# Patient Record
Sex: FEMALE | Race: WHITE | NOT HISPANIC OR LATINO | Employment: OTHER | URBAN - METROPOLITAN AREA
[De-identification: names, ages, dates, MRNs, and addresses within clinical notes are randomized per-mention and may not be internally consistent; named-entity substitution may affect disease eponyms.]

---

## 2017-01-20 ENCOUNTER — OFFICE VISIT (OUTPATIENT)
Dept: FAMILY MEDICINE | Facility: CLINIC | Age: 77
End: 2017-01-20
Payer: MEDICARE

## 2017-01-20 VITALS
SYSTOLIC BLOOD PRESSURE: 136 MMHG | WEIGHT: 141 LBS | DIASTOLIC BLOOD PRESSURE: 70 MMHG | TEMPERATURE: 97.5 F | OXYGEN SATURATION: 98 % | HEART RATE: 79 BPM | BODY MASS INDEX: 24.98 KG/M2

## 2017-01-20 DIAGNOSIS — R35.0 URINARY FREQUENCY: Primary | ICD-10-CM

## 2017-01-20 DIAGNOSIS — L65.9 HAIR THINNING: ICD-10-CM

## 2017-01-20 DIAGNOSIS — F43.9 STRESS: ICD-10-CM

## 2017-01-20 DIAGNOSIS — L98.9 SORE ON SCALP: ICD-10-CM

## 2017-01-20 DIAGNOSIS — R39.89 URINARY PROBLEM: ICD-10-CM

## 2017-01-20 DIAGNOSIS — I10 ESSENTIAL HYPERTENSION WITH GOAL BLOOD PRESSURE LESS THAN 140/90: ICD-10-CM

## 2017-01-20 DIAGNOSIS — R53.83 DECREASED ENERGY: ICD-10-CM

## 2017-01-20 DIAGNOSIS — R82.90 ABNORMAL URINE ODOR: ICD-10-CM

## 2017-01-20 DIAGNOSIS — Z86.39 HISTORY OF HYPOKALEMIA: ICD-10-CM

## 2017-01-20 DIAGNOSIS — E04.1 THYROID NODULE: ICD-10-CM

## 2017-01-20 LAB
ALBUMIN UR-MCNC: NEGATIVE MG/DL
AMORPH CRY #/AREA URNS HPF: ABNORMAL /HPF
APPEARANCE UR: ABNORMAL
BACTERIA #/AREA URNS HPF: ABNORMAL /HPF
BILIRUB UR QL STRIP: NEGATIVE
COLOR UR AUTO: YELLOW
GLUCOSE UR STRIP-MCNC: NEGATIVE MG/DL
HGB UR QL STRIP: NEGATIVE
KETONES UR STRIP-MCNC: NEGATIVE MG/DL
LEUKOCYTE ESTERASE UR QL STRIP: ABNORMAL
MICRO REPORT STATUS: NORMAL
NITRATE UR QL: POSITIVE
NON-SQ EPI CELLS #/AREA URNS LPF: ABNORMAL /LPF
PH UR STRIP: 8 PH (ref 5–7)
RBC #/AREA URNS AUTO: ABNORMAL /HPF (ref 0–2)
SP GR UR STRIP: 1.02 (ref 1–1.03)
SPECIMEN SOURCE: NORMAL
URN SPEC COLLECT METH UR: ABNORMAL
UROBILINOGEN UR STRIP-ACNC: 0.2 EU/DL (ref 0.2–1)
WBC #/AREA URNS AUTO: ABNORMAL /HPF (ref 0–2)
WET PREP SPEC: NORMAL

## 2017-01-20 PROCEDURE — 99214 OFFICE O/P EST MOD 30 MIN: CPT | Performed by: FAMILY MEDICINE

## 2017-01-20 PROCEDURE — 87088 URINE BACTERIA CULTURE: CPT | Performed by: FAMILY MEDICINE

## 2017-01-20 PROCEDURE — 81001 URINALYSIS AUTO W/SCOPE: CPT | Performed by: FAMILY MEDICINE

## 2017-01-20 PROCEDURE — 87186 SC STD MICRODIL/AGAR DIL: CPT | Performed by: FAMILY MEDICINE

## 2017-01-20 PROCEDURE — 87086 URINE CULTURE/COLONY COUNT: CPT | Performed by: FAMILY MEDICINE

## 2017-01-20 PROCEDURE — 87210 SMEAR WET MOUNT SALINE/INK: CPT | Performed by: FAMILY MEDICINE

## 2017-01-20 RX ORDER — FLUOXETINE 10 MG/1
10 CAPSULE ORAL DAILY
Qty: 90 CAPSULE | Refills: 1 | Status: SHIPPED | OUTPATIENT
Start: 2017-01-20 | End: 2017-07-27

## 2017-01-20 RX ORDER — LOSARTAN POTASSIUM 100 MG/1
100 TABLET ORAL
Qty: 90 TABLET | Refills: 1 | Status: SHIPPED | OUTPATIENT
Start: 2017-01-20 | End: 2017-04-10

## 2017-01-20 RX ORDER — POTASSIUM CHLORIDE 750 MG/1
10 TABLET, EXTENDED RELEASE ORAL 2 TIMES DAILY
Qty: 180 TABLET | Refills: 1 | Status: SHIPPED | OUTPATIENT
Start: 2017-01-20 | End: 2017-07-27

## 2017-01-20 RX ORDER — CARVEDILOL 25 MG/1
25 TABLET ORAL 2 TIMES DAILY WITH MEALS
Qty: 180 TABLET | Refills: 1 | Status: SHIPPED | OUTPATIENT
Start: 2017-01-20 | End: 2017-04-10

## 2017-01-20 ASSESSMENT — ANXIETY QUESTIONNAIRES
2. NOT BEING ABLE TO STOP OR CONTROL WORRYING: NOT AT ALL
GAD7 TOTAL SCORE: 1
3. WORRYING TOO MUCH ABOUT DIFFERENT THINGS: SEVERAL DAYS
7. FEELING AFRAID AS IF SOMETHING AWFUL MIGHT HAPPEN: NOT AT ALL
6. BECOMING EASILY ANNOYED OR IRRITABLE: NOT AT ALL
1. FEELING NERVOUS, ANXIOUS, OR ON EDGE: NOT AT ALL
5. BEING SO RESTLESS THAT IT IS HARD TO SIT STILL: NOT AT ALL

## 2017-01-20 ASSESSMENT — PATIENT HEALTH QUESTIONNAIRE - PHQ9: 5. POOR APPETITE OR OVEREATING: NOT AT ALL

## 2017-01-20 NOTE — PROGRESS NOTES
"  SUBJECTIVE:                                                    Tomasa Fam is a 76 year old female who presents to clinic today for the following health issues:      URINARY TRACT SYMPTOMS      Duration: intermittent 2 weeks    Description  odor and hesitancy, and \"shakes\" while sitting down to go, cloudy urine    Intensity:  intermittent    Accompanying signs and symptoms:  Fever/chills: no   Flank pain no   Nausea and vomiting: no   Vaginal symptoms: none  Abdominal/Pelvic Pain: YES- occassional    History  History of frequent UTI's: YES, but has not had one in awhile  History of kidney stones: no   Sexually Active: no   Possibility of pregnancy: No    Precipitating or alleviating factors: None    Therapies tried and outcome: none   Outcome: n/a     Follow up on hair loss. Needs medication refills.        Patient Active Problem List   Diagnosis     Chronic airway obstruction (H)     ASCUS on Pap smear     Hyperlipidemia LDL goal <160     Breast cancer (H)     Hypertension goal BP (blood pressure) < 140/90     Cystocele, midline     Uterovaginal prolapse     S/P hysterectomy     Advanced directives, counseling/discussion     History of hypokalemia     Concussion     Malignant neoplasm of upper lobe of right lung (H)     Thyroid nodule     Chronic pain of both knees       Current Outpatient Prescriptions   Medication Sig Dispense Refill     order for DME Equipment being ordered: The following items were dispensed: Knee Brace - Reaction medium RIGHT 1 Device 0     cholecalciferol (VITAMIN D) 400 UNIT TABS Take 400 Units by mouth daily       carvedilol (COREG) 25 MG tablet Take 1 tablet (25 mg) by mouth 2 times daily (with meals) 180 tablet 0     FLUoxetine (PROZAC) 10 MG capsule Take 1 capsule (10 mg) by mouth daily 90 capsule 0     losartan (COZAAR) 100 MG tablet Take 1 tablet (100 mg) by mouth daily (with breakfast) 90 tablet 0     potassium chloride SA (K-DUR,KLOR-CON M) 10 MEQ tablet Take 1 tablet (10 " mEq) by mouth 2 times daily 180 tablet 1     fluocinonide (LIDEX) 0.05 % external solution Apply topically 2 times daily       calcium-vitamin D (CALTRATE) 600-400 MG-UNIT per tablet Take 1 tablet by mouth 2 times daily       olopatadine HCl (PATADAY) 0.2 % SOLN Place 1 drop into the right eye daily as needed (runny eye)       loteprednol (LOTEMAX/ALREX) 0.2 % SUSP Place 1 drop into the right eye daily as needed (runny eye)       polyethylene glycol (MIRALAX/GLYCOLAX) powder Take 1 capful by mouth daily       aspirin 81 MG tablet Take 1 tablet (81 mg) by mouth daily 90 tablet 3     omega 3 1000 MG CAPS Take 1 g by mouth daily 90 capsule      chlorthalidone (HYGROTON) 25 MG tablet Take 1 tablet (25 mg) by mouth daily [DO NOT FILL. Patient will call] 90 tablet 1     B Complex Vitamins (VITAMIN  B COMPLEX) tablet Take 1 tablet by mouth daily.             ROS:  CONSTITUTIONAL:NEGATIVE for fever, chills, change in weight  INTEGUMENTARY/SKIN: notes she continues with hair loss. Wonders if there might be dandruff or other present.  RESP:NEGATIVE for significant cough or SOB  CV: NEGATIVE for chest pain, palpitations or peripheral edema  GI: NEGATIVE for nausea, abdominal pain, heartburn, or change in bowel habits  : see below.  PSYCHIATRIC: NEGATIVE for changes in mood or affect    Urine odor. This has been more constant.  Will sometimes feels like has to go and gets the shivers. Very little urine. Not frequent. Not at all today.  No dysuria.    No vaginal itch or discharge.     Hair loss; ? Stress.         OBJECTIVE:                                                    /70 mmHg  Pulse 79  Temp(Src) 97.5  F (36.4  C) (Oral)  Wt 141 lb (63.957 kg)  SpO2 98%  Body mass index is 24.98 kg/(m^2).  GENERAL APPEARANCE: alert and no distress  CV: regular rates and rhythm  MS: extremities normal- no gross deformities noted  SKIN: hair is diffusely thin. I am not seeing any lesions or dandruff.   PSYCH: mentation appears  normal and affect normal/bright    TSH   Date Value Ref Range Status   05/18/2016 2.58 0.40 - 4.00 mU/L Final   ]  PHQ-9 SCORE 8/3/2016 1/20/2017   Total Score 3 3       RYANNE-7 SCORE 8/3/2016 1/20/2017   Total Score 2 1             Component      Latest Ref Rng 9/1/2016   Sodium      133 - 144 mmol/L 132 (L)   Potassium      3.4 - 5.3 mmol/L 4.1   Chloride      94 - 109 mmol/L 97   Carbon Dioxide      20 - 32 mmol/L 28   Anion Gap      3 - 14 mmol/L 7   Glucose      70 - 99 mg/dL 89   Urea Nitrogen      7 - 30 mg/dL 16   Creatinine      0.52 - 1.04 mg/dL 0.78   GFR Estimate      >60 mL/min/1.7m2 71   GFR Estimate If Black      >60 mL/min/1.7m2 86   Calcium      8.5 - 10.1 mg/dL 9.6   WBC      4.0 - 11.0 10e9/L 5.5   RBC Count      3.8 - 5.2 10e12/L 4.22   Hemoglobin      11.7 - 15.7 g/dL 12.4   Hematocrit      35.0 - 47.0 % 36.7   MCV      78 - 100 fl 87   MCH      26.5 - 33.0 pg 29.4   MCHC      31.5 - 36.5 g/dL 33.8   RDW      10.0 - 15.0 % 12.4   Platelet Count      150 - 450 10e9/L 228   Bilirubin Direct      0.0 - 0.2 mg/dL 0.1   Bilirubin Total      0.2 - 1.3 mg/dL 0.4   Albumin      3.4 - 5.0 g/dL 3.8   Protein Total      6.8 - 8.8 g/dL 6.9   Alkaline Phosphatase      40 - 150 U/L 59   ALT      0 - 50 U/L 24   AST      0 - 45 U/L 19   Cholesterol      <200 mg/dL 210 (H)   Triglycerides      <150 mg/dL 158 (H)   HDL Cholesterol      >49 mg/dL 63   LDL Cholesterol Calculated      <100 mg/dL 115 (H)   Non HDL Cholesterol      <130 mg/dL 147 (H)     Reviewed Endocrinology notes.     ASSESSMENT/PLAN:                                                      Urinary frequency  Mild symptoms. Discussed and will await culture. She will contact us for worsening symptoms.   - Urine Culture Aerobic Bacterial    Abnormal urine odor  As above.   - Wet prep    Essential hypertension with goal blood pressure less than 140/90  Meeting goal. Refilling.   - carvedilol (COREG) 25 MG tablet; Take 1 tablet (25 mg) by mouth 2 times  daily (with meals)  - losartan (COZAAR) 100 MG tablet; Take 1 tablet (100 mg) by mouth daily (with breakfast)  - Urine Microscopic    Decreased energy  Improved with fluoxetine. Continue.   - FLUoxetine (PROZAC) 10 MG capsule; Take 1 capsule (10 mg) by mouth daily    Sore on scalp  Resolved.  - FLUoxetine (PROZAC) 10 MG capsule; Take 1 capsule (10 mg) by mouth daily    Hair thinning:  Could be stress. Discussed genetics. Thyroid OK. She has already discussed with Dermatology.     Stress  Feeling better.   - FLUoxetine (PROZAC) 10 MG capsule; Take 1 capsule (10 mg) by mouth daily    History of hypokalemia  Refilling.  - potassium chloride SA (K-DUR/KLOR-CON M) 10 MEQ CR tablet; Take 1 tablet (10 mEq) by mouth 2 times daily    Thyroid nodule  Will follow with Yina regarding this.  - ENDOCRINOLOGY ADULT REFERRAL    Urinary problem  As above.  - *UA reflex to Microscopic and Culture ([facility], [place] and [facility] (except [place] and [place])  - Urine Culture Aerobic Bacterial        Patient Instructions   I will be in touch through MyChart as the culture returns.            Violet Fenton MD, MD  [facility]

## 2017-01-20 NOTE — MR AVS SNAPSHOT
After Visit Summary   1/20/2017    Tomasa Fam    MRN: [id number]           Patient Information     Date Of Birth          1940        Visit Information        Provider Department      1/20/2017 3:10 PM Violet Fenton MD [facility]        Today's Diagnoses     Essential hypertension with goal blood pressure less than 140/90    -  1     Urinary frequency         Decreased energy         Sore on scalp         Stress         History of hypokalemia         Urinary problem         Abnormal urine odor           Care Instructions    I will be in touch through [email or web address] as the culture returns.            Follow-ups after your visit        Who to contact     If you have questions or need follow up information about today's clinic visit or your schedule please contact [facility] directly at [phone].  Normal or non-critical lab and imaging results will be communicated to you by [email or web address], letter or phone within 4 business days after the clinic has received the results. If you do not hear from us within 7 days, please contact the clinic through Within3t or phone. If you have a critical or abnormal lab result, we will notify you by phone as soon as possible.  Submit refill requests through [email or web address] or call your pharmacy and they will forward the refill request to us. Please allow 3 business days for your refill to be completed.          Additional Information About Your Visit        [email or web address] Information     [email or web address] gives you secure access to your electronic health record. If you see a primary care provider, you can also send messages to your care team and make appointments. If you have questions, please call your primary care clinic.  If you do not have a primary care provider, please call [phone] and they will assist you.        Care EveryWhere ID     This is your Care EveryWhere ID. This could be used by other organizations to access your [facility]  records  [id number]        Your Vitals Were     Pulse Temperature Pulse Oximetry             79 97.5  F (36.4  C) (Oral) 98%          Blood Pressure from Last 3 Encounters:   01/20/17 136/70   11/09/16 122/64   11/02/16 125/42    Weight from Last 3 Encounters:   01/20/17 141 lb (63.957 kg)   11/02/16 136 lb (61.689 kg)   10/27/16 139 lb 1.6 oz (63.095 kg)              We Performed the Following     *UA reflex to Microscopic and Culture ([facility], [place] and [facility] (except [place] and [place])     Urine Culture Aerobic Bacterial     Urine Microscopic     Wet prep          Where to get your medicines      These medications were sent to [id number] - [place], [place] - [address]  [address], [facility] [address]     Phone:  [phone]    - carvedilol 25 MG tablet  - FLUoxetine 10 MG capsule  - losartan 100 MG tablet  - potassium chloride SA 10 MEQ CR tablet       Primary Care Provider Office Phone # Fax #    Violet Fenton [phone]       [facility] [address]  [facility] [address]        Thank you!     Thank you for choosing [facility]  for your care. Our goal is always to provide you with excellent care. Hearing back from our patients is one way we can continue to improve our services. Please take a few minutes to complete the written survey that you may receive in the mail after your visit with us. Thank you!             Your Updated Medication List - Protect others around you: Learn how to safely use, store and throw away your medicines at www.disposemymeds.org.          This list is accurate as of: 1/20/17  3:56 PM.  Always use your most recent med list.                   Brand Name Dispense Instructions for use    aspirin 81 MG tablet     90 tablet    Take 1 tablet (81 mg) by mouth daily       calcium-vitamin D 600-400 MG-UNIT per tablet    CALTRATE     Take 1 tablet by  mouth 2 times daily       carvedilol 25 MG tablet    COREG    180 tablet    Take 1 tablet (25 mg) by mouth 2 times daily (with meals)       chlorthalidone 25 MG tablet    HYGROTON    90 tablet    Take 1 tablet (25 mg) by mouth daily [DO NOT FILL. Patient will call]       cholecalciferol 400 UNIT Tabs tablet    vitamin D     Take 400 Units by mouth daily       fluocinonide 0.05 % solution    LIDEX     Apply topically 2 times daily       FLUoxetine 10 MG capsule    PROzac    90 capsule    Take 1 capsule (10 mg) by mouth daily       losartan 100 MG tablet    COZAAR    90 tablet    Take 1 tablet (100 mg) by mouth daily (with breakfast)       loteprednol 0.2 % Susp ophthalmic susp    LOTEMAX/ALREX     Place 1 drop into the right eye daily as needed (runny eye)       olopatadine HCl 0.2 % Soln    PATADAY     Place 1 drop into the right eye daily as needed (runny eye)       omega 3 1000 MG Caps     90 capsule    Take 1 g by mouth daily       order for DME     1 Device    Equipment being ordered: The following items were dispensed: Knee Brace - Reaction medium RIGHT       polyethylene glycol powder    MIRALAX/GLYCOLAX     Take 1 capful by mouth daily       potassium chloride SA 10 MEQ CR tablet    K-DUR/KLOR-CON M    180 tablet    Take 1 tablet (10 mEq) by mouth 2 times daily       vitamin B complex with vitamin C Tabs tablet    STRESS TAB     Take 1 tablet by mouth daily.

## 2017-01-21 ASSESSMENT — PATIENT HEALTH QUESTIONNAIRE - PHQ9: SUM OF ALL RESPONSES TO PHQ QUESTIONS 1-9: 3

## 2017-01-21 ASSESSMENT — ANXIETY QUESTIONNAIRES: GAD7 TOTAL SCORE: 1

## 2017-01-24 ENCOUNTER — TELEPHONE (OUTPATIENT)
Dept: FAMILY MEDICINE | Facility: CLINIC | Age: 77
End: 2017-01-24

## 2017-01-24 DIAGNOSIS — N39.0 URINARY TRACT INFECTION WITHOUT HEMATURIA, SITE UNSPECIFIED: Primary | ICD-10-CM

## 2017-01-24 LAB
BACTERIA SPEC CULT: ABNORMAL
MICRO REPORT STATUS: ABNORMAL
MICROORGANISM SPEC CULT: ABNORMAL
SPECIMEN SOURCE: ABNORMAL

## 2017-01-24 RX ORDER — SULFAMETHOXAZOLE/TRIMETHOPRIM 800-160 MG
1 TABLET ORAL 2 TIMES DAILY
Qty: 10 TABLET | Refills: 0 | Status: SHIPPED | OUTPATIENT
Start: 2017-01-24 | End: 2017-04-10

## 2017-01-24 NOTE — TELEPHONE ENCOUNTER
Please call and see if she has any symptoms yet...    It does look like something is growing out though still not final as far as the identification.     There are sensitivities there. If she is still having any symptoms, would treat.  Otherwise, I do not think we need to.    Thanks!!!

## 2017-01-24 NOTE — TELEPHONE ENCOUNTER
Patient is having continued odor to urine, as well as on and off cloudiness, no fever. No problem and no pain with voiding. Does not drink a lot of water. Is up a lot at night if she does.  Please make this an inexpensive antibiotic.   Peyton Boyd, RN  Triage Nurse

## 2017-01-25 NOTE — TELEPHONE ENCOUNTER
Let's go with Septra. Take twice daily; best tolerated with food.   It should be inexpensive.    Thanks!

## 2017-02-09 ENCOUNTER — OFFICE VISIT (OUTPATIENT)
Dept: ENDOCRINOLOGY | Facility: CLINIC | Age: 77
End: 2017-02-09
Payer: MEDICARE

## 2017-02-09 VITALS
WEIGHT: 139.9 LBS | BODY MASS INDEX: 24.79 KG/M2 | SYSTOLIC BLOOD PRESSURE: 116 MMHG | HEART RATE: 78 BPM | HEIGHT: 63 IN | DIASTOLIC BLOOD PRESSURE: 60 MMHG | OXYGEN SATURATION: 98 %

## 2017-02-09 DIAGNOSIS — E04.1 THYROID NODULE: Primary | ICD-10-CM

## 2017-02-09 DIAGNOSIS — R79.89 ELEVATED TSH: ICD-10-CM

## 2017-02-09 LAB
T4 FREE SERPL-MCNC: 0.95 NG/DL (ref 0.76–1.46)
TSH SERPL DL<=0.05 MIU/L-ACNC: 2.81 MU/L (ref 0.4–4)

## 2017-02-09 PROCEDURE — 99213 OFFICE O/P EST LOW 20 MIN: CPT | Performed by: CLINICAL NURSE SPECIALIST

## 2017-02-09 PROCEDURE — 84443 ASSAY THYROID STIM HORMONE: CPT | Performed by: CLINICAL NURSE SPECIALIST

## 2017-02-09 PROCEDURE — 36415 COLL VENOUS BLD VENIPUNCTURE: CPT | Performed by: CLINICAL NURSE SPECIALIST

## 2017-02-09 PROCEDURE — 84439 ASSAY OF FREE THYROXINE: CPT | Performed by: CLINICAL NURSE SPECIALIST

## 2017-02-09 NOTE — PROGRESS NOTES
"Chief Complaint   Patient presents with     Follow Up For     THYROID       Initial There were no vitals taken for this visit. Estimated body mass index is 24.98 kg/(m^2) as calculated from the following:    Height as of 11/2/16: 1.6 m (5' 3\").    Weight as of 1/20/17: 63.957 kg (141 lb).  Medication Reconciliation: complete  "

## 2017-02-09 NOTE — PROGRESS NOTES
Name: Tomasa Fam  Seen at the request of Violet Fenton for thyroid nodule (Last seen 6/8/2016).   HPI:  Tomasa Fam is a 76 year old female who presents for the f/u of thyroid nodule.  Right thyroid nodule incidentally noted on chest CT done for further evaluation of lung nodules October 2015.  Repeat chest CT done February 2016 reports right thyroid nodule stable compared to previous CT.  Recent TSH also noted to be slightly elevated with normal free T4.  She has a prior history of breast cancer diagnosed 4 years ago, treated surgically followed by chemo and currently in remission.  Lung cancer recent diagnosed October 2015 felt secondary to second hand smoke (not mets from breast cancer), recent surgery 3/2016, felt to be cured surgically - no chemo planned.  No known family history of thyroid disease or thyroid cancer.  No personal history of radiation exposure to the head or neck.      The constant need to clear her throat and throat irritation/pain was due to nodule on her larynx.  This has been treated since she was last seen.      Her other concern is continued hair loss the past 2 years.  She has treated this with Rogaine but finds it inconvenient to apply twice daily.    History of radiation exposure: NO  History of thyroid dysfunction: NO  Palpitations:  No  Changes to hair or skin: Yes: increased hair loss the past 2 years, amount of hair loss varies  Diarrhea/Constipation:Yes: constipation felt related to medications she takes  Changes in menses: N/A  Changes in vision:No  Diplopia/Blurriness:No  Dysphagia or Shortness of breath:Yes: no SOB, but occasional difficulty swallowing  Muscle aches or pain:Yes: right knee pain related to arthritis  Tremors:No  Difficulty sleeping:Yes: off an don  Changes in weight: No  PMH/PSH:  Past Medical History   Diagnosis Date     Unspecified essential hypertension late 1980's     Diffuse cystic mastopathy      Chronic airway obstruction, not elsewhere  classified 2006     very mild COPD - small cough     Arthritis      hands, knees     Breast cancer (H) jan. 2012      left mastectomy followed by right;  Dr. Luong     Lung cancer (H) 10/21/2015     Past Surgical History   Procedure Laterality Date     C nonspecific procedure  1970     s/p Tubal ligation 1970     Colonoscopy  3/2003     adenomatous polyp      Colonoscopy  7/2006     diverticulosis - repeat in 5 years     Colonoscopy  10/13/2011     Procedure:COLONOSCOPY; COLONOSCOPY ; Surgeon:CHITO GORDILLO; Location:[facility]     Surgical history of -   in 40's     face lift     Surgical history of -        lipoma removed right thigh     Mastectomy simple, sentinel node, combined  1/6/2012     Procedure:COMBINED MASTECTOMY SIMPLE, SENTINEL NODE; Left Mastectomy Left [place] Node Biopsy,  Right Breast Reduction ; Surgeon:TRESSA TAPIA; Location:[facility]     Mammoplasty reduction  1/6/2012     Procedure:MAMMOPLASTY REDUCTION; Surgeon:MICKI CAICEDO; Location:[facility]     Liposuction, rhytidectomy, combined       Insert port vascular access  2/3/2012     Procedure:INSERT PORT VASCULAR ACCESS; Power Port-A- Catheter Placement ; Surgeon:TRESSA TAPIA; Location:[facility]     Surgical history of -        D and C     Davinci hysterectomy supracervical, sacrocolpopexy, combined  7/11/2012     Procedure: COMBINED DAVINCI HYSTERECTOMY SUPRACERVICAL, SACROCOLPOPEXY;   DAVINCI ASSISTED LAPAROSCOPIC SUPRACERVICAL HYSTERECTOMY AND BILATERAL SALPINGO-OOPHORECTOMY, SACROCOLPOPEXY AND CYSTOSCOPY;  Surgeon: Aline Cooper DO;  Location:  OR     Laparoscopic salpingo-oophorectomy  7/11/2012     Procedure: LAPAROSCOPIC SALPINGO-OOPHORECTOMY;  Davinci;  Surgeon: Aline Cooper DO;  Location:  OR     Cystoscopy  7/11/2012     Procedure: CYSTOSCOPY;;  Surgeon: Aline Cooper DO;  Location:  OR     Mastectomy simple  11/12/2012     Procedure: MASTECTOMY SIMPLE;   Right Prophylactic Mastectomy with attempted  Right Sentinal Node Biopsy, Revision Bilateral Mastectomy Insicions, liposuction in breast area;  Surgeon: Irish Douglas MD;  Location: [facility]     Revise reconstructed breast bilateral  11/12/2012     Procedure: REVISE RECONSTRUCTED BREAST BILATERAL;;  Surgeon: Katia Kyle MD;  Location: [facility]     Mastectomy, bilateral       Remove port vascular access  4/29/2013     Procedure: REMOVE PORT VASCULAR ACCESS;  Port A catheter removal ;  Surgeon: Irish Douglas MD;  Location: [facility]     Repair ptosis bilateral Bilateral 6/29/2015     Procedure: REPAIR PTOSIS BILATERAL;  Surgeon: Hank Olvera MD;  [facility]:  [facility]     Excise lesion eyelid Right 6/29/2015     Procedure: EXCISE LESION EYELID;  Surgeon: Hank Olvera MD;  Location:  [facility]     Thoracotomy Right 3/1/2016     Procedure: THORACOTOMY;  Surgeon: Jonas Woodward MD;  Location: [facility]     Lobectomy lung Right 3/1/2016     Procedure: LOBECTOMY LUNG;  Surgeon: Jonas Woodward MD;  Location: [facility]     Family Hx:  Family History   Problem Relation Age of Onset     Cardiovascular Father      ruptured aorta, hardening of the arteries     CEREBROVASCULAR DISEASE Mother      Respiratory Mother      chronic bronchitis - was a smoker early on     Cardiovascular Paternal Grandfather      MI     CEREBROVASCULAR DISEASE Paternal Aunt      Hypertension Son      Neurologic Disorder Daughter      migraines     Brain Tumor Sister      Thyroid disease: No         DM2: No         Autoimmune: DM1, SLE, RA, Vitiligo No    Social Hx:  Social History     Social History     Marital Status:      Spouse Name: Aren     Number of Children: 2     Years of Education: N/A     Occupational History     curves None      Social History Main Topics     Smoking status: Never Smoker      Smokeless tobacco: Never Used     Alcohol Use: 0.0 oz/week     0 Standard drinks or equivalent per week      Comment: occasional; perhaps one per day     Drug  "Use: No     Sexual Activity:     Partners: Male     Other Topics Concern     Exercise Yes     curves     Parent/Sibling W/ Cabg, Mi Or Angioplasty Before 65f 55m? Yes     Social History Narrative          MEDICATIONS:  has a current medication list which includes the following prescription(s): carvedilol, losartan, fluoxetine, potassium chloride sa, order for dme, cholecalciferol, fluocinonide, calcium-vitamin d, olopatadine hcl, loteprednol, polyethylene glycol, aspirin, omega 3, chlorthalidone, vitamin  b complex, and sulfamethoxazole-trimethoprim, and the following Facility-Administered Medications: cefazolin.    Review of Systems  10 point ROS neg other than the symptoms noted above in the HPI.    Physical Exam   VS: /60 mmHg  Pulse 78  Ht 1.6 m (5' 3\")  Wt 63.458 kg (139 lb 14.4 oz)  BMI 24.79 kg/m2  SpO2 98%  GENERAL: AXOX3, NAD, well dressed, answering questions appropriately, appears stated age.  HEENT: OP clear, no LAD, no TM, non-tender, no exophthalmos, no proptosis, EOMI, no lig lag, no retraction  NECK:  Supple, palpable right thyroid nodule, approximately 2 cm, smooth, firm, freely mobile, nontender, no adenopathy, no other distinct nodules noted.  CV: RRR, no rubs, gallops, no murmurs  LUNGS: CTAB, no wheezes, rales, or rhonchi  EXTREMITIES: no edema, +pulses, no rashes, no lesions  NEUROLOGY: CN grossly intact, no tremors  MSK: grossly intact  SKIN: no rashes, no lesions    LABS:  TFTs:  !THYROID Latest Ref Rng 5/18/2016 2/23/2016 6/10/2015 12/30/2011   TSH 0.40 - 4.00 mU/L 2.58 4.34 (H) 2.39 2.45   T4 FREE 0.76 - 1.46 ng/dL 0.93 1.00       Component    Latest Ref Rng 5/18/2016   Thyroglobulin Antibody    <40 IU/mL <20   Thyroid Peroxidase Antibody    <35 IU/mL <10     CT CHEST WITH CONTRAST 10/21/2015:  Right thyroid lobe nodule or cyst demonstrates  peripheral enhancement measuring 1.8 cm.    CT CHEST WITH CONTRAST 2/24/2016       HISTORY: Malignant neoplasm of upper-inner quadrant of " left female  breast. Solitary pulmonary nodule.     COMPARISON: CT chest 10/21/2015.     TECHNIQUE: Axial images from the thoracic inlet to the lung bases are  performed with additional coronal reformatted images. 80 mL of Isovue  370 are given intravenously.          FINDINGS:       Chest: Area of groundglass opacity with some associated solid  component on series 3, image 26 now measures 3.4 x 1.7 cm, previously  2.8 x 1.8 cm. Overall this appears slightly larger in comparison to  the prior study. Tiny 0.4 cm nodule medial right middle lobe on image  35 is stable. Tiny 0.3 cm nodule left lower lobe on image 46 and a  similar-sized nodule in the left lower lobe on image 38 that are also  stable. No new lung lesions are appreciated.     No pleural or pericardial fluid. Heart is normal in size. Esophagus is  unremarkable. Prominent right thyroid lobe nodule and/or cyst is  unchanged. No enlarged lymph nodes in the chest or axillas. Thoracic  aorta is normal in caliber with scattered calcified plaque. Bone  window examination is unremarkable. Mild degenerative spine changes  are present.         IMPRESSION:  1. Groundglass opacity right upper lobe along the minor fissure  appears slightly larger when compared to prior exam. 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 Followup as clinically warranted.  2. No enlarged lymph nodes in the chest or axillas.     3. Stable right thyroid lobe nodule and/or cyst.    ULTRASOUND HEAD NECK SOFT TISSUE  5/19/2016       HISTORY: Nontoxic single thyroid nodule. Abnormal results of thyroid  function studies.     COMPARISON: None.     FINDINGS:  Right thyroid measures 4.0 x 1.9 x 2.0 cm. Left thyroid  measures 3.5 x 0.9 x 1.1 cm. Thyroid isthmus measures 0.2 cm.     Thyroid nodules as follows:  1. 3.2 x 1.7 x 1.2 cm complex solid and cystic hypoechoic right mid to  low thyroid nodule.  2. 1.4 x 1.0 x 0.7 cm complex hypoechoic solid medial right lower  thyroid  nodule.                                                                       IMPRESSION: Thyroid nodules as above.       CYTOLOGIC INTERPRETATION: 6/2/2016    A.  FNA-thyroid, right thyroid nodule, correlates to #1 on  ultrasound:    Benign   Consistent with a benign nodule (includes adenomatoid nodule, colloid   nodule, etc.)     The [place]ot Implied Risk of Malignancy and Recommended Clinical   Management:   Benign has a 0-3% risk of malignancy, recommended management is clinical   follow-up     Specimen Adequacy: Satisfactory for evaluation.     B.  FNA-thyroid, right thyroid nodule, correlates to #2 on ultrasound:   Benign   Consistent with a benign nodule (includes adenomatoid nodule, colloid   nodule, etc.)   All pertinent notes, labs, and images personally reviewed by me.     A/P  Ms.Barbara Speedy Fam is a 75 year old here for the evaluation/managment of:    #1.  Thyroid nodule.  Two right thyroid nodules initially noted incidentally on CT scan - verified by ultrasound to be 3.2 cm and 1.4 cm.  FNA biopsy done 6/2/2016 consistent with benign nodules.  Plan to repeat TFT's today and obtain repeat thyroid ultrasound.    I don't believe the hair loss is related to her thyroid function.      Thyroid nodules are common and are frequently benign. Data suggest that the prevalence of palpable thyroid nodules is 3% to 7% in [place]; the prevalence is as high as 50% based on ultrasonography (US) or autopsy data. All patients with a palpable thyroid nodule, however, should undergo US examination. US-guided FNA (US-FNA) is recommended for nodules ?10 mm; US-FNA is suggested for nodules <10 mm only if clinical information or US features are suspicious.    Causes of thyroid Nodules: Benign (Multinodular goiter, Hashimoto s thyroiditis, Simple or hemorrhagic cysts, Follicular adenomas, Subacute thyroiditis) or Malignant(Papillary carcinoma, Follicular carcinoma, Hürthle cell carcinoma, Medullary carcinoma,  Anaplastic carcinoma, Primary thyroid lymphoma, or Metastatic malignant lesion).    MultiNodule:  The risk of cancer is not significantly higher in palpable solitary thyroid nodules than in multinodular lesions or in nodules in diffuse goiters. In multinodular thyroid glands, the cytologic sampling should be focused on lesions characterized by suspicious US features rather than on larger or clinically dominant nodules.    Cyst:  Most complex thyroid nodules with a dominant fluid component are benign. USFNA, however, should always be performed because the rare papillary thyroid carcinoma (PTC) can be cystic. An unsatisfactory (nondiagnostic) specimen usually results from a cystic nodule that yields few or no follicular cells. Reaspiration yields satisfactory results in 50% of cases.    Fine-Needle Aspiration Cytologic Diagnosis: 70% of FNA specimens are classified as benign; in addition, 5% are malignant, 10% are suspicious, and 10% to 20% are nondiagnostic or unsatisfactory. At surgical intervention, about 20% of such indeterminate/suspicious specimens are found to be malignant lesions.    Will first obtain thyroid ultrasound to confirm nodule size and characteristics and screen for any other occult nodules.  Once ultrasound completed, will schedule FNA biopsy of any thyroid nodules meeting criteria for biopsy.  Despite good initial technique, repeated biopsy, and US-FNA, approximately 5% of nodules still remain nondiagnostic. Such thyroid nodules should be surgically excised.    #2.  Elevated TSH/Subclinical hypothyroidism.  Repeat TFT's were WNL's and thyroid antibodies were not elevated. Continue to monitor.    Labs ordered today:   Orders Placed This Encounter   Procedures     US Thyroid     TSH     T4 FREE     Radiology/Consults ordered today: US THYROID    More than 50% of the time spent with Ms. Fam on counseling / coordinating her care. Total face to face time was greater than or equal to 15  minutes.      Follow-up:  6 months    Yina Tran NP  Endocrinology  [facility]  CC: Violet Fenton

## 2017-02-15 NOTE — PROGRESS NOTES
Chandrika,  Your thyroid levels are normal.  I recommend rechecking thyroid labs again in another 6 months.  I'll let you know the thyroid ultrasound results when available.  Yina Tran NP  Endocrinology

## 2017-02-23 ENCOUNTER — HOSPITAL ENCOUNTER (OUTPATIENT)
Dept: ULTRASOUND IMAGING | Facility: CLINIC | Age: 77
Discharge: HOME | End: 2017-02-23
Attending: CLINICAL NURSE SPECIALIST | Admitting: CLINICAL NURSE SPECIALIST
Payer: MEDICARE

## 2017-02-23 DIAGNOSIS — R79.89 ELEVATED TSH: ICD-10-CM

## 2017-02-23 DIAGNOSIS — E04.1 THYROID NODULE: ICD-10-CM

## 2017-02-23 PROCEDURE — 76536 US EXAM OF HEAD AND NECK: CPT

## 2017-02-24 NOTE — PROGRESS NOTES
Chandrika,  Your thyroid ultrasound is unchanged compare to the previous thyroid ultrasound done 5/2016.  Here's a copy of the results for your records.  Let me know if you have any questions.  Yina Tran NP  Endocrinology

## 2017-03-09 DIAGNOSIS — I10 HYPERTENSION GOAL BP (BLOOD PRESSURE) < 140/90: ICD-10-CM

## 2017-03-09 NOTE — TELEPHONE ENCOUNTER
chlorthalidone (HYGROTON) 25 MG      Last Written Prescription Date: 1/4/16  Last Fill Quantity: 90, # refills: 1  Last Office Visit with G, P or [facility] prescribing provider: 1/20/17       Potassium   Date Value Ref Range Status   09/01/2016 4.1 3.4 - 5.3 mmol/L Final     Creatinine   Date Value Ref Range Status   09/01/2016 0.78 0.52 - 1.04 mg/dL Final     BP Readings from Last 3 Encounters:   02/09/17 116/60   01/20/17 136/70   11/09/16 122/64

## 2017-03-10 RX ORDER — CHLORTHALIDONE 25 MG/1
25 TABLET ORAL DAILY
Qty: 90 TABLET | Refills: 1 | Status: SHIPPED | OUTPATIENT
Start: 2017-03-10 | End: 2017-06-01

## 2017-03-10 NOTE — TELEPHONE ENCOUNTER
Prescription approved per [facility] Refill Protocol.    Not sure if the pt has been taking this regularly as she would have been out of it a while ago.       Called the pt to see if she has been taking the medication.  She says she does take it daily or her ankles get swollen.

## 2017-03-17 ENCOUNTER — TELEPHONE (OUTPATIENT)
Dept: PEDIATRICS | Facility: CLINIC | Age: 77
End: 2017-03-17

## 2017-03-17 NOTE — TELEPHONE ENCOUNTER
Pt notifies the following:     - morning BP reading has been high, then it drops too low after med intake - this has been going on for about 1-2 weeks now  - has been checking BP 2-3 times a day lately  - BP at 9 am today 154/68 - 84, took her regular BP meds, rechecked BP around 10:30 am, it was 86/42 - 85  - evening BP(~ 4 pm) has been 140/70 - 79  - when her BP goes down, she has been feeling weakness all over the body(especially legs)  - after resting for a while it gets better  - denies chest pain/tightness, PADRON, HA, SOB, vision changes, weakness in one side of face/body, diaphoresis, lethargy, confusion, palpitations, vomiting, nausea, pain radiating to jaw/shoulder/hand or dehydration sx's  - pt has been taking Coreg 25 mg bid, chlorthalidone 25 mg qam & losartan 100 mg qhs    Please advise on any BP med dose change. Pt is stable. Pt can be reached at [phone](OK to LM).    Gia, RN  Triage Nurse

## 2017-03-17 NOTE — TELEPHONE ENCOUNTER
See if she is willing to try switching the losartan and chlorthalidone. Take the losartan in am and chlorthalidone in pm.    The chlorthalidone is a diuretic, but a little long acting. It may not interrupt sleep so much regarding getting up to go to the bathroom.

## 2017-03-28 ENCOUNTER — TRANSFERRED RECORDS (OUTPATIENT)
Dept: SURGERY | Facility: CLINIC | Age: 77
End: 2017-03-28

## 2017-03-28 ENCOUNTER — TRANSFERRED RECORDS (OUTPATIENT)
Dept: HEALTH INFORMATION MANAGEMENT | Facility: CLINIC | Age: 77
End: 2017-03-28

## 2017-04-10 ENCOUNTER — OFFICE VISIT (OUTPATIENT)
Dept: FAMILY MEDICINE | Facility: CLINIC | Age: 77
End: 2017-04-10
Payer: MEDICARE

## 2017-04-10 VITALS
BODY MASS INDEX: 24.38 KG/M2 | TEMPERATURE: 97.4 F | WEIGHT: 137.6 LBS | HEART RATE: 83 BPM | SYSTOLIC BLOOD PRESSURE: 101 MMHG | DIASTOLIC BLOOD PRESSURE: 64 MMHG | RESPIRATION RATE: 16 BRPM | HEIGHT: 63 IN

## 2017-04-10 DIAGNOSIS — R07.0 THROAT DISCOMFORT: ICD-10-CM

## 2017-04-10 DIAGNOSIS — E78.5 HYPERLIPIDEMIA LDL GOAL <160: ICD-10-CM

## 2017-04-10 DIAGNOSIS — R42 LIGHTHEADEDNESS: Primary | ICD-10-CM

## 2017-04-10 DIAGNOSIS — I10 ESSENTIAL HYPERTENSION WITH GOAL BLOOD PRESSURE LESS THAN 140/90: ICD-10-CM

## 2017-04-10 PROCEDURE — 80048 BASIC METABOLIC PNL TOTAL CA: CPT | Performed by: FAMILY MEDICINE

## 2017-04-10 PROCEDURE — 36415 COLL VENOUS BLD VENIPUNCTURE: CPT | Performed by: FAMILY MEDICINE

## 2017-04-10 PROCEDURE — 99214 OFFICE O/P EST MOD 30 MIN: CPT | Performed by: FAMILY MEDICINE

## 2017-04-10 RX ORDER — CARVEDILOL 25 MG/1
12.5 TABLET ORAL 2 TIMES DAILY WITH MEALS
Qty: 1 TABLET | Refills: 0 | Status: SHIPPED | OUTPATIENT
Start: 2017-04-10 | End: 2017-06-01

## 2017-04-10 RX ORDER — LOSARTAN POTASSIUM 100 MG/1
50 TABLET ORAL
COMMUNITY
Start: 2017-04-10 | End: 2017-05-05

## 2017-04-10 NOTE — PATIENT INSTRUCTIONS
At this time, let's have you do the following.    Take 1/2 of the carvedilol twice daily.  Take 1/2 of the losartan in the evening.    Continue chlortalidone.    -------------------------    In the long run, we may be able to wean you off carvedilol. We would want to do this gradually; if you do it suddenly, you may develop a fast heart rate.    Let's see you back in ~ 3 weeks with your blood pressure readings.

## 2017-04-10 NOTE — PROGRESS NOTES
SUBJECTIVE:                                                    Tomasa Fam is a 76 year old female who presents to clinic today for the following health issues:      Here to discuss low blood pressure reading in the am.  Been running about /42-88.  Discuss taking Niacin.  Discuss throat - worse while laying down.  Cyst on the lower left leg.  Compared pt's home monitor to clinic monitor.  Pt's blood pressure reading 119 66.        Problem list and histories reviewed & adjusted, as indicated.  Additional history:     See under ROS    Patient Active Problem List   Diagnosis     Chronic airway obstruction (H)     ASCUS on Pap smear     Hyperlipidemia LDL goal <160     Breast cancer (H)     Hypertension goal BP (blood pressure) < 140/90     Cystocele, midline     Uterovaginal prolapse     S/P hysterectomy     Advanced directives, counseling/discussion     History of hypokalemia     Concussion     Malignant neoplasm of upper lobe of right lung (H)     Thyroid nodule     Chronic pain of both knees       Current Outpatient Prescriptions   Medication Sig Dispense Refill     chlorthalidone (HYGROTON) 25 MG tablet Take 1 tablet (25 mg) by mouth daily 90 tablet 1     carvedilol (COREG) 25 MG tablet Take 1 tablet (25 mg) by mouth 2 times daily (with meals) 180 tablet 1     FLUoxetine (PROZAC) 10 MG capsule Take 1 capsule (10 mg) by mouth daily 90 capsule 1     potassium chloride SA (K-DUR/KLOR-CON M) 10 MEQ CR tablet Take 1 tablet (10 mEq) by mouth 2 times daily 180 tablet 1     order for DME Equipment being ordered: The following items were dispensed: Knee Brace - Reaction medium RIGHT 1 Device 0     cholecalciferol (VITAMIN D) 400 UNIT TABS Take 400 Units by mouth daily       fluocinonide (LIDEX) 0.05 % external solution Apply topically 2 times daily       calcium-vitamin D (CALTRATE) 600-400 MG-UNIT per tablet Take 1 tablet by mouth 2 times daily       olopatadine HCl (PATADAY) 0.2 % SOLN Place 1 drop into  "the right eye daily as needed (runny eye)       loteprednol (LOTEMAX/ALREX) 0.2 % SUSP Place 1 drop into the right eye daily as needed (runny eye)       polyethylene glycol (MIRALAX/GLYCOLAX) powder Take 1 capful by mouth daily       aspirin 81 MG tablet Take 1 tablet (81 mg) by mouth daily 90 tablet 3     omega 3 1000 MG CAPS Take 1 g by mouth daily 90 capsule      B Complex Vitamins (VITAMIN  B COMPLEX) tablet Take 1 tablet by mouth daily.       losartan (COZAAR) 100 MG tablet Take 1 tablet (100 mg) by mouth daily (with breakfast) (Patient not taking: Reported on 4/10/2017) 90 tablet 1     SHE IS NOT TAKING THE LOSARTAN    Reviewed and updated as needed this visit by clinical staff  Tobacco  Allergies  Med Hx  Surg Hx  Fam Hx  Soc Hx      Reviewed and updated as needed this visit by Provider         ROS:  CONSTITUTIONAL:NEGATIVE for fever, chills, change in weight  CV: NEGATIVE for chest pain, palpitations or change in peripheral edema. Does get swelling by the end of the day.  Will get hard heart beats; not fast ones.  PSYCHIATRIC: NEGATIVE for changes in mood or affect    Notes bp low in the morning.   Did bump into something (wall or other)  this am; notes vision was black. Did not pass out.  Notes in the am.    Took self off losartan. Stopped mid March.  The dizzines will usually go away.    Stopped something due to cost for lipids. (zetia).   Wonders about niacin.     Throat is doing similar thing as prior to surgery.  Anticipates having it looked at again.   Will cough when lay down. Gets phlegmy stuff.     Has been to thyroid doctor; nothing changing there.     OBJECTIVE:                                                    /64 (BP Location: Left arm, Patient Position: Chair, Cuff Size: Adult Regular)  Pulse 83  Temp 97.4  F (36.3  C) (Oral)  Resp 16  Ht 5' 3\" (1.6 m)  Wt 137 lb 9.6 oz (62.4 kg)  BMI 24.37 kg/m2  Body mass index is 24.37 kg/(m^2).  GENERAL APPEARANCE: alert and no " distress  RESP: lungs clear to auscultation - no rales, rhonchi or wheezes  CV: regular rates and rhythm  MS: extremities normal- no gross deformities noted  PSYCH: mentation appears normal and affect normal/bright    Recent Labs   Lab Test  09/01/16   0830  06/10/15   0925  07/22/14   0827   CHOL  210*  194  183   HDL  63  70  55   LDL  115*  106  105   TRIG  158*  88  114   CHOLHDLRATIO   --   2.8  3.3              ASSESSMENT/PLAN:                                                      Lightheadedness  Primarily in the am. Suspect may be related to lowish bp.  She is improved after stopping the losartan (on her own). Denies CV disease or palpitations; at this time, will attempt to wean/stop the beta blocker and get ARB back on board.     Essential hypertension with goal blood pressure less than 140/90  See above. Discussed potential for palpitations with stopping beta blocker. Will attempt to wean. Getting back on ARB; may need to adjust.   - Basic metabolic panel  - carvedilol (COREG) 25 MG tablet; Take 0.5 tablets (12.5 mg) by mouth 2 times daily (with meals)  - losartan (COZAAR) 100 MG tablet; Take 0.5 tablets (50 mg) by mouth daily (with breakfast)    Hyperlipidemia LDL goal <160  In the future.   - **Lipid panel reflex to direct LDL FUTURE anytime; Future    Throat discomfort  Uncertain etiology. She plans to check with ENT. Discussed possible PND. She could try something like Flonase        Patient Instructions   At this time, let's have you do the following.    Take 1/2 of the carvedilol twice daily.  Take 1/2 of the losartan in the evening.    Continue chlortalidone.    -------------------------    In the long run, we may be able to wean you off carvedilol. We would want to do this gradually; if you do it suddenly, you may develop a fast heart rate.    Let's see you back in ~ 3 weeks with your blood pressure readings.          Violet Fenton MD, MD  [facility]

## 2017-04-10 NOTE — NURSING NOTE
"Chief Complaint   Patient presents with     Hypertension       Initial /64 (BP Location: Left arm, Patient Position: Chair, Cuff Size: Adult Regular)  Pulse 83  Temp 97.4  F (36.3  C) (Oral)  Resp 16  Ht 5' 3\" (1.6 m)  Wt 137 lb 9.6 oz (62.4 kg)  BMI 24.37 kg/m2 Estimated body mass index is 24.37 kg/(m^2) as calculated from the following:    Height as of this encounter: 5' 3\" (1.6 m).    Weight as of this encounter: 137 lb 9.6 oz (62.4 kg).  Medication Reconciliation: complete   Kamini Bob, AMADO      "

## 2017-04-10 NOTE — MR AVS SNAPSHOT
After Visit Summary   4/10/2017    Tomasa aFm    MRN: [id number]           Patient Information     Date Of Birth          1940        Visit Information        Provider Department      4/10/2017 8:50 AM Violet Fenton MD [facility]        Today's Diagnoses     Hypertension goal BP (blood pressure) < 140/90    -  1    Hyperlipidemia LDL goal <160          Care Instructions    At this time, let's have you do the following.    Take 1/2 of the carvedilol twice daily.  Take 1/2 of the losartan in the evening.    Continue chlortalidone.    -------------------------    In the long run, we may be able to wean you off carvedilol. We would want to do this gradually; if you do it suddenly, you may develop a fast heart rate.    Let's see you back in ~ 3 weeks with your blood pressure readings.            Follow-ups after your visit        Future tests that were ordered for you today     Open Future Orders        Priority Expected Expires Ordered    **Lipid panel reflex to direct LDL FUTURE anytime Routine 4/10/2017 4/10/2018 4/10/2017            Who to contact     If you have questions or need follow up information about today's clinic visit or your schedule please contact [facility] directly at [phone].  Normal or non-critical lab and imaging results will be communicated to you by MyChart, letter or phone within 4 business days after the clinic has received the results. If you do not hear from us within 7 days, please contact the clinic through MyChart or phone. If you have a critical or abnormal lab result, we will notify you by phone as soon as possible.  Submit refill requests through [employer]ated[employer] or call your pharmacy and they will forward the refill request to us. Please allow 3 business days for your refill to be completed.          Additional Information About Your Visit        [employer] Information     [employer] gives you secure access to your electronic health  "record. If you see a primary care provider, you can also send messages to your care team and make appointments. If you have questions, please call your primary care clinic.  If you do not have a primary care provider, please call [phone] and they will assist you.        Care EveryWhere ID     This is your Care EveryWhere ID. This could be used by other organizations to access your [place] medical records  [id number]        Your Vitals Were     Pulse Temperature Respirations Height BMI (Body Mass Index)       83 97.4  F (36.3  C) (Oral) 16 5' 3\" (1.6 m) 24.37 kg/m2        Blood Pressure from Last 3 Encounters:   04/10/17 101/64   02/09/17 116/60   01/20/17 136/70    Weight from Last 3 Encounters:   04/10/17 137 lb 9.6 oz (62.4 kg)   02/09/17 139 lb 14.4 oz (63.5 kg)   01/20/17 141 lb (64 kg)              We Performed the Following     Basic metabolic panel        Primary Care Provider Office Phone # Fax #    Violet Fenton [phone]       [facility] [address] [facility] [address]        Thank you!     Thank you for choosing [facility]  for your care. Our goal is always to provide you with excellent care. Hearing back from our patients is one way we can continue to improve our services. Please take a few minutes to complete the written survey that you may receive in the mail after your visit with us. Thank you!             Your Updated Medication List - Protect others around you: Learn how to safely use, store and throw away your medicines at www.disposemymeds.org.          This list is accurate as of: 4/10/17  9:50 AM.  Always use your most recent med list.                   Brand Name Dispense Instructions for use    aspirin 81 MG tablet     90 tablet    Take 1 tablet (81 mg) by mouth daily       calcium-vitamin D 600-400 MG-UNIT per tablet    CALTRATE     Take 1 tablet by mouth 2 times daily       carvedilol 25 MG tablet    COREG    180 tablet    Take " 1 tablet (25 mg) by mouth 2 times daily (with meals)       chlorthalidone 25 MG tablet    HYGROTON    90 tablet    Take 1 tablet (25 mg) by mouth daily       cholecalciferol 400 UNIT Tabs tablet    vitamin D     Take 400 Units by mouth daily       fluocinonide 0.05 % solution    LIDEX     Apply topically 2 times daily       FLUoxetine 10 MG capsule    PROzac    90 capsule    Take 1 capsule (10 mg) by mouth daily       losartan 100 MG tablet    COZAAR    90 tablet    Take 1 tablet (100 mg) by mouth daily (with breakfast)       loteprednol 0.2 % Susp ophthalmic susp    LOTEMAX/ALREX     Place 1 drop into the right eye daily as needed (runny eye)       olopatadine HCl 0.2 % Soln    PATADAY     Place 1 drop into the right eye daily as needed (runny eye)       omega 3 1000 MG Caps     90 capsule    Take 1 g by mouth daily       order for DME     1 Device    Equipment being ordered: The following items were dispensed: Knee Brace - Reaction medium RIGHT       polyethylene glycol powder    MIRALAX/GLYCOLAX     Take 1 capful by mouth daily       potassium chloride SA 10 MEQ CR tablet    K-DUR/KLOR-CON M    180 tablet    Take 1 tablet (10 mEq) by mouth 2 times daily       vitamin B complex with vitamin C Tabs tablet    STRESS TAB     Take 1 tablet by mouth daily.

## 2017-04-11 LAB
ANION GAP SERPL CALCULATED.3IONS-SCNC: 7 MMOL/L (ref 3–14)
BUN SERPL-MCNC: 21 MG/DL (ref 7–30)
CALCIUM SERPL-MCNC: 9.9 MG/DL (ref 8.5–10.1)
CHLORIDE SERPL-SCNC: 97 MMOL/L (ref 94–109)
CO2 SERPL-SCNC: 31 MMOL/L (ref 20–32)
CREAT SERPL-MCNC: 0.84 MG/DL (ref 0.52–1.04)
GFR SERPL CREATININE-BSD FRML MDRD: 66 ML/MIN/1.7M2
GLUCOSE SERPL-MCNC: 110 MG/DL (ref 70–99)
POTASSIUM SERPL-SCNC: 3.4 MMOL/L (ref 3.4–5.3)
SODIUM SERPL-SCNC: 135 MMOL/L (ref 133–144)

## 2017-04-26 ENCOUNTER — TRANSFERRED RECORDS (OUTPATIENT)
Dept: SURGERY | Facility: CLINIC | Age: 77
End: 2017-04-26

## 2017-04-26 ENCOUNTER — TRANSFERRED RECORDS (OUTPATIENT)
Dept: HEALTH INFORMATION MANAGEMENT | Facility: CLINIC | Age: 77
End: 2017-04-26

## 2017-05-05 ENCOUNTER — OFFICE VISIT (OUTPATIENT)
Dept: FAMILY MEDICINE | Facility: CLINIC | Age: 77
End: 2017-05-05
Payer: MEDICARE

## 2017-05-05 VITALS
OXYGEN SATURATION: 99 % | SYSTOLIC BLOOD PRESSURE: 144 MMHG | HEART RATE: 79 BPM | DIASTOLIC BLOOD PRESSURE: 72 MMHG | HEIGHT: 63 IN | BODY MASS INDEX: 24.82 KG/M2 | RESPIRATION RATE: 16 BRPM | TEMPERATURE: 97.4 F | WEIGHT: 140.1 LBS

## 2017-05-05 DIAGNOSIS — I10 ESSENTIAL HYPERTENSION WITH GOAL BLOOD PRESSURE LESS THAN 140/90: ICD-10-CM

## 2017-05-05 DIAGNOSIS — E78.5 HYPERLIPIDEMIA LDL GOAL <160: ICD-10-CM

## 2017-05-05 LAB
ANION GAP SERPL CALCULATED.3IONS-SCNC: 8 MMOL/L (ref 3–14)
BUN SERPL-MCNC: 19 MG/DL (ref 7–30)
CALCIUM SERPL-MCNC: 10.1 MG/DL (ref 8.5–10.1)
CHLORIDE SERPL-SCNC: 96 MMOL/L (ref 94–109)
CHOLEST SERPL-MCNC: 243 MG/DL
CO2 SERPL-SCNC: 29 MMOL/L (ref 20–32)
CREAT SERPL-MCNC: 0.74 MG/DL (ref 0.52–1.04)
GFR SERPL CREATININE-BSD FRML MDRD: 76 ML/MIN/1.7M2
GLUCOSE SERPL-MCNC: 93 MG/DL (ref 70–99)
HDLC SERPL-MCNC: 72 MG/DL
LDLC SERPL CALC-MCNC: 152 MG/DL
NONHDLC SERPL-MCNC: 171 MG/DL
POTASSIUM SERPL-SCNC: 3.9 MMOL/L (ref 3.4–5.3)
SODIUM SERPL-SCNC: 133 MMOL/L (ref 133–144)
TRIGL SERPL-MCNC: 94 MG/DL

## 2017-05-05 PROCEDURE — 99213 OFFICE O/P EST LOW 20 MIN: CPT | Performed by: FAMILY MEDICINE

## 2017-05-05 PROCEDURE — 36415 COLL VENOUS BLD VENIPUNCTURE: CPT | Performed by: FAMILY MEDICINE

## 2017-05-05 PROCEDURE — 80048 BASIC METABOLIC PNL TOTAL CA: CPT | Performed by: FAMILY MEDICINE

## 2017-05-05 PROCEDURE — 80061 LIPID PANEL: CPT | Performed by: FAMILY MEDICINE

## 2017-05-05 RX ORDER — CARVEDILOL 6.25 MG/1
6.25 TABLET ORAL 2 TIMES DAILY WITH MEALS
Qty: 60 TABLET | Refills: 0 | Status: SHIPPED | OUTPATIENT
Start: 2017-05-05 | End: 2017-06-01

## 2017-05-05 RX ORDER — LOSARTAN POTASSIUM 100 MG/1
100 TABLET ORAL
Qty: 30 TABLET | Refills: 0 | Status: SHIPPED | OUTPATIENT
Start: 2017-05-05 | End: 2017-06-01

## 2017-05-05 NOTE — PROGRESS NOTES
SUBJECTIVE:                                                    Tomasa Fam is a 76 year old female who presents to clinic today for the following health issues:      Hypertension Follow-up      Outpatient blood pressures are being checked at home.  Results are 132-180/71-91.    Low Salt Diet: not monitoring salt       Amount of exercise or physical activity: low to moderate    Problems taking medications regularly: No    Medication side effects: none    Diet: regular (no restrictions)  Did have 2 episodes of feeling really weak. Has to sit down.         Problem list and histories reviewed & adjusted, as indicated.  Additional history:   See under ROS    Patient Active Problem List   Diagnosis     Chronic airway obstruction (H)     ASCUS on Pap smear     Hyperlipidemia LDL goal <160     Breast cancer (H)     Hypertension goal BP (blood pressure) < 140/90     Cystocele, midline     Uterovaginal prolapse     S/P hysterectomy     Advanced directives, counseling/discussion     History of hypokalemia     Concussion     Malignant neoplasm of upper lobe of right lung (H)     Thyroid nodule     Chronic pain of both knees       Current Outpatient Prescriptions   Medication Sig Dispense Refill     carvedilol (COREG) 25 MG tablet Take 0.5 tablets (12.5 mg) by mouth 2 times daily (with meals) 1 tablet 0     losartan (COZAAR) 100 MG tablet Take 0.5 tablets (50 mg) by mouth daily (with breakfast)       chlorthalidone (HYGROTON) 25 MG tablet Take 1 tablet (25 mg) by mouth daily 90 tablet 1     FLUoxetine (PROZAC) 10 MG capsule Take 1 capsule (10 mg) by mouth daily 90 capsule 1     cholecalciferol (VITAMIN D) 400 UNIT TABS Take 400 Units by mouth daily       fluocinonide (LIDEX) 0.05 % external solution Apply topically 2 times daily       calcium-vitamin D (CALTRATE) 600-400 MG-UNIT per tablet Take 1 tablet by mouth 2 times daily       aspirin 81 MG tablet Take 1 tablet (81 mg) by mouth daily 90 tablet 3     B Complex  "Vitamins (VITAMIN  B COMPLEX) tablet Take 1 tablet by mouth daily.       potassium chloride SA (K-DUR/KLOR-CON M) 10 MEQ CR tablet Take 1 tablet (10 mEq) by mouth 2 times daily 180 tablet 1     order for DME Equipment being ordered: The following items were dispensed: Knee Brace - Reaction medium RIGHT 1 Device 0     olopatadine HCl (PATADAY) 0.2 % SOLN Place 1 drop into the right eye daily as needed (runny eye)       loteprednol (LOTEMAX/ALREX) 0.2 % SUSP Place 1 drop into the right eye daily as needed (runny eye)       polyethylene glycol (MIRALAX/GLYCOLAX) powder Take 1 capful by mouth daily       omega 3 1000 MG CAPS Take 1 g by mouth daily 90 capsule            Reviewed and updated as needed this visit by clinical staff  Tobacco  Allergies  Med Hx  Surg Hx  Fam Hx  Soc Hx      Reviewed and updated as needed this visit by Provider         ROS:  CONSTITUTIONAL:NEGATIVE for fever, chills, change in weight  RESP:NEGATIVE for significant cough or SOB  CV: NEGATIVE for chest pain, palpitations or peripheral edema  PSYCHIATRIC: NEGATIVE for changes in mood or affect    Things are going better.  Only had the problem once. Weakness; not in her head. Difficult to stand up.    Half of everything now (carvedilol and losartan).  No palpitations.    Fasting; has been off cholesterol medication. (zetial).  Due to cost.   Not on statin due to leg pain.      OBJECTIVE:                                                    /72 (BP Location: Right arm, Patient Position: Chair, Cuff Size: Adult Regular)  Pulse 79  Temp 97.4  F (36.3  C) (Oral)  Resp 16  Ht 5' 3\" (1.6 m)  Wt 140 lb 1.6 oz (63.5 kg)  SpO2 99%  BMI 24.82 kg/m2  Body mass index is 24.82 kg/(m^2).     GENERAL APPEARANCE: alert and no distress  CV: regular rates and rhythm  MS: no edema  PSYCH: mentation appears normal and affect normal/bright      Last Basic Metabolic Panel:  Lab Results   Component Value Date     04/10/2017      Lab Results "   Component Value Date    POTASSIUM 3.4 04/10/2017     Lab Results   Component Value Date    CHLORIDE 97 04/10/2017     Lab Results   Component Value Date    CHARLIE 9.9 04/10/2017     Lab Results   Component Value Date    CO2 31 04/10/2017     Lab Results   Component Value Date    BUN 21 04/10/2017     Lab Results   Component Value Date    CR 0.84 04/10/2017     Lab Results   Component Value Date     04/10/2017       Recent Labs   Lab Test  09/01/16   0830  06/10/15   0925  07/22/14   0827   CHOL  210*  194  183   HDL  63  70  55   LDL  115*  106  105   TRIG  158*  88  114   CHOLHDLRATIO   --   2.8  3.3     Reviewed her bp readings; some have been normal; several high.   See HPI       ASSESSMENT/PLAN:                                                      Essential hypertension with goal blood pressure less than 140/90  Continuing to go down on the coreg. If doing well, may consider stopping.   Will have her increase losartan to 100 mg.   Follow up in a month.   - carvedilol (COREG) 6.25 MG tablet; Take 1 tablet (6.25 mg) by mouth 2 times daily (with meals)  - losartan (COZAAR) 100 MG tablet; Take 1 tablet (100 mg) by mouth daily (with breakfast)  - Basic metabolic panel    Hyperlipidemia LDL goal <160  She notes inability to tolerate statin. Zetia too expensive at this time.  Would like to check lipids now that she has been off for a few months.   - **Lipid panel reflex to direct LDL FUTURE anytime      Patient Instructions   Take the 6.25 mg carvedilol twice daily (this will be half of what you are currently doing).    Take the full pill of the losartan.     Let's see you in a month (before you run out).          Violet Fenton MD, MD  [facility]

## 2017-05-05 NOTE — NURSING NOTE
"Chief Complaint   Patient presents with     Hypertension     medication recheck       Initial /72 (BP Location: Right arm, Patient Position: Chair, Cuff Size: Adult Regular)  Pulse 79  Temp 97.4  F (36.3  C) (Oral)  Resp 16  Ht 5' 3\" (1.6 m)  Wt 140 lb 1.6 oz (63.5 kg)  SpO2 99%  BMI 24.82 kg/m2 Estimated body mass index is 24.82 kg/(m^2) as calculated from the following:    Height as of this encounter: 5' 3\" (1.6 m).    Weight as of this encounter: 140 lb 1.6 oz (63.5 kg).  Medication Reconciliation: complete   Kamini Bob, AMADO      "

## 2017-05-05 NOTE — PATIENT INSTRUCTIONS
Take the 6.25 mg carvedilol twice daily (this will be half of what you are currently doing).    Take the full pill of the losartan.     Let's see you in a month (before you run out).

## 2017-05-05 NOTE — MR AVS SNAPSHOT
After Visit Summary   5/5/2017    Tomasa Fam    MRN: [id number]           Patient Information     Date Of Birth          1940        Visit Information        Provider Department      5/5/2017 8:30 AM Violet Fenton MD [facility]        Today's Diagnoses     Essential hypertension with goal blood pressure less than 140/90        Hyperlipidemia LDL goal <160          Care Instructions    Take the 6.25 mg carvedilol twice daily (this will be half of what you are currently doing).    Take the full pill of the losartan.     Let's see you in a month (before you run out).            Follow-ups after your visit        Who to contact     If you have questions or need follow up information about today's clinic visit or your schedule please contact [facility] directly at [phone].  Normal or non-critical lab and imaging results will be communicated to you by MyChart, letter or phone within 4 business days after the clinic has received the results. If you do not hear from us within 7 days, please contact the clinic through [employer] or phone. If you have a critical or abnormal lab result, we will notify you by phone as soon as possible.  Submit refill requests through [employer]ia or call your pharmacy and they will forward the refill request to us. Please allow 3 business days for your refill to be completed.          Additional Information About Your Visit        MyChart Information     [employer] gives you secure access to your electronic health record. If you see a primary care provider, you can also send messages to your care team and make appointments. If you have questions, please call your primary care clinic.  If you do not have a primary care provider, please call [phone] and they will assist you.        Care EveryWhere ID     This is your Care EveryWhere ID. This could be used by other organizations to access your [place] medical records  [id number]       "  Your Vitals Were     Pulse Temperature Respirations Height Pulse Oximetry BMI (Body Mass Index)    79 97.4  F (36.3  C) (Oral) 16 5' 3\" (1.6 m) 99% 24.82 kg/m2       Blood Pressure from Last 3 Encounters:   05/05/17 144/72   04/10/17 101/64   02/09/17 116/60    Weight from Last 3 Encounters:   05/05/17 140 lb 1.6 oz (63.5 kg)   04/10/17 137 lb 9.6 oz (62.4 kg)   02/09/17 139 lb 14.4 oz (63.5 kg)              We Performed the Following     **Lipid panel reflex to direct LDL FUTURE anytime     Basic metabolic panel          Today's Medication Changes          These changes are accurate as of: 5/5/17  9:06 AM.  If you have any questions, ask your nurse or doctor.               These medicines have changed or have updated prescriptions.        Dose/Directions    * carvedilol 25 MG tablet   Commonly known as:  COREG   This may have changed:  Another medication with the same name was added. Make sure you understand how and when to take each.   Used for:  Essential hypertension with goal blood pressure less than 140/90   Changed by:  Violet Fenton MD        Dose:  12.5 mg   Take 0.5 tablets (12.5 mg) by mouth 2 times daily (with meals)   Quantity:  1 tablet   Refills:  0       * carvedilol 6.25 MG tablet   Commonly known as:  COREG   This may have changed:  You were already taking a medication with the same name, and this prescription was added. Make sure you understand how and when to take each.   Used for:  Essential hypertension with goal blood pressure less than 140/90   Changed by:  Violet Fenton MD        Dose:  6.25 mg   Take 1 tablet (6.25 mg) by mouth 2 times daily (with meals)   Quantity:  60 tablet   Refills:  0       losartan 100 MG tablet   Commonly known as:  COZAAR   This may have changed:  how much to take   Used for:  Essential hypertension with goal blood pressure less than 140/90   Changed by:  Violet Fenton MD        Dose:  100 mg   Take 1 tablet (100 mg) by mouth daily (with breakfast)   Quantity: "  30 tablet   Refills:  0       * Notice:  This list has 2 medication(s) that are the same as other medications prescribed for you. Read the directions carefully, and ask your doctor or other care provider to review them with you.         Where to get your medicines      These medications were sent to [id number] - [place], [place] - [address]  [address], [facility] [address]     Phone:  [phone]     carvedilol 6.25 MG tablet    losartan 100 MG tablet                Primary Care Provider Office Phone # Fax #    Violet Fenton [phone]       [facility] [address]  [facility] [address]        Thank you!     Thank you for choosing [facility]  for your care. Our goal is always to provide you with excellent care. Hearing back from our patients is one way we can continue to improve our services. Please take a few minutes to complete the written survey that you may receive in the mail after your visit with us. Thank you!             Your Updated Medication List - Protect others around you: Learn how to safely use, store and throw away your medicines at www.disposemymeds.org.          This list is accurate as of: 5/5/17  9:06 AM.  Always use your most recent med list.                   Brand Name Dispense Instructions for use    aspirin 81 MG tablet     90 tablet    Take 1 tablet (81 mg) by mouth daily       calcium-vitamin D 600-400 MG-UNIT per tablet    CALTRATE     Take 1 tablet by mouth 2 times daily       * carvedilol 25 MG tablet    COREG    1 tablet    Take 0.5 tablets (12.5 mg) by mouth 2 times daily (with meals)       * carvedilol 6.25 MG tablet    COREG    60 tablet    Take 1 tablet (6.25 mg) by mouth 2 times daily (with meals)       chlorthalidone 25 MG tablet    HYGROTON    90 tablet    Take 1 tablet (25 mg) by mouth daily       cholecalciferol 400 UNIT Tabs tablet    vitamin D      Take 400 Units by mouth daily       fluocinonide 0.05 % solution    LIDEX     Apply topically 2 times daily       FLUoxetine 10 MG capsule    PROzac    90 capsule    Take 1 capsule (10 mg) by mouth daily       losartan 100 MG tablet    COZAAR    30 tablet    Take 1 tablet (100 mg) by mouth daily (with breakfast)       loteprednol 0.2 % Susp ophthalmic susp    LOTEMAX/ALREX     Place 1 drop into the right eye daily as needed (runny eye)       olopatadine HCl 0.2 % Soln    PATADAY     Place 1 drop into the right eye daily as needed (runny eye)       omega 3 1000 MG Caps     90 capsule    Take 1 g by mouth daily       order for DME     1 Device    Equipment being ordered: The following items were dispensed: Knee Brace - Reaction medium RIGHT       polyethylene glycol powder    MIRALAX/GLYCOLAX     Take 1 capful by mouth daily       potassium chloride SA 10 MEQ CR tablet    K-DUR/KLOR-CON M    180 tablet    Take 1 tablet (10 mEq) by mouth 2 times daily       vitamin B complex with vitamin C Tabs tablet    STRESS TAB     Take 1 tablet by mouth daily.       * Notice:  This list has 2 medication(s) that are the same as other medications prescribed for you. Read the directions carefully, and ask your doctor or other care provider to review them with you.

## 2017-06-01 ENCOUNTER — OFFICE VISIT (OUTPATIENT)
Dept: FAMILY MEDICINE | Facility: CLINIC | Age: 77
End: 2017-06-01
Payer: MEDICARE

## 2017-06-01 VITALS
HEART RATE: 85 BPM | HEIGHT: 63 IN | TEMPERATURE: 97.6 F | DIASTOLIC BLOOD PRESSURE: 60 MMHG | WEIGHT: 137.2 LBS | RESPIRATION RATE: 16 BRPM | OXYGEN SATURATION: 99 % | BODY MASS INDEX: 24.31 KG/M2 | SYSTOLIC BLOOD PRESSURE: 140 MMHG

## 2017-06-01 DIAGNOSIS — I10 ESSENTIAL HYPERTENSION WITH GOAL BLOOD PRESSURE LESS THAN 140/90: Primary | ICD-10-CM

## 2017-06-01 DIAGNOSIS — R05.9 COUGH: ICD-10-CM

## 2017-06-01 DIAGNOSIS — J30.2 SEASONAL ALLERGIC RHINITIS, UNSPECIFIED ALLERGIC RHINITIS TRIGGER: ICD-10-CM

## 2017-06-01 PROCEDURE — 80048 BASIC METABOLIC PNL TOTAL CA: CPT | Performed by: FAMILY MEDICINE

## 2017-06-01 PROCEDURE — 99214 OFFICE O/P EST MOD 30 MIN: CPT | Performed by: FAMILY MEDICINE

## 2017-06-01 PROCEDURE — 82043 UR ALBUMIN QUANTITATIVE: CPT | Performed by: FAMILY MEDICINE

## 2017-06-01 PROCEDURE — 36415 COLL VENOUS BLD VENIPUNCTURE: CPT | Performed by: FAMILY MEDICINE

## 2017-06-01 RX ORDER — CHLORTHALIDONE 25 MG/1
25 TABLET ORAL DAILY
Qty: 90 TABLET | Refills: 1 | Status: SHIPPED | OUTPATIENT
Start: 2017-06-01 | End: 2017-11-30

## 2017-06-01 RX ORDER — CARVEDILOL 6.25 MG/1
6.25 TABLET ORAL 2 TIMES DAILY WITH MEALS
Qty: 180 TABLET | Refills: 1 | Status: SHIPPED | OUTPATIENT
Start: 2017-06-01 | End: 2017-11-30

## 2017-06-01 RX ORDER — LOSARTAN POTASSIUM 100 MG/1
100 TABLET ORAL
Qty: 90 TABLET | Refills: 1 | Status: SHIPPED | OUTPATIENT
Start: 2017-06-01 | End: 2018-02-22

## 2017-06-01 NOTE — PATIENT INSTRUCTIONS
If all is well, let's see you in 6 months.    You may want to try the allergy pill consistently to see if this helps the cough. Otherwise, I agree with seeing the ENT person.

## 2017-06-01 NOTE — NURSING NOTE
"Chief Complaint   Patient presents with     Hypertension       Initial /60 (BP Location: Right arm, Patient Position: Chair, Cuff Size: Adult Regular)  Pulse 85  Temp 97.6  F (36.4  C) (Oral)  Resp 16  Ht 5' 3\" (1.6 m)  Wt 137 lb 3.2 oz (62.2 kg)  SpO2 99%  BMI 24.3 kg/m2 Estimated body mass index is 24.3 kg/(m^2) as calculated from the following:    Height as of this encounter: 5' 3\" (1.6 m).    Weight as of this encounter: 137 lb 3.2 oz (62.2 kg).  Medication Reconciliation: complete   Kamini Bob, AMADO      "

## 2017-06-01 NOTE — MR AVS SNAPSHOT
After Visit Summary   6/1/2017    Tomasa Fam    MRN: [id number]           Patient Information     Date Of Birth          1940        Visit Information        Provider Department      6/1/2017 4:10 PM Violet Fenton MD [facility]        Today's Diagnoses     Hypertension goal BP (blood pressure) < 140/90    -  1    Essential hypertension with goal blood pressure less than 140/90          Care Instructions    If all is well, let's see you in 6 months.    You may want to try the allergy pill consistently to see if this helps the cough. Otherwise, I agree with seeing the ENT person.                Follow-ups after your visit        Who to contact     If you have questions or need follow up information about today's clinic visit or your schedule please contact [facility] directly at [phone].  Normal or non-critical lab and imaging results will be communicated to you by MyChart, letter or phone within 4 business days after the clinic has received the results. If you do not hear from us within 7 days, please contact the clinic through MyChart or phone. If you have a critical or abnormal lab result, we will notify you by phone as soon as possible.  Submit refill requests through [employer] or call your pharmacy and they will forward the refill request to us. Please allow 3 business days for your refill to be completed.          Additional Information About Your Visit        [employer] Information     [employer] gives you secure access to your electronic health record. If you see a primary care provider, you can also send messages to your care team and make appointments. If you have questions, please call your primary care clinic.  If you do not have a primary care provider, please call [phone] and they will assist you.        Care EveryWhere ID     This is your Care EveryWhere ID. This could be used by other organizations to access your [facility]  "records  [id number]        Your Vitals Were     Pulse Temperature Respirations Height Pulse Oximetry BMI (Body Mass Index)    85 97.6  F (36.4  C) (Oral) 16 5' 3\" (1.6 m) 99% 24.3 kg/m2       Blood Pressure from Last 3 Encounters:   06/01/17 140/60   05/05/17 144/72   04/10/17 101/64    Weight from Last 3 Encounters:   06/01/17 137 lb 3.2 oz (62.2 kg)   05/05/17 140 lb 1.6 oz (63.5 kg)   04/10/17 137 lb 9.6 oz (62.4 kg)              We Performed the Following     Albumin Random Urine Quantitative     Basic metabolic panel          Today's Medication Changes          These changes are accurate as of: 6/1/17  4:59 PM.  If you have any questions, ask your nurse or doctor.               These medicines have changed or have updated prescriptions.        Dose/Directions    carvedilol 6.25 MG tablet   Commonly known as:  COREG   This may have changed:  Another medication with the same name was removed. Continue taking this medication, and follow the directions you see here.   Used for:  Essential hypertension with goal blood pressure less than 140/90, Hypertension goal BP (blood pressure) < 140/90   Changed by:  Violet Fenton MD        Dose:  6.25 mg   Take 1 tablet (6.25 mg) by mouth 2 times daily (with meals)   Quantity:  180 tablet   Refills:  1            Where to get your medicines      These medications were sent to [facility] - [facility] [address]  [address], [facility] [address]     Phone:  [phone]     carvedilol 6.25 MG tablet    chlorthalidone 25 MG tablet    losartan 100 MG tablet                Primary Care Provider Office Phone # Fax #    Violet Fenton [phone]       [facility] [address]  [facility] [address]        Thank you!     Thank you for choosing [facility]  for your care. Our goal is always to provide you with excellent care. Hearing back from our patients is " one way we can continue to improve our services. Please take a few minutes to complete the written survey that you may receive in the mail after your visit with us. Thank you!             Your Updated Medication List - Protect others around you: Learn how to safely use, store and throw away your medicines at www.disposemymeds.org.          This list is accurate as of: 6/1/17  4:59 PM.  Always use your most recent med list.                   Brand Name Dispense Instructions for use    aspirin 81 MG tablet     90 tablet    Take 1 tablet (81 mg) by mouth daily       calcium-vitamin D 600-400 MG-UNIT per tablet    CALTRATE     Take 1 tablet by mouth 2 times daily       carvedilol 6.25 MG tablet    COREG    180 tablet    Take 1 tablet (6.25 mg) by mouth 2 times daily (with meals)       chlorthalidone 25 MG tablet    HYGROTON    90 tablet    Take 1 tablet (25 mg) by mouth daily       cholecalciferol 400 UNIT Tabs tablet    vitamin D     Take 400 Units by mouth daily       fluocinonide 0.05 % solution    LIDEX     Apply topically 2 times daily       FLUoxetine 10 MG capsule    PROzac    90 capsule    Take 1 capsule (10 mg) by mouth daily       losartan 100 MG tablet    COZAAR    90 tablet    Take 1 tablet (100 mg) by mouth daily (with breakfast)       loteprednol 0.2 % Susp ophthalmic susp    LOTEMAX/ALREX     Place 1 drop into the right eye daily as needed (runny eye)       olopatadine HCl 0.2 % Soln    PATADAY     Place 1 drop into the right eye daily as needed (runny eye)       omega 3 1000 MG Caps     90 capsule    Take 1 g by mouth daily       order for DME     1 Device    Equipment being ordered: The following items were dispensed: Knee Brace - Reaction medium RIGHT       polyethylene glycol powder    MIRALAX/GLYCOLAX     Take 1 capful by mouth daily       potassium chloride SA 10 MEQ CR tablet    K-DUR/KLOR-CON M    180 tablet    Take 1 tablet (10 mEq) by mouth 2 times daily       vitamin B complex with vitamin C  Tabs tablet    STRESS TAB     Take 1 tablet by mouth daily.

## 2017-06-01 NOTE — PROGRESS NOTES
SUBJECTIVE:                                                    Tomasa Fam is a 76 year old female who presents to clinic today for the following health issues:      Medication Followup of coreg - decrease    Taking Medication as prescribed: yes    Side Effects:  None    Medication Helping Symptoms:  Blood pressure readings- [phone] before blood pressure pills   Would like to discuss if blood pressure is normal should be taking the blood pressure medication?        Problem list and histories reviewed & adjusted, as indicated.  Additional history:   See under ROS     Patient Active Problem List   Diagnosis     Chronic airway obstruction (H)     ASCUS on Pap smear     Hyperlipidemia LDL goal <160     Breast cancer (H)     Hypertension goal BP (blood pressure) < 140/90     Cystocele, midline     Uterovaginal prolapse     S/P hysterectomy     Advanced directives, counseling/discussion     History of hypokalemia     Concussion     Malignant neoplasm of upper lobe of right lung (H)     Thyroid nodule     Chronic pain of both knees       Current Outpatient Prescriptions   Medication Sig Dispense Refill     carvedilol (COREG) 6.25 MG tablet Take 1 tablet (6.25 mg) by mouth 2 times daily (with meals) 60 tablet 0     losartan (COZAAR) 100 MG tablet Take 1 tablet (100 mg) by mouth daily (with breakfast) 30 tablet 0     carvedilol (COREG) 25 MG tablet Take 0.5 tablets (12.5 mg) by mouth 2 times daily (with meals) 1 tablet 0     chlorthalidone (HYGROTON) 25 MG tablet Take 1 tablet (25 mg) by mouth daily 90 tablet 1     FLUoxetine (PROZAC) 10 MG capsule Take 1 capsule (10 mg) by mouth daily 90 capsule 1     potassium chloride SA (K-DUR/KLOR-CON M) 10 MEQ CR tablet Take 1 tablet (10 mEq) by mouth 2 times daily 180 tablet 1     order for DME Equipment being ordered: The following items were dispensed: Knee Brace - Reaction medium RIGHT 1 Device 0     cholecalciferol (VITAMIN D) 400 UNIT TABS Take 400 Units by mouth  daily       fluocinonide (LIDEX) 0.05 % external solution Apply topically 2 times daily       calcium-vitamin D (CALTRATE) 600-400 MG-UNIT per tablet Take 1 tablet by mouth 2 times daily       olopatadine HCl (PATADAY) 0.2 % SOLN Place 1 drop into the right eye daily as needed (runny eye)       loteprednol (LOTEMAX/ALREX) 0.2 % SUSP Place 1 drop into the right eye daily as needed (runny eye)       polyethylene glycol (MIRALAX/GLYCOLAX) powder Take 1 capful by mouth daily       aspirin 81 MG tablet Take 1 tablet (81 mg) by mouth daily 90 tablet 3     omega 3 1000 MG CAPS Take 1 g by mouth daily 90 capsule      B Complex Vitamins (VITAMIN  B COMPLEX) tablet Take 1 tablet by mouth daily.               Reviewed and updated as needed this visit by clinical staff  Tobacco  Allergies  Meds  Med Hx  Surg Hx  Fam Hx  Soc Hx      Reviewed and updated as needed this visit by Provider         ROS:  CONSTITUTIONAL:NEGATIVE for fever, chills, change in weight  RESP:NEGATIVE for significant cough or SOB  CV: NEGATIVE for chest pain, palpitations or peripheral edema  MUSCULOSKELETAL: NEGATIVE for significant arthralgias or myalgia  PSYCHIATRIC: NEGATIVE for changes in mood or affect    Thinking things are better. Notes no attack; (the almost passing out feeling.)   In the am, wants to get up and go right away. Nervous in the am about her medication. Wants to know if still should take med if her bp is normal.    Coughing for no reason. Cough drop has helped.   There had been a growth previously that was removed. This seems a little different this time. This feels like a constant tickle.   Last time the growth would get in the way and then when she coughed, would move and clear. Also her voice was more affected.    Taking an allergy pill.      OBJECTIVE:                                                    /60 (BP Location: Right arm, Patient Position: Chair, Cuff Size: Adult Regular)  Pulse 85  Temp 97.6  F (36.4  C)  "(Oral)  Resp 16  Ht 5' 3\" (1.6 m)  Wt 137 lb 3.2 oz (62.2 kg)  SpO2 99%  BMI 24.3 kg/m2  Body mass index is 24.3 kg/(m^2).  GENERAL APPEARANCE: alert and no distress  HENT: ear canals and TM's normal and nose and mouth without ulcers or lesions  RESP: lungs clear to auscultation - no rales, rhonchi or wheezes  CV: regular rates and rhythm  MS: extremities normal- no gross deformities noted  PSYCH: mentation appears normal and affect normal/bright         ASSESSMENT/PLAN:                                                      Essential hypertension with goal blood pressure less than 140/90  Controlled.  I did recommend to be consistent with the bp medication. It is meant to last 24 hours; so should take it when bp normal; not just when elevated. Gets difficult to adjust then.   If there is some consistency with an abnormally low or symptomatically low bp, we can adjust.   - Basic metabolic panel  - Albumin Random Urine Quantitative  - carvedilol (COREG) 6.25 MG tablet; Take 1 tablet (6.25 mg) by mouth 2 times daily (with meals)  - losartan (COZAAR) 100 MG tablet; Take 1 tablet (100 mg) by mouth daily (with breakfast)  - chlorthalidone (HYGROTON) 25 MG tablet; Take 1 tablet (25 mg) by mouth daily    Cough  Uncertain etiology. Possible allergies; see AVS    Seasonal allergic rhinitis, unspecified allergic rhinitis trigger  Also see patient instructions.          Patient Instructions   If all is well, let's see you in 6 months.    You may want to try the allergy pill consistently to see if this helps the cough. Otherwise, I agree with seeing the ENT person.            Violet Fenton MD, MD  [facility]  "

## 2017-06-02 LAB
ANION GAP SERPL CALCULATED.3IONS-SCNC: 12 MMOL/L (ref 3–14)
BUN SERPL-MCNC: 15 MG/DL (ref 7–30)
CALCIUM SERPL-MCNC: 9.7 MG/DL (ref 8.5–10.1)
CHLORIDE SERPL-SCNC: 91 MMOL/L (ref 94–109)
CO2 SERPL-SCNC: 27 MMOL/L (ref 20–32)
CREAT SERPL-MCNC: 0.67 MG/DL (ref 0.52–1.04)
GFR SERPL CREATININE-BSD FRML MDRD: 85 ML/MIN/1.7M2
GLUCOSE SERPL-MCNC: 86 MG/DL (ref 70–99)
POTASSIUM SERPL-SCNC: 3.2 MMOL/L (ref 3.4–5.3)
SODIUM SERPL-SCNC: 130 MMOL/L (ref 133–144)

## 2017-06-03 ENCOUNTER — TELEPHONE (OUTPATIENT)
Dept: FAMILY MEDICINE | Facility: CLINIC | Age: 77
End: 2017-06-03

## 2017-06-03 DIAGNOSIS — E87.1 HYPONATREMIA: ICD-10-CM

## 2017-06-03 DIAGNOSIS — E87.6 HYPOKALEMIA: Primary | ICD-10-CM

## 2017-06-03 LAB
CREAT UR-MCNC: 78 MG/DL
MICROALBUMIN UR-MCNC: 18 MG/L
MICROALBUMIN/CREAT UR: 22.88 MG/G CR (ref 0–25)

## 2017-06-03 NOTE — TELEPHONE ENCOUNTER
She called me back; noting that she took her bp medication (carvedilol) this morning and then felt odd and took her bp.   It was ~ 10_/__.  She notes being afraid to take it if her bp is normal in the am because of this.  She does take the rest of her meds at night.    At this time, encourage continued consistency. If this is something that continues to happen, we can adjust medication around this.   Difficult to tell if she sometimes takes medication and sometimes doesn't.   To keep in touch.   (also mentioned coreg important for heart).

## 2017-06-07 DIAGNOSIS — E87.1 HYPONATREMIA: ICD-10-CM

## 2017-06-07 DIAGNOSIS — E87.6 HYPOKALEMIA: ICD-10-CM

## 2017-06-07 PROCEDURE — 36415 COLL VENOUS BLD VENIPUNCTURE: CPT | Performed by: FAMILY MEDICINE

## 2017-06-07 PROCEDURE — 80048 BASIC METABOLIC PNL TOTAL CA: CPT | Performed by: FAMILY MEDICINE

## 2017-06-08 LAB
ANION GAP SERPL CALCULATED.3IONS-SCNC: 10 MMOL/L (ref 3–14)
BUN SERPL-MCNC: 19 MG/DL (ref 7–30)
CALCIUM SERPL-MCNC: 9.6 MG/DL (ref 8.5–10.1)
CHLORIDE SERPL-SCNC: 96 MMOL/L (ref 94–109)
CO2 SERPL-SCNC: 27 MMOL/L (ref 20–32)
CREAT SERPL-MCNC: 0.79 MG/DL (ref 0.52–1.04)
GFR SERPL CREATININE-BSD FRML MDRD: 70 ML/MIN/1.7M2
GLUCOSE SERPL-MCNC: 102 MG/DL (ref 70–99)
POTASSIUM SERPL-SCNC: 3.5 MMOL/L (ref 3.4–5.3)
SODIUM SERPL-SCNC: 133 MMOL/L (ref 133–144)

## 2017-06-16 ENCOUNTER — OFFICE VISIT (OUTPATIENT)
Dept: FAMILY MEDICINE | Facility: CLINIC | Age: 77
End: 2017-06-16
Payer: MEDICARE

## 2017-06-16 VITALS
SYSTOLIC BLOOD PRESSURE: 138 MMHG | BODY MASS INDEX: 23.76 KG/M2 | TEMPERATURE: 97.8 F | HEART RATE: 79 BPM | DIASTOLIC BLOOD PRESSURE: 72 MMHG | WEIGHT: 134.1 LBS | OXYGEN SATURATION: 99 % | HEIGHT: 63 IN

## 2017-06-16 DIAGNOSIS — N90.89 VULVAR LUMP: Primary | ICD-10-CM

## 2017-06-16 PROCEDURE — 99213 OFFICE O/P EST LOW 20 MIN: CPT | Performed by: FAMILY MEDICINE

## 2017-06-16 NOTE — PROGRESS NOTES
SUBJECTIVE:                                                    Tomasa Fam is a 76 year old female who presents to clinic today for the following health issues:      Lump near urethra      Duration: 2-3 months    Description (location/character/radiation): marble sized lumps near urethra    Intensity:  none    Accompanying signs and symptoms: chills    History (similar episodes/previous evaluation): None    Precipitating or alleviating factors: None    Therapies tried and outcome: None           Problem list and histories reviewed & adjusted, as indicated.  Additional history:     See under ROS     Patient Active Problem List   Diagnosis     Chronic airway obstruction (H)     ASCUS on Pap smear     Hyperlipidemia LDL goal <160     Breast cancer (H)     Hypertension goal BP (blood pressure) < 140/90     Cystocele, midline     Uterovaginal prolapse     S/P hysterectomy     Advanced directives, counseling/discussion     History of hypokalemia     Concussion     Malignant neoplasm of upper lobe of right lung (H)     Thyroid nodule     Chronic pain of both knees       Current Outpatient Prescriptions   Medication Sig Dispense Refill     carvedilol (COREG) 6.25 MG tablet Take 1 tablet (6.25 mg) by mouth 2 times daily (with meals) 180 tablet 1     losartan (COZAAR) 100 MG tablet Take 1 tablet (100 mg) by mouth daily (with breakfast) 90 tablet 1     chlorthalidone (HYGROTON) 25 MG tablet Take 1 tablet (25 mg) by mouth daily 90 tablet 1     FLUoxetine (PROZAC) 10 MG capsule Take 1 capsule (10 mg) by mouth daily 90 capsule 1     potassium chloride SA (K-DUR/KLOR-CON M) 10 MEQ CR tablet Take 1 tablet (10 mEq) by mouth 2 times daily 180 tablet 1     order for DME Equipment being ordered: The following items were dispensed: Knee Brace - Reaction medium RIGHT 1 Device 0     cholecalciferol (VITAMIN D) 400 UNIT TABS Take 400 Units by mouth daily       fluocinonide (LIDEX) 0.05 % external solution Apply topically 2 times  "daily       calcium-vitamin D (CALTRATE) 600-400 MG-UNIT per tablet Take 1 tablet by mouth 2 times daily       olopatadine HCl (PATADAY) 0.2 % SOLN Place 1 drop into the right eye daily as needed (runny eye)       loteprednol (LOTEMAX/ALREX) 0.2 % SUSP Place 1 drop into the right eye daily as needed (runny eye)       polyethylene glycol (MIRALAX/GLYCOLAX) powder Take 1 capful by mouth daily       aspirin 81 MG tablet Take 1 tablet (81 mg) by mouth daily 90 tablet 3     omega 3 1000 MG CAPS Take 1 g by mouth daily 90 capsule      B Complex Vitamins (VITAMIN  B COMPLEX) tablet Take 1 tablet by mouth daily.         Reviewed and updated as needed this visit by clinical staff  Allergies  Meds       Reviewed and updated as needed this visit by Provider         ROS:  CONSTITUTIONAL:NEGATIVE for fever, chills, change in weight  : see below    Has recently noted a lump.    Lump in the genital area. Feels 2 of them. Not painful.  Notes would get shivers intermittently in her arms with urinating. This is infrequent.  Thinks may be related to the lumps now.   No itching or pain associated with that. Occasional itch, more with dryness (not at same spot).  Notes they are hard and mobile.         OBJECTIVE:                                                    /72 (BP Location: Right arm, Patient Position: Chair, Cuff Size: Adult Regular)  Pulse 79  Temp 97.8  F (36.6  C) (Oral)  Ht 5' 3\" (1.6 m)  Wt 134 lb 1.6 oz (60.8 kg)  SpO2 99%  BMI 23.75 kg/m2  Body mass index is 23.75 kg/(m^2).  GENERAL APPEARANCE: alert and no distress   (female): there is a lump in the labia anteriorly, that is palpable , well circumscribed, mobile subcutaeously. No lump was visible.   PSYCH: mentation appears normal and affect normal/bright         ASSESSMENT/PLAN:                                                      Vulvar lump  Uncertain etiology. Suspect sebaceous cyst or other. Doubt worrisome. Will have her evaluated by GYN.  - " OB/GYN REFERRAL    Follow up prn or as previously directed.        Violet Fenton MD, MD  [facility]

## 2017-06-16 NOTE — MR AVS SNAPSHOT
After Visit Summary   6/16/2017    Tomasa Fam    MRN: [id number]           Patient Information     Date Of Birth          1940        Visit Information        Provider Department      6/16/2017 9:10 AM Violet Fenton MD [facility]        Today's Diagnoses     Vulvar lump    -  1       Follow-ups after your visit        Additional Services     OB/GYN REFERRAL       Your provider has referred you to:  [facility]: [facility] [phone]   [email or web address]      Please be aware that coverage of these services is subject to the terms and limitations of your health insurance plan.  Call member services at your health plan with any benefit or coverage questions.      Please bring the following to your appointment:  >>   Any x-rays, CTs or MRIs which have been performed.  Contact the facility where they were done to arrange for  prior to your scheduled appointment.  Any new CT, MRI or other procedures ordered by your specialist must be performed at a [place] facility or coordinated by your clinic's referral office.    >>   List of current medications   >>   This referral request   >>   Any documents/labs given to you for this referral                  Who to contact     If you have questions or need follow up information about today's clinic visit or your schedule please contact [facility] directly at [phone].  Normal or non-critical lab and imaging results will be communicated to you by MyChart, letter or phone within 4 business days after the clinic has received the results. If you do not hear from us within 7 days, please contact the clinic through MyChart or phone. If you have a critical or abnormal lab result, we will notify you by phone as soon as possible.  Submit refill requests through [employer] or call your pharmacy and they will forward the refill request to us. Please allow 3 business days  "for your refill to be completed.          Additional Information About Your Visit        MyChart Information     [employer] gives you secure access to your electronic health record. If you see a primary care provider, you can also send messages to your care team and make appointments. If you have questions, please call your primary care clinic.  If you do not have a primary care provider, please call [phone] and they will assist you.        Care EveryWhere ID     This is your Care EveryWhere ID. This could be used by other organizations to access your [place] medical records  [id number]        Your Vitals Were     Pulse Temperature Height Pulse Oximetry BMI (Body Mass Index)       79 97.8  F (36.6  C) (Oral) 5' 3\" (1.6 m) 99% 23.75 kg/m2        Blood Pressure from Last 3 Encounters:   06/16/17 138/72   06/01/17 140/60   05/05/17 144/72    Weight from Last 3 Encounters:   06/16/17 134 lb 1.6 oz (60.8 kg)   06/01/17 137 lb 3.2 oz (62.2 kg)   05/05/17 140 lb 1.6 oz (63.5 kg)              We Performed the Following     OB/GYN REFERRAL        Primary Care Provider Office Phone # Fax #    Violet Fenton [phone]       [facility] [address] [facility] [address]        Thank you!     Thank you for choosing [facility]  for your care. Our goal is always to provide you with excellent care. Hearing back from our patients is one way we can continue to improve our services. Please take a few minutes to complete the written survey that you may receive in the mail after your visit with us. Thank you!             Your Updated Medication List - Protect others around you: Learn how to safely use, store and throw away your medicines at www.disposemymeds.org.          This list is accurate as of: 6/16/17 10:04 AM.  Always use your most recent med list.                   Brand Name Dispense Instructions for use    aspirin 81 MG tablet     90 tablet    Take 1 tablet (81 mg) by " mouth daily       calcium-vitamin D 600-400 MG-UNIT per tablet    CALTRATE     Take 1 tablet by mouth 2 times daily       carvedilol 6.25 MG tablet    COREG    180 tablet    Take 1 tablet (6.25 mg) by mouth 2 times daily (with meals)       chlorthalidone 25 MG tablet    HYGROTON    90 tablet    Take 1 tablet (25 mg) by mouth daily       cholecalciferol 400 UNIT Tabs tablet    vitamin D     Take 400 Units by mouth daily       fluocinonide 0.05 % solution    LIDEX     Apply topically 2 times daily       FLUoxetine 10 MG capsule    PROzac    90 capsule    Take 1 capsule (10 mg) by mouth daily       losartan 100 MG tablet    COZAAR    90 tablet    Take 1 tablet (100 mg) by mouth daily (with breakfast)       loteprednol 0.2 % Susp ophthalmic susp    LOTEMAX/ALREX     Place 1 drop into the right eye daily as needed (runny eye)       olopatadine HCl 0.2 % Soln    PATADAY     Place 1 drop into the right eye daily as needed (runny eye)       omega 3 1000 MG Caps     90 capsule    Take 1 g by mouth daily       order for DME     1 Device    Equipment being ordered: The following items were dispensed: Knee Brace - Reaction medium RIGHT       polyethylene glycol powder    MIRALAX/GLYCOLAX     Take 1 capful by mouth daily       potassium chloride SA 10 MEQ CR tablet    K-DUR/KLOR-CON M    180 tablet    Take 1 tablet (10 mEq) by mouth 2 times daily       vitamin B complex with vitamin C Tabs tablet    STRESS TAB     Take 1 tablet by mouth daily.

## 2017-06-16 NOTE — NURSING NOTE
"Chief Complaint   Patient presents with     lump near urethra       Initial /72 (BP Location: Right arm, Patient Position: Chair, Cuff Size: Adult Regular)  Pulse 79  Temp 97.8  F (36.6  C) (Oral)  Ht 5' 3\" (1.6 m)  Wt 134 lb 1.6 oz (60.8 kg)  SpO2 99%  BMI 23.75 kg/m2 Estimated body mass index is 23.75 kg/(m^2) as calculated from the following:    Height as of this encounter: 5' 3\" (1.6 m).    Weight as of this encounter: 134 lb 1.6 oz (60.8 kg).  Medication Reconciliation: complete  "

## 2017-06-22 ENCOUNTER — TELEPHONE (OUTPATIENT)
Dept: OBGYN | Facility: CLINIC | Age: 77
End: 2017-06-22

## 2017-06-22 ENCOUNTER — OFFICE VISIT (OUTPATIENT)
Dept: OBGYN | Facility: CLINIC | Age: 77
End: 2017-06-22
Payer: MEDICARE

## 2017-06-22 VITALS
WEIGHT: 136.1 LBS | BODY MASS INDEX: 24.11 KG/M2 | HEIGHT: 63 IN | SYSTOLIC BLOOD PRESSURE: 130 MMHG | DIASTOLIC BLOOD PRESSURE: 82 MMHG

## 2017-06-22 DIAGNOSIS — R82.90 CLOUDY URINE: Primary | ICD-10-CM

## 2017-06-22 DIAGNOSIS — N90.89 VULVAR IRRITATION: Primary | ICD-10-CM

## 2017-06-22 DIAGNOSIS — N90.89 VULVAR MASS: ICD-10-CM

## 2017-06-22 PROCEDURE — 99213 OFFICE O/P EST LOW 20 MIN: CPT | Performed by: FAMILY MEDICINE

## 2017-06-22 NOTE — NURSING NOTE
"Chief Complaint   Patient presents with     Consult     vaginal lumps--not painful--noticed them awhile ago       Initial /82  Ht 5' 3\" (1.6 m)  Wt 136 lb 1.6 oz (61.7 kg)  BMI 24.11 kg/m2 Estimated body mass index is 24.11 kg/(m^2) as calculated from the following:    Height as of this encounter: 5' 3\" (1.6 m).    Weight as of this encounter: 136 lb 1.6 oz (61.7 kg).  Medication Reconciliation: complete   Clara Wick CMA      "

## 2017-06-22 NOTE — PATIENT INSTRUCTIONS
chris will call you   Dr. Aline Cooper, DO    Obstetrics and Gynecology  [facility] [place] [facility] [place]

## 2017-06-22 NOTE — MR AVS SNAPSHOT
"              After Visit Summary   6/22/2017    Tomasa Fam    MRN: [id number]           Patient Information     Date Of Birth          1940        Visit Information        Provider Department      6/22/2017 1:45 PM Aline Cooper, DO [facility]        Care Instructions    chris will call you   Dr. Aline Cooper, DO    Obstetrics and Gynecology  [facility] and [place]                 Follow-ups after your visit        Who to contact     If you have questions or need follow up information about today's clinic visit or your schedule please contact [facility] directly at [phone].  Normal or non-critical lab and imaging results will be communicated to you by Guguchuhart, letter or phone within 4 business days after the clinic has received the results. If you do not hear from us within 7 days, please contact the clinic through Guguchuhart or phone. If you have a critical or abnormal lab result, we will notify you by phone as soon as possible.  Submit refill requests through ERCOM or call your pharmacy and they will forward the refill request to us. Please allow 3 business days for your refill to be completed.          Additional Information About Your Visit        MyChart Information     ERCOM gives you secure access to your electronic health record. If you see a primary care provider, you can also send messages to your care team and make appointments. If you have questions, please call your primary care clinic.  If you do not have a primary care provider, please call [phone] and they will assist you.        Care EveryWhere ID     This is your Care EveryWhere ID. This could be used by other organizations to access your [place] medical records  [id number]        Your Vitals Were     Height BMI (Body Mass Index)                5' 3\" (1.6 m) 24.11 kg/m2           Blood Pressure from Last 3 Encounters:   06/22/17 130/82   06/16/17 138/72 "   06/01/17 140/60    Weight from Last 3 Encounters:   06/22/17 136 lb 1.6 oz (61.7 kg)   06/16/17 134 lb 1.6 oz (60.8 kg)   06/01/17 137 lb 3.2 oz (62.2 kg)              Today, you had the following     No orders found for display       Primary Care Provider Office Phone # Fax #    Violet Fenton [phone]       [facility] [address] [address] [facility] [address]        Equal Access to Services     [facility]: Hadii aad ku hadasho Soomaali, waaxda luqadaha, qaybta kaalmada adeegyada, waxay luciano haychandana puri . So [facility] [phone].    ATENCIÓN: Si habla español, tiene a srivastava disposición servicios gratuitos de asistencia lingüística. [facility] [phone].    We comply with applicable federal civil rights laws and [place] laws. We do not discriminate on the basis of race, color, national origin, age, disability sex, sexual orientation or gender identity.            Thank you!     Thank you for choosing [facility]  for your care. Our goal is always to provide you with excellent care. Hearing back from our patients is one way we can continue to improve our services. Please take a few minutes to complete the written survey that you may receive in the mail after your visit with us. Thank you!             Your Updated Medication List - Protect others around you: Learn how to safely use, store and throw away your medicines at www.disposemymeds.org.          This list is accurate as of: 6/22/17  2:04 PM.  Always use your most recent med list.                   Brand Name Dispense Instructions for use Diagnosis    aspirin 81 MG tablet     90 tablet    Take 1 tablet (81 mg) by mouth daily        calcium-vitamin D 600-400 MG-UNIT per tablet    CALTRATE     Take 1 tablet by mouth 2 times daily        carvedilol 6.25 MG tablet    COREG    180 tablet    Take 1 tablet (6.25 mg) by mouth 2 times daily (with meals)    Essential hypertension with goal blood pressure  less than 140/90       chlorthalidone 25 MG tablet    HYGROTON    90 tablet    Take 1 tablet (25 mg) by mouth daily    Essential hypertension with goal blood pressure less than 140/90       cholecalciferol 400 UNIT Tabs tablet    vitamin D     Take 400 Units by mouth daily        fluocinonide 0.05 % solution    LIDEX     Apply topically 2 times daily        FLUoxetine 10 MG capsule    PROzac    90 capsule    Take 1 capsule (10 mg) by mouth daily    Decreased energy, Stress       losartan 100 MG tablet    COZAAR    90 tablet    Take 1 tablet (100 mg) by mouth daily (with breakfast)    Essential hypertension with goal blood pressure less than 140/90       loteprednol 0.2 % Susp ophthalmic susp    LOTEMAX/ALREX     Place 1 drop into the right eye daily as needed (runny eye)        olopatadine HCl 0.2 % Soln    PATADAY     Place 1 drop into the right eye daily as needed (runny eye)        omega 3 1000 MG Caps     90 capsule    Take 1 g by mouth daily        order for DME     1 Device    Equipment being ordered: The following items were dispensed: Knee Brace - Reaction medium RIGHT    Right knee pain, unspecified chronicity, Primary osteoarthritis of both knees, Knee effusion, right       polyethylene glycol powder    MIRALAX/GLYCOLAX     Take 1 capful by mouth daily        potassium chloride SA 10 MEQ CR tablet    K-DUR/KLOR-CON M    180 tablet    Take 1 tablet (10 mEq) by mouth 2 times daily    History of hypokalemia       vitamin B complex with vitamin C Tabs tablet    STRESS TAB     Take 1 tablet by mouth daily.

## 2017-06-22 NOTE — TELEPHONE ENCOUNTER
Surgery:  VULVAR BIOPSY  Date:  6/28/17  Time:  12:30 PM  Hospital:  [facility]    Patient advised of the following:  The surgery center will contact you 24-48 hours prior to surgery to discuss any pre op instructions.  Contact your insurance company to see if a prior authorization or second opinion is needed.  Please schedule a pre op appointment with your primary physician within 7 days of surgery.  No aspirin or Ibuprofen 10 days prior to surgery.  Make arrangements to have someone drive you home from the hospital.    Surgery specific and surgery center information mailed to patient.    Information was placed on the surgery calendar in [place].

## 2017-06-22 NOTE — PROGRESS NOTES
SUBJECTIVE:  Tomasa Fam is an 76 year old  woman who presents for   gynecology consult for evaluation of vulvar lump.  No LMP recorded. Patient has had a hysterectomy.  No bleeding, spotting, or discharge. Patient endorses cloudy urine and notes sometimes she gets pain in her lower side area.       History of abnormal Pap smear: No  Family history of uterine or ovarian cancer: No  History of abnormal mammogram: No  Family history of breast cancer: Yes: self, mastectomy    Concerns today:   Patient reports she has noticed a hard lump in her vulvar area.       Past Medical History:   Diagnosis Date     Arthritis     hands, knees     Breast cancer (H) 2012     left mastectomy followed by right;  Dr. Luong     Chronic airway obstruction, not elsewhere classified     very mild COPD - small cough     Diffuse cystic mastopathy      Lung cancer (H) 10/21/2015     Unspecified essential hypertension late           Family History   Problem Relation Age of Onset     Cardiovascular Father      ruptured aorta, hardening of the arteries     CEREBROVASCULAR DISEASE Mother      Respiratory Mother      chronic bronchitis - was a smoker early on     Cardiovascular Paternal Grandfather      MI     CEREBROVASCULAR DISEASE Paternal Aunt      Hypertension Son      Neurologic Disorder Daughter      migraines     Brain Tumor Sister        Past Surgical History:   Procedure Laterality Date     C NONSPECIFIC PROCEDURE      s/p Tubal ligation      COLONOSCOPY  3/2003    adenomatous polyp      COLONOSCOPY  2006    diverticulosis - repeat in 5 years     COLONOSCOPY  10/13/2011    Procedure:COLONOSCOPY; COLONOSCOPY ; Surgeon:CHITO GORDILLO; Location: GI     CYSTOSCOPY  2012    Procedure: CYSTOSCOPY;;  Surgeon: Aline Cooper DO;  Location: [facility] OR     DAVINCI HYSTERECTOMY SUPRACERVICAL, SACROCOLPOPEXY, COMBINED  2012    Procedure: COMBINED DAVINCI HYSTERECTOMY SUPRACERVICAL,  SACROCOLPOPEXY;   DAVINCI ASSISTED LAPAROSCOPIC SUPRACERVICAL HYSTERECTOMY AND BILATERAL SALPINGO-OOPHORECTOMY, SACROCOLPOPEXY AND CYSTOSCOPY;  Surgeon: Aline Cooper DO;  Location:  OR     EXCISE LESION EYELID Right 6/29/2015    Procedure: EXCISE LESION EYELID;  Surgeon: Hank Olvera MD;  Location:  [facility]     INSERT PORT VASCULAR ACCESS  2/3/2012    Procedure:INSERT PORT VASCULAR ACCESS; Power Port-A- Catheter Placement ; Surgeon:IRISH TAPIA; Location:[facility] OR     LAPAROSCOPIC SALPINGO-OOPHORECTOMY  7/11/2012    Procedure: LAPAROSCOPIC SALPINGO-OOPHORECTOMY;  Davinci;  Surgeon: Aline Cooper DO;  Location:  OR     LIPOSUCTION, RHYTIDECTOMY, COMBINED       LOBECTOMY LUNG Right 3/1/2016    Procedure: LOBECTOMY LUNG;  Surgeon: Jonas Woodward MD;  Location:  OR     MAMMOPLASTY REDUCTION  1/6/2012    Procedure:MAMMOPLASTY REDUCTION; Surgeon:MICKI KYLE; Location:[facility] OR     MASTECTOMY SIMPLE  11/12/2012    Procedure: MASTECTOMY SIMPLE;   Right Prophylactic Mastectomy with attempted Right Sentinal Node Biopsy, Revision Bilateral Mastectomy Insicions, liposuction in breast area;  Surgeon: Irish Tapia MD;  Location: [facility] OR     MASTECTOMY SIMPLE, SENTINEL NODE, COMBINED  1/6/2012    Procedure:COMBINED MASTECTOMY SIMPLE, SENTINEL NODE; Left Mastectomy Left [place] Node Biopsy,  Right Breast Reduction ; Surgeon:IRISH TAPIA; Location:[facility] OR     MASTECTOMY, BILATERAL       REMOVE PORT VASCULAR ACCESS  4/29/2013    Procedure: REMOVE PORT VASCULAR ACCESS;  Port A catheter removal ;  Surgeon: Irish Tapia MD;  Location: [facility] OR     REPAIR PTOSIS BILATERAL Bilateral 6/29/2015    Procedure: REPAIR PTOSIS BILATERAL;  Surgeon: Hank Olvera MD;  Location:  [facility]     REVISE RECONSTRUCTED BREAST BILATERAL  11/12/2012    Procedure: REVISE RECONSTRUCTED BREAST BILATERAL;;  Surgeon: Micki Kyle MD;  Location: [facility] OR     SURGICAL HISTORY OF -   in 40's     face lift     SURGICAL HISTORY OF -       lipoma removed right thigh     SURGICAL HISTORY OF -       D and C     THORACOTOMY Right 3/1/2016    Procedure: THORACOTOMY;  Surgeon: Jonas Woodward MD;  Location:  OR       Current Outpatient Prescriptions   Medication     carvedilol (COREG) 6.25 MG tablet     losartan (COZAAR) 100 MG tablet     chlorthalidone (HYGROTON) 25 MG tablet     FLUoxetine (PROZAC) 10 MG capsule     potassium chloride SA (K-DUR/KLOR-CON M) 10 MEQ CR tablet     order for DME     cholecalciferol (VITAMIN D) 400 UNIT TABS     fluocinonide (LIDEX) 0.05 % external solution     calcium-vitamin D (CALTRATE) 600-400 MG-UNIT per tablet     olopatadine HCl (PATADAY) 0.2 % SOLN     loteprednol (LOTEMAX/ALREX) 0.2 % SUSP     polyethylene glycol (MIRALAX/GLYCOLAX) powder     aspirin 81 MG tablet     omega 3 1000 MG CAPS     B Complex Vitamins (VITAMIN  B COMPLEX) tablet     No current facility-administered medications for this visit.      Facility-Administered Medications Ordered in Other Visits   Medication     ceFAZolin (ANCEF) 1 g vial to attach to  ml bag for ADULT or 50 ml bag for PEDS     Allergies   Allergen Reactions     No Known Drug Allergies      Tape [Adhesive Tape]      Sensitive to plastic tape on upper part of body--takes skin off       Social History   Substance Use Topics     Smoking status: Never Smoker     Smokeless tobacco: Never Used     Alcohol use 0.0 oz/week     0 Standard drinks or equivalent per week      Comment: occasional; perhaps one per day       Review Of Systems  Ears/Nose/Throat: negative  Respiratory: No shortness of breath, dyspnea on exertion, cough, or hemoptysis  Cardiovascular: negative  Gastrointestinal: negative  Genitourinary: negative    This document serves as a record of the services and decisions personally performed and made by Aline Cooper DO. It was created on his/her behalf by Supriya Palma, a trained medical scribe. The creation of this  "document is based the provider's statements to the medical scribe.  Jagjitjohn Epps Minerva 1:40 PM, 2017      OBJECTIVE:  /82  Ht 1.6 m (5' 3\")  Wt 61.7 kg (136 lb 1.6 oz)  BMI 24.11 kg/m2  General appearance: healthy, alert and no distress  Lungs: negative, Percussion normal. Good diaphragmatic excursion. Lungs clear  Heart: negative, PMI normal. No lifts, heaves, or thrills. RRR. No murmurs, clicks gallops or rub  Pelvic: Pelvic examination  including  External genitalia normal   and vagina normal rugatted not atrophic  White skin changes noted over labia minor and majora with erythema  Examination of urethra normal no masses, tenderness, scarring  Mobile inclusion cyst noted in left labia minora/clitoral hodd  bladder, no masses or tenderness  Cervix no lesions or discharge      ASSESSMENT:  Tomasa Fam is an 76 year old  woman who presents for   gynecology consult for evaluation of vulvar lump.  Concerns today     PLAN:  Dx:  1) Vulvar inclusion cyst: mobile, discussed surgical removal vs. monitoring and patient desires surgical removal in next 1-2 weeks.   2) White skin changes in vulvar area: suspect Lichen Sclerosis , plan for vulvar biopsy during above surgery  3) UA reflex pending    The information in this document, created by the medical scribe for me, accurately reflects the services I personally performed and the decisions made by me. I have reviewed and approved this document for accuracy prior to leaving the patient care area.  Aline Cooper DO  1:41 PM, 17      Dr. Aline Cooper, DO    Obstetrics and Gynecology  [facility]       "

## 2017-06-22 NOTE — TELEPHONE ENCOUNTER
Surgeon:MIHIR LUCERO  Assist:  No  Location: [facility] or [facility] operating room  Date/time preference:  Next 1-2 week if possible on Wednesday,   After the previous 2 cases scheduled     Surgery:  Vulvar biopsy,   Length of Surgery:  30 min  Diagnosis:  Vulvar mass, vulvar irritation  Anesthesia type:  GENERAL     Special instructions / equipment:    Am admit or same day: SAME DAY  Bowel prep: No  Pre op: PCP  Office visit with surgeon prior to surgery: No

## 2017-06-22 NOTE — PROGRESS NOTES
Surgeon:MIHIR LUCERO  Assist:  No  Location: [facility] or [facility] operating room  Date/time preference:  Next 1-2 week if possible on Wednesday,   After the previous 2 cases scheduled    Surgery:  Vulvar biopsy,   Length of Surgery:  30 min  Diagnosis:  Vulvar mass, vulvar irritation  Anesthesia type:  GENERAL    Special instructions / equipment:    Am admit or same day: SAME DAY  Bowel prep: No  Pre op: PCP  Office visit with surgeon prior to surgery: No

## 2017-06-23 ENCOUNTER — OFFICE VISIT (OUTPATIENT)
Dept: FAMILY MEDICINE | Facility: CLINIC | Age: 77
End: 2017-06-23
Payer: MEDICARE

## 2017-06-23 VITALS
HEART RATE: 80 BPM | RESPIRATION RATE: 16 BRPM | OXYGEN SATURATION: 99 % | SYSTOLIC BLOOD PRESSURE: 128 MMHG | HEIGHT: 63 IN | BODY MASS INDEX: 24.19 KG/M2 | WEIGHT: 136.5 LBS | DIASTOLIC BLOOD PRESSURE: 70 MMHG | TEMPERATURE: 98.1 F

## 2017-06-23 DIAGNOSIS — Z86.39 HISTORY OF HYPOKALEMIA: ICD-10-CM

## 2017-06-23 DIAGNOSIS — I10 HYPERTENSION GOAL BP (BLOOD PRESSURE) < 140/90: ICD-10-CM

## 2017-06-23 DIAGNOSIS — J44.9 CHRONIC OBSTRUCTIVE PULMONARY DISEASE, UNSPECIFIED COPD TYPE (H): ICD-10-CM

## 2017-06-23 DIAGNOSIS — Z01.818 PREOP GENERAL PHYSICAL EXAM: Primary | ICD-10-CM

## 2017-06-23 DIAGNOSIS — I44.0 FIRST DEGREE AV BLOCK: ICD-10-CM

## 2017-06-23 LAB — HGB BLD-MCNC: 12.3 G/DL (ref 11.7–15.7)

## 2017-06-23 PROCEDURE — 93000 ELECTROCARDIOGRAM COMPLETE: CPT | Performed by: NURSE PRACTITIONER

## 2017-06-23 PROCEDURE — 84132 ASSAY OF SERUM POTASSIUM: CPT | Performed by: NURSE PRACTITIONER

## 2017-06-23 PROCEDURE — 85018 HEMOGLOBIN: CPT | Performed by: NURSE PRACTITIONER

## 2017-06-23 PROCEDURE — 36415 COLL VENOUS BLD VENIPUNCTURE: CPT | Performed by: NURSE PRACTITIONER

## 2017-06-23 PROCEDURE — 99214 OFFICE O/P EST MOD 30 MIN: CPT | Performed by: NURSE PRACTITIONER

## 2017-06-23 NOTE — MR AVS SNAPSHOT
After Visit Summary   6/23/2017    Tomasa Fam    MRN: [id number]           Patient Information     Date Of Birth          1940        Visit Information        Provider Department      6/23/2017 4:20 PM Radha Magana APRN [facility]        Today's Diagnoses     Preop general physical exam    -  1      Care Instructions      Before Your Surgery      Call your surgeon if there is any change in your health. This includes signs of a cold or flu (such as a sore throat, runny nose, cough, rash or fever).    Do not smoke, drink alcohol or take over the counter medicine (unless your surgeon or primary care doctor tells you to) for the 24 hours before and after surgery.    If you take prescribed drugs: Follow your doctor s orders about which medicines to take and which to stop until after surgery.    Eating and drinking prior to surgery: follow the instructions from your surgeon    Take a shower or bath the night before surgery. Use the soap your surgeon gave you to gently clean your skin. If you do not have soap from your surgeon, use your regular soap. Do not shave or scrub the surgery site.  Wear clean pajamas and have clean sheets on your bed.     Approval given to proceed with proposed procedure, without further diagnostic evaluation    --Pain management prior to surgery:   -Patient advised to avoid NSAIDS (Motrin, Ibuprofen, Aleve or Naprosyn)   -If needed, Tylenol or Acetaminophen are fine to use.    --Medications reviewed:   -One week prior to surgery:    HOLD Aspirin, Vitamin E, and Fish Oil one week prior to surgery.  STOP today   -Night prior to surgery:    OK to take statins.   -Morning of surgery: HOLD Losaartan on the morning of surgery    OK to take other medications with a tiny sip of water.    --Pain medications, time off from work and FMLA following surgery deferred to surgeon.            Follow-ups after your visit        Future tests that were ordered  "for you today     Open Future Orders        Priority Expected Expires Ordered    UA with Microscopic reflex to Culture Routine  6/22/2018 6/22/2017            Who to contact     If you have questions or need follow up information about today's clinic visit or your schedule please contact [facility] directly at [phone].  Normal or non-critical lab and imaging results will be communicated to you by MyChart, letter or phone within 4 business days after the clinic has received the results. If you do not hear from us within 7 days, please contact the clinic through IngagePatienthart or phone. If you have a critical or abnormal lab result, we will notify you by phone as soon as possible.  Submit refill requests through [email or web address] or call your pharmacy and they will forward the refill request to us. Please allow 3 business days for your refill to be completed.          Additional Information About Your Visit        MyChart Information     [email or web address] gives you secure access to your electronic health record. If you see a primary care provider, you can also send messages to your care team and make appointments. If you have questions, please call your primary care clinic.  If you do not have a primary care provider, please call [phone] and they will assist you.        Care EveryWhere ID     This is your Care EveryWhere ID. This could be used by other organizations to access your [place] medical records  [id number]        Your Vitals Were     Pulse Temperature Respirations Height Pulse Oximetry BMI (Body Mass Index)    80 98.1  F (36.7  C) (Oral) 16 5' 3\" (1.6 m) 99% 24.18 kg/m2       Blood Pressure from Last 3 Encounters:   06/23/17 128/70   06/22/17 130/82   06/16/17 138/72    Weight from Last 3 Encounters:   06/23/17 136 lb 8 oz (61.9 kg)   06/22/17 136 lb 1.6 oz (61.7 kg)   06/16/17 134 lb 1.6 oz (60.8 kg)              We Performed the Following     EKG 12-lead complete w/read - Clinics     Hemoglobin     " [facility]        Primary Care Provider Office Phone # Fax #    Violet Fenton [phone]       [facility] [address] [address]  [facility] [address]        Equal Access to Services     DELFINA SIGALA : Hadii vitaly ku hadsondrao Sotiali, waaxda luqadaha, qaybta kaalmada adeegyada, waxdelgado watersn falguni brennan laefrain asif. So [facility] [phone].    ATENCIÓN: Si habla español, tiene a srivastava disposición servicios gratuitos de asistencia lingüística. Llame al [phone].    We comply with applicable federal civil rights laws and [place] laws. We do not discriminate on the basis of race, color, national origin, age, disability sex, sexual orientation or gender identity.            Thank you!     Thank you for choosing [facility]  for your care. Our goal is always to provide you with excellent care. Hearing back from our patients is one way we can continue to improve our services. Please take a few minutes to complete the written survey that you may receive in the mail after your visit with us. Thank you!             Your Updated Medication List - Protect others around you: Learn how to safely use, store and throw away your medicines at www.disposemymeds.org.          This list is accurate as of: 6/23/17  5:29 PM.  Always use your most recent med list.                   Brand Name Dispense Instructions for use Diagnosis    aspirin 81 MG tablet     90 tablet    Take 1 tablet (81 mg) by mouth daily        calcium-vitamin D 600-400 MG-UNIT per tablet    CALTRATE     Take 1 tablet by mouth 2 times daily        carvedilol 6.25 MG tablet    COREG    180 tablet    Take 1 tablet (6.25 mg) by mouth 2 times daily (with meals)    Essential hypertension with goal blood pressure less than 140/90       chlorthalidone 25 MG tablet    HYGROTON    90 tablet    Take 1 tablet (25 mg) by mouth daily    Essential hypertension with goal blood pressure less than 140/90       cholecalciferol 400 UNIT Tabs  tablet    vitamin D     Take 400 Units by mouth daily        fluocinonide 0.05 % solution    LIDEX     Apply topically 2 times daily        FLUoxetine 10 MG capsule    PROzac    90 capsule    Take 1 capsule (10 mg) by mouth daily    Decreased energy, Stress       losartan 100 MG tablet    COZAAR    90 tablet    Take 1 tablet (100 mg) by mouth daily (with breakfast)    Essential hypertension with goal blood pressure less than 140/90       loteprednol 0.2 % Susp ophthalmic susp    LOTEMAX/ALREX     Place 1 drop into the right eye daily as needed (runny eye)        olopatadine HCl 0.2 % Soln    PATADAY     Place 1 drop into the right eye daily as needed (runny eye)        omega 3 1000 MG Caps     90 capsule    Take 1 g by mouth daily        order for DME     1 Device    Equipment being ordered: The following items were dispensed: Knee Brace - Reaction medium RIGHT    Right knee pain, unspecified chronicity, Primary osteoarthritis of both knees, Knee effusion, right       polyethylene glycol powder    MIRALAX/GLYCOLAX     Take 1 capful by mouth daily        potassium chloride SA 10 MEQ CR tablet    K-DUR/KLOR-CON M    180 tablet    Take 1 tablet (10 mEq) by mouth 2 times daily    History of hypokalemia       vitamin B complex with vitamin C Tabs tablet    STRESS TAB     Take 1 tablet by mouth daily.

## 2017-06-23 NOTE — NURSING NOTE
"Chief Complaint   Patient presents with     Pre-Op Exam       Initial /70 (BP Location: Right arm, Patient Position: Chair, Cuff Size: Adult Large)  Pulse 80  Temp 98.1  F (36.7  C) (Oral)  Resp 16  Ht 5' 3\" (1.6 m)  Wt 136 lb 8 oz (61.9 kg)  SpO2 99%  BMI 24.18 kg/m2 Estimated body mass index is 24.18 kg/(m^2) as calculated from the following:    Height as of this encounter: 5' 3\" (1.6 m).    Weight as of this encounter: 136 lb 8 oz (61.9 kg).  Medication Reconciliation: complete   Joanie Guadalupe MA       "

## 2017-06-23 NOTE — PROGRESS NOTES
[facility]  [address] [facility] [address] [phone]  Dept: [phone]    PRE-OP EVALUATION:  Today's date: 2017    Tomasa Fam (: 1940) presents for pre-operative evaluation assessment as requested by Dr. Aline Cooper.  She requires evaluation and anesthesia risk assessment prior to undergoing surgery/procedure for treatment .  Proposed procedure: Cyst removal     Date of Surgery/ Procedure: 17  Time of Surgery/ Procedure: [facility]  Hospital/Surgical [facility]  Primary Physician: Violet Fenton  Type of Anesthesia Anticipated: General    Patient has a Health Care Directive or Living Will:  YES     Preop Questions 2017   1.  Do you have a history of heart attack, stroke, stent, bypass or surgery on an artery in the head, neck, heart or legs? No   2.  Do you ever have any pain or discomfort in your chest? No   3.  Do you have a history of  Heart Failure? No   4.   Are you troubled by shortness of breath when:  walking on a level surface, or up a slight hill, or at night? YES - Pt had breast removal and lung surgery, no acute changes, has chronic shortness of breath   5.  Do you currently have a cold, bronchitis or other respiratory infection? No   6.  Do you have a cough, shortness of breath, or wheezing? No   7.  Do you sometimes get pains in the calves of your legs when you walk? No   8. Do you or anyone in your family have previous history of blood clots? No   9.  Do you or does anyone in your family have a serious bleeding problem such as prolonged bleeding following surgeries or cuts? No   10. Have you ever had problems with anemia or been told to take iron pills? YES - 30+ years ago - resolved.   11. Have you had any abnormal blood loss such as black, tarry or bloody stools, or abnormal vaginal bleeding? No   12. Have you ever had a blood transfusion? No   13. Have you or any of your relatives ever had problems with anesthesia?  No   14. Do you have sleep apnea, excessive snoring or daytime drowsiness? No   15. Do you have any prosthetic heart valves? No   16. Do you have prosthetic joints? No   17. Is there any chance that you may be pregnant? No     HPI:                                                      Brief HPI related to upcoming procedure:   Pt is presenting for a preoperative visit to have a cyst removed from her vulva.  The cyst is located  near her urethra. She was washing herself in the shower and noticed the cyst.  She claims that the lump had been there for quite some time.  She noticed several lumps, went in for evaluation, and decided to have them removed. The procedure will take place on 06/26/17.   She is additionally having a biopsy for a potential yeast disease in her vagina.  She is not having any new acute problems at this time.        HYPERTENSION - Patient has longstanding history of mod-severe HTN , currently denies any symptoms referable to elevated blood pressure. Specifically denies chest pain, palpitations, dyspnea, orthopnea, PND or peripheral edema. Blood pressure readings have been in normal range. Current medication regimen is as listed below. Patient denies any side effects of medication.                                      BP Readings from Last 3 Encounters:   06/23/17 128/70   06/22/17 130/82   06/16/17 138/72                                                                                                                                                           .                        HYPERLIPIDEMIA - Patient not currently taking any medication for hyperlipidemia. No reason to delay procedure.   Recent Labs   Lab Test  05/05/17   0907  09/01/16   0830  06/10/15   0925  07/22/14   0827   CHOL  243*  210*  194  183   HDL  72  63  70  55   LDL  152*  115*  106  105   TRIG  94  158*  88  114   CHOLHDLRATIO   --    --   2.8  3.3                                                                                   .  DEPRESSION - Patient has a long history of Depression of moderate severity requiring medication for control with recent symptoms being stable..Current symptoms of depression include: none.                                                                                                                                                                                    .  MEDICAL HISTORY:                                                      Patient Active Problem List    Diagnosis Date Noted     Chronic pain of both knees 11/09/2016     Priority: Medium     Thyroid nodule 04/23/2016     Priority: Medium     Noted on CT Fall 2015.       Malignant neoplasm of upper lobe of right lung (H) 03/01/2016     Priority: Medium     Concussion 03/13/2013     Priority: Medium     Problem list name updated by automated process. Provider to review and confirm  [employer] Update utility       History of hypokalemia 01/23/2013     Priority: Medium     Advanced directives, counseling/discussion 08/21/2012     Priority: Medium     Received outside advance directive.  Previously signed by patient and witnessed with two signatures (cannot be the HCA).  scanned into EMR as Advance Directive/Living Will document. View document and details in Code Status History Report. Please see advance directive for specifics.          S/P hysterectomy 07/12/2012     Priority: Medium     Uterovaginal prolapse 06/12/2012     Priority: Medium     Cystocele, midline 04/04/2012     Priority: Medium     Hypertension goal BP (blood pressure) < 140/90 01/31/2012     Priority: Medium     Breast cancer (H) 12/30/2011     Priority: Medium     Hyperlipidemia LDL goal <160 06/29/2011     Priority: Medium     [place] 10-year CHD Risk Score: 2% (14 Total Points)   Values used to calculate score:     Age: 70 years -- Points: 14     Total Cholesterol: 192 mg/dL -- Points: 1     HDL Cholesterol: 61 mg/dL -- Points: -1     Systolic BP (treated): 118 mmHg -- Points:  0    The patient is not a smoker. -- Points: 0    The patient has not been diagnosed with diabetes. -- Points: 0    The patient does not have a family history of CHD. -- Points:0          ASCUS on Pap smear 03/11/2011     Priority: Medium     3/11/11 pap ASCUS, neg HPV - r/p pap in 1 year.   Due 3/12/12.       Chronic airway obstruction (H)      Priority: Medium     very mild COPD  Problem list name updated by automated process. Provider to review        Past Medical History:   Diagnosis Date     Arthritis     hands, knees     Breast cancer (H) jan. 2012     left mastectomy followed by right;  Dr. Luong     Chronic airway obstruction, not elsewhere classified 2006    very mild COPD - small cough     Diffuse cystic mastopathy      Lung cancer (H) 10/21/2015     Unspecified essential hypertension late 1980's     Past Surgical History:   Procedure Laterality Date     C NONSPECIFIC PROCEDURE  1970    s/p Tubal ligation 1970     COLONOSCOPY  3/2003    adenomatous polyp      COLONOSCOPY  7/2006    diverticulosis - repeat in 5 years     COLONOSCOPY  10/13/2011    Procedure:COLONOSCOPY; COLONOSCOPY ; Surgeon:CHITO GORDILLO; Location: GI     CYSTOSCOPY  7/11/2012    Procedure: CYSTOSCOPY;;  Surgeon: Aline Cooper DO;  Location:  OR     DAVINCI HYSTERECTOMY SUPRACERVICAL, SACROCOLPOPEXY, COMBINED  7/11/2012    Procedure: COMBINED DAVINCI HYSTERECTOMY SUPRACERVICAL, SACROCOLPOPEXY;   DAVINCI ASSISTED LAPAROSCOPIC SUPRACERVICAL HYSTERECTOMY AND BILATERAL SALPINGO-OOPHORECTOMY, SACROCOLPOPEXY AND CYSTOSCOPY;  Surgeon: Aline Cooper DO;  Location:  OR     EXCISE LESION EYELID Right 6/29/2015    Procedure: EXCISE LESION EYELID;  Surgeon: Hank Olvera MD;  Location:  SD     INSERT PORT VASCULAR ACCESS  2/3/2012    Procedure:INSERT PORT VASCULAR ACCESS; Power Port-A- Catheter Placement ; Surgeon:TRESSA TAPIA; Location: OR     LAPAROSCOPIC SALPINGO-OOPHORECTOMY  7/11/2012    Procedure:  LAPAROSCOPIC SALPINGO-OOPHORECTOMY;  Eric;  Surgeon: Aline Cooper DO;  Location: [facility] OR     LIPOSUCTION, RHYTIDECTOMY, COMBINED       LOBECTOMY LUNG Right 3/1/2016    Procedure: LOBECTOMY LUNG;  Surgeon: Jonas Woodward MD;  Location:  OR     MAMMOPLASTY REDUCTION  1/6/2012    Procedure:MAMMOPLASTY REDUCTION; Surgeon:MICKI KYLE; Location:[facility] OR     MASTECTOMY SIMPLE  11/12/2012    Procedure: MASTECTOMY SIMPLE;   Right Prophylactic Mastectomy with attempted Right Sentinal Node Biopsy, Revision Bilateral Mastectomy Insicions, liposuction in breast area;  Surgeon: Irish Tapia MD;  Location: [facility] OR     MASTECTOMY SIMPLE, SENTINEL NODE, COMBINED  1/6/2012    Procedure:COMBINED MASTECTOMY SIMPLE, SENTINEL NODE; Left Mastectomy Left [place] Node Biopsy,  Right Breast Reduction ; Surgeon:IRISH TAPIA; Location:[facility] OR     MASTECTOMY, BILATERAL       REMOVE PORT VASCULAR ACCESS  4/29/2013    Procedure: REMOVE PORT VASCULAR ACCESS;  Port A catheter removal ;  Surgeon: Irish Tapia MD;  Location: [facility] OR     REPAIR PTOSIS BILATERAL Bilateral 6/29/2015    Procedure: REPAIR PTOSIS BILATERAL;  Surgeon: Hank Olvera MD;  Location:  [facility]     REVISE RECONSTRUCTED BREAST BILATERAL  11/12/2012    Procedure: REVISE RECONSTRUCTED BREAST BILATERAL;;  Surgeon: Micki Kyle MD;  Location: [facility] OR     SURGICAL HISTORY OF -   in 40's    face lift     SURGICAL HISTORY OF -       lipoma removed right thigh     SURGICAL HISTORY OF -       D and C     THORACOTOMY Right 3/1/2016    Procedure: THORACOTOMY;  Surgeon: Jonas Woodward MD;  Location:  OR     Current Outpatient Prescriptions   Medication Sig Dispense Refill     carvedilol (COREG) 6.25 MG tablet Take 1 tablet (6.25 mg) by mouth 2 times daily (with meals) 180 tablet 1     losartan (COZAAR) 100 MG tablet Take 1 tablet (100 mg) by mouth daily (with breakfast) 90 tablet 1     chlorthalidone (HYGROTON) 25 MG tablet  Take 1 tablet (25 mg) by mouth daily 90 tablet 1     FLUoxetine (PROZAC) 10 MG capsule Take 1 capsule (10 mg) by mouth daily 90 capsule 1     potassium chloride SA (K-DUR/KLOR-CON M) 10 MEQ CR tablet Take 1 tablet (10 mEq) by mouth 2 times daily 180 tablet 1     order for DME Equipment being ordered: The following items were dispensed: Knee Brace - Reaction medium RIGHT 1 Device 0     cholecalciferol (VITAMIN D) 400 UNIT TABS Take 400 Units by mouth daily       fluocinonide (LIDEX) 0.05 % external solution Apply topically 2 times daily       calcium-vitamin D (CALTRATE) 600-400 MG-UNIT per tablet Take 1 tablet by mouth 2 times daily       olopatadine HCl (PATADAY) 0.2 % SOLN Place 1 drop into the right eye daily as needed (runny eye)       loteprednol (LOTEMAX/ALREX) 0.2 % SUSP Place 1 drop into the right eye daily as needed (runny eye)       polyethylene glycol (MIRALAX/GLYCOLAX) powder Take 1 capful by mouth daily       aspirin 81 MG tablet Take 1 tablet (81 mg) by mouth daily 90 tablet 3     omega 3 1000 MG CAPS Take 1 g by mouth daily 90 capsule      B Complex Vitamins (VITAMIN  B COMPLEX) tablet Take 1 tablet by mouth daily.       OTC products: None, except as noted above    Allergies   Allergen Reactions     No Known Drug Allergies      Tape [Adhesive Tape]      Sensitive to plastic tape on upper part of body--takes skin off      Latex Allergy: NO    Social History   Substance Use Topics     Smoking status: Never Smoker     Smokeless tobacco: Never Used     Alcohol use 0.0 oz/week     0 Standard drinks or equivalent per week      Comment: occasional; perhaps one per day     History   Drug Use No       REVIEW OF SYSTEMS:                                                    REVIEW OF SYSTEMS:  C: NEGATIVE for fever, chills, or change in weight  I: NEGATIVE for worrisome rashes, moles or lesions  R: NEGATIVE for significant cough or SOB  CV: Hx of hypertension - use of Losartan and Coreg; NEGATIVE for chest pain,  "palpitations or peripheral edema  GI: NEGATIVE for nausea, abdominal pain, heartburn, or change in bowel habits  B: Hx of bilateral mastectomy; NEGATIVE for masses, tenderness, or nipple discharge  : Cyst on vagina near urethra, see HPI;  M: Positive for leg pain/soreness on right leg; NEGATIVE for significant arthralgias or myalgia  N: NEGATIVE for weakness, dizziness or paresthesias  H: NEGATIVE for bleeding problems  P: Hx of anxiety - use of Prozac; NEGATIVE for changes in mood or affect    This document serves as a record of the services and decisions personally performed and made by Radha Magana NP. It was created on her behalf by Huey Davenport, a trained medical scribe. The creation of this document is based on the provider's statements to the medical scribe.  Huey Davenport, June 23, 2017, 4:55 PM    EXAM:                                                    /70 (BP Location: Right arm, Patient Position: Chair, Cuff Size: Adult Large)  Pulse 80  Temp 98.1  F (36.7  C) (Oral)  Resp 16  Ht 5' 3\" (1.6 m)  Wt 136 lb 8 oz (61.9 kg)  SpO2 99%  BMI 24.18 kg/m2    EXAM:  GENERAL: Patient appears healthy, alert and not distressed.  HENT: Ear canals and TM's appear normal, nose and mouth are without ulcers or lesions, oropharynx is clear and oral mucous membranes are moist  NECK: No adenopathy present, no asymmetry, masses, or scars noted, thyroid is normal to palpation  RESP: Lungs are clear to auscultation - no rales, rhonchi or wheezes present  CV: Regular rate and rhythm, normal S1 S2 heart sounds, no ectopy, no peripheral edema present, peripheral pulses normal, no carotid bruit.  ABDOMEN: Soft, nontender, no hepatosplenomegaly, no masses, normal bowel sounds  MS: No gross musculoskeletal defects noted, no edema, gait is age appropriate without ataxia  SKIN: No suspicious rashes, lesions, or moles.  PSYCH: Mentation appears normal, affect is normal/bright    Diagnostic Test " Results:  Hemoglobin   Date Value Ref Range Status   06/23/2017 12.3 11.7 - 15.7 g/dL Final   ]  Potassium   Date Value Ref Range Status   06/23/2017 3.6 3.4 - 5.3 mmol/L Final   ]        DIAGNOSTICS:                                                    EKG: No acute changes.  1st degree AV Block Nicci 236.  Sinus rhythm., no LVH by voltage criteria    Recent Labs   Lab Test  06/07/17   1027  06/01/17   1701   09/01/16   0830  04/18/16   0912   03/01/16   0650   HGB   --    --    --   12.4  11.9   < >  13.0   PLT   --    --    --   228  273   < >  217   INR   --    --    --    --    --    --   0.94   NA  133  130*   < >  132*  138   < >  135   POTASSIUM  3.5  3.2*   < >  4.1  3.5   < >  3.1*   CR  0.79  0.67   < >  0.78  0.80   < >  0.71    < > = values in this interval not displayed.     IMPRESSION:                                                    Reason for surgery/procedure: Pt has a cyst on vulva which is causing itching and discomfort/cyst removal  Diagnosis/reason for consult: Preoperative visit    The proposed surgical procedure is considered LOW risk.    REVISED CARDIAC RISK INDEX  The patient has the following serious cardiovascular risks for perioperative complications such as (MI, PE, VFib and 3  AV Block):  No serious cardiac risks  INTERPRETATION: 0 risks: Class I (very low risk - 0.4% complication rate)    The patient has the following additional risks for perioperative complications:  No identified additional risks      ICD-10-CM    1. Preop general physical exam Z01.818 EKG 12-lead complete w/read - Clinics     Hemoglobin     Potassium   2. Hypertension goal BP (blood pressure) < 140/90 I10    3. History of hypokalemia Z86.39    4. Chronic obstructive pulmonary disease, unspecified COPD type (H) J44.9    5. First degree AV block I44.0      Reviewed recommendations for holding ARB on day of surgery with Dr Fenton.  She supports this and is also aware of 1st degree AV block.  No other changes to  medication regimen recommended at this time.    Chronic conditions are stable and well managed.        Patient Instructions:    Before Your Surgery      Call your surgeon if there is any change in your health. This includes signs of a cold or flu (such as a sore throat, runny nose, cough, rash or fever).    Do not smoke, drink alcohol or take over the counter medicine (unless your surgeon or primary care doctor tells you to) for the 24 hours before and after surgery.    If you take prescribed drugs: Follow your doctor s orders about which medicines to take and which to stop until after surgery.    Eating and drinking prior to surgery: follow the instructions from your surgeon    Take a shower or bath the night before surgery. Use the soap your surgeon gave you to gently clean your skin. If you do not have soap from your surgeon, use your regular soap. Do not shave or scrub the surgery site.  Wear clean pajamas and have clean sheets on your bed.     Approval given to proceed with proposed procedure, without further diagnostic evaluation    --Pain management prior to surgery:   -Patient advised to avoid NSAIDS (Motrin, Ibuprofen, Aleve or Naprosyn)   -If needed, Tylenol or Acetaminophen are fine to use.    --Medications reviewed:   -One week prior to surgery:    HOLD Aspirin, Vitamin E, and Fish Oil one week prior to surgery.  Patient to start this now.   -Night prior to surgery:    OK to take statins.   -Morning of surgery: HOLD Cozaar    OK to take other blood pressure medications with a tiny sip of water.    --Pain medications, time off from work and FMLA following surgery deferred to surgeon.      RECOMMENDATIONS:                                                      --Patient is to take all scheduled medications on the day of surgery EXCEPT for modifications listed above.    APPROVAL GIVEN to proceed with proposed procedure, without further diagnostic evaluation     The information in this document, created by deepa  medical scribe for me, accurately reflects the services I personally performed and the decisions made by me. I have reviewed and approved this document for accuracy.  Radha Magana, June 23, 2017, 5:31 PM    Signed Electronically by: SYLVIA Henderson CNP    Copy of this evaluation report is provided to requesting physician.    [facility] Preop Guidelines

## 2017-06-23 NOTE — PATIENT INSTRUCTIONS
Before Your Surgery      Call your surgeon if there is any change in your health. This includes signs of a cold or flu (such as a sore throat, runny nose, cough, rash or fever).    Do not smoke, drink alcohol or take over the counter medicine (unless your surgeon or primary care doctor tells you to) for the 24 hours before and after surgery.    If you take prescribed drugs: Follow your doctor s orders about which medicines to take and which to stop until after surgery.    Eating and drinking prior to surgery: follow the instructions from your surgeon    Take a shower or bath the night before surgery. Use the soap your surgeon gave you to gently clean your skin. If you do not have soap from your surgeon, use your regular soap. Do not shave or scrub the surgery site.  Wear clean pajamas and have clean sheets on your bed.     Approval given to proceed with proposed procedure, without further diagnostic evaluation    --Pain management prior to surgery:   -Patient advised to avoid NSAIDS (Motrin, Ibuprofen, Aleve or Naprosyn)   -If needed, Tylenol or Acetaminophen are fine to use.    --Medications reviewed:   -One week prior to surgery:    HOLD Aspirin, Vitamin E, and Fish Oil one week prior to surgery.  STOP today   -Night prior to surgery:    OK to take statins.   -Morning of surgery: HOLD Losaartan on the morning of surgery    OK to take other medications with a tiny sip of water.    --Pain medications, time off from work and FMLA following surgery deferred to surgeon.

## 2017-06-24 ENCOUNTER — HEALTH MAINTENANCE LETTER (OUTPATIENT)
Age: 77
End: 2017-06-24

## 2017-06-24 LAB — POTASSIUM SERPL-SCNC: 3.6 MMOL/L (ref 3.4–5.3)

## 2017-06-26 ENCOUNTER — TELEPHONE (OUTPATIENT)
Dept: OBGYN | Facility: CLINIC | Age: 77
End: 2017-06-26

## 2017-06-26 DIAGNOSIS — R30.0 DYSURIA: Primary | ICD-10-CM

## 2017-06-26 NOTE — TELEPHONE ENCOUNTER
Pt states that she would like to do the ativan 30 min prior to procedure.      She asks where to go first and at what time, has not received anything in the mail yet.    Routed to both Dr Cooper and Ana.    Tiesha Noland R.N.  [facility]

## 2017-06-26 NOTE — TELEPHONE ENCOUNTER
Pt calls and has a question about anesthetic.    She has vulvar bx scheduled for 6/28/17.  She is interested in going the route of not doing general anesthesia.  But, she asks about getting an anti anxiety or tranquilizer to take prior to the procedure to calm her with the local anesthetic.  Is this possible?    Tiesha Noland R.N.  [facility]

## 2017-06-26 NOTE — TELEPHONE ENCOUNTER
Please let her know, we can possibly do mild sedation   If we do it at the surgery center, but it would be through the IV and   We would need to discuss it at the time of the procedure.     Other option is at the clinic and I could give her ativan   To take 30 minutes prior to procedure, do topical numbing and   Then local anesthetic.      Dr. Aline Cooper, DO    Obstetrics and Gynecology  [facility] [place] and [place]

## 2017-06-26 NOTE — TELEPHONE ENCOUNTER
Spoke with pt, she talked with Dr Cooper.  Will cb if she didn't info in the mail.    Tiesha Noland R.N.  [facility]

## 2017-06-26 NOTE — TELEPHONE ENCOUNTER
Patient  Has already been scheduled wed at 1230   Advised to come 1-2 hours prior  Info was mailed out   Recommend to do at surgery center.       Dr. Aline Cooper, DO    Obstetrics and Gynecology  [facility] - [place] and [place]

## 2017-06-28 ENCOUNTER — HOSPITAL (OUTPATIENT)
Dept: OBGYN | Facility: CLINIC | Age: 77
End: 2017-06-28

## 2017-06-28 ENCOUNTER — HOSPITAL PATHOLOGY (OUTPATIENT)
Dept: OTHER | Facility: CLINIC | Age: 77
End: 2017-06-28

## 2017-06-28 NOTE — PROGRESS NOTES
Pre-procedure diagnosis: 77 y/o  with vulvar itching and skin changes and vulvar mass   Post-procedure diagnosis:  77 y/o  with vulvar itching and skin changes and vulvar mass     Procedure:   1.  Vulvar Biopsy   2. Vulvar mass excision  Surgeon: Dr. Cooper   Assistant:  none  Complications: none  EBL: 1 cc   Indications: 77 y/o  with vulvar itching and skin changes and vulvar mass   Procedure: After informed consent was obtained, the patient was taken to the operating room and placed under MAC anesthesia. She was placed in dorsal lithotomy position and prepped and draped in the normal fashion. A time out was performed.  Lidocaine was injection at the 11 and 2 o'clock positions of the left and right fold between the labial majora and minora.   At the 11 o'clock position 2 4 mm camilo punch biopsies were performed.  The incision was closed with 4-0 monocryl in a figure-of-8 suture.  The vulvar mass was located and skin incision over it was made.  A round capsular mass was located and dissected out of the underlying tissue and removed.  The incision is closed with a deep stitch of figure-of-8 suture of 4-0 monocryl and then the skin edges were closed with 2 interrupted sutures of 4-0 monocryl.  Hemostasis is seen.   The patient tolerated the procedure well and was awakened from anesthesia and taken to the recovery room in stable condition. Sponge, lap and needle counts are correct x 2.   Dr. Aline Cooper, DO   Obstetrics and Gynecology   [facility]

## 2017-06-29 LAB — COPATH REPORT: NORMAL

## 2017-06-30 ENCOUNTER — TELEPHONE (OUTPATIENT)
Dept: OBGYN | Facility: CLINIC | Age: 77
End: 2017-06-30

## 2017-06-30 DIAGNOSIS — L90.0 LICHEN SCLEROSUS: Primary | ICD-10-CM

## 2017-06-30 NOTE — TELEPHONE ENCOUNTER
Pt calls and asks for results of recent pathology.    Tiesha Noland R.N.  [facility]

## 2017-07-05 ENCOUNTER — ALLIED HEALTH/NURSE VISIT (OUTPATIENT)
Dept: NURSING | Facility: CLINIC | Age: 77
End: 2017-07-05
Payer: MEDICARE

## 2017-07-05 DIAGNOSIS — R30.0 DYSURIA: ICD-10-CM

## 2017-07-05 DIAGNOSIS — N81.11 CYSTOCELE, MIDLINE: Primary | ICD-10-CM

## 2017-07-05 LAB
ALBUMIN UR-MCNC: NEGATIVE MG/DL
APPEARANCE UR: CLEAR
BILIRUB UR QL STRIP: NEGATIVE
COLOR UR AUTO: YELLOW
GLUCOSE UR STRIP-MCNC: NEGATIVE MG/DL
HGB UR QL STRIP: NEGATIVE
KETONES UR STRIP-MCNC: NEGATIVE MG/DL
LEUKOCYTE ESTERASE UR QL STRIP: ABNORMAL
NITRATE UR QL: NEGATIVE
PH UR STRIP: 7 PH (ref 5–7)
RBC #/AREA URNS AUTO: ABNORMAL /HPF (ref 0–2)
SP GR UR STRIP: 1.02 (ref 1–1.03)
URN SPEC COLLECT METH UR: ABNORMAL
UROBILINOGEN UR STRIP-ACNC: 0.2 EU/DL (ref 0.2–1)
WBC #/AREA URNS AUTO: ABNORMAL /HPF (ref 0–2)

## 2017-07-05 PROCEDURE — 99207 ZZC NO CHARGE NURSE ONLY: CPT

## 2017-07-05 PROCEDURE — 81001 URINALYSIS AUTO W/SCOPE: CPT | Performed by: FAMILY MEDICINE

## 2017-07-05 PROCEDURE — 87086 URINE CULTURE/COLONY COUNT: CPT | Performed by: FAMILY MEDICINE

## 2017-07-05 RX ORDER — CLOBETASOL PROPIONATE 0.5 MG/G
CREAM TOPICAL DAILY
Qty: 70 G | Refills: 0 | Status: SHIPPED | OUTPATIENT
Start: 2017-07-05 | End: 2019-09-13

## 2017-07-05 RX ORDER — HYDROCORTISONE VALERATE 2 MG/G
OINTMENT TOPICAL
Qty: 45 G | Refills: 0 | Status: SHIPPED | OUTPATIENT
Start: 2017-07-05 | End: 2018-03-02

## 2017-07-05 NOTE — TELEPHONE ENCOUNTER
Pt calling again to check the status of the message.     She is very anxious to get the results from her recent biopsy.     I did advise the pt that the md has been out of the office since the end of last week.       Pt states that the symptoms have gotten worse.     Pt would like a phone call from md today.     JOHN Rodriguez RN

## 2017-07-05 NOTE — TELEPHONE ENCOUNTER
Discussed results   Called in clobetasol       Dr. Aline Cooper, DO    Obstetrics and Gynecology  [facility] [place] and [place]

## 2017-07-05 NOTE — MR AVS SNAPSHOT
After Visit Summary   7/5/2017    Tomasa Fam    MRN: [id number]           Patient Information     Date Of Birth          1940        Visit Information        Provider Department      7/5/2017 4:15 PM RI OB NURSE [facility]        Today's Diagnoses     Cystocele, midline    -  1    Dysuria            Follow-ups after your visit        Your next 10 appointments already scheduled     Jul 21, 2017 10:00 AM CDT   SHORT with Aline Cooper, DO   [facility]    [address]  [facility] [id number] [phone]              Who to contact     If you have questions or need follow up information about today's clinic visit or your schedule please contact [facility] directly at [phone].  Normal or non-critical lab and imaging results will be communicated to you by MyChart, letter or phone within 4 business days after the clinic has received the results. If you do not hear from us within 7 days, please contact the clinic through MyChart or phone. If you have a critical or abnormal lab result, we will notify you by phone as soon as possible.  Submit refill requests through [email or web address] or call your pharmacy and they will forward the refill request to us. Please allow 3 business days for your refill to be completed.          Additional Information About Your Visit        MyChart Information     [email or web address] gives you secure access to your electronic health record. If you see a primary care provider, you can also send messages to your care team and make appointments. If you have questions, please call your primary care clinic.  If you do not have a primary care provider, please call [phone] and they will assist you.        Care EveryWhere ID     This is your Care EveryWhere ID. This could be used by other organizations to access your [place] medical records  [id number]         Blood Pressure from Last 3  Encounters:   06/23/17 128/70   06/22/17 130/82   06/16/17 138/72    Weight from Last 3 Encounters:   06/23/17 136 lb 8 oz (61.9 kg)   06/22/17 136 lb 1.6 oz (61.7 kg)   06/16/17 134 lb 1.6 oz (60.8 kg)              We Performed the Following     UA with Microscopic     Urine Culture Aerobic Bacterial        Primary Care Provider Office Phone # Fax #    Violet Fenton [phone]       [facility] [address] [facility] [address]        Equal Access to Services     [facility]: Hadii aad ku hadasho Soomaali, waaxda luqadaha, qaybta kaalmada adeegyada, ward puri . So [facility] [phone].    ATENCIÓN: Si habla español, tiene a srivastava disposición servicios gratuitos de asistencia lingüística. Llame al [phone].    We comply with applicable federal civil rights laws and [place] laws. We do not discriminate on the basis of race, color, national origin, age, disability sex, sexual orientation or gender identity.            Thank you!     Thank you for choosing [facility]  for your care. Our goal is always to provide you with excellent care. Hearing back from our patients is one way we can continue to improve our services. Please take a few minutes to complete the written survey that you may receive in the mail after your visit with us. Thank you!             Your Updated Medication List - Protect others around you: Learn how to safely use, store and throw away your medicines at www.disposemymeds.org.          This list is accurate as of: 7/5/17  5:07 PM.  Always use your most recent med list.                   Brand Name Dispense Instructions for use Diagnosis    aspirin 81 MG tablet     90 tablet    Take 1 tablet (81 mg) by mouth daily        calcium-vitamin D 600-400 MG-UNIT per tablet    CALTRATE     Take 1 tablet by mouth 2 times daily        carvedilol 6.25 MG tablet    COREG    180 tablet    Take 1 tablet (6.25 mg) by mouth 2 times  daily (with meals)    Essential hypertension with goal blood pressure less than 140/90       chlorthalidone 25 MG tablet    HYGROTON    90 tablet    Take 1 tablet (25 mg) by mouth daily    Essential hypertension with goal blood pressure less than 140/90       cholecalciferol 400 UNIT Tabs tablet    vitamin D     Take 400 Units by mouth daily        clobetasol 0.05 % Crea cream    CLOBETASOL PROPIONATE EMULSION    70 g    Apply topically daily    Lichen sclerosus       fluocinonide 0.05 % solution    LIDEX     Apply topically 2 times daily        FLUoxetine 10 MG capsule    PROzac    90 capsule    Take 1 capsule (10 mg) by mouth daily    Decreased energy, Stress       hydrocortisone valerate 0.2 % ointment    WEST-MARIEL    45 g    Apply sparingly to affected area 2 times daily    Lichen sclerosus       losartan 100 MG tablet    COZAAR    90 tablet    Take 1 tablet (100 mg) by mouth daily (with breakfast)    Essential hypertension with goal blood pressure less than 140/90       loteprednol 0.2 % Susp ophthalmic susp    LOTEMAX/ALREX     Place 1 drop into the right eye daily as needed (runny eye)        olopatadine HCl 0.2 % Soln    PATADAY     Place 1 drop into the right eye daily as needed (runny eye)        omega 3 1000 MG Caps     90 capsule    Take 1 g by mouth daily        order for DME     1 Device    Equipment being ordered: The following items were dispensed: Knee Brace - Reaction medium RIGHT    Right knee pain, unspecified chronicity, Primary osteoarthritis of both knees, Knee effusion, right       polyethylene glycol powder    MIRALAX/GLYCOLAX     Take 1 capful by mouth daily        potassium chloride SA 10 MEQ CR tablet    K-DUR/KLOR-CON M    180 tablet    Take 1 tablet (10 mEq) by mouth 2 times daily    History of hypokalemia       vitamin B complex with vitamin C Tabs tablet    STRESS TAB     Take 1 tablet by mouth daily.

## 2017-07-05 NOTE — NURSING NOTE
"Chief Complaint   Patient presents with     Urinary Problem     C/O a cloudy urine and has spasms while urinating        Initial There were no vitals taken for this visit. Estimated body mass index is 24.18 kg/(m^2) as calculated from the following:    Height as of 6/23/17: 5' 3\" (1.6 m).    Weight as of 6/23/17: 136 lb 8 oz (61.9 kg).  Medication Reconciliation: incomplete    "

## 2017-07-06 LAB
BACTERIA SPEC CULT: NORMAL
MICRO REPORT STATUS: NORMAL
SPECIMEN SOURCE: NORMAL

## 2017-07-06 RX ORDER — LEVOFLOXACIN 250 MG/1
250 TABLET, FILM COATED ORAL DAILY
Qty: 3 TABLET | Refills: 0 | Status: SHIPPED | OUTPATIENT
Start: 2017-07-06 | End: 2018-03-02

## 2017-07-07 ENCOUNTER — TELEPHONE (OUTPATIENT)
Dept: OBGYN | Facility: CLINIC | Age: 77
End: 2017-07-07

## 2017-07-10 NOTE — TELEPHONE ENCOUNTER
Can you guys help me out?  Can you call insurance and find out what cortisone is covered and I will call that in.   Just google medicare and it should be listed. Let me know and I will call it in    Dr. Aline Cooper, DO    Obstetrics and Gynecology  [facility] [place]

## 2017-07-10 NOTE — TELEPHONE ENCOUNTER
I tried a few phone numbers and still didn't get anyone that could answer this question.  I did link the pharmacy benefits in epic so you can type in hydrocortisone into orders and the ones that are covered should have a green check by them.  You get more options when you click database lookup tab.      Tiesha Noland R.N.  [facility]

## 2017-07-21 ENCOUNTER — OFFICE VISIT (OUTPATIENT)
Dept: OBGYN | Facility: CLINIC | Age: 77
End: 2017-07-21
Payer: MEDICARE

## 2017-07-21 VITALS
HEART RATE: 78 BPM | BODY MASS INDEX: 23.87 KG/M2 | SYSTOLIC BLOOD PRESSURE: 138 MMHG | HEIGHT: 63 IN | WEIGHT: 134.7 LBS | DIASTOLIC BLOOD PRESSURE: 76 MMHG

## 2017-07-21 DIAGNOSIS — Z87.440 PERSONAL HISTORY OF URINARY TRACT INFECTION: ICD-10-CM

## 2017-07-21 DIAGNOSIS — L90.0 LICHEN SCLEROSUS ET ATROPHICUS: Primary | ICD-10-CM

## 2017-07-21 LAB
MICRO REPORT STATUS: NORMAL
SPECIMEN SOURCE: NORMAL
WET PREP SPEC: NORMAL

## 2017-07-21 PROCEDURE — 87210 SMEAR WET MOUNT SALINE/INK: CPT | Performed by: FAMILY MEDICINE

## 2017-07-21 PROCEDURE — 99024 POSTOP FOLLOW-UP VISIT: CPT | Performed by: FAMILY MEDICINE

## 2017-07-21 RX ORDER — BETAMETHASONE DIPROPIONATE 0.5 MG/G
CREAM TOPICAL 2 TIMES DAILY
Qty: 70 G | Refills: 11 | Status: SHIPPED | OUTPATIENT
Start: 2017-07-21 | End: 2018-08-06

## 2017-07-21 NOTE — NURSING NOTE
"Chief Complaint   Patient presents with     Surgical Followup     Patient stated her medication is not working       Initial /76  Pulse 78  Ht 5' 3\" (1.6 m)  Wt 134 lb 11.2 oz (61.1 kg)  BMI 23.86 kg/m2 Estimated body mass index is 23.86 kg/(m^2) as calculated from the following:    Height as of this encounter: 5' 3\" (1.6 m).    Weight as of this encounter: 134 lb 11.2 oz (61.1 kg).  BP completed using cuff size: regular        The following HM Due: NONE      The following patient reported/Care Every where data was sent to:  P ABSTRACT QUALITY INITIATIVES [15504]  NA     patient has appointment for today    Debbie GREWAL               "

## 2017-07-21 NOTE — PROGRESS NOTES
"Subjective: 76 year old female   status post vulvar biopsy and vulvar cyst removal , here for incision check, 6/28/17.  Doing well, denies fever, significant pain.    Is not taking pain medications.   States some light vaginal bleeding.      Objective:  EXAM:  /76  Pulse 78  Ht 5' 3\" (1.6 m)  Wt 134 lb 11.2 oz (61.1 kg)  BMI 23.86 kg/m2  Constitutional: healthy, alert and no distress  Vulva:  Erythema, stitches  removed      Assessment/Plan: 76 year old female   status  post vulvar biopsy and vulvar cyst removal , here for incision check, 6/28/17.V67.00C Surgery Follow-Up Examination  (primary encounter diagnosis)  Comment:    Plan: twice daily betamethasone diprionate covered by medicare !!!!    Stitches removed   Return in 3 months    "

## 2017-07-21 NOTE — MR AVS SNAPSHOT
"              After Visit Summary   7/21/2017    Tomasa Fam    MRN: [id number]           Patient Information     Date Of Birth          1940        Visit Information        Provider Department      7/21/2017 10:00 AM Aline Cooper,  [facility]        Today's Diagnoses     Lichen sclerosus et atrophicus    -  1      Care Instructions    twice daily betamethasone diprionate   Stitches removed   Return in 3 months            Follow-ups after your visit        Who to contact     If you have questions or need follow up information about today's clinic visit or your schedule please contact [facility] directly at [phone].  Normal or non-critical lab and imaging results will be communicated to you by [employer], letter or phone within 4 business days after the clinic has received the results. If you do not hear from us within 7 days, please contact the clinic through [employer] or phone. If you have a critical or abnormal lab result, we will notify you by phone as soon as possible.  Submit refill requests through [employer] or call your pharmacy and they will forward the refill request to us. Please allow 3 business days for your refill to be completed.          Additional Information About Your Visit        MyChart Information     [employer] gives you secure access to your electronic health record. If you see a primary care provider, you can also send messages to your care team and make appointments. If you have questions, please call your primary care clinic.  If you do not have a primary care provider, please call [phone] and they will assist you.        Care EveryWhere ID     This is your Care EveryWhere ID. This could be used by other organizations to access your [place] medical records  [id number]        Your Vitals Were     Pulse Height BMI (Body Mass Index)             78 5' 3\" (1.6 m) 23.86 kg/m2          Blood Pressure from Last 3 Encounters:   07/21/17 " 138/76   06/23/17 128/70   06/22/17 130/82    Weight from Last 3 Encounters:   07/21/17 134 lb 11.2 oz (61.1 kg)   06/23/17 136 lb 8 oz (61.9 kg)   06/22/17 136 lb 1.6 oz (61.7 kg)              Today, you had the following     No orders found for display         Today's Medication Changes          These changes are accurate as of: 7/21/17 10:36 AM.  If you have any questions, ask your nurse or doctor.               Start taking these medicines.        Dose/Directions    augmented betamethasone dipropionate 0.05 % cream   Commonly known as:  DIPROLENE-AF   Used for:  Lichen sclerosus et atrophicus   Started by:  Aline Cooper, DO        Apply topically 2 times daily   Quantity:  70 g   Refills:  11            Where to get your medicines      These medications were sent to [facility] [id number] [place] [address] [address]  [address], [facility] [address]     Phone:  [phone]     augmented betamethasone dipropionate 0.05 % cream                Primary Care Provider Office Phone # Fax #    Violet Fenton [phone]       [facility] [address] [facility] [address]        Equal Access to Services     [facility]: Hadii aad ku hadasho Soomaali, waaxda luqadaha, qaybta kaalmada adeegyada, waxay idiin hayaan falguni khalexis puri ah. So [facility] [phone].    ATENCIÓN: Si habla español, tiene a srivastava disposición servicios gratuitos de asistencia lingüística. Llame al [phone].    We comply with applicable federal civil rights laws and [place] laws. We do not discriminate on the basis of race, color, national origin, age, disability sex, sexual orientation or gender identity.            Thank you!     Thank you for choosing [facility]  for your care. Our goal is always to provide you with excellent care. Hearing back from our patients is one way we can continue to improve our services. Please  take a few minutes to complete the written survey that you may receive in the mail after your visit with us. Thank you!             Your Updated Medication List - Protect others around you: Learn how to safely use, store and throw away your medicines at www.disposemymeds.org.          This list is accurate as of: 7/21/17 10:36 AM.  Always use your most recent med list.                   Brand Name Dispense Instructions for use Diagnosis    aspirin 81 MG tablet     90 tablet    Take 1 tablet (81 mg) by mouth daily        augmented betamethasone dipropionate 0.05 % cream    DIPROLENE-AF    70 g    Apply topically 2 times daily    Lichen sclerosus et atrophicus       calcium-vitamin D 600-400 MG-UNIT per tablet    CALTRATE     Take 1 tablet by mouth 2 times daily        carvedilol 6.25 MG tablet    COREG    180 tablet    Take 1 tablet (6.25 mg) by mouth 2 times daily (with meals)    Essential hypertension with goal blood pressure less than 140/90       chlorthalidone 25 MG tablet    HYGROTON    90 tablet    Take 1 tablet (25 mg) by mouth daily    Essential hypertension with goal blood pressure less than 140/90       cholecalciferol 400 UNIT Tabs tablet    vitamin D     Take 400 Units by mouth daily        clobetasol 0.05 % Crea cream    CLOBETASOL PROPIONATE EMULSION    70 g    Apply topically daily    Lichen sclerosus       fluocinonide 0.05 % solution    LIDEX     Apply topically 2 times daily        FLUoxetine 10 MG capsule    PROzac    90 capsule    Take 1 capsule (10 mg) by mouth daily    Decreased energy, Stress       hydrocortisone valerate 0.2 % ointment    WEST-MARIEL    45 g    Apply sparingly to affected area 2 times daily    Lichen sclerosus       levofloxacin 250 MG tablet    LEVAQUIN    3 tablet    Take 1 tablet (250 mg) by mouth daily    Dysuria       losartan 100 MG tablet    COZAAR    90 tablet    Take 1 tablet (100 mg) by mouth daily (with breakfast)    Essential hypertension with goal blood pressure  less than 140/90       loteprednol 0.2 % Susp ophthalmic susp    LOTEMAX/ALREX     Place 1 drop into the right eye daily as needed (runny eye)        olopatadine HCl 0.2 % Soln    PATADAY     Place 1 drop into the right eye daily as needed (runny eye)        omega 3 1000 MG Caps     90 capsule    Take 1 g by mouth daily        order for DME     1 Device    Equipment being ordered: The following items were dispensed: Knee Brace - Reaction medium RIGHT    Right knee pain, unspecified chronicity, Primary osteoarthritis of both knees, Knee effusion, right       polyethylene glycol powder    MIRALAX/GLYCOLAX     Take 1 capful by mouth daily        potassium chloride SA 10 MEQ CR tablet    K-DUR/KLOR-CON M    180 tablet    Take 1 tablet (10 mEq) by mouth 2 times daily    History of hypokalemia       vitamin B complex with vitamin C Tabs tablet    STRESS TAB     Take 1 tablet by mouth daily.

## 2017-07-27 DIAGNOSIS — F43.9 STRESS: ICD-10-CM

## 2017-07-27 DIAGNOSIS — R53.83 DECREASED ENERGY: ICD-10-CM

## 2017-07-27 DIAGNOSIS — Z86.39 HISTORY OF HYPOKALEMIA: ICD-10-CM

## 2017-07-27 RX ORDER — FLUOXETINE 10 MG/1
CAPSULE ORAL
Qty: 90 CAPSULE | Refills: 1 | Status: SHIPPED | OUTPATIENT
Start: 2017-07-27 | End: 2018-01-19

## 2017-07-27 RX ORDER — POTASSIUM CHLORIDE 750 MG/1
TABLET, EXTENDED RELEASE ORAL
Qty: 180 TABLET | Refills: 1 | Status: SHIPPED | OUTPATIENT
Start: 2017-07-27 | End: 2018-01-25

## 2017-07-27 NOTE — TELEPHONE ENCOUNTER
FLUoxetine (PROZAC) 10 MG capsule     Last Written Prescription Date: 1/20/17  Last Fill Quantity: 90, # refills: 1  Last Office Visit with [facility] primary care provider:  6/23/17        Last PHQ-9 score on record=   PHQ-9 SCORE 1/20/2017   Total Score 3   Some recent data might be hidden         potassium chloride SA (K-DUR/KLOR-CON M) 10 MEQ       Last Written Prescription Date: 1/20/17  Last Fill Quantity: 180, # refills: 1  Last Office Visit with [facility], [facility] or [facility] prescribing provider: 6/23/17       Potassium   Date Value Ref Range Status   06/23/2017 3.6 3.4 - 5.3 mmol/L Final     Creatinine   Date Value Ref Range Status   06/07/2017 0.79 0.52 - 1.04 mg/dL Final     BP Readings from Last 3 Encounters:   07/21/17 138/76   06/23/17 128/70   06/22/17 130/82

## 2017-07-27 NOTE — TELEPHONE ENCOUNTER
Prescription approved per [facility] Refill Protocol.  Peyton Boyd, RN  Triage Nurse

## 2017-10-13 ENCOUNTER — ALLIED HEALTH/NURSE VISIT (OUTPATIENT)
Dept: NURSING | Facility: CLINIC | Age: 77
End: 2017-10-13
Payer: MEDICARE

## 2017-10-13 DIAGNOSIS — R82.90 NONSPECIFIC FINDING ON EXAMINATION OF URINE: Primary | ICD-10-CM

## 2017-10-13 DIAGNOSIS — R30.0 DYSURIA: ICD-10-CM

## 2017-10-13 DIAGNOSIS — Z23 NEED FOR PROPHYLACTIC VACCINATION AND INOCULATION AGAINST INFLUENZA: Primary | ICD-10-CM

## 2017-10-13 DIAGNOSIS — Z87.440 PERSONAL HISTORY OF URINARY TRACT INFECTION: ICD-10-CM

## 2017-10-13 LAB
ALBUMIN UR-MCNC: NEGATIVE MG/DL
APPEARANCE UR: CLEAR
BACTERIA #/AREA URNS HPF: ABNORMAL /HPF
BILIRUB UR QL STRIP: NEGATIVE
COLOR UR AUTO: YELLOW
GLUCOSE UR STRIP-MCNC: NEGATIVE MG/DL
HGB UR QL STRIP: NEGATIVE
KETONES UR STRIP-MCNC: NEGATIVE MG/DL
LEUKOCYTE ESTERASE UR QL STRIP: ABNORMAL
NITRATE UR QL: NEGATIVE
PH UR STRIP: 7 PH (ref 5–7)
RBC #/AREA URNS AUTO: ABNORMAL /HPF
SOURCE: ABNORMAL
SP GR UR STRIP: 1.01 (ref 1–1.03)
UROBILINOGEN UR STRIP-ACNC: 0.2 EU/DL (ref 0.2–1)
WBC #/AREA URNS AUTO: ABNORMAL /HPF

## 2017-10-13 PROCEDURE — 87086 URINE CULTURE/COLONY COUNT: CPT | Performed by: FAMILY MEDICINE

## 2017-10-13 PROCEDURE — G0008 ADMIN INFLUENZA VIRUS VAC: HCPCS

## 2017-10-13 PROCEDURE — 99207 ZZC NO CHARGE NURSE ONLY: CPT

## 2017-10-13 PROCEDURE — 81001 URINALYSIS AUTO W/SCOPE: CPT | Performed by: FAMILY MEDICINE

## 2017-10-13 PROCEDURE — 87186 SC STD MICRODIL/AGAR DIL: CPT | Performed by: FAMILY MEDICINE

## 2017-10-13 PROCEDURE — 87088 URINE BACTERIA CULTURE: CPT | Performed by: FAMILY MEDICINE

## 2017-10-13 PROCEDURE — 90662 IIV NO PRSV INCREASED AG IM: CPT

## 2017-10-13 NOTE — PROGRESS NOTES
Injectable Influenza Immunization Documentation    1.  Is the person to be vaccinated sick today?   No    2. Does the person to be vaccinated have an allergy to a component   of the vaccine?   No    3. Has the person to be vaccinated ever had a serious reaction   to influenza vaccine in the past?   No    4. Has the person to be vaccinated ever had Guillain-Barré syndrome?   No    Form completed by Kamini Salguero MA

## 2017-10-13 NOTE — MR AVS SNAPSHOT
After Visit Summary   10/13/2017    Tomasa Fam    MRN: [id number]           Patient Information     Date Of Birth          1940        Visit Information        Provider Department      10/13/2017 9:15 AM  FLU CLINIC NURSE [facility]        Today's Diagnoses     Need for prophylactic vaccination and inoculation against influenza    -  1       Follow-ups after your visit        Your next 10 appointments already scheduled     Oct 13, 2017  9:45 AM CDT   LAB with  LAB   [facility]    [address] [facility] [address] [phone]           Patient must bring picture ID. Patient should be prepared to give a urine specimen  Please do not eat 10-12 hours before your appointment if you are coming in fasting for labs on lipids, cholesterol, or glucose (sugar). Pregnant women should follow their Care Team instructions. Water with medications is okay. Do not drink coffee or other fluids. If you have concerns about taking  your medications, please ask at office or if scheduling via [employer], send a message by clicking on Secure Messaging, Message Your Care Team.              Who to contact     If you have questions or need follow up information about today's clinic visit or your schedule please contact [facility] directly at [phone].  Normal or non-critical lab and imaging results will be communicated to you by MyChart, letter or phone within 4 business days after the clinic has received the results. If you do not hear from us within 7 days, please contact the clinic through [employer] or phone. If you have a critical or abnormal lab result, we will notify you by phone as soon as possible.  Submit refill requests through [employer] or call your pharmacy and they will forward the refill request to us. Please allow 3 business days for your refill to be completed.          Additional Information About Your  Visit        MyChart Information     [employer] gives you secure access to your electronic health record. If you see a primary care provider, you can also send messages to your care team and make appointments. If you have questions, please call your primary care clinic.  If you do not have a primary care provider, please call [phone] and they will assist you.        Care EveryWhere ID     This is your Care EveryWhere ID. This could be used by other organizations to access your [place] medical records  [id number]         Blood Pressure from Last 3 Encounters:   07/21/17 138/76   06/23/17 128/70   06/22/17 130/82    Weight from Last 3 Encounters:   07/21/17 134 lb 11.2 oz (61.1 kg)   06/23/17 136 lb 8 oz (61.9 kg)   06/22/17 136 lb 1.6 oz (61.7 kg)              We Performed the Following     ADMIN INFLUENZA (For MEDICARE Patients ONLY) []     FLU VACCINE, INCREASED ANTIGEN, PRESV FREE, AGE 65+ [93734]        Primary Care Provider Office Phone # Fax #    Violet Fenton [phone] [address] [facility] [address]        Equal Access to Services     [facility] ZAKIYA AH: Hadii aad ku hadasho Sotiali, waaxda luqadaha, qaybta kaalmada adeegyada, ward asif. So [facility] [phone].    ATENCIÓN: Si habla español, tiene a srivastava disposición servicios gratuitos de asistencia lingüística. [facility] [phone].    We comply with applicable federal civil rights laws and [place] laws. We do not discriminate on the basis of race, color, national origin, age, disability, sex, sexual orientation, or gender identity.            Thank you!     Thank you for choosing [facility]  for your care. Our goal is always to provide you with excellent care. Hearing back from our patients is one way we can continue to improve our services. Please take a few minutes to complete the written survey that you may receive in the mail after your visit with us. Thank you!              Your Updated Medication List - Protect others around you: Learn how to safely use, store and throw away your medicines at www.disposemymeds.org.          This list is accurate as of: 10/13/17  9:32 AM.  Always use your most recent med list.                   Brand Name Dispense Instructions for use Diagnosis    aspirin 81 MG tablet     90 tablet    Take 1 tablet (81 mg) by mouth daily        augmented betamethasone dipropionate 0.05 % cream    DIPROLENE-AF    70 g    Apply topically 2 times daily    Lichen sclerosus et atrophicus       calcium-vitamin D 600-400 MG-UNIT per tablet    CALTRATE     Take 1 tablet by mouth 2 times daily        carvedilol 6.25 MG tablet    COREG    180 tablet    Take 1 tablet (6.25 mg) by mouth 2 times daily (with meals)    Essential hypertension with goal blood pressure less than 140/90       chlorthalidone 25 MG tablet    HYGROTON    90 tablet    Take 1 tablet (25 mg) by mouth daily    Essential hypertension with goal blood pressure less than 140/90       cholecalciferol 400 UNIT Tabs tablet    vitamin D     Take 400 Units by mouth daily        clobetasol 0.05 % Crea cream    CLOBETASOL PROPIONATE EMULSION    70 g    Apply topically daily    Lichen sclerosus       fluocinonide 0.05 % solution    LIDEX     Apply topically 2 times daily        FLUoxetine 10 MG capsule    PROzac    90 capsule    TAKE 1 CAPSULE(10 MG) BY MOUTH DAILY    Decreased energy, Stress       hydrocortisone valerate 0.2 % ointment    WEST-MARIEL    45 g    Apply sparingly to affected area 2 times daily    Lichen sclerosus       levofloxacin 250 MG tablet    LEVAQUIN    3 tablet    Take 1 tablet (250 mg) by mouth daily    Dysuria       losartan 100 MG tablet    COZAAR    90 tablet    Take 1 tablet (100 mg) by mouth daily (with breakfast)    Essential hypertension with goal blood pressure less than 140/90       loteprednol 0.2 % Susp ophthalmic susp    LOTEMAX/ALREX     Place 1 drop into the right eye daily as  needed (runny eye)        olopatadine HCl 0.2 % Soln    [facility]     Place 1 drop into the right eye daily as needed (runny eye)        omega 3 1000 MG Caps     90 capsule    Take 1 g by mouth daily        order for DME     1 Device    Equipment being ordered: The following items were dispensed: Knee Brace - Reaction medium RIGHT    Right knee pain, unspecified chronicity, Primary osteoarthritis of both knees, Knee effusion, right       polyethylene glycol powder    MIRALAX/GLYCOLAX     Take 1 capful by mouth daily        potassium chloride SA 10 MEQ CR tablet    K-DUR/KLOR-CON M    180 tablet    TAKE 1 TABLET(10 MEQ) BY MOUTH TWICE DAILY    History of hypokalemia       vitamin B complex with vitamin C Tabs tablet    STRESS TAB     Take 1 tablet by mouth daily.

## 2017-10-15 RX ORDER — NITROFURANTOIN 25; 75 MG/1; MG/1
100 CAPSULE ORAL 2 TIMES DAILY
Qty: 14 CAPSULE | Refills: 0 | Status: SHIPPED | OUTPATIENT
Start: 2017-10-15 | End: 2018-03-02

## 2017-10-16 ENCOUNTER — TRANSFERRED RECORDS (OUTPATIENT)
Dept: HEALTH INFORMATION MANAGEMENT | Facility: CLINIC | Age: 77
End: 2017-10-16

## 2017-10-16 ENCOUNTER — TRANSFERRED RECORDS (OUTPATIENT)
Dept: SURGERY | Facility: CLINIC | Age: 77
End: 2017-10-16

## 2017-10-16 LAB
BACTERIA SPEC CULT: ABNORMAL
BACTERIA SPEC CULT: ABNORMAL
SPECIMEN SOURCE: ABNORMAL

## 2017-10-30 DIAGNOSIS — R82.90 CLOUDY URINE: ICD-10-CM

## 2017-10-30 LAB
ALBUMIN UR-MCNC: NEGATIVE MG/DL
AMORPH CRY #/AREA URNS HPF: ABNORMAL /HPF
APPEARANCE UR: CLEAR
BILIRUB UR QL STRIP: NEGATIVE
COLOR UR AUTO: YELLOW
GLUCOSE UR STRIP-MCNC: NEGATIVE MG/DL
HGB UR QL STRIP: NEGATIVE
KETONES UR STRIP-MCNC: NEGATIVE MG/DL
LEUKOCYTE ESTERASE UR QL STRIP: ABNORMAL
NITRATE UR QL: NEGATIVE
NON-SQ EPI CELLS #/AREA URNS LPF: ABNORMAL /LPF
PH UR STRIP: 7 PH (ref 5–7)
RBC #/AREA URNS AUTO: ABNORMAL /HPF
SOURCE: ABNORMAL
SP GR UR STRIP: 1.02 (ref 1–1.03)
UROBILINOGEN UR STRIP-ACNC: 0.2 EU/DL (ref 0.2–1)
WBC #/AREA URNS AUTO: ABNORMAL /HPF

## 2017-10-30 PROCEDURE — 81001 URINALYSIS AUTO W/SCOPE: CPT | Performed by: FAMILY MEDICINE

## 2017-11-30 DIAGNOSIS — I10 ESSENTIAL HYPERTENSION WITH GOAL BLOOD PRESSURE LESS THAN 140/90: ICD-10-CM

## 2017-12-01 RX ORDER — CARVEDILOL 6.25 MG/1
TABLET ORAL
Qty: 180 TABLET | Refills: 1 | Status: SHIPPED | OUTPATIENT
Start: 2017-12-01 | End: 2018-05-23

## 2017-12-01 RX ORDER — CHLORTHALIDONE 25 MG/1
TABLET ORAL
Qty: 90 TABLET | Refills: 1 | Status: SHIPPED | OUTPATIENT
Start: 2017-12-01 | End: 2018-05-31

## 2017-12-01 NOTE — TELEPHONE ENCOUNTER
Requested Prescriptions   Pending Prescriptions Disp Refills     carvedilol (COREG) 6.25 MG tablet [Pharmacy Med Name: CARVEDILOL 6.25MG TABLETS] 180 tablet 0     Sig: TAKE 1 TABLET(6.25 MG) BY MOUTH TWICE DAILY WITH MEALS    Beta-Blockers Protocol Passed    11/30/2017  1:58 PM       Passed - Blood pressure under 140/90    BP Readings from Last 3 Encounters:   07/21/17 138/76   06/23/17 128/70   06/22/17 130/82                Passed - Patient is age 6 or older       Passed - Recent or future visit with authorizing provider's specialty    Patient had office visit in the last year or has a visit in the next 30 days with authorizing provider.  See chart review.               chlorthalidone (HYGROTON) 25 MG tablet [Pharmacy Med Name: CHLORTHALIDONE 25MG TABLETS] 90 tablet 0     Sig: TAKE 1 TABLET(25 MG) BY MOUTH DAILY    Diuretics (Including Combos) Protocol Passed    11/30/2017  1:58 PM       Passed - Blood pressure under 140/90    BP Readings from Last 3 Encounters:   07/21/17 138/76   06/23/17 128/70   06/22/17 130/82                Passed - Recent or future visit with authorizing provider's specialty    Patient had office visit in the last year or has a visit in the next 30 days with authorizing provider.  See chart review.              Passed - Patient is age 18 or older       Passed - No active pregancy on record       Passed - Normal serum creatinine on file in past 12 months    Recent Labs   Lab Test  06/07/17   1027   CR  0.79             Passed - Normal serum potassium on file in past 12 months    Recent Labs   Lab Test  06/23/17   1659   POTASSIUM  3.6                   Passed - Normal serum sodium on file in past 12 months    Recent Labs   Lab Test  06/07/17   1027   NA  133             Passed - No positive pregnancy test in past 12 months        Prescription approved per G Refill Protocol.    Ama Veliz RN -- [facility]

## 2018-01-19 DIAGNOSIS — R53.83 DECREASED ENERGY: ICD-10-CM

## 2018-01-19 DIAGNOSIS — F43.9 STRESS: ICD-10-CM

## 2018-01-23 RX ORDER — FLUOXETINE 10 MG/1
CAPSULE ORAL
Qty: 90 CAPSULE | Refills: 0 | Status: SHIPPED | OUTPATIENT
Start: 2018-01-23 | End: 2018-04-19

## 2018-01-23 NOTE — TELEPHONE ENCOUNTER
Depression not on problem list.  Medication is being filled for 1 time refill only due to:  will need visit 6/18   Imelda Davis RN

## 2018-01-25 DIAGNOSIS — Z86.39 HISTORY OF HYPOKALEMIA: ICD-10-CM

## 2018-01-26 NOTE — TELEPHONE ENCOUNTER
"Requested Prescriptions   Pending Prescriptions Disp Refills     potassium chloride SA (K-DUR/KLOR-CON M) 10 MEQ CR tablet [Pharmacy Med Name: POTASSIUM CL MICRO 10MEQ ER TABS]  Last Written Prescription Date:  07/27/2017  Last Fill Quantity: 180 tablet,  # refills: 1   Last Office Visit: 6/23/2017   Future Office Visit:    180 tablet 0     Sig: TAKE 1 TABLET(10 MEQ) BY MOUTH TWICE DAILY    Potassium Supplements Protocol Passed    1/25/2018  1:50 PM       Passed - Recent or future visit with authorizing provider's specialty    Patient had office visit in the last year or has a visit in the next 30 days with authorizing provider.  See \"Patient Info\" tab in inbasket, or \"Choose Columns\" in Meds & Orders section of the refill encounter.            Passed - Patient is age 18 or older       Passed - Normal serum potassium in past 12 months    Recent Labs   Lab Test  06/23/17   1659   POTASSIUM  3.6                      "

## 2018-01-30 RX ORDER — POTASSIUM CHLORIDE 750 MG/1
TABLET, EXTENDED RELEASE ORAL
Qty: 180 TABLET | Refills: 1 | Status: SHIPPED | OUTPATIENT
Start: 2018-01-30 | End: 2018-07-26

## 2018-01-30 NOTE — TELEPHONE ENCOUNTER
Patient calls and needs this today, it is sent.  Labs are up to date.  Peyton Boyd, RN  Triage Nurse

## 2018-02-22 DIAGNOSIS — I10 ESSENTIAL HYPERTENSION WITH GOAL BLOOD PRESSURE LESS THAN 140/90: ICD-10-CM

## 2018-02-22 NOTE — TELEPHONE ENCOUNTER
"Requested Prescriptions   Pending Prescriptions Disp Refills     losartan (COZAAR) 100 MG tablet [Pharmacy Med Name: LOSARTAN 100MG TABLETS]  Last Written Prescription Date:  6/1/17  Last Fill Quantity: 90,  # refills: 1   Last office visit: 6/23/2017 with prescribing provider:  6/23/2017     Future Office Visit:     90 tablet 0     Sig: TAKE 1 TABLET(100 MG) BY MOUTH DAILY WITH BREAKFAST    Angiotensin-II Receptors Passed    2/22/2018 11:22 AM       Passed - Blood pressure under 140/90 in past 12 months    BP Readings from Last 3 Encounters:   07/21/17 138/76   06/23/17 128/70   06/22/17 130/82                Passed - Recent or future visit with authorizing provider's specialty    Patient had office visit in the last year or has a visit in the next 30 days with authorizing provider.  See \"Patient Info\" tab in inbasket, or \"Choose Columns\" in Meds & Orders section of the refill encounter.            Passed - Patient is age 18 or older       Passed - No active pregnancy on record       Passed - Normal serum creatinine on file in past 12 months    Recent Labs   Lab Test  06/07/17   1027   CR  0.79            Passed - Normal serum potassium on file in past 12 months    Recent Labs   Lab Test  06/23/17   1659   POTASSIUM  3.6                   Passed - No positive pregnancy test in past 12 months          "

## 2018-02-26 RX ORDER — LOSARTAN POTASSIUM 100 MG/1
TABLET ORAL
Qty: 90 TABLET | Refills: 0 | Status: SHIPPED | OUTPATIENT
Start: 2018-02-26 | End: 2018-03-02

## 2018-02-26 NOTE — TELEPHONE ENCOUNTER
Pt calls.  She says she only 1 or 2 left.      Medication is being filled for 1 time refill only due to:  At [facility] in June with Dr. Fenton, she wanted to see the pt back in about 6 months.    Called the pt.  Appt scheduled.

## 2018-03-02 ENCOUNTER — OFFICE VISIT (OUTPATIENT)
Dept: FAMILY MEDICINE | Facility: CLINIC | Age: 78
End: 2018-03-02
Payer: MEDICARE

## 2018-03-02 DIAGNOSIS — Z85.118 HISTORY OF LUNG CANCER: ICD-10-CM

## 2018-03-02 DIAGNOSIS — I10 ESSENTIAL HYPERTENSION WITH GOAL BLOOD PRESSURE LESS THAN 140/90: Primary | ICD-10-CM

## 2018-03-02 DIAGNOSIS — J44.9 CHRONIC OBSTRUCTIVE PULMONARY DISEASE, UNSPECIFIED COPD TYPE (H): ICD-10-CM

## 2018-03-02 DIAGNOSIS — E78.5 HYPERLIPIDEMIA LDL GOAL <160: ICD-10-CM

## 2018-03-02 DIAGNOSIS — M54.50 ACUTE RIGHT-SIDED LOW BACK PAIN WITHOUT SCIATICA: ICD-10-CM

## 2018-03-02 PROCEDURE — 99214 OFFICE O/P EST MOD 30 MIN: CPT | Performed by: FAMILY MEDICINE

## 2018-03-02 RX ORDER — EZETIMIBE 10 MG/1
10 TABLET ORAL DAILY
Qty: 90 TABLET | Refills: 0 | Status: SHIPPED | OUTPATIENT
Start: 2018-03-02 | End: 2018-05-23

## 2018-03-02 RX ORDER — LOSARTAN POTASSIUM 100 MG/1
TABLET ORAL
Qty: 90 TABLET | Refills: 0 | Status: SHIPPED | OUTPATIENT
Start: 2018-03-02 | End: 2018-10-01

## 2018-03-02 ASSESSMENT — ANXIETY QUESTIONNAIRES
5. BEING SO RESTLESS THAT IT IS HARD TO SIT STILL: NOT AT ALL
1. FEELING NERVOUS, ANXIOUS, OR ON EDGE: NOT AT ALL
6. BECOMING EASILY ANNOYED OR IRRITABLE: NOT AT ALL
GAD7 TOTAL SCORE: 0
3. WORRYING TOO MUCH ABOUT DIFFERENT THINGS: NOT AT ALL
7. FEELING AFRAID AS IF SOMETHING AWFUL MIGHT HAPPEN: NOT AT ALL
2. NOT BEING ABLE TO STOP OR CONTROL WORRYING: NOT AT ALL

## 2018-03-02 ASSESSMENT — PATIENT HEALTH QUESTIONNAIRE - PHQ9: 5. POOR APPETITE OR OVEREATING: NOT AT ALL

## 2018-03-02 NOTE — MR AVS SNAPSHOT
After Visit Summary   3/2/2018    Tomasa Fam    MRN: [id number]           Patient Information     Date Of Birth          1940        Visit Information        Provider Department      3/2/2018 10:30 AM Violet Fenton MD [place] [facility]        Today's Diagnoses     Hyperlipidemia LDL goal <160    -  1    Hypertension goal BP (blood pressure) < 140/90        Essential hypertension with goal blood pressure less than 140/90        Chronic obstructive pulmonary disease, unspecified COPD type (H)          Care Instructions    Do fasting lab work in about three months.    If all is well, I would like to see you in a year; earlier if needed.            Follow-ups after your visit        Follow-up notes from your care team     Return in about 3 months (around 6/2/2018) for Lab Work.      Future tests that were ordered for you today     Open Future Orders        Priority Expected Expires Ordered    Comprehensive metabolic panel Routine 6/2/2018 9/2/2018 3/2/2018    Lipid panel reflex to direct LDL Fasting Routine 6/2/2018 9/2/2018 3/2/2018    Albumin Random Urine Quantitative with Creat Ratio Routine 6/2/2018 9/2/2018 3/2/2018    COPD ACTION PLAN Routine 6/2/2018 9/2/2018 3/2/2018            Who to contact     If you have questions or need follow up information about today's clinic visit or your schedule please contact [facility] directly at [phone].  Normal or non-critical lab and imaging results will be communicated to you by MyChart, letter or phone within 4 business days after the clinic has received the results. If you do not hear from us within 7 days, please contact the clinic through MyChart or phone. If you have a critical or abnormal lab result, we will notify you by phone as soon as possible.  Submit refill requests through [email or web address] or call your pharmacy and they will forward the refill request to us. Please allow 3 business days for your refill to be  completed.          Additional Information About Your Visit        [facility] Information     [facility] gives you secure access to your electronic health record. If you see a primary care provider, you can also send messages to your care team and make appointments. If you have questions, please call your primary care clinic.  If you do not have a primary care provider, please call [phone] and they will assist you.        Care EveryWhere ID     This is your Care EveryWhere ID. This could be used by other organizations to access your [facility] medical records  [id number]         Blood Pressure from Last 3 Encounters:   03/02/18 (P) 132/68   07/21/17 138/76   06/23/17 128/70    Weight from Last 3 Encounters:   03/02/18 (P) 135 lb 4.8 oz (61.4 kg)   07/21/17 134 lb 11.2 oz (61.1 kg)   06/23/17 136 lb 8 oz (61.9 kg)                 Today's Medication Changes          These changes are accurate as of 3/2/18 11:21 AM.  If you have any questions, ask your nurse or doctor.               Start taking these medicines.        Dose/Directions    ezetimibe 10 MG tablet   Commonly known as:  ZETIA   Used for:  Hyperlipidemia LDL goal <160   Started by:  Violet Fenton MD        Dose:  10 mg   Take 1 tablet (10 mg) by mouth daily   Quantity:  90 tablet   Refills:  0         These medicines have changed or have updated prescriptions.        Dose/Directions    losartan 100 MG tablet   Commonly known as:  COZAAR   This may have changed:  See the new instructions.   Used for:  Essential hypertension with goal blood pressure less than 140/90   Changed by:  Violet Fenton MD        TAKE 1 TABLET(100 MG) BY MOUTH DAILY WITH BREAKFAST   Quantity:  90 tablet   Refills:  0         Stop taking these medicines if you haven't already. Please contact your care team if you have questions.     hydrocortisone valerate 0.2 % ointment   Commonly known as:  WEST-MARIEL   Stopped by:  Violet Fenton MD           loteprednol 0.2 % Susp ophthalmic susp    Commonly known as:  LOTEMAX/ALREX   Stopped by:  Violet Fenton MD           order for DME   Stopped by:  Violet Fenton MD                Where to get your medicines      These medications were sent to [facility] - [place], [place] - [address]  [address], [facility] [address]     Phone:  [phone]     ezetimibe 10 MG tablet    losartan 100 MG tablet                Primary Care Provider Office Phone # Fax #    Violet Fenton [phone] [address] [address] [facility] [address]        Equal Access to Services     : Hadii aad ku hadasho Soomaali, waaxda luqadaha, qaybta kaalmada adeegyada, waxay luzin haychandana adeamy puri . So [facility] [phone].    ATENCIÓN: Si habla español, tiene a srivastava disposición servicios gratuitos de asistencia lingüística. Llame al [phone].    We comply with applicable federal civil rights laws and [place] laws. We do not discriminate on the basis of race, color, national origin, age, disability, sex, sexual orientation, or gender identity.            Thank you!     Thank you for choosing [facility]  for your care. Our goal is always to provide you with excellent care. Hearing back from our patients is one way we can continue to improve our services. Please take a few minutes to complete the written survey that you may receive in the mail after your visit with us. Thank you!             Your Updated Medication List - Protect others around you: Learn how to safely use, store and throw away your medicines at www.disposemymeds.org.          This list is accurate as of 3/2/18 11:21 AM.  Always use your most recent med list.                   Brand Name Dispense Instructions for use Diagnosis    aspirin 81 MG tablet     90 tablet    Take 1 tablet (81 mg) by mouth daily        augmented betamethasone dipropionate 0.05 % cream    DIPROLENE-AF    70 g    Apply  topically 2 times daily    Lichen sclerosus et atrophicus       calcium-vitamin D 600-400 MG-UNIT per tablet    CALTRATE     Take 1 tablet by mouth 2 times daily        carvedilol 6.25 MG tablet    COREG    180 tablet    TAKE 1 TABLET(6.25 MG) BY MOUTH TWICE DAILY WITH MEALS    Essential hypertension with goal blood pressure less than 140/90       chlorthalidone 25 MG tablet    HYGROTON    90 tablet    TAKE 1 TABLET(25 MG) BY MOUTH DAILY    Essential hypertension with goal blood pressure less than 140/90       cholecalciferol 400 UNIT Tabs tablet    vitamin D3     Take 400 Units by mouth daily        clobetasol 0.05 % Crea cream    CLOBETASOL PROPIONATE EMULSION    70 g    Apply topically daily    Lichen sclerosus       ezetimibe 10 MG tablet    ZETIA    90 tablet    Take 1 tablet (10 mg) by mouth daily    Hyperlipidemia LDL goal <160       fluocinonide 0.05 % solution    LIDEX     Apply topically 2 times daily        FLUoxetine 10 MG capsule    PROzac    90 capsule    TAKE 1 CAPSULE(10 MG) BY MOUTH DAILY    Decreased energy, Stress       losartan 100 MG tablet    COZAAR    90 tablet    TAKE 1 TABLET(100 MG) BY MOUTH DAILY WITH BREAKFAST    Essential hypertension with goal blood pressure less than 140/90       olopatadine HCl 0.2 % [facility]     Place 1 drop into the right eye daily as needed (runny eye)        omega 3 1000 MG Caps     90 capsule    Take 1 g by mouth daily        polyethylene glycol powder    MIRALAX/GLYCOLAX     Take 1 capful by mouth daily        potassium chloride SA 10 MEQ CR tablet    K-DUR/KLOR-CON M    180 tablet    TAKE 1 TABLET(10 MEQ) BY MOUTH TWICE DAILY    History of hypokalemia       vitamin B complex with vitamin C Tabs tablet    STRESS TAB     Take 1 tablet by mouth daily.

## 2018-03-02 NOTE — PATIENT INSTRUCTIONS
Do fasting lab work in about three months.    If all is well, I would like to see you in a year; earlier if needed.

## 2018-03-02 NOTE — PROGRESS NOTES
"  SUBJECTIVE:   Tomasa Fam is a 77 year old female who presents to clinic today for the following health issues:      Hypertension Follow-up      Outpatient blood pressures are not being checked.    Low Salt Diet: not monitoring salt      Amount of exercise or physical activity: \"not much due to breathing issues\"    Problems taking medications regularly: No    Medication side effects: none    Diet: regular (no restrictions)        Back Pain       Duration: Tuesday        Specific cause: none    Description:   Location of pain: low back right  Character of pain: sharp and intermittent  Pain radiation:radiates into the right buttocks  New numbness or weakness in legs, not attributed to pain:  no     Intensity: At its worst 9/10 4/10    History:   Pain interferes with job: Not applicable,   History of back problems: no prior back problems  Any previous MRI or X-rays: None  Sees a specialist for back pain:  No  Therapies tried without relief: none    Alleviating factors:   Improved by: none      Precipitating factors:  Worsened by: Sitting and walking -the first 6 steps and then will be fine    Functional and Psychosocial Screen (Nemesio STarT Back):      Most recent score:    NEMESIO START BACK TOTAL SCORE 3/2/2018   Total Score (all 9) 2             Accompanying Signs & Symptoms:  Risk of Fracture:  None  Risk of Cauda Equina:  None  Risk of Infection:  None  Risk of Cancer:  History of cancer  Risk of Ankylosing Spondylitis:  Onset at age <35, male, AND morning back stiffness. no       Problem list and histories reviewed & adjusted, as indicated.  Additional history:     See under ROS     Patient Active Problem List   Diagnosis     Chronic airway obstruction (H)     ASCUS on Pap smear     Hyperlipidemia LDL goal <160     Breast cancer (H)     Hypertension goal BP (blood pressure) < 140/90     Cystocele, midline     Uterovaginal prolapse     S/P hysterectomy     Advanced directives, counseling/discussion     " History of hypokalemia     Concussion     Malignant neoplasm of upper lobe of right lung (H)     Thyroid nodule     Chronic pain of both knees       Current Outpatient Prescriptions   Medication Sig Dispense Refill     losartan (COZAAR) 100 MG tablet TAKE 1 TABLET(100 MG) BY MOUTH DAILY WITH BREAKFAST 90 tablet 0     potassium chloride SA (K-DUR/KLOR-CON M) 10 MEQ CR tablet TAKE 1 TABLET(10 MEQ) BY MOUTH TWICE DAILY 180 tablet 1     FLUoxetine (PROZAC) 10 MG capsule TAKE 1 CAPSULE(10 MG) BY MOUTH DAILY 90 capsule 0     carvedilol (COREG) 6.25 MG tablet TAKE 1 TABLET(6.25 MG) BY MOUTH TWICE DAILY WITH MEALS 180 tablet 1     chlorthalidone (HYGROTON) 25 MG tablet TAKE 1 TABLET(25 MG) BY MOUTH DAILY 90 tablet 1     augmented betamethasone dipropionate (DIPROLENE-AF) 0.05 % cream Apply topically 2 times daily 70 g 11     order for DME Equipment being ordered: The following items were dispensed: Knee Brace - Reaction medium RIGHT 1 Device 0     cholecalciferol (VITAMIN D) 400 UNIT TABS Take 400 Units by mouth daily       fluocinonide (LIDEX) 0.05 % external solution Apply topically 2 times daily       calcium-vitamin D (CALTRATE) 600-400 MG-UNIT per tablet Take 1 tablet by mouth 2 times daily       olopatadine HCl (PATADAY) 0.2 % SOLN Place 1 drop into the right eye daily as needed (runny eye)       polyethylene glycol (MIRALAX/GLYCOLAX) powder Take 1 capful by mouth daily       aspirin 81 MG tablet Take 1 tablet (81 mg) by mouth daily 90 tablet 3     omega 3 1000 MG CAPS Take 1 g by mouth daily 90 capsule      B Complex Vitamins (VITAMIN  B COMPLEX) tablet Take 1 tablet by mouth daily.       clobetasol (CLOBETASOL PROPIONATE EMULSION) 0.05 % CREA cream Apply topically daily (Patient not taking: Reported on 3/2/2018) 70 g 0         Reviewed and updated as needed this visit by clinical staff       Reviewed and updated as needed this visit by Provider         ROS:  CONSTITUTIONAL:NEGATIVE for fever, chills, change in  "weight  RESP:NEGATIVE for significant cough or SOB  CV: NEGATIVE for chest pain, palpitations or peripheral edema  MUSCULOSKELETAL: see below  PSYCHIATRIC: NEGATIVE for changes in mood or affect    Low right back pain.  Started as she was getting out of a chair, 3 days ago.  No other injury.  Would probably not have called for an appointment for this; but is here for the blood pressure.     Back is bad first thing in the morning.   When first getting up from chair and first few steps will hurt.  Gets better.  Not going down leg.    She notes she is not fasting today.  Is not currently on lipid lowering medication. Stopped zetia when it got too costly.  Tried Lipitor and another statin and had myalgia. Could not walk.      Breathing is without change.  Will go for another CT scan. This is through Dr. Woodward.   No need for inhalers.    Was told no COPD when she had her prior spirometry; though notes someone else told her she did. She only gets dyspnea on exertion with significant exercises; she does not feel this is unusual.     OBJECTIVE:     BP (P) 132/68 (BP Location: Right arm, Cuff Size: Adult Regular)  Pulse (P) 73  Temp (P) 97.7  F (36.5  C) (Oral)  Resp (P) 16  Ht (P) 5' 3\" (1.6 m)  Wt (P) 135 lb 4.8 oz (61.4 kg)  SpO2 (P) 98%  BMI (P) 23.97 kg/m2  Body mass index is 23.97 kg/(m^2) (pended).  GENERAL APPEARANCE: alert and no distress  RESP: lungs clear to auscultation - no rales, rhonchi or wheezes  CV: regular rates and rhythm  MS: Back is minimally tender to palpation right low back, about the SI area. .  Forward bending limited at extreme.  Extension is normal. Right lateral flexion is painful at the extreme.  Left lateral flexion is painful at the extreme.  Rotation is painful, more to the right.  Negative straight leg raising test bilaterally.  Patellar and achilles reflexes symmetric.  Dorsiflexion intact.  PSYCH: mentation appears normal and affect normal/bright    GFR Estimate   Date Value Ref " Range Status   06/07/2017 70 >60 mL/min/1.7m2 Final     Comment:     Non  GFR Calc   06/01/2017 85 >60 mL/min/1.7m2 Final     Comment:     Non  GFR Calc   05/05/2017 76 >60 mL/min/1.7m2 Final     Comment:     Non  GFR Calc     Recent Labs   Lab Test  05/05/17   0907  09/01/16   0830  06/10/15   0925  07/22/14   0827   CHOL  243*  210*  194  183   HDL  72  63  70  55   LDL  152*  115*  106  105   TRIG  94  158*  88  114   CHOLHDLRATIO   --    --   2.8  3.3       The 10-year ASCVD risk score (Gray Summitevangelista AMADOR Jr, et al., 2013) is: 30.6%    Values used to calculate the score:      Age: 77 years      Sex: Female      Is Non- : No      Diabetic: No      Tobacco smoker: No      Systolic Blood Pressure: 132 mmHg      Is BP treated: Yes      HDL Cholesterol: 72 mg/dL      Total Cholesterol: 243 mg/dL      Reviewed her spirometry from 2016  INTERPRETATION:   1. Moderate obstruction   2. Air trapping   3. Hyperinflation, borderline   4. Impaired gas transfer       ASSESSMENT/PLAN:     Essential hypertension with goal blood pressure less than 140/90  Meeting goal. Anticipate continuing current medication.   - Comprehensive metabolic panel; Future  - Albumin Random Urine Quantitative with Creat Ratio; Future  - losartan (COZAAR) 100 MG tablet; TAKE 1 TABLET(100 MG) BY MOUTH DAILY WITH BREAKFAST    Acute right-sided low back pain without sciatica  Mild. Discussed ice/heat.   Consider physical therapy; she will call is if this is worsening or not improving.     Hyperlipidemia LDL goal <160  She has not tolerated statins. Stopped zetia due to cost.   At this time, will see if cost is more reasonable. If not, she anticipates not being on medication.   - ezetimibe (ZETIA) 10 MG tablet; Take 1 tablet (10 mg) by mouth daily  - Comprehensive metabolic panel; Future  - Lipid panel reflex to direct LDL Fasting; Future    Chronic obstructive pulmonary disease, unspecified COPD  type (H)  She notes she was told by someone that she did not have COPD when she had the spirometry.  It looks like it was abnormal. She is overall assymptomatic; not needing inhalers. Discussed some of the symptoms to be aware of.  - COPD ACTION PLAN; Future    History of lung cancer  She is following with Dr. Woodward. Anticipating a CT in the near future.       Patient Instructions   Do fasting lab work in about three months.    If all is well, I would like to see you in a year; earlier if needed.        Violet Fenton MD, MD  [facility]

## 2018-03-02 NOTE — LETTER
My COPD Action Plan     Name: Tomasa Fam    YOB: 1940   Date: 3/2/2018    My doctor: Violet Fenton MD, MD   My clinic: [address] [address] [phone]  My Controller Medicine: none   Dose:      My Rescue Medicine: none   Dose:      My Flare Up Medicine: none   Dose:  FEV-1 (no units)   Date Value   07/31/2013 0.01     FEV1/FVC (no units)   Date Value   07/31/2013 47      My COPD Severity: Moderate = FeV1 < 79% -50%      Use of Oxygen: Oxygen Not Prescribed      Make sure you've had your pneumonia   vaccines.          GREEN ZONE       Doing well today      Usual level of activity and exercise    Usual amount of cough and mucus    No shortness of breath    Usual level of health (thinking clearly, sleeping well, feel like eating) Actions:      Take daily medicines    Use oxygen as prescribed    Follow regular exercise and diet plan    Avoid cigarette smoke and other irritants that harm the lungs           YELLOW ZONE          Having a bad day or flare up      Short of breath more than usual    A lot more sputum (mucus) than usual    Sputum looks yellow, green, tan, brown or bloody    More coughing or wheezing    Fever or chills    Less energy; trouble completing activities    Trouble thinking or focusing    Using quick relief inhaler or nebulizer more often    Poor sleep; symptoms wake me up    Do not feel like eating Actions:      Get plenty of rest    Take daily medicines    Use quick relief inhaler every  hours    If you use oxygen, call you doctor to see if you should adjust your oxygen    Do breathing exercises or other things to help you relax    Let a loved one, friend or neighbor know you are feeling worse    Call your care team if you have 2 or more symptoms.  Start taking steroids or antibiotics if directed by your care team           RED ZONE       Need medical care now      Severe shortness of breath (feel you can't  breathe)    Fever, chills    Not enough breath to do any activity    Trouble coughing up mucus, walking or talking    Blood in mucus    Frequent coughing   Rescue medicines are not working    Not able to sleep because of breathing    Feel confused or drowsy    Chest pain    Actions:      Call your health care team.  If you cannot reach your care team, call 911 or go to the emergency room.        Electronically signed by: Violet Fenton MD, March 2, 2018  Annual Reminders:  Meet with Care Team, Flu Shot every Fall  Pharmacy:    [facility] [address] [place] [address] [id number]  [place], [place] - [address]

## 2018-03-03 ASSESSMENT — ANXIETY QUESTIONNAIRES: GAD7 TOTAL SCORE: 0

## 2018-03-03 ASSESSMENT — PATIENT HEALTH QUESTIONNAIRE - PHQ9: SUM OF ALL RESPONSES TO PHQ QUESTIONS 1-9: 0

## 2018-04-09 ENCOUNTER — TRANSFERRED RECORDS (OUTPATIENT)
Dept: SURGERY | Facility: CLINIC | Age: 78
End: 2018-04-09

## 2018-04-09 ENCOUNTER — TRANSFERRED RECORDS (OUTPATIENT)
Dept: HEALTH INFORMATION MANAGEMENT | Facility: CLINIC | Age: 78
End: 2018-04-09

## 2018-04-19 DIAGNOSIS — R53.83 DECREASED ENERGY: ICD-10-CM

## 2018-04-19 DIAGNOSIS — F43.9 STRESS: ICD-10-CM

## 2018-04-19 NOTE — TELEPHONE ENCOUNTER
"Requested Prescriptions   Pending Prescriptions Disp Refills     FLUoxetine (PROZAC) 10 MG capsule [Pharmacy Med Name: FLUOXETINE 10MG CAPSULES]  Last Written Prescription Date:  1/23/2018  Last Fill Quantity: 90 capsule,  # refills: 0   Last office visit: 3/2/2018 with prescribing provider:  Violet Fenton   Future Office Visit:     90 capsule 0     Sig: TAKE 1 CAPSULE(10 MG) BY MOUTH DAILY    SSRIs Protocol Passed    4/19/2018 12:51 PM  PHQ-9 SCORE 8/3/2016 1/20/2017 3/2/2018   Total Score 3 3 0     RYANNE-7 SCORE 8/3/2016 1/20/2017 3/2/2018   Total Score 2 1 0          Passed - Recent (12 mo) or future (30 days) visit within the authorizing provider's specialty    Patient had office visit in the last 12 months or has a visit in the next 30 days with authorizing provider or within the authorizing provider's specialty.  See \"Patient Info\" tab in inbasket, or \"Choose Columns\" in Meds & Orders section of the refill encounter.           Passed - Patient is age 18 or older       Passed - No active pregnancy on record       Passed - No positive pregnancy test in last 12 months          "

## 2018-04-23 RX ORDER — FLUOXETINE 10 MG/1
CAPSULE ORAL
Qty: 90 CAPSULE | Refills: 0 | Status: SHIPPED | OUTPATIENT
Start: 2018-04-23 | End: 2018-07-18

## 2018-04-23 NOTE — TELEPHONE ENCOUNTER
Medication is being filled for 1 time refill only due to:  Patient needs to be seen because due for follow up per LOV note around 6/2/2018.   Patient informed.     Prescription approved per [facility] Refill Protocol.    Tomasa CUNNINGHAM RN, BSN, PHN  [place] [facility]

## 2018-05-22 DIAGNOSIS — I10 ESSENTIAL HYPERTENSION WITH GOAL BLOOD PRESSURE LESS THAN 140/90: ICD-10-CM

## 2018-05-22 DIAGNOSIS — E78.5 HYPERLIPIDEMIA LDL GOAL <160: ICD-10-CM

## 2018-05-22 DIAGNOSIS — R30.0 DYSURIA: ICD-10-CM

## 2018-05-22 DIAGNOSIS — R82.90 NONSPECIFIC FINDING ON EXAMINATION OF URINE: Primary | ICD-10-CM

## 2018-05-22 LAB
ALBUMIN SERPL-MCNC: 3.8 G/DL (ref 3.4–5)
ALBUMIN UR-MCNC: NEGATIVE MG/DL
ALP SERPL-CCNC: 56 U/L (ref 40–150)
ALT SERPL W P-5'-P-CCNC: 27 U/L (ref 0–50)
ANION GAP SERPL CALCULATED.3IONS-SCNC: 7 MMOL/L (ref 3–14)
APPEARANCE UR: ABNORMAL
AST SERPL W P-5'-P-CCNC: 16 U/L (ref 0–45)
BACTERIA #/AREA URNS HPF: ABNORMAL /HPF
BILIRUB SERPL-MCNC: 0.6 MG/DL (ref 0.2–1.3)
BILIRUB UR QL STRIP: NEGATIVE
BUN SERPL-MCNC: 21 MG/DL (ref 7–30)
CALCIUM SERPL-MCNC: 10 MG/DL (ref 8.5–10.1)
CHLORIDE SERPL-SCNC: 98 MMOL/L (ref 94–109)
CHOLEST SERPL-MCNC: 197 MG/DL
CO2 SERPL-SCNC: 29 MMOL/L (ref 20–32)
COLOR UR AUTO: YELLOW
CREAT SERPL-MCNC: 0.81 MG/DL (ref 0.52–1.04)
CREAT UR-MCNC: 56 MG/DL
GFR SERPL CREATININE-BSD FRML MDRD: 69 ML/MIN/1.7M2
GLUCOSE SERPL-MCNC: 95 MG/DL (ref 70–99)
GLUCOSE UR STRIP-MCNC: NEGATIVE MG/DL
HDLC SERPL-MCNC: 66 MG/DL
HGB UR QL STRIP: NEGATIVE
KETONES UR STRIP-MCNC: NEGATIVE MG/DL
LDLC SERPL CALC-MCNC: 108 MG/DL
LEUKOCYTE ESTERASE UR QL STRIP: ABNORMAL
MICROALBUMIN UR-MCNC: 13 MG/L
MICROALBUMIN/CREAT UR: 22.66 MG/G CR (ref 0–25)
NITRATE UR QL: NEGATIVE
NON-SQ EPI CELLS #/AREA URNS LPF: ABNORMAL /LPF
NONHDLC SERPL-MCNC: 131 MG/DL
PH UR STRIP: 7 PH (ref 5–7)
POTASSIUM SERPL-SCNC: 3.7 MMOL/L (ref 3.4–5.3)
PROT SERPL-MCNC: 7 G/DL (ref 6.8–8.8)
RBC #/AREA URNS AUTO: ABNORMAL /HPF
SODIUM SERPL-SCNC: 134 MMOL/L (ref 133–144)
SOURCE: ABNORMAL
SP GR UR STRIP: 1.01 (ref 1–1.03)
TRIGL SERPL-MCNC: 113 MG/DL
UROBILINOGEN UR STRIP-ACNC: 0.2 EU/DL (ref 0.2–1)
WBC #/AREA URNS AUTO: ABNORMAL /HPF

## 2018-05-22 PROCEDURE — 82043 UR ALBUMIN QUANTITATIVE: CPT | Performed by: FAMILY MEDICINE

## 2018-05-22 PROCEDURE — 36415 COLL VENOUS BLD VENIPUNCTURE: CPT | Performed by: FAMILY MEDICINE

## 2018-05-22 PROCEDURE — 80061 LIPID PANEL: CPT | Performed by: FAMILY MEDICINE

## 2018-05-22 PROCEDURE — 80053 COMPREHEN METABOLIC PANEL: CPT | Performed by: FAMILY MEDICINE

## 2018-05-22 PROCEDURE — 87086 URINE CULTURE/COLONY COUNT: CPT | Performed by: FAMILY MEDICINE

## 2018-05-22 PROCEDURE — 81001 URINALYSIS AUTO W/SCOPE: CPT | Performed by: FAMILY MEDICINE

## 2018-05-23 DIAGNOSIS — I10 ESSENTIAL HYPERTENSION WITH GOAL BLOOD PRESSURE LESS THAN 140/90: ICD-10-CM

## 2018-05-23 DIAGNOSIS — E78.5 HYPERLIPIDEMIA LDL GOAL <160: ICD-10-CM

## 2018-05-23 LAB
BACTERIA SPEC CULT: NORMAL
SPECIMEN SOURCE: NORMAL

## 2018-05-24 RX ORDER — EZETIMIBE 10 MG/1
TABLET ORAL
Qty: 90 TABLET | Refills: 3 | Status: SHIPPED | OUTPATIENT
Start: 2018-05-24 | End: 2019-05-23

## 2018-05-24 RX ORDER — CARVEDILOL 6.25 MG/1
TABLET ORAL
Qty: 180 TABLET | Refills: 2 | Status: SHIPPED | OUTPATIENT
Start: 2018-05-24 | End: 2018-10-01

## 2018-05-24 RX ORDER — LOSARTAN POTASSIUM 100 MG/1
TABLET ORAL
Qty: 90 TABLET | Refills: 3 | Status: SHIPPED | OUTPATIENT
Start: 2018-05-24 | End: 2018-12-06

## 2018-05-24 NOTE — TELEPHONE ENCOUNTER
"Requested Prescriptions   Pending Prescriptions Disp Refills     losartan (COZAAR) 100 MG tablet [Pharmacy Med Name: LOSARTAN 100MG TABLETS]  Last Written Prescription Date:  3/2/18  Last Fill Quantity: 90,  # refills: 0   Last office visit: 3/2/2018 with prescribing provider:  3/2/2018     Future Office Visit:     90 tablet 0     Sig: TAKE 1 TABLET(100 MG) BY MOUTH DAILY WITH BREAKFAST    Angiotensin-II Receptors Passed    5/23/2018  6:23 PM       Passed - Blood pressure under 140/90 in past 12 months    BP Readings from Last 3 Encounters:   03/02/18 (P) 132/68   07/21/17 138/76   06/23/17 128/70                Passed - Recent (12 mo) or future (30 days) visit within the authorizing provider's specialty    Patient had office visit in the last 12 months or has a visit in the next 30 days with authorizing provider or within the authorizing provider's specialty.  See \"Patient Info\" tab in inbasket, or \"Choose Columns\" in Meds & Orders section of the refill encounter.           Passed - Patient is age 18 or older       Passed - No active pregnancy on record       Passed - Normal serum creatinine on file in past 12 months    Recent Labs   Lab Test  05/22/18   0759   CR  0.81            Passed - Normal serum potassium on file in past 12 months    Recent Labs   Lab Test  05/22/18   0759   POTASSIUM  3.7                   Passed - No positive pregnancy test in past 12 months        carvedilol (COREG) 6.25 MG tablet [Pharmacy Med Name: CARVEDILOL 6.25MG TABLETS]  Last Written Prescription Date:  12/1/17  Last Fill Quantity: 180,  # refills: 1   Last office visit: 3/2/2018 with prescribing provider:  3/2/2018     Future Office Visit:     180 tablet 0     Sig: TAKE 1 TABLET(6.25 MG) BY MOUTH TWICE DAILY WITH MEALS    Beta-Blockers Protocol Passed    5/23/2018  6:23 PM       Passed - Blood pressure under 140/90 in past 12 months    BP Readings from Last 3 Encounters:   03/02/18 (P) 132/68   07/21/17 138/76   06/23/17 128/70 " "               Passed - Patient is age 6 or older       Passed - Recent (12 mo) or future (30 days) visit within the authorizing provider's specialty    Patient had office visit in the last 12 months or has a visit in the next 30 days with authorizing provider or within the authorizing provider's specialty.  See \"Patient Info\" tab in inBebosket, or \"Choose Columns\" in Meds & Orders section of the refill encounter.            ezetimibe (ZETIA) 10 MG tablet [Pharmacy Med Name: EZETIMIBE 10MG TABLETS]  Last Written Prescription Date:  3/2/18  Last Fill Quantity: 90,  # refills: 0   Last office visit: 3/2/2018 with prescribing provider:  3/2/2018     Future Office Visit:     90 tablet 0     Sig: TAKE 1 TABLET(10 MG) BY MOUTH DAILY    Antihyperlipidemic agents Passed    5/23/2018  6:23 PM       Passed - Lipid panel on file in past 12 mos    Recent Labs   Lab Test  05/22/18   0759   06/10/15   0925   CHOL  197   < >  194   TRIG  113   < >  88   HDL  66   < >  70   LDL  108*   < >  106   NHDL  131*   < >   --    VLDL   --    --   18   CHOLHDLRATIO   --    --   2.8    < > = values in this interval not displayed.              Passed - Normal serum ALT on record in past 12 mos    Recent Labs   Lab Test  05/22/18   0759   ALT  27            Passed - Recent (12 mo) or future (30 days) visit within the authorizing provider's specialty    Patient had office visit in the last 12 months or has a visit in the next 30 days with authorizing provider or within the authorizing provider's specialty.  See \"Patient Info\" tab in inbasket, or \"Choose Columns\" in Meds & Orders section of the refill encounter.           Passed - Patient is age 18 years or older       Passed - No active pregnancy on record       Passed - No positive pregnancy test in past 12 mos          "

## 2018-05-31 DIAGNOSIS — I10 ESSENTIAL HYPERTENSION WITH GOAL BLOOD PRESSURE LESS THAN 140/90: ICD-10-CM

## 2018-06-05 RX ORDER — CHLORTHALIDONE 25 MG/1
TABLET ORAL
Qty: 90 TABLET | Refills: 2 | Status: SHIPPED | OUTPATIENT
Start: 2018-06-05 | End: 2018-12-06

## 2018-06-05 NOTE — TELEPHONE ENCOUNTER
Prescription approved per [facility] Refill Protocol.  Zina Leung RN  Message handled by Nurse Triage.

## 2018-07-18 DIAGNOSIS — R53.83 DECREASED ENERGY: ICD-10-CM

## 2018-07-18 DIAGNOSIS — F43.9 STRESS: ICD-10-CM

## 2018-07-19 NOTE — TELEPHONE ENCOUNTER
"Requested Prescriptions   Pending Prescriptions Disp Refills     FLUoxetine (PROZAC) 10 MG capsule [Pharmacy Med Name: FLUOXETINE 10MG CAPSULES]  Last Written Prescription Date:  4/23/18  Last Fill Quantity: 90,  # refills: 0   Last office visit: 3/2/2018 with prescribing provider:  Violet Fenton MD    Future Office Visit:     90 capsule 0     Sig: TAKE 1 CAPSULE(10 MG) BY MOUTH DAILY    SSRIs Protocol Passed    7/18/2018  6:27 PM  PHQ-9 SCORE 8/3/2016 1/20/2017 3/2/2018   Total Score 3 3 0     RYANNE-7 SCORE 8/3/2016 1/20/2017 3/2/2018   Total Score 2 1 0              Passed - Recent (12 mo) or future (30 days) visit within the authorizing provider's specialty    Patient had office visit in the last 12 months or has a visit in the next 30 days with authorizing provider or within the authorizing provider's specialty.  See \"Patient Info\" tab in inbasket, or \"Choose Columns\" in Meds & Orders section of the refill encounter.           Passed - Patient is age 18 or older       Passed - No active pregnancy on record       Passed - No positive pregnancy test in last 12 months          "

## 2018-07-22 RX ORDER — FLUOXETINE 10 MG/1
CAPSULE ORAL
Qty: 90 CAPSULE | Refills: 0 | Status: SHIPPED | OUTPATIENT
Start: 2018-07-22 | End: 2018-10-01

## 2018-07-22 NOTE — TELEPHONE ENCOUNTER
Routing refill request to provider for review/approval because:  Lizbeth given x1 and patient did not follow up, please advise  Zina Leung, RN  Message handled by Nurse Triage.

## 2018-07-23 NOTE — TELEPHONE ENCOUNTER
PHQ-9 SCORE 8/3/2016 1/20/2017 3/2/2018   Total Score 3 3 0       RYANNE-7 SCORE 8/3/2016 1/20/2017 3/2/2018   Total Score 2 1 0

## 2018-07-26 DIAGNOSIS — Z86.39 HISTORY OF HYPOKALEMIA: ICD-10-CM

## 2018-07-27 RX ORDER — POTASSIUM CHLORIDE 750 MG/1
TABLET, EXTENDED RELEASE ORAL
Qty: 180 TABLET | Refills: 1 | Status: SHIPPED | OUTPATIENT
Start: 2018-07-27 | End: 2019-01-31

## 2018-07-27 NOTE — TELEPHONE ENCOUNTER
"Requested Prescriptions   Pending Prescriptions Disp Refills     potassium chloride SA (K-DUR/KLOR-CON M) 10 MEQ CR tablet [Pharmacy Med Name: POTASSIUM CL MICRO 10MEQ ER TABS] 180 tablet 0    Last Written Prescription Date:  1/30/18  Last Fill Quantity: 180,  # refills: 1   Last Office Visit: 3/2/2018 [place], Hypertension   Future Office Visit:      Sig: TAKE 1 TABLET(10 MEQ) BY MOUTH TWICE DAILY    Potassium Supplements Protocol Passed    7/26/2018 12:01 PM       Passed - Recent (12 mo) or future (30 days) visit within the authorizing provider's specialty    Patient had office visit in the last 12 months or has a visit in the next 30 days with authorizing provider or within the authorizing provider's specialty.  See \"Patient Info\" tab in inbasket, or \"Choose Columns\" in Meds & Orders section of the refill encounter.           Passed - Patient is age 18 or older       Passed - Normal serum potassium in past 12 months    Recent Labs   Lab Test  05/22/18   0759   POTASSIUM  3.7                      "

## 2018-08-06 DIAGNOSIS — L90.0 LICHEN SCLEROSUS ET ATROPHICUS: ICD-10-CM

## 2018-08-06 RX ORDER — BETAMETHASONE DIPROPIONATE 0.5 MG/G
CREAM TOPICAL
Qty: 50 G | Refills: 0 | Status: SHIPPED | OUTPATIENT
Start: 2018-08-06 | End: 2019-09-13

## 2018-08-06 NOTE — TELEPHONE ENCOUNTER
Courtesy refill given, added note to sig to make appt.  Tiesha FREITAS R.N.  [facility]

## 2018-09-24 ENCOUNTER — TRANSFERRED RECORDS (OUTPATIENT)
Dept: HEALTH INFORMATION MANAGEMENT | Facility: CLINIC | Age: 78
End: 2018-09-24

## 2018-09-28 ENCOUNTER — NURSE TRIAGE (OUTPATIENT)
Dept: NURSING | Facility: CLINIC | Age: 78
End: 2018-09-28

## 2018-09-28 ENCOUNTER — HOSPITAL ENCOUNTER (EMERGENCY)
Facility: CLINIC | Age: 78
Discharge: HOME | End: 2018-09-28
Attending: EMERGENCY MEDICINE | Admitting: EMERGENCY MEDICINE
Payer: MEDICARE

## 2018-09-28 ENCOUNTER — APPOINTMENT (OUTPATIENT)
Dept: GENERAL RADIOLOGY | Facility: CLINIC | Age: 78
End: 2018-09-28
Attending: EMERGENCY MEDICINE
Payer: MEDICARE

## 2018-09-28 VITALS
TEMPERATURE: 97.7 F | RESPIRATION RATE: 21 BRPM | OXYGEN SATURATION: 96 % | SYSTOLIC BLOOD PRESSURE: 160 MMHG | DIASTOLIC BLOOD PRESSURE: 69 MMHG

## 2018-09-28 DIAGNOSIS — R30.0 DYSURIA: ICD-10-CM

## 2018-09-28 DIAGNOSIS — I10 ESSENTIAL HYPERTENSION: ICD-10-CM

## 2018-09-28 LAB
ALBUMIN UR-MCNC: NEGATIVE MG/DL
ANION GAP SERPL CALCULATED.3IONS-SCNC: 7 MMOL/L (ref 3–14)
APPEARANCE UR: CLEAR
BACTERIA #/AREA URNS HPF: ABNORMAL /HPF
BASOPHILS # BLD AUTO: 0.1 10E9/L (ref 0–0.2)
BASOPHILS NFR BLD AUTO: 0.6 %
BILIRUB UR QL STRIP: NEGATIVE
BUN SERPL-MCNC: 17 MG/DL (ref 7–30)
CALCIUM SERPL-MCNC: 9.8 MG/DL (ref 8.5–10.1)
CHLORIDE SERPL-SCNC: 92 MMOL/L (ref 94–109)
CO2 BLDCOV-SCNC: 29 MMOL/L (ref 21–28)
CO2 SERPL-SCNC: 31 MMOL/L (ref 20–32)
COLOR UR AUTO: YELLOW
CREAT SERPL-MCNC: 0.71 MG/DL (ref 0.52–1.04)
DIFFERENTIAL METHOD BLD: NORMAL
EOSINOPHIL # BLD AUTO: 0.3 10E9/L (ref 0–0.7)
EOSINOPHIL NFR BLD AUTO: 3.5 %
ERYTHROCYTE [DISTWIDTH] IN BLOOD BY AUTOMATED COUNT: 12.6 % (ref 10–15)
GFR SERPL CREATININE-BSD FRML MDRD: 79 ML/MIN/1.7M2
GLUCOSE SERPL-MCNC: 91 MG/DL (ref 70–99)
GLUCOSE UR STRIP-MCNC: NEGATIVE MG/DL
HCT VFR BLD AUTO: 38.9 % (ref 35–47)
HGB BLD-MCNC: 13.2 G/DL (ref 11.7–15.7)
HGB UR QL STRIP: NEGATIVE
IMM GRANULOCYTES # BLD: 0 10E9/L (ref 0–0.4)
IMM GRANULOCYTES NFR BLD: 0.3 %
INTERPRETATION ECG - MUSE: NORMAL
KETONES UR STRIP-MCNC: NEGATIVE MG/DL
LACTATE BLD-SCNC: 0.4 MMOL/L (ref 0.7–2.1)
LEUKOCYTE ESTERASE UR QL STRIP: ABNORMAL
LYMPHOCYTES # BLD AUTO: 1.7 10E9/L (ref 0.8–5.3)
LYMPHOCYTES NFR BLD AUTO: 21.3 %
MCH RBC QN AUTO: 30.3 PG (ref 26.5–33)
MCHC RBC AUTO-ENTMCNC: 33.9 G/DL (ref 31.5–36.5)
MCV RBC AUTO: 89 FL (ref 78–100)
MONOCYTES # BLD AUTO: 1 10E9/L (ref 0–1.3)
MONOCYTES NFR BLD AUTO: 12.8 %
NEUTROPHILS # BLD AUTO: 4.8 10E9/L (ref 1.6–8.3)
NEUTROPHILS NFR BLD AUTO: 61.5 %
NITRATE UR QL: NEGATIVE
NRBC # BLD AUTO: 0 10*3/UL
NRBC BLD AUTO-RTO: 0 /100
PCO2 BLDV: 43 MM HG (ref 40–50)
PH BLDV: 7.43 PH (ref 7.32–7.43)
PH UR STRIP: 8 PH (ref 5–7)
PLATELET # BLD AUTO: 237 10E9/L (ref 150–450)
PO2 BLDV: 28 MM HG (ref 25–47)
POTASSIUM SERPL-SCNC: 3 MMOL/L (ref 3.4–5.3)
RBC # BLD AUTO: 4.36 10E12/L (ref 3.8–5.2)
RBC #/AREA URNS AUTO: <1 /HPF (ref 0–2)
SAO2 % BLDV FROM PO2: 53 %
SODIUM SERPL-SCNC: 130 MMOL/L (ref 133–144)
SOURCE: ABNORMAL
SP GR UR STRIP: 1.01 (ref 1–1.03)
SQUAMOUS #/AREA URNS AUTO: <1 /HPF (ref 0–1)
T4 FREE SERPL-MCNC: 0.96 NG/DL (ref 0.76–1.46)
TROPONIN I SERPL-MCNC: <0.015 UG/L (ref 0–0.04)
TSH SERPL DL<=0.005 MIU/L-ACNC: 4.32 MU/L (ref 0.4–4)
UROBILINOGEN UR STRIP-MCNC: 0 MG/DL (ref 0–2)
WBC # BLD AUTO: 7.8 10E9/L (ref 4–11)
WBC #/AREA URNS AUTO: 8 /HPF (ref 0–5)

## 2018-09-28 PROCEDURE — 87088 URINE BACTERIA CULTURE: CPT | Performed by: EMERGENCY MEDICINE

## 2018-09-28 PROCEDURE — 84484 ASSAY OF TROPONIN QUANT: CPT | Performed by: EMERGENCY MEDICINE

## 2018-09-28 PROCEDURE — 25000132 ZZH RX MED GY IP 250 OP 250 PS 637: Mod: GY | Performed by: EMERGENCY MEDICINE

## 2018-09-28 PROCEDURE — 71046 X-RAY EXAM CHEST 2 VIEWS: CPT

## 2018-09-28 PROCEDURE — 96374 THER/PROPH/DIAG INJ IV PUSH: CPT

## 2018-09-28 PROCEDURE — 81001 URINALYSIS AUTO W/SCOPE: CPT | Performed by: EMERGENCY MEDICINE

## 2018-09-28 PROCEDURE — 84439 ASSAY OF FREE THYROXINE: CPT | Performed by: EMERGENCY MEDICINE

## 2018-09-28 PROCEDURE — 87086 URINE CULTURE/COLONY COUNT: CPT | Performed by: EMERGENCY MEDICINE

## 2018-09-28 PROCEDURE — 84443 ASSAY THYROID STIM HORMONE: CPT | Performed by: EMERGENCY MEDICINE

## 2018-09-28 PROCEDURE — 80048 BASIC METABOLIC PNL TOTAL CA: CPT | Performed by: EMERGENCY MEDICINE

## 2018-09-28 PROCEDURE — 93005 ELECTROCARDIOGRAM TRACING: CPT

## 2018-09-28 PROCEDURE — 83605 ASSAY OF LACTIC ACID: CPT

## 2018-09-28 PROCEDURE — 82803 BLOOD GASES ANY COMBINATION: CPT

## 2018-09-28 PROCEDURE — 25000128 H RX IP 250 OP 636: Performed by: EMERGENCY MEDICINE

## 2018-09-28 PROCEDURE — A9270 NON-COVERED ITEM OR SERVICE: HCPCS | Mod: GY | Performed by: EMERGENCY MEDICINE

## 2018-09-28 PROCEDURE — 99284 EMERGENCY DEPT VISIT MOD MDM: CPT | Mod: 25

## 2018-09-28 PROCEDURE — 85025 COMPLETE CBC W/AUTO DIFF WBC: CPT | Performed by: EMERGENCY MEDICINE

## 2018-09-28 RX ORDER — LABETALOL HYDROCHLORIDE 5 MG/ML
10 INJECTION, SOLUTION INTRAVENOUS ONCE
Status: COMPLETED | OUTPATIENT
Start: 2018-09-28 | End: 2018-09-28

## 2018-09-28 RX ORDER — NITROFURANTOIN 25; 75 MG/1; MG/1
100 CAPSULE ORAL ONCE
Status: COMPLETED | OUTPATIENT
Start: 2018-09-28 | End: 2018-09-28

## 2018-09-28 RX ORDER — NITROFURANTOIN 25; 75 MG/1; MG/1
100 CAPSULE ORAL 2 TIMES DAILY
Qty: 13 CAPSULE | Refills: 0 | Status: SHIPPED | OUTPATIENT
Start: 2018-09-28 | End: 2018-09-30 | Stop reason: ALTCHOICE

## 2018-09-28 RX ORDER — POTASSIUM CHLORIDE 1.5 G/1.58G
40 POWDER, FOR SOLUTION ORAL ONCE
Status: COMPLETED | OUTPATIENT
Start: 2018-09-28 | End: 2018-09-28

## 2018-09-28 RX ADMIN — NITROFURANTOIN (MONOHYDRATE/MACROCRYSTALS) 100 MG: 75; 25 CAPSULE ORAL at 21:02

## 2018-09-28 RX ADMIN — SODIUM CHLORIDE 1000 ML: 9 INJECTION, SOLUTION INTRAVENOUS at 21:02

## 2018-09-28 RX ADMIN — POTASSIUM CHLORIDE 40 MEQ: 1.5 POWDER, FOR SOLUTION ORAL at 19:53

## 2018-09-28 RX ADMIN — LABETALOL HYDROCHLORIDE 10 MG: 5 INJECTION, SOLUTION INTRAVENOUS at 18:54

## 2018-09-28 ASSESSMENT — ENCOUNTER SYMPTOMS
CONSTIPATION: 0
DIARRHEA: 0
DIAPHORESIS: 0
APPETITE CHANGE: 0
BLOOD IN STOOL: 0
FREQUENCY: 1
WEAKNESS: 0
ABDOMINAL PAIN: 0
COUGH: 1
UNEXPECTED WEIGHT CHANGE: 0
SHORTNESS OF BREATH: 0

## 2018-09-28 NOTE — ED TRIAGE NOTES
"Pt presents to ED for evaluation of elevated blood pressure.  States today she was \"feeling quite right\" which prompted her to check her BP at home.  Pt states her BP was 191/91; at about 1815 pt did take a dose of Coreg and a dose of Losartan.  Pt denies any pain but does note over the past week or two she's had occasional nausea and just hasn't been feeling well.   "

## 2018-09-28 NOTE — ED PROVIDER NOTES
"  History     Chief Complaint:  Hypertension    The history is provided by a friend.      Tomasa Fam is a 78 year old female, with history of breast cancer, lung cancer, hypertension, hyperlipidemia, amongst others as noted below, currently taking baby aspirin daily amongst other medications below, who presents with her spouse to the emergency department for evaluation of hypertension, with a home reading of 191/ 91 prior to today. Patient noted that she has not been feeling baseline for the last two weeks and \"something felt wrong\", where she was experiencing intermittent sore throat, headaches, nausea episodes, intermittent right calf cramps and hot flashes. Patient noted that she felt baseline most of the day yesterday, but last evening she began to feel like something was \"not quite right\" and measured her blood pressure, noting it was high and took a dose of her medication. However, patient noted that she was unable to sleep as she was concerned there was \"something wrong\". Patient checked her blood pressure again today and it was noted to be 191/90, raising concern and prompting patient to present. Of note, patient took a dose of Coreg and Losartan prior to arrival at 1815. She notes that she normally takes her medication with a meal, but she did not feel hungry and took it anyway. Spouse noted that they have not eaten since this morning when they had a late breakfast, but did snack throughout the day. Patient endorses worsening cough, some nausea one arrival and some urinary urgency/frequency, but decreased urine output. She also noted intermittent right calf pain which began two weeks ago. She notes this is somewhat similar to when she experienced previous UTIs. Patient denies any chest pain, new shortness of breath, new abdominal pain, one sided weakness, new bowel issues, visual changes (including blurry or doubled vision), weight loss, appetite change or diaphoresis.    Allergies:  No Known Drug " "Allergies      Medications:    Coreg  Losartan  Aspirin 81 mg  Diprolene cream  Vitamin B Complex  Hygroton  Vitamin D  Clobetasol  Zetia  Lidex  Prozac  Omega 3   Polyethylene glycol  Potassium chloride     Past Medical History:    Arthritis  Breast cancer  Chromic airway obstruction  Diffuse cystic mastopathy  Lung cancer  Unspecified essential hypertension  Hyperlipidemia    Past Surgical History:    Tubal ligation  Colonoscopy x3  Cystoscopy  Davinci hysterectomy supracervical sacrocolpopexy  Excise lesion eyelid  Insert port vascular access  Laparascopic salpingo-oophorectomy   Liposuction, rhytidectomy combined  Lobectomy lung  Mammoplasty reduction  Mastectomy simple x2  Mastectomy bilateral  Remove port vascular access  Revise reconstructed breast bilateral  Repair ptosis bilateral  Face lift  Lipoma removed right thigh  D & C  Thoracotomy    Family History:    Cardiovascular - ruptured aorta, hardening of the arteries  Cerebrovascular disease  Respiratory - chronic bronchitis  Brain tumor    Social History:  The patient was accompanied to the ED by spouse.  Smoking Status: No  Smokeless Tobacco: No  Alcohol Use: Yes - 4-5x/week   Marital Status:   [2]     Review of Systems   Constitutional: Negative for appetite change, diaphoresis and unexpected weight change.        \"Feeling hot\"   Eyes: Negative for visual disturbance.   Respiratory: Positive for cough. Negative for shortness of breath.    Cardiovascular: Negative for chest pain.   Gastrointestinal: Negative for abdominal pain, blood in stool, constipation and diarrhea.   Genitourinary: Positive for decreased urine volume, frequency and urgency.   Musculoskeletal:        Intermittent right calf cramping   Neurological: Negative for weakness.   All other systems reviewed and are negative.    Physical Exam   Vitals:  Patient Vitals for the past 24 hrs:   BP Temp Temp src Heart Rate Resp SpO2   09/28/18 2115 160/69 - - - - -   09/28/18 2100 153/71 - " - - - 96 %   09/28/18 2045 156/77 - - - - 96 %   09/28/18 2030 155/66 - - - - 95 %   09/28/18 2015 - - - - - 97 %   09/28/18 2000 172/78 - - - - 96 %   09/28/18 1955 - - - - - 98 %   09/28/18 1954 - - - - - 97 %   09/28/18 1945 166/72 - - - - 98 %   09/1939 - - - - - 98 %   09/28/18 1937 165/84 - - - - -   09/28/18 1910 175/83 - - 71 21 97 %   09/28/18 1900 - - - 71 18 97 %   09/28/18 1854 181/77 - - 74 15 96 %   09/28/18 1845 - - - 74 25 98 %   09/28/18 1830 199/88 - - 81 14 98 %   09/28/18 1819 - - - 80 25 99 %   09/28/18 1818 200/84 - - - - 98 %   09/28/18 1815 (!) 203/93 97.7  F (36.5  C) Oral 86 18 98 %   09/28/18 1814 (!) 203/93 - - - - -      Physical Exam  Constitutional: Well developed, nontox appearance  Head: Atraumatic.   Mouth/Throat: Oropharynx is clear and moist.   Neck:  no stridor  Eyes: no scleral icterus, PERRL, EOMI  Cardiovascular: RRR, 2+ bilat radial pulses  Pulmonary/Chest: nml resp effort, Clear BS bilat  Abdominal: ND, +BS, soft, NT, no rebound or guarding   : no CVA tenderness bilat  Ext: Warm, well perfused, no edema  Neurological: A&O,  CNII-XII intact, 5/5 strength throughout upper and lower ext, symmetric; sensation grossly intactt  Skin: Skin is warm and dry.   Psychiatric: Behavior is normal. Thought content normal.   Nursing note and vitals reviewed.  Emergency Department Course     ECG:  ECG taken at 1815, ECG read at 1820 by Dr. Rollins  Sinus rhythm with premature atrial complexes  Possible left atrial enlargement  Borderline ECG  New T wave inversion in VI  Rate 83 bpm. WY interval 208. QRS duration 84. QT/QTc 372/437. P-R-T axes 62 34 58.     Imaging:  Radiology findings were communicated with the patient and family who voiced understanding of the findings.  XR Chest:  IMPRESSION: Postoperative change of right upper lung resection are  noted with suture lines in place. Lungs are otherwise clear. No  consolidation, edema, effusion, or pneumothorax. Heart size is at  upper  limits of normal.  Reading per radiology.     Laboratory:  Laboratory findings were communicated with the patient and family who voiced understanding of the findings.  CBC: AWNL (WBC 7.8, HGB 13.2, )  BMP:  (L), Potassium 3.0 (L), Chloride  92 (L) o/w WNL (Creatinine 0.71)  ISTAT Gases Lactate Charles POCT (Collected 1849): Bicarbonate Venous 29 (H), Lactic acid 0.4 (L) o/w WNL  Troponin (Collected 1843): <0.015   TSH with free T4 reflex: 0.96  T4 Free: 0.96    UA with Microscopic: pH Urine 8.0 (H), Leukocyte Esterase Urine Small (A), WBC/HPF 8 (H), Bacteria Few (A) o/w WNL   Urine Culture: Pending    Interventions:  1854 Labetalol 10 mg IV  1953 Potassium chloride 40 mEq PO  2102 0.9% NaCl Bolus 1000 mL IV  2102 Macrobid 100 mg PO     Emergency Department Course:  Nursing notes and vitals reviewed.  The patient was sent for a XR Chest while in the emergency department, results above.   IV was inserted and blood was drawn for laboratory testing, results above.  The patient provided a urine sample here in the emergency department. This was sent for laboratory testing, findings above.  EKG obtained in the ED, see results above.      6:27 PM: I performed an exam of the patient as documented above. History obtained from patient.  8:44 PM: Updated patient and spouse regarding results. Plan of care will be to discharge patient home after a liter of fluids and a dose of Macrobid here.     I discussed the treatment plan with the patient. They expressed understanding of this plan and consented to discharge. They will be discharged home with instructions for care and follow up. In addition, the patient will return to the emergency department if their symptoms persist, worsen, if new symptoms arise or if there is any concern.  All questions were answered.     I personally reviewed the laboratory results with the Patient and spouse and answered all related questions prior to discharge.    Impression & Plan      Medical  Decision Makin year old female presenting w/ hypertension, urinary urgency     DDx includes essential hypertension, subtherapeutic HTN medication, UTI, pyelonephritis, thyroid dysfunction, electrolyte abnml, HTN urgency vs emergency.  Doubt aortic dissection, PE, CVA given symptomatology.  EKG as noted above.  Labs and imaging ordered as noted above.  Labs significant for mild hypokalemia and hyponatremia, UA with WBCs and leuk esterase.  Imaging sig for no acute cardiopul dx.  Given EKG and trop in context of symptoms and phys exam, doubt ACS.  Given pt's urinary symptoms, will treat for UTI although weakly positive UA.  BP improved in ED after IV meds to bridge gap until home meds took affect.   Recommendations given regarding follow up with primary care doctor and return to the emergency department as needed for new or worsening symptoms.  Counseled on all results, disposition and diagnosis.  Pt understanding and agreeable to plan. Patient discharged in stable condition.      Diagnosis:    ICD-10-CM    1. Dysuria R30.0    2. Essential hypertension I10         Disposition:   Discharged.    Discharge Medications:  New Prescriptions    NITROFURANTOIN, MACROCRYSTAL-MONOHYDRATE, (MACROBID) 100 MG CAPSULE    Take 1 capsule (100 mg) by mouth 2 times daily for 13 doses       Scribe Disclosure:  Nova TOSCANO, am serving as a scribe at 6:27 PM on 2018 to document services personally performed by Fercho Rollins MD, based on my observations and the provider's statements to me.  2018   [facility] EMERGENCY DEPARTMENT       Fercho Rollins MD  18 [id number]

## 2018-09-28 NOTE — ED AVS SNAPSHOT
[facility] Emergency Department    [address]    [facility] [address]    Phone:  [phone]    Fax:  [phone]                                       Tomasa Fam   MRN: [id number]    Department:  [facility] Emergency Department   Date of Visit:  9/28/2018           Patient Information     Date Of Birth          1940        Your diagnoses for this visit were:     Dysuria     Essential hypertension        You were seen by Fercho Rollins MD.      Follow-up Information     Follow up with [facility] Emergency Department.    Specialty:  EMERGENCY MEDICINE    Why:  As needed    Contact information:    [address] [place] [address] [phone]        Follow up with Violet Fenton MD. Call in 3 days.    Specialty:  Family Practice    Why:  to ask about urine culture results    Contact information:    [address] [place] [id number] [phone]          Discharge Instructions       Take the below medications as prescribed.  Please do not miss any doses.    New Prescriptions    NITROFURANTOIN, MACROCRYSTAL-MONOHYDRATE, (MACROBID) 100 MG CAPSULE    Take 1 capsule (100 mg) by mouth 2 times daily for 13 doses       Please keep a morning and evening blood pressure journal until you follow-up with your primary care doctor.    Please return to the emergency department as needed for new or worsening symptoms including fever greater than 100.4  F, vomiting and unable to keep anything down, chest pain, shortness of breath, severe confusion, focal weakness, any other concerning symptoms.    Discharge Instructions  Urinary Tract Infection  You or your child have been diagnosed with a urinary tract infection, or UTI. The urinary tract includes the kidneys (which make urine/pee), ureters (the tubes that carry urine/pee from the kidneys to the bladder), the bladder (which stores urine/pee), and urethra (the tube that carries  urine/pee out of the bladder). Urinary tract infections occur when bacteria travel up the urethra into the bladder (bladder infection) and, in some cases, from there into the kidneys (kidney infection).  Generally, every Emergency Department visit should have a follow-up clinic visit with either a primary or a specialty clinic/provider. Please follow-up as instructed by your emergency provider today.  Return to the Emergency Department if:    You or your child have severe back pain.    You or your child are vomiting (throwing up) so that you cannot take your medicine.    You or your child have a new fever (had not previously had a fever) over 101 F.    You or your child have confusion or are very weak, or feel very ill.    Your child seems much more ill, will not wake up, will not respond right, or is crying for a long time and will not calm down.    You or your child are showing signs of dehydration. These signs may include decreased urination (pee), dry mouth/gums/tongue, or decreased activity.    Follow-up with your provider:     Children under 24 months need to be seen by their regular provider within one week after a diagnosis of a UTI. It may be necessary to do some more tests to look at the child s kidney or bladder.    You should begin to feel better within 24 - 48 hours of starting your antibiotic; follow-up with your regular clinic/doctor/provider if this is not the case.    Treatment:     You will be treated with an antibiotic to kill the bacteria. We have to make an educated guess, based on what we know about common bacteria and antibiotics, as to which antibiotic will work for your infection. We will be correct most times but there will be some cases where the antibiotic chosen is not correct (see urine cultures below).    Take a pain medication such as acetaminophen (Tylenol ) or ibuprofen (Advil , Motrin , Nuprin ).    Phenazopyridine (Pyridium , Uristat ) is a prescription medication that numbs the  "bladder to reduce the burning pain of some UTIs.  The same medication is available in a non-prescription version (Azo-Standard , Urodol ). This medication will change the color of the urine and tears (usually blue or orange). If you wear contacts, do not wear them while taking this medication as they may be stained by the medication.    Urine Cultures:    If indicated, a urine culture may have been performed today. This test generally takes 24-48 hours to complete so the results are not known at this time. The results can confirm that an infection is present but also determine which antibiotic is effective for the specific bacteria that is causing the infection. If your urine culture shows that the antibiotic you were given today will not work to treat your infection, we will attempt to contact you to make arrangements to change the antibiotic. If the culture confirms that the antibiotic is effective for your infection, you will not be contacted. We often recommend follow-up with your regular physician/provider on the culture results regardless of this process.    Antibiotic Warning:     If you have been placed on antibiotics - watch for signs of allergic reaction.  These include rash, lip swelling, difficulty breathing, wheezing, and dizziness.  If you develop any of these symptoms, stop the antibiotic immediately and go to an emergency room or urgent care for evaluation.    Probiotics: If you have been given an antibiotic, you may want to also take a probiotic pill or eat yogurt with live cultures. Probiotics have \"good bacteria\" to help your intestines stay healthy. Studies have shown that probiotics help prevent diarrhea and other intestine problems (including C. diff infection) when you take antibiotics. You can buy these without a prescription in the pharmacy section of the store.   If you were given a prescription for medicine here today, be sure to read all of the information (including the package insert) " that comes with your prescription.  This will include important information about the medicine, its side effects, and any warnings that you need to know about.  The pharmacist who fills the prescription can provide more information and answer questions you may have about the medicine.  If you have questions or concerns that the pharmacist cannot address, please call or return to the Emergency Department.   Remember that you can always come back to the Emergency Department if you are not able to see your regular provider in the amount of time listed above, if you get any new symptoms, or if there is anything that worries you.    Discharge Instructions  Hypertension - High Blood Pressure    During you visit to the Emergency Department, your blood pressure was higher than the recommended blood pressure.  This may be related to stress, pain, medication or other temporary conditions. In these cases, your blood pressure may return to normal on its own. If you have a history of high blood pressure, you may need to have your provider adjust your medications. Sometimes, your high measurement here may indicate that you have developed high blood pressure that will stay high unless it is treated. As a general rule, high blood pressure causes problems over years rather than days, weeks, or months. So, while it is important to treat blood pressure, it is rarely important to treat blood pressure immediately. Occasionally we will begin a medication in the Emergency Department; more often we will recommend close follow-up for medications with a primary doctor/clinic.    Generally, every Emergency Department visit should have a follow-up clinic visit with either a primary or a specialty clinic/provider. Please follow-up as instructed by your emergency provider today.    Return to the Emergency Department if you start to have:    A severe headache.    Chest pain.    Shortness of breath.    Weakness or numbness that affects one part of  the body.    Confusion.    Vision changes.    Significant swelling of legs and/or eyes.    A reaction to any medication started in the Emergency Department.    What can I do to help myself?    Avoid alcohol.    Take any blood pressure medicine that you are prescribed.    Get a good night s sleep.    Lower your salt intake.    Exercise.    Lose weight.    Manage stress.    See your doctor regularly    If blood pressure medication was started in the Emergency Department:    The medicine may not have an immediate effect. The body and brain determine what blood pressure you have. The medicine s job is to retrain the body s  thermostat  to a lower blood pressure.    You will need to follow up with your provider to see how this medicine is working for you.  If you were given a prescription for medicine here today, be sure to read all of the information (including the package insert) that comes with your prescription.  This will include important information about the medicine, its side effects, and any warnings that you need to know about.  The pharmacist who fills the prescription can provide more information and answer questions you may have about the medicine.  If you have questions or concerns that the pharmacist cannot address, please call or return to the Emergency Department.   Remember that you can always come back to the Emergency Department if you are not able to see your regular provider in the amount of time listed above, if you get any new symptoms, or if there is anything that worries you.      Your next 10 appointments already scheduled     Oct 09, 2018  4:10 PM CDT   Office Visit with Violet Fenton MD   [facility]    [address] [address] [phone]           Bring a current list of meds and any records pertaining to this visit. For Physicals, please bring immunization records and any forms needing to be filled out. Please arrive 10  minutes early to complete paperwork.              24 Hour Appointment Hotline       To make an appointment at any [facility], call [phone] ([phone]). If you don't have a family doctor or clinic, we will help you find one. [facility] clinics are conveniently located to serve the needs of you and your family.             Review of your medicines      START taking        Dose / Directions Last dose taken    nitroFURantoin (macrocrystal-monohydrate) 100 MG capsule   Commonly known as:  MACROBID   Dose:  100 mg   Quantity:  13 capsule        Take 1 capsule (100 mg) by mouth 2 times daily for 13 doses   Refills:  0          Our records show that you are taking the medicines listed below. If these are incorrect, please call your family doctor or clinic.        Dose / Directions Last dose taken    aspirin 81 MG tablet   Dose:  81 mg   Quantity:  90 tablet        Take 1 tablet (81 mg) by mouth daily   Refills:  3        augmented betamethasone dipropionate 0.05 % cream   Commonly known as:  DIPROLENE-AF   Quantity:  50 g        APPLY EXTERNALLY TO THE AFFECTED AREA TWICE DAILY   Refills:  0        calcium carbonate 600 mg-vitamin D 400 units 600-400 MG-UNIT per tablet   Commonly known as:  CALTRATE   Dose:  1 tablet        Take 1 tablet by mouth 2 times daily   Refills:  0        carvedilol 6.25 MG tablet   Commonly known as:  COREG   Quantity:  180 tablet        TAKE 1 TABLET(6.25 MG) BY MOUTH TWICE DAILY WITH MEALS   Refills:  2        chlorthalidone 25 MG tablet   Commonly known as:  HYGROTON   Quantity:  90 tablet        TAKE 1 TABLET(25 MG) BY MOUTH DAILY   Refills:  2        cholecalciferol 400 UNIT Tabs tablet   Commonly known as:  vitamin D3   Dose:  400 Units        Take 400 Units by mouth daily   Refills:  0        clobetasol 0.05 % Crea cream   Commonly known as:  CLOBETASOL PROPIONATE EMULSION   Quantity:  70 g        Apply topically daily   Refills:  0        ezetimibe 10 MG tablet   Commonly  known as:  ZETIA   Quantity:  90 tablet        TAKE 1 TABLET(10 MG) BY MOUTH DAILY   Refills:  3        fluocinonide 0.05 % solution   Commonly known as:  LIDEX        Apply topically 2 times daily   Refills:  0        FLUoxetine 10 MG capsule   Commonly known as:  PROzac   Quantity:  90 capsule        TAKE 1 CAPSULE(10 MG) BY MOUTH DAILY   Refills:  0        * losartan 100 MG tablet   Commonly known as:  COZAAR   Quantity:  90 tablet        TAKE 1 TABLET(100 MG) BY MOUTH DAILY WITH BREAKFAST   Refills:  0        * losartan 100 MG tablet   Commonly known as:  COZAAR   Quantity:  90 tablet        TAKE 1 TABLET(100 MG) BY MOUTH DAILY WITH BREAKFAST   Refills:  3        olopatadine HCl 0.2 % Soln   Commonly known as:  PATADAY   Dose:  1 drop        Place 1 drop into the right eye daily as needed (runny eye)   Refills:  0        omega 3 1000 MG Caps   Dose:  1 g   Quantity:  90 capsule        Take 1 g by mouth daily   Refills:  0        polyethylene glycol powder   Commonly known as:  MIRALAX/GLYCOLAX   Dose:  1 capful        Take 1 capful by mouth daily   Refills:  0        potassium chloride SA 10 MEQ CR tablet   Commonly known as:  K-DUR/KLOR-CON M   Quantity:  180 tablet        TAKE 1 TABLET(10 MEQ) BY MOUTH TWICE DAILY   Refills:  1        vitamin B complex with vitamin C Tabs tablet   Commonly known as:  STRESS TAB   Dose:  1 tablet        Take 1 tablet by mouth daily.   Refills:  0        * Notice:  This list has 2 medication(s) that are the same as other medications prescribed for you. Read the directions carefully, and ask your doctor or other care provider to review them with you.            Prescriptions were sent or printed at these locations (1 Prescription)                   Other Prescriptions                Printed at Department/Unit printer (1 of 1)         nitroFURantoin, macrocrystal-monohydrate, (MACROBID) 100 MG capsule                Procedures and tests performed during your visit      Procedure/Test Number of Times Performed    Basic metabolic panel 1    CBC with platelets differential 1    EKG 12 lead 1    ISTAT CG4 gases lactate balwinder nursing POCT 1    ISTAT gases lactate balwinder POCT 1    T4 free 2    TSH with free T4 reflex 1    Troponin I 1    UA with Microscopic 1    Urine Culture 1    XR Chest 2 Views 1      Orders Needing Specimen Collection     None      Pending Results     Date and Time Order Name Status Description    9/28/2018 2041 Urine Culture In process     9/28/2018 1843 T4 free In process             Pending Culture Results     Date and Time Order Name Status Description    9/28/2018 2041 Urine Culture In process             Pending Results Instructions     If you had any lab results that were not finalized at the time of your Discharge, you can call the ED Lab Result RN at [phone]. You will be contacted by this team for any positive Lab results or changes in treatment. The nurses are available 7 days a week from 10A to 6:30P.  You can leave a message 24 hours per day and they will return your call.        Test Results From Your Hospital Stay        9/28/2018  7:01 PM      Component Results     Component Value Ref Range & Units Status    WBC 7.8 4.0 - 11.0 10e9/L Final    RBC Count 4.36 3.8 - 5.2 10e12/L Final    Hemoglobin 13.2 11.7 - 15.7 g/dL Final    Hematocrit 38.9 35.0 - 47.0 % Final    MCV 89 78 - 100 fl Final    MCH 30.3 26.5 - 33.0 pg Final    MCHC 33.9 31.5 - 36.5 g/dL Final    RDW 12.6 10.0 - 15.0 % Final    Platelet Count 237 150 - 450 10e9/L Final    Diff Method Automated Method  Final    % Neutrophils 61.5 % Final    % Lymphocytes 21.3 % Final    % Monocytes 12.8 % Final    % Eosinophils 3.5 % Final    % Basophils 0.6 % Final    % Immature Granulocytes 0.3 % Final    Nucleated RBCs 0 0 /100 Final    Absolute Neutrophil 4.8 1.6 - 8.3 10e9/L Final    Absolute Lymphocytes 1.7 0.8 - 5.3 10e9/L Final    Absolute Monocytes 1.0 0.0 - 1.3 10e9/L Final    Absolute Eosinophils  0.3 0.0 - 0.7 10e9/L Final    Absolute Basophils 0.1 0.0 - 0.2 10e9/L Final    Abs Immature Granulocytes 0.0 0 - 0.4 10e9/L Final    Absolute Nucleated RBC 0.0  Final         9/28/2018  7:17 PM      Component Results     Component Value Ref Range & Units Status    Sodium 130 (L) 133 - 144 mmol/L Final    Potassium 3.0 (L) 3.4 - 5.3 mmol/L Final    Chloride 92 (L) 94 - 109 mmol/L Final    Carbon Dioxide 31 20 - 32 mmol/L Final    Anion Gap 7 3 - 14 mmol/L Final    Glucose 91 70 - 99 mg/dL Final    Urea Nitrogen 17 7 - 30 mg/dL Final    Creatinine 0.71 0.52 - 1.04 mg/dL Final    GFR Estimate 79 >60 mL/min/1.7m2 Final    Non  GFR Calc    GFR Estimate If Black >90 >60 mL/min/1.7m2 Final    African American GFR Calc    Calcium 9.8 8.5 - 10.1 mg/dL Final         9/28/2018  8:00 PM      Component Results     Component Value Ref Range & Units Status    Color Urine Yellow  Final    Appearance Urine Clear  Final    Glucose Urine Negative NEG^Negative mg/dL Final    Bilirubin Urine Negative NEG^Negative Final    Ketones Urine Negative NEG^Negative mg/dL Final    Specific Gravity Urine 1.009 1.003 - 1.035 Final    Blood Urine Negative NEG^Negative Final    pH Urine 8.0 (H) 5.0 - 7.0 pH Final    Protein Albumin Urine Negative NEG^Negative mg/dL Final    Urobilinogen mg/dL 0.0 0.0 - 2.0 mg/dL Final    Nitrite Urine Negative NEG^Negative Final    Leukocyte Esterase Urine Small (A) NEG^Negative Final    Source Midstream Urine  Final    WBC Urine 8 (H) 0 - 5 /HPF Final    RBC Urine <1 0 - 2 /HPF Final    Bacteria Urine Few (A) NEG^Negative /HPF Final    Squamous Epithelial /HPF Urine <1 0 - 1 /HPF Final         9/28/2018  7:40 PM      Narrative     CHEST TWO VIEWS    9/28/2018 7:28 PM     HISTORY: New fatigue, previous lung resection.     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 Lungs are otherwise  clear. No  consolidation, edema, effusion, or pneumothorax. Heart size is at  upper limits of normal.    EDE COOPER MD         9/28/2018  7:22 PM      Component Results     Component Value Ref Range & Units Status    Troponin I ES <0.015 0.000 - 0.045 ug/L Final    The 99th percentile for upper reference range is 0.045 ug/L.  Troponin values   in the range of 0.045 - 0.120 ug/L may be associated with risks of adverse   clinical events.           9/28/2018  7:42 PM      Component Results     Component Value Ref Range & Units Status    TSH 4.32 (H) 0.40 - 4.00 mU/L Final               9/28/2018  6:56 PM      Component Results     Component Value Ref Range & Units Status    Ph Venous 7.43 7.32 - 7.43 pH Final    PCO2 Venous 43 40 - 50 mm Hg Final    PO2 Venous 28 25 - 47 mm Hg Final    Bicarbonate Venous 29 (H) 21 - 28 mmol/L Final    O2 Sat Venous 53 % Final    Lactic Acid 0.4 (L) 0.7 - 2.1 mmol/L Final         9/28/2018  7:28 PM         9/28/2018  7:42 PM      Component Results     Component Value Ref Range & Units Status    T4 Free 0.96 0.76 - 1.46 ng/dL Final         9/28/2018  8:47 PM                Clinical Quality Measure: Blood Pressure Screening     Your blood pressure was checked while you were in the emergency department today. The last reading we obtained was  BP: 160/69 . Please read the guidelines below about what these numbers mean and what you should do about them.  If your systolic blood pressure (the top number) is less than 120 and your diastolic blood pressure (the bottom number) is less than 80, then your blood pressure is normal. There is nothing more that you need to do about it.  If your systolic blood pressure (the top number) is 120-139 or your diastolic blood pressure (the bottom number) is 80-89, your blood pressure may be higher than it should be. You should have your blood pressure rechecked within a year by a primary care provider.  If your systolic blood pressure (the top number) is  140 or greater or your diastolic blood pressure (the bottom number) is 90 or greater, you may have high blood pressure. High blood pressure is treatable, but if left untreated over time it can put you at risk for heart attack, stroke, or kidney failure. You should have your blood pressure rechecked by a primary care provider within the next 4 weeks.  If your provider in the emergency department today gave you specific instructions to follow-up with your doctor or provider even sooner than that, you should follow that instruction and not wait for up to 4 weeks for your follow-up visit.        Thank you for choosing [facility]       Thank you for choosing [facility] for your care. Our goal is always to provide you with excellent care. Hearing back from our patients is one way we can continue to improve our services. Please take a few minutes to complete the written survey that you may receive in the mail after you visit with us. Thank you!        [employer] Information     [employer] gives you secure access to your electronic health record. If you see a primary care provider, you can also send messages to your care team and make appointments. If you have questions, please call your primary care clinic.  If you do not have a primary care provider, please call [phone] and they will assist you.        Care EveryWhere ID     This is your Care EveryWhere ID. This could be used by other organizations to access your [facility] medical records  [id number]        Equal Access to Services     DELFINA SIGALA : Hadii vitaly Vides, waaxda luqadaha, qaybta kaalmada zana, ward asif. So [facility] [phone].    ATENCIÓN: Si habla español, tiene a srivastava disposición servicios gratuitos de asistencia lingüística. Llame al [phone].    We comply with applicable federal civil rights laws and [place] laws. We do not discriminate on the basis of race, color, national origin, age, disability, sex,  sexual orientation, or gender identity.            After Visit Summary       This is your record. Keep this with you and show to your community pharmacist(s) and doctor(s) at your next visit.

## 2018-09-28 NOTE — TELEPHONE ENCOUNTER
Patient calls to report that her blood pressure has been erratic this past week. She uses a home blood pressure check on her wrist and it is now 191/91. Patient denies chest pain or trouble breathing, but does state she has some pain under her left arm. Patient is triaged per adult high blood pressure and is advised to go now to her emergency department. Patient will go to [facility] and her  will drive her. Patient will call 911 if any symptoms worsen.    Reason for Disposition    [1] BP  >= 160 / 100 AND [2] cardiac or neurologic symptoms    (e.g., chest pain, difficulty breathing, unsteady gait, blurred vision)    Additional Information    Negative: Difficult to awaken or acting confused  (e.g., disoriented, slurred speech)    Negative: Severe difficulty breathing (e.g., struggling for each breath, speaks in single words)    Negative: [1] Weakness of the face, arm or leg on one side of the body AND [2] new onset    Negative: [1] Numbness (i.e., loss of sensation) of the face, arm or leg on one side of the body AND [2] new onset    Negative: [1] Chest pain lasts > 5 minutes AND [2] history of heart disease  (i.e., heart attack, bypass surgery, angina, angioplasty, CHF)    Negative: [1] Chest pain AND [2] took nitrogylcerin AND [3] pain was not relieved    Negative: Sounds like a life-threatening emergency to the triager    Negative: Symptom is main concern  (e.g., headache, chest pain)    Negative: Low blood pressure is main concern    Protocols used: HIGH BLOOD PRESSURE-ADULT-

## 2018-09-28 NOTE — ED AVS SNAPSHOT
[facility] Emergency Department    [address]    [facility] [address]    Phone:  [phone]    Fax:  [phone]                                       Tomasa Fam   MRN: [id number]    Department:  [facility] Emergency Department   Date of Visit:  9/28/2018           After Visit Summary Signature Page     I have received my discharge instructions, and my questions have been answered. I have discussed any challenges I see with this plan with the nurse or doctor.    ..........................................................................................................................................  Patient/Patient Representative Signature      ..........................................................................................................................................  Patient Representative Print Name and Relationship to Patient    ..................................................               ................................................  Date                                   Time    ..........................................................................................................................................  Reviewed by Signature/Title    ...................................................              ..............................................  Date                                               Time          22EPIC Rev 08/18

## 2018-09-29 NOTE — DISCHARGE INSTRUCTIONS
Take the below medications as prescribed.  Please do not miss any doses.    New Prescriptions    NITROFURANTOIN, MACROCRYSTAL-MONOHYDRATE, (MACROBID) 100 MG CAPSULE    Take 1 capsule (100 mg) by mouth 2 times daily for 13 doses       Please keep a morning and evening blood pressure journal until you follow-up with your primary care doctor.    Please return to the emergency department as needed for new or worsening symptoms including fever greater than 100.4  F, vomiting and unable to keep anything down, chest pain, shortness of breath, severe confusion, focal weakness, any other concerning symptoms.    Discharge Instructions  Urinary Tract Infection  You or your child have been diagnosed with a urinary tract infection, or UTI. The urinary tract includes the kidneys (which make urine/pee), ureters (the tubes that carry urine/pee from the kidneys to the bladder), the bladder (which stores urine/pee), and urethra (the tube that carries urine/pee out of the bladder). Urinary tract infections occur when bacteria travel up the urethra into the bladder (bladder infection) and, in some cases, from there into the kidneys (kidney infection).  Generally, every Emergency Department visit should have a follow-up clinic visit with either a primary or a specialty clinic/provider. Please follow-up as instructed by your emergency provider today.  Return to the Emergency Department if:    You or your child have severe back pain.    You or your child are vomiting (throwing up) so that you cannot take your medicine.    You or your child have a new fever (had not previously had a fever) over 101 F.    You or your child have confusion or are very weak, or feel very ill.    Your child seems much more ill, will not wake up, will not respond right, or is crying for a long time and will not calm down.    You or your child are showing signs of dehydration. These signs may include decreased urination (pee), dry mouth/gums/tongue, or decreased  activity.    Follow-up with your provider:     Children under 24 months need to be seen by their regular provider within one week after a diagnosis of a UTI. It may be necessary to do some more tests to look at the child s kidney or bladder.    You should begin to feel better within 24 - 48 hours of starting your antibiotic; follow-up with your regular clinic/doctor/provider if this is not the case.    Treatment:     You will be treated with an antibiotic to kill the bacteria. We have to make an educated guess, based on what we know about common bacteria and antibiotics, as to which antibiotic will work for your infection. We will be correct most times but there will be some cases where the antibiotic chosen is not correct (see urine cultures below).    Take a pain medication such as acetaminophen (Tylenol ) or ibuprofen (Advil , Motrin , Nuprin ).    Phenazopyridine (Pyridium , Uristat ) is a prescription medication that numbs the bladder to reduce the burning pain of some UTIs.  The same medication is available in a non-prescription version (Azo-Standard , Urodol ). This medication will change the color of the urine and tears (usually blue or orange). If you wear contacts, do not wear them while taking this medication as they may be stained by the medication.    Urine Cultures:    If indicated, a urine culture may have been performed today. This test generally takes 24-48 hours to complete so the results are not known at this time. The results can confirm that an infection is present but also determine which antibiotic is effective for the specific bacteria that is causing the infection. If your urine culture shows that the antibiotic you were given today will not work to treat your infection, we will attempt to contact you to make arrangements to change the antibiotic. If the culture confirms that the antibiotic is effective for your infection, you will not be contacted. We often recommend follow-up with your  "regular physician/provider on the culture results regardless of this process.    Antibiotic Warning:     If you have been placed on antibiotics - watch for signs of allergic reaction.  These include rash, lip swelling, difficulty breathing, wheezing, and dizziness.  If you develop any of these symptoms, stop the antibiotic immediately and go to an emergency room or urgent care for evaluation.    Probiotics: If you have been given an antibiotic, you may want to also take a probiotic pill or eat yogurt with live cultures. Probiotics have \"good bacteria\" to help your intestines stay healthy. Studies have shown that probiotics help prevent diarrhea and other intestine problems (including C. diff infection) when you take antibiotics. You can buy these without a prescription in the pharmacy section of the store.   If you were given a prescription for medicine here today, be sure to read all of the information (including the package insert) that comes with your prescription.  This will include important information about the medicine, its side effects, and any warnings that you need to know about.  The pharmacist who fills the prescription can provide more information and answer questions you may have about the medicine.  If you have questions or concerns that the pharmacist cannot address, please call or return to the Emergency Department.   Remember that you can always come back to the Emergency Department if you are not able to see your regular provider in the amount of time listed above, if you get any new symptoms, or if there is anything that worries you.    Discharge Instructions  Hypertension - High Blood Pressure    During you visit to the Emergency Department, your blood pressure was higher than the recommended blood pressure.  This may be related to stress, pain, medication or other temporary conditions. In these cases, your blood pressure may return to normal on its own. If you have a history of high blood " pressure, you may need to have your provider adjust your medications. Sometimes, your high measurement here may indicate that you have developed high blood pressure that will stay high unless it is treated. As a general rule, high blood pressure causes problems over years rather than days, weeks, or months. So, while it is important to treat blood pressure, it is rarely important to treat blood pressure immediately. Occasionally we will begin a medication in the Emergency Department; more often we will recommend close follow-up for medications with a primary doctor/clinic.    Generally, every Emergency Department visit should have a follow-up clinic visit with either a primary or a specialty clinic/provider. Please follow-up as instructed by your emergency provider today.    Return to the Emergency Department if you start to have:    A severe headache.    Chest pain.    Shortness of breath.    Weakness or numbness that affects one part of the body.    Confusion.    Vision changes.    Significant swelling of legs and/or eyes.    A reaction to any medication started in the Emergency Department.    What can I do to help myself?    Avoid alcohol.    Take any blood pressure medicine that you are prescribed.    Get a good night s sleep.    Lower your salt intake.    Exercise.    Lose weight.    Manage stress.    See your doctor regularly    If blood pressure medication was started in the Emergency Department:    The medicine may not have an immediate effect. The body and brain determine what blood pressure you have. The medicine s job is to retrain the body s  thermostat  to a lower blood pressure.    You will need to follow up with your provider to see how this medicine is working for you.  If you were given a prescription for medicine here today, be sure to read all of the information (including the package insert) that comes with your prescription.  This will include important information about the medicine, its side  effects, and any warnings that you need to know about.  The pharmacist who fills the prescription can provide more information and answer questions you may have about the medicine.  If you have questions or concerns that the pharmacist cannot address, please call or return to the Emergency Department.   Remember that you can always come back to the Emergency Department if you are not able to see your regular provider in the amount of time listed above, if you get any new symptoms, or if there is anything that worries you.

## 2018-09-30 ENCOUNTER — TELEPHONE (OUTPATIENT)
Dept: EMERGENCY MEDICINE | Facility: CLINIC | Age: 78
End: 2018-09-30

## 2018-09-30 ENCOUNTER — NURSE TRIAGE (OUTPATIENT)
Dept: NURSING | Facility: CLINIC | Age: 78
End: 2018-09-30

## 2018-09-30 DIAGNOSIS — R30.0 DYSURIA: ICD-10-CM

## 2018-09-30 LAB
BACTERIA SPEC CULT: ABNORMAL
Lab: ABNORMAL
SPECIMEN SOURCE: ABNORMAL

## 2018-09-30 NOTE — TELEPHONE ENCOUNTER
"[facility] Emergency Department Lab result notification [Adult-Female]    [facility] ED lab result protocol used  Urine Culture    Reason for call  Notify of lab results, assess symptoms,  review ED providers recommendations/discharge instructions (if necessary) and advise per ED lab result f/u protocol    Lab Result (including Rx patient on, if applicable)  Final Urine Culture Report on 9/30/18  Emergency Dept discharge antibiotic prescribed: Nitrofurantoin Macrocrystal-Monohydrate (Macrobid) 100 mg PO capsule, 1 capsule (100 mg) by mouth 2 times daily for 7 days.  #1. Bacteria, >100,000 colonies/mL Beta hemolytic Streptococcus group B,  is [NOT TESTED] to antibiotic.   Change in treatment as per [facility] ED Lab result protocol.    Information table from ED Provider visit on 9/28/18  Symptoms reported at ED visit (Chief complaint, HPI) Tomasa Fam is a 78 year old female, with history of breast cancer, lung cancer, hypertension, hyperlipidemia, amongst others as noted below, currently taking baby aspirin daily amongst other medications below, who presents with her spouse to the emergency department for evaluation of hypertension, with a home reading of 191/ 91 prior to today. Patient noted that she has not been feeling baseline for the last two weeks and \"something felt wrong\", where she was experiencing intermittent sore throat, headaches, nausea episodes, intermittent right calf cramps and hot flashes. Patient noted that she felt baseline most of the day yesterday, but last evening she began to feel like something was \"not quite right\" and measured her blood pressure, noting it was high and took a dose of her medication. However, patient noted that she was unable to sleep as she was concerned there was \"something wrong\". Patient checked her blood pressure again today and it was noted to be 191/90, raising concern and prompting patient to present. Of note, patient took a dose of Coreg and " Losartan prior to arrival at 1815. She notes that she normally takes her medication with a meal, but she did not feel hungry and took it anyway. Spouse noted that they have not eaten since this morning when they had a late breakfast, but did snack throughout the day. Patient endorses worsening cough, some nausea one arrival and some urinary urgency/frequency, but decreased urine output. She also noted intermittent right calf pain which began two weeks ago. She notes this is somewhat similar to when she experienced previous UTIs. Patient denies any chest pain, new shortness of breath, new abdominal pain, one sided weakness, new bowel issues, visual changes (including blurry or doubled vision), weight loss, appetite change or diaphoresis.   Significant Medical hx, if applicable (i.e. CKD, diabetes) Lung cancer   Allergies Allergies   Allergen Reactions     No Known Drug Allergies      Tape [Adhesive Tape]      Sensitive to plastic tape on upper part of body--takes skin off      Weight, if applicable Wt Readings from Last 2 Encounters:   03/02/18 (P) 61.4 kg (135 lb 4.8 oz)   07/21/17 61.1 kg (134 lb 11.2 oz)      Coumadin/Warfarin [Yes /No] No   Creatinine Level (mg/dl) Creatinine   Date Value Ref Range Status   09/28/2018 0.71 0.52 - 1.04 mg/dL Final      Creatinine clearance (ml/min), if applicable Creatinine clearance cannot be calculated (Unknown ideal weight.)   Pregnant (Yes/No/NA) No   Breastfeeding (Yes/No/NA) No   ED providers Impression and Plan (applicable information) 78 year old female presenting w/ hypertension, urinary urgency      DDx includes essential hypertension, subtherapeutic HTN medication, UTI, pyelonephritis, thyroid dysfunction, electrolyte abnml, HTN urgency vs emergency.  Doubt aortic dissection, PE, CVA given symptomatology.  EKG as noted above.  Labs and imaging ordered as noted above.  Labs significant for mild hypokalemia and hyponatremia, UA with WBCs and leuk esterase.  Imaging sig  "for no acute cardiopul dx.  Given EKG and trop in context of symptoms and phys exam, doubt ACS.  Given pt's urinary symptoms, will treat for UTI although weakly positive UA.  BP improved in ED after IV meds to bridge gap until home meds took affect.   Recommendations given regarding follow up with primary care doctor and return to the emergency department as needed for new or worsening symptoms.  Counseled on all results, disposition and diagnosis.  Pt understanding and agreeable to plan. Patient discharged in stable condition.     ED diagnosis  Dysuria      ED provider Fercho Rollins MD      RN Assessment (Patient s current Symptoms), include time called.  [Insert Left message here if message left]  \"My b/p has not gone down\".  Today is \"172/98\".  \"A little bit of headache but I'm trying to cut out caffeine\".  Yesterday had slight headache and it did resolve with 1/2 cup of coffee, believes it is related to caffeine cessation.  1 year ago, had Carvedilol dosage cut in half for hypotension.  Chandrika reports urinary symptoms \"seems a little bit better\" (pain) and is improving with urinary urgency, though it is still present.  Did review final UC results, questions answered.  Did advise to call triage nurse as needed if b/p continues to elevate, or she becomes symptomatic.       RN Recommendations/Instructions per [facility] ED lab result protocol  Patient notified of lab result and treatment recommendations.  Rx for Augmentin sent to [Pharmacy - [facility] in [place]].  RN reviewed information about stopping Macrobid.    Patient Education on preventing future UTI's.  1. Practice good personal hygiene. Wipe yourself from front to back after using the toilet. This helps keep bacteria from getting into the urethra. Keep the genital area clean and dry.  2. Empty your bladder. Always empty your bladder when you feel the urge to urinate. And always urinate before going to sleep. Urine that stays in your bladder can " lead to infection. Try to urinate before and after sex as well.  3. Use condoms during sex. These help prevent UTIs caused by sexually transmitted bacteria. Also, avoid using spermicides during sex. These can increase the risk of UTIs. Choose other forms of birth control instead. For women who tend to get UTIs after sex, a low-dose of a preventive antibiotic may be used. Be sure to discuss this option with your health care provider.       Please Contact your PCP clinic or return to the Emergency department if your:    Symptoms return.    Symptoms do not resolve after completing antibiotic.    Symptoms worsen or other concerning symptom's.    PCP follow-up Questions asked: YES       Aline Olmstead RN    [facility] RN  Lung Nodule and ED Lab Results F/U RN  Epic pool (ED late result f/u RN) : P [id number]   # [phone]    Copy of Lab result   Order   Urine Culture [[id number]] (Order [id number])   Exam Information   Exam Date Exam Time Accession # Results    9/28/18  7:34 PM [id number]    Component Results   Component Collected Lab   Specimen Description 09/28/2018  7:34    Midstream Urine   Special Requests 09/28/2018  7:34 PM 75   Specimen received in preservative   Culture Micro (Abnormal) 09/28/2018  7:34    >100,000 colonies/mL   Streptococcus agalactiae sero group B   Susceptibility testing not routinely done on this organism from the genitourinary tract.   Our antibiogram indicates that Group B streptococci are susceptible to ampicillin,   penicillin, vancomycin and the cephalosporins. Susceptibility testing must be requested   within 5 days.

## 2018-09-30 NOTE — TELEPHONE ENCOUNTER
Tomasa was seen in an ER on 9/28/2018 for hypertension. Many tests were done and provider did not find anything significant that could be the cause except for an urinary tract infection.  This provider did not add any bp medications or increase dose of her Coreg. It was recommended to her to see her pcp about this. Tomasa's pcp is out on vacation at this time. Tomasa would like to be seen as soon as tomorrow. I connected her to scheduling.  Meri CABEZAS RN [facility]

## 2018-10-01 ENCOUNTER — OFFICE VISIT (OUTPATIENT)
Dept: FAMILY MEDICINE | Facility: CLINIC | Age: 78
End: 2018-10-01
Payer: MEDICARE

## 2018-10-01 VITALS
RESPIRATION RATE: 16 BRPM | TEMPERATURE: 98 F | DIASTOLIC BLOOD PRESSURE: 78 MMHG | WEIGHT: 132 LBS | SYSTOLIC BLOOD PRESSURE: 156 MMHG | BODY MASS INDEX: 23.39 KG/M2 | HEART RATE: 78 BPM | HEIGHT: 63 IN | OXYGEN SATURATION: 97 %

## 2018-10-01 DIAGNOSIS — I10 ESSENTIAL HYPERTENSION WITH GOAL BLOOD PRESSURE LESS THAN 140/90: Primary | ICD-10-CM

## 2018-10-01 DIAGNOSIS — R53.83 DECREASED ENERGY: ICD-10-CM

## 2018-10-01 DIAGNOSIS — F43.9 STRESS: ICD-10-CM

## 2018-10-01 LAB
ANION GAP SERPL CALCULATED.3IONS-SCNC: 6 MMOL/L (ref 3–14)
BUN SERPL-MCNC: 17 MG/DL (ref 7–30)
CALCIUM SERPL-MCNC: 9.7 MG/DL (ref 8.5–10.1)
CHLORIDE SERPL-SCNC: 94 MMOL/L (ref 94–109)
CO2 SERPL-SCNC: 31 MMOL/L (ref 20–32)
CREAT SERPL-MCNC: 0.69 MG/DL (ref 0.52–1.04)
GFR SERPL CREATININE-BSD FRML MDRD: 82 ML/MIN/1.7M2
GLUCOSE SERPL-MCNC: 81 MG/DL (ref 70–99)
POTASSIUM SERPL-SCNC: 3.7 MMOL/L (ref 3.4–5.3)
SODIUM SERPL-SCNC: 131 MMOL/L (ref 133–144)

## 2018-10-01 PROCEDURE — 80048 BASIC METABOLIC PNL TOTAL CA: CPT | Performed by: PHYSICIAN ASSISTANT

## 2018-10-01 PROCEDURE — 36415 COLL VENOUS BLD VENIPUNCTURE: CPT | Performed by: PHYSICIAN ASSISTANT

## 2018-10-01 PROCEDURE — 99213 OFFICE O/P EST LOW 20 MIN: CPT | Performed by: PHYSICIAN ASSISTANT

## 2018-10-01 RX ORDER — FLUOXETINE 10 MG/1
CAPSULE ORAL
Qty: 90 CAPSULE | Refills: 0 | Status: SHIPPED | OUTPATIENT
Start: 2018-10-01 | End: 2019-01-17

## 2018-10-01 RX ORDER — CARVEDILOL 6.25 MG/1
12.5 TABLET ORAL 2 TIMES DAILY WITH MEALS
Qty: 180 TABLET | Refills: 2 | COMMUNITY
Start: 2018-10-01 | End: 2018-10-09

## 2018-10-01 RX ORDER — AMLODIPINE BESYLATE 5 MG/1
5 TABLET ORAL DAILY
Qty: 30 TABLET | Refills: 1 | Status: CANCELLED | OUTPATIENT
Start: 2018-10-01

## 2018-10-01 RX ORDER — LOSARTAN POTASSIUM 100 MG/1
TABLET ORAL
Qty: 90 TABLET | Refills: 0 | Status: CANCELLED | OUTPATIENT
Start: 2018-10-01

## 2018-10-01 NOTE — PROGRESS NOTES
SUBJECTIVE:   Tomasa Fam is a 78 year old female who presents to clinic today for the following health issues:      ED/UC Followup:    Facility:  [facility]  Date of visit: 9/28/18  Reason for visit: hypertension, dyruria  Current Status:   BP 9/29: 156/84 AM; 165/82 PM   BP 9/30: 172/93 AM; 153/79 PM  BP 10/1: 162/87 AM      Patient is here today for ER follow up  Over the weekend, went to the ER with high BP readings  She notes while there, they did numerous tests, had trouble getting BP lower  Denies chest pain, headache, vision changes, shortness of breath  Taking BP medications regularly  Denies recent stressors  Does note about 1 year ago, Carvedilol was decreased in dose.  Was diagnosed with UTI  Was switched from Macrobid to Augmentin, thinks symptoms are getting better but just started Augmentin yesterday. She denies painful urination, fever or chills.           Problem list and histories reviewed & adjusted, as indicated.  Additional history: as documented    Patient Active Problem List   Diagnosis     Chronic airway obstruction (H)     ASCUS on Pap smear     Hyperlipidemia LDL goal <160     Breast cancer (H)     Essential hypertension with goal blood pressure less than 140/90     Cystocele, midline     Uterovaginal prolapse     S/P hysterectomy     Advanced directives, counseling/discussion     History of hypokalemia     Concussion     Malignant neoplasm of upper lobe of right lung (H)     Thyroid nodule     Chronic pain of both knees     Past Surgical History:   Procedure Laterality Date     C NONSPECIFIC PROCEDURE  1970    s/p Tubal ligation 1970     COLONOSCOPY  3/2003    adenomatous polyp      COLONOSCOPY  7/2006    diverticulosis - repeat in 5 years     COLONOSCOPY  10/13/2011    Procedure:COLONOSCOPY; COLONOSCOPY ; Surgeon:CHITO GORDILLO; Location: GI     CYSTOSCOPY  7/11/2012    Procedure: CYSTOSCOPY;;  Surgeon: Aline Cooper DO;  Location: [facility] OR     DAVINCI HYSTERECTOMY  SUPRACERVICAL, SACROCOLPOPEXY, COMBINED  7/11/2012    Procedure: COMBINED DAVINCI HYSTERECTOMY SUPRACERVICAL, SACROCOLPOPEXY;   DAVINCI ASSISTED LAPAROSCOPIC SUPRACERVICAL HYSTERECTOMY AND BILATERAL SALPINGO-OOPHORECTOMY, SACROCOLPOPEXY AND CYSTOSCOPY;  Surgeon: Aline Cooper DO;  Location:  OR     EXCISE LESION EYELID Right 6/29/2015    Procedure: EXCISE LESION EYELID;  Surgeon: Hank Olvera MD;  Location:  [facility]     INSERT PORT VASCULAR ACCESS  2/3/2012    Procedure:INSERT PORT VASCULAR ACCESS; Power Port-A- Catheter Placement ; Surgeon:IRISH TAPIA; Location:[facility] OR     LAPAROSCOPIC SALPINGO-OOPHORECTOMY  7/11/2012    Procedure: LAPAROSCOPIC SALPINGO-OOPHORECTOMY;  Davinci;  Surgeon: Aline Cooper DO;  Location:  OR     LIPOSUCTION, RHYTIDECTOMY, COMBINED       LOBECTOMY LUNG Right 3/1/2016    Procedure: LOBECTOMY LUNG;  Surgeon: Jonas Woodward MD;  Location:  OR     MAMMOPLASTY REDUCTION  1/6/2012    Procedure:MAMMOPLASTY REDUCTION; Surgeon:MICKI CAICEDO; Location:[facility] OR     MASTECTOMY SIMPLE  11/12/2012    Procedure: MASTECTOMY SIMPLE;   Right Prophylactic Mastectomy with attempted Right Sentinal Node Biopsy, Revision Bilateral Mastectomy Insicions, liposuction in breast area;  Surgeon: Irish Tapia MD;  Location: [facility] OR     MASTECTOMY SIMPLE, SENTINEL NODE, COMBINED  1/6/2012    Procedure:COMBINED MASTECTOMY SIMPLE, SENTINEL NODE; Left Mastectomy Left Fletcher Node Biopsy,  Right Breast Reduction ; Surgeon:IRISH TAPIA; Location:[facility] OR     MASTECTOMY, BILATERAL       REMOVE PORT VASCULAR ACCESS  4/29/2013    Procedure: REMOVE PORT VASCULAR ACCESS;  Port A catheter removal ;  Surgeon: Irish Tapia MD;  Location: [facility] OR     REPAIR PTOSIS BILATERAL Bilateral 6/29/2015    Procedure: REPAIR PTOSIS BILATERAL;  Surgeon: Hank Olvera MD;  Location:  [facility]     REVISE RECONSTRUCTED BREAST BILATERAL  11/12/2012    Procedure: REVISE RECONSTRUCTED  BREAST BILATERAL;;  Surgeon: Katia Kyle MD;  Location: [facility] OR     SURGICAL HISTORY OF -   in 40's    face lift     SURGICAL HISTORY OF -       lipoma removed right thigh     SURGICAL HISTORY OF -       D and C     THORACOTOMY Right 3/1/2016    Procedure: THORACOTOMY;  Surgeon: Jonas Woodward MD;  Location:  OR       Social History   Substance Use Topics     Smoking status: Never Smoker     Smokeless tobacco: Never Used     Alcohol use 0.0 oz/week     0 Standard drinks or equivalent per week      Comment: occasional; perhaps one per day     Family History   Problem Relation Age of Onset     Cardiovascular Father      ruptured aorta, hardening of the arteries     Cerebrovascular Disease Mother      Respiratory Mother      chronic bronchitis - was a smoker early on     Cardiovascular Paternal Grandfather      MI     Cerebrovascular Disease Paternal Aunt      Hypertension Son      Neurologic Disorder Daughter      migraines     Brain Tumor Sister          Current Outpatient Prescriptions   Medication Sig Dispense Refill     amoxicillin-clavulanate (AUGMENTIN) 875-125 MG per tablet Take 1 tablet by mouth 2 times daily for 3 days 6 tablet 0     aspirin 81 MG tablet Take 1 tablet (81 mg) by mouth daily 90 tablet 3     augmented betamethasone dipropionate (DIPROLENE-AF) 0.05 % cream APPLY EXTERNALLY TO THE AFFECTED AREA TWICE DAILY 50 g 0     B Complex Vitamins (VITAMIN  B COMPLEX) tablet Take 1 tablet by mouth daily.       calcium-vitamin D (CALTRATE) 600-400 MG-UNIT per tablet Take 1 tablet by mouth 2 times daily       carvedilol (COREG) 6.25 MG tablet Take 2 tablets (12.5 mg) by mouth 2 times daily (with meals) 180 tablet 2     chlorthalidone (HYGROTON) 25 MG tablet TAKE 1 TABLET(25 MG) BY MOUTH DAILY 90 tablet 2     cholecalciferol (VITAMIN D) 400 UNIT TABS Take 400 Units by mouth daily       clobetasol (CLOBETASOL PROPIONATE EMULSION) 0.05 % CREA cream Apply topically daily (Patient taking  "differently: Apply topically as needed ) 70 g 0     ezetimibe (ZETIA) 10 MG tablet TAKE 1 TABLET(10 MG) BY MOUTH DAILY 90 tablet 3     fluocinonide (LIDEX) 0.05 % external solution Apply topically as needed        FLUoxetine (PROZAC) 10 MG capsule TAKE 1 CAPSULE(10 MG) BY MOUTH DAILY 90 capsule 0     losartan (COZAAR) 100 MG tablet TAKE 1 TABLET(100 MG) BY MOUTH DAILY WITH BREAKFAST 90 tablet 3     olopatadine HCl (PATADAY) 0.2 % SOLN Place 1 drop into the right eye daily as needed (runny eye)       omega 3 1000 MG CAPS Take 1 g by mouth daily 90 capsule      polyethylene glycol (MIRALAX/GLYCOLAX) powder Take 1 capful by mouth daily       potassium chloride SA (K-DUR/KLOR-CON M) 10 MEQ CR tablet TAKE 1 TABLET(10 MEQ) BY MOUTH TWICE DAILY 180 tablet 1     [DISCONTINUED] carvedilol (COREG) 6.25 MG tablet TAKE 1 TABLET(6.25 MG) BY MOUTH TWICE DAILY WITH MEALS 180 tablet 2     [DISCONTINUED] FLUoxetine (PROZAC) 10 MG capsule TAKE 1 CAPSULE(10 MG) BY MOUTH DAILY 90 capsule 0     [DISCONTINUED] losartan (COZAAR) 100 MG tablet TAKE 1 TABLET(100 MG) BY MOUTH DAILY WITH BREAKFAST 90 tablet 0     Allergies   Allergen Reactions     No Known Drug Allergies      Tape [Adhesive Tape]      Sensitive to plastic tape on upper part of body--takes skin off       Reviewed and updated as needed this visit by clinical staff  Tobacco  Allergies  Meds  Med Hx  Surg Hx  Fam Hx  Soc Hx      Reviewed and updated as needed this visit by Provider         ROS:  Constitutional, HEENT, cardiovascular, pulmonary, gi and gu systems are negative, except as otherwise noted.    OBJECTIVE:     /78 (BP Location: Right arm, Patient Position: Chair, Cuff Size: Adult Regular)  Pulse 78  Temp 98  F (36.7  C) (Oral)  Resp 16  Ht 5' 3\" (1.6 m)  Wt 132 lb (59.9 kg)  LMP  (LMP Unknown)  SpO2 97%  Breastfeeding? No  BMI 23.38 kg/m2  Body mass index is 23.38 kg/(m^2).  GENERAL: healthy, alert and no distress  NECK: no adenopathy, no asymmetry, " masses, or scars and thyroid normal to palpation  RESP: lungs clear to auscultation - no rales, rhonchi or wheezes  CV: regular rate and rhythm, normal S1 S2, no S3 or S4, no murmur, click or rub, no peripheral edema and peripheral pulses strong  ABDOMEN: soft, nontender, no hepatosplenomegaly, no masses and bowel sounds normal  MS: no gross musculoskeletal defects noted, no edema    Diagnostic Test Results:  Results for orders placed or performed during the hospital encounter of 09/28/18   XR Chest 2 Views    Narrative    CHEST TWO VIEWS    9/28/2018 7:28 PM     HISTORY: New fatigue, previous lung resection.     COMPARISON: Chest CT 10/21/2015      Impression    IMPRESSION: Postoperative change of right upper lung resection are  noted with suture lines in place. Lungs are otherwise clear. No  consolidation, edema, effusion, or pneumothorax. Heart size is at  upper limits of normal.    EDE COOPER MD   CBC with platelets differential   Result Value Ref Range    WBC 7.8 4.0 - 11.0 10e9/L    RBC Count 4.36 3.8 - 5.2 10e12/L    Hemoglobin 13.2 11.7 - 15.7 g/dL    Hematocrit 38.9 35.0 - 47.0 %    MCV 89 78 - 100 fl    MCH 30.3 26.5 - 33.0 pg    MCHC 33.9 31.5 - 36.5 g/dL    RDW 12.6 10.0 - 15.0 %    Platelet Count 237 150 - 450 10e9/L    Diff Method Automated Method     % Neutrophils 61.5 %    % Lymphocytes 21.3 %    % Monocytes 12.8 %    % Eosinophils 3.5 %    % Basophils 0.6 %    % Immature Granulocytes 0.3 %    Nucleated RBCs 0 0 /100    Absolute Neutrophil 4.8 1.6 - 8.3 10e9/L    Absolute Lymphocytes 1.7 0.8 - 5.3 10e9/L    Absolute Monocytes 1.0 0.0 - 1.3 10e9/L    Absolute Eosinophils 0.3 0.0 - 0.7 10e9/L    Absolute Basophils 0.1 0.0 - 0.2 10e9/L    Abs Immature Granulocytes 0.0 0 - 0.4 10e9/L    Absolute Nucleated RBC 0.0    Basic metabolic panel   Result Value Ref Range    Sodium 130 (L) 133 - 144 mmol/L    Potassium 3.0 (L) 3.4 - 5.3 mmol/L    Chloride 92 (L) 94 - 109 mmol/L    Carbon Dioxide 31 20 - 32  mmol/L    Anion Gap 7 3 - 14 mmol/L    Glucose 91 70 - 99 mg/dL    Urea Nitrogen 17 7 - 30 mg/dL    Creatinine 0.71 0.52 - 1.04 mg/dL    GFR Estimate 79 >60 mL/min/1.7m2    GFR Estimate If Black >90 >60 mL/min/1.7m2    Calcium 9.8 8.5 - 10.1 mg/dL   UA with Microscopic   Result Value Ref Range    Color Urine Yellow     Appearance Urine Clear     Glucose Urine Negative NEG^Negative mg/dL    Bilirubin Urine Negative NEG^Negative    Ketones Urine Negative NEG^Negative mg/dL    Specific Gravity Urine 1.009 1.003 - 1.035    Blood Urine Negative NEG^Negative    pH Urine 8.0 (H) 5.0 - 7.0 pH    Protein Albumin Urine Negative NEG^Negative mg/dL    Urobilinogen mg/dL 0.0 0.0 - 2.0 mg/dL    Nitrite Urine Negative NEG^Negative    Leukocyte Esterase Urine Small (A) NEG^Negative    Source Midstream Urine     WBC Urine 8 (H) 0 - 5 /HPF    RBC Urine <1 0 - 2 /HPF    Bacteria Urine Few (A) NEG^Negative /HPF    Squamous Epithelial /HPF Urine <1 0 - 1 /HPF   Troponin I   Result Value Ref Range    Troponin I ES <0.015 0.000 - 0.045 ug/L   TSH with free T4 reflex   Result Value Ref Range    TSH 4.32 (H) 0.40 - 4.00 mU/L   T4 free   Result Value Ref Range    T4 Free 0.96 0.76 - 1.46 ng/dL   EKG 12 lead   Result Value Ref Range    Interpretation ECG Click View Image link to view waveform and result    ISTAT gases lactate balwinder POCT   Result Value Ref Range    Ph Venous 7.43 7.32 - 7.43 pH    PCO2 Venous 43 40 - 50 mm Hg    PO2 Venous 28 25 - 47 mm Hg    Bicarbonate Venous 29 (H) 21 - 28 mmol/L    O2 Sat Venous 53 %    Lactic Acid 0.4 (L) 0.7 - 2.1 mmol/L   Urine Culture   Result Value Ref Range    Specimen Description Midstream Urine     Special Requests Specimen received in preservative     Culture Micro (A)      >100,000 colonies/mL  Streptococcus agalactiae sero group B  Susceptibility testing not routinely done on this organism from the genitourinary tract.   Our antibiogram indicates that Group B streptococci are susceptible to  ampicillin,   penicillin, vancomycin and the cephalosporins. Susceptibility testing must be requested   within 5 days.         ASSESSMENT/PLAN:             1. Essential hypertension with goal blood pressure less than 140/90  Chronic issue, BP high today.  Asymptomatic. Will double dose of Coreg, recheck with PCP next week.  Also due to her lab abnormalities in ER, will recheck BMP, may need to increase K+ supplement.  - carvedilol (COREG) 6.25 MG tablet; Take 2 tablets (12.5 mg) by mouth 2 times daily (with meals)  Dispense: 180 tablet; Refill: 2  - Basic metabolic panel    2. Decreased energy  3. Stress  Chronic issue, stable, meds refilled.  - FLUoxetine (PROZAC) 10 MG capsule; TAKE 1 CAPSULE(10 MG) BY MOUTH DAILY  Dispense: 90 capsule; Refill: 0    Risks, benefits and alternatives were discussed with patient. Agreeable to the plan of care.      Sherri Martin PA-C  [facility]    Injectable Influenza Immunization Documentation    1.  Is the person to be vaccinated sick today?   No    2. Does the person to be vaccinated have an allergy to a component   of the vaccine?   No  Egg Allergy Algorithm Link    3. Has the person to be vaccinated ever had a serious reaction   to influenza vaccine in the past?   No    4. Has the person to be vaccinated ever had Guillain-Barré syndrome?   No    Form completed by Evelyn Forrester CMA (AAMA)

## 2018-10-09 ENCOUNTER — OFFICE VISIT (OUTPATIENT)
Dept: FAMILY MEDICINE | Facility: CLINIC | Age: 78
End: 2018-10-09
Payer: MEDICARE

## 2018-10-09 VITALS
HEIGHT: 63 IN | SYSTOLIC BLOOD PRESSURE: 165 MMHG | HEART RATE: 81 BPM | BODY MASS INDEX: 23.81 KG/M2 | WEIGHT: 134.4 LBS | DIASTOLIC BLOOD PRESSURE: 82 MMHG

## 2018-10-09 DIAGNOSIS — J02.9 SORE THROAT: ICD-10-CM

## 2018-10-09 DIAGNOSIS — Z23 NEED FOR PROPHYLACTIC VACCINATION AND INOCULATION AGAINST INFLUENZA: ICD-10-CM

## 2018-10-09 DIAGNOSIS — I10 ESSENTIAL HYPERTENSION WITH GOAL BLOOD PRESSURE LESS THAN 140/90: Primary | ICD-10-CM

## 2018-10-09 PROCEDURE — 90662 IIV NO PRSV INCREASED AG IM: CPT | Performed by: FAMILY MEDICINE

## 2018-10-09 PROCEDURE — 99214 OFFICE O/P EST MOD 30 MIN: CPT | Mod: 25 | Performed by: FAMILY MEDICINE

## 2018-10-09 PROCEDURE — G0008 ADMIN INFLUENZA VIRUS VAC: HCPCS | Performed by: FAMILY MEDICINE

## 2018-10-09 RX ORDER — CARVEDILOL 6.25 MG/1
6.25 TABLET ORAL 2 TIMES DAILY WITH MEALS
Qty: 180 TABLET | Refills: 2 | COMMUNITY
Start: 2018-10-09 | End: 2019-06-20

## 2018-10-09 NOTE — PROGRESS NOTES
SUBJECTIVE:   Tomasa Fam is a 78 year old female who presents to clinic today for the following health issues:      Acute Illness   Acute illness concerns: sore throat   Onset: x 3 weeks     Fever: no     Chills/Sweats: YES    Headache (location?): YES    Sinus Pressure:no     Conjunctivitis:  no    Ear Pain: no    Rhinorrhea: YES    Congestion: no     Sore Throat: YES     Cough: no    Wheeze: no     Decreased Appetite: no     Nausea: YES- not currently    Vomiting: no     Diarrhea:  no     Dysuria/Freq.: no     Fatigue/Achiness: no     Sick/Strep Exposure: no      Therapies Tried and outcome: none    Discuss incontinence. Discuss fluctuating blood pressures.  Discuss leg cramps at night.        Problem list and histories reviewed & adjusted, as indicated.  Additional history:     See under ROS     Patient Active Problem List   Diagnosis     Chronic airway obstruction (H)     ASCUS on Pap smear     Hyperlipidemia LDL goal <160     Breast cancer (H)     Essential hypertension with goal blood pressure less than 140/90     Cystocele, midline     Uterovaginal prolapse     S/P hysterectomy     Advanced directives, counseling/discussion     History of hypokalemia     Concussion     Malignant neoplasm of upper lobe of right lung (H)     Thyroid nodule     Chronic pain of both knees       Current Outpatient Prescriptions   Medication Sig Dispense Refill     aspirin 81 MG tablet Take 1 tablet (81 mg) by mouth daily 90 tablet 3     augmented betamethasone dipropionate (DIPROLENE-AF) 0.05 % cream APPLY EXTERNALLY TO THE AFFECTED AREA TWICE DAILY 50 g 0     B Complex Vitamins (VITAMIN  B COMPLEX) tablet Take 1 tablet by mouth daily.       calcium-vitamin D (CALTRATE) 600-400 MG-UNIT per tablet Take 1 tablet by mouth 2 times daily       carvedilol (COREG) 6.25 MG tablet Take 2 tablets (12.5 mg) by mouth 2 times daily (with meals) 180 tablet 2     chlorthalidone (HYGROTON) 25 MG tablet TAKE 1 TABLET(25 MG) BY MOUTH  DAILY 90 tablet 2     cholecalciferol (VITAMIN D) 400 UNIT TABS Take 400 Units by mouth daily       clobetasol (CLOBETASOL PROPIONATE EMULSION) 0.05 % CREA cream Apply topically daily (Patient taking differently: Apply topically as needed ) 70 g 0     ezetimibe (ZETIA) 10 MG tablet TAKE 1 TABLET(10 MG) BY MOUTH DAILY 90 tablet 3     fluocinonide (LIDEX) 0.05 % external solution Apply topically as needed        FLUoxetine (PROZAC) 10 MG capsule TAKE 1 CAPSULE(10 MG) BY MOUTH DAILY 90 capsule 0     losartan (COZAAR) 100 MG tablet TAKE 1 TABLET(100 MG) BY MOUTH DAILY WITH BREAKFAST 90 tablet 3     olopatadine HCl (PATADAY) 0.2 % SOLN Place 1 drop into the right eye daily as needed (runny eye)       omega 3 1000 MG CAPS Take 1 g by mouth daily 90 capsule      polyethylene glycol (MIRALAX/GLYCOLAX) powder Take 1 capful by mouth daily       potassium chloride SA (K-DUR/KLOR-CON M) 10 MEQ CR tablet TAKE 1 TABLET(10 MEQ) BY MOUTH TWICE DAILY 180 tablet 1         Reviewed and updated as needed this visit by clinical staff       Reviewed and updated as needed this visit by Provider         ROS:  CONSTITUTIONAL:NEGATIVE for fever, chills, change in weight  EENT: see below  RESP:NEGATIVE for significant cough or SOB  CV: NEGATIVE for chest pain, palpitations or peripheral edema  PSYCHIATRIC: NEGATIVE for changes in mood or affect    Notes will get real weak when bp low.  Was low at that time; a couple weeks ago. Then checked a little while later and bp was 205/109.   Was up for several days.   Did go to ER.    The hospital did not change medication.  Then saw Sherri.   She suggested taking 2 coregs; but has not done this x for the one time noted below.    Did take 2 on 10/7 evening when bp 163/71.   122/71 when she got up yesterday; so did not take coreg.  Went down to 83/49; had taken her water pill and potassium,  At noon was 143/69.   Evening 129/63 so took one.   So did not take one until dinner yesterday.   She notes her  "bp has rarely low since cutting the coreg into half.     Has recently been treated for UTI.  Initially macrobid and then changed to augmentin for UTI.    Notes mild sore throat. Congestion.    OBJECTIVE:     BP (P) 172/70 (BP Location: Right arm, Cuff Size: Adult Regular)  Pulse (P) 77  Temp (P) 97.4  F (36.3  C)  Resp (P) 16  Ht 5' 3\" (1.6 m)  Wt 134 lb 6.4 oz (61 kg)  LMP  (LMP Unknown)  SpO2 (P) 99%  BMI 23.81 kg/m2  Body mass index is 23.81 kg/(m^2).  GENERAL APPEARANCE: alert and no distress  HENT: ear canals and TM's normal and nose and mouth without ulcers or lesions  RESP: lungs clear to auscultation - no rales, rhonchi or wheezes  CV: regular rates and rhythm  MS: no ankle edema.  PSYCH: mentation appears normal and affect normal/bright    GFR Estimate   Date Value Ref Range Status   10/01/2018 82 >60 mL/min/1.7m2 Final     Comment:     Non  GFR Calc   09/28/2018 79 >60 mL/min/1.7m2 Final     Comment:     Non  GFR Calc   05/22/2018 69 >60 mL/min/1.7m2 Final     Comment:     Non  GFR Calc           Reviewed ER note and later office note.    ASSESSMENT/PLAN:     Essential hypertension with goal blood pressure less than 140/90  This is varying.   She also has been taking the medication inconsistently in reaction to the bp. One low one noted above was when she did not take coreg.  I wonder if she may have been dehydrated; she had taken diuretic.  At this time, recommend using the coreg consistently at the lower dose.   If her bp is low, to hydrate self.   Monitor bp; see patient instructions. Consider amlodipine if elevated.  - carvedilol (COREG) 6.25 MG tablet; Take 1 tablet (6.25 mg) by mouth 2 times daily (with meals)    Sore throat  Uncertain etiology.  I wonder if this is PND; perhaps with some allergy or other. Could try Flonase.    Need for prophylactic vaccination and inoculation against influenza    - FLU VACCINE, INCREASED ANTIGEN, PRESV FREE, " AGE 65+ [52602]  - ADMIN INFLUENZA (For MEDICARE Patients ONLY) []      Patient Instructions       At this time, go back to 1 of the coreg twice daily.    Take your medications consistently.    If your bp is low, first take in some fluids.  Hold coreg only if bp < 100/50 and the fluids do not bring it up.      I want to see you again in 10-14 days with your blood pressures.  If high, my thought at this time, would be to add a low dose amlodipine at night.  We will check your kidney tests and electrolytes again.          Violet Fenton MD, MD  [facility]

## 2018-10-09 NOTE — MR AVS SNAPSHOT
After Visit Summary   10/9/2018    Tomasa Fam    MRN: [id number]           Patient Information     Date Of Birth          1940        Visit Information        Provider Department      10/9/2018 4:10 PM Violet Fenton MD [facility]        Today's Diagnoses     Essential hypertension with goal blood pressure less than 140/90    -  1    Sore throat        Need for prophylactic vaccination and inoculation against influenza          Care Instructions        At this time, go back to 1 of the coreg twice daily.    Take your medications consistently.    If your bp is low, first take in some fluids.  Hold coreg only if bp < 100/50 and the fluids do not bring it up.      I want to see you again in 10-14 days with your blood pressures.  If high, my thought at this time, would be to add a low dose amlodipine at night.  We will check your kidney tests and electrolytes again.              Follow-ups after your visit        Follow-up notes from your care team     Return in about 2 weeks (around 10/23/2018) for Medication recheck.      Your next 10 appointments already scheduled     Nov 06, 2018  4:30 PM CST   Office Visit with Violet Fenton MD   [facility] ([facility])    [address] [facility] [address] [phone]           Bring a current list of meds and any records pertaining to this visit. For Physicals, please bring immunization records and any forms needing to be filled out. Please arrive 10 minutes early to complete paperwork.              Who to contact     If you have questions or need follow up information about today's clinic visit or your schedule please contact [facility] directly at [phone].  Normal or non-critical lab and imaging results will be communicated to you by MyChart, letter or phone within 4 business days after the clinic has received the results. If you do not hear from us within 7 days,  "please contact the clinic through [employer] or phone. If you have a critical or abnormal lab result, we will notify you by phone as soon as possible.  Submit refill requests through [employer] or call your pharmacy and they will forward the refill request to us. Please allow 3 business days for your refill to be completed.          Additional Information About Your Visit        [employer] Information     [employer] gives you secure access to your electronic health record. If you see a primary care provider, you can also send messages to your care team and make appointments. If you have questions, please call your primary care clinic.  If you do not have a primary care provider, please call [phone] and they will assist you.        Care EveryWhere ID     This is your Care EveryWhere ID. This could be used by other organizations to access your [facility] medical records  [id number]        Your Vitals Were     Pulse Height Last Period BMI (Body Mass Index)          81 5' 3\" (1.6 m) (LMP Unknown) 23.81 kg/m2         Blood Pressure from Last 3 Encounters:   10/19/18 142/70   10/09/18 165/82   10/01/18 156/78    Weight from Last 3 Encounters:   10/19/18 133 lb 12.8 oz (60.7 kg)   10/09/18 134 lb 6.4 oz (61 kg)   10/01/18 132 lb (59.9 kg)              We Performed the Following     ADMIN INFLUENZA (For MEDICARE Patients ONLY) []     FLU VACCINE, INCREASED ANTIGEN, PRESV FREE, AGE 65+ [37554]          Today's Medication Changes          These changes are accurate as of 10/9/18 11:59 PM.  If you have any questions, ask your nurse or doctor.               These medicines have changed or have updated prescriptions.        Dose/Directions    carvedilol 6.25 MG tablet   Commonly known as:  COREG   This may have changed:  how much to take   Used for:  Essential hypertension with goal blood pressure less than 140/90   Changed by:  Violet Fenton MD        Dose:  6.25 mg   Take 1 tablet (6.25 mg) by mouth 2 times daily (with meals) "   Quantity:  180 tablet   Refills:  2       clobetasol 0.05 % Crea cream   Commonly known as:  CLOBETASOL PROPIONATE EMULSION   This may have changed:    - when to take this  - reasons to take this   Used for:  Lichen sclerosus        Apply topically daily   Quantity:  70 g   Refills:  0                Primary Care Provider Office Phone # Fax #    Violet Fenton [phone]       [address] [facility] [address]        Equal Access to Services     TAIWO SIGALA AH: Hadii aad ku hadasho Soomaali, waaxda luqadaha, qaybta kaalmada adeegyada, waxay idiin hayaan adeeg lainalizzieyo laefrain . So [facility] [phone].    ATENCIÓN: Si habla español, tiene a srivastava disposición servicios gratuitos de asistencia lingüística. [facility] [phone].    We comply with applicable federal civil rights laws and [place] laws. We do not discriminate on the basis of race, color, national origin, age, disability, sex, sexual orientation, or gender identity.            Thank you!     Thank you for choosing [facility]  for your care. Our goal is always to provide you with excellent care. Hearing back from our patients is one way we can continue to improve our services. Please take a few minutes to complete the written survey that you may receive in the mail after your visit with us. Thank you!             Your Updated Medication List - Protect others around you: Learn how to safely use, store and throw away your medicines at www.disposemymeds.org.          This list is accurate as of 10/9/18 11:59 PM.  Always use your most recent med list.                   Brand Name Dispense Instructions for use Diagnosis    aspirin 81 MG tablet     90 tablet    Take 1 tablet (81 mg) by mouth daily        augmented betamethasone dipropionate 0.05 % cream    DIPROLENE-AF    50 g    APPLY EXTERNALLY TO THE AFFECTED AREA TWICE DAILY    Lichen sclerosus et atrophicus       calcium carbonate 600 mg-vitamin D 400 units 600-400 MG-UNIT  per tablet    CALTRATE     Take 1 tablet by mouth 2 times daily        carvedilol 6.25 MG tablet    COREG    180 tablet    Take 1 tablet (6.25 mg) by mouth 2 times daily (with meals)    Essential hypertension with goal blood pressure less than 140/90       chlorthalidone 25 MG tablet    HYGROTON    90 tablet    TAKE 1 TABLET(25 MG) BY MOUTH DAILY    Essential hypertension with goal blood pressure less than 140/90       cholecalciferol 400 UNIT Tabs tablet    vitamin D3     Take 400 Units by mouth daily        clobetasol 0.05 % Crea cream    CLOBETASOL PROPIONATE EMULSION    70 g    Apply topically daily    Lichen sclerosus       ezetimibe 10 MG tablet    ZETIA    90 tablet    TAKE 1 TABLET(10 MG) BY MOUTH DAILY    Hyperlipidemia LDL goal <160       fluocinonide 0.05 % solution    LIDEX     Apply topically as needed        FLUoxetine 10 MG capsule    PROzac    90 capsule    TAKE 1 CAPSULE(10 MG) BY MOUTH DAILY    Decreased energy, Stress       losartan 100 MG tablet    COZAAR    90 tablet    TAKE 1 TABLET(100 MG) BY MOUTH DAILY WITH BREAKFAST    Essential hypertension with goal blood pressure less than 140/90       olopatadine HCl 0.2 % Soln    [facility]     Place 1 drop into the right eye daily as needed (runny eye)        omega 3 1000 MG Caps     90 capsule    Take 1 g by mouth daily        polyethylene glycol powder    MIRALAX/GLYCOLAX     Take 1 capful by mouth daily        potassium chloride SA 10 MEQ CR tablet    K-DUR/KLOR-CON M    180 tablet    TAKE 1 TABLET(10 MEQ) BY MOUTH TWICE DAILY    History of hypokalemia       vitamin B complex with vitamin C Tabs tablet    STRESS TAB     Take 1 tablet by mouth daily.

## 2018-10-09 NOTE — PROGRESS NOTES
"  SUBJECTIVE:   Tomasa Fam is a 78 year old female who presents to clinic today for the following health issues:      ED/UC Followup:    Facility:  [facility]  Date of visit: 09/28/2018  Reason for visit: high blood pressure   Current Status: blood press        {ACUTE Problem  - brief histories:784595}    {additional problems for provider to add:448017}    Problem list and histories reviewed & adjusted, as indicated.  Additional history: {NONE - AS DOCUMENTED:210503::\"as documented\"}    {HIST REVIEW/ LINKS 2:147264}    Reviewed and updated as needed this visit by clinical staff       Reviewed and updated as needed this visit by Provider         {PROVIDER CHARTING PREFERENCE:177089}  "

## 2018-10-09 NOTE — PATIENT INSTRUCTIONS
At this time, go back to 1 of the coreg twice daily.    Take your medications consistently.    If your bp is low, first take in some fluids.  Hold coreg only if bp < 100/50 and the fluids do not bring it up.      I want to see you again in 10-14 days with your blood pressures.  If high, my thought at this time, would be to add a low dose amlodipine at night.  We will check your kidney tests and electrolytes again.

## 2018-10-09 NOTE — PROGRESS NOTES

## 2018-10-19 ENCOUNTER — OFFICE VISIT (OUTPATIENT)
Dept: FAMILY MEDICINE | Facility: CLINIC | Age: 78
End: 2018-10-19
Payer: MEDICARE

## 2018-10-19 VITALS
BODY MASS INDEX: 23.71 KG/M2 | HEIGHT: 63 IN | SYSTOLIC BLOOD PRESSURE: 142 MMHG | DIASTOLIC BLOOD PRESSURE: 70 MMHG | TEMPERATURE: 97.6 F | OXYGEN SATURATION: 98 % | RESPIRATION RATE: 16 BRPM | WEIGHT: 133.8 LBS | HEART RATE: 78 BPM

## 2018-10-19 DIAGNOSIS — R07.89 ATYPICAL CHEST PAIN: ICD-10-CM

## 2018-10-19 DIAGNOSIS — Z85.118 HISTORY OF LUNG CANCER: ICD-10-CM

## 2018-10-19 DIAGNOSIS — I10 ESSENTIAL HYPERTENSION WITH GOAL BLOOD PRESSURE LESS THAN 140/90: Primary | ICD-10-CM

## 2018-10-19 DIAGNOSIS — K21.9 GASTROESOPHAGEAL REFLUX DISEASE, ESOPHAGITIS PRESENCE NOT SPECIFIED: ICD-10-CM

## 2018-10-19 PROCEDURE — 99214 OFFICE O/P EST MOD 30 MIN: CPT | Performed by: FAMILY MEDICINE

## 2018-10-19 RX ORDER — AMLODIPINE BESYLATE 2.5 MG/1
2.5 TABLET ORAL DAILY
Qty: 30 TABLET | Refills: 0 | Status: SHIPPED | OUTPATIENT
Start: 2018-10-19 | End: 2018-11-06

## 2018-10-19 NOTE — MR AVS SNAPSHOT
After Visit Summary   10/19/2018    Tomasa Fam    MRN: [id number]           Patient Information     Date Of Birth          1940        Visit Information        Provider Department      10/19/2018 9:10 AM Violet Fenton MD [facility]        Today's Diagnoses     Essential hypertension with goal blood pressure less than 140/90    -  1      Care Instructions        Try Zantac; (ranitidine) at night.   You can start at 75 mg and go as high as 300 mg.    If that does not work, we can consider Prilosec (omeprazole).          Follow-ups after your visit        Follow-up notes from your care team     Return in about 3 weeks (around 11/9/2018) for Medication recheck.      Your next 10 appointments already scheduled     Nov 06, 2018  4:30 PM CST   Office Visit with Violet Fenton MD   [facility] ([facility])    [address] [place] [address] [phone]           Bring a current list of meds and any records pertaining to this visit. For Physicals, please bring immunization records and any forms needing to be filled out. Please arrive 10 minutes early to complete paperwork.              Who to contact     If you have questions or need follow up information about today's clinic visit or your schedule please contact [facility] directly at [phone].  Normal or non-critical lab and imaging results will be communicated to you by MyChart, letter or phone within 4 business days after the clinic has received the results. If you do not hear from us within 7 days, please contact the clinic through MyChart or phone. If you have a critical or abnormal lab result, we will notify you by phone as soon as possible.  Submit refill requests through [employer] or call your pharmacy and they will forward the refill request to us. Please allow 3 business days for your refill to be completed.          Additional Information About Your  "Visit        [employer] Information     [employer] gives you secure access to your electronic health record. If you see a primary care provider, you can also send messages to your care team and make appointments. If you have questions, please call your primary care clinic.  If you do not have a primary care provider, please call [phone] and they will assist you.        Care EveryWhere ID     This is your Care EveryWhere ID. This could be used by other organizations to access your [facility] medical records  [id number]        Your Vitals Were     Pulse Temperature Respirations Height Last Period Pulse Oximetry    78 97.6  F (36.4  C) (Oral) 16 5' 3\" (1.6 m) (LMP Unknown) 98%    BMI (Body Mass Index)                   23.7 kg/m2            Blood Pressure from Last 3 Encounters:   10/19/18 142/70   10/09/18 165/82   10/01/18 156/78    Weight from Last 3 Encounters:   10/19/18 133 lb 12.8 oz (60.7 kg)   10/09/18 134 lb 6.4 oz (61 kg)   10/01/18 132 lb (59.9 kg)              Today, you had the following     No orders found for display         Today's Medication Changes          These changes are accurate as of 10/19/18 10:13 AM.  If you have any questions, ask your nurse or doctor.               Start taking these medicines.        Dose/Directions    amLODIPine 2.5 MG tablet   Commonly known as:  NORVASC   Used for:  Essential hypertension with goal blood pressure less than 140/90   Started by:  Violet Fenton MD        Dose:  2.5 mg   Take 1 tablet (2.5 mg) by mouth daily   Quantity:  30 tablet   Refills:  0         These medicines have changed or have updated prescriptions.        Dose/Directions    clobetasol 0.05 % Crea cream   Commonly known as:  CLOBETASOL PROPIONATE EMULSION   This may have changed:    - when to take this  - reasons to take this   Used for:  Lichen sclerosus        Apply topically daily   Quantity:  70 g   Refills:  0            Where to get your medicines      These medications [facility] to " [facility] - [place], [place] - [address]  [address], [facility] [address]     Phone:  [phone]     amLODIPine 2.5 MG tablet                Primary Care Provider Office Phone # Fax #    Violet Fenton [phone]       [address]  [facility] [address]        Equal Access to Services     [facility] HospitalDOROTEO : Hadii aad ku hadasho Soomaali, waaxda luqadaha, qaybta kaalmada adeegyada, waxay idiin hayaan adeeg kharash la'aan . So [facility] [phone].    ATENCIÓN: Si habla español, tiene a srivastava disposición servicios gratuitos de asistencia lingüística. [facility] [phone].    We comply with applicable federal civil rights laws and [place] laws. We do not discriminate on the basis of race, color, national origin, age, disability, sex, sexual orientation, or gender identity.            Thank you!     Thank you for choosing [facility]  for your care. Our goal is always to provide you with excellent care. Hearing back from our patients is one way we can continue to improve our services. Please take a few minutes to complete the written survey that you may receive in the mail after your visit with us. Thank you!             Your Updated Medication List - Protect others around you: Learn how to safely use, store and throw away your medicines at www.disposemymeds.org.          This list is accurate as of 10/19/18 10:13 AM.  Always use your most recent med list.                   Brand Name Dispense Instructions for use Diagnosis    amLODIPine 2.5 MG tablet    NORVASC    30 tablet    Take 1 tablet (2.5 mg) by mouth daily    Essential hypertension with goal blood pressure less than 140/90       aspirin 81 MG tablet     90 tablet    Take 1 tablet (81 mg) by mouth daily        augmented betamethasone dipropionate 0.05 % cream    DIPROLENE-AF    50 g    APPLY EXTERNALLY TO THE AFFECTED AREA TWICE DAILY    Lichen sclerosus et  atrophicus       calcium carbonate 600 mg-vitamin D 400 units 600-400 MG-UNIT per tablet    CALTRATE     Take 1 tablet by mouth 2 times daily        carvedilol 6.25 MG tablet    COREG    180 tablet    Take 1 tablet (6.25 mg) by mouth 2 times daily (with meals)    Essential hypertension with goal blood pressure less than 140/90       chlorthalidone 25 MG tablet    HYGROTON    90 tablet    TAKE 1 TABLET(25 MG) BY MOUTH DAILY    Essential hypertension with goal blood pressure less than 140/90       cholecalciferol 400 UNIT Tabs tablet    vitamin D3     Take 400 Units by mouth daily        clobetasol 0.05 % Crea cream    CLOBETASOL PROPIONATE EMULSION    70 g    Apply topically daily    Lichen sclerosus       ezetimibe 10 MG tablet    ZETIA    90 tablet    TAKE 1 TABLET(10 MG) BY MOUTH DAILY    Hyperlipidemia LDL goal <160       fluocinonide 0.05 % solution    LIDEX     Apply topically as needed        FLUoxetine 10 MG capsule    PROzac    90 capsule    TAKE 1 CAPSULE(10 MG) BY MOUTH DAILY    Decreased energy, Stress       losartan 100 MG tablet    COZAAR    90 tablet    TAKE 1 TABLET(100 MG) BY MOUTH DAILY WITH BREAKFAST    Essential hypertension with goal blood pressure less than 140/90       olopatadine HCl 0.2 % [facility]     Place 1 drop into the right eye daily as needed (runny eye)        omega 3 1000 MG Caps     90 capsule    Take 1 g by mouth daily        polyethylene glycol powder    MIRALAX/GLYCOLAX     Take 1 capful by mouth daily        potassium chloride SA 10 MEQ CR tablet    K-DUR/KLOR-CON M    180 tablet    TAKE 1 TABLET(10 MEQ) BY MOUTH TWICE DAILY    History of hypokalemia       vitamin B complex with vitamin C Tabs tablet    STRESS TAB     Take 1 tablet by mouth daily.

## 2018-10-19 NOTE — PROGRESS NOTES
"  SUBJECTIVE:   Tomasa Fam is a 78 year old female who presents to clinic today for the following health issues:      Medication Followup of decreased dose coreg    Taking Medication as prescribed: yes    Side Effects:  None    Medication Helping Symptoms:  Blood pressure reading are /       Is having pain on the left side right before the armpit intermittently.  The pain will radiate up to the left side of the neck. The pain can last most of the day. The pain is \"like a sore muscle pain\".     Problem list and histories reviewed & adjusted, as indicated.  Additional history:     See under ROS     Patient Active Problem List   Diagnosis     Chronic airway obstruction (H)     ASCUS on Pap smear     Hyperlipidemia LDL goal <160     Breast cancer (H)     Essential hypertension with goal blood pressure less than 140/90     Cystocele, midline     Uterovaginal prolapse     S/P hysterectomy     Advanced directives, counseling/discussion     History of hypokalemia     Concussion     Malignant neoplasm of upper lobe of right lung (H)     Thyroid nodule     Chronic pain of both knees       Current Outpatient Prescriptions   Medication Sig Dispense Refill     aspirin 81 MG tablet Take 1 tablet (81 mg) by mouth daily 90 tablet 3     augmented betamethasone dipropionate (DIPROLENE-AF) 0.05 % cream APPLY EXTERNALLY TO THE AFFECTED AREA TWICE DAILY 50 g 0     B Complex Vitamins (VITAMIN  B COMPLEX) tablet Take 1 tablet by mouth daily.       calcium-vitamin D (CALTRATE) 600-400 MG-UNIT per tablet Take 1 tablet by mouth 2 times daily       carvedilol (COREG) 6.25 MG tablet Take 1 tablet (6.25 mg) by mouth 2 times daily (with meals) 180 tablet 2     chlorthalidone (HYGROTON) 25 MG tablet TAKE 1 TABLET(25 MG) BY MOUTH DAILY 90 tablet 2     cholecalciferol (VITAMIN D) 400 UNIT TABS Take 400 Units by mouth daily       clobetasol (CLOBETASOL PROPIONATE EMULSION) 0.05 % CREA cream Apply topically daily (Patient taking " "differently: Apply topically as needed ) 70 g 0     ezetimibe (ZETIA) 10 MG tablet TAKE 1 TABLET(10 MG) BY MOUTH DAILY 90 tablet 3     fluocinonide (LIDEX) 0.05 % external solution Apply topically as needed        FLUoxetine (PROZAC) 10 MG capsule TAKE 1 CAPSULE(10 MG) BY MOUTH DAILY 90 capsule 0     losartan (COZAAR) 100 MG tablet TAKE 1 TABLET(100 MG) BY MOUTH DAILY WITH BREAKFAST 90 tablet 3     olopatadine HCl (PATADAY) 0.2 % SOLN Place 1 drop into the right eye daily as needed (runny eye)       omega 3 1000 MG CAPS Take 1 g by mouth daily 90 capsule      polyethylene glycol (MIRALAX/GLYCOLAX) powder Take 1 capful by mouth daily       potassium chloride SA (K-DUR/KLOR-CON M) 10 MEQ CR tablet TAKE 1 TABLET(10 MEQ) BY MOUTH TWICE DAILY 180 tablet 1       Reviewed and updated as needed this visit by clinical staff       Reviewed and updated as needed this visit by Provider         ROS:  CONSTITUTIONAL:NEGATIVE for fever, chills, change in weight  RESP:NEGATIVE for significant cough or SOB  CV: NEGATIVE for chest pain, palpitations or peripheral edema  PSYCHIATRIC: NEGATIVE for changes in mood or affect    Anticipating CT on Sunday. Notes she gets this q 6 months.   Will get dye.     Wondering if urinary tract problem is gone. Did have infection; she was told by ER doctor to follow up with me.  No dysuria (but never has). Had smell and difficulty holding urine and unable to go when she needed to. No problem since then.    Was on prilosec after surgery for lungs, due to gas problem. Was helpful.  Getting a lot of gas at night currently.  Pain on left chest; down arm and up neck.  Takes tums; this does help.  Happens at night once laying down.  Burps a lot in bed.   Does not eat prior to going to bed.     OBJECTIVE:     /70 (BP Location: Right arm, Cuff Size: Adult Regular)  Pulse 78  Temp 97.6  F (36.4  C) (Oral)  Resp 16  Ht 5' 3\" (1.6 m)  Wt 133 lb 12.8 oz (60.7 kg)  LMP  (LMP Unknown)  SpO2 98%  BMI " 23.7 kg/m2  Body mass index is 23.7 kg/(m^2).     GENERAL APPEARANCE: alert and no distress  RESP: lungs clear to auscultation - no rales, rhonchi or wheezes  CV: regular rates and rhythm  MS: no edema.   PSYCH: mentation appears normal and affect normal/bright    Her blood pressures /53-82.  Highest p salt.  But several others in the 150-17_ range.         ASSESSMENT/PLAN:     Essential hypertension with goal blood pressure less than 140/90  It does appear elevated. She is feeling better having decreased the coreg.  At this time, will add low dose amlodipine at night. Discussed potential for edema.   - amLODIPine (NORVASC) 2.5 MG tablet; Take 1 tablet (2.5 mg) by mouth daily    Gastroesophageal reflux disease, esophagitis presence not specified  Discussed options. At this time, will try ranitidine.   Discussed as less strong than PPI.     Atypical chest pain  Sounds more consistent with GERD with happening at night when laying down and relieved with AA.     History of lung cancer  Anticipating CT. She notes they do check her kidney function. Encourage to be hydrated.       Patient Instructions       Try Zantac; (ranitidine) at night.   You can start at 75 mg and go as high as 300 mg.    If that does not work, we can consider Prilosec (omeprazole).      Violet Fenton MD, MD  [facility]

## 2018-10-19 NOTE — PATIENT INSTRUCTIONS
Try Zantac; (ranitidine) at night.   You can start at 75 mg and go as high as 300 mg.    If that does not work, we can consider Prilosec (omeprazole).

## 2018-10-22 ENCOUNTER — TRANSFERRED RECORDS (OUTPATIENT)
Dept: HEALTH INFORMATION MANAGEMENT | Facility: CLINIC | Age: 78
End: 2018-10-22

## 2018-10-22 ENCOUNTER — TRANSFERRED RECORDS (OUTPATIENT)
Dept: SURGERY | Facility: CLINIC | Age: 78
End: 2018-10-22

## 2018-11-06 ENCOUNTER — OFFICE VISIT (OUTPATIENT)
Dept: FAMILY MEDICINE | Facility: CLINIC | Age: 78
End: 2018-11-06
Payer: MEDICARE

## 2018-11-06 VITALS
DIASTOLIC BLOOD PRESSURE: 76 MMHG | SYSTOLIC BLOOD PRESSURE: 164 MMHG | RESPIRATION RATE: 14 BRPM | HEART RATE: 79 BPM | WEIGHT: 136 LBS | TEMPERATURE: 98 F | BODY MASS INDEX: 24.09 KG/M2 | OXYGEN SATURATION: 99 %

## 2018-11-06 DIAGNOSIS — I10 ESSENTIAL HYPERTENSION WITH GOAL BLOOD PRESSURE LESS THAN 140/90: Primary | ICD-10-CM

## 2018-11-06 DIAGNOSIS — R20.9 ABNORMAL ARM SENSATION: ICD-10-CM

## 2018-11-06 DIAGNOSIS — Z12.11 SCREEN FOR COLON CANCER: ICD-10-CM

## 2018-11-06 PROCEDURE — 99214 OFFICE O/P EST MOD 30 MIN: CPT | Performed by: FAMILY MEDICINE

## 2018-11-06 RX ORDER — AMLODIPINE BESYLATE 2.5 MG/1
2.5 TABLET ORAL DAILY
Qty: 30 TABLET | Refills: 0 | Status: SHIPPED | OUTPATIENT
Start: 2018-11-06 | End: 2018-12-06

## 2018-11-06 NOTE — MR AVS SNAPSHOT
After Visit Summary   11/6/2018    Tomasa Fam    MRN: [id number]           Patient Information     Date Of Birth          1940        Visit Information        Provider Department      11/6/2018 4:30 PM Violet Fenton MD [facility]        Today's Diagnoses     Screen for colon cancer    -  1    Essential hypertension with goal blood pressure less than 140/90          Care Instructions        Let's have you try continuing to take the coreg twice daily, and the chlorthalidone in the morning.    Continue with the amlodipine at night.    We can try switching the losartan to mid day.              Follow-ups after your visit        Follow-up notes from your care team     Return in about 4 weeks (around 12/4/2018).      Your next 10 appointments already scheduled     Dec 06, 2018  1:50 PM CST   SHORT with Violet Fenton MD   [facility] ([facility])    [address] [address] [phone]              Who to contact     If you have questions or need follow up information about today's clinic visit or your schedule please contact [facility] directly at [phone].  Normal or non-critical lab and imaging results will be communicated to you by MyChart, letter or phone within 4 business days after the clinic has received the results. If you do not hear from us within 7 days, please contact the clinic through [facility]rh[facility] or phone. If you have a critical or abnormal lab result, we will notify you by phone as soon as possible.  Submit refill requests through Needish or call your pharmacy and they will forward the refill request to us. Please allow 3 business days for your refill to be completed.          Additional Information About Your Visit        MyChart Information     Needish gives you secure access to your electronic health record. If you see a primary care provider, you can also send messages to your care team  and make appointments. If you have questions, please call your primary care clinic.  If you do not have a primary care provider, please call [phone] and they will assist you.        Care EveryWhere ID     This is your Care EveryWhere ID. This could be used by other organizations to access your [facility] medical records  [id number]        Your Vitals Were     Pulse Temperature Respirations Last Period Pulse Oximetry BMI (Body Mass Index)    79 98  F (36.7  C) (Oral) 14 (LMP Unknown) 99% 24.09 kg/m2       Blood Pressure from Last 3 Encounters:   11/06/18 164/76   10/19/18 142/70   10/09/18 165/82    Weight from Last 3 Encounters:   11/06/18 136 lb (61.7 kg)   10/19/18 133 lb 12.8 oz (60.7 kg)   10/09/18 134 lb 6.4 oz (61 kg)              Today, you had the following     No orders found for display         Today's Medication Changes          These changes are accurate as of 11/6/18  5:15 PM.  If you have any questions, ask your nurse or doctor.               These medicines have changed or have updated prescriptions.        Dose/Directions    clobetasol 0.05 % Crea cream   Commonly known as:  CLOBETASOL PROPIONATE EMULSION   This may have changed:    - when to take this  - reasons to take this   Used for:  Lichen sclerosus        Apply topically daily   Quantity:  70 g   Refills:  0            Where to get your medicines      These medications were sent to [id number] - [facility] [address]  [address], [facility] [address]     Phone:  [phone]     amLODIPine 2.5 MG tablet                Primary Care Provider Office Phone # Fax #    Violet Fenton [phone] [address] [place] [address]        Equal Access to Services     DELFINA SIGALA : Jennifer cordova Soyakelin, waaxda luqadaha, qaybta kaalmada adeegyada, ward aisf. So [facility] [phone].    ATENCIÓN: Si pasquale  español, tiene a srivastava disposición servicios gratuitos de asistencia lingüística. Savi chatman [phone].    We comply with applicable federal civil rights laws and [place] laws. We do not discriminate on the basis of race, color, national origin, age, disability, sex, sexual orientation, or gender identity.            Thank you!     Thank you for choosing [facility]  for your care. Our goal is always to provide you with excellent care. Hearing back from our patients is one way we can continue to improve our services. Please take a few minutes to complete the written survey that you may receive in the mail after your visit with us. Thank you!             Your Updated Medication List - Protect others around you: Learn how to safely use, store and throw away your medicines at www.disposemymeds.org.          This list is accurate as of 11/6/18  5:15 PM.  Always use your most recent med list.                   Brand Name Dispense Instructions for use Diagnosis    amLODIPine 2.5 MG tablet    NORVASC    30 tablet    Take 1 tablet (2.5 mg) by mouth daily    Essential hypertension with goal blood pressure less than 140/90       aspirin 81 MG tablet     90 tablet    Take 1 tablet (81 mg) by mouth daily        augmented betamethasone dipropionate 0.05 % cream    DIPROLENE-AF    50 g    APPLY EXTERNALLY TO THE AFFECTED AREA TWICE DAILY    Lichen sclerosus et atrophicus       calcium carbonate 600 mg-vitamin D 400 units 600-400 MG-UNIT per tablet    CALTRATE     Take 1 tablet by mouth 2 times daily        carvedilol 6.25 MG tablet    COREG    180 tablet    Take 1 tablet (6.25 mg) by mouth 2 times daily (with meals)    Essential hypertension with goal blood pressure less than 140/90       chlorthalidone 25 MG tablet    HYGROTON    90 tablet    TAKE 1 TABLET(25 MG) BY MOUTH DAILY    Essential hypertension with goal blood pressure less than 140/90       cholecalciferol 400 UNIT Tabs tablet    vitamin D3     Take 400  Units by mouth daily        clobetasol 0.05 % Crea cream    CLOBETASOL PROPIONATE EMULSION    70 g    Apply topically daily    Lichen sclerosus       ezetimibe 10 MG tablet    ZETIA    90 tablet    TAKE 1 TABLET(10 MG) BY MOUTH DAILY    Hyperlipidemia LDL goal <160       fluocinonide 0.05 % solution    LIDEX     Apply topically as needed        FLUoxetine 10 MG capsule    PROzac    90 capsule    TAKE 1 CAPSULE(10 MG) BY MOUTH DAILY    Decreased energy, Stress       losartan 100 MG tablet    COZAAR    90 tablet    TAKE 1 TABLET(100 MG) BY MOUTH DAILY WITH BREAKFAST    Essential hypertension with goal blood pressure less than 140/90       olopatadine HCl 0.2 % [facility]     Place 1 drop into the right eye daily as needed (runny eye)        omega 3 1000 MG Caps     90 capsule    Take 1 g by mouth daily        polyethylene glycol powder    MIRALAX/GLYCOLAX     Take 1 capful by mouth daily        potassium chloride SA 10 MEQ CR tablet    K-DUR/KLOR-CON M    180 tablet    TAKE 1 TABLET(10 MEQ) BY MOUTH TWICE DAILY    History of hypokalemia       vitamin B complex with vitamin C Tabs tablet    STRESS TAB     Take 1 tablet by mouth daily.

## 2018-11-06 NOTE — PATIENT INSTRUCTIONS
Let's have you try continuing to take the coreg twice daily, and the chlorthalidone in the morning.    Continue with the amlodipine at night.    We can try switching the losartan to mid day.

## 2018-11-06 NOTE — PROGRESS NOTES
SUBJECTIVE:   Tomasa Fam is a 78 year old female who presents to clinic today for the following health issues:      History of Present Illness     Hypertension:     Outpatient blood pressures:  Are being checked    Blood pressures checked at:  Home    Dietary sodium intake::  Not monitoring salt intake    Diet:  Regular (no restrictions)  Frequency of exercise:  1 day/week  Taking medications regularly:  Yes  Medication side effects:  Not applicable  Additional concerns today:  Yes (intermittent shooting pain going down left arm.)      Problem list and histories reviewed & adjusted, as indicated.  Additional history:         See under ROS     Patient Active Problem List   Diagnosis     Chronic airway obstruction (H)     ASCUS on Pap smear     Hyperlipidemia LDL goal <160     Breast cancer (H)     Essential hypertension with goal blood pressure less than 140/90     Cystocele, midline     Uterovaginal prolapse     S/P hysterectomy     Advanced directives, counseling/discussion     History of hypokalemia     Concussion     Malignant neoplasm of upper lobe of right lung (H)     Thyroid nodule     Chronic pain of both knees       Current Outpatient Prescriptions   Medication Sig Dispense Refill     amLODIPine (NORVASC) 2.5 MG tablet Take 1 tablet (2.5 mg) by mouth daily 30 tablet 0     aspirin 81 MG tablet Take 1 tablet (81 mg) by mouth daily 90 tablet 3     augmented betamethasone dipropionate (DIPROLENE-AF) 0.05 % cream APPLY EXTERNALLY TO THE AFFECTED AREA TWICE DAILY 50 g 0     B Complex Vitamins (VITAMIN  B COMPLEX) tablet Take 1 tablet by mouth daily.       calcium-vitamin D (CALTRATE) 600-400 MG-UNIT per tablet Take 1 tablet by mouth 2 times daily       carvedilol (COREG) 6.25 MG tablet Take 1 tablet (6.25 mg) by mouth 2 times daily (with meals) 180 tablet 2     chlorthalidone (HYGROTON) 25 MG tablet TAKE 1 TABLET(25 MG) BY MOUTH DAILY 90 tablet 2     cholecalciferol (VITAMIN D) 400 UNIT TABS Take 400  Units by mouth daily       clobetasol (CLOBETASOL PROPIONATE EMULSION) 0.05 % CREA cream Apply topically daily (Patient taking differently: Apply topically as needed ) 70 g 0     ezetimibe (ZETIA) 10 MG tablet TAKE 1 TABLET(10 MG) BY MOUTH DAILY 90 tablet 3     fluocinonide (LIDEX) 0.05 % external solution Apply topically as needed        FLUoxetine (PROZAC) 10 MG capsule TAKE 1 CAPSULE(10 MG) BY MOUTH DAILY 90 capsule 0     losartan (COZAAR) 100 MG tablet TAKE 1 TABLET(100 MG) BY MOUTH DAILY WITH BREAKFAST 90 tablet 3     olopatadine HCl (PATADAY) 0.2 % SOLN Place 1 drop into the right eye daily as needed (runny eye)       omega 3 1000 MG CAPS Take 1 g by mouth daily 90 capsule      polyethylene glycol (MIRALAX/GLYCOLAX) powder Take 1 capful by mouth daily       potassium chloride SA (K-DUR/KLOR-CON M) 10 MEQ CR tablet TAKE 1 TABLET(10 MEQ) BY MOUTH TWICE DAILY 180 tablet 1         ROS:  CONSTITUTIONAL:NEGATIVE for fever, chills, change in weight  RESP:NEGATIVE for significant cough or SOB  CV: NEGATIVE for chest pain, palpitations or peripheral edema  PSYCHIATRIC: NEGATIVE for changes in mood or affect    bp at home has mostly been mostly ok, but with some elevations. Rare low ones.     Took her bp now and was 182/92 on her machine; better on ours; see below.   Not sure if she trusts the original one.    Notes the bp effect does not last 24 hours.    One day, bp low in the am and needed to sit down.  Will have more highs.    Left arm pain intermittently will get a mild ache.. Years.  One node as removed.    Currently, Will get little needles sensation under part of upper arm.   This is different pain.  This one has been there for this week. A couple prior to that.   Certain movement brings it out.        OBJECTIVE:     /80  Pulse 79  Temp 98  F (36.7  C) (Oral)  Resp 14  Wt 136 lb (61.7 kg)  LMP  (LMP Unknown)  SpO2 99%  BMI 24.09 kg/m2  Body mass index is 24.09 kg/(m^2).   /76       GENERAL  APPEARANCE: alert and no distress  CV: regular rates and rhythm  MS: no abnormality detected of her upper left arm   PSYCH: mentation appears normal and affect normal/bright        ASSESSMENT/PLAN:     Essential hypertension with goal blood pressure less than 140/90  She has been reluctant to increase medication. Reviewed timing of what/when she takes her meds. Also see patient instructions. Return in 1 month.   - amLODIPine (NORVASC) 2.5 MG tablet; Take 1 tablet (2.5 mg) by mouth daily    Abnormal arm sensation  Uncertain etiology. Sounds like a nerve type symptoms. Minimal. Consider Neurology if persists/worsens.     Screen for colon cancer  Discussed.       Patient Instructions       Let's have you try continuing to take the coreg twice daily, and the chlorthalidone in the morning.    Continue with the amlodipine at night.    We can try switching the losartan to mid day.          Violet Fenton MD, MD  [facility]  Answers for HPI/ROS submitted by the patient on 11/6/2018   PHQ-2 Score: 0

## 2018-12-06 ENCOUNTER — OFFICE VISIT (OUTPATIENT)
Dept: FAMILY MEDICINE | Facility: CLINIC | Age: 78
End: 2018-12-06
Payer: MEDICARE

## 2018-12-06 VITALS
SYSTOLIC BLOOD PRESSURE: 112 MMHG | RESPIRATION RATE: 16 BRPM | HEART RATE: 85 BPM | HEIGHT: 63 IN | WEIGHT: 137.8 LBS | TEMPERATURE: 97.7 F | DIASTOLIC BLOOD PRESSURE: 52 MMHG | OXYGEN SATURATION: 99 % | BODY MASS INDEX: 24.41 KG/M2

## 2018-12-06 DIAGNOSIS — R25.2 LEG CRAMPS: ICD-10-CM

## 2018-12-06 DIAGNOSIS — E87.1 HYPONATREMIA: ICD-10-CM

## 2018-12-06 DIAGNOSIS — I10 ESSENTIAL HYPERTENSION WITH GOAL BLOOD PRESSURE LESS THAN 140/90: Primary | ICD-10-CM

## 2018-12-06 PROCEDURE — 80048 BASIC METABOLIC PNL TOTAL CA: CPT | Performed by: FAMILY MEDICINE

## 2018-12-06 PROCEDURE — 83735 ASSAY OF MAGNESIUM: CPT | Performed by: FAMILY MEDICINE

## 2018-12-06 PROCEDURE — 36415 COLL VENOUS BLD VENIPUNCTURE: CPT | Performed by: FAMILY MEDICINE

## 2018-12-06 PROCEDURE — 99214 OFFICE O/P EST MOD 30 MIN: CPT | Performed by: FAMILY MEDICINE

## 2018-12-06 RX ORDER — LOSARTAN POTASSIUM 100 MG/1
TABLET ORAL
Qty: 90 TABLET | Refills: 1 | Status: ON HOLD | OUTPATIENT
Start: 2018-12-06 | End: 2019-09-14

## 2018-12-06 RX ORDER — CHLORTHALIDONE 25 MG/1
TABLET ORAL
Qty: 90 TABLET | Refills: 1 | Status: SHIPPED | OUTPATIENT
Start: 2018-12-06 | End: 2019-08-29

## 2018-12-06 RX ORDER — AMLODIPINE BESYLATE 2.5 MG/1
2.5 TABLET ORAL DAILY
Qty: 90 TABLET | Refills: 1 | Status: SHIPPED | OUTPATIENT
Start: 2018-12-06 | End: 2019-09-13

## 2018-12-06 NOTE — PROGRESS NOTES
"  SUBJECTIVE:   Tomasa Fam is a 78 year old female who presents to clinic today for the following health issues:      Hypertension Follow-up      Outpatient blood pressures are not being checked.    Low Salt Diet: not monitoring salt      Amount of exercise or physical activity: daily activities    Problems taking medications regularly: sometimes will forget to take them.    Medication side effects: none    Diet: regular (no restrictions)    Would like to discuss bilateral leg cramps that happen at night or early morning.  Did stop using OTC medication for leg cramps.  Is having a sensation under the left arm that feels like \"needle sticks poking the arm\".  Comes at random times.        Problem list and histories reviewed & adjusted, as indicated.  Additional history:     See under ROS   Patient Active Problem List   Diagnosis     Chronic airway obstruction (H)     ASCUS on Pap smear     Hyperlipidemia LDL goal <160     Breast cancer (H)     Essential hypertension with goal blood pressure less than 140/90     Cystocele, midline     Uterovaginal prolapse     S/P hysterectomy     Advanced directives, counseling/discussion     History of hypokalemia     Concussion     Malignant neoplasm of upper lobe of right lung (H)     Thyroid nodule     Chronic pain of both knees       Current Outpatient Prescriptions   Medication Sig Dispense Refill     amLODIPine (NORVASC) 2.5 MG tablet Take 1 tablet (2.5 mg) by mouth daily 30 tablet 0     aspirin 81 MG tablet Take 1 tablet (81 mg) by mouth daily 90 tablet 3     augmented betamethasone dipropionate (DIPROLENE-AF) 0.05 % cream APPLY EXTERNALLY TO THE AFFECTED AREA TWICE DAILY 50 g 0     B Complex Vitamins (VITAMIN  B COMPLEX) tablet Take 1 tablet by mouth daily.       calcium-vitamin D (CALTRATE) 600-400 MG-UNIT per tablet Take 1 tablet by mouth 2 times daily       carvedilol (COREG) 6.25 MG tablet Take 1 tablet (6.25 mg) by mouth 2 times daily (with meals) 180 tablet 2 "     chlorthalidone (HYGROTON) 25 MG tablet TAKE 1 TABLET(25 MG) BY MOUTH DAILY 90 tablet 2     cholecalciferol (VITAMIN D) 400 UNIT TABS Take 400 Units by mouth daily       clobetasol (CLOBETASOL PROPIONATE EMULSION) 0.05 % CREA cream Apply topically daily (Patient taking differently: Apply topically as needed ) 70 g 0     ezetimibe (ZETIA) 10 MG tablet TAKE 1 TABLET(10 MG) BY MOUTH DAILY 90 tablet 3     fluocinonide (LIDEX) 0.05 % external solution Apply topically as needed        FLUoxetine (PROZAC) 10 MG capsule TAKE 1 CAPSULE(10 MG) BY MOUTH DAILY 90 capsule 0     losartan (COZAAR) 100 MG tablet TAKE 1 TABLET(100 MG) BY MOUTH DAILY WITH BREAKFAST 90 tablet 3     olopatadine HCl (PATADAY) 0.2 % SOLN Place 1 drop into the right eye daily as needed (runny eye)       omega 3 1000 MG CAPS Take 1 g by mouth daily 90 capsule      polyethylene glycol (MIRALAX/GLYCOLAX) powder Take 1 capful by mouth daily       potassium chloride SA (K-DUR/KLOR-CON M) 10 MEQ CR tablet TAKE 1 TABLET(10 MEQ) BY MOUTH TWICE DAILY 180 tablet 1         Reviewed and updated as needed this visit by clinical staff       Reviewed and updated as needed this visit by Provider         ROS:  CONSTITUTIONAL:NEGATIVE for fever, chills, change in weight  RESP:NEGATIVE for significant cough or SOB  CV: NEGATIVE for chest pain, palpitations or peripheral edema  MUSCULOSKELETAL: not much swelling.   PSYCHIATRIC: NEGATIVE for changes in mood or affect    Notes bp is low today and she does not feel well. Just does not like to do much when her bp is in this range.   Does not happen very often.    Did have leg cramps last night; not frequently.   Did take otc pills for leg cramps.   Also had some nocturia last night. Note she drank a lot of fluids. No dysuria. No frequency today.    Was eating a half banana; but does not like to. (will stop her up)    OBJECTIVE:     /52 (BP Location: Right arm, Cuff Size: Adult Regular)  Pulse 85  Temp 97.7  F (36.5  " C) (Oral)  Resp 16  Ht 5' 3\" (1.6 m)  Wt 137 lb 12.8 oz (62.5 kg)  LMP  (LMP Unknown)  SpO2 99%  BMI 24.41 kg/m2  Body mass index is 24.41 kg/(m^2).  GENERAL APPEARANCE: alert and no distress  CV: regular rates and rhythm  MS: no edema.   PSYCH: mentation appears normal and affect normal/bright    Reviewed sodiums historically.    ASSESSMENT/PLAN:       Essential hypertension with goal blood pressure less than 140/90  Overall has been doing well.   No changes made. She notes today is too low. Does look OK, but she does not feel well.  Since this is rare, recommend no change in her usual medication regimen.   - Basic metabolic panel  - amLODIPine (NORVASC) 2.5 MG tablet; Take 1 tablet (2.5 mg) by mouth daily  - chlorthalidone (HYGROTON) 25 MG tablet; TAKE 1 TABLET(25 MG) BY MOUTH DAILY  - losartan (COZAAR) 100 MG tablet; TAKE 1 TABLET(100 MG) BY MOUTH DAILY WITH BREAKFAST    Hyponatremia  Discussed this and what can happen. Might be the chlorthalidone contributing. Fluid intake can contribute as well.  She notes if she goes off chlorthalidone that her legs really swell. Will continue to follow.   - Basic metabolic panel    Leg cramps  She notes not real common. Does not feel it is the otc med she took last night that is making her feel sluggish today.   - Basic metabolic panel  - Magnesium        Violet Fenton MD, MD  [facility] [place]  "

## 2018-12-06 NOTE — MR AVS SNAPSHOT
After Visit Summary   12/6/2018    Tomasa Fam    MRN: [id number]           Patient Information     Date Of Birth          1940        Visit Information        Provider Department      12/6/2018 1:50 PM Violet Fenton MD [facility]        Today's Diagnoses     Hyponatremia    -  1    Essential hypertension with goal blood pressure less than 140/90        Leg cramps           Follow-ups after your visit        Follow-up notes from your care team     Return in about 6 months (around 6/6/2019).      Who to contact     If you have questions or need follow up information about today's clinic visit or your schedule please contact [facility] directly at [phone].  Normal or non-critical lab and imaging results will be communicated to you by MyChart, letter or phone within 4 business days after the clinic has received the results. If you do not hear from us within 7 days, please contact the clinic through [employer] or phone. If you have a critical or abnormal lab result, we will notify you by phone as soon as possible.  Submit refill requests through [employer] or call your pharmacy and they will forward the refill request to us. Please allow 3 business days for your refill to be completed.          Additional Information About Your Visit        MyChart Information     [employer] gives you secure access to your electronic health record. If you see a primary care provider, you can also send messages to your care team and make appointments. If you have questions, please call your primary care clinic.  If you do not have a primary care provider, please call [phone] and they will assist you.        Care EveryWhere ID     This is your Care EveryWhere ID. This could be used by other organizations to access your [place] medical records  [id number]        Your Vitals Were     Pulse Temperature Respirations Height Last Period Pulse Oximetry    85 97.7  F (36.5  C)  "(Oral) 16 5' 3\" (1.6 m) (LMP Unknown) 99%    BMI (Body Mass Index)                   24.41 kg/m2            Blood Pressure from Last 3 Encounters:   12/06/18 112/52   11/06/18 164/76   10/19/18 142/70    Weight from Last 3 Encounters:   12/06/18 137 lb 12.8 oz (62.5 kg)   11/06/18 136 lb (61.7 kg)   10/19/18 133 lb 12.8 oz (60.7 kg)              We Performed the Following     Basic metabolic panel     Magnesium          Today's Medication Changes          These changes are accurate as of 12/6/18  2:25 PM.  If you have any questions, ask your nurse or doctor.               These medicines have changed or have updated prescriptions.        Dose/Directions    clobetasol 0.05 % Crea cream   Commonly known as:  CLOBETASOL PROPIONATE EMULSION   This may have changed:    - when to take this  - reasons to take this   Used for:  Lichen sclerosus        Apply topically daily   Quantity:  70 g   Refills:  0            Where to get your medicines      These medications were sent to [id number] - [facility] [address]  [address], [facility] [address]     Phone:  [phone]     amLODIPine 2.5 MG tablet    chlorthalidone 25 MG tablet    losartan 100 MG tablet                Primary Care Provider Office Phone # Fax #    Violet Fenton [phone]       [address] [facility] [address]        Equal Access to Services     [facility]: Hadii aad ku hadasho Soomaali, waaxda luqadaha, qaybta kaalmada adeegyada, waxay luciano haydilian falguni puri . So [facility] [phone].    ATENCIÓN: Si habla español, tiene a srivastava disposición servicios gratuitos de asistencia lingüística. Llame al [phone].    We comply with applicable federal civil rights laws and [place] laws. We do not discriminate on the basis of race, color, national origin, age, disability, sex, sexual orientation, or gender identity.            Thank you!     Thank you " [facility]  [facility]. Our goal is always to provide you with excellent care. Hearing back from our patients is one way we can continue to improve our services. Please take a few minutes to complete the written survey that you may receive in the mail after your visit with us. Thank you!             Your Updated Medication List - Protect others around you: Learn how to safely use, store and throw away your medicines at www.disposemymeds.org.          This list is accurate as of 12/6/18  2:25 PM.  Always use your most recent med list.                   Brand Name Dispense Instructions for use Diagnosis    amLODIPine 2.5 MG tablet    NORVASC    90 tablet    Take 1 tablet (2.5 mg) by mouth daily    Essential hypertension with goal blood pressure less than 140/90       aspirin 81 MG tablet    ASA    90 tablet    Take 1 tablet (81 mg) by mouth daily        augmented betamethasone dipropionate 0.05 % external cream    DIPROLENE-AF    50 g    APPLY EXTERNALLY TO THE AFFECTED AREA TWICE DAILY    Lichen sclerosus et atrophicus       calcium carbonate 600 mg-vitamin D 400 units 600-400 MG-UNIT per tablet    CALTRATE     Take 1 tablet by mouth 2 times daily        carvedilol 6.25 MG tablet    COREG    180 tablet    Take 1 tablet (6.25 mg) by mouth 2 times daily (with meals)    Essential hypertension with goal blood pressure less than 140/90       chlorthalidone 25 MG tablet    HYGROTON    90 tablet    TAKE 1 TABLET(25 MG) BY MOUTH DAILY    Essential hypertension with goal blood pressure less than 140/90       cholecalciferol 400 unit (10 mcg) Tabs tablet    VITAMIN D3     Take 400 Units by mouth daily        clobetasol 0.05 % Crea cream    CLOBETASOL PROPIONATE EMULSION    70 g    Apply topically daily    Lichen sclerosus       ezetimibe 10 MG tablet    ZETIA    90 tablet    TAKE 1 TABLET(10 MG) BY MOUTH DAILY    Hyperlipidemia LDL goal <160       fluocinonide 0.05 % external solution    LIDEX      Apply topically as needed        FLUoxetine 10 MG capsule    PROzac    90 capsule    TAKE 1 CAPSULE(10 MG) BY MOUTH DAILY    Decreased energy, Stress       losartan 100 MG tablet    COZAAR    90 tablet    TAKE 1 TABLET(100 MG) BY MOUTH DAILY WITH BREAKFAST    Essential hypertension with goal blood pressure less than 140/90       olopatadine 0.2 % ophthalmic solution    PATADAY     Place 1 drop into the right eye daily as needed (runny eye)        omega 3 1000 MG Caps     90 capsule    Take 1 g by mouth daily        polyethylene glycol powder    MIRALAX/GLYCOLAX     Take 1 capful by mouth daily        potassium chloride ER 10 MEQ CR tablet    K-DUR/KLOR-CON M    180 tablet    TAKE 1 TABLET(10 MEQ) BY MOUTH TWICE DAILY    History of hypokalemia       vitamin B complex with vitamin C tablet    STRESS TAB     Take 1 tablet by mouth daily.

## 2018-12-07 LAB
ANION GAP SERPL CALCULATED.3IONS-SCNC: 10 MMOL/L (ref 3–14)
BUN SERPL-MCNC: 26 MG/DL (ref 7–30)
CALCIUM SERPL-MCNC: 9.6 MG/DL (ref 8.5–10.1)
CHLORIDE SERPL-SCNC: 99 MMOL/L (ref 94–109)
CO2 SERPL-SCNC: 26 MMOL/L (ref 20–32)
CREAT SERPL-MCNC: 1.06 MG/DL (ref 0.52–1.04)
GFR SERPL CREATININE-BSD FRML MDRD: 50 ML/MIN/1.7M2
GLUCOSE SERPL-MCNC: 126 MG/DL (ref 70–99)
MAGNESIUM SERPL-MCNC: 1.4 MG/DL (ref 1.6–2.3)
POTASSIUM SERPL-SCNC: 3.4 MMOL/L (ref 3.4–5.3)
SODIUM SERPL-SCNC: 135 MMOL/L (ref 133–144)

## 2018-12-17 NOTE — TELEPHONE ENCOUNTER
"Requested Prescriptions   Pending Prescriptions Disp Refills     FLUoxetine (PROZAC) 10 MG capsule [Pharmacy Med Name: FLUOXETINE 10MG CAPSULES]  Last Written Prescription Date:  7/27/17  Last Fill Quantity: 90,  # refills: 1   Last Office Visit with [facility], P or [facility] prescribing provider:  6/23/2017     Future Office Visit:      90 capsule 0     Sig: TAKE 1 CAPSULE(10 MG) BY MOUTH DAILY    SSRIs Protocol Passed    1/19/2018 11:42 AM  PHQ-9 SCORE 8/3/2016 1/20/2017   Total Score 3 3   Some recent data might be hidden     RYANNE-7 SCORE 8/3/2016 1/20/2017   Total Score 2 1   Some recent data might be hidden              Passed - Recent or future visit with authorizing provider    Patient had office visit in the last year or has a visit in the next 30 days with authorizing provider.  See \"Patient Info\" tab in inbasket, or \"Choose Columns\" in Meds & Orders section of the refill encounter.            Passed - Patient is age 18 or older       Passed - No active pregnancy on record       Passed - No positive pregnancy test in last 12 months          " Pt wanted to reschedule to Wednesday or Thursday morning. Informed pt that Dr. Dupont is not in clinic on Wednesday and start at 1pm on Thursday. Pt will keep original scheduled appointment

## 2019-01-17 DIAGNOSIS — F43.9 STRESS: ICD-10-CM

## 2019-01-17 DIAGNOSIS — R53.83 DECREASED ENERGY: ICD-10-CM

## 2019-01-18 RX ORDER — FLUOXETINE 10 MG/1
CAPSULE ORAL
Qty: 90 CAPSULE | Refills: 0 | Status: SHIPPED | OUTPATIENT
Start: 2019-01-18 | End: 2019-04-18

## 2019-01-31 DIAGNOSIS — Z86.39 HISTORY OF HYPOKALEMIA: ICD-10-CM

## 2019-01-31 NOTE — TELEPHONE ENCOUNTER
"Requested Prescriptions   Pending Prescriptions Disp Refills     potassium chloride ER (K-DUR/KLOR-CON M) 10 MEQ CR tablet [Pharmacy Med Name: POTASSIUM CL MICRO 10MEQ ER TABS]  Last Written Prescription Date:  7/27/18  Last Fill Quantity: 180,  # refills: 1   Last office visit: 12/6/2018 with prescribing provider:  Violet Fenton MD   Future Office Visit:     180 tablet 0     Sig: TAKE 1 TABLET(10 MEQ) BY MOUTH TWICE DAILY    Potassium Supplements Protocol Passed - 1/31/2019 11:47 AM       Passed - Recent (12 mo) or future (30 days) visit within the authorizing provider's specialty    Patient had office visit in the last 12 months or has a visit in the next 30 days with authorizing provider or within the authorizing provider's specialty.  See \"Patient Info\" tab in inbasket, or \"Choose Columns\" in Meds & Orders section of the refill encounter.             Passed - Medication is active on med list       Passed - Patient is age 18 or older       Passed - Normal serum potassium in past 12 months    Recent Labs   Lab Test 12/06/18  1429   POTASSIUM 3.4                      "

## 2019-02-01 RX ORDER — POTASSIUM CHLORIDE 750 MG/1
TABLET, EXTENDED RELEASE ORAL
Qty: 180 TABLET | Refills: 0 | Status: SHIPPED | OUTPATIENT
Start: 2019-02-01 | End: 2019-04-25

## 2019-02-01 NOTE — TELEPHONE ENCOUNTER
Prescription approved per [facility] Refill Protocol.    Ute Morales, RN  Triage Nurse

## 2019-02-07 ENCOUNTER — TRANSFERRED RECORDS (OUTPATIENT)
Dept: HEALTH INFORMATION MANAGEMENT | Facility: CLINIC | Age: 79
End: 2019-02-07

## 2019-02-11 ENCOUNTER — TRANSFERRED RECORDS (OUTPATIENT)
Dept: HEALTH INFORMATION MANAGEMENT | Facility: CLINIC | Age: 79
End: 2019-02-11

## 2019-02-28 DIAGNOSIS — I10 ESSENTIAL HYPERTENSION WITH GOAL BLOOD PRESSURE LESS THAN 140/90: ICD-10-CM

## 2019-02-28 NOTE — TELEPHONE ENCOUNTER
"Requested Prescriptions   Pending Prescriptions Disp Refills     carvedilol (COREG) 6.25 MG tablet [Pharmacy Med Name: CARVEDILOL 6.25MG TABLETS]  Last Written Prescription Date:  10/9/18  Last Fill Quantity: 180,  # refills: 2   Last office visit: 12/6/2018 with prescribing provider:  Violet Fenton MD    Future Office Visit:     180 tablet 0     Sig: TAKE 1 TABLET(6.25 MG) BY MOUTH TWICE DAILY WITH MEALS    Beta-Blockers Protocol Passed - 2/28/2019 10:54 AM       Passed - Blood pressure under 140/90 in past 12 months    BP Readings from Last 3 Encounters:   12/06/18 112/52   11/06/18 164/76   10/19/18 142/70                Passed - Patient is age 6 or older       Passed - Recent (12 mo) or future (30 days) visit within the authorizing provider's specialty    Patient had office visit in the last 12 months or has a visit in the next 30 days with authorizing provider or within the authorizing provider's specialty.  See \"Patient Info\" tab in inbasket, or \"Choose Columns\" in Meds & Orders section of the refill encounter.             Passed - Medication is active on med list          "

## 2019-03-04 RX ORDER — CARVEDILOL 6.25 MG/1
TABLET ORAL
Qty: 180 TABLET | Refills: 0 | Status: SHIPPED | OUTPATIENT
Start: 2019-03-04 | End: 2019-05-23

## 2019-03-04 NOTE — TELEPHONE ENCOUNTER
Routing refill request to provider for review/approval because:  Medication is reported/historical  Alyse MCKENZIE RN

## 2019-03-15 ENCOUNTER — OFFICE VISIT (OUTPATIENT)
Dept: FAMILY MEDICINE | Facility: CLINIC | Age: 79
End: 2019-03-15
Payer: MEDICARE

## 2019-03-15 DIAGNOSIS — K08.89 PAIN IN A TOOTH OR TEETH: ICD-10-CM

## 2019-03-15 DIAGNOSIS — N39.0 URINARY TRACT INFECTION WITHOUT HEMATURIA, SITE UNSPECIFIED: ICD-10-CM

## 2019-03-15 DIAGNOSIS — R35.0 URINE FREQUENCY: ICD-10-CM

## 2019-03-15 DIAGNOSIS — R82.90 NONSPECIFIC FINDING ON EXAMINATION OF URINE: ICD-10-CM

## 2019-03-15 DIAGNOSIS — R39.15 URINARY URGENCY: Primary | ICD-10-CM

## 2019-03-15 LAB
ALBUMIN UR-MCNC: NEGATIVE MG/DL
APPEARANCE UR: ABNORMAL
BACTERIA #/AREA URNS HPF: ABNORMAL /HPF
BILIRUB UR QL STRIP: NEGATIVE
COLOR UR AUTO: YELLOW
GLUCOSE UR STRIP-MCNC: NEGATIVE MG/DL
HGB UR QL STRIP: NEGATIVE
KETONES UR STRIP-MCNC: NEGATIVE MG/DL
LEUKOCYTE ESTERASE UR QL STRIP: ABNORMAL
NITRATE UR QL: NEGATIVE
PH UR STRIP: 6.5 PH (ref 5–7)
RBC #/AREA URNS AUTO: ABNORMAL /HPF
SOURCE: ABNORMAL
SP GR UR STRIP: 1.02 (ref 1–1.03)
UROBILINOGEN UR STRIP-ACNC: 0.2 EU/DL (ref 0.2–1)
WBC #/AREA URNS AUTO: >100 /HPF

## 2019-03-15 PROCEDURE — 99214 OFFICE O/P EST MOD 30 MIN: CPT | Performed by: FAMILY MEDICINE

## 2019-03-15 PROCEDURE — 81001 URINALYSIS AUTO W/SCOPE: CPT | Performed by: FAMILY MEDICINE

## 2019-03-15 PROCEDURE — 87086 URINE CULTURE/COLONY COUNT: CPT | Performed by: FAMILY MEDICINE

## 2019-03-15 RX ORDER — SULFAMETHOXAZOLE/TRIMETHOPRIM 800-160 MG
1 TABLET ORAL 2 TIMES DAILY
Qty: 10 TABLET | Refills: 0 | Status: SHIPPED | OUTPATIENT
Start: 2019-03-15 | End: 2019-06-20

## 2019-03-15 ASSESSMENT — ANXIETY QUESTIONNAIRES
3. WORRYING TOO MUCH ABOUT DIFFERENT THINGS: NOT AT ALL
6. BECOMING EASILY ANNOYED OR IRRITABLE: NOT AT ALL
7. FEELING AFRAID AS IF SOMETHING AWFUL MIGHT HAPPEN: NOT AT ALL
1. FEELING NERVOUS, ANXIOUS, OR ON EDGE: NOT AT ALL
5. BEING SO RESTLESS THAT IT IS HARD TO SIT STILL: NOT AT ALL
IF YOU CHECKED OFF ANY PROBLEMS ON THIS QUESTIONNAIRE, HOW DIFFICULT HAVE THESE PROBLEMS MADE IT FOR YOU TO DO YOUR WORK, TAKE CARE OF THINGS AT HOME, OR GET ALONG WITH OTHER PEOPLE: NOT DIFFICULT AT ALL
2. NOT BEING ABLE TO STOP OR CONTROL WORRYING: NOT AT ALL
GAD7 TOTAL SCORE: 0

## 2019-03-15 ASSESSMENT — PATIENT HEALTH QUESTIONNAIRE - PHQ9
SUM OF ALL RESPONSES TO PHQ QUESTIONS 1-9: 1
5. POOR APPETITE OR OVEREATING: NOT AT ALL

## 2019-03-15 NOTE — PROGRESS NOTES
"  SUBJECTIVE:   Tomasa Fam is a 78 year old female who presents to clinic today for the following health issues:      URINARY TRACT SYMPTOMS      Duration: on for last few years    Description  frequency and urgency, strong odor     Intensity:  Mild \"weird\"     Accompanying signs and symptoms:  Fever/chills: no   Flank pain YES- left   Nausea and vomiting: no   Vaginal symptoms: none  Abdominal/Pelvic Pain: no     History  History of frequent UTI's: YES  History of kidney stones: no   Sexually Active: no   Possibility of pregnancy: No    Precipitating or alleviating factors: None    Therapies tried and outcome: none         Here to discuss tingling and prickling sensations on the arms x 2 days.  Dentist suggested to see PCP regarding possible clenching of the teeth. Wants to rule out heart issues before getting a mouth guard for the jaw pain.    Problem list and histories reviewed & adjusted, as indicated.  Additional history:     See under ROS     Patient Active Problem List   Diagnosis     Chronic airway obstruction (H)     ASCUS on Pap smear     Hyperlipidemia LDL goal <160     Breast cancer (H)     Essential hypertension with goal blood pressure less than 140/90     Cystocele, midline     Uterovaginal prolapse     S/P hysterectomy     Advanced directives, counseling/discussion     History of hypokalemia     Concussion     Malignant neoplasm of upper lobe of right lung (H)     Thyroid nodule     Chronic pain of both knees       Current Outpatient Medications   Medication Sig Dispense Refill     amLODIPine (NORVASC) 2.5 MG tablet Take 1 tablet (2.5 mg) by mouth daily 90 tablet 1     aspirin 81 MG tablet Take 1 tablet (81 mg) by mouth daily 90 tablet 3     augmented betamethasone dipropionate (DIPROLENE-AF) 0.05 % cream APPLY EXTERNALLY TO THE AFFECTED AREA TWICE DAILY 50 g 0     B Complex Vitamins (VITAMIN  B COMPLEX) tablet Take 1 tablet by mouth daily.       calcium-vitamin D (CALTRATE) 600-400 MG-UNIT " per tablet Take 1 tablet by mouth 2 times daily       carvedilol (COREG) 6.25 MG tablet TAKE 1 TABLET(6.25 MG) BY MOUTH TWICE DAILY WITH MEALS 180 tablet 0     carvedilol (COREG) 6.25 MG tablet Take 1 tablet (6.25 mg) by mouth 2 times daily (with meals) 180 tablet 2     chlorthalidone (HYGROTON) 25 MG tablet TAKE 1 TABLET(25 MG) BY MOUTH DAILY 90 tablet 1     cholecalciferol (VITAMIN D) 400 UNIT TABS Take 400 Units by mouth daily       clobetasol (CLOBETASOL PROPIONATE EMULSION) 0.05 % CREA cream Apply topically daily (Patient taking differently: Apply topically as needed ) 70 g 0     ezetimibe (ZETIA) 10 MG tablet TAKE 1 TABLET(10 MG) BY MOUTH DAILY 90 tablet 3     fluocinonide (LIDEX) 0.05 % external solution Apply topically as needed        FLUoxetine (PROZAC) 10 MG capsule TAKE 1 CAPSULE(10 MG) BY MOUTH DAILY 90 capsule 0     losartan (COZAAR) 100 MG tablet TAKE 1 TABLET(100 MG) BY MOUTH DAILY WITH BREAKFAST 90 tablet 1     olopatadine HCl (PATADAY) 0.2 % SOLN Place 1 drop into the right eye daily as needed (runny eye)       omega 3 1000 MG CAPS Take 1 g by mouth daily 90 capsule      polyethylene glycol (MIRALAX/GLYCOLAX) powder Take 1 capful by mouth daily       potassium chloride ER (K-DUR/KLOR-CON M) 10 MEQ CR tablet TAKE 1 TABLET(10 MEQ) BY MOUTH TWICE DAILY 180 tablet 0       Reviewed and updated as needed this visit by clinical staff       Reviewed and updated as needed this visit by Provider         ROS:  CONSTITUTIONAL:NEGATIVE for fever, chills, change in weight  HEENT: see below  RESP:NEGATIVE for significant cough or SOB  CV: NEGATIVE for chest pain, palpitations or peripheral edema  GI: NEGATIVE for nausea, abdominal pain, heartburn, or change in bowel habits  : see below  PSYCHIATRIC: NEGATIVE for changes in mood or affect    If pees a little bit, gets pin pricks in arms; then gone.  Occasional frequency and urgency; intermittent.    Dentist wants us to check.  All her teeth hurt.  Random.  Frequent. Can be watching TV and will start.   Once did last long enough that she checked for asprin. Usually will go away on its own.  15-30 minutes.   None of this pain with exertion.  Always short of breath; not new.  Can vary; some days can do a lot of stairs.  Hesitates to chew gum; sometimes wonders if feels it with a piece of candy.     ? Heart. Before treating for clenching.    OBJECTIVE:     BP (P) 118/52 (BP Location: Right arm, Cuff Size: Adult Regular)   Pulse (P) 82   Temp (P) 97.9  F (36.6  C) (Oral)   Resp (P) 16   Wt (P) 61.2 kg (134 lb 14.4 oz)   LMP  (LMP Unknown)   SpO2 (P) 97%   BMI (P) 23.90 kg/m    Body mass index is 23.9 kg/m  (pended).  GENERAL APPEARANCE: alert and no distress  HEENT: Unremarkable.  NECK: no adenopathy  RESP: lungs clear to auscultation - no rales, rhonchi or wheezes  CV: regular rates and rhythm  ABDOMEN: soft, nontender, without hepatosplenomegaly or masses and bowel sounds normal  PSYCH: mentation appears normal and affect normal/bright    Diagnostic Test Results:  Results for orders placed or performed in visit on 03/15/19   *UA reflex to Microscopic and Culture ([place] [facility] (except [place] and [place])   Result Value Ref Range    Color Urine Yellow     Appearance Urine Slightly Cloudy     Glucose Urine Negative NEG^Negative mg/dL    Bilirubin Urine Negative NEG^Negative    Ketones Urine Negative NEG^Negative mg/dL    Specific Gravity Urine 1.020 1.003 - 1.035    Blood Urine Negative NEG^Negative    pH Urine 6.5 5.0 - 7.0 pH    Protein Albumin Urine Negative NEG^Negative mg/dL    Urobilinogen Urine 0.2 0.2 - 1.0 EU/dL    Nitrite Urine Negative NEG^Negative    Leukocyte Esterase Urine Moderate (A) NEG^Negative    Source Midstream Urine    Urine Microscopic   Result Value Ref Range    WBC Urine >100 (A) OTO5^0 - 5 /HPF    RBC Urine O - 2 OTO2^O - 2 /HPF    Bacteria Urine Few (A) NEG^Negative /HPF   Urine Culture Aerobic Bacterial                              ASSESSMENT/PLAN:       1. Urinary urgency    - *UA reflex to Microscopic and Culture ([place] and [facility] (except [place] and [place])  - Urine Microscopic  - Urine Culture Aerobic Bacterial    2. Urine frequency    - *UA reflex to Microscopic and Culture ([place] [facility] (except [place] and [place])  - Urine Culture Aerobic Bacterial    3. Nonspecific finding on examination of urine    - Urine Culture Aerobic Bacterial    4. Urinary tract infection without hematuria, site unspecified  There are a lot of wbc's. Will go ahead and treat. Running culture. Discussed potential side effects.  - sulfamethoxazole-trimethoprim (BACTRIM DS/SEPTRA DS) 800-160 MG tablet; Take 1 tablet by mouth 2 times daily  Dispense: 10 tablet; Refill: 0    5. Pain in a tooth or teeth  Very atypical for cardiac pain. Does sound more consistent with her teeth/moth.  Discussed to be seen for any discomfort with exertion, increased shortness of breath.    Violet Fenton MD, MD  [facility]

## 2019-03-16 LAB
BACTERIA SPEC CULT: NORMAL
SPECIMEN SOURCE: NORMAL

## 2019-03-16 ASSESSMENT — ANXIETY QUESTIONNAIRES: GAD7 TOTAL SCORE: 0

## 2019-04-18 DIAGNOSIS — F43.9 STRESS: ICD-10-CM

## 2019-04-18 DIAGNOSIS — R53.83 DECREASED ENERGY: ICD-10-CM

## 2019-04-18 RX ORDER — FLUOXETINE 10 MG/1
CAPSULE ORAL
Qty: 90 CAPSULE | Refills: 1 | Status: SHIPPED | OUTPATIENT
Start: 2019-04-18 | End: 2019-10-17

## 2019-04-18 NOTE — TELEPHONE ENCOUNTER
Prescription approved per [facility], [facility] or [facility] refill protocol.  Yue BABCOCK RN - Triage  [facility]

## 2019-04-18 NOTE — TELEPHONE ENCOUNTER
"Requested Prescriptions   Pending Prescriptions Disp Refills     FLUoxetine (PROZAC) 10 MG capsule [Pharmacy Med Name: FLUOXETINE 10MG CAPSULES]  Last Written Prescription Date:  1/18/19  Last Fill Quantity: 90,  # refills: 0    Last office visit: 3/15/2019 with prescribing provider:  Violet Fenton MD        Future Office Visit:     90 capsule 0     Sig: TAKE 1 CAPSULE(10 MG) BY MOUTH DAILY       SSRIs Protocol Passed - 4/18/2019  2:32 PM  PHQ-9 SCORE 1/20/2017 3/2/2018 3/15/2019   PHQ-9 Total Score 3 0 1     RYANNE-7 SCORE 1/20/2017 3/2/2018 3/15/2019   Total Score 1 0 0               Passed - Recent (12 mo) or future (30 days) visit within the authorizing provider's specialty     Patient had office visit in the last 12 months or has a visit in the next 30 days with authorizing provider or within the authorizing provider's specialty.  See \"Patient Info\" tab in inbasket, or \"Choose Columns\" in Meds & Orders section of the refill encounter.              Passed - Medication is active on med list        Passed - Patient is age 18 or older        Passed - No active pregnancy on record        Passed - No positive pregnancy test in last 12 months          "

## 2019-04-24 ENCOUNTER — TRANSFERRED RECORDS (OUTPATIENT)
Dept: HEALTH INFORMATION MANAGEMENT | Facility: CLINIC | Age: 79
End: 2019-04-24

## 2019-04-24 ENCOUNTER — TRANSFERRED RECORDS (OUTPATIENT)
Dept: SURGERY | Facility: CLINIC | Age: 79
End: 2019-04-24

## 2019-04-25 DIAGNOSIS — Z86.39 HISTORY OF HYPOKALEMIA: ICD-10-CM

## 2019-04-25 NOTE — TELEPHONE ENCOUNTER
"Requested Prescriptions   Pending Prescriptions Disp Refills     potassium chloride ER (K-DUR/KLOR-CON M) 10 MEQ CR tablet [Pharmacy Med Name: POTASSIUM CL MICRO 10MEQ ER TABS]  Last Written Prescription Date:  2/1/19  Last Fill Quantity: 180,  # refills: 0    Last office visit: 3/15/2019 with prescribing provider:  Violet Fenton MD        Future Office Visit:     180 tablet 0     Sig: TAKE 1 TABLET(10 MEQ) BY MOUTH TWICE DAILY       Potassium Supplements Protocol Passed - 4/25/2019 10:45 AM        Passed - Recent (12 mo) or future (30 days) visit within the authorizing provider's specialty     Patient had office visit in the last 12 months or has a visit in the next 30 days with authorizing provider or within the authorizing provider's specialty.  See \"Patient Info\" tab in inbasket, or \"Choose Columns\" in Meds & Orders section of the refill encounter.              Passed - Medication is active on med list        Passed - Patient is age 18 or older        Passed - Normal serum potassium in past 12 months     Recent Labs   Lab Test 12/06/18  1429   POTASSIUM 3.4                      "

## 2019-04-29 RX ORDER — POTASSIUM CHLORIDE 750 MG/1
TABLET, EXTENDED RELEASE ORAL
Qty: 180 TABLET | Refills: 1 | Status: SHIPPED | OUTPATIENT
Start: 2019-04-29 | End: 2019-09-30

## 2019-04-29 NOTE — TELEPHONE ENCOUNTER
Prescription approved per [facility], [facility] or [facility] refill protocol.  Yue BABCOCK RN - Triage  [facility]

## 2019-05-23 DIAGNOSIS — I10 ESSENTIAL HYPERTENSION WITH GOAL BLOOD PRESSURE LESS THAN 140/90: ICD-10-CM

## 2019-05-23 DIAGNOSIS — E78.5 HYPERLIPIDEMIA LDL GOAL <160: ICD-10-CM

## 2019-05-23 DIAGNOSIS — R94.4 DECREASED GFR: Primary | ICD-10-CM

## 2019-05-23 RX ORDER — EZETIMIBE 10 MG/1
TABLET ORAL
Qty: 90 TABLET | Refills: 0 | Status: SHIPPED | OUTPATIENT
Start: 2019-05-23 | End: 2019-08-22

## 2019-05-23 RX ORDER — CARVEDILOL 6.25 MG/1
TABLET ORAL
Qty: 180 TABLET | Refills: 0 | Status: SHIPPED | OUTPATIENT
Start: 2019-05-23 | End: 2019-08-22 | Stop reason: DRUGHIGH

## 2019-05-23 NOTE — TELEPHONE ENCOUNTER
"Requested Prescriptions   Pending Prescriptions Disp Refills     ezetimibe (ZETIA) 10 MG tablet [Pharmacy Med Name: EZETIMIBE 10MG TABLETS]  Last Written Prescription Date:  5/24/18  Last Fill Quantity: 90,  # refills: 3    Last office visit: 3/15/2019 with prescribing provider:  Violet Fenton MD        Future Office Visit:   Next 5 appointments (look out 90 days)    Jun 20, 2019  1:10 PM CDT  Office Visit with Violet Fenton MD  [address] [address] [phone]         90 tablet 0     Sig: TAKE 1 TABLET(10 MG) BY MOUTH DAILY       Antihyperlipidemic agents Failed - 5/23/2019 11:04 AM        Failed - Lipid panel on file in past 12 mos     Recent Labs   Lab Test 05/22/18  0759  06/10/15  0925   CHOL 197   < > 194   TRIG 113   < > 88   HDL 66   < > 70   *   < > 106   NHDL 131*   < >  --    VLDL  --   --  18   CHOLHDLRATIO  --   --  2.8    < > = values in this interval not displayed.               Failed - Normal serum ALT on record in past 12 mos     Recent Labs   Lab Test 05/22/18  0759   ALT 27             Passed - Recent (12 mo) or future (30 days) visit within the authorizing provider's specialty     Patient had office visit in the last 12 months or has a visit in the next 30 days with authorizing provider or within the authorizing provider's specialty.  See \"Patient Info\" tab in inbasket, or \"Choose Columns\" in Meds & Orders section of the refill encounter.              Passed - Medication is active on med list        Passed - Patient is age 18 years or older        Passed - No active pregnancy on record        Passed - No positive pregnancy test in past 12 mos   Routing refill request to provider for review/approval because:  Labs not current:  LDL           carvedilol (COREG) 6.25 MG tablet [Pharmacy Med Name: CARVEDILOL 6.25MG TABLETS]  Last Written Prescription Date:  3/4/19  Last Fill Quantity: 180,  # refills: 0 " "   Last office visit: 3/15/2019 with prescribing provider:  Violet Fenton MD        Future Office Visit:   Next 5 appointments (look out 90 days)    Jun 20, 2019  1:10 PM CDT  [facility] [address] [facility] [address] [phone]          180 tablet 0     Sig: TAKE 1 TABLET(6.25 MG) BY MOUTH TWICE DAILY WITH MEALS       Beta-Blockers Protocol Passed - 5/23/2019 11:04 AM        Passed - Blood pressure under 140/90 in past 12 months     BP Readings from Last 3 Encounters:   03/15/19 (P) 118/52   12/06/18 112/52   11/06/18 164/76           Passed - Patient is age 6 or older        Passed - Recent (12 mo) or future (30 days) visit within the authorizing provider's specialty     Patient had office visit in the last 12 months or has a visit in the next 30 days with authorizing provider or within the authorizing provider's specialty.  See \"Patient Info\" tab in inbasket, or \"Choose Columns\" in Meds & Orders section of the refill encounter.              Passed - Medication is active on med list      Prescription approved per [facility] Refill Protocol.  Marianne Chakraborty RN    "

## 2019-05-23 NOTE — TELEPHONE ENCOUNTER
Has appointment 6/20    GFR Estimate   Date Value Ref Range Status   12/06/2018 50 (L) >60 mL/min/1.7m2 Final     Comment:     Non  GFR Calc   10/01/2018 82 >60 mL/min/1.7m2 Final     Comment:     Non  GFR Calc   09/28/2018 79 >60 mL/min/1.7m2 Final     Comment:     Non  GFR Calc

## 2019-06-20 ENCOUNTER — OFFICE VISIT (OUTPATIENT)
Dept: FAMILY MEDICINE | Facility: CLINIC | Age: 79
End: 2019-06-20
Payer: MEDICARE

## 2019-06-20 VITALS
WEIGHT: 134.6 LBS | TEMPERATURE: 98 F | OXYGEN SATURATION: 98 % | HEIGHT: 63 IN | BODY MASS INDEX: 23.85 KG/M2 | RESPIRATION RATE: 16 BRPM | SYSTOLIC BLOOD PRESSURE: 124 MMHG | DIASTOLIC BLOOD PRESSURE: 62 MMHG | HEART RATE: 86 BPM

## 2019-06-20 DIAGNOSIS — J44.9 CHRONIC OBSTRUCTIVE PULMONARY DISEASE, UNSPECIFIED COPD TYPE (H): ICD-10-CM

## 2019-06-20 DIAGNOSIS — Z85.118 HISTORY OF LUNG CANCER: ICD-10-CM

## 2019-06-20 DIAGNOSIS — R06.09 DOE (DYSPNEA ON EXERTION): Primary | ICD-10-CM

## 2019-06-20 LAB
ERYTHROCYTE [DISTWIDTH] IN BLOOD BY AUTOMATED COUNT: 11.6 % (ref 10–15)
FEF 25/75: 0.56
FEV-1: 1.02
FEV1/FVC: NORMAL
FVC: 1.71
HCT VFR BLD AUTO: 34.4 % (ref 35–47)
HGB BLD-MCNC: 11.7 G/DL (ref 11.7–15.7)
MCH RBC QN AUTO: 28.9 PG (ref 26.5–33)
MCHC RBC AUTO-ENTMCNC: 34 G/DL (ref 31.5–36.5)
MCV RBC AUTO: 85 FL (ref 78–100)
PLATELET # BLD AUTO: 254 10E9/L (ref 150–450)
RBC # BLD AUTO: 4.05 10E12/L (ref 3.8–5.2)
WBC # BLD AUTO: 7.3 10E9/L (ref 4–11)

## 2019-06-20 PROCEDURE — 94010 BREATHING CAPACITY TEST: CPT | Performed by: FAMILY MEDICINE

## 2019-06-20 PROCEDURE — 85027 COMPLETE CBC AUTOMATED: CPT | Performed by: FAMILY MEDICINE

## 2019-06-20 PROCEDURE — 36415 COLL VENOUS BLD VENIPUNCTURE: CPT | Performed by: FAMILY MEDICINE

## 2019-06-20 PROCEDURE — 99214 OFFICE O/P EST MOD 30 MIN: CPT | Mod: 25 | Performed by: FAMILY MEDICINE

## 2019-06-20 PROCEDURE — 93000 ELECTROCARDIOGRAM COMPLETE: CPT | Performed by: FAMILY MEDICINE

## 2019-06-20 RX ORDER — LEVALBUTEROL TARTRATE 45 UG/1
2 AEROSOL, METERED ORAL EVERY 4 HOURS PRN
Qty: 1 INHALER | Refills: 0 | Status: SHIPPED | OUTPATIENT
Start: 2019-06-20 | End: 2019-09-13

## 2019-06-20 ASSESSMENT — MIFFLIN-ST. JEOR: SCORE: 1054.67

## 2019-06-20 NOTE — PROGRESS NOTES
Subjective     Tomasa Fam is a 78 year old female who presents to clinic today for the following health issues:    HPI   Here to discuss SOB.  Has been getting worse within the last year.  Notices going up and down stairs and while walking up hills.  The cough has improved.           See under ROS     Patient Active Problem List   Diagnosis     Chronic airway obstruction (H)     ASCUS on Pap smear     Hyperlipidemia LDL goal <160     Breast cancer (H)     Essential hypertension with goal blood pressure less than 140/90     Cystocele, midline     Uterovaginal prolapse     S/P hysterectomy     Advanced directives, counseling/discussion     History of hypokalemia     Concussion     Malignant neoplasm of upper lobe of right lung (H)     Thyroid nodule     Chronic pain of both knees       Current Outpatient Medications   Medication Sig Dispense Refill     amLODIPine (NORVASC) 2.5 MG tablet Take 1 tablet (2.5 mg) by mouth daily 90 tablet 1     aspirin 81 MG tablet Take 1 tablet (81 mg) by mouth daily 90 tablet 3     augmented betamethasone dipropionate (DIPROLENE-AF) 0.05 % cream APPLY EXTERNALLY TO THE AFFECTED AREA TWICE DAILY 50 g 0     B Complex Vitamins (VITAMIN  B COMPLEX) tablet Take 1 tablet by mouth daily.       calcium-vitamin D (CALTRATE) 600-400 MG-UNIT per tablet Take 1 tablet by mouth 2 times daily       carvedilol (COREG) 6.25 MG tablet TAKE 1 TABLET(6.25 MG) BY MOUTH TWICE DAILY WITH MEALS 180 tablet 0     chlorthalidone (HYGROTON) 25 MG tablet TAKE 1 TABLET(25 MG) BY MOUTH DAILY 90 tablet 1     cholecalciferol (VITAMIN D) 400 UNIT TABS Take 400 Units by mouth daily       clobetasol (CLOBETASOL PROPIONATE EMULSION) 0.05 % CREA cream Apply topically daily (Patient taking differently: Apply topically as needed ) 70 g 0     ezetimibe (ZETIA) 10 MG tablet TAKE 1 TABLET(10 MG) BY MOUTH DAILY 90 tablet 0     fluocinonide (LIDEX) 0.05 % external solution Apply topically as needed        FLUoxetine  "(PROZAC) 10 MG capsule TAKE 1 CAPSULE(10 MG) BY MOUTH DAILY 90 capsule 1     losartan (COZAAR) 100 MG tablet TAKE 1 TABLET(100 MG) BY MOUTH DAILY WITH BREAKFAST 90 tablet 1     olopatadine HCl (PATADAY) 0.2 % SOLN Place 1 drop into the right eye daily as needed (runny eye)       omega 3 1000 MG CAPS Take 1 g by mouth daily 90 capsule      polyethylene glycol (MIRALAX/GLYCOLAX) powder Take 1 capful by mouth daily       potassium chloride ER (K-DUR/KLOR-CON M) 10 MEQ CR tablet TAKE 1 TABLET(10 MEQ) BY MOUTH TWICE DAILY 180 tablet 1       Social History     Tobacco Use     Smoking status: Never Smoker     Smokeless tobacco: Never Used   Substance Use Topics     Alcohol use: Yes     Alcohol/week: 0.0 oz     Comment: occasional; perhaps one per day       Reviewed and updated as needed this visit by Provider         Review of Systems   ROS COMP: CONSTITUTIONAL:NEGATIVE for fever, chills, change in weight  RESP:see below  CV: NEGATIVE for chest pain, palpitations or peripheral edema  PSYCHIATRIC: NEGATIVE for changes in mood or affect    Dyspnea on Exertion. Improves with rest.   Will cough with exposure to smoke.   No chest pain or pressure.     Gradually increased over the last year plus.       No blood loss.    Was reading in the newspaper about mesh.  Notes no pain.    Had a clear CT in April/May through Dr. Woodward.    She notes when she tried albuterol in the past, it caused her to cough a lot.      Objective    /62 (BP Location: Right arm, Cuff Size: Adult Regular)   Pulse 86   Temp 98  F (36.7  C) (Oral)   Resp 16   Ht 1.6 m (5' 3\")   Wt 61.1 kg (134 lb 9.6 oz)   LMP  (LMP Unknown)   SpO2 98%   BMI 23.84 kg/m    Body mass index is 23.84 kg/m .  Physical Exam   GENERAL APPEARANCE: alert and no distress  RESP: lungs clear to auscultation - no rales, rhonchi or wheezes; but breath sounds diminished  CV: regular rates and rhythm  MS: no edema.   PSYCH: mentation appears normal and affect " normal/bright    Hemoglobin   Date Value Ref Range Status   09/28/2018 13.2 11.7 - 15.7 g/dL Final   06/23/2017 12.3 11.7 - 15.7 g/dL Final           EKG probably normal; negative precordial T waves.    Component      Latest Ref Rng & Units 6/20/2019   WBC      4.0 - 11.0 10e9/L 7.3   RBC Count      3.8 - 5.2 10e12/L 4.05   Hemoglobin      11.7 - 15.7 g/dL 11.7   Hematocrit      35.0 - 47.0 % 34.4 (L)   MCV      78 - 100 fl 85   MCH      26.5 - 33.0 pg 28.9   MCHC      31.5 - 36.5 g/dL 34.0   RDW      10.0 - 15.0 % 11.6   Platelet Count      150 - 450 10e9/L 254           Assessment & Plan     1. PADRON (dyspnea on exertion)  Discussed etiologies can include cardiac, lung, systemic like anemia. At this time, I suspect most likely COPD  - CBC with platelets  - Spirometry, Breathing Capacity: Normal Order, Clinic Performed  - EKG 12-lead complete w/read - Clinics    2. Chronic obstructive pulmonary disease, unspecified COPD type (H)  She notes she had a cough with albuterol in the past. Will try xopenex. Discussed controller vs rescue.   Reassess in a few weeks, may wish to increase controller inhaler if getting some improvement  - umeclidinium (INCRUSE ELLIPTA) 62.5 MCG/INH inhaler; Inhale 1 puff into the lungs daily  Dispense: 1 Inhaler; Refill: 0  - levalbuterol (XOPENEX HFA) 45 MCG/ACT inhaler; Inhale 2 puffs into the lungs every 4 hours as needed for shortness of breath / dyspnea or wheezing  Dispense: 1 Inhaler; Refill: 0    3. History of lung cancer  Has had thoracotomy.  Of note, she has not been a smoker.  Reports a lot of passive exposure.         Return in about 6 weeks (around 8/1/2019), or if symptoms worsen or fail to improve, for Medication recheck.    Violet Fenton MD, MD  [facility]

## 2019-08-21 NOTE — PROGRESS NOTES
Subjective     Tomasa aFm is a 78 year old female who presents to clinic today for the following health issues:    HPI   COPD Follow-Up    Overall, how are your COPD symptoms since your last clinic visit?  Medium worse    How much fatigue or shortness of breath do you have when you are walking?  More than usual    How much shortness of breath do you have when you are resting?  None    How often do you cough? Often    Have you noticed any change in your sputum/phlegm?  No- not seeing the sputum/phlegm due to vomiting from the cough.     Have you experienced a recent fever? No    Please describe how far you can walk without stopping to rest:  Less than a mile    How many flights of stairs are you able to walk up without stopping?  1    Have you had any Emergency Room Visits, Urgent Care Visits, or Hospital Admissions because of your COPD since your last office visit?  No    History   Smoking Status     Never Smoker   Smokeless Tobacco     Never Used     Lab Results   Component Value Date    FEV1 1.02 06/20/2019    ILV7FIS 60% 06/20/2019           How many servings of fruits and vegetables do you eat daily?  1-3    On average, how many sweetened beverages do you drink each day (soda, juice, sweet tea, etc)?   0  How many days per week do you miss taking your medication? 1    What makes it hard for you to take your medications?  remembering to take            See under ROS    Patient Active Problem List   Diagnosis     Chronic airway obstruction (H)     ASCUS on Pap smear     Hyperlipidemia LDL goal <160     Breast cancer (H)     Essential hypertension with goal blood pressure less than 140/90     Cystocele, midline     Uterovaginal prolapse     S/P hysterectomy     Advanced directives, counseling/discussion     History of hypokalemia     Concussion     Malignant neoplasm of upper lobe of right lung (H)     Thyroid nodule     Chronic pain of both knees     Past Surgical History:   Procedure Laterality Date      C NONSPECIFIC PROCEDURE  1970    s/p Tubal ligation 1970     COLONOSCOPY  3/2003    adenomatous polyp      COLONOSCOPY  7/2006    diverticulosis - repeat in 5 years     COLONOSCOPY  10/13/2011    Procedure:COLONOSCOPY; COLONOSCOPY ; Surgeon:CHITO GORDILLO; Location:[facility]     CYSTOSCOPY  7/11/2012    Procedure: CYSTOSCOPY;;  Surgeon: Aline Cooper DO;  Location: [facility]     DAVINCI HYSTERECTOMY SUPRACERVICAL, SACROCOLPOPEXY, COMBINED  7/11/2012    Procedure: COMBINED DAVINCI HYSTERECTOMY SUPRACERVICAL, SACROCOLPOPEXY;   DAVINCI ASSISTED LAPAROSCOPIC SUPRACERVICAL HYSTERECTOMY AND BILATERAL SALPINGO-OOPHORECTOMY, SACROCOLPOPEXY AND CYSTOSCOPY;  Surgeon: Aline Cooper DO;  Location: [facility]     EXCISE LESION EYELID Right 6/29/2015    Procedure: EXCISE LESION EYELID;  Surgeon: Hank Olvera MD;  Location:  [facility]     INSERT PORT VASCULAR ACCESS  2/3/2012    Procedure:INSERT PORT VASCULAR ACCESS; Power Port-A- Catheter Placement ; Surgeon:IRISH TAPIA; Location:[facility]     LAPAROSCOPIC SALPINGO-OOPHORECTOMY  7/11/2012    Procedure: LAPAROSCOPIC SALPINGO-OOPHORECTOMY;  Davinci;  Surgeon: Aline Cooper DO;  Location: [facility]     LIPOSUCTION, RHYTIDECTOMY, COMBINED       LOBECTOMY LUNG Right 3/1/2016    Procedure: LOBECTOMY LUNG;  Surgeon: Jonas Woodward MD;  Location:  OR     MAMMOPLASTY REDUCTION  1/6/2012    Procedure:MAMMOPLASTY REDUCTION; Surgeon:MICKI CAICEDO; Location:[facility]     MASTECTOMY SIMPLE  11/12/2012    Procedure: MASTECTOMY SIMPLE;   Right Prophylactic Mastectomy with attempted Right Sentinal Node Biopsy, Revision Bilateral Mastectomy Insicions, liposuction in breast area;  Surgeon: Irish Tapia MD;  Location: [facility]     MASTECTOMY SIMPLE, SENTINEL NODE, COMBINED  1/6/2012    Procedure:COMBINED MASTECTOMY SIMPLE, SENTINEL NODE; Left Mastectomy Left [place] Node Biopsy,  Right Breast Reduction ; Surgeon:IRISH TAPIA; Location:[facility]     MASTECTOMY,  BILATERAL       REMOVE PORT VASCULAR ACCESS  4/29/2013    Procedure: REMOVE PORT VASCULAR ACCESS;  Port A catheter removal ;  Surgeon: Irish Douglas MD;  Location: [facility]     REPAIR PTOSIS BILATERAL Bilateral 6/29/2015    Procedure: REPAIR PTOSIS BILATERAL;  Surgeon: Hank Olvera MD;  [facility]:  [facility]     REVISE RECONSTRUCTED BREAST BILATERAL  11/12/2012    Procedure: REVISE RECONSTRUCTED BREAST BILATERAL;;  Surgeon: Katia Kyle MD;  Location: [facility]     SURGICAL HISTORY OF -   in 40's    face lift     SURGICAL HISTORY OF -       lipoma removed right thigh     SURGICAL HISTORY OF -       D and C     THORACOTOMY Right 3/1/2016    Procedure: THORACOTOMY;  Surgeon: Jonas Woodward MD;  Location:  OR         Current Outpatient Medications   Medication Sig Dispense Refill     amLODIPine (NORVASC) 2.5 MG tablet Take 1 tablet (2.5 mg) by mouth daily 90 tablet 1     aspirin 81 MG tablet Take 1 tablet (81 mg) by mouth daily 90 tablet 3     augmented betamethasone dipropionate (DIPROLENE-AF) 0.05 % cream APPLY EXTERNALLY TO THE AFFECTED AREA TWICE DAILY 50 g 0     B Complex Vitamins (VITAMIN  B COMPLEX) tablet Take 1 tablet by mouth daily.       calcium-vitamin D (CALTRATE) 600-400 MG-UNIT per tablet Take 1 tablet by mouth 2 times daily       carvedilol (COREG) 6.25 MG tablet TAKE 1 TABLET(6.25 MG) BY MOUTH TWICE DAILY WITH MEALS 180 tablet 0     chlorthalidone (HYGROTON) 25 MG tablet TAKE 1 TABLET(25 MG) BY MOUTH DAILY 90 tablet 1     cholecalciferol (VITAMIN D) 400 UNIT TABS Take 400 Units by mouth daily       clobetasol (CLOBETASOL PROPIONATE EMULSION) 0.05 % CREA cream Apply topically daily (Patient taking differently: Apply topically as needed ) 70 g 0     ezetimibe (ZETIA) 10 MG tablet TAKE 1 TABLET(10 MG) BY MOUTH DAILY 90 tablet 0     fluocinonide (LIDEX) 0.05 % external solution Apply topically as needed        FLUoxetine (PROZAC) 10 MG capsule TAKE 1 CAPSULE(10 MG) BY MOUTH DAILY 90  "capsule 1     levalbuterol (XOPENEX HFA) 45 MCG/ACT inhaler Inhale 2 puffs into the lungs every 4 hours as needed for shortness of breath / dyspnea or wheezing 1 Inhaler 0     losartan (COZAAR) 100 MG tablet TAKE 1 TABLET(100 MG) BY MOUTH DAILY WITH BREAKFAST 90 tablet 1     olopatadine HCl (PATADAY) 0.2 % SOLN Place 1 drop into the right eye daily as needed (runny eye)       omega 3 1000 MG CAPS Take 1 g by mouth daily 90 capsule      polyethylene glycol (MIRALAX/GLYCOLAX) powder Take 1 capful by mouth daily       potassium chloride ER (K-DUR/KLOR-CON M) 10 MEQ CR tablet TAKE 1 TABLET(10 MEQ) BY MOUTH TWICE DAILY 180 tablet 1     umeclidinium (INCRUSE ELLIPTA) 62.5 MCG/INH inhaler Inhale 1 puff into the lungs daily 1 Inhaler 0         Reviewed and updated as needed this visit by Provider         Review of Systems   ROS COMP: CONSTITUTIONAL:NEGATIVE for fever, chills, change in weight  RESP:see below  CV: NEGATIVE for chest pain, palpitations or peripheral edema  PSYCHIATRIC: NEGATIVE for changes in mood or affect      Notes the inhaler did not do anything. Never has it when she needs it, such as at the top of the stairs.  So not using any inhaler.  Just got the cheaper inhaler; did not get the Incruse.    Bp:   Notes dizzy with bp 128/70 was too low.  Takes medication intermittently. Will hold if bp is low to her; such as < 140/90  Had carvedilol this am.  Losartan does not give her as much of a jolt for bp lowering.  Carvedilol takes bp down faster   Will take the one at night if high.   Amlodipine at night.     Pain Left side abdomen.   Will get pain left triceps since breast surgery. Massage and will go away. Some on right side.    Daughter has breast cancer; being treated now.    Objective    /66 (BP Location: Right arm, Cuff Size: Adult Regular)   Pulse 88   Temp 98.2  F (36.8  C) (Oral)   Resp 16   Ht 1.6 m (5' 3\")   Wt 60.1 kg (132 lb 9.6 oz)   LMP  (LMP Unknown)   SpO2 98%   BMI 23.49 kg/m  " "  Body mass index is 23.49 kg/m .  Physical Exam   GENERAL APPEARANCE: alert and no distress  RESP: lungs clear to auscultation - no rales, rhonchi or wheezes  CV: regular rates and rhythm  MS: no edema  PSYCH: mentation appears normal and affect normal/bright                Recent Labs   Lab Test 05/22/18  0759 05/05/17  0907  06/10/15  0925 07/22/14  0827   CHOL 197 243*   < > 194 183   HDL 66 72   < > 70 55   * 152*   < > 106 105   TRIG 113 94   < > 88 114   CHOLHDLRATIO  --   --   --  2.8 3.3    < > = values in this interval not displayed.       Assessment & Plan     1. Chronic obstructive pulmonary disease, unspecified COPD type (H)  I believe there was some miscommunication.  Discussed rescue inhaler vs controller. She had not started on any controller. Will do now. Discussed there are additional, combination inhalers if this does not work.  She is interested in seeing Pulmonary as well.   - umeclidinium (INCRUSE ELLIPTA) 62.5 MCG/INH inhaler; Inhale 1 puff into the lungs daily  Dispense: 1 Inhaler; Refill: 0  - PULMONARY MEDICINE REFERRAL    2. Essential hypertension with goal blood pressure less than 140/90  Discussed.   Will try to keep near the upper end of this goal as she feels better there. Discussed being consistent with medication so we can adjust around this.   Since she notes a significant \"jolt\" with carvedilol (and this is also bid); will have her stop this, and work on consistency with other meds. Also see patient instructions.     3. Hyperlipidemia LDL goal <160  Refilling.   - ezetimibe (ZETIA) 10 MG tablet; Take 1 tablet (10 mg) by mouth daily  Dispense: 90 tablet; Refill: 0    4. History of lung cancer  Did have thoracotomy.            Patient Instructions   Stop the carvedilol.   Take the losartan in the am and the amlodipine at night.    I would like to see how your bp does in a couple months.         Return in about 2 months (around 10/22/2019).    Violet Fenton MD, MD [facility] last note, this note and spirometry.

## 2019-08-22 ENCOUNTER — OFFICE VISIT (OUTPATIENT)
Dept: FAMILY MEDICINE | Facility: CLINIC | Age: 79
End: 2019-08-22
Payer: MEDICARE

## 2019-08-22 VITALS
TEMPERATURE: 98.2 F | WEIGHT: 132.6 LBS | HEART RATE: 88 BPM | HEIGHT: 63 IN | RESPIRATION RATE: 16 BRPM | DIASTOLIC BLOOD PRESSURE: 66 MMHG | SYSTOLIC BLOOD PRESSURE: 138 MMHG | OXYGEN SATURATION: 98 % | BODY MASS INDEX: 23.5 KG/M2

## 2019-08-22 DIAGNOSIS — Z85.118 HISTORY OF LUNG CANCER: ICD-10-CM

## 2019-08-22 DIAGNOSIS — I10 ESSENTIAL HYPERTENSION WITH GOAL BLOOD PRESSURE LESS THAN 140/90: ICD-10-CM

## 2019-08-22 DIAGNOSIS — E78.5 HYPERLIPIDEMIA LDL GOAL <160: ICD-10-CM

## 2019-08-22 DIAGNOSIS — J44.9 CHRONIC OBSTRUCTIVE PULMONARY DISEASE, UNSPECIFIED COPD TYPE (H): Primary | ICD-10-CM

## 2019-08-22 PROCEDURE — 99214 OFFICE O/P EST MOD 30 MIN: CPT | Performed by: FAMILY MEDICINE

## 2019-08-22 RX ORDER — EZETIMIBE 10 MG/1
10 TABLET ORAL DAILY
Qty: 90 TABLET | Refills: 0 | Status: SHIPPED | OUTPATIENT
Start: 2019-08-22 | End: 2019-11-21

## 2019-08-22 ASSESSMENT — MIFFLIN-ST. JEOR: SCORE: 1045.6

## 2019-08-22 NOTE — PATIENT INSTRUCTIONS
Stop the carvedilol.   Take the losartan in the am and the amlodipine at night.    I would like to see how your bp does in a couple months.

## 2019-08-26 DIAGNOSIS — R94.4 DECREASED GFR: ICD-10-CM

## 2019-08-26 DIAGNOSIS — I10 ESSENTIAL HYPERTENSION WITH GOAL BLOOD PRESSURE LESS THAN 140/90: ICD-10-CM

## 2019-08-26 DIAGNOSIS — E78.5 HYPERLIPIDEMIA LDL GOAL <160: ICD-10-CM

## 2019-08-26 LAB
ALBUMIN SERPL-MCNC: 3.4 G/DL (ref 3.4–5)
ALP SERPL-CCNC: 68 U/L (ref 40–150)
ALT SERPL W P-5'-P-CCNC: 20 U/L (ref 0–50)
ANION GAP SERPL CALCULATED.3IONS-SCNC: 7 MMOL/L (ref 3–14)
AST SERPL W P-5'-P-CCNC: 13 U/L (ref 0–45)
BILIRUB SERPL-MCNC: 0.6 MG/DL (ref 0.2–1.3)
BUN SERPL-MCNC: 13 MG/DL (ref 7–30)
CALCIUM SERPL-MCNC: 9.8 MG/DL (ref 8.5–10.1)
CHLORIDE SERPL-SCNC: 98 MMOL/L (ref 94–109)
CHOLEST SERPL-MCNC: 187 MG/DL
CO2 SERPL-SCNC: 29 MMOL/L (ref 20–32)
CREAT SERPL-MCNC: 0.6 MG/DL (ref 0.52–1.04)
CREAT UR-MCNC: 52 MG/DL
GFR SERPL CREATININE-BSD FRML MDRD: 86 ML/MIN/{1.73_M2}
GLUCOSE SERPL-MCNC: 93 MG/DL (ref 70–99)
HDLC SERPL-MCNC: 65 MG/DL
LDLC SERPL CALC-MCNC: 97 MG/DL
MICROALBUMIN UR-MCNC: 7 MG/L
MICROALBUMIN/CREAT UR: 13.24 MG/G CR (ref 0–25)
NONHDLC SERPL-MCNC: 122 MG/DL
POTASSIUM SERPL-SCNC: 3.7 MMOL/L (ref 3.4–5.3)
PROT SERPL-MCNC: 7 G/DL (ref 6.8–8.8)
SODIUM SERPL-SCNC: 134 MMOL/L (ref 133–144)
TRIGL SERPL-MCNC: 124 MG/DL

## 2019-08-26 PROCEDURE — 80053 COMPREHEN METABOLIC PANEL: CPT | Performed by: FAMILY MEDICINE

## 2019-08-26 PROCEDURE — 80061 LIPID PANEL: CPT | Performed by: FAMILY MEDICINE

## 2019-08-26 PROCEDURE — 82043 UR ALBUMIN QUANTITATIVE: CPT | Performed by: FAMILY MEDICINE

## 2019-08-26 PROCEDURE — 36415 COLL VENOUS BLD VENIPUNCTURE: CPT | Performed by: FAMILY MEDICINE

## 2019-08-29 DIAGNOSIS — I10 ESSENTIAL HYPERTENSION WITH GOAL BLOOD PRESSURE LESS THAN 140/90: ICD-10-CM

## 2019-08-29 NOTE — TELEPHONE ENCOUNTER
"Requested Prescriptions   Pending Prescriptions Disp Refills     chlorthalidone (HYGROTON) 25 MG tablet [Pharmacy Med Name: CHLORTHALIDONE 25MG TABLETS] 90 tablet 0     Sig: TAKE 1 TABLET(25 MG) BY MOUTH DAILY   Last Written Prescription Date:  12/6/18  Last Fill Quantity: 90,  # refills: 1   Last office visit: 8/22/19 with prescribing provider:  Violet Fenton MD   Future Office Visit:   Next 5 appointments (look out 90 days)    Oct 24, 2019  9:10 AM CDT  Office Visit with Violet Fenton MD  [facility] ([facility] [address] [facility] [address] [phone]             Diuretics (Including Combos) Protocol Passed - 8/29/2019  9:45 AM        Passed - Blood pressure under 140/90 in past 12 months     BP Readings from Last 3 Encounters:   08/22/19 138/66   06/20/19 124/62   03/15/19 (P) 118/52                 Passed - Recent (12 mo) or future (30 days) visit within the authorizing provider's specialty     Patient had office visit in the last 12 months or has a visit in the next 30 days with authorizing provider or within the authorizing provider's specialty.  See \"Patient Info\" tab in inbasket, or \"Choose Columns\" in Meds & Orders section of the refill encounter.              Passed - Medication is active on med list        Passed - Patient is age 18 or older        Passed - No active pregancy on record        Passed - Normal serum creatinine on file in past 12 months     Recent Labs   Lab Test 08/26/19  0832   CR 0.60              Passed - Normal serum potassium on file in past 12 months     Recent Labs   Lab Test 08/26/19  0832   POTASSIUM 3.7                    Passed - Normal serum sodium on file in past 12 months     Recent Labs   Lab Test 08/26/19  0832                 Passed - No positive pregnancy test in past 12 months          "

## 2019-08-30 RX ORDER — CHLORTHALIDONE 25 MG/1
TABLET ORAL
Qty: 90 TABLET | Refills: 1 | Status: SHIPPED | OUTPATIENT
Start: 2019-08-30 | End: 2019-09-28

## 2019-08-30 NOTE — TELEPHONE ENCOUNTER
Prescription approved per [facility] Refill Protocol.    Kamini Verdugo RN, [facility]

## 2019-09-01 PROBLEM — Z85.118 HISTORY OF LUNG CANCER: Status: ACTIVE | Noted: 2019-09-01

## 2019-09-09 ENCOUNTER — TRANSFERRED RECORDS (OUTPATIENT)
Dept: HEALTH INFORMATION MANAGEMENT | Facility: CLINIC | Age: 79
End: 2019-09-09

## 2019-09-13 ENCOUNTER — TELEPHONE (OUTPATIENT)
Dept: FAMILY MEDICINE | Facility: CLINIC | Age: 79
End: 2019-09-13

## 2019-09-13 ENCOUNTER — APPOINTMENT (OUTPATIENT)
Dept: CARDIOLOGY | Facility: CLINIC | Age: 79
End: 2019-09-13
Attending: PHYSICIAN ASSISTANT
Payer: MEDICARE

## 2019-09-13 ENCOUNTER — APPOINTMENT (OUTPATIENT)
Dept: ULTRASOUND IMAGING | Facility: CLINIC | Age: 79
End: 2019-09-13
Attending: PHYSICIAN ASSISTANT
Payer: MEDICARE

## 2019-09-13 ENCOUNTER — HOSPITAL ENCOUNTER (OUTPATIENT)
Facility: CLINIC | Age: 79
Setting detail: OBSERVATION
Discharge: HOME | End: 2019-09-14
Attending: NURSE PRACTITIONER | Admitting: INTERNAL MEDICINE
Payer: MEDICARE

## 2019-09-13 DIAGNOSIS — I48.20 CHRONIC ATRIAL FIBRILLATION (H): ICD-10-CM

## 2019-09-13 DIAGNOSIS — K21.00 GASTROESOPHAGEAL REFLUX DISEASE WITH ESOPHAGITIS: Primary | ICD-10-CM

## 2019-09-13 DIAGNOSIS — I10 ESSENTIAL HYPERTENSION: ICD-10-CM

## 2019-09-13 DIAGNOSIS — I48.91 ATRIAL FIBRILLATION (H): ICD-10-CM

## 2019-09-13 LAB
ALBUMIN SERPL-MCNC: 3.6 G/DL (ref 3.4–5)
ALP SERPL-CCNC: 66 U/L (ref 40–150)
ALT SERPL W P-5'-P-CCNC: 24 U/L (ref 0–50)
ANION GAP SERPL CALCULATED.3IONS-SCNC: 7 MMOL/L (ref 3–14)
APTT PPP: 24 SEC (ref 22–37)
AST SERPL W P-5'-P-CCNC: 13 U/L (ref 0–45)
BASOPHILS # BLD AUTO: 0.1 10E9/L (ref 0–0.2)
BASOPHILS NFR BLD AUTO: 0.6 %
BILIRUB SERPL-MCNC: 0.4 MG/DL (ref 0.2–1.3)
BUN SERPL-MCNC: 24 MG/DL (ref 7–30)
CALCIUM SERPL-MCNC: 10.1 MG/DL (ref 8.5–10.1)
CHLORIDE SERPL-SCNC: 97 MMOL/L (ref 94–109)
CO2 SERPL-SCNC: 29 MMOL/L (ref 20–32)
CREAT SERPL-MCNC: 0.79 MG/DL (ref 0.52–1.04)
DIFFERENTIAL METHOD BLD: ABNORMAL
EOSINOPHIL # BLD AUTO: 0.4 10E9/L (ref 0–0.7)
EOSINOPHIL NFR BLD AUTO: 3 %
ERYTHROCYTE [DISTWIDTH] IN BLOOD BY AUTOMATED COUNT: 13 % (ref 10–15)
GFR SERPL CREATININE-BSD FRML MDRD: 71 ML/MIN/{1.73_M2}
GLUCOSE SERPL-MCNC: 124 MG/DL (ref 70–99)
HCT VFR BLD AUTO: 39.3 % (ref 35–47)
HGB BLD-MCNC: 12.7 G/DL (ref 11.7–15.7)
IMM GRANULOCYTES # BLD: 0.1 10E9/L (ref 0–0.4)
IMM GRANULOCYTES NFR BLD: 0.5 %
INR PPP: 0.99 (ref 0.86–1.14)
INTERPRETATION ECG - MUSE: NORMAL
LYMPHOCYTES # BLD AUTO: 1.1 10E9/L (ref 0.8–5.3)
LYMPHOCYTES NFR BLD AUTO: 9.3 %
MAGNESIUM SERPL-MCNC: 1.4 MG/DL (ref 1.6–2.3)
MAGNESIUM SERPL-MCNC: 2.7 MG/DL (ref 1.6–2.3)
MCH RBC QN AUTO: 28 PG (ref 26.5–33)
MCHC RBC AUTO-ENTMCNC: 32.3 G/DL (ref 31.5–36.5)
MCV RBC AUTO: 87 FL (ref 78–100)
MONOCYTES # BLD AUTO: 0.9 10E9/L (ref 0–1.3)
MONOCYTES NFR BLD AUTO: 7.5 %
NEUTROPHILS # BLD AUTO: 9.5 10E9/L (ref 1.6–8.3)
NEUTROPHILS NFR BLD AUTO: 79.1 %
NRBC # BLD AUTO: 0 10*3/UL
NRBC BLD AUTO-RTO: 0 /100
PLATELET # BLD AUTO: 355 10E9/L (ref 150–450)
POTASSIUM SERPL-SCNC: 3.5 MMOL/L (ref 3.4–5.3)
PROT SERPL-MCNC: 7.3 G/DL (ref 6.8–8.8)
RBC # BLD AUTO: 4.53 10E12/L (ref 3.8–5.2)
SODIUM SERPL-SCNC: 133 MMOL/L (ref 133–144)
TSH SERPL DL<=0.005 MIU/L-ACNC: 2.99 MU/L (ref 0.4–4)
WBC # BLD AUTO: 12.1 10E9/L (ref 4–11)

## 2019-09-13 PROCEDURE — 85025 COMPLETE CBC W/AUTO DIFF WBC: CPT | Performed by: NURSE PRACTITIONER

## 2019-09-13 PROCEDURE — 96375 TX/PRO/DX INJ NEW DRUG ADDON: CPT

## 2019-09-13 PROCEDURE — 84443 ASSAY THYROID STIM HORMONE: CPT | Performed by: NURSE PRACTITIONER

## 2019-09-13 PROCEDURE — 25000132 ZZH RX MED GY IP 250 OP 250 PS 637: Mod: GY | Performed by: PHYSICIAN ASSISTANT

## 2019-09-13 PROCEDURE — 96366 THER/PROPH/DIAG IV INF ADDON: CPT

## 2019-09-13 PROCEDURE — 96365 THER/PROPH/DIAG IV INF INIT: CPT

## 2019-09-13 PROCEDURE — 83735 ASSAY OF MAGNESIUM: CPT | Performed by: NURSE PRACTITIONER

## 2019-09-13 PROCEDURE — G0378 HOSPITAL OBSERVATION PER HR: HCPCS

## 2019-09-13 PROCEDURE — 93005 ELECTROCARDIOGRAM TRACING: CPT | Mod: XU

## 2019-09-13 PROCEDURE — 25000128 H RX IP 250 OP 636: Performed by: PHYSICIAN ASSISTANT

## 2019-09-13 PROCEDURE — 93880 EXTRACRANIAL BILAT STUDY: CPT

## 2019-09-13 PROCEDURE — 99291 CRITICAL CARE FIRST HOUR: CPT | Mod: 25

## 2019-09-13 PROCEDURE — 85610 PROTHROMBIN TIME: CPT | Performed by: NURSE PRACTITIONER

## 2019-09-13 PROCEDURE — 0298T ZIO PATCH HOLTER ADULT PEDIATRIC GREATER THAN 48 HRS: CPT | Performed by: INTERNAL MEDICINE

## 2019-09-13 PROCEDURE — 80053 COMPREHEN METABOLIC PANEL: CPT | Performed by: NURSE PRACTITIONER

## 2019-09-13 PROCEDURE — 25000125 ZZHC RX 250: Performed by: NURSE PRACTITIONER

## 2019-09-13 PROCEDURE — 25000128 H RX IP 250 OP 636: Performed by: NURSE PRACTITIONER

## 2019-09-13 PROCEDURE — 83735 ASSAY OF MAGNESIUM: CPT | Mod: 91 | Performed by: INTERNAL MEDICINE

## 2019-09-13 PROCEDURE — 25800030 ZZH RX IP 258 OP 636: Performed by: NURSE PRACTITIONER

## 2019-09-13 PROCEDURE — 36415 COLL VENOUS BLD VENIPUNCTURE: CPT | Performed by: INTERNAL MEDICINE

## 2019-09-13 PROCEDURE — 0296T ZIO PATCH HOLTER ADULT PEDIATRIC GREATER THAN 48 HRS: CPT

## 2019-09-13 PROCEDURE — 85730 THROMBOPLASTIN TIME PARTIAL: CPT | Performed by: NURSE PRACTITIONER

## 2019-09-13 PROCEDURE — 96361 HYDRATE IV INFUSION ADD-ON: CPT

## 2019-09-13 PROCEDURE — 99220 ZZC INITIAL OBSERVATION CARE,LEVL III: CPT | Performed by: PHYSICIAN ASSISTANT

## 2019-09-13 PROCEDURE — 25000132 ZZH RX MED GY IP 250 OP 250 PS 637: Mod: GY | Performed by: NURSE PRACTITIONER

## 2019-09-13 RX ORDER — NALOXONE HYDROCHLORIDE 0.4 MG/ML
.1-.4 INJECTION, SOLUTION INTRAMUSCULAR; INTRAVENOUS; SUBCUTANEOUS
Status: DISCONTINUED | OUTPATIENT
Start: 2019-09-13 | End: 2019-09-14 | Stop reason: HOSPADM

## 2019-09-13 RX ORDER — POTASSIUM CHLORIDE 7.45 MG/ML
10 INJECTION INTRAVENOUS
Status: DISCONTINUED | OUTPATIENT
Start: 2019-09-13 | End: 2019-09-14 | Stop reason: HOSPADM

## 2019-09-13 RX ORDER — PREDNISONE 20 MG/1
20 TABLET ORAL DAILY
Status: DISCONTINUED | OUTPATIENT
Start: 2019-09-13 | End: 2019-09-14 | Stop reason: HOSPADM

## 2019-09-13 RX ORDER — ONDANSETRON 4 MG/1
4 TABLET, ORALLY DISINTEGRATING ORAL EVERY 6 HOURS PRN
Status: DISCONTINUED | OUTPATIENT
Start: 2019-09-13 | End: 2019-09-14 | Stop reason: HOSPADM

## 2019-09-13 RX ORDER — LIDOCAINE 40 MG/G
CREAM TOPICAL
Status: DISCONTINUED | OUTPATIENT
Start: 2019-09-13 | End: 2019-09-14 | Stop reason: HOSPADM

## 2019-09-13 RX ORDER — IPRATROPIUM BROMIDE AND ALBUTEROL SULFATE 2.5; .5 MG/3ML; MG/3ML
3 SOLUTION RESPIRATORY (INHALATION) EVERY 4 HOURS PRN
Status: DISCONTINUED | OUTPATIENT
Start: 2019-09-13 | End: 2019-09-14 | Stop reason: HOSPADM

## 2019-09-13 RX ORDER — POTASSIUM CHLORIDE 750 MG/1
10 TABLET, EXTENDED RELEASE ORAL 2 TIMES DAILY
Status: DISCONTINUED | OUTPATIENT
Start: 2019-09-13 | End: 2019-09-14

## 2019-09-13 RX ORDER — ACETAMINOPHEN 325 MG/1
650 TABLET ORAL EVERY 4 HOURS PRN
Status: DISCONTINUED | OUTPATIENT
Start: 2019-09-13 | End: 2019-09-14 | Stop reason: HOSPADM

## 2019-09-13 RX ORDER — ALBUTEROL SULFATE 90 UG/1
1-2 AEROSOL, METERED RESPIRATORY (INHALATION) EVERY 6 HOURS PRN
COMMUNITY
End: 2020-03-19

## 2019-09-13 RX ORDER — POTASSIUM CHLORIDE 29.8 MG/ML
20 INJECTION INTRAVENOUS
Status: DISCONTINUED | OUTPATIENT
Start: 2019-09-13 | End: 2019-09-14 | Stop reason: HOSPADM

## 2019-09-13 RX ORDER — METOPROLOL TARTRATE 1 MG/ML
5 INJECTION, SOLUTION INTRAVENOUS ONCE
Status: COMPLETED | OUTPATIENT
Start: 2019-09-13 | End: 2019-09-13

## 2019-09-13 RX ORDER — METOPROLOL TARTRATE 25 MG/1
25 TABLET, FILM COATED ORAL ONCE
Status: COMPLETED | OUTPATIENT
Start: 2019-09-13 | End: 2019-09-13

## 2019-09-13 RX ORDER — POTASSIUM CL/LIDO/0.9 % NACL 10MEQ/0.1L
10 INTRAVENOUS SOLUTION, PIGGYBACK (ML) INTRAVENOUS
Status: DISCONTINUED | OUTPATIENT
Start: 2019-09-13 | End: 2019-09-14 | Stop reason: HOSPADM

## 2019-09-13 RX ORDER — FLUOXETINE 10 MG/1
10 CAPSULE ORAL DAILY
Status: DISCONTINUED | OUTPATIENT
Start: 2019-09-13 | End: 2019-09-14 | Stop reason: HOSPADM

## 2019-09-13 RX ORDER — POTASSIUM CHLORIDE 1.5 G/1.58G
20-40 POWDER, FOR SOLUTION ORAL
Status: DISCONTINUED | OUTPATIENT
Start: 2019-09-13 | End: 2019-09-14 | Stop reason: HOSPADM

## 2019-09-13 RX ORDER — ONDANSETRON 2 MG/ML
4 INJECTION INTRAMUSCULAR; INTRAVENOUS EVERY 6 HOURS PRN
Status: DISCONTINUED | OUTPATIENT
Start: 2019-09-13 | End: 2019-09-14 | Stop reason: HOSPADM

## 2019-09-13 RX ORDER — AMLODIPINE BESYLATE 2.5 MG/1
2.5 TABLET ORAL AT BEDTIME
Status: ON HOLD | COMMUNITY
End: 2019-09-14

## 2019-09-13 RX ORDER — NITROGLYCERIN 0.4 MG/1
0.4 TABLET SUBLINGUAL EVERY 5 MIN PRN
Status: DISCONTINUED | OUTPATIENT
Start: 2019-09-13 | End: 2019-09-14 | Stop reason: HOSPADM

## 2019-09-13 RX ORDER — POTASSIUM CHLORIDE 1500 MG/1
20-40 TABLET, EXTENDED RELEASE ORAL
Status: DISCONTINUED | OUTPATIENT
Start: 2019-09-13 | End: 2019-09-14 | Stop reason: HOSPADM

## 2019-09-13 RX ORDER — METOPROLOL TARTRATE 25 MG/1
25 TABLET, FILM COATED ORAL 2 TIMES DAILY
Status: DISCONTINUED | OUTPATIENT
Start: 2019-09-13 | End: 2019-09-14 | Stop reason: HOSPADM

## 2019-09-13 RX ORDER — MAGNESIUM SULFATE HEPTAHYDRATE 40 MG/ML
4 INJECTION, SOLUTION INTRAVENOUS EVERY 4 HOURS PRN
Status: DISCONTINUED | OUTPATIENT
Start: 2019-09-13 | End: 2019-09-14 | Stop reason: HOSPADM

## 2019-09-13 RX ORDER — CHLORTHALIDONE 25 MG/1
25 TABLET ORAL DAILY
Status: DISCONTINUED | OUTPATIENT
Start: 2019-09-13 | End: 2019-09-14 | Stop reason: HOSPADM

## 2019-09-13 RX ORDER — EZETIMIBE 10 MG/1
10 TABLET ORAL DAILY
Status: DISCONTINUED | OUTPATIENT
Start: 2019-09-13 | End: 2019-09-14 | Stop reason: HOSPADM

## 2019-09-13 RX ORDER — PREDNISONE 10 MG/1
10 TABLET ORAL SEE ADMIN INSTRUCTIONS
COMMUNITY
Start: 2019-09-09 | End: 2019-10-22

## 2019-09-13 RX ORDER — PREDNISONE 10 MG/1
10 TABLET ORAL SEE ADMIN INSTRUCTIONS
Status: DISCONTINUED | OUTPATIENT
Start: 2019-09-13 | End: 2019-09-13

## 2019-09-13 RX ORDER — ACETAMINOPHEN 650 MG/1
650 SUPPOSITORY RECTAL EVERY 4 HOURS PRN
Status: DISCONTINUED | OUTPATIENT
Start: 2019-09-13 | End: 2019-09-14 | Stop reason: HOSPADM

## 2019-09-13 RX ADMIN — SODIUM CHLORIDE 1000 ML: 9 INJECTION, SOLUTION INTRAVENOUS at 11:36

## 2019-09-13 RX ADMIN — METOPROLOL TARTRATE 25 MG: 25 TABLET ORAL at 13:51

## 2019-09-13 RX ADMIN — DILTIAZEM HYDROCHLORIDE 5 MG/HR: 5 INJECTION INTRAVENOUS at 13:47

## 2019-09-13 RX ADMIN — METOPROLOL TARTRATE 5 MG: 5 INJECTION INTRAVENOUS at 11:36

## 2019-09-13 RX ADMIN — METOPROLOL TARTRATE 25 MG: 25 TABLET ORAL at 20:31

## 2019-09-13 RX ADMIN — POTASSIUM CHLORIDE 10 MEQ: 750 TABLET, FILM COATED, EXTENDED RELEASE ORAL at 20:31

## 2019-09-13 RX ADMIN — APIXABAN 2.5 MG: 2.5 TABLET, FILM COATED ORAL at 14:13

## 2019-09-13 RX ADMIN — MAGNESIUM SULFATE HEPTAHYDRATE 4 G: 40 INJECTION, SOLUTION INTRAVENOUS at 18:29

## 2019-09-13 RX ADMIN — APIXABAN 2.5 MG: 2.5 TABLET, FILM COATED ORAL at 20:31

## 2019-09-13 ASSESSMENT — ENCOUNTER SYMPTOMS
DIZZINESS: 1
DIARRHEA: 0
DYSURIA: 0
VOMITING: 0
WEAKNESS: 0
LIGHT-HEADEDNESS: 1
FEVER: 0
SHORTNESS OF BREATH: 0
PALPITATIONS: 1
FREQUENCY: 0
CHILLS: 0

## 2019-09-13 ASSESSMENT — ACTIVITIES OF DAILY LIVING (ADL)
AMBULATION: 0-->INDEPENDENT
FALL_HISTORY_WITHIN_LAST_SIX_MONTHS: NO
COGNITION: 0 - NO COGNITION ISSUES REPORTED
BATHING: 0-->INDEPENDENT
TRANSFERRING: 0-->INDEPENDENT
TOILETING: 0-->INDEPENDENT
RETIRED_EATING: 0-->INDEPENDENT
DRESS: 0-->INDEPENDENT
RETIRED_COMMUNICATION: 0-->UNDERSTANDS/COMMUNICATES WITHOUT DIFFICULTY
SWALLOWING: 0-->SWALLOWS FOODS/LIQUIDS WITHOUT DIFFICULTY

## 2019-09-13 ASSESSMENT — MIFFLIN-ST. JEOR: SCORE: 1037.88

## 2019-09-13 NOTE — ED NOTES
[facility]  ED Nurse Handoff Report    Tomasa Fam is a 79 year old female   ED Chief complaint: Tachycardia and Dizziness  . ED Diagnosis:   Final diagnoses:   Atrial fibrillation (H)     Allergies:   Allergies   Allergen Reactions     No Known Drug Allergies      Tape [Adhesive Tape]      Sensitive to plastic tape on upper part of body--takes skin off       Code Status: Full Code  Activity level - Baseline/Home:  Independent. Activity Level - Current:   Stand by Assist. Lift room needed: No. Bariatric: No   Needed: No   Isolation: No. Infection: Not Applicable.     Vital Signs:   Vitals:    09/13/19 1130 09/13/19 1145 09/13/19 1200 09/13/19 1207   BP: 115/84 114/66 126/63    Pulse: 126 116 92    Resp:       Temp:       TempSrc:       SpO2: 100% 100% 100%    Weight:    59.9 kg (132 lb)   Height:           Cardiac Rhythm:  ,      Pain level: 0-10 Pain Scale: 0  Patient confused: No. Patient Falls Risk: Yes.   Elimination Status: has not voided yet in the ER   Patient Report - Initial Complaint: dizziness. Focused Assessment: intermittent dizziness and feelings of syncope for the past 3 weeks. Noted to be in afib.    Tests Performed: EKG, labs. Abnormal Results:   Labs Ordered and Resulted from Time of ED Arrival Up to the Time of Departure from the ED   CBC WITH PLATELETS DIFFERENTIAL - Abnormal; Notable for the following components:       Result Value    WBC 12.1 (*)     Absolute Neutrophil 9.5 (*)     All other components within normal limits   COMPREHENSIVE METABOLIC PANEL - Abnormal; Notable for the following components:    Glucose 124 (*)     All other components within normal limits   INR   PARTIAL THROMBOPLASTIN TIME   MAY SALINE LOCK IV   .   Treatments provided: see mar  Family Comments: spouse supportive at bedside  OBS brochure/video discussed/provided to patient:  N/A  ED Medications:   Medications   apixaban ANTICOAGULANT (ELIQUIS) tablet 2.5 mg (has no administration  in time range)   0.9% sodium chloride BOLUS (1,000 mLs Intravenous New Bag 9/13/19 1136)   metoprolol (LOPRESSOR) injection 5 mg (5 mg Intravenous Given 9/13/19 1136)     Drips infusing:  No  For the majority of the shift, the patient's behavior Green. Interventions performed were reinforce plan of care.     Severe Sepsis OR Septic Shock Diagnosis Present: No      ED Nurse Name/Phone Number: Irish Doran RN,   12:17 PM  RECEIVING UNIT ED HANDOFF REVIEW    Above ED Nurse Handoff Report was reviewed: Yes  Reviewed by: Mariposa Osborn RN on September 13, 2019 at 1:28 PM

## 2019-09-13 NOTE — H&P
Admitted:     09/13/2019      PRIMARY CARE PROVIDER:  Violet Fenton MD.        CHIEF COMPLAINT:  Lightheadedness and dizziness and weakness.      HISTORY OF PRESENT ILLNESS:  Ms. Chandrika Fam is a very pleasant 79-year-old female with a past medical history significant for hypertension, COPD, breast cancer, status post chemo and mastectomy in 2011, along with non-small cell lung cancer, status post thoracotomy in 2016 and a history of hypokalemia, hyperlipidemia who presented to the emergency room with 1-2 week history of worsening gradual shortness of breath with activity, dizziness, weakness and near-syncope.  History is obtained by speaking with the ER physician, the patient interview and chart review.  The patient states that she has recently been treated for COPD with acute bronchitis.  She was actually seen by Dr. Suero not too long ago and was recently placed on a course of prednisone taper along with a course of azithromycin that she finished.  She has not taken much over-the-counter except for occasional Mucinex and occasionally some [facility] medication that has over-the-counter decongestant.  Over the last 1-2 weeks she has been complaining of intermittent lightheadedness and dizziness and generalized weakness that she initially attributed to her bronchitis.  She did have some episodes where she got more short of breath with activity as well as near-syncope symptoms and due to the duration of her symptoms, she decided to come into the emergency room for further evaluation.      Upon arrival to the emergency room, she is afebrile, her heart rate was elevated in the 120s-150s, blood pressure was stable in the 110s-120s.      Laboratory result was fairly unremarkable with normal electrolyte panel, normal TSH, white count was minimally elevated at 12.1, but otherwise hemoglobin stable at 12.7.  EKG performed shows new onset atrial fibrillation with heart rate of 139.  No obvious ischemic changes were  "identified.  She was given initially 5 mg of labetalol which brought her depression, which brought her heart rate down to the 90s, but with any movement she jumped right back to the 130s.  Therefore, she has been started on diltiazem drip and I have been asked to admit her to the hospital for further evaluation and treatment.      PAST MEDICAL HISTORY:   1.  Hypertension.   2.  History of non-small cell lung cancer, status post right thoracotomy in 2016.   3.  History of breast cancer, status post left mastectomy with chemotherapy, followed by Dr. Luong.     4.  Osteoarthritis.   5.  Chronic obstructive pulmonary disease due to secondhand smoke.      PAST SURGICAL HISTORY:  Multiple, including thoracotomy, mastectomy and reconstruction, laparoscopic salpingo-oophorectomy, cystoscopy, and colonoscopy.  Otherwise, other surgeries are fully reviewed in Epic.      MEDICATIONS PRIOR TO ADMISSION:  Currently being reconciled.  They include:     1.  Albuterol 1-2 puffs as needed for shortness of breath.   2.  Norvasc 2.5 mg at bedtime.   3.  Aspirin 81 mg daily.   4.  Vitamin B complex.   5.  Calcium supplements.     6.  Chlorthalidone 25 mg daily.   6.  Vitamin D 400 units daily.   7.  Zetia 10 mg daily.   8.  Prozac 10 mg daily.   9.  Trelegy Ellipta 10/6.25/25 one puff daily.  This is a new medication.     10.  Cozaar 100 mg p.o. daily.   11.  Potassium supplements.     12.  Prednisone taper, which she is currently on 20 mg and will be weaned down to 10 mg for 3 days.      ALLERGIES:  No known drug allergies, except she states that previously she had a \"jolt sensation\" when she was on Coreg.      FAMILY HISTORY:  Reviewed.  Father  at age of 51 due to coronary artery disease.  Mother had TIAs and pneumonia.      SOCIAL HISTORY:  The patient is .  She is a nonsmoker.  She is a very rare alcohol user, maybe 1 glass of wine every few months.  Otherwise, she is ambulatory and lives independently with her " .  She is normally ambulatory without any assistive device.        CODE STATUS:  She is a DNR/DNI and this was discussed with the patient.      REVIEW OF SYSTEMS:  Negative for any recent fevers or chills.  No nausea or vomiting, no complaints of chest pain, no complaints of exertional angina, no orthopnea or PND.  Otherwise, 12-point system reviewed and all negative beyond those stated in HPI.      PHYSICAL EXAMINATION:   VITAL SIGNS:  T-max of 98.91 with a heart rate of 98 after diltiazem drip.  Blood pressure 124/75, respirations 20, saturating 100% on room air.   GENERAL:  The patient is alert and oriented.  She is in no acute distress.   HEENT:  Pupils equal, round, react to light.  EOMs intact.  Sclerae nonicteric.  Conjunctivae are pink.  Oral mucosa is pink and moist.   NECK:  Supple, no evidence of lymphadenopathy or thyromegaly.  Trachea is midline.   CARDIAC:  Irregularly irregular rhythm.  Normal S1, S2 with no loud murmurs appreciated.   PULMONARY:  Slightly decreased breath sounds at the bases, but no significant wheezing, rales or rhonchi appreciated.  No use of accessory muscles or costal retraction.   ABDOMEN:  Bowel sounds present, soft, nontender, nondistended, no hepatomegaly.   EXTREMITIES:  Reveals no clubbing, cyanosis or edema.   NEUROLOGIC:  Cranial nerves II-XII intact.  She has bilateral symmetric upper and lower extremity strength.  Sensation is intact distally.  She has no focal deficits.   PSYCHIATRIC:  Mood and affect are appropriate.      LABORATORY RESULTS:  Again, as described above.      ASSESSMENT AND PLAN:  Ms. Chandrika Fam is a very pleasant 79-year-old female with a past medical history significant for breast cancer, status post mastectomy and chemo, lung cancer status post thoracotomy, hypertension, COPD who presents to the emergency room with 2-week history of waxing and waning worsening lightheadedness, dizziness, shortness of breath and near-syncope.  She has been  recently treated for acute bronchitis with a steroid taper and now comes in for increasing dizziness and near-syncope.  Workup in the emergency room revealed new onset atrial fibrillation with heart rates as high as the 150s.  A diltiazem drip has been started, and she will be admitted to the hospital for further evaluation and treatment.   1.  New onset atrial fibrillation.  She has never had any history of atrial fibrillation in the past.  No history of arrhythmia in the family.  I am not sure what the specific trigger is, her electrolytes are looking pretty good.  TSH is normal.  Only new medication is the prednisone and possibly the over-the-counter decongestant, although not pseudoephedrine.  Otherwise, clinically she is hemodynamically stable with no need for emergent cardioversion.  She will be admitted to the hospital under telemetry.  She will be continued on diltiazem drip for now.  I will start her on metoprolol 25 mg b.i.d. with further titration as needed.  Otherwise, she will continue with echocardiogram to assess her valves and cardiac function.  She will need workup for possible coronary ischemia at some point.  In terms of anticoagulation, she has a CHADS-VASc2 score of 4 given age, hypertension, and female sex.  Anticoagulation has been discussed and she adamantly is declining warfarin due to its tedious nature.  They started her on apixaban in the emergency room.  I will continue that for now and have pharmacy liaison reconcile the cost for the apixaban.  Note that her last test for workup of coronary ischemia was in 2012 in which she underwent a nuclear Lexiscan that showed no significant perfusion deficit.  In addition, I will place her on a ZIO Patch to evaluate for her atrial fibrillation burden as this is the weekend.   2.  Acute bronchitis in the setting of history of chronic obstructive pulmonary disease.  Currently, her lungs actually sound clear.  She has no acute exacerbation at this  point.  I will continue her on her prednisone 20 mg today and then tapered off to 10 mg for the next 3 days as per Pulmonary.  There are no signs of pneumonia.  I will place p.rxiomara. DuoNeb here in the hospital while she is in observation.  I did hold her Trelegy inhaler due to cost.   3.  Hypertension.  The patient states that she is very sensitive to any blood pressure below 125 in which she starts to feel lightheaded and dizzy.  Given the initiation of beta blocker, I will hold off on her Norvasc and losartan for now and titrate them back if needed.  I will continue her chlorthalidone for now.   4.  History of hypokalemia.  Potassium appears to be stable at 3.5.  I will give her additional potassium to boost it up above 4 for now.  Her magnesium is also low at 1.4, so I will replace that as well.   5.  Mild leukocytosis.  I suspect likely related to steroid usage.  There are no signs of acute infection.  She has no complaints of fevers, chills to suggest pneumonia or urinary symptoms.   7.  Otherwise, all her other medical conditions are stable.  She will be admitted as observation for now.      CODE STATUS:  DNR/DNI, discussed with the patient.         ALISSA VERGARA MD       As dictated by FARIDEH ZAIDI            D: 2019   T: 2019   MT: JOANN      Name:     HAMIDA HODGE   MRN:      [id number]        Account:      [id number]   :      1940        Admitted:     2019                   Document: [id number]       cc: Violet Fenton MD

## 2019-09-13 NOTE — PROGRESS NOTES
Patient admitted into room 312 from ED.  Alert oriented times four.  Walked from cart to scale, then bed, then BR to void unmeasured amount.  Has PIV c diltiazem drip at 5 mg/hour infusing.  Lungs sound clear.  Has nonproductive cough.  Last bm this am  Skin intact.  No issues.    Dx: atrial fibrillation-->heart rate 107-->113    Plan:  Monitor HR, drip.   Safety.

## 2019-09-13 NOTE — PHARMACY-ADMISSION MEDICATION HISTORY
Admission medication history interview status for this patient is complete. See [facility] admission navigator for allergy information, prior to admission medications and immunization status.     Medication history interview source(s):Patient  Medication history resources (including written lists, pill bottles, clinic record):None  Primary pharmacy: [id number] - [place], [place] - [address]    Changes made to PTA medication list:  Added: prednisone, trelegy ellipta, albuterol  Deleted: diprolene cream, clobetasol cream, lidex solution, xoponex inhaler, incruse ellipta  Changed: amlodipine (daily --> every evening),     Actions taken by pharmacist (provider contacted, etc):None   Additional medication history information: completed Z-Josh (azithromycin) this morning; currently on day 5 of 17 of steroid taper (see PTA med list)  Medication reconciliation/reorder completed by provider prior to medication history? No      Prior to Admission medications    Medication Sig Last Dose Taking? Auth Provider   albuterol (PROAIR HFA/PROVENTIL HFA/VENTOLIN HFA) 108 (90 Base) MCG/ACT inhaler Inhale 1-2 puffs into the lungs every 6 hours as needed for shortness of breath / dyspnea or wheezing Past Week at Unknown time Yes Unknown, Entered By History   amLODIPine (NORVASC) 2.5 MG tablet Take 2.5 mg by mouth At Bedtime 9/12/2019 at hs Yes Unknown, Entered By History   aspirin 81 MG tablet Take 1 tablet (81 mg) by mouth daily 9/12/2019 at am Yes Violet Fenton MD   B Complex Vitamins (VITAMIN  B COMPLEX) tablet Take 1 tablet by mouth daily. 9/13/2019 at am Yes Unknown, Entered By History   calcium-vitamin D (CALTRATE) 600-400 MG-UNIT per tablet Take 1 tablet by mouth 2 times daily 9/13/2019 at x 1 Yes Reported, Patient   chlorthalidone (HYGROTON) 25 MG tablet TAKE 1 TABLET(25 MG) BY MOUTH DAILY 9/12/2019 at am Yes Violet Fenton MD   cholecalciferol (VITAMIN D) 400 UNIT TABS Take 400  Units by mouth daily 9/13/2019 at am Yes Reported, Patient   ezetimibe (ZETIA) 10 MG tablet Take 1 tablet (10 mg) by mouth daily 9/12/2019 at am Yes Violet Fenton MD   FLUoxetine (PROZAC) 10 MG capsule TAKE 1 CAPSULE(10 MG) BY MOUTH DAILY 9/12/2019 at am Yes Sherri Martin PA-C   Fluticasone-Umeclidin-Vilanterol (TRELEGY ELLIPTA) 100-62.5-25 MCG/INH oral inhaler Inhale 1 puff into the lungs daily 9/13/2019 at am Yes Unknown, Entered By History   losartan (COZAAR) 100 MG tablet TAKE 1 TABLET(100 MG) BY MOUTH DAILY WITH BREAKFAST 9/12/2019 at am Yes Violet Fenton MD   omega 3 1000 MG CAPS Take 1 g by mouth daily 9/13/2019 at am Yes Violet Fenton MD   polyethylene glycol (MIRALAX/GLYCOLAX) powder Take 1 capful by mouth daily 9/13/2019 at am Yes Reported, Patient   potassium chloride ER (K-DUR/KLOR-CON M) 10 MEQ CR tablet TAKE 1 TABLET(10 MEQ) BY MOUTH TWICE DAILY 9/13/2019 at x 1 Yes Violet Fenton MD   predniSONE (DELTASONE) 10 MG tablet Take 10 mg by mouth See Admin Instructions Take 4 tablets daily for 2 days, then decrease by 1 tablet every 3 days until all gone 9/12/2019 at took 2 tablets = 20 mg Yes Unknown, Entered By History

## 2019-09-13 NOTE — ED PROVIDER NOTES
History     Chief Complaint:  Dizziness and Tachycardia    HPI   Tomasa Fam is a 79 year old female who presents to the ED for evaluation of dizziness and tachycardia. The patient states that she has been having intermittent episodes of dizziness and light headedness to the point where she almost loses consciousness. The patient states that she has also been dealing with a cough/cold for the past 6 months that has become progressively worse; she saw a pulmonologist last week and was diagnosed with bronchitis and prescribed prednisone and an antibiotic. The patient denies any history of atrial fibrillation, but her ECG in the ED notes new atrial fibrillation. The patient denies any vomiting, diarrhea, dysuria, urgency, or frequency. The patient also denies any history of diabetes, COPD, or tobacco use.     Allergies:  The patient has no known drug allergies.     Medications:    Norvasc  81 mg Aspirin  Diprolene-AF  Vitamin B complex  Caltrate  Hygroton  Vitamin D  Clobetasol  Zetia  Lidex  Prozac  Xopenex HFA  Cozaar  Pataday  Miralax  K-Dur  Incruse Ellipta    Past Medical History:    Arthritis  Breast cancer  Chronic airway obstruction  Diffuse cystic mastopathy  Lung cancer  Hypertension   Hyperlipidemia  Hypokalemia  Thyroid nodule  Cystocele     Past Surgical History:    Tubal ligation  Davinci hysterectomy supracervical sacrocolpopexy, combined  Excise lesion, eyelid  Insert port vascular access  Laparoscopic salpingo-oophorectomy  Liposuction , rhytidectomy, combined  Lobectomy lung  Mammoplasty reduction  Mastectomy simple  Mastectomy bilateral  Repair ptosis bilateral  Reconstruct breast bilateral  Thoracotomy   Lipoma removed right thigh  D & C  Face lift    Family History:    Atherosclerosis, father  CVD, mother  Chronic bronchitis, mother    Social History:  Negative for tobacco use.  Positive for alcohol use.   Negative for drug use.  Marital Status:   [2]     Review of Systems  "  Constitutional: Negative for chills and fever.   Eyes: Negative for visual disturbance.   Respiratory: Negative for shortness of breath.    Cardiovascular: Positive for palpitations. Negative for chest pain.   Gastrointestinal: Negative for diarrhea and vomiting.   Genitourinary: Negative for dysuria, frequency and urgency.   Neurological: Positive for dizziness and light-headedness. Negative for weakness.   All other systems reviewed and are negative.      Physical Exam     Patient Vitals for the past 24 hrs:   BP Temp Temp src Pulse Heart Rate Resp SpO2 Height Weight   09/13/19 1207 -- -- -- -- -- -- -- -- 59.9 kg (132 lb)   09/13/19 1200 126/63 -- -- 92 98 -- 100 % -- --   09/13/19 1145 114/66 -- -- 116 105 -- 100 % -- --   09/13/19 1130 115/84 -- -- 126 112 -- 100 % -- --   09/13/19 1115 116/66 -- -- 154 -- -- 100 % -- --   09/13/19 1114 122/88 97.9  F (36.6  C) Oral 139 -- 20 100 % 1.6 m (5' 3\") --     Physical Exam  General: Alert, No obvious discomfort, well kept  Eyes: PERRL, conjunctivae pink no scleral icterus or conjunctival injection  ENT:   Moist mucus membranes, posterior oropharynx clear without erythema or exudates, No lymphadenopathy, Normal voice  Resp:  Lungs clear to auscultation bilaterally, no crackles/rubs/wheezes. Good air movement  CV:  Tachycardic irregularly irregular rhythm, no murmurs/rubs/gallops  GI:  Abdomen soft and non-distended.  Normoactive BS.  No tenderness, guarding or rebound, No masses  Skin:  Warm, dry.  No rashes or petechiae  Musculoskeletal: No peripheral edema or calf tenderness, Normal gross ROM   Neuro: Alert and oriented to person/place/time, normal sensation  Psychiatric: Normal affect, cooperative, good eye contact    Emergency Department Course   ECG:  Indication: dizziness and tachycardia  Time: 1113  Vent. Rate 139 bpm. OK interval *. QRS duration 72. QT/QTc 294/447. P-R-T axis * 59 -33.  Atrial fibrillation with rapid ventricular response with premature " ventricular or aberrantly conducted complexes. Abnormal QRS-T angle, consider primary T wave abnormality. Abnormal ECG. Read time: 1128    Laboratory:  CBC: WBC: 12.1 (H), HGB: 12.7, PLT: 355  CMP: Glucose 124 (H), o/w WNL (Creatinine: 0.79)  INR: 0.99  Partial thromboplastin: 24  TSH with free T4 reflex: 2.99  Magnesium: 1.4 (L)    Interventions:  1136 NS 1L IV Bolus   Lopressor injection 5 mg IV    Emergency Department Course:  Nursing notes and vitals reviewed. (1118) I performed an exam of the patient as documented above.     IV inserted. Medicine administered as documented above. Blood drawn. This was sent to the lab for further testing, results above.     1152 I rechecked the patient and discussed the results of her workup thus far.     1203 I updated the patient.     Findings and plan explained to the Patient who consents to admission.     1222 I discussed the patient with Dr. Tatum    1238 I updated the patient    Findings and plan explained to the Patient who consents to admission.     1249: Discussed the patient with Margaret Tatum PA-C, for Dr. Wild, who will admit the patient to an [facility] bed for further monitoring, evaluation, and treatment.     Impression & Plan    Medical Decision Making:  Tomasa Fam is a 79 year old female who presents today for evaluation of lightheadedness and dizziness as well as feeling of palpitations.  She has had this intermittently for the past several weeks however over the last couple days it has worsened and today she felt like she may have a syncopal episode a couple times therefore presented for evaluation.  Her examination shows new onset atrial fibrillation.  She was rate controlled with metoprolol which seemed to control her rate well until she was ambulatory and rate went back up into the 130s she was therefore started on a diltiazem drip.  She has otherwise been asymptomatic.  She does has a chadsvasc score of 4 and is started on Eliquis.  She is admitted to  the cardiac telemetry unit.  I spoke to hospitalist who did accept patient to their service.      Diagnosis:    ICD-10-CM    1. Atrial fibrillation (H) I48.91 TSH with free T4 reflex     TSH with free T4 reflex     Magnesium     Magnesium     CANCELED: TSH with free T4 reflex     CANCELED: Magnesium       Disposition:  The patient was admitted.     Scribe Disclosure:  I, Allison Lan, am serving as a scribe on 9/13/2019 at 11:18 AM to personally document services performed by Oc Rollins APRN based on my observations and the provider's statements to me.     Allison Lan  9/13/2019   [facility] EMERGENCY DEPARTMENT       Oc Rollins APRN CNP  09/13/19 [id number]

## 2019-09-13 NOTE — ED TRIAGE NOTES
Patient complaining of two to three weeks of dizziness, hypotension and tachycardia.      Denies history of atrial fib.      ABCs intact.  Alert and oriented x 3.

## 2019-09-13 NOTE — TELEPHONE ENCOUNTER
MAYDA    Patient c/o fluctuating BPs and Pulses with dizziness past few weeks. Pulses have been 106-134. BPs have been low so will hold am med. BPs higher at [facility] and will take evening med. BPs 115//122.    C/o rapid pulse and dizziness at this time.    Denies any chest pain and SOB.    Huddled with VO.    Advised patient to go to ER. Does have .    Carlie Burns RN

## 2019-09-13 NOTE — H&P
Physician Attestation   I, Lucy Wild, saw and evaluated Tomasa Fam as part of a shared visit.  I have reviewed and discussed with the advanced practice provider their history, physical and plan.    I personally reviewed the vital signs, medications, labs and imaging.    My key history or physical exam findings: patient comfortable on dilt gtt, HR in the 90's.  BPs ok and asx.  Apparently symptomatic with BPs < 135- gets dizzy    Key management decisions made by me: check carotid US, agree with dilt gtt and wean to oral metop bid, hold home BP meds to make room for BB.  May end up needing digoxin and/or antiarrythmic.  Hold off on cards consult for now.  Pharm liaison for NOAC coverage, already started on apixaban in ER.     Lucy Wild  Date of Service (when I saw the patient): 9/13/19

## 2019-09-14 ENCOUNTER — APPOINTMENT (OUTPATIENT)
Dept: CARDIOLOGY | Facility: CLINIC | Age: 79
End: 2019-09-14
Attending: PHYSICIAN ASSISTANT
Payer: MEDICARE

## 2019-09-14 VITALS
SYSTOLIC BLOOD PRESSURE: 121 MMHG | TEMPERATURE: 97.8 F | DIASTOLIC BLOOD PRESSURE: 78 MMHG | HEART RATE: 52 BPM | RESPIRATION RATE: 16 BRPM | OXYGEN SATURATION: 97 % | BODY MASS INDEX: 23.34 KG/M2 | HEIGHT: 63 IN | WEIGHT: 131.7 LBS

## 2019-09-14 LAB
ANION GAP SERPL CALCULATED.3IONS-SCNC: 4 MMOL/L (ref 3–14)
BUN SERPL-MCNC: 17 MG/DL (ref 7–30)
CALCIUM SERPL-MCNC: 9.6 MG/DL (ref 8.5–10.1)
CHLORIDE SERPL-SCNC: 99 MMOL/L (ref 94–109)
CO2 SERPL-SCNC: 30 MMOL/L (ref 20–32)
CREAT SERPL-MCNC: 0.67 MG/DL (ref 0.52–1.04)
ERYTHROCYTE [DISTWIDTH] IN BLOOD BY AUTOMATED COUNT: 12.9 % (ref 10–15)
GFR SERPL CREATININE-BSD FRML MDRD: 83 ML/MIN/{1.73_M2}
GLUCOSE BLDC GLUCOMTR-MCNC: 110 MG/DL (ref 70–99)
GLUCOSE BLDC GLUCOMTR-MCNC: 96 MG/DL (ref 70–99)
GLUCOSE SERPL-MCNC: 93 MG/DL (ref 70–99)
HCT VFR BLD AUTO: 34.4 % (ref 35–47)
HGB BLD-MCNC: 11.2 G/DL (ref 11.7–15.7)
MCH RBC QN AUTO: 28.3 PG (ref 26.5–33)
MCHC RBC AUTO-ENTMCNC: 32.6 G/DL (ref 31.5–36.5)
MCV RBC AUTO: 87 FL (ref 78–100)
PLATELET # BLD AUTO: 327 10E9/L (ref 150–450)
POTASSIUM SERPL-SCNC: 3.8 MMOL/L (ref 3.4–5.3)
RBC # BLD AUTO: 3.96 10E12/L (ref 3.8–5.2)
SODIUM SERPL-SCNC: 133 MMOL/L (ref 133–144)
WBC # BLD AUTO: 9.2 10E9/L (ref 4–11)

## 2019-09-14 PROCEDURE — 25000132 ZZH RX MED GY IP 250 OP 250 PS 637: Mod: GY | Performed by: PHYSICIAN ASSISTANT

## 2019-09-14 PROCEDURE — 36415 COLL VENOUS BLD VENIPUNCTURE: CPT | Performed by: PHYSICIAN ASSISTANT

## 2019-09-14 PROCEDURE — 40000264 ECHOCARDIOGRAM COMPLETE

## 2019-09-14 PROCEDURE — G0378 HOSPITAL OBSERVATION PER HR: HCPCS

## 2019-09-14 PROCEDURE — 25500064 ZZH RX 255 OP 636: Performed by: INTERNAL MEDICINE

## 2019-09-14 PROCEDURE — 99217 ZZC OBSERVATION CARE DISCHARGE: CPT | Performed by: INTERNAL MEDICINE

## 2019-09-14 PROCEDURE — 85027 COMPLETE CBC AUTOMATED: CPT | Performed by: PHYSICIAN ASSISTANT

## 2019-09-14 PROCEDURE — 80048 BASIC METABOLIC PNL TOTAL CA: CPT | Performed by: PHYSICIAN ASSISTANT

## 2019-09-14 PROCEDURE — 00000146 ZZHCL STATISTIC GLUCOSE BY METER IP

## 2019-09-14 PROCEDURE — 96366 THER/PROPH/DIAG IV INF ADDON: CPT

## 2019-09-14 PROCEDURE — 93306 TTE W/DOPPLER COMPLETE: CPT | Mod: 26 | Performed by: INTERNAL MEDICINE

## 2019-09-14 RX ORDER — METOPROLOL TARTRATE 25 MG/1
25 TABLET, FILM COATED ORAL 2 TIMES DAILY
Qty: 60 TABLET | Refills: 0 | Status: SHIPPED | OUTPATIENT
Start: 2019-09-14 | End: 2019-10-10

## 2019-09-14 RX ORDER — OMEPRAZOLE 20 MG/1
20 TABLET, DELAYED RELEASE ORAL DAILY
Qty: 30 TABLET | Refills: 0 | Status: SHIPPED | OUTPATIENT
Start: 2019-09-14 | End: 2019-10-22

## 2019-09-14 RX ADMIN — HUMAN ALBUMIN MICROSPHERES AND PERFLUTREN 3 ML: 10; .22 INJECTION, SOLUTION INTRAVENOUS at 10:45

## 2019-09-14 RX ADMIN — METOPROLOL TARTRATE 25 MG: 25 TABLET ORAL at 09:07

## 2019-09-14 RX ADMIN — CHLORTHALIDONE 25 MG: 25 TABLET ORAL at 09:06

## 2019-09-14 RX ADMIN — APIXABAN 2.5 MG: 2.5 TABLET, FILM COATED ORAL at 09:07

## 2019-09-14 RX ADMIN — FLUOXETINE 10 MG: 10 CAPSULE ORAL at 09:06

## 2019-09-14 RX ADMIN — POTASSIUM CHLORIDE 20 MEQ: 1500 TABLET, EXTENDED RELEASE ORAL at 09:06

## 2019-09-14 ASSESSMENT — MIFFLIN-ST. JEOR: SCORE: 1036.52

## 2019-09-14 NOTE — PLAN OF CARE
Patient's After Visit Summary was reviewed with patient and/or significant other.   Patient verbalized understanding of After Visit Summary, recommended follow up and was given an opportunity to ask questions.   Discharge medications sent home with patient/family: YES   Discharged with spouse    Patient alert/oriented. Resting comfortably. Reviewed discharge paperwork with patient and . Questions answered. Instructions given on new medications and medications to discontinue. Vital signs stable. Tele is A. Fib CVR.

## 2019-09-14 NOTE — DISCHARGE SUMMARY
Discharge Summary  Hospitalist Service    Tomasa Fam MRN# [id number]   YOB: 1940 Age: 79 year old     Date of Admission:  9/13/2019  Date of Discharge:  9/14/2019  Admitting Physician:  Lucy Wild MD  Discharge Physician: Lucy Wild MD  Discharging Service: Hospitalist Service     Primary Provider: Violet Fenton  Primary Care Physician Phone Number: [phone]         Discharge Diagnoses/Problem Oriented Hospital Course (Providers):    Tomasa Fam was admitted on 9/13/2019 by Lucy Wild MD and I would refer you to their history and physical.  The following problems were addressed during her hospitalization:      Atrial Fibrillation with rvr  Pauses while on combination BB and CCB  htn-significant sensitivity to normal   Remote hx of NSCLC  COPD without exacerbation              Code Status:      Full Code        Brief Hospital Stay Summary Sent Home With Patient in AVS:       Ms. Fam is an 79-year-old female with a past medical history notable for hypertension, HLP, non-oxygen dependent COPD, remote history of breast cancer in 2011, and history of non-small cell lung cancer in 2016 status post prior thoracotomy.    Recently seen in the outpatient setting and placed on azithromycin and is currently finishing a prednisone taper for diagnosis of acute bronchitis by Dr. Suero.    She has chronic hypertension but she is exquisitely sensitive when her blood pressure drops below 135 and she tends to get woozy and lightheaded.    She presents with a 1 to 2-week history of intermittent lightheadedness and dizziness that continued to worsen.  Her presenting evaluation is notable for atrial fibrillation with RVR into the 120-150 range.  She was placed on a diltiazem drip and transition to metoprolol.  Brief hospitalization is notable for pauses while on both BB and CCB.    She is currently quite sensitive to wooziness lightheadedness with too much blood pressure control,  reporting to me that if she has SBPs less than 135 she is often symptomatic.  For these reasons I checked carotid ultrasounds which did not show significant stenosis.    She has a chads VASC2 of 4 (age/hypertension/female sex) so therefore the strong candidate for anticoagulation.    1.  A. fib with RVR: Initially placed on adult drip which is been weaned and she is currently on metoprolol twice daily with good control.  TSH within normal limits.  She does not drink alcohol on a regular basis.  She has no symptoms to suggest obstructive sleep apnea.  Echocardiogram was completed and showed normal LV function at 55%, no evidence of atrial enlargement, indeterminant DD.  Given her chads VASC2 score of 4 she was initiated on anticoagulation.  Initially she was placed on apixaban, but she is actually on the cusp of dosage adjustment.  Typically dosing is 5 mg twice daily, but should be decreased to 2.5 mg twice daily for any 2 of the following: Weight less than or equal to 60 kg, age greater than or equal to 80, her creatinine greater than 1.5.  Her weight is 59.9 kg.  Multiple studies have shown that people as strokes well and does wax due to inappropriate low dosing of the DOAC.  Given that she is right there on the cusp, and after my discussion with pharmacy, it is impossible to know which dose to choose.  Therefore we will switch her over to rivaroxaban at 20 mg nightly-she is to start that tomorrow as she received a dose of apixaban this morning.    2.  Inability to tolerate BPs less than 135: No evidence of significant carotid stenosis.  Consider further evaluation if pervasive    3.  Mild pauses on telemetry while on diltiazem drip and oral metoprolol: Max pause was 2.3 seconds.  Has a Zio patch in place, will discharge on metoprolol 25 mg p.o. twice daily.    4.  Chronic non-oxygen dependent COPD, recent diagnosis of acute bronchitis.  She completed a course of azithromycin in the outpatient setting.  She is  currently on a prednisone wean.  She should complete the wean and I have recommended that she be on a PPI while on both prednisone and anticoagulation to prevent GI bleed.  Continue PTA inhalers    5.  Hypertension: PTA was on amlodipine 2.5 mg p.o. nightly, chlorthalidone 25 mg p.o. daily, and losartan 100 mg p.o. daily.  Held both the losartan and amlodipine.  I have continued the chlorthalidone and added and metoprolol 25 mg twice daily.  She will follow-up with her primary doctor in the next 5 to 7 days for reassessment    6.  Hyperlipidemia: Continue ezetimibe as an outpatient setting           Important Results:      As noted below         Pending Results:        Unresulted Labs Ordered in the Past 30 Days of this Admission     No orders found for last 31 day(s).            Discharge Instructions and Follow-Up:      Follow-up Appointments     Follow-up and recommended labs and tests       F/u with Dr Fenton in 5-7 days for recheck of BP and Atrial Fibrillation  Stop your aspirin, amlodipine and losartan    I switched from eliquis to xarelto based on dosing recommendations, you   should start the xarelto tomorrow (9/15/19) evening               Discharge Disposition:      Discharged to home         Discharge Medications:        Current Discharge Medication List      START taking these medications    Details   metoprolol tartrate (LOPRESSOR) 25 MG tablet Take 1 tablet (25 mg) by mouth 2 times daily  Qty: 60 tablet, Refills: 0    Associated Diagnoses: Essential hypertension      omeprazole (PRILOSEC OTC) 20 MG EC tablet Take 1 tablet (20 mg) by mouth daily Until til 7 days after you complete your prednisone taper  Qty: 30 tablet, Refills: 0    Associated Diagnoses: Gastroesophageal reflux disease with esophagitis      rivaroxaban ANTICOAGULANT (XARELTO ANTICOAGULANT) 20 MG TABS tablet Take 1 tablet (20 mg) by mouth daily (with dinner)  Qty: 30 tablet, Refills: 0    Associated Diagnoses: Chronic atrial fibrillation  (H)         CONTINUE these medications which have NOT CHANGED    Details   albuterol (PROAIR HFA/PROVENTIL HFA/VENTOLIN HFA) 108 (90 Base) MCG/ACT inhaler Inhale 1-2 puffs into the lungs every 6 hours as needed for shortness of breath / dyspnea or wheezing    Comments: Pharmacy may dispense brand covered by insurance (Proair, or proventil or ventolin or generic albuterol inhaler)      B Complex Vitamins (VITAMIN  B COMPLEX) tablet Take 1 tablet by mouth daily.      calcium-vitamin D (CALTRATE) 600-400 MG-UNIT per tablet Take 1 tablet by mouth 2 times daily      chlorthalidone (HYGROTON) 25 MG tablet TAKE 1 TABLET(25 MG) BY MOUTH DAILY  Qty: 90 tablet, Refills: 1    Associated Diagnoses: Essential hypertension with goal blood pressure less than 140/90      cholecalciferol (VITAMIN D) 400 UNIT TABS Take 400 Units by mouth daily      ezetimibe (ZETIA) 10 MG tablet Take 1 tablet (10 mg) by mouth daily  Qty: 90 tablet, Refills: 0    Associated Diagnoses: Hyperlipidemia LDL goal <160      FLUoxetine (PROZAC) 10 MG capsule TAKE 1 CAPSULE(10 MG) BY MOUTH DAILY  Qty: 90 capsule, Refills: 1    Associated Diagnoses: Decreased energy; Stress      Fluticasone-Umeclidin-Vilanterol (TRELEGY ELLIPTA) 100-62.5-25 MCG/INH oral inhaler Inhale 1 puff into the lungs daily      omega 3 1000 MG CAPS Take 1 g by mouth daily  Qty: 90 capsule      polyethylene glycol (MIRALAX/GLYCOLAX) powder Take 1 capful by mouth daily      potassium chloride ER (K-DUR/KLOR-CON M) 10 MEQ CR tablet TAKE 1 TABLET(10 MEQ) BY MOUTH TWICE DAILY  Qty: 180 tablet, Refills: 1    Associated Diagnoses: History of hypokalemia      predniSONE (DELTASONE) 10 MG tablet Take 10 mg by mouth See Admin Instructions Take 4 tablets daily for 2 days, then decrease by 1 tablet every 3 days until all gone         STOP taking these medications       amLODIPine (NORVASC) 2.5 MG tablet Comments:   Reason for Stopping:         aspirin 81 MG tablet Comments:   Reason for Stopping:   "       losartan (COZAAR) 100 MG tablet Comments:   Reason for Stopping:                 Allergies:         Allergies   Allergen Reactions     No Known Drug Allergies      Tape [Adhesive Tape]      Sensitive to plastic tape on upper part of body--takes skin off           Consultations This Hospital Stay:      No consultations were requested during this admission         Condition and Physical on Discharge:      Discharge condition: Stable   Vitals: Blood pressure 105/68, pulse 52, temperature 97.7  F (36.5  C), temperature source Oral, resp. rate 16, height 1.6 m (5' 3\"), weight 59.7 kg (131 lb 11.2 oz), SpO2 100 %, not currently breastfeeding.     Constitutional:  Pleasant no acute distress looks stated age head is normocephalic atraumatic and sclera are clear   Lungs:  Some rhonchorous findings but otherwise clear to auscultation she exhibits normal respiratory effort   Cardiovascular:  Irregularly irregular rhythm without murmurs rubs or gallops, telemetry strips are reviewed and she is A. fib with controlled ventricular rate 3 pauses were documented longest 1 2.3 seconds   Abdomen:  Soft nontender nondistended   Skin:  Warm dry no cyanosis or clubbing of the extremities   Other:  Affect is appropriate she is alert and oriented         Discharge Time:      Greater than 30 minutes.        Image Results From This Hospital Stay (For Non-EPIC Providers):        Results for orders placed or performed during the hospital encounter of 09/13/19   US Carotid Bilateral    Narrative    BILATERAL CAROTID ULTRASOUND   9/13/2019 5:21 PM     HISTORY: Dizziness.    COMPARISON: None.    RIGHT CAROTID FINDINGS:  Mild plaque.  Right ICA PSV:  74  cm/sec.  Right ICA EDV:  21 cm/sec.  Right ICA/CCA PSV Ratio:  0.8    These indicate less than 50% diameter stenosis of the right ICA.    Right Vertebral: Not visualized.  Right ECA: Antegrade flow.     LEFT CAROTID FINDINGS:  Mild plaque.  Left ICA PSV:  87  cm/sec.  Left ICA EDV:  19 " cm/sec.  Left ICA/CCA PSV Ratio:  0.9    These indicate less than 50% diameter stenosis of the left ICA.    Left Vertebral: Antegrade flow.   Left ECA: Antegrade flow.     Causes of Decreased Accuracy:   None.       Impression    IMPRESSION:    1. Less than 50% diameter stenosis of the right ICA relative to the  distal ICA diameter.  2. Less than 50% diameter stenosis of the left ICA relative to the  distal ICA diameter.   3. Right vertebral artery was not visualized on this study. Left  vertebral artery showed antegrade flow.    SHANDA HEIN MD           Most Recent Lab Results In EPIC (For Non-EPIC Providers):    Most Recent 3 CBC's:  Recent Labs   Lab Test 09/14/19  0621 09/13/19  1127 06/20/19  1359   WBC 9.2 12.1* 7.3   HGB 11.2* 12.7 11.7   MCV 87 87 85    355 254      Most Recent 3 BMP's:  Recent Labs   Lab Test 09/14/19  0621 09/13/19  1127 08/26/19  0832    133 134   POTASSIUM 3.8 3.5 3.7   CHLORIDE 99 97 98   CO2 30 29 29   BUN 17 24 13   CR 0.67 0.79 0.60   ANIONGAP 4 7 7   CHARLIE 9.6 10.1 9.8   GLC 93 124* 93     Most Recent 3 INR's:  Recent Labs   Lab Test 09/13/19  1127 03/01/16  0650   INR 0.99 0.94     Most Recent 2 LFT's:  Recent Labs   Lab Test 09/13/19  1127 08/26/19  0832   AST 13 13   ALT 24 20   ALKPHOS 66 68   BILITOTAL 0.4 0.6     Most Recent Cholesterol Panel:  Recent Labs   Lab Test 08/26/19  0832   CHOL 187   LDL 97   HDL 65   TRIG 124       Most Recent TSH, T4 and HgbA1c:   Recent Labs   Lab Test 09/13/19  1127 09/28/18  1843   TSH 2.99 4.32*   T4  --  0.96

## 2019-09-14 NOTE — PROGRESS NOTES
Called for pauses, in setting for diltiazem drip and BB therapy for atrial fib. hearet rate controlled, discontinue diltiazem and hold BB til seem by rounder in am

## 2019-09-14 NOTE — PLAN OF CARE
Pt came in 9/13, for dizziness and lightheadedness. Tachycardic, New A-fib. On dilt drip @5. HR in the 70;s. No complaints of pain, SOB, PADRON, lightheadedness, or dizziness. BP is stable, on Metoprolol. VS done every two hours. Tele Afib CVR. Pt has had 3 pauses this shift, the longest pause being 2.3 seconds. MD was paged and notified. Dilt drip shut off at 0404 per MD order. MD also wanted Metoprolol held until rounding hospitalist came in the AM (see MD note for clarification).  Reg diet, no caffeine. On eliquis. On high mag and K protocol. K replaced, recheck in the AM. Mag was replaced on eves, Mag 2.7. Up SBA.  Plan Is to monitor HR and treat bronchitis. zyo patch on. . Will continue to monitor.

## 2019-09-14 NOTE — PLAN OF CARE
Patient denied pain or discomfort. VS stable. MD updated r/t HR 63-98, continue Dilt gtt 5 mg/h. Had one pause 2.1, continue to monitor.

## 2019-09-14 NOTE — PROVIDER NOTIFICATION
MD updated r/t patient conditions: HR btw 63-98, , par MD continue Dilt gtt and IMC order entered.

## 2019-09-14 NOTE — PROVIDER NOTIFICATION
MD paged pt in 312 BP has had three pauses since 0130, longest being 2.3 seconds.     MD called back, MD wants to discontinue dilt drip, and hold metoprolol until rounding hospitalist comes in the AM.

## 2019-09-16 ENCOUNTER — TELEPHONE (OUTPATIENT)
Dept: FAMILY MEDICINE | Facility: CLINIC | Age: 79
End: 2019-09-16

## 2019-09-16 NOTE — TELEPHONE ENCOUNTER
Please contact patient for In-patient follow up.  [phone] (home)     Visit date: 9/14/2019  Diagnosis listed:Atrial Fibrillation (H), Gastroesophageal Reflux Disease with Esophagitis  Number of visits in past 12 months:0/0

## 2019-09-18 NOTE — PROGRESS NOTES
Subjective     Tomasa Fam is a 79 year old female who presents to clinic today for the following health issues:    HPI       Hospital Follow-up Visit:    Hospital/Nursing Home/IP Rehab Facility: [facility]  Date of Admission: 09/13/2019  Date of Discharge: 09/14/2019  Reason(s) for Admission: afib            Problems taking medications regularly:  None       Medication changes since discharge: yes-Metoprolol, prilosec,xarelto       Problems adhering to non-medication therapy:  None    Summary of hospitalization:  [facility] discharge summary reviewed  Diagnostic Tests/Treatments reviewed.  Follow up needed: Zio patch report once available.   Other Healthcare Providers Involved in Patient s Care:         None  Update since discharge: stable.     Post Discharge Medication Reconciliation: discharge medications reconciled, continue medications without change.  Plan of care communicated with patient     Coding guidelines for this visit:  Type of Medical   Decision Making Face-to-Face Visit       within 7 Days of discharge Face-to-Face Visit        within 14 days of discharge   Moderate Complexity 82024 [id number]   High Complexity [id number]                See under ROS     Patient Active Problem List   Diagnosis     Chronic airway obstruction (H)     ASCUS on Pap smear     Hyperlipidemia LDL goal <160     Breast cancer (H)     Essential hypertension with goal blood pressure less than 140/90     Cystocele, midline     Uterovaginal prolapse     S/P hysterectomy     Advanced directives, counseling/discussion     History of hypokalemia     Concussion     Thyroid nodule     Chronic pain of both knees     History of lung cancer     Atrial fibrillation (H)       Current Outpatient Medications   Medication Sig Dispense Refill     albuterol (PROAIR HFA/PROVENTIL HFA/VENTOLIN HFA) 108 (90 Base) MCG/ACT inhaler Inhale 1-2 puffs into the lungs every 6 hours as needed for shortness of breath / dyspnea or  wheezing       B Complex Vitamins (VITAMIN  B COMPLEX) tablet Take 1 tablet by mouth daily.       calcium-vitamin D (CALTRATE) 600-400 MG-UNIT per tablet Take 1 tablet by mouth 2 times daily       chlorthalidone (HYGROTON) 25 MG tablet TAKE 1 TABLET(25 MG) BY MOUTH DAILY 90 tablet 1     cholecalciferol (VITAMIN D) 400 UNIT TABS Take 400 Units by mouth daily       ezetimibe (ZETIA) 10 MG tablet Take 1 tablet (10 mg) by mouth daily 90 tablet 0     FLUoxetine (PROZAC) 10 MG capsule TAKE 1 CAPSULE(10 MG) BY MOUTH DAILY 90 capsule 1     Fluticasone-Umeclidin-Vilanterol (TRELEGY ELLIPTA) 100-62.5-25 MCG/INH oral inhaler Inhale 1 puff into the lungs daily       metoprolol tartrate (LOPRESSOR) 25 MG tablet Take 1 tablet (25 mg) by mouth 2 times daily 60 tablet 0     omega 3 1000 MG CAPS Take 1 g by mouth daily 90 capsule      omeprazole (PRILOSEC OTC) 20 MG EC tablet Take 1 tablet (20 mg) by mouth daily Until til 7 days after you complete your prednisone taper 30 tablet 0     polyethylene glycol (MIRALAX/GLYCOLAX) powder Take 1 capful by mouth daily       potassium chloride ER (K-DUR/KLOR-CON M) 10 MEQ CR tablet TAKE 1 TABLET(10 MEQ) BY MOUTH TWICE DAILY 180 tablet 1     predniSONE (DELTASONE) 10 MG tablet Take 10 mg by mouth See Admin Instructions Take 4 tablets daily for 2 days, then decrease by 1 tablet every 3 days until all gone       rivaroxaban ANTICOAGULANT (XARELTO ANTICOAGULANT) 20 MG TABS tablet Take 1 tablet (20 mg) by mouth daily (with dinner) 30 tablet 0           Reviewed and updated as needed this visit by Provider         Review of Systems   ROS COMP: CONSTITUTIONAL:NEGATIVE for fever, chills, change in weight  RESP:NEGATIVE for significant cough or SOB  CV: NEGATIVE for chest pain, palpitations or peripheral edema x occasional pounding in chest.  NEURO: feeling less dizziness; in the past, complained of this when her bp < 135  PSYCHIATRIC: NEGATIVE for changes in mood or affect      Started on Rivaroxaban  "for anticoagulation.  Has a Zio patch in place due to some pauses. Was discharged on metoprolol.    Asprin on hold. Amlodipine and losartan held. Put on metoprolol.      Notes numbers are good; bp, pulse and oxygen.  Can sometimes feel heart pounding.    Notes bp gets down to 111; but not feeling dizzy.      Objective    /62 (BP Location: Right arm, Cuff Size: Adult Regular)   Pulse 65   Temp 98  F (36.7  C) (Oral)   Resp 16   Ht 1.6 m (5' 3\")   Wt 60.9 kg (134 lb 3.2 oz)   LMP  (LMP Unknown)   SpO2 98%   BMI 23.77 kg/m    Body mass index is 23.77 kg/m .  Physical Exam   GENERAL APPEARANCE: alert and no distress  RESP: lungs clear to auscultation - no rales, rhonchi or wheezes  CV: irregularly irregular heart beat; controlled.   MS: no edema.  PSYCH: mentation appears normal and affect normal/bright          Assessment & Plan     1. Hospital discharge follow-up      2. Atrial fibrillation, unspecified type (H)  Recent diagnosis.  Is on anticoagulation.  Pauses were seen on Telemetry. She does have Zio patch monitor on.  I strongly encouraged Cardiology consult to follow and assist with any concerns on her monitor.   - rivaroxaban ANTICOAGULANT (XARELTO ANTICOAGULANT) 20 MG TABS tablet; Take 1 tablet (20 mg) by mouth daily (with dinner)  Dispense: 30 tablet; Refill: 3  - MED THERAPY MANAGE REFERRAL  - CARDIOLOGY EVAL ADULT REFERRAL    3. Essential hypertension with goal blood pressure less than 140/90  Currently satisfactory; now on metoprolol.   She is interested in MTM; have made referral.  - MED THERAPY MANAGE REFERRAL    4. Palpitations  Recently found to have a fib with RVR. This may have been some of her dizziness; perhaps not related to normal bp.   Continue to follow.   - CARDIOLOGY EVAL ADULT REFERRAL    5. Long term current use of anticoagulant therapy  On Xarelto.  She notes expensive.   Apixaban was considered but decided against due to being on the cusp for dosing.  She does not want to " deal with the monitoring of coumadin.   Will see if MTM and Cardiology can assist with this. Did spend time discussing the monitoring that goes with coumadin. Once stable,  Can often be 4-6 weeks.         Violet Fenton MD, MD  [facility]

## 2019-09-18 NOTE — TELEPHONE ENCOUNTER
"Pt has an appt scheduled for 9/19/19.     ED/Discharge Protocol    \"Hi, my name is Kylie Whyte RN, a registered nurse, and I am calling on behalf of Dr. Fenton's office at [place].  I am calling to follow up and see how things are going for you after your recent visit.\"    \"I see that you were in the (ER/UC/IP) on 9/13/19.    How are you doing now that you are home?\" doing pretty good    Is patient experiencing symptoms that may require a hospital visit?  no    Discharge Instructions    \"Let's review your discharge instructions.  What is/are the follow-up recommendations?  Pt. Response: She has an appt with Dr. Fenton tomorrow - 9/19/19    \"Were you instructed to make a follow-up appointment?\"  Pt. Response: Yes.  Has appointment been made?   Yes      \"When you see the provider, I would recommend that you bring your discharge instructions with you.    Medications    \"How many new medications are you on since your hospitalization/ED visit?\"    2 or more - Psychiatric MTM referral needed - she declines visit with MTM at this time.  She wants to speak to Dr. Fenton first.  \"How many of your current medicines changed (dose, timing, name, etc.) while you were in the hospital/ED visit?\"   2 or more - Psychiatric MTM referral needed - see above  \"Do you have questions about your medications?\"   She would like to discuss at appt tomorrow with Dr. Fenton  \"Were you newly diagnosed with heart failure, COPD, diabetes or did you have a heart attack?\"   No  For patients on insulin: \"Did you start on insulin in the hospital or did you have your insulin dose changed?\"   No    Medication reconciliation completed? Yes    Was MTM referral placed (*Make sure to put transitions as reason for referral)?   No    Call Summary    \"Do you have any questions or concerns about your condition or care plan at the moment?\"    No  Triage nurse advice given: none    Patient was in ER twice in the past year (assess appropriateness of ER visits.)      \"If " "you have questions or things don't continue to improve, we encourage you contact us through the main clinic number,  [phone].  Even if the clinic is not open, triage nurses are available 24/7 to help you.     We would like you to know that our clinic has extended hours (provide information).  We also have urgent care (provide details on closest location and hours/contact info)\"      \"Thank you for your time and take care!\"        "

## 2019-09-19 ENCOUNTER — OFFICE VISIT (OUTPATIENT)
Dept: FAMILY MEDICINE | Facility: CLINIC | Age: 79
End: 2019-09-19
Payer: MEDICARE

## 2019-09-19 VITALS
WEIGHT: 134.2 LBS | BODY MASS INDEX: 23.78 KG/M2 | TEMPERATURE: 98 F | HEIGHT: 63 IN | HEART RATE: 65 BPM | RESPIRATION RATE: 16 BRPM | OXYGEN SATURATION: 98 % | DIASTOLIC BLOOD PRESSURE: 62 MMHG | SYSTOLIC BLOOD PRESSURE: 122 MMHG

## 2019-09-19 DIAGNOSIS — Z09 HOSPITAL DISCHARGE FOLLOW-UP: Primary | ICD-10-CM

## 2019-09-19 DIAGNOSIS — I48.91 ATRIAL FIBRILLATION, UNSPECIFIED TYPE (H): ICD-10-CM

## 2019-09-19 DIAGNOSIS — R00.2 PALPITATIONS: ICD-10-CM

## 2019-09-19 DIAGNOSIS — Z79.01 LONG TERM CURRENT USE OF ANTICOAGULANT THERAPY: ICD-10-CM

## 2019-09-19 DIAGNOSIS — I10 ESSENTIAL HYPERTENSION WITH GOAL BLOOD PRESSURE LESS THAN 140/90: ICD-10-CM

## 2019-09-19 PROCEDURE — 99496 TRANSJ CARE MGMT HIGH F2F 7D: CPT | Performed by: FAMILY MEDICINE

## 2019-09-19 ASSESSMENT — MIFFLIN-ST. JEOR: SCORE: 1047.86

## 2019-09-24 ENCOUNTER — OFFICE VISIT (OUTPATIENT)
Dept: CARDIOLOGY | Facility: CLINIC | Age: 79
End: 2019-09-24
Attending: FAMILY MEDICINE
Payer: MEDICARE

## 2019-09-24 VITALS
BODY MASS INDEX: 23.83 KG/M2 | HEIGHT: 63 IN | SYSTOLIC BLOOD PRESSURE: 120 MMHG | WEIGHT: 134.5 LBS | DIASTOLIC BLOOD PRESSURE: 62 MMHG | HEART RATE: 68 BPM

## 2019-09-24 DIAGNOSIS — I10 ESSENTIAL HYPERTENSION: ICD-10-CM

## 2019-09-24 DIAGNOSIS — I48.91 ATRIAL FIBRILLATION, UNSPECIFIED TYPE (H): Primary | ICD-10-CM

## 2019-09-24 PROCEDURE — 99204 OFFICE O/P NEW MOD 45 MIN: CPT | Performed by: INTERNAL MEDICINE

## 2019-09-24 ASSESSMENT — MIFFLIN-ST. JEOR: SCORE: 1049.22

## 2019-09-24 NOTE — PROGRESS NOTES
SUBJECTIVE/OBJECTIVE:                Tomasa Fam is a 79 year old female coming in for a transitions of care visit.  She was discharged from [facility] on 9/14 for Atrial Fibrillation. Referred by Dr. Fenton.    Chief Complaint: She is mostly concerned about the cost of her medications, the Xarelto going forward and her Trelegy as well.    Allergies/ADRs: Tape (adhesive)  Tobacco: No tobacco use   Alcohol: 1 glass of wine with supper  Caffeine: 1 cups/day of coffee, 1 can/day of soda  Activity: Limited, knee pain hinders movement    Medication Adherence/Access:  Trelegy is expensive $150/month and has switched to taking Anora due to cost. If Xarelto is going to be expensive once her sample is done she will not take/ the medication.    Afib/HTN: Patient is currently taking Xarelto 20mg daily (from sample supply) for anticoagulation. Patient reports no current concerns of bruising or bleeding. Patient does not have a hx of GI bleed. She wonders about aspirin therapy vs warfarin/Xarelto. She also inquired about using warfarin over Xarelto as well and does not mind finger pricks and frequent INR monitoring. With warfarin, she is concerned about her variable diet, especially the timing of her meals each day. Patient has a calculated CHADS-VASc2 score of 4 and a HASBLED score of 2.  Patient is also taking metoprolol tartrate 25 mg twice daily and chlorthalidone 25 mg once daily. Also takes potassium chloride 10 mEq ER twice daily. Pt reports no current medication side effects.   Patient does self-monitor BP. Pt reports SBP~138 mmHg yesterday when checked at home prior to BP medications. When she goes below SBP ~120 mmHg she starts to feel fatigued and dizzy (especially when standing up).  Estimated Creatinine Clearance: 64.5 mL/min (based on SCr of 0.67 mg/dL).     BP Readings from Last 3 Encounters:   09/25/19 (P) 128/64   09/24/19 120/62   09/19/19 122/62       Hyperlipidemia: Current therapy  includes Ezetimibe (Zetia) 10mg once daily.  Pt reports no significant myalgias or other side effects. She has tried atorvastatin and simvastatin (per chart) in the past but had intolerable muscle pains, and not interesting in starting a different statin at this time. Willing to in the future.    Lab Results   Component Value Date    CHOL 187 08/26/2019     Lab Results   Component Value Date    HDL 65 08/26/2019     Lab Results   Component Value Date    LDL 97 08/26/2019     Lab Results   Component Value Date    TRIG 124 08/26/2019     Lab Results   Component Value Date    CHOLHDLRATIO 2.8 06/10/2015     Lab Results   Component Value Date    CHOL 187 08/26/2019     Lab Results   Component Value Date    HDL 65 08/26/2019     Lab Results   Component Value Date    LDL 97 08/26/2019     Lab Results   Component Value Date    TRIG 124 08/26/2019     Lab Results   Component Value Date    CHOLHDLRATIO 2.8 06/10/2015     COPD: Currently taking Anoro 1 puff once daily, albuterol PRN (has not needed for a couple of weeks), prednisone 10 mg once daily (part of taper, unknown how many tablets left). She had been on Trelegy for the past two weeks after meeting with pulmonologist, however it was a sample and it has run out as of yesterday. It is too expensive for her to fill now. She is now using Anoro which she had been on prior to Trelegy. The patient brought in her AVS from the pulmonologist visit 2 weeks ago and it put her in the severe COPD category, however she did have a respiratory infection at that time. Her breathing has been fine recently, only some difficulty breathing going up stairs or carrying things.    Knee Pain: Currently uses a cannabis/CBD cream that she feels works well. No additional concerns at this time.    Depression: Currently taking fluoxetine 10 mg once daily. She reports no issues at this time and is not sure if it is having an effect at this point. It was started on at a point in her life when things  where very stressful, but now it is hard to tell if it is adding any benefit for her.  PHQ-9 SCORE 1/20/2017 3/2/2018 3/15/2019   PHQ-9 Total Score 3 0 1     RYANNE-7 SCORE 1/20/2017 3/2/2018 3/15/2019   Total Score 1 0 0     Ulcer Prophylaxis: Currently taking omeprazole 20 mg once daily. She is to continue until 7 days after prednisone taper is complete. No side effects reported.     Supplements: Currently taking vitamin B complex 1 tablet once daily, calcium-vitamin D 600 mg-400 units once daily, and vitamin D 400 units once daily. No additional concerns at this time.    Constipation: Currently taking polyethylene glycol 1 capful once daily. No additional concerns at this time.    Today's Vitals: BP (P) 128/64   Wt 133 lb 9.6 oz (60.6 kg)   LMP  (LMP Unknown)   BMI 23.67 kg/m      ASSESSMENT:                 Current medications were reviewed today.      Medication Adherence/Access: needs improvement - see below    Afib/HTN: Needs improvement. The patient will not take Xarelto given how expensive it will be through her insurance, although covered still $150/month. She would benefit from a more affordable anticoagulant, such as warfarin, to prevent stroke with her Afib. With a CHADS-VASc2 score >2 and a HASBLED score of 2 the patient should be on an anticoagulant to prevent stroke. Educated pt on warfarin use with diet as she had questions. Patient is also meeting blood pressure goal of <140/90 mmHg.    Hyperlipidemia: Stable. Although patient is taking ezetimibe, patient could benefit more from a statin to reduce risk of stroke. At future visits, starting a trial of a statin she has not attempted, such as pravastatin, should be inquired about.    COPD: Improved. There is no alternative for Trelegy, however Anoro has a LAMA and LABA contained in Trelegy and should still provide the patient with benefits for her COPD. Anoro is less expensive than Trelegy, however still $80/month. She may benefit from speaking with   Prescription Assistance program to determine if there are possible ways for her to decrease the price of her COPD medications.    Knee Pain: Stable    Depression: Needs improvement. Pt preferred to discuss discontinuing fluoxetine at future visit as may be no longer needed.    Ulcer Prophylaxis: Stable.    Supplements: Stable.    Constipation: Stable    PLAN:                  Post Discharge Medication Reconciliation Status: discharge medications reconciled and changed, per note/orders (see AVS).  1. Patient will call [place] Prescription Assistance program to inquire about COPD inhaler pricing.  2. Referral for [facility] [facility] sent, they will reach out to patient to set up an appointment and begin warfarin.    Future: discontinue fluoxetine, switch ezetimibe to pravastatin    I spent 60 minutes with this patient today. All changes were made via collaborative practice agreement with Violet Fenton. A copy of the visit note was provided to the patient's primary care provider.    Will follow up in 2-3 months.    The patient was given a summary of these recommendations as an after visit summary.    Rm Lees, PharmD IV Student    Annabelle Petty, PharmD  Medication Therapy Management Provider, [facility]  Pager: [phone]

## 2019-09-24 NOTE — LETTER
9/24/2019      [place]  [address]  [facility] [address]      RE: Tomasa Speedy Fam       Dear Colleague,    I had the pleasure of seeing Tomasa Fam in the [facility].    Service Date: 09/24/2019      REFERRING PHYSICIAN:  Dr. Violet Fenton.      HISTORY OF PRESENT ILLNESS:  Ms. Fam is a very pleasant 79-year-old female who was recently hospitalized with weakness and dizziness, new onset atrial fibrillation with a rapid ventricular response.  She was treated with rate-controlling medication and placed on anticoagulation with Xarelto.  There was some concern about what type of anticoagulation.  She is on the cusp of requiring reduction in dose of Eliquis, so they opted to put her on Xarelto.  She also was sent home on metoprolol 25 mg twice daily.  She takes chlorthalidone for lower extremity swelling.  She has trouble with low blood pressures.  She is very sensitive if her blood pressure falls below 120s systolic.  She has not experienced significant weakness or dizziness since her discharge, although she does occasionally feel dizzy in the a.m.  She is unsure whether this may be a recurrence of atrial fibrillation or not.  She notes that she has only been on the metoprolol about a week and so she is not so sure whether or not she is going to be tolerating this medicine, again because of her sensitivity to low blood pressures and her occasional intermittent dizziness in the mornings.  She is concerned that if her blood pressure is low in the morning and she feels dizzy whether or not she should take the metoprolol.  She also has a problem with the cost of the anticoagulation and we spent some time today discussing the options for that as well.      PHYSICAL EXAMINATION:  On exam today, her blood pressure is 120/62, pulse was 68 and regular on auscultation indicating normal sinus today.  The rest of her physical exam was normal.      SUMMARY:  Ms.  Ayesha is a very pleasant 79-year-old female with an underlying history of hypertension, hyperlipidemia, new onset atrial fibrillation.  She is on low-dose metoprolol 25 mg short-acting b.i.d. and Xarelto for CVA prophylaxis with a CHADS-VASc score of 3.  We spent the majority of the appointment in discussion about different management options and the need for anticoagulation and options for this as well including adult sized aspirin which does not provide as much protection against CVA as the anticoagulant such as Coumadin and its novel alternatives.  She is very concerned about the cost and she is going to meet with the [place] pharmacist tomorrow to discuss further the options that best suit her for anticoagulation.  I have given her some more samples of Xarelto today to give her some more time to decide.  In regards to the metoprolol, I did talk about other options including digoxin, which may not be as effective in rate controlling as metoprolol, but would not lower blood pressure.  She is going to continue to take the metoprolol and give it a try for another couple of weeks before she decides whether or not she wants to try something different.  We also talked about rhythm controlling options as well which she did not seem too keen on.  I have instructed that if she feels dizzy and her blood pressure is low in the morning not to take her morning dose of the metoprolol.  She is on the lowest dose so she can choose not to take it if she is not feeling well or if her blood pressure is too low.        I will see her back in a few weeks.  Please feel free to contact me with any questions you have in regards to her care and thank you for allowing me to participate in the care of your nice patient.      cc:   Violet Fenton MD   [facility]    [address] [place]  [address]         MAGY MILLER DO             D: 09/24/2019   T: 09/24/2019   MT: ANGÉLICA      Name:     AYESHA  HAMIDA   MRN:      [id number]        Account:      [id number]   :      1940           Service Date: 2019      Document: [id number]         Outpatient Encounter Medications as of 2019   Medication Sig Dispense Refill     albuterol (PROAIR HFA/PROVENTIL HFA/VENTOLIN HFA) 108 (90 Base) MCG/ACT inhaler Inhale 1-2 puffs into the lungs every 6 hours as needed for shortness of breath / dyspnea or wheezing       B Complex Vitamins (VITAMIN  B COMPLEX) tablet Take 1 tablet by mouth daily.       calcium-vitamin D (CALTRATE) 600-400 MG-UNIT per tablet Take 1 tablet by mouth daily        chlorthalidone (HYGROTON) 25 MG tablet TAKE 1 TABLET(25 MG) BY MOUTH DAILY 90 tablet 1     cholecalciferol (VITAMIN D) 400 UNIT TABS Take 400 Units by mouth daily       ezetimibe (ZETIA) 10 MG tablet Take 1 tablet (10 mg) by mouth daily 90 tablet 0     FLUoxetine (PROZAC) 10 MG capsule TAKE 1 CAPSULE(10 MG) BY MOUTH DAILY 90 capsule 1     Fluticasone-Umeclidin-Vilanterol (TRELEGY ELLIPTA) 100-62.5-25 MCG/INH oral inhaler Inhale 1 puff into the lungs daily       metoprolol tartrate (LOPRESSOR) 25 MG tablet Take 1 tablet (25 mg) by mouth 2 times daily 60 tablet 0     omega 3 1000 MG CAPS Take 1 g by mouth daily 90 capsule      omeprazole (PRILOSEC OTC) 20 MG EC tablet Take 1 tablet (20 mg) by mouth daily Until til 7 days after you complete your prednisone taper 30 tablet 0     polyethylene glycol (MIRALAX/GLYCOLAX) powder Take 1 capful by mouth daily       potassium chloride ER (K-DUR/KLOR-CON M) 10 MEQ CR tablet TAKE 1 TABLET(10 MEQ) BY MOUTH TWICE DAILY 180 tablet 1     [] predniSONE (DELTASONE) 10 MG tablet Take 10 mg by mouth See Admin Instructions Take 4 tablets daily for 2 days, then decrease by 1 tablet every 3 days until all gone       rivaroxaban ANTICOAGULANT (XARELTO ANTICOAGULANT) 20 MG TABS tablet Take 1 tablet (20 mg) by mouth daily (with dinner) 30 tablet 3     No facility-administered encounter  medications on file as of 9/24/2019.        Again, thank you for allowing me to participate in the care of your patient.      Sincerely,    Kylie Sevilla,      [facility]

## 2019-09-24 NOTE — LETTER
9/24/2019    [place]  [address] [place] [address]    RE: Tomasa Fam       Dear Colleague,    I had the pleasure of seeing Tomasa Fam in the [facility].    HPI and Plan:   See dictation    Orders Placed This Encounter   Procedures     Follow-Up with Cardiac Advanced Practice Provider       No orders of the defined types were placed in this encounter.      There are no discontinued medications.      Encounter Diagnoses   Name Primary?     Atrial fibrillation, unspecified type (H) Yes     Essential hypertension        CURRENT MEDICATIONS:  Current Outpatient Medications   Medication Sig Dispense Refill     albuterol (PROAIR HFA/PROVENTIL HFA/VENTOLIN HFA) 108 (90 Base) MCG/ACT inhaler Inhale 1-2 puffs into the lungs every 6 hours as needed for shortness of breath / dyspnea or wheezing       B Complex Vitamins (VITAMIN  B COMPLEX) tablet Take 1 tablet by mouth daily.       calcium-vitamin D (CALTRATE) 600-400 MG-UNIT per tablet Take 1 tablet by mouth 2 times daily       chlorthalidone (HYGROTON) 25 MG tablet TAKE 1 TABLET(25 MG) BY MOUTH DAILY 90 tablet 1     cholecalciferol (VITAMIN D) 400 UNIT TABS Take 400 Units by mouth daily       ezetimibe (ZETIA) 10 MG tablet Take 1 tablet (10 mg) by mouth daily 90 tablet 0     FLUoxetine (PROZAC) 10 MG capsule TAKE 1 CAPSULE(10 MG) BY MOUTH DAILY 90 capsule 1     Fluticasone-Umeclidin-Vilanterol (TRELEGY ELLIPTA) 100-62.5-25 MCG/INH oral inhaler Inhale 1 puff into the lungs daily       metoprolol tartrate (LOPRESSOR) 25 MG tablet Take 1 tablet (25 mg) by mouth 2 times daily 60 tablet 0     omega 3 1000 MG CAPS Take 1 g by mouth daily 90 capsule      omeprazole (PRILOSEC OTC) 20 MG EC tablet Take 1 tablet (20 mg) by mouth daily Until til 7 days after you complete your prednisone taper 30 tablet 0     polyethylene glycol (MIRALAX/GLYCOLAX) powder Take 1 capful by mouth daily       potassium chloride ER  (K-DUR/KLOR-CON M) 10 MEQ CR tablet TAKE 1 TABLET(10 MEQ) BY MOUTH TWICE DAILY 180 tablet 1     predniSONE (DELTASONE) 10 MG tablet Take 10 mg by mouth See Admin Instructions Take 4 tablets daily for 2 days, then decrease by 1 tablet every 3 days until all gone       rivaroxaban ANTICOAGULANT (XARELTO ANTICOAGULANT) 20 MG TABS tablet Take 1 tablet (20 mg) by mouth daily (with dinner) 30 tablet 3       ALLERGIES     Allergies   Allergen Reactions     No Known Drug Allergies      Tape [Adhesive Tape]      Sensitive to plastic tape on upper part of body--takes skin off       PAST MEDICAL HISTORY:  Past Medical History:   Diagnosis Date     Arthritis     hands, knees     Breast cancer (H) jan. 2012     left mastectomy followed by right;  Dr. Luong     Chronic airway obstruction, not elsewhere classified 2006    very mild COPD - small cough     Diffuse cystic mastopathy      Lung cancer (H) 10/21/2015     Unspecified essential hypertension late 1980's       PAST SURGICAL HISTORY:  Past Surgical History:   Procedure Laterality Date     C NONSPECIFIC PROCEDURE  1970    s/p Tubal ligation 1970     COLONOSCOPY  3/2003    adenomatous polyp      COLONOSCOPY  7/2006    diverticulosis - repeat in 5 years     COLONOSCOPY  10/13/2011    Procedure:COLONOSCOPY; COLONOSCOPY ; Surgeon:CHITO GORDILLO; Location: GI     CYSTOSCOPY  7/11/2012    Procedure: CYSTOSCOPY;;  Surgeon: Aline Cooper DO;  Location:  OR     DAVINCI HYSTERECTOMY SUPRACERVICAL, SACROCOLPOPEXY, COMBINED  7/11/2012    Procedure: COMBINED DAVINCI HYSTERECTOMY SUPRACERVICAL, SACROCOLPOPEXY;   DAVINCI ASSISTED LAPAROSCOPIC SUPRACERVICAL HYSTERECTOMY AND BILATERAL SALPINGO-OOPHORECTOMY, SACROCOLPOPEXY AND CYSTOSCOPY;  Surgeon: Aline Cooper DO;  Location:  OR     EXCISE LESION EYELID Right 6/29/2015    Procedure: EXCISE LESION EYELID;  Surgeon: Hank Olvera MD;  Location:  SD     INSERT PORT VASCULAR ACCESS  2/3/2012    Procedure:INSERT  PORT VASCULAR ACCESS; Power Port-A- Catheter Placement ; Surgeon:IRISH TAPIA; Location:[facility] OR     LAPAROSCOPIC SALPINGO-OOPHORECTOMY  7/11/2012    Procedure: LAPAROSCOPIC SALPINGO-OOPHORECTOMY;  Davinci;  Surgeon: Aline Cooper DO;  Location: [facility] OR     LIPOSUCTION, RHYTIDECTOMY, COMBINED       LOBECTOMY LUNG Right 3/1/2016    Procedure: LOBECTOMY LUNG;  Surgeon: Jonas Woodward MD;  Location:  OR     MAMMOPLASTY REDUCTION  1/6/2012    Procedure:MAMMOPLASTY REDUCTION; Surgeon:MICKI KYLE; Location:[facility] OR     MASTECTOMY SIMPLE  11/12/2012    Procedure: MASTECTOMY SIMPLE;   Right Prophylactic Mastectomy with attempted Right Sentinal Node Biopsy, Revision Bilateral Mastectomy Insicions, liposuction in breast area;  Surgeon: Irish Tapia MD;  Location: [facility] OR     MASTECTOMY SIMPLE, SENTINEL NODE, COMBINED  1/6/2012    Procedure:COMBINED MASTECTOMY SIMPLE, SENTINEL NODE; Left Mastectomy Left Midway Node Biopsy,  Right Breast Reduction ; Surgeon:IRISH TAPIA; Location:[facility] OR     MASTECTOMY, BILATERAL       REMOVE PORT VASCULAR ACCESS  4/29/2013    Procedure: REMOVE PORT VASCULAR ACCESS;  Port A catheter removal ;  Surgeon: Irish Tapia MD;  Location: [facility] OR     REPAIR PTOSIS BILATERAL Bilateral 6/29/2015    Procedure: REPAIR PTOSIS BILATERAL;  Surgeon: Hank Olvera MD;  Location:  [facility]     REVISE RECONSTRUCTED BREAST BILATERAL  11/12/2012    Procedure: REVISE RECONSTRUCTED BREAST BILATERAL;;  Surgeon: Micki Kyle MD;  Location:  OR     SURGICAL HISTORY OF -   in 40's    face lift     SURGICAL HISTORY OF -       lipoma removed right thigh     SURGICAL HISTORY OF -       D and C     THORACOTOMY Right 3/1/2016    Procedure: THORACOTOMY;  Surgeon: Jonas Woodward MD;  Location:  OR       FAMILY HISTORY:  Family History   Problem Relation Age of Onset     Cardiovascular Father         ruptured aorta, hardening of the arteries     Cerebrovascular  Disease Mother      Respiratory Mother         chronic bronchitis - was a smoker early on     Cardiovascular Paternal Grandfather         MI     Cerebrovascular Disease Paternal Aunt      Hypertension Son      Neurologic Disorder Daughter         migraines     Breast Cancer Daughter      Brain Tumor Sister        SOCIAL HISTORY:  Social History     Socioeconomic History     Marital status:      Spouse name: Aren     Number of children: 2     Years of education: None     Highest education level: None   Occupational History     Occupation: [employer]     Employer: NONE    Social Needs     Financial resource strain: None     Food insecurity:     Worry: None     Inability: None     Transportation needs:     Medical: None     Non-medical: None   Tobacco Use     Smoking status: Never Smoker     Smokeless tobacco: Never Used   Substance and Sexual Activity     Alcohol use: Yes     Alcohol/week: 0.0 standard drinks     Comment: occasional; perhaps one per day     Drug use: No     Sexual activity: Yes     Partners: Male   Lifestyle     Physical activity:     Days per week: None     Minutes per session: None     Stress: None   Relationships     Social connections:     Talks on phone: None     Gets together: None     Attends Christian service: None     Active member of club or organization: None     Attends meetings of clubs or organizations: None     Relationship status: None     Intimate partner violence:     Fear of current or ex partner: None     Emotionally abused: None     Physically abused: None     Forced sexual activity: None   Other Topics Concern      Service Not Asked     Blood Transfusions Not Asked     Caffeine Concern Not Asked     Occupational Exposure Not Asked     Hobby Hazards Not Asked     Sleep Concern Not Asked     Stress Concern Not Asked     Weight Concern Not Asked     Special Diet Not Asked     Back Care Not Asked     Exercise Yes     Comment: curves     Bike Helmet Not Asked     Seat  "Belt Not Asked     Self-Exams Not Asked     Parent/sibling w/ CABG, MI or angioplasty before 65F 55M? Yes   Social History Narrative     None       Review of Systems:  Skin:  Positive for hair changes;itching hair loss off and on - itching on scalp   Eyes:  Positive for glasses    ENT:  Negative      Respiratory:  Positive for dyspnea on exertion stairs; COPD   Cardiovascular:    Positive for;palpitations;chest pain;lightheadedness chest pain due to gas per pt  Gastroenterology: Negative      Genitourinary:  Positive for nocturia    Musculoskeletal:  Positive for muscular weakness;joint pain;nocturnal cramping right knee - needs a replacement; weakness with low BP per pt  Neurologic:  Negative      Psychiatric:  Positive for   a little stress  Heme/Lymph/Imm:  Negative      Endocrine:  Negative        Physical Exam:  Vitals: /62 (BP Location: Right arm, Patient Position: Sitting, Cuff Size: Adult Regular)   Pulse 68   Ht 1.6 m (5' 3\")   Wt 61 kg (134 lb 8 oz)   LMP  (LMP Unknown)   BMI 23.83 kg/m       Constitutional:  cooperative;in no acute distress        Skin:  warm and dry to the touch          Head:  normocephalic        Eyes:  pupils equal and round        Lymph:      ENT:  no pallor or cyanosis        Neck:  carotid pulses are full and equal bilaterally        Respiratory:  clear to auscultation;normal symmetry         Cardiac: regular rhythm;no murmurs, gallops or rubs detected                pulses full and equal                                        GI:  abdomen soft;no bruits        Extremities and Muscular Skeletal:  no deformities, clubbing, cyanosis, erythema observed;no edema              Neurological:  no gross motor deficits;affect appropriate        Psych:  Alert and Oriented x 3          CC  Violet Fenton MD  [address] [address] [place] [address]                    Thank you for allowing me to participate in the care of your patient.      Sincerely,     Kylie Sevilla, DO "     [facility]    cc:   Violet Fenton MD  [address] [address]

## 2019-09-24 NOTE — PROGRESS NOTES
Service Date: 09/24/2019      REFERRING PHYSICIAN:  Dr. Violet Fenton.      HISTORY OF PRESENT ILLNESS:  Ms. Fam is a very pleasant 79-year-old female who was recently hospitalized with weakness and dizziness, new onset atrial fibrillation with a rapid ventricular response.  She was treated with rate-controlling medication and placed on anticoagulation with Xarelto.  There was some concern about what type of anticoagulation.  She is on the cusp of requiring reduction in dose of Eliquis, so they opted to put her on Xarelto.  She also was sent home on metoprolol 25 mg twice daily.  She takes chlorthalidone for lower extremity swelling.  She has trouble with low blood pressures.  She is very sensitive if her blood pressure falls below 120s systolic.  She has not experienced significant weakness or dizziness since her discharge, although she does occasionally feel dizzy in the a.m.  She is unsure whether this may be a recurrence of atrial fibrillation or not.  She notes that she has only been on the metoprolol about a week and so she is not so sure whether or not she is going to be tolerating this medicine, again because of her sensitivity to low blood pressures and her occasional intermittent dizziness in the mornings.  She is concerned that if her blood pressure is low in the morning and she feels dizzy whether or not she should take the metoprolol.  She also has a problem with the cost of the anticoagulation and we spent some time today discussing the options for that as well.      PHYSICAL EXAMINATION:  On exam today, her blood pressure is 120/62, pulse was 68 and regular on auscultation indicating normal sinus today.  The rest of her physical exam was normal.      SUMMARY:  Ms. Fam is a very pleasant 79-year-old female with an underlying history of hypertension, hyperlipidemia, new onset atrial fibrillation.  She is on low-dose metoprolol 25 mg short-acting b.i.d. and Xarelto for CVA prophylaxis with a  CHADS-VASc score of 3.  We spent the majority of the appointment in discussion about different management options and the need for anticoagulation and options for this as well including adult sized aspirin which does not provide as much protection against CVA as the anticoagulant such as Coumadin and its novel alternatives.  She is very concerned about the cost and she is going to meet with the [place] pharmacist tomorrow to discuss further the options that best suit her for anticoagulation.  I have given her some more samples of Xarelto today to give her some more time to decide.  In regards to the metoprolol, I did talk about other options including digoxin, which may not be as effective in rate controlling as metoprolol, but would not lower blood pressure.  She is going to continue to take the metoprolol and give it a try for another couple of weeks before she decides whether or not she wants to try something different.  We also talked about rhythm controlling options as well which she did not seem too keen on.  I have instructed that if she feels dizzy and her blood pressure is low in the morning not to take her morning dose of the metoprolol.  She is on the lowest dose so she can choose not to take it if she is not feeling well or if her blood pressure is too low.        I will see her back in a few weeks.  Please feel free to contact me with any questions you have in regards to her care and thank you for allowing me to participate in the care of your nice patient.      cc:   [place] [address]         MAGY MILLER DO             D: 2019   T: 2019   MT: ANGÉLICA      Name:     HAMIDA HODGE   MRN:      [id number]        Account:      [id number]   :      1940           Service Date: 2019      Document: [id number]

## 2019-09-24 NOTE — PROGRESS NOTES
HPI and Plan:   See dictation    Orders Placed This Encounter   Procedures     Follow-Up with Cardiac Advanced Practice Provider       No orders of the defined types were placed in this encounter.      There are no discontinued medications.      Encounter Diagnoses   Name Primary?     Atrial fibrillation, unspecified type (H) Yes     Essential hypertension        CURRENT MEDICATIONS:  Current Outpatient Medications   Medication Sig Dispense Refill     albuterol (PROAIR HFA/PROVENTIL HFA/VENTOLIN HFA) 108 (90 Base) MCG/ACT inhaler Inhale 1-2 puffs into the lungs every 6 hours as needed for shortness of breath / dyspnea or wheezing       B Complex Vitamins (VITAMIN  B COMPLEX) tablet Take 1 tablet by mouth daily.       calcium-vitamin D (CALTRATE) 600-400 MG-UNIT per tablet Take 1 tablet by mouth 2 times daily       chlorthalidone (HYGROTON) 25 MG tablet TAKE 1 TABLET(25 MG) BY MOUTH DAILY 90 tablet 1     cholecalciferol (VITAMIN D) 400 UNIT TABS Take 400 Units by mouth daily       ezetimibe (ZETIA) 10 MG tablet Take 1 tablet (10 mg) by mouth daily 90 tablet 0     FLUoxetine (PROZAC) 10 MG capsule TAKE 1 CAPSULE(10 MG) BY MOUTH DAILY 90 capsule 1     Fluticasone-Umeclidin-Vilanterol (TRELEGY ELLIPTA) 100-62.5-25 MCG/INH oral inhaler Inhale 1 puff into the lungs daily       metoprolol tartrate (LOPRESSOR) 25 MG tablet Take 1 tablet (25 mg) by mouth 2 times daily 60 tablet 0     omega 3 1000 MG CAPS Take 1 g by mouth daily 90 capsule      omeprazole (PRILOSEC OTC) 20 MG EC tablet Take 1 tablet (20 mg) by mouth daily Until til 7 days after you complete your prednisone taper 30 tablet 0     polyethylene glycol (MIRALAX/GLYCOLAX) powder Take 1 capful by mouth daily       potassium chloride ER (K-DUR/KLOR-CON M) 10 MEQ CR tablet TAKE 1 TABLET(10 MEQ) BY MOUTH TWICE DAILY 180 tablet 1     predniSONE (DELTASONE) 10 MG tablet Take 10 mg by mouth See Admin Instructions Take 4 tablets daily for 2 days, then decrease by 1 tablet  every 3 days until all gone       rivaroxaban ANTICOAGULANT (XARELTO ANTICOAGULANT) 20 MG TABS tablet Take 1 tablet (20 mg) by mouth daily (with dinner) 30 tablet 3       ALLERGIES     Allergies   Allergen Reactions     No Known Drug Allergies      Tape [Adhesive Tape]      Sensitive to plastic tape on upper part of body--takes skin off       PAST MEDICAL HISTORY:  Past Medical History:   Diagnosis Date     Arthritis     hands, knees     Breast cancer (H) jan. 2012     left mastectomy followed by right;  Dr. Luong     Chronic airway obstruction, not elsewhere classified 2006    very mild COPD - small cough     Diffuse cystic mastopathy      Lung cancer (H) 10/21/2015     Unspecified essential hypertension late 1980's       PAST SURGICAL HISTORY:  Past Surgical History:   Procedure Laterality Date     C NONSPECIFIC PROCEDURE  1970    s/p Tubal ligation 1970     COLONOSCOPY  3/2003    adenomatous polyp      COLONOSCOPY  7/2006    diverticulosis - repeat in 5 years     COLONOSCOPY  10/13/2011    Procedure:COLONOSCOPY; COLONOSCOPY ; Surgeon:CHITO GORDILLO; Location: GI     CYSTOSCOPY  7/11/2012    Procedure: CYSTOSCOPY;;  Surgeon: Aline Cooper DO;  Location:  OR     DAVINCI HYSTERECTOMY SUPRACERVICAL, SACROCOLPOPEXY, COMBINED  7/11/2012    Procedure: COMBINED DAVINCI HYSTERECTOMY SUPRACERVICAL, SACROCOLPOPEXY;   DAVINCI ASSISTED LAPAROSCOPIC SUPRACERVICAL HYSTERECTOMY AND BILATERAL SALPINGO-OOPHORECTOMY, SACROCOLPOPEXY AND CYSTOSCOPY;  Surgeon: Aline Cooper DO;  Location:  OR     EXCISE LESION EYELID Right 6/29/2015    Procedure: EXCISE LESION EYELID;  Surgeon: Hank Olvera MD;  Location: [facility]     INSERT PORT VASCULAR ACCESS  2/3/2012    Procedure:INSERT PORT VASCULAR ACCESS; Power Port-A- Catheter Placement ; Surgeon:TRESSA TAPIA; Location: OR     LAPAROSCOPIC SALPINGO-OOPHORECTOMY  7/11/2012    Procedure: LAPAROSCOPIC SALPINGO-OOPHORECTOMY;  Davinci;  Surgeon: Aline Cooper  DO Jordana;  Location:  OR     LIPOSUCTION, RHYTIDECTOMY, COMBINED       LOBECTOMY LUNG Right 3/1/2016    Procedure: LOBECTOMY LUNG;  Surgeon: Jonas Woodward MD;  Location:  OR     MAMMOPLASTY REDUCTION  1/6/2012    Procedure:MAMMOPLASTY REDUCTION; Surgeon:MICKI KYLE; Location:[facility] OR     MASTECTOMY SIMPLE  11/12/2012    Procedure: MASTECTOMY SIMPLE;   Right Prophylactic Mastectomy with attempted Right Sentinal Node Biopsy, Revision Bilateral Mastectomy Insicions, liposuction in breast area;  Surgeon: Irish Tapia MD;  Location: [facility] OR     MASTECTOMY SIMPLE, SENTINEL NODE, COMBINED  1/6/2012    Procedure:COMBINED MASTECTOMY SIMPLE, SENTINEL NODE; Left Mastectomy Left [place] Node Biopsy,  Right Breast Reduction ; Surgeon:IRISH TAPIA; Location:[facility] OR     MASTECTOMY, BILATERAL       REMOVE PORT VASCULAR ACCESS  4/29/2013    Procedure: REMOVE PORT VASCULAR ACCESS;  Port A catheter removal ;  Surgeon: Irish Tapia MD;  Location: [facility] OR     REPAIR PTOSIS BILATERAL Bilateral 6/29/2015    Procedure: REPAIR PTOSIS BILATERAL;  Surgeon: Hank Olvera MD;  Location:  [facility]     REVISE RECONSTRUCTED BREAST BILATERAL  11/12/2012    Procedure: REVISE RECONSTRUCTED BREAST BILATERAL;;  Surgeon: Micki Kyle MD;  Location: [facility] OR     SURGICAL HISTORY OF -   in 40's    face lift     SURGICAL HISTORY OF -       lipoma removed right thigh     SURGICAL HISTORY OF -       D and C     THORACOTOMY Right 3/1/2016    Procedure: THORACOTOMY;  Surgeon: Jonas Woodward MD;  Location:  OR       FAMILY HISTORY:  Family History   Problem Relation Age of Onset     Cardiovascular Father         ruptured aorta, hardening of the arteries     Cerebrovascular Disease Mother      Respiratory Mother         chronic bronchitis - was a smoker early on     Cardiovascular Paternal Grandfather         MI     Cerebrovascular Disease Paternal Aunt      Hypertension Son      Neurologic Disorder  Daughter         migraines     Breast Cancer Daughter      Brain Tumor Sister        SOCIAL HISTORY:  Social History     Socioeconomic History     Marital status:      Spouse name: Aren     Number of children: 2     Years of education: None     Highest education level: None   Occupational History     Occupation: [employer]     Employer: NONE    Social Needs     Financial resource strain: None     Food insecurity:     Worry: None     Inability: None     Transportation needs:     Medical: None     Non-medical: None   Tobacco Use     Smoking status: Never Smoker     Smokeless tobacco: Never Used   Substance and Sexual Activity     Alcohol use: Yes     Alcohol/week: 0.0 standard drinks     Comment: occasional; perhaps one per day     Drug use: No     Sexual activity: Yes     Partners: Male   Lifestyle     Physical activity:     Days per week: None     Minutes per session: None     Stress: None   Relationships     Social connections:     Talks on phone: None     Gets together: None     Attends [employer] service: None     Active member of club or organization: None     Attends meetings of clubs or organizations: None     Relationship status: None     Intimate partner violence:     Fear of current or ex partner: None     Emotionally abused: None     Physically abused: None     Forced sexual activity: None   Other Topics Concern      Service Not Asked     Blood Transfusions Not Asked     Caffeine Concern Not Asked     Occupational Exposure Not Asked     Hobby Hazards Not Asked     Sleep Concern Not Asked     Stress Concern Not Asked     Weight Concern Not Asked     Special Diet Not Asked     Back Care Not Asked     Exercise Yes     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 Yes   Social History Narrative     None       Review of Systems:  Skin:  Positive for hair changes;itching hair loss off and on -  "itching on scalp   Eyes:  Positive for glasses    ENT:  Negative      Respiratory:  Positive for dyspnea on exertion stairs; COPD   Cardiovascular:    Positive for;palpitations;chest pain;lightheadedness chest pain due to gas per pt  Gastroenterology: Negative      Genitourinary:  Positive for nocturia    Musculoskeletal:  Positive for muscular weakness;joint pain;nocturnal cramping right knee - needs a replacement; weakness with low BP per pt  Neurologic:  Negative      Psychiatric:  Positive for   a little stress  Heme/Lymph/Imm:  Negative      Endocrine:  Negative        Physical Exam:  Vitals: /62 (BP Location: Right arm, Patient Position: Sitting, Cuff Size: Adult Regular)   Pulse 68   Ht 1.6 m (5' 3\")   Wt 61 kg (134 lb 8 oz)   LMP  (LMP Unknown)   BMI 23.83 kg/m      Constitutional:  cooperative;in no acute distress        Skin:  warm and dry to the touch          Head:  normocephalic        Eyes:  pupils equal and round        Lymph:      ENT:  no pallor or cyanosis        Neck:  carotid pulses are full and equal bilaterally        Respiratory:  clear to auscultation;normal symmetry         Cardiac: regular rhythm;no murmurs, gallops or rubs detected                pulses full and equal                                        GI:  abdomen soft;no bruits        Extremities and Muscular Skeletal:  no deformities, clubbing, cyanosis, erythema observed;no edema              Neurological:  no gross motor deficits;affect appropriate        Psych:  Alert and Oriented x 3          CC  Violet Fenton MD  [id number] [id number]                  "

## 2019-09-25 ENCOUNTER — OFFICE VISIT (OUTPATIENT)
Dept: PHARMACY | Facility: CLINIC | Age: 79
End: 2019-09-25
Payer: COMMERCIAL

## 2019-09-25 VITALS — WEIGHT: 133.6 LBS | BODY MASS INDEX: 23.67 KG/M2

## 2019-09-25 DIAGNOSIS — I10 ESSENTIAL HYPERTENSION WITH GOAL BLOOD PRESSURE LESS THAN 140/90: ICD-10-CM

## 2019-09-25 DIAGNOSIS — F39 MOOD DISORDER (H): ICD-10-CM

## 2019-09-25 DIAGNOSIS — Z78.9 TAKES DIETARY SUPPLEMENTS: ICD-10-CM

## 2019-09-25 DIAGNOSIS — K59.00 CONSTIPATION, UNSPECIFIED CONSTIPATION TYPE: ICD-10-CM

## 2019-09-25 DIAGNOSIS — I48.91 ATRIAL FIBRILLATION, UNSPECIFIED TYPE (H): Primary | ICD-10-CM

## 2019-09-25 DIAGNOSIS — E78.5 HYPERLIPIDEMIA LDL GOAL <160: ICD-10-CM

## 2019-09-25 DIAGNOSIS — J44.9 CHRONIC OBSTRUCTIVE PULMONARY DISEASE, UNSPECIFIED COPD TYPE (H): ICD-10-CM

## 2019-09-25 DIAGNOSIS — R52 PAIN: ICD-10-CM

## 2019-09-25 DIAGNOSIS — Z91.89 AT RISK FOR STRESS ULCER: ICD-10-CM

## 2019-09-25 PROCEDURE — 99605 MTMS BY PHARM NP 15 MIN: CPT | Performed by: PHARMACIST

## 2019-09-25 PROCEDURE — 99607 MTMS BY PHARM ADDL 15 MIN: CPT | Performed by: PHARMACIST

## 2019-09-25 NOTE — PATIENT INSTRUCTIONS
Recommendations from today's MTM visit:                                                    MTM (medication therapy management) is a service provided by a clinical pharmacist designed to help you get the most of out of your medicines.   Today we reviewed what your medicines are for, how to know if they are working, that your medicines are safe and how to make your medicine regimen as easy as possible.     1. Call [facility] Assistance program: [phone]    2. The [facility] will follow-up with you to set up an appointment and start warfarin.    It was great to speak with you today.  I value your experience and would be very thankful for your time with providing feedback on our clinic survey. You may receive a survey via email or text message in the next few days.     To schedule another MTM appointment, please call the clinic directly or you may call the MTM scheduling line at [phone] or toll-free at [phone].     My Clinical Pharmacist's contact information:                                                      It was a pleasure talking with you today!  Please feel free to contact me with any questions or concerns you have.      Annabelle Petty, PharmD  Medication Therapy Management Provider, [facility]  Pager: [phone]

## 2019-09-28 ENCOUNTER — APPOINTMENT (OUTPATIENT)
Dept: CT IMAGING | Facility: CLINIC | Age: 79
End: 2019-09-28
Attending: EMERGENCY MEDICINE
Payer: MEDICARE

## 2019-09-28 ENCOUNTER — HOSPITAL ENCOUNTER (EMERGENCY)
Facility: CLINIC | Age: 79
Discharge: HOME | End: 2019-09-28
Attending: EMERGENCY MEDICINE | Admitting: EMERGENCY MEDICINE
Payer: MEDICARE

## 2019-09-28 VITALS
OXYGEN SATURATION: 99 % | DIASTOLIC BLOOD PRESSURE: 64 MMHG | SYSTOLIC BLOOD PRESSURE: 136 MMHG | TEMPERATURE: 97.7 F | RESPIRATION RATE: 21 BRPM | HEART RATE: 87 BPM

## 2019-09-28 DIAGNOSIS — R51.9 ACUTE NONINTRACTABLE HEADACHE, UNSPECIFIED HEADACHE TYPE: ICD-10-CM

## 2019-09-28 DIAGNOSIS — I10 ESSENTIAL HYPERTENSION: ICD-10-CM

## 2019-09-28 LAB
ANION GAP SERPL CALCULATED.3IONS-SCNC: 6 MMOL/L (ref 3–14)
BASOPHILS # BLD AUTO: 0 10E9/L (ref 0–0.2)
BASOPHILS NFR BLD AUTO: 0.2 %
BUN SERPL-MCNC: 19 MG/DL (ref 7–30)
CALCIUM SERPL-MCNC: 9.8 MG/DL (ref 8.5–10.1)
CHLORIDE SERPL-SCNC: 88 MMOL/L (ref 94–109)
CO2 SERPL-SCNC: 32 MMOL/L (ref 20–32)
CREAT SERPL-MCNC: 0.7 MG/DL (ref 0.52–1.04)
DIFFERENTIAL METHOD BLD: ABNORMAL
EOSINOPHIL # BLD AUTO: 0.1 10E9/L (ref 0–0.7)
EOSINOPHIL NFR BLD AUTO: 0.5 %
ERYTHROCYTE [DISTWIDTH] IN BLOOD BY AUTOMATED COUNT: 13.5 % (ref 10–15)
GFR SERPL CREATININE-BSD FRML MDRD: 81 ML/MIN/{1.73_M2}
GLUCOSE SERPL-MCNC: 101 MG/DL (ref 70–99)
HCT VFR BLD AUTO: 39.1 % (ref 35–47)
HGB BLD-MCNC: 13.2 G/DL (ref 11.7–15.7)
IMM GRANULOCYTES # BLD: 0.1 10E9/L (ref 0–0.4)
IMM GRANULOCYTES NFR BLD: 0.6 %
INR PPP: 1.01 (ref 0.86–1.14)
LYMPHOCYTES # BLD AUTO: 1.8 10E9/L (ref 0.8–5.3)
LYMPHOCYTES NFR BLD AUTO: 14.7 %
MCH RBC QN AUTO: 28.5 PG (ref 26.5–33)
MCHC RBC AUTO-ENTMCNC: 33.8 G/DL (ref 31.5–36.5)
MCV RBC AUTO: 84 FL (ref 78–100)
MONOCYTES # BLD AUTO: 1 10E9/L (ref 0–1.3)
MONOCYTES NFR BLD AUTO: 7.8 %
NEUTROPHILS # BLD AUTO: 9.4 10E9/L (ref 1.6–8.3)
NEUTROPHILS NFR BLD AUTO: 76.2 %
NRBC # BLD AUTO: 0 10*3/UL
NRBC BLD AUTO-RTO: 0 /100
PLATELET # BLD AUTO: 264 10E9/L (ref 150–450)
POTASSIUM SERPL-SCNC: 3.5 MMOL/L (ref 3.4–5.3)
RBC # BLD AUTO: 4.63 10E12/L (ref 3.8–5.2)
SODIUM SERPL-SCNC: 126 MMOL/L (ref 133–144)
WBC # BLD AUTO: 12.4 10E9/L (ref 4–11)

## 2019-09-28 PROCEDURE — 96375 TX/PRO/DX INJ NEW DRUG ADDON: CPT

## 2019-09-28 PROCEDURE — 25000128 H RX IP 250 OP 636: Performed by: EMERGENCY MEDICINE

## 2019-09-28 PROCEDURE — 93005 ELECTROCARDIOGRAM TRACING: CPT

## 2019-09-28 PROCEDURE — 99285 EMERGENCY DEPT VISIT HI MDM: CPT | Mod: 25

## 2019-09-28 PROCEDURE — 96374 THER/PROPH/DIAG INJ IV PUSH: CPT | Mod: 59

## 2019-09-28 PROCEDURE — 96361 HYDRATE IV INFUSION ADD-ON: CPT

## 2019-09-28 PROCEDURE — 70450 CT HEAD/BRAIN W/O DYE: CPT | Mod: XS

## 2019-09-28 PROCEDURE — 25000125 ZZHC RX 250: Performed by: EMERGENCY MEDICINE

## 2019-09-28 PROCEDURE — 80048 BASIC METABOLIC PNL TOTAL CA: CPT | Performed by: EMERGENCY MEDICINE

## 2019-09-28 PROCEDURE — 70498 CT ANGIOGRAPHY NECK: CPT

## 2019-09-28 PROCEDURE — 85025 COMPLETE CBC W/AUTO DIFF WBC: CPT | Performed by: EMERGENCY MEDICINE

## 2019-09-28 PROCEDURE — 85610 PROTHROMBIN TIME: CPT | Performed by: EMERGENCY MEDICINE

## 2019-09-28 RX ORDER — METOCLOPRAMIDE HYDROCHLORIDE 5 MG/ML
5 INJECTION INTRAMUSCULAR; INTRAVENOUS ONCE
Status: COMPLETED | OUTPATIENT
Start: 2019-09-28 | End: 2019-09-28

## 2019-09-28 RX ORDER — MORPHINE SULFATE 4 MG/ML
4 INJECTION, SOLUTION INTRAMUSCULAR; INTRAVENOUS ONCE
Status: COMPLETED | OUTPATIENT
Start: 2019-09-28 | End: 2019-09-28

## 2019-09-28 RX ORDER — DIPHENHYDRAMINE HYDROCHLORIDE 50 MG/ML
25 INJECTION INTRAMUSCULAR; INTRAVENOUS ONCE
Status: COMPLETED | OUTPATIENT
Start: 2019-09-28 | End: 2019-09-28

## 2019-09-28 RX ORDER — HYDRALAZINE HYDROCHLORIDE 20 MG/ML
10 INJECTION INTRAMUSCULAR; INTRAVENOUS ONCE
Status: COMPLETED | OUTPATIENT
Start: 2019-09-28 | End: 2019-09-28

## 2019-09-28 RX ORDER — IOPAMIDOL 755 MG/ML
500 INJECTION, SOLUTION INTRAVASCULAR ONCE
Status: COMPLETED | OUTPATIENT
Start: 2019-09-28 | End: 2019-09-28

## 2019-09-28 RX ORDER — SODIUM CHLORIDE 9 MG/ML
1000 INJECTION, SOLUTION INTRAVENOUS CONTINUOUS
Status: DISCONTINUED | OUTPATIENT
Start: 2019-09-28 | End: 2019-09-28 | Stop reason: HOSPADM

## 2019-09-28 RX ORDER — DEXAMETHASONE SODIUM PHOSPHATE 10 MG/ML
10 INJECTION, SOLUTION INTRAMUSCULAR; INTRAVENOUS ONCE
Status: COMPLETED | OUTPATIENT
Start: 2019-09-28 | End: 2019-09-28

## 2019-09-28 RX ADMIN — MORPHINE SULFATE 4 MG: 4 INJECTION INTRAVENOUS at 19:43

## 2019-09-28 RX ADMIN — METOCLOPRAMIDE 5 MG: 5 INJECTION, SOLUTION INTRAMUSCULAR; INTRAVENOUS at 19:43

## 2019-09-28 RX ADMIN — HYDRALAZINE HYDROCHLORIDE 10 MG: 20 INJECTION INTRAMUSCULAR; INTRAVENOUS at 20:20

## 2019-09-28 RX ADMIN — SODIUM CHLORIDE 1000 ML: 9 INJECTION, SOLUTION INTRAVENOUS at 19:43

## 2019-09-28 RX ADMIN — DEXAMETHASONE SODIUM PHOSPHATE 10 MG: 10 INJECTION, SOLUTION INTRAMUSCULAR; INTRAVENOUS at 19:43

## 2019-09-28 RX ADMIN — IOPAMIDOL 70 ML: 755 INJECTION, SOLUTION INTRAVENOUS at 19:55

## 2019-09-28 RX ADMIN — SODIUM CHLORIDE 80 ML: 9 INJECTION, SOLUTION INTRAVENOUS at 19:56

## 2019-09-28 RX ADMIN — DIPHENHYDRAMINE HYDROCHLORIDE 25 MG: 50 INJECTION, SOLUTION INTRAMUSCULAR; INTRAVENOUS at 19:42

## 2019-09-28 ASSESSMENT — ENCOUNTER SYMPTOMS
VOMITING: 1
NECK STIFFNESS: 1
HEADACHES: 1
NAUSEA: 1

## 2019-09-28 NOTE — ED AVS SNAPSHOT
[facility] Emergency Department  [address]  [facility] [address]  Phone:  [phone]  Fax:  [phone]                                    Tomasa Fam   MRN: [id number]    Department:  [facility] Emergency Department   Date of Visit:  9/28/2019           After Visit Summary Signature Page    I have received my discharge instructions, and my questions have been answered. I have discussed any challenges I see with this plan with the nurse or doctor.    ..........................................................................................................................................  Patient/Patient Representative Signature      ..........................................................................................................................................  Patient Representative Print Name and Relationship to Patient    ..................................................               ................................................  Date                                   Time    ..........................................................................................................................................  Reviewed by Signature/Title    ...................................................              ..............................................  Date                                               Time          22EPIC Rev 08/18

## 2019-09-28 NOTE — ED TRIAGE NOTES
"Right posterior headache since 1800 this evening. Vomited right after the onset of the headache and BP at home was high.  Lights \"bothering my eyes\"  Recently diagnosed with afib and \"started on a whole bunch of new medications, they told me to watch for a stroke\"  "

## 2019-09-28 NOTE — ED PROVIDER NOTES
"  History     Chief Complaint:  Headache    HPI   Tomasa Fam is a 79 year old female with a history of breast cancer, hypertension, hyperlipidemia, lung cancer, amongst others, anticoagulated on Xarelto who presents with headache. The patient reports that within the past hour she developed a headache followed by neck stiffness, nausea, and vomiting. She describes that since she vomited, however, the nausea has improved. She describes that the headache is a 4/10 on severity and started in her eyes, primarily the right, and has since transitioned to the posterior head. The patient recalls that she has had issues with the right eyes for several years though. She was diagnosed with atrial fibrillation 2 weeks ago and has since been started on Xarelto, which she takes nightly and has not taken yet today nor any of her other evening medications. The patient denies head trauma, injuries, or falls today. She notes that she has had headaches in the past, but never been diagnosed with migraines and the last one she had was \"a long time ago\".     Allergies:  No known drug allergies.       Medications:    Albuterol  Caltrate  Hygroton  Zetia  Prozac  Trelegy Ellipta  Lopressor  Prilosec  Miralax  K-Dur/Klor-Con  Xarelto  Anoro Ellipta     Past Medical History:    arthritis  Breast cancer  Hypokalemia  Thyroid nodule   Atrial fibrillation    Uterovaginal prolapse   Chronic airway obstruction  Diffuse cystic mastopathy  Lung cancer  Hypertension  ASCUS on pap smear   Hyperlipidemia  Breast cancer  Cystocele     Past Surgical History:    Tubal ligation  Colonoscopy x2  Cystoscopy   Hysterectomy supracervical sacrocolpopexy  Excise lesion eyelid   Insert port vascular access  Salpingo-oophorectomy  Liposuction, rhytidectomy  Lobectomy   Mammoplasty reduction  Mastectomy x3  Remove port vascular access  Repair ptosis bilateral   Revise reconstructed breast bilateral   Face lift  Lipoma removed right thigh  D&C  thoracotomy "     Family History:    Father: ruptured aorta  Mother: Cerebrovascular Disease, chronic bronchitis   Son: hypertension  Sister: brain tumor  Daughter: breast cancer, migraines   Paternal aunt: Cerebrovascular Disease  Paternal grandfather: MI     Social History:  The patient was accompanied to the ED by .  Smoking Status: Never Smoker  Smokeless Tobacco: Never Used  Alcohol Use: Positive  Drug Use: Negative  PCP: Violet Fenton   Marital Status:        Review of Systems   Gastrointestinal: Positive for nausea and vomiting.   Musculoskeletal: Positive for neck stiffness.   Neurological: Positive for headaches.   All other systems reviewed and are negative.    Physical Exam     Patient Vitals for the past 24 hrs:   BP Temp Temp src Pulse Heart Rate Resp SpO2   09/28/19 2115 (!) 144/65 -- -- 80 80 -- --   09/28/19 2100 137/75 -- -- 68 71 22 98 %   09/28/19 2045 126/56 -- -- 62 66 14 96 %   09/28/19 2030 (!) 142/60 -- -- 79 -- -- 98 %   09/28/19 2020 (!) 180/85 -- -- -- -- -- --   09/28/19 2015 (!) 180/85 -- -- 74 -- -- --   09/28/19 1945 (!) 191/86 -- -- 57 58 22 99 %   09/28/19 1930 (!) 191/79 -- -- 61 65 19 99 %   09/28/19 1915 (!) 193/81 -- -- 67 67 27 100 %   09/28/19 1900 (!) 194/92 -- -- 64 71 21 100 %   09/28/19 1850 (!) 208/90 97.7  F (36.5  C) Oral -- 65 18 100 %   09/28/19 1845 (!) 208/90 -- -- 71 -- -- 100 %      Physical Exam  Constitutional:       Appearance: She is well-developed.   HENT:      Right Ear: Tympanic membrane and external ear normal.      Left Ear: Tympanic membrane and external ear normal.      Mouth/Throat:      Mouth: Mucous membranes are moist.      Pharynx: No oropharyngeal exudate.   Eyes:      General: No scleral icterus.     Conjunctiva/sclera: Conjunctivae normal.      Pupils: Pupils are equal, round, and reactive to light.   Neck:      Musculoskeletal: Normal range of motion and neck supple. No neck rigidity.      Vascular: No JVD.   Cardiovascular:      Rate and  Rhythm: Normal rate and regular rhythm.      Heart sounds: Normal heart sounds. No murmur. No friction rub. No gallop.    Pulmonary:      Effort: Pulmonary effort is normal. No respiratory distress.      Breath sounds: Normal breath sounds. No wheezing or rales.   Abdominal:      General: Bowel sounds are normal. There is no distension.      Palpations: Abdomen is soft. There is no mass.      Tenderness: There is no tenderness.   Musculoskeletal: Normal range of motion.   Skin:     General: Skin is warm and dry.      Capillary Refill: Capillary refill takes less than 2 seconds.      Findings: No rash.   Neurological:      General: No focal deficit present.      Mental Status: She is alert.      Cranial Nerves: No cranial nerve deficit.      Sensory: No sensory deficit.      Coordination: Coordination normal.   Psychiatric:         Mood and Affect: Mood normal.           Emergency Department Course     ECG:  ECG taken at 1851, ECG read at 1853  Normal sinus rhythm  Possible left atrial enlargement   Borderline ECG  Rate 70 bpm. MN interval 192 ms. QRS duration 86 ms. QT/QTc 402/434 ms. P-R-T axes 62 49 53.    Imaging:  Radiology findings were communicated with the patient who voiced understanding of the findings.    CT Head w/o Contrast  1.  No acute process intracranially. Mild chronic white matter changes. No specific CT evidence for subarachnoid hemorrhage is identified. Nonspecific gas density cavernous sinus and extracranially at the suprazygomatic and  spaces may be   iatrogenic or related to trauma. Please correlate clinically.  Reading per radiology    CT Head Neck Angio w/o & w Contrast  HEAD CTA:   1.  Mild ectasia versus 2 mm saccular outpouching of the medially directed aspect of the right supraclinoid ICA with no additional evidence for aneurysm. There is no high-grade intracranial stenosis with mild narrowing cavernous ICA segments and left MCA   M1 segment. Gas density at the cavernous sinus  level and suprahyoid neck deep fascial spaces may be iatrogenic related to injection on the basis of trauma and is indeterminate.. Nothing for dissection. Nothing for large vessel occlusion. No evidence for   contrast extravasation/blood product leak including at the level of the supraclinoid right ICA noted.  NECK CTA:   1.  Narrowing of the CCA bifurcations and ICA level proximally bilaterally left more than right as above mild to moderate in degree.  Reading per radiology      Laboratory:  Laboratory findings were communicated with the patient who voiced understanding of the findings.    CBC: WBC 12.4 (H), HGB 13.2,   BMP:  (L), Chloride 88 (L), Glucose 101 (H), o/w WNL (Creatinine 0.70)    INR: 1.01    Interventions:  1942 Benadryl 25 mg IV  1943 NS 1000 mL  IV  1943 Decadron 10 mg IV  1943 Reglan 5 mg IV   2020 Hydralazine 10 mg IV     Emergency Department Course:    1850 Nursing notes and vitals reviewed. I performed an exam of the patient as documented above.     1851 EKG obtained as noted above.     1902 IV was inserted and blood was drawn for laboratory testing, results above.     1952 The patient was sent for a CT while in the emergency department, results above.      2031 Patient rechecked and updated.  The patient has no pain or nausea.     2108 Patient rechecked and updated.      2128 Prior to dishcarge, I personally reviewed the results with the patient and all related questions were answered. The patient  verbalized understanding and is amenable to plan.     Impression & Plan      Medical Decision Making:  Tomasa Fam is a 79 year old female who presents to the emergency department today for evaluation of hypertension headache, nausea, vomiting. Her headache was a sudden and she did present fairly right after that. No history of similar headaches. Her headache was more posterior although it started more on the right side. She was given hydralazine to treat blood pressure and was  send for CT and CTA. She was on a blood thinner therefore we did want to rule out bleed. Her blood pressure reduced down to the 120'2-140's range after Hydralazine. Her symptoms are now gone and its likely that she may have hypertension induced headache. I do not believe that this is a subarachnoid hemorrhage. There is no evidence of bleed and her CT was performed right after onset so the sensitivity is fairly good. We discussed stopping chorthalidone due to her low sodium which she was somewhat hesitant to do but she did eventually agree to restart her Amlodipine. She is going to follow up with her doctor in the next couple of days and they can discuss switching up her blood pressure medication. She is comfortable with that. I asked her to return if symptoms worsen and she is aware that she needs to stop chlorthalidone  tonight and starting Amlodipine in the morning.     Diagnosis:    ICD-10-CM    1. Essential hypertension I10    2. Acute nonintractable headache, unspecified headache type R51      Disposition:   The patient is discharged to home.     Discharge Medications:  No discharge medications.     Scribe Disclosure:  I, Orla Severson, am serving as a scribe at 6:53 PM on 9/28/2019 to document services personally performed by Jazzy Louis MD based on my observations and the provider's statements to me.    [facility] EMERGENCY DEPARTMENT       Jazzy Louis MD  09/28/19 2841

## 2019-09-29 NOTE — DISCHARGE INSTRUCTIONS
Stop chlorthalidone due to low sodium  Restart your amlodipine tomorrow  Continue amlodipine until you get in to see your doctor to discuss your BP medications  Continue all other medications

## 2019-09-29 NOTE — ED NOTES
Pt eating and drinking without nausea, headache has improved. Walked to bathroom without issue.  at bedside.

## 2019-09-30 ENCOUNTER — OFFICE VISIT (OUTPATIENT)
Dept: FAMILY MEDICINE | Facility: CLINIC | Age: 79
End: 2019-09-30
Payer: MEDICARE

## 2019-09-30 VITALS
TEMPERATURE: 97.8 F | SYSTOLIC BLOOD PRESSURE: 146 MMHG | HEIGHT: 63 IN | OXYGEN SATURATION: 99 % | RESPIRATION RATE: 16 BRPM | BODY MASS INDEX: 24.33 KG/M2 | HEART RATE: 66 BPM | DIASTOLIC BLOOD PRESSURE: 62 MMHG | WEIGHT: 137.3 LBS

## 2019-09-30 DIAGNOSIS — I10 ESSENTIAL HYPERTENSION WITH GOAL BLOOD PRESSURE LESS THAN 140/90: ICD-10-CM

## 2019-09-30 DIAGNOSIS — I48.91 ATRIAL FIBRILLATION, UNSPECIFIED TYPE (H): Primary | ICD-10-CM

## 2019-09-30 LAB — INTERPRETATION ECG - MUSE: NORMAL

## 2019-09-30 PROCEDURE — 99214 OFFICE O/P EST MOD 30 MIN: CPT | Performed by: PHYSICIAN ASSISTANT

## 2019-09-30 RX ORDER — AMLODIPINE BESYLATE 2.5 MG/1
5 TABLET ORAL DAILY
Qty: 30 TABLET | Refills: 1
Start: 2019-09-30 | End: 2019-10-24

## 2019-09-30 RX ORDER — AMLODIPINE BESYLATE 2.5 MG/1
TABLET ORAL
Refills: 1 | COMMUNITY
Start: 2019-05-23 | End: 2019-09-30

## 2019-09-30 ASSESSMENT — MIFFLIN-ST. JEOR: SCORE: 1061.92

## 2019-09-30 NOTE — PROGRESS NOTES
"Subjective     Tomasa Fam is a 79 year old female who presents to clinic today for the following health issues:    HPI   ED Followup:    Facility: [facility]  Date of visit: 9/28/19  Reason for visit: Headache, nausea    Current Status: Pt has still had vision problems since being seen, states she has \"swirly eyes\".      Patient is an 80yo female who presents for med check/fu after a new onset a fib and uncontrolled hypertension  Initial dx for a fib was 9/13/19   She has also suffered from uncontrolled blood pressure during the episodes   -her meds were changed multiple times   -did have some low sodium recently and previously; stopped chlorthalidone    -not potassium??  She DID restart amlodipine last night - 2.5mg  She is thinking of starting on coumadin; meeting with anticoagulation next wee      Today she still notes a bit of \"swirlyness\" in the eyes once in awhile  Noted just a bit in the morning, not for a while   -she has had these for 45 years, but seemed worse last Saturday  Does feel lower on energy   Headaches have largely improved       Patient Active Problem List   Diagnosis     Chronic airway obstruction (H)     ASCUS on Pap smear     Hyperlipidemia LDL goal <160     Breast cancer (H)     Essential hypertension with goal blood pressure less than 140/90     Cystocele, midline     Uterovaginal prolapse     S/P hysterectomy     Advanced directives, counseling/discussion     History of hypokalemia     Concussion     Thyroid nodule     Chronic pain of both knees     History of lung cancer     Atrial fibrillation (H)     Past Surgical History:   Procedure Laterality Date     C NONSPECIFIC PROCEDURE  1970    s/p Tubal ligation 1970     COLONOSCOPY  3/2003    adenomatous polyp      COLONOSCOPY  7/2006    diverticulosis - repeat in 5 years     COLONOSCOPY  10/13/2011    Procedure:COLONOSCOPY; COLONOSCOPY ; Surgeon:CHITO GORDILLO; Location: GI     CYSTOSCOPY  7/11/2012    Procedure: CYSTOSCOPY;;  " Surgeon: Aline Cooper DO;  Location:  OR     DAVINCI HYSTERECTOMY SUPRACERVICAL, SACROCOLPOPEXY, COMBINED  7/11/2012    Procedure: COMBINED DAVINCI HYSTERECTOMY SUPRACERVICAL, SACROCOLPOPEXY;   DAVINCI ASSISTED LAPAROSCOPIC SUPRACERVICAL HYSTERECTOMY AND BILATERAL SALPINGO-OOPHORECTOMY, SACROCOLPOPEXY AND CYSTOSCOPY;  Surgeon: Aline Cooper DO;  Location:  OR     EXCISE LESION EYELID Right 6/29/2015    Procedure: EXCISE LESION EYELID;  Surgeon: Hank Olvera MD;  Location:  SD     INSERT PORT VASCULAR ACCESS  2/3/2012    Procedure:INSERT PORT VASCULAR ACCESS; Power Port-A- Catheter Placement ; Surgeon:IRISH TAPIA; Location:[facility] OR     LAPAROSCOPIC SALPINGO-OOPHORECTOMY  7/11/2012    Procedure: LAPAROSCOPIC SALPINGO-OOPHORECTOMY;  Davinci;  Surgeon: Aline Cooper DO;  Location:  OR     LIPOSUCTION, RHYTIDECTOMY, COMBINED       LOBECTOMY LUNG Right 3/1/2016    Procedure: LOBECTOMY LUNG;  Surgeon: Jonas Woodward MD;  Location:  OR     MAMMOPLASTY REDUCTION  1/6/2012    Procedure:MAMMOPLASTY REDUCTION; Surgeon:MICKI CAICEDO; Location:[facility] OR     MASTECTOMY SIMPLE  11/12/2012    Procedure: MASTECTOMY SIMPLE;   Right Prophylactic Mastectomy with attempted Right Sentinal Node Biopsy, Revision Bilateral Mastectomy Insicions, liposuction in breast area;  Surgeon: Irish Tapia MD;  Location: [facility] OR     MASTECTOMY SIMPLE, SENTINEL NODE, COMBINED  1/6/2012    Procedure:COMBINED MASTECTOMY SIMPLE, SENTINEL NODE; Left Mastectomy Left Loyal Node Biopsy,  Right Breast Reduction ; Surgeon:IRISH TAPIA; Location:[facility] OR     MASTECTOMY, BILATERAL       REMOVE PORT VASCULAR ACCESS  4/29/2013    Procedure: REMOVE PORT VASCULAR ACCESS;  Port A catheter removal ;  Surgeon: Irish Tapia MD;  Location: [facility] OR     REPAIR PTOSIS BILATERAL Bilateral 6/29/2015    Procedure: REPAIR PTOSIS BILATERAL;  Surgeon: Hank Olvera MD;  Location:  [facility]     REVISE  "RECONSTRUCTED BREAST BILATERAL  11/12/2012    Procedure: REVISE RECONSTRUCTED BREAST BILATERAL;;  Surgeon: Katia Kyle MD;  Location: [facility] OR     SURGICAL HISTORY OF -   in 40's    face lift     SURGICAL HISTORY OF -       lipoma removed right thigh     SURGICAL HISTORY OF -       D and C     THORACOTOMY Right 3/1/2016    Procedure: THORACOTOMY;  Surgeon: Jonas Woodward MD;  Location: [facility] OR       Social History     Tobacco Use     Smoking status: Never Smoker     Smokeless tobacco: Never Used   Substance Use Topics     Alcohol use: Yes     Alcohol/week: 0.0 standard drinks     Comment: occasional; perhaps one per day     Family History   Problem Relation Age of Onset     Cardiovascular Father         ruptured aorta, hardening of the arteries     Cerebrovascular Disease Mother      Respiratory Mother         chronic bronchitis - was a smoker early on     Cardiovascular Paternal Grandfather         MI     Cerebrovascular Disease Paternal Aunt      Hypertension Son      Neurologic Disorder Daughter         migraines     Breast Cancer Daughter      Brain Tumor Sister            Reviewed and updated as needed this visit by Provider         Review of Systems   ROS COMP: Constitutional, HEENT, cardiovascular, pulmonary, gi and gu systems are negative, except as otherwise noted.      Objective    BP (!) 146/62   Pulse 66   Temp 97.8  F (36.6  C) (Tympanic)   Resp 16   Ht 1.6 m (5' 3\")   Wt 62.3 kg (137 lb 4.8 oz)   LMP  (LMP Unknown)   SpO2 99%   BMI 24.32 kg/m    Body mass index is 24.32 kg/m .  Physical Exam   GENERAL: healthy, alert and no distress  NECK: thyroid normal to palpation and no carotid bruits  RESP: lungs clear to auscultation - no rales, rhonchi or wheezes  CV: TODAY I did not note irregularity; RRR, soft systolic murmur noted   MS: No peripheral edema   PSYCH: mentation appears normal, affect normal/bright    Diagnostic Test Results:  Labs reviewed in Epic        Assessment & " Plan     1. Atrial fibrillation, unspecified type (H)  2. Essential hypertension with goal blood pressure less than 140/90  ON exam today I did not note irregularity of rhythm. The BP is just slightly elevated systolic, will add another 2.5mg dose amlodipine. She is now off thhiazide diuretic so I will stop her potassium. She will need this updated with primary care provider at next visit. We discussed the dose dependent swelling with increased dosing of the CCB but she will be additionally going off of diuretic so hard to know if swelling occurs the etiology. She will continue to check BP at home. She otherwise feels well today, just overwhelmed with the recent developments.   - amLODIPine (NORVASC) 2.5 MG tablet; Take 2 tablets (5 mg) by mouth daily  Dispense: 30 tablet; Refill: 1       Return in about 24 days (around 10/24/2019) for With Dr. Prabhakar .    Jourdan Chu PA-C  [facility]

## 2019-10-03 ENCOUNTER — HEALTH MAINTENANCE LETTER (OUTPATIENT)
Age: 79
End: 2019-10-03

## 2019-10-09 ENCOUNTER — TRANSFERRED RECORDS (OUTPATIENT)
Dept: HEALTH INFORMATION MANAGEMENT | Facility: CLINIC | Age: 79
End: 2019-10-09

## 2019-10-09 ENCOUNTER — TRANSFERRED RECORDS (OUTPATIENT)
Dept: SURGERY | Facility: CLINIC | Age: 79
End: 2019-10-09

## 2019-10-10 DIAGNOSIS — I10 ESSENTIAL HYPERTENSION: ICD-10-CM

## 2019-10-10 NOTE — TELEPHONE ENCOUNTER
"Requested Prescriptions   Pending Prescriptions Disp Refills     metoprolol tartrate (LOPRESSOR) 25 MG tablet 60 tablet 0     Sig: Take 1 tablet (25 mg) by mouth 2 times daily   Last Written Prescription Date:  9/14/19  Last Fill Quantity: 60,  # refills: 0   Last office visit: 9/30/2019 with prescribing provider:  Jourdan Chu PA-C   Future Office Visit:   Next 5 appointments (look out 90 days)    Oct 22, 2019  1:50 PM CDT  Pharmacist visit with Annabelle Petty RPH, CR EXAM ROOM 36  [facility] ([facility]) [address] [address] [address]  [phone]   Oct 23, 2019  2:30 PM CDT  Return Visit with Kyung Lobo PA-C  [facility] ([facility]) [address]  [facility] [id number] [phone]   Oct 24, 2019  9:10 AM CDT  Office Visit with Violet Fenton MD  [facility] [address] [address] [address]  [phone]             Beta-Blockers Protocol Failed - 10/10/2019  1:21 PM        Failed - Blood pressure under 140/90 in past 12 months     BP Readings from Last 3 Encounters:   09/30/19 (!) 146/62   09/28/19 136/64   09/25/19 (P) 128/64                 Passed - Patient is age 6 or older        Passed - Recent (12 mo) or future (30 days) visit within the authorizing provider's specialty     Patient has had an office visit with the authorizing provider or a provider within the authorizing providers department within the previous 12 mos or has a future within next 30 days. See \"Patient Info\" tab in inbasket, or \"Choose Columns\" in Meds & Orders section of the refill encounter.              Passed - Medication is active on med list         "

## 2019-10-11 ENCOUNTER — DOCUMENTATION ONLY (OUTPATIENT)
Dept: CARDIOLOGY | Facility: CLINIC | Age: 79
End: 2019-10-11

## 2019-10-11 DIAGNOSIS — I10 ESSENTIAL HYPERTENSION: ICD-10-CM

## 2019-10-11 RX ORDER — METOPROLOL TARTRATE 25 MG/1
25 TABLET, FILM COATED ORAL 2 TIMES DAILY
Qty: 60 TABLET | Refills: 0 | Status: SHIPPED | OUTPATIENT
Start: 2019-10-11 | End: 2019-11-07

## 2019-10-11 RX ORDER — METOPROLOL TARTRATE 25 MG/1
TABLET, FILM COATED ORAL
Qty: 180 TABLET | Refills: 0 | OUTPATIENT
Start: 2019-10-11

## 2019-10-11 NOTE — TELEPHONE ENCOUNTER
Routing refill request to provider for review/approval because:  Labs out of range:  bp    Med pended for 90 day supply with reminder

## 2019-10-11 NOTE — TELEPHONE ENCOUNTER
Pt calling to follow up on refill request. Pt stated she knows Dr. Fenton is not in clinic on  Fridays and would like to know what is going to happen with her request.     Pt would like to have a call back from RN with update.

## 2019-10-11 NOTE — TELEPHONE ENCOUNTER
"Requested Prescriptions   Pending Prescriptions Disp Refills     metoprolol tartrate (LOPRESSOR) 25 MG tablet [Pharmacy Med Name: METOPROLOL TARTRATE 25MG TABLETS] 180 tablet 0     Sig: TAKE 1 TABLET BY MOUTH TWICE DAILY   Last Written Prescription Date:  10/11/19  Last Fill Quantity: 60,  # refills: 0   Last office visit: 9/30/2019 with prescribing provider:  Jourdan Chu PA-C   Future Office Visit:   Next 5 appointments (look out 90 days)    Oct 22, 2019  1:50 PM CDT  Pharmacist visit with Annabelle Petty RPH, CR EXAM ROOM 36  [facility] ([facility]) [address] [address] [address] [phone]   Oct 23, 2019  2:30 PM CDT  Return Visit with Kyung Lobo PA-C  [facility] ([facility]) [address]  [facility] [address] [phone]   Oct 24, 2019  9:10 AM CDT  Office Visit with Violet Fenton MD  [facility] ([facility]) [address] [address] [address] [phone]             Beta-Blockers Protocol Failed - 10/11/2019 11:32 AM        Failed - Blood pressure under 140/90 in past 12 months     BP Readings from Last 3 Encounters:   09/30/19 (!) 146/62   09/28/19 136/64   09/25/19 (P) 128/64                 Passed - Patient is age 6 or older        Passed - Recent (12 mo) or future (30 days) visit within the authorizing provider's specialty     Patient has had an office visit with the authorizing provider or a provider within the authorizing providers department within the previous 12 mos or has a future within next 30 days. See \"Patient Info\" tab in inbasket, or \"Choose Columns\" in Meds & Orders section of the refill encounter.              Passed - Medication is active on med list         "

## 2019-10-11 NOTE — TELEPHONE ENCOUNTER
Patient seen by DM 9/30/2019. appt with Dr. Fenton 10/24.    Medication is being filled for 1 time refill only due to:  Patient needs to be seen because BP elevated..     Patient informed of refill sent to pharmacy.    Carlie Burns RN

## 2019-10-17 ENCOUNTER — TELEPHONE (OUTPATIENT)
Dept: FAMILY MEDICINE | Facility: CLINIC | Age: 79
End: 2019-10-17

## 2019-10-17 DIAGNOSIS — I10 ESSENTIAL HYPERTENSION WITH GOAL BLOOD PRESSURE LESS THAN 140/90: ICD-10-CM

## 2019-10-17 DIAGNOSIS — I10 ESSENTIAL HYPERTENSION WITH GOAL BLOOD PRESSURE LESS THAN 140/90: Primary | ICD-10-CM

## 2019-10-17 DIAGNOSIS — R52 GENERALIZED PAIN: ICD-10-CM

## 2019-10-17 DIAGNOSIS — F43.9 STRESS: ICD-10-CM

## 2019-10-17 DIAGNOSIS — R53.83 DECREASED ENERGY: ICD-10-CM

## 2019-10-17 LAB
ANION GAP SERPL CALCULATED.3IONS-SCNC: 5 MMOL/L (ref 3–14)
BUN SERPL-MCNC: 12 MG/DL (ref 7–30)
CALCIUM SERPL-MCNC: 9.7 MG/DL (ref 8.5–10.1)
CHLORIDE SERPL-SCNC: 100 MMOL/L (ref 94–109)
CO2 SERPL-SCNC: 29 MMOL/L (ref 20–32)
CREAT SERPL-MCNC: 0.6 MG/DL (ref 0.52–1.04)
GFR SERPL CREATININE-BSD FRML MDRD: 86 ML/MIN/{1.73_M2}
GLUCOSE SERPL-MCNC: 112 MG/DL (ref 70–99)
MAGNESIUM SERPL-MCNC: 1.5 MG/DL (ref 1.6–2.3)
POTASSIUM SERPL-SCNC: 4 MMOL/L (ref 3.4–5.3)
SODIUM SERPL-SCNC: 134 MMOL/L (ref 133–144)

## 2019-10-17 PROCEDURE — 36415 COLL VENOUS BLD VENIPUNCTURE: CPT | Performed by: FAMILY MEDICINE

## 2019-10-17 PROCEDURE — 83735 ASSAY OF MAGNESIUM: CPT | Performed by: FAMILY MEDICINE

## 2019-10-17 PROCEDURE — 80048 BASIC METABOLIC PNL TOTAL CA: CPT | Performed by: FAMILY MEDICINE

## 2019-10-17 RX ORDER — FLUOXETINE 10 MG/1
CAPSULE ORAL
Qty: 90 CAPSULE | Refills: 1 | Status: SHIPPED | OUTPATIENT
Start: 2019-10-17 | End: 2020-02-20

## 2019-10-17 RX ORDER — ACETAMINOPHEN 500 MG
500-1000 TABLET ORAL EVERY 6 HOURS PRN
COMMUNITY
End: 2020-02-20

## 2019-10-17 NOTE — TELEPHONE ENCOUNTER
"Prescription approved per [facility] Refill Protocol.    Fluoxetine, 10 mg cap  Last Written Prescription Date:  4/18/19  Last Fill Quantity: 90,  # refills: 1   Last office visit: 9/30/2019 with GERALDO Chu  Future Office Visit:   Next 5 appointments (look out 90 days)    Oct 22, 2019  1:50 PM CDT  Pharmacist visit with Annabelle Petty RPH, CR EXAM ROOM 36  [facility] ([facility]) [address] [address] [address] [phone]   Oct 23, 2019  2:30 PM CDT  Return Visit with Kyung Lobo PA-C  [facility] ([facility]) [address]  [facility] [address] [phone]   Oct 24, 2019  9:10 AM CDT  Office Visit with Violet Fenton MD  [facility] ([address] [address] [phone]         Requested Prescriptions   Pending Prescriptions Disp Refills     FLUoxetine (PROZAC) 10 MG capsule 90 capsule 1     Sig: TAKE 1 CAPSULE(10 MG) BY MOUTH DAILY       SSRIs Protocol Passed - 10/17/2019 11:47 AM        Passed - Recent (12 mo) or future (30 days) visit within the authorizing provider's specialty     Patient has had an office visit with the authorizing provider or a provider within the authorizing providers department within the previous 12 mos or has a future within next 30 days. See \"Patient Info\" tab in inbasket, or \"Choose Columns\" in Meds & Orders section of the refill encounter.              Passed - Medication is active on med list        Passed - Patient is age 18 or older        Passed - No active pregnancy on record        Passed - No positive pregnancy test in last 12 months        Handy Velázquez RN    "

## 2019-10-17 NOTE — TELEPHONE ENCOUNTER
Patient states for the last 3-4 days she hurts all over her body, especially in bed, standing up it's not as bad. Taking Tylenol and it did help. Blood pressures been running a bit high. This morning it was 158/80. Sometimes it's low it's under 130's/80s and she feels weak. Rating pain at night it gets closer to 8/10 in the morning. Shoulders, arms, hips and lower legs, and neck and left side of head     Next 5 appointments (look out 90 days)    Oct 22, 2019  1:50 PM CDT  Pharmacist visit with Annabelle Petty RPH, DIPIKA EXAM ROOM 36  [facility] ([facility]) [address]  [address] [phone]   Oct 23, 2019  2:30 PM CDT  Return Visit with Kyung Lobo PA-C  [facility] ([facility]) [address]  [facility] [address] [phone]   Oct 24, 2019  9:10 AM CDT  Office Visit with Violet Fenton MD  [facility] ([facility]) [address] [address] [address] [phone]        Huddled with Dr. Fenton: Continue with Tylenol PRN, order BMP and Magnesium and schedule lab-only appt.     Patient in agreement, scheduled lab-only appt for today.    Yue NICOLAS, Triage RN

## 2019-10-21 NOTE — PROGRESS NOTES
SUBJECTIVE/OBJECTIVE:                Tomasa Fam is a 79 year old female coming in for a follow-up visit for Medication Therapy Management.  She was referred to me from Dr. Fenton.     Chief Complaint: Follow up from [facility] visit on 9/25. She has been having muscle aches and headaches for the past week.    Tobacco:  reports that she has never smoked. She has never used smokeless tobacco.  Alcohol: Social History    Substance and Sexual Activity      Alcohol use: Yes        Alcohol/week: 0.0 standard drinks        Comment: occasional; perhaps one per day    Medication Adherence/Access:  Only taking 2.5 mg amlodipine at night if blood pressure is low.    Afib/HTN: Patient is currently taking Xarelto 20mg daily (from sample supply) for anticoagulation. Patient reports no current concerns of bruising or bleeding. Patient does not have a hx of GI bleed. She would like more information about warfarin. With warfarin, she is concerned about her variable diet, especially the timing of her meals each day. A referral was sent at the last visit but she was never contacted about setting up an appointment. She does have a cardiology appointment this week where she could discuss starting warfarin.  Patient is also taking metoprolol tartrate 25 mg twice daily and amlodipine 5mg daily (most days). Stopped chlorthalidone. Pt reports no current medication side effects. States when her BP is low in the 110-120's, she'll take only 2.5mg of amlodipine at night.  Patient does self-monitor BP. Pt reports variable BP readings. Some readings in 160's systolic and other readings 110-130. States she can't function with a SBP lower than 130. She also complains of headaches that worsen when she lays down.  Estimated Creatinine Clearance: 73.5 mL/min (based on SCr of 0.6 mg/dL).  BP Readings from Last 3 Encounters:   10/22/19 (!) 152/66   09/30/19 (!) 146/62   09/28/19 136/64     Last Comprehensive Metabolic Panel:  Sodium   Date Value Ref  Range Status   10/17/2019 134 133 - 144 mmol/L Final     Potassium   Date Value Ref Range Status   10/17/2019 4.0 3.4 - 5.3 mmol/L Final     Chloride   Date Value Ref Range Status   10/17/2019 100 94 - 109 mmol/L Final     Carbon Dioxide   Date Value Ref Range Status   10/17/2019 29 20 - 32 mmol/L Final     Anion Gap   Date Value Ref Range Status   10/17/2019 5 3 - 14 mmol/L Final     Glucose   Date Value Ref Range Status   10/17/2019 112 (H) 70 - 99 mg/dL Final     Urea Nitrogen   Date Value Ref Range Status   10/17/2019 12 7 - 30 mg/dL Final     Creatinine   Date Value Ref Range Status   10/17/2019 0.60 0.52 - 1.04 mg/dL Final     GFR Estimate   Date Value Ref Range Status   10/17/2019 86 >60 mL/min/[1.73_m2] Final     Comment:     Non  GFR Calc  Starting 12/18/2018, serum creatinine based estimated GFR (eGFR) will be   calculated using the Chronic Kidney Disease Epidemiology Collaboration   (CKD-EPI) equation.       Calcium   Date Value Ref Range Status   10/17/2019 9.7 8.5 - 10.1 mg/dL Final     Hyperlipidemia: Current therapy includes Ezetimibe (Zetia) 10mg once daily. She complains of muscle pains, however she has been on ezetimibe therapy before these symptoms started.  Lab Results   Component Value Date    CHOL 187 08/26/2019     Lab Results   Component Value Date    HDL 65 08/26/2019     Lab Results   Component Value Date    LDL 97 08/26/2019     Lab Results   Component Value Date    TRIG 124 08/26/2019     Lab Results   Component Value Date    CHOLHDLRATIO 2.8 06/10/2015     COPD: She is not currently taking any medications. She feels her breathing is fine and has no symptoms currently. Anoro made her throat hurt so she stopped using.    Depression: Currently taking fluoxetine 10 mg once daily. She reports no issues at this time. She was having low sodium levels, but this resolved when chlorthalidone was discontinued. She expressed interest in discontinuing at future visits (too much going  on right now).  PHQ-9 SCORE 1/20/2017 3/2/2018 3/15/2019   PHQ-9 Total Score 3 0 1     RYANNE-7 SCORE 1/20/2017 3/2/2018 3/15/2019   Total Score 1 0 0     Muscle aches/Headache: Currently taking acetaminophen 500-1000mg every 6 hours as needed. She feels this regimen is effective, but is wondering if the symptoms are medication related. Patient asked about starting magnesium, which was recommended by a provider. Aches started about 1 week ago.    Immunizations: Pt is due for flu shot. Plans to get this at PCP visit.   Most Recent Immunizations   Administered Date(s) Administered     Influenza (High Dose) 3 valent vaccine 10/09/2018     Influenza (IIV3) PF 11/01/2011     Pneumo Conj 13-V (2010&after) 08/03/2016     Pneumococcal 23 valent 07/31/2013     TD (ADULT, 7+) 10/26/2009     TDAP Vaccine (Adacel) 03/11/2011     Today's Vitals: BP (!) 152/66   LMP  (LMP Unknown)       ASSESSMENT:                  Medication Adherence:needs improvement - see below    Afib/HTN: Needs improvement. The patient is not meeting blood pressure goal of <140/90 mmHg. She would benefit from taking 2.5 mg of her amlodipine during the day if she only takes 2.5 mg the night before (due to low blood pressure per pt) to ensure that she is still receiving 5 mg amlodipine per day as she refuses to have her systolic BP lower than 120mmHg. Improving adherence will likely stabilize blood pressure, reducing the frequency of highs (which may be causing the headaches). Blood pressure would not be expected to decrease to SBP<100 mmHg with this regimen. The patient was also educated and provided information about warfarin.    Hyperlipidemia: Stable. Muscle pains are not likely related to the ezetimibe due to the timing of the symptoms.    COPD: Stable.    Depression: Stable. Reassess need for fluoxetine at future visits.    Muscle aches/Headache: Needs improvement. The headache are most likely related to the high blood pressure, see the Afib/HTN section  for more information. Magnesium deficiency could be contributing to muscle aches, educated patient on buying magnesium over the counter.    Immunizations: Plan in place for flu vaccine at PCP visit this week.    PLAN:                  1. Educated pt on adherence to amlodipine dose to keep BP under control.   2. Educated pt on buying magnesium supplement over the counter.    I spent 45 minutes with this patient today. All changes were made via collaborative practice agreement with Violet Fenton. A copy of the visit note was provided to the patient's primary care provider.     Will follow up in 2 months.    The patient was given a summary of these recommendations as an after visit summary.    Rm Lees, PharmD IV Student    Annabelle Petty, PharmD  Medication Therapy Management Provider, [facility][note 3]  Pager: [phone]

## 2019-10-22 ENCOUNTER — OFFICE VISIT (OUTPATIENT)
Dept: PHARMACY | Facility: CLINIC | Age: 79
End: 2019-10-22
Payer: COMMERCIAL

## 2019-10-22 VITALS — DIASTOLIC BLOOD PRESSURE: 66 MMHG | SYSTOLIC BLOOD PRESSURE: 152 MMHG

## 2019-10-22 DIAGNOSIS — J44.9 CHRONIC OBSTRUCTIVE PULMONARY DISEASE, UNSPECIFIED COPD TYPE (H): ICD-10-CM

## 2019-10-22 DIAGNOSIS — R52 PAIN: ICD-10-CM

## 2019-10-22 DIAGNOSIS — E78.5 HYPERLIPIDEMIA LDL GOAL <160: ICD-10-CM

## 2019-10-22 DIAGNOSIS — F39 MOOD DISORDER (H): ICD-10-CM

## 2019-10-22 DIAGNOSIS — Z23 ENCOUNTER FOR IMMUNIZATION: ICD-10-CM

## 2019-10-22 DIAGNOSIS — I48.91 ATRIAL FIBRILLATION, UNSPECIFIED TYPE (H): Primary | ICD-10-CM

## 2019-10-22 DIAGNOSIS — I10 ESSENTIAL HYPERTENSION WITH GOAL BLOOD PRESSURE LESS THAN 140/90: ICD-10-CM

## 2019-10-22 PROCEDURE — 99607 MTMS BY PHARM ADDL 15 MIN: CPT | Performed by: PHARMACIST

## 2019-10-22 PROCEDURE — 99606 MTMS BY PHARM EST 15 MIN: CPT | Performed by: PHARMACIST

## 2019-10-22 NOTE — PATIENT INSTRUCTIONS
Recommendations from today's MTM visit:                                                      1. Remember to take another amlodipine dose during the day if you need to again if you only took 1 dose of amlodipine at bedtime.     It was great to speak with you today.  I value your experience and would be very thankful for your time with providing feedback on our clinic survey. You may receive a survey via email or text message in the next few days.     To schedule another MTM appointment, please call the clinic directly or you may call the MTM scheduling line at [phone] or toll-free at [phone].     My Clinical Pharmacist's contact information:                                                      It was a pleasure talking with you today!  Please feel free to contact me with any questions or concerns you have.      Annabelle Petty, PharmD  Medication Therapy Management Provider, [facility]  Pager: [phone]

## 2019-10-23 ENCOUNTER — OFFICE VISIT (OUTPATIENT)
Dept: CARDIOLOGY | Facility: CLINIC | Age: 79
End: 2019-10-23
Attending: INTERNAL MEDICINE
Payer: MEDICARE

## 2019-10-23 VITALS
OXYGEN SATURATION: 97 % | SYSTOLIC BLOOD PRESSURE: 128 MMHG | HEART RATE: 78 BPM | WEIGHT: 136 LBS | BODY MASS INDEX: 24.1 KG/M2 | DIASTOLIC BLOOD PRESSURE: 68 MMHG | HEIGHT: 63 IN

## 2019-10-23 DIAGNOSIS — I48.91 ATRIAL FIBRILLATION, UNSPECIFIED TYPE (H): ICD-10-CM

## 2019-10-23 DIAGNOSIS — I10 ESSENTIAL HYPERTENSION: ICD-10-CM

## 2019-10-23 PROCEDURE — 99214 OFFICE O/P EST MOD 30 MIN: CPT | Performed by: PHYSICIAN ASSISTANT

## 2019-10-23 ASSESSMENT — MIFFLIN-ST. JEOR: SCORE: 1056.02

## 2019-10-23 NOTE — LETTER
10/23/2019    [place]  [address] [address] [facility] [address]    RE: Tomasa Fam       Dear Colleague,    I had the pleasure of seeing Tomasa Fam in the [facility].    CARDIOLOGY CLINIC PROGRESS NOTE    DOS: 10/23/2019      Tomasa Fam  : 1940, 79 year old  MRN: [id number]      History:  I am meeting Tomasa Fam today in the cardiology clinic.  She previously was seen by Dr. Cueva, but more recently was hospitalized and has since established follow up with Dr. Sevilla.     Chandrika is a very pleasant 79 year old woman with history of afib with RVR, idiopathic CM, HTN, hyperlipidemia, COPD, hx of lung cancer with partial resection of the right lung, and breast cancer, thyroid nodule.     Chandrika was hospitalized 2019 with weakness and dizziness, new onset atrial fibrillation with a rapid ventricular response.  She was treated with rate-controlling medication and placed on anticoagulation with Xarelto.  There was some concern about what type of anticoagulation.  She is on the cusp of requiring reduction in dose of Eliquis, so they opted to put her on Xarelto.  She also was sent home on metoprolol 25 mg twice daily.  She takes chlorthalidone for lower extremity swelling.      She has trouble with low blood pressures.  She is very sensitive if her blood pressure falls below 120s systolic.      When she saw Dr. Sevilla 19 she had not experienced significant weakness or dizziness since her discharge, although she did occasionally feel dizzy in the a.m.  She is unsure whether this may be a recurrence of atrial fibrillation or not.     Dr. Sevilla provided some samples of Xarelto with plans for Chandrika to discuss cost with her pharmacist. She decided to continue metoprolol for a few more weeks to see if the dizziness/BPs improved.       Interval History:   19 MTM visit. Planning to switch from Xarelto to warfarin. INRs through  PCP.     9/28/19 ED visit for HA, N/V.  BP was elevated >200 systolic.  CT and CTA head/neck were done and ruled out a bleed.  She was given IV hydralazine and SBP came down to 120-140s.  Na was low at 126. Advised to stop chlorthalidone and start amlodipine 2.5 mg.     9/30/19 PCP visit:  BP was 146/62.  Amlodipine increased to 5 mg.  Her potassium supplement was stopped.    10/17/19 repeat BMP showed Na was WNL at 134.     10/22/19 MTM visit.  When pt has lower BPs (110-120s) she will only take amlodipine 2.5 mg instead of 5 mg as she states she cannot function with SBP<130.  Is not interested in restarting a statin (previous intolerance to atorva and simva).     Interval History:  She plans to switch over to warfarin when the Xarelto runs out.   BP today is controlled.   No bleeding on the Xarelto.   The PADRON is chronic since her partial lung resection.        ROS:  Skin:  Negative     Eyes:  Positive for glasses  ENT:  Negative    Respiratory:  Positive for dyspnea on exertion;cough  Cardiovascular:    lightheadedness;Positive for  Gastroenterology: Negative    Genitourinary:  Negative    Musculoskeletal:    joint pain  Neurologic:  Positive for headaches  Psychiatric:  Positive for anxiety;excessive stress;depression  Heme/Lymph/Imm:  Negative    Endocrine:  Negative      PAST MEDICAL HISTORY:  Past Medical History:   Diagnosis Date     Arthritis     hands, knees     Breast cancer (H) jan. 2012     left mastectomy followed by right;  Dr. Luong     Chronic airway obstruction, not elsewhere classified 2006    very mild COPD - small cough     Diffuse cystic mastopathy      Lung cancer (H) 10/21/2015     Unspecified essential hypertension late 1980's       PAST SURGICAL HISTORY:  Past Surgical History:   Procedure Laterality Date     C NONSPECIFIC PROCEDURE  1970    s/p Tubal ligation 1970     COLONOSCOPY  3/2003    adenomatous polyp      COLONOSCOPY  7/2006    diverticulosis - repeat in 5 years     COLONOSCOPY   10/13/2011    Procedure:COLONOSCOPY; COLONOSCOPY ; Surgeon:CHITO GORDILLO; Location: GI     CYSTOSCOPY  7/11/2012    Procedure: CYSTOSCOPY;;  Surgeon: Aline Cooper DO;  Location: [facility] OR     DAVINCI HYSTERECTOMY SUPRACERVICAL, SACROCOLPOPEXY, COMBINED  7/11/2012    Procedure: COMBINED DAVINCI HYSTERECTOMY SUPRACERVICAL, SACROCOLPOPEXY;   DAVINCI ASSISTED LAPAROSCOPIC SUPRACERVICAL HYSTERECTOMY AND BILATERAL SALPINGO-OOPHORECTOMY, SACROCOLPOPEXY AND CYSTOSCOPY;  Surgeon: Aline Cooper DO;  Location:  OR     EXCISE LESION EYELID Right 6/29/2015    Procedure: EXCISE LESION EYELID;  Surgeon: Hank Olvera MD;  Location:  [facility]     INSERT PORT VASCULAR ACCESS  2/3/2012    Procedure:INSERT PORT VASCULAR ACCESS; Power Port-A- Catheter Placement ; Surgeon:IRISH TAPIA; Location: OR     LAPAROSCOPIC SALPINGO-OOPHORECTOMY  7/11/2012    Procedure: LAPAROSCOPIC SALPINGO-OOPHORECTOMY;  Davinci;  Surgeon: Aline Cooper DO;  Location:  OR     LIPOSUCTION, RHYTIDECTOMY, COMBINED       LOBECTOMY LUNG Right 3/1/2016    Procedure: LOBECTOMY LUNG;  Surgeon: Jonas Woodward MD;  Location:  OR     MAMMOPLASTY REDUCTION  1/6/2012    Procedure:MAMMOPLASTY REDUCTION; Surgeon:MICKI CAICEDO; Location:[facility] OR     MASTECTOMY SIMPLE  11/12/2012    Procedure: MASTECTOMY SIMPLE;   Right Prophylactic Mastectomy with attempted Right Sentinal Node Biopsy, Revision Bilateral Mastectomy Insicions, liposuction in breast area;  Surgeon: Irish Tapia MD;  Location: [facility] OR     MASTECTOMY SIMPLE, SENTINEL NODE, COMBINED  1/6/2012    Procedure:COMBINED MASTECTOMY SIMPLE, SENTINEL NODE; Left Mastectomy Left Jamul Node Biopsy,  Right Breast Reduction ; Surgeon:IRISH TAPIA; Location:[facility] OR     MASTECTOMY, BILATERAL       REMOVE PORT VASCULAR ACCESS  4/29/2013    Procedure: REMOVE PORT VASCULAR ACCESS;  Port A catheter removal ;  Surgeon: Irish Tapia MD;  Location: [facility] OR      REPAIR PTOSIS BILATERAL Bilateral 6/29/2015    Procedure: REPAIR PTOSIS BILATERAL;  Surgeon: Hank Olvera MD;  Location:  [facility]     REVISE RECONSTRUCTED BREAST BILATERAL  11/12/2012    Procedure: REVISE RECONSTRUCTED BREAST BILATERAL;;  Surgeon: Katia Kyle MD;  Location:  OR     SURGICAL HISTORY OF -   in 40's    face lift     SURGICAL HISTORY OF -       lipoma removed right thigh     SURGICAL HISTORY OF -       D and C     THORACOTOMY Right 3/1/2016    Procedure: THORACOTOMY;  Surgeon: Jonas Woodward MD;  Location:  OR       SOCIAL HISTORY:  Social History     Socioeconomic History     Marital status:      Spouse name: Aren     Number of children: 2     Years of education: Not on file     Highest education level: Not on file   Occupational History     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     Employer: NONE    Social Needs     Financial resource strain: Not on file     Food insecurity:     Worry: Not on file     Inability: Not on file     Transportation needs:     Medical: Not on file     Non-medical: Not on file   Tobacco Use     Smoking status: Never Smoker     Smokeless tobacco: Never Used   Substance and Sexual Activity     Alcohol use: Yes     Alcohol/week: 0.0 standard drinks     Comment: occasional; perhaps one per day     Drug use: No     Sexual activity: Yes     Partners: Male   Lifestyle     Physical activity:     Days per week: Not on file     Minutes per session: Not on file     Stress: Not on file   Relationships     Social connections:     Talks on phone: Not on file     Gets together: Not on file     Attends Yazdanism service: Not on file     Active member of club or organization: Not on file     Attends meetings of clubs or organizations: Not on file     Relationship status: Not on file     Intimate partner violence:     Fear of current or ex partner: Not on file     Emotionally abused: Not on file     Physically abused: Not on file     Forced sexual activity: Not on file    Other Topics Concern      Service Not Asked     Blood Transfusions Not Asked     Caffeine Concern Not Asked     Occupational Exposure Not Asked     Hobby Hazards Not Asked     Sleep Concern Not Asked     Stress Concern Not Asked     Weight Concern Not Asked     Special Diet Not Asked     Back Care Not Asked     Exercise Yes     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 Yes   Social History Narrative     Not on file       FAMILY HISTORY:  Family History   Problem Relation Age of Onset     Cardiovascular Father         ruptured aorta, hardening of the arteries     Cerebrovascular Disease Mother      Respiratory Mother         chronic bronchitis - was a smoker early on     Cardiovascular Paternal Grandfather         MI     Cerebrovascular Disease Paternal Aunt      Hypertension Son      Neurologic Disorder Daughter         migraines     Breast Cancer Daughter      Brain Tumor Sister        MEDS: acetaminophen (TYLENOL) 500 MG tablet, Take 500-1,000 mg by mouth every 6 hours as needed for pain  albuterol (PROAIR HFA/PROVENTIL HFA/VENTOLIN HFA) 108 (90 Base) MCG/ACT inhaler, Inhale 1-2 puffs into the lungs every 6 hours as needed for shortness of breath / dyspnea or wheezing  amLODIPine (NORVASC) 2.5 MG tablet, Take 2 tablets (5 mg) by mouth daily  B Complex Vitamins (VITAMIN  B COMPLEX) tablet, Take 1 tablet by mouth daily.  calcium-vitamin D (CALTRATE) 600-400 MG-UNIT per tablet, Take 1 tablet by mouth daily   cholecalciferol (VITAMIN D) 400 UNIT TABS, Take 400 Units by mouth daily  ezetimibe (ZETIA) 10 MG tablet, Take 1 tablet (10 mg) by mouth daily  FLUoxetine (PROZAC) 10 MG capsule, TAKE 1 CAPSULE(10 MG) BY MOUTH DAILY  HEMP OIL OR EXTRACT OR OTHER CBD CANNABINOID, NOT MEDICAL CANNABIS,, Apply topically daily as needed  metoprolol tartrate (LOPRESSOR) 25 MG tablet, Take 1 tablet (25 mg) by mouth 2  "times daily  omega 3 1000 MG CAPS, Take 1 g by mouth daily  polyethylene glycol (MIRALAX/GLYCOLAX) powder, Take 1 capful by mouth daily as needed for constipation  rivaroxaban ANTICOAGULANT (XARELTO ANTICOAGULANT) 20 MG TABS tablet, Take 1 tablet (20 mg) by mouth daily (with dinner)  umeclidinium-vilanterol (ANORO ELLIPTA) 62.5-25 MCG/INH oral inhaler, Inhale 1 puff into the lungs daily    No current facility-administered medications on file prior to visit.       ALLERGIES:   Allergies   Allergen Reactions     No Known Drug Allergies      Tape [Adhesive Tape]      Sensitive to plastic tape on upper part of body--takes skin off       PHYSICAL EXAM:  Vitals: /68 (BP Location: Right arm, Patient Position: Sitting, Cuff Size: Adult Regular)   Pulse 78   Ht 1.6 m (5' 3\")   Wt 61.7 kg (136 lb)   LMP  (LMP Unknown)   SpO2 97%   BMI 24.09 kg/m     Constitutional:  cooperative, alert and oriented, well developed, well nourished, in no acute distress        Skin:  warm and dry to the touch        Head:  normocephalic;no masses or lesions        Eyes:  pupils equal and round;conjunctivae and lids unremarkable;sclera white        ENT:  no pallor or cyanosis;dentition good        Neck:  JVP normal        Respiratory:  clear to auscultation;normal symmetry        Cardiac: regular rhythm       systolic murmur;grade 1     single ectopic beat     GI:  abdomen soft;BS normoactive        Vascular: pulses full and equal                                      Extremities and Musculoskeletal:  no deformities, clubbing, cyanosis, erythema observed;no edema        Neurological:  no gross motor deficits;affect appropriate          LABS/DATA:  I reviewed the followin day Lorraine 19:  PAF, about 20% of the time, longest was 1 day 22 hours        ASSESSMENT/PLAN:  HTN  - Controlled and not too low on metoprolol 25 mg BID and amlodipine 5 mg daily.  She may only take amlodipine 2.5 mg if her BPs is running low as she is quite " symptomatic with this      Hyperlipidemia  - On Zetia 10 mg with LDL 97      PAF  - Asymptomatic with the afib  - Rate controlled on metoprolol 25 mg BID, and she is tolerating this  - [id number] score is 3.  She is on Xarelto.  After this runs out, she will switch to warfarin due to cost.  PCP will manage INRs.         Follow up:  6 months        Thank you for allowing me to participate in the care of your patient.    Sincerely,     Kyung Lobo PA-C     [facility]

## 2019-10-23 NOTE — LETTER
10/23/2019    [place]  [address] [address] [facility] [address]    RE: Tomasa Fam       Dear Colleague,    I had the pleasure of seeing Tomasa Fam in the [facility].    CARDIOLOGY CLINIC PROGRESS NOTE    DOS: 10/23/2019      Tomasa Fam  : 1940, 79 year old  MRN: [id number]      History:  I am meeting Tomasa Fam today in the cardiology clinic.  She previously was seen by Dr. Cueva, but more recently was hospitalized and has since established follow up with Dr. Sevilla.     Chandrika is a very pleasant 79 year old woman with history of afib with RVR, idiopathic CM, HTN, hyperlipidemia, COPD, hx of lung cancer with partial resection of the right lung, and breast cancer, thyroid nodule.     Chandrika was hospitalized 2019 with weakness and dizziness, new onset atrial fibrillation with a rapid ventricular response.  She was treated with rate-controlling medication and placed on anticoagulation with Xarelto.  There was some concern about what type of anticoagulation.  She is on the cusp of requiring reduction in dose of Eliquis, so they opted to put her on Xarelto.  She also was sent home on metoprolol 25 mg twice daily.  She takes chlorthalidone for lower extremity swelling.      She has trouble with low blood pressures.  She is very sensitive if her blood pressure falls below 120s systolic.      When she saw Dr. Sevilla 19 she had not experienced significant weakness or dizziness since her discharge, although she did occasionally feel dizzy in the a.m.  She is unsure whether this may be a recurrence of atrial fibrillation or not.     Dr. Sevilla provided some samples of Xarelto with plans for Chandrika to discuss cost with her pharmacist. She decided to continue metoprolol for a few more weeks to see if the dizziness/BPs improved.       Interval History:   19 MTM visit. Planning to switch from Xarelto to warfarin. INRs through  PCP.     9/28/19 ED visit for HA, N/V.  BP was elevated >200 systolic.  CT and CTA head/neck were done and ruled out a bleed.  She was given IV hydralazine and SBP came down to 120-140s.  Na was low at 126. Advised to stop chlorthalidone and start amlodipine 2.5 mg.     9/30/19 PCP visit:  BP was 146/62.  Amlodipine increased to 5 mg.  Her potassium supplement was stopped.    10/17/19 repeat BMP showed Na was WNL at 134.     10/22/19 MTM visit.  When pt has lower BPs (110-120s) she will only take amlodipine 2.5 mg instead of 5 mg as she states she cannot function with SBP<130.  Is not interested in restarting a statin (previous intolerance to atorva and simva).     Interval History:  She plans to switch over to warfarin when the Xarelto runs out.   BP today is controlled.   No bleeding on the Xarelto.   The PADRON is chronic since her partial lung resection.        ROS:  Skin:  Negative     Eyes:  Positive for glasses  ENT:  Negative    Respiratory:  Positive for dyspnea on exertion;cough  Cardiovascular:    lightheadedness;Positive for  Gastroenterology: Negative    Genitourinary:  Negative    Musculoskeletal:    joint pain  Neurologic:  Positive for headaches  Psychiatric:  Positive for anxiety;excessive stress;depression  Heme/Lymph/Imm:  Negative    Endocrine:  Negative      PAST MEDICAL HISTORY:  Past Medical History:   Diagnosis Date     Arthritis     hands, knees     Breast cancer (H) jan. 2012     left mastectomy followed by right;  Dr. Luong     Chronic airway obstruction, not elsewhere classified 2006    very mild COPD - small cough     Diffuse cystic mastopathy      Lung cancer (H) 10/21/2015     Unspecified essential hypertension late 1980's       PAST SURGICAL HISTORY:  Past Surgical History:   Procedure Laterality Date     C NONSPECIFIC PROCEDURE  1970    s/p Tubal ligation 1970     COLONOSCOPY  3/2003    adenomatous polyp      COLONOSCOPY  7/2006    diverticulosis - repeat in 5 years     COLONOSCOPY   10/13/2011    Procedure:COLONOSCOPY; COLONOSCOPY ; Surgeon:CHITO GORDILLO; Location: GI     CYSTOSCOPY  7/11/2012    Procedure: CYSTOSCOPY;;  Surgeon: Aline Cooper DO;  Location: [facility] OR     DAVINCI HYSTERECTOMY SUPRACERVICAL, SACROCOLPOPEXY, COMBINED  7/11/2012    Procedure: COMBINED DAVINCI HYSTERECTOMY SUPRACERVICAL, SACROCOLPOPEXY;   DAVINCI ASSISTED LAPAROSCOPIC SUPRACERVICAL HYSTERECTOMY AND BILATERAL SALPINGO-OOPHORECTOMY, SACROCOLPOPEXY AND CYSTOSCOPY;  Surgeon: Aline Cooper DO;  Location:  OR     EXCISE LESION EYELID Right 6/29/2015    Procedure: EXCISE LESION EYELID;  Surgeon: Hank Olvera MD;  Location:  [facility]     INSERT PORT VASCULAR ACCESS  2/3/2012    Procedure:INSERT PORT VASCULAR ACCESS; Power Port-A- Catheter Placement ; Surgeon:IRISH TAPIA; Location: OR     LAPAROSCOPIC SALPINGO-OOPHORECTOMY  7/11/2012    Procedure: LAPAROSCOPIC SALPINGO-OOPHORECTOMY;  Davinci;  Surgeon: Aline Cooper DO;  Location:  OR     LIPOSUCTION, RHYTIDECTOMY, COMBINED       LOBECTOMY LUNG Right 3/1/2016    Procedure: LOBECTOMY LUNG;  Surgeon: Jonas Woodward MD;  Location:  OR     MAMMOPLASTY REDUCTION  1/6/2012    Procedure:MAMMOPLASTY REDUCTION; Surgeon:MICKI CAICEDO; Location:[facility] OR     MASTECTOMY SIMPLE  11/12/2012    Procedure: MASTECTOMY SIMPLE;   Right Prophylactic Mastectomy with attempted Right Sentinal Node Biopsy, Revision Bilateral Mastectomy Insicions, liposuction in breast area;  Surgeon: Irish Tapia MD;  Location: [facility] OR     MASTECTOMY SIMPLE, SENTINEL NODE, COMBINED  1/6/2012    Procedure:COMBINED MASTECTOMY SIMPLE, SENTINEL NODE; Left Mastectomy Left [place] Node Biopsy,  Right Breast Reduction ; Surgeon:IRISH TAPIA; Location:[facility] OR     MASTECTOMY, BILATERAL       REMOVE PORT VASCULAR ACCESS  4/29/2013    Procedure: REMOVE PORT VASCULAR ACCESS;  Port A catheter removal ;  Surgeon: Irish Tapia MD;  Location: [facility] OR      REPAIR PTOSIS BILATERAL Bilateral 6/29/2015    Procedure: REPAIR PTOSIS BILATERAL;  Surgeon: Hank Olvera MD;  Location:  [facility]     REVISE RECONSTRUCTED BREAST BILATERAL  11/12/2012    Procedure: REVISE RECONSTRUCTED BREAST BILATERAL;;  Surgeon: Katia Kyle MD;  Location:  OR     SURGICAL HISTORY OF -   in 40's    face lift     SURGICAL HISTORY OF -       lipoma removed right thigh     SURGICAL HISTORY OF -       D and C     THORACOTOMY Right 3/1/2016    Procedure: THORACOTOMY;  Surgeon: Jonas Woodward MD;  Location:  OR       SOCIAL HISTORY:  Social History     Socioeconomic History     Marital status:      Spouse name: Aren     Number of children: 2     Years of education: Not on file     Highest education level: Not on file   Occupational History     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     Employer: NONE    Social Needs     Financial resource strain: Not on file     Food insecurity:     Worry: Not on file     Inability: Not on file     Transportation needs:     Medical: Not on file     Non-medical: Not on file   Tobacco Use     Smoking status: Never Smoker     Smokeless tobacco: Never Used   Substance and Sexual Activity     Alcohol use: Yes     Alcohol/week: 0.0 standard drinks     Comment: occasional; perhaps one per day     Drug use: No     Sexual activity: Yes     Partners: Male   Lifestyle     Physical activity:     Days per week: Not on file     Minutes per session: Not on file     Stress: Not on file   Relationships     Social connections:     Talks on phone: Not on file     Gets together: Not on file     Attends U[employer] service: Not on file     Active member of club or organization: Not on file     Attends meetings of clubs or organizations: Not on file     Relationship status: Not on file     Intimate partner violence:     Fear of current or ex partner: Not on file     Emotionally abused: Not on file     Physically abused: Not on file     Forced sexual activity: Not on file    Other Topics Concern      Service Not Asked     Blood Transfusions Not Asked     Caffeine Concern Not Asked     Occupational Exposure Not Asked     Hobby Hazards Not Asked     Sleep Concern Not Asked     Stress Concern Not Asked     Weight Concern Not Asked     Special Diet Not Asked     Back Care Not Asked     Exercise Yes     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 Yes   Social History Narrative     Not on file       FAMILY HISTORY:  Family History   Problem Relation Age of Onset     Cardiovascular Father         ruptured aorta, hardening of the arteries     Cerebrovascular Disease Mother      Respiratory Mother         chronic bronchitis - was a smoker early on     Cardiovascular Paternal Grandfather         MI     Cerebrovascular Disease Paternal Aunt      Hypertension Son      Neurologic Disorder Daughter         migraines     Breast Cancer Daughter      Brain Tumor Sister        MEDS: acetaminophen (TYLENOL) 500 MG tablet, Take 500-1,000 mg by mouth every 6 hours as needed for pain  albuterol (PROAIR HFA/PROVENTIL HFA/VENTOLIN HFA) 108 (90 Base) MCG/ACT inhaler, Inhale 1-2 puffs into the lungs every 6 hours as needed for shortness of breath / dyspnea or wheezing  amLODIPine (NORVASC) 2.5 MG tablet, Take 2 tablets (5 mg) by mouth daily  B Complex Vitamins (VITAMIN  B COMPLEX) tablet, Take 1 tablet by mouth daily.  calcium-vitamin D (CALTRATE) 600-400 MG-UNIT per tablet, Take 1 tablet by mouth daily   cholecalciferol (VITAMIN D) 400 UNIT TABS, Take 400 Units by mouth daily  ezetimibe (ZETIA) 10 MG tablet, Take 1 tablet (10 mg) by mouth daily  FLUoxetine (PROZAC) 10 MG capsule, TAKE 1 CAPSULE(10 MG) BY MOUTH DAILY  HEMP OIL OR EXTRACT OR OTHER CBD CANNABINOID, NOT MEDICAL CANNABIS,, Apply topically daily as needed  metoprolol tartrate (LOPRESSOR) 25 MG tablet, Take 1 tablet (25 mg) by mouth 2  "times daily  omega 3 1000 MG CAPS, Take 1 g by mouth daily  polyethylene glycol (MIRALAX/GLYCOLAX) powder, Take 1 capful by mouth daily as needed for constipation  rivaroxaban ANTICOAGULANT (XARELTO ANTICOAGULANT) 20 MG TABS tablet, Take 1 tablet (20 mg) by mouth daily (with dinner)  umeclidinium-vilanterol (ANORO ELLIPTA) 62.5-25 MCG/INH oral inhaler, Inhale 1 puff into the lungs daily    No current facility-administered medications on file prior to visit.       ALLERGIES:   Allergies   Allergen Reactions     No Known Drug Allergies      Tape [Adhesive Tape]      Sensitive to plastic tape on upper part of body--takes skin off       PHYSICAL EXAM:  Vitals: /68 (BP Location: Right arm, Patient Position: Sitting, Cuff Size: Adult Regular)   Pulse 78   Ht 1.6 m (5' 3\")   Wt 61.7 kg (136 lb)   LMP  (LMP Unknown)   SpO2 97%   BMI 24.09 kg/m     Constitutional:  cooperative, alert and oriented, well developed, well nourished, in no acute distress        Skin:  warm and dry to the touch        Head:  normocephalic;no masses or lesions        Eyes:  pupils equal and round;conjunctivae and lids unremarkable;sclera white        ENT:  no pallor or cyanosis;dentition good        Neck:  JVP normal        Respiratory:  clear to auscultation;normal symmetry        Cardiac: regular rhythm       systolic murmur;grade 1     single ectopic beat     GI:  abdomen soft;BS normoactive        Vascular: pulses full and equal                                      Extremities and Musculoskeletal:  no deformities, clubbing, cyanosis, erythema observed;no edema        Neurological:  no gross motor deficits;affect appropriate          LABS/DATA:  I reviewed the followin day Lorraine 19:  PAF, about 20% of the time, longest was 1 day 22 hours        ASSESSMENT/PLAN:  HTN  - Controlled and not too low on metoprolol 25 mg BID and amlodipine 5 mg daily.  She may only take amlodipine 2.5 mg if her BPs is running low as she is quite " symptomatic with this      Hyperlipidemia  - On Zetia 10 mg with LDL 97      PAF  - Asymptomatic with the afib  - Rate controlled on metoprolol 25 mg BID, and she is tolerating this  - [id number] score is 3.  She is on Xarelto.  After this runs out, she will switch to warfarin due to cost.  PCP will manage INRs.         Follow up:  6 months      Kyung Lobo PA-C    Thank you for allowing me to participate in the care of your patient.      Sincerely,     Kyung Lobo PA-C     [facility]    cc:   Kylie Sevilla DO  [address]  [place] [address]

## 2019-10-23 NOTE — PROGRESS NOTES
CARDIOLOGY CLINIC PROGRESS NOTE    DOS: 10/23/2019      Tomasa Fam  : 1940, 79 year old  MRN: [id number]      History:  I am meeting Tomasa Fam today in the cardiology clinic.  She previously was seen by Dr. Cueva, but more recently was hospitalized and has since established follow up with Dr. Sevilla.     Chandrika is a very pleasant 79 year old woman with history of afib with RVR, idiopathic CM, HTN, hyperlipidemia, COPD, hx of lung cancer with partial resection of the right lung, and breast cancer, thyroid nodule.     Chandrika was hospitalized 2019 with weakness and dizziness, new onset atrial fibrillation with a rapid ventricular response.  She was treated with rate-controlling medication and placed on anticoagulation with Xarelto.  There was some concern about what type of anticoagulation.  She is on the cusp of requiring reduction in dose of Eliquis, so they opted to put her on Xarelto.  She also was sent home on metoprolol 25 mg twice daily.  She takes chlorthalidone for lower extremity swelling.      She has trouble with low blood pressures.  She is very sensitive if her blood pressure falls below 120s systolic.      When she saw Dr. Sevilla 19 she had not experienced significant weakness or dizziness since her discharge, although she did occasionally feel dizzy in the a.m.  She is unsure whether this may be a recurrence of atrial fibrillation or not.     Dr. Sevilla provided some samples of Xarelto with plans for Chandrika to discuss cost with her pharmacist. She decided to continue metoprolol for a few more weeks to see if the dizziness/BPs improved.       Interval History:   19 MTM visit. Planning to switch from Xarelto to warfarin. INRs through PCP.     19 ED visit for HA, N/V.  BP was elevated >200 systolic.  CT and CTA head/neck were done and ruled out a bleed.  She was given IV hydralazine and SBP came down to 120-140s.  Na was low at 126. Advised to stop  chlorthalidone and start amlodipine 2.5 mg.     9/30/19 PCP visit:  BP was 146/62.  Amlodipine increased to 5 mg.  Her potassium supplement was stopped.    10/17/19 repeat BMP showed Na was WNL at 134.     10/22/19 MTM visit.  When pt has lower BPs (110-120s) she will only take amlodipine 2.5 mg instead of 5 mg as she states she cannot function with SBP<130.  Is not interested in restarting a statin (previous intolerance to atorva and simva).     Interval History:  She plans to switch over to warfarin when the Xarelto runs out.   BP today is controlled.   No bleeding on the Xarelto.   The PADRON is chronic since her partial lung resection.        ROS:  Skin:  Negative     Eyes:  Positive for glasses  ENT:  Negative    Respiratory:  Positive for dyspnea on exertion;cough  Cardiovascular:    lightheadedness;Positive for  Gastroenterology: Negative    Genitourinary:  Negative    Musculoskeletal:    joint pain  Neurologic:  Positive for headaches  Psychiatric:  Positive for anxiety;excessive stress;depression  Heme/Lymph/Imm:  Negative    Endocrine:  Negative      PAST MEDICAL HISTORY:  Past Medical History:   Diagnosis Date     Arthritis     hands, knees     Breast cancer (H) jan. 2012     left mastectomy followed by right;  Dr. Luong     Chronic airway obstruction, not elsewhere classified 2006    very mild COPD - small cough     Diffuse cystic mastopathy      Lung cancer (H) 10/21/2015     Unspecified essential hypertension late 1980's       PAST SURGICAL HISTORY:  Past Surgical History:   Procedure Laterality Date     C NONSPECIFIC PROCEDURE  1970    s/p Tubal ligation 1970     COLONOSCOPY  3/2003    adenomatous polyp      COLONOSCOPY  7/2006    diverticulosis - repeat in 5 years     COLONOSCOPY  10/13/2011    Procedure:COLONOSCOPY; COLONOSCOPY ; Surgeon:CHITO GORDILLO; Location: GI     CYSTOSCOPY  7/11/2012    Procedure: CYSTOSCOPY;;  Surgeon: Aline Cooper DO;  Location:  OR     DAVINCI HYSTERECTOMY  SUPRACERVICAL, SACROCOLPOPEXY, COMBINED  7/11/2012    Procedure: COMBINED DAVINCI HYSTERECTOMY SUPRACERVICAL, SACROCOLPOPEXY;   DAVINCI ASSISTED LAPAROSCOPIC SUPRACERVICAL HYSTERECTOMY AND BILATERAL SALPINGO-OOPHORECTOMY, SACROCOLPOPEXY AND CYSTOSCOPY;  Surgeon: Aline Cooper DO;  Location:  OR     EXCISE LESION EYELID Right 6/29/2015    Procedure: EXCISE LESION EYELID;  Surgeon: Hank Olvera MD;  Location:  [facility]     INSERT PORT VASCULAR ACCESS  2/3/2012    Procedure:INSERT PORT VASCULAR ACCESS; Power Port-A- Catheter Placement ; Surgeon:IRISH TAPIA; Location:[facility] OR     LAPAROSCOPIC SALPINGO-OOPHORECTOMY  7/11/2012    Procedure: LAPAROSCOPIC SALPINGO-OOPHORECTOMY;  Davinci;  Surgeon: Aline Cooper DO;  Location:  OR     LIPOSUCTION, RHYTIDECTOMY, COMBINED       LOBECTOMY LUNG Right 3/1/2016    Procedure: LOBECTOMY LUNG;  Surgeon: Jonas Woodward MD;  Location:  OR     MAMMOPLASTY REDUCTION  1/6/2012    Procedure:MAMMOPLASTY REDUCTION; Surgeon:MICKI CAICEDO; Location:[facility] OR     MASTECTOMY SIMPLE  11/12/2012    Procedure: MASTECTOMY SIMPLE;   Right Prophylactic Mastectomy with attempted Right Sentinal Node Biopsy, Revision Bilateral Mastectomy Insicions, liposuction in breast area;  Surgeon: Irish Tapia MD;  Location: [facility] OR     MASTECTOMY SIMPLE, SENTINEL NODE, COMBINED  1/6/2012    Procedure:COMBINED MASTECTOMY SIMPLE, SENTINEL NODE; Left Mastectomy Left [place] Node Biopsy,  Right Breast Reduction ; Surgeon:IRISH TAPIA; Location:[facility] OR     MASTECTOMY, BILATERAL       REMOVE PORT VASCULAR ACCESS  4/29/2013    Procedure: REMOVE PORT VASCULAR ACCESS;  Port A catheter removal ;  Surgeon: Irish Tapia MD;  Location: [facility] OR     REPAIR PTOSIS BILATERAL Bilateral 6/29/2015    Procedure: REPAIR PTOSIS BILATERAL;  Surgeon: Hank Olvera MD;  Location:  [facility]     REVISE RECONSTRUCTED BREAST BILATERAL  11/12/2012    Procedure: REVISE RECONSTRUCTED  BREAST BILATERAL;;  Surgeon: Katia Kyle MD;  Location: [facility] OR     SURGICAL HISTORY OF -   in 40's    face lift     SURGICAL HISTORY OF -       lipoma removed right thigh     SURGICAL HISTORY OF -       D and C     THORACOTOMY Right 3/1/2016    Procedure: THORACOTOMY;  Surgeon: Jonas Woodward MD;  Location:  OR       SOCIAL HISTORY:  Social History     Socioeconomic History     Marital status:      Spouse name: Aren     Number of children: 2     Years of education: Not on file     Highest education level: Not on file   Occupational History     Occupation: [employer]     Employer: NONE    Social Needs     Financial resource strain: Not on file     Food insecurity:     Worry: Not on file     Inability: Not on file     Transportation needs:     Medical: Not on file     Non-medical: Not on file   Tobacco Use     Smoking status: Never Smoker     Smokeless tobacco: Never Used   Substance and Sexual Activity     Alcohol use: Yes     Alcohol/week: 0.0 standard drinks     Comment: occasional; perhaps one per day     Drug use: No     Sexual activity: Yes     Partners: Male   Lifestyle     Physical activity:     Days per week: Not on file     Minutes per session: Not on file     Stress: Not on file   Relationships     Social connections:     Talks on phone: Not on file     Gets together: Not on file     Attends Christianity service: Not on file     Active member of club or organization: Not on file     Attends meetings of clubs or organizations: Not on file     Relationship status: Not on file     Intimate partner violence:     Fear of current or ex partner: Not on file     Emotionally abused: Not on file     Physically abused: Not on file     Forced sexual activity: Not on file   Other Topics Concern      Service Not Asked     Blood Transfusions Not Asked     Caffeine Concern Not Asked     Occupational Exposure Not Asked     Hobby Hazards Not Asked     Sleep Concern Not Asked     Stress  Concern Not Asked     Weight Concern Not Asked     Special Diet Not Asked     Back Care Not Asked     Exercise Yes     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 Yes   Social History Narrative     Not on file       FAMILY HISTORY:  Family History   Problem Relation Age of Onset     Cardiovascular Father         ruptured aorta, hardening of the arteries     Cerebrovascular Disease Mother      Respiratory Mother         chronic bronchitis - was a smoker early on     Cardiovascular Paternal Grandfather         MI     Cerebrovascular Disease Paternal Aunt      Hypertension Son      Neurologic Disorder Daughter         migraines     Breast Cancer Daughter      Brain Tumor Sister        MEDS: acetaminophen (TYLENOL) 500 MG tablet, Take 500-1,000 mg by mouth every 6 hours as needed for pain  albuterol (PROAIR HFA/PROVENTIL HFA/VENTOLIN HFA) 108 (90 Base) MCG/ACT inhaler, Inhale 1-2 puffs into the lungs every 6 hours as needed for shortness of breath / dyspnea or wheezing  amLODIPine (NORVASC) 2.5 MG tablet, Take 2 tablets (5 mg) by mouth daily  B Complex Vitamins (VITAMIN  B COMPLEX) tablet, Take 1 tablet by mouth daily.  calcium-vitamin D (CALTRATE) 600-400 MG-UNIT per tablet, Take 1 tablet by mouth daily   cholecalciferol (VITAMIN D) 400 UNIT TABS, Take 400 Units by mouth daily  ezetimibe (ZETIA) 10 MG tablet, Take 1 tablet (10 mg) by mouth daily  FLUoxetine (PROZAC) 10 MG capsule, TAKE 1 CAPSULE(10 MG) BY MOUTH DAILY  HEMP OIL OR EXTRACT OR OTHER CBD CANNABINOID, NOT MEDICAL CANNABIS,, Apply topically daily as needed  metoprolol tartrate (LOPRESSOR) 25 MG tablet, Take 1 tablet (25 mg) by mouth 2 times daily  omega 3 1000 MG CAPS, Take 1 g by mouth daily  polyethylene glycol (MIRALAX/GLYCOLAX) powder, Take 1 capful by mouth daily as needed for constipation  rivaroxaban ANTICOAGULANT (XARELTO ANTICOAGULANT) 20 MG  "TABS tablet, Take 1 tablet (20 mg) by mouth daily (with dinner)  umeclidinium-vilanterol (ANORO ELLIPTA) 62.5-25 MCG/INH oral inhaler, Inhale 1 puff into the lungs daily    No current facility-administered medications on file prior to visit.       ALLERGIES:   Allergies   Allergen Reactions     No Known Drug Allergies      Tape [Adhesive Tape]      Sensitive to plastic tape on upper part of body--takes skin off       PHYSICAL EXAM:  Vitals: /68 (BP Location: Right arm, Patient Position: Sitting, Cuff Size: Adult Regular)   Pulse 78   Ht 1.6 m (5' 3\")   Wt 61.7 kg (136 lb)   LMP  (LMP Unknown)   SpO2 97%   BMI 24.09 kg/m    Constitutional:  cooperative, alert and oriented, well developed, well nourished, in no acute distress        Skin:  warm and dry to the touch        Head:  normocephalic;no masses or lesions        Eyes:  pupils equal and round;conjunctivae and lids unremarkable;sclera white        ENT:  no pallor or cyanosis;dentition good        Neck:  JVP normal        Respiratory:  clear to auscultation;normal symmetry        Cardiac: regular rhythm       systolic murmur;grade 1     single ectopic beat     GI:  abdomen soft;BS normoactive        Vascular: pulses full and equal                                      Extremities and Musculoskeletal:  no deformities, clubbing, cyanosis, erythema observed;no edema        Neurological:  no gross motor deficits;affect appropriate          LABS/DATA:  I reviewed the followin day Zio 19:  PAF, about 20% of the time, longest was 1 day 22 hours        ASSESSMENT/PLAN:  HTN  - Controlled and not too low on metoprolol 25 mg BID and amlodipine 5 mg daily.  She may only take amlodipine 2.5 mg if her BPs is running low as she is quite symptomatic with this      Hyperlipidemia  - On Zetia 10 mg with LDL 97      PAF  - Asymptomatic with the afib  - Rate controlled on metoprolol 25 mg BID, and she is tolerating this  - [id number] score is 3.  She is on " Xarelto.  After this runs out, she will switch to warfarin due to cost.  PCP will manage INRs.         Follow up:  6 months      Kyung Lobo PA-C

## 2019-10-24 ENCOUNTER — OFFICE VISIT (OUTPATIENT)
Dept: FAMILY MEDICINE | Facility: CLINIC | Age: 79
End: 2019-10-24
Payer: MEDICARE

## 2019-10-24 VITALS
SYSTOLIC BLOOD PRESSURE: 138 MMHG | WEIGHT: 134.7 LBS | TEMPERATURE: 97.7 F | HEART RATE: 81 BPM | DIASTOLIC BLOOD PRESSURE: 70 MMHG | OXYGEN SATURATION: 98 % | BODY MASS INDEX: 23.87 KG/M2 | HEIGHT: 63 IN | RESPIRATION RATE: 16 BRPM

## 2019-10-24 DIAGNOSIS — I10 ESSENTIAL HYPERTENSION WITH GOAL BLOOD PRESSURE LESS THAN 140/90: Primary | ICD-10-CM

## 2019-10-24 DIAGNOSIS — Z23 NEED FOR PROPHYLACTIC VACCINATION AND INOCULATION AGAINST INFLUENZA: ICD-10-CM

## 2019-10-24 DIAGNOSIS — I48.91 ATRIAL FIBRILLATION, UNSPECIFIED TYPE (H): ICD-10-CM

## 2019-10-24 DIAGNOSIS — E83.42 HYPOMAGNESEMIA: ICD-10-CM

## 2019-10-24 DIAGNOSIS — Z79.01 LONG TERM CURRENT USE OF ANTICOAGULANTS WITH INR GOAL OF 2.0-3.0: ICD-10-CM

## 2019-10-24 DIAGNOSIS — Z79.01 LONG TERM CURRENT USE OF ANTICOAGULANTS WITH INR GOAL OF 2.0-3.0: Primary | ICD-10-CM

## 2019-10-24 DIAGNOSIS — M79.10 MYALGIA: ICD-10-CM

## 2019-10-24 DIAGNOSIS — I10 ESSENTIAL HYPERTENSION WITH GOAL BLOOD PRESSURE LESS THAN 140/90: ICD-10-CM

## 2019-10-24 PROCEDURE — 99214 OFFICE O/P EST MOD 30 MIN: CPT | Mod: 25 | Performed by: FAMILY MEDICINE

## 2019-10-24 PROCEDURE — G0008 ADMIN INFLUENZA VIRUS VAC: HCPCS | Performed by: FAMILY MEDICINE

## 2019-10-24 PROCEDURE — 90662 IIV NO PRSV INCREASED AG IM: CPT | Performed by: FAMILY MEDICINE

## 2019-10-24 RX ORDER — WARFARIN SODIUM 2.5 MG/1
TABLET ORAL
Qty: 90 TABLET | Refills: 0 | Status: SHIPPED | OUTPATIENT
Start: 2019-11-06 | End: 2019-11-15

## 2019-10-24 RX ORDER — WARFARIN SODIUM 2.5 MG/1
TABLET ORAL
Qty: 30 TABLET | Refills: 0 | Status: SHIPPED | OUTPATIENT
Start: 2019-10-24 | End: 2019-11-08

## 2019-10-24 RX ORDER — AMLODIPINE BESYLATE 2.5 MG/1
5 TABLET ORAL 2 TIMES DAILY
Qty: 180 TABLET | Refills: 1 | Status: SHIPPED | OUTPATIENT
Start: 2019-10-24 | End: 2020-02-20

## 2019-10-24 RX ORDER — AMLODIPINE BESYLATE 2.5 MG/1
5 TABLET ORAL DAILY
Qty: 90 TABLET | Refills: 1 | Status: SHIPPED | OUTPATIENT
Start: 2019-10-24 | End: 2019-10-24

## 2019-10-24 RX ORDER — AMLODIPINE BESYLATE 2.5 MG/1
TABLET ORAL
Qty: 180 TABLET | Refills: 1 | OUTPATIENT
Start: 2019-10-24

## 2019-10-24 ASSESSMENT — MIFFLIN-ST. JEOR: SCORE: 1050.13

## 2019-10-24 NOTE — PROGRESS NOTES
Subjective     Tomasa Fam is a 79 year old female who presents to clinic today for the following health issues:    HPI   Medication Followup of Amlodipine     Taking Medication as prescribed: yes    Side Effects:  None    Medication Helping Symptoms:  Blood pressure have been fluctuating up and down     Would like to discuss muscle aches in both arms and joints in the hips. The muscle aches in the arms have been for 2 weeks.       See under ROS     Patient Active Problem List   Diagnosis     Chronic airway obstruction (H)     ASCUS on Pap smear     Hyperlipidemia LDL goal <160     Breast cancer (H)     Essential hypertension with goal blood pressure less than 140/90     Cystocele, midline     Uterovaginal prolapse     S/P hysterectomy     Advanced directives, counseling/discussion     History of hypokalemia     Concussion     Thyroid nodule     Chronic pain of both knees     History of lung cancer     Atrial fibrillation (H)       Current Outpatient Medications   Medication Sig Dispense Refill     acetaminophen (TYLENOL) 500 MG tablet Take 500-1,000 mg by mouth every 6 hours as needed for pain       albuterol (PROAIR HFA/PROVENTIL HFA/VENTOLIN HFA) 108 (90 Base) MCG/ACT inhaler Inhale 1-2 puffs into the lungs every 6 hours as needed for shortness of breath / dyspnea or wheezing       amLODIPine (NORVASC) 2.5 MG tablet Take 2 tablets (5 mg) by mouth daily 30 tablet 1     B Complex Vitamins (VITAMIN  B COMPLEX) tablet Take 1 tablet by mouth daily.       calcium-vitamin D (CALTRATE) 600-400 MG-UNIT per tablet Take 1 tablet by mouth daily        cholecalciferol (VITAMIN D) 400 UNIT TABS Take 400 Units by mouth daily       ezetimibe (ZETIA) 10 MG tablet Take 1 tablet (10 mg) by mouth daily 90 tablet 0     FLUoxetine (PROZAC) 10 MG capsule TAKE 1 CAPSULE(10 MG) BY MOUTH DAILY 90 capsule 1     HEMP OIL OR EXTRACT OR OTHER CBD CANNABINOID, NOT MEDICAL CANNABIS, Apply topically daily as needed       metoprolol  "tartrate (LOPRESSOR) 25 MG tablet Take 1 tablet (25 mg) by mouth 2 times daily 60 tablet 0     omega 3 1000 MG CAPS Take 1 g by mouth daily 90 capsule      polyethylene glycol (MIRALAX/GLYCOLAX) powder Take 1 capful by mouth daily as needed for constipation       rivaroxaban ANTICOAGULANT (XARELTO ANTICOAGULANT) 20 MG TABS tablet Take 1 tablet (20 mg) by mouth daily (with dinner) 30 tablet 3     umeclidinium-vilanterol (ANORO ELLIPTA) 62.5-25 MCG/INH oral inhaler Inhale 1 puff into the lungs daily             Reviewed and updated as needed this visit by Provider         Review of Systems   ROS COMP: CONSTITUTIONAL:NEGATIVE for fever, chills, change in weight  CV: NEGATIVE for chest pain, palpitations or peripheral edema  GI: see below  MUSCULOSKELETAL: no edema.     Some pain RLQ intermittent.  Some muscle pain upper arms and hips.   Arm pain improves through the day.   Tylenol works.   She has not started magnesium.    Intermittent problems with bm; miralax helps.    bp up and down.   Did see MTM on Tuesday; they convinced her to take her medication consistently twice per day. This is difficult for her to do. If she skips a dose; she is planning to make it up later.     Yesterday, did not miss dose. But did prior to that.           Objective    BP (!) 144/60 (BP Location: Right arm, Cuff Size: Adult Regular)   Pulse 81   Temp 97.7  F (36.5  C) (Oral)   Resp 16   Ht 1.6 m (5' 3\")   Wt 61.1 kg (134 lb 11.2 oz)   LMP  (LMP Unknown)   SpO2 98%   BMI 23.86 kg/m    There is no height or weight on file to calculate BMI.     /70  Upon repeat.    Physical Exam   GENERAL APPEARANCE: alert and no distress  CV: regular rates and rhythm  ABDOMEN: soft, nontender, without hepatosplenomegaly or masses  PSYCH: mentation appears normal and affect normal/bright    Reviewed MTM note.  Reviewed Cardiology note        Assessment & Plan     1. Essential hypertension with goal blood pressure less than 140/90  Borderline " control.   Discussed again how it can be difficult to adjust doses when not taking medication consistently. She is planning to do this now.   Will follow.  - amLODIPine (NORVASC) 2.5 MG tablet; Take 2 tablets (5 mg) by mouth 2 times daily  Dispense: 180 tablet; Refill: 1    2. Atrial fibrillation, unspecified type (H)  Stable.  She is on anticoagulation, but planning to change to coumadin. She is using up her Xarelto.   Did have INR nurse meet with her briefly. Yue is working on getting the plan to her.     3. Myalgia  Uncertain etiology. Magnesium was a little low; did discuss how this can go with cramps. However, it sounds like this may be different. Magnesium was minimally low.    4. Hypomagnesemia  As above.     5. Need for prophylactic vaccination and inoculation against influenza    - INFLUENZA (HIGH DOSE) 3 VALENT VACCINE [65185]  - ADMIN INFLUENZA (For MEDICARE Patients ONLY) []    Return in about 4 months (around 2/24/2020).    Violet Fenton MD, MD  [facility]

## 2019-10-24 NOTE — TELEPHONE ENCOUNTER
"Norvasc  Last Written Prescription Date:  10/24/2019  Last Fill Quantity: 180,  # refills: 1   Last office visit: 10/24/19  Future Office Visit:      Requested Prescriptions   Pending Prescriptions Disp Refills     amLODIPine (NORVASC) 2.5 MG tablet [Pharmacy Med Name: AMLODIPINE BESYLATE 2.5MG TABLETS] 180 tablet 1     Sig: TAKE 2 TABLETS(5 MG) BY MOUTH DAILY       Calcium Channel Blockers Protocol  Passed - 10/24/2019 10:19 AM        Passed - Blood pressure under 140/90 in past 12 months     BP Readings from Last 3 Encounters:   10/24/19 138/70   10/23/19 128/68   10/22/19 (!) 152/66                 Passed - Recent (12 mo) or future (30 days) visit within the authorizing provider's specialty     Patient has had an office visit with the authorizing provider or a provider within the authorizing providers department within the previous 12 mos or has a future within next 30 days. See \"Patient Info\" tab in inbasket, or \"Choose Columns\" in Meds & Orders section of the refill encounter.              Passed - Medication is active on med list        Passed - Patient is age 18 or older        Passed - No active pregnancy on record        Passed - Normal serum creatinine on file in past 12 months     Recent Labs   Lab Test 10/17/19  1018  10/21/15  1014   CR 0.60   < >  --    CREAT  --   --  0.7    < > = values in this interval not displayed.             Passed - No positive pregnancy test in past 12 months        RX filled today by provider  Atiya Estrada RN on 10/24/2019 at 12:02 PM    "

## 2019-10-24 NOTE — TELEPHONE ENCOUNTER
Was informed today by PCP that on 9/25/19, patient met with [facility] and was given a referral to the Anticoagulation Clinic to transition from Xarelto to warfarin d/t high cost of Xarelto. Patient understands the need for frequent lab tests and is in agreement. [facility] provided some education and told patient INR nurse would be in touch to schedule. However, INR clinic was never informed until today. Discussed transitioning with PCP and Anticoagulation Pharmacist. Patient has a therapeutic range of 2.0-3.0 for a diagnosis of paroxysmal a-fib. Cannot accept referral from [facility]. Dr. Fenton will need to sign new referral and prescribe the warfarin. Orders pended for signature.     Yue NICOLAS RN  Anticoagulation Clinic  [place]

## 2019-10-28 RX ORDER — WARFARIN SODIUM 2.5 MG/1
TABLET ORAL
Qty: 90 TABLET | Refills: 0 | OUTPATIENT
Start: 2019-11-06

## 2019-11-06 ENCOUNTER — TELEPHONE (OUTPATIENT)
Dept: FAMILY MEDICINE | Facility: CLINIC | Age: 79
End: 2019-11-06

## 2019-11-06 ENCOUNTER — ANTICOAGULATION THERAPY VISIT (OUTPATIENT)
Dept: NURSING | Facility: CLINIC | Age: 79
End: 2019-11-06
Payer: MEDICARE

## 2019-11-06 DIAGNOSIS — I48.91 ATRIAL FIBRILLATION (H): ICD-10-CM

## 2019-11-06 DIAGNOSIS — J44.9 CHRONIC OBSTRUCTIVE PULMONARY DISEASE, UNSPECIFIED COPD TYPE (H): ICD-10-CM

## 2019-11-06 LAB — INR POINT OF CARE: 1.3 (ref 0.86–1.14)

## 2019-11-06 PROCEDURE — 36416 COLLJ CAPILLARY BLOOD SPEC: CPT

## 2019-11-06 PROCEDURE — 99207 ZZC NO CHARGE NURSE ONLY: CPT

## 2019-11-06 PROCEDURE — 85610 PROTHROMBIN TIME: CPT | Mod: QW

## 2019-11-06 NOTE — TELEPHONE ENCOUNTER
Routing to Cardiology for further advice.    Patient was in to the INR Clinic today to start transition to warfarin. Advised to take 2.5mg both today and tomorrow and return on Friday for another INR.   Per protocol, with Xarelto [place] labeling we can continue with Xarelto when starting warfarin. And will discontinue the Xarelto when INR is greater than or equal to 2.0. Are you in agreement with this plan? Patient has 5 tabs of Xarelto left.  PCP defers to Cardiology.    Yue NICOLAS RN  Anticoagulation Clinic  [place]

## 2019-11-06 NOTE — PROGRESS NOTES
ANTICOAGULATION INITIAL CLINIC VISIT    Patient Name:  Tomasa Fam  Date:  11/6/2019  Referred by: Violet Fenton MD   Contact Type:  Face to Face    SUBJECTIVE:  Coumadin education was completed today.  Topics covered include:  -Introduction to coumadin  -Proper Administration  -INR Testing  -Sign/Symptoms of Bleeding  -Signs/Symptoms of Clot Formation or Stroke  -Dietary Intake of Vitamin K  -Drug Interactions  -Anticoagulation Identification (bracelet, necklace or wallet card)  -Future Surgery  -Effects of Alcohol, Tobacco, and Exercise on Coumadin  -Bridging with Xarelto    Coumadin Education Booklet and Coumadin Identification Wallet Card were given to the patient.    Patient Findings     Comments:   Patient has been taking one 20mg Xarelto daily with dinner, she has five left. This writer remembers discussing bridging with PCP and being advised no bridging needed, but now cannot find documentation of that. Per protocol, Xarelto can bridge to warfarin under [place] guidelines until patient's INR is in therapeutic range. PCP is not in clinic today so sent Cardiology a message to see if they want to bridge with Xarelto or not. Advised patient she should continue to take Xarelto for the next two days in addition to starting 2.5mg warfarin nightly. Will recheck INR in 2 days on Friday. Consulted with [facility], Annabelle Salcedo, and she is in agreement that until we hear back from either PCP or Cardiology, taking the Xarelto in conjunction with warfarin is appropriate as warfarin will take at least 3 days to start to see much effect. That being said, patient's [id number] score is 3, patient may not actually need bridging. Will wait for reply from Cardiology or huddle with PCP tomorrow.        Clinical Outcomes     Comments:   Patient has been taking one 20mg Xarelto daily with dinner, she has five left. This writer remembers discussing bridging with PCP and being advised no bridging needed, but now  cannot find documentation of that. Per protocol, Xarelto can bridge to warfarin under [place] guidelines until patient's INR is in therapeutic range. PCP is not in clinic today so sent Cardiology a message to see if they want to bridge with Xarelto or not. Advised patient she should continue to take Xarelto for the next two days in addition to starting 2.5mg warfarin nightly. Will recheck INR in 2 days on Friday. Consulted with Shanique CHOW, Annabelle Salcedo, and she is in agreement that until we hear back from either PCP or Cardiology, taking the Xarelto in conjunction with warfarin is appropriate as warfarin will take at least 3 days to start to see much effect. That being said, patient's [id number] score is 3, patient may not actually need bridging. Will wait for reply from Cardiology or huddle with PCP tomorrow.          OBJECTIVE    INR Protime   Date Value Ref Range Status   2019 1.3 (A) 0.86 - 1.14 Final       ASSESSMENT / PLAN  INR assessment SUB    Recheck INR In: 2 DAYS    INR Location Clinic      Anticoagulation Summary  As of 2019    INR goal:   2.0-3.0   TTR:   --   INR used for dosin.3! (2019)   Warfarin maintenance plan:   2.5 mg (2.5 mg x 1) every day   Full warfarin instructions:   2.5 mg every day   Weekly warfarin total:   17.5 mg   Plan last modified:   Yue Martínez RN (2019)   Next INR check:   2019   Target end date:   Indefinite    Indications    Atrial fibrillation (H) [I48.91]             Anticoagulation Episode Summary     INR check location:   Anticoagulation Clinic    Preferred lab:       Send INR reminders to:   SHANIQUE WEAVER    Comments:   patient started on Xarelto, transition to warfarin 19, 2.5mg tab      Anticoagulation Care Providers     Provider Role Specialty Phone number    Violet Fenton MD Referring [facility] [phone]            See the Encounter Report to view Anticoagulation Flowsheet and Dosing Calendar (Go to  Encounters tab in chart review, and find the Anticoagulation Therapy Visit)    Dosage adjustment made based on physician directed care plan. Starting warfarin tonight.    Yue Martínez RN

## 2019-11-07 DIAGNOSIS — I10 ESSENTIAL HYPERTENSION: ICD-10-CM

## 2019-11-07 RX ORDER — METOPROLOL TARTRATE 25 MG/1
TABLET, FILM COATED ORAL
Qty: 180 TABLET | Refills: 0 | Status: SHIPPED | OUTPATIENT
Start: 2019-11-07 | End: 2020-02-14

## 2019-11-07 NOTE — TELEPHONE ENCOUNTER
I believe she is on Anoro ellipta through Pulmonary instead of the Incruse.  I believe she is still seeing Pulmonary.     So this is probably the pharmacy asking and not realizing about adjustment.   Please verify with patient.  Thanks!

## 2019-11-07 NOTE — TELEPHONE ENCOUNTER
Not on RN protocol.  Please refill as appropriate. Patient was seen on 10/24/19 Dr. Fenton.  States that she was not taking due to sore throat.    Please advise.  Josephine Ribeiro RN

## 2019-11-08 ENCOUNTER — ANTICOAGULATION THERAPY VISIT (OUTPATIENT)
Dept: NURSING | Facility: CLINIC | Age: 79
End: 2019-11-08
Payer: MEDICARE

## 2019-11-08 DIAGNOSIS — I48.91 ATRIAL FIBRILLATION (H): ICD-10-CM

## 2019-11-08 LAB — INR POINT OF CARE: 1.3 (ref 0.86–1.14)

## 2019-11-08 PROCEDURE — 85610 PROTHROMBIN TIME: CPT | Mod: QW

## 2019-11-08 PROCEDURE — 36416 COLLJ CAPILLARY BLOOD SPEC: CPT

## 2019-11-08 PROCEDURE — 99207 ZZC NO CHARGE NURSE ONLY: CPT

## 2019-11-08 NOTE — TELEPHONE ENCOUNTER
Patient returned call and reported that Incruse Elipta is the inhaler she has at home.  Patient notes that she was told the inhalers are the same.    Has not used it for a while because it gives her sore throat even though she rinses and gargles.      She says that Pulmonary did prescribe an inhaler that was to expensive and so she went back to the Incruse from PCP.    Patient notes that she is not following with them at this time they advised to see her in 6 months at last appointment.  She notes that she was just there prior to being hospitalized.    Please advise,  Yue VENEGAS - Registered Nurse  [facility]  Acute and Diagnostic Services

## 2019-11-08 NOTE — TELEPHONE ENCOUNTER
I would recommend that she call and discuss with Pulmonary and see if they want to see her sooner or what they would want her to do regarding inhalers.  The Anoro is a step up from the Incruse; it does look like Dr. Suero had some thoughts about alternative treatment if the original one she prescribed was too expensive (though looks like the original was Trelogy, so Anoro may be OK (?); looks like a couple were recommended).    The note is on 9/9/19; but listed as Oncology instead of Pulmonary.  She did say to return in 3-6 months.    (I wonder if we can get the note changed to be listed as transferred records from Pulmonary instead of Oncology; would be easier to find in the future... not sure who to send it to, can you help?)

## 2019-11-08 NOTE — TELEPHONE ENCOUNTER
Patient contacted with information from PCP and agreed with plan to follow up with Pulmonology regarding inhalers  Yue VENEGAS - Registered Nurse  [facility]  Acute and Diagnostic Services

## 2019-11-08 NOTE — TELEPHONE ENCOUNTER
Non-detailed message left with patient  to return our call.    Yue VENEGAS - Registered Nurse  [facility]  Acute and Diagnostic Services

## 2019-11-08 NOTE — TELEPHONE ENCOUNTER
Non-detailed message left to return our call.  OK to speak to triage nurse at PCP station when they call back.    Beverly LOZA given information regarding scanned document error and will reach out to abstracting.  Yue VENEGAS - Registered Nurse  [facility]  Acute and Diagnostic Services

## 2019-11-08 NOTE — TELEPHONE ENCOUNTER
I am okay with this plan, however, I am not this patient's primary cardiologist. Dr. Mancia has been seeing her so you may want to okay with him as well.

## 2019-11-08 NOTE — TELEPHONE ENCOUNTER
Form signed by Dr. Gonzales and faxed to [phone]. Original form sent to scanning.  Yuliet Posada RN'     Routing to abstraction team to update the record.  -Beverly Higgins

## 2019-11-08 NOTE — TELEPHONE ENCOUNTER
Sly Wesley,  Thank you for your message below. The abstraction team is not a part of HIM and works remotely so we are unable to receive records or scan them in. Please let us know if you have any questions about this.  You need to reach out to the [employer] department with any scanning errors. We are a part of the Data Analytics team with IT.  Thank you,  Diana, Abstracting Team

## 2019-11-08 NOTE — PROGRESS NOTES
ANTICOAGULATION FOLLOW-UP CLINIC VISIT    Patient Name:  Tomasa Fam  Date:  2019  Contact Type:  Face to Face    SUBJECTIVE:  Patient Findings     Comments:   Per new start protocol, can increase warfarin 0-20%. Will increase next 2 days which is an increase of 14%. Patient will continue with Xarelto through the weekend as well and recheck on Monday in [place]. The patient was assessed for diet, medication, and activity level changes, missed or extra doses, bruising or bleeding, with no problem findings. Still no word from cardiology regarding bridging.  Yue NICOLAS RN  Anticoagulation Clinic  [place]          Clinical Outcomes     Comments:   Per new start protocol, can increase warfarin 0-20%. Will increase next 2 days which is an increase of 14%. Patient will continue with Xarelto through the weekend as well and recheck on Monday in [place]. The patient was assessed for diet, medication, and activity level changes, missed or extra doses, bruising or bleeding, with no problem findings. Still no word from cardiology regarding bridging.  Yue NICOLAS RN  Anticoagulation Clinic  [place]             OBJECTIVE    INR Protime   Date Value Ref Range Status   2019 1.3 (A) 0.86 - 1.14 Final       ASSESSMENT / PLAN  INR assessment SUB    Recheck INR In: 3 DAYS    INR Location Clinic      Anticoagulation Summary  As of 2019    INR goal:   2.0-3.0   TTR:   --   INR used for dosin.3! (2019)   Warfarin maintenance plan:   3.75 mg (2.5 mg x 1.5) every Fri, Sat; 2.5 mg (2.5 mg x 1) all other days   Full warfarin instructions:   3.75 mg every Fri, Sat; 2.5 mg all other days   Weekly warfarin total:   20 mg   Plan last modified:   Yue Martínez RN (2019)   Next INR check:   2019   Target end date:   Indefinite    Indications    Atrial fibrillation (H) [I48.91]             Anticoagulation Episode Summary     INR check location:   Anticoagulation Clinic    Preferred  lab:       Send INR reminders to:   PK WEAVER    Comments:   patient started on Xarelto, transition to warfarin 11/6/19, 2.5mg tab      Anticoagulation Care Providers     Provider Role Specialty Phone number    Violet Fenton MD Referring [facility] [phone]            See the Encounter Report to view Anticoagulation Flowsheet and Dosing Calendar (Go to Encounters tab in chart review, and find the Anticoagulation Therapy Visit)    Dosage adjustment made based on physician directed care plan.    Yue Martínez RN

## 2019-11-11 ENCOUNTER — ANTICOAGULATION THERAPY VISIT (OUTPATIENT)
Dept: NURSING | Facility: CLINIC | Age: 79
End: 2019-11-11
Payer: MEDICARE

## 2019-11-11 DIAGNOSIS — I48.91 ATRIAL FIBRILLATION (H): ICD-10-CM

## 2019-11-11 LAB — INR POINT OF CARE: 2.1 (ref 0.86–1.14)

## 2019-11-11 PROCEDURE — 36416 COLLJ CAPILLARY BLOOD SPEC: CPT

## 2019-11-11 PROCEDURE — 85610 PROTHROMBIN TIME: CPT | Mod: QW

## 2019-11-11 PROCEDURE — 99207 ZZC NO CHARGE NURSE ONLY: CPT

## 2019-11-11 NOTE — TELEPHONE ENCOUNTER
Patient's INR is 2.1 today. Bridging no longer required.    Yue NICOLAS RN  Anticoagulation Clinic  [place]

## 2019-11-11 NOTE — PROGRESS NOTES
ANTICOAGULATION FOLLOW-UP CLINIC VISIT    Patient Name:  Tomasa Fam  Date:  2019  Contact Type:  Face to Face    SUBJECTIVE:  Patient Findings     Positives:   Change in medications (Finished Xarelto last night--11/10/19)    Comments:   INR jasen quickly with weekend Warfarin dose increase so patient will have INR again in 2 days at her home clinic ().        Clinical Outcomes     Negatives:   Major bleeding event, Thromboembolic event, Anticoagulation-related hospital admission, Anticoagulation-related ED visit, Anticoagulation-related fatality    Comments:   INR jasen quickly with weekend Warfarin dose increase so patient will have INR again in 2 days at her home clinic ().           OBJECTIVE    INR Protime   Date Value Ref Range Status   2019 2.1 (A) 0.86 - 1.14 Final       ASSESSMENT / PLAN  INR assessment THER    Recheck INR In: 2 DAYS    INR Location Clinic      Anticoagulation Summary  As of 2019    INR goal:   2.0-3.0   TTR:   --   INR used for dosin.1 (2019)   Warfarin maintenance plan:   3.75 mg (2.5 mg x 1.5) every Fri, Sat; 2.5 mg (2.5 mg x 1) all other days   Full warfarin instructions:   3.75 mg every Fri, Sat; 2.5 mg all other days   Weekly warfarin total:   20 mg   Plan last modified:   hTeresa Lorenzo RN (2019)   Next INR check:   2019   Target end date:   Indefinite    Indications    Atrial fibrillation (H) [I48.91]             Anticoagulation Episode Summary     INR check location:   Anticoagulation Clinic    Preferred lab:       Send INR reminders to:   PK WEAVER    Comments:   patient started on Xarelto, transition to warfarin 19, 2.5mg tab      Anticoagulation Care Providers     Provider Role Specialty Phone number    Violet Fenton MD Referring Family Practice [phone]            See the Encounter Report to view Anticoagulation Flowsheet and Dosing Calendar (Go to Encounters tab in chart review, and find the Anticoagulation  Therapy Visit)        Theresa Lorenzo RN

## 2019-11-13 ENCOUNTER — ANTICOAGULATION THERAPY VISIT (OUTPATIENT)
Dept: NURSING | Facility: CLINIC | Age: 79
End: 2019-11-13
Payer: MEDICARE

## 2019-11-13 DIAGNOSIS — I48.91 ATRIAL FIBRILLATION (H): ICD-10-CM

## 2019-11-13 LAB — INR POINT OF CARE: 1.4 (ref 0.86–1.14)

## 2019-11-13 PROCEDURE — 85610 PROTHROMBIN TIME: CPT | Mod: QW

## 2019-11-13 PROCEDURE — 36416 COLLJ CAPILLARY BLOOD SPEC: CPT

## 2019-11-13 PROCEDURE — 99207 ZZC NO CHARGE NURSE ONLY: CPT

## 2019-11-13 NOTE — PROGRESS NOTES
ANTICOAGULATION FOLLOW-UP CLINIC VISIT    Patient Name:  Tomasa Fam  Date:  2019  Contact Type:  Face to Face    SUBJECTIVE:  Patient Findings     Positives:   Change in medications (stopped Xarelto on 19)    Comments:   Now that patient has not had Xarelto since , her INR is back down to 1.4. Per protocol, can increase 10-20%. Will increase maintenance dose by 18% and see her back in another 2 days. Otherwise, no changes or concerns.   Yue NICOLAS RN  Anticoagulation Clinic  [place]          Clinical Outcomes     Comments:   Now that patient has not had Xarelto since , her INR is back down to 1.4. Per protocol, can increase 10-20%. Will increase maintenance dose by 18% and see her back in another 2 days. Otherwise, no changes or concerns.   Yue NICOLAS RN  Anticoagulation Clinic  [place]             OBJECTIVE    INR Protime   Date Value Ref Range Status   2019 1.4 (A) 0.86 - 1.14 Final       ASSESSMENT / PLAN  INR assessment SUB    Recheck INR In: 2 DAYS    INR Location Clinic      Anticoagulation Summary  As of 2019    INR goal:   2.0-3.0   TTR:   --   INR used for dosin.4! (2019)   Warfarin maintenance plan:   3.75 mg (2.5 mg x 1.5) every Thu, Fri, Sat; 5 mg (2.5 mg x 2) every Wed; 2.5 mg (2.5 mg x 1) all other days   Full warfarin instructions:   3.75 mg every Thu, Fri, Sat; 5 mg every Wed; 2.5 mg all other days   Weekly warfarin total:   23.75 mg   Plan last modified:   Yue Martínez RN (2019)   Next INR check:   11/15/2019   Priority:   High   Target end date:   Indefinite    Indications    Atrial fibrillation (H) [I48.91]             Anticoagulation Episode Summary     INR check location:   Anticoagulation Clinic    Preferred lab:       Send INR reminders to:   PK WEAVER    Comments:   patient started on Xarelto, transition to warfarin 19, 2.5mg tab      Anticoagulation Care Providers     Provider Role Specialty Phone  number    Violet Fenton MD Referring [facility] [phone]            See the Encounter Report to view Anticoagulation Flowsheet and Dosing Calendar (Go to Encounters tab in chart review, and find the Anticoagulation Therapy Visit)    Dosage adjustment made based on physician directed care plan.    Yue Martínez RN

## 2019-11-15 ENCOUNTER — ANTICOAGULATION THERAPY VISIT (OUTPATIENT)
Dept: NURSING | Facility: CLINIC | Age: 79
End: 2019-11-15
Payer: MEDICARE

## 2019-11-15 DIAGNOSIS — Z79.01 LONG TERM CURRENT USE OF ANTICOAGULANTS WITH INR GOAL OF 2.0-3.0: ICD-10-CM

## 2019-11-15 DIAGNOSIS — I48.91 ATRIAL FIBRILLATION, UNSPECIFIED TYPE (H): ICD-10-CM

## 2019-11-15 DIAGNOSIS — I48.91 ATRIAL FIBRILLATION (H): ICD-10-CM

## 2019-11-15 LAB — INR POINT OF CARE: 1.9 (ref 0.86–1.14)

## 2019-11-15 PROCEDURE — 99207 ZZC NO CHARGE NURSE ONLY: CPT

## 2019-11-15 PROCEDURE — 36416 COLLJ CAPILLARY BLOOD SPEC: CPT

## 2019-11-15 PROCEDURE — 85610 PROTHROMBIN TIME: CPT | Mod: QW

## 2019-11-15 RX ORDER — WARFARIN SODIUM 2.5 MG/1
TABLET ORAL
Qty: 90 TABLET | Refills: 0
Start: 2019-11-15 | End: 2020-01-10

## 2019-11-15 NOTE — PROGRESS NOTES
ANTICOAGULATION FOLLOW-UP CLINIC VISIT    Patient Name:  Tomasa Fam  Date:  11/15/2019  Contact Type:  Face to Face    SUBJECTIVE:  Patient Findings     Comments:   Patient 1.9 today, per New Start Schedule, with increase maintenance dose by 5% and see patient back for next INR in 1 week. The patient was assessed for diet, medication, and activity level changes, missed or extra doses, bruising or bleeding, with no problem findings.  Yue NICOLAS RN  Anticoagulation Clinic  [place]          Clinical Outcomes     Negatives:   Major bleeding event, Thromboembolic event, Anticoagulation-related hospital admission, Anticoagulation-related ED visit, Anticoagulation-related fatality    Comments:   Patient 1.9 today, per New Start Schedule, with increase maintenance dose by 5% and see patient back for next INR in 1 week. The patient was assessed for diet, medication, and activity level changes, missed or extra doses, bruising or bleeding, with no problem findings.  Yue NICOLAS RN  Anticoagulation Clinic  [place]             OBJECTIVE    INR Protime   Date Value Ref Range Status   11/15/2019 1.9 (A) 0.86 - 1.14 Final       ASSESSMENT / PLAN  INR assessment SUB    Recheck INR In: 1 WEEK    INR Location Clinic      Anticoagulation Summary  As of 11/15/2019    INR goal:   2.0-3.0   TTR:   --   INR used for dosin.9! (11/15/2019)   Warfarin maintenance plan:   5 mg (2.5 mg x 2) every Mon, Wed, Fri; 2.5 mg (2.5 mg x 1) all other days   Full warfarin instructions:   5 mg every Mon, Wed, Fri; 2.5 mg all other days   Weekly warfarin total:   25 mg   Plan last modified:   Yue Martínez RN (11/15/2019)   Next INR check:   2019   Priority:   High   Target end date:   Indefinite    Indications    Atrial fibrillation (H) [I48.91]             Anticoagulation Episode Summary     INR check location:   Anticoagulation Clinic    Preferred lab:       Send INR reminders to:   PK WEAVER    Comments:    patient started on Xarelto, transition to warfarin 11/6/19, 2.5mg tab      Anticoagulation Care Providers     Provider Role Specialty Phone number    Violet Fenton MD Referring [facility] [phone]            See the Encounter Report to view Anticoagulation Flowsheet and Dosing Calendar (Go to Encounters tab in chart review, and find the Anticoagulation Therapy Visit)    Dosage adjustment made based on physician directed care plan.    Yue Martínez RN

## 2019-11-21 DIAGNOSIS — E78.5 HYPERLIPIDEMIA LDL GOAL <160: ICD-10-CM

## 2019-11-21 NOTE — TELEPHONE ENCOUNTER
"Requested Prescriptions   Pending Prescriptions Disp Refills     ezetimibe (ZETIA) 10 MG tablet [Pharmacy Med Name: EZETIMIBE 10MG TABLETS] 90 tablet 0     Sig: TAKE 1 TABLET(10 MG) BY MOUTH DAILY   Last Written Prescription Date:  8/22/19  Last Fill Quantity: 90,  # refills: 0   Last office visit: 10/24/2019 with prescribing provider:  Violet Fenton MD   Future Office Visit:   Next 5 appointments (look out 90 days)    Nov 22, 2019 11:30 AM CST  [facility] Visit with [facility] Fenton MD  [facility] [address] [facility] [address] [phone]               Antihyperlipidemic agents Passed - 11/21/2019 10:59 AM        Passed - Lipid panel on file in past 12 mos     Recent Labs   Lab Test 08/26/19  0832  06/10/15  0925   CHOL 187   < > 194   TRIG 124   < > 88   HDL 65   < > 70   LDL 97   < > 106   NHDL 122   < >  --    VLDL  --   --  18   CHOLHDLRATIO  --   --  2.8    < > = values in this interval not displayed.               Passed - Normal serum ALT on record in past 12 mos     Recent Labs   Lab Test 09/13/19  1127   ALT 24             Passed - Recent (12 mo) or future (30 days) visit within the authorizing provider's specialty     Patient has had an office visit with the authorizing provider or a provider within the authorizing providers department within the previous 12 mos or has a future within next 30 days. See \"Patient Info\" tab in inbasket, or \"Choose Columns\" in Meds & Orders section of the refill encounter.              Passed - Medication is active on med list        Passed - Patient is age 18 years or older        Passed - No active pregnancy on record        Passed - No positive pregnancy test in past 12 mos         "

## 2019-11-22 ENCOUNTER — OFFICE VISIT (OUTPATIENT)
Dept: FAMILY MEDICINE | Facility: CLINIC | Age: 79
End: 2019-11-22
Payer: MEDICARE

## 2019-11-22 ENCOUNTER — ANTICOAGULATION THERAPY VISIT (OUTPATIENT)
Dept: NURSING | Facility: CLINIC | Age: 79
End: 2019-11-22
Payer: MEDICARE

## 2019-11-22 VITALS
BODY MASS INDEX: 23.76 KG/M2 | HEART RATE: 79 BPM | DIASTOLIC BLOOD PRESSURE: 70 MMHG | SYSTOLIC BLOOD PRESSURE: 146 MMHG | HEIGHT: 63 IN | RESPIRATION RATE: 16 BRPM | TEMPERATURE: 97.7 F | WEIGHT: 134.1 LBS | OXYGEN SATURATION: 99 %

## 2019-11-22 DIAGNOSIS — M79.602 BILATERAL ARM PAIN: Primary | ICD-10-CM

## 2019-11-22 DIAGNOSIS — I48.91 ATRIAL FIBRILLATION (H): ICD-10-CM

## 2019-11-22 DIAGNOSIS — M79.605 BILATERAL LEG PAIN: ICD-10-CM

## 2019-11-22 DIAGNOSIS — I10 ESSENTIAL HYPERTENSION WITH GOAL BLOOD PRESSURE LESS THAN 140/90: ICD-10-CM

## 2019-11-22 DIAGNOSIS — M79.604 BILATERAL LEG PAIN: ICD-10-CM

## 2019-11-22 DIAGNOSIS — M79.601 BILATERAL ARM PAIN: Primary | ICD-10-CM

## 2019-11-22 DIAGNOSIS — K21.9 GASTROESOPHAGEAL REFLUX DISEASE, ESOPHAGITIS PRESENCE NOT SPECIFIED: ICD-10-CM

## 2019-11-22 LAB
ANION GAP SERPL CALCULATED.3IONS-SCNC: 8 MMOL/L (ref 3–14)
BUN SERPL-MCNC: 16 MG/DL (ref 7–30)
CALCIUM SERPL-MCNC: 9.8 MG/DL (ref 8.5–10.1)
CHLORIDE SERPL-SCNC: 100 MMOL/L (ref 94–109)
CO2 SERPL-SCNC: 26 MMOL/L (ref 20–32)
CREAT SERPL-MCNC: 0.62 MG/DL (ref 0.52–1.04)
CRP SERPL-MCNC: 94 MG/L (ref 0–8)
ERYTHROCYTE [DISTWIDTH] IN BLOOD BY AUTOMATED COUNT: 12.9 % (ref 10–15)
ERYTHROCYTE [SEDIMENTATION RATE] IN BLOOD BY WESTERGREN METHOD: 59 MM/H (ref 0–30)
GFR SERPL CREATININE-BSD FRML MDRD: 85 ML/MIN/{1.73_M2}
GLUCOSE SERPL-MCNC: 84 MG/DL (ref 70–99)
HCT VFR BLD AUTO: 35.8 % (ref 35–47)
HGB BLD-MCNC: 11.6 G/DL (ref 11.7–15.7)
INR POINT OF CARE: 2.7 (ref 0.86–1.14)
MAGNESIUM SERPL-MCNC: 1.7 MG/DL (ref 1.6–2.3)
MCH RBC QN AUTO: 27.4 PG (ref 26.5–33)
MCHC RBC AUTO-ENTMCNC: 32.4 G/DL (ref 31.5–36.5)
MCV RBC AUTO: 85 FL (ref 78–100)
PLATELET # BLD AUTO: 400 10E9/L (ref 150–450)
POTASSIUM SERPL-SCNC: 4.2 MMOL/L (ref 3.4–5.3)
RBC # BLD AUTO: 4.23 10E12/L (ref 3.8–5.2)
SODIUM SERPL-SCNC: 134 MMOL/L (ref 133–144)
WBC # BLD AUTO: 9.8 10E9/L (ref 4–11)

## 2019-11-22 PROCEDURE — 85027 COMPLETE CBC AUTOMATED: CPT | Performed by: FAMILY MEDICINE

## 2019-11-22 PROCEDURE — 83735 ASSAY OF MAGNESIUM: CPT | Performed by: FAMILY MEDICINE

## 2019-11-22 PROCEDURE — 99214 OFFICE O/P EST MOD 30 MIN: CPT | Performed by: FAMILY MEDICINE

## 2019-11-22 PROCEDURE — 86140 C-REACTIVE PROTEIN: CPT | Performed by: FAMILY MEDICINE

## 2019-11-22 PROCEDURE — 80048 BASIC METABOLIC PNL TOTAL CA: CPT | Performed by: FAMILY MEDICINE

## 2019-11-22 PROCEDURE — 36416 COLLJ CAPILLARY BLOOD SPEC: CPT

## 2019-11-22 PROCEDURE — 99207 ZZC NO CHARGE NURSE ONLY: CPT

## 2019-11-22 PROCEDURE — 85652 RBC SED RATE AUTOMATED: CPT | Performed by: FAMILY MEDICINE

## 2019-11-22 PROCEDURE — 85610 PROTHROMBIN TIME: CPT | Mod: QW

## 2019-11-22 RX ORDER — EZETIMIBE 10 MG/1
TABLET ORAL
Qty: 90 TABLET | Refills: 1 | Status: SHIPPED | OUTPATIENT
Start: 2019-11-22 | End: 2020-05-21

## 2019-11-22 ASSESSMENT — MIFFLIN-ST. JEOR: SCORE: 1047.4

## 2019-11-22 NOTE — PROGRESS NOTES
Subjective     Tomasa Fam is a 79 year old female who presents to clinic today for the following health issues:    HPI   Here to discuss muscle aches that are located on bilateral arm, upper neck that goes up the back of head and the right toe.  The bilateral arm pain is constant. The upper neck pain comes and goes.  The pain is a deep ache.  Would like to have potassium checked. The pain started about 3-4 days after stopping the Potassium. Is also having dry mouth mostly at night.       See under ROS    Patient Active Problem List   Diagnosis     Chronic airway obstruction (H)     ASCUS on Pap smear     Hyperlipidemia LDL goal <160     Breast cancer (H)     Essential hypertension with goal blood pressure less than 140/90     Cystocele, midline     Uterovaginal prolapse     S/P hysterectomy     Advanced directives, counseling/discussion     History of hypokalemia     Concussion     Thyroid nodule     Chronic pain of both knees     History of lung cancer     Atrial fibrillation (H)       Current Outpatient Medications   Medication Sig Dispense Refill     acetaminophen (TYLENOL) 500 MG tablet Take 500-1,000 mg by mouth every 6 hours as needed for pain       albuterol (PROAIR HFA/PROVENTIL HFA/VENTOLIN HFA) 108 (90 Base) MCG/ACT inhaler Inhale 1-2 puffs into the lungs every 6 hours as needed for shortness of breath / dyspnea or wheezing       amLODIPine (NORVASC) 2.5 MG tablet Take 2 tablets (5 mg) by mouth 2 times daily 180 tablet 1     B Complex Vitamins (VITAMIN  B COMPLEX) tablet Take 1 tablet by mouth daily.       calcium-vitamin D (CALTRATE) 600-400 MG-UNIT per tablet Take 1 tablet by mouth daily        cholecalciferol (VITAMIN D) 400 UNIT TABS Take 400 Units by mouth daily       ezetimibe (ZETIA) 10 MG tablet TAKE 1 TABLET(10 MG) BY MOUTH DAILY 90 tablet 1     FLUoxetine (PROZAC) 10 MG capsule TAKE 1 CAPSULE(10 MG) BY MOUTH DAILY 90 capsule 1     HEMP OIL OR EXTRACT OR OTHER CBD CANNABINOID, NOT  "MEDICAL CANNABIS, Apply topically daily as needed       metoprolol tartrate (LOPRESSOR) 25 MG tablet TAKE 1 TABLET BY MOUTH TWICE DAILY 180 tablet 0     omega 3 1000 MG CAPS Take 1 g by mouth daily 90 capsule      polyethylene glycol (MIRALAX/GLYCOLAX) powder Take 1 capful by mouth daily as needed for constipation       umeclidinium-vilanterol (ANORO ELLIPTA) 62.5-25 MCG/INH oral inhaler Inhale 1 puff into the lungs daily       warfarin ANTICOAGULANT (COUMADIN) 2.5 MG tablet Take 2 tablets (5mg) by mouth M,W,F; take 1 tablet (2.5mg) by mouth T,Th,Sat,Sun; or as instructed by INR Clinic. 90 tablet 0           Reviewed and updated as needed this visit by Provider         Review of Systems   ROS COMP: CONSTITUTIONAL:NEGATIVE for fever, chills, change in weight  CV: NEGATIVE for chest pain, palpitations or peripheral edema  MUSCULOSKELETAL: see below  PSYCHIATRIC: NEGATIVE for changes in mood or affect    Arm pain since potassium and water pills were taken away at her last hospitalization.   Within the last couple months.   She would like to have her potassium checked.    Hurts to bring arms up to shoulder level or beyond. Due to pain in the muscle bilaterally. Upper arm.   So reaching can be difficult.     Some pain in legs. Some pain in knees. Will have some in the thighs as well.     It is worse, with stiffness in the am or after periods of inactivity.    She denies headaches.        Objective    BP (!) (P) 144/74 (BP Location: Right arm, Cuff Size: Adult Regular)   Pulse 79   Temp 97.7  F (36.5  C) (Oral)   Resp 16   Ht 1.6 m (5' 3\")   Wt 60.8 kg (134 lb 1.6 oz)   LMP  (LMP Unknown)   SpO2 99%   BMI 23.75 kg/m    Body mass index is 23.75 kg/m .     Physical Exam   GENERAL APPEARANCE: alert and no distress  CV: regular rates and rhythm  MS: there is no tenderness of upper arms or thighs in the area of pain.  She does show me how difficult it is to raise her arms to 90 degrees; she does not go further.   Able " to raise legs without difficulty.  PSYCH: mentation appears normal and affect normal/bright    GFR Estimate   Date Value Ref Range Status   10/17/2019 86 >60 mL/min/[1.73_m2] Final     Comment:     Non  GFR Calc  Starting 12/18/2018, serum creatinine based estimated GFR (eGFR) will be   calculated using the Chronic Kidney Disease Epidemiology Collaboration   (CKD-EPI) equation.     09/28/2019 81 >60 mL/min/[1.73_m2] Final     Comment:     Non  GFR Calc  Starting 12/18/2018, serum creatinine based estimated GFR (eGFR) will be   calculated using the Chronic Kidney Disease Epidemiology Collaboration   (CKD-EPI) equation.     09/14/2019 83 >60 mL/min/[1.73_m2] Final     Comment:     Non  GFR Calc  Starting 12/18/2018, serum creatinine based estimated GFR (eGFR) will be   calculated using the Chronic Kidney Disease Epidemiology Collaboration   (CKD-EPI) equation.       Reviewed her zio patch with her; she was in a fib 20% of the time.              Assessment & Plan     1. Bilateral arm pain  Uncertain etiology. She believes it to be potassium. Will check magnesium and calcium as well.  I have some concerns about PMR. Discussed this. Treatment would be steroids. She is not real anxious to go on steroids; notes if she does she would need PPI as it worsens GERD  Given patient education materials from [employer] on Polymyalgia Rheumatica.  This also discussed temporal arteritis.   Discussed possible visit with Rheumatology.  - Basic metabolic panel  - Magnesium  - ESR: Erythrocyte sedimentation rate  - CRP, inflammation  - CBC with platelets      2. Bilateral leg pain  As above.   - Basic metabolic panel  - Magnesium  - ESR: Erythrocyte sedimentation rate  - CRP, inflammation  - CBC with platelets    3. Essential hypertension with goal blood pressure less than 140/90  Borderline elevated.     4. Gastroesophageal reflux disease, esophagitis presence not specified  As above; would  need PPI if she were to go on steroids.     Return in about 3 months (around 2/22/2020) for Medication recheck.    Violet Fenton MD, MD  [facility]

## 2019-11-22 NOTE — PROGRESS NOTES
ANTICOAGULATION FOLLOW-UP CLINIC VISIT    Patient Name:  Tomasa Fam  Date:  2019  Contact Type:  Face to Face    SUBJECTIVE:  Patient Findings     Comments:   The patient was assessed for diet, medication, and activity level changes, missed or extra doses, bruising or bleeding, with no problem findings.  Will check again in 10 days (cannot come in 1 week).   Yue NICOLAS RN  Anticoagulation Clinic  [place]          Clinical Outcomes     Negatives:   Major bleeding event, Thromboembolic event, Anticoagulation-related hospital admission, Anticoagulation-related ED visit, Anticoagulation-related fatality    Comments:   The patient was assessed for diet, medication, and activity level changes, missed or extra doses, bruising or bleeding, with no problem findings.  Will check again in 10 days (cannot come in 1 week).   Yue NICOLAS RN  Anticoagulation Clinic  [place]             OBJECTIVE    INR Protime   Date Value Ref Range Status   2019 2.7 (A) 0.86 - 1.14 Final       ASSESSMENT / PLAN  INR assessment THER    Recheck INR In: 10 DAYS    INR Location Clinic      Anticoagulation Summary  As of 2019    INR goal:   2.0-3.0   TTR:   100.0 % (6 d)   INR used for dosin.7 (2019)   Warfarin maintenance plan:   5 mg (2.5 mg x 2) every Mon, Wed, Fri; 2.5 mg (2.5 mg x 1) all other days   Full warfarin instructions:   5 mg every Mon, Wed, Fri; 2.5 mg all other days   Weekly warfarin total:   25 mg   No change documented:   Yue Martínez RN   Plan last modified:   Yue Martínez RN (11/15/2019)   Next INR check:   2019   Priority:   High   Target end date:   Indefinite    Indications    Atrial fibrillation (H) [I48.91]             Anticoagulation Episode Summary     INR check location:   Anticoagulation Clinic    Preferred lab:       Send INR reminders to:   PK WEAVER    Comments:   No Bandaid // 2.5 mg tab // patient started on Xarelto, transition to warfarin 19       Anticoagulation Care Providers     Provider Role Specialty Phone number    Violet Fenton MD Referring [facility] [phone]            See the Encounter Report to view Anticoagulation Flowsheet and Dosing Calendar (Go to Encounters tab in chart review, and find the Anticoagulation Therapy Visit)    Yue Martínez RN

## 2019-11-22 NOTE — TELEPHONE ENCOUNTER
Prescription approved per [facility] Refill Protocol.  Maddison Carmona RN

## 2019-11-26 ENCOUNTER — TELEPHONE (OUTPATIENT)
Dept: FAMILY MEDICINE | Facility: CLINIC | Age: 79
End: 2019-11-26

## 2019-11-26 DIAGNOSIS — K21.9 GASTROESOPHAGEAL REFLUX DISEASE, ESOPHAGITIS PRESENCE NOT SPECIFIED: ICD-10-CM

## 2019-11-26 DIAGNOSIS — M35.3 POLYMYALGIA RHEUMATICA (H): Primary | ICD-10-CM

## 2019-11-26 RX ORDER — PREDNISONE 5 MG/1
15 TABLET ORAL DAILY
Qty: 150 TABLET | Refills: 0 | Status: SHIPPED | OUTPATIENT
Start: 2019-11-26 | End: 2020-01-10

## 2019-11-26 NOTE — TELEPHONE ENCOUNTER
Called and talked to patient, next available apt is 1/17/20, apt scheduled with PCP.    Patient is also seeing INR on 12/4, made note in apt notes to check on patient and see how she is doing, will route to PCP when completed.  Patient aware needs to check in when she comes for INR.    Evelyn Forrester CMA ([facility])

## 2019-11-26 NOTE — TELEPHONE ENCOUNTER
Discussed her CRP.   Strongly suspect PMR. She does as well after reading the materials we gave her and seeing other labs as normal.    Will start prednisone.   Discussed potential side effects, including long term as anticipate she will be on this for awhile.   Offered Rheumatology; at this time, she notes she does not want to drive; will continue here at this time.    I would like to hear how she is doing in 1-2 weeks and would like to see her in a month.    She notes her next anticoagulation visit is 12/4.  Will send this to INR nurse in the event they would want to see her sooner.      MAYDA INR nurse: adding in prednisone.  Team Coordinators: see if you can help her schedule a visit for a month from now.    Thanks!

## 2019-11-26 NOTE — TELEPHONE ENCOUNTER
Reason for call:  Results   Name of test or procedure: lab   Date of test or procedure:  Last week   Location of test or procedure: [facility]     Additional comments:  Patient would like to start whatever medication you recommended after seeing her lab results and would meds as soon as possible   Please send to [facility] on [address]     Phone number to reach patient:  Home number on file [phone] (home)    Best Time:  Any     Can we leave a detailed message on this number?  YES

## 2019-12-04 ENCOUNTER — ANTICOAGULATION THERAPY VISIT (OUTPATIENT)
Dept: NURSING | Facility: CLINIC | Age: 79
End: 2019-12-04
Payer: MEDICARE

## 2019-12-04 DIAGNOSIS — I48.91 ATRIAL FIBRILLATION (H): ICD-10-CM

## 2019-12-04 LAB — INR POINT OF CARE: 3.7 (ref 0.86–1.14)

## 2019-12-04 PROCEDURE — 36416 COLLJ CAPILLARY BLOOD SPEC: CPT

## 2019-12-04 PROCEDURE — 99207 ZZC NO CHARGE NURSE ONLY: CPT

## 2019-12-04 PROCEDURE — 85610 PROTHROMBIN TIME: CPT | Mod: QW

## 2019-12-04 NOTE — PROGRESS NOTES
"ANTICOAGULATION FOLLOW-UP CLINIC VISIT    Patient Name:  Tomasa Fam  Date:  12/4/2019  Contact Type:  Face to Face    SUBJECTIVE:  Patient Findings     Positives:   Change in health, Change in medications    Comments:   Patient has been on prednisone for about 1 week. Notes the pain went away the day before the prescription. The pain in her arms has gone away. Around Thanksgiving she did have 3 optic migraines which is a lot in such a short time. She is having some symptoms with a-fib, she will get short of breath, it might last from waking to about noon and when she takes her bp it is \"wacko\". She feels the more she moves around the better she feels. Patient has not noted any unusual bruising or bleeding. INR is supratherapeutic today, probably d/t prednisone use x 1 week. Will hold warfarin today and reduce maintenance dosing by about 10%. Will check INR next in 10 days.  Yue NICOLAS RN  Anticoagulation Clinic  [place]          Clinical Outcomes     Comments:   Patient has been on prednisone for about 1 week. Notes the pain went away the day before the prescription. The pain in her arms has gone away. Around Thanksgiving she did have 3 optic migraines which is a lot in such a short time. She is having some symptoms with a-fib, she will get short of breath, it might last from waking to about noon and when she takes her bp it is \"wacko\". She feels the more she moves around the better she feels. Patient has not noted any unusual bruising or bleeding. INR is supratherapeutic today, probably d/t prednisone use x 1 week. Will hold warfarin today and reduce maintenance dosing by about 10%. Will check INR next in 10 days.  Yue NICOLAS RN  Anticoagulation Clinic  [place]             OBJECTIVE    INR Protime   Date Value Ref Range Status   12/04/2019 3.7 (A) 0.86 - 1.14 Final       ASSESSMENT / PLAN  INR assessment SUPRA    Recheck INR In: 10 DAYS    INR Location Clinic      Anticoagulation Summary  As of " 12/4/2019    INR goal:   2.0-3.0   TTR:   53.3 % (2.6 wk)   INR used for dosing:   3.7! (12/4/2019)   Warfarin maintenance plan:   5 mg (2.5 mg x 2) every Mon, Fri; 2.5 mg (2.5 mg x 1) all other days   Full warfarin instructions:   12/4: Hold; Otherwise 5 mg every Mon, Fri; 2.5 mg all other days   Weekly warfarin total:   22.5 mg   Plan last modified:   Yue Martínez RN (12/4/2019)   Next INR check:   12/13/2019   Priority:   High   Target end date:   Indefinite    Indications    Atrial fibrillation (H) [I48.91]             Anticoagulation Episode Summary     INR check location:   Anticoagulation Clinic    Preferred lab:       Send INR reminders to:   PK WEAVER    Comments:   No Bandaid // 2.5 mg tab // patient started on Xarelto, transition to warfarin 11/6/19      Anticoagulation Care Providers     Provider Role Specialty Phone number    Violet Fenton MD Referring [facility] [phone]            See the Encounter Report to view Anticoagulation Flowsheet and Dosing Calendar (Go to Encounters tab in chart review, and find the Anticoagulation Therapy Visit)    Dosage adjustment made based on physician directed care plan.    Yue Martínez RN

## 2019-12-13 ENCOUNTER — ANTICOAGULATION THERAPY VISIT (OUTPATIENT)
Dept: NURSING | Facility: CLINIC | Age: 79
End: 2019-12-13
Payer: MEDICARE

## 2019-12-13 DIAGNOSIS — I48.91 ATRIAL FIBRILLATION (H): ICD-10-CM

## 2019-12-13 LAB — INR POINT OF CARE: 2.7 (ref 0.86–1.14)

## 2019-12-13 PROCEDURE — 99207 ZZC NO CHARGE NURSE ONLY: CPT

## 2019-12-13 PROCEDURE — 36416 COLLJ CAPILLARY BLOOD SPEC: CPT

## 2019-12-13 PROCEDURE — 85610 PROTHROMBIN TIME: CPT | Mod: QW

## 2019-12-13 NOTE — PROGRESS NOTES
ANTICOAGULATION FOLLOW-UP CLINIC VISIT    Patient Name:  Tomasa Fam  Date:  2019  Contact Type:  Face to Face    SUBJECTIVE:  Patient Findings     Comments:   The patient was assessed for diet, medication, and activity level changes, missed or extra doses, bruising or bleeding, with no problem findings.  States she is going to try getting off the prednisone, not sure that she really needs it and doesn't like all the possible side effects. For now will leave on this maintenance dose of warfarin and see patient back 1 week, if still therapeutic, will start increasing time between visits. Patient stated understanding and is in agreement with plan.  Yue NICOLAS RN  Anticoagulation Clinic  [place]          Clinical Outcomes     Negatives:   Major bleeding event, Thromboembolic event, Anticoagulation-related hospital admission, Anticoagulation-related ED visit, Anticoagulation-related fatality    Comments:   The patient was assessed for diet, medication, and activity level changes, missed or extra doses, bruising or bleeding, with no problem findings.  States she is going to try getting off the prednisone, not sure that she really needs it and doesn't like all the possible side effects. For now will leave on this maintenance dose of warfarin and see patient back 1 week, if still therapeutic, will start increasing time between visits. Patient stated understanding and is in agreement with plan.  Yue NICOLAS RN  Anticoagulation Clinic  [place]             OBJECTIVE    INR Protime   Date Value Ref Range Status   2019 2.7 (A) 0.86 - 1.14 Final       ASSESSMENT / PLAN  INR assessment THER    Recheck INR In: 1 WEEK    INR Location Clinic      Anticoagulation Summary  As of 2019    INR goal:   2.0-3.0   TTR:   45.6 % (3.9 wk)   INR used for dosin.7 (2019)   Warfarin maintenance plan:   5 mg (2.5 mg x 2) every Mon, Fri; 2.5 mg (2.5 mg x 1) all other days   Full warfarin instructions:   5  mg every Mon, Fri; 2.5 mg all other days   Weekly warfarin total:   22.5 mg   No change documented:   Yue Martínez RN   Plan last modified:   Yue Martínez RN (12/4/2019)   Next INR check:   12/20/2019   Priority:   Maintenance   Target end date:   Indefinite    Indications    Atrial fibrillation (H) [I48.91]             Anticoagulation Episode Summary     INR check location:   Anticoagulation Clinic    Preferred lab:       Send INR reminders to:   PK WEAVER    Comments:   No Bandaid // 2.5 mg tab // patient started on Xarelto, transition to warfarin 11/6/19      Anticoagulation Care Providers     Provider Role Specialty Phone number    Violet Fenton MD Referring [facility] [phone]            See the Encounter Report to view Anticoagulation Flowsheet and Dosing Calendar (Go to Encounters tab in chart review, and find the Anticoagulation Therapy Visit)    Yue Martínez, VEDA

## 2019-12-16 ENCOUNTER — HEALTH MAINTENANCE LETTER (OUTPATIENT)
Age: 79
End: 2019-12-16

## 2019-12-20 ENCOUNTER — ANTICOAGULATION THERAPY VISIT (OUTPATIENT)
Dept: NURSING | Facility: CLINIC | Age: 79
End: 2019-12-20
Payer: MEDICARE

## 2019-12-20 DIAGNOSIS — I48.91 ATRIAL FIBRILLATION (H): ICD-10-CM

## 2019-12-20 LAB — INR POINT OF CARE: 2.8 (ref 0.86–1.14)

## 2019-12-20 PROCEDURE — 85610 PROTHROMBIN TIME: CPT | Mod: QW

## 2019-12-20 PROCEDURE — 36416 COLLJ CAPILLARY BLOOD SPEC: CPT

## 2019-12-20 PROCEDURE — 99207 ZZC NO CHARGE NURSE ONLY: CPT

## 2019-12-20 NOTE — PATIENT INSTRUCTIONS
Can add in an extra serving or two throughout the week of green veggies for a bit more vitamin K to keep you more mid range (2.0-3.0).

## 2019-12-20 NOTE — PROGRESS NOTES
ANTICOAGULATION FOLLOW-UP CLINIC VISIT    Patient Name:  Tomasa Fam  Date:  2019  Contact Type:  Face to Face    SUBJECTIVE:  Patient Findings     Comments:   Patient continues with prednisone and now noting she is having a hard time sleeping. Otherwise, The patient was assessed for diet, medication, and activity level changes, missed or extra doses, bruising or bleeding, with no problem findings. Will continue with this maintenance dose and recheck in another 2 weeks.   Yue NICOLAS RN  Anticoagulation Clinic  [place]            Clinical Outcomes     Comments:   Patient continues with prednisone and now noting she is having a hard time sleeping. Otherwise, The patient was assessed for diet, medication, and activity level changes, missed or extra doses, bruising or bleeding, with no problem findings. Will continue with this maintenance dose and recheck in another 2 weeks.   Yue NICOLAS RN  Anticoagulation Clinic  [place]               OBJECTIVE    INR Protime   Date Value Ref Range Status   2019 2.8 (A) 0.86 - 1.14 Final       ASSESSMENT / PLAN  INR assessment THER    Recheck INR In: 2 WEEKS    INR Location Clinic      Anticoagulation Summary  As of 2019    INR goal:   2.0-3.0   TTR:   56.8 % (1.1 mo)   INR used for dosin.8 (2019)   Warfarin maintenance plan:   5 mg (2.5 mg x 2) every Mon, Fri; 2.5 mg (2.5 mg x 1) all other days   Full warfarin instructions:   5 mg every Mon, Fri; 2.5 mg all other days   Weekly warfarin total:   22.5 mg   No change documented:   Yue Martínez RN   Plan last modified:   Yue Martínez RN (2019)   Next INR check:   1/3/2020   Priority:   Maintenance   Target end date:   Indefinite    Indications    Atrial fibrillation (H) [I48.91]             Anticoagulation Episode Summary     INR check location:   Anticoagulation Clinic    Preferred lab:       Send INR reminders to:   PK WEAVER    Comments:   No Bandaid // 2.5 mg tab  // patient started on Xarelto, transition to warfarin 11/6/19      Anticoagulation Care Providers     Provider Role Specialty Phone number    Violet Fenton MD Referring [facility] [phone]            See the Encounter Report to view Anticoagulation Flowsheet and Dosing Calendar (Go to Encounters tab in chart review, and find the Anticoagulation Therapy Visit)    Yue Martínez RN

## 2020-01-03 ENCOUNTER — ANTICOAGULATION THERAPY VISIT (OUTPATIENT)
Dept: NURSING | Facility: CLINIC | Age: 80
End: 2020-01-03
Payer: MEDICARE

## 2020-01-03 DIAGNOSIS — I48.91 ATRIAL FIBRILLATION (H): ICD-10-CM

## 2020-01-03 LAB — INR POINT OF CARE: 2.8 (ref 0.86–1.14)

## 2020-01-03 PROCEDURE — 85610 PROTHROMBIN TIME: CPT | Mod: QW

## 2020-01-03 PROCEDURE — 36416 COLLJ CAPILLARY BLOOD SPEC: CPT

## 2020-01-03 PROCEDURE — 99207 ZZC NO CHARGE NURSE ONLY: CPT

## 2020-01-03 NOTE — PROGRESS NOTES
ANTICOAGULATION FOLLOW-UP CLINIC VISIT    Patient Name:  Tomasa Fam  Date:  1/3/2020  Contact Type:  Face to Face    SUBJECTIVE:  Patient Findings     Comments:   Patient continues on prednisone, will have f/u appt on 20 with Dr. Fenton. Still not sleeping well but feels the prednisone will be long-term. Therapeutic today. The patient was assessed for diet, medication, and activity level changes, missed or extra doses, bruising or bleeding, with no problem findings.  Yue NICOLAS RN  Anticoagulation Clinic  [place]          Clinical Outcomes     Comments:   Patient continues on prednisone, will have f/u appt on 20 with Dr. Fenton. Still not sleeping well but feels the prednisone will be long-term. Therapeutic today. The patient was assessed for diet, medication, and activity level changes, missed or extra doses, bruising or bleeding, with no problem findings.  Yue NICOLAS RN  Anticoagulation Clinic  [place]             OBJECTIVE    INR Protime   Date Value Ref Range Status   2020 2.8 (A) 0.86 - 1.14 Final       ASSESSMENT / PLAN  INR assessment THER    Recheck INR In: 3 WEEKS    INR Location Clinic      Anticoagulation Summary  As of 1/3/2020    INR goal:   2.0-3.0   TTR:   69.4 % (1.6 mo)   INR used for dosin.8 (1/3/2020)   Warfarin maintenance plan:   5 mg (2.5 mg x 2) every Mon, Fri; 2.5 mg (2.5 mg x 1) all other days   Full warfarin instructions:   5 mg every Mon, Fri; 2.5 mg all other days   Weekly warfarin total:   22.5 mg   No change documented:   Yue Martínez RN   Plan last modified:   Yue Martínez RN (2019)   Next INR check:   2020   Priority:   Maintenance   Target end date:   Indefinite    Indications    Atrial fibrillation (H) [I48.91]             Anticoagulation Episode Summary     INR check location:   Anticoagulation Clinic    Preferred lab:       Send INR reminders to:   PK WAEVER    Comments:   No Bandaid // 2.5 mg tab // patient  started on Xarelto, transition to warfarin 11/6/19      Anticoagulation Care Providers     Provider Role Specialty Phone number    Violet Fenton MD Referring [facility] [phone]            See the Encounter Report to view Anticoagulation Flowsheet and Dosing Calendar (Go to Encounters tab in chart review, and find the Anticoagulation Therapy Visit)    Yue Martínez RN

## 2020-01-09 DIAGNOSIS — I48.91 ATRIAL FIBRILLATION, UNSPECIFIED TYPE (H): ICD-10-CM

## 2020-01-09 DIAGNOSIS — M35.3 POLYMYALGIA RHEUMATICA (H): ICD-10-CM

## 2020-01-09 DIAGNOSIS — Z79.01 LONG TERM CURRENT USE OF ANTICOAGULANTS WITH INR GOAL OF 2.0-3.0: ICD-10-CM

## 2020-01-09 NOTE — TELEPHONE ENCOUNTER
"Requested Prescriptions   Pending Prescriptions Disp Refills     predniSONE (DELTASONE) 5 MG tablet [Pharmacy Med Name: PREDNISONE 5MG TABLETS] 150 tablet 0     Sig: TAKE 3 TABLETS(15 MG) BY MOUTH DAILY   Last Written Prescription Date:  11/26/19  Last Fill Quantity: 150,  # refills: 0   Last office visit: 11/22/2019 with prescribing provider:  Violet Fenton MD   Future Office Visit:   Next 5 appointments (look out 90 days)    Jan 17, 2020 11:30 AM CST  [facility] [address] [id number] [phone]             There is no refill protocol information for this order        warfarin ANTICOAGULANT (COUMADIN) 2.5 MG tablet [Pharmacy Med Name: WARFARIN SOD 2.5MG TABLETS (GREEN)] 90 tablet 0     Sig: TAKE 1 TABLET BY MOUTH DAILY OR AS INSTRUCTED BY INR CLINIC, TO START AFTER FIRST VISIT 11/06/19   Last Written Prescription Date:  11/15/19  Last Fill Quantity: 90,  # refills: 0   Last office visit: 11/22/2019 with prescribing provider:  Violet Fenton MD   Future Office Visit:   Next 5 appointments (look out 90 days)    Jan 17, 2020 11:30 AM CST  SHORT with Violet Fenton MD  [facility] [address] [address] [phone]             Vitamin K Antagonists Failed - 1/9/2020  8:46 AM        Failed - INR is within goal in the past 6 weeks     Confirm INR is within goal in the past 6 weeks.     Recent Labs   Lab Test 01/03/20   INR 2.8*                       Passed - Recent (12 mo) or future (30 days) visit within the authorizing provider's specialty     Patient has had an office visit with the authorizing provider or a provider within the authorizing providers department within the previous 12 mos or has a future within next 30 days. See \"Patient Info\" tab in inbasket, or \"Choose Columns\" in Meds & Orders section of the refill encounter.              Passed - " Medication is active on med list        Passed - Patient is 18 years of age or older        Passed - Patient is not pregnant        Passed - No positive pregnancy on file in past 12 months

## 2020-01-10 RX ORDER — PREDNISONE 5 MG/1
TABLET ORAL
Qty: 150 TABLET | Refills: 0 | Status: SHIPPED | OUTPATIENT
Start: 2020-01-10 | End: 2020-01-17

## 2020-01-10 RX ORDER — WARFARIN SODIUM 2.5 MG/1
TABLET ORAL
Qty: 110 TABLET | Refills: 0 | Status: SHIPPED | OUTPATIENT
Start: 2020-01-10 | End: 2020-01-10

## 2020-01-10 NOTE — TELEPHONE ENCOUNTER
Routing refill request to provider for review/approval because:  Drug not on the [facility] refill protocol     Carlie Burns RN

## 2020-01-10 NOTE — TELEPHONE ENCOUNTER
Warfarin 2.5 mg tablets: Reviewed last refill and last [facility] visit. INR therapeutic at 2.8 on 1/3/2020.    Prescription approved per [facility] Refill Protocol.    uYe NICOLAS RN  Anticoagulation Clinic  [place]

## 2020-01-17 ENCOUNTER — OFFICE VISIT (OUTPATIENT)
Dept: FAMILY MEDICINE | Facility: CLINIC | Age: 80
End: 2020-01-17
Payer: MEDICARE

## 2020-01-17 VITALS — DIASTOLIC BLOOD PRESSURE: 80 MMHG | SYSTOLIC BLOOD PRESSURE: 132 MMHG

## 2020-01-17 DIAGNOSIS — M35.3 PMR (POLYMYALGIA RHEUMATICA) (H): Primary | ICD-10-CM

## 2020-01-17 DIAGNOSIS — G47.00 INSOMNIA, UNSPECIFIED TYPE: ICD-10-CM

## 2020-01-17 DIAGNOSIS — G89.29 CHRONIC PAIN OF LEFT KNEE: ICD-10-CM

## 2020-01-17 DIAGNOSIS — I10 ESSENTIAL HYPERTENSION WITH GOAL BLOOD PRESSURE LESS THAN 140/90: ICD-10-CM

## 2020-01-17 DIAGNOSIS — I48.0 PAROXYSMAL ATRIAL FIBRILLATION (H): ICD-10-CM

## 2020-01-17 DIAGNOSIS — R63.1 INCREASED THIRST: ICD-10-CM

## 2020-01-17 DIAGNOSIS — M25.562 CHRONIC PAIN OF LEFT KNEE: ICD-10-CM

## 2020-01-17 DIAGNOSIS — R23.2 HOT FLASHES: ICD-10-CM

## 2020-01-17 LAB
CRP SERPL-MCNC: <2.9 MG/L (ref 0–8)
ERYTHROCYTE [SEDIMENTATION RATE] IN BLOOD BY WESTERGREN METHOD: 8 MM/H (ref 0–30)

## 2020-01-17 PROCEDURE — 85652 RBC SED RATE AUTOMATED: CPT | Performed by: FAMILY MEDICINE

## 2020-01-17 PROCEDURE — 99214 OFFICE O/P EST MOD 30 MIN: CPT | Performed by: FAMILY MEDICINE

## 2020-01-17 PROCEDURE — 36415 COLL VENOUS BLD VENIPUNCTURE: CPT | Performed by: FAMILY MEDICINE

## 2020-01-17 PROCEDURE — 86140 C-REACTIVE PROTEIN: CPT | Performed by: FAMILY MEDICINE

## 2020-01-17 RX ORDER — PREDNISONE 1 MG/1
3 TABLET ORAL DAILY
Qty: 90 TABLET | Refills: 1 | Status: SHIPPED | OUTPATIENT
Start: 2020-01-17 | End: 2020-02-20 | Stop reason: DRUGHIGH

## 2020-01-17 RX ORDER — PREDNISONE 5 MG/1
10 TABLET ORAL DAILY
Qty: 150 TABLET | Refills: 0 | COMMUNITY
Start: 2020-01-17 | End: 2020-03-30

## 2020-01-17 ASSESSMENT — MIFFLIN-ST. JEOR: SCORE: 1040.6

## 2020-01-17 NOTE — PROGRESS NOTES
Subjective     Tomasa Fam is a 79 year old female who presents to clinic today for the following health issues:    HPI   Medication Followup of Prednisone     Taking Medication as prescribed: yes- had missed a couple doses due to flu symptoms- vomiting and diarrhea     Side Effects:  Insomnia, hot flashes, thirsty (not sure if related)     Medication Helping Symptoms:  Yes- effective but would like to get off this - would like to have other options due to the side effects.        See under ROS     Patient Active Problem List   Diagnosis     Chronic airway obstruction (H)     ASCUS on Pap smear     Hyperlipidemia LDL goal <160     Breast cancer (H)     Essential hypertension with goal blood pressure less than 140/90     Cystocele, midline     Uterovaginal prolapse     S/P hysterectomy     Advanced directives, counseling/discussion     History of hypokalemia     Concussion     Thyroid nodule     Chronic pain of both knees     History of lung cancer     Atrial fibrillation (H)     Gastroesophageal reflux disease, esophagitis presence not specified     PMR (polymyalgia rheumatica) (H)       Current Outpatient Medications   Medication Sig Dispense Refill     acetaminophen (TYLENOL) 500 MG tablet Take 500-1,000 mg by mouth every 6 hours as needed for pain       albuterol (PROAIR HFA/PROVENTIL HFA/VENTOLIN HFA) 108 (90 Base) MCG/ACT inhaler Inhale 1-2 puffs into the lungs every 6 hours as needed for shortness of breath / dyspnea or wheezing       amLODIPine (NORVASC) 2.5 MG tablet Take 2 tablets (5 mg) by mouth 2 times daily 180 tablet 1     B Complex Vitamins (VITAMIN  B COMPLEX) tablet Take 1 tablet by mouth daily.       calcium-vitamin D (CALTRATE) 600-400 MG-UNIT per tablet Take 1 tablet by mouth daily        cholecalciferol (VITAMIN D) 400 UNIT TABS Take 400 Units by mouth daily       ezetimibe (ZETIA) 10 MG tablet TAKE 1 TABLET(10 MG) BY MOUTH DAILY 90 tablet 1     FLUoxetine (PROZAC) 10 MG capsule TAKE 1  CAPSULE(10 MG) BY MOUTH DAILY 90 capsule 1     HEMP OIL OR EXTRACT OR OTHER CBD CANNABINOID, NOT MEDICAL CANNABIS, Apply topically daily as needed       metoprolol tartrate (LOPRESSOR) 25 MG tablet TAKE 1 TABLET BY MOUTH TWICE DAILY 180 tablet 0     omega 3 1000 MG CAPS Take 1 g by mouth daily 90 capsule      omeprazole (PRILOSEC) 20 MG DR capsule Take 1 capsule (20 mg) by mouth daily 90 capsule 1     polyethylene glycol (MIRALAX/GLYCOLAX) powder Take 1 capful by mouth daily as needed for constipation       predniSONE (DELTASONE) 5 MG tablet TAKE 3 TABLETS(15 MG) BY MOUTH DAILY 150 tablet 0     umeclidinium-vilanterol (ANORO ELLIPTA) 62.5-25 MCG/INH oral inhaler Inhale 1 puff into the lungs daily       warfarin ANTICOAGULANT (COUMADIN) 2.5 MG tablet TAKE 5 MG (2 TABLETS) BY MOUTH MONDAY AND FRIDAY AND TAKE 2.5 MG (ONE TABLET) BY MOUTH ALL OTHER DAYS OF THE WEEK 120 tablet 0     Reviewed and updated as needed this visit by Provider         Review of Systems   ROS COMP: CONSTITUTIONAL:NEGATIVE for fever, chills, change in weight  RESP:NEGATIVE for significant cough or SOB  CV: NEGATIVE for chest pain, palpitations or peripheral edema  MUSCULOSKELETAL: see below  PSYCHIATRIC: NEGATIVE for changes in mood or affect    Doing well overall.  Notes the arm pain went away very quickly after starting the prednisone.    She does note she had been ill for a couple days with a GI illness. Better now.    Notes bp went low for two days; not when sick. Difficult time navigating when this happens; she notes difficulty if < 130. She does note it went as low as 93/65 on her wrist cuff.     She notes increased thirst; asks if it could be metoprol.     Wonders if she can take a Sleeping pill.  Not sleeping well. Has taken sominex at home and notes it did work well.    Asking about when she can get her knee replaced; this is still hurting. She was told with the a fib that she should wait 6 months. 4 more months to go for this.     Hot  "flashes; went away a year ago or so. Came back; prior to prednisone. About when she started with the a fib discovery.      Objective    BP (!) (P) 144/76 (BP Location: Right arm, Cuff Size: Adult Regular)   Pulse (P) 70   Temp (P) 97.9  F (36.6  C) (Oral)   Resp (P) 16   Ht (P) 1.6 m (5' 3\")   Wt (P) 60.1 kg (132 lb 9.6 oz)   LMP  (LMP Unknown)   SpO2 (P) 99%   BMI (P) 23.49 kg/m    Body mass index is 23.49 kg/m  (pended).     /80  Upon recheck.     Physical Exam   GENERAL APPEARANCE: alert and no distress  CV: regular rates and rhythm  MS: no edema.   PSYCH: mentation appears normal and affect normal/bright    Component      Latest Ref Rng & Units 11/22/2019   Sed Rate      0 - 30 mm/h 59 (H)   CRP Inflammation      0.0 - 8.0 mg/L 94.0 (H)               Assessment & Plan     1. PMR (polymyalgia rheumatica) (H)  Noted significant improvement once starting prednisone. She has been on for a little over a month. Will try to start weaning. She is aware it will take some time.   Did see that when > 10 mg, one can decrease by 2.5 mg every 2-4 weeks when I reviewed. She notes it would be difficult for her to cut pills in half; so will decrease by 2 mg at this time.   Will have her follow up in a month. If doing well after a couple weeks, and she wants to try going down 2 more, she can. Did discuss if pain returns, will go back up to where she had been pain free.   Anticipate checking inflammatory markers every few months.   - predniSONE (DELTASONE) 1 MG tablet; Take 3 tablets (3 mg) by mouth daily (along with 2 of the 5 mg to + 13 mg daily  Dispense: 90 tablet; Refill: 1  - CRP, inflammation  - ESR: Erythrocyte sedimentation rate    2. Chronic pain of left knee  She notes Cardiology told her to wait for at least 6 months (and that she now has 4 more months). I do think it would be good to get her lower on the prednisone as well if we can.     3. Paroxysmal atrial fibrillation (H)  Seeing Cardiology. Is on " coumadin.     4. Essential hypertension with goal blood pressure less than 140/90  Borderline controlled.     5. Increased thirst  Uncertain etiology. Doubt metoprolol. Did discuss that prednisone can increase blood sugars.     6. Insomnia, unspecified type  sominex can cause dry mouth.   Discussed I am not real big on sleeping pills. She notes she would use rarely. Did caution this can sometimes cause drowsiness or impairment into the next morning; she does admit she had this one day. She will use rarely.     7. Hot flashes  Uncertain etiology. She does note it was prior to prednisone.        Patient Instructions       We will decrease prednisone to 13 mg (2 of the 5 mg tabs and 3 of the 1 mg tabs).    If you are really doing well with this after a couple weeks and want to try to decrease by 2 mg more (11 mg) you can do this.      Return in about 4 weeks (around 2/14/2020).    Violet Fenton MD, MD  [facility]

## 2020-01-17 NOTE — PATIENT INSTRUCTIONS
We will decrease prednisone to 13 mg (2 of the 5 mg tabs and 3 of the 1 mg tabs).    If you are really doing well with this after a couple weeks and want to try to decrease by 2 mg more (11 mg) you can do this.

## 2020-01-18 PROBLEM — I48.0 PAROXYSMAL ATRIAL FIBRILLATION (H): Status: ACTIVE | Noted: 2019-09-13

## 2020-01-24 ENCOUNTER — ANTICOAGULATION THERAPY VISIT (OUTPATIENT)
Dept: NURSING | Facility: CLINIC | Age: 80
End: 2020-01-24
Payer: MEDICARE

## 2020-01-24 DIAGNOSIS — I48.91 ATRIAL FIBRILLATION, UNSPECIFIED TYPE (H): ICD-10-CM

## 2020-01-24 DIAGNOSIS — Z79.01 LONG TERM CURRENT USE OF ANTICOAGULANTS WITH INR GOAL OF 2.0-3.0: ICD-10-CM

## 2020-01-24 DIAGNOSIS — I48.0 PAROXYSMAL ATRIAL FIBRILLATION (H): ICD-10-CM

## 2020-01-24 LAB — INR POINT OF CARE: 3.6 (ref 0.86–1.14)

## 2020-01-24 PROCEDURE — 99207 ZZC NO CHARGE NURSE ONLY: CPT

## 2020-01-24 PROCEDURE — 36416 COLLJ CAPILLARY BLOOD SPEC: CPT

## 2020-01-24 PROCEDURE — 85610 PROTHROMBIN TIME: CPT | Mod: QW

## 2020-01-24 RX ORDER — WARFARIN SODIUM 2.5 MG/1
TABLET ORAL
Qty: 120 TABLET | Refills: 0
Start: 2020-01-24 | End: 2020-04-16

## 2020-01-24 NOTE — PROGRESS NOTES
ANTICOAGULATION FOLLOW-UP CLINIC VISIT    Patient Name:  Tomasa Fam  Date:  1/24/2020  Contact Type:  Face to Face    SUBJECTIVE:  Patient Findings     Positives:   Signs/symptoms of bleeding (when she blows nose, notices it's pink tinged), Change in medications (reducing prednisone)    Comments:   Patient tapering her prednisone. The patient was assessed for diet, medication, and activity level changes, missed or extra doses, with no problem findings. Did have the flu a few weeks ago with vomiting, but well recovered now. No pain returning with lower dose of prednisone. Per protocol, will reduce warfarin dose today as well as maintenance dose by about 10%.   Yue NICOLAS RN  Anticoagulation Clinic  [place]          Clinical Outcomes     Comments:   Patient tapering her prednisone. The patient was assessed for diet, medication, and activity level changes, missed or extra doses, with no problem findings. Did have the flu a few weeks ago with vomiting, but well recovered now. No pain returning with lower dose of prednisone. Per protocol, will reduce warfarin dose today as well as maintenance dose by about 10%.   Yue NICOLAS RN  Anticoagulation Clinic  [place]             OBJECTIVE    INR Protime   Date Value Ref Range Status   01/24/2020 3.6 (A) 0.86 - 1.14 Final       ASSESSMENT / PLAN  INR assessment SUPRA    Recheck INR In: 2 WEEKS    INR Location Clinic      Anticoagulation Summary  As of 1/24/2020    INR goal:   2.0-3.0   TTR:   55.9 % (2.3 mo)   INR used for dosing:   3.6! (1/24/2020)   Warfarin maintenance plan:   5 mg (2.5 mg x 2) every Fri; 2.5 mg (2.5 mg x 1) all other days   Full warfarin instructions:   1/24: 2.5 mg; Otherwise 5 mg every Fri; 2.5 mg all other days   Weekly warfarin total:   20 mg   Plan last modified:   Yue Martínez RN (1/24/2020)   Next INR check:   2/7/2020   Priority:   High   Target end date:   Indefinite    Indications    Paroxysmal atrial fibrillation (H)  [I48.0]             Anticoagulation Episode Summary     INR check location:   Anticoagulation Clinic    Preferred lab:       Send INR reminders to:   PK WEAVER    Comments:   No Bandaid // 2.5 mg tab // patient started on Xarelto, transition to warfarin 11/6/19      Anticoagulation Care Providers     Provider Role Specialty Phone number    Violet Fenton MD Referring [facility] [phone]            See the Encounter Report to view Anticoagulation Flowsheet and Dosing Calendar (Go to Encounters tab in chart review, and find the Anticoagulation Therapy Visit)    Dosage adjustment made based on physician directed care plan.    Yue Martínez RN

## 2020-02-07 ENCOUNTER — ANTICOAGULATION THERAPY VISIT (OUTPATIENT)
Dept: NURSING | Facility: CLINIC | Age: 80
End: 2020-02-07
Payer: MEDICARE

## 2020-02-07 DIAGNOSIS — I48.0 PAROXYSMAL ATRIAL FIBRILLATION (H): ICD-10-CM

## 2020-02-07 LAB — INR POINT OF CARE: 1.9 (ref 0.86–1.14)

## 2020-02-07 PROCEDURE — 36416 COLLJ CAPILLARY BLOOD SPEC: CPT

## 2020-02-07 PROCEDURE — 85610 PROTHROMBIN TIME: CPT | Mod: QW

## 2020-02-07 PROCEDURE — 99207 ZZC NO CHARGE NURSE ONLY: CPT

## 2020-02-07 NOTE — PROGRESS NOTES
ANTICOAGULATION FOLLOW-UP CLINIC VISIT    Patient Name:  Tomasa Fam  Date:  2020  Contact Type:  Face to Face    SUBJECTIVE:  Patient Findings     Positives:   Signs/symptoms of bleeding (some bloody noses lately), Change in health (some issues with BP lately), Change in diet/appetite (eating more greens asparagus)    Comments:   Assessed for S/S bleeding, clotting, medication, diet, health, activity and alcohol changes.          Clinical Outcomes     Negatives:   Major bleeding event, Thromboembolic event, Anticoagulation-related hospital admission, Anticoagulation-related ED visit, Anticoagulation-related fatality    Comments:   Assessed for S/S bleeding, clotting, medication, diet, health, activity and alcohol changes.             OBJECTIVE    INR Protime   Date Value Ref Range Status   2020 1.9 (A) 0.86 - 1.14 Final       ASSESSMENT / PLAN  INR assessment SUB    Recheck INR In: 2 WEEKS    INR Location Clinic      Anticoagulation Summary  As of 2020    INR goal:   2.0-3.0   TTR:   56.4 % (2.8 mo)   INR used for dosin.9! (2020)   Warfarin maintenance plan:   5 mg (2.5 mg x 2) every Fri; 2.5 mg (2.5 mg x 1) all other days   Full warfarin instructions:   5 mg every Fri; 2.5 mg all other days   Weekly warfarin total:   20 mg   No change documented:   Annabelle Salcedo [facility]   Plan last modified:   Yue Martínez RN (2020)   Next INR check:   2020   Priority:   High   Target end date:   Indefinite    Indications    Paroxysmal atrial fibrillation (H) [I48.0]             Anticoagulation Episode Summary     INR check location:   Anticoagulation Clinic    Preferred lab:       Send INR reminders to:   PK WEAVER    Comments:   No Bandaid // 2.5 mg tab // patient started on Xarelto, transition to warfarin 19      Anticoagulation Care Providers     Provider Role Specialty Phone number    Violet Fenton MD Referring [facility] [phone]            See the  Encounter Report to view Anticoagulation Flowsheet and Dosing Calendar (Go to Encounters tab in chart review, and find the Anticoagulation Therapy Visit)    Will not adjust dose and recheck in 2 weeks since Chandrika is changing her diet a bit.  Reviewed that asparagus has a bit more vitamin K, but encouraged to continue current diet if that is comfortable to maintain.      Annabelle Salcedo, [facility]

## 2020-02-11 ENCOUNTER — TELEPHONE (OUTPATIENT)
Dept: FAMILY MEDICINE | Facility: CLINIC | Age: 80
End: 2020-02-11

## 2020-02-11 NOTE — TELEPHONE ENCOUNTER
Received Medical Authorization form [facility] to send laboratory results.    Attempted to contact pt- left voicemail to give a call back    When call back- ask pt what lab reports needed and dates. Form did not specify.  At what location to fax to- [facility] or [place].    Waiting for call back.  Kamini Bob [facility]

## 2020-02-13 DIAGNOSIS — I10 ESSENTIAL HYPERTENSION: ICD-10-CM

## 2020-02-13 NOTE — TELEPHONE ENCOUNTER
"Requested Prescriptions   Pending Prescriptions Disp Refills     metoprolol tartrate (LOPRESSOR) 25 MG tablet [Pharmacy Med Name: METOPROLOL TARTRATE 25MG TABLETS] 180 tablet 0     Sig: TAKE 1 TABLET BY MOUTH TWICE DAILY   Last Written Prescription Date:  11/7/19  Last Fill Quantity: 180,  # refills: 0   Last office visit: 1/17/2020 with prescribing provider:  Violet Fenton MD   Future Office Visit:   Next 5 appointments (look out 90 days)    Feb 20, 2020 11:30 AM CST  Office Visit with Violet Fenton MD  [facility] [address] [address]  [phone]   Feb 20, 2020 11:30 AM CST  SHORT with Annabelle Ribeiro [facility] ([facility]) [address] [address]  [phone]             Beta-Blockers Protocol Passed - 2/13/2020 10:44 AM        Passed - Blood pressure under 140/90 in past 12 months     BP Readings from Last 3 Encounters:   01/17/20 132/80   11/22/19 (!) (P) 144/74   10/24/19 138/70                 Passed - Patient is age 6 or older        Passed - Recent (12 mo) or future (30 days) visit within the authorizing provider's specialty     Patient has had an office visit with the authorizing provider or a provider within the authorizing providers department within the previous 12 mos or has a future within next 30 days. See \"Patient Info\" tab in inbasket, or \"Choose Columns\" in Meds & Orders section of the refill encounter.              Passed - Medication is active on med list         "

## 2020-02-14 RX ORDER — METOPROLOL TARTRATE 25 MG/1
TABLET, FILM COATED ORAL
Qty: 180 TABLET | Refills: 0 | Status: SHIPPED | OUTPATIENT
Start: 2020-02-14 | End: 2020-05-06

## 2020-02-20 ENCOUNTER — OFFICE VISIT (OUTPATIENT)
Dept: PHARMACY | Facility: CLINIC | Age: 80
End: 2020-02-20
Payer: COMMERCIAL

## 2020-02-20 ENCOUNTER — TELEPHONE (OUTPATIENT)
Dept: FAMILY MEDICINE | Facility: CLINIC | Age: 80
End: 2020-02-20

## 2020-02-20 ENCOUNTER — OFFICE VISIT (OUTPATIENT)
Dept: FAMILY MEDICINE | Facility: CLINIC | Age: 80
End: 2020-02-20
Payer: MEDICARE

## 2020-02-20 VITALS
HEART RATE: 68 BPM | BODY MASS INDEX: 25 KG/M2 | TEMPERATURE: 97.6 F | OXYGEN SATURATION: 97 % | HEIGHT: 63 IN | WEIGHT: 141.1 LBS | DIASTOLIC BLOOD PRESSURE: 84 MMHG | RESPIRATION RATE: 16 BRPM | SYSTOLIC BLOOD PRESSURE: 134 MMHG

## 2020-02-20 DIAGNOSIS — Z85.3 PERSONAL HISTORY OF MALIGNANT NEOPLASM OF BREAST: ICD-10-CM

## 2020-02-20 DIAGNOSIS — I48.0 PAROXYSMAL ATRIAL FIBRILLATION (H): ICD-10-CM

## 2020-02-20 DIAGNOSIS — J44.9 CHRONIC OBSTRUCTIVE PULMONARY DISEASE, UNSPECIFIED COPD TYPE (H): ICD-10-CM

## 2020-02-20 DIAGNOSIS — F39 MOOD DISORDER (H): ICD-10-CM

## 2020-02-20 DIAGNOSIS — I10 ESSENTIAL HYPERTENSION WITH GOAL BLOOD PRESSURE LESS THAN 140/90: ICD-10-CM

## 2020-02-20 DIAGNOSIS — K59.00 CONSTIPATION, UNSPECIFIED CONSTIPATION TYPE: ICD-10-CM

## 2020-02-20 DIAGNOSIS — M35.3 PMR (POLYMYALGIA RHEUMATICA) (H): Primary | ICD-10-CM

## 2020-02-20 DIAGNOSIS — Z78.9 TAKES DIETARY SUPPLEMENTS: ICD-10-CM

## 2020-02-20 DIAGNOSIS — K21.9 GASTROESOPHAGEAL REFLUX DISEASE, ESOPHAGITIS PRESENCE NOT SPECIFIED: ICD-10-CM

## 2020-02-20 DIAGNOSIS — I48.91 ATRIAL FIBRILLATION, UNSPECIFIED TYPE (H): Primary | ICD-10-CM

## 2020-02-20 DIAGNOSIS — R23.2 HOT FLASHES: ICD-10-CM

## 2020-02-20 DIAGNOSIS — E78.5 HYPERLIPIDEMIA LDL GOAL <160: ICD-10-CM

## 2020-02-20 DIAGNOSIS — M35.3 PMR (POLYMYALGIA RHEUMATICA) (H): ICD-10-CM

## 2020-02-20 LAB
CAPILLARY BLOOD COLLECTION: NORMAL
INR PPP: 2.1 (ref 0.86–1.14)

## 2020-02-20 PROCEDURE — 99605 MTMS BY PHARM NP 15 MIN: CPT | Performed by: PHARMACIST

## 2020-02-20 PROCEDURE — 99607 MTMS BY PHARM ADDL 15 MIN: CPT | Performed by: PHARMACIST

## 2020-02-20 PROCEDURE — 36416 COLLJ CAPILLARY BLOOD SPEC: CPT | Performed by: FAMILY MEDICINE

## 2020-02-20 PROCEDURE — 85610 PROTHROMBIN TIME: CPT | Performed by: FAMILY MEDICINE

## 2020-02-20 PROCEDURE — 99214 OFFICE O/P EST MOD 30 MIN: CPT | Performed by: FAMILY MEDICINE

## 2020-02-20 RX ORDER — AMLODIPINE BESYLATE 2.5 MG/1
5 TABLET ORAL DAILY
Qty: 180 TABLET | Refills: 1 | Status: SHIPPED | OUTPATIENT
Start: 2020-02-20 | End: 2020-05-20

## 2020-02-20 ASSESSMENT — MIFFLIN-ST. JEOR: SCORE: 1079.16

## 2020-02-20 NOTE — PROGRESS NOTES
MTM ENCOUNTER  SUBJECTIVE/OBJECTIVE:                Tomasa Fam is a 79 year old female coming in for a follow-up visit.  She was referred to me from Dr. Fenton. Today's visit is a co-visit with PCP.    Chief Complaint: Follow up from MTM visit on 10/22.     Tobacco:  reports that she has never smoked. She has never used smokeless tobacco.  Alcohol: occasionally, perhaps 1 drink/day    Medication Adherence/Access:  no issues reported - has been taking amlodipine 5mg every night now    Afib/HTN: Patient is currently taking warfarin 5mg Fridays and 2.5mg AOD (follows INR clinic). Patient reports no current concerns of bruising or bleeding. Patient does not have a hx of GI bleed. Patient is also taking metoprolol tartrate 25 mg twice daily and amlodipine 5mg HS. Pt reports no current medication side effects except does still complain of dizziness with BP's in the 120's. She likes her BP to be 135-140 systolic. Patient does self-monitor BP. Pt reports home systolic BP readings of 120-130's. States she can't function with a SBP lower than 130. Wonders if she should skip her metoprolol dose when her BP is low.   BP Readings from Last 3 Encounters:   02/20/20 134/84   01/17/20 132/80   11/22/19 (!) (P) 144/74     Lab Results   Component Value Date    INR 2.10 02/20/2020    INR 1.9 02/07/2020    INR 3.6 01/24/2020     Last Comprehensive Metabolic Panel:  Sodium   Date Value Ref Range Status   11/22/2019 134 133 - 144 mmol/L Final     Potassium   Date Value Ref Range Status   11/22/2019 4.2 3.4 - 5.3 mmol/L Final     Chloride   Date Value Ref Range Status   11/22/2019 100 94 - 109 mmol/L Final     Carbon Dioxide   Date Value Ref Range Status   11/22/2019 26 20 - 32 mmol/L Final     Anion Gap   Date Value Ref Range Status   11/22/2019 8 3 - 14 mmol/L Final     Glucose   Date Value Ref Range Status   11/22/2019 84 70 - 99 mg/dL Final     Comment:     Non Fasting     Urea Nitrogen   Date Value Ref Range Status    11/22/2019 16 7 - 30 mg/dL Final     Creatinine   Date Value Ref Range Status   11/22/2019 0.62 0.52 - 1.04 mg/dL Final     GFR Estimate   Date Value Ref Range Status   11/22/2019 85 >60 mL/min/[1.73_m2] Final     Comment:     Non  GFR Calc  Starting 12/18/2018, serum creatinine based estimated GFR (eGFR) will be   calculated using the Chronic Kidney Disease Epidemiology Collaboration   (CKD-EPI) equation.       Calcium   Date Value Ref Range Status   11/22/2019 9.8 8.5 - 10.1 mg/dL Final     Hyperlipidemia: Current therapy includes Ezetimibe (Zetia) 10mg once daily and fish oil daily. Reports no issues.   Lab Results   Component Value Date    CHOL 187 08/26/2019     Lab Results   Component Value Date    HDL 65 08/26/2019     Lab Results   Component Value Date    LDL 97 08/26/2019     Lab Results   Component Value Date    TRIG 124 08/26/2019     Lab Results   Component Value Date    CHOLHDLRATIO 2.8 06/10/2015     COPD: She is not currently taking any medications. She feels her breathing is fine and has no symptoms. Anoro made her throat hurt so she stopped using. Not using albuterol inhaler.     Depression: Currently taking fluoxetine 10 mg once daily. She reports no issues at this time. She would be interested in trying off fluoxetine.   PHQ 1/20/2017 3/2/2018 3/15/2019   PHQ-9 Total Score 3 0 1   Q9: Thoughts of better off dead/self-harm past 2 weeks Not at all Not at all Not at all     RYANNE-7 SCORE 1/20/2017 3/2/2018 3/15/2019   Total Score 1 0 0     GERD: Current medications include: Prilosec (omeprazole) 20mg once daily. Pt c/o no current symptoms.  Patient feels that current regimen is effective with prednisone. She needs omeprazole with prednisone otherwise terrible heartburn.    PMR: Taking prednisone 12 mg daily and would like to decrease dose. Doesn't feel like she needs such a high dose anymore and having side effects of insomnia, hot flashes (she's not sure if from prednisone).  "    Supplements: Currently taking vitamin D daily, calcium/vitamin d daily and B complex daily. Reports no issues.    Constipation: Takes Miralax daily and reports no issues. States this keeps her well regulated.     Today's Vitals:   BP Readings from Last 1 Encounters:   02/20/20 134/84     Pulse Readings from Last 1 Encounters:   02/20/20 68     Wt Readings from Last 1 Encounters:   02/20/20 141 lb 1.6 oz (64 kg)     Ht Readings from Last 1 Encounters:   02/20/20 5' 3\" (1.6 m)     Estimated body mass index is 24.99 kg/m  as calculated from the following:    Height as of an earlier encounter on 2/20/20: 5' 3\" (1.6 m).    Weight as of an earlier encounter on 2/20/20: 141 lb 1.6 oz (64 kg).    Temp Readings from Last 1 Encounters:   02/20/20 97.6  F (36.4  C) (Oral)       ASSESSMENT:                  Medication Adherence: no issues identified    Afib/Hypertension: Stable. Educated pt on possible orthostatic hypotension and staying hydrated plus compression stockings may improve dizziness. Also educated to remain on consistent dose of metoprolol for Afib.     Hyperlipidemia: Stable.    COPD: Stable    Depression: Needs improvement. She would benefit from discontinuing fluoxetine.     GERD: Stable    PMR: Improved and addressed by PCP for steroid dose decrease.     Supplements: Stable    Constipation: Stable  PLAN:                  1. Decrease fluoxetine to every other day for 1-2 weeks then stop.  2. Use compression stockings during the day.     I spent 20 minutes with this patient today. All changes were made via collaborative practice agreement with Violet Fenton. A copy of the visit note was provided to the patient's primary care provider.     Will follow up in 6 months or sooner if needed.    The patient was given a summary of these recommendations. See Provider note/AVS from today.     Annabelle Ribeiro, PharmD  Medication Therapy Management Provider, [facility]  Pager: " [phone]

## 2020-02-20 NOTE — TELEPHONE ENCOUNTER
Left message for Chandrika to return call to INR clinic and left dosing instruction to take 2.5 mg tonight.    INR today 2.10, clinic lab

## 2020-02-20 NOTE — PROGRESS NOTES
Subjective     Tomasa Fam is a 79 year old female who presents to clinic today for the following health issues:    HPI   Here for a follow up and seeing MTM      Patient Active Problem List   Diagnosis     Chronic airway obstruction (H)     ASCUS on Pap smear     Hyperlipidemia LDL goal <160     Breast cancer (H)     Essential hypertension with goal blood pressure less than 140/90     Cystocele, midline     Uterovaginal prolapse     S/P hysterectomy     Advanced directives, counseling/discussion     History of hypokalemia     Concussion     Thyroid nodule     Chronic pain of both knees     History of lung cancer     Paroxysmal atrial fibrillation (H)     Gastroesophageal reflux disease, esophagitis presence not specified     PMR (polymyalgia rheumatica) (H)       Past Medical History:   Diagnosis Date     Arthritis     hands, knees     Breast cancer (H) jan. 2012     left mastectomy followed by right;  Dr. Luong     Chronic airway obstruction, not elsewhere classified 2006    very mild COPD - small cough     Diffuse cystic mastopathy      Lung cancer (H) 10/21/2015     Unspecified essential hypertension late 1980's       Past Surgical History:   Procedure Laterality Date     C NONSPECIFIC PROCEDURE  1970    s/p Tubal ligation 1970     COLONOSCOPY  3/2003    adenomatous polyp      COLONOSCOPY  7/2006    diverticulosis - repeat in 5 years     COLONOSCOPY  10/13/2011    Procedure:COLONOSCOPY; COLONOSCOPY ; Surgeon:CHITO GORDILLO; Location: GI     CYSTOSCOPY  7/11/2012    Procedure: CYSTOSCOPY;;  Surgeon: Aline Cooper DO;  Location:  OR     DAVINCI HYSTERECTOMY SUPRACERVICAL, SACROCOLPOPEXY, COMBINED  7/11/2012    Procedure: COMBINED DAVINCI HYSTERECTOMY SUPRACERVICAL, SACROCOLPOPEXY;   DAVINCI ASSISTED LAPAROSCOPIC SUPRACERVICAL HYSTERECTOMY AND BILATERAL SALPINGO-OOPHORECTOMY, SACROCOLPOPEXY AND CYSTOSCOPY;  Surgeon: Aline Cooper DO;  Location:  OR     EXCISE LESION EYELID Right  6/29/2015    Procedure: EXCISE LESION EYELID;  Surgeon: Hank Olvera MD;  Location:  SD     INSERT PORT VASCULAR ACCESS  2/3/2012    Procedure:INSERT PORT VASCULAR ACCESS; Power Port-A- Catheter Placement ; Surgeon:IRISH TAPIA; Location:[facility] OR     LAPAROSCOPIC SALPINGO-OOPHORECTOMY  7/11/2012    Procedure: LAPAROSCOPIC SALPINGO-OOPHORECTOMY;  Davinci;  Surgeon: Aline Cooper DO;  Location:  OR     LIPOSUCTION, RHYTIDECTOMY, COMBINED       LOBECTOMY LUNG Right 3/1/2016    Procedure: LOBECTOMY LUNG;  Surgeon: Jonas Woodward MD;  Location:  OR     MAMMOPLASTY REDUCTION  1/6/2012    Procedure:MAMMOPLASTY REDUCTION; Surgeon:MICKI KYLE; Location:[facility] OR     MASTECTOMY SIMPLE  11/12/2012    Procedure: MASTECTOMY SIMPLE;   Right Prophylactic Mastectomy with attempted Right Sentinal Node Biopsy, Revision Bilateral Mastectomy Insicions, liposuction in breast area;  Surgeon: Irish Tapia MD;  Location: [facility] OR     MASTECTOMY SIMPLE, SENTINEL NODE, COMBINED  1/6/2012    Procedure:COMBINED MASTECTOMY SIMPLE, SENTINEL NODE; Left Mastectomy Left [place] Node Biopsy,  Right Breast Reduction ; Surgeon:IRISH TAPIA; Location:[facility] OR     MASTECTOMY, BILATERAL       REMOVE PORT VASCULAR ACCESS  4/29/2013    Procedure: REMOVE PORT VASCULAR ACCESS;  Port A catheter removal ;  Surgeon: Irish Tapia MD;  Location: [facility] OR     REPAIR PTOSIS BILATERAL Bilateral 6/29/2015    Procedure: REPAIR PTOSIS BILATERAL;  Surgeon: Hank Olvera MD;  Location: [facility]     REVISE RECONSTRUCTED BREAST BILATERAL  11/12/2012    Procedure: REVISE RECONSTRUCTED BREAST BILATERAL;;  Surgeon: Micki Kyle MD;  Location: [facility] OR     SURGICAL HISTORY OF -   in 40's    face lift     SURGICAL HISTORY OF -       lipoma removed right thigh     SURGICAL HISTORY OF -       D and C     THORACOTOMY Right 3/1/2016    Procedure: THORACOTOMY;  Surgeon: Jonas Woodward MD;  Location:  OR        OB History    Para Term  AB Living   3 2 2 0 1 2   SAB TAB Ectopic Multiple Live Births   1 0 0 0 0      # Outcome Date GA Lbr Hussein/2nd Weight Sex Delivery Anes PTL Lv   3 SAB            2 Term            1 Term                Current Outpatient Medications   Medication Sig Dispense Refill     amLODIPine 2.5 MG PO tablet Take 2 tablets (5 mg) by mouth daily 180 tablet 1     B Complex Vitamins (VITAMIN  B COMPLEX) tablet Take 1 tablet by mouth daily.       calcium-vitamin D (CALTRATE) 600-400 MG-UNIT per tablet Take 1 tablet by mouth daily        cholecalciferol (VITAMIN D) 400 UNIT TABS Take 400 Units by mouth daily       ezetimibe (ZETIA) 10 MG tablet TAKE 1 TABLET(10 MG) BY MOUTH DAILY 90 tablet 1     metoprolol tartrate (LOPRESSOR) 25 MG tablet TAKE 1 TABLET BY MOUTH TWICE DAILY 180 tablet 0     omega 3 1000 MG CAPS Take 1 g by mouth daily 90 capsule      omeprazole (PRILOSEC) 20 MG DR capsule Take 1 capsule (20 mg) by mouth daily 90 capsule 1     polyethylene glycol (MIRALAX/GLYCOLAX) powder Take 1 capful by mouth daily as needed for constipation       predniSONE (DELTASONE) 1 MG tablet Take 3 tablets (3 mg) by mouth daily (along with 2 of the 5 mg to + 13 mg daily (Patient taking differently: Take 2 mg by mouth daily (along with 2 of the 5 mg to + 13 mg daily) 90 tablet 1     predniSONE (DELTASONE) 5 MG tablet Take 2 tablets (10 mg) by mouth daily 150 tablet 0     warfarin ANTICOAGULANT (COUMADIN) 2.5 MG tablet TAKE 5 MG (2 TABLETS) BY MOUTH ON  AND TAKE 2.5 MG (ONE TABLET) BY MOUTH ALL OTHER DAYS OF THE WEEK 120 tablet 0     albuterol (PROAIR HFA/PROVENTIL HFA/VENTOLIN HFA) 108 (90 Base) MCG/ACT inhaler Inhale 1-2 puffs into the lungs every 6 hours as needed for shortness of breath / dyspnea or wheezing         Family History   Problem Relation Age of Onset     Cardiovascular Father         ruptured aorta, hardening of the arteries     Cerebrovascular Disease Mother      Respiratory  "Mother         chronic bronchitis - was a smoker early on     Cardiovascular Paternal Grandfather         MI     Cerebrovascular Disease Paternal Aunt      Hypertension Son      Neurologic Disorder Daughter         migraines     Breast Cancer Daughter      Brain Tumor Sister        Social History     Tobacco Use     Smoking status: Never Smoker     Smokeless tobacco: Never Used   Substance Use Topics     Alcohol use: Yes     Alcohol/week: 0.0 standard drinks     Comment: occasional; perhaps one per day       Immunization History   Administered Date(s) Administered     Influenza (High Dose) 3 valent vaccine 11/01/2012, 10/25/2013, 11/07/2014, 11/04/2015, 11/01/2016, 10/13/2017, 10/09/2018, 10/24/2019     Influenza (IIV3) PF 10/26/2006, 11/14/2007, 10/31/2008, 10/26/2009, 10/11/2010, 10/26/2011, 11/01/2011     Pneumo Conj 13-V (2010&after) 08/03/2016     Pneumococcal 23 valent 07/31/2013     TD (ADULT, 7+) 10/26/2009     TDAP Vaccine (Adacel) 03/11/2011           Reviewed and updated as needed this visit by Provider         Review of Systems   ROS COMP: CONSTITUTIONAL:NEGATIVE for fever, chills, change in weight  RESP:NEGATIVE for significant cough or SOB  CV: NEGATIVE for chest pain, palpitations or peripheral edema  PSYCHIATRIC: NEGATIVE for changes in mood or affect    Doing well.     Having some hot flashes.   Does not want to consider hormones.   Will try going off fluoxetine; will initially go every other day. See MTM note.    Notes her low blood pressure in am.     Would like to decrease prednisone. Doing well with regards to pain.        Objective    /84   Pulse 68   Temp 97.6  F (36.4  C) (Oral)   Resp 16   Ht 1.6 m (5' 3\")   Wt 64 kg (141 lb 1.6 oz)   LMP  (LMP Unknown)   SpO2 97%   BMI 24.99 kg/m    Body mass index is 24.99 kg/m .  Physical Exam   GENERAL APPEARANCE: alert and no distress  RESP: lungs clear to auscultation - no rales, rhonchi or wheezes  CV: regular rates and rhythm  MS: trace " edema bilaterally.   PSYCH: mentation appears normal and affect normal/bright    Component      Latest Ref Rng & Units 11/22/2019 1/17/2020   Sed Rate      0 - 30 mm/h 59 (H) 8   CRP Inflammation      0.0 - 8.0 mg/L 94.0 (H) <2.9             Assessment & Plan     1. PMR (polymyalgia rheumatica) (H)  Doing well. Will have her decrease prednisone to 10 mg. If doing well in another month; can consider further decrease. At that time, would go by 1 mg increments.     2. Essential hypertension with goal blood pressure less than 140/90  Stable. No change in dosing. She does get nervous if bp is getting near the 130/80 arielle (feels she has symptoms of low bp). See MTM note; agree with trying compression stockings.   - amLODIPine 2.5 MG PO tablet; Take 2 tablets (5 mg) by mouth daily  Dispense: 180 tablet; Refill: 1    3. Hot flashes  She will try decreasing fluoxetine.     4. Personal history of malignant neoplasm of breast  Agree with not wanting to use hormones.            Patient Instructions   1. Use your compression stockings during the day.     2. Decrease fluoxetine to every other day for 1-2 weeks before stopping.     Dr. Eliceo Ribeiro, PharmD  Medication Therapy Management Provider, [facility]  Pager: [phone]      Return in about 4 weeks (around 3/19/2020).    Violet Fenton MD, MD  [facility]

## 2020-02-20 NOTE — PATIENT INSTRUCTIONS
1. Use your compression stockings during the day.     2. Decrease fluoxetine to every other day for 1-2 weeks before stopping.     Dr. Eliceo Ribeiro, PharmD  Medication Therapy Management Provider, [facility]  Pager: [phone]

## 2020-02-21 PROBLEM — Z85.3 PERSONAL HISTORY OF MALIGNANT NEOPLASM OF BREAST: Status: ACTIVE | Noted: 2020-02-21

## 2020-02-21 NOTE — TELEPHONE ENCOUNTER
ANTICOAGULATION MANAGEMENT     Patient Name:  Tomasa Fam  Date:  2020    ASSESSMENT /SUBJECTIVE:      Today's INR result of 2.10 is therapeutic. Goal INR of 2.0-3.0      Warfarin dose taken: Warfarin taken as previously instructed    Diet: No new diet changes affecting INR    Medication changes/ interactions: No new medications/supplements affecting INR    Previous INR: subTherapeutic     S/S of bleeding or thromboembolism: No    New injury or illness:  No    Upcoming surgery, procedure or cardioversion:  No    Additional findings: None      PLAN:    Spoke with Tomasa regarding INR result and instructed:     Warfarin Dosing Instructions: Continue your current warfarin dose    Instructed patient to follow up no later than: 2 weeks  ACC RN visit scheduled    Education provided: No      Chandrika verbalizes understanding and agrees to warfarin dosing plan.    Instructed to call the Anticoagulation Clinic for any changes, questions or concerns. (#[phone])        OBJECTIVE:  INR   Date Value Ref Range Status   2020 2.10 (H) 0.86 - 1.14 Final     Comment:     This test is intended for monitoring Coumadin therapy.  Results are not   accurate in patients with prolonged INR due to factor deficiency.               Anticoagulation Summary  As of 2020    INR goal:   2.0-3.0   TTR:   55.5 % (3.2 mo)   INR used for dosin.10 (2020)   Warfarin maintenance plan:   5 mg (2.5 mg x 2) every Fri; 2.5 mg (2.5 mg x 1) all other days   Full warfarin instructions:   5 mg every Fri; 2.5 mg all other days   Weekly warfarin total:   20 mg   Plan last modified:   Yue Martínez RN (2020)   Next INR check:   3/5/2020   Priority:   High   Target end date:   Indefinite    Indications    Paroxysmal atrial fibrillation (H) [I48.0]             Anticoagulation Episode Summary     INR check location:   Anticoagulation Clinic    Preferred lab:       Send INR reminders to:   PK WEAVER    Comments:    No Bandaid // 2.5 mg tab // patient started on Xarelto, transition to warfarin 11/6/19      Anticoagulation Care Providers     Provider Role Specialty Phone number    Violet Fenton MD Referring [facility] [phone]

## 2020-03-04 ENCOUNTER — TELEPHONE (OUTPATIENT)
Dept: FAMILY MEDICINE | Facility: CLINIC | Age: 80
End: 2020-03-04

## 2020-03-04 NOTE — TELEPHONE ENCOUNTER
Pt has NOT been charting her home BP, but she will begin documenting. She will NOT skip her Metoprolol, but is wondering about her Amlodipine? She is willing to speak w/ MTM over the phone after charting her BP.     Irish RICO RN

## 2020-03-04 NOTE — TELEPHONE ENCOUNTER
Is she writing down her bp and when she is changing her meds?   It might be good to have someone sit down and review this.   MTM has seen her; I wonder if they could connect with her tomorrow and review.    She tends to like her bp on the high side (above 140; not just above 130...)    I do recommend that she NOT skip the metoprolol

## 2020-03-04 NOTE — TELEPHONE ENCOUNTER
Pt calling in- symptoms of SOB, dizziness, bilateral ankle swelling. Going on for sometime. She believes it is BP related. She takes her BP when feeling symptomatic and always below 130 when she has symptoms. Today 94/63 and 107/76. Symptoms are noticed if she is up and about NOT siting in a chair. She did use her compression stockings yesterday which she found slightly helpful especially with the swelling. She is going to put them on now. She takes (2) tablets of amlodipine at night to equal 5 mg. She wakes with these symptoms and they almost always are better by 2pm. She sometimes takes her Metoprolol (am) sometimes not because she thinks it will affect her BP more. Hx of lung CA w/ partial lung removed.     Adv her Metoprolol is also to help her rate control due to her Afib.     Adv her she should reach out to cardiology. Will route to . She would like her BP above 130- she does NOT feel the symptoms with if it is higher. Adv if symptoms worsen- SOB at rest, dizziness/faint feeling, increased swelling should go into ER.     Will notify pt when we hear back with plan.     Irish RICO RN

## 2020-03-05 NOTE — TELEPHONE ENCOUNTER
I prefer that she does not skip her amlodipine. But if she is going to do something, I would probably only take one of those instead of the 2 she takes in the evening. She should track it as well with the bp's.

## 2020-03-05 NOTE — TELEPHONE ENCOUNTER
Patient calling back- patient stated: I took two last night- I will stick with 2 and see what happens. If I feel like my BP is too low I will take one. For now I will just take 2 as prescribed. It was explained to me yesterday what my medications were for and now I understand. Adv patient to call clinic with any further questions/concerns. Patient verbalized understanding and is agreeable to plan.     Roxie Lobato RN on 3/5/2020 at 8:28 AM

## 2020-03-10 ENCOUNTER — ANTICOAGULATION THERAPY VISIT (OUTPATIENT)
Dept: NURSING | Facility: CLINIC | Age: 80
End: 2020-03-10
Payer: MEDICARE

## 2020-03-10 DIAGNOSIS — I48.0 PAROXYSMAL ATRIAL FIBRILLATION (H): ICD-10-CM

## 2020-03-10 LAB — INR POINT OF CARE: 2 (ref 0.86–1.14)

## 2020-03-10 PROCEDURE — 85610 PROTHROMBIN TIME: CPT | Mod: QW

## 2020-03-10 PROCEDURE — 36416 COLLJ CAPILLARY BLOOD SPEC: CPT

## 2020-03-10 PROCEDURE — 99207 ZZC NO CHARGE NURSE ONLY: CPT

## 2020-03-10 NOTE — PROGRESS NOTES
ANTICOAGULATION FOLLOW-UP CLINIC VISIT    Patient Name:  Tomasa Fam  Date:  3/10/2020  Contact Type:  Face to Face    SUBJECTIVE:  Patient Findings     Comments:   Patient's  had a heart attack and is not doing very well so she is stressed and not in her usual habits. But The patient was assessed for diet, medication, and activity level changes, missed or extra doses, bruising or bleeding, with no problem findings.  Yue NICOLAS RN  Anticoagulation Clinic  [place]            Clinical Outcomes     Negatives:   Major bleeding event, Thromboembolic event, Anticoagulation-related hospital admission, Anticoagulation-related ED visit, Anticoagulation-related fatality    Comments:   Patient's  had a heart attack and is not doing very well so she is stressed and not in her usual habits. But The patient was assessed for diet, medication, and activity level changes, missed or extra doses, bruising or bleeding, with no problem findings.  Yue NICOLAS RN  Anticoagulation Clinic  [place]               OBJECTIVE    INR Protime   Date Value Ref Range Status   03/10/2020 2.0 (A) 0.86 - 1.14 Final       ASSESSMENT / PLAN  INR assessment THER    Recheck INR In: 3 WEEKS    INR Location Clinic      Anticoagulation Summary  As of 3/10/2020    INR goal:   2.0-3.0   TTR:   62.7 % (3.8 mo)   INR used for dosin.0 (3/10/2020)   Warfarin maintenance plan:   5 mg (2.5 mg x 2) every Fri; 2.5 mg (2.5 mg x 1) all other days   Full warfarin instructions:   5 mg every Fri; 2.5 mg all other days   Weekly warfarin total:   20 mg   No change documented:   Yue Martínez RN   Plan last modified:   Yue Martínez RN (2020)   Next INR check:   2020   Priority:   Maintenance   Target end date:   Indefinite    Indications    Paroxysmal atrial fibrillation (H) [I48.0]             Anticoagulation Episode Summary     INR check location:   Anticoagulation Clinic    Preferred lab:       Send INR reminders to:    PK WEAVER    Comments:   No Bandaid // 2.5 mg tab // patient started on Xarelto, transition to warfarin 11/6/19      Anticoagulation Care Providers     Provider Role Specialty Phone number    Violet Fenton MD Referring [facility] [phone]            See the Encounter Report to view Anticoagulation Flowsheet and Dosing Calendar (Go to Encounters tab in chart review, and find the Anticoagulation Therapy Visit)    Yue Martínez RN

## 2020-03-19 ENCOUNTER — OFFICE VISIT (OUTPATIENT)
Dept: FAMILY MEDICINE | Facility: CLINIC | Age: 80
End: 2020-03-19
Payer: MEDICARE

## 2020-03-19 VITALS
RESPIRATION RATE: 16 BRPM | DIASTOLIC BLOOD PRESSURE: 74 MMHG | HEART RATE: 136 BPM | TEMPERATURE: 97.4 F | WEIGHT: 141.7 LBS | SYSTOLIC BLOOD PRESSURE: 128 MMHG | OXYGEN SATURATION: 99 % | BODY MASS INDEX: 25.1 KG/M2

## 2020-03-19 DIAGNOSIS — I48.0 PAROXYSMAL ATRIAL FIBRILLATION (H): Primary | ICD-10-CM

## 2020-03-19 DIAGNOSIS — R82.90 BAD ODOR OF URINE: ICD-10-CM

## 2020-03-19 DIAGNOSIS — R39.15 URINARY URGENCY: ICD-10-CM

## 2020-03-19 DIAGNOSIS — F43.9 STRESS: ICD-10-CM

## 2020-03-19 DIAGNOSIS — M35.3 PMR (POLYMYALGIA RHEUMATICA) (H): Primary | ICD-10-CM

## 2020-03-19 DIAGNOSIS — R82.90 NONSPECIFIC FINDING ON EXAMINATION OF URINE: ICD-10-CM

## 2020-03-19 LAB
ALBUMIN UR-MCNC: ABNORMAL MG/DL
APPEARANCE UR: ABNORMAL
BACTERIA #/AREA URNS HPF: ABNORMAL /HPF
BILIRUB UR QL STRIP: ABNORMAL
COLOR UR AUTO: YELLOW
CRP SERPL-MCNC: <2.9 MG/L (ref 0–8)
ERYTHROCYTE [SEDIMENTATION RATE] IN BLOOD BY WESTERGREN METHOD: 10 MM/H (ref 0–30)
GLUCOSE UR STRIP-MCNC: NEGATIVE MG/DL
HGB UR QL STRIP: ABNORMAL
KETONES UR STRIP-MCNC: NEGATIVE MG/DL
LEUKOCYTE ESTERASE UR QL STRIP: ABNORMAL
NITRATE UR QL: NEGATIVE
PH UR STRIP: 6 PH (ref 5–7)
RBC #/AREA URNS AUTO: ABNORMAL /HPF
SOURCE: ABNORMAL
SP GR UR STRIP: 1.02 (ref 1–1.03)
URNS CMNT MICRO: ABNORMAL
UROBILINOGEN UR STRIP-ACNC: 0.2 EU/DL (ref 0.2–1)
WBC #/AREA URNS AUTO: ABNORMAL /HPF
WBC CLUMPS #/AREA URNS HPF: PRESENT /HPF

## 2020-03-19 PROCEDURE — 87086 URINE CULTURE/COLONY COUNT: CPT | Performed by: FAMILY MEDICINE

## 2020-03-19 PROCEDURE — 87088 URINE BACTERIA CULTURE: CPT | Performed by: FAMILY MEDICINE

## 2020-03-19 PROCEDURE — 87186 SC STD MICRODIL/AGAR DIL: CPT | Performed by: FAMILY MEDICINE

## 2020-03-19 PROCEDURE — 85652 RBC SED RATE AUTOMATED: CPT | Performed by: FAMILY MEDICINE

## 2020-03-19 PROCEDURE — 81001 URINALYSIS AUTO W/SCOPE: CPT | Performed by: FAMILY MEDICINE

## 2020-03-19 PROCEDURE — 99214 OFFICE O/P EST MOD 30 MIN: CPT | Performed by: FAMILY MEDICINE

## 2020-03-19 PROCEDURE — 36415 COLL VENOUS BLD VENIPUNCTURE: CPT | Performed by: FAMILY MEDICINE

## 2020-03-19 PROCEDURE — 86140 C-REACTIVE PROTEIN: CPT | Performed by: FAMILY MEDICINE

## 2020-03-19 RX ORDER — PREDNISONE 5 MG/1
5 TABLET ORAL DAILY
Qty: 30 TABLET | Refills: 0 | Status: SHIPPED | OUTPATIENT
Start: 2020-03-19 | End: 2020-03-30

## 2020-03-19 RX ORDER — PREDNISONE 1 MG/1
4 TABLET ORAL DAILY
Qty: 120 TABLET | Refills: 0 | Status: SHIPPED | OUTPATIENT
Start: 2020-03-19 | End: 2020-03-30

## 2020-03-19 NOTE — PATIENT INSTRUCTIONS
Let us know if you develop burning with urination or excessively going to the bathroom.    We should follow up in a month.   Let me know earlier if you develop pain.  At this time, will decrease the prednisone to 9 mg.

## 2020-03-19 NOTE — PROGRESS NOTES
Subjective     Tomasa Fam is a 79 year old female who presents to clinic today for the following health issues:    HPI   Medication Followup of Prednisone    Taking Medication as prescribed: yes    Side Effects:  swelling    Medication Helping Symptoms:  yes     URINARY TRACT SYMPTOMS      Duration: about 2 weeks    Description  urgency and urine is very strong smelling in the morning     Intensity:  mild    Accompanying signs and symptoms:  Fever/chills: no   Flank pain no   Nausea and vomiting: no   Vaginal symptoms: none  Abdominal/Pelvic Pain: no     History  History of frequent UTI's: YES  History of kidney stones: no   Sexually Active: no   Possibility of pregnancy: No    Precipitating or alleviating factors: None    Therapies tried and outcome: none         See under ROS     Patient Active Problem List   Diagnosis     Chronic airway obstruction (H)     ASCUS on Pap smear     Hyperlipidemia LDL goal <160     Breast cancer (H)     Essential hypertension with goal blood pressure less than 140/90     Cystocele, midline     Uterovaginal prolapse     S/P hysterectomy     Advanced directives, counseling/discussion     History of hypokalemia     Concussion     Thyroid nodule     Chronic pain of both knees     History of lung cancer     Paroxysmal atrial fibrillation (H)     Gastroesophageal reflux disease, esophagitis presence not specified     PMR (polymyalgia rheumatica) (H)     Personal history of malignant neoplasm of breast       Current Outpatient Medications   Medication Sig Dispense Refill     amLODIPine 2.5 MG PO tablet Take 2 tablets (5 mg) by mouth daily 180 tablet 1     B Complex Vitamins (VITAMIN  B COMPLEX) tablet Take 1 tablet by mouth daily.       calcium-vitamin D (CALTRATE) 600-400 MG-UNIT per tablet Take 1 tablet by mouth daily        cholecalciferol (VITAMIN D) 400 UNIT TABS Take 400 Units by mouth daily       ezetimibe (ZETIA) 10 MG tablet TAKE 1 TABLET(10 MG) BY MOUTH DAILY 90 tablet 1      metoprolol tartrate (LOPRESSOR) 25 MG tablet TAKE 1 TABLET BY MOUTH TWICE DAILY 180 tablet 0     omega 3 1000 MG CAPS Take 1 g by mouth daily 90 capsule      omeprazole (PRILOSEC) 20 MG DR capsule Take 1 capsule (20 mg) by mouth daily 90 capsule 1     polyethylene glycol (MIRALAX/GLYCOLAX) powder Take 1 capful by mouth daily as needed for constipation       predniSONE (DELTASONE) 5 MG tablet Take 2 tablets (10 mg) by mouth daily 150 tablet 0     warfarin ANTICOAGULANT (COUMADIN) 2.5 MG tablet TAKE 5 MG (2 TABLETS) BY MOUTH ON FRIDAYS AND TAKE 2.5 MG (ONE TABLET) BY MOUTH ALL OTHER DAYS OF THE WEEK 120 tablet 0       Reviewed and updated as needed this visit by Provider         Review of Systems   ROS COMP: CONSTITUTIONAL:NEGATIVE for fever, chills, change in weight  CV: NEGATIVE for chest pain, palpitations or peripheral edema  MUSCULOSKELETAL: see below  PSYCHIATRIC: see below.    Did fall and had some arm pain related to that. That is now gone.  Doing well with regards to pain otherwise.   Would like to get off the prednisone; would like to go down further. Notes face is puffy.     Stress:  in [facility]. Will be on dialysis for rest of his life. Recent MI; has multiple stents. Looking at getting placed in Rehab. She finally is having her son deal with that. Her son is dealing with prostate cancer..     MTM took her off prozac. Overall feeling well. Melt down last night but related to the above stress.    Urine has an odor. More at night.   No dysuria.   Intermittent RLQ pain.  Occasional urgency at night. Not new.       Objective    /74 (BP Location: Right arm, Patient Position: Sitting, Cuff Size: Adult Regular)   Pulse 136   Temp 97.4  F (36.3  C) (Oral)   Resp 16   Wt 64.3 kg (141 lb 11.2 oz)   LMP  (LMP Unknown)   SpO2 99%   BMI 25.10 kg/m    Body mass index is 25.1 kg/m .  Physical Exam   GENERAL APPEARANCE: alert and no distress  CV: regular rates and rhythm  PSYCH: mentation appears  normal and affect normal/bright    Diagnostic Test Results:  Labs reviewed in Epic  Results for orders placed or performed in visit on 03/19/20 (from the past 24 hour(s))   UA reflex to Microscopic and Culture    Specimen: Midstream Urine   Result Value Ref Range    Color Urine Yellow     Appearance Urine Cloudy     Glucose Urine Negative NEG^Negative mg/dL    Bilirubin Urine Small (A) NEG^Negative    Ketones Urine Negative NEG^Negative mg/dL    Specific Gravity Urine 1.025 1.003 - 1.035    Blood Urine Small (A) NEG^Negative    pH Urine 6.0 5.0 - 7.0 pH    Protein Albumin Urine Trace (A) NEG^Negative mg/dL    Urobilinogen Urine 0.2 0.2 - 1.0 EU/dL    Nitrite Urine Negative NEG^Negative    Leukocyte Esterase Urine Large (A) NEG^Negative    Source Midstream Urine    Urine Microscopic   Result Value Ref Range    WBC Urine  (A) OTO5^0 - 5 /HPF    RBC Urine O - 2 OTO2^O - 2 /HPF    WBC Clumps Present (A) NEG^Negative /HPF    Bacteria Urine Many (A) NEG^Negative /HPF    Comment Urine Unconcentrated            Assessment & Plan     1. PMR (polymyalgia rheumatica) (H)  Doing well clinically. She is real interested in seeing labs. Wanted to keep office visit so she could do that.   Will decrease to 9 mg (she is requesting 8). Recommended that we do 1 mg decrease per month if doing well.  Did review with her that the steroid does increase risk for infection, including coronavirus. To take the precautions around this.   - ESR: Erythrocyte sedimentation rate  - CRP, inflammation  - predniSONE (DELTASONE) 5 MG tablet; Take 1 tablet (5 mg) by mouth daily  Dispense: 30 tablet; Refill: 0  - predniSONE (DELTASONE) 1 MG tablet; Take 4 tablets (4 mg) by mouth daily  Dispense: 120 tablet; Refill: 0    2. Bad odor of urine  Reviewed that we do not tend to treat for this symptom alone.   Did recommend to let us know if she develops burning or frequency.   - UA reflex to Microscopic and Culture  - Urine Microscopic    3. Stress  She  notes that overall she is doing well.   She does have some help at home. Can continue to monitor now that she has recently stopped fluoxetine; can consider restarting.     4. Urinary urgency  See above. She notes this is not new. Will have her monitor for symptoms. Doing a UC but not treat unless other symptoms develop  - UA reflex to Microscopic and Culture  - Urine Microscopic  - Urine Culture Aerobic Bacterial    5. Nonspecific finding on examination of urine  As above.  - Urine Culture Aerobic Bacterial       Patient Instructions       Let us know if you develop burning with urination or excessively going to the bathroom.    We should follow up in a month.   Let me know earlier if you develop pain.  At this time, will decrease the prednisone to 9 mg.          Return in about 4 weeks (around 4/16/2020).    Violet Fenton MD, MD  [facility]

## 2020-03-20 LAB
BACTERIA SPEC CULT: ABNORMAL
SPECIMEN SOURCE: ABNORMAL

## 2020-03-27 ENCOUNTER — MYC MEDICAL ADVICE (OUTPATIENT)
Dept: FAMILY MEDICINE | Facility: CLINIC | Age: 80
End: 2020-03-27

## 2020-03-30 ENCOUNTER — VIRTUAL VISIT (OUTPATIENT)
Dept: FAMILY MEDICINE | Facility: CLINIC | Age: 80
End: 2020-03-30
Payer: MEDICARE

## 2020-03-30 DIAGNOSIS — M35.3 PMR (POLYMYALGIA RHEUMATICA) (H): Primary | ICD-10-CM

## 2020-03-30 DIAGNOSIS — Z85.118 HISTORY OF LUNG CANCER: ICD-10-CM

## 2020-03-30 DIAGNOSIS — Z79.52 LONG TERM SYSTEMIC STEROID USER: ICD-10-CM

## 2020-03-30 DIAGNOSIS — Z71.89 ADVICE GIVEN ABOUT COVID-19 VIRUS INFECTION: ICD-10-CM

## 2020-03-30 PROCEDURE — G2012 BRIEF CHECK IN BY MD/QHP: HCPCS | Performed by: FAMILY MEDICINE

## 2020-03-30 RX ORDER — PREDNISONE 5 MG/1
5 TABLET ORAL DAILY
Qty: 30 TABLET | Refills: 0 | Status: SHIPPED | OUTPATIENT
Start: 2020-03-30 | End: 2020-04-20

## 2020-03-30 RX ORDER — PREDNISONE 1 MG/1
3 TABLET ORAL DAILY
Qty: 90 TABLET | Refills: 0 | Status: SHIPPED | OUTPATIENT
Start: 2020-03-30 | End: 2020-04-20

## 2020-03-30 NOTE — TELEPHONE ENCOUNTER
Pt reports feeling a little groggy from sleeping pill last night  SOB w/ exertion (going up and down stairs).   No cough   No fever  Swelling still present- night is worse  Compression stockings- she stopped HURT too bad    She feels like it may be from the prednisone.     Set pt up for a phone visit this afternoon to discuss concerns. She is really wanting to feel better- her  is coming home in 2 days from being in the hospital for 3 1/2 weeks.     Irish RICO RN

## 2020-03-30 NOTE — PROGRESS NOTES
"Subjective     Tomasa Fam is a 79 year old female who is being evaluated via a billable telephone visit.      The patient has been notified of following:     \"This telephone visit will be conducted via a call between you and your physician/provider. We have found that certain health care needs can be provided without the need for a physical exam.  This service lets us provide the care you need with a short phone conversation.  If a prescription is necessary we can send it directly to your pharmacy.  If lab work is needed we can place an order for that and you can then stop by our lab to have the test done at a later time.    If during the course of the call the physician/provider feels a telephone visit is not appropriate, you will not be charged for this service.\"     Physician has received verbal consent for a Telephone Visit from the patient? Yes    Tomasa Fam complains of   Chief Complaint   Patient presents with     Edema     has been on prednisone since November and has gained about 10 pounds.   ( her  had a heart attack last week and is coming home this week)      Is going down to 8 mg daily  taking for PMR      PT STATES THAT SHE IS GOING DOWN TO 8 MG TODAY AND REFUSES TO GO UP.  HER PREFERENCE IS TO GET OFF PREDNISONE AS SOON  POSSIBLE   ALLERGIES  No known drug allergies and Tape [adhesive tape]      Patient Active Problem List   Diagnosis     Chronic airway obstruction (H)     ASCUS on Pap smear     Hyperlipidemia LDL goal <160     Breast cancer (H)     Essential hypertension with goal blood pressure less than 140/90     Cystocele, midline     Uterovaginal prolapse     S/P hysterectomy     Advanced directives, counseling/discussion     History of hypokalemia     Concussion     Thyroid nodule     Chronic pain of both knees     History of lung cancer     Paroxysmal atrial fibrillation (H)     Gastroesophageal reflux disease, esophagitis presence not specified     PMR (polymyalgia " rheumatica) (H)     Personal history of malignant neoplasm of breast       Current Outpatient Medications   Medication Sig Dispense Refill     amLODIPine 2.5 MG PO tablet Take 2 tablets (5 mg) by mouth daily 180 tablet 1     B Complex Vitamins (VITAMIN  B COMPLEX) tablet Take 1 tablet by mouth daily.       calcium-vitamin D (CALTRATE) 600-400 MG-UNIT per tablet Take 1 tablet by mouth daily        cholecalciferol (VITAMIN D) 400 UNIT TABS Take 400 Units by mouth daily       ezetimibe (ZETIA) 10 MG tablet TAKE 1 TABLET(10 MG) BY MOUTH DAILY 90 tablet 1     metoprolol tartrate (LOPRESSOR) 25 MG tablet TAKE 1 TABLET BY MOUTH TWICE DAILY 180 tablet 0     omega 3 1000 MG CAPS Take 1 g by mouth daily 90 capsule      omeprazole (PRILOSEC) 20 MG DR capsule Take 1 capsule (20 mg) by mouth daily 90 capsule 1     polyethylene glycol (MIRALAX/GLYCOLAX) powder Take 1 capful by mouth daily as needed for constipation       predniSONE (DELTASONE) 1 MG tablet Take 4 tablets (4 mg) by mouth daily 120 tablet 0     predniSONE (DELTASONE) 5 MG tablet Take 1 tablet (5 mg) by mouth daily 30 tablet 0     warfarin ANTICOAGULANT (COUMADIN) 2.5 MG tablet TAKE 5 MG (2 TABLETS) BY MOUTH ON FRIDAYS AND TAKE 2.5 MG (ONE TABLET) BY MOUTH ALL OTHER DAYS OF THE WEEK 120 tablet 0   She just went to 8 mg today.  Notes she was at the 9 mg for about three weeks.    Recalls when she had a cough, she was on a quick taper (sounds like a medrol dose marianne).     She is supposed to get a CT next week; not sure if she will be getting it or not.  Reviewed and updated as needed this visit by Provider         Review of Systems   Notes puffiness.   Occasionally a little dyspnea on exertion that she feels is related to the puffiness.  Not having pain.       Objective   Reported vitals:  LMP  (LMP Unknown)    Voice sounds upbeat.        Assessment/Plan:  1. PMR (polymyalgia rheumatica) (H)  Discussed the typical recommendations are to decrease no faster than 1 mg per  month once under 10 mg.   Did discuss I think the puffiness is due to the steroid.   Reviewed that sometimes we have to go slower due to relapse/return of pain.   She does anticipate staying on this for a month currently.   Discussed this is a different indication than a cough.   - predniSONE (DELTASONE) 1 MG tablet; Take 3 tablets (3 mg) by mouth daily  Dispense: 90 tablet; Refill: 0  - predniSONE (DELTASONE) 5 MG tablet; Take 1 tablet (5 mg) by mouth daily  Dispense: 30 tablet; Refill: 0    2. Long term systemic steroid user  Discussed having been on higher dose for longer time, another consideration is adrenal suppression. This would be another reason to go down slowly.     3. History of lung cancer  She has CT scheduled for next Monday.   She will call Thursday or Friday if not hearing from them to make sure it is still happening vs being postponed.    4. Advice Given About Covid-19 Virus Infection  Brief discussion around this and grocery shopping.  Her  will be coming home soon from Rehab.      Return in about 4 weeks (around 4/27/2020) for Medication recheck.      Phone call duration:  10 minutes    Violet Fenton MD, MD

## 2020-03-31 ENCOUNTER — TELEPHONE (OUTPATIENT)
Dept: FAMILY MEDICINE | Facility: CLINIC | Age: 80
End: 2020-03-31

## 2020-03-31 NOTE — TELEPHONE ENCOUNTER
"Called patient to help reschedule INR appointment to a lab-only appointment.      In response to the COVID-19 pandemic, the Anticoagulation Clinic ([facility]) program is making changes to keep your safety our top priority.      Warfarin monitoring is important, and we are doing the following to make your monitoring safe:  1. Lab will be doing your fingerstick, to minimize your time in clinic  2. Your result will be routed to a trained [facility] nurse or pharmacist  3. An [facility] nurse or pharmacist will call you to review your results, review the questions we normally ask you in clinic, and give you dosing and na next recheck date.     Please:  1. Have your phone number up to date in your chart information  2. Update your information with the clinic so we know if it is OK to leave a voice message, or talk to another person about your result if we cnnot reach you directly  3. Answer your phone if you see [facility] on your caller ID  4. Keep in mind our calls may also display as \"caller ID unavailable\"     We recognize that this is a change and that can sometimes be difficult. Your safety is our priority. Thank you for partnering with us as we work through these challenging times.      Transferred to lab-only INR appt. RN to call patient at home with results and instructions.     Yue NICOLAS RN  Anticoagulation     "

## 2020-04-01 ENCOUNTER — ANTICOAGULATION THERAPY VISIT (OUTPATIENT)
Dept: NURSING | Facility: CLINIC | Age: 80
End: 2020-04-01

## 2020-04-01 ENCOUNTER — TELEPHONE (OUTPATIENT)
Dept: FAMILY MEDICINE | Facility: CLINIC | Age: 80
End: 2020-04-01

## 2020-04-01 DIAGNOSIS — I48.0 PAROXYSMAL ATRIAL FIBRILLATION (H): ICD-10-CM

## 2020-04-01 LAB
CAPILLARY BLOOD COLLECTION: NORMAL
INR PPP: 2.5 (ref 0.86–1.14)

## 2020-04-01 PROCEDURE — 36416 COLLJ CAPILLARY BLOOD SPEC: CPT | Performed by: FAMILY MEDICINE

## 2020-04-01 PROCEDURE — 85610 PROTHROMBIN TIME: CPT | Performed by: FAMILY MEDICINE

## 2020-04-01 PROCEDURE — 99207 ZZC NO CHARGE NURSE ONLY: CPT

## 2020-04-01 NOTE — PROGRESS NOTES
ANTICOAGULATION FOLLOW-UP CLINIC VISIT    Patient Name:  Tomasa Fam  Date:  2020  Contact Type:  Telephone    SUBJECTIVE:  Patient Findings     Comments:   Called patient to discuss lab-only INR result: The patient was assessed for diet, medication, and activity level changes, missed or extra doses, bruising or bleeding, with no problem findings.  Is tapering her long-term prednisone by about 1 mg per month - had been developing some puffiness she is uncomfortable with. Reviewed maintenance dosing of warfarin which she will continue until next lab-only INR in six weeks.  Yue NICOLAS RN  Anticoagulation Clinic  [place]          Clinical Outcomes     Negatives:   Major bleeding event, Thromboembolic event, Anticoagulation-related hospital admission, Anticoagulation-related ED visit, Anticoagulation-related fatality    Comments:   Called patient to discuss lab-only INR result: The patient was assessed for diet, medication, and activity level changes, missed or extra doses, bruising or bleeding, with no problem findings.  Is tapering her long-term prednisone by about 1 mg per month - had been developing some puffiness she is uncomfortable with. Reviewed maintenance dosing of warfarin which she will continue until next lab-only INR in six weeks.  Yue NICOLAS RN  Anticoagulation Clinic  [place]             OBJECTIVE    INR   Date Value Ref Range Status   2020 2.50 (H) 0.86 - 1.14 Final     Comment:     This test is intended for monitoring Coumadin therapy.  Results are not   accurate in patients with prolonged INR due to factor deficiency.         ASSESSMENT / PLAN  INR assessment THER    Recheck INR In: 6 WEEKS    INR Location Clinic lab     Anticoagulation Summary  As of 2020    INR goal:   2.0-3.0   TTR:   68.8 % (4.6 mo)   INR used for dosin.50 (2020)   Warfarin maintenance plan:   5 mg (2.5 mg x 2) every Fri; 2.5 mg (2.5 mg x 1) all other days   Full warfarin instructions:   5 mg  every Fri; 2.5 mg all other days   Weekly warfarin total:   20 mg   No change documented:   Yue Martínez RN   Plan last modified:   Yue Martínez RN (1/24/2020)   Next INR check:   5/13/2020   Priority:   Maintenance   Target end date:   Indefinite    Indications    Paroxysmal atrial fibrillation (H) [I48.0]             Anticoagulation Episode Summary     INR check location:   Anticoagulation Clinic    Preferred lab:       Send INR reminders to:   PK WEAVER    Comments:   No Bandaid // 2.5 mg tab // patient started on Xarelto, transition to warfarin 11/6/19      Anticoagulation Care Providers     Provider Role Specialty Phone number    Violet Fenton MD Referring [facility] [phone]            See the Encounter Report to view Anticoagulation Flowsheet and Dosing Calendar (Go to Encounters tab in chart review, and find the Anticoagulation Therapy Visit)    Yue Martínez RN

## 2020-04-01 NOTE — TELEPHONE ENCOUNTER
"    Due to see Dr. Woodward for CT scan and visit.  She is more short of breath. Also has more \"fluid on board.\"  May be related to prednisone.       Discussed this with her.  Suspect that it is more prednisone related unless things have changed in the last week or so.  She will leave final decision to Dr. Woodward regarding whether to do or postpone the CT.   "

## 2020-04-01 NOTE — TELEPHONE ENCOUNTER
Dr. Woodward's nurse, Elham, from [facility] called [phone] requesting to discuss pt. Please call Elham back at the above number.    Thanks,  Elham Zavala on 4/1/2020 at 12:27 PM

## 2020-04-09 ENCOUNTER — VIRTUAL VISIT (OUTPATIENT)
Dept: FAMILY MEDICINE | Facility: CLINIC | Age: 80
End: 2020-04-09
Payer: MEDICARE

## 2020-04-09 DIAGNOSIS — R53.83 DECREASED ENERGY: ICD-10-CM

## 2020-04-09 DIAGNOSIS — G89.29 CHRONIC KNEE PAIN, UNSPECIFIED LATERALITY: ICD-10-CM

## 2020-04-09 DIAGNOSIS — Z79.52 LONG TERM SYSTEMIC STEROID USER: ICD-10-CM

## 2020-04-09 DIAGNOSIS — R82.90 ABNORMAL URINE ODOR: ICD-10-CM

## 2020-04-09 DIAGNOSIS — M35.3 PMR (POLYMYALGIA RHEUMATICA) (H): ICD-10-CM

## 2020-04-09 DIAGNOSIS — R60.9 EDEMA, UNSPECIFIED TYPE: Primary | ICD-10-CM

## 2020-04-09 DIAGNOSIS — M25.559 ARTHRALGIA OF HIP, UNSPECIFIED LATERALITY: ICD-10-CM

## 2020-04-09 DIAGNOSIS — F43.9 STRESS: ICD-10-CM

## 2020-04-09 DIAGNOSIS — M25.569 CHRONIC KNEE PAIN, UNSPECIFIED LATERALITY: ICD-10-CM

## 2020-04-09 DIAGNOSIS — R60.9 EDEMA, UNSPECIFIED TYPE: ICD-10-CM

## 2020-04-09 PROCEDURE — 99442 ZZC PHYSICIAN TELEPHONE EVALUATION 11-20 MIN: CPT | Performed by: FAMILY MEDICINE

## 2020-04-09 RX ORDER — POTASSIUM CHLORIDE 750 MG/1
10 TABLET, EXTENDED RELEASE ORAL DAILY
Qty: 30 TABLET | Refills: 0 | Status: SHIPPED | OUTPATIENT
Start: 2020-04-09 | End: 2020-04-10

## 2020-04-09 RX ORDER — FUROSEMIDE 20 MG
10 TABLET ORAL DAILY
Qty: 15 TABLET | Refills: 0 | Status: SHIPPED | OUTPATIENT
Start: 2020-04-09 | End: 2020-04-10

## 2020-04-09 ASSESSMENT — ANXIETY QUESTIONNAIRES
6. BECOMING EASILY ANNOYED OR IRRITABLE: NOT AT ALL
5. BEING SO RESTLESS THAT IT IS HARD TO SIT STILL: NOT AT ALL
7. FEELING AFRAID AS IF SOMETHING AWFUL MIGHT HAPPEN: NOT AT ALL
3. WORRYING TOO MUCH ABOUT DIFFERENT THINGS: NOT AT ALL
GAD7 TOTAL SCORE: 0
1. FEELING NERVOUS, ANXIOUS, OR ON EDGE: NOT AT ALL
2. NOT BEING ABLE TO STOP OR CONTROL WORRYING: NOT AT ALL

## 2020-04-09 ASSESSMENT — PATIENT HEALTH QUESTIONNAIRE - PHQ9
5. POOR APPETITE OR OVEREATING: NOT AT ALL
SUM OF ALL RESPONSES TO PHQ QUESTIONS 1-9: 7

## 2020-04-09 NOTE — PROGRESS NOTES
"Subjective     Tomasa Fam is a 79 year old female who is being evaluated via a billable telephone visit.    [phone]    The patient has been notified of following:     \"This telephone visit will be conducted via a call between you and your physician/provider. We have found that certain health care needs can be provided without the need for a physical exam.  This service lets us provide the care you need with a short phone conversation.  If a prescription is necessary we can send it directly to your pharmacy.  If lab work is needed we can place an order for that and you can then stop by our lab to have the test done at a later time.    Telephone visits are billed at different rates depending on your insurance coverage. During this emergency period, for some insurers they may be billed the same as an in-person visit.  Please reach out to your insurance provider with any questions.    If during the course of the call the physician/provider feels a telephone visit is not appropriate, you will not be charged for this service.\"    Patient has given verbal consent for Telephone visit?  Yes    How would you like to obtain your AVS? Graceharluis armando    Tomasa Fam complains of No chief complaint on file.      ALLERGIES  No known drug allergies and Tape [adhesive tape]    Would like to discuss problems breathing still. Legs and feet are swollen x 1 week.  Not able comfort socks on and shoes are not fitting. The left hip is painful due to the knee pain.  Cannot walk very well. The urine has a terrible odor more in the morning.   While laying on the back throat will feel closed up.  After sitting up it will go away.      See under ROS     Patient Active Problem List   Diagnosis     Chronic airway obstruction (H)     ASCUS on Pap smear     Hyperlipidemia LDL goal <160     Breast cancer (H)     Essential hypertension with goal blood pressure less than 140/90     Cystocele, midline     Uterovaginal prolapse     S/P " hysterectomy     Advanced directives, counseling/discussion     History of hypokalemia     Concussion     Thyroid nodule     Chronic pain of both knees     History of lung cancer     Paroxysmal atrial fibrillation (H)     Gastroesophageal reflux disease, esophagitis presence not specified     PMR (polymyalgia rheumatica) (H)     Personal history of malignant neoplasm of breast     Long term systemic steroid user       Current Outpatient Medications   Medication Sig Dispense Refill     amLODIPine 2.5 MG PO tablet Take 2 tablets (5 mg) by mouth daily 180 tablet 1     B Complex Vitamins (VITAMIN  B COMPLEX) tablet Take 1 tablet by mouth daily.       calcium-vitamin D (CALTRATE) 600-400 MG-UNIT per tablet Take 1 tablet by mouth daily        cholecalciferol (VITAMIN D) 400 UNIT TABS Take 400 Units by mouth daily       ezetimibe (ZETIA) 10 MG tablet TAKE 1 TABLET(10 MG) BY MOUTH DAILY 90 tablet 1     metoprolol tartrate (LOPRESSOR) 25 MG tablet TAKE 1 TABLET BY MOUTH TWICE DAILY 180 tablet 0     omega 3 1000 MG CAPS Take 1 g by mouth daily 90 capsule      omeprazole (PRILOSEC) 20 MG DR capsule Take 1 capsule (20 mg) by mouth daily (Patient taking differently: Take 20 mg by mouth as needed ) 90 capsule 1     polyethylene glycol (MIRALAX/GLYCOLAX) powder Take 1 capful by mouth daily as needed for constipation       predniSONE (DELTASONE) 1 MG tablet Take 3 tablets (3 mg) by mouth daily 90 tablet 0     predniSONE (DELTASONE) 5 MG tablet Take 1 tablet (5 mg) by mouth daily 30 tablet 0     warfarin ANTICOAGULANT (COUMADIN) 2.5 MG tablet TAKE 5 MG (2 TABLETS) BY MOUTH ON FRIDAYS AND TAKE 2.5 MG (ONE TABLET) BY MOUTH ALL OTHER DAYS OF THE WEEK 120 tablet 0         Reviewed and updated as needed this visit by Provider         Review of Systems   ROS COMP: CONSTITUTIONAL:she notes a weight gain of 10 pounds she attributes to fluids.  RESP:she notes dyspnea on exertion going upstairs; not going downstairs.  MUSCULOSKELETAL: she  notes edema.  PSYCHIATRIC: see below.    Not especially down or depressed.   had MI. He is now on dialysis. A couple times in the last 6 weeks where she felt she has had it.  But then picks up again.  Micelle took her off fluoxetine. Wondering about going back on it.       Son and daughter with cancer. (breast and prostate)    Feeling she has 10 pounds of fluid.  Compression socks hurt too so cannot wear them.     Has not been able to get knee done.   Walking funny.  Hip hurts. Into butt muscle.  With every step.    Difficulty breathing going upstairs.     Strong urine smell.  This has continued.  No other urinary symptoms.       Objective   Reported vitals:  LMP  (LMP Unknown)      Psych: Alert and oriented times 3; coherent speech, normal   rate and volume, able to articulate logical thoughts, able   to abstract reason.  Her affect is upbeat on the phone.       PHQ-9 SCORE 3/2/2018 3/15/2019 4/9/2020   PHQ-9 Total Score 0 1 7       RYANNE-7 SCORE 3/2/2018 3/15/2019 4/9/2020   Total Score 0 0 0         ECHO 9/19:  Interpretation Summary     Left ventricular systolic function is normal.  The visual ejection fraction is estimated at 55-60%.  Diastolic function not assessed due to atrial fibrillation.  The right ventricle is normal in structure, function and size.  Normal atrial dimensions.  There is trace to mild mitral regurgitation.  There is mild (1+) tricuspid regurgitation.  The right ventricular systolic pressure is approximated at 21mmHg plus the  right atrial pressure.  There is mild (1+) aortic regurgitation.  There is mild to moderate (1-2+) pulmonic valvular regurgitation.  The rhythm was atrial fibrillation with controlled ventricular rate at rest.  Since the study of 5/15/2013, the only change is atrial fibrillation is now  present.        GFR Estimate   Date Value Ref Range Status   11/22/2019 85 >60 mL/min/[1.73_m2] Final     Comment:     Non  GFR Calc  Starting 12/18/2018, serum  creatinine based estimated GFR (eGFR) will be   calculated using the Chronic Kidney Disease Epidemiology Collaboration   (CKD-EPI) equation.     10/17/2019 86 >60 mL/min/[1.73_m2] Final     Comment:     Non  GFR Calc  Starting 12/18/2018, serum creatinine based estimated GFR (eGFR) will be   calculated using the Chronic Kidney Disease Epidemiology Collaboration   (CKD-EPI) equation.     09/28/2019 81 >60 mL/min/[1.73_m2] Final     Comment:     Non  GFR Calc  Starting 12/18/2018, serum creatinine based estimated GFR (eGFR) will be   calculated using the Chronic Kidney Disease Epidemiology Collaboration   (CKD-EPI) equation.           Assessment/Plan:  1. Edema, unspecified type  Uncertain etiology. She does feel this is fluid.  Discussed and prescribed lasix and potassium; will want to have close follow up to see if working or if having decreased potassium/kidney function.    2. Decreased energy  Some of this may go with weight gain, etc. Continue to follow.    3. Stress  Discussed going back on fluoxetine. She has a lot on her plate and it is now during the covid pandemic as well.   At this time, she has decided not to, but might in the future.     4. PMR (polymyalgia rheumatica) (H)  Continues on prednisone; slow wean.     5. Long term systemic steroid user  Suspect this is contributing to her edema and puffiness.    6. Chronic knee pain, unspecified laterality  Notes she has not been able to have this worked on surgically; walking differently.     7. Arthralgia of hip, unspecified laterality  In buttock.  Might not be joint; discussed possible muscle or back.  She is walking differently. Continue to follow.      Return in about 1 week (around 4/16/2020).      Phone call duration:  15 minutes    Violet Fenton MD, MD

## 2020-04-10 RX ORDER — FUROSEMIDE 20 MG
TABLET ORAL
Qty: 45 TABLET | Refills: 0 | Status: SHIPPED | OUTPATIENT
Start: 2020-04-10 | End: 2020-04-28 | Stop reason: DRUGHIGH

## 2020-04-10 RX ORDER — POTASSIUM CHLORIDE 750 MG/1
TABLET, EXTENDED RELEASE ORAL
Qty: 90 TABLET | Refills: 0 | Status: SHIPPED | OUTPATIENT
Start: 2020-04-10 | End: 2020-05-05 | Stop reason: DRUGHIGH

## 2020-04-10 ASSESSMENT — ANXIETY QUESTIONNAIRES: GAD7 TOTAL SCORE: 0

## 2020-04-10 NOTE — TELEPHONE ENCOUNTER
"Requested Prescriptions   Pending Prescriptions Disp Refills     furosemide (LASIX) 20 MG tablet [Pharmacy Med Name: FUROSEMIDE 20MG TABLETS]  Last Written Prescription Date:  4/9/2020  Last Fill Quantity: 15,  # refills: 0   Last office visit: No previous visit found with prescribing provider:  arnoldo   Future Office Visit:   Next 5 appointments (look out 90 days)    Jun 23, 2020 10:50 AM CDT  Return Visit with SYLVIA Pena CNP  [facility] ([facility]) [id number] [place] [address] [phone]          45 tablet      Sig: TAKE 1/2 TABLET(10 MG) BY MOUTH DAILY       Diuretics (Including Combos) Protocol Passed - 4/9/2020  5:08 PM        Passed - Blood pressure under 140/90 in past 12 months     BP Readings from Last 3 Encounters:   03/19/20 128/74   02/20/20 134/84   01/17/20 132/80                 Passed - Recent (12 mo) or future (30 days) visit within the authorizing provider's specialty     Patient has had an office visit with the authorizing provider or a provider within the authorizing providers department within the previous 12 mos or has a future within next 30 days. See \"Patient Info\" tab in inbasket, or \"Choose Columns\" in Meds & Orders section of the refill encounter.              Passed - Medication is active on med list        Passed - Patient is age 18 or older        Passed - No active pregancy on record        Passed - Normal serum creatinine on file in past 12 months     Recent Labs   Lab Test 11/22/19  1219   CR 0.62              Passed - Normal serum potassium on file in past 12 months     Recent Labs   Lab Test 11/22/19  1219   POTASSIUM 4.2                    Passed - Normal serum sodium on file in past 12 months     Recent Labs   Lab Test 11/22/19  1219                 Passed - No positive pregnancy test in past 12 months           potassium chloride ER (K-TAB/KLOR-CON) 10 MEQ CR tablet [Pharmacy Med Name: " "POTASSIUM CL 10MEQ ER TABLETS]  Last Written Prescription Date:  4/9/2020  Last Fill Quantity: 30,  # refills: 0   Last office visit: No previous visit found with prescribing provider:  arnoldo   Future Office Visit:   Next 5 appointments (look out 90 days)    Jun 23, 2020 10:50 AM CDT  Return Visit with SYLVIA Pena CNP  [facility] ([facility]) [address] [address] [address] [phone]          90 tablet      Sig: TAKE 1 TABLET(10 MEQ) BY MOUTH DAILY       Potassium Supplements Protocol Passed - 4/9/2020  5:08 PM        Passed - Recent (12 mo) or future (30 days) visit within the authorizing provider's department     Patient has had an office visit with the authorizing provider or a provider within the authorizing providers department within the previous 12 mos or has a future within next 30 days. See \"Patient Info\" tab in inbasket, or \"Choose Columns\" in Meds & Orders section of the refill encounter.              Passed - Medication is active on med list        Passed - Patient is age 18 or older        Passed - Normal serum potassium in past 12 months     Recent Labs   Lab Test 11/22/19  1219   POTASSIUM 4.2                         "

## 2020-04-13 ENCOUNTER — TELEPHONE (OUTPATIENT)
Dept: FAMILY MEDICINE | Facility: CLINIC | Age: 80
End: 2020-04-13

## 2020-04-13 DIAGNOSIS — R60.9 EDEMA, UNSPECIFIED TYPE: Primary | ICD-10-CM

## 2020-04-13 NOTE — TELEPHONE ENCOUNTER
Scheduled patient per PCP's recommendation. Need lab orders placed. PCP wanted labs done this Friday and F/U in clinic with her on Monday.  -Beverly Higgins

## 2020-04-13 NOTE — TELEPHONE ENCOUNTER
Reason for call:  Other   Patient called regarding (reason for call): TO see if appointments were scheduled for follow up. Pt says she is supposed to have labs and an in person visit with Dr. Fenton. No new lab orders in chart.   Additional comments: none    Phone number to reach patient:  Home number on file [phone] (home)    Best Time:  any    Can we leave a detailed message on this number?  YES    Travel screening: Not Applicable     Willow Ge on 4/13/2020 at 2:27 PM

## 2020-04-16 DIAGNOSIS — Z79.01 LONG TERM CURRENT USE OF ANTICOAGULANTS WITH INR GOAL OF 2.0-3.0: ICD-10-CM

## 2020-04-16 DIAGNOSIS — I48.91 ATRIAL FIBRILLATION, UNSPECIFIED TYPE (H): ICD-10-CM

## 2020-04-16 RX ORDER — WARFARIN SODIUM 2.5 MG/1
TABLET ORAL
Qty: 110 TABLET | Refills: 0 | Status: SHIPPED | OUTPATIENT
Start: 2020-04-16 | End: 2020-07-30

## 2020-04-16 NOTE — TELEPHONE ENCOUNTER
"Requested Prescriptions   Pending Prescriptions Disp Refills     warfarin ANTICOAGULANT (COUMADIN) 2.5 MG tablet [Pharmacy Med Name: WARFARIN SOD 2.5MG TABLETS (GREEN)]  Last Written Prescription Date:  1/24/2020  Last Fill Quantity: 120,  # refills: 0   Last office visit: 4/9/2020 with prescribing provider:  Eliceo   Future Office Visit:   Next 5 appointments (look out 90 days)    Apr 20, 2020  2:10 PM CDT  [facility] Visit with Violet Fenton MD  [facility] [address] [facility] [address] [phone]   Jun 23, 2020 10:50 AM CDT  Return Visit with SYLVIA Pena CNP  [facility] ([facility]) [address] [facility] [address]  [facility] [address] [phone]          110 tablet      Sig: TAKE 2 TABLETS BY MOUTH MONDAY AND FRIDAY AND TAKE ONE TABLET BY MOUTH ALL OTHER DAYS OF THE WEEK       Vitamin K Antagonists Failed - 4/16/2020 11:23 AM        Failed - INR is within goal in the past 6 weeks     Confirm INR is within goal in the past 6 weeks.     Recent Labs   Lab Test 04/01/20  1323   INR 2.50*                       Passed - Recent (12 mo) or future (30 days) visit within the authorizing provider's specialty     Patient has had an office visit with the authorizing provider or a provider within the authorizing providers department within the previous 12 mos or has a future within next 30 days. See \"Patient Info\" tab in inbasket, or \"Choose Columns\" in Meds & Orders section of the refill encounter.              Passed - Medication is active on med list        Passed - Patient is 18 years of age or older        Passed - Patient is not pregnant        Passed - No positive pregnancy on file in past 12 months             "

## 2020-04-16 NOTE — TELEPHONE ENCOUNTER
Prescription approved per [facility] according to the [facility] Refill Protocol.  Elham [place] BSN, RN

## 2020-04-17 DIAGNOSIS — R60.9 EDEMA, UNSPECIFIED TYPE: ICD-10-CM

## 2020-04-17 PROCEDURE — 36415 COLL VENOUS BLD VENIPUNCTURE: CPT | Performed by: FAMILY MEDICINE

## 2020-04-17 PROCEDURE — 80048 BASIC METABOLIC PNL TOTAL CA: CPT | Performed by: FAMILY MEDICINE

## 2020-04-18 LAB
ANION GAP SERPL CALCULATED.3IONS-SCNC: 7 MMOL/L (ref 3–14)
BUN SERPL-MCNC: 24 MG/DL (ref 7–30)
CALCIUM SERPL-MCNC: 9.6 MG/DL (ref 8.5–10.1)
CHLORIDE SERPL-SCNC: 104 MMOL/L (ref 94–109)
CO2 SERPL-SCNC: 26 MMOL/L (ref 20–32)
CREAT SERPL-MCNC: 0.83 MG/DL (ref 0.52–1.04)
GFR SERPL CREATININE-BSD FRML MDRD: 66 ML/MIN/{1.73_M2}
GLUCOSE SERPL-MCNC: 86 MG/DL (ref 70–99)
POTASSIUM SERPL-SCNC: 3.4 MMOL/L (ref 3.4–5.3)
SODIUM SERPL-SCNC: 137 MMOL/L (ref 133–144)

## 2020-04-20 ENCOUNTER — OFFICE VISIT (OUTPATIENT)
Dept: FAMILY MEDICINE | Facility: CLINIC | Age: 80
End: 2020-04-20
Payer: MEDICARE

## 2020-04-20 VITALS
TEMPERATURE: 98 F | HEART RATE: 120 BPM | DIASTOLIC BLOOD PRESSURE: 62 MMHG | SYSTOLIC BLOOD PRESSURE: 118 MMHG | WEIGHT: 146.2 LBS | OXYGEN SATURATION: 98 % | RESPIRATION RATE: 16 BRPM | BODY MASS INDEX: 25.9 KG/M2

## 2020-04-20 DIAGNOSIS — Z79.52 LONG TERM SYSTEMIC STEROID USER: ICD-10-CM

## 2020-04-20 DIAGNOSIS — I48.0 PAROXYSMAL ATRIAL FIBRILLATION (H): ICD-10-CM

## 2020-04-20 DIAGNOSIS — R60.9 EDEMA, UNSPECIFIED TYPE: ICD-10-CM

## 2020-04-20 DIAGNOSIS — R06.09 DOE (DYSPNEA ON EXERTION): ICD-10-CM

## 2020-04-20 DIAGNOSIS — M35.3 PMR (POLYMYALGIA RHEUMATICA) (H): ICD-10-CM

## 2020-04-20 DIAGNOSIS — R60.9 EDEMA, UNSPECIFIED TYPE: Primary | ICD-10-CM

## 2020-04-20 PROCEDURE — 99214 OFFICE O/P EST MOD 30 MIN: CPT | Performed by: FAMILY MEDICINE

## 2020-04-20 RX ORDER — POTASSIUM CHLORIDE 1500 MG/1
20 TABLET, EXTENDED RELEASE ORAL 2 TIMES DAILY
Qty: 30 TABLET | Refills: 0 | Status: SHIPPED | OUTPATIENT
Start: 2020-04-20 | End: 2020-06-04

## 2020-04-20 RX ORDER — FUROSEMIDE 40 MG
40 TABLET ORAL DAILY
Qty: 30 TABLET | Refills: 0 | Status: SHIPPED | OUTPATIENT
Start: 2020-04-20 | End: 2020-04-28

## 2020-04-20 RX ORDER — PREDNISONE 1 MG/1
2 TABLET ORAL DAILY
Qty: 60 TABLET | Refills: 0 | Status: SHIPPED | OUTPATIENT
Start: 2020-04-20 | End: 2020-06-09

## 2020-04-20 RX ORDER — PREDNISONE 5 MG/1
5 TABLET ORAL DAILY
Qty: 30 TABLET | Refills: 0 | Status: SHIPPED | OUTPATIENT
Start: 2020-04-20 | End: 2020-06-09

## 2020-04-20 NOTE — PROGRESS NOTES
Subjective     Tomasa Fam is a 79 year old female who presents to clinic today for the following health issues:    HPI   Here to follow up with edema in bilateral legs.  Is still having breathing issues with exertion- runs out of breath easy.        See under ROS     Patient Active Problem List   Diagnosis     Chronic airway obstruction (H)     ASCUS on Pap smear     Hyperlipidemia LDL goal <160     Breast cancer (H)     Essential hypertension with goal blood pressure less than 140/90     Cystocele, midline     Uterovaginal prolapse     S/P hysterectomy     Advanced directives, counseling/discussion     History of hypokalemia     Concussion     Thyroid nodule     Chronic pain of both knees     History of lung cancer     Paroxysmal atrial fibrillation (H)     Gastroesophageal reflux disease, esophagitis presence not specified     PMR (polymyalgia rheumatica) (H)     Personal history of malignant neoplasm of breast     Long term systemic steroid user       Current Outpatient Medications   Medication Sig Dispense Refill     amLODIPine 2.5 MG PO tablet Take 2 tablets (5 mg) by mouth daily 180 tablet 1     B Complex Vitamins (VITAMIN  B COMPLEX) tablet Take 1 tablet by mouth daily.       calcium-vitamin D (CALTRATE) 600-400 MG-UNIT per tablet Take 1 tablet by mouth daily        cholecalciferol (VITAMIN D) 400 UNIT TABS Take 400 Units by mouth daily       ezetimibe (ZETIA) 10 MG tablet TAKE 1 TABLET(10 MG) BY MOUTH DAILY 90 tablet 1     furosemide (LASIX) 20 MG tablet TAKE 1/2 TABLET(10 MG) BY MOUTH DAILY 45 tablet 0     metoprolol tartrate (LOPRESSOR) 25 MG tablet TAKE 1 TABLET BY MOUTH TWICE DAILY 180 tablet 0     omega 3 1000 MG CAPS Take 1 g by mouth daily 90 capsule      omeprazole (PRILOSEC) 20 MG DR capsule Take 1 capsule (20 mg) by mouth daily (Patient taking differently: Take 20 mg by mouth as needed ) 90 capsule 1     polyethylene glycol (MIRALAX/GLYCOLAX) powder Take 1 capful by mouth daily as needed  for constipation       potassium chloride ER (K-TAB/KLOR-CON) 10 MEQ CR tablet TAKE 1 TABLET(10 MEQ) BY MOUTH DAILY 90 tablet 0     predniSONE (DELTASONE) 1 MG tablet Take 3 tablets (3 mg) by mouth daily 90 tablet 0     predniSONE (DELTASONE) 5 MG tablet Take 1 tablet (5 mg) by mouth daily 30 tablet 0     warfarin ANTICOAGULANT (COUMADIN) 2.5 MG tablet TAKE 2 TABLETS BY MOUTH MONDAY AND FRIDAY AND TAKE ONE TABLET BY MOUTH ALL OTHER DAYS OF THE WEEK 110 tablet 0           Reviewed and updated as needed this visit by Provider         Review of Systems   ROS COMP: CONSTITUTIONAL:NEGATIVE for fever, chills, change in weight x notes her weight has continued to increase.  RESP:continues to feel short of breath.   CV: NEGATIVE for chest pain, palpitations. She does have edema. See below.  MUSCULOSKELETAL: see below  PSYCHIATRIC: NEGATIVE for changes in mood or affect    Here to follow up after starting lasix.  On the full pill. Notes the half pill did not do anything; started the full pill on the second day.    Weight has continued to go up.  She notes a change about 31/2 - 4 weeks ago. Feels like the fluid came on more then. She notes it came on fairly quickly when it did; she notes that overnight she had difficulty going up stairs.     She is now attributing this to prednisone. She has been real anxious to get off. She has not liked the puffiness in her face as well.   She is on a slow taper for PMR. Currently on 8 mg.   She notes there is something else to take for this; a friend of hers is on something.  Also wondering why she can't just come off it; as she has experienced bursts of prednisone in the past.  She notes she had a CT 2 weeks ago for Dr. Woodward. She was told this looked good. She did state that she asked about fluids.  She does note that her Cardiology appointment was canceled due to the pandemic.    She notes some left hip pain that she attributes to needing her knee fixed. She denies pain in the other  leg and not in the shoulder area where she first experienced the PMR symptoms.    Thinks needs to get back on anxiety medication.  Gets angry and turns to tears easily. Again believes much of this is the prednisone.       Objective    /62 (BP Location: Right arm, Cuff Size: Adult Regular)   Pulse 120   Temp 98  F (36.7  C) (Axillary)   Resp 16   Wt 66.3 kg (146 lb 3.2 oz)   LMP  (LMP Unknown)   SpO2 98%   BMI 25.90 kg/m    Body mass index is 25.9 kg/m .  Physical Exam   GENERAL APPEARANCE: alert and no distress  RESP: lungs clear to auscultation - no rales, rhonchi or wheezes  COR: Regular rate and rhythm.  MS: there is 2 + pitting edema 2/3 of the way to knees on both legs  PSYCH: mentation appears normal and affect normal/bright    PHQ-9 SCORE 3/2/2018 3/15/2019 4/9/2020   PHQ-9 Total Score 0 1 7       RYANNE-7 SCORE 3/2/2018 3/15/2019 4/9/2020   Total Score 0 0 0           GFR Estimate   Date Value Ref Range Status   04/17/2020 66 >60 mL/min/[1.73_m2] Final     Comment:     Non  GFR Calc  Starting 12/18/2018, serum creatinine based estimated GFR (eGFR) will be   calculated using the Chronic Kidney Disease Epidemiology Collaboration   (CKD-EPI) equation.     11/22/2019 85 >60 mL/min/[1.73_m2] Final     Comment:     Non  GFR Calc  Starting 12/18/2018, serum creatinine based estimated GFR (eGFR) will be   calculated using the Chronic Kidney Disease Epidemiology Collaboration   (CKD-EPI) equation.     10/17/2019 86 >60 mL/min/[1.73_m2] Final     Comment:     Non  GFR Calc  Starting 12/18/2018, serum creatinine based estimated GFR (eGFR) will be   calculated using the Chronic Kidney Disease Epidemiology Collaboration   (CKD-EPI) equation.         Reviewed ECHO from last Fall. Normal EF.             Assessment & Plan     1. Edema, unspecified type  She has not noted significant improvement.   Kidneys seem to be tolerating the diuretic. She feels she has not had  a huge diuretic response with the 20 mg lasix; will increase her dose to 40 mg.  Also increasing her potassium. Will follow renal function.   Will send note to Cardiology. Don't want to necessarily write this off to prednisone. Recommend she let me know how she is feeling in the next several days; if this is working. We could otherwise think of bid dosing.   - furosemide (LASIX) 40 MG tablet; Take 1 tablet (40 mg) by mouth daily  Dispense: 30 tablet; Refill: 0  - potassium chloride ER (KLOR-CON M) 20 MEQ CR tablet; Take 1 tablet (20 mEq) by mouth 2 times daily  Dispense: 30 tablet; Refill: 0  - **Comprehensive metabolic panel FUTURE anytime; Future    2. PADRON (dyspnea on exertion)  As above.   Lungs are clear; she reports a normal CT during this time. Will request CT report.     3. Paroxysmal atrial fibrillation (H)  As above. Will connect with Cardiology to see if they have recommendations around the above.     4. PMR (polymyalgia rheumatica) (H)  Reviewed in UpToDate and with her that methotrexate or some of the other specialty medications are mentioned, but these are not medications I feel comfortable with.  She has not wanted to travel for Rheumatology visit. She might consider; did recommend if she does, to let me know as we would need to send records.  She is currently on 8 mg daily; has been for several weeks. We have discussed preferring to go down slowly, such as monthly, but will see how she does now at 7 mg.  Discussed this is different than a burst of prednisone, partly due to the disease, but also possible adrenal suppression.  When she returns next week for cmp, will get inflammatory markers as well.   - RHEUMATOLOGY REFERRAL  - predniSONE (DELTASONE) 5 MG tablet; Take 1 tablet (5 mg) by mouth daily  Dispense: 30 tablet; Refill: 0  - predniSONE (DELTASONE) 1 MG tablet; Take 2 tablets (2 mg) by mouth daily  Dispense: 60 tablet; Refill: 0  - **ESR FUTURE anytime; Future  - CRP, inflammation; Future    5.  Long term systemic steroid user  As above.          Return in about 2 weeks (around 5/4/2020).    Violet Fenton MD, MD  [facility] Dr. Sevilla and Kyung Lobo

## 2020-04-20 NOTE — Clinical Note
Hello! This is a mutual patient. She has recently developed edema and some dyspnea on exertion. She attributes to prednisone, but I want to keep other things in mind as well; she has been weaning on the prednisone.  I have had her on some lasix without much response and am increasing. Her VS have been ok and watching renal function.  She had a follow up Cardiology appointment that was canceled with the pandemic.  Wondering if you would like to visit with her now or have other recommendations, such as ECHO? I did not do CXR as she reports a normal chest CT ... working on getting that report.   Thanks!

## 2020-04-22 RX ORDER — FUROSEMIDE 40 MG
TABLET ORAL
Qty: 90 TABLET | OUTPATIENT
Start: 2020-04-22

## 2020-04-22 RX ORDER — POTASSIUM CHLORIDE 1500 MG/1
TABLET, EXTENDED RELEASE ORAL
Qty: 180 TABLET | OUTPATIENT
Start: 2020-04-22

## 2020-04-22 NOTE — TELEPHONE ENCOUNTER
Furosemide fill on 4/20/20    Jermaine Queen RN   [facility] [place] Triage

## 2020-04-23 ENCOUNTER — TELEPHONE (OUTPATIENT)
Dept: CARDIOLOGY | Facility: CLINIC | Age: 80
End: 2020-04-23

## 2020-04-23 NOTE — TELEPHONE ENCOUNTER
"Pt was transferred from [facility] to speak pt about recent increase of bilateral leg swelling,with increased shortness of breath. Pt states she has noticed a 10# weight gain in the past 3 weeks. She has a hx of PADRON and SOB, with hx of previous lung surgery and is on Prednisone therapy. She did see her PCP on 4/20/20 who increased her Lasix from 20 mg daily to 40 mg daily. (note is in Epic) She feels the increase of Lasix has not made a difference for the edema, and states was advised by her PCP to f/u with Cardiology if no relief of leg edema. States she is \"feeling fine\" otherwise, denies having any chest pain, fatigue, dizziness, nausea, denies any fever. Concerned about ongoing PADRON, SOB, and leg edema and would like an appointment or speak to Dr Sevilla nurse. Transferred to Team nurse line, and advised if develops chest pain, increased trouble breathing or new symptoms to call 911 or go to ER. She verbalizes understanding but wants to speak to Dr Sevilla's nurse.   "

## 2020-04-28 ENCOUNTER — VIRTUAL VISIT (OUTPATIENT)
Dept: CARDIOLOGY | Facility: CLINIC | Age: 80
End: 2020-04-28
Payer: MEDICARE

## 2020-04-28 VITALS
BODY MASS INDEX: 26.04 KG/M2 | WEIGHT: 147 LBS | HEART RATE: 110 BPM | SYSTOLIC BLOOD PRESSURE: 128 MMHG | DIASTOLIC BLOOD PRESSURE: 94 MMHG

## 2020-04-28 DIAGNOSIS — R60.9 EDEMA, UNSPECIFIED TYPE: ICD-10-CM

## 2020-04-28 DIAGNOSIS — I10 ESSENTIAL HYPERTENSION: ICD-10-CM

## 2020-04-28 DIAGNOSIS — I48.0 PAROXYSMAL ATRIAL FIBRILLATION (H): Primary | ICD-10-CM

## 2020-04-28 DIAGNOSIS — I50.31 ACUTE DIASTOLIC CONGESTIVE HEART FAILURE (H): ICD-10-CM

## 2020-04-28 DIAGNOSIS — I48.91 ATRIAL FIBRILLATION, UNSPECIFIED TYPE (H): ICD-10-CM

## 2020-04-28 PROCEDURE — 99442 ZZC PHYSICIAN TELEPHONE EVALUATION 11-20 MIN: CPT | Performed by: INTERNAL MEDICINE

## 2020-04-28 RX ORDER — FUROSEMIDE 40 MG
80 TABLET ORAL DAILY
Qty: 60 TABLET | Refills: 11 | Status: SHIPPED | OUTPATIENT
Start: 2020-04-28 | End: 2020-06-11

## 2020-04-28 NOTE — PROGRESS NOTES
Service Date: 04/28/2020      CLINIC FOLLOWUP PHONE VISIT       REFERRING PHYSICIAN:  Violet Fenton MD       HISTORY OF PRESENT ILLNESS:  Ms. Fam is a very pleasant, 79-year-old female with a history of paroxysmal atrial fibrillation, idiopathic cardiomyopathy, COPD and a history of lung cancer.  She has been followed by Dr. Adam Tran.  I saw her back in 09/2019 with new onset of atrial fibrillation.  She was initially placed on Xarelto, but switched to warfarin due to cough.  She was placed on metoprolol at that time as well.  It looks like over the last 6-9 months, she has had additional antihypertensive medication with amlodipine.  She has also been diagnosed with polymyalgia rheumatica and placed on a long tapering dose of prednisone.  In recent weeks, she has been experiencing increased shortness of breath, dyspnea on exertion and increased lower extremity swelling with weight gain apparently around the time her  was diagnosed with a myocardial infarction in mid to late March.  She went to see her primary care provider, Dr. Fenton, who placed her on Lasix and has escalated the dose to 40 mg daily as well as given a potassium supplement.  With her recent escalation, she has not returned for a basic metabolic panel.  Her last was done on 04/17, and this was prior to increasing her Lasix dose to 40 and the additional potassium.  Her numbers on 04/17, however, were sodium 137, potassium 3.4, creatinine 0.83.  Ms. Fam has not noted any change in her symptoms with the escalation of the Lasix.  She continues to have significant lower extremity swelling in her ankles and calves.  It is hard to get her shoes on.  She is having difficulty with tightness in her pants around her calves due to the swelling.  She also has persistent shortness of breath and dyspnea on exertion as well.  She self reported her blood pressure at 128/94 today, her pulse rate of 110.  She is noting significant heart pounding  throughout the day, especially at nighttime when she is sleeping.        In summary, Ms. Fam is a very pleasant, 79-year-old female with a history of idiopathic cardiomyopathy.  Her last assessment of LV function was in September of last year, and her EF was estimated around 55%-60%.  She was in atrial fibrillation at the time.  She had mild valvular heart disease.  Her last EKG from 09/28 suggested a normal sinus rhythm, and her recent clinical history suggests that she has gone from paroxysmal atrial fibrillation likely to persistent atrial fibrillation with rapid response.  She has been subsequently diagnosed with polymyalgia rheumatica and placed on a long tapering dose of prednisone, which is likely contributing to fluid retention as well.  I will recommend that we escalate her diuretic dose further, as she is not experiencing a significant diuretic response currently from the 40 mg of Lasix.  We talked about going to a twice-daily dose, but in efforts to avoid frequent nocturia, we have decided to increase her dose to 80 mg in the morning.  She is on a significant potassium supplementation of 40 mEq per day.  I will continue this as well at its current dose.  I would like her to have a basic metabolic panel to recheck her electrolytes within 5-7 days.  I also want her seen back to see her response and symptoms with the escalation of diuretic.  I have asked her to monitor and record her heart rate at rest twice daily over the next 5-7 days, and if she continues to be elevated above 100 for a resting heart rate, we may need to consider a better heart rate control for atrial fibrillation as well.  She did not tolerate higher doses of metoprolol previously and experienced some dizziness with the addition of this medication.  We may need to consider other alternatives to metoprolol, such as the addition of digoxin or a trial of a higher dose and see how she does.  To avoid lower blood pressures with an  escalation of beta blocker, we may need to consider discontinuing amlodipine.  I would like to focus on heart rate control and fluid retention for now.  We will start with escalation in her diuretic and a close monitoring followup visit within 5-7 days with a basic metabolic panel prior to that phone visit.        Please feel free to contact me with any questions you have in regard to her care.      cc:   [place] [address]         MAGY MILLER DO             D: 2020   T: 2020   MT: tanja      Name:     HAMIDA HODGE   MRN:      [id number]        Account:      [id number]   :      1940           Service Date: 2020      Document: [id number]

## 2020-04-28 NOTE — PROGRESS NOTES
"Tomasa Fam is a 79 year old female who is being evaluated via a billable telephone visit.   H [phone]     The patient has been notified of following:     \"This telephone visit will be conducted via a call between you and your physician/provider. We have found that certain health care needs can be provided without the need for a physical exam.  This service lets us provide the care you need with a short phone conversation.  If a prescription is necessary we can send it directly to your pharmacy.  If lab work is needed we can place an order for that and you can then stop by our lab to have the test done at a later time.    Telephone visits are billed at different rates depending on your insurance coverage. During this emergency period, for some insurers they may be billed the same as an in-person visit.  Please reach out to your insurance provider with any questions.    If during the course of the call the physician/provider feels a telephone visit is not appropriate, you will not be charged for this service.\"    Patient has given verbal consent for Telephone visit?  YES  How would you like to obtain your AVS? MyChart  Review Of Systems  Skin: NEGATIVE  Eyes:Ears/Nose/Throat: NEGATIVE  Respiratory: increased SOB, edema in both lower extremity(started approx 1 mth ago)  Cardiovascular:NEGATIVE  Gastrointestinal: NEGATIVE  Genitourinary:NEGATIVE   Musculoskeletal: NEGATIVE  Neurologic: NEGATIVE  Psychiatric: NEGATIVE  Hematologic/Lymphatic/Immunologic: NEGATIVE  Endocrine:  NEGATIVE  Vitals: pt reports /94 Pulse 110 Weight 147lbs      Phone call duration:14 minutes    Briana Terry LPN      "

## 2020-04-28 NOTE — LETTER
4/28/2020    Violet Fenton MD   [facility]    [address] [place] [address]       RE: Tomasa Fam  [address] [facility] [id number]       Dear Colleague,    Thank you for the opportunity to participate in the care of your patient, Tomasa Fma, at the [facility] at [facility]. Please see a copy of my visit note below.     HISTORY OF PRESENT ILLNESS:  Ms. Fam is a very pleasant, 79-year-old female with a history of paroxysmal atrial fibrillation, idiopathic cardiomyopathy, COPD and a history of lung cancer.  She has been followed by Dr. Adam Tran.  I saw her back in 09/2019 with new onset of atrial fibrillation.  She was initially placed on Xarelto, but switched to warfarin due to cough.  She was placed on metoprolol at that time as well.  It looks like over the last 6-9 months, she has had additional antihypertensive medication with amlodipine.  She has also been diagnosed with polymyalgia rheumatica and placed on a long tapering dose of prednisone.  In recent weeks, she has been experiencing increased shortness of breath, dyspnea on exertion and increased lower extremity swelling with weight gain apparently around the time her  was diagnosed with a myocardial infarction in mid to late March.  She went to see her primary care provider, Dr. Fenton, who placed her on Lasix and has escalated the dose to 40 mg daily as well as given a potassium supplement.  With her recent escalation, she has not returned for a basic metabolic panel.  Her last was done on 04/17, and this was prior to increasing her Lasix dose to 40 and the additional potassium.  Her numbers on 04/17, however, were sodium 137, potassium 3.4, creatinine 0.83.  Ms. Fam has not noted any change in her symptoms with the escalation of the Lasix.  She continues to have significant lower extremity swelling in  her ankles and calves.  It is hard to get her shoes on.  She is having difficulty with tightness in her pants around her calves due to the swelling.  She also has persistent shortness of breath and dyspnea on exertion as well.  She self reported her blood pressure at 128/94 today, her pulse rate of 110.  She is noting significant heart pounding throughout the day, especially at nighttime when she is sleeping.        In summary, Ms. Fam is a very pleasant, 79-year-old female with a history of idiopathic cardiomyopathy.  Her last assessment of LV function was in September of last year, and her EF was estimated around 55%-60%.  She was in atrial fibrillation at the time.  She had mild valvular heart disease.  Her last EKG from 09/28 suggested a normal sinus rhythm, and her recent clinical history suggests that she has gone from paroxysmal atrial fibrillation likely to persistent atrial fibrillation with rapid response.  She has been subsequently diagnosed with polymyalgia rheumatica and placed on a long tapering dose of prednisone, which is likely contributing to fluid retention as well.  I will recommend that we escalate her diuretic dose further, as she is not experiencing a significant diuretic response currently from the 40 mg of Lasix.  We talked about going to a twice-daily dose, but in efforts to avoid frequent nocturia, we have decided to increase her dose to 80 mg in the morning.  She is on a significant potassium supplementation of 40 mEq per day.  I will continue this as well at its current dose.  I would like her to have a basic metabolic panel to recheck her electrolytes within 5-7 days.  I also want her seen back to see her response and symptoms with the escalation of diuretic.  I have asked her to monitor and record her heart rate at rest twice daily over the next 5-7 days, and if she continues to be elevated above 100 for a resting heart rate, we may need to consider a better heart rate control for  atrial fibrillation as well.  She did not tolerate higher doses of metoprolol previously and experienced some dizziness with the addition of this medication.  We may need to consider other alternatives to metoprolol, such as the addition of digoxin or a trial of a higher dose and see how she does.  To avoid lower blood pressures with an escalation of beta blocker, we may need to consider discontinuing amlodipine.  I would like to focus on heart rate control and fluid retention for now.  We will start with escalation in her diuretic and a close monitoring followup visit within 5-7 days with a basic metabolic panel prior to that phone visit.        Please feel free to contact me with any questions you have in regard to her care.      Please do not hesitate to contact me if you have any questions/concerns.     Sincerely,     Kylie Sevilla, DO

## 2020-04-30 ENCOUNTER — TELEPHONE (OUTPATIENT)
Dept: CARDIOLOGY | Facility: CLINIC | Age: 80
End: 2020-04-30

## 2020-04-30 NOTE — TELEPHONE ENCOUNTER
Received call from pt. Pt stated she is supposed to have labs drawn at Dr. Fenton's office tomorrow, and she wonders if Dr. Sevilla's labs can be drawn at the same time. Pt was seen by Dr. Seivlla via virtual visit on 4/28/20 and pt's furosemide was increased from 40 to 80 mg daily. Reviewed that tomorrow is a little bit soon to have her labs drawn and if possible would like to have them done 5-7 days after medication change as ordered by Dr. Sevilla. Pt stated she will call and see if she can move her lab appointment to Monday, 5/4/20. Pt is scheduled to see Dr. Fenton on 5/5/20. Pt asked if she is supposed to see Dr. Sevilla again. Per Dr. Sevilla's note pt to follow up in 5-7 days. Scheduled pt for telephone visit on 5/6/20 at 9:45 am with Dr. Sevilla. Pt noted she does not think there has been much of a change in how much she is urinating since the increased dose of furosemide, but today is only the second day she has taken the increased dose. Pt stated she has not weighed herself. Requested pt weight herself daily and keep a log if possible. Pt stated she will do this and she is already keeping a log of her HR for Dr. Sevilla.

## 2020-05-04 DIAGNOSIS — I50.31 ACUTE DIASTOLIC CONGESTIVE HEART FAILURE (H): ICD-10-CM

## 2020-05-04 DIAGNOSIS — M35.3 PMR (POLYMYALGIA RHEUMATICA) (H): ICD-10-CM

## 2020-05-04 DIAGNOSIS — R60.9 EDEMA, UNSPECIFIED TYPE: ICD-10-CM

## 2020-05-04 DIAGNOSIS — I48.91 ATRIAL FIBRILLATION, UNSPECIFIED TYPE (H): ICD-10-CM

## 2020-05-04 DIAGNOSIS — I10 ESSENTIAL HYPERTENSION: ICD-10-CM

## 2020-05-04 DIAGNOSIS — I48.0 PAROXYSMAL ATRIAL FIBRILLATION (H): ICD-10-CM

## 2020-05-04 LAB
CRP SERPL-MCNC: 7.4 MG/L (ref 0–8)
ERYTHROCYTE [SEDIMENTATION RATE] IN BLOOD BY WESTERGREN METHOD: 13 MM/H (ref 0–30)

## 2020-05-04 PROCEDURE — 85652 RBC SED RATE AUTOMATED: CPT | Performed by: FAMILY MEDICINE

## 2020-05-04 PROCEDURE — 36415 COLL VENOUS BLD VENIPUNCTURE: CPT | Performed by: FAMILY MEDICINE

## 2020-05-04 PROCEDURE — 86140 C-REACTIVE PROTEIN: CPT | Performed by: FAMILY MEDICINE

## 2020-05-04 PROCEDURE — 80053 COMPREHEN METABOLIC PANEL: CPT | Performed by: FAMILY MEDICINE

## 2020-05-04 NOTE — PROGRESS NOTES
"Tomasa Fam is a 79 year old female who is being evaluated via a billable telephone visit.      [phone]    The patient has been notified of following:     \"This telephone visit will be conducted via a call between you and your physician/provider. We have found that certain health care needs can be provided without the need for a physical exam.  This service lets us provide the care you need with a short phone conversation.  If a prescription is necessary we can send it directly to your pharmacy.  If lab work is needed we can place an order for that and you can then stop by our lab to have the test done at a later time.    Telephone visits are billed at different rates depending on your insurance coverage. During this emergency period, for some insurers they may be billed the same as an in-person visit.  Please reach out to your insurance provider with any questions.    If during the course of the call the physician/provider feels a telephone visit is not appropriate, you will not be charged for this service.\"    Patient has given verbal consent for Telephone visit?  Yes    What phone number would you like to be contacted at? [phone]    How would you like to obtain your AVS? Elsa    Subjective     Tomasa Fam is a 79 year old female who presents to clinic today for the following health issues:    HPI  Medication Followup of furosemide and  Potassium     Taking Medication as prescribed: yes    Side Effects:  None    Medication Helping Symptoms:  Effective- shoes are fitting but the end of the day more swollen           See under ROS     Patient Active Problem List   Diagnosis     Chronic airway obstruction (H)     ASCUS on Pap smear     Hyperlipidemia LDL goal <160     Breast cancer (H)     Essential hypertension with goal blood pressure less than 140/90     Cystocele, midline     Uterovaginal prolapse     S/P hysterectomy     Advanced directives, counseling/discussion     History of " hypokalemia     Concussion     Thyroid nodule     Chronic pain of both knees     History of lung cancer     Paroxysmal atrial fibrillation (H)     Gastroesophageal reflux disease, esophagitis presence not specified     PMR (polymyalgia rheumatica) (H)     Personal history of malignant neoplasm of breast     Long term systemic steroid user       Current Outpatient Medications   Medication Sig Dispense Refill     amLODIPine 2.5 MG PO tablet Take 2 tablets (5 mg) by mouth daily 180 tablet 1     B Complex Vitamins (VITAMIN  B COMPLEX) tablet Take 1 tablet by mouth daily.       calcium-vitamin D (CALTRATE) 600-400 MG-UNIT per tablet Take 1 tablet by mouth daily        cholecalciferol (VITAMIN D) 400 UNIT TABS Take 400 Units by mouth daily       ezetimibe (ZETIA) 10 MG tablet TAKE 1 TABLET(10 MG) BY MOUTH DAILY 90 tablet 1     furosemide (LASIX) 40 MG tablet Take 2 tablets (80 mg) by mouth daily 60 tablet 11     metoprolol tartrate (LOPRESSOR) 25 MG tablet TAKE 1 TABLET BY MOUTH TWICE DAILY 180 tablet 0     omega 3 1000 MG CAPS Take 1 g by mouth daily 90 capsule      omeprazole (PRILOSEC) 20 MG DR capsule Take 1 capsule (20 mg) by mouth daily (Patient taking differently: Take 20 mg by mouth as needed ) 90 capsule 1     polyethylene glycol (MIRALAX/GLYCOLAX) powder Take 1 capful by mouth daily as needed for constipation       potassium chloride ER (KLOR-CON M) 20 MEQ CR tablet Take 1 tablet (20 mEq) by mouth 2 times daily 30 tablet 0     predniSONE (DELTASONE) 1 MG tablet Take 2 tablets (2 mg) by mouth daily 60 tablet 0     predniSONE (DELTASONE) 5 MG tablet Take 1 tablet (5 mg) by mouth daily 30 tablet 0     warfarin ANTICOAGULANT (COUMADIN) 2.5 MG tablet TAKE 2 TABLETS BY MOUTH MONDAY AND FRIDAY AND TAKE ONE TABLET BY MOUTH ALL OTHER DAYS OF THE WEEK 110 tablet 0           Reviewed and updated as needed this visit by Provider         Review of Systems   ROS COMP: CONSTITUTIONAL:NEGATIVE for fever, chills, change in  weight; notes weight has not changed much.   RESP:no change in her dyspnea. No cough.   CV:  See below. She does note that her HR is going all over the place.  MUSCULOSKELETAL: see below.  PSYCHIATRIC: NEGATIVE for changes in mood or affect    Swelling is going down, but very slowly.  She notes that there can be some pitting in the feet.  She is now able to get shoes on.   She does note it gets worse through the day.  Has been on 80 mg lasix since she visited with Cardiology.    Pain in leg is there. Just one.   In the muscle.   Notes right knee is bad, but this is her left thigh.  No pain in arms.   Has not contacted the Rheumatologist yet.    Has called the heart doctor.   She notes that she believes the a fib.is acting up. She notes she would like to see about getting the swelling down  Has been on 80 mg lasix since then.       Objective   Reported vitals:  LMP  (LMP Unknown)    alert and no distress  PSYCH: Alert and oriented times 3; coherent speech, normal   rate and volume, able to articulate logical thoughts, able   to abstract reason. Her affect is normal; occasionally sounds frustrated.  RESP: No cough, no audible wheezing, able to talk in full sentences  Remainder of exam unable to be completed due to telephone visits    Component      Latest Ref Rng & Units 5/4/2020   CRP Inflammation      0.0 - 8.0 mg/L 7.4   Sed Rate      0 - 30 mm/h 13       cmp panel is pending.         Assessment/Plan:  1. Edema, unspecified type  She notes it is slowly decreasing.  She does have a virtual appointment with Cardiology tomorrow. Potassium and renal parameters not back yet.  I will be interested in Cardiology input; the labs should be available then.   I do wonder about bid; she is on high dose already. I wonder about a different med... could she have some poor absorption related to bowel edema or other?     2. PMR (polymyalgia rheumatica) (H)  She notes some thigh pain that she believes is this. It is possible, in  which case she could go back up slightly on prednisone.   However, her inflammatory markers from yesterday look good. It is only one leg; not both. She really does not want to increase dose prednisone. At this time, I recommend not to go down. She is still planning on contacting a Rheumatologist.     3. Paroxysmal atrial fibrillation (H)  As per Cardiology.     4. Long term systemic steroid user  As above. She is anxious to get off the steroid. Working on a slow taper.       Return in about 4 weeks (around 6/2/2020).      Phone call duration:  13 minutes    Violet Fenton MD, MD

## 2020-05-05 ENCOUNTER — VIRTUAL VISIT (OUTPATIENT)
Dept: FAMILY MEDICINE | Facility: CLINIC | Age: 80
End: 2020-05-05
Payer: MEDICARE

## 2020-05-05 DIAGNOSIS — Z79.52 LONG TERM SYSTEMIC STEROID USER: ICD-10-CM

## 2020-05-05 DIAGNOSIS — I48.0 PAROXYSMAL ATRIAL FIBRILLATION (H): ICD-10-CM

## 2020-05-05 DIAGNOSIS — M35.3 PMR (POLYMYALGIA RHEUMATICA) (H): ICD-10-CM

## 2020-05-05 DIAGNOSIS — R60.9 EDEMA, UNSPECIFIED TYPE: Primary | ICD-10-CM

## 2020-05-05 LAB
ALBUMIN SERPL-MCNC: 3.2 G/DL (ref 3.4–5)
ALP SERPL-CCNC: 61 U/L (ref 40–150)
ALT SERPL W P-5'-P-CCNC: 69 U/L (ref 0–50)
ANION GAP SERPL CALCULATED.3IONS-SCNC: 3 MMOL/L (ref 3–14)
AST SERPL W P-5'-P-CCNC: 23 U/L (ref 0–45)
BILIRUB SERPL-MCNC: 0.6 MG/DL (ref 0.2–1.3)
BUN SERPL-MCNC: 23 MG/DL (ref 7–30)
CALCIUM SERPL-MCNC: 10.1 MG/DL (ref 8.5–10.1)
CHLORIDE SERPL-SCNC: 102 MMOL/L (ref 94–109)
CO2 SERPL-SCNC: 31 MMOL/L (ref 20–32)
CREAT SERPL-MCNC: 0.98 MG/DL (ref 0.52–1.04)
GFR SERPL CREATININE-BSD FRML MDRD: 54 ML/MIN/{1.73_M2}
GLUCOSE SERPL-MCNC: 95 MG/DL (ref 70–99)
POTASSIUM SERPL-SCNC: 3.7 MMOL/L (ref 3.4–5.3)
PROT SERPL-MCNC: 6.6 G/DL (ref 6.8–8.8)
SODIUM SERPL-SCNC: 136 MMOL/L (ref 133–144)

## 2020-05-05 PROCEDURE — 99442 ZZC PHYSICIAN TELEPHONE EVALUATION 11-20 MIN: CPT | Performed by: FAMILY MEDICINE

## 2020-05-06 ENCOUNTER — VIRTUAL VISIT (OUTPATIENT)
Dept: CARDIOLOGY | Facility: CLINIC | Age: 80
End: 2020-05-06
Payer: MEDICARE

## 2020-05-06 DIAGNOSIS — I48.0 PAROXYSMAL ATRIAL FIBRILLATION (H): Primary | ICD-10-CM

## 2020-05-06 DIAGNOSIS — I73.9 INTERMITTENT CLAUDICATION (H): ICD-10-CM

## 2020-05-06 DIAGNOSIS — I50.31 ACUTE DIASTOLIC CONGESTIVE HEART FAILURE (H): ICD-10-CM

## 2020-05-06 DIAGNOSIS — I10 ESSENTIAL HYPERTENSION: ICD-10-CM

## 2020-05-06 PROCEDURE — 99442: CPT | Performed by: INTERNAL MEDICINE

## 2020-05-06 RX ORDER — METOPROLOL TARTRATE 25 MG/1
50 TABLET, FILM COATED ORAL 2 TIMES DAILY
Qty: 270 TABLET | Refills: 3 | Status: SHIPPED | OUTPATIENT
Start: 2020-05-06 | End: 2020-05-19

## 2020-05-06 NOTE — PROGRESS NOTES
"Tomasa Fam is a 79 year old female who is being evaluated via a billable telephone visit.      The patient has been notified of following:     \"This telephone visit will be conducted via a call between you and your physician/provider. We have found that certain health care needs can be provided without the need for a physical exam.  This service lets us provide the care you need with a short phone conversation.  If a prescription is necessary we can send it directly to your pharmacy.  If lab work is needed we can place an order for that and you can then stop by our lab to have the test done at a later time.    Telephone visits are billed at different rates depending on your insurance coverage. During this emergency period, for some insurers they may be billed the same as an in-person visit.  Please reach out to your insurance provider with any questions.    If during the course of the call the physician/provider feels a telephone visit is not appropriate, you will not be charged for this service.\"    BP: 145/91  HR:  range  Height:  5-3\"  Weight: 141lb today, reports 147lb last week  O2-98% room air    Review Of Systems  Skin: bruising on LE  Eyes:Ears/Nose/Throat: eyeglasses  Respiratory: PADRON, with stairs, has to stop half way up stairs  Cardiovascular: edema, LE, improving, can wear shoes now, fatigue, hears  palpitations in ear when laying down  Gastrointestinal: hearburn  Genitourinary: urinary frequency in the AM   Musculoskeletal: muscle pain L thigh, has difficulty walking-last 4-5 days  Neurologic: NEGATIVE  Psychiatric: anxiety  Hematologic/Lymphatic/Immunologic: bruises easily  Endocrine:  NEGATIVE    Brenda Herring LPN    Patient has given verbal consent for Telephone visit?  Yes    What phone number would you like to be contacted at? [phone]    How would you like to obtain your AVS? Elsa    Phone call duration: 15MIN    ANNE MARIE HEIN        "

## 2020-05-06 NOTE — LETTER
5/6/2020    Violet Fenton MD   [facility]   [address] [place] [address]     RE: Tomasa Fam  [address] [address] [place] [id number]       Dear Colleague,    Thank you for the opportunity to participate in the care of your patient, Tomasa Fam, at the [facility] at [facility]. Please see a copy of my visit note below.     HISTORY OF PRESENT ILLNESS:  Ms. Fam is a pleasant 79-year-old female with history of persistent atrial fibrillation, previous idiopathic cardiomyopathy, COPD and a history of lung cancer.  She is on warfarin for CVA prophylaxis.  She has also been diagnosed with polymyalgia rheumatica and been on prednisone.  I saw her in late April and she was experiencing increasing shortness of breath and lower extremity edema.  She had already been started on Lasix with escalation in the dose by Dr. Fenton but was not receiving much benefit.  I continued the escalation of her Lasix dose to 80 mg and kept her potassium supplementation at 40.  We are following up today.  She has noted some improvement in her lower extremity edema but she has persistent shortness of breath and dyspnea on exertion  She denies any difficulty with lying flat or supine or symptoms suggestive of orthopnea.  I am looking through her chart and I see that she had pulmonary function tests done in September, but for some reason, I cannot actually see the results.  I do know that she has at least moderate COPD.  She was self reporting her heart rate ranging between 90s to 110s at rest.  Her blood pressure today was 145/91.  Her weight is down to 141 pounds from 146 at her previous visit.  She has an oximeter at home and was able to report her oxygen levels at 98% on room air today.  Lastly, Ms. Fam is still suffering from some left thigh pain.  She has seen her primary for this and is scheduled to see  Neurology at some point for her symptoms.  I am trying to help her out a little bit, trying to figure out what this is.  In talking with her, it really does not bother her when she is resting or lying down very much.  Even standing is okay.  It is only when she is walking, which suggests to me that this might be claudication.  It occurs on the top of her thigh but does radiate to her buttocks.  I would like to do a lower extremity Doppler just to check the blood flow to make sure that this is not an issue that is causing her left thigh pain as well.  I am going to ask her to undergo an echocardiogram to assess her LV function and also her atria size.  Back in September of last year, she had an echocardiogram suggesting that her atria size was normal.  I would like to reassess this to see if we cannot improve her symptoms with medication adjustment.  She may benefit from a trial of restoration of normal rhythm with a cardioversion.      SUMMARY:  Ms. Fam is a very pleasant 79-year-old female with persistent atrial fibrillation, dyspnea on exertion, exertional left thigh pain radiating to her buttocks with an underlying history of COPD and lung cancer.  Her heart rate is not well controlled on her current regimen, and therefore, I would like to increase her metoprolol dose from 25 to 50 mg twice daily. I think her blood pressure will tolerate this change.  She looks to be a little bit borderline hypertensive still.  We talked about maybe reducing her amlodipine at some point if she continues to have difficulty with lower extremity edema, but for now it seems to be improving with the addition of Lasix.  Her breathing, however, has not changed.  We will get an echocardiogram and reassess her LV function, atria size and increase her metoprolol dose.  If better heart rate does not improve her symptoms, we will entertain rhythm control due to persistent dyspnea on exertion.  Lastly, we will do a vascular *** Doppler  ultrasound to rule out claudication of her left leg.  I will see her back in followup with the test results once complete.  Please feel free to contact me with any questions you have in regards to her care.     Please do not hesitate to contact me if you have any questions/concerns.     Sincerely,     Kylie Sevilla, DO

## 2020-05-06 NOTE — PROGRESS NOTES
Service Date: 05/06/2020      TELEPHONE CLINIC FOLLOWUP VISIT      REFERRING PHYSICIAN:  Dr. Violet Fenton.      HISTORY OF PRESENT ILLNESS:  Ms. Fam is a pleasant 79-year-old female with history of persistent atrial fibrillation, previous idiopathic cardiomyopathy, COPD and a history of lung cancer.  She is on warfarin for CVA prophylaxis.  She has also been diagnosed with polymyalgia rheumatica and been on prednisone.  I saw her in late April and she was experiencing increasing shortness of breath and lower extremity edema.  She had already been started on Lasix with escalation in the dose by Dr. Fenton but was not receiving much benefit.  I continued the escalation of her Lasix dose to 80 mg and kept her potassium supplementation at 40.  We are following up today.  She has noted some improvement in her lower extremity edema but she has persistent shortness of breath and dyspnea on exertion  She denies any difficulty with lying flat or supine or symptoms suggestive of orthopnea.  I am looking through her chart and I see that she had pulmonary function tests done in September, but for some reason, I cannot actually see the results.  I do know that she has at least moderate COPD.  She was self reporting her heart rate ranging between 90s to 110s at rest.  Her blood pressure today was 145/91.  Her weight is down to 141 pounds from 146 at her previous visit.  She has an oximeter at home and was able to report her oxygen levels at 98% on room air today.  Lastly, Ms. Fam is still suffering from some left thigh pain.  She has seen her primary for this and is scheduled to see Neurology at some point for her symptoms.  I am trying to help her out a little bit, trying to figure out what this is.  In talking with her, it really does not bother her when she is resting or lying down very much.  Even standing is okay.  It is only when she is walking, which suggests to me that this might be claudication.  It occurs on  the top of her thigh but does radiate to her buttocks.  I would like to do a lower extremity Doppler just to check the blood flow to make sure that this is not an issue that is causing her left thigh pain as well.  I am going to ask her to undergo an echocardiogram to assess her LV function and also her atria size.  Back in September of last year, she had an echocardiogram suggesting that her atria size was normal.  I would like to reassess this to see if we cannot improve her symptoms with medication adjustment.  She may benefit from a trial of restoration of normal rhythm with a cardioversion.      SUMMARY:  Ms. Fam is a very pleasant 79-year-old female with persistent atrial fibrillation, dyspnea on exertion, exertional left thigh pain radiating to her buttocks with an underlying history of COPD and lung cancer.  Her heart rate is not well controlled on her current regimen, and therefore, I would like to increase her metoprolol dose from 25 to 50 mg twice daily. I think her blood pressure will tolerate this change.  She looks to be a little bit borderline hypertensive still.  We talked about maybe reducing her amlodipine at some point if she continues to have difficulty with lower extremity edema, but for now it seems to be improving with the addition of Lasix.  Her breathing, however, has not changed.  We will get an echocardiogram and reassess her LV function, atria size and increase her metoprolol dose.  If better heart rate does not improve her symptoms, we will entertain rhythm control due to persistent dyspnea on exertion.  Lastly, we will do a vascular  Doppler ultrasound to rule out claudication of her left leg.  I will see her back in followup with the test results once complete.  Please feel free to contact me with any questions you have in regards to her care.      cc:   Violet Fenton MD   [address] [address]         MAGY MILLER, DO              D: 2020   T: 2020   MT: ashok      Name:     HAMIDA HODGE   MRN:      [id number]        Account:      [id number]   :      1940           Service Date: 2020      Document: [id number]

## 2020-05-13 ENCOUNTER — ANTICOAGULATION THERAPY VISIT (OUTPATIENT)
Dept: NURSING | Facility: CLINIC | Age: 80
End: 2020-05-13

## 2020-05-13 DIAGNOSIS — I48.0 PAROXYSMAL ATRIAL FIBRILLATION (H): ICD-10-CM

## 2020-05-13 DIAGNOSIS — I10 ESSENTIAL HYPERTENSION: ICD-10-CM

## 2020-05-13 DIAGNOSIS — I73.9 INTERMITTENT CLAUDICATION (H): ICD-10-CM

## 2020-05-13 LAB
CAPILLARY BLOOD COLLECTION: NORMAL
INR PPP: 2 (ref 0.86–1.14)

## 2020-05-13 PROCEDURE — 99207 ZZC NO CHARGE NURSE ONLY: CPT

## 2020-05-13 PROCEDURE — 85610 PROTHROMBIN TIME: CPT | Performed by: FAMILY MEDICINE

## 2020-05-13 PROCEDURE — 36416 COLLJ CAPILLARY BLOOD SPEC: CPT | Performed by: FAMILY MEDICINE

## 2020-05-13 NOTE — PROGRESS NOTES
"ANTICOAGULATION FOLLOW-UP CLINIC VISIT    Patient Name:  Tomasa Fam  Date:  5/13/2020  Contact Type:  Telephone    SUBJECTIVE:  Patient Findings     Positives:   Change in health, Change in medications    Comments:   Patient states her feet are very swollen, and going up and down steps she has a hard time with breathing. Patient has increased her \"water pill\" but it doesn't seem to be doing much. She has an echo and US scheduled in the next couple of weeks. Then a f/u Cardiology appt. and appt with PCP within the next month. She feels the a-fib is worse so they will be investigating. She will also be following up with Rheumatology to see if she can get off her prednisone. The patient was assessed for diet, activity level changes, missed or extra doses, bruising or bleeding, with no problem findings. Reviewed maintenance warfarin dosing with patient. Patient will remain on the same dose until next INR check. No other questions or concerns. Scheduled next lab-only INR. The last few INRs have been on the lower end of therapeutic. If she dips below 2.0, will probably increase her maintenance dose. Patient stated understanding and is in agreement with plan.  Yue NICOLAS RN  Anticoagulation Clinic  [place]            Clinical Outcomes     Comments:   Patient states her feet are very swollen, and going up and down steps she has a hard time with breathing. Patient has increased her \"water pill\" but it doesn't seem to be doing much. She has an echo and US scheduled in the next couple of weeks. Then a f/u Cardiology appt. and appt with PCP within the next month. She feels the a-fib is worse so they will be investigating. She will also be following up with Rheumatology to see if she can get off her prednisone. The patient was assessed for diet, activity level changes, missed or extra doses, bruising or bleeding, with no problem findings. Reviewed maintenance warfarin dosing with patient. Patient will remain on the " same dose until next INR check. No other questions or concerns. Scheduled next lab-only INR. The last few INRs have been on the lower end of therapeutic. If she dips below 2.0, will probably increase her maintenance dose. Patient stated understanding and is in agreement with plan.  Yue NICOLAS RN  Anticoagulation Clinic  [place]               OBJECTIVE    Recent labs: (last 7 days)     20  1327   INR 2.00*       ASSESSMENT / PLAN  INR assessment THER    Recheck INR In: 4 WEEKS    INR Location Clinic lab     Anticoagulation Summary  As of 2020    INR goal:   2.0-3.0   TTR:   76.1 % (6 mo)   INR used for dosin.00 (2020)   Warfarin maintenance plan:   5 mg (2.5 mg x 2) every Fri; 2.5 mg (2.5 mg x 1) all other days   Full warfarin instructions:   5 mg every Fri; 2.5 mg all other days   Weekly warfarin total:   20 mg   No change documented:   Yue Martínez RN   Plan last modified:   Yue Martínez RN (2020)   Next INR check:   6/10/2020   Priority:   Maintenance   Target end date:   Indefinite    Indications    Paroxysmal atrial fibrillation (H) [I48.0]             Anticoagulation Episode Summary     INR check location:   Anticoagulation Clinic    Preferred lab:       Send INR reminders to:   PK WEAVER    Comments:   No Bandaid // 2.5 mg tab // patient started on Xarelto, transition to warfarin 19      Anticoagulation Care Providers     Provider Role Specialty Phone number    Violet Fenton MD Referring [facility] [phone]            See the Encounter Report to view Anticoagulation Flowsheet and Dosing Calendar (Go to Encounters tab in chart review, and find the Anticoagulation Therapy Visit)    Yue Martínez RN

## 2020-05-15 ENCOUNTER — HOSPITAL ENCOUNTER (OUTPATIENT)
Dept: CARDIOLOGY | Facility: CLINIC | Age: 80
Discharge: HOME | End: 2020-05-15
Attending: INTERNAL MEDICINE | Admitting: INTERNAL MEDICINE
Payer: MEDICARE

## 2020-05-15 ENCOUNTER — TELEPHONE (OUTPATIENT)
Dept: CARDIOLOGY | Facility: CLINIC | Age: 80
End: 2020-05-15

## 2020-05-15 DIAGNOSIS — I10 ESSENTIAL HYPERTENSION: ICD-10-CM

## 2020-05-15 DIAGNOSIS — I50.31 ACUTE DIASTOLIC CONGESTIVE HEART FAILURE (H): ICD-10-CM

## 2020-05-15 DIAGNOSIS — I48.0 PAROXYSMAL ATRIAL FIBRILLATION (H): ICD-10-CM

## 2020-05-15 PROCEDURE — 93306 TTE W/DOPPLER COMPLETE: CPT

## 2020-05-15 PROCEDURE — 93306 TTE W/DOPPLER COMPLETE: CPT | Mod: 26 | Performed by: INTERNAL MEDICINE

## 2020-05-15 NOTE — TELEPHONE ENCOUNTER
Echo results noted:    Interpretation Summary     The visual ejection fraction is estimated at 30-35%.  Left ventricular systolic function is moderately reduced.  Regional wall motion abnormalities cannot be excluded due to limited  visualization. There was no IV available to give contrast according to the  echo technician's note. Contrast would have enhance image quality.  There is mild to moderate (1-2+) mitral regurgitation.  There is moderate (2+) tricuspid regurgitation.  There is mild (1+) aortic regurgitation.  There is mild to moderate (1-2+) pulmonic valvular regurgitation.  Trivial pericardial effusion  The rhythm was rapid atrial fibrillation.  --------------------------------------------------------------------------  Pt had appt with Dr. Sevilla on 5/6/20.  Per Dr. Sevilla's note:  Her heart rate is not well controlled on her current regimen, and therefore, I would like to increase her metoprolol dose from 25 to 50 mg twice daily. I think her blood pressure will tolerate this change.  She looks to be a little bit borderline hypertensive still.  We talked about maybe reducing her amlodipine at some point if she continues to have difficulty with lower extremity edema, but for now it seems to be improving with the addition of Lasix.  Her breathing, however, has not changed.  We will get an echocardiogram and reassess her LV function, atria size and increase her metoprolol dose.  If better heart rate does not improve her symptoms, we will entertain rhythm control due to persistent dyspnea on exertion.     Pt has lower extremity US scheduled for 5/26/20 and appt with Daniela Monaco on 6/1/20.   Will route update to Dr. Sevilla.

## 2020-05-15 NOTE — TELEPHONE ENCOUNTER
Left message for pt to call back to discuss echo results and make 2 week follow up appt.   Pt has appt scheduled on 6/1/20.   Pt could see Rona Tran on 5/19/20 instead.   Awaiting call back from pt.

## 2020-05-15 NOTE — TELEPHONE ENCOUNTER
Kylie Sevilla, Roula Mendez, RN    Caller: Unspecified (Today, 12:19 PM)               She needs a 2 week follow up for better HR control, titration of BB.

## 2020-05-19 ENCOUNTER — VIRTUAL VISIT (OUTPATIENT)
Dept: CARDIOLOGY | Facility: CLINIC | Age: 80
End: 2020-05-19
Attending: INTERNAL MEDICINE
Payer: MEDICARE

## 2020-05-19 VITALS — BODY MASS INDEX: 25.24 KG/M2 | WEIGHT: 142.5 LBS

## 2020-05-19 DIAGNOSIS — I50.31 ACUTE DIASTOLIC CONGESTIVE HEART FAILURE (H): ICD-10-CM

## 2020-05-19 DIAGNOSIS — I10 ESSENTIAL HYPERTENSION: ICD-10-CM

## 2020-05-19 DIAGNOSIS — I73.9 INTERMITTENT CLAUDICATION (H): ICD-10-CM

## 2020-05-19 DIAGNOSIS — I48.91 ATRIAL FIBRILLATION, UNSPECIFIED TYPE (H): ICD-10-CM

## 2020-05-19 DIAGNOSIS — I48.0 PAROXYSMAL ATRIAL FIBRILLATION (H): Primary | ICD-10-CM

## 2020-05-19 PROCEDURE — 99442: CPT | Performed by: PHYSICIAN ASSISTANT

## 2020-05-19 RX ORDER — METOPROLOL TARTRATE 25 MG/1
75 TABLET, FILM COATED ORAL 2 TIMES DAILY
Qty: 270 TABLET | Refills: 3
Start: 2020-05-19 | End: 2020-05-19

## 2020-05-19 RX ORDER — METOPROLOL TARTRATE 25 MG/1
100 TABLET, FILM COATED ORAL 2 TIMES DAILY
Qty: 270 TABLET | Refills: 3 | COMMUNITY
Start: 2020-05-19 | End: 2020-05-20

## 2020-05-19 NOTE — LETTER
5/19/2020      RE: Tomasa Fam  [address] [facility] [id number]       Dear Colleague,    Thank you for the opportunity to participate in the care of your patient, Tomasa Fam, at the [facility] at [facility]. Please see a copy of my visit note below.    Ms. Fam is a pleasant 79-year-old female with a PMhx including HTN, thyroid nodule, PMR on steroids, breast cancer, lung cancer, hyperlipidemia, and mild COPD. She also has longstanding atrial fibrillation on warfarin and a previous idiopathic cardiomyopathy. Over the last few months she has had progressive shortness of breath and lower extremity edema. She had been started on Lasix which has been escalated up to 80mg daily.     She saw Dr. Sevilla a few weeks ago for follow up. She continued with PADRON though at that time thought her swelling had improved somewhat. Her heart rates were not under good control, and she self reported between 90-110s at rest. She was also suffering from some left thigh pain and there was some concern for claudication. At that visit, her metoprolol was increased to 50mg BID and an echocardiogram and lower extremity ultrasound were requested.     Her echo was performed a few days ago and showed a decline in her EF to 30-35% with moderately reduced LV function. (down from 55-60% in Sept 2019). Regional WMAs could not be excluded due to limited visualization. She had 1-2+ MR and 2+ TR along with a trivial pericardial effusion. Notably, she was in rapid atrial fibrillation at the time of the exam. She was called and asked to move up her appt to discuss this further. Her LE ultrasound has not yet been performed and is scheduled for next week.    Today, in efforts to limit possible exposures to COVID-19, she was scheduled as a telephone visit with me today which she was agreeable to. She tells me that overall she has not been feeling  "well, and her lower extremity edema has worsened somewhat. She admits to \"some good days and some bad days\" but her weight is up 2 lbs today. She also acknowledges eating pizza last evening.  Her PADRON is a bit worse today as well. She periodically checks her home heart rates and says they are still in the high 90s to 100s at times and she feels her heart race fairly frequently. She notes she \"almost feels the bed bouncing\" at night when she lies down. She denies presyncope/syncope. No new exertional chest discomfort. There were no new labs arranged prior to today's phone visit.        Assessment/Plan:    1. Acute systolic heart failure.   --Echo done a few days ago shows a reduction in her EF to 30-35% (from 55-60% Sept 2019). She is having progressive PADRON and some worsening LE edema with weight gain after some dietary indiscretions. She is already on Lasix 80mg daily. I suspect she would benefit from IV diuresis and possibly a conversion to torsemide or bumex. However, since I have not met/seen her in person, I am not comfortable sending her to the infusion clinic without a physical exam. It also appears that her afib rates are still not under good control which may be a contributor.    --We discussed a possible ED visit today which she would like to avoid if possible. Thus, I have arranged for her to be seen in clinic tomorrow to decide on IV diuresis and further medication adjustments.  She may ultimately benefit from [facility] clinic enrollment in the future as well.     2. Atrial fibrillation.   --Her resting afib rates are not under good control, and may be even higher than she is reporting given that she was in rapid atrial fibrillation at the time of her echo. Again, she has politely declined the ED.    --For now she is metoprolol 50mg BID. Will check an EKG at her visit tomorrow and make further adjustments if needed. We may need to shift focus to rhythm control given her new heart failure.    --Continue " anticoagulation with warfarin, she follows with the coumadin clinic.     Of note, she is still awaiting a doppler ultrasound to rule out claudication of her left leg. This is scheduled for next week.     Follow up plan: As above, I have arranged for an in-person visit tomorrow in efforts to avoid hospitalization.       I have reviewed the note as documented above.  This accurately captures the substance of my conversation with the patient.    Rebecca Smalls PA-C  [facility]  Pager [phone]      Please do not hesitate to contact me if you have any questions/concerns.     Sincerely,     FARIDEH Silva

## 2020-05-19 NOTE — PROGRESS NOTES
"CARDIOLOGY TELEPHONE VISIT      Tomasa Fam is a 79 year old female who is being evaluated via a billable telephone visit.      The patient has been notified of following:     \"This telephone visit will be conducted via a call between you and your physician/provider. We have found that certain health care needs can be provided without the need for a physical exam.  This service lets us provide the care you need with a short phone conversation.  If a prescription is necessary we can send it directly to your pharmacy.  If lab work is needed we can place an order for that and you can then stop by our lab to have the test done at a later time.    Telephone visits are billed at different rates depending on your insurance coverage. During this emergency period, for some insurers they may be billed the same as an in-person visit.  Please reach out to your insurance provider with any questions.    If during the course of the call the physician/provider feels a telephone visit is not appropriate, you will not be charged for this service.\"    Patient has given verbal consent for Telephone visit? Yes  What phone number would you like to be contacted at? [phone]    How would you like to obtain your AVS? Mail a copy     Review Of Systems    Skin: NEGATIVE  Eyes:Ears/Nose/Throat: NEGATIVE  Respiratory:  SOB is bad  Cardiovascular:edema in feet is worse and calves feel tight  Gastrointestinal: NEGATIVE  Genitourinary:NEGATIVE  Musculoskeletal: NEGATIVE  Neurologic: NEGATIVE  Psychiatric: NEGATIVE  Hematologic/Lymphatic/Immunologic: NEGATIVE  Endocrine:  NEGATIVE  Vitals: Pt reports can't take BP and Pulse at home. Weight is 142.5lbs    Briana Terry LPN      I have reviewed and updated the patient's Past Medical History, Social History, Family History and Medication List.      MEDICATIONS:  Current Outpatient Medications   Medication Sig Dispense Refill     amLODIPine 2.5 MG PO tablet Take 2 tablets (5 mg) by " mouth daily 180 tablet 1     B Complex Vitamins (VITAMIN  B COMPLEX) tablet Take 1 tablet by mouth daily.       calcium-vitamin D (CALTRATE) 600-400 MG-UNIT per tablet Take 1 tablet by mouth daily        cholecalciferol (VITAMIN D) 400 UNIT TABS Take 400 Units by mouth daily       ezetimibe (ZETIA) 10 MG tablet TAKE 1 TABLET(10 MG) BY MOUTH DAILY 90 tablet 1     furosemide (LASIX) 40 MG tablet Take 2 tablets (80 mg) by mouth daily 60 tablet 11     metoprolol tartrate (LOPRESSOR) 25 MG tablet Take 2 tablets (50 mg) by mouth 2 times daily 270 tablet 3     omega 3 1000 MG CAPS Take 1 g by mouth daily 90 capsule      omeprazole (PRILOSEC) 20 MG DR capsule Take 1 capsule (20 mg) by mouth daily (Patient taking differently: Take 20 mg by mouth as needed ) 90 capsule 1     polyethylene glycol (MIRALAX/GLYCOLAX) powder Take 1 capful by mouth daily as needed for constipation       potassium chloride ER (KLOR-CON M) 20 MEQ CR tablet Take 1 tablet (20 mEq) by mouth 2 times daily 30 tablet 0     predniSONE (DELTASONE) 1 MG tablet Take 2 tablets (2 mg) by mouth daily 60 tablet 0     predniSONE (DELTASONE) 5 MG tablet Take 1 tablet (5 mg) by mouth daily 30 tablet 0     warfarin ANTICOAGULANT (COUMADIN) 2.5 MG tablet TAKE 2 TABLETS BY MOUTH MONDAY AND FRIDAY AND TAKE ONE TABLET BY MOUTH ALL OTHER DAYS OF THE WEEK 110 tablet 0       ALLERGIES  No known drug allergies and Tape [adhesive tape]    Self reported vitals:  Weight: 142  BP not reported  HR       Most recent labs:   Results for ANN HODGE (MRN [id number]) as of 5/19/2020 12:28   Ref. Range 5/4/2020 11:29   Sodium Latest Ref Range: 133 - 144 mmol/L 136   Potassium Latest Ref Range: 3.4 - 5.3 mmol/L 3.7   Chloride Latest Ref Range: 94 - 109 mmol/L 102   Carbon Dioxide Latest Ref Range: 20 - 32 mmol/L 31   Urea Nitrogen Latest Ref Range: 7 - 30 mg/dL 23   Creatinine Latest Ref Range: 0.52 - 1.04 mg/dL 0.98   GFR Estimate Latest Ref Range: >60 mL/min/1.73_m2 54  (L)   GFR Estimate If Black Latest Ref Range: >60 mL/min/1.73_m2 63   Calcium Latest Ref Range: 8.5 - 10.1 mg/dL 10.1   Anion Gap Latest Ref Range: 3 - 14 mmol/L 3   Albumin Latest Ref Range: 3.4 - 5.0 g/dL 3.2 (L)   Protein Total Latest Ref Range: 6.8 - 8.8 g/dL 6.6 (L)   Bilirubin Total Latest Ref Range: 0.2 - 1.3 mg/dL 0.6   Alkaline Phosphatase Latest Ref Range: 40 - 150 U/L 61   ALT Latest Ref Range: 0 - 50 U/L 69 (H)   AST Latest Ref Range: 0 - 45 U/L 23   CRP Inflammation Latest Ref Range: 0.0 - 8.0 mg/L 7.4   Glucose Latest Ref Range: 70 - 99 mg/dL 95       Primary cardiologist: Dr. Willow Sevilla      HPI:  Ms. Fam is a pleasant 79-year-old female with a PMhx including HTN, thyroid nodule, PMR on steroids, breast cancer, lung cancer, hyperlipidemia, and mild COPD. She also has longstanding atrial fibrillation on warfarin and a previous idiopathic cardiomyopathy. Over the last few months she has had progressive shortness of breath and lower extremity edema. She had been started on Lasix which has been escalated up to 80mg daily.     She saw Dr. Sevilla a few weeks ago for follow up. She continued with PADRON though at that time thought her swelling had improved somewhat. Her heart rates were not under good control, and she self reported between 90-110s at rest. She was also suffering from some left thigh pain and there was some concern for claudication. At that visit, her metoprolol was increased to 50mg BID and an echocardiogram and lower extremity ultrasound were requested.     Her echo was performed a few days ago and showed a decline in her EF to 30-35% with moderately reduced LV function. (down from 55-60% in Sept 2019). Regional WMAs could not be excluded due to limited visualization. She had 1-2+ MR and 2+ TR along with a trivial pericardial effusion. Notably, she was in rapid atrial fibrillation at the time of the exam. She was called and asked to move up her appt to discuss this further. Her LE  "ultrasound has not yet been performed and is scheduled for next week.    Today, in efforts to limit possible exposures to COVID-19, she was scheduled as a telephone visit with me today which she was agreeable to. She tells me that overall she has not been feeling well, and her lower extremity edema has worsened somewhat. She admits to \"some good days and some bad days\" but her weight is up 2 lbs today. She also acknowledges eating pizza last evening.  Her PADRON is a bit worse today as well. She periodically checks her home heart rates and says they are still in the high 90s to 100s at times and she feels her heart race fairly frequently. She notes she \"almost feels the bed bouncing\" at night when she lies down. She denies presyncope/syncope. No new exertional chest discomfort. There were no new labs arranged prior to today's phone visit.        Assessment/Plan:    1. Acute systolic heart failure.   --Echo done a few days ago shows a reduction in her EF to 30-35% (from 55-60% Sept 2019). She is having progressive PADRON and some worsening LE edema with weight gain after some dietary indiscretions. She is already on Lasix 80mg daily. I suspect she would benefit from IV diuresis and possibly a conversion to torsemide or bumex. However, since I have not met/seen her in person, I am not comfortable sending her to the infusion clinic without a physical exam. It also appears that her afib rates are still not under good control which may be a contributor.    --We discussed a possible ED visit today which she would like to avoid if possible. Thus, I have arranged for her to be seen in clinic tomorrow to decide on IV diuresis and further medication adjustments.  She may ultimately benefit from [facility] clinic enrollment in the future as well.     2. Atrial fibrillation.   --Her resting afib rates are not under good control, and may be even higher than she is reporting given that she was in rapid atrial fibrillation at the time of her " echo. Again, she has politely declined the ED.    --For now she is metoprolol 50mg BID. Will check an EKG at her visit tomorrow and make further adjustments if needed. We may need to shift focus to rhythm control given her new heart failure.    --Continue anticoagulation with warfarin, she follows with the coumadin clinic.     Of note, she is still awaiting a doppler ultrasound to rule out claudication of her left leg. This is scheduled for next week.     Follow up plan: As above, I have arranged for an in-person visit tomorrow in efforts to avoid hospitalization.       I have reviewed the note as documented above.  This accurately captures the substance of my conversation with the patient.      Phone call contact time  Call Started at 1328  Call Ended at 1349  Plus an additional 15 minutes of care coordination time       Rebecca Smalls PA-C  [facility]  Pager [phone]

## 2020-05-19 NOTE — PATIENT INSTRUCTIONS
Visit Summary:    We will arrange for you to be seen by Gloria Donnelly NP tomorrow, on 5/20/2020. Our office will call you to confirm details.

## 2020-05-20 ENCOUNTER — TELEPHONE (OUTPATIENT)
Dept: CARDIOLOGY | Facility: CLINIC | Age: 80
End: 2020-05-20

## 2020-05-20 ENCOUNTER — OFFICE VISIT (OUTPATIENT)
Dept: CARDIOLOGY | Facility: CLINIC | Age: 80
End: 2020-05-20
Payer: MEDICARE

## 2020-05-20 VITALS
SYSTOLIC BLOOD PRESSURE: 119 MMHG | BODY MASS INDEX: 25.33 KG/M2 | DIASTOLIC BLOOD PRESSURE: 83 MMHG | HEART RATE: 76 BPM | WEIGHT: 143 LBS

## 2020-05-20 DIAGNOSIS — I42.9 SECONDARY CARDIOMYOPATHY (H): ICD-10-CM

## 2020-05-20 DIAGNOSIS — I50.31 ACUTE DIASTOLIC CONGESTIVE HEART FAILURE (H): ICD-10-CM

## 2020-05-20 DIAGNOSIS — I48.19 PERSISTENT ATRIAL FIBRILLATION (H): Primary | ICD-10-CM

## 2020-05-20 DIAGNOSIS — I73.9 INTERMITTENT CLAUDICATION (H): ICD-10-CM

## 2020-05-20 DIAGNOSIS — I10 ESSENTIAL HYPERTENSION: ICD-10-CM

## 2020-05-20 PROCEDURE — 93000 ELECTROCARDIOGRAM COMPLETE: CPT | Performed by: NURSE PRACTITIONER

## 2020-05-20 PROCEDURE — 99215 OFFICE O/P EST HI 40 MIN: CPT | Performed by: NURSE PRACTITIONER

## 2020-05-20 RX ORDER — AMLODIPINE BESYLATE 2.5 MG/1
2.5 TABLET ORAL DAILY
Qty: 180 TABLET | Refills: 1
Start: 2020-05-20 | End: 2021-04-15

## 2020-05-20 RX ORDER — METOPROLOL TARTRATE 100 MG
100 TABLET ORAL 2 TIMES DAILY
Qty: 60 TABLET | Refills: 4 | Status: SHIPPED | OUTPATIENT
Start: 2020-05-20 | End: 2020-05-21

## 2020-05-20 NOTE — TELEPHONE ENCOUNTER
Wellness Screening Tool    Symptom Screening:    Do you have one of the following new symptoms:      Fever or reported chills?   No    A new cough (started within the past 14 days)?  No    Shortness of breath (started within the past 14 days)?  No    Nausea, vomiting or diarrhea? No    Within the past 3 weeks, have you been exposed to someone with a known positive illness below?      COVID - 19 (known or suspected)     no    Chicken pox?   no    Measles?  no    Pertussis?  No          Patient notified of visitor restriction: Yes   Patient informed to wear a mask: YES    Patient's appointment status: Patient will be seen in clinic as scheduled on 5/20/20

## 2020-05-20 NOTE — LETTER
5/20/2020    [place]  [id number]  [facility] [address]    RE: Tomasa Fam       Dear Colleague,    I had the pleasure of seeing Tomasa Fam in the [facility].    HPI:  It is my pleasure meeting Tomasa Fam, an 79-year-old female who has had progressive shortness of breath, fatigue, and increased lower extremity edema.  She was seen by my colleague Rebecca Smalls PA-C via virtual visit yesterday.  She requested that the patient be evaluated in person.    Her past medical history includes:  1.  Hypertension  2.  Mild COPD, non-oxygen dependent  3.  Lung cancer, non-small cell in 2016, status post prior thoracotomy  4.  Breast cancer with left mastectomy  5.  Polymyalgia rheumatica on steroid therapy, weaning off prednisone and currently at 7 mg daily.  6.  Persistent atrial fibrillation on warfarin therapy  7. Idiopathic cardiomyopathy previously thought to be   secondary to Herceptin  8.  Questionable lower extremity claudication.  Ultrasound testing scheduled next week.  9.  Persistent lower extremity edema    In reviewing Chandrika's history, it appears that she had her first documented episode of atrial fibrillation when hospitalized 9/2019.  It was reported on telemetry that she was having intermittent pauses on diltiazem and Cardizem therapy.  Both were stopped at the time of her discharge.  She was initially placed on a Xarelto and switch to warfarin due to cost concerns.  She was seen in follow-up by Dr. Sevilla and was noted to be in sinus rhythm.  A ZIO patch monitor completed 9/2019 showed episodes of paroxysmal atrial fibrillation with rates ranging from  bpm.  Longest episode was 1 day and 22 hours.    In April she had complaints of increased edema and worsening shortness of breath.  Her Lasix dose was increased and an echocardiogram was ordered.  Unfortunately this showed a decline in her ejection fraction down to 30 to 35% with  moderate RV dysfunction.  Wall motion abnormality could not be excluded due to limited visualization.  She had mild to moderate MR, moderate TR, and mild AR.  She had a trivial pericardial effusion.  She had moderate dilatation of both her LA 3.9 cm- up from 3.6 cm), and RA.    Dr. Sevilla increased her metoprolol to 50 mg twice daily, and recommended that she be seen in follow-up.  Due to the COVID-19 restriction she had a virtual visit yesterday, however, due to the decline in her EF and persistent symptoms it was thought that she should come into the clinic to be evaluated.    Currently she denies chest pain.  Her shortness of breath is worse with exertion.  She denies orthopnea or PND.  Her peripheral edema has improved on the increased Lasix however, with sodium indiscretion her edema returns.  She is aware she should be following a low-sodium diet.  Her weight is down 3 to 4 pounds.  Her BMP on 5/4/2020 was stable.    LABS:  Component      Latest Ref Rng & Units 5/4/2020   Sodium      133 - 144 mmol/L 136   Potassium      3.4 - 5.3 mmol/L 3.7   Chloride      94 - 109 mmol/L 102   Carbon Dioxide      20 - 32 mmol/L 31   Anion Gap      3 - 14 mmol/L 3   Glucose      70 - 99 mg/dL 95   Urea Nitrogen      7 - 30 mg/dL 23   Creatinine      0.52 - 1.04 mg/dL 0.98   GFR Estimate      >60 mL/min/1.73:m2 54 (L)   GFR Estimate If Black      >60 mL/min/1.73:m2 63   Calcium      8.5 - 10.1 mg/dL 10.1   Bilirubin Total      0.2 - 1.3 mg/dL 0.6   Albumin      3.4 - 5.0 g/dL 3.2 (L)   Protein Total      6.8 - 8.8 g/dL 6.6 (L)   Alkaline Phosphatase      40 - 150 U/L 61   ALT      0 - 50 U/L 69 (H)   AST      0 - 45 U/L 23     EKG:      Results for ANN HODGE (MRN [id number]) as of 5/20/2020 16:32   Ref. Range 2/7/2020 00:00 2/20/2020 11:12 3/10/2020 00:00 4/1/2020 13:23 5/13/2020 13:27   INR Latest Ref Range: 0.86 - 1.14  1.9 (A) 2.10 (H) 2.0 (A) 2.50 (H) 2.00 (H)       Plan:   1.  Persistent atrial fibrillation with  RVR  I reviewed her echocardiogram and EKG with her today.  I would like to try to achieve better rate control.  A repeat EKG will be completed next week at Dr. Homans office.  If her rate continues to be fast 1 consideration could be to add digoxin.  The other option is to consider proceeding with a cardioversion, however, I am not optimistic she would maintain rhythm without an antiarrhythmic drug.  She is wondering if the atrial fibrillation could have been related to her prednisone therapy which is now being weaned off.  She is on warfarin therapy, and INR is have been done monthly and have been therapeutic.  We could start weekly INRs with the idea of possible cardioversion in the next 2-3weeks. If she can get an INR this week, she would only need 2 more weekly INR's. This would allow her to have a cardioversion the first week of June.    If she becomes more symptomatic we could proceed with a SLIME/cardioversion.    Chandrika is not wanting to drive to [place] for visits.  Unfortunately, due to COVID-19 pandemic our [facility] is closed.  PLAN:  Increase metoprolol tartrate to 100 mg twice daily  We have agreed that she will do lab work and an EKG next week at Dr. Fenton's office.  Weekly INRs with the anticipation of a cardioversion next month.  I will update Dr. Sevilla for her recommendation.    2.  Cardiomyopathy with recent drop of EF from 55-65% to 30-35%  Currently on furosemide 80 mg daily, and KCl 20 mEq twice daily.  Her lungs are clear and abdomen is soft.  She does have lower extremity edema which could be secondary to amlodipine and questionable claudication disorder.  She is unable to wear support stockings.  I have encouraged her to do bilateral Ace wraps with elevation and to minimize sodium intake.  She wanted to decrease her furosemide, however, she is stable and with her rate being fast this will help minimize heart failure exacerbation.  I will decrease amlodipine to 2.5 mg daily.  If blood  pressure becomes elevated can consider adding spironolactone and weaning off KCl.  Patient's  recently started dialysis and Chandrika is very concerned about being on medication that can affect her kidney function.    3. Edema/claudication of lower extremities  Edema and leg pain both improved per pt.       I will have my nurse contact her next week to see how she is feeling and to assess her heart rate.  I look forward to reviewing the EKG and the lab work next week.  I will update Dr. Sevilla to see what her recommendations are.    Thank you for including us in her care.    Gloria Donnelly, NP, APRN CNP        Orders Placed This Encounter   Procedures     Basic metabolic panel     EKG 12-lead complete w/read - Clinics (performed today)     EKG 12-lead complete w/read - Clinics (to be scheduled)       Orders Placed This Encounter   Medications     amLODIPine (NORVASC) 2.5 MG tablet     Sig: Take 1 tablet (2.5 mg) by mouth daily     Dispense:  180 tablet     Refill:  1     metoprolol tartrate (LOPRESSOR) 100 MG tablet     Sig: Take 1 tablet (100 mg) by mouth 2 times daily     Dispense:  60 tablet     Refill:  4       Medications Discontinued During This Encounter   Medication Reason     amLODIPine 2.5 MG PO tablet      metoprolol tartrate (LOPRESSOR) 25 MG tablet Reorder         Encounter Diagnoses   Name Primary?     Persistent atrial fibrillation Yes     Acute diastolic congestive heart failure (H)      Essential hypertension      Intermittent claudication (H)        CURRENT MEDICATIONS:  Current Outpatient Medications   Medication Sig Dispense Refill     amLODIPine (NORVASC) 2.5 MG tablet Take 1 tablet (2.5 mg) by mouth daily 180 tablet 1     B Complex Vitamins (VITAMIN  B COMPLEX) tablet Take 1 tablet by mouth daily.       calcium-vitamin D (CALTRATE) 600-400 MG-UNIT per tablet Take 1 tablet by mouth daily        cholecalciferol (VITAMIN D) 400 UNIT TABS Take 400 Units by mouth daily       ezetimibe (ZETIA) 10 MG  tablet TAKE 1 TABLET(10 MG) BY MOUTH DAILY 90 tablet 1     furosemide (LASIX) 40 MG tablet Take 2 tablets (80 mg) by mouth daily 60 tablet 11     metoprolol tartrate (LOPRESSOR) 100 MG tablet Take 1 tablet (100 mg) by mouth 2 times daily 60 tablet 4     omega 3 1000 MG CAPS Take 1 g by mouth daily 90 capsule      omeprazole (PRILOSEC) 20 MG DR capsule Take 1 capsule (20 mg) by mouth daily (Patient taking differently: Take 20 mg by mouth as needed ) 90 capsule 1     polyethylene glycol (MIRALAX/GLYCOLAX) powder Take 1 capful by mouth daily as needed for constipation       potassium chloride ER (KLOR-CON M) 20 MEQ CR tablet Take 1 tablet (20 mEq) by mouth 2 times daily 30 tablet 0     predniSONE (DELTASONE) 1 MG tablet Take 2 tablets (2 mg) by mouth daily 60 tablet 0     predniSONE (DELTASONE) 5 MG tablet Take 1 tablet (5 mg) by mouth daily 30 tablet 0     warfarin ANTICOAGULANT (COUMADIN) 2.5 MG tablet TAKE 2 TABLETS BY MOUTH MONDAY AND FRIDAY AND TAKE ONE TABLET BY MOUTH ALL OTHER DAYS OF THE WEEK 110 tablet 0       ALLERGIES     Allergies   Allergen Reactions     No Known Drug Allergies      Tape [Adhesive Tape]      Sensitive to plastic tape on upper part of body--takes skin off       PAST MEDICAL HISTORY:  Past Medical History:   Diagnosis Date     Arthritis     hands, knees     Breast cancer (H) jan. 2012     left mastectomy followed by right;  Dr. Luong     Chronic airway obstruction, not elsewhere classified 2006    very mild COPD - small cough     Concussion 3/13/2013     Problem list name updated by automated process. Provider to review and confirm [employer] Update utility     Cystocele, midline 4/4/2012     Diffuse cystic mastopathy      Gastroesophageal reflux disease, esophagitis presence not specified 11/23/2019     History of hypokalemia 1/23/2013     Hyperlipidemia LDL goal <160 6/29/2011    Dana 10-year CHD Risk Score: 2% (14 Total Points)  Values used to calculate score:    Age: 70 years -- Points:  14    Total Cholesterol: 192 mg/dL -- Points: 1    HDL Cholesterol: 61 mg/dL -- Points: -1    Systolic BP (treated): 118 mmHg -- Points: 0   The patient is not a smoker. -- Points: 0   The patient has not been diagnosed with diabetes. -- Points: 0   The patient does not have a famil     Lung cancer (H) 10/21/2015     Paroxysmal atrial fibrillation (H) 9/13/2019     PMR (polymyalgia rheumatica) (H) 1/17/2020    tapering' above.)  In patients receiving over 10 mg of prednisone/day, the dose can be lowered by 2.5 mg/day decrements every two to four weeks  Once the dose of prednisone is 10 mg/day, further tapering can be done by 1 mg per month, provided the clinical course is stable  The clinical response to glucocorticoid therapy is closely monitored, which centers on screening for the presence and/or recu     Thyroid nodule 4/23/2016    Noted on CT Fall 2015.      Unspecified essential hypertension late 1980's       PAST SURGICAL HISTORY:  Past Surgical History:   Procedure Laterality Date     C NONSPECIFIC PROCEDURE  1970    s/p Tubal ligation 1970     COLONOSCOPY  3/2003    adenomatous polyp      COLONOSCOPY  7/2006    diverticulosis - repeat in 5 years     COLONOSCOPY  10/13/2011    Procedure:COLONOSCOPY; COLONOSCOPY ; Surgeon:CHITO GORDILLO; Location: GI     CYSTOSCOPY  7/11/2012    Procedure: CYSTOSCOPY;;  Surgeon: Aline Cooper DO;  Location:  OR     DAVINCI HYSTERECTOMY SUPRACERVICAL, SACROCOLPOPEXY, COMBINED  7/11/2012    Procedure: COMBINED DAVINCI HYSTERECTOMY SUPRACERVICAL, SACROCOLPOPEXY;   DAVINCI ASSISTED LAPAROSCOPIC SUPRACERVICAL HYSTERECTOMY AND BILATERAL SALPINGO-OOPHORECTOMY, SACROCOLPOPEXY AND CYSTOSCOPY;  Surgeon: Aline Cooper DO;  Location:  OR     EXCISE LESION EYELID Right 6/29/2015    Procedure: EXCISE LESION EYELID;  Surgeon: Hank Olvera MD;  Location:  SD     INSERT PORT VASCULAR ACCESS  2/3/2012    Procedure:INSERT PORT VASCULAR ACCESS; Power Port-A-  Catheter Placement ; Surgeon:IRISH TAPIA; Location:[facility] OR     LAPAROSCOPIC SALPINGO-OOPHORECTOMY  7/11/2012    Procedure: LAPAROSCOPIC SALPINGO-OOPHORECTOMY;  Davinci;  Surgeon: Aline Cooper DO;  Location: [facility] OR     LIPOSUCTION, RHYTIDECTOMY, COMBINED       LOBECTOMY LUNG Right 3/1/2016    Procedure: LOBECTOMY LUNG;  Surgeon: Jonas Woodward MD;  Location:  OR     MAMMOPLASTY REDUCTION  1/6/2012    Procedure:MAMMOPLASTY REDUCTION; Surgeon:MICKI KYLE; Location:[facility] OR     MASTECTOMY SIMPLE  11/12/2012    Procedure: MASTECTOMY SIMPLE;   Right Prophylactic Mastectomy with attempted Right Sentinal Node Biopsy, Revision Bilateral Mastectomy Insicions, liposuction in breast area;  Surgeon: Irish Tapia MD;  Location: [facility] OR     MASTECTOMY SIMPLE, SENTINEL NODE, COMBINED  1/6/2012    Procedure:COMBINED MASTECTOMY SIMPLE, SENTINEL NODE; Left Mastectomy Left [place] Node Biopsy,  Right Breast Reduction ; Surgeon:IRISH TAPIA; Location:[facility] OR     MASTECTOMY, BILATERAL       REMOVE PORT VASCULAR ACCESS  4/29/2013    Procedure: REMOVE PORT VASCULAR ACCESS;  Port A catheter removal ;  Surgeon: Irish Tapia MD;  Location: [facility] OR     REPAIR PTOSIS BILATERAL Bilateral 6/29/2015    Procedure: REPAIR PTOSIS BILATERAL;  Surgeon: Hank Olvera MD;  Location:  [facility]     REVISE RECONSTRUCTED BREAST BILATERAL  11/12/2012    Procedure: REVISE RECONSTRUCTED BREAST BILATERAL;;  Surgeon: Micki Kyle MD;  Location: [facility] OR     SURGICAL HISTORY OF -   in 40's    face lift     SURGICAL HISTORY OF -       lipoma removed right thigh     SURGICAL HISTORY OF -       D and C     THORACOTOMY Right 3/1/2016    Procedure: THORACOTOMY;  Surgeon: Jonas Woodward MD;  Location:  OR       FAMILY HISTORY:  Family History   Problem Relation Age of Onset     Cardiovascular Father         ruptured aorta, hardening of the arteries     Cerebrovascular Disease Mother      Respiratory  Mother         chronic bronchitis - was a smoker early on     Cardiovascular Paternal Grandfather         MI     Cerebrovascular Disease Paternal Aunt      Hypertension Son      Neurologic Disorder Daughter         migraines     Breast Cancer Daughter      Brain Tumor Sister        SOCIAL HISTORY:  Social History     Socioeconomic History     Marital status:      Spouse name: Aren     Number of children: 2     Years of education: None     Highest education level: None   Occupational History     Occupation: [employer]     Employer: NONE    Social Needs     Financial resource strain: None     Food insecurity     Worry: None     Inability: None     Transportation needs     Medical: None     Non-medical: None   Tobacco Use     Smoking status: Never Smoker     Smokeless tobacco: Never Used   Substance and Sexual Activity     Alcohol use: Yes     Alcohol/week: 0.0 standard drinks     Comment: 1 drink day     Drug use: No     Sexual activity: Yes     Partners: Male   Lifestyle     Physical activity     Days per week: None     Minutes per session: None     Stress: None   Relationships     Social connections     Talks on phone: None     Gets together: None     Attends Mu-ism service: None     Active member of club or organization: None     Attends meetings of clubs or organizations: None     Relationship status: None     Intimate partner violence     Fear of current or ex partner: None     Emotionally abused: None     Physically abused: None     Forced sexual activity: None   Other Topics Concern      Service Not Asked     Blood Transfusions Not Asked     Caffeine Concern Not Asked     Occupational Exposure Not Asked     Hobby Hazards Not Asked     Sleep Concern Not Asked     Stress Concern Not Asked     Weight Concern Not Asked     Special Diet Not Asked     Back Care Not Asked     Exercise Yes     Comment: curves     Bike Helmet Not Asked     Seat Belt Not Asked     Self-Exams Not Asked     Parent/sibling  w/ CABG, MI or angioplasty before 65F 55M? Yes   Social History Narrative     None       Review of Systems:  Skin:  Negative       Eyes:  Positive for glasses    ENT:  Negative      Respiratory:  Positive for dyspnea on exertion;cough;shortness of breath stairs; COPD   Cardiovascular:    Positive for;edema;palpitations tachycardia  Gastroenterology: Positive for heartburn    Genitourinary:  Negative      Musculoskeletal:    joint pain right knee - needs a replacement, shoulders  Neurologic:  Positive for headaches At night HA's (in neck into back of head)   Psychiatric:  Positive for anxiety;excessive stress;depression due to daughter having cancer and being in pain   Heme/Lymph/Imm:  Negative      Endocrine:  Negative        Physical Exam:  Vitals: /83   Pulse 76   Wt 64.9 kg (143 lb)   LMP  (LMP Unknown)   BMI 25.33 kg/m      Constitutional:  cooperative, alert and oriented, well developed, well nourished, in no acute distress        Skin:  warm and dry to the touch, no apparent skin lesions or masses noted          Head:  normocephalic, no masses or lesions        Eyes:  pupils equal and round        Lymph:      ENT:  no pallor or cyanosis, dentition good        Neck:  JVP normal        Respiratory:  normal breath sounds, clear to auscultation, normal A-P diameter, normal symmetry, normal respiratory excursion, no use of accessory muscles         Cardiac:   irregularly irregular rhythm;tachycardic              not assessed this visit                                        GI:  abdomen soft        Extremities and Muscular Skeletal:    stasis pigmentation 1+          Neurological:  no gross motor deficits        Psych:  Alert and Oriented x 3        CC  Violet Fenton MD  [address] [place] [address]                Thank you for allowing me to participate in the care of your patient.      Sincerely,     Gloria Donnelly, REBA, APRN [facility]    cc:   Violet SALMERON  MD [place]  [id number] [id number]

## 2020-05-20 NOTE — PROGRESS NOTES
HPI:  It is my pleasure meeting Tomasa Fam, an 79-year-old female who has had progressive shortness of breath, fatigue, and increased lower extremity edema.  She was seen by my colleague Rebecca Smalls PA-C via virtual visit yesterday.  She requested that the patient be evaluated in person.    Her past medical history includes:  1.  Hypertension  2.  Mild COPD, non-oxygen dependent  3.  Lung cancer, non-small cell in 2016, status post prior thoracotomy  4.  Breast cancer with left mastectomy  5.  Polymyalgia rheumatica on steroid therapy, weaning off prednisone and currently at 7 mg daily.  6.  Persistent atrial fibrillation on warfarin therapy  7. Idiopathic cardiomyopathy previously thought to be   secondary to Herceptin  8.  Questionable lower extremity claudication.  Ultrasound testing scheduled next week.  9.  Persistent lower extremity edema    In reviewing Chandrika's history, it appears that she had her first documented episode of atrial fibrillation when hospitalized 9/2019.  It was reported on telemetry that she was having intermittent pauses on diltiazem and Cardizem therapy.  Both were stopped at the time of her discharge.  She was initially placed on a Xarelto and switch to warfarin due to cost concerns.  She was seen in follow-up by Dr. Sevilla and was noted to be in sinus rhythm.  A ZIO patch monitor completed 9/2019 showed episodes of paroxysmal atrial fibrillation with rates ranging from  bpm.  Longest episode was 1 day and 22 hours.    In April she had complaints of increased edema and worsening shortness of breath.  Her Lasix dose was increased and an echocardiogram was ordered.  Unfortunately this showed a decline in her ejection fraction down to 30 to 35% with moderate RV dysfunction.  Wall motion abnormality could not be excluded due to limited visualization.  She had mild to moderate MR, moderate TR, and mild AR.  She had a trivial pericardial effusion.  She had moderate dilatation of both  her LA 3.9 cm- up from 3.6 cm), and RA.    Dr. Sevilla increased her metoprolol to 50 mg twice daily, and recommended that she be seen in follow-up.  Due to the COVID-19 restriction she had a virtual visit yesterday, however, due to the decline in her EF and persistent symptoms it was thought that she should come into the clinic to be evaluated.    Currently she denies chest pain.  Her shortness of breath is worse with exertion.  She denies orthopnea or PND.  Her peripheral edema has improved on the increased Lasix however, with sodium indiscretion her edema returns.  She is aware she should be following a low-sodium diet.  Her weight is down 3 to 4 pounds.  Her BMP on 5/4/2020 was stable.    LABS:  Component      Latest Ref Rng & Units 5/4/2020   Sodium      133 - 144 mmol/L 136   Potassium      3.4 - 5.3 mmol/L 3.7   Chloride      94 - 109 mmol/L 102   Carbon Dioxide      20 - 32 mmol/L 31   Anion Gap      3 - 14 mmol/L 3   Glucose      70 - 99 mg/dL 95   Urea Nitrogen      7 - 30 mg/dL 23   Creatinine      0.52 - 1.04 mg/dL 0.98   GFR Estimate      >60 mL/min/1.73:m2 54 (L)   GFR Estimate If Black      >60 mL/min/1.73:m2 63   Calcium      8.5 - 10.1 mg/dL 10.1   Bilirubin Total      0.2 - 1.3 mg/dL 0.6   Albumin      3.4 - 5.0 g/dL 3.2 (L)   Protein Total      6.8 - 8.8 g/dL 6.6 (L)   Alkaline Phosphatase      40 - 150 U/L 61   ALT      0 - 50 U/L 69 (H)   AST      0 - 45 U/L 23     EKG:      Results for ANN HODGE (MRN [id number]) as of 5/20/2020 16:32   Ref. Range 2/7/2020 00:00 2/20/2020 11:12 3/10/2020 00:00 4/1/2020 13:23 5/13/2020 13:27   INR Latest Ref Range: 0.86 - 1.14  1.9 (A) 2.10 (H) 2.0 (A) 2.50 (H) 2.00 (H)       Plan:   1.  Persistent atrial fibrillation with RVR  I reviewed her echocardiogram and EKG with her today.  I would like to try to achieve better rate control.  A repeat EKG will be completed next week at Dr. Homans office.  If her rate continues to be fast 1 consideration could be  to add digoxin.  The other option is to consider proceeding with a cardioversion, however, I am not optimistic she would maintain rhythm without an antiarrhythmic drug.  She is wondering if the atrial fibrillation could have been related to her prednisone therapy which is now being weaned off.  She is on warfarin therapy, and INR is have been done monthly and have been therapeutic.  We could start weekly INRs with the idea of possible cardioversion in the next 2-3weeks. If she can get an INR this week, she would only need 2 more weekly INR's. This would allow her to have a cardioversion the first week of June.    If she becomes more symptomatic we could proceed with a SLIME/cardioversion.    Chandrika is not wanting to drive to [place] for visits.  Unfortunately, due to COVID-19 pandemic our [place] clinic is closed.  PLAN:  Increase metoprolol tartrate to 100 mg twice daily  We have agreed that she will do lab work and an EKG next week at Dr. Fenton's office.  Weekly INRs with the anticipation of a cardioversion next month.  I will update Dr. Sevilla for her recommendation.    2.  Cardiomyopathy with recent drop of EF from 55-65% to 30-35%  Currently on furosemide 80 mg daily, and KCl 20 mEq twice daily.  Her lungs are clear and abdomen is soft.  She does have lower extremity edema which could be secondary to amlodipine and questionable claudication disorder.  She is unable to wear support stockings.  I have encouraged her to do bilateral Ace wraps with elevation and to minimize sodium intake.  She wanted to decrease her furosemide, however, she is stable and with her rate being fast this will help minimize heart failure exacerbation.  I will decrease amlodipine to 2.5 mg daily.  If blood pressure becomes elevated can consider adding spironolactone and weaning off KCl.  Patient's  recently started dialysis and Chandrika is very concerned about being on medication that can affect her kidney function.    3.  Edema/claudication of lower extremities  Edema and leg pain both improved per pt.       I will have my nurse contact her next week to see how she is feeling and to assess her heart rate.  I look forward to reviewing the EKG and the lab work next week.  I will update Dr. Sevilla to see what her recommendations are.    Thank you for including us in her care.    Gloria Donnelly, NP, APRN CNP        Orders Placed This Encounter   Procedures     Basic metabolic panel     EKG 12-lead complete w/read - Clinics (performed today)     EKG 12-lead complete w/read - Clinics (to be scheduled)       Orders Placed This Encounter   Medications     amLODIPine (NORVASC) 2.5 MG tablet     Sig: Take 1 tablet (2.5 mg) by mouth daily     Dispense:  180 tablet     Refill:  1     metoprolol tartrate (LOPRESSOR) 100 MG tablet     Sig: Take 1 tablet (100 mg) by mouth 2 times daily     Dispense:  60 tablet     Refill:  4       Medications Discontinued During This Encounter   Medication Reason     amLODIPine 2.5 MG PO tablet      metoprolol tartrate (LOPRESSOR) 25 MG tablet Reorder         Encounter Diagnoses   Name Primary?     Persistent atrial fibrillation Yes     Acute diastolic congestive heart failure (H)      Essential hypertension      Intermittent claudication (H)        CURRENT MEDICATIONS:  Current Outpatient Medications   Medication Sig Dispense Refill     amLODIPine (NORVASC) 2.5 MG tablet Take 1 tablet (2.5 mg) by mouth daily 180 tablet 1     B Complex Vitamins (VITAMIN  B COMPLEX) tablet Take 1 tablet by mouth daily.       calcium-vitamin D (CALTRATE) 600-400 MG-UNIT per tablet Take 1 tablet by mouth daily        cholecalciferol (VITAMIN D) 400 UNIT TABS Take 400 Units by mouth daily       ezetimibe (ZETIA) 10 MG tablet TAKE 1 TABLET(10 MG) BY MOUTH DAILY 90 tablet 1     furosemide (LASIX) 40 MG tablet Take 2 tablets (80 mg) by mouth daily 60 tablet 11     metoprolol tartrate (LOPRESSOR) 100 MG tablet Take 1 tablet (100 mg) by  mouth 2 times daily 60 tablet 4     omega 3 1000 MG CAPS Take 1 g by mouth daily 90 capsule      omeprazole (PRILOSEC) 20 MG DR capsule Take 1 capsule (20 mg) by mouth daily (Patient taking differently: Take 20 mg by mouth as needed ) 90 capsule 1     polyethylene glycol (MIRALAX/GLYCOLAX) powder Take 1 capful by mouth daily as needed for constipation       potassium chloride ER (KLOR-CON M) 20 MEQ CR tablet Take 1 tablet (20 mEq) by mouth 2 times daily 30 tablet 0     predniSONE (DELTASONE) 1 MG tablet Take 2 tablets (2 mg) by mouth daily 60 tablet 0     predniSONE (DELTASONE) 5 MG tablet Take 1 tablet (5 mg) by mouth daily 30 tablet 0     warfarin ANTICOAGULANT (COUMADIN) 2.5 MG tablet TAKE 2 TABLETS BY MOUTH MONDAY AND FRIDAY AND TAKE ONE TABLET BY MOUTH ALL OTHER DAYS OF THE WEEK 110 tablet 0       ALLERGIES     Allergies   Allergen Reactions     No Known Drug Allergies      Tape [Adhesive Tape]      Sensitive to plastic tape on upper part of body--takes skin off       PAST MEDICAL HISTORY:  Past Medical History:   Diagnosis Date     Arthritis     hands, knees     Breast cancer (H) jan. 2012     left mastectomy followed by right;  Dr. Luong     Chronic airway obstruction, not elsewhere classified 2006    very mild COPD - small cough     Concussion 3/13/2013     Problem list name updated by automated process. Provider to review and confirm [employer] Update utility     Cystocele, midline 4/4/2012     Diffuse cystic mastopathy      Gastroesophageal reflux disease, esophagitis presence not specified 11/23/2019     History of hypokalemia 1/23/2013     Hyperlipidemia LDL goal <160 6/29/2011    [place] 10-year CHD Risk Score: 2% (14 Total Points)  Values used to calculate score:    Age: 70 years -- Points: 14    Total Cholesterol: 192 mg/dL -- Points: 1    HDL Cholesterol: 61 mg/dL -- Points: -1    Systolic BP (treated): 118 mmHg -- Points: 0   The patient is not a smoker. -- Points: 0   The patient has not been diagnosed  with diabetes. -- Points: 0   The patient does not have a famil     Lung cancer (H) 10/21/2015     Paroxysmal atrial fibrillation (H) 9/13/2019     PMR (polymyalgia rheumatica) (H) 1/17/2020    tapering' above.)  In patients receiving over 10 mg of prednisone/day, the dose can be lowered by 2.5 mg/day decrements every two to four weeks  Once the dose of prednisone is 10 mg/day, further tapering can be done by 1 mg per month, provided the clinical course is stable  The clinical response to glucocorticoid therapy is closely monitored, which centers on screening for the presence and/or recu     Thyroid nodule 4/23/2016    Noted on CT Fall 2015.      Unspecified essential hypertension late 1980's       PAST SURGICAL HISTORY:  Past Surgical History:   Procedure Laterality Date     C NONSPECIFIC PROCEDURE  1970    s/p Tubal ligation 1970     COLONOSCOPY  3/2003    adenomatous polyp      COLONOSCOPY  7/2006    diverticulosis - repeat in 5 years     COLONOSCOPY  10/13/2011    Procedure:COLONOSCOPY; COLONOSCOPY ; Surgeon:CHITO GORDILLO; Location: GI     CYSTOSCOPY  7/11/2012    Procedure: CYSTOSCOPY;;  Surgeon: Aline Cooper DO;  Location:  OR     DAVINCI HYSTERECTOMY SUPRACERVICAL, SACROCOLPOPEXY, COMBINED  7/11/2012    Procedure: COMBINED DAVINCI HYSTERECTOMY SUPRACERVICAL, SACROCOLPOPEXY;   DAVINCI ASSISTED LAPAROSCOPIC SUPRACERVICAL HYSTERECTOMY AND BILATERAL SALPINGO-OOPHORECTOMY, SACROCOLPOPEXY AND CYSTOSCOPY;  Surgeon: Aline Cooper DO;  Location:  OR     EXCISE LESION EYELID Right 6/29/2015    Procedure: EXCISE LESION EYELID;  Surgeon: Hank Olvera MD;  Location:  [facility]     INSERT PORT VASCULAR ACCESS  2/3/2012    Procedure:INSERT PORT VASCULAR ACCESS; Power Port-A- Catheter Placement ; Surgeon:TRESSA TAPIA; Location: OR     LAPAROSCOPIC SALPINGO-OOPHORECTOMY  7/11/2012    Procedure: LAPAROSCOPIC SALPINGO-OOPHORECTOMY;  Davinci;  Surgeon: Aline Cooper DO;  Location:   OR     LIPOSUCTION, RHYTIDECTOMY, COMBINED       LOBECTOMY LUNG Right 3/1/2016    Procedure: LOBECTOMY LUNG;  Surgeon: Jonas Woodward MD;  Location:  OR     MAMMOPLASTY REDUCTION  1/6/2012    Procedure:MAMMOPLASTY REDUCTION; Surgeon:MICKI KYLE; Location:[facility] OR     MASTECTOMY SIMPLE  11/12/2012    Procedure: MASTECTOMY SIMPLE;   Right Prophylactic Mastectomy with attempted Right Sentinal Node Biopsy, Revision Bilateral Mastectomy Insicions, liposuction in breast area;  Surgeon: Irish Tapia MD;  Location: [facility] OR     MASTECTOMY SIMPLE, SENTINEL NODE, COMBINED  1/6/2012    Procedure:COMBINED MASTECTOMY SIMPLE, SENTINEL NODE; Left Mastectomy Left [place] Node Biopsy,  Right Breast Reduction ; Surgeon:IRISH TAPIA; Location:[facility] OR     MASTECTOMY, BILATERAL       REMOVE PORT VASCULAR ACCESS  4/29/2013    Procedure: REMOVE PORT VASCULAR ACCESS;  Port A catheter removal ;  Surgeon: Irish Tapia MD;  Location: [facility] OR     REPAIR PTOSIS BILATERAL Bilateral 6/29/2015    Procedure: REPAIR PTOSIS BILATERAL;  Surgeon: Hank Olvera MD;  Location:  [facility]     REVISE RECONSTRUCTED BREAST BILATERAL  11/12/2012    Procedure: REVISE RECONSTRUCTED BREAST BILATERAL;;  Surgeon: Micki Kyle MD;  Location: [facility] OR     SURGICAL HISTORY OF -   in 40's    face lift     SURGICAL HISTORY OF -       lipoma removed right thigh     SURGICAL HISTORY OF -       D and C     THORACOTOMY Right 3/1/2016    Procedure: THORACOTOMY;  Surgeon: Jonas Woodward MD;  Location:  OR       FAMILY HISTORY:  Family History   Problem Relation Age of Onset     Cardiovascular Father         ruptured aorta, hardening of the arteries     Cerebrovascular Disease Mother      Respiratory Mother         chronic bronchitis - was a smoker early on     Cardiovascular Paternal Grandfather         MI     Cerebrovascular Disease Paternal Aunt      Hypertension Son      Neurologic Disorder Daughter         migraines      Breast Cancer Daughter      Brain Tumor Sister        SOCIAL HISTORY:  Social History     Socioeconomic History     Marital status:      Spouse name: Aren     Number of children: 2     Years of education: None     Highest education level: None   Occupational History     Occupation: Language Logistics     Employer: NONE    Social Needs     Financial resource strain: None     Food insecurity     Worry: None     Inability: None     Transportation needs     Medical: None     Non-medical: None   Tobacco Use     Smoking status: Never Smoker     Smokeless tobacco: Never Used   Substance and Sexual Activity     Alcohol use: Yes     Alcohol/week: 0.0 standard drinks     Comment: 1 drink day     Drug use: No     Sexual activity: Yes     Partners: Male   Lifestyle     Physical activity     Days per week: None     Minutes per session: None     Stress: None   Relationships     Social connections     Talks on phone: None     Gets together: None     Attends [employer] service: None     Active member of club or organization: None     Attends meetings of clubs or organizations: None     Relationship status: None     Intimate partner violence     Fear of current or ex partner: None     Emotionally abused: None     Physically abused: None     Forced sexual activity: None   Other Topics Concern      Service Not Asked     Blood Transfusions Not Asked     Caffeine Concern Not Asked     Occupational Exposure Not Asked     Hobby Hazards Not Asked     Sleep Concern Not Asked     Stress Concern Not Asked     Weight Concern Not Asked     Special Diet Not Asked     Back Care Not Asked     Exercise Yes     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 Yes   Social History Narrative     None       Review of Systems:  Skin:  Negative       Eyes:  Positive for glasses    ENT:  Negative      Respiratory:  Positive for dyspnea on  exertion;cough;shortness of breath stairs; COPD   Cardiovascular:    Positive for;edema;palpitations tachycardia  Gastroenterology: Positive for heartburn    Genitourinary:  Negative      Musculoskeletal:    joint pain right knee - needs a replacement, shoulders  Neurologic:  Positive for headaches At night HA's (in neck into back of head)   Psychiatric:  Positive for anxiety;excessive stress;depression due to daughter having cancer and being in pain   Heme/Lymph/Imm:  Negative      Endocrine:  Negative        Physical Exam:  Vitals: /83   Pulse 76   Wt 64.9 kg (143 lb)   LMP  (LMP Unknown)   BMI 25.33 kg/m      Constitutional:  cooperative, alert and oriented, well developed, well nourished, in no acute distress        Skin:  warm and dry to the touch, no apparent skin lesions or masses noted          Head:  normocephalic, no masses or lesions        Eyes:  pupils equal and round        Lymph:      ENT:  no pallor or cyanosis, dentition good        Neck:  JVP normal        Respiratory:  normal breath sounds, clear to auscultation, normal A-P diameter, normal symmetry, normal respiratory excursion, no use of accessory muscles         Cardiac:   irregularly irregular rhythm;tachycardic              not assessed this visit                                        GI:  abdomen soft        Extremities and Muscular Skeletal:    stasis pigmentation 1+          Neurological:  no gross motor deficits        Psych:  Alert and Oriented x 3        CC  Violet Fenton MD  [id number] [address] [id number]

## 2020-05-20 NOTE — LETTER
5/20/2020    [place]  [address] [address]  Formerly [facility] [address]    RE: Tomasa Fam       Dear Colleague,    I had the pleasure of seeing Tomasa Fam in the [facility].    HPI:  It is my pleasure meeting Tomasa Fam, an 79-year-old female who has had progressive shortness of breath, fatigue, and increased lower extremity edema.  She was seen by my colleague Rebecca Smalls PA-C via virtual visit yesterday.  She requested that the patient be evaluated in person.    Her past medical history includes:  1.  Hypertension  2.  Mild COPD, non-oxygen dependent  3.  Lung cancer, non-small cell in 2016, status post prior thoracotomy  4.  Breast cancer with left mastectomy  5.  Polymyalgia rheumatica on steroid therapy, weaning off prednisone and currently at 7 mg daily.  6.  Persistent atrial fibrillation on warfarin therapy  7. Idiopathic cardiomyopathy previously thought to be   secondary to Herceptin  8.  Questionable lower extremity claudication.  Ultrasound testing scheduled next week.  9.  Persistent lower extremity edema    In reviewing Chandrika's history, it appears that she had her first documented episode of atrial fibrillation when hospitalized 9/2019.  It was reported on telemetry that she was having intermittent pauses on diltiazem and Cardizem therapy.  Both were stopped at the time of her discharge.  She was initially placed on a Xarelto and switch to warfarin due to cost concerns.  She was seen in follow-up by Dr. Sevilla and was noted to be in sinus rhythm.  A ZIO patch monitor completed 9/2019 showed episodes of paroxysmal atrial fibrillation with rates ranging from  bpm.  Longest episode was 1 day and 22 hours.    In April she had complaints of increased edema and worsening shortness of breath.  Her Lasix dose was increased and an echocardiogram was ordered.  Unfortunately this showed a decline in her ejection fraction down to 30 to 35% with  moderate RV dysfunction.  Wall motion abnormality could not be excluded due to limited visualization.  She had mild to moderate MR, moderate TR, and mild AR.  She had a trivial pericardial effusion.  She had moderate dilatation of both her LA 3.9 cm- up from 3.6 cm), and RA.    Dr. Sevilla increased her metoprolol to 50 mg twice daily, and recommended that she be seen in follow-up.  Due to the COVID-19 restriction she had a virtual visit yesterday, however, due to the decline in her EF and persistent symptoms it was thought that she should come into the clinic to be evaluated.    Currently she denies chest pain.  Her shortness of breath is worse with exertion.  She denies orthopnea or PND.  Her peripheral edema has improved on the increased Lasix however, with sodium indiscretion her edema returns.  She is aware she should be following a low-sodium diet.  Her weight is down 3 to 4 pounds.  Her BMP on 5/4/2020 was stable.    LABS:  Component      Latest Ref Rng & Units 5/4/2020   Sodium      133 - 144 mmol/L 136   Potassium      3.4 - 5.3 mmol/L 3.7   Chloride      94 - 109 mmol/L 102   Carbon Dioxide      20 - 32 mmol/L 31   Anion Gap      3 - 14 mmol/L 3   Glucose      70 - 99 mg/dL 95   Urea Nitrogen      7 - 30 mg/dL 23   Creatinine      0.52 - 1.04 mg/dL 0.98   GFR Estimate      >60 mL/min/1.73:m2 54 (L)   GFR Estimate If Black      >60 mL/min/1.73:m2 63   Calcium      8.5 - 10.1 mg/dL 10.1   Bilirubin Total      0.2 - 1.3 mg/dL 0.6   Albumin      3.4 - 5.0 g/dL 3.2 (L)   Protein Total      6.8 - 8.8 g/dL 6.6 (L)   Alkaline Phosphatase      40 - 150 U/L 61   ALT      0 - 50 U/L 69 (H)   AST      0 - 45 U/L 23     EKG:      Results for ANN HOGDE (MRN [id number]) as of 5/20/2020 16:32   Ref. Range 2/7/2020 00:00 2/20/2020 11:12 3/10/2020 00:00 4/1/2020 13:23 5/13/2020 13:27   INR Latest Ref Range: 0.86 - 1.14  1.9 (A) 2.10 (H) 2.0 (A) 2.50 (H) 2.00 (H)       Plan:   1.  Persistent atrial fibrillation with  RVR  I reviewed her echocardiogram and EKG with her today.  I would like to try to achieve better rate control.  A repeat EKG will be completed next week at Dr. Homans office.  If her rate continues to be fast 1 consideration could be to add digoxin.  The other option is to consider proceeding with a cardioversion, however, I am not optimistic she would maintain rhythm without an antiarrhythmic drug.  She is wondering if the atrial fibrillation could have been related to her prednisone therapy which is now being weaned off.  She is on warfarin therapy, and INR is have been done monthly and have been therapeutic.  We could start weekly INRs with the idea of possible cardioversion in the next 2-3weeks. If she can get an INR this week, she would only need 2 more weekly INR's. This would allow her to have a cardioversion the first week of June.    If she becomes more symptomatic we could proceed with a SLIME/cardioversion.    Chandrika is not wanting to drive to [place] for visits.  Unfortunately, due to COVID-19 pandemic our [place] clinic is closed.  PLAN:  Increase metoprolol tartrate to 100 mg twice daily  We have agreed that she will do lab work and an EKG next week at Dr. Fenton's office.  Weekly INRs with the anticipation of a cardioversion next month.  I will update Dr. Sevilla for her recommendation.    2.  Cardiomyopathy with recent drop of EF from 55-65% to 30-35%  Currently on furosemide 80 mg daily, and KCl 20 mEq twice daily.  Her lungs are clear and abdomen is soft.  She does have lower extremity edema which could be secondary to amlodipine and questionable claudication disorder.  She is unable to wear support stockings.  I have encouraged her to do bilateral Ace wraps with elevation and to minimize sodium intake.  She wanted to decrease her furosemide, however, she is stable and with her rate being fast this will help minimize heart failure exacerbation.  I will decrease amlodipine to 2.5 mg daily.  If blood  pressure becomes elevated can consider adding spironolactone and weaning off KCl.  Patient's  recently started dialysis and Chandrika is very concerned about being on medication that can affect her kidney function.    3. Edema/claudication of lower extremities  Edema and leg pain both improved per pt.       I will have my nurse contact her next week to see how she is feeling and to assess her heart rate.  I look forward to reviewing the EKG and the lab work next week.  I will update Dr. Sevilla to see what her recommendations are.    Thank you for including us in her care.    Gloria Donnelly, NP, APRN CNP        Orders Placed This Encounter   Procedures     Basic metabolic panel     EKG 12-lead complete w/read - Clinics (performed today)     EKG 12-lead complete w/read - Clinics (to be scheduled)       Orders Placed This Encounter   Medications     amLODIPine (NORVASC) 2.5 MG tablet     Sig: Take 1 tablet (2.5 mg) by mouth daily     Dispense:  180 tablet     Refill:  1     metoprolol tartrate (LOPRESSOR) 100 MG tablet     Sig: Take 1 tablet (100 mg) by mouth 2 times daily     Dispense:  60 tablet     Refill:  4       Medications Discontinued During This Encounter   Medication Reason     amLODIPine 2.5 MG PO tablet      metoprolol tartrate (LOPRESSOR) 25 MG tablet Reorder         Encounter Diagnoses   Name Primary?     Persistent atrial fibrillation Yes     Acute diastolic congestive heart failure (H)      Essential hypertension      Intermittent claudication (H)        CURRENT MEDICATIONS:  Current Outpatient Medications   Medication Sig Dispense Refill     amLODIPine (NORVASC) 2.5 MG tablet Take 1 tablet (2.5 mg) by mouth daily 180 tablet 1     B Complex Vitamins (VITAMIN  B COMPLEX) tablet Take 1 tablet by mouth daily.       calcium-vitamin D (CALTRATE) 600-400 MG-UNIT per tablet Take 1 tablet by mouth daily        cholecalciferol (VITAMIN D) 400 UNIT TABS Take 400 Units by mouth daily       ezetimibe (ZETIA) 10 MG  tablet TAKE 1 TABLET(10 MG) BY MOUTH DAILY 90 tablet 1     furosemide (LASIX) 40 MG tablet Take 2 tablets (80 mg) by mouth daily 60 tablet 11     metoprolol tartrate (LOPRESSOR) 100 MG tablet Take 1 tablet (100 mg) by mouth 2 times daily 60 tablet 4     omega 3 1000 MG CAPS Take 1 g by mouth daily 90 capsule      omeprazole (PRILOSEC) 20 MG DR capsule Take 1 capsule (20 mg) by mouth daily (Patient taking differently: Take 20 mg by mouth as needed ) 90 capsule 1     polyethylene glycol (MIRALAX/GLYCOLAX) powder Take 1 capful by mouth daily as needed for constipation       potassium chloride ER (KLOR-CON M) 20 MEQ CR tablet Take 1 tablet (20 mEq) by mouth 2 times daily 30 tablet 0     predniSONE (DELTASONE) 1 MG tablet Take 2 tablets (2 mg) by mouth daily 60 tablet 0     predniSONE (DELTASONE) 5 MG tablet Take 1 tablet (5 mg) by mouth daily 30 tablet 0     warfarin ANTICOAGULANT (COUMADIN) 2.5 MG tablet TAKE 2 TABLETS BY MOUTH MONDAY AND FRIDAY AND TAKE ONE TABLET BY MOUTH ALL OTHER DAYS OF THE WEEK 110 tablet 0       ALLERGIES     Allergies   Allergen Reactions     No Known Drug Allergies      Tape [Adhesive Tape]      Sensitive to plastic tape on upper part of body--takes skin off       PAST MEDICAL HISTORY:  Past Medical History:   Diagnosis Date     Arthritis     hands, knees     Breast cancer (H) jan. 2012     left mastectomy followed by right;  Dr. Luong     Chronic airway obstruction, not elsewhere classified 2006    very mild COPD - small cough     Concussion 3/13/2013     Problem list name updated by automated process. Provider to review and confirm [employer] Update utility     Cystocele, midline 4/4/2012     Diffuse cystic mastopathy      Gastroesophageal reflux disease, esophagitis presence not specified 11/23/2019     History of hypokalemia 1/23/2013     Hyperlipidemia LDL goal <160 6/29/2011    [place] 10-year CHD Risk Score: 2% (14 Total Points)  Values used to calculate score:    Age: 70 years -- Points:  14    Total Cholesterol: 192 mg/dL -- Points: 1    HDL Cholesterol: 61 mg/dL -- Points: -1    Systolic BP (treated): 118 mmHg -- Points: 0   The patient is not a smoker. -- Points: 0   The patient has not been diagnosed with diabetes. -- Points: 0   The patient does not have a famil     Lung cancer (H) 10/21/2015     Paroxysmal atrial fibrillation (H) 9/13/2019     PMR (polymyalgia rheumatica) (H) 1/17/2020    tapering' above.)  In patients receiving over 10 mg of prednisone/day, the dose can be lowered by 2.5 mg/day decrements every two to four weeks  Once the dose of prednisone is 10 mg/day, further tapering can be done by 1 mg per month, provided the clinical course is stable  The clinical response to glucocorticoid therapy is closely monitored, which centers on screening for the presence and/or recu     Thyroid nodule 4/23/2016    Noted on CT Fall 2015.      Unspecified essential hypertension late 1980's       PAST SURGICAL HISTORY:  Past Surgical History:   Procedure Laterality Date     C NONSPECIFIC PROCEDURE  1970    s/p Tubal ligation 1970     COLONOSCOPY  3/2003    adenomatous polyp      COLONOSCOPY  7/2006    diverticulosis - repeat in 5 years     COLONOSCOPY  10/13/2011    Procedure:COLONOSCOPY; COLONOSCOPY ; Surgeon:CHITO GORDILLO; Location: GI     CYSTOSCOPY  7/11/2012    Procedure: CYSTOSCOPY;;  Surgeon: Aline Cooper DO;  Location:  OR     DAVINCI HYSTERECTOMY SUPRACERVICAL, SACROCOLPOPEXY, COMBINED  7/11/2012    Procedure: COMBINED DAVINCI HYSTERECTOMY SUPRACERVICAL, SACROCOLPOPEXY;   DAVINCI ASSISTED LAPAROSCOPIC SUPRACERVICAL HYSTERECTOMY AND BILATERAL SALPINGO-OOPHORECTOMY, SACROCOLPOPEXY AND CYSTOSCOPY;  Surgeon: Aline Cooper DO;  Location:  OR     EXCISE LESION EYELID Right 6/29/2015    Procedure: EXCISE LESION EYELID;  Surgeon: Hank Olvera MD;  Location:  SD     INSERT PORT VASCULAR ACCESS  2/3/2012    Procedure:INSERT PORT VASCULAR ACCESS; Power Port-A-  Catheter Placement ; Surgeon:IRISH TAPIA; Location:[facility] OR     LAPAROSCOPIC SALPINGO-OOPHORECTOMY  7/11/2012    Procedure: LAPAROSCOPIC SALPINGO-OOPHORECTOMY;  Davinci;  Surgeon: Aline Cooper DO;  Location: [facility] OR     LIPOSUCTION, RHYTIDECTOMY, COMBINED       LOBECTOMY LUNG Right 3/1/2016    Procedure: LOBECTOMY LUNG;  Surgeon: Jonas Woodward MD;  Location:  OR     MAMMOPLASTY REDUCTION  1/6/2012    Procedure:MAMMOPLASTY REDUCTION; Surgeon:MICKI KYLE; Location:[facility] OR     MASTECTOMY SIMPLE  11/12/2012    Procedure: MASTECTOMY SIMPLE;   Right Prophylactic Mastectomy with attempted Right Sentinal Node Biopsy, Revision Bilateral Mastectomy Insicions, liposuction in breast area;  Surgeon: Irish Tapia MD;  Location: [facility] OR     MASTECTOMY SIMPLE, SENTINEL NODE, COMBINED  1/6/2012    Procedure:COMBINED MASTECTOMY SIMPLE, SENTINEL NODE; Left Mastectomy Left Eustis Node Biopsy,  Right Breast Reduction ; Surgeon:IRISH TAPIA; Location:[facility] OR     MASTECTOMY, BILATERAL       REMOVE PORT VASCULAR ACCESS  4/29/2013    Procedure: REMOVE PORT VASCULAR ACCESS;  Port A catheter removal ;  Surgeon: Irish Tapia MD;  Location: [facility] OR     REPAIR PTOSIS BILATERAL Bilateral 6/29/2015    Procedure: REPAIR PTOSIS BILATERAL;  Surgeon: Hank Olvera MD;  Location:  SD     REVISE RECONSTRUCTED BREAST BILATERAL  11/12/2012    Procedure: REVISE RECONSTRUCTED BREAST BILATERAL;;  Surgeon: Micki Kyle MD;  Location: [facility] OR     SURGICAL HISTORY OF -   in 40's    face lift     SURGICAL HISTORY OF -       lipoma removed right thigh     SURGICAL HISTORY OF -       D and C     THORACOTOMY Right 3/1/2016    Procedure: THORACOTOMY;  Surgeon: Jonas Woodward MD;  Location:  OR       FAMILY HISTORY:  Family History   Problem Relation Age of Onset     Cardiovascular Father         ruptured aorta, hardening of the arteries     Cerebrovascular Disease Mother      Respiratory  Mother         chronic bronchitis - was a smoker early on     Cardiovascular Paternal Grandfather         MI     Cerebrovascular Disease Paternal Aunt      Hypertension Son      Neurologic Disorder Daughter         migraines     Breast Cancer Daughter      Brain Tumor Sister        SOCIAL HISTORY:  Social History     Socioeconomic History     Marital status:      Spouse name: Aren     Number of children: 2     Years of education: None     Highest education level: None   Occupational History     Occupation: [employer]     Employer: NONE    Social Needs     Financial resource strain: None     Food insecurity     Worry: None     Inability: None     Transportation needs     Medical: None     Non-medical: None   Tobacco Use     Smoking status: Never Smoker     Smokeless tobacco: Never Used   Substance and Sexual Activity     Alcohol use: Yes     Alcohol/week: 0.0 standard drinks     Comment: 1 drink day     Drug use: No     Sexual activity: Yes     Partners: Male   Lifestyle     Physical activity     Days per week: None     Minutes per session: None     Stress: None   Relationships     Social connections     Talks on phone: None     Gets together: None     Attends Yarsanism service: None     Active member of club or organization: None     Attends meetings of clubs or organizations: None     Relationship status: None     Intimate partner violence     Fear of current or ex partner: None     Emotionally abused: None     Physically abused: None     Forced sexual activity: None   Other Topics Concern      Service Not Asked     Blood Transfusions Not Asked     Caffeine Concern Not Asked     Occupational Exposure Not Asked     Hobby Hazards Not Asked     Sleep Concern Not Asked     Stress Concern Not Asked     Weight Concern Not Asked     Special Diet Not Asked     Back Care Not Asked     Exercise Yes     Comment: curves     Bike Helmet Not Asked     Seat Belt Not Asked     Self-Exams Not Asked     Parent/sibling  w/ CABG, MI or angioplasty before 65F 55M? Yes   Social History Narrative     None       Review of Systems:  Skin:  Negative       Eyes:  Positive for glasses    ENT:  Negative      Respiratory:  Positive for dyspnea on exertion;cough;shortness of breath stairs; COPD   Cardiovascular:    Positive for;edema;palpitations tachycardia  Gastroenterology: Positive for heartburn    Genitourinary:  Negative      Musculoskeletal:    joint pain right knee - needs a replacement, shoulders  Neurologic:  Positive for headaches At night HA's (in neck into back of head)   Psychiatric:  Positive for anxiety;excessive stress;depression due to daughter having cancer and being in pain   Heme/Lymph/Imm:  Negative      Endocrine:  Negative        Physical Exam:  Vitals: /83   Pulse 76   Wt 64.9 kg (143 lb)   LMP  (LMP Unknown)   BMI 25.33 kg/m      Constitutional:  cooperative, alert and oriented, well developed, well nourished, in no acute distress        Skin:  warm and dry to the touch, no apparent skin lesions or masses noted          Head:  normocephalic, no masses or lesions        Eyes:  pupils equal and round        Lymph:      ENT:  no pallor or cyanosis, dentition good        Neck:  JVP normal        Respiratory:  normal breath sounds, clear to auscultation, normal A-P diameter, normal symmetry, normal respiratory excursion, no use of accessory muscles         Cardiac:   irregularly irregular rhythm;tachycardic              not assessed this visit                                        GI:  abdomen soft        Extremities and Muscular Skeletal:    stasis pigmentation 1+          Neurological:  no gross motor deficits        Psych:  Alert and Oriented x 3        Thank you for allowing me to participate in the care of your patient.    Sincerely,     Gloria Donnelly, NP, APRN CNP     [facility]

## 2020-05-20 NOTE — PATIENT INSTRUCTIONS
Call my nurse with any questions or concerns:  [phone]  *If you have concerns after hours, please call [phone], option 2 to speak with on call Cardiologist.    1. Medication changes from today:    Decrease amlodipine 2.5 mg daily  Increase metoprolol 100 mg twice daily  Ace wrap legs and keep elevated  Limit your salt intake and weigh daily  Spot check your pulse and b/p    EKG and lab draw next week at 's office next week

## 2020-05-21 ENCOUNTER — ANTICOAGULATION THERAPY VISIT (OUTPATIENT)
Dept: NURSING | Facility: CLINIC | Age: 80
End: 2020-05-21

## 2020-05-21 DIAGNOSIS — I73.9 INTERMITTENT CLAUDICATION (H): ICD-10-CM

## 2020-05-21 DIAGNOSIS — I48.19 PERSISTENT ATRIAL FIBRILLATION (H): ICD-10-CM

## 2020-05-21 DIAGNOSIS — I50.31 ACUTE DIASTOLIC CONGESTIVE HEART FAILURE (H): ICD-10-CM

## 2020-05-21 DIAGNOSIS — I10 ESSENTIAL HYPERTENSION: ICD-10-CM

## 2020-05-21 DIAGNOSIS — I10 ESSENTIAL HYPERTENSION WITH GOAL BLOOD PRESSURE LESS THAN 140/90: Primary | ICD-10-CM

## 2020-05-21 DIAGNOSIS — E78.5 HYPERLIPIDEMIA LDL GOAL <160: ICD-10-CM

## 2020-05-21 DIAGNOSIS — I48.0 PAROXYSMAL ATRIAL FIBRILLATION (H): ICD-10-CM

## 2020-05-21 LAB
CAPILLARY BLOOD COLLECTION: NORMAL
INR PPP: 2 (ref 0.86–1.14)

## 2020-05-21 PROCEDURE — 99207 ZZC NO CHARGE NURSE ONLY: CPT

## 2020-05-21 PROCEDURE — 85610 PROTHROMBIN TIME: CPT | Performed by: FAMILY MEDICINE

## 2020-05-21 PROCEDURE — 36416 COLLJ CAPILLARY BLOOD SPEC: CPT | Performed by: FAMILY MEDICINE

## 2020-05-21 RX ORDER — EZETIMIBE 10 MG/1
TABLET ORAL
Qty: 90 TABLET | Refills: 0 | Status: SHIPPED | OUTPATIENT
Start: 2020-05-21 | End: 2020-08-21

## 2020-05-21 RX ORDER — METOPROLOL TARTRATE 100 MG
100 TABLET ORAL 2 TIMES DAILY
Qty: 180 TABLET | Refills: 3 | Status: SHIPPED | OUTPATIENT
Start: 2020-05-21 | End: 2020-06-11

## 2020-05-21 NOTE — PROGRESS NOTES
"ANTICOAGULATION FOLLOW-UP CLINIC VISIT    Patient Name:  Tomasa Fam  Date:  2020  Contact Type:  Telephone    SUBJECTIVE:  Patient Findings     Positives:   Change in health (Worsening PADRON and L extremity edema. Cardiac echo performed on 05/15 shows a decrease in EF to 30-35% (was 55-60 in 2019).  ), Change in medications (Weaning down on PDN (currently on 7mg QD), cardiologist decreased Norvasc to 2.5mg QD and increased Metoprolol to 100mg BID effective 20--med list is current), Change in diet/appetite (Inconsistent greens, pt is buying salad kits and she states \"I eat them until they are gone\".  I encouraged her to be consistent and to watch serving sizes.  Pt does not eat any other green vegetables.)    Comments:   Patient had virtual visit with cardiology on  and in-office visit with cardiologist on 20.  Cardiologist would like weekly INR's in hopes of doing a cardioversion early .  I did NOT increase weekly warfarin dose due to proposed up-coming cardioversion and markedly reduced EF, patient will watch greens intake, as well.        Clinical Outcomes     Negatives:   Major bleeding event, Thromboembolic event, Anticoagulation-related hospital admission, Anticoagulation-related ED visit, Anticoagulation-related fatality    Comments:   Patient had virtual visit with cardiology on  and in-office visit with cardiologist on 20.  Cardiologist would like weekly INR's in hopes of doing a cardioversion early .  I did NOT increase weekly warfarin dose due to proposed up-coming cardioversion and markedly reduced EF, patient will watch greens intake, as well.           OBJECTIVE    Recent labs: (last 7 days)     20  1329   INR 2.00*       ASSESSMENT / PLAN  INR assessment THER    Recheck INR In: 1 WEEK    INR Location Clinic      Anticoagulation Summary  As of 2020    INR goal:   2.0-3.0   TTR:   77.1 % (6.2 mo)   INR used for dosin.00 (2020) "   Warfarin maintenance plan:   5 mg (2.5 mg x 2) every Fri; 2.5 mg (2.5 mg x 1) all other days   Full warfarin instructions:   5 mg every Fri; 2.5 mg all other days   Weekly warfarin total:   20 mg   No change documented:   Theresa Lorenzo RN   Plan last modified:   Yue Martínez RN (1/24/2020)   Next INR check:   5/28/2020   Priority:   High   Target end date:   Indefinite    Indications    Paroxysmal atrial fibrillation (H) [I48.0]             Anticoagulation Episode Summary     INR check location:   Anticoagulation Clinic    Preferred lab:       Send INR reminders to:   PK WEAVER    Comments:   No Bandaid // 2.5 mg tab // patient started on Xarelto, transition to warfarin 11/6/19      Anticoagulation Care Providers     Provider Role Specialty Phone number    Violet Fenton MD Referring [facility] [phone]            See the Encounter Report to view Anticoagulation Flowsheet and Dosing Calendar (Go to Encounters tab in chart review, and find the Anticoagulation Therapy Visit)        Theresa Lorenzo RN

## 2020-05-21 NOTE — TELEPHONE ENCOUNTER
Routing refill request to provider for review/approval because:  Labs out of range:    Lab Results   Component Value Date    ALT 69 05/04/2020       Next 5 appointments (look out 90 days)    Jun 09, 2020  9:30 AM CDT  [facility] [address] [facility] [address] [phone]   Jun 23, 2020 10:50 AM CDT  Return Visit with SYLVIA Pena CNP  [facility] ([facility]) [address] [address] [address] [phone]        Carlie Burns RN

## 2020-05-21 NOTE — TELEPHONE ENCOUNTER
Recent Labs   Lab Test 08/26/19  0832 05/22/18  0759  06/10/15  0925 07/22/14  0827   CHOL 187 197   < > 194 183   HDL 65 66   < > 70 55   LDL 97 108*   < > 106 105   TRIG 124 113   < > 88 114   CHOLHDLRATIO  --   --   --  2.8 3.3    < > = values in this interval not displayed.

## 2020-05-22 NOTE — PROGRESS NOTES
"I realized that INR cannot be SUB-therapeutic for cardioversion  and if they check patient's INR via venipuncture, may be <2.0, so I did increase patient's weekly warfarin dose 1 step=12.5% and she will continue to eat a small salad everyday.  I spoke to patient, gave her updated warfarin dosing instructions.  Patient then asked \"what is a cardioversion\", she did not recall cardiology discussing this. I explained procedure to patient and informed her this is the reason for weekly INR monitoring.    Theresa Lorenzo RN    "

## 2020-05-26 ENCOUNTER — HOSPITAL ENCOUNTER (OUTPATIENT)
Dept: ULTRASOUND IMAGING | Facility: CLINIC | Age: 80
Discharge: HOME | End: 2020-05-26
Attending: INTERNAL MEDICINE | Admitting: INTERNAL MEDICINE
Payer: MEDICARE

## 2020-05-26 PROCEDURE — 93925 LOWER EXTREMITY STUDY: CPT

## 2020-05-27 ENCOUNTER — TELEPHONE (OUTPATIENT)
Dept: CARDIOLOGY | Facility: CLINIC | Age: 80
End: 2020-05-27

## 2020-05-27 DIAGNOSIS — I48.19 PERSISTENT ATRIAL FIBRILLATION (H): Primary | ICD-10-CM

## 2020-05-27 NOTE — TELEPHONE ENCOUNTER
"5/27/20 Msg received from Gloria Donnelly ANDER, \" Please call Chandrika and see how her heart rates and blood pressures have been. \"  Called pt 1 week after OV with Gloria Donnelly where Metoprolol was increased to 100 mg BID and Amlodipine was decreased to 2.5 every day d/t LE edema.  Pt reports she feels the same, weight is \"slightly down\" but LE edema is still present and unchanged. Breathing feels the same. Pt reports taking BP and pulse daily and shared the following readings:  5/27- 122/62 p 98  5/26 -116/65 p 95  5/25- 118/ 71 p 98  5/24- 113/59 p 100  5/23- 114/64 p 107    Pt will have repeat EKG on 5/28 along with BMP and INR. Will route msg to Gloria for review. Pt voiced understanding and agreement w liliana Tran 1:58 pm  "

## 2020-05-28 ENCOUNTER — ALLIED HEALTH/NURSE VISIT (OUTPATIENT)
Dept: NURSING | Facility: CLINIC | Age: 80
End: 2020-05-28
Payer: MEDICARE

## 2020-05-28 ENCOUNTER — ANTICOAGULATION THERAPY VISIT (OUTPATIENT)
Dept: NURSING | Facility: CLINIC | Age: 80
End: 2020-05-28

## 2020-05-28 DIAGNOSIS — I48.0 PAROXYSMAL ATRIAL FIBRILLATION (H): ICD-10-CM

## 2020-05-28 DIAGNOSIS — I48.19 PERSISTENT ATRIAL FIBRILLATION (H): ICD-10-CM

## 2020-05-28 LAB
CAPILLARY BLOOD COLLECTION: NORMAL
INR PPP: 3.6 (ref 0.86–1.14)

## 2020-05-28 PROCEDURE — 36416 COLLJ CAPILLARY BLOOD SPEC: CPT | Performed by: FAMILY MEDICINE

## 2020-05-28 PROCEDURE — 99207 ZZC NO CHARGE NURSE ONLY: CPT

## 2020-05-28 PROCEDURE — 93000 ELECTROCARDIOGRAM COMPLETE: CPT

## 2020-05-28 PROCEDURE — 85610 PROTHROMBIN TIME: CPT | Performed by: FAMILY MEDICINE

## 2020-05-28 NOTE — PROGRESS NOTES
Patient to resume previous maintenance dose and recheck INR in one week.  She did not have any lettuce this past week and is eating iceberg lettuce at this time.    Sandie Sanchez RN  [facility]

## 2020-05-28 NOTE — TELEPHONE ENCOUNTER
"5/28/20 Msg from lab received  \" EKG requested by cardiology and completed today  Pt was to have labs done but did not realize she was suppose to be fasting so will be returning tomorrow morning for labs. \"KherroRN 336 pm  "

## 2020-05-28 NOTE — PROGRESS NOTES
EKG requested by cardiology and completed today  Pt was to have labs done but did not realize she was suppose to be fasting so will be returning tomorrow morning for labs.

## 2020-05-29 DIAGNOSIS — E78.5 HYPERLIPIDEMIA LDL GOAL <160: ICD-10-CM

## 2020-05-29 DIAGNOSIS — I10 ESSENTIAL HYPERTENSION WITH GOAL BLOOD PRESSURE LESS THAN 140/90: ICD-10-CM

## 2020-05-29 LAB
ALBUMIN SERPL-MCNC: 3.2 G/DL (ref 3.4–5)
ALP SERPL-CCNC: 72 U/L (ref 40–150)
ALT SERPL W P-5'-P-CCNC: 128 U/L (ref 0–50)
ANION GAP SERPL CALCULATED.3IONS-SCNC: 7 MMOL/L (ref 3–14)
AST SERPL W P-5'-P-CCNC: 52 U/L (ref 0–45)
BILIRUB SERPL-MCNC: 0.5 MG/DL (ref 0.2–1.3)
BUN SERPL-MCNC: 26 MG/DL (ref 7–30)
CALCIUM SERPL-MCNC: 9.5 MG/DL (ref 8.5–10.1)
CHLORIDE SERPL-SCNC: 103 MMOL/L (ref 94–109)
CHOLEST SERPL-MCNC: 148 MG/DL
CO2 SERPL-SCNC: 28 MMOL/L (ref 20–32)
CREAT SERPL-MCNC: 0.92 MG/DL (ref 0.52–1.04)
GFR SERPL CREATININE-BSD FRML MDRD: 59 ML/MIN/{1.73_M2}
GLUCOSE SERPL-MCNC: 95 MG/DL (ref 70–99)
HDLC SERPL-MCNC: 50 MG/DL
LDLC SERPL CALC-MCNC: 76 MG/DL
NONHDLC SERPL-MCNC: 98 MG/DL
POTASSIUM SERPL-SCNC: 3.9 MMOL/L (ref 3.4–5.3)
PROT SERPL-MCNC: 6.6 G/DL (ref 6.8–8.8)
SODIUM SERPL-SCNC: 138 MMOL/L (ref 133–144)
TRIGL SERPL-MCNC: 110 MG/DL

## 2020-05-29 PROCEDURE — 80061 LIPID PANEL: CPT | Performed by: FAMILY MEDICINE

## 2020-05-29 PROCEDURE — 80053 COMPREHEN METABOLIC PANEL: CPT | Performed by: FAMILY MEDICINE

## 2020-05-29 PROCEDURE — 36415 COLL VENOUS BLD VENIPUNCTURE: CPT | Performed by: FAMILY MEDICINE

## 2020-06-03 NOTE — TELEPHONE ENCOUNTER
6/3 /20 Spoke w patient  regarding recommendations per Gloria. Explained generally the reason and process of DCCV. Pt has had INRs checked weekly for past 2 weeks and has 2 more weekly checks scheduled. Pt has visit with Gloria on 6/11 and DCCV on hold in [facility] for 6/12. Orders entered for DCCV and COVID. Pt aware. Chelsea 855 am

## 2020-06-03 NOTE — TELEPHONE ENCOUNTER
Still in afib, but rate is better. I would like to get her set up for a cardioversion. I know we have a phone call visit 6/11, and chance we could get her set up for the cardioversion the following day?  Let me know what she thinks. Continue weekly INR's  Thx

## 2020-06-04 ENCOUNTER — ANTICOAGULATION THERAPY VISIT (OUTPATIENT)
Dept: NURSING | Facility: CLINIC | Age: 80
End: 2020-06-04

## 2020-06-04 DIAGNOSIS — R60.9 EDEMA, UNSPECIFIED TYPE: ICD-10-CM

## 2020-06-04 DIAGNOSIS — I48.0 PAROXYSMAL ATRIAL FIBRILLATION (H): ICD-10-CM

## 2020-06-04 LAB
CAPILLARY BLOOD COLLECTION: NORMAL
INR PPP: 3 (ref 0.86–1.14)

## 2020-06-04 PROCEDURE — 99207 ZZC NO CHARGE NURSE ONLY: CPT

## 2020-06-04 PROCEDURE — 36416 COLLJ CAPILLARY BLOOD SPEC: CPT | Performed by: FAMILY MEDICINE

## 2020-06-04 PROCEDURE — 85610 PROTHROMBIN TIME: CPT | Performed by: FAMILY MEDICINE

## 2020-06-04 RX ORDER — POTASSIUM CHLORIDE 1500 MG/1
TABLET, EXTENDED RELEASE ORAL
Qty: 30 TABLET | Refills: 0 | Status: SHIPPED | OUTPATIENT
Start: 2020-06-04 | End: 2020-06-11

## 2020-06-04 NOTE — TELEPHONE ENCOUNTER
Routing to PCP to address refill. Per notes from. 5/5/20 virtual visit:   Edema, unspecified type  She notes it is slowly decreasing.  She does have a virtual appointment with Cardiology tomorrow. Potassium and renal parameters not back yet.  I will be interested in Cardiology input; the labs should be available then.   I do wonder about bid; she is on high dose already. I wonder about a different med... could she have some poor absorption related to bowel edema or other?

## 2020-06-04 NOTE — PROGRESS NOTES
ANTICOAGULATION FOLLOW-UP CLINIC VISIT    Patient Name:  Tomasa Fam  Date:  6/4/2020  Contact Type:  Telephone    SUBJECTIVE:  Patient Findings     Positives:   Change in diet/appetite (Pt expressed frustration with not knowing what greens she can eat or should eat.  2 weeks ago I spoke to pt, she was eating bagged mixed green salads but stopped and now is eating only iceberg lettuce. )    Comments:   Patient states she will continue with iceberg lettuce only as she doesn't typically plan out her meals in advance.  I informed patient that consistent amounts of vitamin K and small portions are acceptable but she won't add any back in at this time.  Patient aware iceberg lettuce does NOT contain vitamin K.        Clinical Outcomes     Negatives:   Major bleeding event, Thromboembolic event, Anticoagulation-related hospital admission, Anticoagulation-related ED visit, Anticoagulation-related fatality    Comments:   Patient states she will continue with iceberg lettuce only as she doesn't typically plan out her meals in advance.  I informed patient that consistent amounts of vitamin K and small portions are acceptable but she won't add any back in at this time.  Patient aware iceberg lettuce does NOT contain vitamin K.           OBJECTIVE    Recent labs: (last 7 days)     06/04/20  1316   INR 3.00*       ASSESSMENT / PLAN  INR assessment THER    Recheck INR In: 6 DAYS    INR Location Clinic      Anticoagulation Summary  As of 6/4/2020    INR goal:   2.0-3.0   TTR:   73.9 % (6.7 mo)   INR used for dosing:   3.00 (6/4/2020)   Warfarin maintenance plan:   5 mg (2.5 mg x 2) every Mon; 2.5 mg (2.5 mg x 1) all other days   Full warfarin instructions:   5 mg every Mon; 2.5 mg all other days   Weekly warfarin total:   20 mg   No change documented:   Theresa Lorenzo RN   Plan last modified:   Sandie Sanchez RN (5/28/2020)   Next INR check:   6/10/2020   Priority:   High   Target end date:   Indefinite    Indications     Paroxysmal atrial fibrillation (H) [I48.0]             Anticoagulation Episode Summary     INR check location:   Anticoagulation Clinic    Preferred lab:       Send INR reminders to:   PK WEAVER    Comments:   No Bandaid // 2.5 mg tab // patient started on Xarelto, transition to warfarin 11/6/19      Anticoagulation Care Providers     Provider Role Specialty Phone number    Violet Fenton MD Referring [facility] [phone]            See the Encounter Report to view Anticoagulation Flowsheet and Dosing Calendar (Go to Encounters tab in chart review, and find the Anticoagulation Therapy Visit)        Theresa Lorenzo RN

## 2020-06-09 ENCOUNTER — VIRTUAL VISIT (OUTPATIENT)
Dept: FAMILY MEDICINE | Facility: CLINIC | Age: 80
End: 2020-06-09
Payer: MEDICARE

## 2020-06-09 DIAGNOSIS — E78.5 HYPERLIPIDEMIA LDL GOAL <160: ICD-10-CM

## 2020-06-09 DIAGNOSIS — I42.9 CARDIOMYOPATHY, UNSPECIFIED TYPE (H): ICD-10-CM

## 2020-06-09 DIAGNOSIS — R06.09 DOE (DYSPNEA ON EXERTION): Primary | ICD-10-CM

## 2020-06-09 DIAGNOSIS — I48.0 PAROXYSMAL ATRIAL FIBRILLATION (H): ICD-10-CM

## 2020-06-09 DIAGNOSIS — M35.3 PMR (POLYMYALGIA RHEUMATICA) (H): ICD-10-CM

## 2020-06-09 PROCEDURE — 99442 ZZC PHYSICIAN TELEPHONE EVALUATION 11-20 MIN: CPT | Performed by: FAMILY MEDICINE

## 2020-06-09 RX ORDER — PREDNISONE 1 MG/1
5 TABLET ORAL DAILY
Qty: 60 TABLET | Refills: 0 | COMMUNITY
Start: 2020-06-09 | End: 2020-08-31

## 2020-06-09 RX ORDER — FLUOXETINE 10 MG/1
10 CAPSULE ORAL DAILY
COMMUNITY
End: 2020-06-11

## 2020-06-09 NOTE — PROGRESS NOTES
"Tomasa Fam is a 79 year old female who is being evaluated via a billable telephone visit.      [phone]    The patient has been notified of following:     \"This telephone visit will be conducted via a call between you and your physician/provider. We have found that certain health care needs can be provided without the need for a physical exam.  This service lets us provide the care you need with a short phone conversation.  If a prescription is necessary we can send it directly to your pharmacy.  If lab work is needed we can place an order for that and you can then stop by our lab to have the test done at a later time.    Telephone visits are billed at different rates depending on your insurance coverage. During this emergency period, for some insurers they may be billed the same as an in-person visit.  Please reach out to your insurance provider with any questions.    If during the course of the call the physician/provider feels a telephone visit is not appropriate, you will not be charged for this service.\"    Patient has given verbal consent for Telephone visit?  Yes    What phone number would you like to be contacted at? [phone]    How would you like to obtain your AVS? Mail a copy    Subjective     Tomasa aFm is a 79 year old female who presents via phone visit today for the following health issues:      HPI     Update provider with other provider visits.         See under ROS     Patient Active Problem List   Diagnosis     Chronic airway obstruction (H)     ASCUS on Pap smear     Hyperlipidemia LDL goal <160     Breast cancer (H)     Essential hypertension with goal blood pressure less than 140/90     Cystocele, midline     Uterovaginal prolapse     S/P hysterectomy     Advanced directives, counseling/discussion     History of hypokalemia     Concussion     Thyroid nodule     Chronic pain of both knees     History of lung cancer     Paroxysmal atrial fibrillation (H)     " Gastroesophageal reflux disease, esophagitis presence not specified     PMR (polymyalgia rheumatica) (H)     Personal history of malignant neoplasm of breast     Long term systemic steroid user       Current Outpatient Medications   Medication Sig Dispense Refill     amLODIPine (NORVASC) 2.5 MG tablet Take 1 tablet (2.5 mg) by mouth daily 180 tablet 1     B Complex Vitamins (VITAMIN  B COMPLEX) tablet Take 1 tablet by mouth daily.       calcium-vitamin D (CALTRATE) 600-400 MG-UNIT per tablet Take 1 tablet by mouth daily        ezetimibe (ZETIA) 10 MG tablet TAKE 1 TABLET(10 MG) BY MOUTH DAILY 90 tablet 0     FLUoxetine (PROZAC) 10 MG capsule Take 10 mg by mouth daily       furosemide (LASIX) 40 MG tablet Take 2 tablets (80 mg) by mouth daily 60 tablet 11     metoprolol tartrate (LOPRESSOR) 100 MG tablet Take 1 tablet (100 mg) by mouth 2 times daily 180 tablet 3     omega 3 1000 MG CAPS Take 1 g by mouth daily 90 capsule      omeprazole (PRILOSEC) 20 MG DR capsule Take 1 capsule (20 mg) by mouth daily (Patient taking differently: Take 20 mg by mouth as needed ) 90 capsule 1     polyethylene glycol (MIRALAX/GLYCOLAX) powder Take 1 capful by mouth daily as needed for constipation       potassium chloride ER (KLOR-CON M) 20 MEQ CR tablet TAKE 1 TABLET(20 MEQ) BY MOUTH TWICE DAILY 30 tablet 0     predniSONE (DELTASONE) 1 MG tablet Take 2 tablets (2 mg) by mouth daily (Patient taking differently: Take 2 mg by mouth daily Taking 6 tablets daily) 60 tablet 0     warfarin ANTICOAGULANT (COUMADIN) 2.5 MG tablet TAKE 2 TABLETS BY MOUTH MONDAY AND FRIDAY AND TAKE ONE TABLET BY MOUTH ALL OTHER DAYS OF THE WEEK 110 tablet 0     predniSONE (DELTASONE) 5 MG tablet Take 1 tablet (5 mg) by mouth daily (Patient not taking: Reported on 6/9/2020) 30 tablet 0              Reviewed and updated as needed this visit by Provider         Review of Systems   CONSTITUTIONAL:NEGATIVE for fever, chills  RESP:see below  CV: see below  PSYCHIATRIC:  NEGATIVE for changes in mood or affect    Notes she is still short of breath.   Not any better. Not worse.  Feet still swollen, but up and down. Not as bad as previously.    Down to 6 mg prednisone.  taking 6 ones.   Did see Rheumatologist. Will decrease again in a month. Will see him back in 2 months.    She notes she is to get her INR checked, a covid test in anticipation of getting her heart shocked at the end of the week; due to her a fib acting up.         Objective   Reported vitals:  LMP  (LMP Unknown)    alert and no distress  PSYCH: Alert and oriented times 3; coherent speech, normal   rate and volume, able to articulate logical thoughts, able   to abstract reason. Her affect is normal  RESP: No cough, no audible wheezing, able to talk in full sentences  Remainder of exam unable to be completed due to telephone visits    Component      Latest Ref Rng & Units 5/4/2020 5/29/2020   Sodium      133 - 144 mmol/L  138   Potassium      3.4 - 5.3 mmol/L  3.9   Chloride      94 - 109 mmol/L  103   Carbon Dioxide      20 - 32 mmol/L  28   Anion Gap      3 - 14 mmol/L  7   Glucose      70 - 99 mg/dL  95   Urea Nitrogen      7 - 30 mg/dL  26   Creatinine      0.52 - 1.04 mg/dL  0.92   GFR Estimate      >60 mL/min/1.73:m2  59 (L)   GFR Estimate If Black      >60 mL/min/1.73:m2  68   Calcium      8.5 - 10.1 mg/dL  9.5   Bilirubin Total      0.2 - 1.3 mg/dL  0.5   Albumin      3.4 - 5.0 g/dL  3.2 (L)   Protein Total      6.8 - 8.8 g/dL  6.6 (L)   Alkaline Phosphatase      40 - 150 U/L  72   ALT      0 - 50 U/L  128 (H)   AST      0 - 45 U/L  52 (H)   Cholesterol      <200 mg/dL  148   Triglycerides      <150 mg/dL  110   HDL Cholesterol      >49 mg/dL  50   LDL Cholesterol Calculated      <100 mg/dL  76   Non HDL Cholesterol      <130 mg/dL  98   CRP Inflammation      0.0 - 8.0 mg/L 7.4    Sed Rate      0 - 30 mm/h 13           The visual ejection fraction is estimated at 30-35%.  Left ventricular systolic function is  moderately reduced.  Regional wall motion abnormalities cannot be excluded due to limited  visualization. There was no IV available to give contrast according to the  echo technician's note. Contrast would have enhance image quality.  There is mild to moderate (1-2+) mitral regurgitation.  There is moderate (2+) tricuspid regurgitation.  There is mild (1+) aortic regurgitation.  There is mild to moderate (1-2+) pulmonic valvular regurgitation.  Trivial pericardial effusion  The rhythm was rapid atrial fibrillation.    Reviewed last Cardiology note.  Do not see a Rheumatology note         Assessment/Plan:  1. PADRON (dyspnea on exertion)  At this time, noted to have reduced EF. Has had some rapid a fib. She is working with Cardiology.    2. Paroxysmal atrial fibrillation (H)  Anticipating a cardioversion if INR ok and covid negative.     3. Cardiomyopathy, unspecified type (H)  Working with Cardiology.     4. PMR (polymyalgia rheumatica) (H)  She has now seen Rheumatology. She notes he did not find her too exciting; going down another mg prednisone in a month. He noted to her that she is not on a lot at this time.   - predniSONE (DELTASONE) 1 MG tablet; Take 6 tablets (6 mg) by mouth daily  Dispense: 60 tablet; Refill: 0    5. Hyperlipidemia LDL goal <160  She is on zetia currently; has not been able to tolerate statin.    6. Elevation of level of transaminase or lactic acid dehydrogenase (LDH)  Uncertain etiology.   Discussed some possibilities. At this time, I wonder if it is from fluid. Will see what happens if she is cardioverted.   Repeat in ~ 6 weeks.  Discussed otherwise, to consider doing ultrasound of liver, hold zetia if not improving.   - Comprehensive metabolic panel; Future          Return in about 6 weeks (around 7/21/2020) for Lab Work, Medication recheck.      Phone call duration:  17 minutes            Violet Fenton MD, MD

## 2020-06-10 ENCOUNTER — ANTICOAGULATION THERAPY VISIT (OUTPATIENT)
Dept: NURSING | Facility: CLINIC | Age: 80
End: 2020-06-10

## 2020-06-10 DIAGNOSIS — I48.19 PERSISTENT ATRIAL FIBRILLATION (H): ICD-10-CM

## 2020-06-10 DIAGNOSIS — I48.0 PAROXYSMAL ATRIAL FIBRILLATION (H): ICD-10-CM

## 2020-06-10 LAB
CAPILLARY BLOOD COLLECTION: NORMAL
INR PPP: 2.9 (ref 0.86–1.14)
SARS-COV-2 RNA SPEC QL NAA+PROBE: NOT DETECTED
SPECIMEN SOURCE: NORMAL

## 2020-06-10 PROCEDURE — 85610 PROTHROMBIN TIME: CPT | Performed by: FAMILY MEDICINE

## 2020-06-10 PROCEDURE — 99207 ZZC NO BILLABLE SERVICE THIS VISIT: CPT

## 2020-06-10 PROCEDURE — 36416 COLLJ CAPILLARY BLOOD SPEC: CPT | Performed by: FAMILY MEDICINE

## 2020-06-10 PROCEDURE — 99000 SPECIMEN HANDLING OFFICE-LAB: CPT | Performed by: NURSE PRACTITIONER

## 2020-06-10 PROCEDURE — 99207 ZZC NO CHARGE NURSE ONLY: CPT

## 2020-06-10 PROCEDURE — 87635 SARS-COV-2 COVID-19 AMP PRB: CPT | Performed by: NURSE PRACTITIONER

## 2020-06-10 NOTE — PROGRESS NOTES
Patient is at [facility] testing at time of call. INR RN will call again after lunch. She is awaiting DCCV scheduling, getting weekly INRs in preparation - patient is symptomatic with her a-fib.    Yue NICOLAS RN  Anticoagulation Clinic  [place]  ANTICOAGULATION FOLLOW-UP CLINIC VISIT    Patient Name:  Tomasa Fam  Date:  6/10/2020  Contact Type:  Telephone    SUBJECTIVE:  Patient Findings     Comments:   Called patient to discuss today's INR results: The patient was assessed for diet, medication, and activity level changes, missed or extra doses, bruising or bleeding, with no problem findings. She continues to feel very worn out from her a-fib. She was told to keep Friday, 6/12/20 open for the DCCV, but has not been informed of a specific time - still waiting for confirmation. Reviewed maintenance warfarin dosing with patient. Patient will remain on the same dose until next INR check. No other questions or concerns. Scheduled next lab-only INR in 1 week.  Yue NICOLAS RN  Anticoagulation Clinic  [place]          Clinical Outcomes     Negatives:   Major bleeding event, Thromboembolic event, Anticoagulation-related hospital admission, Anticoagulation-related ED visit, Anticoagulation-related fatality    Comments:   Called patient to discuss today's INR results: The patient was assessed for diet, medication, and activity level changes, missed or extra doses, bruising or bleeding, with no problem findings. She continues to feel very worn out from her a-fib. She was told to keep Friday, 6/12/20 open for the DCCV, but has not been informed of a specific time - still waiting for confirmation. Reviewed maintenance warfarin dosing with patient. Patient will remain on the same dose until next INR check. No other questions or concerns. Scheduled next lab-only INR in 1 week.  Yue NICOLAS RN  Anticoagulation Clinic  [place]             OBJECTIVE    Recent labs: (last 7 days)     06/10/20  0929   INR 2.90*        ASSESSMENT / PLAN  INR assessment THER    Recheck INR In: 1 WEEK    INR Location Clinic      Anticoagulation Summary  As of 6/10/2020    INR goal:   2.0-3.0   TTR:   74.7 % (6.9 mo)   INR used for dosin.90 (6/10/2020)   Warfarin maintenance plan:   5 mg (2.5 mg x 2) every Mon; 2.5 mg (2.5 mg x 1) all other days   Full warfarin instructions:   5 mg every Mon; 2.5 mg all other days   Weekly warfarin total:   20 mg   No change documented:   Yue Martínez RN   Plan last modified:   Sandie Sanchez RN (2020)   Next INR check:   2020   Priority:   High   Target end date:   Indefinite    Indications    Paroxysmal atrial fibrillation (H) [I48.0]             Anticoagulation Episode Summary     INR check location:   Anticoagulation Clinic    Preferred lab:       Send INR reminders to:   PK WEAVER    Comments:   No Bandaid // 2.5 mg tab // patient started on Xarelto, transition to warfarin 19      Anticoagulation Care Providers     Provider Role Specialty Phone number    Violet Fenton MD Referring [facility] [phone]            See the Encounter Report to view Anticoagulation Flowsheet and Dosing Calendar (Go to Encounters tab in chart review, and find the Anticoagulation Therapy Visit)    Yue Martínez, VEDA

## 2020-06-11 ENCOUNTER — DOCUMENTATION ONLY (OUTPATIENT)
Dept: CARDIOLOGY | Facility: CLINIC | Age: 80
End: 2020-06-11

## 2020-06-11 ENCOUNTER — VIRTUAL VISIT (OUTPATIENT)
Dept: CARDIOLOGY | Facility: CLINIC | Age: 80
End: 2020-06-11
Payer: MEDICARE

## 2020-06-11 ENCOUNTER — HOSPITAL ENCOUNTER (OUTPATIENT)
Facility: CLINIC | Age: 80
End: 2020-06-11
Payer: MEDICARE

## 2020-06-11 VITALS
WEIGHT: 138 LBS | SYSTOLIC BLOOD PRESSURE: 132 MMHG | DIASTOLIC BLOOD PRESSURE: 85 MMHG | HEART RATE: 132 BPM | BODY MASS INDEX: 24.45 KG/M2

## 2020-06-11 DIAGNOSIS — I48.19 PERSISTENT ATRIAL FIBRILLATION (H): Primary | ICD-10-CM

## 2020-06-11 DIAGNOSIS — I42.9 SECONDARY CARDIOMYOPATHY (H): ICD-10-CM

## 2020-06-11 DIAGNOSIS — F43.9 STRESS: ICD-10-CM

## 2020-06-11 DIAGNOSIS — R53.83 DECREASED ENERGY: Primary | ICD-10-CM

## 2020-06-11 DIAGNOSIS — I10 ESSENTIAL HYPERTENSION: ICD-10-CM

## 2020-06-11 PROCEDURE — 99443 ZZC PHYSICIAN TELEPHONE EVALUATION 21-30 MIN: CPT | Performed by: NURSE PRACTITIONER

## 2020-06-11 RX ORDER — POTASSIUM CHLORIDE 1500 MG/1
20 TABLET, EXTENDED RELEASE ORAL 2 TIMES DAILY
Qty: 60 TABLET | Refills: 6 | Status: SHIPPED | OUTPATIENT
Start: 2020-06-11 | End: 2021-02-26

## 2020-06-11 RX ORDER — METOPROLOL TARTRATE 100 MG
100 TABLET ORAL 2 TIMES DAILY
Qty: 60 TABLET | Refills: 6 | Status: SHIPPED | OUTPATIENT
Start: 2020-06-11 | End: 2021-06-15

## 2020-06-11 RX ORDER — FUROSEMIDE 40 MG
80 TABLET ORAL DAILY
Qty: 60 TABLET | Refills: 6 | Status: SHIPPED | OUTPATIENT
Start: 2020-06-11 | End: 2020-08-31

## 2020-06-11 RX ORDER — FLUOXETINE 10 MG/1
CAPSULE ORAL
Qty: 90 CAPSULE | Refills: 1 | Status: SHIPPED | OUTPATIENT
Start: 2020-06-11 | End: 2020-07-23

## 2020-06-11 NOTE — TELEPHONE ENCOUNTER
Med had been discontinued in hospital 9/14/2019. Listed as historical.    Prescription approved per [facility] Refill Protocol.      Carlie Burns RN

## 2020-06-11 NOTE — PROGRESS NOTES
"Tomasa Fam is a 79 year old female who is being evaluated via a billable telephone visit.      The patient has been notified of following:     \"This telephone visit will be conducted via a call between you and your physician/provider. We have found that certain health care needs can be provided without the need for a physical exam.  This service lets us provide the care you need with a short phone conversation.  If a prescription is necessary we can send it directly to your pharmacy.  If lab work is needed we can place an order for that and you can then stop by our lab to have the test done at a later time.    Telephone visits are billed at different rates depending on your insurance coverage. During this emergency period, for some insurers they may be billed the same as an in-person visit.  Please reach out to your insurance provider with any questions.    If during the course of the call the physician/provider feels a telephone visit is not appropriate, you will not be charged for this service.\"    Patient has given verbal consent for Telephone visit?  Yes    What phone number would you like to be contacted at? [phone]  How would you like to obtain your AVS? Mail a copy   Review Of Systems  Skin: NEGATIVE  Eyes:Ears/Nose/Throat: NEGATIVE  Respiratory: SOB ,PADRON  Cardiovascular: Edema in feet  Gastrointestinal: NEGATIVE  Genitourinary:NEGATIVE   Musculoskeletal: NEGATIVE  Neurologic: NEGATIVE  Psychiatric: NEGATIVE  Hematologic/Lymphatic/Immunologic: NEGATIVE  Endocrine:  NEGATIVE  Vitals: Pt reports BP 6/10/20 was 132/85 Pulse today was up to 132 then bounces down to 112.   O2 sat 98% Weight 138lbs      Briana Terry LPN      EP ANDER NOTE:  This visit was completed via telephone due to COVID-19 precautions.  The patient provided consent for a telephone visit.    I had the pleasure speaking to Tomasa Fam as part of a telephone visit today.    The patient is a 79  delightful with the following " chronic medical issues:  1. Hypertension  2. Mild COPD, non-oxygen dependent  3. History of lung cancer, non-small cell in 2016, status post prior thoracotomy  4. History of breast cancer with left mastectomy  5. Polymyalgia rheumatica on steroid therapy, weaning prednisone currently at 6 mg daily  6. Recent diagnosis of persistent atrial fibrillation with RVR noted on echo 5/2020.  Patient is on chronic anticoagulation therapy.  First diagnosis of atrial fibrillation was noted during hospitalization 9/2019.  7. 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  EF 35% previously 55 to 60% 9/2019  8. Persistent lower extremity edema  9. Intermittent lower extremity claudication.  Lower extremity ultrasound 5/13/2020 showed no evidence of significant stenosis    It is my pleasure having a follow-up visit with Ms. Fam via telephone during the COVID-19 pandemic.  I had the pleasure of meeting her in the clinic 5/20/2020 with exertional and progressive shortness of breath, fatigue, and A. fib with RVR documented on echocardiogram.  Chandrika reports that she noticed increased edema and shortness of breath in April.  Her Lasix dose was increased and the echocardiogram was ordered.  Unfortunately this showed a decrease in her EF now down to 30 to 35% with moderate RV dysfunction.  She also had mild to moderate MR, moderate TR and mild AR.  She had a trivial pericardial effusion.  She had moderate dilatation of both her LA and RA.  LA was measured at 3.9 cm.  At her last appointment I increased her metoprolol to 100 mg twice daily.  I ordered weekly INRs and the plan is for cardioversion tomorrow.  She continues to have fatigue and dyspnea with exertion.  She is also frustrated that she is only been able to lose 5 pounds despite increasing her Lasix.  She denies chest pain, lightheadedness, dizziness, orthopnea, or PND.    DIAGNOSTIC STUDIES:    12-lead ECG: Atrial fibrillation at 103 bpm on  5/28/2020  Echocardiogram 5/15/2020:    The visual ejection fraction is estimated at 30-35%.  Left ventricular systolic function is moderately reduced.  Regional wall motion abnormalities cannot be excluded due to limited visualization. There was no IV available to give contrast according to the echo technician's note. Contrast would have enhance image quality.  There is mild to moderate (1-2+) mitral regurgitation.  There is moderate (2+) tricuspid regurgitation.  There is mild (1+) aortic regurgitation.  There is mild to moderate (1-2+) pulmonic valvular regurgitation.  Trivial pericardial effusion  The rhythm was rapid atrial fibrillation.    Results for ANN HODGE (MRN [id number]) as of 6/11/2020 12:22   Ref. Range 5/13/2020 13:27 5/21/2020 13:29 5/28/2020 13:11 6/4/2020 13:16 6/10/2020 09:29   INR Latest Ref Range: 0.86 - 1.14  2.00 (H) 2.00 (H) 3.60 (H) 3.00 (H) 2.90 (H)       IMPRESSION:  1. Persistent atrial fibrillation with RVR.  Metoprolol tartrate 100 mg twice daily, and warfarin.  2. Cardiomyopathy likely tachycardia induced.  Weight is down 5 pounds on furosemide 80 mg daily and potassium 20 mEq twice daily.  Patient has been on metoprolol tartrate currently at 100 mg twice daily.  Has a history of cough with lisinopril.  Following her cardioversion I will discuss placing the patient on valsartan.      RECOMMENDATIONS:  1. Proceed with cardioversion scheduled tomorrow at [facility].  INR is have been therapeutic over the past month and noted above.  Risks and benefits of the procedure were reviewed with the patient.  Risks include but are not limited to: Superficial burn from defibrillation patches, bradycardia arrhythmias, and a small incidence of stroke, or MI.  Patient understands and is willing to proceed.  I have asked that she take her metoprolol the morning of the procedure.  Her  will arrange transportation home from the hospital.  2. EKG in 7 to 10 days at   [facility] office to reassess rhythm post cardioversion  3. Follow-up phone visit with myself in 2 to 3 weeks.  If patient reverts back to atrial fibrillation then I will refer her to 1 of our electrophysiologists to discuss antiarrhythmic therapy.  4. I would recommend a limited echo once we are able to restore rhythm to reassess LV function.    As always I appreciate being involved in this patient's care.  Please feel free to contact us if you have questions or concerns regarding today's assessment and plan    Gloria Donnelly NP, APRN CNP      Telephone visit documentation  Start: 10:58 am  End: 11:28  Time: 30

## 2020-06-11 NOTE — PATIENT INSTRUCTIONS
Call my nurse with any questions or concerns:  [phone]  *If you have concerns after hours, please call [phone], option 2 to speak with on call Cardiologist.    EKG at 's 1 week after the cardioversion.  Phone visit with me in 2 weeks

## 2020-06-11 NOTE — PROGRESS NOTES
Patient is scheduled for cardioversion on 6/12/20,  arriving at 9:30 am with procedure at 11:30 am. Patient taking coumadin and 4 consecutive weekly INR readings have been done with results >2.0. Patient is aware to be NPO morning of procedure. Patient will hold medication furosemide. Patient is not diabetic. Patient has arranged for transportation and follow up care. COVID19 test was done on 6/10/20 and was negative. Wellness and Travel screenings completed.     Wellness Screening Tool    Symptom Screening:    Do you have one of the following NEW symptoms:      Fever (subjective or >100.0)? No    New cough? No    Shortness of breath? No    Chills? No    New loss of taste or smell? No    Generalized body aches? No    New persistent headache? No    New sore throat? No  Nausea, vomiting or diarrhea? No    Within the past 3 weeks, have you been exposed to someone with a known positive illness below?      COVID - 19 (known or suspected)? No    Chicken pox? No     Measles? No     Pertussis? No     Patient notified of visitor restriction: Yes   Patient informed to wear a mask: Yes     Patient's appointment status: Patient will keep procedure as scheduled on 6/12/20.

## 2020-06-11 NOTE — LETTER
6/11/2020    [place]  [id number] [address]  [facility] [address]    RE: Tomasa Fam       Dear Colleague,    I had the pleasure of seeing Tomasa Fam in the [facility].          Tomasa Fam is a 79 year old female who is being evaluated via a billable telephone visit.        EP ANDER NOTE:  This visit was completed via telephone due to COVID-19 precautions.  The patient provided consent for a telephone visit.    I had the pleasure speaking to Tomasa Fam as part of a telephone visit today.    The patient is a 79  delightful with the following chronic medical issues:  1. Hypertension  2. Mild COPD, non-oxygen dependent  3. History of lung cancer, non-small cell in 2016, status post prior thoracotomy  4. History of breast cancer with left mastectomy  5. Polymyalgia rheumatica on steroid therapy, weaning prednisone currently at 6 mg daily  6. Recent diagnosis of persistent atrial fibrillation with RVR noted on echo 5/2020.  Patient is on chronic anticoagulation therapy.  First diagnosis of atrial fibrillation was noted during hospitalization 9/2019.  7. 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  EF 35% previously 55 to 60% 9/2019  8. Persistent lower extremity edema  9. Intermittent lower extremity claudication.  Lower extremity ultrasound 5/13/2020 showed no evidence of significant stenosis    It is my pleasure having a follow-up visit with Ms. Fam via telephone during the COVID-19 pandemic.  I had the pleasure of meeting her in the clinic 5/20/2020 with exertional and progressive shortness of breath, fatigue, and A. fib with RVR documented on echocardiogram.  Chandrika reports that she noticed increased edema and shortness of breath in April.  Her Lasix dose was increased and the echocardiogram was ordered.  Unfortunately this showed a decrease in her EF now down to 30 to 35% with moderate RV dysfunction.  She also had mild to  moderate MR, moderate TR and mild AR.  She had a trivial pericardial effusion.  She had moderate dilatation of both her LA and RA.  LA was measured at 3.9 cm.  At her last appointment I increased her metoprolol to 100 mg twice daily.  I ordered weekly INRs and the plan is for cardioversion tomorrow.  She continues to have fatigue and dyspnea with exertion.  She is also frustrated that she is only been able to lose 5 pounds despite increasing her Lasix.  She denies chest pain, lightheadedness, dizziness, orthopnea, or PND.    DIAGNOSTIC STUDIES:    12-lead ECG: Atrial fibrillation at 103 bpm on 5/28/2020  Echocardiogram 5/15/2020:    The visual ejection fraction is estimated at 30-35%.  Left ventricular systolic function is moderately reduced.  Regional wall motion abnormalities cannot be excluded due to limited visualization. There was no IV available to give contrast according to the echo technician's note. Contrast would have enhance image quality.  There is mild to moderate (1-2+) mitral regurgitation.  There is moderate (2+) tricuspid regurgitation.  There is mild (1+) aortic regurgitation.  There is mild to moderate (1-2+) pulmonic valvular regurgitation.  Trivial pericardial effusion  The rhythm was rapid atrial fibrillation.    Results for ANN HODGE (MRN [id number]) as of 6/11/2020 12:22   Ref. Range 5/13/2020 13:27 5/21/2020 13:29 5/28/2020 13:11 6/4/2020 13:16 6/10/2020 09:29   INR Latest Ref Range: 0.86 - 1.14  2.00 (H) 2.00 (H) 3.60 (H) 3.00 (H) 2.90 (H)       IMPRESSION:  1. Persistent atrial fibrillation with RVR.  Metoprolol tartrate 100 mg twice daily, and warfarin.  2. Cardiomyopathy likely tachycardia induced.  Weight is down 5 pounds on furosemide 80 mg daily and potassium 20 mEq twice daily.  Patient has been on metoprolol tartrate currently at 100 mg twice daily.  Has a history of cough with lisinopril.  Following her cardioversion I will discuss placing the patient on  valsartan.      RECOMMENDATIONS:  1. Proceed with cardioversion scheduled tomorrow at [facility].  INR is have been therapeutic over the past month and noted above.  Risks and benefits of the procedure were reviewed with the patient.  Risks include but are not limited to: Superficial burn from defibrillation patches, bradycardia arrhythmias, and a small incidence of stroke, or MI.  Patient understands and is willing to proceed.  I have asked that she take her metoprolol the morning of the procedure.  Her  will arrange transportation home from the hospital.  2. EKG in 7 to 10 days at Dr. Homans office to reassess rhythm post cardioversion  3. Follow-up phone visit with myself in 2 to 3 weeks.  If patient reverts back to atrial fibrillation then I will refer her to 1 of our electrophysiologists to discuss antiarrhythmic therapy.  4. I would recommend a limited echo once we are able to restore rhythm to reassess LV function.    As always I appreciate being involved in this patient's care.  Please feel free to contact us if you have questions or concerns regarding today's assessment and plan      Thank you for allowing me to participate in the care of your patient.    Sincerely,     Gloria Donnelly, REBA, APRN CNP     [facility]

## 2020-06-12 ENCOUNTER — APPOINTMENT (OUTPATIENT)
Dept: CARDIOLOGY | Facility: CLINIC | Age: 80
End: 2020-06-12
Attending: NURSE PRACTITIONER
Payer: MEDICARE

## 2020-06-12 ENCOUNTER — ANESTHESIA EVENT (OUTPATIENT)
Dept: SURGERY | Facility: CLINIC | Age: 80
End: 2020-06-12
Payer: MEDICARE

## 2020-06-12 ENCOUNTER — ANESTHESIA (OUTPATIENT)
Dept: SURGERY | Facility: CLINIC | Age: 80
End: 2020-06-12
Payer: MEDICARE

## 2020-06-12 ENCOUNTER — HOSPITAL ENCOUNTER (OUTPATIENT)
Facility: CLINIC | Age: 80
Discharge: HOME | End: 2020-06-12
Attending: INTERNAL MEDICINE | Admitting: INTERNAL MEDICINE
Payer: MEDICARE

## 2020-06-12 VITALS
OXYGEN SATURATION: 100 % | SYSTOLIC BLOOD PRESSURE: 145 MMHG | HEART RATE: 62 BPM | DIASTOLIC BLOOD PRESSURE: 64 MMHG | RESPIRATION RATE: 18 BRPM

## 2020-06-12 DIAGNOSIS — I48.19 PERSISTENT ATRIAL FIBRILLATION (H): ICD-10-CM

## 2020-06-12 LAB
ANION GAP SERPL CALCULATED.3IONS-SCNC: 6 MMOL/L (ref 3–14)
BUN SERPL-MCNC: 31 MG/DL (ref 7–30)
CALCIUM SERPL-MCNC: 9.9 MG/DL (ref 8.5–10.1)
CHLORIDE SERPL-SCNC: 107 MMOL/L (ref 94–109)
CO2 SERPL-SCNC: 27 MMOL/L (ref 20–32)
CREAT SERPL-MCNC: 0.98 MG/DL (ref 0.52–1.04)
GFR SERPL CREATININE-BSD FRML MDRD: 54 ML/MIN/{1.73_M2}
GLUCOSE SERPL-MCNC: 85 MG/DL (ref 70–99)
MAGNESIUM SERPL-MCNC: 1.8 MG/DL (ref 1.6–2.3)
POTASSIUM SERPL-SCNC: 3.9 MMOL/L (ref 3.4–5.3)
SODIUM SERPL-SCNC: 140 MMOL/L (ref 133–144)

## 2020-06-12 PROCEDURE — 83735 ASSAY OF MAGNESIUM: CPT | Performed by: INTERNAL MEDICINE

## 2020-06-12 PROCEDURE — 25000128 H RX IP 250 OP 636: Performed by: NURSE ANESTHETIST, CERTIFIED REGISTERED

## 2020-06-12 PROCEDURE — 92960 CARDIOVERSION ELECTRIC EXT: CPT

## 2020-06-12 PROCEDURE — 80048 BASIC METABOLIC PNL TOTAL CA: CPT | Performed by: INTERNAL MEDICINE

## 2020-06-12 PROCEDURE — 92960 CARDIOVERSION ELECTRIC EXT: CPT | Performed by: INTERNAL MEDICINE

## 2020-06-12 PROCEDURE — 37000008 ZZH ANESTHESIA TECHNICAL FEE, 1ST 30 MIN

## 2020-06-12 RX ORDER — POTASSIUM CHLORIDE 1500 MG/1
20 TABLET, EXTENDED RELEASE ORAL
Status: ACTIVE | OUTPATIENT
Start: 2020-06-12 | End: 2020-06-12

## 2020-06-12 RX ORDER — MAGNESIUM SULFATE HEPTAHYDRATE 40 MG/ML
2 INJECTION, SOLUTION INTRAVENOUS
Status: DISCONTINUED | OUTPATIENT
Start: 2020-06-12 | End: 2020-06-15 | Stop reason: HOSPADM

## 2020-06-12 RX ORDER — PROPOFOL 10 MG/ML
INJECTION, EMULSION INTRAVENOUS PRN
Status: DISCONTINUED | OUTPATIENT
Start: 2020-06-12 | End: 2020-06-12

## 2020-06-12 RX ADMIN — PROPOFOL 50 MG: 10 INJECTION, EMULSION INTRAVENOUS at 11:51

## 2020-06-12 NOTE — PRE-PROCEDURE
GENERAL PRE-PROCEDURE:   Procedure:  Electrical cardioversion  Date/Time:  6/12/2020 11:45 AM    Verbal consent obtained?: Yes    Written consent obtained?: Yes    Risks and benefits: Risks, benefits and alternatives were discussed    Consent given by:  Patient  Patient states understanding of procedure being performed: Yes    Patient's understanding of procedure matches consent: Yes    Procedure consent matches procedure scheduled: Yes    Expected level of sedation:  Moderate  Appropriately NPO:  Yes  ASA Class:  Class 2- mild systemic disease, no acute problems, no functional limitations  Mallampati  :  Grade 2- soft palate, base of uvula, tonsillar pillars, and portion of posterior pharyngeal wall visible  Lungs:  Lungs clear with good breath sounds bilaterally  Heart:  A-fib  History & Physical reviewed:  History and physical reviewed and no updates needed  Statement of review:  I have reviewed the lab findings, diagnostic data, medications, and the plan for sedation

## 2020-06-12 NOTE — PROCEDURES
DC Cardioversion Procedure Note:    Informed consent obtained.  Pads placed in AP position.  Anesthesia used, please see their documentation.    Synchronized, biphasic 200J shock x 1 delivered and successful in converting atrial fibrillation to normal sinus rhythm without bradycardia or pauses.    No apparent complications.    Angel Hobbs MD, [facility]  Cardiology  Pager:  [phone]  Text Page   June 12, 2020

## 2020-06-12 NOTE — ANESTHESIA POSTPROCEDURE EVALUATION
Patient: Tomasa Fam    Procedure(s):  ANESTHESIA, FOR CARDIOVERSION    Diagnosis:A-fib (H) [I48.91]  Diagnosis Additional Information: No value filed.    Anesthesia Type:  MAC    Note:  Anesthesia Post Evaluation    Patient location during evaluation: Bedside  Patient participation: Able to fully participate in evaluation  Level of consciousness: sleepy but conscious  Pain management: adequate  Airway patency: patent  Cardiovascular status: acceptable  Respiratory status: acceptable  Hydration status: acceptable  PONV: none             Last vitals:  Vitals:    06/12/20 1155 06/12/20 1200 06/12/20 1207   BP: 136/81 138/65 (!) 145/64   Pulse: 102 62 62   Resp: 18 18 18   SpO2: 100% 100% 100%         Electronically Signed By: SYLVIA Suh CRNA  June 12, 2020  12:22 PM

## 2020-06-12 NOTE — ANESTHESIA CARE TRANSFER NOTE
Patient: Tomasa Fam    Procedure(s):  ANESTHESIA, FOR CARDIOVERSION    Diagnosis: A-fib (H) [I48.91]  Diagnosis Additional Information: No value filed.    Anesthesia Type:   No value filed.     Note:    Destination: cardiopulmonary.  Comments: Reported off to RN VSS      Vitals: (Last set prior to Anesthesia Care Transfer)    CRNA VITALS  6/12/2020 1151 - 6/12/2020 1221      6/12/2020             NIBP:  98/72    Pulse:  68    SpO2:  96 %    EKG:  Sinus rhythm                Electronically Signed By: SYLVIA Suh CRNA  June 12, 2020  12:21 PM

## 2020-06-12 NOTE — ANESTHESIA PREPROCEDURE EVALUATION
Anesthesia Pre-Procedure Evaluation    Patient: Tomasa Fam   MRN: [id number] : 1940          Preoperative Diagnosis: A-fib (H) [I48.91]    Procedure(s):  ANESTHESIA, FOR CARDIOVERSION    Past Medical History:   Diagnosis Date     Arthritis     hands, knees     Breast cancer (H) 2012     left mastectomy followed by right;  Dr. Luong     Chronic airway obstruction, not elsewhere classified     very mild COPD - small cough     Concussion 3/13/2013     Problem list name updated by automated process. Provider to review and confirm Im[employer] Update utility     Cystocele, midline 2012     Diffuse cystic mastopathy      Gastroesophageal reflux disease, esophagitis presence not specified 2019     History of hypokalemia 2013     Hyperlipidemia LDL goal <160 2011    [place] 10-year CHD Risk Score: 2% (14 Total Points)  Values used to calculate score:    Age: 70 years -- Points: 14    Total Cholesterol: 192 mg/dL -- Points: 1    HDL Cholesterol: 61 mg/dL -- Points: -1    Systolic BP (treated): 118 mmHg -- Points: 0   The patient is not a smoker. -- Points: 0   The patient has not been diagnosed with diabetes. -- Points: 0   The patient does not have a famil     Lung cancer (H) 10/21/2015     Paroxysmal atrial fibrillation (H) 2019     PMR (polymyalgia rheumatica) (H) 2020    tapering' above.)  In patients receiving over 10 mg of prednisone/day, the dose can be lowered by 2.5 mg/day decrements every two to four weeks  Once the dose of prednisone is 10 mg/day, further tapering can be done by 1 mg per month, provided the clinical course is stable  The clinical response to glucocorticoid therapy is closely monitored, which centers on screening for the presence and/or recu     Thyroid nodule 2016    Noted on CT 2015.      Unspecified essential hypertension late      Past Surgical History:   Procedure Laterality Date     C NONSPECIFIC PROCEDURE      s/p Tubal  ligation 1970     COLONOSCOPY  3/2003    adenomatous polyp      COLONOSCOPY  7/2006    diverticulosis - repeat in 5 years     COLONOSCOPY  10/13/2011    Procedure:COLONOSCOPY; COLONOSCOPY ; Surgeon:CHITO GORDILLO; Location:[facility]     CYSTOSCOPY  7/11/2012    Procedure: CYSTOSCOPY;;  Surgeon: Aline Cooper DO;  Location: [facility]     DAVINCI HYSTERECTOMY SUPRACERVICAL, SACROCOLPOPEXY, COMBINED  7/11/2012    Procedure: COMBINED DAVINCI HYSTERECTOMY SUPRACERVICAL, SACROCOLPOPEXY;   DAVINCI ASSISTED LAPAROSCOPIC SUPRACERVICAL HYSTERECTOMY AND BILATERAL SALPINGO-OOPHORECTOMY, SACROCOLPOPEXY AND CYSTOSCOPY;  Surgeon: Aline Cooper DO;  Location: [facility]     EXCISE LESION EYELID Right 6/29/2015    Procedure: EXCISE LESION EYELID;  Surgeon: Hank Olvera MD;  Location:  [facility]     INSERT PORT VASCULAR ACCESS  2/3/2012    Procedure:INSERT PORT VASCULAR ACCESS; Power Port-A- Catheter Placement ; Surgeon:IRISH TAPIA; Location:[facility]     LAPAROSCOPIC SALPINGO-OOPHORECTOMY  7/11/2012    Procedure: LAPAROSCOPIC SALPINGO-OOPHORECTOMY;  Davinci;  Surgeon: Aline Cooper DO;  Location: [facility]     LIPOSUCTION, RHYTIDECTOMY, COMBINED       LOBECTOMY LUNG Right 3/1/2016    Procedure: LOBECTOMY LUNG;  Surgeon: Jonas Woodward MD;  Location:  OR     MAMMOPLASTY REDUCTION  1/6/2012    Procedure:MAMMOPLASTY REDUCTION; Surgeon:MICKI CAIECDO; Location:[facility]     MASTECTOMY SIMPLE  11/12/2012    Procedure: MASTECTOMY SIMPLE;   Right Prophylactic Mastectomy with attempted Right Sentinal Node Biopsy, Revision Bilateral Mastectomy Insicions, liposuction in breast area;  Surgeon: Irish Tapia MD;  Location: [facility] OR     MASTECTOMY SIMPLE, SENTINEL NODE, COMBINED  1/6/2012    Procedure:COMBINED MASTECTOMY SIMPLE, SENTINEL NODE; Left Mastectomy Left [place] Node Biopsy,  Right Breast Reduction ; Surgeon:IRISH TAPIA; Location:[facility]     MASTECTOMY, BILATERAL       REMOVE PORT VASCULAR ACCESS   4/29/2013    Procedure: REMOVE PORT VASCULAR ACCESS;  Port A catheter removal ;  Surgeon: Irish Douglas MD;  Location: [facility]     REPAIR PTOSIS BILATERAL Bilateral 6/29/2015    Procedure: REPAIR PTOSIS BILATERAL;  Surgeon: Hank Olvera MD;  Location:  [facility]     REVISE RECONSTRUCTED BREAST BILATERAL  11/12/2012    Procedure: REVISE RECONSTRUCTED BREAST BILATERAL;;  Surgeon: Katia Kyle MD;  Location: [facility] OR     SURGICAL HISTORY OF -   in 40's    face lift     SURGICAL HISTORY OF -       lipoma removed right thigh     SURGICAL HISTORY OF -       D and C     THORACOTOMY Right 3/1/2016    Procedure: THORACOTOMY;  Surgeon: Jonas Woodward MD;  Location:  OR               Physical Exam  Normal systems: pulmonary and dental    Airway   Mallampati: II    Dental     Cardiovascular   Rhythm and rate: irregular and normal      Pulmonary             Lab Results   Component Value Date    WBC 9.8 11/22/2019    HGB 11.6 (L) 11/22/2019    HCT 35.8 11/22/2019     11/22/2019    CRP 7.4 05/04/2020    SED 13 05/04/2020     06/12/2020    POTASSIUM 3.9 06/12/2020    CHLORIDE 107 06/12/2020    CO2 27 06/12/2020    BUN 31 (H) 06/12/2020    CR 0.98 06/12/2020    GLC 85 06/12/2020    CHARLIE 9.9 06/12/2020    MAG 1.8 06/12/2020    ALBUMIN 3.2 (L) 05/29/2020    PROTTOTAL 6.6 (L) 05/29/2020     (H) 05/29/2020    AST 52 (H) 05/29/2020    ALKPHOS 72 05/29/2020    BILITOTAL 0.5 05/29/2020    PTT 24 09/13/2019    INR 2.90 (H) 06/10/2020    TSH 2.99 09/13/2019    T4 0.96 09/28/2018       Preop Vitals  BP Readings from Last 3 Encounters:   06/12/20 (!) 145/64   06/11/20 132/85   05/20/20 119/83    Pulse Readings from Last 3 Encounters:   06/12/20 62   06/11/20 132   05/20/20 76      Resp Readings from Last 3 Encounters:   06/12/20 18   04/20/20 16   03/19/20 16    SpO2 Readings from Last 3 Encounters:   06/12/20 100%   04/20/20 98%   03/19/20 99%      Temp Readings from Last 1 Encounters:   04/20/20  "98  F (36.7  C) (Axillary)    Ht Readings from Last 1 Encounters:   02/20/20 1.6 m (5' 3\")      Wt Readings from Last 1 Encounters:   06/11/20 62.6 kg (138 lb)    Estimated body mass index is 24.45 kg/m  as calculated from the following:    Height as of 2/20/20: 1.6 m (5' 3\").    Weight as of 6/11/20: 62.6 kg (138 lb).       Anesthesia Plan      History & Physical Review  History and physical reviewed and following examination; no interval change.    ASA Status:  3 .    NPO Status:  > 8 hours    Plan for MAC Reason for MAC:  Deep or markedly invasive procedure (G8)           Postoperative Care      Consents  Anesthetic plan, risks, benefits and alternatives discussed with:  Patient..                 SYLVIA Suh CRNA                    .  "

## 2020-06-12 NOTE — DISCHARGE INSTRUCTIONS
Discharge Instructions for Cardioversion  Your healthcare provider performed a procedure called cardioversion. Your healthcare provider used a controlled electric shock or a medicine to briefly stop all electrical activity in your heart. This helped restore your heart s normal rhythm. Here are some instructions to follow while you recover.  Home care    Because cardioversion typically requires sedation, you won't be able to drive home. You will need a ride. Wait at least 24 hours before driving a car or operating heavy machinery after receiving sedating medicines.    Don t be alarmed if the skin on your chest is irritated or feels like it is sunburned. Your healthcare provider may prescribe a soothing lotion to relieve this discomfort. These minor symptoms will go away in a few days.    Ask your healthcare provider about medicines to keep your heart rhythm steady.    If you were prescribed medicine, take it as instructed by your healthcare provider. Don t skip doses or take double doses. Cardioversion requires blood thinners for at least 4 weeks to prevent a delayed risk of stroke when treating atrial fibrillation or atrial flutter. Be sure you discuss which medicine you are taking to prevent stroke. Ask when you need to have your medicine levels checked, and whether you may be able to stop taking it in the future or whether it is recommended that you take it for life. Some of these blood-thinning medicines such as warfarin will have the dose adjusted, and interact with other medicines or foods. Your healthcare team will give you full instructions on what to watch out for. Report bleeding or symptoms of stroke immediately to your healthcare team and seek emergency medical attention.    Learn to take your own pulse. Keep a record of your results. Ask your healthcare provider when you should seek emergency medical attention. He or she will tell you which pulse rate reading is dangerous.     Keep in mind this  procedure may need to be repeated if the abnormal heart rhythm returns. After the procedure, your healthcare provider will tell you if the treatment worked or if you will need further treatments or medication.    Follow-up care  Make a follow-up appointment, or as directed.  Call 911 When to call your healthcare provider  Call 911 right away if you have:    Chest pain    Shortness of breath    Loss of vision, speech, or strength or coordination in any body part  Call your healthcare provider right away if you:    Feel faint, dizzy, or lightheaded    Have chest pain with increased activity    Have irregular heartbeat or fast pulse    Have bleeding issues from blood-thinning medicines  Date Last Reviewed: 3/1/2017    [id number] [employer]. [address] [place] [address], [place] [address]. All rights reserved. This information is not intended as a substitute for professional medical care. Always follow your healthcare professional's instructions.

## 2020-06-15 ENCOUNTER — DOCUMENTATION ONLY (OUTPATIENT)
Dept: FAMILY MEDICINE | Facility: CLINIC | Age: 80
End: 2020-06-15

## 2020-06-15 ENCOUNTER — TELEPHONE (OUTPATIENT)
Dept: CARDIOLOGY | Facility: CLINIC | Age: 80
End: 2020-06-15

## 2020-06-15 ENCOUNTER — ALLIED HEALTH/NURSE VISIT (OUTPATIENT)
Dept: NURSING | Facility: CLINIC | Age: 80
End: 2020-06-15
Payer: MEDICARE

## 2020-06-15 VITALS
SYSTOLIC BLOOD PRESSURE: 122 MMHG | DIASTOLIC BLOOD PRESSURE: 74 MMHG | HEART RATE: 110 BPM | OXYGEN SATURATION: 98 % | RESPIRATION RATE: 20 BRPM

## 2020-06-15 DIAGNOSIS — I48.19 PERSISTENT ATRIAL FIBRILLATION (H): ICD-10-CM

## 2020-06-15 DIAGNOSIS — I48.19 PERSISTENT ATRIAL FIBRILLATION (H): Primary | ICD-10-CM

## 2020-06-15 LAB
INTERPRETATION ECG - MUSE: NORMAL
INTERPRETATION ECG - MUSE: NORMAL

## 2020-06-15 PROCEDURE — 99207 ZZC NO CHARGE NURSE ONLY: CPT

## 2020-06-15 PROCEDURE — 93000 ELECTROCARDIOGRAM COMPLETE: CPT

## 2020-06-15 NOTE — PROGRESS NOTES
ANTICOAGULATION  MANAGEMENT: Discharge Review    Tomasa Fam chart reviewed for anticoagulation continuity of care    Hospital Admission on 6/12/20 for cardioversion.    Discharge disposition: Home    Results:    Recent labs: (last 7 days)     06/10/20  0929   INR 2.90*     Anticoagulation inpatient management:     not applicable However, the evening she arrived back home (same day-procedure), she felt symptoms of a-fib again. Had EKG at Clinic today and confirmed she is back in a-fib. She is requesting no medication changes, she would like either another cardioversion or an ablation. Has a telephone visit tomorrow to discuss with Dr. Rangel.    Anticoagulation discharge instructions:     Warfarin dosing: home regimen continued   Bridging: No   INR goal change: No      Medication changes affecting anticoagulation: No    Additional factors affecting anticoagulation: No    Plan     No adjustment to anticoagulation plan needed    Patient not contacted    No adjustment to Anticoagulation Calendar was required    Yue Martínez RN

## 2020-06-15 NOTE — TELEPHONE ENCOUNTER
06/15/20 Pt called stating that she had a DCCV Friday the 6/12 and feels like she has been back in Afib since Friday evening. Her pulse ox is reading rates from . She has the same sx of palpitations and is SOB going up and down stairs. Will obtain EKG at PCP in [place] and have GIL BOOTHE review in [facility]. Pt voiced understanding and agreement with plan. Chelsea 908 am

## 2020-06-15 NOTE — TELEPHONE ENCOUNTER
6/15/20 Spoke w patient and informed her DR Rangel reviewed her EKG she is back in Afib. Per Dr Gilbert request, phone visit scheduled tomorrow to discuss plan as pt voiced she is not interested in adding/changing meds but to repeat DCCV or Ablation. Pt has no further questions at this time. Chelsea 343 pm

## 2020-06-15 NOTE — TELEPHONE ENCOUNTER
6/15/20 Pt called back inquiring about EKG. Writer informed her I had alerted Dr Rangel who will review and provide recommendations. Pt stated she really is no interested in any more medicine but would proceed with another DCCV or Ablation if needed. Will  Make Dr Rangel aware. Chelsea 134 pm

## 2020-06-16 ENCOUNTER — VIRTUAL VISIT (OUTPATIENT)
Dept: CARDIOLOGY | Facility: CLINIC | Age: 80
End: 2020-06-16
Payer: MEDICARE

## 2020-06-16 ENCOUNTER — TELEPHONE (OUTPATIENT)
Dept: CARDIOLOGY | Facility: CLINIC | Age: 80
End: 2020-06-16

## 2020-06-16 ENCOUNTER — TELEPHONE (OUTPATIENT)
Dept: FAMILY MEDICINE | Facility: CLINIC | Age: 80
End: 2020-06-16

## 2020-06-16 VITALS
HEART RATE: 97 BPM | DIASTOLIC BLOOD PRESSURE: 82 MMHG | WEIGHT: 140 LBS | SYSTOLIC BLOOD PRESSURE: 124 MMHG | BODY MASS INDEX: 24.8 KG/M2

## 2020-06-16 DIAGNOSIS — I48.0 PAROXYSMAL ATRIAL FIBRILLATION (H): Primary | ICD-10-CM

## 2020-06-16 PROCEDURE — 99441 ZZC PHYSICIAN TELEPHONE EVALUATION 5-10 MIN: CPT | Performed by: INTERNAL MEDICINE

## 2020-06-16 NOTE — PROGRESS NOTES
"Tomasa Fam is a 79 year old female who is being evaluated via a billable telephone visit.      The patient has been notified of following:     \"This telephone visit will be conducted via a call between you and your physician/provider. We have found that certain health care needs can be provided without the need for a physical exam.  This service lets us provide the care you need with a short phone conversation.  If a prescription is necessary we can send it directly to your pharmacy.  If lab work is needed we can place an order for that and you can then stop by our lab to have the test done at a later time.    Telephone visits are billed at different rates depending on your insurance coverage. During this emergency period, for some insurers they may be billed the same as an in-person visit.  Please reach out to your insurance provider with any questions.    If during the course of the call the physician/provider feels a telephone visit is not appropriate, you will not be charged for this service.\"    Patient has given verbal consent for Telephone visit?  Yes    What phone number would you like to be contacted at? [phone]    How would you like to obtain your AVS? Mail a copy   Review Of Systems  Skin: NEGATIVE  Eyes:Ears/Nose/Throat: NEGATIVE  Respiratory: PADRON  Cardiovascular:Feels pounding  Gastrointestinal: NEGATIVE  Genitourinary:NEGATIVE   Musculoskeletal: NEGATIVE  Neurologic: NEGATIVE  Psychiatric: NEGATIVE  Hematologic/Lymphatic/Immunologic: NEGATIVE  Endocrine:  NEGATIVE  /82  Pulse 97  Weight 140lbs    Phone call duration: 5 minutes    Briana Terry LPN  [facility]  1. Hypertension  2. Mild COPD, non-oxygen dependent  3. History of lung cancer, non-small cell in 2016, status post prior thoracotomy  4. History of breast cancer with left mastectomy  5. Polymyalgia rheumatica on steroid therapy, weaning prednisone currently at 6 mg daily  6. Recent diagnosis of persistent atrial fibrillation " with RVR noted on echo 5/2020.  Patient is on chronic anticoagulation therapy.  First diagnosis of atrial fibrillation was noted during hospitalization 9/2019.  7. 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  EF 35% previously 55 to 60% 9/2019  8. Persistent lower extremity edema  9. Intermittent lower extremity claudication.  Lower extremity ultrasound 5/13/2020 showed no evidence of significant stenosis     Delightful patient with the above history, in particular lung cancer in remission and transient cardiomyopathy recently noted palpitations along with sob and pedal edema. Noted to be in AF with vr around 110 bpm. Echo shows LVEF 35% along with moderate LA enlargement. Pt subsequently underwent CVN but unfortunately AF returned within an hour or so. ECG done a few days later confirmed AF with decent rate control. I was asked to see the patient for further eval.    Chandrika continues to feel sob along with palpitations and understandably quite frustrated about what's going on. In particular she is quite reluctant about taking new meds as she is already on so many that sometimes she forgot which one to take. Discussed at length about etiol of AF and in her case likely related to her aging and co-morbidities such as HTN. Next, we discussed potential complications including tachycardia induced cardiomyopathy which she has and CVA. Lastly, we discussed treatment option including rate vs. Rhythm control strategy. As her rate is still relatively fast > 100 bpm despite being on high dose of Metoprolol rate control with medical therapy could be limited. Digoxin could be a consideration but the patient again is quite hesitant about taking another medication. Rhythm control strategy with either amio vs. Ablation is another possibility. Given her hx of lung cancer required lobectomy amio may not be a prudent choice but Dofetilide may be a reasonable option. Lastly, ablation either for AF itself vs. AVN ablation  "with CRT-p. As I can't guarantee her that medical therapy or complex ablation would be a long term \"fix\" and her desire of not wanting to take any new meds the most reasonable option would be AVN ablation with CRT-p as LVEF would likely to improve. Patient would like to discuss with her  and letting us know of her decision.  "

## 2020-06-16 NOTE — TELEPHONE ENCOUNTER
INR Nurse was notified by Clinic LPN that the patient called to state that she would not be in need of her INR tomorrow as she will be having a pacemaker put in tomorrow.    Called patient to clarify details.     Per patient: She rescheduled INR for tomorrow because they will not be doing pacemaker until Monday. She states she spoke with her Cardiology team after her visit and it was changed from an ablation to a pacemaker. She was advised to hold warfarin for day of surgery only. However, Cardiology has not even scheduled her yet and she has not received specific written instructions. Informed her we usually hold warfarin for an invasive procedure 4-5 days. Will contact Cardiology to verify how many days they would like patient to hold warfarin and if bridging with Lovenox is necessary.     Advised patient she should still take tonight's warfarin and should keep her INR appointment tomorrow. We will talk after this INR nurse receives the results. Patient stated understanding and is in agreement with plan.    Routing to Dr. Rangel for further advice.    Yue NICOLAS RN  Anticoagulation Clinic  [place]  [phone]

## 2020-06-16 NOTE — LETTER
6/16/2020    [place]  [address]  [facility] [address]    RE: Tomasa Fam       Dear Colleague,    I had the pleasure of seeing Tomasa Fam in the [facility].    Tomasa Fam is a 79 year old female who is being evaluated via a billable telephone visit.      PMH  1. Hypertension  2. Mild COPD, non-oxygen dependent  3. History of lung cancer, non-small cell in 2016, status post prior thoracotomy  4. History of breast cancer with left mastectomy  5. Polymyalgia rheumatica on steroid therapy, weaning prednisone currently at 6 mg daily  6. Recent diagnosis of persistent atrial fibrillation with RVR noted on echo 5/2020.  Patient is on chronic anticoagulation therapy.  First diagnosis of atrial fibrillation was noted during hospitalization 9/2019.  7. 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  EF 35% previously 55 to 60% 9/2019  8. Persistent lower extremity edema  9. Intermittent lower extremity claudication.  Lower extremity ultrasound 5/13/2020 showed no evidence of significant stenosis     Delightful patient with the above history, in particular lung cancer in remission and transient cardiomyopathy recently noted palpitations along with sob and pedal edema. Noted to be in AF with vr around 110 bpm. Echo shows LVEF 35% along with moderate LA enlargement. Pt subsequently underwent CVN but unfortunately AF returned within an hour or so. ECG done a few days later confirmed AF with decent rate control. I was asked to see the patient for further eval.    Chandrika continues to feel sob along with palpitations and understandably quite frustrated about what's going on. In particular she is quite reluctant about taking new meds as she is already on so many that sometimes she forgot which one to take. Discussed at length about etiol of AF and in her case likely related to her aging and co-morbidities such as HTN. Next, we discussed  "potential complications including tachycardia induced cardiomyopathy which she has and CVA. Lastly, we discussed treatment option including rate vs. Rhythm control strategy. As her rate is still relatively fast > 100 bpm despite being on high dose of Metoprolol rate control with medical therapy could be limited. Digoxin could be a consideration but the patient again is quite hesitant about taking another medication. Rhythm control strategy with either amio vs. Ablation is another possibility. Given her hx of lung cancer required lobectomy amio may not be a prudent choice but Dofetilide may be a reasonable option. Lastly, ablation either for AF itself vs. AVN ablation with CRT-p. As I can't guarantee her that medical therapy or complex ablation would be a long term \"fix\" and her desire of not wanting to take any new meds the most reasonable option would be AVN ablation with CRT-p as LVEF would likely to improve. Patient would like to discuss with her  and letting us know of her decision.    Thank you for allowing me to participate in the care of your patient.    Sincerely,     Padilla Lemons MD     [facility]    "

## 2020-06-16 NOTE — TELEPHONE ENCOUNTER
Patient called device clinic with questions regarding the procedure Dr. Rangel is recommending she has. Spoke with Elham, from scheduling, prior to calling patient back and Elham stated as of no they are tenitively planning on her AVNA and CRT-P implant for Monday, 06/22/20.     Informed pt that if she is scheduled for her procedure on Monday we will call her tomorrow to review pre-procedural instructions. Additionally patient stated she is getting her INR checked tomorrow, 06/17/20. Informed patient to continue with scheduled INR check and that device clinic would let her know what the plan will be for her Warfarin prior to her procedure.     Patient concerned regarding pacemaker vs defibrillator. Informed patient that Dr. Rangel is recommended the implant of a Biventricular pacemaker and that it would not have the ability to shock her. Patient very concerned regarding this.     Provided patient with device clinic phone number and will send message to Elham asking her to call patient again to confirm procedure date and time.

## 2020-06-17 ENCOUNTER — TELEPHONE (OUTPATIENT)
Dept: CARDIOLOGY | Facility: CLINIC | Age: 80
End: 2020-06-17

## 2020-06-17 DIAGNOSIS — I42.9 CARDIOMYOPATHY (H): Primary | ICD-10-CM

## 2020-06-17 DIAGNOSIS — Z98.890 HX OF ATRIOVENTRICULAR NODE ABLATION: ICD-10-CM

## 2020-06-17 DIAGNOSIS — I44.2 COMPLETE HEART BLOCK (H): ICD-10-CM

## 2020-06-17 DIAGNOSIS — Z11.59 ENCOUNTER FOR SCREENING FOR OTHER VIRAL DISEASES: Primary | ICD-10-CM

## 2020-06-17 DIAGNOSIS — I48.0 PAROXYSMAL ATRIAL FIBRILLATION (H): ICD-10-CM

## 2020-06-17 DIAGNOSIS — Z98.890 HX OF ATRIOVENTRICULAR NODE ABLATION: Primary | ICD-10-CM

## 2020-06-17 LAB
CAPILLARY BLOOD COLLECTION: NORMAL
INR PPP: 2 (ref 0.86–1.14)

## 2020-06-17 PROCEDURE — 85610 PROTHROMBIN TIME: CPT | Performed by: FAMILY MEDICINE

## 2020-06-17 PROCEDURE — 36416 COLLJ CAPILLARY BLOOD SPEC: CPT | Performed by: FAMILY MEDICINE

## 2020-06-17 RX ORDER — SODIUM CHLORIDE 450 MG/100ML
INJECTION, SOLUTION INTRAVENOUS CONTINUOUS
Status: CANCELLED | OUTPATIENT
Start: 2020-06-17

## 2020-06-17 RX ORDER — LIDOCAINE 40 MG/G
CREAM TOPICAL
Status: CANCELLED | OUTPATIENT
Start: 2020-06-17

## 2020-06-17 RX ORDER — CEFAZOLIN SODIUM 2 G/100ML
2 INJECTION, SOLUTION INTRAVENOUS
Status: CANCELLED | OUTPATIENT
Start: 2020-06-17

## 2020-06-17 NOTE — TELEPHONE ENCOUNTER
Per Dr. Rangel's routing comment:   Hi, as procedure is scheduled for next week and not today safest thing is for her to hold warfarin 2 days before procedure. No need for lovenox bridging. Thanks, qp     Pt scheduled for PPM + AVNA on 622/2020 next week. We will be calling pt today with pre-procedure instructions.     ADDENDUM 3:05PM. Per routing comment from a separate encounter, Muriel MCCOLLUM with INR clinic asked the following:   I just spoke with patient as well, would it be possible for you to also request that she recheck her INR within a week of restarting her warfarin so we both do not need to call with the same information?      Device RN has tried to get a hold of pt today, left VM.

## 2020-06-17 NOTE — PROGRESS NOTES
Called patient with pre-procedure instructions for device implant:     Anticoagulation: Per Dr. Rangel patient to hold warfarin 2 days prior to procedure (6/20&6/21)  Oral diabetes meds: 0  Insulin: 0  Diuretic: Hold lasix morning of procedure  Contrast allergy: N/A  Pt informed to be NPO at midnight    Instructed pt to shower the morning of the procedure, and then put on a clean shirt in order to help prevent infection.     Pt has post-procedure transportation and 24 hours monitoring set up.   Pt aware of no driving for 24 hours post procedure due to sedation.     Pt aware of arrival time 6:30AM at [facility] location. Pt verbalized understanding of instructions.

## 2020-06-17 NOTE — TELEPHONE ENCOUNTER
Patient called with concerns regarding holding medications prior to AVNA with biventricular pacemaker placement on 6/22/2020. Reviewed medications with patient. Instructed patient to hold warfarin 2 days prior to procedure (6/20&6/21) per Dr. Rangel. Hold lasix the morning of the procedure. Patient was concerned that she would take something and would be unable to move forward with procedure. Reassured patient and instructed to hold warfarin and lasix as stated above. Patient verbalized understanding.

## 2020-06-17 NOTE — TELEPHONE ENCOUNTER
Received call from patient requesting more information about her upcoming AVNA and BiV PPM procedure.  Reviewed the reason for both the AVNA and BiV PPM with patient.  Also reviewed activity restrictions post procedure.  Patient asked if her SOB and the swelling in her feet/ankles would improve with this.  Explained that if these symptoms are related to her AFib with RVR or her tachy induced cardiomyopathy that they may improve, however, patient also has a history of lung cancer with a thoracotomy and her shortness of breath may be related to this.  Explained there are also other reasons for feet and ankles to be swollen and this may not change after the procedure.   Patient verbalized understanding and appreciation for call.  She will let us know if she has any further questions.    VEDA Prasad

## 2020-06-19 DIAGNOSIS — Z11.59 ENCOUNTER FOR SCREENING FOR OTHER VIRAL DISEASES: ICD-10-CM

## 2020-06-19 LAB
SARS-COV-2 RNA SPEC QL NAA+PROBE: NOT DETECTED
SPECIMEN SOURCE: NORMAL

## 2020-06-19 PROCEDURE — U0003 INFECTIOUS AGENT DETECTION BY NUCLEIC ACID (DNA OR RNA); SEVERE ACUTE RESPIRATORY SYNDROME CORONAVIRUS 2 (SARS-COV-2) (CORONAVIRUS DISEASE [COVID-19]), AMPLIFIED PROBE TECHNIQUE, MAKING USE OF HIGH THROUGHPUT TECHNOLOGIES AS DESCRIBED BY CMS-2020-01-R: HCPCS | Performed by: INTERNAL MEDICINE

## 2020-06-19 PROCEDURE — 99207 ZZC NO BILLABLE SERVICE THIS VISIT: CPT

## 2020-06-22 ENCOUNTER — HOSPITAL ENCOUNTER (OUTPATIENT)
Facility: CLINIC | Age: 80
Setting detail: OBSERVATION
Discharge: HOME | End: 2020-06-23
Admitting: INTERNAL MEDICINE
Payer: MEDICARE

## 2020-06-22 DIAGNOSIS — I44.2 COMPLETE HEART BLOCK (H): ICD-10-CM

## 2020-06-22 DIAGNOSIS — I42.9 CARDIOMYOPATHY (H): ICD-10-CM

## 2020-06-22 DIAGNOSIS — Z98.890 HX OF ATRIOVENTRICULAR NODE ABLATION: ICD-10-CM

## 2020-06-22 PROBLEM — I48.91 A-FIB (H): Status: ACTIVE | Noted: 2020-06-22

## 2020-06-22 LAB
ANION GAP SERPL CALCULATED.3IONS-SCNC: 3 MMOL/L (ref 3–14)
BUN SERPL-MCNC: 22 MG/DL (ref 7–30)
CALCIUM SERPL-MCNC: 9.8 MG/DL (ref 8.5–10.1)
CHLORIDE SERPL-SCNC: 108 MMOL/L (ref 94–109)
CO2 SERPL-SCNC: 28 MMOL/L (ref 20–32)
CREAT SERPL-MCNC: 1.04 MG/DL (ref 0.52–1.04)
ERYTHROCYTE [DISTWIDTH] IN BLOOD BY AUTOMATED COUNT: 13.6 % (ref 10–15)
GFR SERPL CREATININE-BSD FRML MDRD: 50 ML/MIN/{1.73_M2}
GLUCOSE SERPL-MCNC: 113 MG/DL (ref 70–99)
HCT VFR BLD AUTO: 42.9 % (ref 35–47)
HGB BLD-MCNC: 13.5 G/DL (ref 11.7–15.7)
INR BLD: 1.6 (ref 0.86–1.14)
MCH RBC QN AUTO: 28.5 PG (ref 26.5–33)
MCHC RBC AUTO-ENTMCNC: 31.5 G/DL (ref 31.5–36.5)
MCV RBC AUTO: 91 FL (ref 78–100)
PLATELET # BLD AUTO: 241 10E9/L (ref 150–450)
POTASSIUM SERPL-SCNC: 4.2 MMOL/L (ref 3.4–5.3)
RBC # BLD AUTO: 4.74 10E12/L (ref 3.8–5.2)
SODIUM SERPL-SCNC: 139 MMOL/L (ref 133–144)
WBC # BLD AUTO: 9.8 10E9/L (ref 4–11)

## 2020-06-22 PROCEDURE — 99153 MOD SED SAME PHYS/QHP EA: CPT | Performed by: INTERNAL MEDICINE

## 2020-06-22 PROCEDURE — 40000852 ZZH STATISTIC HEART CATH LAB OR EP LAB

## 2020-06-22 PROCEDURE — 36415 COLL VENOUS BLD VENIPUNCTURE: CPT

## 2020-06-22 PROCEDURE — 25800029 ZZH RX IP 258 OP 250: Performed by: INTERNAL MEDICINE

## 2020-06-22 PROCEDURE — C2621 PMKR, OTHER THAN SING/DUAL: HCPCS | Performed by: INTERNAL MEDICINE

## 2020-06-22 PROCEDURE — 85027 COMPLETE CBC AUTOMATED: CPT

## 2020-06-22 PROCEDURE — C1730 CATH, EP, 19 OR FEW ELECT: HCPCS | Performed by: INTERNAL MEDICINE

## 2020-06-22 PROCEDURE — 25000132 ZZH RX MED GY IP 250 OP 250 PS 637: Mod: GY | Performed by: INTERNAL MEDICINE

## 2020-06-22 PROCEDURE — 80048 BASIC METABOLIC PNL TOTAL CA: CPT

## 2020-06-22 PROCEDURE — 40000065 ZZH STATISTIC EKG NON-CHARGEABLE

## 2020-06-22 PROCEDURE — 96374 THER/PROPH/DIAG INJ IV PUSH: CPT | Mod: 59

## 2020-06-22 PROCEDURE — 33207 INSERT HEART PM VENTRICULAR: CPT | Mod: KX | Performed by: INTERNAL MEDICINE

## 2020-06-22 PROCEDURE — G0378 HOSPITAL OBSERVATION PER HR: HCPCS

## 2020-06-22 PROCEDURE — C1887 CATHETER, GUIDING: HCPCS | Performed by: INTERNAL MEDICINE

## 2020-06-22 PROCEDURE — 33225 L VENTRIC PACING LEAD ADD-ON: CPT | Performed by: INTERNAL MEDICINE

## 2020-06-22 PROCEDURE — 85610 PROTHROMBIN TIME: CPT | Mod: QW

## 2020-06-22 PROCEDURE — 27210794 ZZH OR GENERAL SUPPLY STERILE: Performed by: INTERNAL MEDICINE

## 2020-06-22 PROCEDURE — 93010 ELECTROCARDIOGRAM REPORT: CPT | Mod: 76 | Performed by: INTERNAL MEDICINE

## 2020-06-22 PROCEDURE — 25000128 H RX IP 250 OP 636: Performed by: INTERNAL MEDICINE

## 2020-06-22 PROCEDURE — 93005 ELECTROCARDIOGRAM TRACING: CPT

## 2020-06-22 PROCEDURE — 93650 ICAR CATH ABLTJ AV NODE FUNC: CPT | Performed by: INTERNAL MEDICINE

## 2020-06-22 PROCEDURE — 99152 MOD SED SAME PHYS/QHP 5/>YRS: CPT | Performed by: INTERNAL MEDICINE

## 2020-06-22 PROCEDURE — C1898 LEAD, PMKR, OTHER THAN TRANS: HCPCS | Performed by: INTERNAL MEDICINE

## 2020-06-22 PROCEDURE — C1733 CATH, EP, OTHR THAN COOL-TIP: HCPCS | Performed by: INTERNAL MEDICINE

## 2020-06-22 PROCEDURE — C1769 GUIDE WIRE: HCPCS | Performed by: INTERNAL MEDICINE

## 2020-06-22 PROCEDURE — C1900 LEAD, CORONARY VENOUS: HCPCS | Performed by: INTERNAL MEDICINE

## 2020-06-22 PROCEDURE — 25000125 ZZHC RX 250: Performed by: INTERNAL MEDICINE

## 2020-06-22 PROCEDURE — C1894 INTRO/SHEATH, NON-LASER: HCPCS | Performed by: INTERNAL MEDICINE

## 2020-06-22 DEVICE — CRT-P VALITUDE X4: Type: IMPLANTABLE DEVICE | Status: FUNCTIONAL

## 2020-06-22 DEVICE — IMPLANTABLE DEVICE: Type: IMPLANTABLE DEVICE | Status: FUNCTIONAL

## 2020-06-22 RX ORDER — METOPROLOL TARTRATE 100 MG
100 TABLET ORAL 2 TIMES DAILY
Status: DISCONTINUED | OUTPATIENT
Start: 2020-06-22 | End: 2020-06-23 | Stop reason: HOSPADM

## 2020-06-22 RX ORDER — POLYETHYLENE GLYCOL 3350 17 G/17G
17 POWDER, FOR SOLUTION ORAL DAILY PRN
Status: DISCONTINUED | OUTPATIENT
Start: 2020-06-22 | End: 2020-06-23 | Stop reason: HOSPADM

## 2020-06-22 RX ORDER — DOBUTAMINE HYDROCHLORIDE 200 MG/100ML
5-40 INJECTION INTRAVENOUS CONTINUOUS PRN
Status: DISCONTINUED | OUTPATIENT
Start: 2020-06-22 | End: 2020-06-22 | Stop reason: HOSPADM

## 2020-06-22 RX ORDER — CEFAZOLIN SODIUM 1 G/3ML
1 INJECTION, POWDER, FOR SOLUTION INTRAMUSCULAR; INTRAVENOUS
Status: DISCONTINUED | OUTPATIENT
Start: 2020-06-22 | End: 2020-06-22 | Stop reason: HOSPADM

## 2020-06-22 RX ORDER — AMLODIPINE BESYLATE 2.5 MG/1
2.5 TABLET ORAL DAILY
Status: DISCONTINUED | OUTPATIENT
Start: 2020-06-22 | End: 2020-06-23 | Stop reason: HOSPADM

## 2020-06-22 RX ORDER — WARFARIN SODIUM 2.5 MG/1
2.5 TABLET ORAL
Status: COMPLETED | OUTPATIENT
Start: 2020-06-22 | End: 2020-06-22

## 2020-06-22 RX ORDER — NALOXONE HYDROCHLORIDE 0.4 MG/ML
.1-.4 INJECTION, SOLUTION INTRAMUSCULAR; INTRAVENOUS; SUBCUTANEOUS
Status: DISCONTINUED | OUTPATIENT
Start: 2020-06-22 | End: 2020-06-23 | Stop reason: HOSPADM

## 2020-06-22 RX ORDER — SODIUM CHLORIDE 450 MG/100ML
INJECTION, SOLUTION INTRAVENOUS CONTINUOUS
Status: DISCONTINUED | OUTPATIENT
Start: 2020-06-22 | End: 2020-06-22 | Stop reason: HOSPADM

## 2020-06-22 RX ORDER — FENTANYL CITRATE 50 UG/ML
INJECTION, SOLUTION INTRAMUSCULAR; INTRAVENOUS
Status: DISCONTINUED | OUTPATIENT
Start: 2020-06-22 | End: 2020-06-22 | Stop reason: HOSPADM

## 2020-06-22 RX ORDER — OXYCODONE AND ACETAMINOPHEN 5; 325 MG/1; MG/1
1 TABLET ORAL EVERY 4 HOURS PRN
Status: DISCONTINUED | OUTPATIENT
Start: 2020-06-22 | End: 2020-06-23 | Stop reason: HOSPADM

## 2020-06-22 RX ORDER — POTASSIUM CHLORIDE 1500 MG/1
20 TABLET, EXTENDED RELEASE ORAL 2 TIMES DAILY
Status: DISCONTINUED | OUTPATIENT
Start: 2020-06-22 | End: 2020-06-23 | Stop reason: HOSPADM

## 2020-06-22 RX ORDER — CEFAZOLIN SODIUM 1 G/3ML
1 INJECTION, POWDER, FOR SOLUTION INTRAMUSCULAR; INTRAVENOUS EVERY 8 HOURS
Status: COMPLETED | OUTPATIENT
Start: 2020-06-22 | End: 2020-06-23

## 2020-06-22 RX ORDER — EZETIMIBE 10 MG/1
10 TABLET ORAL DAILY
Status: DISCONTINUED | OUTPATIENT
Start: 2020-06-22 | End: 2020-06-23 | Stop reason: HOSPADM

## 2020-06-22 RX ORDER — LIDOCAINE 40 MG/G
CREAM TOPICAL
Status: DISCONTINUED | OUTPATIENT
Start: 2020-06-22 | End: 2020-06-22 | Stop reason: HOSPADM

## 2020-06-22 RX ORDER — FUROSEMIDE 40 MG
80 TABLET ORAL DAILY
Status: DISCONTINUED | OUTPATIENT
Start: 2020-06-22 | End: 2020-06-23 | Stop reason: HOSPADM

## 2020-06-22 RX ORDER — BUPIVACAINE HYDROCHLORIDE 2.5 MG/ML
INJECTION, SOLUTION EPIDURAL; INFILTRATION; INTRACAUDAL
Status: DISCONTINUED | OUTPATIENT
Start: 2020-06-22 | End: 2020-06-22 | Stop reason: HOSPADM

## 2020-06-22 RX ORDER — ONDANSETRON 2 MG/ML
4 INJECTION INTRAMUSCULAR; INTRAVENOUS EVERY 6 HOURS PRN
Status: DISCONTINUED | OUTPATIENT
Start: 2020-06-22 | End: 2020-06-23 | Stop reason: HOSPADM

## 2020-06-22 RX ORDER — HEPARIN SODIUM 200 [USP'U]/100ML
100-600 INJECTION, SOLUTION INTRAVENOUS CONTINUOUS PRN
Status: DISCONTINUED | OUTPATIENT
Start: 2020-06-22 | End: 2020-06-22 | Stop reason: HOSPADM

## 2020-06-22 RX ORDER — HEPARIN SODIUM 200 [USP'U]/100ML
100-500 INJECTION, SOLUTION INTRAVENOUS CONTINUOUS PRN
Status: DISCONTINUED | OUTPATIENT
Start: 2020-06-22 | End: 2020-06-22 | Stop reason: HOSPADM

## 2020-06-22 RX ORDER — CEFAZOLIN SODIUM 2 G/100ML
2 INJECTION, SOLUTION INTRAVENOUS
Status: COMPLETED | OUTPATIENT
Start: 2020-06-22 | End: 2020-06-22

## 2020-06-22 RX ADMIN — CEFAZOLIN 1 G: 330 INJECTION, POWDER, FOR SOLUTION INTRAMUSCULAR; INTRAVENOUS at 17:50

## 2020-06-22 RX ADMIN — ONDANSETRON 4 MG: 2 INJECTION INTRAMUSCULAR; INTRAVENOUS at 13:40

## 2020-06-22 RX ADMIN — EZETIMIBE 10 MG: 10 TABLET ORAL at 20:36

## 2020-06-22 RX ADMIN — CEFAZOLIN SODIUM 2 G: 2 INJECTION, SOLUTION INTRAVENOUS at 08:27

## 2020-06-22 RX ADMIN — METOPROLOL TARTRATE 100 MG: 100 TABLET, FILM COATED ORAL at 17:57

## 2020-06-22 RX ADMIN — POTASSIUM CHLORIDE 20 MEQ: 1500 TABLET, EXTENDED RELEASE ORAL at 20:37

## 2020-06-22 RX ADMIN — AMLODIPINE BESYLATE 2.5 MG: 2.5 TABLET ORAL at 17:58

## 2020-06-22 RX ADMIN — SODIUM CHLORIDE: 4.5 INJECTION, SOLUTION INTRAVENOUS at 07:15

## 2020-06-22 RX ADMIN — WARFARIN SODIUM 2.5 MG: 2.5 TABLET ORAL at 17:51

## 2020-06-22 ASSESSMENT — MIFFLIN-ST. JEOR: SCORE: 1075.53

## 2020-06-22 NOTE — PROGRESS NOTES
79-year-old lady with a history of hypertension, COPD, lung cancer (status post thoracotomy), breast cancer (s/p bilateral mastectomy and Port-A-Cath placed on right subclavian vein) in the persistent atrial fibrillation, who failed rate control strategy and presents with signs of tachycardia mediated cardiomyopathy (EF around 35%).  She was referred for AV node ablation and biventricular pacemaker implantation.      Due to development of symptoms, case was deemed to be TIER- 2 level of priority based on current government guidelines, and patient was referred for ablation/ device implantation.    Criteria for Procedure  o Tier 1 - time sensitive in less than one week - schedule now  o Tier 2 - time sensitive w/in 1 month - schedule now  o Tier 3 - time sensitive w/in 2 months  o Tier 4 - Elective        Procedure was explained in details.  She understands there is 1-2% risk of complication associated with procedure.  Consent was signed.    Adriano Raman MD

## 2020-06-22 NOTE — PROGRESS NOTES
Pt had emesis x 2 --order received for IV zofran from Dr Raman- [place] and will monitor for relief prior to possible transfer to heart center.  Right groin site remains stable after emesis.

## 2020-06-22 NOTE — PROVIDER NOTIFICATION
MD Notification    Notified Person: MD    Notified Person Name: Dr. Raman     Notification Date/Time: 6/22 @ 5 PM     Notification Interaction: Text-paged     Purpose of Notification: Pt needs to be registered to observation or inpatient (currently outpatient status)     Orders Received: Pt registered to observation     Comments:

## 2020-06-22 NOTE — PROGRESS NOTES
Pt waking up slowly but able to state name and birthday.  She is taking po fluids safely with assist. Denies pain, nausea or vomiting

## 2020-06-22 NOTE — PROGRESS NOTES
Care Suites Admission Nursing Note    Patient Information  Name: Tomasa Fam  Age: 79 year old  Reason for admission: AVN ablation and PPM implant  Care Suites arrival time: 0640    Patient Admission/Assessment   Pre-procedure assessment complete: Yes  If abnormal assessment/labs, provider notified: N/A  NPO: Yes  Medications held per instructions/orders: Yes  Consent: MD to obtain  If applicable, pregnancy test status:N/A  Patient oriented to room: Yes  Education/questions answered: Yes  Plan/other: per procedural plan of care    Discharge Planning  Accompanied by: self  Discharge name/phone number: see epic   Overnight post sedation caregiver: yes  Discharge location: home    Elham Stanley RN

## 2020-06-22 NOTE — PROGRESS NOTES
Report called to Linda MCCOLLUM in  Heart center.  Will plan on wheelchair transfer when pt's nausea under control.

## 2020-06-22 NOTE — PROGRESS NOTES
PATIENT WELLNESS SCREENING    Step 1: Answer all screening questions 1-3.    1. In the last month, have you been in contact with someone who was confirmed or suspected to have Coronavirus/COVID-19? No    2. Do you have the following symptoms?   Fever? No   Cough? No   Shortness of breath? Yes --always with a fib   Skin rash? No    Step 2: Refer to logic grid below for actions    NO SYMPTOM(S)    ACTIONS:  1. Standard rooming process  2. Provider to assess per normal protocol    POSITIVE SYMPTOM(S)  If positive for ANY of the following symptoms: fever, cough, shortness of breath, rash    ACTIONS  1. Mask patient  2. Room patient as soon as possible  3. Don appropriate PPE when entering room  4. Provider evaluation

## 2020-06-22 NOTE — PROGRESS NOTES
Pt had some nausea and small emesis, states now feels better. Cleaned linens and pt and now is resting stating without nausea. Sites are unchanged from when bedside report received

## 2020-06-22 NOTE — PROGRESS NOTES
Pt returns from cath lab via bed - Right groin site stable without bleeding or hematoma.  Pt very sleepy and difficult to arouse --cath lab RN Amanda will stay in room with pt. Cath lab RN states Dr Raman aware of bruising at PPM site.

## 2020-06-22 NOTE — PRE-PROCEDURE
GENERAL PRE-PROCEDURE:   Procedure:  AVN ablation and PPM    Written consent obtained?: Yes    Risks and benefits: Risks, benefits and alternatives were discussed    Consent given by:  Patient  Patient states understanding of procedure being performed: Yes    Patient's understanding of procedure matches consent: Yes    Procedure consent matches procedure scheduled: Yes    Expected level of sedation:  Moderate  Appropriately NPO:  Yes  ASA Class:  Class 3- Severe systemic disease, definite functional limitations  Mallampati  :  Grade 2- soft palate, base of uvula, tonsillar pillars, and portion of posterior pharyngeal wall visible  Lungs:  Lungs clear with good breath sounds bilaterally  Heart:  Normal heart sounds and rate  History & Physical reviewed:  History and physical reviewed and no updates needed  Statement of review:  I have reviewed the lab findings, diagnostic data, medications, and the plan for sedation

## 2020-06-22 NOTE — DISCHARGE INSTRUCTIONS
AV Node Ablation & Pacemaker Implant Discharge Instructions -     Care of Chest Incision:      Keep the bandage on at least 3 days. You may remove the dressing on 6/25. Change it only if it gets loose or soaked. If you need to change it, use 4x4-inch gauze and a large clear bandage.       Leave the strips of tape on. They will fall off on their own, or we will remove them at your first check-up.    Check your wound daily for signs of infection, such as increased redness, severe swelling or draining. Fever may also be a sign of infection. Call us if you see any of these signs.    If there are no signs of infection, you may shower after the bandage comes off in 3 days. If you take a tub bath, keep the wound dry.    No soaking the incision (swimming pool, bathtub, hot tub) for 2 weeks.    You may have mild to medium pain for 3 to 5 days. Take Acetaminophen (Tylenol) or Ibuprofen (Advil) for the pain. If the pain persists or is severe, call us.    Care of Groin Puncture Site:      For the first 24 hrs - check the puncture site every 1-2 hours while awake.    For the first 2 days, when you cough, sneeze, laugh or move your bowels, hold your hand over the puncture site and press firmly.    Remove the bandaid after 24 hours. If there is minor oozing, apply another bandaid and remove it after 12 hours.    It is normal to have a small bruise or pea size lump at the site.    Do NOT take a bath, or use a hot tub or pool for at least 3 days. Do NOT scrub the site. Do not use lotion or powder near the puncture site.    Activity    Arm:    For 2 days, do not use your hand or arm to support your weight (such as rising from a chair)     For 2 days, do not lift more than 5 pounds or exercise your arm (such as tennis, golf or bowling).    Chest:    For at least 3 weeks: Do not raise your elbow above your shoulder. You can begin to use your arm as it feels comfortable to you.    Do not use arm on implant side to lift more than 10  pounds for 2 weeks.    In 6 to 8 weeks: You may begin to golf, play tennis, swim and do similar activities.    No driving for one day & limit to necessary driving for one week. Please talk to your doctor for specific recommendations.    Groin:     For 2 days:    No stooping or squatting    Do NOT do any heavy activity such as exercise, lifting, or straining.     No housework, yard work or any activity that make you sweat    Do NOT lift more than 10 pounds    Bleeding:    Arm:    If you start bleeding from the site in your arm, sit down and press firmly on/above the site for 10 minutes.     Once bleeding stops, keep your arm straight for 2 hours.     Once bleeding stops, call [facility] as soon as you can.    Chest:    If you start bleeding from the incision site, sit down and press firmly on the site for 10 minutes.     Once bleeding stops, call [facility] as soon as you can.    Groin:     If you start bleeding from the site in your groin, lie down flat and press firmly on the site for 10 minutes.     Once bleeding stops, lay flat for 2 hours.     Call [facility] as soon as you can.       Call 911 right away if you have heavy bleeding or bleeding that does not stop.      Medicines:      Resume warfarin at your normal dose      If you have pain or shortness of breath, you may take Advil (ibuprofen) or Tylenol (acetaminophen).    Follow Up Appointments:      Call [facility] and get your INR set up for next week    Gloria Donnelly NP (virtual visit) on 7/1.  She will review how you're doing after your procedure and may be able to adjust medications as your heart rate will be lower and swelling should be improving      We'll contact you to set up first pacemaker check in 7-10 days    Call the clinic ([phone]) if:      You have increased pain or a large or growing hard lump around the sites.    The site is red, swollen, hot or tender.    Blood or fluid is draining from the site.    You have  chills or a fever greater than 101 F (38 C).    Your arm or leg feels numb, cool or changes color.    Increased pain in the chest and/or groin.    Increased shortness of breath    Chest pain not relieved by Tylenol or Advil    New pain in the back or belly that you cannot control with Tylenol.    Recurrent irregular or fast heart rate lasting over 2 hours.    Any questions or concerns.    Heart rhythms:    You may have some irregular heartbeats. These feel very strong. They may make you feel that the fast heart rhythm is going to start again.  Give it time. The irregular beats should occur less often.      Telling others about your device:      Before you leave the hospital, you will receive a temporary ID card. A permanent card will be mailed to you about 6 to 8 weeks later. Always carry the ID card with you. It has important details about your device.    You may also get a Medical Alert bracelet or tag that says you have a pacemaker or a defibrillator (ICD). Go to [email or web address].     Always tell doctors, dentists and other care providers that you have a device implanted in you.    Let us know before you plan any surgeries. Your care team must take special steps to keep you safe during certain procedures. These steps will depend on the type of device you have. Your provider will need to see your ID card. They may need to call us for instructions.    Device Safety:      Please refer to device  s booklet for further information.    [facility] [place]:      [phone] UMP (7 days a week)    Device Clinic RNs: [phone] (Mon-Fri, 8am-4pm)

## 2020-06-22 NOTE — PROGRESS NOTES
Pt states slight nausea but no vomiting. Requesting saltines and juice.  Declines calling for anti-emetic at this time.

## 2020-06-23 ENCOUNTER — TELEPHONE (OUTPATIENT)
Dept: FAMILY MEDICINE | Facility: CLINIC | Age: 80
End: 2020-06-23

## 2020-06-23 ENCOUNTER — TELEPHONE (OUTPATIENT)
Dept: CARDIOLOGY | Facility: CLINIC | Age: 80
End: 2020-06-23

## 2020-06-23 ENCOUNTER — APPOINTMENT (OUTPATIENT)
Dept: GENERAL RADIOLOGY | Facility: CLINIC | Age: 80
End: 2020-06-23
Attending: INTERNAL MEDICINE
Payer: MEDICARE

## 2020-06-23 VITALS
SYSTOLIC BLOOD PRESSURE: 146 MMHG | HEART RATE: 81 BPM | DIASTOLIC BLOOD PRESSURE: 83 MMHG | HEIGHT: 63 IN | OXYGEN SATURATION: 96 % | BODY MASS INDEX: 24.98 KG/M2 | RESPIRATION RATE: 16 BRPM | TEMPERATURE: 97.9 F | WEIGHT: 141 LBS

## 2020-06-23 DIAGNOSIS — Z98.890 HX OF ATRIOVENTRICULAR NODE ABLATION: Primary | ICD-10-CM

## 2020-06-23 DIAGNOSIS — I48.0 PAROXYSMAL ATRIAL FIBRILLATION (H): ICD-10-CM

## 2020-06-23 DIAGNOSIS — Z95.0 CARDIAC PACEMAKER IN SITU: ICD-10-CM

## 2020-06-23 PROCEDURE — G0378 HOSPITAL OBSERVATION PER HR: HCPCS

## 2020-06-23 PROCEDURE — 25000128 H RX IP 250 OP 636: Performed by: INTERNAL MEDICINE

## 2020-06-23 PROCEDURE — 25000132 ZZH RX MED GY IP 250 OP 250 PS 637: Mod: GY | Performed by: HOSPITALIST

## 2020-06-23 PROCEDURE — 25000132 ZZH RX MED GY IP 250 OP 250 PS 637: Mod: GY | Performed by: INTERNAL MEDICINE

## 2020-06-23 PROCEDURE — 96376 TX/PRO/DX INJ SAME DRUG ADON: CPT

## 2020-06-23 PROCEDURE — 40000986 XR CHEST 2 VW

## 2020-06-23 PROCEDURE — 99024 POSTOP FOLLOW-UP VISIT: CPT | Performed by: PHYSICIAN ASSISTANT

## 2020-06-23 PROCEDURE — 25000131 ZZH RX MED GY IP 250 OP 636 PS 637: Mod: GY | Performed by: INTERNAL MEDICINE

## 2020-06-23 RX ORDER — ACETAMINOPHEN 325 MG/1
650 TABLET ORAL EVERY 4 HOURS PRN
Status: DISCONTINUED | OUTPATIENT
Start: 2020-06-23 | End: 2020-06-23 | Stop reason: HOSPADM

## 2020-06-23 RX ORDER — ACETAMINOPHEN 650 MG/1
650 SUPPOSITORY RECTAL EVERY 4 HOURS PRN
Status: DISCONTINUED | OUTPATIENT
Start: 2020-06-23 | End: 2020-06-23 | Stop reason: HOSPADM

## 2020-06-23 RX ADMIN — CEFAZOLIN 1 G: 330 INJECTION, POWDER, FOR SOLUTION INTRAMUSCULAR; INTRAVENOUS at 08:24

## 2020-06-23 RX ADMIN — POTASSIUM CHLORIDE 20 MEQ: 1500 TABLET, EXTENDED RELEASE ORAL at 08:32

## 2020-06-23 RX ADMIN — CEFAZOLIN 1 G: 330 INJECTION, POWDER, FOR SOLUTION INTRAMUSCULAR; INTRAVENOUS at 00:02

## 2020-06-23 RX ADMIN — METOPROLOL TARTRATE 100 MG: 100 TABLET, FILM COATED ORAL at 08:32

## 2020-06-23 RX ADMIN — FUROSEMIDE 80 MG: 80 TABLET ORAL at 09:00

## 2020-06-23 RX ADMIN — ACETAMINOPHEN 650 MG: 325 TABLET, FILM COATED ORAL at 00:58

## 2020-06-23 RX ADMIN — PREDNISONE 6 MG: 1 TABLET ORAL at 08:32

## 2020-06-23 ASSESSMENT — COLUMBIA-SUICIDE SEVERITY RATING SCALE - C-SSRS
2. HAVE YOU ACTUALLY HAD ANY THOUGHTS OF KILLING YOURSELF IN THE PAST MONTH?: NO
1. IN THE PAST MONTH, HAVE YOU WISHED YOU WERE DEAD OR WISHED YOU COULD GO TO SLEEP AND NOT WAKE UP?: NO

## 2020-06-23 ASSESSMENT — MIFFLIN-ST. JEOR: SCORE: 1078.7

## 2020-06-23 NOTE — TELEPHONE ENCOUNTER
ANTICOAGULATION  MANAGEMENT: Discharge Review    Tomasa Fam chart reviewed for anticoagulation continuity of care    Hospital Admission on 6/22/20 for permanent pacemaker implant.    Discharge disposition: Home    Results:    Recent labs: (last 7 days)     06/17/20  1041 06/22/20  0712   INR 2.00* 1.6*     Anticoagulation inpatient management:     less warfarin administered than maintenance regimen    Anticoagulation discharge instructions:     Warfarin dosing: home regimen continued   Bridging: No   INR goal change: No      Medication changes affecting anticoagulation: No    Additional factors affecting anticoagulation: Yes: pain and swelling from surgery.    Plan     No adjustment to anticoagulation plan needed  Recommend to check INR on 6/29/20    Patient unable to come to the phone. Provided [facility] INR Clinic call back number as well as Central INR number. Need to confirm warfarin dosing after discharge, assess for bruising/bleeding, and to schedule next INR 6/29/20    Anticoagulation Calendar updated    Yue Martínez RN

## 2020-06-23 NOTE — PROGRESS NOTES
"[facility]    EP Progress Note    Date of Service (when I saw the patient): 06/23/2020     Assessment & Plan   Tomasa Fam is a 79 year old female with h/o HTN, AFib with RVR, CM EF 35% and LE edema who was admitted on 6/22/2020 for AVN ablation/PPM implant    1. Persistent AFib; EF 35% -   - First noted during hospitalization 9/2019.  - Cardioversion 6/12/2020 successfully restored SR only for a few hours  - Dr. Rangel had virtual visit with her 6/16 and discussed rate vs rhythm control. She was reluctant to take new meds and opted for AVN ablation and PPM implantation.    - Now s/p AVn ablation and BiV [employer] PPM implantation with Dr. Raman 6/22/2020    - CXR without PTX  - Tele BiV Paced 80 bpm; underlying AFib  - Device interrogation today showed normal measurements. No stim at max output. 100% BIV paced (VVIR 80/130)   - EKG with what looks like good BiV placement   - On warfarin at home. INR 1.6 on day of procedure.     PLAN:   * Device teaching and follow-up appt will be arranged by Device RNs   * Continue warfarin. INR should be set up next week. Pt aware and will call [facility]   * Will have appt with REBA Castro (as previously arranged) as planned to assess if any meds need to be changed (diuretic, beta blocker) as now s/p Bi-V PPM       2. HTN -   - PTA on amlodipine 2.5, Lasix 80 mg daily, lopressor 100 mg BID  - SBPs here 120-140s     PLAN:   * Continue meds    3. CM (likely tachy- induced) -  - EF had been wnl 9/2019, down to 30-35% on echo done 5/15/2020  - PTA on Lasix 80 mg daily with KCl 20 BID  -  I/Os are up 500cc, weight up 0.4 kg. On exam, still with LE edema. Lungs clear.     PLAN:   * Repeat echo 3 m post BiV implant   * Gloria appt (virtual) next week to see if Lasix can be decreased    Lana Kebede PA-C    Interval History   Feels \"fine\" but hasn't done too much so can't comment on energy and/or breathing  Minimal R groin site discomfort to " palpation    Physical Exam   Temp: 97.9  F (36.6  C) Temp src: Oral BP: (!) 141/94 Pulse: 81 Heart Rate: 79 Resp: 15 SpO2: 94 % O2 Device: None (Room air) Oxygen Delivery: 2 LPM  Vitals:    06/22/20 0645 06/23/20 0622   Weight: 63.6 kg (140 lb 4.8 oz) 64 kg (141 lb)     Vital Signs with Ranges  Temp:  [97.8  F (36.6  C)-98.4  F (36.9  C)] 97.9  F (36.6  C)  Pulse:  [64-83] 81  Heart Rate:  [79-80] 79  Resp:  [12-16] 15  BP: (127-146)/(59-94) 141/94  SpO2:  [94 %-100 %] 94 %  I/O last 3 completed shifts:  In: 720 [P.O.:720]  Out: 250 [Urine:250]    Telemetry: BIV paced 80 bpm    Constitutional: awake, alert, cooperative, no apparent distress, and appears stated age  Respiratory: CTA B  Cardiovascular: Regular. PPM bandage with scant dried blood. Some ecchymosis to L axilla. Minimal tenderness  Musculoskeletal: Minimal LE edema. R groin site with minimal ecchymosis. Mild tenderness to palpation. No bruit    Medications       amLODIPine  2.5 mg Oral Daily     ceFAZolin  1 g Intravenous Q8H     ezetimibe  10 mg Oral Daily     furosemide  80 mg Oral Daily     metoprolol tartrate  100 mg Oral BID     potassium chloride ER  20 mEq Oral BID     predniSONE  6 mg Oral Daily       Data   I personally reviewed the EKG tracing showing BiV Paced 80; underlying AFib.  Results for orders placed or performed during the hospital encounter of 06/22/20 (from the past 24 hour(s))   EKG 12-lead, tracing only   Result Value Ref Range    Interpretation ECG Click View Image link to view waveform and result

## 2020-06-23 NOTE — PLAN OF CARE
A&O, VSS on room air. Tele: 100% A-paced and frequently V-paced, HR 80-90's. Denies CP and SOB. L sided PPM chest site ecchymotic w/ site marked w/ scant amount of drainage and unchanged, CMS intact, good pulses. R groin site s/p ablation C/D/I, good pulses, CMS intact. Scant amount of blood on R groin dressing, site marked and unchanged. IV ancef given post procedure. Tolerating food and fluids well. Plan for chest x-ray and interrogation tomorrow. Likely discharge. SBA.

## 2020-06-23 NOTE — TELEPHONE ENCOUNTER
Patient returned call. Confirmed dosing of warfarin, no changes with bruising/bleeding, she is just trying to catch up on her sleep now that she is home. Scheduled next INR for 6/29/20.    Yue NICOLAS RN  Anticoagulation Clinic  [place]

## 2020-06-23 NOTE — PROVIDER NOTIFICATION
Amcom page Dr Raza @0012 : Pt c/o headache, would like tylenol please. Only has percocet ordered. Thank you.             @[id number]: repage, orders entered for PRN Tylenol

## 2020-06-23 NOTE — TELEPHONE ENCOUNTER
Pt had CRT-P + AVNA yesterday. She was discharged earlier today.     Post device implant discharge phone call.    Reviewed the following:  No raising arm above shoulder on the side of implant for 3 weeks  Remove outer dressing 3 days after implant. May shower after outer dressing removed. Leave steri-strips in place, will be removed at 1 week device check  Limit driving for: 1 week (PPM)  Watch for redness, drainage, warmth, or fever. Call device clinic if any signs of infection.     1 week device check scheduled: Tuesday, 6/30/2020, 10:30am, [place]. (in-clinic due to AVNA).     Pt states understanding of all instructions.

## 2020-06-23 NOTE — PLAN OF CARE
A&O, VSS on room air. Tele: 100% A-paced, frequently V-paced, HR 80's. Denies CP and SOB. R groin site unchanged, scant drainage on dressing, site stable, CMS intact, good pulses. L chest site ecchymotic w/ scant drainage on dressing, CMS intact, good pulses, no hematoma. Chest x-ray done, PPM interrogation complete this AM. Per EP, okay to remove dressing after 3 days, pt aware of this plan to remove dressing on own at home. Bilateral lower extremity edema, resumed PTA 80 mg oral lasix this AM. Follow up appointment scheduled with cardiology x1 week, patient aware of appointment. Possible medication change at time of appointment however patient going home with no medication changes. Pt aware to schedule INR check next week. Discharge instructions given for both AV node ablation and PPM sites. All questions have been answered at this time. Extremity restrictions have been reviewed with patient, pt compliant. All belongings have been returned to patient. IV has been removed. Pt  to pick patient up from front door, pt has been given WC ride off unit.

## 2020-06-23 NOTE — PROGRESS NOTES
Due to covid19 restrictions, device clinic RNs are not seeing patient's in the hospital at this time.     Device Discharge  Dressing:  Defer to bedside RN  Chest XR reviewed:  Yes  Pneumo present?:  No  Lead Measurements per Device Rep:  WNL per  [facility] today    Education Provided:  Avoid raising left arm above the level of the shoulder for 3 weeks.  Do not shower until outer occlusive dressing has been removed.  Remove outer dressing in 3 - 4 days.  Leave steri-strips in place, these will be removed at the 1 week check.   Call Device Clinic with increased swelling, drainage, or fever > 101 degrees.   Follow-up with the Device Clinic as scheduled.   Will be calling pt this afternoon to go over above education, see separate encounter.

## 2020-06-23 NOTE — PLAN OF CARE
A&Ox4. VSS on RA. Tele 100% A-paced, part V-paced. Up SBA, voiding in BR. C/o headache, tylenol given with relief. L pacemaker site ecchymotic, tender, no hematoma, dressing with scant dried drainage-no change. PIV SL. R groin dressing scant dried drainage, no change. CMS intact ex bilat feet cool and +2 edema BLE. Plan for CXR and device interrogation today and then likely discharge home with .

## 2020-06-24 ENCOUNTER — PATIENT OUTREACH (OUTPATIENT)
Dept: CARE COORDINATION | Facility: CLINIC | Age: 80
End: 2020-06-24

## 2020-06-24 DIAGNOSIS — Z71.89 OTHER SPECIFIED COUNSELING: Primary | Chronic | ICD-10-CM

## 2020-06-24 NOTE — PROGRESS NOTES
Clinic Care Coordination Contact  Community Health Worker Initial Outreach    Spoke with patient    Referral from IP discharge report.   CHW introduced self and role with CC  Patient states she is doing fine she is catching up on her sleep  Patient states that she does not have any questions or concerns regarding her discharge instructions.  Patient states that she is not in need of any support or resources at this time.   Patient may have questions or concerns in the future, she says she is very tired and is not in the mood to talk on the phone.  CHW provided contact information for future concerns.       Patient accepts CC: No, patient is not in need of any support or resources at this time.     Monique Barnes, SONDRA   Clinic Care Coordination  [facility] Clinics:  [place], [place], [place], and [place]  Phone: [phone]

## 2020-06-25 LAB
INTERPRETATION ECG - MUSE: NORMAL
INTERPRETATION ECG - MUSE: NORMAL

## 2020-06-29 ENCOUNTER — ANTICOAGULATION THERAPY VISIT (OUTPATIENT)
Dept: NURSING | Facility: CLINIC | Age: 80
End: 2020-06-29

## 2020-06-29 DIAGNOSIS — I48.0 PAROXYSMAL ATRIAL FIBRILLATION (H): ICD-10-CM

## 2020-06-29 LAB
CAPILLARY BLOOD COLLECTION: NORMAL
INR PPP: 1.6 (ref 0.86–1.14)

## 2020-06-29 PROCEDURE — 36416 COLLJ CAPILLARY BLOOD SPEC: CPT | Performed by: FAMILY MEDICINE

## 2020-06-29 PROCEDURE — 85610 PROTHROMBIN TIME: CPT | Performed by: FAMILY MEDICINE

## 2020-06-29 PROCEDURE — 99207 ZZC NO CHARGE NURSE ONLY: CPT

## 2020-06-29 NOTE — PROGRESS NOTES
ANTICOAGULATION FOLLOW-UP CLINIC VISIT    Patient Name:  Tomasa Fam  Date:  2020  Contact Type:  Telephone    SUBJECTIVE:  Patient Findings     Comments:   Patient still subtherapeutic after her pacemaker implant last week. Hospital gave 1/2 her normal dose of warfarin last Monday before discharge. There was significant bruising at incision site. The patient was assessed for diet, medication, and activity level changes, missed or extra doses, bleeding, with no problem findings. Per protocol, today will increase warfarin dose by 1/2 for 7.5 mg total instead of 5 mg. Then she will return to maintenance dosing and recheck INR in 10 days. Patient stated understanding and is in agreement with plan.  Yue NICOLAS RN  Anticoagulation Clinic  [place]          Clinical Outcomes     Negatives:   Major bleeding event, Thromboembolic event, Anticoagulation-related hospital admission, Anticoagulation-related ED visit, Anticoagulation-related fatality    Comments:   Patient still subtherapeutic after her pacemaker implant last week. Hospital gave 1/2 her normal dose of warfarin last Monday before discharge. There was significant bruising at incision site. The patient was assessed for diet, medication, and activity level changes, missed or extra doses, bleeding, with no problem findings. Per protocol, today will increase warfarin dose by 1/2 for 7.5 mg total instead of 5 mg. Then she will return to maintenance dosing and recheck INR in 10 days. Patient stated understanding and is in agreement with plan.  Yue NICOLAS RN  Anticoagulation Clinic  [place]             OBJECTIVE    Recent labs: (last 7 days)     20  1015   INR 1.60*       ASSESSMENT / PLAN  INR assessment SUB    Recheck INR In: 10 DAYS    INR Location Clinic      Anticoagulation Summary  As of 2020    INR goal:   2.0-3.0   TTR:   71.5 % (7.5 mo)   INR used for dosin.60! (2020)   Warfarin maintenance plan:   5 mg (2.5 mg x 2) every  Mon; 2.5 mg (2.5 mg x 1) all other days   Full warfarin instructions:   6/29: 7.5 mg; Otherwise 5 mg every Mon; 2.5 mg all other days   Weekly warfarin total:   20 mg   Plan last modified:   Sandie Sanchez RN (5/28/2020)   Next INR check:   7/8/2020   Priority:   High   Target end date:   Indefinite    Indications    Paroxysmal atrial fibrillation (H) [I48.0]             Anticoagulation Episode Summary     INR check location:   Anticoagulation Clinic    Preferred lab:       Send INR reminders to:   PK WEAVER    Comments:   No Bandaid // 2.5 mg tab // patient started on Xarelto, transition to warfarin 11/6/19      Anticoagulation Care Providers     Provider Role Specialty Phone number    Violet Fenton MD Referring [facility] [phone]            See the Encounter Report to view Anticoagulation Flowsheet and Dosing Calendar (Go to Encounters tab in chart review, and find the Anticoagulation Therapy Visit)    Dosage adjustment made based on physician directed care plan.    Yue Martínez RN

## 2020-06-30 ENCOUNTER — DOCUMENTATION ONLY (OUTPATIENT)
Dept: CARDIOLOGY | Facility: CLINIC | Age: 80
End: 2020-06-30

## 2020-06-30 ENCOUNTER — ANCILLARY PROCEDURE (OUTPATIENT)
Dept: CARDIOLOGY | Facility: CLINIC | Age: 80
End: 2020-06-30
Attending: INTERNAL MEDICINE
Payer: MEDICARE

## 2020-06-30 DIAGNOSIS — Z95.0 CARDIAC PACEMAKER IN SITU: ICD-10-CM

## 2020-06-30 DIAGNOSIS — Z98.890 HX OF ATRIOVENTRICULAR NODE ABLATION: Primary | ICD-10-CM

## 2020-06-30 DIAGNOSIS — Z98.890 HX OF ATRIOVENTRICULAR NODE ABLATION: ICD-10-CM

## 2020-06-30 PROCEDURE — 93280 PM DEVICE PROGR EVAL DUAL: CPT | Performed by: INTERNAL MEDICINE

## 2020-06-30 NOTE — PROGRESS NOTES
Patient seen in device clinic one week follow AVNA and CRT-P implant.     1 episode of NSVT logged. EGM available shows 14 beats of VT with V rates in the 160-190s. Patient was sleeping and does not recall symptoms at time episode was logged. Will route MAYDA to Gloria Donnelly, patient following up with Gloria on 7/1/2020.

## 2020-07-01 ENCOUNTER — VIRTUAL VISIT (OUTPATIENT)
Dept: CARDIOLOGY | Facility: CLINIC | Age: 80
End: 2020-07-01
Attending: NURSE PRACTITIONER
Payer: MEDICARE

## 2020-07-01 VITALS — HEIGHT: 63 IN | WEIGHT: 137 LBS | HEART RATE: 70 BPM | BODY MASS INDEX: 24.27 KG/M2

## 2020-07-01 DIAGNOSIS — I10 ESSENTIAL HYPERTENSION: ICD-10-CM

## 2020-07-01 DIAGNOSIS — I42.9 SECONDARY CARDIOMYOPATHY (H): Primary | ICD-10-CM

## 2020-07-01 DIAGNOSIS — I48.19 PERSISTENT ATRIAL FIBRILLATION (H): ICD-10-CM

## 2020-07-01 PROCEDURE — 99442 ZZC PHYSICIAN TELEPHONE EVALUATION 11-20 MIN: CPT | Mod: 24 | Performed by: NURSE PRACTITIONER

## 2020-07-01 ASSESSMENT — MIFFLIN-ST. JEOR: SCORE: 1060.56

## 2020-07-01 NOTE — PATIENT INSTRUCTIONS
Call my nurse with any questions or concerns:  [phone]  *If you have concerns after hours, please call [phone], option 2 to speak with on call Cardiologist.    1. Medication changes from today:    No changes  Limited echo, draw BMP, and visit with  to review

## 2020-07-01 NOTE — PROGRESS NOTES
"Tomasa Fam is a 79 year old female who is being evaluated via a billable telephone visit.      The patient has been notified of following:     \"This telephone visit will be conducted via a call between you and your physician/provider. We have found that certain health care needs can be provided without the need for a physical exam.  This service lets us provide the care you need with a short phone conversation.  If a prescription is necessary we can send it directly to your pharmacy.  If lab work is needed we can place an order for that and you can then stop by our lab to have the test done at a later time.    Telephone visits are billed at different rates depending on your insurance coverage. During this emergency period, for some insurers they may be billed the same as an in-person visit.  Please reach out to your insurance provider with any questions.    If during the course of the call the physician/provider feels a telephone visit is not appropriate, you will not be charged for this service.\"    Patient has given verbal consent for Telephone visit?  Yes    What phone number would you like to be contacted at? [phone]    How would you like to obtain your AVS? Mail    Review Of Systems  Skin: NEGATIVE  Eyes:Ears/Nose/Throat: wears glasses  Respiratory: NEGATIVE  Cardiovascular:edema much improved  Gastrointestinal: a lot of gas in the chest relieved with Omeprazole but does not want to take it all the time  Genitourinary:NEGATIVE  Musculoskeletal: arthritis  Neurologic: NEGATIVE  Psychiatric: treated depression  Hematologic/Lymphatic/Immunologic: NEGATIVE  Endocrine:  NEGATIVE  Patient reported vitals today are:  Pulse 70  Weight  137.0 lb  Patient will have BP at time of telephone visit.   Patient has questions about her pacemaker.  Edema MUCH improved and loss of weight.   145/83  138/66    [facility]      Telephone number of patient: [phone]        EP ADNER NOTE:  This visit was " completed via telephone due to COVID-19 precautions.  The patient provided consent for a telephone visit.  I had the pleasure speaking to Tomasa Sarwat as part of a telephone visit today.    The patient is a delightful 79-year-old woman with the following chronic medical issues:  1. Hypertension  2. COPD  3. History of lung cancer status post thoracotomy  4. History of breast cancer status post bilateral mastectomy and Port-A-Cath placed right subclavian vein  5. Polymyalgia rheumatica on steroid therapy.  6. Symptomatic persistent atrial fibrillation who failed rhythm control after cardioversion and refused PVI ablation  7. 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  8. Recent AV node ablation and biventricular pacemaker implantation on 6/22/2020-[facility]    It is my pleasure having a follow-up phone visit with Chandrika.  We are unable to meet in person to the COVID-19 pandemic.  She is also 1 of our [place] patients and not excited about driving to our [place] location.  Since her procedure she reports that she is feeling much better.  She is able to get on shoes and her edema has almost completely resolved.  Her weight today is 137 lbs compared to 146 lbs in April.  She denies chest pain, shortness of breath, lightheadedness, dizziness.  She has been doing some brief walking and states that her exertional dyspnea has improved as well.  Her device rate was recently reduced to 70 bpm.    DIAGNOSTIC STUDIES:  Labs:    Component      Latest Ref Rng & Units 6/22/2020   Sodium      133 - 144 mmol/L 139   Potassium      3.4 - 5.3 mmol/L 4.2   Chloride      94 - 109 mmol/L 108   Carbon Dioxide      20 - 32 mmol/L 28   Anion Gap      3 - 14 mmol/L 3   Glucose      70 - 99 mg/dL 113 (H)   Urea Nitrogen      7 - 30 mg/dL 22   Creatinine      0.52 - 1.04 mg/dL 1.04   GFR Estimate      >60 mL/min/1.73:m2 50 (L)   GFR Estimate If Black      >60 mL/min/1.73:m2 58 (L)   Calcium      8.5 -  10.1 mg/dL 9.8   WBC      4.0 - 11.0 10e9/L 9.8   RBC Count      3.8 - 5.2 10e12/L 4.74   Hemoglobin      11.7 - 15.7 g/dL 13.5   Hematocrit      35.0 - 47.0 % 42.9   MCV      78 - 100 fl 91   MCH      26.5 - 33.0 pg 28.5   MCHC      31.5 - 36.5 g/dL 31.5   RDW      10.0 - 15.0 % 13.6   Platelet Count      150 - 450 10e9/L 241     DEVICE:  Patient seen in device clinic one week follow AVNA and CRT-P implant.      1 episode of NSVT logged. EGM available shows 14 beats of VT with V rates in the 160-190s. Patient was sleeping and does not recall symptoms at time episode was logged    12-lead ECG:  Afib, 100% BI-V paced  Echocardiogram:  Interpretation Summary 5/2020     The visual ejection fraction is estimated at 30-35%.  Left ventricular systolic function is moderately reduced.  Regional wall motion abnormalities cannot be excluded due to limited visualization. There was no IV available to give contrast according to the echo technician's note. Contrast would have enhance image quality.  There is mild to moderate (1-2+) mitral regurgitation.  There is moderate (2+) tricuspid regurgitation.  There is mild (1+) aortic regurgitation.  There is mild to moderate (1-2+) pulmonic valvular regurgitation.  Trivial pericardial effusion  The rhythm was rapid atrial fibrillation.    IMPRESSION:  1. Tachycardia mediated cardiomyopathy status post AV node ablation and CRT-P implant.  Patient's weight has been trending down almost 8 pounds since procedure and ankle edema has improved significantly.  She is now a functional class I/II  2. Persistent atrial fibrillation with chronic warfarin therapy  3. Hypertension stable on metoprolol tartrate 100 mg twice daily, amlodipine 2.5 mg daily, furosemide 80 mg daily  4. Nonsustained VT-14 beat run while patient was sleeping.  We will continue to monitor.  EGM sent to Dr. Raman for review      RECOMMENDATIONS:  1. Limited echo, BMP and  to review results in 1 month.    I am  pleased to hear how well Chandrika is feeling at today's visit.  She will follow-up with Dr. Sevilla after having a limited echo and a BMP.    We will continue to monitor her device for nonsustained VT.  I am hopeful that her EF will improve post procedure.  Due to her history of lung cancer with thoracotomy, amiodarone is not a good antiarrhythmic option for her.  Sotalol could be considered.    Thank you for including me in her care.    Gloria Donnelly NP, APRN CNP      Telephone visit documentation  Start: 2:03 pm  End: 2:18 pm  Time: 15

## 2020-07-01 NOTE — LETTER
7/1/2020    [place]  [address]  [facility] [address]    RE: Tomasa Ruff Fam       Dear Colleague,    I had the pleasure of seeing Tomasa Fam in the [facility].    Tomasa Fam is a 79 year old female who is being evaluated via a billable telephone visit.      EP ANDER NOTE:  This visit was completed via telephone due to COVID-19 precautions.  The patient provided consent for a telephone visit.  I had the pleasure speaking to Tomasa Fam as part of a telephone visit today.    The patient is a delightful 79-year-old woman with the following chronic medical issues:  1. Hypertension  2. COPD  3. History of lung cancer status post thoracotomy  4. History of breast cancer status post bilateral mastectomy and Port-A-Cath placed right subclavian vein  5. Polymyalgia rheumatica on steroid therapy.  6. Symptomatic persistent atrial fibrillation who failed rhythm control after cardioversion and refused PVI ablation  7. 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  8. Recent AV node ablation and biventricular pacemaker implantation on 6/22/2020-[employer]    It is my pleasure having a follow-up phone visit with Chandrika.  We are unable to meet in person to the COVID-19 pandemic.  She is also 1 of our [place] patients and not excited about driving to our [place] location.  Since her procedure she reports that she is feeling much better.  She is able to get on shoes and her edema has almost completely resolved.  Her weight today is 137 lbs compared to 146 lbs in April.  She denies chest pain, shortness of breath, lightheadedness, dizziness.  She has been doing some brief walking and states that her exertional dyspnea has improved as well.  Her device rate was recently reduced to 70 bpm.    DIAGNOSTIC STUDIES:  Labs:    Component      Latest Ref Rng & Units 6/22/2020   Sodium      133 - 144 mmol/L 139   Potassium      3.4 -  5.3 mmol/L 4.2   Chloride      94 - 109 mmol/L 108   Carbon Dioxide      20 - 32 mmol/L 28   Anion Gap      3 - 14 mmol/L 3   Glucose      70 - 99 mg/dL 113 (H)   Urea Nitrogen      7 - 30 mg/dL 22   Creatinine      0.52 - 1.04 mg/dL 1.04   GFR Estimate      >60 mL/min/1.73:m2 50 (L)   GFR Estimate If Black      >60 mL/min/1.73:m2 58 (L)   Calcium      8.5 - 10.1 mg/dL 9.8   WBC      4.0 - 11.0 10e9/L 9.8   RBC Count      3.8 - 5.2 10e12/L 4.74   Hemoglobin      11.7 - 15.7 g/dL 13.5   Hematocrit      35.0 - 47.0 % 42.9   MCV      78 - 100 fl 91   MCH      26.5 - 33.0 pg 28.5   MCHC      31.5 - 36.5 g/dL 31.5   RDW      10.0 - 15.0 % 13.6   Platelet Count      150 - 450 10e9/L 241     DEVICE:  Patient seen in device clinic one week follow AVNA and CRT-P implant.      1 episode of NSVT logged. EGM available shows 14 beats of VT with V rates in the 160-190s. Patient was sleeping and does not recall symptoms at time episode was logged    12-lead ECG:  Afib, 100% BI-V paced  Echocardiogram:  Interpretation Summary 5/2020     The visual ejection fraction is estimated at 30-35%.  Left ventricular systolic function is moderately reduced.  Regional wall motion abnormalities cannot be excluded due to limited visualization. There was no IV available to give contrast according to the echo technician's note. Contrast would have enhance image quality.  There is mild to moderate (1-2+) mitral regurgitation.  There is moderate (2+) tricuspid regurgitation.  There is mild (1+) aortic regurgitation.  There is mild to moderate (1-2+) pulmonic valvular regurgitation.  Trivial pericardial effusion  The rhythm was rapid atrial fibrillation.    IMPRESSION:  1. Tachycardia mediated cardiomyopathy status post AV node ablation and CRT-P implant.  Patient's weight has been trending down almost 8 pounds since procedure and ankle edema has improved significantly.  She is now a functional class I/II  2. Persistent atrial fibrillation with  chronic warfarin therapy  3. Hypertension stable on metoprolol tartrate 100 mg twice daily, amlodipine 2.5 mg daily, furosemide 80 mg daily  4. Nonsustained VT-14 beat run while patient was sleeping.  We will continue to monitor.  EGM sent to Dr. Raman for review      RECOMMENDATIONS:  1. Limited echo, BMP and  to review results in 1 month.    I am pleased to hear how well Chandrika is feeling at today's visit.  She will follow-up with Dr. Sevilla after having a limited echo and a BMP.    We will continue to monitor her device for nonsustained VT.  I am hopeful that her EF will improve post procedure.  Due to her history of lung cancer with thoracotomy, amiodarone is not a good antiarrhythmic option for her.  Sotalol could be considered.    Thank you for including me in her care.    Sincerely,     Gloria Donnelly NP, APRN CNP     [facility]

## 2020-07-02 LAB
MDC_IDC_LEAD_IMPLANT_DT: NORMAL
MDC_IDC_LEAD_IMPLANT_DT: NORMAL
MDC_IDC_LEAD_LOCATION: NORMAL
MDC_IDC_LEAD_LOCATION: NORMAL
MDC_IDC_LEAD_LOCATION_DETAIL_1: NORMAL
MDC_IDC_LEAD_LOCATION_DETAIL_1: NORMAL
MDC_IDC_LEAD_MFG: NORMAL
MDC_IDC_LEAD_MFG: NORMAL
MDC_IDC_LEAD_MODEL: NORMAL
MDC_IDC_LEAD_MODEL: NORMAL
MDC_IDC_LEAD_POLARITY_TYPE: NORMAL
MDC_IDC_LEAD_SERIAL: NORMAL
MDC_IDC_LEAD_SERIAL: NORMAL
MDC_IDC_MSMT_BATTERY_STATUS: NORMAL
MDC_IDC_MSMT_LEADCHNL_LV_IMPEDANCE_VALUE: 876 OHM
MDC_IDC_MSMT_LEADCHNL_LV_PACING_THRESHOLD_AMPLITUDE: 1.5 V
MDC_IDC_MSMT_LEADCHNL_LV_PACING_THRESHOLD_PULSEWIDTH: 0.4 MS
MDC_IDC_MSMT_LEADCHNL_LV_SENSING_INTR_AMPL: NORMAL
MDC_IDC_MSMT_LEADCHNL_RA_IMPEDANCE_VALUE: 3000 OHM
MDC_IDC_MSMT_LEADCHNL_RV_IMPEDANCE_VALUE: 815 OHM
MDC_IDC_MSMT_LEADCHNL_RV_PACING_THRESHOLD_AMPLITUDE: 0.4 V
MDC_IDC_MSMT_LEADCHNL_RV_PACING_THRESHOLD_PULSEWIDTH: 0.4 MS
MDC_IDC_MSMT_LEADCHNL_RV_SENSING_INTR_AMPL: NORMAL
MDC_IDC_PG_IMPLANT_DTM: NORMAL
MDC_IDC_PG_MFG: NORMAL
MDC_IDC_PG_MODEL: NORMAL
MDC_IDC_PG_SERIAL: NORMAL
MDC_IDC_PG_TYPE: NORMAL
MDC_IDC_SESS_CLINIC_NAME: NORMAL
MDC_IDC_SESS_DTM: NORMAL
MDC_IDC_SESS_TYPE: NORMAL
MDC_IDC_SET_BRADY_AT_MODE_SWITCH_MODE: NORMAL
MDC_IDC_SET_BRADY_LOWRATE: 70 {BEATS}/MIN
MDC_IDC_SET_BRADY_MAX_SENSOR_RATE: 130 {BEATS}/MIN
MDC_IDC_SET_BRADY_MODE: NORMAL
MDC_IDC_SET_BRADY_PAV_DELAY_HIGH: 180 MS
MDC_IDC_SET_BRADY_PAV_DELAY_LOW: 180 MS
MDC_IDC_SET_BRADY_SAV_DELAY_LOW: 120 MS
MDC_IDC_SET_CRT_LVRV_DELAY: 30 MS
MDC_IDC_SET_CRT_PACED_CHAMBERS: NORMAL
MDC_IDC_SET_LEADCHNL_LV_PACING_AMPLITUDE: 3.5 V
MDC_IDC_SET_LEADCHNL_LV_PACING_CAPTURE_MODE: NORMAL
MDC_IDC_SET_LEADCHNL_LV_PACING_POLARITY: NORMAL
MDC_IDC_SET_LEADCHNL_LV_PACING_PULSEWIDTH: 0.4 MS
MDC_IDC_SET_LEADCHNL_LV_SENSING_ADAPTATION_MODE: NORMAL
MDC_IDC_SET_LEADCHNL_LV_SENSING_POLARITY: NORMAL
MDC_IDC_SET_LEADCHNL_LV_SENSING_SENSITIVITY: 2.5 MV
MDC_IDC_SET_LEADCHNL_RA_PACING_CAPTURE_MODE: NORMAL
MDC_IDC_SET_LEADCHNL_RA_PACING_POLARITY: NORMAL
MDC_IDC_SET_LEADCHNL_RA_SENSING_ADAPTATION_MODE: NORMAL
MDC_IDC_SET_LEADCHNL_RA_SENSING_POLARITY: NORMAL
MDC_IDC_SET_LEADCHNL_RA_SENSING_SENSITIVITY: 0.5 MV
MDC_IDC_SET_LEADCHNL_RV_PACING_AMPLITUDE: 3.5 V
MDC_IDC_SET_LEADCHNL_RV_PACING_CAPTURE_MODE: NORMAL
MDC_IDC_SET_LEADCHNL_RV_PACING_POLARITY: NORMAL
MDC_IDC_SET_LEADCHNL_RV_PACING_PULSEWIDTH: 0.4 MS
MDC_IDC_SET_LEADCHNL_RV_SENSING_ADAPTATION_MODE: NORMAL
MDC_IDC_SET_LEADCHNL_RV_SENSING_POLARITY: NORMAL
MDC_IDC_SET_LEADCHNL_RV_SENSING_SENSITIVITY: 2.5 MV
MDC_IDC_SET_ZONE_DETECTION_INTERVAL: 375 MS
MDC_IDC_SET_ZONE_TYPE: NORMAL
MDC_IDC_SET_ZONE_VENDOR_TYPE: NORMAL
MDC_IDC_STAT_EPISODE_RECENT_COUNT: 1
MDC_IDC_STAT_EPISODE_RECENT_COUNT_DTM_END: NORMAL
MDC_IDC_STAT_EPISODE_RECENT_COUNT_DTM_START: NORMAL
MDC_IDC_STAT_EPISODE_TOTAL_COUNT: 1
MDC_IDC_STAT_EPISODE_TOTAL_COUNT_DTM_END: NORMAL
MDC_IDC_STAT_EPISODE_TYPE: NORMAL
MDC_IDC_STAT_EPISODE_TYPE: NORMAL
MDC_IDC_STAT_EPISODE_VENDOR_TYPE: NORMAL
MDC_IDC_STAT_EPISODE_VENDOR_TYPE: NORMAL

## 2020-07-08 ENCOUNTER — ANTICOAGULATION THERAPY VISIT (OUTPATIENT)
Dept: FAMILY MEDICINE | Facility: CLINIC | Age: 80
End: 2020-07-08

## 2020-07-08 DIAGNOSIS — I48.0 PAROXYSMAL ATRIAL FIBRILLATION (H): ICD-10-CM

## 2020-07-08 LAB
CAPILLARY BLOOD COLLECTION: NORMAL
INR PPP: 2.9 (ref 0.86–1.14)

## 2020-07-08 PROCEDURE — 85610 PROTHROMBIN TIME: CPT | Performed by: FAMILY MEDICINE

## 2020-07-08 PROCEDURE — 36416 COLLJ CAPILLARY BLOOD SPEC: CPT | Performed by: FAMILY MEDICINE

## 2020-07-08 NOTE — PROGRESS NOTES
ANTICOAGULATION MANAGEMENT     Patient Name:  Tomasa Fam  Date:  2020    ASSESSMENT /SUBJECTIVE:    Today's INR result of 2.9 is therapeutic. Goal INR of 2.0-3.0      Warfarin dose taken: Warfarin taken as previously instructed, took 7.5mg  as instructed, no missed doses in past week    Diet: No new diet changes affecting INR, patient asked if ok to eat a few salads this week and what it does to INR, explained more greens than usual can cause decrease in INR, advised ok to eat consistent amounts of salad in diet     Medication changes/ interactions: No new medications/supplements affecting INR    Previous INR: Subtherapeutic     S/S of bleeding or thromboembolism: No, bruising at pacemaker incision site fading    New injury or illness: No    Upcoming surgery, procedure or cardioversion: No    Additional findings: None      PLAN:    Spoke with Tomasa regarding INR result and instructed:     Warfarin Dosing Instructions: Continue your current warfarin dose 5 mg every Mon; 2.5 mg all other days    Instructed patient to follow up no later than: 2 weeks  Lab visit scheduled    Education provided: Importance of consistent vitamin K intake, Impact of vitamin K foods on INR and Target INR goal and significance of current INR result      Chandrika verbalizes understanding and agrees to warfarin dosing plan.    Instructed to call the Anticoagulation Clinic for any changes, questions or concerns. (#[phone])     Patricia Beltran, PharmD, [facility]      OBJECTIVE:  INR   Date Value Ref Range Status   2020 2.90 (H) 0.86 - 1.14 Final     Comment:     This test is intended for monitoring Coumadin therapy.  Results are not   accurate in patients with prolonged INR due to factor deficiency.           No question data found.  Anticoagulation Summary  As of 2020    INR goal:   2.0-3.0   TTR:   71.4 % (7.8 mo)   INR used for dosin.90 (2020)   Warfarin maintenance plan:   5 mg (2.5  mg x 2) every Mon; 2.5 mg (2.5 mg x 1) all other days   Full warfarin instructions:   5 mg every Mon; 2.5 mg all other days   Weekly warfarin total:   20 mg   No change documented:   Patricia Beltran [facility]   Plan last modified:   Sandie Sanchez RN (5/28/2020)   Next INR check:   7/22/2020   Priority:   Maintenance   Target end date:   Indefinite    Indications    Paroxysmal atrial fibrillation (H) [I48.0]             Anticoagulation Episode Summary     INR check location:   Anticoagulation Clinic    Preferred lab:       Send INR reminders to:   PK WEAVER    Comments:   No Bandaid // 2.5 mg tab // patient started on Xarelto, transition to warfarin 11/6/19      Anticoagulation Care Providers     Provider Role Specialty Phone number    Violet Fenton MD Referring [facility] [phone]

## 2020-07-16 ENCOUNTER — TRANSFERRED RECORDS (OUTPATIENT)
Dept: HEALTH INFORMATION MANAGEMENT | Facility: CLINIC | Age: 80
End: 2020-07-16

## 2020-07-16 LAB
ALT SERPL-CCNC: 28 IU/L (ref 5–35)
AST SERPL-CCNC: 23 U/L (ref 5–34)
CREAT SERPL-MCNC: 0.78 MG/DL (ref 0.5–1.3)

## 2020-07-23 ENCOUNTER — ANTICOAGULATION THERAPY VISIT (OUTPATIENT)
Dept: NURSING | Facility: CLINIC | Age: 80
End: 2020-07-23

## 2020-07-23 ENCOUNTER — OFFICE VISIT (OUTPATIENT)
Dept: FAMILY MEDICINE | Facility: CLINIC | Age: 80
End: 2020-07-23
Payer: MEDICARE

## 2020-07-23 VITALS
HEART RATE: 70 BPM | HEIGHT: 63 IN | WEIGHT: 133.7 LBS | TEMPERATURE: 97.6 F | SYSTOLIC BLOOD PRESSURE: 122 MMHG | RESPIRATION RATE: 16 BRPM | DIASTOLIC BLOOD PRESSURE: 68 MMHG | OXYGEN SATURATION: 97 % | BODY MASS INDEX: 23.69 KG/M2

## 2020-07-23 DIAGNOSIS — J44.9 CHRONIC OBSTRUCTIVE PULMONARY DISEASE, UNSPECIFIED COPD TYPE (H): ICD-10-CM

## 2020-07-23 DIAGNOSIS — I48.0 PAROXYSMAL ATRIAL FIBRILLATION (H): ICD-10-CM

## 2020-07-23 DIAGNOSIS — M35.3 PMR (POLYMYALGIA RHEUMATICA) (H): ICD-10-CM

## 2020-07-23 DIAGNOSIS — Z85.118 HISTORY OF LUNG CANCER: ICD-10-CM

## 2020-07-23 DIAGNOSIS — R53.83 DECREASED ENERGY: Primary | ICD-10-CM

## 2020-07-23 DIAGNOSIS — F43.9 STRESS: ICD-10-CM

## 2020-07-23 DIAGNOSIS — Z79.52 LONG TERM SYSTEMIC STEROID USER: ICD-10-CM

## 2020-07-23 LAB
CAPILLARY BLOOD COLLECTION: NORMAL
INR PPP: 5.7 (ref 0.86–1.14)

## 2020-07-23 PROCEDURE — 36416 COLLJ CAPILLARY BLOOD SPEC: CPT | Performed by: FAMILY MEDICINE

## 2020-07-23 PROCEDURE — 96127 BRIEF EMOTIONAL/BEHAV ASSMT: CPT | Performed by: FAMILY MEDICINE

## 2020-07-23 PROCEDURE — 99214 OFFICE O/P EST MOD 30 MIN: CPT | Performed by: FAMILY MEDICINE

## 2020-07-23 PROCEDURE — 85610 PROTHROMBIN TIME: CPT | Performed by: FAMILY MEDICINE

## 2020-07-23 PROCEDURE — 99207 ZZC NO CHARGE NURSE ONLY: CPT

## 2020-07-23 RX ORDER — FLUOXETINE 10 MG/1
10 CAPSULE ORAL DAILY
Qty: 90 CAPSULE | Refills: 1 | COMMUNITY
Start: 2020-07-23 | End: 2020-10-31 | Stop reason: DRUGHIGH

## 2020-07-23 RX ORDER — SULFAMETHOXAZOLE/TRIMETHOPRIM 800-160 MG
TABLET ORAL
COMMUNITY
Start: 2020-07-16 | End: 2020-08-05

## 2020-07-23 ASSESSMENT — PATIENT HEALTH QUESTIONNAIRE - PHQ9
5. POOR APPETITE OR OVEREATING: NOT AT ALL
SUM OF ALL RESPONSES TO PHQ QUESTIONS 1-9: 5

## 2020-07-23 ASSESSMENT — ANXIETY QUESTIONNAIRES
GAD7 TOTAL SCORE: 1
2. NOT BEING ABLE TO STOP OR CONTROL WORRYING: NOT AT ALL
6. BECOMING EASILY ANNOYED OR IRRITABLE: NOT AT ALL
1. FEELING NERVOUS, ANXIOUS, OR ON EDGE: SEVERAL DAYS
7. FEELING AFRAID AS IF SOMETHING AWFUL MIGHT HAPPEN: NOT AT ALL
3. WORRYING TOO MUCH ABOUT DIFFERENT THINGS: NOT AT ALL
5. BEING SO RESTLESS THAT IT IS HARD TO SIT STILL: NOT AT ALL
IF YOU CHECKED OFF ANY PROBLEMS ON THIS QUESTIONNAIRE, HOW DIFFICULT HAVE THESE PROBLEMS MADE IT FOR YOU TO DO YOUR WORK, TAKE CARE OF THINGS AT HOME, OR GET ALONG WITH OTHER PEOPLE: SOMEWHAT DIFFICULT

## 2020-07-23 ASSESSMENT — MIFFLIN-ST. JEOR: SCORE: 1045.59

## 2020-07-23 NOTE — PROGRESS NOTES
"ANTICOAGULATION FOLLOW-UP CLINIC VISIT    Patient Name:  Tomasa Fam  Date:  2020  Contact Type:  Telephone    SUBJECTIVE:  Patient Findings     Positives:   Change in health (Pt has polymyalgia rheumatica, saw rheumatologist on  and diagnosed with \"Giant cell arthritis\".  Pt also saw PCP today for fatigue), Change in medications (Started Bactrim , will take 3 times per week x 4 weeks. Also taking 50mg PDN currently, started Citracal with D with BID dosing.  PCP increased Prozac from 10 to 20mg QD today)    Comments:   Warfarin maintenance dose reduced 18% per protocol.  Nan Sauceda ACC PharmD reviewed and approved.  Patient does not eat any dark greens so unable to have any tonight, will check INR again on 20 and will review new warfarin maintenance dose at that time.        Clinical Outcomes     Negatives:   Major bleeding event, Thromboembolic event, Anticoagulation-related hospital admission, Anticoagulation-related ED visit, Anticoagulation-related fatality    Comments:   Warfarin maintenance dose reduced 18% per protocol.  SARA Menchaca PharmD reviewed and approved.  Patient does not eat any dark greens so unable to have any tonight, will check INR again on 20 and will review new warfarin maintenance dose at that time.           OBJECTIVE    Recent labs: (last 7 days)     20  1442   INR 5.70*       ASSESSMENT / PLAN  INR assessment SUPRA    Recheck INR In: 1 DAY    INR Location Clinic      Anticoagulation Summary  As of 2020    INR goal:   2.0-3.0   TTR:   67.3 % (8.3 mo)   INR used for dosin.70! (2020)   Warfarin maintenance plan:   1.25 mg (2.5 mg x 0.5) every Fri; 2.5 mg (2.5 mg x 1) all other days   Full warfarin instructions:   : Hold; Otherwise 1.25 mg every Fri; 2.5 mg all other days   Weekly warfarin total:   16.25 mg   Plan last modified:   Theresa Lorenzo RN (2020)   Next INR check:   2020   Priority:   High   Target end " date:   Indefinite    Indications    Paroxysmal atrial fibrillation (H) [I48.0]             Anticoagulation Episode Summary     INR check location:   Anticoagulation Clinic    Preferred lab:       Send INR reminders to:   PK WEAVER    Comments:   No Bandaid // 2.5 mg tab // patient started on Xarelto, transition to warfarin 11/6/19      Anticoagulation Care Providers     Provider Role Specialty Phone number    Violet Fenton MD Referring [facility] [phone]            See the Encounter Report to view Anticoagulation Flowsheet and Dosing Calendar (Go to Encounters tab in chart review, and find the Anticoagulation Therapy Visit)        Theresa Lorenzo RN

## 2020-07-23 NOTE — PATIENT INSTRUCTIONS
Component      Latest Ref Rng & Units 11/22/2019 1/17/2020 3/19/2020 5/4/2020   Sed Rate      0 - 30 mm/h 59 (H) 8 10 13   CRP Inflammation      0.0 - 8.0 mg/L 94.0 (H) <2.9 <2.9 7.4

## 2020-07-23 NOTE — PROGRESS NOTES
Subjective     Tomasa Fam is a 79 year old female who presents to clinic today for the following health issues:    HPI       Here to discuss getting on an increased dose of Fluoxetine or medication change.  Feeling more depressed.   On Monday was not able to walk- unbalanced -running into walls.  Started have back and leg pain. Had a pace maker placed and still having difficulty breathing while climbing stairs.         See under ROS     Patient Active Problem List   Diagnosis     Chronic airway obstruction (H)     ASCUS on Pap smear     Hyperlipidemia LDL goal <160     Breast cancer (H)     Essential hypertension with goal blood pressure less than 140/90     Cystocele, midline     Uterovaginal prolapse     S/P hysterectomy     Advanced directives, counseling/discussion     History of hypokalemia     Concussion     Thyroid nodule     Chronic pain of both knees     History of lung cancer     Paroxysmal atrial fibrillation (H)     Gastroesophageal reflux disease, esophagitis presence not specified     PMR (polymyalgia rheumatica) (H)     Personal history of malignant neoplasm of breast     Long term systemic steroid user     Cardiomyopathy (H)     Complete heart block (H)     A-fib (H)       Current Outpatient Medications   Medication Sig Dispense Refill     amLODIPine (NORVASC) 2.5 MG tablet Take 1 tablet (2.5 mg) by mouth daily 180 tablet 1     B Complex Vitamins (VITAMIN  B COMPLEX) tablet Take 1 tablet by mouth daily.       calcium-vitamin D (CALTRATE) 600-400 MG-UNIT per tablet Take 4 tablets by mouth daily        ezetimibe (ZETIA) 10 MG tablet TAKE 1 TABLET(10 MG) BY MOUTH DAILY 90 tablet 0     FLUoxetine (PROZAC) 10 MG capsule TAKE 1 CAPSULE(10 MG) BY MOUTH DAILY 90 capsule 1     furosemide (LASIX) 40 MG tablet Take 2 tablets (80 mg) by mouth daily 60 tablet 6     metoprolol tartrate (LOPRESSOR) 100 MG tablet Take 1 tablet (100 mg) by mouth 2 times daily 60 tablet 6     omega 3 1000 MG CAPS Take 1 g by  mouth daily 90 capsule      omeprazole (PRILOSEC) 20 MG DR capsule Take 1 capsule (20 mg) by mouth daily (Patient taking differently: Take 20 mg by mouth as needed ) 90 capsule 1     polyethylene glycol (MIRALAX/GLYCOLAX) powder Take 1 capful by mouth daily as needed for constipation       potassium chloride ER (KLOR-CON M) 20 MEQ CR tablet Take 1 tablet (20 mEq) by mouth 2 times daily 60 tablet 6     predniSONE (DELTASONE) 1 MG tablet Take 5 mg by mouth daily Taking 50 mg daily and then this weekend change to 40 mg 60 tablet 0     sulfamethoxazole-trimethoprim (BACTRIM DS) 800-160 MG tablet Takes on Monday, Wednesday and Friday       warfarin ANTICOAGULANT (COUMADIN) 2.5 MG tablet TAKE 2 TABLETS BY MOUTH MONDAY AND FRIDAY AND TAKE ONE TABLET BY MOUTH ALL OTHER DAYS OF THE WEEK (Patient taking differently: TAKE 2 TABLETS BY MOUTH MONDAY  AND TAKE ONE TABLET BY MOUTH ALL OTHER DAYS OF THE WEEK) 110 tablet 0         Reviewed and updated as needed this visit by Provider         Review of Systems   CONSTITUTIONAL: has lost some weight after treatment of her a fib.  RESP:see below  CV: see below  PSYCHIATRIC: see below    Has a Pacemaker now.  Still feeling like she can't breathe well.   Notes was good for awhile. She can now see her ankles.   Then saw Rheumatologist. She notes she was put on 50 mg prednisone daily when had head and neck stiffness.  This all went away x for her chewing is still a little tough. Has not liked being on prednisone on lower doses.  Anticipating decreasing to 40 mg prednisone.     Notes pain in buttocks and leg still there.     Difficult time walking. Fell into walls etc. This was an episode. Now afraid to go to the grocery store.   Notes that was early with her new medications.     A neighbor has suggested another medication (in place of fluoxetine) but not sure which one.   This neighbor has worked in health care and told her it would be better.     Did lose some weight.   Can see her ankles  "now. Since pacemaker was put in.    She also notes there was something said about her liver previously.      Objective    /68 (BP Location: Right arm, Cuff Size: Adult Regular)   Pulse 70   Temp 97.6  F (36.4  C) (Oral)   Resp 16   Ht 1.6 m (5' 3\")   Wt 60.6 kg (133 lb 11.2 oz)   LMP  (LMP Unknown)   SpO2 97%   BMI 23.68 kg/m    Body mass index is 23.68 kg/m .  Physical Exam   GENERAL APPEARANCE: alert and no distress  RESP: lungs clear to auscultation - no rales, rhonchi or wheezes  CV: regular rates and rhythm  MS: no edema.   PSYCH: mentation appears normal and affect normal    ALT now normal (28)    PHQ-9 SCORE 3/15/2019 4/9/2020 7/23/2020   PHQ-9 Total Score 1 7 5       RYANNE-7 SCORE 3/15/2019 4/9/2020 7/23/2020   Total Score 0 0 1                   Assessment & Plan     1. Decreased energy  She has had changes in her health over the last year or so with developing PMR, side effects prednisone and the a fib. I do suspect this contributes as well as the stress that goes along with this. She is on very low dose fluoxetine. Discussed this; will increase dose.  Discussed serotonin specific reuptake inhibitor; I am also not sure what her neighbor would feel is better, though would be happy to review with her if she gets the name.  Discussed fluoxetine will tend to be more activating than many of the others, which she likes that idea.   Have increased dose to 20 mg; she notes she has a lot of the 10 at home.   Did review her scores for PHQ9 and GAD7.  - FLUoxetine (PROZAC) 10 MG capsule; Take 2 capsules (20 mg) by mouth daily  Dispense: 90 capsule; Refill: 1  - Capillary Blood Collection    2. Stress  As above.   - FLUoxetine (PROZAC) 10 MG capsule; Take 2 capsules (20 mg) by mouth daily  Dispense: 90 capsule; Refill: 1    3. Paroxysmal atrial fibrillation (H)  She has been symptomatic with this. Now has a pacemaker. Follows with Cardiology.  - INR    4. PMR (polymyalgia rheumatica) (H)  Thought to " possibly have temporal arteritis now as well. Prednisone dose was increased. She is following with Rheumatologist now.  Did review some of her labs with her and explained the sed rate and CRP.     5. Chronic obstructive pulmonary disease, unspecified COPD type (H)  This may also be contributing to her short of breath.    6. History of lung cancer  She has had surgery for this. This may be contributing.     7. Long term systemic steroid user  As above.     8. Elevation of level of transaminase or lactic acid dehydrogenase (LDH)  Reviewed. Now normal.  I suspect that this may have been due to some liver congestion with the arrhythmia.           Patient Instructions     Component      Latest Ref Rng & Units 11/22/2019 1/17/2020 3/19/2020 5/4/2020   Sed Rate      0 - 30 mm/h 59 (H) 8 10 13   CRP Inflammation      0.0 - 8.0 mg/L 94.0 (H) <2.9 <2.9 7.4       Return in about 4 weeks (around 8/20/2020).    Violet Fenton MD, MD  [facility]

## 2020-07-24 ENCOUNTER — ANTICOAGULATION THERAPY VISIT (OUTPATIENT)
Dept: NURSING | Facility: CLINIC | Age: 80
End: 2020-07-24

## 2020-07-24 DIAGNOSIS — I48.0 PAROXYSMAL ATRIAL FIBRILLATION (H): ICD-10-CM

## 2020-07-24 LAB
CAPILLARY BLOOD COLLECTION: NORMAL
INR PPP: 4.4 (ref 0.86–1.14)

## 2020-07-24 PROCEDURE — 99207 ZZC NO CHARGE NURSE ONLY: CPT

## 2020-07-24 PROCEDURE — 36416 COLLJ CAPILLARY BLOOD SPEC: CPT | Performed by: FAMILY MEDICINE

## 2020-07-24 PROCEDURE — 85610 PROTHROMBIN TIME: CPT | Performed by: FAMILY MEDICINE

## 2020-07-24 ASSESSMENT — ANXIETY QUESTIONNAIRES: GAD7 TOTAL SCORE: 1

## 2020-07-24 NOTE — PROGRESS NOTES
"ANTICOAGULATION FOLLOW-UP CLINIC VISIT    Patient Name:  Tomasa Fam  Date:  7/24/2020  Contact Type:  Telephone    SUBJECTIVE:  Patient Findings     Positives:   Change in health, Bruising    Comments:   Per Anticoag visit yesterday:   Pt has polymyalgia rheumatica, saw rheumatologist on 07/17 and diagnosed with \"Giant cell arthritis\".  Pt also saw PCP 7/23 for fatigue. She started Bactrim 07/17, will take 3 times per week x 4 weeks. Also taking 50 mg PDN currently, and started Citracal with D, BID dosing.  PCP increased Prozac from 10 to 20mg QD today. Warfarin maintenance dose was reduced 18% per protocol.  Nan Sauceda, [facility] PharmD, reviewed and approved. Patient does not eat any dark greens so unable to have any tonight. After INR checked 7/24, review new warfarin maintenance dose.  Huddled with Nan Sauceda, Pharm D, again, to review future dosing recommendations: Advise holding the 1.25 mg warfarin today, continue with new maintenance dosing, and recheck 1 week for original high, 5/30/20.  Called patient to discuss today's INR results: Patient states ever since she started the Bactrim she has been very dizzy and weak, she has spoken about this with her Rheumatologist and her PCP, but no changes have been made. Advised patient change positions very slowly, could possibly get a temporary walker? Patient is aware she is at high risk for bleeding if she would fall. Her legs are pretty banged up currently with bruises. Informed patient of hold today then 2.5 mg daily until next INR in 6 days. Patient stated understanding and is in agreement with plan.  Yue NICOLAS RN  Anticoagulation Clinic  [place]            Clinical Outcomes     Comments:   Per Anticoag visit yesterday:   Pt has polymyalgia rheumatica, saw rheumatologist on 07/17 and diagnosed with \"Giant cell arthritis\".  Pt also saw PCP 7/23 for fatigue. She started Bactrim 07/17, will take 3 times per week x 4 weeks. Also taking 50 mg PDN " currently, and started Citracal with D, BID dosing.  PCP increased Prozac from 10 to 20mg QD today. Warfarin maintenance dose was reduced 18% per protocol.  Nan Sauceda, [facility] PharmD, reviewed and approved. Patient does not eat any dark greens so unable to have any tonight. After INR checked , review new warfarin maintenance dose.  Huddled with Nan Sauceda, Pharm D, again, to review future dosing recommendations: Advise holding the 1.25 mg warfarin today, continue with new maintenance dosing, and recheck 1 week for original high, 20.  Called patient to discuss today's INR results: Patient states ever since she started the Bactrim she has been very dizzy and weak, she has spoken about this with her Rheumatologist and her PCP, but no changes have been made. Advised patient change positions very slowly, could possibly get a temporary walker? Patient is aware she is at high risk for bleeding if she would fall. Her legs are pretty banged up currently with bruises. Informed patient of hold today then 2.5 mg daily until next INR in 6 days. Patient stated understanding and is in agreement with plan.  Yue NICOLAS RN  Anticoagulation Clinic  [place]               OBJECTIVE    Recent labs: (last 7 days)     20  0945   INR 4.40*       ASSESSMENT / PLAN  INR assessment SUPRA    Recheck INR In: 6 DAYS    INR Location Clinic      Anticoagulation Summary  As of 2020    INR goal:   2.0-3.0   TTR:   67.1 % (8.4 mo)   INR used for dosin.40! (2020)   Warfarin maintenance plan:   1.25 mg (2.5 mg x 0.5) every Fri; 2.5 mg (2.5 mg x 1) all other days   Full warfarin instructions:   : Hold; Otherwise 1.25 mg every Fri; 2.5 mg all other days   Weekly warfarin total:   16.25 mg   Plan last modified:   Theresa Lorenzo RN (2020)   Next INR check:   2020   Priority:   High   Target end date:   Indefinite    Indications    Paroxysmal atrial fibrillation (H) [I48.0]             Anticoagulation Episode  Summary     INR check location:   Anticoagulation Clinic    Preferred lab:       Send INR reminders to:   PK WEAVER    Comments:   No Bandaid // 2.5 mg tab // patient started on Xarelto, transition to warfarin 11/6/19      Anticoagulation Care Providers     Provider Role Specialty Phone number    Violet Fenton MD Referring [facility] [phone]            See the Encounter Report to view Anticoagulation Flowsheet and Dosing Calendar (Go to Encounters tab in chart review, and find the Anticoagulation Therapy Visit)    Dosage adjustment made based on physician directed care plan.    Yue Martínez RN

## 2020-07-30 ENCOUNTER — TELEPHONE (OUTPATIENT)
Dept: FAMILY MEDICINE | Facility: CLINIC | Age: 80
End: 2020-07-30

## 2020-07-30 ENCOUNTER — ANTICOAGULATION THERAPY VISIT (OUTPATIENT)
Dept: NURSING | Facility: CLINIC | Age: 80
End: 2020-07-30

## 2020-07-30 DIAGNOSIS — I48.91 ATRIAL FIBRILLATION, UNSPECIFIED TYPE (H): ICD-10-CM

## 2020-07-30 DIAGNOSIS — I48.0 PAROXYSMAL ATRIAL FIBRILLATION (H): ICD-10-CM

## 2020-07-30 DIAGNOSIS — B37.0 THRUSH: ICD-10-CM

## 2020-07-30 DIAGNOSIS — Z79.01 LONG TERM CURRENT USE OF ANTICOAGULANT THERAPY: Primary | ICD-10-CM

## 2020-07-30 DIAGNOSIS — R79.1 SUPRATHERAPEUTIC INR: ICD-10-CM

## 2020-07-30 DIAGNOSIS — Z79.01 LONG TERM CURRENT USE OF ANTICOAGULANTS WITH INR GOAL OF 2.0-3.0: ICD-10-CM

## 2020-07-30 LAB
CAPILLARY BLOOD COLLECTION: NORMAL
INR PPP: 6.3 (ref 0.86–1.14)

## 2020-07-30 PROCEDURE — 99207 ZZC NO CHARGE NURSE ONLY: CPT

## 2020-07-30 PROCEDURE — 36416 COLLJ CAPILLARY BLOOD SPEC: CPT | Performed by: FAMILY MEDICINE

## 2020-07-30 PROCEDURE — 85610 PROTHROMBIN TIME: CPT | Performed by: FAMILY MEDICINE

## 2020-07-30 RX ORDER — WARFARIN SODIUM 2.5 MG/1
TABLET ORAL
Qty: 90 TABLET | Refills: 0 | Status: SHIPPED | OUTPATIENT
Start: 2020-07-30 | End: 2020-08-31

## 2020-07-30 RX ORDER — NYSTATIN 100000/ML
500000 SUSPENSION, ORAL (FINAL DOSE FORM) ORAL 4 TIMES DAILY
Qty: 400 ML | Refills: 0 | Status: SHIPPED | OUTPATIENT
Start: 2020-07-30 | End: 2020-08-31

## 2020-07-30 NOTE — PROGRESS NOTES
"ANTICOAGULATION FOLLOW-UP CLINIC VISIT    Patient Name:  Tomasa Fam  Date:  7/30/2020  Contact Type:  Telephone   Chart reviewed with Nan Sauceda, ACC PharmD    SUBJECTIVE:  Patient Findings     Positives:   Change in health (Still feeling fatigued and states it is difficult for her to walk in a straight line; however, she describes pain in her upper thigh and buttocks that is causing her to walk differently.  Pt states she has had bursitis in the past.), Change in medications (Started Bactrim on 07/17 due to taking high dose PDN for long period of time.), Bruising (some bruises on bilateral legs from \"running into things\", pt denies any painful bruises but some are \"big\")    Comments:   Patient also informed me that she has a white coating in her mouth that she keeps scraping out, patient most likely has thrush, sent note to PCP.  Patient denies any headache or visual changes, BM's are NOT dark, more green in color.  I encouraged patient to call rheumatology to discuss the side effects she is experiencing since starting Bactrim, she does have in-clinic follow up with them on 08/04/20.        Clinical Outcomes     Negatives:   Major bleeding event, Thromboembolic event, Anticoagulation-related hospital admission, Anticoagulation-related ED visit, Anticoagulation-related fatality    Comments:   Patient also informed me that she has a white coating in her mouth that she keeps scraping out, patient most likely has thrush, sent note to PCP.  Patient denies any headache or visual changes, BM's are NOT dark, more green in color.  I encouraged patient to call rheumatology to discuss the side effects she is experiencing since starting Bactrim, she does have in-clinic follow up with them on 08/04/20.           OBJECTIVE    Recent labs: (last 7 days)     07/30/20  0928   INR 6.30*       ASSESSMENT / PLAN  INR assessment SUPRA    Recheck INR In: 4 DAYS    INR Location Clinic    Vit K given? None      Anticoagulation " Summary  As of 2020    INR goal:   2.0-3.0   TTR:   65.5 % (8.6 mo)   INR used for dosin.30! (2020)   Warfarin maintenance plan:   1.25 mg (2.5 mg x 0.5) every Mon, Wed, Fri; 2.5 mg (2.5 mg x 1) all other days   Full warfarin instructions:   : Hold; : Hold; : Hold; Otherwise 1.25 mg every Mon, Wed, Fri; 2.5 mg all other days   Weekly warfarin total:   13.75 mg   Plan last modified:   Theresa Lorenzo RN (2020)   Next INR check:   8/3/2020   Priority:   High   Target end date:   Indefinite    Indications    Paroxysmal atrial fibrillation (H) [I48.0]             Anticoagulation Episode Summary     INR check location:   Anticoagulation Clinic    Preferred lab:       Send INR reminders to:   PK WEAVER    Comments:   No Bandaid // 2.5 mg tab // patient started on Xarelto, transition to warfarin 19      Anticoagulation Care Providers     Provider Role Specialty Phone number    Violet Fenton MD Referring [facility] [phone]            See the Encounter Report to view Anticoagulation Flowsheet and Dosing Calendar (Go to Encounters tab in chart review, and find the Anticoagulation Therapy Visit)        Theresa Lorenzo RN

## 2020-07-30 NOTE — TELEPHONE ENCOUNTER
Let's go ahead with a cbc.    We can also try nystatin swish and swallow.  Can send in once knowing pharmacy

## 2020-07-30 NOTE — TELEPHONE ENCOUNTER
"Called patient- however per - patient is not available is \"getting her nails done\"    Called cell and LMTCTHEO Lobato RN on 7/30/2020 at 12:54 PM    "

## 2020-07-30 NOTE — TELEPHONE ENCOUNTER
Called patient and informed for provider message. Patient stated she will come on Monday for the blood draw and will  her medication from the pharmacy.     Roxie Lobato RN on 7/30/2020 at 2:46 PM

## 2020-07-30 NOTE — TELEPHONE ENCOUNTER
Mynor Fenton,    Patient's INR has been supra-therapeutic since starting Bactrim, we will continue to hold and/or cut her warfarin dose until INR stabilizes.  Vitamin K has not been given, patient has some bruises on legs but no other signs of bleeding. Do you want patient to have a CBC or any other labs at this time?    2) Patient states she has noticed a white coating in her mouth x 4-5 days and overall has not felt well since starting Bactrim, would you like to prescribe medication for probable thrush? Preferably not Fluconazole as this, too, has DDI with Warfarin.    Patient has in-clinic follow up with rheumatology on 08/04/20; however, I suggested she call them today to discuss the side effects she is experiencing. Patient states overall she has not felt well and has felt more fatigued since starting Bactrim.    Patient aware to let INR clinic know if medication is changed or discontinued.    Thank you,  Theresa Lorenzo RN

## 2020-07-30 NOTE — PROGRESS NOTES
New warfarin maintenance dose not discussed with patient, can discuss in detail on 08/03/20 dependent on INR results.    Theresa Lorenzo RN

## 2020-08-03 ENCOUNTER — ANTICOAGULATION THERAPY VISIT (OUTPATIENT)
Dept: NURSING | Facility: CLINIC | Age: 80
End: 2020-08-03

## 2020-08-03 DIAGNOSIS — Z79.01 LONG TERM CURRENT USE OF ANTICOAGULANT THERAPY: ICD-10-CM

## 2020-08-03 DIAGNOSIS — I48.0 PAROXYSMAL ATRIAL FIBRILLATION (H): ICD-10-CM

## 2020-08-03 DIAGNOSIS — R79.1 SUPRATHERAPEUTIC INR: ICD-10-CM

## 2020-08-03 LAB
ALBUMIN SERPL-MCNC: 3.2 G/DL (ref 3.4–5)
ALP SERPL-CCNC: 51 U/L (ref 40–150)
ALT SERPL W P-5'-P-CCNC: 123 U/L (ref 0–50)
ANION GAP SERPL CALCULATED.3IONS-SCNC: 4 MMOL/L (ref 3–14)
AST SERPL W P-5'-P-CCNC: 46 U/L (ref 0–45)
BILIRUB SERPL-MCNC: 1 MG/DL (ref 0.2–1.3)
BUN SERPL-MCNC: 32 MG/DL (ref 7–30)
CALCIUM SERPL-MCNC: 9.7 MG/DL (ref 8.5–10.1)
CHLORIDE SERPL-SCNC: 96 MMOL/L (ref 94–109)
CO2 SERPL-SCNC: 31 MMOL/L (ref 20–32)
CREAT SERPL-MCNC: 0.85 MG/DL (ref 0.52–1.04)
ERYTHROCYTE [DISTWIDTH] IN BLOOD BY AUTOMATED COUNT: 14.5 % (ref 10–15)
GFR SERPL CREATININE-BSD FRML MDRD: 64 ML/MIN/{1.73_M2}
GLUCOSE SERPL-MCNC: 134 MG/DL (ref 70–99)
HCT VFR BLD AUTO: 45.1 % (ref 35–47)
HGB BLD-MCNC: 14.7 G/DL (ref 11.7–15.7)
INR PPP: 1.6 (ref 0.86–1.14)
MCH RBC QN AUTO: 27.7 PG (ref 26.5–33)
MCHC RBC AUTO-ENTMCNC: 32.6 G/DL (ref 31.5–36.5)
MCV RBC AUTO: 85 FL (ref 78–100)
PLATELET # BLD AUTO: 185 10E9/L (ref 150–450)
POTASSIUM SERPL-SCNC: 3.9 MMOL/L (ref 3.4–5.3)
PROT SERPL-MCNC: 6.5 G/DL (ref 6.8–8.8)
RBC # BLD AUTO: 5.31 10E12/L (ref 3.8–5.2)
SODIUM SERPL-SCNC: 131 MMOL/L (ref 133–144)
WBC # BLD AUTO: 14.4 10E9/L (ref 4–11)

## 2020-08-03 PROCEDURE — 36415 COLL VENOUS BLD VENIPUNCTURE: CPT | Performed by: FAMILY MEDICINE

## 2020-08-03 PROCEDURE — 80053 COMPREHEN METABOLIC PANEL: CPT | Performed by: FAMILY MEDICINE

## 2020-08-03 PROCEDURE — 85610 PROTHROMBIN TIME: CPT | Performed by: FAMILY MEDICINE

## 2020-08-03 PROCEDURE — 85027 COMPLETE CBC AUTOMATED: CPT | Performed by: FAMILY MEDICINE

## 2020-08-03 PROCEDURE — 99207 ZZC NO CHARGE NURSE ONLY: CPT

## 2020-08-03 NOTE — PROGRESS NOTES
ANTICOAGULATION FOLLOW-UP CLINIC VISIT    Patient Name:  Tomasa Fam  Date:  8/3/2020  Contact Type:  Telephone    SUBJECTIVE:  Patient Findings     Comments:   Huddled with Annabelle Gilbert: Patient continues with Bactrim (hopefully ending 8/14/20) and prednisone (more long-term). Ok to give 2.5 mg today to help bring up the INR. However, keeping new maintenance dose (which hasn't really been tried yet) but switching patient's 1.25 mg days to T/Th/Sat so she doesn't have three 2.5mg days in a row, and recheck INR on Friday to makes she she doesn't have a rapid rise, then another INR probably early next week.   Called patient to discuss today's INR results: Explained above to the patient. Patient stated understanding, repeated back dosing instructions, and is in agreement with the plan. The patient was assessed for diet, medication, and activity level changes, missed or extra doses, bruising or bleeding, with no new problem findings. She follows up with her Rheumatologist tomorrow, and will visit with Cardiologist next week to discuss her continued respiratory concerns.  Yue NICOLAS RN  Anticoagulation Clinic  [place]            Clinical Outcomes     Comments:   Huddled with Pharm Annabelle BABCOCK: Patient continues with Bactrim (hopefully ending 8/14/20) and prednisone (more long-term). Ok to give 2.5 mg today to help bring up the INR. However, keeping new maintenance dose (which hasn't really been tried yet) but switching patient's 1.25 mg days to T/Th/Sat so she doesn't have three 2.5mg days in a row, and recheck INR on Friday to makes she she doesn't have a rapid rise, then another INR probably early next week.   Called patient to discuss today's INR results: Explained above to the patient. Patient stated understanding, repeated back dosing instructions, and is in agreement with the plan. The patient was assessed for diet, medication, and activity level changes, missed or extra doses,  bruising or bleeding, with no new problem findings. She follows up with her Rheumatologist tomorrow, and will visit with Cardiologist next week to discuss her continued respiratory concerns.  Yue NICOLAS RN  Anticoagulation Clinic  [place]               OBJECTIVE    Recent labs: (last 7 days)     20  0951   INR 1.60*       ASSESSMENT / PLAN  INR assessment SUB    Recheck INR In: 4 DAYS    INR Location Clinic      Anticoagulation Summary  As of 8/3/2020    INR goal:   2.0-3.0   TTR:   64.8 % (8.7 mo)   INR used for dosin.60! (8/3/2020)   Warfarin maintenance plan:   1.25 mg (2.5 mg x 0.5) every Tue, Thu, Sat; 2.5 mg (2.5 mg x 1) all other days   Full warfarin instructions:   1.25 mg every Tue, Thu, Sat; 2.5 mg all other days   Weekly warfarin total:   13.75 mg   Plan last modified:   Yue Martínez RN (8/3/2020)   Next INR check:   2020   Priority:   High   Target end date:   Indefinite    Indications    Paroxysmal atrial fibrillation (H) [I48.0]             Anticoagulation Episode Summary     INR check location:   Anticoagulation Clinic    Preferred lab:       Send INR reminders to:   PK WEAVER    Comments:   No Bandaid // 2.5 mg tab // patient started on Xarelto, transition to warfarin 19      Anticoagulation Care Providers     Provider Role Specialty Phone number    Violet Fenton MD Referring [facility] [phone]            See the Encounter Report to view Anticoagulation Flowsheet and Dosing Calendar (Go to Encounters tab in chart review, and find the Anticoagulation Therapy Visit)    Dosage adjustment made based on physician directed care plan.    Yue Martínez RN

## 2020-08-04 ENCOUNTER — TRANSFERRED RECORDS (OUTPATIENT)
Dept: HEALTH INFORMATION MANAGEMENT | Facility: CLINIC | Age: 80
End: 2020-08-04

## 2020-08-04 LAB
ALT SERPL-CCNC: 139 IU/L (ref 5–35)
AST SERPL-CCNC: 63 U/L (ref 5–34)
CREAT SERPL-MCNC: 0.85 MG/DL (ref 0.5–1.3)

## 2020-08-05 ENCOUNTER — TELEPHONE (OUTPATIENT)
Dept: FAMILY MEDICINE | Facility: CLINIC | Age: 80
End: 2020-08-05

## 2020-08-05 NOTE — TELEPHONE ENCOUNTER
Please let her know; I am not particularly aware of anyone in this area....   Does she want to go to [facility] for her buttock and leg pain?

## 2020-08-05 NOTE — TELEPHONE ENCOUNTER
ELIZABETH Sexton- Just letting you know patient does not need referrals for Medicare. They can go anywhere that accepts Medicare. She should still check to see what her benefits are.for specialty care.    Angie Carrasco  Referral Coordinator/covering for [facility]

## 2020-08-05 NOTE — PROGRESS NOTES
Patient awake crying being cradled by mother at this time. Pt presents to clinic for EKG-Results reviewed with PCP and called care team whom ordered EKG to relay that it was done.     Instructed by cardiology care team that no further action was needed on my end and they will review EKG and reach out to patient.     Huey Ji CMA ([facility])

## 2020-08-05 NOTE — TELEPHONE ENCOUNTER
I did advise her to check her insurance for coverage. She did not want to go to [facility].  She would rather see you first.    Carlie Burns RN

## 2020-08-05 NOTE — TELEPHONE ENCOUNTER
Triage: please see what she is asking about regarding liver tests... we could always talk about it at a visit as well. Did the Rheumatologist talk with her about that?    Referrals: is there a Rheumatologist closer to us that is covered by her insurance?   There is another message where she has said that her current Rheumatologist mentioned there is someone in [place] and [place]...    Thanks!!

## 2020-08-05 NOTE — TELEPHONE ENCOUNTER
Patient c/o pain in Rt thigh and buttocks x 2 weeks. Describes as ache, not shooting pain. Occurs during ambulation. Patient states more muscle pain than joint. Feels it is bursitis. Had similar pain in other side.    Denies any numbness or tingling. Made appt to see Dr. Fenton 8/7. Wants to discuss liver elevations. Rheumatology dc'd Bactrim due to lab results. Med list updated.    Carlie Burns RN

## 2020-08-05 NOTE — TELEPHONE ENCOUNTER
"Reason for call:  Symptom   Symptom or request: Pain in thigh muscle up to buttocks    Duration (how long have symptoms been present): \"a while\"  Have you been treated for this before? Yes    Additional comments: pt believes it to be bursitis, pain not there when sitting but bothersome while walking, no discoloration or temperature change    Phone number to reach patient:  Home number on file [phone] (home)    Best Time:  any    Can we leave a detailed message on this number?  YES    Travel screening: Not Applicable    "

## 2020-08-05 NOTE — TELEPHONE ENCOUNTER
Reason for call:  Other   Patient called regarding (reason for call): call back  Additional comments: Pt stated her liver numbers were off and she wanted to speak to someone about that. Pt stated that her INR levels have been all over the place. Pt also wanting assistance in finding a new rheumatologist closer to home. Please call to assist.    Phone number to reach patient:  Home number on file [phone] (home)    Best Time:  any    Can we leave a detailed message on this number?  YES    Travel screening: Not Applicable

## 2020-08-05 NOTE — TELEPHONE ENCOUNTER
Discussed this with patient during encounter for Rt thigh/buttocks pain.    Rheumatology dc'd bactrim due to Liver enzyme lab results. Patient stated was advised to follow up with pcp concerning this. Patient had been advised on Rheumatology at [facility].    Advised to check insurance for coverage.    Made OV appointment to discuss thigh pain and labs.    Carlie Burns RN

## 2020-08-07 ENCOUNTER — OFFICE VISIT (OUTPATIENT)
Dept: FAMILY MEDICINE | Facility: CLINIC | Age: 80
End: 2020-08-07
Payer: MEDICARE

## 2020-08-07 ENCOUNTER — ANTICOAGULATION THERAPY VISIT (OUTPATIENT)
Dept: FAMILY MEDICINE | Facility: CLINIC | Age: 80
End: 2020-08-07

## 2020-08-07 VITALS
BODY MASS INDEX: 23.71 KG/M2 | HEIGHT: 63 IN | WEIGHT: 133.8 LBS | OXYGEN SATURATION: 96 % | TEMPERATURE: 97.8 F | SYSTOLIC BLOOD PRESSURE: 126 MMHG | RESPIRATION RATE: 16 BRPM | DIASTOLIC BLOOD PRESSURE: 62 MMHG | HEART RATE: 70 BPM

## 2020-08-07 DIAGNOSIS — M31.6 TEMPORAL ARTERITIS (H): ICD-10-CM

## 2020-08-07 DIAGNOSIS — I48.0 PAROXYSMAL ATRIAL FIBRILLATION (H): ICD-10-CM

## 2020-08-07 DIAGNOSIS — M35.3 PMR (POLYMYALGIA RHEUMATICA) (H): Primary | ICD-10-CM

## 2020-08-07 DIAGNOSIS — M25.552 HIP PAIN, LEFT: ICD-10-CM

## 2020-08-07 DIAGNOSIS — Z79.52 LONG TERM SYSTEMIC STEROID USER: ICD-10-CM

## 2020-08-07 DIAGNOSIS — I10 ESSENTIAL HYPERTENSION WITH GOAL BLOOD PRESSURE LESS THAN 140/90: ICD-10-CM

## 2020-08-07 LAB
CAPILLARY BLOOD COLLECTION: NORMAL
INR PPP: 2.5 (ref 0.86–1.14)

## 2020-08-07 PROCEDURE — 85610 PROTHROMBIN TIME: CPT | Performed by: FAMILY MEDICINE

## 2020-08-07 PROCEDURE — 99214 OFFICE O/P EST MOD 30 MIN: CPT | Performed by: FAMILY MEDICINE

## 2020-08-07 PROCEDURE — 36416 COLLJ CAPILLARY BLOOD SPEC: CPT | Performed by: FAMILY MEDICINE

## 2020-08-07 PROCEDURE — 99207 ZZC NO CHARGE NURSE ONLY: CPT | Performed by: FAMILY MEDICINE

## 2020-08-07 ASSESSMENT — MIFFLIN-ST. JEOR: SCORE: 1046.04

## 2020-08-07 NOTE — PROGRESS NOTES
ANTICOAGULATION FOLLOW-UP CLINIC VISIT    Patient Name:  Tomasa Fam  Date:  2020  Contact Type:  Telephone    SUBJECTIVE:  Patient Findings     Positives:   Change in medications    Comments:   Stopped Bactrim .   INR increased from 1.6 to 2.5 in 4 days despite antibiotic being discontinued. Will lower weekly total by 1.25 mg (9.1%) to prevent from becoming supra therapeutic. and recheck in 5 days.        Clinical Outcomes     Negatives:   Major bleeding event, Thromboembolic event, Anticoagulation-related hospital admission, Anticoagulation-related ED visit, Anticoagulation-related fatality    Comments:   Stopped Bactrim .   INR increased from 1.6 to 2.5 in 4 days despite antibiotic being discontinued. Will lower weekly total by 1.25 mg (9.1%) to prevent from becoming supra therapeutic. and recheck in 5 days.           OBJECTIVE    Recent labs: (last 7 days)     20  0948   INR 2.50*       ASSESSMENT / PLAN  INR assessment THER    Recheck INR In: 5 DAYS    INR Location Clinic      Anticoagulation Summary  As of 2020    INR goal:   2.0-3.0   TTR:   64.7 % (8.8 mo)   INR used for dosin.50 (2020)   Warfarin maintenance plan:   2.5 mg (2.5 mg x 1) every Mon, Wed, Fri; 1.25 mg (2.5 mg x 0.5) all other days   Full warfarin instructions:   2.5 mg every Mon, Wed, Fri; 1.25 mg all other days   Weekly warfarin total:   12.5 mg   Plan last modified:   Mira Haddad RN (2020)   Next INR check:   2020   Priority:   High   Target end date:   Indefinite    Indications    Paroxysmal atrial fibrillation (H) [I48.0]             Anticoagulation Episode Summary     INR check location:   Anticoagulation Clinic    Preferred lab:       Send INR reminders to:   PK WEAVER    Comments:   No Bandaid // 2.5 mg tab // patient started on Xarelto, transition to warfarin 19      Anticoagulation Care Providers     Provider Role Specialty Phone number    Violet Fenton MD  Referring Family Practice [phone]            See the Encounter Report to view Anticoagulation Flowsheet and Dosing Calendar (Go to Encounters tab in chart review, and find the Anticoagulation Therapy Visit)        Mira Haddad RN

## 2020-08-07 NOTE — PROGRESS NOTES
Subjective     Tomasa Fam is a 79 year old female who presents to clinic today for the following health issues:    HPI       Here to follow up with lab results.  Would like to discuss ongoing left thigh pain.   Would like to have a referral for Rheumatologist.  [place]       See under ROS     Patient Active Problem List   Diagnosis     Chronic airway obstruction (H)     ASCUS on Pap smear     Hyperlipidemia LDL goal <160     Breast cancer (H)     Essential hypertension with goal blood pressure less than 140/90     Cystocele, midline     Uterovaginal prolapse     S/P hysterectomy     Advanced directives, counseling/discussion     History of hypokalemia     Concussion     Thyroid nodule     Chronic pain of both knees     History of lung cancer     Paroxysmal atrial fibrillation (H)     Gastroesophageal reflux disease, esophagitis presence not specified     PMR (polymyalgia rheumatica) (H)     Personal history of malignant neoplasm of breast     Long term systemic steroid user     Cardiomyopathy (H)     Complete heart block (H)       Current Outpatient Medications   Medication Sig Dispense Refill     amLODIPine (NORVASC) 2.5 MG tablet Take 1 tablet (2.5 mg) by mouth daily 180 tablet 1     B Complex Vitamins (VITAMIN  B COMPLEX) tablet Take 1 tablet by mouth daily.       calcium-vitamin D (CALTRATE) 600-400 MG-UNIT per tablet Take 4 tablets by mouth daily        ezetimibe (ZETIA) 10 MG tablet TAKE 1 TABLET(10 MG) BY MOUTH DAILY 90 tablet 0     FLUoxetine (PROZAC) 10 MG capsule Take 2 capsules (20 mg) by mouth daily 90 capsule 1     furosemide (LASIX) 40 MG tablet Take 2 tablets (80 mg) by mouth daily 60 tablet 6     metoprolol tartrate (LOPRESSOR) 100 MG tablet Take 1 tablet (100 mg) by mouth 2 times daily 60 tablet 6     nystatin (MYCOSTATIN) 101850 UNIT/ML suspension Take 5 mLs (500,000 Units) by mouth 4 times daily 400 mL 0     omega 3 1000 MG CAPS Take 1 g by mouth  daily 90 capsule      omeprazole (PRILOSEC) 20 MG DR capsule Take 1 capsule (20 mg) by mouth daily (Patient taking differently: Take 20 mg by mouth as needed ) 90 capsule 1     polyethylene glycol (MIRALAX/GLYCOLAX) powder Take 1 capful by mouth daily as needed for constipation       potassium chloride ER (KLOR-CON M) 20 MEQ CR tablet Take 1 tablet (20 mEq) by mouth 2 times daily 60 tablet 6     predniSONE (DELTASONE) 1 MG tablet Take 5 mg by mouth daily Taking 50 mg daily and then this weekend change to 40 mg 60 tablet 0     warfarin ANTICOAGULANT (COUMADIN) 2.5 MG tablet Take 1.25mg every Mon,Wed and Fri / Take 2.5mg rest of week OR as instructed by INR clinic 90 tablet 0         Reviewed and updated as needed this visit by Provider         Review of Systems   CONSTITUTIONAL:NEGATIVE for fever, chills, change in weight  RESP:NEGATIVE for significant cough or SOB  CV: NEGATIVE for chest pain, palpitations or peripheral edema  MUSCULOSKELETAL: See below    [place]  She also notes that she does not feel that the other rheumatologist has really been helping her.  She notes that he is aware that she will be seeking care elsewhere.  Called [employer]; they said she could go to [facility].  She notes there is a doctor at [facility] that is a Rheumatologist.  She is requesting a referral.    She notes that she is feeling better off of Bactrim.  (Of note, if I am recalling the recent rheumatology note that I received, it mentioned dapsone.  This is currently unavailable in her chart.)  She does note that her INR is good; had been wild when she was on the previous medication.    Additionally, she is noting pain into the left hip/thigh.  Years ago, had a bursitis. This reminds her of that. Has some exercises that she had obtained at that time that seemed to give her improvement.  However, she is not sure which exercises these were.    She has not been happy  "with higher doses of prednisone.  She is currently on 40 mg and notes that she will plan on decreasing this herself if not getting into rheumatology in the near future.  She does note that at this time, she will occasionally have some discomfort with chewing still.          Objective    /62 (BP Location: Right arm, Cuff Size: Adult Regular)   Pulse 70   Temp 97.8  F (36.6  C) (Oral)   Resp 16   Ht 1.6 m (5' 3\")   Wt 60.7 kg (133 lb 12.8 oz)   LMP  (LMP Unknown)   SpO2 96%   BMI 23.70 kg/m    Body mass index is 23.7 kg/m .  Physical Exam   GENERAL APPEARANCE: alert and no distress  RESP: lungs clear to auscultation - no rales, rhonchi or wheezes  CV: regular rates and rhythm  MS: She does have some tenderness over the left greater trochanteric area.  PSYCH: mentation appears normal and affect normal/fatigued.    Reviewed the exercises she brings in.  On the top of these, it is noted that this is a prescription for knee pain.  Also reviewed her recent liver tests.  Reviewed orders around this as well.  She notes that orders have been sent.        Assessment & Plan     1. PMR (polymyalgia rheumatica) (H)  She has been on steroids for quite some time.  She had been weaned down to under 10 mg.  She subsequently sought out rheumatology care.  Initially this was continued.  However subsequently, she did develop some additional symptomatology and increased inflammatory markers and was diagnosed with temporal arteritis.  Dose of her steroid was increased with improvement of many symptoms, though not all.  She is currently requesting a referral to a rheumatologist in this area.  Did discuss [facility].  We had reviewed with our referrals department and she does not require a referral.  She will plan on seeking this out.  We can certainly provide any records that would be of assistance; suspect they will be able to see our records under care everywhere.    2. Temporal arteritis (H)  As above.  She is " currently on higher dose steroids.  She is planning to self wean.  Discussed that frequently what we would consider doing would be to go down initially by 10 mg at a time but to slow down when she is at lower doses.  I do believe she is planning to do this.  Did review that blindness could result from inadequately treated temporal arteritis.  Hopefully she will be getting in to see a rheumatologist in the near future.  I am otherwise available as well.    3. Elevation of level of transaminase or lactic acid dehydrogenase (LDH)  Uncertain etiology, but suspect medication.  She is now off this medication.  Discussed repeating liver tests is probably the most appropriate next step.  Consider additional evaluation if not improving.  She does have orders already there through the prior rheumatologist.    4. Essential hypertension with goal blood pressure less than 140/90  This is controlled.    5. Hip pain, left  Suspect trochanteric bursitis.  I am not sure if the exercises that she has would be the ones to treat this.  Discussed physical therapy versus [facility].  Discussed this oftentimes does respond well to a steroid injection.  This could be pursued at the latter.  - [facility]  Referral; Future    6. Long term systemic steroid user  As above.  Currently on 40 mg prednisone.       Return in about 10 days (around 8/17/2020) for Lab Work.    Violet Fenton MD, MD  [facility]

## 2020-08-08 PROBLEM — M31.6 TEMPORAL ARTERITIS (H): Status: ACTIVE | Noted: 2020-08-08

## 2020-08-13 ENCOUNTER — ANTICOAGULATION THERAPY VISIT (OUTPATIENT)
Dept: NURSING | Facility: CLINIC | Age: 80
End: 2020-08-13

## 2020-08-13 DIAGNOSIS — I48.0 PAROXYSMAL ATRIAL FIBRILLATION (H): ICD-10-CM

## 2020-08-13 LAB
CAPILLARY BLOOD COLLECTION: NORMAL
INR PPP: 2 (ref 0.86–1.14)

## 2020-08-13 PROCEDURE — 85610 PROTHROMBIN TIME: CPT | Performed by: FAMILY MEDICINE

## 2020-08-13 PROCEDURE — 36416 COLLJ CAPILLARY BLOOD SPEC: CPT | Performed by: FAMILY MEDICINE

## 2020-08-13 PROCEDURE — 99207 ZZC NO CHARGE NURSE ONLY: CPT

## 2020-08-13 NOTE — PROGRESS NOTES
ANTICOAGULATION FOLLOW-UP CLINIC VISIT    Patient Name:  Tomasa Fam  Date:  8/13/2020  Contact Type:  Telephone    SUBJECTIVE:  Patient Findings     Positives:   Missed doses (missed 1.25 mg last night (only took 1/2 tab instead of 1))    Comments:   Consulted with Annabelle Dunham: See note.  Called patient to discuss today's INR results: Patient states she couldn't remember what she was supposed to take last night and she didn't write it down so she only took a 1/2 tab. Discussed Pharmacist's advice. Patient is ok with continuing as is for now and will recheck in 1 week at previously scheduled lab visit. However, since she took a 1/2 dose less than advised last night, did add that 1/2 dose back in for tonight. Patient repeated back dosing instructions correctly and is in agreement with the plan.  Yue NICOLAS RN  Anticoagulation Clinic  [place]          Clinical Outcomes     Negatives:   Major bleeding event, Thromboembolic event, Anticoagulation-related hospital admission, Anticoagulation-related ED visit, Anticoagulation-related fatality    Comments:   Consulted with Annabelle Dunham: See note.  Called patient to discuss today's INR results: Patient states she couldn't remember what she was supposed to take last night and she didn't write it down so she only took a 1/2 tab. Discussed Pharmacist's advice. Patient is ok with continuing as is for now and will recheck in 1 week at previously scheduled lab visit. However, since she took a 1/2 dose less than advised last night, did add that 1/2 dose back in for tonight. Patient repeated back dosing instructions correctly and is in agreement with the plan.  Yue NICOLAS RN  Anticoagulation Clinic  [place]             OBJECTIVE    Recent labs: (last 7 days)     08/13/20  0926   INR 2.00*       ASSESSMENT / PLAN  INR assessment THER    Recheck INR In: 6 DAYS    INR Location Clinic      Anticoagulation Summary  As of  2020    INR goal:   2.0-3.0   TTR:   65.5 % (9 mo)   INR used for dosin.00 (2020)   Warfarin maintenance plan:   2.5 mg (2.5 mg x 1) every Mon, Wed, Fri; 1.25 mg (2.5 mg x 0.5) all other days   Full warfarin instructions:   : 2.5 mg; Otherwise 2.5 mg every Mon, Wed, Fri; 1.25 mg all other days   Weekly warfarin total:   12.5 mg   Plan last modified:   Mira Haddad RN (2020)   Next INR check:   2020   Priority:   High   Target end date:   Indefinite    Indications    Paroxysmal atrial fibrillation (H) [I48.0]             Anticoagulation Episode Summary     INR check location:   Anticoagulation Clinic    Preferred lab:       Send INR reminders to:   PK WEAVER    Comments:   No Bandaid // 2.5 mg tab // patient started on Xarelto, transition to warfarin 19      Anticoagulation Care Providers     Provider Role Specialty Phone number    Violet Fenton MD Referring [facility] [phone]            See the Encounter Report to view Anticoagulation Flowsheet and Dosing Calendar (Go to Encounters tab in chart review, and find the Anticoagulation Therapy Visit)    Dosage adjustment made based on physician directed care plan.    Yue Martínez RN

## 2020-08-13 NOTE — PROGRESS NOTES
Discussed with ACC RN.  Protocol is no dose change and recommend recheck in 7-10 days.      Patient is [id number] = 4, and no HX clot, if does go subtherapeutic short term would be low risk.    Of note, now > 4 weeks post cardioversion 06/22/2020 (higher clot risk within 4 weeks post procedure)    LFT elevations from Bactrim DS explain the rise in INR despite stopping it 5 days prior to previous INR result.  Would advise INR recheck with LFT recheck scheduled 08/19/2020, and assess if safe to start moving warfarin dose back towards historical weekly totals of 20-22.5mg.    With shared clinic decision making if patient feels would like to adjust dose now, would be reasonable to increase ~10%, recommend change Sunday dose to 2.5mg before next INR recheck.    Annabelle Salcedo, PharmD BCACP  Anticoagulation Clinical Pharmacist

## 2020-08-18 ENCOUNTER — ANCILLARY PROCEDURE (OUTPATIENT)
Dept: GENERAL RADIOLOGY | Facility: CLINIC | Age: 80
End: 2020-08-18
Attending: FAMILY MEDICINE
Payer: MEDICARE

## 2020-08-18 ENCOUNTER — OFFICE VISIT (OUTPATIENT)
Dept: ORTHOPEDICS | Facility: CLINIC | Age: 80
End: 2020-08-18
Payer: MEDICARE

## 2020-08-18 VITALS
WEIGHT: 133 LBS | DIASTOLIC BLOOD PRESSURE: 68 MMHG | HEIGHT: 63 IN | BODY MASS INDEX: 23.57 KG/M2 | SYSTOLIC BLOOD PRESSURE: 118 MMHG

## 2020-08-18 DIAGNOSIS — M25.552 HIP PAIN, LEFT: ICD-10-CM

## 2020-08-18 DIAGNOSIS — M70.62 TROCHANTERIC BURSITIS OF LEFT HIP: ICD-10-CM

## 2020-08-18 DIAGNOSIS — G89.29 CHRONIC PAIN OF RIGHT KNEE: ICD-10-CM

## 2020-08-18 DIAGNOSIS — M25.561 CHRONIC PAIN OF RIGHT KNEE: ICD-10-CM

## 2020-08-18 DIAGNOSIS — M25.561 CHRONIC PAIN OF RIGHT KNEE: Primary | ICD-10-CM

## 2020-08-18 DIAGNOSIS — M17.11 PRIMARY OSTEOARTHRITIS OF RIGHT KNEE: ICD-10-CM

## 2020-08-18 DIAGNOSIS — G89.29 CHRONIC PAIN OF RIGHT KNEE: Primary | ICD-10-CM

## 2020-08-18 PROCEDURE — 99204 OFFICE O/P NEW MOD 45 MIN: CPT | Performed by: FAMILY MEDICINE

## 2020-08-18 PROCEDURE — 73562 X-RAY EXAM OF KNEE 3: CPT

## 2020-08-18 PROCEDURE — 73502 X-RAY EXAM HIP UNI 2-3 VIEWS: CPT

## 2020-08-18 ASSESSMENT — MIFFLIN-ST. JEOR: SCORE: 1042.41

## 2020-08-18 NOTE — PATIENT INSTRUCTIONS
1. Chronic pain of right knee    2. Hip pain, left    3. Primary osteoarthritis of right knee    4. Trochanteric bursitis of left hip      Right knee pain is due to osteoarthritis which has progressed since your last appointment with me  Can return for ultrasound guided cortisone injection after you have decreased your oral steroids  Hip pain is related to bursitis  Recommend ice friction massage  Once your liver labs have normalized recommend a trail of Tylenol 1,00mg three times a day

## 2020-08-18 NOTE — LETTER
"    8/18/2020         RE: Tomasa Fam  [address] [address]        Dear Colleague,    Thank you for referring your patient, Tomasa Fam, to the [facility]. Please see a copy of my visit note below.      ASSESSMENT & PLAN    1. Chronic pain of right knee    2. Hip pain, left    3. Primary osteoarthritis of right knee    4. Trochanteric bursitis of left hip      Seen in consultation for multiple issues - chronic right knee pain and acute left lateral hip pain  Right knee pain is due to osteoarthritis which has progressed since last appointment with me  Can return for ultrasound guided cortisone injection after oral steroid doses have been adjusted.    Hip pain is related to bursitis  Recommend ice friction massage  Personally reviewed recent labwork with elected LFT's. Once they have normalized can consider a trail of Tylenol 1,00mg three times a day  -----    SUBJECTIVE  Tomasa Fam is a/an 79 year old female who is seen in consultation at the request of  Violet Fenton M.D. for evaluation of left hip pain. The patient is seen by themselves.    Onset: 1-2 month(s) ago. Reports insidious onset without acute precipitating event.  Location of Pain: left lateral hip radiating into buttocks - she notes this improved about 1 week ago when she turned over in bed and felt a crack.  Rating of Pain at worst: 8/10  Rating of Pain Currently: 1/10  Worsened by: walking  Better with: rest, sitting  Treatments tried: rest/activity avoidance  Quality: dull  Associated symptoms: no distal numbness or tingling; denies swelling or warmth  Orthopedic history: YES - PMR - patient states that she takes prednisone which she states causes weakness / fatigue, chronic right knee pain  Relevant surgical history: NO  Patient Social History: retired    Patient also complains of right knee pain ongoing for \"many years\". Patient was last seen for right knee pain at [facility] on " "11/2/2016. She notes that she has also been evaluated by [facility] and had a cortisone injection 2-3 years ago that provided no relief.    Patient's past medical, surgical, social, and family histories were reviewed today and no pertinent history related to patient's presenting problem.    REVIEW OF SYSTEMS:  10 point ROS is negative other than symptoms noted above in HPI, Past Medical History or as stated below  Constitutional: NEGATIVE for fever, chills, change in weight  Skin: NEGATIVE for worrisome rashes, moles or lesions  GI/: NEGATIVE for bowel or bladder changes  Neuro: NEGATIVE for weakness, dizziness or paresthesias    OBJECTIVE:  /68   Ht 1.6 m (5' 3\")   Wt 60.3 kg (133 lb)   LMP  (LMP Unknown)   BMI 23.56 kg/m     General: healthy, alert and in no distress  HEENT: no scleral icterus or conjunctival erythema  Skin: no suspicious lesions or rash. No jaundice. Bruising over anterior lower legs  CV: no pedal edema  Resp: normal respiratory effort without conversational dyspnea   Psych: normal mood and affect  Gait: using a wheelchair because she feels unstable, frequent falling  MSK:  LEFT HIP  Inspection:    No obvious deformity or asymmetry, level pelvis  Palpation:    Tender about the greater trochanteric region and gluteus medius insertion. Otherwise all other landmarks are nontender.  Active Range of Motion:     Flexion full, IR full, ER  full  Strength:    Flexion 5/5    RIGHT KNEE  Palpation:    Tender about the medial joint line. Remainder of bony and ligamentous landmarks are nontender.  Strength:    Extensor mechanism intact    Independent visualization of the below image:  Xray Right Knee  Advanced medial compartment narrowing, progressed since previous. No fracture or acute osseous findings.    Xray Left Hip  Well preserved joint space. No CAM/pincer lesions. No fracture or acute osseous findings.      Naveen aSuceda, DO [facility]      Again, thank you for " allowing me to participate in the care of your patient.        Sincerely,        Naveen Sauceda, DO

## 2020-08-18 NOTE — PROGRESS NOTES
"  ASSESSMENT & PLAN    1. Chronic pain of right knee    2. Hip pain, left    3. Primary osteoarthritis of right knee    4. Trochanteric bursitis of left hip      Seen in consultation for multiple issues - chronic right knee pain and acute left lateral hip pain  Right knee pain is due to osteoarthritis which has progressed since last appointment with me  Can return for ultrasound guided cortisone injection after oral steroid doses have been adjusted.    Hip pain is related to bursitis  Recommend ice friction massage  Personally reviewed recent labwork with elected LFT's. Once they have normalized can consider a trail of Tylenol 1,00mg three times a day  -----    SUBJECTIVE  Tomasa Fam is a/an 79 year old female who is seen in consultation at the request of  Violet Fenton M.D. for evaluation of left hip pain. The patient is seen by themselves.    Onset: 1-2 month(s) ago. Reports insidious onset without acute precipitating event.  Location of Pain: left lateral hip radiating into buttocks - she notes this improved about 1 week ago when she turned over in bed and felt a crack.  Rating of Pain at worst: 8/10  Rating of Pain Currently: 1/10  Worsened by: walking  Better with: rest, sitting  Treatments tried: rest/activity avoidance  Quality: dull  Associated symptoms: no distal numbness or tingling; denies swelling or warmth  Orthopedic history: YES - PMR - patient states that she takes prednisone which she states causes weakness / fatigue, chronic right knee pain  Relevant surgical history: NO  Patient Social History: retired    Patient also complains of right knee pain ongoing for \"many years\". Patient was last seen for right knee pain at [facility] on 11/2/2016. She notes that she has also been evaluated by [facility] and had a cortisone injection 2-3 years ago that provided no relief.    Patient's past medical, surgical, social, and family histories were reviewed today and no pertinent history related to patient's " "presenting problem.    REVIEW OF SYSTEMS:  10 point ROS is negative other than symptoms noted above in HPI, Past Medical History or as stated below  Constitutional: NEGATIVE for fever, chills, change in weight  Skin: NEGATIVE for worrisome rashes, moles or lesions  GI/: NEGATIVE for bowel or bladder changes  Neuro: NEGATIVE for weakness, dizziness or paresthesias    OBJECTIVE:  /68   Ht 1.6 m (5' 3\")   Wt 60.3 kg (133 lb)   LMP  (LMP Unknown)   BMI 23.56 kg/m     General: healthy, alert and in no distress  HEENT: no scleral icterus or conjunctival erythema  Skin: no suspicious lesions or rash. No jaundice. Bruising over anterior lower legs  CV: no pedal edema  Resp: normal respiratory effort without conversational dyspnea   Psych: normal mood and affect  Gait: using a wheelchair because she feels unstable, frequent falling  MSK:  LEFT HIP  Inspection:    No obvious deformity or asymmetry, level pelvis  Palpation:    Tender about the greater trochanteric region and gluteus medius insertion. Otherwise all other landmarks are nontender.  Active Range of Motion:     Flexion full, IR full, ER  full  Strength:    Flexion 5/5    RIGHT KNEE  Palpation:    Tender about the medial joint line. Remainder of bony and ligamentous landmarks are nontender.  Strength:    Extensor mechanism intact    Independent visualization of the below image:  Xray Right Knee  Advanced medial compartment narrowing, progressed since previous. No fracture or acute osseous findings.    Xray Left Hip  Well preserved joint space. No CAM/pincer lesions. No fracture or acute osseous findings.      Naveen Sauceda,  [facility]    "

## 2020-08-19 ENCOUNTER — ANTICOAGULATION THERAPY VISIT (OUTPATIENT)
Dept: NURSING | Facility: CLINIC | Age: 80
End: 2020-08-19
Payer: MEDICARE

## 2020-08-19 DIAGNOSIS — I48.0 PAROXYSMAL ATRIAL FIBRILLATION (H): ICD-10-CM

## 2020-08-19 DIAGNOSIS — Z79.899 HIGH RISK MEDICATION USE: ICD-10-CM

## 2020-08-19 LAB
ALT SERPL W P-5'-P-CCNC: 95 U/L (ref 0–50)
AST SERPL W P-5'-P-CCNC: 31 U/L (ref 0–45)
INR PPP: 1.6 (ref 0.86–1.14)

## 2020-08-19 PROCEDURE — 99207 ZZC NO CHARGE NURSE ONLY: CPT

## 2020-08-19 PROCEDURE — 84450 TRANSFERASE (AST) (SGOT): CPT | Performed by: INTERNAL MEDICINE

## 2020-08-19 PROCEDURE — 85610 PROTHROMBIN TIME: CPT | Performed by: INTERNAL MEDICINE

## 2020-08-19 PROCEDURE — 36415 COLL VENOUS BLD VENIPUNCTURE: CPT | Performed by: INTERNAL MEDICINE

## 2020-08-19 PROCEDURE — 84460 ALANINE AMINO (ALT) (SGPT): CPT | Performed by: INTERNAL MEDICINE

## 2020-08-19 NOTE — PROGRESS NOTES
ANTICOAGULATION FOLLOW-UP CLINIC VISIT    Patient Name:  Tomasa Fam  Date:  8/19/2020  Contact Type:  Telephone    SUBJECTIVE:  Patient Findings     Positives:   Change in medications (decreased prednison)    Comments:   Called patient to discuss today's INR results: Patient has LFTs pending, but would expect they are improving and so her body is metabolizing the warfarin better now so we will need to start increasing her maintenance dosing back. The patient was assessed for diet, medication, and activity level changes, missed or extra doses, bruising or bleeding, with no problem findings. However, patient states she is weaning herself off prednisone and went from 40 mg daily down to 30 mg yesterday.  She's not in much pain right now, she is just very tired. Per protocol, gave 1/2 tab boost today and increased maintenance dose by about 10%, recheck in 1 week.  Yue NICOLAS RN  Anticoagulation Clinic  [place]          Clinical Outcomes     Comments:   Called patient to discuss today's INR results: Patient has LFTs pending, but would expect they are improving and so her body is metabolizing the warfarin better now so we will need to start increasing her maintenance dosing back. The patient was assessed for diet, medication, and activity level changes, missed or extra doses, bruising or bleeding, with no problem findings. However, patient states she is weaning herself off prednisone and went from 40 mg daily down to 30 mg yesterday.  She's not in much pain right now, she is just very tired. Per protocol, gave 1/2 tab boost today and increased maintenance dose by about 10%, recheck in 1 week.  uYe NICOLAS RN  Anticoagulation Clinic  [place]             OBJECTIVE    Recent labs: (last 7 days)     08/19/20  0937   INR 1.60*       ASSESSMENT / PLAN  INR assessment SUB    Recheck INR In: 1 WEEK    INR Location Clinic      Anticoagulation Summary  As of 8/19/2020    INR goal:   2.0-3.0   TTR:   64.1 % (9.2 mo)    INR used for dosin.60! (2020)   Warfarin maintenance plan:   1.25 mg (2.5 mg x 0.5) every Mon, Wed, Fri; 2.5 mg (2.5 mg x 1) all other days   Full warfarin instructions:   : 2.5 mg; Otherwise 1.25 mg every Mon, Wed, Fri; 2.5 mg all other days   Weekly warfarin total:   13.75 mg   Plan last modified:   Yue Martínez RN (2020)   Next INR check:   2020   Priority:   High   Target end date:   Indefinite    Indications    Paroxysmal atrial fibrillation (H) [I48.0]             Anticoagulation Episode Summary     INR check location:   Anticoagulation Clinic    Preferred lab:       Send INR reminders to:   PK WEAVER    Comments:   No Bandaid // 2.5 mg tab // patient started on Xarelto, transition to warfarin 19      Anticoagulation Care Providers     Provider Role Specialty Phone number    Violet Fenton MD Referring [facility] [phone]            See the Encounter Report to view Anticoagulation Flowsheet and Dosing Calendar (Go to Encounters tab in chart review, and find the Anticoagulation Therapy Visit)    Dosage adjustment made based on physician directed care plan.    Yue Martínez RN

## 2020-08-20 DIAGNOSIS — E78.5 HYPERLIPIDEMIA LDL GOAL <160: ICD-10-CM

## 2020-08-20 NOTE — TELEPHONE ENCOUNTER
Routing refill request to provider for review/approval because:  Labs out of range:    Lab Results   Component Value Date    ALT 95 08/19/2020     Next 5 appointments (look out 90 days)    Sep 08, 2020  9:10 AM CDT  Office Visit with Violet Fetnon [facility]  [facility] [address] [facility] [id number] [phone]          Carlie Burns RN

## 2020-08-21 RX ORDER — EZETIMIBE 10 MG/1
TABLET ORAL
Qty: 90 TABLET | Refills: 0 | Status: SHIPPED | OUTPATIENT
Start: 2020-08-21 | End: 2020-11-19

## 2020-08-24 ENCOUNTER — TELEPHONE (OUTPATIENT)
Dept: FAMILY MEDICINE | Facility: CLINIC | Age: 80
End: 2020-08-24

## 2020-08-24 NOTE — TELEPHONE ENCOUNTER
Reason for call:  Other   Patient called regarding (reason for call): call back  Additional comments: Patient stated that she has been falling a lot and when she falls she can't get up. Patient called her arthritis doctor and he told her to call and speak to a nurse.    Phone number to reach patient:  Home number on file [phone] (home)    Best Time:  any    Can we leave a detailed message on this number?  YES    Travel screening: Not Applicable     Kamini Griffith,

## 2020-08-24 NOTE — TELEPHONE ENCOUNTER
Calling patient-     I fall very easily and can not get up- no strength in my legs at all. Has been going on for 2 weeks. I talked to my rheumatologist and he said I should talk to Dr. Lazo.    I have bursitis and a bad knee- I know what  Is causing my pain- just not sure what is causing me to feel weak. No other symptoms. Appt scheduled.     Roxie Lobato RN on 8/24/2020 at 4:09 PM

## 2020-08-27 ENCOUNTER — ANTICOAGULATION THERAPY VISIT (OUTPATIENT)
Dept: NURSING | Facility: CLINIC | Age: 80
End: 2020-08-27
Payer: MEDICARE

## 2020-08-27 ENCOUNTER — OFFICE VISIT (OUTPATIENT)
Dept: FAMILY MEDICINE | Facility: CLINIC | Age: 80
End: 2020-08-27
Payer: MEDICARE

## 2020-08-27 ENCOUNTER — TELEPHONE (OUTPATIENT)
Dept: CARDIOLOGY | Facility: CLINIC | Age: 80
End: 2020-08-27

## 2020-08-27 VITALS
DIASTOLIC BLOOD PRESSURE: 52 MMHG | OXYGEN SATURATION: 98 % | WEIGHT: 135.5 LBS | SYSTOLIC BLOOD PRESSURE: 122 MMHG | HEART RATE: 70 BPM | BODY MASS INDEX: 24.01 KG/M2 | TEMPERATURE: 98.1 F | RESPIRATION RATE: 16 BRPM | HEIGHT: 63 IN

## 2020-08-27 DIAGNOSIS — Z86.79 HISTORY OF COMPLETE HEART BLOCK: ICD-10-CM

## 2020-08-27 DIAGNOSIS — R29.898 WEAKNESS OF BOTH LOWER EXTREMITIES: Primary | ICD-10-CM

## 2020-08-27 DIAGNOSIS — R53.83 FATIGUE, UNSPECIFIED TYPE: ICD-10-CM

## 2020-08-27 DIAGNOSIS — R12 HEARTBURN: ICD-10-CM

## 2020-08-27 DIAGNOSIS — Z85.118 HISTORY OF LUNG CANCER: ICD-10-CM

## 2020-08-27 DIAGNOSIS — R29.6 RECURRENT FALLS: ICD-10-CM

## 2020-08-27 DIAGNOSIS — I48.0 PAROXYSMAL ATRIAL FIBRILLATION (H): ICD-10-CM

## 2020-08-27 DIAGNOSIS — T14.8XXA ABRASION: ICD-10-CM

## 2020-08-27 DIAGNOSIS — S80.12XA TRAUMATIC ECCHYMOSIS OF LEFT LOWER LEG, INITIAL ENCOUNTER: ICD-10-CM

## 2020-08-27 DIAGNOSIS — R74.01 NONSPECIFIC ELEVATION OF LEVELS OF TRANSAMINASE OR LACTIC ACID DEHYDROGENASE (LDH): ICD-10-CM

## 2020-08-27 DIAGNOSIS — Z95.0 CARDIAC PACEMAKER IN SITU: ICD-10-CM

## 2020-08-27 DIAGNOSIS — M35.3 PMR (POLYMYALGIA RHEUMATICA) (H): ICD-10-CM

## 2020-08-27 DIAGNOSIS — I42.8 OTHER CARDIOMYOPATHY (H): ICD-10-CM

## 2020-08-27 DIAGNOSIS — M31.6 TEMPORAL ARTERITIS (H): ICD-10-CM

## 2020-08-27 DIAGNOSIS — R74.02 NONSPECIFIC ELEVATION OF LEVELS OF TRANSAMINASE OR LACTIC ACID DEHYDROGENASE (LDH): ICD-10-CM

## 2020-08-27 LAB
CAPILLARY BLOOD COLLECTION: NORMAL
INR PPP: 3.2 (ref 0.86–1.14)

## 2020-08-27 PROCEDURE — 99207 ZZC NO CHARGE NURSE ONLY: CPT

## 2020-08-27 PROCEDURE — 85610 PROTHROMBIN TIME: CPT | Performed by: FAMILY MEDICINE

## 2020-08-27 PROCEDURE — 85027 COMPLETE CBC AUTOMATED: CPT | Performed by: FAMILY MEDICINE

## 2020-08-27 PROCEDURE — 84443 ASSAY THYROID STIM HORMONE: CPT | Performed by: FAMILY MEDICINE

## 2020-08-27 PROCEDURE — 82550 ASSAY OF CK (CPK): CPT | Performed by: FAMILY MEDICINE

## 2020-08-27 PROCEDURE — 99215 OFFICE O/P EST HI 40 MIN: CPT | Performed by: FAMILY MEDICINE

## 2020-08-27 PROCEDURE — 80053 COMPREHEN METABOLIC PANEL: CPT | Performed by: FAMILY MEDICINE

## 2020-08-27 PROCEDURE — 36415 COLL VENOUS BLD VENIPUNCTURE: CPT | Performed by: FAMILY MEDICINE

## 2020-08-27 PROCEDURE — 86618 LYME DISEASE ANTIBODY: CPT | Performed by: FAMILY MEDICINE

## 2020-08-27 ASSESSMENT — MIFFLIN-ST. JEOR: SCORE: 1053.75

## 2020-08-27 NOTE — PROGRESS NOTES
Subjective     Tomasa Fam is a 79 year old female who presents to clinic today for the following health issues:    HPI       Here to discuss increased weakness and SOB.  When sitting down will feel ok but when getting up to walk will increase the SOB and weakness.  Falling has been increasing due to no balance.     See under ROS     Patient Active Problem List   Diagnosis     Chronic airway obstruction (H)     ASCUS on Pap smear     Hyperlipidemia LDL goal <160     Breast cancer (H)     Essential hypertension with goal blood pressure less than 140/90     Cystocele, midline     Uterovaginal prolapse     S/P hysterectomy     Advanced directives, counseling/discussion     History of hypokalemia     Concussion     Thyroid nodule     Chronic pain of both knees     History of lung cancer     Paroxysmal atrial fibrillation (H)     Gastroesophageal reflux disease, esophagitis presence not specified     PMR (polymyalgia rheumatica) (H)     Personal history of malignant neoplasm of breast     Long term systemic steroid user     Cardiomyopathy (H)     Complete heart block (H)     Temporal arteritis (H)     Elevation of level of transaminase or lactic acid dehydrogenase (LDH)       Current Outpatient Medications   Medication Sig Dispense Refill     amLODIPine (NORVASC) 2.5 MG tablet Take 1 tablet (2.5 mg) by mouth daily 180 tablet 1     B Complex Vitamins (VITAMIN  B COMPLEX) tablet Take 1 tablet by mouth daily.       calcium-vitamin D (CALTRATE) 600-400 MG-UNIT per tablet Take 4 tablets by mouth daily        ezetimibe (ZETIA) 10 MG tablet TAKE 1 TABLET(10 MG) BY MOUTH DAILY 90 tablet 0     FLUoxetine (PROZAC) 10 MG capsule Take 2 capsules (20 mg) by mouth daily 90 capsule 1     furosemide (LASIX) 40 MG tablet Take 2 tablets (80 mg) by mouth daily 60 tablet 6     metoprolol tartrate (LOPRESSOR) 100 MG tablet Take 1 tablet (100 mg) by mouth 2 times daily 60 tablet 6     nystatin (MYCOSTATIN) 406681 UNIT/ML suspension  Take 5 mLs (500,000 Units) by mouth 4 times daily 400 mL 0     omega 3 1000 MG CAPS Take 1 g by mouth daily 90 capsule      omeprazole (PRILOSEC) 20 MG DR capsule Take 1 capsule (20 mg) by mouth daily (Patient taking differently: Take 20 mg by mouth as needed ) 90 capsule 1     polyethylene glycol (MIRALAX/GLYCOLAX) powder Take 1 capful by mouth daily as needed for constipation       potassium chloride ER (KLOR-CON M) 20 MEQ CR tablet Take 1 tablet (20 mEq) by mouth 2 times daily 60 tablet 6     predniSONE (DELTASONE) 1 MG tablet Take 5 mg by mouth daily Taking 50 mg daily and then this weekend change to 40 mg 60 tablet 0     warfarin ANTICOAGULANT (COUMADIN) 2.5 MG tablet Take 1.25mg every Mon,Wed and Fri / Take 2.5mg rest of week OR as instructed by INR clinic 90 tablet 0         Review of Systems   CONSTITUTIONAL:NEGATIVE for fever, chills, change in weight  EYES: NEGATIVE for vision changes or irritation  ENT/MOUTH: NEGATIVE for ear, mouth and throat problems  RESP:NEGATIVE for significant cough or SOB x short of breath. Not new. Might be a little nurse.   CV: NEGATIVE for palpitations. See below regarding edema.  Little pain the other night when in bed. Notes usually heartburn or gas is the cause. Did not try anything at the time; went away.   Pacemaker set at 70.   GI: NEGATIVE for nausea, abdominal pain, heartburn, or change in bowel habits x   Notes did throw up the other day. Notes also food gets stuck almost daily. Uses a chewable AA and feels better.   MUSCULOSKELETAL:  see below  Neuro: weakness; see below. No numbness or tinglin.  PSYCHIATRIC:  stressors.    in coma and on vent. Brought him to the hospital this am. Thinking heart; not sure.    Falls frequently. Often related to stepping on something.   Last fall 2 days; stepped on sprinkler head.     Her biggest complaint is weakness, mostly in legs; some in arms.   Difficult time getting in and out of chairs.    She feels she is better in the am  "and gets worse throughout the day.   Minimal dizziness when gets up. She is trying to move slowly.    Will stay with the Rheumatologist she has been seeing in [place]. She notes they came to an understanding.   She has recently decreased her dose prednisone to 35mg. Just started this. Had been on 40 mg for about a month prior to that.    Appointment on 9/30 with Cardiology.   Has been talking with device clinic.    Did not take lasix today; has been unable to control her urination.  Might return if swelling again. As she looks down now, she notes a little.     Just got a walker and walking stick. Has not really started using them yet.    She notes she is scheduled for a lung CT in October.        Objective    /52 (BP Location: Right arm, Cuff Size: Adult Regular)   Pulse 70   Temp 98.1  F (36.7  C) (Oral)   Resp 16   Ht 1.6 m (5' 3\")   Wt 61.5 kg (135 lb 8 oz)   LMP  (LMP Unknown)   SpO2 98%   BMI 24.00 kg/m    Body mass index is 24 kg/m .  Physical Exam     Reviewed orthostatics; bp decreased. HR 70 (she notes her pacemaker is set there).    GENERAL APPEARANCE: alert and no distress  EYES: Eyes grossly normal to inspection, PERRL and conjunctivae and sclerae normal  HENT: ear canals and TM's normal and nose and mouth without ulcers or lesions  NECK: no adenopathy  RESP: lungs clear to auscultation - no rales, rhonchi or wheezes  CV: regular rates and rhythm  ABDOMEN: soft, nontender, without hepatosplenomegaly or masses and bowel sounds normal  MS: large bruise anterior left leg, does dissect distally and laterally.   SKIN: see above. Also with skin tear/abrasian anterior left leg midway between knee and ankle, just lateral to the tibia. This does appear clean. Very slight blood oozing.  NEURO: mentation intact and speech normal. Her ambulation is slow. As walking around the clinic, she did use assist of holding onto the MAs arm.   PSYCH: mentation appears normal and affect normal          TSH   Date " Value Ref Range Status   09/13/2019 2.99 0.40 - 4.00 mU/L Final             Assessment & Plan     Weakness of both lower extremities  Uncertain etiology.   She has recently developed several health conditions, but then has continued to not feel well.   Now described as mostly lower extremity weakness.  She does see Rheumatology, Cardiology. Will be having a lung CT in future.   At this time, will request a Neurology evaluation. Discussed strong consideration for physical therapy as well.   - TSH with free T4 reflex  - Comprehensive metabolic panel  - CBC with platelets  - CK total  - Lyme Disease Graciela with reflex to WB Serum  - NEUROLOGY ADULT REFERRAL    Other cardiomyopathy (H)  She does follow up with Cardiology. She had been in intermittent a fib. Continued to have issues after initial treatment. Has subsequently had a pacemaker placed and adjusted.   Continues to work with Cardiology.     Paroxysmal atrial fibrillation (H)  As above.     Temporal arteritis (H)  Continues with Rheumatology.  She has felt that the prednisone may be related to above additional issues. Does have some of the typical side effects with prednisone, including the puffiness.   Had been on a weaning low dose prednisone when was diagnosed with this through Rheumatology. She is now slowly weaning on this.     PMR (polymyalgia rheumatica) (H)  Continues with Rheumatology.    Recurrent falls  As above. Will have her see Neurology. Discussed safety; she has just purchased a walker and walking stick. Strong consideration for physical therapy.   - TSH with free T4 reflex  - Comprehensive metabolic panel  - CBC with platelets  - CK total    History of complete heart block  Now with pacemaker.   - TSH with free T4 reflex  - Lyme Disease Graciela with reflex to WB Serum    Cardiac pacemaker in situ    - TSH with free T4 reflex  - Lyme Disease Graciela with reflex to WB Serum    Fatigue, unspecified type  Uncertain etiology. Biggest complaint currently is the  weakness in her legs.   - TSH with free T4 reflex    Traumatic ecchymosis of left lower leg, initial encounter  Discussed. She is on blood thinner. Would like to avoid additional falls.     Abrasion  Does not appear to have infection. Looks clean.   Did rewrap for her.     History of lung cancer  Considered with some of her weakness as well.  She did do ok with her O2 sats as she was ambulating.   Will be following up with CT in the near future.     Heartburn:  Discussed she may wish to prevent symptoms as opposed to treat when present.   She also describes things getting stuck. Discussed seeing GI; I do wonder about doing an upper endoscopy for possible stricture. Defers currently.     Transaminase elevation:  Thought related to medication that she is now off of. Will be repeating.     Return in about 4 weeks (around 9/24/2020).    Violet Fenton MD, MD  [facility]    I spent 40 minutes face to face, of which at least 50 % was spent in counseling or coordination of care for weakness with multiple comorbidities.    CC [facility] this note and notes from the past year.

## 2020-08-27 NOTE — TELEPHONE ENCOUNTER
Received call from patient saying she would not be able to make it to her device check on Monday 8/31/20.  Her  is her  and is currently in the hospital in a coma after a heart attack.  Patient is unable to walk long distances and is unable to come to the [facility] clinic.  Pt stated she would be able to drive herself to the [facility] location.  Reviewed [facility] schedule and there was a cancellation on 9/30/20.  Scheduled patient for follow-up at this time.  Per patient, incision is CDI with small healing scabs.  Pt denies fever, redness, swelling, drainage or signs of infection.  Patient will call us with any changes prior to appointment on 9/30/20.    VEDA Prasad

## 2020-08-27 NOTE — Clinical Note
Please send copies of this note and notes from this past year to [facility]. Include labs. Include recent Cardiology note and Rheumatology note.  Include last lung CT.   Thanks!

## 2020-08-27 NOTE — NURSING NOTE
Walked pt around clinic with pulse oximetry. Pt started at 99% and ended the walk at 98%. Pt stated having SOB.

## 2020-08-27 NOTE — PROGRESS NOTES
ANTICOAGULATION FOLLOW-UP CLINIC VISIT    Patient Name:  Tomasa Fam  Date:  8/27/2020  Contact Type:  Telephone    SUBJECTIVE:  Patient Findings     Positives:   Change in health (see below and office visit note from today)    Comments:   Consulted with Shanique Miller Pharm D: Ok to let patient ride with current warfarin dosing and recheck next week. PCP is checking liver function tests again as well as Lyme's disease, we may have more answers to what is causing her pain and weakness by then.  Called patient: This morning her  was taken to hospital and is currently in a coma and on the ventilator, possible heart attack. She has fallen a couple of times recently and has purchased a walker and walking stick. She currently has company so couldn't talk long. Reviewed maintenance warfarin dosing with patient. Patient will remain on the same dose until next INR check. No other questions or concerns. Advised patient to schedule next INR in 1 week (around 9.3.20). Patient stated understanding and is in agreement with plan.  Yue NICOLAS RN  Anticoagulation Clinic  [place]          Clinical Outcomes     Comments:   Consulted with Shanique Miller Pharm D: Ok to let patient ride with current warfarin dosing and recheck next week. PCP is checking liver function tests again as well as Lyme's disease, we may have more answers to what is causing her pain and weakness by then.  Called patient: This morning her  was taken to hospital and is currently in a coma and on the ventilator, possible heart attack. She has fallen a couple of times recently and has purchased a walker and walking stick. She currently has company so couldn't talk long. Reviewed maintenance warfarin dosing with patient. Patient will remain on the same dose until next INR check. No other questions or concerns. Advised patient to schedule next INR in 1 week (around 9.3.20). Patient stated understanding and is in  agreement with plan.  Yue NICOLAS RN  Anticoagulation Clinic  [place]             OBJECTIVE    Recent labs: (last 7 days)     08/27/20  1428   INR 3.20*       ASSESSMENT / PLAN  INR assessment SUPRA    Recheck INR In: 1 WEEK    INR Location Clinic      Anticoagulation Summary  As of 8/27/2020    INR goal:   2.0-3.0   TTR:   64.0 % (9.5 mo)   INR used for dosing:   3.20! (8/27/2020)   Warfarin maintenance plan:   1.25 mg (2.5 mg x 0.5) every Mon, Wed, Fri; 2.5 mg (2.5 mg x 1) all other days   Full warfarin instructions:   1.25 mg every Mon, Wed, Fri; 2.5 mg all other days   Weekly warfarin total:   13.75 mg   No change documented:   Yue Martínez RN   Plan last modified:   Yue Martínez RN (8/19/2020)   Next INR check:   9/3/2020   Priority:   High   Target end date:   Indefinite    Indications    Paroxysmal atrial fibrillation (H) [I48.0]             Anticoagulation Episode Summary     INR check location:   Anticoagulation Clinic    Preferred lab:       Send INR reminders to:   PK WEAVER    Comments:   No Bandaid // 2.5 mg tab // patient started on Xarelto, transition to warfarin 11/6/19      Anticoagulation Care Providers     Provider Role Specialty Phone number    Violet Fenton MD Referring [facility] [phone]            See the Encounter Report to view Anticoagulation Flowsheet and Dosing Calendar (Go to Encounters tab in chart review, and find the Anticoagulation Therapy Visit)    Yue Martínez RN

## 2020-08-28 LAB
ALBUMIN SERPL-MCNC: 3.3 G/DL (ref 3.4–5)
ALP SERPL-CCNC: 51 U/L (ref 40–150)
ALT SERPL W P-5'-P-CCNC: 134 U/L (ref 0–50)
ANION GAP SERPL CALCULATED.3IONS-SCNC: 8 MMOL/L (ref 3–14)
AST SERPL W P-5'-P-CCNC: 64 U/L (ref 0–45)
BILIRUB SERPL-MCNC: 1.2 MG/DL (ref 0.2–1.3)
BUN SERPL-MCNC: 38 MG/DL (ref 7–30)
CALCIUM SERPL-MCNC: 10.8 MG/DL (ref 8.5–10.1)
CHLORIDE SERPL-SCNC: 96 MMOL/L (ref 94–109)
CK SERPL-CCNC: 123 U/L (ref 30–225)
CO2 SERPL-SCNC: 29 MMOL/L (ref 20–32)
CREAT SERPL-MCNC: 0.9 MG/DL (ref 0.52–1.04)
ERYTHROCYTE [DISTWIDTH] IN BLOOD BY AUTOMATED COUNT: 17.1 % (ref 10–15)
GFR SERPL CREATININE-BSD FRML MDRD: 60 ML/MIN/{1.73_M2}
GLUCOSE SERPL-MCNC: 139 MG/DL (ref 70–99)
HCT VFR BLD AUTO: 35 % (ref 35–47)
HGB BLD-MCNC: 11.2 G/DL (ref 11.7–15.7)
MCH RBC QN AUTO: 28.4 PG (ref 26.5–33)
MCHC RBC AUTO-ENTMCNC: 32 G/DL (ref 31.5–36.5)
MCV RBC AUTO: 89 FL (ref 78–100)
PLATELET # BLD AUTO: 194 10E9/L (ref 150–450)
POTASSIUM SERPL-SCNC: 4.5 MMOL/L (ref 3.4–5.3)
PROT SERPL-MCNC: 6.2 G/DL (ref 6.8–8.8)
RBC # BLD AUTO: 3.94 10E12/L (ref 3.8–5.2)
SODIUM SERPL-SCNC: 133 MMOL/L (ref 133–144)
TSH SERPL DL<=0.005 MIU/L-ACNC: 2.24 MU/L (ref 0.4–4)
WBC # BLD AUTO: 15 10E9/L (ref 4–11)

## 2020-08-30 ENCOUNTER — TELEPHONE (OUTPATIENT)
Dept: FAMILY MEDICINE | Facility: CLINIC | Age: 80
End: 2020-08-30

## 2020-08-30 DIAGNOSIS — E83.52 HYPERCALCEMIA: ICD-10-CM

## 2020-08-30 DIAGNOSIS — Z11.59 ENCOUNTER FOR SCREENING FOR OTHER VIRAL DISEASES: ICD-10-CM

## 2020-08-30 DIAGNOSIS — D64.9 ANEMIA, UNSPECIFIED TYPE: Primary | ICD-10-CM

## 2020-08-30 NOTE — TELEPHONE ENCOUNTER
Please call her. I did send a MyChart.   I would like to do additional lab tests related to recent labs. See if you can help her schedule.     Please have her repeat labs ASAP.  She can hold on Neurology at this time.    With liver tests elevated, I would also like to do an ultrasound. See if she has questions around that; otherwise go ahead and sign for it. (I wanted you to call first, before they call her to schedule.     Thanks!      From result note:    I will ask someone to call and help you with scheduling the blood work. See if you have questions about doing an ultrasound of your liver.   I think it might be good to set up an appointment after we get these back... to figure out next steps.

## 2020-08-31 ENCOUNTER — APPOINTMENT (OUTPATIENT)
Dept: GENERAL RADIOLOGY | Facility: CLINIC | Age: 80
End: 2020-08-31
Attending: EMERGENCY MEDICINE
Payer: MEDICARE

## 2020-08-31 ENCOUNTER — HOSPITAL ENCOUNTER (OUTPATIENT)
Facility: CLINIC | Age: 80
Setting detail: OBSERVATION
Discharge: HOME | End: 2020-09-02
Attending: EMERGENCY MEDICINE | Admitting: INTERNAL MEDICINE
Payer: MEDICARE

## 2020-08-31 ENCOUNTER — TELEPHONE (OUTPATIENT)
Dept: FAMILY MEDICINE | Facility: CLINIC | Age: 80
End: 2020-08-31

## 2020-08-31 DIAGNOSIS — R29.6 RECURRENT FALLS: ICD-10-CM

## 2020-08-31 DIAGNOSIS — R79.89 ELEVATED TROPONIN: ICD-10-CM

## 2020-08-31 DIAGNOSIS — N30.00 ACUTE CYSTITIS WITHOUT HEMATURIA: Primary | ICD-10-CM

## 2020-08-31 DIAGNOSIS — R29.6 MULTIPLE FALLS: ICD-10-CM

## 2020-08-31 DIAGNOSIS — M62.81 GENERALIZED MUSCLE WEAKNESS: ICD-10-CM

## 2020-08-31 PROBLEM — R06.00 DYSPNEA: Status: ACTIVE | Noted: 2020-08-31

## 2020-08-31 LAB
ALBUMIN SERPL-MCNC: 3 G/DL (ref 3.4–5)
ALBUMIN UR-MCNC: 10 MG/DL
ALP SERPL-CCNC: 47 U/L (ref 40–150)
ALT SERPL W P-5'-P-CCNC: 153 U/L (ref 0–50)
ANION GAP SERPL CALCULATED.3IONS-SCNC: 4 MMOL/L (ref 3–14)
ANISOCYTOSIS BLD QL SMEAR: ABNORMAL
APPEARANCE UR: CLEAR
AST SERPL W P-5'-P-CCNC: 58 U/L (ref 0–45)
B BURGDOR IGG+IGM SER QL: 0.06 (ref 0–0.89)
BACTERIA #/AREA URNS HPF: ABNORMAL /HPF
BASOPHILS # BLD AUTO: 0 10E9/L (ref 0–0.2)
BASOPHILS NFR BLD AUTO: 0 %
BILIRUB DIRECT SERPL-MCNC: 0.2 MG/DL (ref 0–0.2)
BILIRUB SERPL-MCNC: 1.4 MG/DL (ref 0.2–1.3)
BILIRUB UR QL STRIP: NEGATIVE
BUN SERPL-MCNC: 27 MG/DL (ref 7–30)
CALCIUM SERPL-MCNC: 10 MG/DL (ref 8.5–10.1)
CHLORIDE SERPL-SCNC: 99 MMOL/L (ref 94–109)
CO2 SERPL-SCNC: 31 MMOL/L (ref 20–32)
COLOR UR AUTO: YELLOW
CREAT SERPL-MCNC: 0.79 MG/DL (ref 0.52–1.04)
CRP SERPL-MCNC: <2.9 MG/L (ref 0–8)
D DIMER PPP FEU-MCNC: 0.8 UG/ML FEU (ref 0–0.5)
DIFFERENTIAL METHOD BLD: ABNORMAL
ELLIPTOCYTES BLD QL SMEAR: SLIGHT
EOSINOPHIL # BLD AUTO: 0 10E9/L (ref 0–0.7)
EOSINOPHIL NFR BLD AUTO: 0 %
ERYTHROCYTE [DISTWIDTH] IN BLOOD BY AUTOMATED COUNT: 19.2 % (ref 10–15)
ERYTHROCYTE [SEDIMENTATION RATE] IN BLOOD BY WESTERGREN METHOD: 10 MM/H (ref 0–30)
GFR SERPL CREATININE-BSD FRML MDRD: 71 ML/MIN/{1.73_M2}
GLUCOSE SERPL-MCNC: 236 MG/DL (ref 70–99)
GLUCOSE UR STRIP-MCNC: NEGATIVE MG/DL
HBA1C MFR BLD: 7 % (ref 0–5.6)
HCT VFR BLD AUTO: 36.5 % (ref 35–47)
HGB BLD-MCNC: 11.4 G/DL (ref 11.7–15.7)
HGB UR QL STRIP: NEGATIVE
HYALINE CASTS #/AREA URNS LPF: 1 /LPF (ref 0–2)
INR PPP: 2.06 (ref 0.86–1.14)
INTERPRETATION ECG - MUSE: NORMAL
KETONES UR STRIP-MCNC: NEGATIVE MG/DL
LABORATORY COMMENT REPORT: NORMAL
LEUKOCYTE ESTERASE UR QL STRIP: ABNORMAL
LYMPHOCYTES # BLD AUTO: 2.2 10E9/L (ref 0.8–5.3)
LYMPHOCYTES NFR BLD AUTO: 16 %
MACROCYTES BLD QL SMEAR: PRESENT
MAGNESIUM SERPL-MCNC: 1.9 MG/DL (ref 1.6–2.3)
MCH RBC QN AUTO: 29.5 PG (ref 26.5–33)
MCHC RBC AUTO-ENTMCNC: 31.2 G/DL (ref 31.5–36.5)
MCV RBC AUTO: 94 FL (ref 78–100)
METAMYELOCYTES # BLD: 0.1 10E9/L
METAMYELOCYTES NFR BLD MANUAL: 1 %
MICROCYTES BLD QL SMEAR: PRESENT
MONOCYTES # BLD AUTO: 0.4 10E9/L (ref 0–1.3)
MONOCYTES NFR BLD AUTO: 3 %
MUCOUS THREADS #/AREA URNS LPF: PRESENT /LPF
MYELOCYTES # BLD: 0.3 10E9/L
MYELOCYTES NFR BLD MANUAL: 2 %
NEUTROPHILS # BLD AUTO: 10.5 10E9/L (ref 1.6–8.3)
NEUTROPHILS NFR BLD AUTO: 78 %
NITRATE UR QL: NEGATIVE
NRBC # BLD AUTO: 1.1 10*3/UL
NRBC BLD AUTO-RTO: 8 /100
PH UR STRIP: 6.5 PH (ref 5–7)
PLATELET # BLD AUTO: 192 10E9/L (ref 150–450)
PLATELET # BLD EST: ABNORMAL 10*3/UL
POLYCHROMASIA BLD QL SMEAR: SLIGHT
POTASSIUM SERPL-SCNC: 4.6 MMOL/L (ref 3.4–5.3)
PROT SERPL-MCNC: 5.4 G/DL (ref 6.8–8.8)
RBC # BLD AUTO: 3.87 10E12/L (ref 3.8–5.2)
RBC #/AREA URNS AUTO: 3 /HPF (ref 0–2)
SARS-COV-2 RNA SPEC QL NAA+PROBE: NEGATIVE
SARS-COV-2 RNA SPEC QL NAA+PROBE: NORMAL
SODIUM SERPL-SCNC: 134 MMOL/L (ref 133–144)
SOURCE: ABNORMAL
SP GR UR STRIP: 1.02 (ref 1–1.03)
SPECIMEN SOURCE: NORMAL
SPECIMEN SOURCE: NORMAL
SQUAMOUS #/AREA URNS AUTO: <1 /HPF (ref 0–1)
TROPONIN I SERPL-MCNC: 0.06 UG/L (ref 0–0.04)
TROPONIN I SERPL-MCNC: 0.07 UG/L (ref 0–0.04)
TROPONIN I SERPL-MCNC: 0.07 UG/L (ref 0–0.04)
UROBILINOGEN UR STRIP-MCNC: NORMAL MG/DL (ref 0–2)
WBC # BLD AUTO: 13.5 10E9/L (ref 4–11)
WBC #/AREA URNS AUTO: 64 /HPF (ref 0–5)
YEAST #/AREA URNS HPF: ABNORMAL /HPF

## 2020-08-31 PROCEDURE — 87086 URINE CULTURE/COLONY COUNT: CPT | Performed by: EMERGENCY MEDICINE

## 2020-08-31 PROCEDURE — G0378 HOSPITAL OBSERVATION PER HR: HCPCS

## 2020-08-31 PROCEDURE — 71045 X-RAY EXAM CHEST 1 VIEW: CPT

## 2020-08-31 PROCEDURE — 85610 PROTHROMBIN TIME: CPT | Performed by: EMERGENCY MEDICINE

## 2020-08-31 PROCEDURE — 80076 HEPATIC FUNCTION PANEL: CPT | Performed by: EMERGENCY MEDICINE

## 2020-08-31 PROCEDURE — 86140 C-REACTIVE PROTEIN: CPT | Performed by: EMERGENCY MEDICINE

## 2020-08-31 PROCEDURE — 73590 X-RAY EXAM OF LOWER LEG: CPT | Mod: LT

## 2020-08-31 PROCEDURE — 85379 FIBRIN DEGRADATION QUANT: CPT | Performed by: EMERGENCY MEDICINE

## 2020-08-31 PROCEDURE — 85652 RBC SED RATE AUTOMATED: CPT | Performed by: PHYSICIAN ASSISTANT

## 2020-08-31 PROCEDURE — 25000132 ZZH RX MED GY IP 250 OP 250 PS 637: Mod: GY | Performed by: INTERNAL MEDICINE

## 2020-08-31 PROCEDURE — 85025 COMPLETE CBC W/AUTO DIFF WBC: CPT | Performed by: EMERGENCY MEDICINE

## 2020-08-31 PROCEDURE — 81001 URINALYSIS AUTO W/SCOPE: CPT | Performed by: EMERGENCY MEDICINE

## 2020-08-31 PROCEDURE — 80048 BASIC METABOLIC PNL TOTAL CA: CPT | Performed by: EMERGENCY MEDICINE

## 2020-08-31 PROCEDURE — 83735 ASSAY OF MAGNESIUM: CPT | Performed by: EMERGENCY MEDICINE

## 2020-08-31 PROCEDURE — U0003 INFECTIOUS AGENT DETECTION BY NUCLEIC ACID (DNA OR RNA); SEVERE ACUTE RESPIRATORY SYNDROME CORONAVIRUS 2 (SARS-COV-2) (CORONAVIRUS DISEASE [COVID-19]), AMPLIFIED PROBE TECHNIQUE, MAKING USE OF HIGH THROUGHPUT TECHNOLOGIES AS DESCRIBED BY CMS-2020-01-R: HCPCS | Performed by: EMERGENCY MEDICINE

## 2020-08-31 PROCEDURE — 84484 ASSAY OF TROPONIN QUANT: CPT | Performed by: PHYSICIAN ASSISTANT

## 2020-08-31 PROCEDURE — 99285 EMERGENCY DEPT VISIT HI MDM: CPT | Mod: 25

## 2020-08-31 PROCEDURE — 25000132 ZZH RX MED GY IP 250 OP 250 PS 637: Mod: GY | Performed by: PHYSICIAN ASSISTANT

## 2020-08-31 PROCEDURE — 83036 HEMOGLOBIN GLYCOSYLATED A1C: CPT | Performed by: EMERGENCY MEDICINE

## 2020-08-31 PROCEDURE — 36415 COLL VENOUS BLD VENIPUNCTURE: CPT | Performed by: PHYSICIAN ASSISTANT

## 2020-08-31 PROCEDURE — 93005 ELECTROCARDIOGRAM TRACING: CPT

## 2020-08-31 PROCEDURE — 25000131 ZZH RX MED GY IP 250 OP 636 PS 637: Mod: GY | Performed by: PHYSICIAN ASSISTANT

## 2020-08-31 PROCEDURE — 99220 ZZC INITIAL OBSERVATION CARE,LEVL III: CPT | Performed by: PHYSICIAN ASSISTANT

## 2020-08-31 PROCEDURE — 84484 ASSAY OF TROPONIN QUANT: CPT | Mod: 91 | Performed by: EMERGENCY MEDICINE

## 2020-08-31 RX ORDER — AMLODIPINE BESYLATE 2.5 MG/1
2.5 TABLET ORAL DAILY
Status: DISCONTINUED | OUTPATIENT
Start: 2020-08-31 | End: 2020-09-02 | Stop reason: HOSPADM

## 2020-08-31 RX ORDER — LIDOCAINE 40 MG/G
CREAM TOPICAL
Status: DISCONTINUED | OUTPATIENT
Start: 2020-08-31 | End: 2020-09-02 | Stop reason: HOSPADM

## 2020-08-31 RX ORDER — NITROGLYCERIN 0.4 MG/1
0.4 TABLET SUBLINGUAL EVERY 5 MIN PRN
Status: DISCONTINUED | OUTPATIENT
Start: 2020-08-31 | End: 2020-09-02 | Stop reason: HOSPADM

## 2020-08-31 RX ORDER — WARFARIN SODIUM 2.5 MG/1
TABLET ORAL
COMMUNITY
End: 2020-11-25 | Stop reason: ALTCHOICE

## 2020-08-31 RX ORDER — PREDNISONE 10 MG/1
17.5 TABLET ORAL DAILY
COMMUNITY
End: 2021-03-16 | Stop reason: DRUGHIGH

## 2020-08-31 RX ORDER — ACETAMINOPHEN 500 MG
1000 TABLET ORAL EVERY 8 HOURS PRN
Status: DISCONTINUED | OUTPATIENT
Start: 2020-08-31 | End: 2020-09-02 | Stop reason: HOSPADM

## 2020-08-31 RX ORDER — CALCIUM CARBONATE 500 MG/1
2 TABLET, CHEWABLE ORAL 2 TIMES DAILY PRN
Status: ON HOLD | COMMUNITY
End: 2022-01-31

## 2020-08-31 RX ORDER — NALOXONE HYDROCHLORIDE 0.4 MG/ML
.1-.4 INJECTION, SOLUTION INTRAMUSCULAR; INTRAVENOUS; SUBCUTANEOUS
Status: DISCONTINUED | OUTPATIENT
Start: 2020-08-31 | End: 2020-09-02 | Stop reason: HOSPADM

## 2020-08-31 RX ORDER — METOPROLOL TARTRATE 100 MG
100 TABLET ORAL 2 TIMES DAILY
Status: DISCONTINUED | OUTPATIENT
Start: 2020-08-31 | End: 2020-09-02 | Stop reason: HOSPADM

## 2020-08-31 RX ORDER — ACETAMINOPHEN 500 MG
1000 TABLET ORAL EVERY 8 HOURS PRN
Status: ON HOLD | COMMUNITY
End: 2022-02-01

## 2020-08-31 RX ORDER — FLUOXETINE 10 MG/1
10 CAPSULE ORAL DAILY
Status: DISCONTINUED | OUTPATIENT
Start: 2020-08-31 | End: 2020-09-02 | Stop reason: HOSPADM

## 2020-08-31 RX ORDER — FUROSEMIDE 40 MG
40 TABLET ORAL 2 TIMES DAILY
Status: DISCONTINUED | OUTPATIENT
Start: 2020-08-31 | End: 2020-09-02 | Stop reason: HOSPADM

## 2020-08-31 RX ORDER — ALUMINA, MAGNESIA, AND SIMETHICONE 2400; 2400; 240 MG/30ML; MG/30ML; MG/30ML
30 SUSPENSION ORAL EVERY 4 HOURS PRN
Status: DISCONTINUED | OUTPATIENT
Start: 2020-08-31 | End: 2020-09-02 | Stop reason: HOSPADM

## 2020-08-31 RX ORDER — FUROSEMIDE 40 MG
80 TABLET ORAL DAILY
COMMUNITY
End: 2021-05-19

## 2020-08-31 RX ORDER — POTASSIUM CHLORIDE 1500 MG/1
20 TABLET, EXTENDED RELEASE ORAL 2 TIMES DAILY
Status: DISCONTINUED | OUTPATIENT
Start: 2020-08-31 | End: 2020-09-02 | Stop reason: HOSPADM

## 2020-08-31 RX ADMIN — POTASSIUM CHLORIDE 20 MEQ: 1500 TABLET, EXTENDED RELEASE ORAL at 20:56

## 2020-08-31 RX ADMIN — PREDNISONE 35 MG: 5 TABLET ORAL at 18:55

## 2020-08-31 RX ADMIN — WARFARIN SODIUM 1.25 MG: 2.5 TABLET ORAL at 18:55

## 2020-08-31 RX ADMIN — METOPROLOL TARTRATE 100 MG: 100 TABLET ORAL at 20:53

## 2020-08-31 RX ADMIN — AMLODIPINE BESYLATE 2.5 MG: 2.5 TABLET ORAL at 18:55

## 2020-08-31 RX ADMIN — FLUOXETINE 10 MG: 10 CAPSULE ORAL at 18:58

## 2020-08-31 ASSESSMENT — ENCOUNTER SYMPTOMS
HEADACHES: 0
TROUBLE SWALLOWING: 1
SORE THROAT: 0
PALPITATIONS: 0
WEAKNESS: 1

## 2020-08-31 NOTE — ED TRIAGE NOTES
Patient presents to the ED following multiple falls in the past 2 weeks. Patient states feels weak and has had trouble getting around the house. On warfarin. Denies head injuries associated with the falls.

## 2020-08-31 NOTE — TELEPHONE ENCOUNTER
Calling patient- Patient and daughter will talk to insurance to see if patient can qualify for an inpatient admission and will call the clinic back if needs appt.     Roxie Lobato RN on 8/31/2020 at 2:41 PM

## 2020-08-31 NOTE — PHARMACY-ANTICOAGULATION SERVICE
Clinical Pharmacy - Warfarin Dosing Consult     Pharmacy has been consulted to manage this patient s warfarin therapy.  Indication: Atrial Fibrillation  Therapy Goal: INR 2-3  Warfarin Prior to Admission: Yes  Warfarin PTA Regimen: 1.25mg on M/W/F and 2.5mg the rest of the week  Significant drug interactions: Prozac, omeprazole, prednisone  Recent documented change in oral intake/nutrition: Unknown    INR   Date Value Ref Range Status   08/31/2020 2.06 (H) 0.86 - 1.14 Final   08/27/2020 3.20 (H) 0.86 - 1.14 Final     Comment:     This test is intended for monitoring Coumadin therapy.  Results are not   accurate in patients with prolonged INR due to factor deficiency.         Recommend warfarin 1.25 mg today.  Pharmacy will monitor Tomasa Fam daily and order warfarin doses to achieve specified goal.      Please contact pharmacy as soon as possible if the warfarin needs to be held for a procedure or if the warfarin goals change.

## 2020-08-31 NOTE — ED PROVIDER NOTES
History     Chief Complaint:  Fall and Generalized Weakness    HPI   Tomasa Fam is an anticoagulated 80 year old female with a history of atrial fibrillation, lung cancer, and breast cancer with a pacemaker who presents for evaluation secondary to a falls. The patient reports 2 weeks ago she developed generalized weakness in her legs, causing her to fall today. She denies hitting her head but does states she hit her left leg, resulting in multiple bruises. She endorses 3-4 weeks of difficulty breathing. She endorse trouble swallowing but denies headache, sore throat, and palpitations. Of note, her  is currently hospitalized secondary to a  Heart attack.     Allergies:  Adhesive tape      Medications:    Norvasc  Zetia  Prozac  Lasix  Metoprolol tartrate   Nystatin   Omeprazole   Polyethylene glycol   Klor-Con  Warfarin     Past Medical History:    Cardiomyopathy  Complete heart block  Malignant neoplasm of breast  PMR  GERD  Paroxysmal atrial fibrillation  Lung cancer  Chronic pain of both knees  Cystocele, midline  Essential hypertension  Breast cancer  Hyperlipidemia  Chronic airway obstruction  Diffuse cystic mastopathy  Thyroid nodule    Past Surgical History:    Anesthesia cardioversion  Tubal ligation  Davinci hysterectomy supracervical, sacrolpopexy, combined  EP ablation AV node  EP bivent lead placement  AVNA and BiV pacemaker insertion  Excision lesion eyelid  Laparoscopic salpingo-oophorectomy  Liposuction, rhytidectomy  Lobectomy lung  Mammoplasty reduction  Mastectomy bilateral  Repair ptosis bilateral  Revise reconstruction breast bilateral  Face lift  Lipoma removal right thigh  Dilation and curettage   Thoracotomy     Family History:    Cardiovascular  Cerebrovascular disease  Respiratory    Social History:  Smoking status- never smoker  Alcohol use- yes  Drug use- no   Marital Status:       Review of Systems   HENT: Positive for trouble swallowing. Negative for sore throat.     Cardiovascular: Negative for palpitations.   Neurological: Positive for weakness. Negative for headaches.   All other systems reviewed and are negative.    Physical Exam     Patient Vitals for the past 24 hrs:   BP Temp Temp src Pulse Resp SpO2   08/31/20 1145 (!) 140/68 -- -- 70 18 97 %   08/31/20 1130 (!) 152/66 -- -- 70 17 97 %   08/31/20 1115 (!) 136/98 -- -- 70 16 97 %   08/31/20 1100 136/68 -- -- 70 16 97 %   08/31/20 1045 136/64 -- -- 70 20 97 %   08/31/20 0952 132/60 98.6  F (37  C) Oral 70 18 99 %     Physical Exam  Constitutional: Vital signs reviewed as above.   Head: No external signs of trauma noted.  Eyes: Pupils are equal, round, and reactive to light.   Neck: No JVD noted  Cardiovascular: Normal rate, regular rhythm and normal heart sounds.  No murmur heard. Equal B/L peripheral pulses.  Pulmonary/Chest: Effort normal and breath sounds normal. No respiratory distress. Patient has no wheezes. Patient has no rales.   Gastrointestinal: Soft. There is no tenderness.   Musculoskeletal/Extremities: There is tenderness to the left shin.  Neurological: Patient is alert and oriented to person, place, and time.   Skin: Skin is warm and dry. There is no diaphoresis noted. Notable bruising on the left knee and left shin. Scattered bruising on the right shin.  Psychiatric: The patient appears calm.    Emergency Department Course     ECG  ECG Taken at 10:31    Ventricular paced rhythm  Abnormal EKG  Rate: 70. ME: *. QRS: 140. QTc: 481.  P-R-T Axes:   *   128   36  No significant change noted from 6/22/2020  Interpreted by me at 1036 on 8/31/2020    Imaging:  Radiology findings were communicated with the patient who voiced understanding of the findings.    XR Chest Port 1 View:  Upper lobe surgical sutures, unchanged from 6/23/2020. The  lungs are otherwise grossly clear. Left chest wall battery pack and  intracardiac leads appear unchanged as well.  As read by Radiology.     XR Tibia & Fibula, 2 views  left  Arterial calcifications. The tibia and fibula appear  normal. There is no evidence of fracture or osseous lesion. Calcaneal  enthesophyte at the Achilles tendon insertion site. Otherwise  unremarkable.  Reading per radiology    Laboratory:  Laboratory findings were communicated with the patient who voiced understanding of the findings.    CBC: WBC 13.5 (H), HGB 11.4 (L), MCHC 31.2 (L), RDW 19.2 (H), Nucleated RBCs 8 (H), Absolute Neutrophil 10.5 (H), Absolute Monocytes 0.1 (H), Absolute Myelocytes 0.3 (H), o/w WNL ()  BMP: Glucose 236 (H) o/w WNL (Creatinine 0.79)    Troponin (Collected 1014): 0.074 (H)    Magnesium: 1.9   INR: 2.06 (H)    D-dimer: 0.8     Urine Culture Aerobic Bacterial: Pending   UA with Microscopic: pending    Symptomatic COVID-19 Virus by PCR: pending    Emergency Department Course:  Past medical records, nursing notes, and vitals reviewed.    ED Course as of Aug 31 1649   Mon Aug 31, 2020   1008 I performed an exam of the patient as documented above.       1015 Blood drawn      1041 EKG obtained      1133 Patient was sent for imaging      1248 Updated patient via iPad.      1327 D/W Dr. Velez - ok for Obs, Echocardiogram      1440 Talked with Patient, daughter, and PCP clinic.  I attempted to explain that I do not have control over the patient's admission status.  Patient's daughter states that she would call the insurance company and see why the patient cannot be admitted.      1550 Patient ok with going to Obs floor.      1559 D/W Joanie Gonzalez PA-C.         Findings and plan explained to the Patient who consents to admission. Discussed the patient with Dr. Velez, who will admit the patient to a med/surg bed for further monitoring, evaluation, and treatment.    Impression & Plan     Covid-19  Tomasa Fam was evaluated during a global COVID-19 pandemic, which necessitated consideration that the patient might be at risk for infection with the SARS-CoV-2 virus that causes  COVID-19.   Applicable protocols for evaluation were followed during the patient's care.   COVID-19 was considered as part of the patient's evaluation. The plan for testing is:  a test was obtained during this visit.    Medical Decision Making:  This 80-year-old female patient presents the ED due to generalized weakness leading to multiple falls.  Please see the HPI and exam for specifics.  The patient was found to have a slightly elevated troponin and I wonder if this goes along with her 3 to 4 weeks of slight difficulty breathing.  Because the patient is therapeutic on her Coumadin hospitalist did not think that we needed to pursue a heparin infusion.  Dimer was slightly elevated though technically negative if we adjust for age.  Moreover, the patient is therapeutic on Coumadin.  We will bring the patient into observation for an echocardiogram and further monitoring.  Patient was initially concerned about patient as she did not want to pay for an observation stay though after her daughter talked with her insurance company they seem more comfortable with this and will go up to the observation floor as noted.    Diagnosis:    ICD-10-CM   1. Generalized muscle weakness  M62.81   2. Multiple falls  R29.6   3. Elevated troponin  R79.89     Disposition:  Admitted to med/surg.    Scribe Disclosure:  Lizbeth TOSCANO, am serving as a scribe at 10:02 AM on 8/31/2020 to document services personally performed by Marek Ibanez DO based on my observations and the provider's statements to me.        Marek Ibanez DO  08/31/20 [id number]       Marek Ibanez DO  08/31/20 3202

## 2020-08-31 NOTE — H&P
[facility]  History and Physical Exam     Tomasa Fam MRN# [id number]   YOB: 1940 Age: 80 year old      Date of Admission:  8/31/2020    Primary care provider: Violet Fenton          Assessment:   Tomasa Fam is a 80 year old female with a PMH significant for HTN, PMR and giant cell arteritis on chronic steroids and tapering, systolic CHF with EF 35% prior to ablation, PAF s/p AV node ablation and pacemaker placement in June 2020, hx of lung and breast ca, HLP, GERD and mild COPD, who presents with recurrent falls, generalized weakness and SOB.   Work up in ED reveals: VSS. BMP is unremarkable other than glucose of 236.   Patient is being registered to observation for further evaluation and management.    1. Generalized weakness and falls - likely due to deconditioning during pandemic. Progressive for past 2-3 weeks, unable to stand up on her own currently but once standing, states she can walk. Troponin elevated so will get further work up to rule out cardiac cause. Failed cardioversion for PAF and subsequent AV node ablation and pacemaker placement in June 2020. Pt notes PADRON. Serial troponins, tele and echocardiogram. Covid-19 is negative. Hx of PMR and recent dx of giant cell arteritis, on high dose steroids for past 3-4 months so PMR unlikely. Hx of lung ca, pt due for repeat CT chest in October, per pt report, last scan was in April and looked good but results not in chart. Per chart review, LFTs have been elevated, US ordered as an outpatient. Will add on LFTs here and recheck inflammatory markers, further work up pending results, consider RUQ US if LFTs still elevated. PT/SW consult. Pt would likely benefit from home PT vs TCU.   2. Elevated troponin and PADRON - denies chest pain, notes SOB and weakness with any exertion, states weakness in legs is bigger inhibiter of mobility than SOB.  Failed cardioversion for PAF and subsequent AV node ablation  and pacemaker placement in June 2020. States SOB is similar to prior to ablation. Serial troponins, tele and echocardiogram. Previous echo in May 2020 showed EF 35%. Resume home coumadin, lasix and metoprolol  3. Hyperglycemia - likely due to chronic steroid use, will add on HgbA1c. May be contributing to weakness if A1c is elevated.   4. Leukocytosis - WBC 13.5, likely due to chronic steroid use.   5. HTN - resume home amlodipine with parameters and home metoprolol. Resume lasix  6. PMR and giant cell arteritis - hx of PMR, managed on 6 mg prednisone daily, dx with giant cell arteritis 3-4 months ago, started on 50 mg PO Prednisone along with prophylactic Bactrim. Has tapered down to 35 mg daily, pt wishes to get off Prednisone, unclear of further tapering recommendations. Has follow up appt 9/14. Added on CRP and ESR.   7. GERD - on PPI as needed  8. HLP - resume Zetia on discharge  9. COPD - notes as mild, not on inhalers or chronic medications  10. Covid-19 negative         Plan:     1. [place] to Observation  2. Continue telemetry  3. Follow labs, serial troponins, add on LFTs, CRP, ESR and HgbA1c  4. Echocardiogram  5. Continue home steroids  6. PT consult  7. SW consult  8. regular diet  9. DVT prophylaxis: pt at low risk, encourage ambulation  10. Code Status: DNR/DNI. Discussed with patient  11. Dispo: anticipate discharge home vs TCU in 1-2 days, pending work up                  Chief Complaint:   Falls, SOB         History of Present Illness:   Tomasa Fam is a 80 year old female with a PMH significant for HTN, PMR and giant cell arteritis on chronic steroids and tapering, systolic CHF with EF 35% prior to ablation, PAF s/p AV node ablation and pacemaker placement in June 2020, hx of lung and breast ca, HLP, GERD and mild COPD, who presents with recurrent falls, generalized weakness and SOB. Pt reports progressive weakness over the past 2-3 weeks. She states she has trouble standing up but once  standing, she can walk. She feels stronger in the mornings and is weaker in the evenings. She states now she cannot stand on her own but if someone helps her up, she can walk. She notes SOB with exertion for some time, states weakness in the legs is the bigger limiting factor in her mobility. She denies fever, chills, cough, chest pain, abd pain, nausea, vomiting, diarrhea or dysuria. She notes hx of PAF, underwent failed cardioversion in June then subsequent AV susu ablation and pacemaker placement. She states her SOB feels similar to then. She also notes that she was started on high doses of Prednisone for giant cell arteritis 3-4 months ago, was on low dose prior to that for PMR.             Past Medical History:     Past Medical History:   Diagnosis Date     Arthritis     hands, knees     Breast cancer (H) jan. 2012     left mastectomy followed by right;  Dr. Luong     Chronic airway obstruction, not elsewhere classified 2006    very mild COPD - small cough     Concussion 3/13/2013     Problem list name updated by automated process. Provider to review and confirm [employer] Update utility     Cystocele, midline 4/4/2012     Diffuse cystic mastopathy      Gastroesophageal reflux disease, esophagitis presence not specified 11/23/2019     History of hypokalemia 1/23/2013     Hyperlipidemia LDL goal <160 6/29/2011    [place] 10-year CHD Risk Score: 2% (14 Total Points)  Values used to calculate score:    Age: 70 years -- Points: 14    Total Cholesterol: 192 mg/dL -- Points: 1    HDL Cholesterol: 61 mg/dL -- Points: -1    Systolic BP (treated): 118 mmHg -- Points: 0   The patient is not a smoker. -- Points: 0   The patient has not been diagnosed with diabetes. -- Points: 0   The patient does not have a famil     Lung cancer (H) 10/21/2015     Paroxysmal atrial fibrillation (H) 9/13/2019     PMR (polymyalgia rheumatica) (H) 1/17/2020    tapering' above.)  In patients receiving over 10 mg of prednisone/day, the dose can  be lowered by 2.5 mg/day decrements every two to four weeks  Once the dose of prednisone is 10 mg/day, further tapering can be done by 1 mg per month, provided the clinical course is stable  The clinical response to glucocorticoid therapy is closely monitored, which centers on screening for the presence and/or recu     Thyroid nodule 4/23/2016    Noted on CT Fall 2015.      Unspecified essential hypertension late 1980's               Past Surgical History:     Past Surgical History:   Procedure Laterality Date     ANESTHESIA CARDIOVERSION N/A 6/12/2020    Procedure: ANESTHESIA, FOR CARDIOVERSION;  Surgeon: GENERIC ANESTHESIA PROVIDER;  Location: [facility] OR     C NONSPECIFIC PROCEDURE  1970    s/p Tubal ligation 1970     COLONOSCOPY  3/2003    adenomatous polyp      COLONOSCOPY  7/2006    diverticulosis - repeat in 5 years     COLONOSCOPY  10/13/2011    Procedure:COLONOSCOPY; COLONOSCOPY ; Surgeon:CHITO GORDILLO; Location: GI     CYSTOSCOPY  7/11/2012    Procedure: CYSTOSCOPY;;  Surgeon: Aline Cooper DO;  Location:  OR     DAVINCI HYSTERECTOMY SUPRACERVICAL, SACROCOLPOPEXY, COMBINED  7/11/2012    Procedure: COMBINED DAVINCI HYSTERECTOMY SUPRACERVICAL, SACROCOLPOPEXY;   DAVINCI ASSISTED LAPAROSCOPIC SUPRACERVICAL HYSTERECTOMY AND BILATERAL SALPINGO-OOPHORECTOMY, SACROCOLPOPEXY AND CYSTOSCOPY;  Surgeon: Aline Cooper DO;  Location:  OR     EP ABLATION AV NODE N/A 6/22/2020    Procedure: EP ABLATION AV NODE;  Surgeon: Adriano Raman MD;  Location:  HEART CARDIAC CATH LAB     EP BIVENT LEAD PLACEMENT N/A 6/22/2020    Procedure: Bivent Lead Placement;  Surgeon: Adriano Raman MD;  Location:  HEART [facility]ARDI[facility] CATH LAB     EP PACEMAKER N/A 6/22/2020    Procedure: AVNA and BiV Pacemaker Insertion;  Surgeon: Adriano Raman MD;  Location:  HEART CARDIAC CATH LAB     EXCISE LESION EYELID Right 6/29/2015    Procedure: EXCISE LESION EYELID;  Surgeon: Hank Olvera MD;   Location:  [facility]     INSERT PORT VASCULAR ACCESS  2/3/2012    Procedure:INSERT PORT VASCULAR ACCESS; Power Port-A- Catheter Placement ; Surgeon:IRISH TAPIA; Location:[facility] OR     LAPAROSCOPIC SALPINGO-OOPHORECTOMY  7/11/2012    Procedure: LAPAROSCOPIC SALPINGO-OOPHORECTOMY;  Davinci;  Surgeon: Aline Cooper DO;  Location: [facility] OR     LIPOSUCTION, RHYTIDECTOMY, COMBINED       LOBECTOMY LUNG Right 3/1/2016    Procedure: LOBECTOMY LUNG;  Surgeon: Jonas Woodward MD;  Location:  OR     MAMMOPLASTY REDUCTION  1/6/2012    Procedure:MAMMOPLASTY REDUCTION; Surgeon:MICKI KYLE; Location:[facility] OR     MASTECTOMY SIMPLE  11/12/2012    Procedure: MASTECTOMY SIMPLE;   Right Prophylactic Mastectomy with attempted Right Sentinal Node Biopsy, Revision Bilateral Mastectomy Insicions, liposuction in breast area;  Surgeon: Irish Tapia MD;  Location: [facility] OR     MASTECTOMY SIMPLE, SENTINEL NODE, COMBINED  1/6/2012    Procedure:COMBINED MASTECTOMY SIMPLE, SENTINEL NODE; Left Mastectomy Left [place] Node Biopsy,  Right Breast Reduction ; Surgeon:IRISH TAPIA; Location:[facility] OR     MASTECTOMY, BILATERAL       REMOVE PORT VASCULAR ACCESS  4/29/2013    Procedure: REMOVE PORT VASCULAR ACCESS;  Port A catheter removal ;  Surgeon: Irish Tapia MD;  Location: [facility] OR     REPAIR PTOSIS BILATERAL Bilateral 6/29/2015    Procedure: REPAIR PTOSIS BILATERAL;  Surgeon: Hank Olvera MD;  Location:  SD     REVISE RECONSTRUCTED BREAST BILATERAL  11/12/2012    Procedure: REVISE RECONSTRUCTED BREAST BILATERAL;;  Surgeon: Micki Kyle MD;  Location: [facility] OR     SURGICAL HISTORY OF -   in 40's    face lift     SURGICAL HISTORY OF -       lipoma removed right thigh     SURGICAL HISTORY OF -       D and C     THORACOTOMY Right 3/1/2016    Procedure: THORACOTOMY;  Surgeon: Jonas Woodward MD;  Location:  OR               Social History:     Social History     Socioeconomic History     Marital  status:      Spouse name: Aren     Number of children: 2     Years of education: Not on file     Highest education level: Not on file   Occupational History     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     Employer: NONE    Social Needs     Financial resource strain: Not on file     Food insecurity     Worry: Not on file     Inability: Not on file     Transportation needs     Medical: Not on file     Non-medical: Not on file   Tobacco Use     Smoking status: Never Smoker     Smokeless tobacco: Never Used   Substance and Sexual Activity     Alcohol use: Yes     Alcohol/week: 0.0 standard drinks     Comment: 1 drink day     Drug use: No     Sexual activity: Yes     Partners: Male   Lifestyle     Physical activity     Days per week: Not on file     Minutes per session: Not on file     Stress: Not on file   Relationships     Social connections     Talks on phone: Not on file     Gets together: Not on file     Attends Sabianist service: Not on file     Active member of club or organization: Not on file     Attends meetings of clubs or organizations: Not on file     Relationship status: Not on file     Intimate partner violence     Fear of current or ex partner: Not on file     Emotionally abused: Not on file     Physically abused: Not on file     Forced sexual activity: Not on file   Other Topics Concern      Service Not Asked     Blood Transfusions Not Asked     Caffeine Concern Not Asked     Occupational Exposure Not Asked     Hobby Hazards Not Asked     Sleep Concern Not Asked     Stress Concern Not Asked     Weight Concern Not Asked     Special Diet Not Asked     Back Care Not Asked     Exercise Yes     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 Yes   Social History Narrative     Not on file               Family History:     Family History   Problem Relation Age of Onset     Cardiovascular Father         ruptured  aorta, hardening of the arteries     Cerebrovascular Disease Mother      Respiratory Mother         chronic bronchitis - was a smoker early on     Cardiovascular Paternal Grandfather         MI     Cerebrovascular Disease Paternal Aunt      Hypertension Son      Neurologic Disorder Daughter         migraines     Breast Cancer Daughter      Brain Tumor Sister               Allergies:      Allergies   Allergen Reactions     No Known Drug Allergies      Tape [Adhesive Tape]      Sensitive to plastic tape on upper part of body--takes skin off               Medications:     Prior to Admission medications    Medication Sig Last Dose Taking? Auth Provider   acetaminophen (TYLENOL) 500 MG tablet Take 1,000 mg by mouth every 8 hours as needed for mild pain Past Month at Unknown time Yes Unknown, Entered By History   amLODIPine (NORVASC) 2.5 MG tablet Take 1 tablet (2.5 mg) by mouth daily 8/30/2020 at Unknown time Yes Gloria Donnelly APRN CNP   B Complex Vitamins (VITAMIN  B COMPLEX) tablet Take 1 tablet by mouth daily. 8/30/2020 at Unknown time Yes Unknown, Entered By History   calcium carbonate (TUMS) 500 MG chewable tablet Take 2 chew tab by mouth 2 times daily as needed for heartburn Past Week at Unknown time Yes Unknown, Entered By History   calcium-vitamin D (CALTRATE) 600-400 MG-UNIT per tablet Take 4 tablets by mouth daily  8/30/2020 at Unknown time Yes Reported, Patient   cholecalciferol (VITAMIN D3) 25 mcg (1000 units) capsule Take 1 capsule by mouth daily 8/30/2020 at Unknown time Yes Unknown, Entered By History   ezetimibe (ZETIA) 10 MG tablet TAKE 1 TABLET(10 MG) BY MOUTH DAILY 8/30/2020 at Unknown time Yes Violet Fenton MD   FLUoxetine (PROZAC) 10 MG capsule Take 10 mg by mouth daily  8/30/2020 at Unknown time Yes Violet Fenton MD   furosemide (LASIX) 40 MG tablet Take 40 mg by mouth 2 times daily 8/30/2020 at Unknown time Yes Unknown, Entered By History   metoprolol tartrate (LOPRESSOR) 100 MG tablet Take 1  tablet (100 mg) by mouth 2 times daily 8/30/2020 at Unknown time Yes Gloria Donnelly APRN CNP   omega 3 1000 MG CAPS Take 1 g by mouth daily 8/30/2020 at Unknown time Yes Violet Fenton MD   omeprazole (PRILOSEC) 20 MG DR capsule Take 20 mg by mouth daily as needed Past Month at Unknown time Yes Unknown, Entered By History   polyethylene glycol (MIRALAX/GLYCOLAX) powder Take 1 capful by mouth daily as needed for constipation Past Month at Unknown time Yes Reported, Patient   potassium chloride ER (KLOR-CON M) 20 MEQ CR tablet Take 1 tablet (20 mEq) by mouth 2 times daily 8/30/2020 at Unknown time Yes Gloria Donnelly APRN CNP   predniSONE (DELTASONE) 10 MG tablet Take 35 mg by mouth daily 8/30/2020 at Unknown time Yes Unknown, Entered By History   warfarin ANTICOAGULANT (COUMADIN) 2.5 MG tablet Take 1.25mg every Mon,Wed and Fri / Take 2.5mg rest of week 8/30/2020 at Unknown time Yes Unknown, Entered By History              Review of Systems:   A Comprehensive greater than 10 system review of systems was carried out.  Pertinent positives and negatives are noted above.  Otherwise negative for contributory information.     Constitutional, neuro, ENT, endocrine, pulmonary, cardiac, gastrointestinal, genitourinary, musculoskeletal, integument and psychiatric systems are negative, except as otherwise noted.           Physical Exam:   Blood pressure 129/58, pulse 70, temperature 98  F (36.7  C), temperature source Oral, resp. rate 19, SpO2 99 %, not currently breastfeeding.    GENERAL:  Comfortable.  PSYCH: pleasant, oriented, No acute distress.  HEENT:  Atraumatic, normocephalic. Normal conjunctiva, normal hearing, and oropharynx is normal.  NECK:  Supple, no neck vein distention.  HEART:  Normal S1, S2 with no murmur, no pericardial rub, gallops or S3 or S4.  LUNGS:  Clear to auscultation, normal Respiratory effort. No wheezing, rales or ronchi.  GI:  Soft, normal bowel sounds. Non-tender, non distended.   EXTREMITIES:   Multiple bruises to LE, L>R. Trace bilateral pedal edema, +2 pulses bilateral and equal.  SKIN:  Dry to touch, No rash, wound or ulcerations.  NEUROLOGIC:  CN 2-12 intact, BL 5/5 symmetric upper and lower extremity strength, sensation is intact with no focal deficits.              Data:     EKG demonstrates:  Ventricular-paced rhythm.    Recent Labs   Lab 08/31/20  1014 08/27/20  1530   WBC 13.5* 15.0*   HGB 11.4* 11.2*   HCT 36.5 35.0   MCV 94 89    194     Recent Labs   Lab 08/31/20  1014 08/27/20  1530    133   POTASSIUM 4.6 4.5   CHLORIDE 99 96   CO2 31 29   ANIONGAP 4 8   * 139*   BUN 27 38*   CR 0.79 0.90   GFRESTIMATED 71 60*   GFRESTBLACK 82 70   CHARLIE 10.0 10.8*   MAG 1.9  --    PROTTOTAL  --  6.2*   ALBUMIN  --  3.3*   BILITOTAL  --  1.2   ALKPHOS  --  51   AST  --  64*   ALT  --  134*     Recent Labs   Lab 08/31/20  1014   DD 0.8*     Recent Labs   Lab 08/31/20  1014 08/27/20  1428   INR 2.06* 3.20*     Recent Labs   Lab 08/31/20  1627 08/31/20  1014   TROPI 0.067* 0.074*     Recent Labs   Lab 08/31/20  1354   COLOR Yellow   APPEARANCE Clear   URINEGLC Negative   URINEBILI Negative   URINEKETONE Negative   SG 1.018   UBLD Negative   URINEPH 6.5   PROTEIN 10*   NITRITE Negative   LEUKEST Large*   RBCU 3*   WBCU 64*       Recent Results (from the past 48 hour(s))   XR Chest Port 1 View    Narrative    XR CHEST PORT 1 VW 8/31/2020 11:23 AM     HISTORY: shortness of breath      Impression    IMPRESSION: Upper lobe surgical sutures, unchanged from 6/23/2020. The  lungs are otherwise grossly clear. Left chest wall battery pack and  intracardiac leads appear unchanged as well.    YUAN MALDONADO MD   XR Tibia & Fibula Left 2 Views    Narrative    LEFT TIBIA AND FIBULA TWO VIEWS   8/31/2020 12:03 PM     HISTORY:  Fall, proximal tibial pain.    COMPARISON: None.      Impression    IMPRESSION: Arterial calcifications. The tibia and fibula appear  normal. There is no evidence of fracture or osseous  lesion. Calcaneal  enthesophyte at the Achilles tendon insertion site. Otherwise  unremarkable.    MD Joanie ROMERO PA-C

## 2020-08-31 NOTE — ED NOTES
[facility]  ED Nurse Handoff Report    Tomasa Fam is a 80 year old female   ED Chief complaint: Fall and Generalized Weakness  . ED Diagnosis:   Final diagnoses:   Generalized muscle weakness   Multiple falls   Elevated troponin     Allergies:   Allergies   Allergen Reactions     No Known Drug Allergies      Tape [Adhesive Tape]      Sensitive to plastic tape on upper part of body--takes skin off       Code Status: Full Code  Activity level - Baseline/Home:  Independent. Activity Level - Current:   Assist X 1. Lift room needed: No. Bariatric: No   Needed: No   Isolation: Yes. Infection: Symptomatic COVID rule out.     Vital Signs:   Vitals:    08/31/20 1100 08/31/20 1115 08/31/20 1130 08/31/20 1145   BP: 136/68 (!) 136/98 (!) 152/66 (!) 140/68   Pulse: 70 70 70 70   Resp: 16 16 17 18   Temp:       TempSrc:       SpO2: 97% 97% 97% 97%       Cardiac Rhythm:  ,      Pain level:    Patient confused: No. Patient Falls Risk: Yes.   Elimination Status: Due to void - sample needed   Patient Report - Initial Complaint: Fall Generalized weakness. Focused Assessment: pt has generalized weakness and feels SOB  Tests Performed: EKG, labs, UA needed. Abnormal Results:   Labs Ordered and Resulted from Time of ED Arrival Up to the Time of Departure from the ED   CBC WITH PLATELETS DIFFERENTIAL - Abnormal; Notable for the following components:       Result Value    WBC 13.5 (*)     Hemoglobin 11.4 (*)     MCHC 31.2 (*)     RDW 19.2 (*)     Nucleated RBCs 8 (*)     Absolute Neutrophil 10.5 (*)     Absolute Metamyelocytes 0.1 (*)     Absolute Myelocytes 0.3 (*)     All other components within normal limits   BASIC METABOLIC PANEL - Abnormal; Notable for the following components:    Glucose 236 (*)     All other components within normal limits   INR - Abnormal; Notable for the following components:    INR 2.06 (*)     All other components within normal limits   D DIMER QUANTITATIVE - Abnormal; Notable  for the following components:    D Dimer 0.8 (*)     All other components within normal limits   TROPONIN I - Abnormal; Notable for the following components:    Troponin I ES 0.074 (*)     All other components within normal limits   MAGNESIUM   COVID-19 VIRUS (CORONAVIRUS) BY PCR   SARS-COV-2 (COVID-19) VIRUS RT-PCR   ROUTINE UA WITH MICROSCOPIC   PERIPHERAL IV CATHETER   URINE CULTURE AEROBIC BACTERIAL     XR Tibia & Fibula Left 2 Views   Final Result   IMPRESSION: Arterial calcifications. The tibia and fibula appear   normal. There is no evidence of fracture or osseous lesion. Calcaneal   enthesophyte at the Achilles tendon insertion site. Otherwise   unremarkable.      KATLYN FOFANA MD      XR Chest Port 1 View   Final Result   IMPRESSION: Upper lobe surgical sutures, unchanged from 6/23/2020. The   lungs are otherwise grossly clear. Left chest wall battery pack and   intracardiac leads appear unchanged as well.      YUAN MALDONADO MD        .   Treatments provided: EKG monitoring  Family Comments: NA  OBS brochure/video discussed/provided to patient:  Yes  ED Medications: Medications - No data to display  Drips infusing:  No  For the majority of the shift, the patient's behavior Green. Interventions performed were NA.    Sepsis treatment initiated: No     Patient tested for COVID 19 prior to admission: YES    ED Nurse Name/Phone Number: Je Davies RN,   1:23 PM  RECEIVING UNIT ED HANDOFF REVIEW    Above ED Nurse Handoff Report was reviewed: Yes  Reviewed by: Aurora Bone RN on August 31, 2020 at 4:59 PM

## 2020-08-31 NOTE — TELEPHONE ENCOUNTER
Let's have her follow through with the recommendations I have from prior phone call.  Schedule an appointment with me end of week to at least go through where we are at that time (we may not have all labs back yet... )

## 2020-08-31 NOTE — TELEPHONE ENCOUNTER
Notified pt of below. Scheduled a lab only and will order US. Pt says that her daughter is here from [place] and was going to take pt to the hosp.     Hard to get up or walk. Falls all the time- bruising everywhere.     Pt reports breathing is fine.     Reaching out to Dr. Eliceo Levy w/ Dr Fenton- will recommend pt go into hosp. PCP is worried that her Calcium is too high, maybe even higher over since the weekend. She also stated that increased difficulty getting out of chairs and walking is a worsening and condition and agrees that pt should go into the hospital for further testing/imaging.     Called pt and she is agreeable. Her daughter is going to drive her to [facility]. Both will mask.     Lab appt cancelled.     Irish RICO RN

## 2020-08-31 NOTE — TELEPHONE ENCOUNTER
Patient Calling from the ED and was upset that they only want to admit her as observation status. Her insurance will not cover observation. She stated she will be leaving the hospital and wanted us to let Dr. Fenton know.     Roxie Lobato RN on 8/31/2020 at 2:25 PM

## 2020-08-31 NOTE — PHARMACY-ADMISSION MEDICATION HISTORY
Admission medication history interview status for this patient is complete. See [facility] admission navigator for allergy information, prior to admission medications and immunization status.     Medication history interview done via telephone during Covid-19 pandemic, indicate source(s): Patient and Family - daughterFiona  Medication history resources (including written lists, pill bottles, clinic record): SureScripts and Care Everywhere  Ph[facility]  Changes made to PTA medication list:  Added: acetaminophen, Tums, vitamin D3  Deleted: nystatin  Changed: fluoxetine 20mg daily --> 10mg daily. Furosemide 80 mg daily --> 40 mg BID. Omeprazole 20mg daily --> 20mg daily prn. Prednisone 50mg daily --> 35mg daily (slow taper, pt will decrease when instructed by MD).    Actions taken by pharmacist (provider contacted, etc): called pt to verify home med list     Additional medication history information: Last INR 8/27, next INR 9/3.    Medication reconciliation/reorder completed by provider prior to medication history?  N   (Y/N)     For patients on insulin therapy: N  (Y/N)        Prior to Admission medications    Medication Sig Last Dose Taking? Auth Provider   acetaminophen (TYLENOL) 500 MG tablet Take 1,000 mg by mouth every 8 hours as needed for mild pain Past Month at Unknown time Yes Unknown, Entered By History   amLODIPine (NORVASC) 2.5 MG tablet Take 1 tablet (2.5 mg) by mouth daily 8/30/2020 at Unknown time Yes Gloria Donnelly, APRN CNP   B Complex Vitamins (VITAMIN  B COMPLEX) tablet Take 1 tablet by mouth daily. 8/30/2020 at Unknown time Yes Unknown, Entered By History   calcium carbonate (TUMS) 500 MG chewable tablet Take 2 chew tab by mouth 2 times daily as needed for heartburn Past Week at Unknown time Yes Unknown, Entered By History   calcium-vitamin D (CALTRATE) 600-400 MG-UNIT per tablet Take 4 tablets by mouth daily  8/30/2020 at Unknown time Yes Reported, Patient    cholecalciferol (VITAMIN D3) 25 mcg (1000 units) capsule Take 1 capsule by mouth daily 8/30/2020 at Unknown time Yes Unknown, Entered By History   ezetimibe (ZETIA) 10 MG tablet TAKE 1 TABLET(10 MG) BY MOUTH DAILY 8/30/2020 at Unknown time Yes Violet Fenton MD   FLUoxetine (PROZAC) 10 MG capsule Take 10 mg by mouth daily  8/30/2020 at Unknown time Yes Violet Fenton MD   furosemide (LASIX) 40 MG tablet Take 40 mg by mouth 2 times daily 8/30/2020 at Unknown time Yes Unknown, Entered By History   metoprolol tartrate (LOPRESSOR) 100 MG tablet Take 1 tablet (100 mg) by mouth 2 times daily 8/30/2020 at Unknown time Yes Gloria Donnelly APRN CNP   omega 3 1000 MG CAPS Take 1 g by mouth daily 8/30/2020 at Unknown time Yes Violet Fenton MD   omeprazole (PRILOSEC) 20 MG DR capsule Take 20 mg by mouth daily as needed Past Month at Unknown time Yes Unknown, Entered By History   polyethylene glycol (MIRALAX/GLYCOLAX) powder Take 1 capful by mouth daily as needed for constipation Past Month at Unknown time Yes Reported, Patient   potassium chloride ER (KLOR-CON M) 20 MEQ CR tablet Take 1 tablet (20 mEq) by mouth 2 times daily 8/30/2020 at Unknown time Yes Gloria Donnelly APRN CNP   predniSONE (DELTASONE) 10 MG tablet Take 35 mg by mouth daily 8/30/2020 at Unknown time Yes Unknown, Entered By History   warfarin ANTICOAGULANT (COUMADIN) 2.5 MG tablet Take 1.25mg every Mon,Wed and Fri / Take 2.5mg rest of week OR as instructed by INR clinic 8/30/2020 at Unknown time Yes Violet Fenton MD

## 2020-09-01 ENCOUNTER — APPOINTMENT (OUTPATIENT)
Dept: PHYSICAL THERAPY | Facility: CLINIC | Age: 80
End: 2020-09-01
Attending: PHYSICIAN ASSISTANT
Payer: MEDICARE

## 2020-09-01 ENCOUNTER — APPOINTMENT (OUTPATIENT)
Dept: ULTRASOUND IMAGING | Facility: CLINIC | Age: 80
End: 2020-09-01
Attending: INTERNAL MEDICINE
Payer: MEDICARE

## 2020-09-01 ENCOUNTER — APPOINTMENT (OUTPATIENT)
Dept: CARDIOLOGY | Facility: CLINIC | Age: 80
End: 2020-09-01
Attending: PHYSICIAN ASSISTANT
Payer: MEDICARE

## 2020-09-01 LAB
BACTERIA SPEC CULT: NORMAL
INR PPP: 2.45 (ref 0.86–1.14)
Lab: NORMAL
SPECIMEN SOURCE: NORMAL

## 2020-09-01 PROCEDURE — 93306 TTE W/DOPPLER COMPLETE: CPT

## 2020-09-01 PROCEDURE — 25000132 ZZH RX MED GY IP 250 OP 250 PS 637: Performed by: INTERNAL MEDICINE

## 2020-09-01 PROCEDURE — 97116 GAIT TRAINING THERAPY: CPT | Mod: GP | Performed by: PHYSICAL THERAPIST

## 2020-09-01 PROCEDURE — 25000132 ZZH RX MED GY IP 250 OP 250 PS 637: Mod: GY | Performed by: INTERNAL MEDICINE

## 2020-09-01 PROCEDURE — 99225 ZZC SUBSEQUENT OBSERVATION CARE,LEVEL II: CPT | Performed by: INTERNAL MEDICINE

## 2020-09-01 PROCEDURE — 99207 ZZC CDG-CODE CATEGORY CHANGED: CPT | Performed by: INTERNAL MEDICINE

## 2020-09-01 PROCEDURE — 96374 THER/PROPH/DIAG INJ IV PUSH: CPT

## 2020-09-01 PROCEDURE — 85610 PROTHROMBIN TIME: CPT | Performed by: PHYSICIAN ASSISTANT

## 2020-09-01 PROCEDURE — G0378 HOSPITAL OBSERVATION PER HR: HCPCS

## 2020-09-01 PROCEDURE — 93306 TTE W/DOPPLER COMPLETE: CPT | Mod: 26 | Performed by: INTERNAL MEDICINE

## 2020-09-01 PROCEDURE — 97530 THERAPEUTIC ACTIVITIES: CPT | Mod: GP | Performed by: PHYSICAL THERAPIST

## 2020-09-01 PROCEDURE — 25000128 H RX IP 250 OP 636: Performed by: INTERNAL MEDICINE

## 2020-09-01 PROCEDURE — 25000132 ZZH RX MED GY IP 250 OP 250 PS 637: Mod: GY | Performed by: PHYSICIAN ASSISTANT

## 2020-09-01 PROCEDURE — 25500064 ZZH RX 255 OP 636: Performed by: INTERNAL MEDICINE

## 2020-09-01 PROCEDURE — 25000131 ZZH RX MED GY IP 250 OP 636 PS 637: Mod: GY | Performed by: PHYSICIAN ASSISTANT

## 2020-09-01 PROCEDURE — 36415 COLL VENOUS BLD VENIPUNCTURE: CPT | Performed by: PHYSICIAN ASSISTANT

## 2020-09-01 PROCEDURE — 76705 ECHO EXAM OF ABDOMEN: CPT

## 2020-09-01 PROCEDURE — 97161 PT EVAL LOW COMPLEX 20 MIN: CPT | Mod: GP | Performed by: PHYSICAL THERAPIST

## 2020-09-01 RX ORDER — CEFTRIAXONE 1 G/1
1 INJECTION, POWDER, FOR SOLUTION INTRAMUSCULAR; INTRAVENOUS EVERY 24 HOURS
Status: DISCONTINUED | OUTPATIENT
Start: 2020-09-01 | End: 2020-09-02 | Stop reason: HOSPADM

## 2020-09-01 RX ADMIN — POTASSIUM CHLORIDE 20 MEQ: 1500 TABLET, EXTENDED RELEASE ORAL at 20:38

## 2020-09-01 RX ADMIN — FLUOXETINE 10 MG: 10 CAPSULE ORAL at 10:42

## 2020-09-01 RX ADMIN — METOPROLOL TARTRATE 100 MG: 100 TABLET ORAL at 09:09

## 2020-09-01 RX ADMIN — CEFTRIAXONE SODIUM 1 G: 1 INJECTION, POWDER, FOR SOLUTION INTRAMUSCULAR; INTRAVENOUS at 11:31

## 2020-09-01 RX ADMIN — METOPROLOL TARTRATE 100 MG: 100 TABLET ORAL at 20:38

## 2020-09-01 RX ADMIN — AMLODIPINE BESYLATE 2.5 MG: 2.5 TABLET ORAL at 09:09

## 2020-09-01 RX ADMIN — OMEPRAZOLE 20 MG: 20 CAPSULE, DELAYED RELEASE ORAL at 17:19

## 2020-09-01 RX ADMIN — ALUMINUM HYDROXIDE, MAGNESIUM HYDROXIDE, AND DIMETHICONE 30 ML: 400; 400; 40 SUSPENSION ORAL at 14:50

## 2020-09-01 RX ADMIN — FUROSEMIDE 40 MG: 40 TABLET ORAL at 06:30

## 2020-09-01 RX ADMIN — WARFARIN SODIUM 1.25 MG: 2.5 TABLET ORAL at 17:25

## 2020-09-01 RX ADMIN — POTASSIUM CHLORIDE 20 MEQ: 1500 TABLET, EXTENDED RELEASE ORAL at 09:09

## 2020-09-01 RX ADMIN — PREDNISONE 35 MG: 5 TABLET ORAL at 09:09

## 2020-09-01 RX ADMIN — HUMAN ALBUMIN MICROSPHERES AND PERFLUTREN 3 ML: 10; .22 INJECTION, SOLUTION INTRAVENOUS at 10:00

## 2020-09-01 NOTE — PLAN OF CARE
Admitting Diagnosis: Recurrent falls  Pertinent History: HTN, giant cell arteritis on chronic steroids, systolic CHF - EF 35%, pacemaker placement in 2020, hx of Lung & Brest cancer, GERD, mild COPD.  For vital signs and assessment please see flow sheet.  Living Situation: live home with family  Pain plan: denies pain  Mobility: assistance, stand-by  Baseline activity: with assistive equipment.   Alarms/Safety: BA  LDA's: PIV  Pertinent test results: M.9, cr: 0.79, K: 4.6, PLT: 192, hgb: 11.4.  Consults:PT/SW following  Abnormals/Pending: urine culture pending  Other Cares/Comments: A &O x4, VSS, Tele BVV paced. 02 97% RA  Discharge Disposition: possible 1-2 days   Discharge Time: TBD

## 2020-09-01 NOTE — PLAN OF CARE
PT orders received, eval completed and treatment initiated.    Tomasa Fam is a 80 year old female with past medical history significant for hypertension, polymyalgia rheumatica, giant cell arteritis on steroids, systolic congestive heart failure with EF of 35% prior to ablation, paroxysmal atrial fibrillation status post ablation and pacemaker placement in June 2020, history of lung and breast cancer, hyperlipidemia, GERD and mild COPD presented on 8/31/2020 with generalized weakness, recurrent falls and dyspnea on exertion.  Pt dx with UTI.    Pt lives with her  in a 2 level home with all needs met on main level except for laundry in basement.  Pt reports her  is currently at [facility] after having a heart attack and she feels he is going to pass away today.  Pt's daughter is here from [place] and is currently with the  at [facility].    Pt ambulated without a device up until a week ago, when she got a 4WW from a neighbor.  Pt drove and did the grocery shopping, laundry, and cooking.  Pt has a  once per month.      Discharge Planner PT   Patient plan for discharge: Home but also seems agreeable to TCU  Current status: Pt lying in bed, agreeable to therapy.  Pt transfers sup > sit SBA using momentum to swing LEs over EOB while wrapping arms around L LE to pull her trunk upright as she swings her legs to the floor.  Sit <> stand to the FWW with VCs for safe hand placement.  Pt amb 100' with FWW and SBA with slow gait and L tredelenberg pattern.  Pt declining trial of stairs due to reprots of fatigue and LEs feeling weak.  Decreased functional LE strength.  Pt scored 0 on the chair stand test. This is significantly below age based norms of 9-14 reps.  Pt up to recliner at end of session with seat alarm on.  Pt encouraged to amb with nsg to increase strength and activity tolerance.   Barriers to return to prior living situation: Stairs to enter and for laundry, hx of falls, pt may be  alone due to husbands declining health and current hospitalization  Recommendations for discharge: TCU  Rationale for recommendations: Pt below baseline with all mobility, requiring SBA for safety with transfers and gait.  Pt would benefit from continued skilled PT (and OT) in the acute and TCU setting to increase overall strength, activity tolerance and progress safety and independence with all functional mobility to enable pt to safely return home.       Entered by: Kandice Farnsworth 09/01/2020 3:40 PM

## 2020-09-01 NOTE — PROGRESS NOTES
PRIMARY DIAGNOSIS: GENERALIZED WEAKNESS    OUTPATIENT/OBSERVATION GOALS TO BE MET BEFORE DISCHARGE  1. Orthostatic performed: No    2. Tolerating PO medications: Yes    3. Return to near baseline physical activity: No    4. Cleared for discharge by consultants (if involved): N/A    Discharge Planner Nurse   Safe discharge environment identified: Yes  Barriers to discharge: Yes       Entered by: Barbara Lincoln 09/01/2020 11:08 AM     Please review provider order for any additional goals.   Nurse to notify provider when observation goals have been met and patient is ready for discharge.

## 2020-09-01 NOTE — PLAN OF CARE
"PRIMARY DIAGNOSIS: \"GENERIC\" NURSING  OUTPATIENT/OBSERVATION GOALS TO BE MET BEFORE DISCHARGE:  1. ADLs back to baseline: No    2. Activity and level of assistance: Up with standby assistance.    3. Pain status: Pain free.    4. Return to near baseline physical activity: No     Discharge Planner Nurse   Safe discharge environment identified: No  Barriers to discharge: Yes c/o weakness       Entered by: [address] 09/01/2020 12:57 AM     Please review provider order for any additional goals.   Nurse to notify provider when observation goals have been met and patient is ready for discharge.  "

## 2020-09-01 NOTE — PROGRESS NOTES
[facility]    Hospitalist Progress Note  Name: Tomasa Fam    MRN: [id number]  Provider: Diana Cruz MD  Date of Service: 09/01/2020    Assessment & Plan   Summary of Stay: Tomasa Fam is a 80 year old female with past medical history significant for hypertension, polymyalgia rheumatica, giant cell arteritis on steroids, systolic congestive heart failure with EF of 35% prior to ablation, paroxysmal atrial fibrillation status post ablation and pacemaker placement in June 2020, history of lung and breast cancer, hyperlipidemia, GERD and mild COPD presented on 8/31/2020 with generalized weakness, recurrent falls and dyspnea on exertion.      Generalized weakness and falls  -Likely multifactorial: deconditioning, UTI, possible demand ischemia, hypoglycemia with high-dose of steroids and worsening PMR  -Symptoms progressive over 2 to 3 weeks  -Serial troponins with mild elevation  -Cardiac echo  -PT OT evaluation  -Antibiotics for UTI    Elevated troponin associated with dyspnea on exertion  -History of CHF, diabetes, hyperlipidemia, hypertension with  -Echocardiogram  -Continued on prior to admission, metoprolol Lasix, metoprolol and Coumadin   -Cardiology consult    UTI  -We will start on ceftriaxone  -Urine culture ordered    Abnormal LFTs  -Has been known to be higher prior to admission.  Bilirubin trending up  -We will order right upper quadrant ultrasound    Diabetes mellitus with hyperglycemia  -hgba1c 7  -sliding scale    Hypertension  -Prior admission on metoprolol amlodipine    Polymyalgia rheumatica  -Increased dose of prednisone now tapered to 35 mg daily  -CRP and ESR wnl ESR 10, CRP <2.9    GERD  -On PPI    Hyperlipidemia  -On Zetia    COPD  Clinically stable resume home inhalers-      COVID-19 test negative          DVT Prophylaxis: Pneumatic Compression Devices and Defer to primary service  Code Status: No CPR- Do NOT Intubate    Disposition: Expected discharge likely  to TCU in 1 to 2 days if continues to improve      Interval History   Assumed care reviewed chart patient complains of significant weakness unable to stand or do anything.  Complains of increased fatigue weakness dyspnea on exertion.  Review of all the other symptoms are negative.    -Data reviewed today: I reviewed all new labs and imaging reports over the last 24 hours. I personally reviewed the EKG tracing showing Paced rhythm.    Physical Exam   Temp: 96.4  F (35.8  C) Temp src: Oral BP: (!) 154/68 Pulse: 67   Resp: 16 SpO2: 97 % O2 Device: None (Room air)    Vitals:    08/31/20 1700   Weight: 60.1 kg (132 lb 8 oz)     Vital Signs with Ranges  Temp:  [96.1  F (35.6  C)-99  F (37.2  C)] 96.4  F (35.8  C)  Pulse:  [62-70] 67  Resp:  [16-23] 16  BP: (121-154)/(43-98) 154/68  SpO2:  [96 %-99 %] 97 %  I/O last 3 completed shifts:  In: 240 [P.O.:240]  Out: 775 [Urine:775]      GEN:  Alert, oriented x 3, appears comfortable, NAD.  HEENT:  Normocephalic/atraumatic, no scleral icterus, no nasal discharge, mouth moist.  CV:  Regular rate and rhythm, soft systolic murmur.  S1 + S2 noted, no S3 or S4.  LUNGS:  Clear to auscultation bilaterally without rales/rhonchi/wheezing/retractions.  Symmetric chest rise on inhalation noted.  ABD:  Active bowel sounds, soft, non-tender/non-distended.  No rebound/guarding/rigidity.  EXT: Trace edema.  No cyanosis.  No joint synovitis noted.  SKIN:  Dry to touch, bruising noted left lower extremity    Medications     Warfarin Therapy Reminder         amLODIPine  2.5 mg Oral Daily     FLUoxetine  10 mg Oral Daily     furosemide  40 mg Oral BID     metoprolol tartrate  100 mg Oral BID     potassium chloride ER  20 mEq Oral BID     predniSONE  35 mg Oral Daily     sodium chloride (PF)  3 mL Intracatheter Q8H     Data     Recent Labs   Lab 08/31/20  1014 08/27/20  1530   WBC 13.5* 15.0*   HGB 11.4* 11.2*   HCT 36.5 35.0   MCV 94 89    194     Recent Labs   Lab 08/31/20  1014  08/27/20  1530    133   POTASSIUM 4.6 4.5   CHLORIDE 99 96   CO2 31 29   ANIONGAP 4 8   * 139*   BUN 27 38*   CR 0.79 0.90   GFRESTIMATED 71 60*   GFRESTBLACK 82 70   CHARLIE 10.0 10.8*     Recent Labs   Lab 08/31/20  1354   CULT PENDING     No results for input(s): NTBNPI, NTBNP in the last 168 hours.  Recent Labs   Lab 08/31/20 2009 08/31/20  1627   SED 10  --    CRP  --  <2.9     Recent Labs   Lab 08/31/20  1627 08/27/20  1530   AST 58* 64*   * 134*   ALKPHOS 47 51   BILITOTAL 1.4* 1.2     Recent Labs   Lab 09/01/20  0649 08/31/20  1014 08/27/20  1428   INR 2.45* 2.06* 3.20*     No results for input(s): LACT in the last 168 hours.  No results for input(s): LIPASE in the last 168 hours.  No results for input(s): CHOL, HDL, LDL, TRIG, CHOLHDLRATIO in the last 168 hours.  Recent Labs   Lab 08/27/20  1530   TSH 2.24     Recent Labs   Lab 08/31/20 2009 08/31/20 1627 08/31/20  1014   TROPI 0.057* 0.067* 0.074*     Recent Labs   Lab 08/31/20  1354   COLOR Yellow   APPEARANCE Clear   URINEGLC Negative   URINEBILI Negative   URINEKETONE Negative   SG 1.018   UBLD Negative   URINEPH 6.5   PROTEIN 10*   NITRITE Negative   LEUKEST Large*   RBCU 3*   WBCU 64*       Recent Results (from the past 24 hour(s))   XR Chest Port 1 View    Narrative    XR CHEST PORT 1 VW 8/31/2020 11:23 AM     HISTORY: shortness of breath      Impression    IMPRESSION: Upper lobe surgical sutures, unchanged from 6/23/2020. The  lungs are otherwise grossly clear. Left chest wall battery pack and  intracardiac leads appear unchanged as well.    YUAN MALDONADO MD   XR Tibia & Fibula Left 2 Views    Narrative    LEFT TIBIA AND FIBULA TWO VIEWS   8/31/2020 12:03 PM     HISTORY:  Fall, proximal tibial pain.    COMPARISON: None.      Impression    IMPRESSION: Arterial calcifications. The tibia and fibula appear  normal. There is no evidence of fracture or osseous lesion. Calcaneal  enthesophyte at the Achilles tendon insertion site.  Otherwise  unremarkable.    KATLYN FOFANA MD

## 2020-09-01 NOTE — PROGRESS NOTES
"   09/01/20 1400   Quick Adds   Type of Visit Initial PT Evaluation   Living Environment   Lives With spouse   Living Arrangements house   Home Accessibility stairs to enter home;stairs within home   Number of Stairs, Main Entrance 3   Stair Railings, Main Entrance railing on left side (ascending);railing on right side (ascending)   Number of Stairs, Within Home, Primary 10;other (see comments)  (l;aundry in basement)   Transportation Anticipated car, drives self;family or friend will provide   Living Environment Comment Pt lives with her  in a 2 level home with all needs met on main level except for laundry in basement.  Pt reports her  is currently at [facility] after having a heart attack and she feels he is going to pass away today.     Self-Care   Usual Activity Tolerance good   Current Activity Tolerance moderate   Equipment Currently Used at Home grab bar, tub/shower;shower chair;walker, rolling   Activity/Exercise/Self-Care Comment Pt ambulated without a device up until a week ago, when she got a 4WW from a neighbor.  Pt drove and did the grocery shopping, laundry, and cooking.  Pt has a  once per month.     Functional Level Prior   Ambulation 1-->assistive equipment   Transferring 1-->assistive equipment   Toileting 0-->independent   Bathing 1-->assistive equipment   Communication 0-->understands/communicates without difficulty   Swallowing 0-->swallows foods/liquids without difficulty   Cognition 0 - no cognition issues reported   Fall history within last six months yes   Number of times patient has fallen within last six months 6   Which of the above functional risks had a recent onset or change? ambulation;transferring   Prior Functional Level Comment     General Information   Onset of Illness/Injury or Date of Surgery - Date 08/31/20   Referring Physician Joanie Augustin PA-C   Patient/Family Goals Statement \"The only thing I want to work on is the strength in my legs.\"   Pertinent " History of Current Problem (include personal factors and/or comorbidities that impact the POC) Tomasa Fam is a 80 year old female with past medical history significant for hypertension, polymyalgia rheumatica, giant cell arteritis on steroids, systolic congestive heart failure with EF of 35% prior to ablation, paroxysmal atrial fibrillation status post ablation and pacemaker placement in June 2020, history of lung and breast cancer, hyperlipidemia, GERD and mild COPD presented on 8/31/2020 with generalized weakness, recurrent falls and dyspnea on exertion.  Pt dx with UTI.   Precautions/Limitations fall precautions   General Observations Pt lying in bed and agreeable to therapy.     General Info Comments vitals in sitting: /53, HR 70, O2 sats 97% on RA   Cognitive Status Examination   Orientation orientation to person, place and time   Level of Consciousness alert   Follows Commands and Answers Questions 100% of the time;able to follow single-step instructions   Memory intact   Pain Assessment   Patient Currently in Pain No   Integumentary/Edema   Integumentary/Edema Comments extensive bruising from L knee to foot   Posture    Posture Forward head position;Protracted shoulders   Range of Motion (ROM)   ROM Quick Adds No deficits were identified   Strength   Strength Comments Decreased functional LE strength.  Pt scored 0 on the chair stand test. This is significantly below age based norms of 9-14 reps.  B hip flex 3+/5 MMT.   Bed Mobility   Bed Mobility Comments Sup > sit SBA with pt using momentum to swing LEs over EOB while wrapping arms around L LE to pull her trunk upright as she swings her legs to the floor.   Transfer Skills   Transfer Comments Sit <> stand to FWW with CGA and VCs for safe hand placement.     Gait   Gait Comments Pt amb 100' with FWW and SBA with slow gait and L tredelenberg pattern.  Pt declining trial of stairs due to reprots of fatigue and LEs feeling weak.     Balance  "  Balance Comments Pt demonstrates impaired standing balance requiring B UE support on FWW and SBA for safety.   Sensory Examination   Sensory Perception no deficits were identified   Coordination   Coordination no deficits were identified   Muscle Tone   Muscle Tone no deficits were identified   General Therapy Interventions   Planned Therapy Interventions balance training;bed mobility training;gait training;strengthening;transfer training;risk factor education;home program guidelines;progressive activity/exercise   Clinical Impression   Criteria for Skilled Therapeutic Intervention yes, treatment indicated   PT Diagnosis Decreased functional mobility   Influenced by the following impairments Decreased LE strength, impaired balance and gait, decreased activity tolerance   Functional limitations due to impairments increased falls risk, unable to care for herself, unable to amb longer household or community distances, unable to independently navigate stairs   Clinical Presentation Stable/Uncomplicated   Clinical Presentation Rationale Pt medically stable   Clinical Decision Making (Complexity) Low complexity   Therapy Frequency 5x/week   Predicted Duration of Therapy Intervention (days/wks) 2 days   Anticipated Discharge Disposition Transitional Care Facility   Risk & Benefits of therapy have been explained Yes   Patient, Family & other staff in agreement with plan of care Yes   Therapy Certification   Start of care date 09/01/20   Certification date from 09/01/20   Certification date to 09/03/20   Medical Diagnosis UTI, falls   [facility] AM-PAC TM \"6 Clicks\"   2016, Trustees of [facility], under license to [address]  All rights reserved.   6 Clicks Short Forms Basic Mobility Inpatient Short Form   [facility] AM-PAC  \"6 Clicks\" V.2 Basic Mobility Inpatient Short Form   1. Turning from your back to your side while in a flat bed without using bedrails? 4 - None   2. Moving from lying on your " back to sitting on the side of a flat bed without using bedrails? 4 - None   3. Moving to and from a bed to a chair (including a wheelchair)? 4 - None   4. Standing up from a chair using your arms (e.g., wheelchair, or bedside chair)? 4 - None   5. To walk in hospital room? 4 - None   6. Climbing 3-5 steps with a railing? 3 - A Little   Basic Mobility Raw Score (Score out of 24.Lower scores equate to lower levels of function) 23   Total Evaluation Time   Total Evaluation Time (Minutes) 15

## 2020-09-01 NOTE — CONSULTS
"Care Transition Initial Assessment - SW     Met with: Patient    Active Problems:    Recurrent falls    Dyspnea       DATA  Lives With: spouse   Living Arrangements: house- home setting has 3 steps to get to main level and 12 to get to lower level where laundry is    Quality of Family Relationships: helpful, involved, supportive  Description of Support System: Supportive, Involved  Who is your support system?: Children-2 adult children.. Son lives in [place],,  Daughter Fiona out of town but staying with pt at this time.. Has open ended ticket     Support Assessment: Adequate family and caregiver support, Adequate social supports.   Identified issues/concerns regarding health management: Admitted for fall, being treated for UTI   @   Resources List: Home Care  PT hopes to return home with home PT/OT support  Spoke with pt about home care choices.. Pt agreeable to [facility] as her primary care Clinic is      Quality of Family Relationships: helpful, involved, supportive  Transportation Anticipated: family or friend will provide  Daughter Fiona   ASSESSMENT  Cognitive Status:  awake, alert and oriented  Concerns to be addressed: Met with pt to discuss MD note indication of possible TCU need. PT was very quick to say \" no\".. Pt stated that her spouse is at [facility] and expected to pass today.. SHe really wants to get home tomorrow if possible.. Will have 24 hour support from her daughter at this time once home.. the stairs have been an issues.. Pt is not able to install a Stair lift as the stairway is to narrow.. Pt stated they have installed grab bars around the doorways, she has adapted her mobility to get in/out of the home by using the Grab bars.. PT uses a walker at baseline.. Pt stated \" I can walk forever, that is NOT the problem, I just can't stand up on my own\".  Pt believe her daughter will be able to assist her getting in/out of a chair.. Pt that prior to the weakness she was still driving.. Sw asked pt if she " has a lift chair, pt stated no  Pt is not sure how long she will live in her current home setting,. But at this time no plans to move  PT has not need PT./OT staff  SW asked RN staff if it was possible to get a chair in pt's room that would allow her to start getting in/out of bed while here and up/down from a chair while here     PLAN  Pt hopes to return home with skilled home PT/OT support    Sw will follow for possible d.c home tomorrow    Corinne White \[facility]\""  Inpatient Care Coordination   [phone]  [facility]

## 2020-09-01 NOTE — PLAN OF CARE
"A&OX4. Up with assist of 1 and walker. VSS. Deneis any pain. Lungs sounds clear. Tele BI V paced. She is on PO lasix. PT and SW consult. Plan to have Echpo today.     PRIMARY DIAGNOSIS: \"GENERIC\" NURSING  OUTPATIENT/OBSERVATION GOALS TO BE MET BEFORE DISCHARGE:  1. ADLs back to baseline: No     2. Activity and level of assistance: Up with standby assistance.     3. Pain status: Pain free.     4. Return to near baseline physical activity: No          Discharge Planner Nurse   Safe discharge environment identified: No  Barriers to discharge: Yes c/o weakness       Entered by: [address] 09/01/2020 06:55 AM  Please review provider order for any additional goals.   Nurse to notify provider when observation goals have been met and patient is ready for discharge.  "

## 2020-09-01 NOTE — PLAN OF CARE
Vitals: VSS. Afebrile. Denies pain.   Neuro: A&O x4. Calm, cooperative.   Respiratory: Lungs clear. PADRON. Denies cough.   Cardiac/Telemetry: Biventricular paced. Denies chest pain.Trace edema to BLE.    GI/: WNL. Denies s/sx.  Skin: Extensive bruising to left leg. Generalized bruising present.   LDAs: PIV to right arm SL  Diet: Regular no caffeine   Activity: A1 walker   Teaching: POC reviewed with pt. Questions asked and answered.   Plan: IV abx. Continue with POC.     PRIMARY DIAGNOSIS: GENERALIZED WEAKNESS    OUTPATIENT/OBSERVATION GOALS TO BE MET BEFORE DISCHARGE  1. Orthostatic performed: No    2. Tolerating PO medications: Yes    3. Return to near baseline physical activity: No    4. Cleared for discharge by consultants (if involved): N/A    Discharge Planner Nurse   Safe discharge environment identified: Yes  Barriers to discharge: Yes       Entered by: Barbara Lincoln 09/01/2020 2:47 PM     Please review provider order for any additional goals.   Nurse to notify provider when observation goals have been met and patient is ready for discharge.

## 2020-09-01 NOTE — PLAN OF CARE
"PRIMARY DIAGNOSIS: \"GENERIC\" NURSING  OUTPATIENT/OBSERVATION GOALS TO BE MET BEFORE DISCHARGE:  ADLs back to baseline: Yes    Activity and level of assistance: Up with standby assistance.    Pain status: Pain free.    Return to near baseline physical activity: No     Discharge Planner Nurse   Safe discharge environment identified: No  Barriers to discharge: Yes, pt medically active       Entered by: Aurora Bone 08/31/2020 10:52 PM     Please review provider order for any additional goals.   Nurse to notify provider when observation goals have been met and patient is ready for discharge.  "

## 2020-09-02 ENCOUNTER — ANTICOAGULATION THERAPY VISIT (OUTPATIENT)
Dept: FAMILY MEDICINE | Facility: CLINIC | Age: 80
End: 2020-09-02

## 2020-09-02 VITALS
SYSTOLIC BLOOD PRESSURE: 124 MMHG | RESPIRATION RATE: 18 BRPM | BODY MASS INDEX: 23.47 KG/M2 | HEART RATE: 70 BPM | WEIGHT: 132.5 LBS | OXYGEN SATURATION: 97 % | TEMPERATURE: 96.3 F | DIASTOLIC BLOOD PRESSURE: 54 MMHG

## 2020-09-02 DIAGNOSIS — I48.0 PAROXYSMAL ATRIAL FIBRILLATION (H): ICD-10-CM

## 2020-09-02 LAB — INR PPP: 2.23 (ref 0.86–1.14)

## 2020-09-02 PROCEDURE — 25000131 ZZH RX MED GY IP 250 OP 636 PS 637: Mod: GY | Performed by: PHYSICIAN ASSISTANT

## 2020-09-02 PROCEDURE — 99207 ZZC CDG-CODE CATEGORY CHANGED: CPT | Performed by: INTERNAL MEDICINE

## 2020-09-02 PROCEDURE — 85610 PROTHROMBIN TIME: CPT | Performed by: PHYSICIAN ASSISTANT

## 2020-09-02 PROCEDURE — G0378 HOSPITAL OBSERVATION PER HR: HCPCS

## 2020-09-02 PROCEDURE — 99217 ZZC OBSERVATION CARE DISCHARGE: CPT | Performed by: INTERNAL MEDICINE

## 2020-09-02 PROCEDURE — 25000132 ZZH RX MED GY IP 250 OP 250 PS 637: Mod: GY | Performed by: INTERNAL MEDICINE

## 2020-09-02 PROCEDURE — 25000132 ZZH RX MED GY IP 250 OP 250 PS 637: Mod: GY | Performed by: PHYSICIAN ASSISTANT

## 2020-09-02 PROCEDURE — 36415 COLL VENOUS BLD VENIPUNCTURE: CPT | Performed by: PHYSICIAN ASSISTANT

## 2020-09-02 PROCEDURE — 99207 ZZC NO CHARGE NURSE ONLY: CPT | Performed by: FAMILY MEDICINE

## 2020-09-02 RX ORDER — WARFARIN SODIUM 2 MG/1
2 TABLET ORAL
Status: CANCELLED | OUTPATIENT
Start: 2020-09-02 | End: 2020-09-02

## 2020-09-02 RX ORDER — CEFDINIR 300 MG/1
300 CAPSULE ORAL 2 TIMES DAILY
Qty: 10 CAPSULE | Refills: 0 | Status: SHIPPED | OUTPATIENT
Start: 2020-09-02 | End: 2020-09-07

## 2020-09-02 RX ADMIN — FUROSEMIDE 40 MG: 40 TABLET ORAL at 06:35

## 2020-09-02 RX ADMIN — FLUOXETINE 10 MG: 10 CAPSULE ORAL at 07:58

## 2020-09-02 RX ADMIN — POTASSIUM CHLORIDE 20 MEQ: 1500 TABLET, EXTENDED RELEASE ORAL at 07:58

## 2020-09-02 RX ADMIN — PREDNISONE 35 MG: 5 TABLET ORAL at 07:59

## 2020-09-02 RX ADMIN — METOPROLOL TARTRATE 100 MG: 100 TABLET ORAL at 07:59

## 2020-09-02 RX ADMIN — OMEPRAZOLE 20 MG: 20 CAPSULE, DELAYED RELEASE ORAL at 06:35

## 2020-09-02 NOTE — DISCHARGE INSTRUCTIONS
Your home care referral was sent to [facility].  If you haven't heard from them within the next 24-48 hours,  please call them at [phone].

## 2020-09-02 NOTE — PLAN OF CARE
A&OX4. Up with assist of 1 and walker. VSS. Deneis any pain. Lungs sounds clear. Tele: BI V paced. She is on PO lasix.  IV Rocephine for UTI. PIV SL. PT and SW foloowing. Planning to dischage to TCU 1-2 days if continues to improve per MD note.

## 2020-09-02 NOTE — PHARMACY-ANTICOAGULATION SERVICE
Clinical Pharmacy- Warfarin Discharge Note  This patient is currently on warfarin for the treatment of Atrial fibrillation.  INR Goal= 2-3  Expected length of therapy undetermined.    Warfarin PTA Regimen: 1.25mg on M/W/F and 2.5mg the rest of the week      Anticoagulation Dose History     Recent Dosing and Labs Latest Ref Rng & Units 8/7/2020 8/13/2020 8/19/2020 8/27/2020 8/31/2020 9/1/2020 9/2/2020    Warfarin 1.25 mg - - - - - 1.25 mg 1.25 mg -    INR 0.86 - 1.14 2.50(H) 2.00(H) 1.60(H) 3.20(H) 2.06(H) 2.45(H) 2.23(H)    INR 0.86 - 1.14 - - - - - - -    INR Point of Care 0.86 - 1.14 - - - - - - -          Vitamin K doses administered during the last 7 days: N/A  FFP administered during the last 7 days: N/A  Agree with discharging the patient on prior to admission warfarin regimen of 1.25 mg M/W/F, 2.5 mg all other days with current prescription for warfarin 2.5mg tablets.      The patient should have an INR checked in 2 days.    Leydi Lopez  PGY-1 Pharmacy Practice Resident

## 2020-09-02 NOTE — DISCHARGE SUMMARY
[facility]  Discharge Summary  Hospitalist      Date of Admission:  8/31/2020  Date of Discharge:  9/2/2020  Provider:  Diana Cruz MD  Date of Service (when I last saw the patient): 09/02/20      Primary Provider: Violet Fenton          Discharge Diagnosis:   Discharge Diagnoses   Generalized weakness and frequent falls  UTI  Elevated troponin suspect demand with type II ischemia  Abnormal LFTs  Diabetes mellitus with hyperglycemia  Left without completing the visit patient left without completing evaluation and without safe disposition.  TCU was recommended for patient safe disposition however patient was discharged at her request as her  passed away last night.  Patient's daughter and son will be there 24/7 to care for mom.  Home health is arranged to monitor for safety and to reassess.    Other medical issues:  Past Medical History:   Diagnosis Date     Arthritis     hands, knees     Breast cancer (H) jan. 2012     left mastectomy followed by right;  Dr. Luong     Chronic airway obstruction, not elsewhere classified 2006    very mild COPD - small cough     Concussion 3/13/2013     Problem list name updated by automated process. Provider to review and confirm [employer] Update utility     Cystocele, midline 4/4/2012     Diffuse cystic mastopathy      Gastroesophageal reflux disease, esophagitis presence not specified 11/23/2019     History of hypokalemia 1/23/2013     Hyperlipidemia LDL goal <160 6/29/2011    [place] 10-year CHD Risk Score: 2% (14 Total Points)  Values used to calculate score:    Age: 70 years -- Points: 14    Total Cholesterol: 192 mg/dL -- Points: 1    HDL Cholesterol: 61 mg/dL -- Points: -1    Systolic BP (treated): 118 mmHg -- Points: 0   The patient is not a smoker. -- Points: 0   The patient has not been diagnosed with diabetes. -- Points: 0   The patient does not have a famil     Lung cancer (H) 10/21/2015     Paroxysmal atrial fibrillation (H) 9/13/2019     PMR  (polymyalgia rheumatica) (H) 1/17/2020    tapering' above.)  In patients receiving over 10 mg of prednisone/day, the dose can be lowered by 2.5 mg/day decrements every two to four weeks  Once the dose of prednisone is 10 mg/day, further tapering can be done by 1 mg per month, provided the clinical course is stable  The clinical response to glucocorticoid therapy is closely monitored, which centers on screening for the presence and/or recu     Thyroid nodule 4/23/2016    Noted on CT Fall 2015.      Unspecified essential hypertension late 1980's          History of Present Illness   Tomasa Fam is an 80 year old female who presented with weakness and falls.  Please see the admission history and physical for full details.    Hospital Course     Tomasa Fam was admitted on 8/31/2020.  She is a 80 year old female with past medical history significant for hypertension, polymyalgia rheumatica, giant cell arteritis on steroids, systolic congestive heart failure with EF of 35% prior to ablation, paroxysmal atrial fibrillation status post ablation and pacemaker placement in June 2020, history of lung and breast cancer, hyperlipidemia, GERD and mild COPD presented on 8/31/2020 with generalized weakness, recurrent falls and dyspnea on exertion.        The following problems were addressed during her hospitalization:    Generalized weakness and falls  -Likely multifactorial: deconditioning, UTI, possible demand ischemia, hypoglycemia with high-dose of steroids and worsening PMR, increased stress due to 's illness and now death.  -Symptoms progressive over 2 to 3 weeks  -Serial troponins with mild elevation: Cardiology was consulted evaluation was pending patient left without completing the visit  -Cardiac echo showed improvement  -PT OT evaluation recommended TCU placement due to increasing falls and for safety concerns.  Patient discharged as requested by herself and under care of her family due to her  's death.  -Antibiotics for UTI.  Switch to p.o. prior to discharge     Elevated troponin associated with dyspnea on exertion  -History of CHF, diabetes, hyperlipidemia, hypertension with  -Echocardiogram  -Continued on prior to admission, metoprolol Lasix, metoprolol and Coumadin   -Cardiology consult: Pending incomplete evaluation patient left without completing the visit     UTI  -She was started on ceftriaxone and discharged on cefdinir  -Urine culture ordered     Abnormal LFTs  -Has been known to be higher prior to admission.  Bilirubin trending up  -Right upper quadrant ultrasound as noted below.  She will need follow-up     Diabetes mellitus with hyperglycemia  -hgba1c 7  -sliding scale while in hospital as patient is on steroids     Hypertension  -Prior admission on metoprolol amlodipine     Polymyalgia rheumatica  -Increased dose of prednisone now tapered to 35 mg daily: We will not make any change for now patient to contact primary care provider and rheumatologist for further guidance  -CRP and ESR wnl ESR 10, CRP <2.9     GERD  -On PPI     Hyperlipidemia  -On Zetia     COPD  Clinically stable resume home inhalers-        COVID-19 test negative and significant stress due to 's illness    Significant Results and Procedures   As noted above    Pending Results   Unresulted Labs Ordered in the Past 30 Days of this Admission     No orders found from 8/1/2020 to 9/1/2020.          Code Status   DNR / DNI       Primary Care Physician   Violet Fenton MD    Physical Exam   Temp: 96.3  F (35.7  C) Temp src: Oral BP: 124/54 Pulse: 70   Resp: 18 SpO2: 97 % O2 Device: None (Room air)    Vitals:    08/31/20 1700   Weight: 60.1 kg (132 lb 8 oz)     Vital Signs with Ranges  Temp:  [96.3  F (35.7  C)-97.5  F (36.4  C)] 96.3  F (35.7  C)  Pulse:  [67-73] 70  Resp:  [16-20] 18  BP: (118-154)/(50-68) 124/54  SpO2:  [97 %-99 %] 97 %  I/O last 3 completed shifts:  In: 240 [P.O.:240]  Out: 1500  [Urine:1500]    Constitutional: Awake, alert, cooperative, sad and teary, and appears stated age.  Respiratory: No increased work of breathing, good air exchange, clear to auscultation bilaterally, no crackles or wheezing.  Cardiovascular: Normal apical impulse,normal S1 and S2,   GI: N, normal bowel sounds, soft, non-distended, non-tender follow-up with cardiology according abnormality just FYI      Discharge Disposition   Discharged to home    Consultations This Hospital Stay   PHYSICAL THERAPY ADULT IP CONSULT  SOCIAL WORK IP CONSULT  PHARMACY TO DOSE WARFARIN  CARDIOLOGY IP CONSULT    Time Spent on this Encounter   I, Diana Cruz MD, personally saw the patient today and spent greater than 30 minutes discharging this patient.    Discharge Orders      Home care nursing referral      Home Care PT Referral for Hospital Discharge      Home Care OT Referral for Hospital Discharge      Reason for your hospital stay    Please refer to discharge summary. Admitted for weakness and falls  Admitted for evaluation could not complete evaluation due to family emergency. Her  passed away last night. Patient needs TCU placement per assessment but cannot and will not under these circumstances.  We will try to arrange home health  She will need follow up with pcp, cardiology to complete evaluation to rule out cardiac causes and to further assess abnormal liver tests.     Follow-up and recommended labs and tests     Follow up with primary care provider, Violet Fenton MD, within ASAP  for hospital follow- up.  The following labs/tests are recommended: complete cardiac evaluation, follow up abnormal LFTs, taper steroids for RA, follow up after treatment of UTI and ongoing follow up for home safety. TCU was recommendded patient discharged at HER request prior to completion of evaluation due to her husbands demise.     Activity    Your activity upon discharge: activity as tolerated with assistance. Patient daughter will  help     MD face to face encounter    Documentation of Face to Face and Certification for Home Health Services    I certify that patient: Tomasa Fam is under my care and that I, or a nurse practitioner or physician's assistant working with me, had a face-to-face encounter that meets the physician face-to-face encounter requirements with this patient on: 9/2/2020.    This encounter with the patient was in whole, or in part, for the following medical condition, which is the primary reason for home health care: cannot walk, deconditioning. Refused TCu left prior to completion of evaluation.    I certify that, based on my findings, the following services are medically necessary home health services: Nursing, Occupational Therapy and Physical Therapy.    My clinical findings support the need for the above services because: Nurse is needed: For complex aftercare of surgical procedures because the patient needs instruction and cannot perform care on their own due to: significant weakness. and To provide caregiver training to assist with: safety, care assessment..    Further, I certify that my clinical findings support that this patient is homebound (i.e. absences from home require considerable and taxing effort and are for medical reasons or Jew services or infrequently or of short duration when for other reasons) because: Requires assistance of another person or specialized equipment to access medical services because patient: Is unable to exit home safely on own due to: frequent falls...    Based on the above findings. I certify that this patient is confined to the home and needs intermittent skilled nursing care, physical therapy and/or speech therapy.  The patient is under my care, and I have initiated the establishment of the plan of care.  This patient will be followed by a physician who will periodically review the plan of care.  Physician/Provider to provide follow up care: Violet Fenton  C    Attending hospital physician (the Medicare certified [facility] provider): Diana Cruz MD  Physician Signature: See electronic signature associated with these discharge orders.  Date: 9/2/2020     No CPR- Do NOT Intubate     Diet    Follow this diet upon discharge: Orders Placed This Encounter      Combination Diet Regular Diet Adult; No Caffeine Diet     Discharge Medications   Current Discharge Medication List      START taking these medications    Details   cefdinir (OMNICEF) 300 MG capsule Take 1 capsule (300 mg) by mouth 2 times daily for 5 days  Qty: 10 capsule, Refills: 0    Associated Diagnoses: Acute cystitis without hematuria         CONTINUE these medications which have NOT CHANGED    Details   acetaminophen (TYLENOL) 500 MG tablet Take 1,000 mg by mouth every 8 hours as needed for mild pain      amLODIPine (NORVASC) 2.5 MG tablet Take 1 tablet (2.5 mg) by mouth daily  Qty: 180 tablet, Refills: 1      B Complex Vitamins (VITAMIN  B COMPLEX) tablet Take 1 tablet by mouth daily.      calcium carbonate (TUMS) 500 MG chewable tablet Take 2 chew tab by mouth 2 times daily as needed for heartburn      calcium-vitamin D (CALTRATE) 600-400 MG-UNIT per tablet Take 4 tablets by mouth daily       cholecalciferol (VITAMIN D3) 25 mcg (1000 units) capsule Take 1 capsule by mouth daily      ezetimibe (ZETIA) 10 MG tablet TAKE 1 TABLET(10 MG) BY MOUTH DAILY  Qty: 90 tablet, Refills: 0    Associated Diagnoses: Hyperlipidemia LDL goal <160      FLUoxetine (PROZAC) 10 MG capsule Take 10 mg by mouth daily   Qty: 90 capsule, Refills: 1    Associated Diagnoses: Decreased energy; Stress      furosemide (LASIX) 40 MG tablet Take 40 mg by mouth 2 times daily      metoprolol tartrate (LOPRESSOR) 100 MG tablet Take 1 tablet (100 mg) by mouth 2 times daily  Qty: 60 tablet, Refills: 6    Associated Diagnoses: Persistent atrial fibrillation (H); Essential hypertension      omega 3 1000 MG CAPS Take 1 g by mouth daily  Qty: 90  capsule      omeprazole (PRILOSEC) 20 MG DR capsule Take 20 mg by mouth daily as needed      polyethylene glycol (MIRALAX/GLYCOLAX) powder Take 1 capful by mouth daily as needed for constipation      potassium chloride ER (KLOR-CON M) 20 MEQ CR tablet Take 1 tablet (20 mEq) by mouth 2 times daily  Qty: 60 tablet, Refills: 6      predniSONE (DELTASONE) 10 MG tablet Take 35 mg by mouth daily      warfarin ANTICOAGULANT (COUMADIN) 2.5 MG tablet Take 1.25mg every Mon,Wed and Fri / Take 2.5mg rest of week           Allergies   Allergies   Allergen Reactions     No Known Drug Allergies      Tape [Adhesive Tape]      Sensitive to plastic tape on upper part of body--takes skin off     Data   Most Recent 3 CBC's:  Recent Labs   Lab Test 08/31/20  1014 08/27/20  1530 08/03/20  0951   WBC 13.5* 15.0* 14.4*   HGB 11.4* 11.2* 14.7   MCV 94 89 85    194 185      Most Recent 3 BMP's:  Recent Labs   Lab Test 08/31/20  1014 08/27/20  1530 08/04/20 08/03/20  0951    133  --  131*   POTASSIUM 4.6 4.5  --  3.9   CHLORIDE 99 96  --  96   CO2 31 29  --  31   BUN 27 38*  --  32*   CR 0.79 0.90 0.850 0.85   ANIONGAP 4 8  --  4   CHARLIE 10.0 10.8*  --  9.7   * 139*  --  134*     Most Recent 2 LFT's:  Recent Labs   Lab Test 08/31/20  1627 08/27/20  1530   AST 58* 64*   * 134*   ALKPHOS 47 51   BILITOTAL 1.4* 1.2     Most Recent INR's and Anticoagulation Dosing History:  Anticoagulation Dose History     Recent Dosing and Labs Latest Ref Rng & Units 8/7/2020 8/13/2020 8/19/2020 8/27/2020 8/31/2020 9/1/2020 9/2/2020    Warfarin 1.25 mg - - - - - 1.25 mg 1.25 mg -    INR 0.86 - 1.14 2.50(H) 2.00(H) 1.60(H) 3.20(H) 2.06(H) 2.45(H) 2.23(H)    INR 0.86 - 1.14 - - - - - - -    INR Point of Care 0.86 - 1.14 - - - - - - -        Most Recent 3 Troponin's:  Recent Labs   Lab Test 08/31/20 2009 08/31/20  1627 08/31/20  1014   TROPI 0.057* 0.067* 0.074*     Most Recent Cholesterol Panel:  Recent Labs   Lab Test 05/29/20  0842    CHOL 148   LDL 76   HDL 50   TRIG 110     Most Recent 6 Bacteria Isolates From Any Culture (See EPIC Reports for Culture Details):  Recent Labs   Lab Test 08/31/20  1354 03/19/20  0948 03/15/19  1514 09/28/18  1934 05/22/18  0800 10/13/17  0928   CULT >100,000 colonies/mL  mixed urogenital francy  Susceptibility testing not routinely done   >100,000 colonies/mL  Escherichia coli  * <10,000 colonies/mL  mixed urogenital francy  Susceptibility testing not routinely done   >100,000 colonies/mL  Streptococcus agalactiae sero group B  Susceptibility testing not routinely done on this organism from the genitourinary tract.   Our antibiogram indicates that Group B streptococci are susceptible to ampicillin,   penicillin, vancomycin and the cephalosporins. Susceptibility testing must be requested   within 5 days.  * >100,000 colonies/mL  mixed urogenital francy  Susceptibility testing not routinely done   >100,000 colonies/mL  Enterococcus faecalis  Susceptibility testing in progress  *  10,000 to 50,000 colonies/mL  mixed urogenital francy  Susceptibility testing not routinely done       Most Recent TSH, T4 and A1c Labs:  Recent Labs   Lab Test 08/31/20  1014 08/27/20  1530  09/28/18  1843   TSH  --  2.24   < > 4.32*   T4  --   --   --  0.96   A1C 7.0*  --   --   --     < > = values in this interval not displayed.     Results for orders placed or performed during the hospital encounter of 08/31/20   XR Chest Port 1 View    Narrative    XR CHEST PORT 1 VW 8/31/2020 11:23 AM     HISTORY: shortness of breath      Impression    IMPRESSION: Upper lobe surgical sutures, unchanged from 6/23/2020. The  lungs are otherwise grossly clear. Left chest wall battery pack and  intracardiac leads appear unchanged as well.    YUAN MALDONADO MD   XR Tibia & Fibula Left 2 Views    Narrative    LEFT TIBIA AND FIBULA TWO VIEWS   8/31/2020 12:03 PM     HISTORY:  Fall, proximal tibial pain.    COMPARISON: None.      Impression    IMPRESSION:  Arterial calcifications. The tibia and fibula appear  normal. There is no evidence of fracture or osseous lesion. Calcaneal  enthesophyte at the Achilles tendon insertion site. Otherwise  unremarkable.    KATLYN FOFANA MD   US Abdomen Limited    Narrative    ULTRASOUND ABDOMEN LIMITED 2020 10:43 AM    CLINICAL HISTORY: Abnormal liver function tests, weakness.    TECHNIQUE: Limited abdominal ultrasound.    COMPARISON: None.    FINDINGS:    GALLBLADDER: Negative sonographic Goode sign. Multiple small layering  gallstones. No gallbladder wall inflammatory change identified.    BILE DUCTS: There is no biliary dilatation. The common duct measures 4  mm.    LIVER: Normal where seen.    RIGHT KIDNEY: Mild right hydronephrosis.    PANCREAS: The visualized portions of the pancreas are normal.    No ascites.      Impression    IMPRESSION:  1.  Mild right hydronephrosis.  2.  Several tiny layering gallstones. Negative sonographic Goode  sign. No biliary dilatation.    PACO CHEW MD   Echocardiogram Complete    Narrative    [id number]  [id number]  [id number]  [id number]^MIKE^DEEDEE^[facility]  Echocardiography Laboratory  [address] [place] [address]        Name: HAMIDA HODGE  MRN: [id number]  : 1940  Study Date: 2020 09:16 AM  Age: 80 yrs  Gender: Female  Patient Location: [facility]  Reason For Study: Dyspnea  Ordering Physician: DEEDEE MCKEON  Referring Physician: MAGY MILLER  Performed By: Mariposa Clay     BSA: 1.6 m2  Height: 63 in  Weight: 132 lb  HR: 70  BP: 154/68 mmHg  _____________________________________________________________________________  __        Procedure  Complete Portable Echo Adult. Optison (NDC #6130-1242) given intravenously.  _____________________________________________________________________________  __        Interpretation Summary     Left ventricular systolic function is normal.The visual ejection fraction  is  estimated at 55-60%.  The right ventricular systolic function is normal.  Moderate to severe bi-atrial enlargement.  The aortic valve is trileaflet with aortic valve sclerosis.  There is mild (1+) aortic regurgitation.  Mild aortic root dilatation.     Compared to echo dated 05/15/2020 there is interval improvement in LV systolic  function.  _____________________________________________________________________________  __        Left Ventricle  The left ventricle is normal in size. There is mild concentric left  ventricular hypertrophy. Left ventricular systolic function is normal. The  visual ejection fraction is estimated at 55-60%. Diastolic Doppler findings  (E/E' ratio and/or other parameters) suggest left ventricular filling  pressures are indeterminate. No regional wall motion abnormalities noted.     Right Ventricle  The right ventricle is mildly dilated. There is a catheter/pacemaker lead seen  in the right ventricle. The right ventricular systolic function is normal.     Atria  The left atrium is moderate to severely dilated. The right atrium is moderate  to severely dilated. There is no color Doppler evidence of an atrial shunt.     Mitral Valve  The mitral valve leaflets appear thickened, but open well. There is mild (1+)  mitral regurgitation.        Tricuspid Valve  There is trace tricuspid regurgitation. The right ventricular systolic  pressure is approximated at 25.2 mmHg plus the right atrial pressure.     Aortic Valve  The aortic valve is trileaflet. The aortic valve is trileaflet with aortic  valve sclerosis. There is mild (1+) aortic regurgitation. No aortic stenosis  is present.     Pulmonic Valve  There is mild (1+) pulmonic valvular regurgitation. There is no pulmonic  valvular stenosis.     Vessels  Mild aortic root dilatation. 3.8 cm. Normal size ascending aorta. The inferior  vena cava was normal in size with preserved respiratory variability.     Pericardium  There is no pericardial  effusion.     _____________________________________________________________________________  __  MMode/2D Measurements & Calculations  IVSd: 1.2 cm  LVIDd: 4.3 cm  LVIDs: 2.9 cm  LVPWd: 0.98 cm  FS: 32.5 %  LV mass(C)d: 158.4 grams  LV mass(C)dI: 97.8 grams/m2     Ao root diam: 3.8 cm  LA dimension: 4.1 cm  asc Aorta Diam: 3.3 cm  LA/Ao: 1.1  LVOT diam: 2.0 cm  LVOT area: 3.2 cm2  LA Volume (BP): 75.2 ml  LA Volume Index (BP): 46.4 ml/m2  RWT: 0.46        Doppler Measurements & Calculations  MV E max khai: 83.7 cm/sec  MV A max khai: 41.0 cm/sec  MV E/A: 2.0  MV dec time: 0.14 sec     Ao V2 max: 166.8 cm/sec  Ao max P.0 mmHg  Ao V2 mean: 110.7 cm/sec  Ao mean P.7 mmHg  Ao V2 VTI: 36.6 cm  ARLENE(I,D): 1.1 cm2  ARLENE(V,D): 1.2 cm2  AI P1/2t: 476.1 msec  LV V1 max P.5 mmHg  LV V1 max: 61.4 cm/sec  LV V1 VTI: 12.4 cm  SV(LVOT): 39.4 ml  SI(LVOT): 24.3 ml/m2  PA acc time: 0.10 sec  PI end-d khai: 142.5 cm/sec  TR max khai: 251.1 cm/sec  TR max P.2 mmHg  AV Khai Ratio (DI): 0.37  ARLENE Index (cm2/m2): 0.66  E/E' av.1  Lateral E/e': 11.1  Medial E/e': 15.1              _____________________________________________________________________________  __        Report approved by: Nimco Calvillo 2020 11:18 AM                Disclaimer: This note consists of symbols derived from keyboarding, dictation and/or voice recognition software. As a result, there may be errors in the script that have gone undetected. Please consider this when interpreting information found in this chart.

## 2020-09-02 NOTE — PROVIDER NOTIFICATION
Paged dr rousseau: pt's   during the night and she wants to leave asap, her daughter is in route to the hospital to pick her up now she said. please come see the pt. thank you.    MD on floor and states that she will see the pt and enter orders for discharge but since TCU has been recommended and the pt will be leaving with her daughter to go home that the pt needs to understand the safety risks associated with that.  MD states that she will have a discussion with the pt and this RN will reinforce that as well.

## 2020-09-02 NOTE — PLAN OF CARE
Admitting Diagnosis: Recurrent falls  Pertinent History: HTN, giant cell arteritis on chronic steroids, systolic CHF - pacemaker placement in 2020, hx of Lung [place] cancer, GERD, mild COPD.  For vital signs and assessment please see flow sheet.  Living Situation: live home with family  Pain plan: denies pain  Mobility: assistance, A x 1 with walker  Baseline activity: with assistive equipment.   Alarms/Safety: BA  LDA's: PIV  Pertinent test results: M.9, cr: 0.79, K: 4.6, PLT: 192, hgb: 11.4.  Consults:PT/Cardiology/pharmacy following  Abnormals/Pending: urine culture pending  Other Cares/Comments: A &O x4, VSS, Tele BVV paced. 02 97% RA  Discharge Disposition: possible 1-2 days   Discharge Time: TBD

## 2020-09-02 NOTE — PLAN OF CARE
"PRIMARY DIAGNOSIS: \"GENERIC\" NURSING  OUTPATIENT/OBSERVATION GOALS TO BE MET BEFORE DISCHARGE:  1. ADLs back to baseline: Yes     2. Activity and level of assistance: Up with standby assistance.     3. Pain status: Pain free.     4. Return to near baseline physical activity: No          Discharge Planner Nurse   Safe discharge environment identified: No  Barriers to discharge: Yes, c/o weakness       Entered by: Lydia St 09/02/2020 0200AM  Please review provider order for any additional goals.   Nurse to notify provider when observation goals have been met and patient is ready for discharge.  "

## 2020-09-02 NOTE — PROGRESS NOTES
Discharge Planner   Discharge Plans in progress: The pt will discharge home today with [facility] RN/PT/OT services.  Nereida updated the [facility] liaison and emailed [facility] with the referral.  Nereida met with the pt, her dtr Fiona was present and her son Rodney was present.  The pt chose [facility] because that is who her PCP is through and she thinks continuity of care is important.  The pt's discharge recommendation was TCU, but due to her  passing overnight, she has chosen to go home with HC and family support.  Fiona said that she can stay with the pt for as long as she needs.  Fiona said that her son and Rodney will also be present to provide cares and support for the pt.  The pt will have 24/7 supervision and support at home.  The pt's dtr Fiona will provide transport home for the pt.   Nereida updated the pt's AVS to include [facility]'s contact information.     09/02/20 0909   Final Resources   Resources List Home Care   Home Care [facility] [phone], Fax: [phone]     Barriers to discharge plan: None identified at this time.  Follow up plan: Sw will continue to be available as needed until discharge.       Entered by: Priya Robin 09/02/2020 9:10 AM     ANGELA Parker, [facility]  Inpatient Care Coordination  [facility]  [phone]

## 2020-09-02 NOTE — PROGRESS NOTES
ANTICOAGULATION FOLLOW-UP CLINIC VISIT    Patient Name:  Tomasa Fam  Date:  2020  Contact Type:  Telephone/ Chandrika    SUBJECTIVE:  Patient Findings     Positives:   Change in health, Change in medications, Hospital admission    Comments:   Patient discharged from hospital today. Currently on antibiotics for UTI. Home care was ordered on discharge today and they will start services this Friday.         Clinical Outcomes     Negatives:   Major bleeding event, Thromboembolic event, Anticoagulation-related hospital admission, Anticoagulation-related ED visit, Anticoagulation-related fatality    Comments:   Patient discharged from hospital today. Currently on antibiotics for UTI. Home care was ordered on discharge today and they will start services this Friday.            OBJECTIVE    Recent labs: (last 7 days)     20  0644   INR 2.23*       ASSESSMENT / PLAN  INR assessment THER    Recheck INR In: 6 DAYS    INR Location Clinic      Anticoagulation Summary  As of 2020    INR goal:   2.0-3.0   TTR:   64.5 % (9.7 mo)   INR used for dosin.23 (2020)   Warfarin maintenance plan:   1.25 mg (2.5 mg x 0.5) every Mon, Wed, Fri; 2.5 mg (2.5 mg x 1) all other days   Full warfarin instructions:   1.25 mg every Mon, Wed, Fri; 2.5 mg all other days   Weekly warfarin total:   13.75 mg   Plan last modified:   Tiffanie Leon RN (2020)   Next INR check:   2020   Priority:   High   Target end date:   Indefinite    Indications    Paroxysmal atrial fibrillation (H) [I48.0]             Anticoagulation Episode Summary     INR check location:   Anticoagulation Clinic    Preferred lab:       Send INR reminders to:   PK WEAVER    Comments:   No Bandaid // 2.5 mg tab // patient started on Xarelto, transition to warfarin 19      Anticoagulation Care Providers     Provider Role Specialty Phone number    Violet Fenton MD Referring Family Practice [phone]            See the Encounter  Report to view Anticoagulation Flowsheet and Dosing Calendar (Go to Encounters tab in chart review, and find the Anticoagulation Therapy Visit)    Patient INR is therapeutic.  Patient will continue to take 13.75 mg weekly dosing and follow up in 6 days or sooner for any problems or concerns.    Homecare will ideally take over checking INRs for the meantime while they are servicing patient. She will mention this to them on Friday.     Tiffanie Leon RN

## 2020-09-02 NOTE — PLAN OF CARE
"PRIMARY DIAGNOSIS: \"GENERIC\" NURSING  OUTPATIENT/OBSERVATION GOALS TO BE MET BEFORE DISCHARGE:  ADLs back to baseline: Yes    Activity and level of assistance: Up with standby assistance.    Pain status: Pain free.    Return to near baseline physical activity: No     Discharge Planner Nurse   Safe discharge environment identified: No  Barriers to discharge: Yes, medically active       Entered by: Aurora Bone 09/01/2020 7:24 PM     Please review provider order for any additional goals.   Nurse to notify provider when observation goals have been met and patient is ready for discharge.  "

## 2020-09-02 NOTE — PROGRESS NOTES
OBSERVATION patient END time:  0915    Pt is in stable condition, vss, no co pain/cp/sob.  Pt dc home today as stated in previous notes as her  passed overnight, pt/family verbalized understanding of safety risks associated with going home rather than TCU.  All dc education reviewed with pt/family in regards to: diet, activity, safety, s/s to report, medications and rx (paper given to pt), follow up appointments/care, etc.  Questions were answered and pt/family verbalized understanding.  All belongings were sent with pt, dc via  by staff to main entrance, private transport.       PRIMARY DIAGNOSIS: generalized weakness/fall  OUTPATIENT/OBSERVATION GOALS TO BE MET BEFORE DISCHARGE:  1. ADLs back to baseline: Yes    2. Activity and level of assistance: Up with standby assistance.    3. Pain status: Pain free.    4. Return to near baseline physical activity: No     Discharge Planner Nurse   Safe discharge environment identified: No, but d/t circumstances noted above pt will dc home with HHC and daughter to assist for safety.   Barriers to discharge: Yes, needs TCU stay.       Entered by: Elham Momin 09/02/2020 9:16 AM     Please review provider order for any additional goals.   Nurse to notify provider when observation goals have been met and patient is ready for discharge.

## 2020-09-02 NOTE — PLAN OF CARE
Physical Therapy Discharge Summary     Reason for therapy discharge:    Discharged to home with home care     Progress towards therapy goal(s). See goals on Care Plan in [facility] electronic health record for goal details.  Goals not met.  Barriers to achieving goals:   declined attempt at stairs, scoring 0 on sit<>Stand test     Therapy recommendation(s):    Continued therapy is recommended.  Rationale/Recommendations:  PT was recommending TCU, but declining and returning home with home care. Pt is below baseline with functional mobility and strength and would benefit from continued PT to progress skills.

## 2020-09-03 ENCOUNTER — TELEPHONE (OUTPATIENT)
Dept: FAMILY MEDICINE | Facility: CLINIC | Age: 80
End: 2020-09-03

## 2020-09-03 ENCOUNTER — PATIENT OUTREACH (OUTPATIENT)
Dept: NURSING | Facility: CLINIC | Age: 80
End: 2020-09-03
Payer: MEDICARE

## 2020-09-03 DIAGNOSIS — Z20.822 COVID-19 RULED OUT: Primary | ICD-10-CM

## 2020-09-03 NOTE — TELEPHONE ENCOUNTER
Please contact patient for In-patient follow up.  [phone] (home)     Visit date: 9/02/2020  Diagnosis listed:Generalized Muscle Weakness, Acute Cystitis without Hematuria  Number of visits in past 12 months:0/0

## 2020-09-03 NOTE — TELEPHONE ENCOUNTER
"ED / Discharge Outreach Protocol    Patient Contact    Attempt # 1    Was call answered?  Yes.  \"May I please speak with <patient name>\"  Is patient available?   Yes    Hospital/TCU/ED for chronic condition Discharge Protocol    \"Hi, my name is Roxie Lobato RN, a registered nurse, and I am calling from [facility].  I am calling to follow up and see how things are going for you after your recent emergency visit/hospital/TCU stay.\"    Tell me how you are doing now that you are home?\" I am ok I am tired right now- been up doing things       Discharge Instructions    \"Let's review your discharge instructions.  What is/are the follow-up recommendations?  Pt. Response: I do not have questions- I have to much on my mind to talk right now     \"Has an appointment with your primary care provider been scheduled?\"   No (schedule appointment)  Patient will call back later to schedule.   \"When you see the provider, I would recommend that you bring your medications with you.\"    Medications    \"Tell me what changed about your medicines when you discharged?\"    Changes to chronic meds?    0-1    \"What questions do you have about your medications?\"    None     New diagnoses of heart failure, COPD, diabetes, or MI?    No          On warfarin: \"Were you given any recommendations for follow-up with the anticoagulation clinic?\" WAs not given warfarin- was not previouly taking    Post Discharge Medication Reconciliation Status: discharge medications reconciled, continue medications without change.    Was MTM referral placed (*Make sure to put transitions as reason for referral)?   No    Call Summary    \"What questions or concerns do you have about your recent visit and your follow-up care?\"     none    \"If you have questions or things don't continue to improve, we encourage you contact us through the main clinic number (give number).  Even if the clinic is not open, triage nurses are available 24/7 to help you.     We would like " "you to know that our clinic has extended hours (provide information).  We also have urgent care (provide details on closest location and hours/contact info)\"      \"Thank you for your time and take care!\"         Roxie Lobato RN on 9/3/2020 at 8:46 AM      "

## 2020-09-03 NOTE — PROGRESS NOTES
Clinic Care Coordination Contact  Community Health Worker Initial Outreach  Spoke with patient     Chart Review: Referral from discharge report. Discharged AMA from hospital due to husbands death. TCU recommended but went home with 24/7 family support and home care.  Follow up with PCP scheduled for 9/8.    CHW introduced self and role with CC  Patient states that she is very tired today and doesn't feel like talking.   CHW offered CC support, patient is confused with home care who is coming tomorrow.   Patient does not wish to speak with a Care Coordinator at this time.   CHW provided contact information and notified patient of letter with more information being sent to her MyChart and to reach out with any questions/concerns/needs.     Patient accepts CC: No, Enrolled in home care. Patient will be sent Care Coordination introduction letter for future reference.     SONDRA Bain   Clinic Care Coordination  [facility] Clinics:  [place], [place], [place], [place], and [place]  Phone: [phone]

## 2020-09-04 ENCOUNTER — ANTICOAGULATION THERAPY VISIT (OUTPATIENT)
Dept: FAMILY MEDICINE | Facility: CLINIC | Age: 80
End: 2020-09-04

## 2020-09-04 DIAGNOSIS — I48.0 PAROXYSMAL ATRIAL FIBRILLATION (H): ICD-10-CM

## 2020-09-04 LAB — INR PPP: 1.5 (ref 0.9–1.1)

## 2020-09-04 NOTE — PROGRESS NOTES
Patient's symptoms consistent with COVID 19.   Test result: Negative.  Get well loop referral/order placed.   CHW completed patient outreach on 09/03. Patient declined care coordination.   No further outreaches at this time.   RNCC will be available as needed.     Yeni Torres RN Care Coordinator  [facility]  Email: [email or web address]  Phone: [phone]

## 2020-09-04 NOTE — PROGRESS NOTES
ANTICOAGULATION MANAGEMENT     Patient Name:  Tomasa Fam  Date:  2020    ASSESSMENT /SUBJECTIVE:    Today's INR result of 1.5 is subtherapeutic. Goal INR of 2.0-3.0      Warfarin dose taken: Less warfarin taken than instructed which may be affecting INR    Diet: No new diet changes affecting INR    Medication changes/ interactions: Interaction between omnicef and tapering of prednisone and warfarin may be affecting INR    Previous INR: Therapeutic     S/S of bleeding or thromboembolism: Yes: bruise on LLE from ankle to knee from fall on 20    New injury or illness: Yes: cystitis dxed 20    Upcoming surgery, procedure or cardioversion: No    Additional findings:   20      PLAN:    Spoke with POOJA Ayala RN regarding INR result and instructed:     Warfarin Dosing Instructions: 2.5 mg tonight then continue your current warfarin dose of 1.25 mg MWF; 2.5 mg ROW    Instructed patient to follow up no later than: 4 days  Orders given to  Homecare nurse/facility to recheck    Education provided: PE and DVT education provided.      Lucy verbalizes understanding and agrees to warfarin dosing plan.    Instructed to call the Anticoagulation Clinic for any changes, questions or concerns. (#[phone])        Shanna Melissa RN      OBJECTIVE:  Recent labs: (last 7 days)     20  1014 20  0649 20  0644 20   INR 2.06* 2.45* 2.23* 1.5*         INR assessment SUB    Recheck INR In: 4 DAYS    INR Location Homecare INR      Anticoagulation Summary  As of 2020    INR goal:   2.0-3.0   TTR:   64.3 % (9.7 mo)   INR used for dosin.5! (2020)   Warfarin maintenance plan:   1.25 mg (2.5 mg x 0.5) every Mon, Wed, Fri; 2.5 mg (2.5 mg x 1) all other days   Full warfarin instructions:   : 2.5 mg; Otherwise 1.25 mg every Mon, Wed, Fri; 2.5 mg all other days   Weekly warfarin total:   13.75 mg   Plan last modified:   Tiffanie Leon RN (2020)   Next INR  check:   9/8/2020   Priority:   High   Target end date:   Indefinite    Indications    Paroxysmal atrial fibrillation (H) [I48.0]             Anticoagulation Episode Summary     INR check location:   Anticoagulation Clinic    Preferred lab:       Send INR reminders to:   PK WEAVER    Comments:   No Bandaid // 2.5 mg tab // patient started on Xarelto, transition to warfarin 11/6/19      Anticoagulation Care Providers     Provider Role Specialty Phone number    Violet Fenton MD [facility] [phone]

## 2020-09-08 ENCOUNTER — OFFICE VISIT (OUTPATIENT)
Dept: FAMILY MEDICINE | Facility: CLINIC | Age: 80
End: 2020-09-08
Payer: MEDICARE

## 2020-09-08 ENCOUNTER — ANTICOAGULATION THERAPY VISIT (OUTPATIENT)
Dept: NURSING | Facility: CLINIC | Age: 80
End: 2020-09-08

## 2020-09-08 VITALS
RESPIRATION RATE: 20 BRPM | HEART RATE: 70 BPM | DIASTOLIC BLOOD PRESSURE: 60 MMHG | SYSTOLIC BLOOD PRESSURE: 100 MMHG | WEIGHT: 135 LBS | TEMPERATURE: 97.1 F | OXYGEN SATURATION: 99 % | BODY MASS INDEX: 23.92 KG/M2 | HEIGHT: 63 IN

## 2020-09-08 DIAGNOSIS — E11.9 TYPE 2 DIABETES MELLITUS WITHOUT COMPLICATION, WITHOUT LONG-TERM CURRENT USE OF INSULIN (H): ICD-10-CM

## 2020-09-08 DIAGNOSIS — R53.1 WEAKNESS: ICD-10-CM

## 2020-09-08 DIAGNOSIS — F43.21 GRIEVING: ICD-10-CM

## 2020-09-08 DIAGNOSIS — M31.6 TEMPORAL ARTERITIS (H): ICD-10-CM

## 2020-09-08 DIAGNOSIS — I42.8 OTHER CARDIOMYOPATHY (H): ICD-10-CM

## 2020-09-08 DIAGNOSIS — I48.0 PAROXYSMAL ATRIAL FIBRILLATION (H): ICD-10-CM

## 2020-09-08 DIAGNOSIS — Z09 HOSPITAL DISCHARGE FOLLOW-UP: Primary | ICD-10-CM

## 2020-09-08 DIAGNOSIS — R29.6 RECURRENT FALLS: ICD-10-CM

## 2020-09-08 DIAGNOSIS — E83.52 HYPERCALCEMIA: ICD-10-CM

## 2020-09-08 DIAGNOSIS — D64.9 ANEMIA, UNSPECIFIED TYPE: ICD-10-CM

## 2020-09-08 DIAGNOSIS — R68.89 THROAT SYMPTOM: ICD-10-CM

## 2020-09-08 DIAGNOSIS — M35.3 PMR (POLYMYALGIA RHEUMATICA) (H): ICD-10-CM

## 2020-09-08 DIAGNOSIS — R79.89 ELEVATED TROPONIN: ICD-10-CM

## 2020-09-08 LAB
ALBUMIN SERPL-MCNC: 3.2 G/DL (ref 3.4–5)
ALP SERPL-CCNC: 53 U/L (ref 40–150)
ALT SERPL W P-5'-P-CCNC: 127 U/L (ref 0–50)
ANION GAP SERPL CALCULATED.3IONS-SCNC: 8 MMOL/L (ref 3–14)
ANISOCYTOSIS BLD QL SMEAR: ABNORMAL
AST SERPL W P-5'-P-CCNC: 37 U/L (ref 0–45)
BILIRUB SERPL-MCNC: 1 MG/DL (ref 0.2–1.3)
BUN SERPL-MCNC: 30 MG/DL (ref 7–30)
CALCIUM SERPL-MCNC: 10.2 MG/DL (ref 8.5–10.1)
CHLORIDE SERPL-SCNC: 102 MMOL/L (ref 94–109)
CO2 SERPL-SCNC: 25 MMOL/L (ref 20–32)
CREAT SERPL-MCNC: 0.95 MG/DL (ref 0.52–1.04)
DIFFERENTIAL METHOD BLD: ABNORMAL
ERYTHROCYTE [DISTWIDTH] IN BLOOD BY AUTOMATED COUNT: 23.2 % (ref 10–15)
FOLATE SERPL-MCNC: 85.6 NG/ML
GFR SERPL CREATININE-BSD FRML MDRD: 56 ML/MIN/{1.73_M2}
GLUCOSE SERPL-MCNC: 140 MG/DL (ref 70–99)
HCT VFR BLD AUTO: 36.6 % (ref 35–47)
HGB BLD-MCNC: 11.4 G/DL (ref 11.7–15.7)
INR PPP: 1.8 (ref 0.86–1.14)
LYMPHOCYTES NFR BLD AUTO: 18 %
MACROCYTES BLD QL SMEAR: PRESENT
MCH RBC QN AUTO: 30.5 PG (ref 26.5–33)
MCHC RBC AUTO-ENTMCNC: 31.1 G/DL (ref 31.5–36.5)
MCV RBC AUTO: 98 FL (ref 78–100)
METAMYELOCYTES NFR BLD MANUAL: 3 %
MONOCYTES NFR BLD AUTO: 5 %
NEUTROPHILS NFR BLD AUTO: 74 %
NRBC BLD AUTO-RTO: 9 /100
OVALOCYTES BLD QL SMEAR: SLIGHT
PLATELET # BLD AUTO: 235 10E9/L (ref 150–450)
PLATELET # BLD EST: ABNORMAL 10*3/UL
POIKILOCYTOSIS BLD QL SMEAR: SLIGHT
POTASSIUM SERPL-SCNC: 4.2 MMOL/L (ref 3.4–5.3)
PROT SERPL-MCNC: 6.3 G/DL (ref 6.8–8.8)
PTH-INTACT SERPL-MCNC: 121 PG/ML (ref 18–80)
RBC # BLD AUTO: 3.74 10E12/L (ref 3.8–5.2)
RETICS # AUTO: 133.6 10E9/L (ref 25–95)
RETICS/RBC NFR AUTO: 3.6 % (ref 0.5–2)
SODIUM SERPL-SCNC: 135 MMOL/L (ref 133–144)
VIT B12 SERPL-MCNC: 549 PG/ML (ref 193–986)
WBC # BLD AUTO: 11.2 10E9/L (ref 4–11)

## 2020-09-08 PROCEDURE — 84165 PROTEIN E-PHORESIS SERUM: CPT | Performed by: FAMILY MEDICINE

## 2020-09-08 PROCEDURE — 85025 COMPLETE CBC W/AUTO DIFF WBC: CPT | Performed by: FAMILY MEDICINE

## 2020-09-08 PROCEDURE — 82306 VITAMIN D 25 HYDROXY: CPT | Performed by: FAMILY MEDICINE

## 2020-09-08 PROCEDURE — 99207 ZZC NO CHARGE NURSE ONLY: CPT

## 2020-09-08 PROCEDURE — 85045 AUTOMATED RETICULOCYTE COUNT: CPT | Performed by: FAMILY MEDICINE

## 2020-09-08 PROCEDURE — 82746 ASSAY OF FOLIC ACID SERUM: CPT | Performed by: FAMILY MEDICINE

## 2020-09-08 PROCEDURE — 83540 ASSAY OF IRON: CPT | Performed by: FAMILY MEDICINE

## 2020-09-08 PROCEDURE — 00000402 ZZHCL STATISTIC TOTAL PROTEIN: Performed by: FAMILY MEDICINE

## 2020-09-08 PROCEDURE — 85610 PROTHROMBIN TIME: CPT | Performed by: FAMILY MEDICINE

## 2020-09-08 PROCEDURE — 80053 COMPREHEN METABOLIC PANEL: CPT | Performed by: FAMILY MEDICINE

## 2020-09-08 PROCEDURE — 99495 TRANSJ CARE MGMT MOD F2F 14D: CPT | Performed by: FAMILY MEDICINE

## 2020-09-08 PROCEDURE — 82728 ASSAY OF FERRITIN: CPT | Performed by: FAMILY MEDICINE

## 2020-09-08 PROCEDURE — 83550 IRON BINDING TEST: CPT | Performed by: FAMILY MEDICINE

## 2020-09-08 PROCEDURE — 85060 BLOOD SMEAR INTERPRETATION: CPT | Performed by: FAMILY MEDICINE

## 2020-09-08 PROCEDURE — 36415 COLL VENOUS BLD VENIPUNCTURE: CPT | Performed by: FAMILY MEDICINE

## 2020-09-08 PROCEDURE — 82607 VITAMIN B-12: CPT | Performed by: FAMILY MEDICINE

## 2020-09-08 PROCEDURE — 83970 ASSAY OF PARATHORMONE: CPT | Performed by: FAMILY MEDICINE

## 2020-09-08 PROCEDURE — 40000847 ZZHCL STATISTIC MORPHOLOGY W/INTERP HISTOLOGY TC 85060: Performed by: FAMILY MEDICINE

## 2020-09-08 ASSESSMENT — MIFFLIN-ST. JEOR: SCORE: 1046.49

## 2020-09-08 NOTE — PATIENT INSTRUCTIONS

## 2020-09-08 NOTE — PROGRESS NOTES
ANTICOAGULATION FOLLOW-UP CLINIC VISIT    Patient Name:  Tomasa Fam  Date:  9/8/2020  Contact Type:  Telephone    SUBJECTIVE:  Patient Findings     Positives:   Change in health, Change in medications    Comments:   ABX for UTI 9/4/20-9/7/20. Concurrent use of CEFDINIR and WARFARIN may result in an increased risk of bleeding.  recently passed away. Upon discharge from hospital, they advised TCU placement, but patient returned home with help of family d/t loss of . PCP gave referral for PT/OT/SN. Also referrals for Otolaryngology and Cardiology.  Called patient to discuss today's INR results: No other changes than what is discussed above. Patient states her PCP believes the Prednisone may be the root cause of many of her issues, currently on 35 mg daily, she is meeting with a new provider about this tomorrow. Advised patient to increase maintenance dose of warfarin starting tomorrow, then recheck in 1 week. Patient and her daughter wrote down instructions and are in agreement with the plan.  Yue NICOLAS RN  Anticoagulation Clinic  [place]            Clinical Outcomes     Negatives:   Major bleeding event, Thromboembolic event, Anticoagulation-related hospital admission, Anticoagulation-related ED visit, Anticoagulation-related fatality    Comments:   ABX for UTI 9/4/20-9/7/20. Concurrent use of CEFDINIR and WARFARIN may result in an increased risk of bleeding.  recently passed away. Upon discharge from hospital, they advised TCU placement, but patient returned home with help of family d/t loss of . PCP gave referral for PT/OT/SN. Also referrals for Otolaryngology and Cardiology.  Called patient to discuss today's INR results: No other changes than what is discussed above. Patient states her PCP believes the Prednisone may be the root cause of many of her issues, currently on 35 mg daily, she is meeting with a new provider about this tomorrow. Advised patient to increase  maintenance dose of warfarin starting tomorrow, then recheck in 1 week. Patient and her daughter wrote down instructions and are in agreement with the plan.  Yue NICOLAS RN  Anticoagulation Clinic  [place]               OBJECTIVE    Recent labs: (last 7 days)     20  1024   INR 1.80*       ASSESSMENT / PLAN  INR assessment SUB    Recheck INR In: 1 WEEK    INR Location Clinic      Anticoagulation Summary  As of 2020    INR goal:   2.0-3.0   TTR:   63.3 % (9.9 mo)   INR used for dosin.80! (2020)   Warfarin maintenance plan:   1.25 mg (2.5 mg x 0.5) every Mon, Fri; 2.5 mg (2.5 mg x 1) all other days   Full warfarin instructions:   1.25 mg every Mon, Fri; 2.5 mg all other days   Weekly warfarin total:   15 mg   Plan last modified:   Yue Martínez RN (2020)   Next INR check:   9/15/2020   Priority:   Maintenance   Target end date:   Indefinite    Indications    Paroxysmal atrial fibrillation (H) [I48.0]             Anticoagulation Episode Summary     INR check location:   Anticoagulation Clinic    Preferred lab:       Send INR reminders to:   PK WEAVER    Comments:   No Bandaid // 2.5 mg tab // patient started on Xarelto, transition to warfarin 19      Anticoagulation Care Providers     Provider Role Specialty Phone number    Violet Fenton MD Referring [facility] [phone]            See the Encounter Report to view Anticoagulation Flowsheet and Dosing Calendar (Go to Encounters tab in chart review, and find the Anticoagulation Therapy Visit)    Dosage adjustment made based on physician directed care plan.    Yue Martínez RN

## 2020-09-08 NOTE — PROGRESS NOTES
"Subjective     Tomasa Fam is a 80 year old female who presents to clinic today for the following health issues:    HPI         Hospital Follow-up Visit:    Hospital/Nursing Home/IP Rehab Facility: [facility]  Date of Admission: 8/31/2020  Date of Discharge: 9/2/2020  Reason(s) for Admission: Generalized Weakness/ Frequent falls      Was your hospitalization related to COVID-19? No   Problems taking medications regularly:  Not currently; notes she had some mix ups early after she returned home. When asked which medications; she said \"all of them, but they are straight now\"  Medication changes since discharge: completed antibiotics.   Problems adhering to non-medication therapy:  None at this time.     Summary of hospitalization:  [facility] discharge summary reviewed  Diagnostic Tests/Treatments reviewed.  Follow up needed: cardiology; follow up on elevated liver tests.   Other Healthcare Providers Involved in Patient s Care:         Homecare  Update since discharge: improved. Post Discharge Medication Reconciliation: discharge medications reconciled, continue medications without change.  Plan of care communicated with patient and family          See under ROS    Patient Active Problem List   Diagnosis     Chronic airway obstruction (H)     ASCUS on Pap smear     Hyperlipidemia LDL goal <160     Breast cancer (H)     Essential hypertension with goal blood pressure less than 140/90     Cystocele, midline     Uterovaginal prolapse     S/P hysterectomy     Advanced directives, counseling/discussion     History of hypokalemia     Concussion     Thyroid nodule     Chronic pain of both knees     History of lung cancer     Paroxysmal atrial fibrillation (H)     Gastroesophageal reflux disease, esophagitis presence not specified     PMR (polymyalgia rheumatica) (H)     Personal history of malignant neoplasm of breast     Long term systemic steroid user     Cardiomyopathy (H)     Complete heart " block (H)     Temporal arteritis (H)     Elevation of level of transaminase or lactic acid dehydrogenase (LDH)     Recurrent falls     Dyspnea     Diabetes mellitus, type 2 (H)       Current Outpatient Medications   Medication Sig Dispense Refill     acetaminophen (TYLENOL) 500 MG tablet Take 1,000 mg by mouth every 8 hours as needed for mild pain       amLODIPine (NORVASC) 2.5 MG tablet Take 1 tablet (2.5 mg) by mouth daily 180 tablet 1     B Complex Vitamins (VITAMIN  B COMPLEX) tablet Take 1 tablet by mouth daily.       calcium carbonate (TUMS) 500 MG chewable tablet Take 2 chew tab by mouth 2 times daily as needed for heartburn       calcium-vitamin D (CALTRATE) 600-400 MG-UNIT per tablet Take 4 tablets by mouth daily        cholecalciferol (VITAMIN D3) 25 mcg (1000 units) capsule Take 1 capsule by mouth daily       ezetimibe (ZETIA) 10 MG tablet TAKE 1 TABLET(10 MG) BY MOUTH DAILY 90 tablet 0     FLUoxetine (PROZAC) 10 MG capsule Take 10 mg by mouth daily  90 capsule 1     furosemide (LASIX) 40 MG tablet Take 40 mg by mouth 2 times daily       metoprolol tartrate (LOPRESSOR) 100 MG tablet Take 1 tablet (100 mg) by mouth 2 times daily 60 tablet 6     omega 3 1000 MG CAPS Take 1 g by mouth daily 90 capsule      omeprazole (PRILOSEC) 20 MG DR capsule Take 20 mg by mouth daily as needed       polyethylene glycol (MIRALAX/GLYCOLAX) powder Take 1 capful by mouth daily as needed for constipation       potassium chloride ER (KLOR-CON M) 20 MEQ CR tablet Take 1 tablet (20 mEq) by mouth 2 times daily 60 tablet 6     predniSONE (DELTASONE) 10 MG tablet Take 35 mg by mouth daily       warfarin ANTICOAGULANT (COUMADIN) 2.5 MG tablet Take 1.25mg every Mon,Wed and Fri / Take 2.5mg rest of week           Review of Systems   CONSTITUTIONAL:NEGATIVE for fever, chills, change in weight  RESP:NEGATIVE for significant cough or SOB  CV: NEGATIVE for chest pain, palpitations  MUSCULOSKELETAL: feels like she is getting stronger.  "  NEURO: improving weakness.   PSYCHIATRIC: daughter notes stresses.  Her  recently passed. She notes she is coping OK; she has gotten a lot done (notes [employer] in paper today)    Daughter, Fiona, is here from [place].    Doing fairly well at home.  Walking around the house without walker. Able to get in/out of bed without help.   Working on getting stronger.     Home Health will be out for OT. Anticipating physical therapy in future.    Notes medications straightened out now; had been messed up since the hospital; but did not elaborate which ones. She notes doing well now.     Went to Neurology after her last visit. She notes she won't go back; did not like how she was treated.     Throat closes up when lays on back. Would like to see Dr. Reilly again; he has been helpful in the past.   Bubbles come out. Not choking on pills etc.     Due to see Oncology in October. (Dr. Woodward).    She notes she just completed antibiotics.       Objective    /60 (BP Location: Right arm, Patient Position: Chair, Cuff Size: Adult Regular)   Pulse 70   Temp 97.1  F (36.2  C) (Tympanic)   Resp 20   Ht 1.6 m (5' 3\")   Wt 61.2 kg (135 lb)   LMP  (LMP Unknown)   SpO2 99%   BMI 23.91 kg/m    Body mass index is 23.91 kg/m .  Physical Exam   GENERAL APPEARANCE: alert and no distress  RESP: lungs clear to auscultation - no rales, rhonchi or wheezes  CV: regular rates and rhythm  MS: trace LE edema.   SKIN: bruising of left leg; now extends down to ankle.   PSYCH: mentation appears normal and affect normal.      Urine culture was negative.    Abdominal ultrasound.  IMPRESSION:  1.  Mild right hydronephrosis.  2.  Several tiny layering gallstones. Negative sonographic Goode  sign. No biliary dilatation.    Lab Results   Component Value Date    A1C 7.0 08/31/2020    A1C 5.9 11/28/2005     Hemoglobin   Date Value Ref Range Status   08/31/2020 11.4 (L) 11.7 - 15.7 g/dL Final   08/27/2020 11.2 (L) 11.7 - 15.7 g/dL Final     " Comment:     Results confirmed by repeat test       Component      Latest Ref Rng & Units 8/31/2020   Sodium      133 - 144 mmol/L 134   Potassium      3.4 - 5.3 mmol/L 4.6   Chloride      94 - 109 mmol/L 99   Carbon Dioxide      20 - 32 mmol/L 31   Anion Gap      3 - 14 mmol/L 4   Glucose      70 - 99 mg/dL 236 (H)   Urea Nitrogen      7 - 30 mg/dL 27   Creatinine      0.52 - 1.04 mg/dL 0.79   GFR Estimate      >60 mL/min/1.73:m2 71   GFR Estimate If Black      >60 mL/min/1.73:m2 82   Calcium      8.5 - 10.1 mg/dL 10.0   Bilirubin Direct      0.0 - 0.2 mg/dL 0.2   Bilirubin Total      0.2 - 1.3 mg/dL 1.4 (H)   Albumin      3.4 - 5.0 g/dL 3.0 (L)   Protein Total      6.8 - 8.8 g/dL 5.4 (L)   Alkaline Phosphatase      40 - 150 U/L 47   ALT      0 - 50 U/L 153 (H)   AST      0 - 45 U/L 58 (H)           Corrected calcium 10.8 for low albumin.     Assessment & Plan         1. Hospital discharge follow-up      2. Weakness  Uncertain etiology. She notes some improvement.   Home Health coming out.   She does have several things that could be contributing to this. See other issues.     3. Elevated troponin  She left the hospital prior to cardiac evaluation. This was due to the passing of her . It was recommended to evaluate outpatient.   Will have her set up an appointment.   - CARDIOLOGY EVAL ADULT REFERRAL    4. Type 2 diabetes mellitus without complication, without long-term current use of insulin (H)  Discussed goals; for her, I would think of < 8.0.  Suspect this is related to her prednisone use. She has recently decreased and is hoping to decrease more.   Discussed monitoring. She notes she still has her 's monitor.   Considering Home Care may be able to do some monitoring. Would like to check HgbA1C in another three months.     5. Temporal arteritis (H)  Sees Rheumatology. On prednisone; was put on higher dose when this was diagnosed.    6. PMR (polymyalgia rheumatica) (H)  Had been on prednisone for  months for this prior to above diagnosis.     7. Other cardiomyopathy (H)  Recommend to follow up with Cardiology.     8. Elevation of level of transaminase or lactic acid dehydrogenase (LDH)  Uncertain etiology. Will recheck. She did have ultrasound. There are some gallstones, but do not appear to be obstructing.  Continue to follow.   She will see Dr. Woodward soon.   Initially was thought due to Septra and initial improvement with stopping it, but now increased again.  - Iron and iron binding capacity  - Comprehensive metabolic panel    9. Anemia, unspecified type  Uncertain etiology. Mild, but has dropped since early August. Will evaluate at this time.   - CBC with platelets differential  - Blood Morphology Pathologist Review  - Iron and iron binding capacity  - Ferritin  - Protein electrophoresis  - Folate  - Vitamin B12  - Reticulocyte Count    10. Throat symptom  She would like to see Dr. Reyes for this.   - OTOLARYNGOLOGY REFERRAL    11. Hypercalcemia  Discussed uncertain etiology.   Was upper end of normal in hospital, but elevated with correction for protein.   Will do following. Discussed cancer can sometimes do this also.   - Vitamin D Deficiency  - Parathyroid Hormone Intact  - Comprehensive metabolic panel    12. Paroxysmal atrial fibrillation (H)  She notes she needs INR  - INR    13. Grieving  Recent passing of . She feels she is currently coping.     14. Recurrent falls  She notes they have occurred from stepping on things, etc.   Discussed that physical therapy can often help with some balance as well; she may have some decreased proprioception/          See Patient Instructions    Return in about 6 weeks (around 10/20/2020), or if symptoms worsen or fail to improve, for Medication recheck.    Violet Fenton MD, MD  [facility]

## 2020-09-09 LAB
ALBUMIN SERPL ELPH-MCNC: 3.8 G/DL (ref 3.7–5.1)
ALPHA1 GLOB SERPL ELPH-MCNC: 0.4 G/DL (ref 0.2–0.4)
ALPHA2 GLOB SERPL ELPH-MCNC: 0.8 G/DL (ref 0.5–0.9)
B-GLOBULIN SERPL ELPH-MCNC: 0.6 G/DL (ref 0.6–1)
COPATH REPORT: NORMAL
DEPRECATED CALCIDIOL+CALCIFEROL SERPL-MC: 50 UG/L (ref 20–75)
FERRITIN SERPL-MCNC: 515 NG/ML (ref 8–252)
GAMMA GLOB SERPL ELPH-MCNC: 0.5 G/DL (ref 0.7–1.6)
IRON SATN MFR SERPL: 61 % (ref 15–46)
IRON SERPL-MCNC: 196 UG/DL (ref 35–180)
M PROTEIN SERPL ELPH-MCNC: 0.1 G/DL
PROT PATTERN SERPL ELPH-IMP: ABNORMAL
TIBC SERPL-MCNC: 323 UG/DL (ref 240–430)

## 2020-09-10 ENCOUNTER — TELEPHONE (OUTPATIENT)
Dept: FAMILY MEDICINE | Facility: CLINIC | Age: 80
End: 2020-09-10

## 2020-09-10 DIAGNOSIS — D64.9 ANEMIA, UNSPECIFIED TYPE: Primary | ICD-10-CM

## 2020-09-10 DIAGNOSIS — E83.52 HYPERCALCEMIA: ICD-10-CM

## 2020-09-10 DIAGNOSIS — R77.8 ABNORMAL SERUM PROTEIN ELECTROPHORESIS: ICD-10-CM

## 2020-09-10 DIAGNOSIS — R79.9 ABNORMAL BLOOD SMEAR: ICD-10-CM

## 2020-09-10 NOTE — TELEPHONE ENCOUNTER
Attempted to call regarding recent lab results.  Line busy.    Discussed with daughter, Fiona.   Recommend seeing provider at [facility]; suspect she would need to see other than Dr. Woodward for some of this.

## 2020-09-14 ENCOUNTER — TRANSFERRED RECORDS (OUTPATIENT)
Dept: HEALTH INFORMATION MANAGEMENT | Facility: CLINIC | Age: 80
End: 2020-09-14

## 2020-09-15 ENCOUNTER — ANTICOAGULATION THERAPY VISIT (OUTPATIENT)
Dept: FAMILY MEDICINE | Facility: CLINIC | Age: 80
End: 2020-09-15

## 2020-09-15 DIAGNOSIS — I48.0 PAROXYSMAL ATRIAL FIBRILLATION (H): ICD-10-CM

## 2020-09-15 LAB — INR PPP: 4.4 (ref 0.9–1.1)

## 2020-09-15 NOTE — PROGRESS NOTES
ANTICOAGULATION MANAGEMENT     Patient Name:  Tomasa Fam  Date:  9/15/2020    ASSESSMENT /SUBJECTIVE:    Today's INR result of 4.4 is supratherapeutic. Goal INR of 2.0-3.0      Warfarin dose taken: Warfarin taken as previously instructed    Diet: No new diet changes affecting INR    Medication changes/ interactions: finished abx on 20; will continue to taper prednisone x 2 weeks.    Previous INR: Subtherapeutic     S/S of bleeding or thromboembolism: No    New injury or illness: No    Upcoming surgery, procedure or cardioversion: No    Additional findings: None      PLAN:    Spoke with Yeni PATTON RN regarding INR result and instructed:     Warfarin Dosing Instructions: hold tonight then continue your current warfarin dose of 1.25 mg MF; 2.5 mg ROW    Instructed patient to follow up no later than: 1 week  Orders given to  Homecare nurse/facility to recheck    Education provided: Monitoring for bleeding signs and symptoms, Monitoring for clotting signs and symptoms and When to seek medical attention/emergency care      Yeni verbalizes understanding and agrees to warfarin dosing plan.    Instructed to call the Anticoagulation Clinic for any changes, questions or concerns. (#[phone])        Shanna Melissa RN      OBJECTIVE:  Recent labs: (last 7 days)     09/15/20   INR 4.4*         INR assessment SUPRA    Recheck INR In: 1 WEEK    INR Location Homecare INR      Anticoagulation Summary  As of 9/15/2020    INR goal:   2.0-3.0   TTR:   62.8 % (10.1 mo)   INR used for dosin.4! (9/15/2020)   Warfarin maintenance plan:   1.25 mg (2.5 mg x 0.5) every Mon, Fri; 2.5 mg (2.5 mg x 1) all other days   Full warfarin instructions:   9/15: Hold; Otherwise 1.25 mg every Mon, Fri; 2.5 mg all other days   Weekly warfarin total:   15 mg   Plan last modified:   Yue Martínez, RN (2020)   Next INR check:   2020   Priority:   Maintenance   Target end date:   Indefinite    Indications    Paroxysmal  atrial fibrillation (H) [I48.0]             Anticoagulation Episode Summary     INR check location:   Anticoagulation Clinic    Preferred lab:       Send INR reminders to:   PK WEAVER    Comments:   No Bandaid // 2.5 mg tab // patient started on Xarelto, transition to warfarin 11/6/19      Anticoagulation Care Providers     Provider Role Specialty Phone number    Violet Fenton MD Referring [facility] [phone]

## 2020-09-22 ENCOUNTER — TELEPHONE (OUTPATIENT)
Dept: FAMILY MEDICINE | Facility: CLINIC | Age: 80
End: 2020-09-22

## 2020-09-22 ENCOUNTER — ANTICOAGULATION THERAPY VISIT (OUTPATIENT)
Dept: FAMILY MEDICINE | Facility: CLINIC | Age: 80
End: 2020-09-22

## 2020-09-22 DIAGNOSIS — I48.0 PAROXYSMAL ATRIAL FIBRILLATION (H): ICD-10-CM

## 2020-09-22 DIAGNOSIS — Z53.9 DIAGNOSIS NOT YET DEFINED: Primary | ICD-10-CM

## 2020-09-22 LAB — INR PPP: 3.5 (ref 0.9–1.1)

## 2020-09-22 PROCEDURE — G0180 MD CERTIFICATION HHA PATIENT: HCPCS | Performed by: FAMILY MEDICINE

## 2020-09-22 NOTE — PROGRESS NOTES
ANTICOAGULATION MANAGEMENT     Patient Name:  Tomasa Fam  Date:  9/22/2020    ASSESSMENT /SUBJECTIVE:    Today's INR result of 3/5 is supratherapeutic. Goal INR of 2.0-3.0      Warfarin dose taken: Warfarin taken as instructed    Diet: No new diet changes affecting INR    Medication changes/ interactions: No new medications/supplements affecting INR    Previous INR: Supratherapeutic     S/S of bleeding or thromboembolism: No    New injury or illness: No    Upcoming surgery, procedure or cardioversion: No    Additional findings: finished abx on 9/8/20 and prednisone taper for one more week      PLAN:    Telephone call with home care nurse Yeni regarding INR result and instructed:     Warfarin Dosing Instructions: 1.25 mg tonight then resume current dosing of 1.25 mg MF; 2.5 mg ROW    Instructed patient to follow up no later than: 1 week  Set with home care nurse     Education provided: None required      Yeni verbalizes understanding and agrees to warfarin dosing plan.    Instructed to call the Anticoagulation Clinic for any changes, questions or concerns. (#[phone])        Shanna Melissa RN      OBJECTIVE:  Recent labs: (last 7 days)     09/22/20   INR 3.5*         INR assessment SUPRA    Recheck INR In: 1 WEEK    INR Location Homecare INR      Anticoagulation Summary  As of 9/22/2020    INR goal:   2.0-3.0   TTR:   61.4 % (10.3 mo)   INR used for dosing:   3.5! (9/22/2020)   Warfarin maintenance plan:   1.25 mg (2.5 mg x 0.5) every Mon, Fri; 2.5 mg (2.5 mg x 1) all other days   Full warfarin instructions:   9/22: 1.25 mg; Otherwise 1.25 mg every Mon, Fri; 2.5 mg all other days   Weekly warfarin total:   15 mg   Plan last modified:   Yue Martínez RN (9/8/2020)   Next INR check:   9/29/2020   Priority:   Maintenance   Target end date:   Indefinite    Indications    Paroxysmal atrial fibrillation (H) [I48.0]             Anticoagulation Episode Summary     INR check location:    Anticoagulation Clinic    Preferred lab:       Send INR reminders to:   PK WEAVER    Comments:   No Bandaid // 2.5 mg tab // patient started on Xarelto, transition to warfarin 11/6/19      Anticoagulation Care Providers     Provider Role Specialty Phone number    Violet Fenton MD Referring [facility] [phone]

## 2020-09-22 NOTE — TELEPHONE ENCOUNTER
Home Certification and Plan of Care has been faxed and copy sent to abstraction.  Kamini Bob, CMA

## 2020-09-29 ENCOUNTER — TELEPHONE (OUTPATIENT)
Dept: FAMILY MEDICINE | Facility: CLINIC | Age: 80
End: 2020-09-29

## 2020-09-29 ENCOUNTER — ANTICOAGULATION THERAPY VISIT (OUTPATIENT)
Dept: FAMILY MEDICINE | Facility: CLINIC | Age: 80
End: 2020-09-29

## 2020-09-29 DIAGNOSIS — I48.0 PAROXYSMAL ATRIAL FIBRILLATION (H): ICD-10-CM

## 2020-09-29 LAB — INR PPP: 3.8 (ref 0.9–1.1)

## 2020-09-29 NOTE — TELEPHONE ENCOUNTER
..[facility] utilizes an encounter to take the place of a direct phone call to your office. Please take a moment to review the below request. Please reply or route message to author of this encounter.  Message will act as a verbal OK of orders requested below. Thank you.    Requesting skilled nursing visit 1x/week for 1 and 1 PRN with plan for discharge next visit.    Thank you,    Annabelle Delaney RN  [phone]

## 2020-09-29 NOTE — PROGRESS NOTES
ANTICOAGULATION MANAGEMENT     Patient Name:  Tomasa Fam  Date:  9/29/2020    ASSESSMENT /SUBJECTIVE:    Today's INR result of 3.8 is supratherapeutic. Goal INR of 2.0-3.0      Warfarin dose taken: Warfarin taken as instructed    Diet: No new diet changes affecting INR    Medication changes/ interactions: prednisone dose tapered down this past week, she will be on this for a few weeks longer    Previous INR: Supratherapeutic     S/S of bleeding or thromboembolism: No    New injury or illness: No    Upcoming surgery, procedure or cardioversion: No    Additional findings: home care likely discharging next week      PLAN:    Telephone call with home care nurse Annabelle regarding INR result and instructed:     Warfarin Dosing Instructions: Change your warfarin dose to      2.5 mg every Mon, Wed, Fri; 1.25 mg all other days     (9% change)    Instructed patient to follow up no later than: 1 week  Orders given to  Homecare nurse/facility to recheck    Education provided: Target INR goal and significance of current INR result, Potential interaction between warfarin and prednisone and Monitoring for bleeding signs and symptoms      Annabelle verbalizes understanding and agrees to warfarin dosing plan.    Instructed to call the Anticoagulation Clinic for any changes, questions or concerns. (#[phone])        Myah Greenfield RN      OBJECTIVE:  Recent labs: (last 7 days)     09/29/20   INR 3.8*         No question data found.  Anticoagulation Summary  As of 9/29/2020    INR goal:   2.0-3.0   TTR:   60.0 % (10.6 mo)   INR used for dosing:   3.8! (9/29/2020)   Warfarin maintenance plan:   2.5 mg (2.5 mg x 1) every Mon, Wed, Fri; 1.25 mg (2.5 mg x 0.5) all other days   Full warfarin instructions:   2.5 mg every Mon, Wed, Fri; 1.25 mg all other days   Weekly warfarin total:   12.5 mg   Plan last modified:   Myah Greenfield, RN (9/29/2020)   Next INR check:   10/6/2020   Priority:   Maintenance   Target end date:    Indefinite    Indications    Paroxysmal atrial fibrillation (H) [I48.0]             Anticoagulation Episode Summary     INR check location:   Anticoagulation Clinic    Preferred lab:       Send INR reminders to:   PK VALLES    Comments:   No Bandaid // 2.5 mg tab // patient started on Xarelto, transition to warfarin 11/6/19      Anticoagulation Care Providers     Provider Role Specialty Phone number    Violet Fenton MD Referring [facility] [phone]

## 2020-09-30 ENCOUNTER — ANCILLARY PROCEDURE (OUTPATIENT)
Dept: CARDIOLOGY | Facility: CLINIC | Age: 80
End: 2020-09-30
Attending: INTERNAL MEDICINE
Payer: MEDICARE

## 2020-09-30 ENCOUNTER — TELEPHONE (OUTPATIENT)
Dept: FAMILY MEDICINE | Facility: CLINIC | Age: 80
End: 2020-09-30

## 2020-09-30 DIAGNOSIS — Z95.0 CARDIAC PACEMAKER IN SITU: ICD-10-CM

## 2020-09-30 DIAGNOSIS — Z98.890 HX OF ATRIOVENTRICULAR NODE ABLATION: ICD-10-CM

## 2020-09-30 PROCEDURE — 93281 PM DEVICE PROGR EVAL MULTI: CPT | Performed by: INTERNAL MEDICINE

## 2020-09-30 NOTE — TELEPHONE ENCOUNTER
[facility] now requests orders and shares plan of care/discharge summaries for some patients through [employer].  Please REPLY TO THIS MESSAGE OR ROUTE BACK TO THE AUTHOR in order to give authorization for orders when needed.  This is considered a verbal order, you will still receive a faxed copy of orders for signature.  Thank you for your assistance in improving collaboration for our patients.    ORDER request  PT add orders, 1w2, to progress HEP for balance, strength, endurance and safety. Pt now staying alone as dtr and grandson have returned home.   Thank you, Annabelle Escobedo, PT

## 2020-10-01 LAB
MDC_IDC_LEAD_IMPLANT_DT: NORMAL
MDC_IDC_LEAD_IMPLANT_DT: NORMAL
MDC_IDC_LEAD_LOCATION: NORMAL
MDC_IDC_LEAD_LOCATION: NORMAL
MDC_IDC_LEAD_LOCATION_DETAIL_1: NORMAL
MDC_IDC_LEAD_LOCATION_DETAIL_1: NORMAL
MDC_IDC_LEAD_MFG: NORMAL
MDC_IDC_LEAD_MFG: NORMAL
MDC_IDC_LEAD_MODEL: NORMAL
MDC_IDC_LEAD_MODEL: NORMAL
MDC_IDC_LEAD_POLARITY_TYPE: NORMAL
MDC_IDC_LEAD_SERIAL: NORMAL
MDC_IDC_LEAD_SERIAL: NORMAL
MDC_IDC_MSMT_BATTERY_REMAINING_LONGEVITY: 132 MO
MDC_IDC_MSMT_BATTERY_STATUS: NORMAL
MDC_IDC_MSMT_LEADCHNL_LV_IMPEDANCE_VALUE: 849 OHM
MDC_IDC_MSMT_LEADCHNL_LV_PACING_THRESHOLD_AMPLITUDE: 1.5 V
MDC_IDC_MSMT_LEADCHNL_LV_PACING_THRESHOLD_PULSEWIDTH: 0.4 MS
MDC_IDC_MSMT_LEADCHNL_LV_SENSING_INTR_AMPL: 12.7 MV
MDC_IDC_MSMT_LEADCHNL_RA_IMPEDANCE_VALUE: 3000 OHM
MDC_IDC_MSMT_LEADCHNL_RV_IMPEDANCE_VALUE: 796 OHM
MDC_IDC_MSMT_LEADCHNL_RV_PACING_THRESHOLD_AMPLITUDE: 0.4 V
MDC_IDC_MSMT_LEADCHNL_RV_PACING_THRESHOLD_PULSEWIDTH: 0.4 MS
MDC_IDC_MSMT_LEADCHNL_RV_SENSING_INTR_AMPL: 16 MV
MDC_IDC_PG_IMPLANT_DTM: NORMAL
MDC_IDC_PG_MFG: NORMAL
MDC_IDC_PG_MODEL: NORMAL
MDC_IDC_PG_SERIAL: NORMAL
MDC_IDC_PG_TYPE: NORMAL
MDC_IDC_SESS_CLINIC_NAME: NORMAL
MDC_IDC_SESS_DTM: NORMAL
MDC_IDC_SESS_TYPE: NORMAL
MDC_IDC_SET_BRADY_AT_MODE_SWITCH_MODE: NORMAL
MDC_IDC_SET_BRADY_LOWRATE: 60 {BEATS}/MIN
MDC_IDC_SET_BRADY_MAX_SENSOR_RATE: 130 {BEATS}/MIN
MDC_IDC_SET_BRADY_MODE: NORMAL
MDC_IDC_SET_BRADY_PAV_DELAY_HIGH: 180 MS
MDC_IDC_SET_BRADY_PAV_DELAY_LOW: 180 MS
MDC_IDC_SET_BRADY_SAV_DELAY_LOW: 120 MS
MDC_IDC_SET_CRT_LVRV_DELAY: 30 MS
MDC_IDC_SET_CRT_PACED_CHAMBERS: NORMAL
MDC_IDC_SET_LEADCHNL_LV_PACING_AMPLITUDE: 2.5 V
MDC_IDC_SET_LEADCHNL_LV_PACING_CAPTURE_MODE: NORMAL
MDC_IDC_SET_LEADCHNL_LV_PACING_POLARITY: NORMAL
MDC_IDC_SET_LEADCHNL_LV_PACING_PULSEWIDTH: 0.4 MS
MDC_IDC_SET_LEADCHNL_LV_SENSING_ADAPTATION_MODE: NORMAL
MDC_IDC_SET_LEADCHNL_LV_SENSING_POLARITY: NORMAL
MDC_IDC_SET_LEADCHNL_LV_SENSING_SENSITIVITY: 2.5 MV
MDC_IDC_SET_LEADCHNL_RA_PACING_CAPTURE_MODE: NORMAL
MDC_IDC_SET_LEADCHNL_RA_PACING_POLARITY: NORMAL
MDC_IDC_SET_LEADCHNL_RA_SENSING_ADAPTATION_MODE: NORMAL
MDC_IDC_SET_LEADCHNL_RA_SENSING_POLARITY: NORMAL
MDC_IDC_SET_LEADCHNL_RA_SENSING_SENSITIVITY: 0.5 MV
MDC_IDC_SET_LEADCHNL_RV_PACING_AMPLITUDE: 2 V
MDC_IDC_SET_LEADCHNL_RV_PACING_CAPTURE_MODE: NORMAL
MDC_IDC_SET_LEADCHNL_RV_PACING_POLARITY: NORMAL
MDC_IDC_SET_LEADCHNL_RV_PACING_PULSEWIDTH: 0.4 MS
MDC_IDC_SET_LEADCHNL_RV_SENSING_ADAPTATION_MODE: NORMAL
MDC_IDC_SET_LEADCHNL_RV_SENSING_POLARITY: NORMAL
MDC_IDC_SET_LEADCHNL_RV_SENSING_SENSITIVITY: 2.5 MV
MDC_IDC_SET_ZONE_DETECTION_INTERVAL: 375 MS
MDC_IDC_SET_ZONE_TYPE: NORMAL
MDC_IDC_SET_ZONE_VENDOR_TYPE: NORMAL
MDC_IDC_STAT_BRADY_RV_PERCENT_PACED: 100 %
MDC_IDC_STAT_CRT_LV_PERCENT_PACED: 100 %
MDC_IDC_STAT_EPISODE_RECENT_COUNT: 0
MDC_IDC_STAT_EPISODE_RECENT_COUNT_DTM_END: NORMAL
MDC_IDC_STAT_EPISODE_RECENT_COUNT_DTM_START: NORMAL
MDC_IDC_STAT_EPISODE_TOTAL_COUNT: 1
MDC_IDC_STAT_EPISODE_TOTAL_COUNT_DTM_END: NORMAL
MDC_IDC_STAT_EPISODE_TYPE: NORMAL
MDC_IDC_STAT_EPISODE_TYPE: NORMAL
MDC_IDC_STAT_EPISODE_VENDOR_TYPE: NORMAL
MDC_IDC_STAT_EPISODE_VENDOR_TYPE: NORMAL

## 2020-10-06 ENCOUNTER — TELEPHONE (OUTPATIENT)
Dept: CARDIOLOGY | Facility: CLINIC | Age: 80
End: 2020-10-06

## 2020-10-06 ENCOUNTER — ANTICOAGULATION THERAPY VISIT (OUTPATIENT)
Dept: NURSING | Facility: CLINIC | Age: 80
End: 2020-10-06

## 2020-10-06 DIAGNOSIS — I48.0 PAROXYSMAL ATRIAL FIBRILLATION (H): ICD-10-CM

## 2020-10-06 DIAGNOSIS — Z95.0 CARDIAC PACEMAKER IN SITU: ICD-10-CM

## 2020-10-06 DIAGNOSIS — Z98.890 HX OF ATRIOVENTRICULAR NODE ABLATION: Primary | ICD-10-CM

## 2020-10-06 LAB — INR PPP: 2.5 (ref 0.9–1.1)

## 2020-10-06 NOTE — TELEPHONE ENCOUNTER
Pt left . She said last week she was in the clinic for a PPM check and her rate was changed from 70 to 60. Now she feels short of breath. She wonders if it should be changed back to 70.     Chart reviewed. We saw pt on 9/30/2020 in [place] and changed LRL from 70 to 60 per [facility]NA protocol. We also increased her Rate Response Accelerometer Factor from 10 to 12 due to activity intolerance and flat histogram.     Called pt back. She said she feels worse with the LRL at 60, she is short of breath with any activity. She will not come to [place]. The first official opening in [place] is 12/16/2020. Told pt we can squeeze her in at [place] at noon tomorrow. She said she has another appointment tomorrow at 1:00, she has to leave for that appointment at 12:30. I told her this is a very quick setting change that will not take longer than 10 minutes. She will come at noon tomorrow to [place].

## 2020-10-06 NOTE — PROGRESS NOTES
ANTICOAGULATION MANAGEMENT     Patient Name:  Tomasa Fam  Date:  10/6/2020    ASSESSMENT /SUBJECTIVE:    Today's INR result of 2.5 is therapeutic. Goal INR of 2.0-3.0      Warfarin dose taken: Warfarin taken as instructed    Diet: Increased greens/vitamin K in diet which may be affecting INR; ongoing change    Medication changes/ interactions: No new medications/supplements affecting INR    Previous INR: Supratherapeutic     S/S of bleeding or thromboembolism: No    New injury or illness: No    Upcoming surgery, procedure or cardioversion: No    Additional findings: Patient is being discharged from home care      PLAN:    Telephone call with home care nurse Annabelle regarding INR result and instructed:     Warfarin Dosing Instructions: Continue your current warfarin dose 2.5 mg Mon/Wed/Fri and 1.25 mg ROW    Instructed patient to follow up no later than: 1 week  Lab visit scheduled    Education provided: Target INR goal and significance of current INR result      Annabelle verbalizes understanding and agrees to warfarin dosing plan.    Instructed to call the Anticoagulation Clinic for any changes, questions or concerns. (#[phone])        Tamra Bhakta RN      OBJECTIVE:  Recent labs: (last 7 days)     10/06/20  1225   INR 2.5*         No question data found.  Anticoagulation Summary  As of 10/6/2020    INR goal:  2.0-3.0   TTR:  59.5 % (10.8 mo)   INR used for dosin.5 (10/6/2020)   Warfarin maintenance plan:  2.5 mg (2.5 mg x 1) every Mon, Wed, Fri; 1.25 mg (2.5 mg x 0.5) all other days   Full warfarin instructions:  2.5 mg every Mon, Wed, Fri; 1.25 mg all other days   Weekly warfarin total:  12.5 mg   Plan last modified:  Myah Greenfield, RN (2020)   Next INR check:     Priority:  Maintenance   Target end date:  Indefinite    Indications    Paroxysmal atrial fibrillation (H) [I48.0]             Anticoagulation Episode Summary     INR check location:  Anticoagulation Clinic    Preferred  lab:      Send INR reminders to:  PK VALLES    Comments:  No Bandaid // 2.5 mg tab // patient started on Xarelto, transition to warfarin 11/6/19      Anticoagulation Care Providers     Provider Role Specialty Phone number    Violet Fenton MD Referring [facility] [phone]

## 2020-10-07 ENCOUNTER — ANCILLARY PROCEDURE (OUTPATIENT)
Dept: CARDIOLOGY | Facility: CLINIC | Age: 80
End: 2020-10-07
Attending: INTERNAL MEDICINE
Payer: MEDICARE

## 2020-10-07 ENCOUNTER — TRANSFERRED RECORDS (OUTPATIENT)
Dept: HEALTH INFORMATION MANAGEMENT | Facility: CLINIC | Age: 80
End: 2020-10-07

## 2020-10-07 DIAGNOSIS — Z98.890 HX OF ATRIOVENTRICULAR NODE ABLATION: ICD-10-CM

## 2020-10-07 DIAGNOSIS — Z95.0 CARDIAC PACEMAKER IN SITU: ICD-10-CM

## 2020-10-13 ENCOUNTER — ANTICOAGULATION THERAPY VISIT (OUTPATIENT)
Dept: NURSING | Facility: CLINIC | Age: 80
End: 2020-10-13

## 2020-10-13 DIAGNOSIS — I48.0 PAROXYSMAL ATRIAL FIBRILLATION (H): ICD-10-CM

## 2020-10-13 LAB
CAPILLARY BLOOD COLLECTION: NORMAL
INR PPP: 2.6 (ref 0.86–1.14)

## 2020-10-13 PROCEDURE — 85610 PROTHROMBIN TIME: CPT | Performed by: FAMILY MEDICINE

## 2020-10-13 PROCEDURE — 99207 PR NO CHARGE NURSE ONLY: CPT

## 2020-10-13 PROCEDURE — 36416 COLLJ CAPILLARY BLOOD SPEC: CPT | Performed by: FAMILY MEDICINE

## 2020-10-13 NOTE — PROGRESS NOTES
ANTICOAGULATION FOLLOW-UP CLINIC VISIT    Patient Name:  Tomasa Fam  Date:  10/13/2020  Contact Type:  Telephone    SUBJECTIVE:  Patient Findings     Comments:  Called patient to discuss today's  INR results: Patient was unable to talk, she was at the bank. Quickly advised patient to take regular 1.25 mg dose tonight and if we don't talk tonight, we will follow up in the morning. Patient stated understanding and is in agreement with plan.    Called patient the next day to continue discussion. She states she took a whole pill yesterday, so advised she take a 1/2 pill today, otherwise, continue with maintenance dosing of 2.5 mg M, W, F, 1.25 mg ROW. Patient repeated back dosing instructions correctly. Scheduled next INR in 2 weeks. The patient was assessed for diet, medication, and activity level changes, missed or extra doses, bruising or bleeding, with no problem findings.  Yue NICOLAS RN  Anticoagulation Clinic  [place]            Clinical Outcomes     Negatives:  Major bleeding event, Thromboembolic event, Anticoagulation-related hospital admission, Anticoagulation-related ED visit, Anticoagulation-related fatality    Comments:  Called patient to discuss today's  INR results: Patient was unable to talk, she was at the bank. Quickly advised patient to take regular 1.25 mg dose tonight and if we don't talk tonight, we will follow up in the morning. Patient stated understanding and is in agreement with plan.    Called patient the next day to continue discussion. She states she took a whole pill yesterday, so advised she take a 1/2 pill today, otherwise, continue with maintenance dosing of 2.5 mg M, W, F, 1.25 mg ROW. Patient repeated back dosing instructions correctly. Scheduled next INR in 2 weeks. The patient was assessed for diet, medication, and activity level changes, missed or extra doses, bruising or bleeding, with no problem findings.  Yue NICOLAS RN  Anticoagulation Clinic  [place]                OBJECTIVE    Recent labs: (last 7 days)     10/13/20  1552   INR 2.60*       ASSESSMENT / PLAN  INR assessment THER    Recheck INR In: 2 WEEKS    INR Location Clinic      Anticoagulation Summary  As of 10/13/2020    INR goal:  2.0-3.0   TTR:  60.4 % (11.1 mo)   INR used for dosin.60 (10/13/2020)   Warfarin maintenance plan:  2.5 mg (2.5 mg x 1) every Mon, Wed, Fri; 1.25 mg (2.5 mg x 0.5) all other days   Full warfarin instructions:  10/13: 2.5 mg; 10/14: 1.25 mg; Otherwise 2.5 mg every Mon, Wed, Fri; 1.25 mg all other days   Weekly warfarin total:  12.5 mg   Plan last modified:  Myah Greenfield RN (2020)   Next INR check:  10/27/2020   Priority:  Maintenance   Target end date:  Indefinite    Indications    Paroxysmal atrial fibrillation (H) [I48.0]             Anticoagulation Episode Summary     INR check location:  Anticoagulation Clinic    Preferred lab:      Send INR reminders to:  PK WEAVER    Comments:  No Bandaid // 2.5 mg tab // patient started on Xarelto, transition to warfarin 19      Anticoagulation Care Providers     Provider Role Specialty Phone number    Violet Fenton MD Referring [facility] [phone]            See the Encounter Report to view Anticoagulation Flowsheet and Dosing Calendar (Go to Encounters tab in chart review, and find the Anticoagulation Therapy Visit)    Yue Martínez RN

## 2020-10-14 LAB
MDC_IDC_LEAD_IMPLANT_DT: NORMAL
MDC_IDC_LEAD_IMPLANT_DT: NORMAL
MDC_IDC_LEAD_LOCATION: NORMAL
MDC_IDC_LEAD_LOCATION: NORMAL
MDC_IDC_LEAD_LOCATION_DETAIL_1: NORMAL
MDC_IDC_LEAD_LOCATION_DETAIL_1: NORMAL
MDC_IDC_LEAD_MFG: NORMAL
MDC_IDC_LEAD_MFG: NORMAL
MDC_IDC_LEAD_MODEL: NORMAL
MDC_IDC_LEAD_MODEL: NORMAL
MDC_IDC_LEAD_POLARITY_TYPE: NORMAL
MDC_IDC_LEAD_SERIAL: NORMAL
MDC_IDC_LEAD_SERIAL: NORMAL
MDC_IDC_MSMT_BATTERY_STATUS: NORMAL
MDC_IDC_PG_IMPLANT_DTM: NORMAL
MDC_IDC_PG_MFG: NORMAL
MDC_IDC_PG_MODEL: NORMAL
MDC_IDC_PG_SERIAL: NORMAL
MDC_IDC_PG_TYPE: NORMAL
MDC_IDC_SESS_CLINIC_NAME: NORMAL
MDC_IDC_SESS_DTM: NORMAL
MDC_IDC_SESS_TYPE: NORMAL
MDC_IDC_SET_BRADY_AT_MODE_SWITCH_MODE: NORMAL
MDC_IDC_SET_BRADY_LOWRATE: 70 {BEATS}/MIN
MDC_IDC_SET_BRADY_MAX_SENSOR_RATE: 130 {BEATS}/MIN
MDC_IDC_SET_BRADY_MODE: NORMAL
MDC_IDC_SET_BRADY_PAV_DELAY_HIGH: 180 MS
MDC_IDC_SET_BRADY_PAV_DELAY_LOW: 180 MS
MDC_IDC_SET_BRADY_SAV_DELAY_LOW: 120 MS
MDC_IDC_SET_CRT_LVRV_DELAY: 30 MS
MDC_IDC_SET_CRT_PACED_CHAMBERS: NORMAL
MDC_IDC_SET_LEADCHNL_LV_PACING_AMPLITUDE: 2.5 V
MDC_IDC_SET_LEADCHNL_LV_PACING_CAPTURE_MODE: NORMAL
MDC_IDC_SET_LEADCHNL_LV_PACING_POLARITY: NORMAL
MDC_IDC_SET_LEADCHNL_LV_PACING_PULSEWIDTH: 0.4 MS
MDC_IDC_SET_LEADCHNL_LV_SENSING_ADAPTATION_MODE: NORMAL
MDC_IDC_SET_LEADCHNL_LV_SENSING_POLARITY: NORMAL
MDC_IDC_SET_LEADCHNL_LV_SENSING_SENSITIVITY: 2.5 MV
MDC_IDC_SET_LEADCHNL_RA_PACING_CAPTURE_MODE: NORMAL
MDC_IDC_SET_LEADCHNL_RA_PACING_POLARITY: NORMAL
MDC_IDC_SET_LEADCHNL_RA_SENSING_ADAPTATION_MODE: NORMAL
MDC_IDC_SET_LEADCHNL_RA_SENSING_POLARITY: NORMAL
MDC_IDC_SET_LEADCHNL_RA_SENSING_SENSITIVITY: 0.5 MV
MDC_IDC_SET_LEADCHNL_RV_PACING_AMPLITUDE: 2 V
MDC_IDC_SET_LEADCHNL_RV_PACING_CAPTURE_MODE: NORMAL
MDC_IDC_SET_LEADCHNL_RV_PACING_POLARITY: NORMAL
MDC_IDC_SET_LEADCHNL_RV_PACING_PULSEWIDTH: 0.4 MS
MDC_IDC_SET_LEADCHNL_RV_SENSING_ADAPTATION_MODE: NORMAL
MDC_IDC_SET_LEADCHNL_RV_SENSING_POLARITY: NORMAL
MDC_IDC_SET_LEADCHNL_RV_SENSING_SENSITIVITY: 2.5 MV
MDC_IDC_SET_ZONE_DETECTION_INTERVAL: 375 MS
MDC_IDC_SET_ZONE_TYPE: NORMAL
MDC_IDC_SET_ZONE_VENDOR_TYPE: NORMAL
MDC_IDC_STAT_EPISODE_RECENT_COUNT: 0
MDC_IDC_STAT_EPISODE_RECENT_COUNT_DTM_END: NORMAL
MDC_IDC_STAT_EPISODE_RECENT_COUNT_DTM_START: NORMAL
MDC_IDC_STAT_EPISODE_TOTAL_COUNT: 1
MDC_IDC_STAT_EPISODE_TOTAL_COUNT_DTM_END: NORMAL
MDC_IDC_STAT_EPISODE_TYPE: NORMAL
MDC_IDC_STAT_EPISODE_TYPE: NORMAL
MDC_IDC_STAT_EPISODE_VENDOR_TYPE: NORMAL
MDC_IDC_STAT_EPISODE_VENDOR_TYPE: NORMAL

## 2020-10-19 DIAGNOSIS — R53.83 DECREASED ENERGY: ICD-10-CM

## 2020-10-19 DIAGNOSIS — F43.9 STRESS: ICD-10-CM

## 2020-10-20 RX ORDER — FLUOXETINE 10 MG/1
CAPSULE ORAL
Qty: 90 CAPSULE | Refills: 1 | OUTPATIENT
Start: 2020-10-20

## 2020-10-27 ENCOUNTER — ANTICOAGULATION THERAPY VISIT (OUTPATIENT)
Dept: NURSING | Facility: CLINIC | Age: 80
End: 2020-10-27

## 2020-10-27 ENCOUNTER — ALLIED HEALTH/NURSE VISIT (OUTPATIENT)
Dept: NURSING | Facility: CLINIC | Age: 80
End: 2020-10-27
Payer: MEDICARE

## 2020-10-27 DIAGNOSIS — R53.83 DECREASED ENERGY: ICD-10-CM

## 2020-10-27 DIAGNOSIS — I48.0 PAROXYSMAL ATRIAL FIBRILLATION (H): ICD-10-CM

## 2020-10-27 DIAGNOSIS — Z23 NEED FOR PROPHYLACTIC VACCINATION AND INOCULATION AGAINST INFLUENZA: Primary | ICD-10-CM

## 2020-10-27 DIAGNOSIS — F43.9 STRESS: ICD-10-CM

## 2020-10-27 LAB
CAPILLARY BLOOD COLLECTION: NORMAL
INR PPP: 1.9 (ref 0.86–1.14)

## 2020-10-27 PROCEDURE — 90662 IIV NO PRSV INCREASED AG IM: CPT

## 2020-10-27 PROCEDURE — 99207 PR NO CHARGE NURSE ONLY: CPT

## 2020-10-27 PROCEDURE — 85610 PROTHROMBIN TIME: CPT | Performed by: FAMILY MEDICINE

## 2020-10-27 PROCEDURE — 36416 COLLJ CAPILLARY BLOOD SPEC: CPT | Performed by: FAMILY MEDICINE

## 2020-10-27 PROCEDURE — G0008 ADMIN INFLUENZA VIRUS VAC: HCPCS

## 2020-10-27 NOTE — PROGRESS NOTES
Patient also requesting refill of Prozac 20 (dose was increased at previous OV 9/8).  Notes she has been taking 20 mg, hasn't seen too much of a difference yet but would like to continue at same dose.  Has been out for a couple weeks since refill request at pharmacy was denied, talked to PCP today in clinic.  Routing request to her to fill.    Has upcoming appt on 11/19 with PCP to discuss med refills.    Evelyn Forrester CMA ([facility])

## 2020-10-27 NOTE — PROGRESS NOTES
ANTICOAGULATION FOLLOW-UP CLINIC VISIT    Patient Name:  Tomasa Fam  Date:  10/27/2020  Contact Type:  Telephone    SUBJECTIVE:  Patient Findings     Positives:  Change in diet/appetite    Comments:  Called patient to discuss today's INR results: Patient had soup last night with kale, she has a hard time controlling her vit k/green veggie intake. She has been on 20 mg prednisone daily now for the last 2 weeks. In another 3-4 weeks she will drop to 15 mg daily. The patient was assessed for activity level changes, missed or extra doses, bruising or bleeding, with no problem findings. So whether this 1.9 is a result of increased vit K or prednisone dose change remains to be seen. For now, since her last 2 INRs were therapeutic, patient will remain on same maintenance dosing (reviewed) and recheck INR again in another 2 weeks, attempting to keep diet as stable as possible.   Yue NICOLAS RN  Anticoagulation Clinic  [place]            Clinical Outcomes     Negatives:  Major bleeding event, Thromboembolic event, Anticoagulation-related hospital admission, Anticoagulation-related ED visit, Anticoagulation-related fatality    Comments:  Called patient to discuss today's INR results: Patient had soup last night with kale, she has a hard time controlling her vit k/green veggie intake. She has been on 20 mg prednisone daily now for the last 2 weeks. In another 3-4 weeks she will drop to 15 mg daily. The patient was assessed for activity level changes, missed or extra doses, bruising or bleeding, with no problem findings. So whether this 1.9 is a result of increased vit K or prednisone dose change remains to be seen. For now, since her last 2 INRs were therapeutic, patient will remain on same maintenance dosing (reviewed) and recheck INR again in another 2 weeks, attempting to keep diet as stable as possible.   Yue NICOLAS RN  Anticoagulation Clinic  [place]               OBJECTIVE    Recent labs: (last 7 days)      10/27/20  1313   INR 1.90*       ASSESSMENT / PLAN  INR assessment SUB    Recheck INR In: 2 WEEKS    INR Location Clinic      Anticoagulation Summary  As of 10/27/2020    INR goal:  2.0-3.0   TTR:  61.4 % (11.5 mo)   INR used for dosin.90 (10/27/2020)   Warfarin maintenance plan:  2.5 mg (2.5 mg x 1) every Mon, Wed, Fri; 1.25 mg (2.5 mg x 0.5) all other days   Full warfarin instructions:  2.5 mg every Mon, Wed, Fri; 1.25 mg all other days   Weekly warfarin total:  12.5 mg   No change documented:  Yue Martínez RN   Plan last modified:  Myah Greenfield RN (2020)   Next INR check:  11/10/2020   Priority:  Maintenance   Target end date:  Indefinite    Indications    Paroxysmal atrial fibrillation (H) [I48.0]             Anticoagulation Episode Summary     INR check location:  Anticoagulation Clinic    Preferred lab:      Send INR reminders to:  PK WEAVER    Comments:  No Bandaid // 2.5 mg tab // patient started on Xarelto, transition to warfarin 19      Anticoagulation Care Providers     Provider Role Specialty Phone number    Violet Fenton MD Referring [facility][1] [phone]            See the Encounter Report to view Anticoagulation Flowsheet and Dosing Calendar (Go to Encounters tab in chart review, and find the Anticoagulation Therapy Visit)    Yue Martínez, RN

## 2020-11-10 ENCOUNTER — ANTICOAGULATION THERAPY VISIT (OUTPATIENT)
Dept: NURSING | Facility: CLINIC | Age: 80
End: 2020-11-10

## 2020-11-10 DIAGNOSIS — Z79.01 LONG TERM CURRENT USE OF ANTICOAGULANTS WITH INR GOAL OF 2.0-3.0: Primary | ICD-10-CM

## 2020-11-10 DIAGNOSIS — Z79.01 LONG TERM CURRENT USE OF ANTICOAGULANTS WITH INR GOAL OF 2.0-3.0: ICD-10-CM

## 2020-11-10 DIAGNOSIS — I48.0 PAROXYSMAL ATRIAL FIBRILLATION (H): ICD-10-CM

## 2020-11-10 LAB
CAPILLARY BLOOD COLLECTION: NORMAL
INR PPP: 1.9 (ref 0.86–1.14)

## 2020-11-10 PROCEDURE — 99207 PR NO CHARGE NURSE ONLY: CPT

## 2020-11-10 PROCEDURE — 36416 COLLJ CAPILLARY BLOOD SPEC: CPT | Performed by: FAMILY MEDICINE

## 2020-11-10 PROCEDURE — 85610 PROTHROMBIN TIME: CPT | Performed by: FAMILY MEDICINE

## 2020-11-10 RX ORDER — WARFARIN SODIUM 1 MG/1
TABLET ORAL
Qty: 60 TABLET | Refills: 0 | Status: SHIPPED | OUTPATIENT
Start: 2020-11-10 | End: 2020-11-11

## 2020-11-10 NOTE — PROGRESS NOTES
ANTICOAGULATION FOLLOW-UP CLINIC VISIT    Patient Name:  Tomasa Fam  Date:  11/10/2020  Contact Type:  Telephone    SUBJECTIVE:  Patient Findings     Positives:  Signs/symptoms of bleeding, Change in medications (tapering prednisone), Bruising, Other complaints (fell today)    Comments:  Patient recently fell today outside, she wasn't hurt but she couldn't get up. She pressed her Life Alert to help her get up. She states her legs are just so weak. Her prednisone is now down to 17.5 mg daily. Otherwise, she feels her other meds and diet of green veggies has stayed about the same. She says she bumps her legs and they bleed - her legs are puffy and the skin is so thin. Her arms and legs are full of bruises.   Before increasing warfarin dosing, consulted with [facility] Nan Diaz since patient is having issues with bruising/bleeding: Plan as outlined looks good, notify providers that she has upcoming appointments with about the easy bruising/bleeding so they can lay eyes on legs and arms. Patient cannot  new prescription until Thursday, so new maintenance dosing schedule will start then. Recheck INR in 2 weeks.  Called patient back. Patient stated understanding and is in agreement with plan.  Yue NICOLAS RN  Anticoagulation Clinic  [place]          Clinical Outcomes     Negatives:  Major bleeding event, Thromboembolic event, Anticoagulation-related hospital admission, Anticoagulation-related ED visit, Anticoagulation-related fatality    Comments:  Patient recently fell today outside, she wasn't hurt but she couldn't get up. She pressed her Life Alert to help her get up. She states her legs are just so weak. Her prednisone is now down to 17.5 mg daily. Otherwise, she feels her other meds and diet of green veggies has stayed about the same. She says she bumps her legs and they bleed - her legs are puffy and the skin is so thin. Her arms and legs are full of bruises.   Before increasing  warfarin dosing, consulted with Nan Tom since patient is having issues with bruising/bleeding: Plan as outlined looks good, notify providers that she has upcoming appointments with about the easy bruising/bleeding so they can lay eyes on legs and arms. Patient cannot  new prescription until Thursday, so new maintenance dosing schedule will start then. Recheck INR in 2 weeks.  Called patient back. Patient stated understanding and is in agreement with plan.  Yue NICOLAS RN  Anticoagulation Clinic  [place]             OBJECTIVE    Recent labs: (last 7 days)     11/10/20  1306   INR 1.90*       ASSESSMENT / PLAN  INR assessment SUB    Recheck INR In: 2 WEEKS    INR Location Clinic      Anticoagulation Summary  As of 11/10/2020    INR goal:  2.0-3.0   TTR:  59.0 % (1 y)   INR used for dosin.90 (11/10/2020)   Warfarin maintenance plan:  1 mg (1 mg x 1) every Sun; 2 mg (1 mg x 2) all other days   Full warfarin instructions:  : 2.5 mg; Otherwise 1 mg every Sun; 2 mg all other days   Weekly warfarin total:  13 mg   Plan last modified:  Yue Martínez RN (11/10/2020)   Next INR check:  2020   Priority:  Maintenance   Target end date:  Indefinite    Indications    Paroxysmal atrial fibrillation (H) [I48.0]             Anticoagulation Episode Summary     INR check location:  Anticoagulation Clinic    Preferred lab:      Send INR reminders to:  PK WEAVER    Comments:  No Bandaid // 2.5 mg tab // patient started on Xarelto, transition to warfarin 19      Anticoagulation Care Providers     Provider Role Specialty Phone number    Violet Fenton MD Referring [facility] [phone]            See the Encounter Report to view Anticoagulation Flowsheet and Dosing Calendar (Go to Encounters tab in chart review, and find the Anticoagulation Therapy Visit)    Dosage adjustment made based on physician directed care plan.    Yue Martínez RN

## 2020-11-11 RX ORDER — WARFARIN SODIUM 1 MG/1
TABLET ORAL
Qty: 180 TABLET | Refills: 1 | Status: SHIPPED | OUTPATIENT
Start: 2020-11-11 | End: 2020-11-25 | Stop reason: ALTCHOICE

## 2020-11-11 NOTE — TELEPHONE ENCOUNTER
Component      Latest Ref Rng & Units 10/6/2020 10/13/2020 10/27/2020 11/10/2020   INR      0.86 - 1.14 2.5 (A) 2.60 (H) 1.90 (H) 1.90 (H)         Current warfarin dose per last [facility] note: 11/11: 2.5 mg; Otherwise 1 mg every Sun; 2 mg all other days    Last OV with responsible provider's group: 9/8/20    Patient is up to date.   Refilled per protocol.

## 2020-11-12 ENCOUNTER — OFFICE VISIT (OUTPATIENT)
Dept: CARDIOLOGY | Facility: CLINIC | Age: 80
End: 2020-11-12
Attending: FAMILY MEDICINE
Payer: MEDICARE

## 2020-11-12 VITALS
SYSTOLIC BLOOD PRESSURE: 138 MMHG | DIASTOLIC BLOOD PRESSURE: 64 MMHG | BODY MASS INDEX: 25.68 KG/M2 | HEART RATE: 72 BPM | HEIGHT: 63 IN | WEIGHT: 144.9 LBS

## 2020-11-12 DIAGNOSIS — R07.2 PRECORDIAL PAIN: ICD-10-CM

## 2020-11-12 DIAGNOSIS — R06.09 DYSPNEA ON EXERTION: ICD-10-CM

## 2020-11-12 DIAGNOSIS — Z98.890 HX OF ATRIOVENTRICULAR NODE ABLATION: ICD-10-CM

## 2020-11-12 DIAGNOSIS — I42.9 SECONDARY CARDIOMYOPATHY (H): ICD-10-CM

## 2020-11-12 DIAGNOSIS — R79.89 TROPONIN LEVEL ELEVATED: ICD-10-CM

## 2020-11-12 DIAGNOSIS — Z95.0 CARDIAC PACEMAKER IN SITU: Primary | ICD-10-CM

## 2020-11-12 DIAGNOSIS — I48.19 PERSISTENT ATRIAL FIBRILLATION (H): ICD-10-CM

## 2020-11-12 PROCEDURE — 99214 OFFICE O/P EST MOD 30 MIN: CPT | Performed by: INTERNAL MEDICINE

## 2020-11-12 SDOH — HEALTH STABILITY: MENTAL HEALTH: HOW OFTEN DO YOU HAVE 6 OR MORE DRINKS ON ONE OCCASION?: NOT ASKED

## 2020-11-12 SDOH — HEALTH STABILITY: MENTAL HEALTH: HOW MANY STANDARD DRINKS CONTAINING ALCOHOL DO YOU HAVE ON A TYPICAL DAY?: NOT ASKED

## 2020-11-12 SDOH — HEALTH STABILITY: MENTAL HEALTH: HOW OFTEN DO YOU HAVE A DRINK CONTAINING ALCOHOL?: NOT ASKED

## 2020-11-12 ASSESSMENT — MIFFLIN-ST. JEOR: SCORE: 1091.39

## 2020-11-12 NOTE — LETTER
11/12/2020    [place]  [address] [place] [address]    RE: Tomasa Fam       Dear Colleague,    I had the pleasure of seeing Tomasa Fam in the [facility].    HPI and Plan:   See dictation    No orders of the defined types were placed in this encounter.      No orders of the defined types were placed in this encounter.      There are no discontinued medications.      Encounter Diagnoses   Name Primary?     Cardiac pacemaker in situ Yes     Persistent atrial fibrillation (H)      Secondary cardiomyopathy (H)      Hx of atrioventricular node ablation      Dyspnea on exertion      Precordial pain      Troponin level elevated        CURRENT MEDICATIONS:  Current Outpatient Medications   Medication Sig Dispense Refill     acetaminophen (TYLENOL) 500 MG tablet Take 1,000 mg by mouth every 8 hours as needed for mild pain       amLODIPine (NORVASC) 2.5 MG tablet Take 1 tablet (2.5 mg) by mouth daily 180 tablet 1     B Complex Vitamins (VITAMIN  B COMPLEX) tablet Take 1 tablet by mouth daily.       calcium carbonate (TUMS) 500 MG chewable tablet Take 2 chew tab by mouth 2 times daily as needed for heartburn       calcium citrate-vitamin D (CALCIUM CITRATE + D) 315-250 MG-UNIT TABS per tablet Take 2 tablets by mouth daily       ezetimibe (ZETIA) 10 MG tablet TAKE 1 TABLET(10 MG) BY MOUTH DAILY 90 tablet 0     FLUoxetine (PROZAC) 20 MG capsule Take 1 capsule (20 mg) by mouth daily 90 capsule 0     furosemide (LASIX) 40 MG tablet Take 80 mg by mouth daily        metoprolol tartrate (LOPRESSOR) 100 MG tablet Take 1 tablet (100 mg) by mouth 2 times daily 60 tablet 6     omeprazole (PRILOSEC) 20 MG DR capsule Take 20 mg by mouth daily as needed       polyethylene glycol (MIRALAX/GLYCOLAX) powder Take 1 capful by mouth daily as needed for constipation       potassium chloride ER (KLOR-CON M) 20 MEQ CR tablet Take 1 tablet (20 mEq) by mouth 2 times daily 60  tablet 6     predniSONE (DELTASONE) 10 MG tablet Take 17.5 mg by mouth daily        warfarin ANTICOAGULANT (COUMADIN) 1 MG tablet Take 1 to 2mg (1 to 2 tabs) by mouth daily OR AS DIRECTED.  Adjust dose based on INR. 180 tablet 1     warfarin ANTICOAGULANT (COUMADIN) 2.5 MG tablet Transitioning to 1 mg tablet size starting 11/12/20.       calcium-vitamin D (CALTRATE) 600-400 MG-UNIT per tablet Take 4 tablets by mouth daily        cholecalciferol (VITAMIN D3) 25 mcg (1000 units) capsule Take 1 capsule by mouth daily       omega 3 1000 MG CAPS Take 1 g by mouth daily 90 capsule        ALLERGIES     Allergies   Allergen Reactions     No Known Drug Allergies      Tape [Adhesive Tape]      Sensitive to plastic tape on upper part of body--takes skin off       PAST MEDICAL HISTORY:  Past Medical History:   Diagnosis Date     Arthritis     hands, knees     Breast cancer (H) jan. 2012     left mastectomy followed by right;  Dr. Luong     Chronic airway obstruction, not elsewhere classified 2006    very mild COPD - small cough     Concussion 3/13/2013     Problem list name updated by automated process. Provider to review and confirm [employer] Update utility     Cystocele, midline 4/4/2012     Diffuse cystic mastopathy      Gastroesophageal reflux disease, esophagitis presence not specified 11/23/2019     History of hypokalemia 1/23/2013     Hyperlipidemia LDL goal <160 6/29/2011    Bloomer 10-year CHD Risk Score: 2% (14 Total Points)  Values used to calculate score:    Age: 70 years -- Points: 14    Total Cholesterol: 192 mg/dL -- Points: 1    HDL Cholesterol: 61 mg/dL -- Points: -1    Systolic BP (treated): 118 mmHg -- Points: 0   The patient is not a smoker. -- Points: 0   The patient has not been diagnosed with diabetes. -- Points: 0   The patient does not have a famil     Lung cancer (H) 10/21/2015     Paroxysmal atrial fibrillation (H) 9/13/2019     PMR (polymyalgia rheumatica) (H) 1/17/2020    tapering' above.)  In patients  receiving over 10 mg of prednisone/day, the dose can be lowered by 2.5 mg/day decrements every two to four weeks  Once the dose of prednisone is 10 mg/day, further tapering can be done by 1 mg per month, provided the clinical course is stable  The clinical response to glucocorticoid therapy is closely monitored, which centers on screening for the presence and/or recu     Thyroid nodule 4/23/2016    Noted on CT Fall 2015.      Unspecified essential hypertension late 1980's       PAST SURGICAL HISTORY:  Past Surgical History:   Procedure Laterality Date     ANESTHESIA CARDIOVERSION N/A 6/12/2020    Procedure: ANESTHESIA, FOR CARDIOVERSION;  Surgeon: GENERIC ANESTHESIA PROVIDER;  Location:  OR     COLONOSCOPY  3/2003    adenomatous polyp      COLONOSCOPY  7/2006    diverticulosis - repeat in 5 years     COLONOSCOPY  10/13/2011    Procedure:COLONOSCOPY; COLONOSCOPY ; Surgeon:CHITO GORDILLO; Location: GI     CYSTOSCOPY  7/11/2012    Procedure: CYSTOSCOPY;;  Surgeon: Aline Cooper DO;  Location:  OR     DAVINCI HYSTERECTOMY SUPRACERVICAL, SACROCOLPOPEXY, COMBINED  7/11/2012    Procedure: COMBINED DAVINCI HYSTERECTOMY SUPRACERVICAL, SACROCOLPOPEXY;   DAVINCI ASSISTED LAPAROSCOPIC SUPRACERVICAL HYSTERECTOMY AND BILATERAL SALPINGO-OOPHORECTOMY, SACROCOLPOPEXY AND CYSTOSCOPY;  Surgeon: Aline Cooper DO;  Location:  OR     EP ABLATION AV NODE N/A 6/22/2020    Procedure: EP ABLATION AV NODE;  Surgeon: Adriano Raman MD;  Location:  HEART CARDIAC CATH LAB     EP BIVENT LEAD PLACEMENT N/A 6/22/2020    Procedure: Bivent Lead Placement;  Surgeon: Adriano Raman MD;  Location:  HEART CARDIAC CATH LAB     EP PACEMAKER N/A 6/22/2020    Procedure: AVNA and BiV Pacemaker Insertion;  Surgeon: Adriano Raman MD;  Location:  HEART CARDIAC CATH LAB     EXCISE LESION EYELID Right 6/29/2015    Procedure: EXCISE LESION EYELID;  Surgeon: Hank Olvera MD;  Location: [facility]     INSERT  PORT VASCULAR ACCESS  2/3/2012    Procedure:INSERT PORT VASCULAR ACCESS; Power Port-A- Catheter Placement ; Surgeon:IRISH TAPIA; Location:[facility] OR     LAPAROSCOPIC SALPINGO-OOPHORECTOMY  7/11/2012    Procedure: LAPAROSCOPIC SALPINGO-OOPHORECTOMY;  Davinci;  Surgeon: Aline Cooper DO;  Location: [facility] OR     LIPOSUCTION, RHYTIDECTOMY, COMBINED       LOBECTOMY LUNG Right 3/1/2016    Procedure: LOBECTOMY LUNG;  Surgeon: Jonas Woodward MD;  Location: [facility] OR     MAMMOPLASTY REDUCTION  1/6/2012    Procedure:MAMMOPLASTY REDUCTION; Surgeon:MICKI KYLE; Location:[facility] OR     MASTECTOMY SIMPLE  11/12/2012    Procedure: MASTECTOMY SIMPLE;   Right Prophylactic Mastectomy with attempted Right Sentinal Node Biopsy, Revision Bilateral Mastectomy Insicions, liposuction in breast area;  Surgeon: Irish Tapia MD;  Location: [facility] OR     MASTECTOMY SIMPLE, SENTINEL NODE, COMBINED  1/6/2012    Procedure:COMBINED MASTECTOMY SIMPLE, SENTINEL NODE; Left Mastectomy Left [place] Node Biopsy,  Right Breast Reduction ; Surgeon:IRISH TAPIA; Location:[facility] OR     MASTECTOMY, BILATERAL       REMOVE PORT VASCULAR ACCESS  4/29/2013    Procedure: REMOVE PORT VASCULAR ACCESS;  Port A catheter removal ;  Surgeon: Irish Tapia MD;  Location: [facility] OR     REPAIR PTOSIS BILATERAL Bilateral 6/29/2015    Procedure: REPAIR PTOSIS BILATERAL;  Surgeon: Hank Olvera MD;  Location:  [facility]     REVISE RECONSTRUCTED BREAST BILATERAL  11/12/2012    Procedure: REVISE RECONSTRUCTED BREAST BILATERAL;;  Surgeon: Micki Kyle MD;  Location: [facility] OR     SURGICAL HISTORY OF -   in 40's    face lift     SURGICAL HISTORY OF -       lipoma removed right thigh     SURGICAL HISTORY OF -       D and C     THORACOTOMY Right 3/1/2016    Procedure: THORACOTOMY;  Surgeon: Jonas Woodward MD;  Location: [facility] OR     ZZC NONSPECIFIC PROCEDURE  1970    s/p Tubal ligation 1970       FAMILY HISTORY:  Family History   Problem  Relation Age of Onset     Cardiovascular Father         ruptured aorta, hardening of the arteries     Cerebrovascular Disease Mother      Respiratory Mother         chronic bronchitis - was a smoker early on     Cardiovascular Paternal Grandfather         MI     Cerebrovascular Disease Paternal Aunt      Hypertension Son      Neurologic Disorder Daughter         migraines     Breast Cancer Daughter      Brain Tumor Sister        SOCIAL HISTORY:  Social History     Socioeconomic History     Marital status:      Spouse name: Aren     Number of children: 2     Years of education: None     Highest education level: None   Occupational History     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     Employer: NONE    Social Needs     Financial resource strain: None     Food insecurity     Worry: None     Inability: None     Transportation needs     Medical: None     Non-medical: None   Tobacco Use     Smoking status: Never Smoker     Smokeless tobacco: Never Used   Substance and Sexual Activity     Alcohol use: Yes     Comment: 0-1 drink day     Drug use: No     Sexual activity: Yes     Partners: Male   Lifestyle     Physical activity     Days per week: None     Minutes per session: None     Stress: None   Relationships     Social connections     Talks on phone: None     Gets together: None     Attends [employer] service: None     Active member of club or organization: None     Attends meetings of clubs or organizations: None     Relationship status: None     Intimate partner violence     Fear of current or ex partner: None     Emotionally abused: None     Physically abused: None     Forced sexual activity: None   Other Topics Concern      Service Not Asked     Blood Transfusions Not Asked     Caffeine Concern Not Asked     Occupational Exposure Not Asked     Hobby Hazards Not Asked     Sleep Concern Not Asked     Stress Concern Not Asked     Weight Concern Not Asked     Special Diet Not Asked     Back Care Not Asked     Exercise Yes  "    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 Yes   Social History Narrative     None       Review of Systems:  Skin:  Positive for pigmentation;bruising     Eyes:  Positive for glasses;tearing tearing in right eye  ENT:  Negative      Respiratory:  Positive for dyspnea on exertion     Cardiovascular:    Positive for;chest pain;edema;fatigue fatigue off and on  Gastroenterology: Positive for abdominal pain on the left side  Genitourinary:  Negative      Musculoskeletal:  Positive for joint pain PMR  Neurologic:  Negative      Psychiatric:  Positive for excessive stress;anxiety;depression    Heme/Lymph/Imm:  Negative      Endocrine:  Negative        Physical Exam:  Vitals: /64   Pulse 72   Ht 1.6 m (5' 3\")   Wt 65.7 kg (144 lb 14.4 oz)   LMP  (LMP Unknown)   BMI 25.67 kg/m      Constitutional:  cooperative, alert and oriented, well developed, well nourished, in no acute distress        Skin:    bruises present        Head:  normocephalic, no masses or lesions        Eyes:  pupils equal and round        Lymph:      ENT:  no pallor or cyanosis, dentition good        Neck:  no carotid bruit        Respiratory:  clear to auscultation;normal symmetry         Cardiac: regular rhythm       systolic murmur;grade 1     single ectopic beat     pulses below the femoral arteries are diminished                                      GI:  abdomen soft        Extremities and Muscular Skeletal:  no deformities, clubbing, cyanosis, erythema observed stasis pigmentation 1+          Neurological:  no gross motor deficits        Psych:  Alert and Oriented x 3          CC  Violet Fenton MD  [address] [address] [place] [address]                      Thank you for allowing me to participate in the care of your patient.      Sincerely,     Kylie Sevilla, DO     [facility]    cc:   Violet Fenton [place]  [address] [place] [address]

## 2020-11-12 NOTE — PROGRESS NOTES
HPI and Plan:   See dictation    No orders of the defined types were placed in this encounter.      No orders of the defined types were placed in this encounter.      There are no discontinued medications.      Encounter Diagnoses   Name Primary?     Cardiac pacemaker in situ Yes     Persistent atrial fibrillation (H)      Secondary cardiomyopathy (H)      Hx of atrioventricular node ablation      Dyspnea on exertion      Precordial pain      Troponin level elevated        CURRENT MEDICATIONS:  Current Outpatient Medications   Medication Sig Dispense Refill     acetaminophen (TYLENOL) 500 MG tablet Take 1,000 mg by mouth every 8 hours as needed for mild pain       amLODIPine (NORVASC) 2.5 MG tablet Take 1 tablet (2.5 mg) by mouth daily 180 tablet 1     B Complex Vitamins (VITAMIN  B COMPLEX) tablet Take 1 tablet by mouth daily.       calcium carbonate (TUMS) 500 MG chewable tablet Take 2 chew tab by mouth 2 times daily as needed for heartburn       calcium citrate-vitamin D (CALCIUM CITRATE + D) 315-250 MG-UNIT TABS per tablet Take 2 tablets by mouth daily       ezetimibe (ZETIA) 10 MG tablet TAKE 1 TABLET(10 MG) BY MOUTH DAILY 90 tablet 0     FLUoxetine (PROZAC) 20 MG capsule Take 1 capsule (20 mg) by mouth daily 90 capsule 0     furosemide (LASIX) 40 MG tablet Take 80 mg by mouth daily        metoprolol tartrate (LOPRESSOR) 100 MG tablet Take 1 tablet (100 mg) by mouth 2 times daily 60 tablet 6     omeprazole (PRILOSEC) 20 MG DR capsule Take 20 mg by mouth daily as needed       polyethylene glycol (MIRALAX/GLYCOLAX) powder Take 1 capful by mouth daily as needed for constipation       potassium chloride ER (KLOR-CON M) 20 MEQ CR tablet Take 1 tablet (20 mEq) by mouth 2 times daily 60 tablet 6     predniSONE (DELTASONE) 10 MG tablet Take 17.5 mg by mouth daily        warfarin ANTICOAGULANT (COUMADIN) 1 MG tablet Take 1 to 2mg (1 to 2 tabs) by mouth daily OR AS DIRECTED.  Adjust dose based on INR. 180 tablet 1      warfarin ANTICOAGULANT (COUMADIN) 2.5 MG tablet Transitioning to 1 mg tablet size starting 11/12/20.       calcium-vitamin D (CALTRATE) 600-400 MG-UNIT per tablet Take 4 tablets by mouth daily        cholecalciferol (VITAMIN D3) 25 mcg (1000 units) capsule Take 1 capsule by mouth daily       omega 3 1000 MG CAPS Take 1 g by mouth daily 90 capsule        ALLERGIES     Allergies   Allergen Reactions     No Known Drug Allergies      Tape [Adhesive Tape]      Sensitive to plastic tape on upper part of body--takes skin off       PAST MEDICAL HISTORY:  Past Medical History:   Diagnosis Date     Arthritis     hands, knees     Breast cancer (H) jan. 2012     left mastectomy followed by right;  Dr. Luong     Chronic airway obstruction, not elsewhere classified 2006    very mild COPD - small cough     Concussion 3/13/2013     Problem list name updated by automated process. Provider to review and confirm [employer] Update utility     Cystocele, midline 4/4/2012     Diffuse cystic mastopathy      Gastroesophageal reflux disease, esophagitis presence not specified 11/23/2019     History of hypokalemia 1/23/2013     Hyperlipidemia LDL goal <160 6/29/2011    [place] 10-year CHD Risk Score: 2% (14 Total Points)  Values used to calculate score:    Age: 70 years -- Points: 14    Total Cholesterol: 192 mg/dL -- Points: 1    HDL Cholesterol: 61 mg/dL -- Points: -1    Systolic BP (treated): 118 mmHg -- Points: 0   The patient is not a smoker. -- Points: 0   The patient has not been diagnosed with diabetes. -- Points: 0   The patient does not have a famil     Lung cancer (H) 10/21/2015     Paroxysmal atrial fibrillation (H) 9/13/2019     PMR (polymyalgia rheumatica) (H) 1/17/2020    tapering' above.)  In patients receiving over 10 mg of prednisone/day, the dose can be lowered by 2.5 mg/day decrements every two to four weeks  Once the dose of prednisone is 10 mg/day, further tapering can be done by 1 mg per month, provided the clinical course  is stable  The clinical response to glucocorticoid therapy is closely monitored, which centers on screening for the presence and/or recu     Thyroid nodule 4/23/2016    Noted on CT Fall 2015.      Unspecified essential hypertension late 1980's       PAST SURGICAL HISTORY:  Past Surgical History:   Procedure Laterality Date     ANESTHESIA CARDIOVERSION N/A 6/12/2020    Procedure: ANESTHESIA, FOR CARDIOVERSION;  Surgeon: GENERIC ANESTHESIA PROVIDER;  Location:  OR     COLONOSCOPY  3/2003    adenomatous polyp      COLONOSCOPY  7/2006    diverticulosis - repeat in 5 years     COLONOSCOPY  10/13/2011    Procedure:COLONOSCOPY; COLONOSCOPY ; Surgeon:CHITO GORDILLO; Location: GI     CYSTOSCOPY  7/11/2012    Procedure: CYSTOSCOPY;;  Surgeon: Aline Cooper DO;  Location:  OR     DAVINCI HYSTERECTOMY SUPRACERVICAL, SACROCOLPOPEXY, COMBINED  7/11/2012    Procedure: COMBINED DAVINCI HYSTERECTOMY SUPRACERVICAL, SACROCOLPOPEXY;   DAVINCI ASSISTED LAPAROSCOPIC SUPRACERVICAL HYSTERECTOMY AND BILATERAL SALPINGO-OOPHORECTOMY, SACROCOLPOPEXY AND CYSTOSCOPY;  Surgeon: Aline Cooper DO;  Location:  OR     EP ABLATION AV NODE N/A 6/22/2020    Procedure: EP ABLATION AV NODE;  Surgeon: Adriano Raman MD;  Location:  HEART CARDI[facility] CATH LAB     EP BIVENT LEAD PLACEMENT N/A 6/22/2020    Procedure: Bivent Lead Placement;  Surgeon: Adriano Raman MD;  Location:  HEART CARD[facility] CATH LAB     EP PACEMAKER N/A 6/22/2020    Procedure: AVNA and BiV Pacemaker Insertion;  Surgeon: Adriano Raman MD;  Location:  HEART CARDIAC CATH LAB     EXCISE LESION EYELID Right 6/29/2015    Procedure: EXCISE LESION EYELID;  Surgeon: Hank Olvera MD;  Location:  SD     INSERT PORT VASCULAR ACCESS  2/3/2012    Procedure:INSERT PORT VASCULAR ACCESS; Power Port-A- Catheter Placement ; Surgeon:TRESSA TAPIA; Location: OR     LAPAROSCOPIC SALPINGO-OOPHORECTOMY  7/11/2012    Procedure: LAPAROSCOPIC  SALPINGO-OOPHORECTOMY;  Davinci;  Surgeon: Aline Cooper DO;  Location: [facility] OR     LIPOSUCTION, RHYTIDECTOMY, COMBINED       LOBECTOMY LUNG Right 3/1/2016    Procedure: LOBECTOMY LUNG;  Surgeon: Jonas Woodward MD;  Location:  OR     MAMMOPLASTY REDUCTION  1/6/2012    Procedure:MAMMOPLASTY REDUCTION; Surgeon:MICKI KYLE; Location:[facility] OR     MASTECTOMY SIMPLE  11/12/2012    Procedure: MASTECTOMY SIMPLE;   Right Prophylactic Mastectomy with attempted Right Sentinal Node Biopsy, Revision Bilateral Mastectomy Insicions, liposuction in breast area;  Surgeon: Irish Tapia MD;  Location: [facility] OR     MASTECTOMY SIMPLE, SENTINEL NODE, COMBINED  1/6/2012    Procedure:COMBINED MASTECTOMY SIMPLE, SENTINEL NODE; Left Mastectomy Left [place] Node Biopsy,  Right Breast Reduction ; Surgeon:IRISH TAPIA; Location:[facility] OR     MASTECTOMY, BILATERAL       REMOVE PORT VASCULAR ACCESS  4/29/2013    Procedure: REMOVE PORT VASCULAR ACCESS;  Port A catheter removal ;  Surgeon: Irish Tapia MD;  Location: [facility] OR     REPAIR PTOSIS BILATERAL Bilateral 6/29/2015    Procedure: REPAIR PTOSIS BILATERAL;  Surgeon: Hank Olvera MD;  Location:  [facility]     REVISE RECONSTRUCTED BREAST BILATERAL  11/12/2012    Procedure: REVISE RECONSTRUCTED BREAST BILATERAL;;  Surgeon: Micki Kyle MD;  Location: [facility] OR     SURGICAL HISTORY OF -   in 40's    face lift     SURGICAL HISTORY OF -       lipoma removed right thigh     SURGICAL HISTORY OF -       D and C     THORACOTOMY Right 3/1/2016    Procedure: THORACOTOMY;  Surgeon: Jonas Woodward MD;  Location:  OR     ZZC NONSPECIFIC PROCEDURE  1970    s/p Tubal ligation 1970       FAMILY HISTORY:  Family History   Problem Relation Age of Onset     Cardiovascular Father         ruptured aorta, hardening of the arteries     Cerebrovascular Disease Mother      Respiratory Mother         chronic bronchitis - was a smoker early on     Cardiovascular  Paternal Grandfather         MI     Cerebrovascular Disease Paternal Aunt      Hypertension Son      Neurologic Disorder Daughter         migraines     Breast Cancer Daughter      Brain Tumor Sister        SOCIAL HISTORY:  Social History     Socioeconomic History     Marital status:      Spouse name: Aren     Number of children: 2     Years of education: None     Highest education level: None   Occupational History     Occupation: nguyen     Employer: NONE    Social Needs     Financial resource strain: None     Food insecurity     Worry: None     Inability: None     Transportation needs     Medical: None     Non-medical: None   Tobacco Use     Smoking status: Never Smoker     Smokeless tobacco: Never Used   Substance and Sexual Activity     Alcohol use: Yes     Comment: 0-1 drink day     Drug use: No     Sexual activity: Yes     Partners: Male   Lifestyle     Physical activity     Days per week: None     Minutes per session: None     Stress: None   Relationships     Social connections     Talks on phone: None     Gets together: None     Attends Yazidi service: None     Active member of club or organization: None     Attends meetings of clubs or organizations: None     Relationship status: None     Intimate partner violence     Fear of current or ex partner: None     Emotionally abused: None     Physically abused: None     Forced sexual activity: None   Other Topics Concern      Service Not Asked     Blood Transfusions Not Asked     Caffeine Concern Not Asked     Occupational Exposure Not Asked     Hobby Hazards Not Asked     Sleep Concern Not Asked     Stress Concern Not Asked     Weight Concern Not Asked     Special Diet Not Asked     Back Care Not Asked     Exercise Yes     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 Yes   Social History Narrative     None       Review of Systems:  Skin:  " Positive for pigmentation;bruising     Eyes:  Positive for glasses;tearing tearing in right eye  ENT:  Negative      Respiratory:  Positive for dyspnea on exertion     Cardiovascular:    Positive for;chest pain;edema;fatigue fatigue off and on  Gastroenterology: Positive for abdominal pain on the left side  Genitourinary:  Negative      Musculoskeletal:  Positive for joint pain PMR  Neurologic:  Negative      Psychiatric:  Positive for excessive stress;anxiety;depression    Heme/Lymph/Imm:  Negative      Endocrine:  Negative        Physical Exam:  Vitals: /64   Pulse 72   Ht 1.6 m (5' 3\")   Wt 65.7 kg (144 lb 14.4 oz)   LMP  (LMP Unknown)   BMI 25.67 kg/m      Constitutional:  cooperative, alert and oriented, well developed, well nourished, in no acute distress        Skin:    bruises present        Head:  normocephalic, no masses or lesions        Eyes:  pupils equal and round        Lymph:      ENT:  no pallor or cyanosis, dentition good        Neck:  no carotid bruit        Respiratory:  clear to auscultation;normal symmetry         Cardiac: regular rhythm       systolic murmur;grade 1     single ectopic beat     pulses below the femoral arteries are diminished                                      GI:  abdomen soft        Extremities and Muscular Skeletal:  no deformities, clubbing, cyanosis, erythema observed stasis pigmentation 1+          Neurological:  no gross motor deficits        Psych:  Alert and Oriented x 3          CC  Violet Fenton MD  [id number] [id number]                  "

## 2020-11-12 NOTE — LETTER
11/12/2020      [place]  [id number]  [facility] [address]      RE: Tomasa Farleyon       Dear Colleague,    I had the pleasure of seeing Tomasa Fam in the [facility].    Service Date: 11/12/2020      HISTORY OF PRESENT ILLNESS:  Ms. Fam is a very pleasant 80-year-old female with a history of atrial fibrillation with a tachycardia-induced cardiomyopathy, hypertension, hyperlipidemia, COPD and lung cancer.  She underwent an AV susu ablation and BiV pacer implant in 06/2020.  She had been admitted to the hospital in August for frequent falls, generalized weakness ended up having, I think a urinary tract infection.  She had troponin elevation during that time and Cardiology I believe was consulted, but she ended up leaving the hospital because her  passed away in the night and she was not able to be seen by Cardiology.        She is being treated for polymyalgia rheumatica with a long chronic tapering dose of prednisone.  She is on warfarin for CVA prophylaxis due to her persistent atrial fibrillation and she has been having a lot of bruising and occasional bleeding with skin tissue breakdown of her lower extremities and in particular, was told to follow up with Cardiology.      On the first problem, the troponin elevation during her hospitalization, I did review those.  Max troponin was 0.07 and dropped from there.  It did appear to be a type 2 pattern of troponin elevation or demand ischemia.  She has shown improvement in her LV function since she was diagnosed with tachycardia-induced cardiomyopathy.  Initially in May, her ejection fraction was estimated around 30%-35%.  This was while she was in atrial fibrillation.  A repeat echocardiogram in September suggested normalization of her ejection fraction to 55%-60%.  She has not had any significant complications since her procedure or pacer implant and the device seem to be working  appropriately.        However, in talking with her, she does have progressive shortness of breath and dyspnea on exertion over the past year.  She also has some upper posterior neck pain, especially with stress or emotional stress.  We talked about evaluating for ischemic heart disease because of these symptoms and evidence of troponin elevation with her most recent hospitalization.  Her problem mainly is that she is dependent on other people to take her to her appointments.  She is no longer driving.  I explained the type of stress testing that I would like to perform and how it may benefit or change our management.  Certainly, if there is evidence of greater than 15% of the myocardium in fact, this may change our management, but she is on appropriate secondary prevention as far as medications and optimization of her risk factors in regards to ischemic heart disease.        She has agreed to go forth with a stress test, so I did order a Lexiscan stress test for her.  I would like this to be done at the [facility] on our cardiac-specific camera to get a better image and reduce the risk of breast attenuation, etc.  She seems agreeable to this.        The second issue again with the anticoagulation and significant bruising and skin tissue breakdown, likely a combination of the steroid and warfarin.  She has found the novel anticoagulant agents, such as Eliquis, too expensive, but I did provide her a pamphlet to reevaluate whether or not she would qualify for reduction in copay with the Eliquis through the Eliquis company.      PHYSICAL EXAMINATION:   VITAL SIGNS:  On exam today, her blood pressure is 138/64, pulse is 72, weight 144 with a body mass index of 25.   CARDIOVASCULAR:  Tones today were regular.  Again, she is in a paced rhythm.   LUNGS:  Diminished but otherwise clear.   EXTREMITIES:  She has significant bruising to her lower extremities with 1+ peripheral edema; however, she does note that her bruising  has been, particularly to her left lower extremity is much better since her hospitalization where she fell.  She said her entire left leg was black at that time, so this is showing some improvement.      SUMMARY:  Ms. Hodge is a very pleasant 80-year-old female with a history of tachycardia-induced cardiomyopathy with persistent atrial fibrillation.  She is status post AV susu ablation procedure and biventricular pacing device with improvement in her overall LV function.  Her EF is now normalized to 55%-60%.  She is taking warfarin for anticoagulation due to her high risk for cerebral embolic events related to atrial fibrillation; however, she is experiencing significant lower extremity bruising and skin tissue breakdown, which likely is a combination of both the chronic, long tapering dose of steroid in addition to the warfarin.        I did provide her a pamphlet to determine if Eliquis may be something she could consider at a reduced dose through the company, but at this time, I do not see that the risks outweigh the benefits of continuing anticoagulation at this time due to her high risk of cerebral embolic events related to persistent atrial fibrillation.      In regards to her troponin elevation, symptoms of progressive dyspnea on exertion and upper neck and back discomfort with emotional stress, we will go forth with evaluating for ischemic heart disease with a Lexiscan nuclear study and I will have her follow up with those tests results once complete.      Please feel free to contact me with any questions you have in regards to her care.      cc:      Violet Fenton MD    [facility]   [address] [place] [address]         MAGY MILLER DO             D: 2020   T: 2020   MT: RONEL      Name:     HAMIDA HODGE   MRN:      [id number]        Account:      [id number]   :      1940           Service Date: 2020      Document: [id number]         Outpatient Encounter Medications as of 11/12/2020   Medication Sig Dispense Refill     acetaminophen (TYLENOL) 500 MG tablet Take 1,000 mg by mouth every 8 hours as needed for mild pain       amLODIPine (NORVASC) 2.5 MG tablet Take 1 tablet (2.5 mg) by mouth daily 180 tablet 1     B Complex Vitamins (VITAMIN  B COMPLEX) tablet Take 1 tablet by mouth daily.       calcium carbonate (TUMS) 500 MG chewable tablet Take 2 chew tab by mouth 2 times daily as needed for heartburn       calcium citrate-vitamin D (CALCIUM CITRATE + D) 315-250 MG-UNIT TABS per tablet Take 2 tablets by mouth daily       ezetimibe (ZETIA) 10 MG tablet TAKE 1 TABLET(10 MG) BY MOUTH DAILY 90 tablet 0     FLUoxetine (PROZAC) 20 MG capsule Take 1 capsule (20 mg) by mouth daily 90 capsule 0     furosemide (LASIX) 40 MG tablet Take 80 mg by mouth daily        metoprolol tartrate (LOPRESSOR) 100 MG tablet Take 1 tablet (100 mg) by mouth 2 times daily 60 tablet 6     omeprazole (PRILOSEC) 20 MG DR capsule Take 20 mg by mouth daily as needed       polyethylene glycol (MIRALAX/GLYCOLAX) powder Take 1 capful by mouth daily as needed for constipation       potassium chloride ER (KLOR-CON M) 20 MEQ CR tablet Take 1 tablet (20 mEq) by mouth 2 times daily 60 tablet 6     predniSONE (DELTASONE) 10 MG tablet Take 17.5 mg by mouth daily        warfarin ANTICOAGULANT (COUMADIN) 1 MG tablet Take 1 to 2mg (1 to 2 tabs) by mouth daily OR AS DIRECTED.  Adjust dose based on INR. 180 tablet 1     warfarin ANTICOAGULANT (COUMADIN) 2.5 MG tablet Transitioning to 1 mg tablet size starting 11/12/20.       calcium-vitamin D (CALTRATE) 600-400 MG-UNIT per tablet Take 4 tablets by mouth daily        cholecalciferol (VITAMIN D3) 25 mcg (1000 units) capsule Take 1 capsule by mouth daily       omega 3 1000 MG CAPS Take 1 g by mouth daily 90 capsule      No facility-administered encounter medications on file as of 11/12/2020.        Again, thank you for allowing me to  participate in the care of your patient.      Sincerely,    Kylie Sevilla,      [facility]

## 2020-11-18 ENCOUNTER — TELEPHONE (OUTPATIENT)
Dept: FAMILY MEDICINE | Facility: CLINIC | Age: 80
End: 2020-11-18

## 2020-11-18 DIAGNOSIS — I48.0 PAROXYSMAL ATRIAL FIBRILLATION (H): ICD-10-CM

## 2020-11-18 NOTE — TELEPHONE ENCOUNTER
Patient called asking to speak with [facility] RN about dosing.     Please call patient back.     Davida Cortez, RN, BSN, PHN

## 2020-11-18 NOTE — TELEPHONE ENCOUNTER
Called patient to review dosing instructions. Patient has transitioned from 2.5 mg tablets to 1 mg tablets. She states understanding of dosing up through next INR in 6 days.  Cardiologist looked at patient's legs, told her the prednisone is really doing a number on her skin and prednisone and warfarin don't mix well. Would recommend transitioning to Eliquis. Chandrika plans to talk with PCP about this at appointment tomorrow. However, cost will be a deciding factor.  She knows if she changes anticoagulants to call [phone] to notify, as [facility] RN at  will not be in clinic the rest of the week.  Yue WOOD RN  Anticoagulation Team

## 2020-11-19 ENCOUNTER — OFFICE VISIT (OUTPATIENT)
Dept: FAMILY MEDICINE | Facility: CLINIC | Age: 80
End: 2020-11-19
Payer: MEDICARE

## 2020-11-19 VITALS
HEART RATE: 70 BPM | OXYGEN SATURATION: 96 % | BODY MASS INDEX: 25.53 KG/M2 | HEIGHT: 63 IN | SYSTOLIC BLOOD PRESSURE: 126 MMHG | WEIGHT: 144.1 LBS | TEMPERATURE: 98.2 F | RESPIRATION RATE: 16 BRPM | DIASTOLIC BLOOD PRESSURE: 62 MMHG

## 2020-11-19 DIAGNOSIS — F43.9 STRESS: ICD-10-CM

## 2020-11-19 DIAGNOSIS — I48.19 PERSISTENT ATRIAL FIBRILLATION (H): ICD-10-CM

## 2020-11-19 DIAGNOSIS — R74.01 NONSPECIFIC ELEVATION OF LEVELS OF TRANSAMINASE AND LACTIC ACID DEHYDROGENASE (LDH): ICD-10-CM

## 2020-11-19 DIAGNOSIS — M35.3 PMR (POLYMYALGIA RHEUMATICA) (H): ICD-10-CM

## 2020-11-19 DIAGNOSIS — M25.569 CHRONIC KNEE PAIN, UNSPECIFIED LATERALITY: ICD-10-CM

## 2020-11-19 DIAGNOSIS — I42.8 OTHER CARDIOMYOPATHY (H): ICD-10-CM

## 2020-11-19 DIAGNOSIS — G89.29 CHRONIC KNEE PAIN, UNSPECIFIED LATERALITY: ICD-10-CM

## 2020-11-19 DIAGNOSIS — R23.3 EASY BRUISING: ICD-10-CM

## 2020-11-19 DIAGNOSIS — E78.5 HYPERLIPIDEMIA LDL GOAL <160: ICD-10-CM

## 2020-11-19 DIAGNOSIS — I10 ESSENTIAL HYPERTENSION WITH GOAL BLOOD PRESSURE LESS THAN 140/90: Primary | ICD-10-CM

## 2020-11-19 DIAGNOSIS — R74.02 NONSPECIFIC ELEVATION OF LEVELS OF TRANSAMINASE AND LACTIC ACID DEHYDROGENASE (LDH): ICD-10-CM

## 2020-11-19 DIAGNOSIS — R53.83 DECREASED ENERGY: ICD-10-CM

## 2020-11-19 DIAGNOSIS — E11.9 TYPE 2 DIABETES MELLITUS WITHOUT COMPLICATION, WITHOUT LONG-TERM CURRENT USE OF INSULIN (H): ICD-10-CM

## 2020-11-19 PROBLEM — I48.0 PAROXYSMAL ATRIAL FIBRILLATION (H): Status: RESOLVED | Noted: 2019-09-13 | Resolved: 2020-11-19

## 2020-11-19 PROCEDURE — 99214 OFFICE O/P EST MOD 30 MIN: CPT | Performed by: FAMILY MEDICINE

## 2020-11-19 PROCEDURE — 96127 BRIEF EMOTIONAL/BEHAV ASSMT: CPT | Performed by: FAMILY MEDICINE

## 2020-11-19 RX ORDER — EZETIMIBE 10 MG/1
10 TABLET ORAL DAILY
Qty: 90 TABLET | Refills: 1 | Status: SHIPPED | OUTPATIENT
Start: 2020-11-19 | End: 2021-05-20

## 2020-11-19 ASSESSMENT — ANXIETY QUESTIONNAIRES
1. FEELING NERVOUS, ANXIOUS, OR ON EDGE: SEVERAL DAYS
GAD7 TOTAL SCORE: 1
2. NOT BEING ABLE TO STOP OR CONTROL WORRYING: NOT AT ALL
7. FEELING AFRAID AS IF SOMETHING AWFUL MIGHT HAPPEN: NOT AT ALL
5. BEING SO RESTLESS THAT IT IS HARD TO SIT STILL: NOT AT ALL
6. BECOMING EASILY ANNOYED OR IRRITABLE: NOT AT ALL
3. WORRYING TOO MUCH ABOUT DIFFERENT THINGS: NOT AT ALL

## 2020-11-19 ASSESSMENT — MIFFLIN-ST. JEOR: SCORE: 1087.76

## 2020-11-19 ASSESSMENT — PATIENT HEALTH QUESTIONNAIRE - PHQ9
5. POOR APPETITE OR OVEREATING: NOT AT ALL
SUM OF ALL RESPONSES TO PHQ QUESTIONS 1-9: 5

## 2020-11-19 NOTE — Clinical Note
Hello!  She is interested in trying the Eliquis.   She found on the brochure she was given, that it said the program  2017 (she did show me).  At this time, will you prescribe this?   If you would like me to, what dose are you recommending (you said something about a reduced dose in your note) and do you know of something for financial help (I could otherwise have someone look into that).  Thanks!   Violet

## 2020-11-20 ASSESSMENT — ANXIETY QUESTIONNAIRES: GAD7 TOTAL SCORE: 1

## 2020-11-24 ENCOUNTER — ANTICOAGULATION THERAPY VISIT (OUTPATIENT)
Dept: NURSING | Facility: CLINIC | Age: 80
End: 2020-11-24

## 2020-11-24 ENCOUNTER — TELEPHONE (OUTPATIENT)
Dept: FAMILY MEDICINE | Facility: CLINIC | Age: 80
End: 2020-11-24

## 2020-11-24 DIAGNOSIS — Z79.01 LONG TERM CURRENT USE OF ANTICOAGULANTS WITH INR GOAL OF 2.0-3.0: ICD-10-CM

## 2020-11-24 DIAGNOSIS — I48.0 PAROXYSMAL ATRIAL FIBRILLATION (H): ICD-10-CM

## 2020-11-24 DIAGNOSIS — I48.19 PERSISTENT ATRIAL FIBRILLATION (H): Primary | ICD-10-CM

## 2020-11-24 LAB
CAPILLARY BLOOD COLLECTION: NORMAL
INR PPP: 1.2 (ref 0.86–1.14)

## 2020-11-24 PROCEDURE — 85610 PROTHROMBIN TIME: CPT | Performed by: FAMILY MEDICINE

## 2020-11-24 PROCEDURE — 36416 COLLJ CAPILLARY BLOOD SPEC: CPT | Performed by: FAMILY MEDICINE

## 2020-11-24 PROCEDURE — 99207 PR NO CHARGE NURSE ONLY: CPT

## 2020-11-24 NOTE — PROGRESS NOTES
"ANTICOAGULATION FOLLOW-UP CLINIC VISIT    Patient Name:  Tomasa Fam  Date:  2020  Contact Type:  Telephone    SUBJECTIVE:  Patient Findings     Positives:  Bruising (horrible bruising all around legs, if she bumps into something her skin will open and bleed.)    Comments:  In light of PCP wishing to review switch to Eliquis with Dr. Sevilla, [facility] RN did review with Annbaelle Salcedo RPH: See Annabelle's advice regarding Eliquis dosing on today's telephone encounter. For now, suggest patient take 4 mg warfarin tonight since her INR is so low, then [facility] RN will follow up tomorrow regarding switch to Eliquis.  Called patient to discuss: She will take 4 \"pinks\" for a total of 4 mg warfarin tonight, then wait for call regarding Eliquis transition tomorrow.  Yue WOOD RN  Anticoagulation Team          Clinical Outcomes     Comments:  In light of PCP wishing to review switch to Eliquis with Dr. Sevilla, SARA RN did review with Annabelle Salcedo RPH: See Annabelle's advice regarding Eliquis dosing on today's telephone encounter. For now, suggest patient take 4 mg warfarin tonight since her INR is so low, then [facility] RN will follow up tomorrow regarding switch to Eliquis.  Called patient to discuss: She will take 4 \"pinks\" for a total of 4 mg warfarin tonight, then wait for call regarding Eliquis transition tomorrow.  Yue WOOD RN  Anticoagulation Team             OBJECTIVE    Recent labs: (last 7 days)     20  1325   INR 1.20*       ASSESSMENT / PLAN  INR assessment SUB    Recheck INR In: 1 DAY may be switching to Eliquis tomorrow, no INR scheduled   INR Location Clinic      Anticoagulation Summary  As of 2020    INR goal:  2.0-3.0   TTR:  55.8 % (1 y)   INR used for dosin.20 (2020)   Warfarin maintenance plan:  1 mg (1 mg x 1) every Sun; 2 mg (1 mg x 2) all other days   Full warfarin instructions:  : 4 mg; Otherwise 1 mg every Sun; 2 mg all other days   Weekly warfarin total:  13 mg "   Plan last modified:  Yue Martínez RN (11/10/2020)   Next INR check:     Priority:  High   Target end date:  Indefinite    Indications    Paroxysmal atrial fibrillation (H) (Resolved) [I48.0]             Anticoagulation Episode Summary     INR check location:  Anticoagulation Clinic    Preferred lab:      Send INR reminders to:  PK WEAVER    Comments:  No Bandaid // 2.5 mg tab // patient started on Xarelto, transition to warfarin 11/6/19      Anticoagulation Care Providers     Provider Role Specialty Phone number    Violet Fenton MD Referring Family Medicine [phone]            See the Encounter Report to view Anticoagulation Flowsheet and Dosing Calendar (Go to Encounters tab in chart review, and find the Anticoagulation Therapy Visit)    Dosage adjustment made based on physician directed care plan.    Yue Martínez RN

## 2020-11-24 NOTE — TELEPHONE ENCOUNTER
Chart reviewed, patient is a great candidate for a DOAC.  Her TTR is 55%, meaning about half of the time her INR is not in a therapeutic range, and therefore not protective.     Adjusted body weight 58kg, actual body weight 65g  SCr 0.95 mg/dl 09/08/2020    Calculated CrCl with ABW = 42ml/mn     Eliquis 5mg BID would be appropriate dose, and could be started immediately since INR is subtherapeutic.    The Eliquis will become fully therapeutic within 2-3 hours onse started, so no bridge is necessary.      (Eliquis 2.5mg BID is only used if patient >80 years old PLUS weight <60mg, or SCr > 1.5mg/dl)    Annabelle Salcedo, PharmD BCACP  Anticoagulation Clinical Pharmacist

## 2020-11-24 NOTE — TELEPHONE ENCOUNTER
Pt walks in the clinic with questions about her meds.  Asked If I could call her on the phone.      Called the pt.  She was seen at [facility].    She said Dr. Sevilla wants her to change to eloquis.  Per the pt Dr. Fenton said that is probably a good idea.  She said she needs to speak to Dr. Sevilla.  They were going to discuss who was going to write the prescription and dosing etc.      She is wondering where that is at.       Spoke to Dr. Fenton.  She said she sent Dr. Sevilla a message, but has not heard back.  Dr. Fenton advised it would be a good idea for the pt to reach out to Dr. Sevilla as well.      Pt was advised.

## 2020-11-25 DIAGNOSIS — I48.19 PERSISTENT ATRIAL FIBRILLATION (H): Primary | ICD-10-CM

## 2020-11-25 NOTE — PROGRESS NOTES
Received update from Dr. Sevilla's nurse that they will start her on Eliquis, 5 mg BID.  Closed out episode, removed warfarin from medicaton list, and removed standing lab orders for INR.  Called patient to discuss. She just got off the phone with Dr. Sevilla's nurse. Patient stated understanding and is in agreement with plan.  Yue WOOD RN  Anticoagulation Team

## 2020-11-25 NOTE — TELEPHONE ENCOUNTER
Spoke with Dr. Sevilla about switching pt to Eliquis.   Dr. Sevilla agreed and is OK with prescribing 5mg BID to pt.     Will route update to Dr. Fenton and nurses.

## 2020-12-07 ENCOUNTER — TRANSFERRED RECORDS (OUTPATIENT)
Dept: HEALTH INFORMATION MANAGEMENT | Facility: CLINIC | Age: 80
End: 2020-12-07

## 2020-12-10 ENCOUNTER — TRANSFERRED RECORDS (OUTPATIENT)
Dept: HEALTH INFORMATION MANAGEMENT | Facility: CLINIC | Age: 80
End: 2020-12-10

## 2020-12-26 ENCOUNTER — TELEPHONE (OUTPATIENT)
Dept: NURSING | Facility: CLINIC | Age: 80
End: 2020-12-26

## 2020-12-26 NOTE — TELEPHONE ENCOUNTER
Patient calling, she states that pharmacy has been trying to get her refill of eloquis for the past week and has been unsuccessful. She is going to run out the first of the week and needs this refill ASAP when the clinic opens on Monday..  Charlotte Myrick RN  [facility]

## 2020-12-27 ENCOUNTER — MYC MEDICAL ADVICE (OUTPATIENT)
Dept: CARDIOLOGY | Facility: CLINIC | Age: 80
End: 2020-12-27

## 2020-12-28 DIAGNOSIS — I48.19 PERSISTENT ATRIAL FIBRILLATION (H): ICD-10-CM

## 2020-12-28 NOTE — TELEPHONE ENCOUNTER
Left detailed message. This has been sent in today.     Roxie Lobato RN on 12/28/2020 at 9:30 AM

## 2021-01-04 ENCOUNTER — ALLIED HEALTH/NURSE VISIT (OUTPATIENT)
Dept: NURSING | Facility: CLINIC | Age: 81
End: 2021-01-04
Payer: MEDICARE

## 2021-01-04 DIAGNOSIS — L98.9 SORE ON LEG: Primary | ICD-10-CM

## 2021-01-04 PROCEDURE — 99207 PR NO CHARGE NURSE ONLY: CPT

## 2021-01-04 NOTE — PROGRESS NOTES
Pt walks in the clinic.  She has some sores on her leg.  She says it is from her predinsone.  She says she has had the sores for a long time, but they are really hurting her.      She has 4 sores on her right leg.  She showed me one.  She denies any pus coming out of them.      Advised she should be seen.  Appt scheduled.  Had discussed with Vaishali Stoner.

## 2021-01-05 ENCOUNTER — OFFICE VISIT (OUTPATIENT)
Dept: FAMILY MEDICINE | Facility: CLINIC | Age: 81
End: 2021-01-05
Payer: MEDICARE

## 2021-01-05 VITALS
WEIGHT: 144 LBS | HEART RATE: 69 BPM | BODY MASS INDEX: 25.51 KG/M2 | DIASTOLIC BLOOD PRESSURE: 60 MMHG | SYSTOLIC BLOOD PRESSURE: 124 MMHG | TEMPERATURE: 97.8 F | OXYGEN SATURATION: 97 %

## 2021-01-05 DIAGNOSIS — R23.3 EASY BRUISING: ICD-10-CM

## 2021-01-05 DIAGNOSIS — T14.8XXA NON-HEALING NON-SURGICAL WOUND: Primary | ICD-10-CM

## 2021-01-05 DIAGNOSIS — E11.9 TYPE 2 DIABETES MELLITUS WITHOUT COMPLICATION, WITHOUT LONG-TERM CURRENT USE OF INSULIN (H): ICD-10-CM

## 2021-01-05 DIAGNOSIS — I48.19 PERSISTENT ATRIAL FIBRILLATION (H): ICD-10-CM

## 2021-01-05 DIAGNOSIS — Z79.52 LONG TERM SYSTEMIC STEROID USER: ICD-10-CM

## 2021-01-05 PROCEDURE — 99213 OFFICE O/P EST LOW 20 MIN: CPT | Performed by: PHYSICIAN ASSISTANT

## 2021-01-05 NOTE — PROGRESS NOTES
Assessment & Plan     Non-healing non-surgical wound  Ongoing issues with poorly healing wounds on both lower extremities for past year. Currently has abrasions with hemorrhagic crusts on right lower leg. No signs of infection. She did see dermatology about 1 month ago for this and was given mupirocin but wounds are still not healing. Recommend further management with wound care, referral placed. Patient is agreeable. Discussed monitoring and close follow-up if any signs of infection. Wounds were dressed today, discussed wound management until she can see wound care.  - WOUND CARE REFERRAL; Future    Long term systemic steroid user  Weaning off prednisone    Persistent atrial fibrillation (H)  Chronic, on eliquis    Easy bruising  Chronic, on eliquis    Type 2 diabetes mellitus without complication, without long-term current use of insulin (H)  Chronic, stable    Return in about 1 week (around 1/12/2021) for wound clinic.    RADHA [facility] [place]    Subjective     Tomasa Fam is a 80 year old female who presents to clinic today for the following health issues    HPI       Skin Lesion  Onset/Duration: Right shin and ankle skin tears  Description  Location: right shin and ankle  Color: red right ankle  Border description: irregular border, skin tears  Character: painful, burning, draining, red  Itching: no  Bleeding:  YES  Intensity:  severe  Progression of Symptoms:  worsening  Accompanying signs and symptoms:   Bleeding: YES  Scaling: YES  Excessive sun exposure/tanning: no  Sunscreen used: not applicable  History:           Any previous history of skin cancer: no  Any family history of melanoma: no  Previous episodes of similar lesion: YES ongoing for past year  Precipitating or alleviating factors: Thin skin, prednisone  Therapies tried and outcome: topical mupirocin from dermatologist    Has had ongoing issues with poorly healing skin tears on both legs for the past  year. She saw dermatology on 12/10/20 for leg sores located on right pretibial region, left pretibial region, left forearm, and right forearm, ongoing for 1 year. Thought to be abrasion/erosion with hemorrhagic crusting amd was started on mupirocin.     Today, she reports the sores on her left leg have resolved but there are several sores on her right pretibial region that will not seem to heal. The lesions occasionally bleed, are painful, and sometimes drain clear fluid. She states sometimes a wound will heal but then she will hit her leg on something else and scrape the skin off. She is on prednisone (weaning off, currently on 8 mg daily, started at 50 mg daily) and eliquis which has led her to bruise and bleed easily. She feels really frustrated by these wounds that won't heal. No surrounding/spreading redness, warmth, tenderness, purulent drainage. No fever. Otherwise feeling well.     Review of Systems   Constitutional, HEENT, cardiovascular, pulmonary, gi and gu systems are negative, except as otherwise noted.      Objective    /60   Pulse 69   Temp 97.8  F (36.6  C) (Oral)   Wt 65.3 kg (144 lb)   LMP  (LMP Unknown)   SpO2 97%   BMI 25.51 kg/m    Body mass index is 25.51 kg/m .  Physical Exam   GENERAL: healthy, alert and no distress  SKIN: see images      NEURO: Normal strength and tone, mentation intact and speech normal  PSYCH: mentation appears normal, affect normal/bright

## 2021-01-07 ENCOUNTER — ANCILLARY PROCEDURE (OUTPATIENT)
Dept: CARDIOLOGY | Facility: CLINIC | Age: 81
End: 2021-01-07
Attending: INTERNAL MEDICINE
Payer: MEDICARE

## 2021-01-07 PROCEDURE — 93296 REM INTERROG EVL PM/IDS: CPT | Performed by: INTERNAL MEDICINE

## 2021-01-07 PROCEDURE — 93294 REM INTERROG EVL PM/LDLS PM: CPT | Performed by: INTERNAL MEDICINE

## 2021-01-11 LAB
MDC_IDC_EPISODE_DTM: NORMAL
MDC_IDC_EPISODE_DTM: NORMAL
MDC_IDC_EPISODE_DURATION: 13 S
MDC_IDC_EPISODE_ID: NORMAL
MDC_IDC_EPISODE_ID: NORMAL
MDC_IDC_EPISODE_TYPE: NORMAL
MDC_IDC_EPISODE_TYPE: NORMAL
MDC_IDC_LEAD_IMPLANT_DT: NORMAL
MDC_IDC_LEAD_IMPLANT_DT: NORMAL
MDC_IDC_LEAD_LOCATION: NORMAL
MDC_IDC_LEAD_LOCATION: NORMAL
MDC_IDC_LEAD_LOCATION_DETAIL_1: NORMAL
MDC_IDC_LEAD_LOCATION_DETAIL_1: NORMAL
MDC_IDC_LEAD_MFG: NORMAL
MDC_IDC_LEAD_MFG: NORMAL
MDC_IDC_LEAD_MODEL: NORMAL
MDC_IDC_LEAD_MODEL: NORMAL
MDC_IDC_LEAD_POLARITY_TYPE: NORMAL
MDC_IDC_LEAD_SERIAL: NORMAL
MDC_IDC_LEAD_SERIAL: NORMAL
MDC_IDC_MSMT_BATTERY_DTM: NORMAL
MDC_IDC_MSMT_BATTERY_REMAINING_LONGEVITY: 132 MO
MDC_IDC_MSMT_BATTERY_REMAINING_PERCENTAGE: 100 %
MDC_IDC_MSMT_BATTERY_STATUS: NORMAL
MDC_IDC_MSMT_LEADCHNL_LV_IMPEDANCE_VALUE: 760 OHM
MDC_IDC_MSMT_LEADCHNL_LV_PACING_THRESHOLD_AMPLITUDE: 1.5 V
MDC_IDC_MSMT_LEADCHNL_LV_PACING_THRESHOLD_PULSEWIDTH: 0.4 MS
MDC_IDC_MSMT_LEADCHNL_RV_IMPEDANCE_VALUE: 801 OHM
MDC_IDC_MSMT_LEADCHNL_RV_PACING_THRESHOLD_AMPLITUDE: 0.7 V
MDC_IDC_MSMT_LEADCHNL_RV_PACING_THRESHOLD_PULSEWIDTH: 0.4 MS
MDC_IDC_PG_IMPLANT_DTM: NORMAL
MDC_IDC_PG_MFG: NORMAL
MDC_IDC_PG_MODEL: NORMAL
MDC_IDC_PG_SERIAL: NORMAL
MDC_IDC_PG_TYPE: NORMAL
MDC_IDC_SESS_CLINIC_NAME: NORMAL
MDC_IDC_SESS_DTM: NORMAL
MDC_IDC_SESS_TYPE: NORMAL
MDC_IDC_SET_BRADY_AT_MODE_SWITCH_RATE: 170 {BEATS}/MIN
MDC_IDC_SET_BRADY_LOWRATE: 70 {BEATS}/MIN
MDC_IDC_SET_BRADY_MAX_SENSOR_RATE: 130 {BEATS}/MIN
MDC_IDC_SET_BRADY_MODE: NORMAL
MDC_IDC_SET_CRT_LVRV_DELAY: 30 MS
MDC_IDC_SET_CRT_PACED_CHAMBERS: NORMAL
MDC_IDC_SET_LEADCHNL_LV_PACING_AMPLITUDE: 2.5 V
MDC_IDC_SET_LEADCHNL_LV_PACING_PULSEWIDTH: 0.4 MS
MDC_IDC_SET_LEADCHNL_LV_SENSING_ADAPTATION_MODE: NORMAL
MDC_IDC_SET_LEADCHNL_LV_SENSING_SENSITIVITY: 2.5 MV
MDC_IDC_SET_LEADCHNL_RA_SENSING_ADAPTATION_MODE: NORMAL
MDC_IDC_SET_LEADCHNL_RA_SENSING_SENSITIVITY: 0.5 MV
MDC_IDC_SET_LEADCHNL_RV_PACING_AMPLITUDE: 2 V
MDC_IDC_SET_LEADCHNL_RV_PACING_CAPTURE_MODE: NORMAL
MDC_IDC_SET_LEADCHNL_RV_PACING_POLARITY: NORMAL
MDC_IDC_SET_LEADCHNL_RV_PACING_PULSEWIDTH: 0.4 MS
MDC_IDC_SET_LEADCHNL_RV_SENSING_ADAPTATION_MODE: NORMAL
MDC_IDC_SET_LEADCHNL_RV_SENSING_POLARITY: NORMAL
MDC_IDC_SET_LEADCHNL_RV_SENSING_SENSITIVITY: 2.5 MV
MDC_IDC_SET_ZONE_DETECTION_INTERVAL: 375 MS
MDC_IDC_SET_ZONE_TYPE: NORMAL
MDC_IDC_SET_ZONE_VENDOR_TYPE: NORMAL
MDC_IDC_STAT_BRADY_DTM_END: NORMAL
MDC_IDC_STAT_BRADY_DTM_START: NORMAL
MDC_IDC_STAT_BRADY_RA_PERCENT_PACED: 0 %
MDC_IDC_STAT_BRADY_RV_PERCENT_PACED: 100 %
MDC_IDC_STAT_CRT_DTM_END: NORMAL
MDC_IDC_STAT_CRT_DTM_START: NORMAL
MDC_IDC_STAT_CRT_LV_PERCENT_PACED: 100 %
MDC_IDC_STAT_EPISODE_RECENT_COUNT: 0
MDC_IDC_STAT_EPISODE_RECENT_COUNT: 1
MDC_IDC_STAT_EPISODE_RECENT_COUNT_DTM_END: NORMAL
MDC_IDC_STAT_EPISODE_RECENT_COUNT_DTM_START: NORMAL
MDC_IDC_STAT_EPISODE_TYPE: NORMAL
MDC_IDC_STAT_EPISODE_VENDOR_TYPE: NORMAL

## 2021-01-13 ENCOUNTER — HOSPITAL ENCOUNTER (OUTPATIENT)
Dept: WOUND CARE | Facility: CLINIC | Age: 81
Discharge: HOME | End: 2021-01-13
Attending: PHYSICIAN ASSISTANT | Admitting: PHYSICIAN ASSISTANT
Payer: MEDICARE

## 2021-01-13 ENCOUNTER — TELEPHONE (OUTPATIENT)
Dept: WOUND CARE | Facility: CLINIC | Age: 81
End: 2021-01-13

## 2021-01-13 VITALS
RESPIRATION RATE: 20 BRPM | HEART RATE: 70 BPM | WEIGHT: 144 LBS | DIASTOLIC BLOOD PRESSURE: 73 MMHG | HEIGHT: 63 IN | SYSTOLIC BLOOD PRESSURE: 151 MMHG | BODY MASS INDEX: 25.52 KG/M2 | TEMPERATURE: 96.9 F

## 2021-01-13 DIAGNOSIS — T14.8XXA NON-HEALING NON-SURGICAL WOUND: ICD-10-CM

## 2021-01-13 DIAGNOSIS — L97.912 SKIN ULCER OF RIGHT LOWER LEG WITH FAT LAYER EXPOSED (H): ICD-10-CM

## 2021-01-13 PROCEDURE — 97597 DBRDMT OPN WND 1ST 20 CM/<: CPT

## 2021-01-13 PROCEDURE — G0463 HOSPITAL OUTPT CLINIC VISIT: HCPCS | Mod: 25

## 2021-01-13 PROCEDURE — 99204 OFFICE O/P NEW MOD 45 MIN: CPT | Mod: 25 | Performed by: PHYSICIAN ASSISTANT

## 2021-01-13 PROCEDURE — 97597 DBRDMT OPN WND 1ST 20 CM/<: CPT | Performed by: PHYSICIAN ASSISTANT

## 2021-01-13 RX ORDER — POTASSIUM CHLORIDE 750 MG/1
TABLET, EXTENDED RELEASE ORAL
COMMUNITY
Start: 2020-05-11 | End: 2021-03-22

## 2021-01-13 ASSESSMENT — MIFFLIN-ST. JEOR: SCORE: 1087.31

## 2021-01-13 NOTE — PROGRESS NOTES
"[facility]    ASSESSMENT:   1. Full-thickness ulcerations of right lower leg of unclear etiology, suspect multifactorial etiology of trauma and prednisone use, PG is also on my differential due to significant pain and autoimmune history (chronic not at goal)  2. High dose prednisone use for polymyalgia rheumatica and giant cell arteritis  3. Slight peripheral edema (chronic not at goal)    PLAN/DISCUSSION:   1. Wound care plan: wash daily, dress with MeSalt, cover dressing of choice, SpandiGrip light for edema control  2. Vit A 25k international unit(s) x 10d protocol for prednisone use  3. Reviewed 12/24/20 note from arthritis and rheum consultants, 1/5 note from PCP visit    HISTORY OF PRESENT ILLNESS:   Tomasa Fam is a 80 year old female with history of breast cancer, lung cancer, a fib on Eliquis, polymyalgia rheumatica and temporal arteritis who presents today for non-healing sores on right lower leg. Have been present for months. She thinks they started traumatically and are improving but very slowly. Has been on prednisone since last fall for PMR and arteritis and notices her skin is thinner from this. Attempting to wean off of this. Denies history of blood clots or vascular disease. Wounds are exquisitely painful to palpation.     TREATMENT COURSE:  January 13, 2021: Presents for initial visit    VITALS: BP (!) 151/73   Pulse 70   Temp 96.9  F (36.1  C) (Temporal)   Resp 20   Ht 1.6 m (5' 3\")   Wt 65.3 kg (144 lb)   LMP  (LMP Unknown)   BMI 25.51 kg/m       PHYSICAL EXAM:  GENERAL: Patient is alert and oriented and in no acute distress  CV: RLE  2+ DP pulse, 1+ PT pulse  INTEGUMENTARY:   Wound (used by [facility] only) 01/13/21 0854 Right (Active)   Thickness/Stage full thickness 01/13/21 0924   Dressing Appearance moist drainage 01/13/21 0800   Base necrotic;granulating;slough;scab 01/13/21 0924   Periwound intact;edematous 01/13/21 0924   Periwound Temperature warm " 01/13/21 0924   Length (cm) 0.5 01/13/21 0800   Width (cm) 0.6 01/13/21 0800   Depth (cm) 0.1 01/13/21 0800   Wound (cm^2) 0.3 cm^2 01/13/21 0800   Wound Volume (cm^3) 0.03 cm^3 01/13/21 0800   Drainage Characteristics/Odor serosanguineous 01/13/21 0800   Drainage Amount moderate 01/13/21 0800   Care, Wound debrided 01/13/21 0924       Wound (used by [facility] only) 01/13/21 0854 Right other (see comments) (Active)   Thickness/Stage full thickness 01/13/21 0925   Dressing Appearance moist drainage 01/13/21 0800   Base granulating;necrotic;scab 01/13/21 0925   Periwound intact 01/13/21 0925   Periwound Temperature warm 01/13/21 0925   Length (cm) 3.9 01/13/21 0800   Width (cm) 3 01/13/21 0800   Depth (cm) 0.1 01/13/21 0800   Wound (cm^2) 11.7 cm^2 01/13/21 0800   Wound Volume (cm^3) 1.17 cm^3 01/13/21 0800   Drainage Characteristics/Odor serosanguineous 01/13/21 0800   Drainage Amount moderate 01/13/21 0800   Care, Wound debrided 01/13/21 0925       Wound (used by [facility] only) 01/13/21 0854 Right other (see comments) (Active)   Thickness/Stage full thickness 01/13/21 0926   Dressing Appearance moist drainage 01/13/21 0800   Base necrotic;scab;slough 01/13/21 0926   Periwound intact 01/13/21 0926   Periwound Temperature warm 01/13/21 0926   Length (cm) 1.8 01/13/21 0800   Width (cm) 2 01/13/21 0800   Depth (cm) 0.5 01/13/21 0800   Wound (cm^2) 3.6 cm^2 01/13/21 0800   Wound Volume (cm^3) 1.8 cm^3 01/13/21 0800   Drainage Characteristics/Odor serosanguineous;brown;creamy 01/13/21 0800   Drainage Amount moderate 01/13/21 0800   Care, Wound debrided 01/13/21 0926                 PROCEDURE: 4% topical lidocaine was applied to the wound by the nursing staff. Patient was determined to be capable of making their own medical decisions and informed consent was obtained. Using a 15 blade a selective debridement of non-viable tissue was performed of <20 cm. Hemostasis was achieved with pressure. The patient tolerated the procedure  lyndsay.      NIKOLAI LEWIS PA-C

## 2021-01-13 NOTE — PROGRESS NOTES
Patient arrived for wound care visit. Certified Wound Care Nurse time spent evaluating patient record, completed a full evaluation and documented wound(s) & miles-wound skin; provided recommendation based on treatment plan. Applied dressing, reviewed discharge instructions, patient education, and discussed plan of care with appropriate medical team staff members and patient and/or family members.

## 2021-01-13 NOTE — TELEPHONE ENCOUNTER
Returned call to patient to let her know that she can get Vit A 5000u tablets over the counter. She needs to take 5 tabs daily for 10 days. Verbalized understanding.

## 2021-01-13 NOTE — DISCHARGE INSTRUCTIONS
[facility]  [address] [facility] [address] [address]    Call us at [phone] if you have any questions about your wounds, have redness or swelling around your wound, have a fever of 101 or greater or if you have any other problems or concerns. We answer the phone Monday through Friday 8 am to 4 pm, please leave a message as we check the voicemail frequently throughout the day.     Tomasa Fam      1940    Medications/supplements to aid in healin. Vitamin A 25,000 units daily for 10 days    A diet high in protein is important for wound healing, we recommend getting 90 grams of protein per day. Taking protein shakes or bars are a good way to get extra protein in your diet.  Other good sources of protein:  Pork 26g per 3 oz  Whey protein powder - 24g per scoop (on average)  Greek yogurt - 23g per 8oz   Chicken or Turkey - 23g per 3oz  Fish - 20-25g per 3oz  Beef - 18-23g per 3oz  Navy beans - 20g per cup  Cottage cheese - 14g per 1/2 cup   Lentils - 13g per 1/4 cup  Beef jerky 13g per 1oz  2% milk - 8g per cup  Peanut butter - 8g per 2 tablespoons  Eggs - 6g per egg  Mixed nuts - 6g per 2oz     Wound care recommendations to right lower leg ulcers  Remove dressings and wash wounds in the shower with soap and water. Apply Mesalt to wound beds followed by EdmeaWear size Purple Lite with gauze on the outside. Change daily.     Apply Urea cream on your dry, flaky skin    Please raise your legs above your heart for 30 mins 3 times a day to promote wound healing.      Marisela Prado PA-C. 2021    Wound Clinic follow up as scheduled

## 2021-01-15 ENCOUNTER — HEALTH MAINTENANCE LETTER (OUTPATIENT)
Age: 81
End: 2021-01-15

## 2021-01-20 ENCOUNTER — TELEPHONE (OUTPATIENT)
Dept: WOUND CARE | Facility: CLINIC | Age: 81
End: 2021-01-20

## 2021-01-20 NOTE — TELEPHONE ENCOUNTER
[facility]    Who is the name of the provider?:  Eve      What is the location you see this provider at?: [place]    Reason for call:  Pain control for dressing changes.      Can we leave a detailed message on this number?  YES

## 2021-01-20 NOTE — TELEPHONE ENCOUNTER
Pt is having extreme pain with dressing changes using mesalt. The pt states she can no longer tolerate using the mesalt. Spoke with Renee GARCIA who ordered the pt to use bacitracin and gauze. The pt verbalized understanding of the order. No further questions or concerns.

## 2021-02-01 ENCOUNTER — TRANSFERRED RECORDS (OUTPATIENT)
Dept: HEALTH INFORMATION MANAGEMENT | Facility: CLINIC | Age: 81
End: 2021-02-01

## 2021-02-03 ENCOUNTER — HOSPITAL ENCOUNTER (OUTPATIENT)
Dept: WOUND CARE | Facility: CLINIC | Age: 81
Discharge: HOME | End: 2021-02-03
Attending: PHYSICIAN ASSISTANT | Admitting: PHYSICIAN ASSISTANT
Payer: MEDICARE

## 2021-02-03 VITALS
TEMPERATURE: 98.7 F | SYSTOLIC BLOOD PRESSURE: 134 MMHG | HEART RATE: 69 BPM | DIASTOLIC BLOOD PRESSURE: 71 MMHG | RESPIRATION RATE: 16 BRPM

## 2021-02-03 DIAGNOSIS — L97.912 SKIN ULCER OF RIGHT LOWER LEG WITH FAT LAYER EXPOSED (H): ICD-10-CM

## 2021-02-03 PROCEDURE — 97597 DBRDMT OPN WND 1ST 20 CM/<: CPT

## 2021-02-03 PROCEDURE — 11042 DBRDMT SUBQ TIS 1ST 20SQCM/<: CPT

## 2021-02-03 PROCEDURE — 97597 DBRDMT OPN WND 1ST 20 CM/<: CPT | Performed by: PHYSICIAN ASSISTANT

## 2021-02-03 RX ORDER — SILVER SULFADIAZINE 10 MG/G
CREAM TOPICAL DAILY
Qty: 50 G | Refills: 3 | Status: SHIPPED | OUTPATIENT
Start: 2021-02-03 | End: 2021-06-01

## 2021-02-03 NOTE — PROGRESS NOTES
"[place] [facility]    ASSESSMENT:   1. Full-thickness ulcerations of right lower leg of unclear etiology, suspect multifactorial etiology of trauma and prednisone use, PG is also on my differential due to significant pain and autoimmune history (chronic not at goal)  2. High dose prednisone use for polymyalgia rheumatica and giant cell arteritis  3. Slight peripheral edema (chronic not at goal)    PLAN/DISCUSSION:   1. Wound care plan: wash daily, dress with MeSalt, cover dressing of choice, SpandiGrip light for edema control  2. Vit A 25k international unit(s) x 10d protocol for prednisone use  3. Reviewed 12/24/20 note from arthritis and rheum consultants, 1/5 note from PCP visit    HISTORY OF PRESENT ILLNESS:   Tomasa Fam is a 80 year old female with history of breast cancer, lung cancer, a fib on Eliquis, polymyalgia rheumatica and temporal arteritis who presents today for non-healing sores on right lower leg. Have been present for months. She thinks they started traumatically and are improving but very slowly. Has been on prednisone since last fall for PMR and arteritis and notices her skin is thinner from this. Attempting to wean off of this. Denies history of blood clots or vascular disease. Wounds are exquisitely painful to palpation.     TREATMENT COURSE:  1/13/21: Presents for initial visit. Started on MeSalt and high dose Vit A regimen.  02/03/21: Returns for follow-up. Was unable to tolerate MeSalt and switched to dry non-stick dressings. Stopped EdemaWear because she \"didn't see the point.\" Completed vit A course. Wounds have thick layer of slough overlying that once removed revealed improving ulcerations.      VITALS: /71   Pulse 69   Temp 98.7  F (37.1  C) (Temporal)   Resp 16   LMP  (LMP Unknown)      PHYSICAL EXAM:  GENERAL: Patient is alert and oriented and in no acute distress  CV: RLE  2+ DP pulse, 1+ PT pulse  INTEGUMENTARY:   Wound (used by [facility] only) " 01/13/21 0854 Right other (see comments) (Active)   Thickness/Stage full thickness 02/03/21 1038   Dressing Appearance moist drainage 02/03/21 1038   Base black eschar 02/03/21 1038   Periwound intact;edematous 02/03/21 1038   Periwound Temperature warm 02/03/21 1038   Length (cm) 3.9 02/03/21 1038   Width (cm) 3 02/03/21 1038   Depth (cm) 0.1 02/03/21 1038   Wound (cm^2) 11.7 cm^2 02/03/21 1038   Wound Volume (cm^3) 1.17 cm^3 02/03/21 1038   Wound healing % 0 02/03/21 1038   Drainage Characteristics/Odor serosanguineous 02/03/21 1038   Drainage Amount moderate 02/03/21 1038   Care, Wound debrided 02/03/21 1038       Wound (used by [facility] only) 01/13/21 0854 Right other (see comments) (Active)   Thickness/Stage full thickness 02/03/21 1038   Dressing Appearance moist drainage 02/03/21 1000   Base slough 02/03/21 1038   Periwound intact;edematous 02/03/21 1038   Periwound Temperature warm 02/03/21 1038   Length (cm) 1.5 02/03/21 1000   Width (cm) 1.8 02/03/21 1000   Depth (cm) 0.5 02/03/21 1000   Wound (cm^2) 2.7 cm^2 02/03/21 1000   Wound Volume (cm^3) 1.35 cm^3 02/03/21 1000   Wound healing % 25 02/03/21 1000   Drainage Characteristics/Odor serosanguineous 02/03/21 1000   Drainage Amount copious 02/03/21 1038   Care, Wound debrided 02/03/21 1038         PROCEDURE: 4% topical lidocaine was applied to the wound by the nursing staff. Patient was determined to be capable of making their own medical decisions and informed consent was obtained. Using a 15 blade a selective debridement of non-viable tissue was performed of <20 cm. Hemostasis was achieved with pressure. The patient tolerated the procedure well.      NIKOLAI LEWIS PA-C

## 2021-02-03 NOTE — DISCHARGE INSTRUCTIONS
[facility]  [address] [place] [address] [address]    Call us at [phone] if you have any questions about your wounds, have redness or swelling around your wound, have a fever of 101 or greater or if you have any other problems or concerns. We answer the phone Monday through Friday 8 am to 4 pm, please leave a message as we check the voicemail frequently throughout the day.     Tomasa Fam      1940    A diet high in protein is important for wound healing, we recommend getting 90 grams of protein per day. Taking protein shakes or bars are a good way to get extra protein in your diet.  Other good sources of protein:  Pork 26g per 3 oz  Whey protein powder - 24g per scoop (on average)  Greek yogurt - 23g per 8oz   Chicken or Turkey - 23g per 3oz  Fish - 20-25g per 3oz  Beef - 18-23g per 3oz  Navy beans - 20g per cup  Cottage cheese - 14g per 1/2 cup   Lentils - 13g per 1/4 cup  Beef jerky 13g per 1oz  2% milk - 8g per cup  Peanut butter - 8g per 2 tablespoons  Eggs - 6g per egg  Mixed nuts - 6g per 2oz     Wound care recommendations to right lower leg ulcers  Remove dressings and wash wounds in the shower with soap and water. Apply Silvadene  to wound beds followed by bandages then EdmeaWear size purple lite, change daily.    Apply Urea cream on your dry, flaky skin    Please raise your legs above your heart for 30 mins 3 times a day to promote wound  healing.    Marisela Prado PA-C. February 3, 2021    Wound Clinic follow up as scheduled    COVID vaccine information at [email or web address]    If you had a positive experience please indicate that on your patient satisfaction survey form that [facility] will be sending you.

## 2021-02-25 NOTE — PROGRESS NOTES
Subjective     Tomasa Fam is a 80 year old female who presents to clinic today for the following health issues:    HPI         Here for follow up.  Would like to discuss Eliquis. Would like to discuss using Acetaminophen Extra strength for pain. Not sure if can take with other medications.     Would like to discuss lumps founded on the outside of the pace maker x 2 weeks ago.     Would like to have legs checked due to some wounds on the lower legs.     See under ROS    Patient Active Problem List   Diagnosis     Chronic airway obstruction (H)     ASCUS on Pap smear     Hyperlipidemia LDL goal <160     Breast cancer (H)     Essential hypertension with goal blood pressure less than 140/90     Cystocele, midline     Uterovaginal prolapse     S/P hysterectomy     Advanced directives, counseling/discussion     History of hypokalemia     Concussion     Thyroid nodule     Chronic pain of both knees     History of lung cancer     Paroxysmal atrial fibrillation (H)     Gastroesophageal reflux disease, esophagitis presence not specified     PMR (polymyalgia rheumatica) (H)     Personal history of malignant neoplasm of breast     Long term systemic steroid user     Cardiomyopathy (H)     Complete heart block (H)     Temporal arteritis (H)     Elevation of level of transaminase or lactic acid dehydrogenase (LDH)     Recurrent falls     Dyspnea     Diabetes mellitus, type 2 (H)       Current Outpatient Medications   Medication Sig Dispense Refill     acetaminophen (TYLENOL) 500 MG tablet Take 1,000 mg by mouth every 8 hours as needed for mild pain       amLODIPine (NORVASC) 2.5 MG tablet Take 1 tablet (2.5 mg) by mouth daily 180 tablet 1     B Complex Vitamins (VITAMIN  B COMPLEX) tablet Take 1 tablet by mouth daily.       calcium carbonate (TUMS) 500 MG chewable tablet Take 2 chew tab by mouth 2 times daily as needed for heartburn       calcium citrate-vitamin D (CALCIUM CITRATE + D) 315-250 MG-UNIT TABS per tablet  Take 2 tablets by mouth daily       ezetimibe (ZETIA) 10 MG tablet TAKE 1 TABLET(10 MG) BY MOUTH DAILY 90 tablet 0     FLUoxetine (PROZAC) 20 MG capsule Take 1 capsule (20 mg) by mouth daily 90 capsule 0     furosemide (LASIX) 40 MG tablet Take 80 mg by mouth daily        metoprolol tartrate (LOPRESSOR) 100 MG tablet Take 1 tablet (100 mg) by mouth 2 times daily 60 tablet 6     omega 3 1000 MG CAPS Take 1 g by mouth daily 90 capsule      omeprazole (PRILOSEC) 20 MG DR capsule Take 20 mg by mouth daily as needed       polyethylene glycol (MIRALAX/GLYCOLAX) powder Take 1 capful by mouth daily as needed for constipation       potassium chloride ER (KLOR-CON M) 20 MEQ CR tablet Take 1 tablet (20 mEq) by mouth 2 times daily 60 tablet 6     predniSONE (DELTASONE) 10 MG tablet Take 17.5 mg by mouth daily        warfarin ANTICOAGULANT (COUMADIN) 1 MG tablet Take 1 to 2mg (1 to 2 tabs) by mouth daily OR AS DIRECTED.  Adjust dose based on INR. 180 tablet 1     warfarin ANTICOAGULANT (COUMADIN) 2.5 MG tablet Transitioning to 1 mg tablet size starting 11/12/20.           Review of Systems   CONSTITUTIONAL:NEGATIVE for fever, chills, change in weight  INTEGUMENTARY/SKIN: notes bruising and easy bleeding. See below.  RESP: no change in her shortness of breath recently.   CV: NEGATIVE for chest pain, palpitations  PSYCHIATRIC: NEGATIVE for changes in mood or affect    Currently on 17.5 mg prednisone. Will go down to 15 mg soon.    Dr. Di alejandroooked at her legs. Thought should get off warfarin.  She notes she has a difficult time getting her INR where it should be.  Was given some information on eliquis. She would like to consider this.    She notes she needs a refill of Zetia.   She is wondering if it is OK to take Acetaminophen. 500 mg. Takes 2 on most days, occasionally another 2.  This does seem to help a lot with her knee pain.    Moodwise, she notes some days good; others not.  More bad in last 3 days. More trouble with knee  "and walking recently, so affects her mood.         Objective    /62 (BP Location: Right arm, Cuff Size: Adult Regular)   Pulse 70   Temp 98.2  F (36.8  C) (Oral)   Resp 16   Ht 1.6 m (5' 3\")   Wt 65.4 kg (144 lb 1.6 oz)   LMP  (LMP Unknown)   SpO2 96%   BMI 25.53 kg/m    Body mass index is 25.53 kg/m .  Physical Exam   GENERAL APPEARANCE: alert and no distress  RESP: lungs clear to auscultation - no rales, rhonchi or wheezes  CV: regular rates and rhythm  MS: ~ 1+ pitting edema.   SKIN: multiple bruises and a couple abrasians.   PSYCH: mentation appears normal and affect normal/bright      PHQ-9 SCORE 3/15/2019 4/9/2020 7/23/2020   PHQ-9 Total Score 1 7 5       RYANNE-7 SCORE 3/15/2019 4/9/2020 7/23/2020   Total Score 0 0 1         GFR Estimate   Date Value Ref Range Status   09/08/2020 56 (L) >60 mL/min/[1.73_m2] Final     Comment:     Non  GFR Calc  Starting 12/18/2018, serum creatinine based estimated GFR (eGFR) will be   calculated using the Chronic Kidney Disease Epidemiology Collaboration   (CKD-EPI) equation.     08/31/2020 71 >60 mL/min/[1.73_m2] Final     Comment:     Non  GFR Calc  Starting 12/18/2018, serum creatinine based estimated GFR (eGFR) will be   calculated using the Chronic Kidney Disease Epidemiology Collaboration   (CKD-EPI) equation.     08/27/2020 60 (L) >60 mL/min/[1.73_m2] Final     Comment:     Non  GFR Calc  Starting 12/18/2018, serum creatinine based estimated GFR (eGFR) will be   calculated using the Chronic Kidney Disease Epidemiology Collaboration   (CKD-EPI) equation.       Recent Labs   Lab Test 05/29/20  0842 08/26/19  0832 06/10/15  0925 06/10/15  0925 07/22/14  0827   CHOL 148 187   < > 194 183   HDL 50 65   < > 70 55   LDL 76 97   < > 106 105   TRIG 110 124   < > 88 114   CHOLHDLRATIO  --   --   --  2.8 3.3    < > = values in this interval not displayed.           PHQ-9 SCORE 4/9/2020 7/23/2020 11/19/2020   PHQ-9 Total " "Score 7 5 5       RYANNE-7 SCORE 4/9/2020 7/23/2020 11/19/2020   Total Score 0 1 1           Lab Results   Component Value Date    A1C 7.0 08/31/2020    A1C 5.9 11/28/2005         Assessment & Plan     Essential hypertension with goal blood pressure less than 140/90  Currently controlled. No change in medication.     Easy bruising  Difficulty keeping INR therapeutic.  She did show me the brochure regarding Eliquis. Did review Cardiology note.  I will try to connect through [employer] with Dr. Sevilla around prescribing this.     Persistent atrial fibrillation (H)  Reason for anticoagulation. She does have a pacemaker.     Other cardiomyopathy (H)  She does follow with Cardiology.     PMR (polymyalgia rheumatica) (H)  Follows with Rheumatology.     Hyperlipidemia LDL goal <160  Refilling.   - ezetimibe (ZETIA) 10 MG tablet; Take 1 tablet (10 mg) by mouth daily    Decreased energy  Continuing fluoxetine.   - FLUoxetine (PROZAC) 20 MG capsule; Take 1 capsule (20 mg) by mouth daily    Stress  As above, will continue. Her scores are stable.  - FLUoxetine (PROZAC) 20 MG capsule; Take 1 capsule (20 mg) by mouth daily    Chronic knee pain, unspecified laterality  Discussed tylenol dosing.   With the 500 mg tabs she has, she can use up to 6 per day.     Type 2 diabetes mellitus without complication, without long-term current use of insulin (H)  Anticipate lab next visit.    Elevation of level of transaminase or lactic acid dehydrogenase (LDH)  Anticipate repeat next visit.        BMI:   Estimated body mass index is 25.53 kg/m  as calculated from the following:    Height as of this encounter: 1.6 m (5' 3\").    Weight as of this encounter: 65.4 kg (144 lb 1.6 oz).                Return in about 3 months (around 2/19/2021) for Medication recheck.    Violet Fenton MD, MD  [facility] Dr. Sevilla around the Eliquis.        "  used

## 2021-02-26 ENCOUNTER — HOSPITAL ENCOUNTER (OUTPATIENT)
Dept: WOUND CARE | Facility: CLINIC | Age: 81
Discharge: HOME | End: 2021-02-26
Attending: PHYSICIAN ASSISTANT | Admitting: PHYSICIAN ASSISTANT
Payer: MEDICARE

## 2021-02-26 VITALS — HEART RATE: 73 BPM | TEMPERATURE: 97.3 F | SYSTOLIC BLOOD PRESSURE: 148 MMHG | DIASTOLIC BLOOD PRESSURE: 67 MMHG

## 2021-02-26 DIAGNOSIS — L97.912 SKIN ULCER OF RIGHT LOWER LEG WITH FAT LAYER EXPOSED (H): ICD-10-CM

## 2021-02-26 PROCEDURE — 99214 OFFICE O/P EST MOD 30 MIN: CPT | Performed by: PHYSICIAN ASSISTANT

## 2021-02-26 PROCEDURE — 97602 WOUND(S) CARE NON-SELECTIVE: CPT

## 2021-02-26 NOTE — PROGRESS NOTES
"[facility]    ASSESSMENT:   1. Full-thickness ulcerations of right lower leg of unclear etiology, suspect multifactorial etiology of trauma and prednisone use, PG is also on my differential due to significant pain and autoimmune history (chronic not at goal)  2. High dose prednisone use for polymyalgia rheumatica and giant cell arteritis  3. Slight peripheral edema (chronic not at goal)    PLAN/DISCUSSION:   1. Wound care plan: wash daily, dress with Silvadene, cover dressing of choice, EdemaWear light for edema control  2. Has completed Vit A 25k international unit(s) x 10d protocol for prednisone use    HISTORY OF PRESENT ILLNESS:   Tomasa Fam is a 80 year old female with history of breast cancer, lung cancer, a fib on Eliquis, polymyalgia rheumatica and temporal arteritis who presents today for non-healing sores on right lower leg. Have been present for months. She thinks they started traumatically and are improving but very slowly. Has been on prednisone since last fall for PMR and arteritis and notices her skin is thinner from this. Attempting to wean off of this. Denies history of blood clots or vascular disease. Wounds are exquisitely painful to palpation.     TREATMENT COURSE:  1/13/21: Presents for initial visit. Started on MeSalt and high dose Vit A regimen.  02/03/21: Returns for follow-up. Was unable to tolerate MeSalt and switched to dry non-stick dressings. Stopped EdemaWear because she \"didn't see the point.\" Completed vit A course. Wounds have thick layer of slough overlying that once removed revealed improving ulcerations. Started on Silvadene cream.   02/26/21: Returns for follow-up. Both wounds much improved. Has been compliant with Silvadene. Has not been wearing compression garments as recommended.     VITALS: BP (!) 148/67   Pulse 73   Temp 97.3  F (36.3  C) (Temporal)   LMP  (LMP Unknown)      PHYSICAL EXAM:  GENERAL: Patient is alert and oriented and in no " acute distress  CV: RLE  2+ DP pulse, 1+ PT pulse  INTEGUMENTARY:   Wound (used by OP [facility] only) 01/13/21 0854 Right other (see comments) (Active)   Thickness/Stage full thickness 02/26/21 1000   Dressing Appearance moist drainage 02/26/21 1000   Base slough;scab 02/26/21 1000   Periwound intact;pink 02/26/21 1000   Periwound Temperature warm 02/26/21 1000   Periwound Skin Turgor soft 02/26/21 1000   Length (cm) 1 02/26/21 1000   Width (cm) 0.3 02/26/21 1000   Depth (cm) 0.2 02/26/21 1000   Wound (cm^2) 0.3 cm^2 02/26/21 1000   Wound Volume (cm^3) 0.06 cm^3 02/26/21 1000   Wound healing % 97.44 02/26/21 1000   Drainage Characteristics/Odor serosanguineous 02/26/21 1000   Drainage Amount moderate 02/26/21 1000   Dressing Care dressing applied 02/26/21 1000       Wound (used by OP [facility] only) 01/13/21 0854 Right other (see comments) (Active)   Thickness/Stage full thickness 02/26/21 1000   Dressing Appearance moist drainage 02/26/21 1000   Base slough;scab 02/26/21 1000   Periwound intact;edematous 02/26/21 1000   Periwound Temperature warm 02/26/21 1000   Periwound Skin Turgor soft 02/26/21 1000   Length (cm) 0.4 02/26/21 1000   Width (cm) 1 02/26/21 1000   Depth (cm) 0.1 02/26/21 1000   Wound (cm^2) 0.4 cm^2 02/26/21 1000   Wound Volume (cm^3) 0.04 cm^3 02/26/21 1000   Wound healing % 88.89 02/26/21 1000   Drainage Characteristics/Odor serosanguineous 02/26/21 1000   Drainage Amount moderate 02/26/21 1000   Dressing Care dressing applied 02/26/21 1000           MDM: 30-39 minutes were spent on the date of the visit reviewing previous chart notes, evaluating patient and developing the treatment plan.     NIKOLAI LEWIS PA-C

## 2021-02-26 NOTE — DISCHARGE INSTRUCTIONS
[facility]  [address] [address]    Call us at [phone] if you have any questions about your wounds, have redness or swelling around your wound, have a fever of 101 or greater or if you have any other problems or concerns. We answer the phone Monday through Friday 8 am to 4 pm, please leave a message as we check the voicemail frequently throughout the day.     Tomasa Fam      1940    Wound care recommendations to right lower leg ulcers: Daily  Remove dressings and wash wounds in the shower with soap and water.   Apply Silvadene to wound beds followed by bandages then EdmeaWear size purple lite  Apply Urea cream on your dry, flaky skin    Please raise your legs above your heart for 30 mins 3 times a day to promote wound healing.  COVID vaccine information at [email or web address]     Marisela Prado PA-C. February 26, 2021    If you had a positive experience please indicate on your patient satisfaction survey form that [facility] will be sending you.

## 2021-03-16 ENCOUNTER — OFFICE VISIT (OUTPATIENT)
Dept: FAMILY MEDICINE | Facility: CLINIC | Age: 81
End: 2021-03-16
Payer: MEDICARE

## 2021-03-16 VITALS
BODY MASS INDEX: 25.51 KG/M2 | WEIGHT: 144 LBS | OXYGEN SATURATION: 99 % | TEMPERATURE: 97.7 F | DIASTOLIC BLOOD PRESSURE: 52 MMHG | SYSTOLIC BLOOD PRESSURE: 98 MMHG | HEART RATE: 70 BPM | RESPIRATION RATE: 16 BRPM

## 2021-03-16 DIAGNOSIS — M35.3 PMR (POLYMYALGIA RHEUMATICA) (H): ICD-10-CM

## 2021-03-16 DIAGNOSIS — R74.02 NONSPECIFIC ELEVATION OF LEVELS OF TRANSAMINASE AND LACTIC ACID DEHYDROGENASE (LDH): ICD-10-CM

## 2021-03-16 DIAGNOSIS — E78.5 HYPERLIPIDEMIA LDL GOAL <160: ICD-10-CM

## 2021-03-16 DIAGNOSIS — E11.9 TYPE 2 DIABETES MELLITUS WITHOUT COMPLICATION, WITHOUT LONG-TERM CURRENT USE OF INSULIN (H): ICD-10-CM

## 2021-03-16 DIAGNOSIS — I10 ESSENTIAL HYPERTENSION WITH GOAL BLOOD PRESSURE LESS THAN 140/90: ICD-10-CM

## 2021-03-16 DIAGNOSIS — Z95.0 CARDIAC PACEMAKER IN SITU: ICD-10-CM

## 2021-03-16 DIAGNOSIS — R22.9 LOCALIZED SUPERFICIAL SWELLING, MASS, OR LUMP: Primary | ICD-10-CM

## 2021-03-16 DIAGNOSIS — R74.01 NONSPECIFIC ELEVATION OF LEVELS OF TRANSAMINASE AND LACTIC ACID DEHYDROGENASE (LDH): ICD-10-CM

## 2021-03-16 LAB — HBA1C MFR BLD: 5.6 % (ref 0–5.6)

## 2021-03-16 PROCEDURE — 36415 COLL VENOUS BLD VENIPUNCTURE: CPT | Performed by: FAMILY MEDICINE

## 2021-03-16 PROCEDURE — 99207 PR FOOT EXAM NO CHARGE: CPT | Mod: 25 | Performed by: FAMILY MEDICINE

## 2021-03-16 PROCEDURE — 80053 COMPREHEN METABOLIC PANEL: CPT | Performed by: FAMILY MEDICINE

## 2021-03-16 PROCEDURE — 99214 OFFICE O/P EST MOD 30 MIN: CPT | Performed by: FAMILY MEDICINE

## 2021-03-16 PROCEDURE — 83036 HEMOGLOBIN GLYCOSYLATED A1C: CPT | Performed by: FAMILY MEDICINE

## 2021-03-16 RX ORDER — PREDNISONE 1 MG/1
1 TABLET ORAL EVERY OTHER DAY
COMMUNITY
Start: 2021-03-16 | End: 2022-02-03

## 2021-03-16 NOTE — PROGRESS NOTES
"    {PROVIDER CHARTING PREFERENCE:988540}    Subjective   Chandrika is a 80 year old who presents for the following health issues {ACCOMPANIED BY STATEMENT (Optional):557092}    HPI     Musculoskeletal problem/pain  Onset/Duration: ***  Description  Location: {.:768362} - {.:058635}  Joint Swelling: {.:540473::\"no\"}  Redness: {.:255725::\"no\"}  Pain: {.:052405::\"no\"}  Warmth: {.:889996::\"no\"}  Intensity:  {mild,moderate,severe:011079}  Progression of Symptoms:  {.:280272}  Accompanying signs and symptoms:   Fevers: {.:001537::\"no\"}  Numbness/tingling/weakness: {.:788461::\"no\"}  History  Trauma to the area: {.:504192::\"no\"}  Recent illness:  {.:741378::\"no\"}  Previous similar problem: {.:946097::\"no\"}  Previous evaluation:  {.:154042::\"no\"}  Precipitating or alleviating factors:  Aggravating factors include: {AGGRAVATING MS FACTORS CHRONIC PROB:824994::\"none\"}  Therapies tried and outcome: {MS RELIEF ITEMS:562066::\"nothing\"}    {additonal problems for provider to add (Optional):261490}    Review of Systems   {ROS COMP (Optional):676034}      Objective    LMP  (LMP Unknown)   There is no height or weight on file to calculate BMI.  Physical Exam   {Exam List (Optional):900890}    {Diagnostic Test Results (Optional):899825}    {AMBULATORY ATTESTATION (Optional):578332}        "

## 2021-03-16 NOTE — PROGRESS NOTES
"    Assessment & Plan     Localized superficial swelling, mass, or lump  Uncertain etiology, but I am most suspicious for some scar tissue.  At this time, she will monitor. She can see what Cardiology thinks. If becoming bigger or more painful, discussed consideration for ultrasound.  She was thinking it is most likely scar tissue as well.     Cardiac pacemaker in situ  As above. She does follow with Cardiology.     PMR (polymyalgia rheumatica) (H)  On prednisone and does experience side effects. She will be visiting with Rheumatology soon.    Type 2 diabetes mellitus without complication, without long-term current use of insulin (H)  Suspect related to prednisone. Hoping her HgbA1C is lower now that she is on lower dose.   Will check lab. If way off, will invite her back sooner.   - Comprehensive metabolic panel  - Hemoglobin A1c  - FOOT EXAM    Hyperlipidemia LDL goal <160  On Zetia.   - Comprehensive metabolic panel    Elevation of level of transaminase or lactic acid dehydrogenase (LDH)  Will recheck.   - Comprehensive metabolic panel    Essential hypertension with goal blood pressure less than 140/90  bp is low today.  Not having any symptoms. Reviewed prior readings; some of which have been elevated. Continue to monitor.                    Return in about 4 months (around 7/16/2021), or if symptoms worsen or fail to improve or pending lab, for Face to Face visit preferred.    Violet Fenton MD, MD  [facility] MEG Cobos is a 80 year old who presents for the following health issues     HPI     Concern - Multiple lumps around pace maker  Onset: x 2 months  Description: hard lumps \"size of a dime\"  Intensity: no pain  Progression of Symptoms:  \"I never check them so I have no clue\"  Accompanying Signs & Symptoms: no  Previous history of similar problem: no  Precipitating factors:        Worsened by: n/a  Alleviating factors:        Improved by: n/a  Therapies tried and outcome: " None      See under ROS    Patient Active Problem List   Diagnosis     Chronic airway obstruction (H)     ASCUS on Pap smear     Hyperlipidemia LDL goal <160     Breast cancer (H)     Essential hypertension with goal blood pressure less than 140/90     Cystocele, midline     Uterovaginal prolapse     S/P hysterectomy     Advanced directives, counseling/discussion     History of hypokalemia     Concussion     Thyroid nodule     Chronic pain of both knees     History of lung cancer     Gastroesophageal reflux disease, esophagitis presence not specified     PMR (polymyalgia rheumatica) (H)     Personal history of malignant neoplasm of breast     Long term systemic steroid user     Cardiomyopathy (H)     Complete heart block (H)     Temporal arteritis (H)     Elevation of level of transaminase or lactic acid dehydrogenase (LDH)     Recurrent falls     Dyspnea     Diabetes mellitus, type 2 (H)     Persistent atrial fibrillation (H)     Skin ulcer of right lower leg with fat layer exposed (H)       Current Outpatient Medications   Medication Sig Dispense Refill     acetaminophen (TYLENOL) 500 MG tablet Take 1,000 mg by mouth every 8 hours as needed for mild pain       amLODIPine (NORVASC) 2.5 MG tablet Take 1 tablet (2.5 mg) by mouth daily 180 tablet 1     apixaban ANTICOAGULANT (ELIQUIS) 5 MG tablet Take 1 tablet (5 mg) by mouth 2 times daily 180 tablet 3     B Complex Vitamins (VITAMIN  B COMPLEX) tablet Take 1 tablet by mouth daily.       calcium carbonate (TUMS) 500 MG chewable tablet Take 2 chew tab by mouth 2 times daily as needed for heartburn       calcium citrate-vitamin D (CALCIUM CITRATE + D) 315-250 MG-UNIT TABS per tablet Take 2 tablets by mouth daily       ezetimibe (ZETIA) 10 MG tablet Take 1 tablet (10 mg) by mouth daily 90 tablet 1     FLUoxetine (PROZAC) 20 MG capsule Take 1 capsule (20 mg) by mouth daily 90 capsule 0     furosemide (LASIX) 40 MG tablet Take 80 mg by mouth daily        metoprolol  tartrate (LOPRESSOR) 100 MG tablet Take 1 tablet (100 mg) by mouth 2 times daily 60 tablet 6     omega 3 1000 MG CAPS Take 1 g by mouth daily 90 capsule      polyethylene glycol (MIRALAX/GLYCOLAX) powder Take 1 capful by mouth daily as needed for constipation       potassium chloride ER (K-TAB/KLOR-CON) 10 MEQ CR tablet        predniSONE (DELTASONE) 10 MG tablet Take 17.5 mg by mouth daily        silver sulfADIAZINE (SILVADENE) 1 % external cream Apply topically daily 50 g 3         Review of Systems   CONSTITUTIONAL:NEGATIVE for fever, chills  INTEGUMENTARY/SKIN: see below  RESP:NEGATIVE for significant cough or SOB  CV: NEGATIVE for chest pain, palpitations or peripheral edema  MUSCULOSKELETAL: see below  PSYCHIATRIC: NEGATIVE for changes in mood or affect. Not happy about the prednisone.     She has recently noted some lumps around pacemaker. They are hard; not painful.  She has been aware of them in the last 2-3 months; Not sure how long they have actually been there.     Just went down to 7 mg prednisone, but right arm is hurting so thinking she may need to go back up.  Will see Rheumatology soon.   Appointment end of April with Cardiology.     She does have sores on right leg. These do generally start with bumping something, but then take a long time to heal. Less edema than previously.     She is noting her bp.  Not dizzy or lightheaded.              Objective    BP 98/52 (BP Location: Right arm, Patient Position: Sitting, Cuff Size: Adult Regular)   Pulse 70   Temp 97.7  F (36.5  C) (Oral)   Resp 16   Wt 65.3 kg (144 lb)   LMP  (LMP Unknown)   SpO2 99%   BMI 25.51 kg/m    Body mass index is 25.51 kg/m .  Physical Exam   GENERAL APPEARANCE: healthy, alert and no distress  RESP: lungs clear to auscultation - no rales, rhonchi or wheezes  CV: regular rates and rhythm  Pacemaker is present in the upper left chest. nontender to palpation. There is an incisional scar superior to this and there are a couple  lumps, ~ 1/2 cm diameter present. Round, mobile, nontender in the subcutaneous tissue. There feels like there may be a couple firm lumps along the medial edge of the pacemaker as well; a little smaller than above.   MS: trace edema.   SKIN: she has a couple dark scabs, ~ an inch in diameter lateral right leg between knee and ankle. There is another area that looks more like callous or thickened skin where one is further along in the healing.   PSYCH: mentation appears normal and affect normal/bright    DP pulse present bilaterally.  No sores or ulcerations.  Little callous.  Monofilament exam normal.    GFR Estimate   Date Value Ref Range Status   09/08/2020 56 (L) >60 mL/min/[1.73_m2] Final     Comment:     Non  GFR Calc  Starting 12/18/2018, serum creatinine based estimated GFR (eGFR) will be   calculated using the Chronic Kidney Disease Epidemiology Collaboration   (CKD-EPI) equation.     08/31/2020 71 >60 mL/min/[1.73_m2] Final     Comment:     Non  GFR Calc  Starting 12/18/2018, serum creatinine based estimated GFR (eGFR) will be   calculated using the Chronic Kidney Disease Epidemiology Collaboration   (CKD-EPI) equation.     08/27/2020 60 (L) >60 mL/min/[1.73_m2] Final     Comment:     Non  GFR Calc  Starting 12/18/2018, serum creatinine based estimated GFR (eGFR) will be   calculated using the Chronic Kidney Disease Epidemiology Collaboration   (CKD-EPI) equation.       Lab Results   Component Value Date    A1C 7.0 08/31/2020    A1C 5.9 11/28/2005     Recent Labs   Lab Test 05/29/20  0842 08/26/19  0832 06/10/15  0925 06/10/15  0925 07/22/14  0827   CHOL 148 187   < > 194 183   HDL 50 65   < > 70 55   LDL 76 97   < > 106 105   TRIG 110 124   < > 88 114   CHOLHDLRATIO  --   --   --  2.8 3.3    < > = values in this interval not displayed.

## 2021-03-17 LAB
ALBUMIN SERPL-MCNC: 3.5 G/DL (ref 3.4–5)
ALP SERPL-CCNC: 77 U/L (ref 40–150)
ALT SERPL W P-5'-P-CCNC: 35 U/L (ref 0–50)
ANION GAP SERPL CALCULATED.3IONS-SCNC: 4 MMOL/L (ref 3–14)
AST SERPL W P-5'-P-CCNC: 18 U/L (ref 0–45)
BILIRUB SERPL-MCNC: 0.4 MG/DL (ref 0.2–1.3)
BUN SERPL-MCNC: 21 MG/DL (ref 7–30)
CALCIUM SERPL-MCNC: 10.2 MG/DL (ref 8.5–10.1)
CHLORIDE SERPL-SCNC: 103 MMOL/L (ref 94–109)
CO2 SERPL-SCNC: 32 MMOL/L (ref 20–32)
CREAT SERPL-MCNC: 1.03 MG/DL (ref 0.52–1.04)
GFR SERPL CREATININE-BSD FRML MDRD: 51 ML/MIN/{1.73_M2}
GLUCOSE SERPL-MCNC: 97 MG/DL (ref 70–99)
POTASSIUM SERPL-SCNC: 3 MMOL/L (ref 3.4–5.3)
PROT SERPL-MCNC: 6.8 G/DL (ref 6.8–8.8)
SODIUM SERPL-SCNC: 139 MMOL/L (ref 133–144)

## 2021-03-21 ENCOUNTER — TELEPHONE (OUTPATIENT)
Dept: FAMILY MEDICINE | Facility: CLINIC | Age: 81
End: 2021-03-21

## 2021-03-21 DIAGNOSIS — E87.6 HYPOKALEMIA: Primary | ICD-10-CM

## 2021-03-21 DIAGNOSIS — I10 ESSENTIAL HYPERTENSION WITH GOAL BLOOD PRESSURE LESS THAN 140/90: ICD-10-CM

## 2021-03-21 NOTE — TELEPHONE ENCOUNTER
Please call her about her potassium.     From result note:        Your potassium is low.   I have that you are taking 10 meq of this daily. I am recommending that you take this three times a day for three days to boost this. Then go back to your once daily.  I would recommend rechecking in a couple weeks. If this is OK, I would recommend that we recheck again in a couple months to make sure you are keeping up... we might need to consider increasing the dose.

## 2021-03-22 RX ORDER — POTASSIUM CHLORIDE 1500 MG/1
20 TABLET, EXTENDED RELEASE ORAL 2 TIMES DAILY
Qty: 63 TABLET | Refills: 0 | Status: SHIPPED | OUTPATIENT
Start: 2021-03-22 | End: 2021-04-22

## 2021-03-22 RX ORDER — POTASSIUM CHLORIDE 1500 MG/1
20 TABLET, EXTENDED RELEASE ORAL 2 TIMES DAILY
COMMUNITY
Start: 2021-02-16 | End: 2021-03-22

## 2021-03-22 NOTE — TELEPHONE ENCOUNTER
Let's have her take it tid for 3 days, then go back to her bid and work at being consistent.  She may wish to update whoever did prescribe it... will send in a month with these directions. Still do recheck as we recommended.     Might be able to work with pharmacist on smaller pills; something to drink if necessary.   There are 10 meq pills; would need to be taking more of them, but perhaps they are smaller.

## 2021-03-22 NOTE — TELEPHONE ENCOUNTER
Patient calls back. Advised of recommendations from Dr. Fenton regarding Potassium. Patient states that her Potassium prescription is for 20 meq tablets and she is taking this twice a day for a total of 40 meq a day. Patient states that she does forget to take this at times as it is difficult to swallow and sometimes get stuck in her throat. Will send message back to Dr. Fenton to review for any changes to Potassium directions since patient is taking differently than listed in Epic.     She also states that only has 9 days of potassium left if she takes 2 tabs a day and is out of refills at the pharmacy so she will need a new prescription soon.     Kamini Winn RN  [facility]

## 2021-03-22 NOTE — TELEPHONE ENCOUNTER
Called patient and informed of below. Patient verbalized understanding and is agreeable to plan. Both labs have been scheduled.     Roxie Lobato RN on 3/22/2021 at 10:20 AM

## 2021-03-25 ENCOUNTER — IMMUNIZATION (OUTPATIENT)
Dept: NURSING | Facility: CLINIC | Age: 81
End: 2021-03-25
Payer: MEDICARE

## 2021-03-25 PROCEDURE — 0031A PR COVID VAC JANSSEN AD26 0.5ML: CPT

## 2021-03-25 PROCEDURE — 91303 PR COVID VAC JANSSEN AD26 0.5ML: CPT

## 2021-04-01 ENCOUNTER — DOCUMENTATION ONLY (OUTPATIENT)
Dept: LAB | Facility: CLINIC | Age: 81
End: 2021-04-01

## 2021-04-01 ENCOUNTER — TRANSFERRED RECORDS (OUTPATIENT)
Dept: HEALTH INFORMATION MANAGEMENT | Facility: CLINIC | Age: 81
End: 2021-04-01

## 2021-04-01 NOTE — PROGRESS NOTES
Tomasa Speedy Farleyon has an upcoming lab appointment:    Future Appointments   Date Time Provider Department Center   4/5/2021  1:45 PM RM [facility]   [facility]   4/30/2021  2:15 PM Kylie Sevilla DO [facility]   6/1/2021 10:00 AM RM [facility]      Please review Health Maintenance and sign order: Review of Health Maintenance Protocol Orders (HMPO) to authorize patient's due Health Maintenance labs to be drawn.    Health Maintenance Due   Topic     ANNUAL REVIEW OF HM ORDERS      KAREN Pryor

## 2021-04-20 DIAGNOSIS — E87.6 HYPOKALEMIA: ICD-10-CM

## 2021-04-22 RX ORDER — POTASSIUM CHLORIDE 1500 MG/1
20 TABLET, EXTENDED RELEASE ORAL 2 TIMES DAILY
Qty: 180 TABLET | Refills: 0 | Status: SHIPPED | OUTPATIENT
Start: 2021-04-22 | End: 2021-07-26

## 2021-04-22 NOTE — TELEPHONE ENCOUNTER
potassium chloride ER (KLOR-CON M) 20 MEQ CR tablet  Last Written Prescription Date:  3/22/21  Last Fill Quantity: 63,   # refills: 0  Last Office Visit: 3/16/21  Future Office visit:    Next 5 appointments (look out 90 days)    Apr 30, 2021  2:15 PM  Return Visit with Kylie Sevilla DO  [facility] ([facility] ) [address] [address] [phone]           Routing refill request to provider for review/approval because:  Failed labs     Roxie Lobato RN on 4/22/2021 at 8:42 AM

## 2021-04-22 NOTE — TELEPHONE ENCOUNTER
This was sent...    I had wanted her to recheck her potassium in a couple weeks from the last one; see if you can help her schedule. It has been more than that...     Thanks!

## 2021-04-22 NOTE — TELEPHONE ENCOUNTER
Pt has labs drawn at Rheumatology on 4/1/21, Those labs are in the chart. Potasium was 3.5.    Janelle Sanchez-

## 2021-04-29 ENCOUNTER — ANCILLARY PROCEDURE (OUTPATIENT)
Dept: CARDIOLOGY | Facility: CLINIC | Age: 81
End: 2021-04-29
Attending: INTERNAL MEDICINE
Payer: MEDICARE

## 2021-04-29 DIAGNOSIS — Z95.0 CARDIAC PACEMAKER IN SITU: ICD-10-CM

## 2021-04-29 PROCEDURE — 93294 REM INTERROG EVL PM/LDLS PM: CPT | Performed by: INTERNAL MEDICINE

## 2021-04-29 PROCEDURE — 93296 REM INTERROG EVL PM/IDS: CPT | Performed by: INTERNAL MEDICINE

## 2021-04-30 ENCOUNTER — OFFICE VISIT (OUTPATIENT)
Dept: CARDIOLOGY | Facility: CLINIC | Age: 81
End: 2021-04-30
Attending: NURSE PRACTITIONER
Payer: MEDICARE

## 2021-04-30 VITALS
HEIGHT: 63 IN | BODY MASS INDEX: 25.52 KG/M2 | WEIGHT: 144 LBS | HEART RATE: 71 BPM | SYSTOLIC BLOOD PRESSURE: 137 MMHG | DIASTOLIC BLOOD PRESSURE: 62 MMHG

## 2021-04-30 DIAGNOSIS — I42.9 SECONDARY CARDIOMYOPATHY (H): ICD-10-CM

## 2021-04-30 DIAGNOSIS — I83.90 ASYMPTOMATIC VARICOSE VEINS: ICD-10-CM

## 2021-04-30 DIAGNOSIS — I80.253: ICD-10-CM

## 2021-04-30 DIAGNOSIS — I48.19 PERSISTENT ATRIAL FIBRILLATION (H): ICD-10-CM

## 2021-04-30 DIAGNOSIS — J43.8 OTHER EMPHYSEMA (H): ICD-10-CM

## 2021-04-30 DIAGNOSIS — Z95.0 CARDIAC PACEMAKER IN SITU: Primary | ICD-10-CM

## 2021-04-30 DIAGNOSIS — L97.901: ICD-10-CM

## 2021-04-30 DIAGNOSIS — I73.9 PERIPHERAL VASCULAR DISEASE, UNSPECIFIED (H): ICD-10-CM

## 2021-04-30 DIAGNOSIS — I42.8 OTHER CARDIOMYOPATHY (H): ICD-10-CM

## 2021-04-30 LAB
MDC_IDC_EPISODE_DTM: NORMAL
MDC_IDC_EPISODE_DURATION: 14 S
MDC_IDC_EPISODE_ID: NORMAL
MDC_IDC_EPISODE_TYPE: NORMAL
MDC_IDC_LEAD_IMPLANT_DT: NORMAL
MDC_IDC_LEAD_IMPLANT_DT: NORMAL
MDC_IDC_LEAD_LOCATION: NORMAL
MDC_IDC_LEAD_LOCATION: NORMAL
MDC_IDC_LEAD_LOCATION_DETAIL_1: NORMAL
MDC_IDC_LEAD_LOCATION_DETAIL_1: NORMAL
MDC_IDC_LEAD_MFG: NORMAL
MDC_IDC_LEAD_MFG: NORMAL
MDC_IDC_LEAD_MODEL: NORMAL
MDC_IDC_LEAD_MODEL: NORMAL
MDC_IDC_LEAD_POLARITY_TYPE: NORMAL
MDC_IDC_LEAD_SERIAL: NORMAL
MDC_IDC_LEAD_SERIAL: NORMAL
MDC_IDC_MSMT_BATTERY_DTM: NORMAL
MDC_IDC_MSMT_BATTERY_REMAINING_LONGEVITY: 132 MO
MDC_IDC_MSMT_BATTERY_REMAINING_PERCENTAGE: 100 %
MDC_IDC_MSMT_BATTERY_STATUS: NORMAL
MDC_IDC_MSMT_LEADCHNL_LV_IMPEDANCE_VALUE: 750 OHM
MDC_IDC_MSMT_LEADCHNL_LV_PACING_THRESHOLD_AMPLITUDE: 1.5 V
MDC_IDC_MSMT_LEADCHNL_LV_PACING_THRESHOLD_PULSEWIDTH: 0.4 MS
MDC_IDC_MSMT_LEADCHNL_RV_IMPEDANCE_VALUE: 776 OHM
MDC_IDC_MSMT_LEADCHNL_RV_PACING_THRESHOLD_AMPLITUDE: 0.7 V
MDC_IDC_MSMT_LEADCHNL_RV_PACING_THRESHOLD_PULSEWIDTH: 0.4 MS
MDC_IDC_PG_IMPLANT_DTM: NORMAL
MDC_IDC_PG_MFG: NORMAL
MDC_IDC_PG_MODEL: NORMAL
MDC_IDC_PG_SERIAL: NORMAL
MDC_IDC_PG_TYPE: NORMAL
MDC_IDC_SESS_CLINIC_NAME: NORMAL
MDC_IDC_SESS_DTM: NORMAL
MDC_IDC_SESS_TYPE: NORMAL
MDC_IDC_SET_BRADY_AT_MODE_SWITCH_RATE: 170 {BEATS}/MIN
MDC_IDC_SET_BRADY_LOWRATE: 70 {BEATS}/MIN
MDC_IDC_SET_BRADY_MAX_SENSOR_RATE: 130 {BEATS}/MIN
MDC_IDC_SET_BRADY_MODE: NORMAL
MDC_IDC_SET_CRT_LVRV_DELAY: 30 MS
MDC_IDC_SET_CRT_PACED_CHAMBERS: NORMAL
MDC_IDC_SET_LEADCHNL_LV_PACING_AMPLITUDE: 2.5 V
MDC_IDC_SET_LEADCHNL_LV_PACING_ANODE_ELECTRODE_1: NORMAL
MDC_IDC_SET_LEADCHNL_LV_PACING_ANODE_LOCATION_1: NORMAL
MDC_IDC_SET_LEADCHNL_LV_PACING_CATHODE_ELECTRODE_1: NORMAL
MDC_IDC_SET_LEADCHNL_LV_PACING_CATHODE_LOCATION_1: NORMAL
MDC_IDC_SET_LEADCHNL_LV_PACING_PULSEWIDTH: 0.4 MS
MDC_IDC_SET_LEADCHNL_LV_SENSING_ADAPTATION_MODE: NORMAL
MDC_IDC_SET_LEADCHNL_LV_SENSING_ANODE_ELECTRODE_1: NORMAL
MDC_IDC_SET_LEADCHNL_LV_SENSING_ANODE_LOCATION_1: NORMAL
MDC_IDC_SET_LEADCHNL_LV_SENSING_CATHODE_ELECTRODE_1: NORMAL
MDC_IDC_SET_LEADCHNL_LV_SENSING_CATHODE_LOCATION_1: NORMAL
MDC_IDC_SET_LEADCHNL_LV_SENSING_SENSITIVITY: 2.5 MV
MDC_IDC_SET_LEADCHNL_RA_SENSING_ADAPTATION_MODE: NORMAL
MDC_IDC_SET_LEADCHNL_RA_SENSING_SENSITIVITY: 0.5 MV
MDC_IDC_SET_LEADCHNL_RV_PACING_AMPLITUDE: 2 V
MDC_IDC_SET_LEADCHNL_RV_PACING_CAPTURE_MODE: NORMAL
MDC_IDC_SET_LEADCHNL_RV_PACING_POLARITY: NORMAL
MDC_IDC_SET_LEADCHNL_RV_PACING_PULSEWIDTH: 0.4 MS
MDC_IDC_SET_LEADCHNL_RV_SENSING_ADAPTATION_MODE: NORMAL
MDC_IDC_SET_LEADCHNL_RV_SENSING_POLARITY: NORMAL
MDC_IDC_SET_LEADCHNL_RV_SENSING_SENSITIVITY: 2.5 MV
MDC_IDC_SET_ZONE_DETECTION_INTERVAL: 375 MS
MDC_IDC_SET_ZONE_TYPE: NORMAL
MDC_IDC_SET_ZONE_VENDOR_TYPE: NORMAL
MDC_IDC_STAT_BRADY_DTM_END: NORMAL
MDC_IDC_STAT_BRADY_DTM_START: NORMAL
MDC_IDC_STAT_BRADY_RA_PERCENT_PACED: 0 %
MDC_IDC_STAT_BRADY_RV_PERCENT_PACED: 100 %
MDC_IDC_STAT_CRT_DTM_END: NORMAL
MDC_IDC_STAT_CRT_DTM_START: NORMAL
MDC_IDC_STAT_CRT_LV_PERCENT_PACED: 100 %
MDC_IDC_STAT_EPISODE_RECENT_COUNT: 0
MDC_IDC_STAT_EPISODE_RECENT_COUNT: 2
MDC_IDC_STAT_EPISODE_RECENT_COUNT_DTM_END: NORMAL
MDC_IDC_STAT_EPISODE_RECENT_COUNT_DTM_START: NORMAL
MDC_IDC_STAT_EPISODE_TYPE: NORMAL
MDC_IDC_STAT_EPISODE_VENDOR_TYPE: NORMAL

## 2021-04-30 PROCEDURE — 99214 OFFICE O/P EST MOD 30 MIN: CPT | Performed by: INTERNAL MEDICINE

## 2021-04-30 ASSESSMENT — MIFFLIN-ST. JEOR: SCORE: 1087.31

## 2021-04-30 NOTE — PROGRESS NOTES
Service Date: 04/30/2021    HISTORY OF PRESENT ILLNESS:  Ms. Fam is a very pleasant 80-year-old female with a history of permanent atrial fibrillation, idiopathic cardiomyopathy, hypertension, history of lung cancer and breast cancer and also leg edema with nonhealing ulcers of her lower extremities.  She is here for a followup visit today.      She underwent a permanent pacemaker with a biventricular pacing device upgrade and AV susu ablation in 06/2020.  At that time, her echo had suggested severe LV dysfunction with an EF around 30%-35% and moderate valvular heart disease.      Since then, her heart rate has been much better controlled.  A repeat echocardiogram about 3 months later showed significant improvement in her overall LV function.  Her ejection fraction was now 55%-60% with a significant improvement in her valvular disease as well, only mild mitral and aortic insufficiency were noted.  Pulmonary pressures were estimated to be normal.      Despite this, she has had continued progressive shortness of breath.  She gets out of breath even with minimal activity such as walking to her mailbox; she has to stop and catch her breath.  She denies any chest pain or lightheadedness.  She denies any weakness.  She is on chronic steroids for polymyalgia rheumatica and has joint pains with this, but this is not her limiting factor for activity, her breathing has been.    PHYSICAL EXAMINATION:  Her blood pressure is 137/62, pulse of 71, weight is 144 pounds, which is stable over the last year.  On exam, carotid upstrokes were brisk.  I did not appreciate bruit.  Cardiovascular tones were regular today.  I did not hear a murmur, gallop or rub.  Lung sounds were markedly diminished throughout the left lung field as compared to the right.  Interestingly, she had resection of the right lung from her lung cancer.      In talking with her, she has routine chest CT through her oncologist to monitor for recurrence and her  last was in October.  She has a chart history of COPD, but she is uncertain as to what this is and again with her lung exam, I am very concerned that her breathing issues are stemming from her lungs, especially knowing that her heart function has greatly improved following her AV node ablation and upgrade to a biventricular pacing device.      In looking at her lower extremities, she does have mild peripheral edema, significant varicose veins noted and some finally healing ulcerations on her right leg in particular, which make it very difficult for her to wear compression socks.  These ulcers are on the mid calf area and posterior calf area.  Pulses are diminished in the lower extremities.      I do not see that she has had a recent hemoglobin.  Her last was in 09/2020.  It was slightly low at 11.4.  She is maintained on Eliquis for her atrial fibrillation for CVA prophylaxis.  No obvious bleeding history.      IMPRESSION:  In summary, Ms. Fam is a very pleasant 80-year-old female with a history of permanent atrial fibrillation and cardiomyopathy.  She had an AV node ablation and a biventricular pacing device placed in 06/2020.  This has significantly improved her overall LV function as well as her valvular heart disease.  However, she has ongoing significant dyspnea with exertion, even mild exertion, walking less than 50 feet.  Her lung exam suggests that this may be stemming from a pulmonary issue, but I do not have access to any of her recent lung exams or chest CTs as these were done at [facility].  I have recommended that she follow up with her primary care as well as her oncologist about her breathing issues.      I would also recommend repeating a hemoglobin to ensure that her anemia is not getting worse and is a contributing factor as well.    In regards to her lower extremities, she definitely has evidence of severe varicose veins and likely has some venous insufficiency contributing to her lower  extremity edema.  She has diminished pulses and evidence of peripheral vascular disease.  I would like her to undergo an ultrasound, both arterial and venous of her lower extremities to see if this is contributing to her nonhealing ulcers and diminished pulses.  We will schedule that for her in the near future and possibly have her see one of our vascular specialists.    Please feel free to contact me with any questions you have in regards to her care.    cc:   Violet Fenton MD   [facility]  [address] [place] [address]     Kylie Sevilla DO        D: 2021   T: 2021   MT: RONEL    Name:     HAMIDA HODGE  MRN:      [id number]        Account:      [id number]   :      1940           Service Date: 2021       Document: [id number]

## 2021-04-30 NOTE — LETTER
4/30/2021    [place]  [address] [place] [address]    RE: Tomasa Fam       Dear Colleague,    I had the pleasure of seeing Tomasa Fam in the [facility].    HPI and Plan:   See dictation    Orders Placed This Encounter   Procedures     US Venous Competency Bilateral     US Lower Extremity Arterial Duplex Bilateral       No orders of the defined types were placed in this encounter.      There are no discontinued medications.      Encounter Diagnoses   Name Primary?     Secondary cardiomyopathy (H)      Cardiac pacemaker in situ Yes     Other cardiomyopathy (H)      Persistent atrial fibrillation (H)      Other emphysema (H)      Non-healing ulcer of lower leg, limited to breakdown of skin, unspecified laterality (H)      Asymptomatic varicose veins      Phlebitis and thrombophlebitis of calf muscular vein, bilateral (H)       Peripheral vascular disease, unspecified (H)         CURRENT MEDICATIONS:  Current Outpatient Medications   Medication Sig Dispense Refill     acetaminophen (TYLENOL) 500 MG tablet Take 1,000 mg by mouth every 8 hours as needed for mild pain       amLODIPine (NORVASC) 2.5 MG tablet Take 1 tablet (2.5 mg) by mouth daily 90 tablet 1     apixaban ANTICOAGULANT (ELIQUIS) 5 MG tablet Take 1 tablet (5 mg) by mouth 2 times daily 180 tablet 3     B Complex Vitamins (VITAMIN  B COMPLEX) tablet Take 1 tablet by mouth daily.       calcium carbonate (TUMS) 500 MG chewable tablet Take 2 chew tab by mouth 2 times daily as needed for heartburn       calcium citrate-vitamin D (CALCIUM CITRATE + D) 315-250 MG-UNIT TABS per tablet Take 2 tablets by mouth daily       ezetimibe (ZETIA) 10 MG tablet Take 1 tablet (10 mg) by mouth daily 90 tablet 1     furosemide (LASIX) 40 MG tablet Take 80 mg by mouth daily        metoprolol tartrate (LOPRESSOR) 100 MG tablet Take 1 tablet (100 mg) by mouth 2 times daily 60 tablet 6      omega 3 1000 MG CAPS Take 1 g by mouth daily 90 capsule      polyethylene glycol (MIRALAX/GLYCOLAX) powder Take 1 capful by mouth daily as needed for constipation       potassium chloride ER (KLOR-CON M) 20 MEQ CR tablet Take 1 tablet (20 mEq) by mouth 2 times daily 180 tablet 0     predniSONE (DELTASONE) 1 MG tablet Take 6 mg by mouth daily        FLUoxetine (PROZAC) 20 MG capsule Take 1 capsule (20 mg) by mouth daily (Patient not taking: Reported on 4/30/2021) 90 capsule 0     silver sulfADIAZINE (SILVADENE) 1 % external cream Apply topically daily (Patient not taking: Reported on 4/30/2021) 50 g 3       ALLERGIES     Allergies   Allergen Reactions     No Known Drug Allergies      Tape [Adhesive Tape]      Sensitive to plastic tape on upper part of body--takes skin off       PAST MEDICAL HISTORY:  Past Medical History:   Diagnosis Date     Arthritis     hands, knees     Breast cancer (H) jan. 2012     left mastectomy followed by right;  Dr. Luong     Chronic airway obstruction, not elsewhere classified 2006    very mild COPD - small cough     Concussion 3/13/2013     Problem list name updated by automated process. Provider to review and confirm [employer] Update utility     Cystocele, midline 4/4/2012     Diffuse cystic mastopathy      Gastroesophageal reflux disease, esophagitis presence not specified 11/23/2019     History of hypokalemia 1/23/2013     Hyperlipidemia LDL goal <160 6/29/2011    Lynn 10-year CHD Risk Score: 2% (14 Total Points)  Values used to calculate score:    Age: 70 years -- Points: 14    Total Cholesterol: 192 mg/dL -- Points: 1    HDL Cholesterol: 61 mg/dL -- Points: -1    Systolic BP (treated): 118 mmHg -- Points: 0   The patient is not a smoker. -- Points: 0   The patient has not been diagnosed with diabetes. -- Points: 0   The patient does not have a famil     Lung cancer (H) 10/21/2015     Paroxysmal atrial fibrillation (H) 9/13/2019     PMR (polymyalgia rheumatica) (H) 1/17/2020     tapering' above.)  In patients receiving over 10 mg of prednisone/day, the dose can be lowered by 2.5 mg/day decrements every two to four weeks  Once the dose of prednisone is 10 mg/day, further tapering can be done by 1 mg per month, provided the clinical course is stable  The clinical response to glucocorticoid therapy is closely monitored, which centers on screening for the presence and/or recu     Thyroid nodule 4/23/2016    Noted on CT Fall 2015.      Unspecified essential hypertension late 1980's       PAST SURGICAL HISTORY:  Past Surgical History:   Procedure Laterality Date     ANESTHESIA CARDIOVERSION N/A 6/12/2020    Procedure: ANESTHESIA, FOR CARDIOVERSION;  Surgeon: GENERIC ANESTHESIA PROVIDER;  Location: [facility] OR     COLONOSCOPY  3/2003    adenomatous polyp      COLONOSCOPY  7/2006    diverticulosis - repeat in 5 years     COLONOSCOPY  10/13/2011    Procedure:COLONOSCOPY; COLONOSCOPY ; Surgeon:CHITO GORDILLO; Location: GI     CYSTOSCOPY  7/11/2012    Procedure: CYSTOSCOPY;;  Surgeon: Aline Cooper DO;  Location:  OR     DAVINCI HYSTERECTOMY SUPRACERVICAL, SACROCOLPOPEXY, COMBINED  7/11/2012    Procedure: COMBINED DAVINCI HYSTERECTOMY SUPRACERVICAL, SACROCOLPOPEXY;   DAVINCI ASSISTED LAPAROSCOPIC SUPRACERVICAL HYSTERECTOMY AND BILATERAL SALPINGO-OOPHORECTOMY, SACROCOLPOPEXY AND CYSTOSCOPY;  Surgeon: Aline Cooper DO;  Location:  OR     EP ABLATION AV NODE N/A 6/22/2020    Procedure: EP ABLATION AV NODE;  Surgeon: Adriano Raman MD;  Location:  HEART CARDIAC CATH LAB     EP BIVENT LEAD PLACEMENT N/A 6/22/2020    Procedure: Bivent Lead Placement;  Surgeon: Adriano Raman MD;  Location:  HEART CARDIAC CATH LAB     EP PACEMAKER N/A 6/22/2020    Procedure: AVNA and BiV Pacemaker Insertion;  Surgeon: Adriano Raman MD;  Location:  HEART CARDIAC CATH LAB     EXCISE LESION EYELID Right 6/29/2015    Procedure: EXCISE LESION EYELID;  Surgeon: Hank Olvera,  MD;  Location:  [facility]     INSERT PORT VASCULAR ACCESS  2/3/2012    Procedure:INSERT PORT VASCULAR ACCESS; Power Port-A- Catheter Placement ; Surgeon:IRISH TAPIA; Location:[facility] OR     LAPAROSCOPIC SALPINGO-OOPHORECTOMY  7/11/2012    Procedure: LAPAROSCOPIC SALPINGO-OOPHORECTOMY;  Davinci;  Surgeon: Aline Cooper DO;  Location:  OR     LIPOSUCTION, RHYTIDECTOMY, COMBINED       LOBECTOMY LUNG Right 3/1/2016    Procedure: LOBECTOMY LUNG;  Surgeon: Jonas Woodward MD;  Location:  OR     MAMMOPLASTY REDUCTION  1/6/2012    Procedure:MAMMOPLASTY REDUCTION; Surgeon:MICKI KYLE; Location:[facility] OR     MASTECTOMY SIMPLE  11/12/2012    Procedure: MASTECTOMY SIMPLE;   Right Prophylactic Mastectomy with attempted Right Sentinal Node Biopsy, Revision Bilateral Mastectomy Insicions, liposuction in breast area;  Surgeon: Irish Tapia MD;  Location: [facility] OR     MASTECTOMY SIMPLE, SENTINEL NODE, COMBINED  1/6/2012    Procedure:COMBINED MASTECTOMY SIMPLE, SENTINEL NODE; Left Mastectomy Left [place] Node Biopsy,  Right Breast Reduction ; Surgeon:IRISH TAPIA; Location:[facility] OR     MASTECTOMY, BILATERAL       REMOVE PORT VASCULAR ACCESS  4/29/2013    Procedure: REMOVE PORT VASCULAR ACCESS;  Port A catheter removal ;  Surgeon: Irish Tapia MD;  Location: [facility] OR     REPAIR PTOSIS BILATERAL Bilateral 6/29/2015    Procedure: REPAIR PTOSIS BILATERAL;  Surgeon: Hank Olvera MD;  Location:  [facility]     REVISE RECONSTRUCTED BREAST BILATERAL  11/12/2012    Procedure: REVISE RECONSTRUCTED BREAST BILATERAL;;  Surgeon: Micki Kyle MD;  Location: [facility] OR     SURGICAL HISTORY OF -   in 40's    face lift     SURGICAL HISTORY OF -       lipoma removed right thigh     SURGICAL HISTORY OF -       D and C     THORACOTOMY Right 3/1/2016    Procedure: THORACOTOMY;  Surgeon: Jonas Woodward MD;  Location:  OR     ZZC NONSPECIFIC PROCEDURE  1970    s/p Tubal ligation 1970       FAMILY  HISTORY:  Family History   Problem Relation Age of Onset     Cardiovascular Father         ruptured aorta, hardening of the arteries     Cerebrovascular Disease Mother      Respiratory Mother         chronic bronchitis - was a smoker early on     Cardiovascular Paternal Grandfather         MI     Cerebrovascular Disease Paternal Aunt      Hypertension Son      Neurologic Disorder Daughter         migraines     Breast Cancer Daughter      Brain Tumor Sister        SOCIAL HISTORY:  Social History     Socioeconomic History     Marital status:      Spouse name: Aren     Number of children: 2     Years of education: None     Highest education level: None   Occupational History     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     Employer: NONE    Social Needs     Financial resource strain: None     Food insecurity     Worry: None     Inability: None     Transportation needs     Medical: None     Non-medical: None   Tobacco Use     Smoking status: Never Smoker     Smokeless tobacco: Never Used   Substance and Sexual Activity     Alcohol use: Yes     Comment: 0-1 drink day     Drug use: No     Sexual activity: Yes     Partners: Male   Lifestyle     Physical activity     Days per week: None     Minutes per session: None     Stress: None   Relationships     Social connections     Talks on phone: None     Gets together: None     Attends [employer] service: None     Active member of club or organization: None     Attends meetings of clubs or organizations: None     Relationship status: None     Intimate partner violence     Fear of current or ex partner: None     Emotionally abused: None     Physically abused: None     Forced sexual activity: None   Other Topics Concern      Service Not Asked     Blood Transfusions Not Asked     Caffeine Concern Not Asked     Occupational Exposure Not Asked     Hobby Hazards Not Asked     Sleep Concern Not Asked     Stress Concern Not Asked     Weight Concern Not Asked     Special Diet Not Asked      "Back Care Not Asked     Exercise Yes     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 Yes   Social History Narrative     None       Review of Systems:  Skin:  Positive for bruising     Eyes:  Positive for visual blurring    ENT:  Negative      Respiratory:  Positive for dyspnea on exertion     Cardiovascular:    chest pain;Positive for;fatigue    Gastroenterology: Positive for heartburn    Genitourinary:  Positive for urinary frequency    Musculoskeletal:  Positive for   arm pain  Neurologic:  Positive for numbness or tingling of hands    Psychiatric:  Positive for sleep disturbances    Heme/Lymph/Imm:  Negative      Endocrine:  Negative        Physical Exam:  Vitals: /62   Pulse 71   Ht 1.6 m (5' 3\")   Wt 65.3 kg (144 lb)   LMP  (LMP Unknown)   BMI 25.51 kg/m      Constitutional:  cooperative;in no acute distress        Skin:    bruises present;dusky;venous stasis changes        Head:  normocephalic, no masses or lesions        Eyes:  pupils equal and round        Lymph:      ENT:  no pallor or cyanosis, dentition good        Neck:  no carotid bruit        Respiratory:       marked diminished BS on left posterior lung as compared to right, no wheezing or rales    Cardiac: regular rhythm             single ectopic beat     pulses below the femoral arteries are diminished                                      GI:  abdomen soft        Extremities and Muscular Skeletal:  no deformities, clubbing, cyanosis, erythema observed stasis pigmentation 1+          Neurological:  no gross motor deficits        Psych:  Alert and Oriented x 3      Thank you for allowing me to participate in the care of your patient.      Sincerely,     Kylie Sevilla DO     [facility]  cc:   Gloria Donnelly, APRN CNP  [address] " [phone]

## 2021-04-30 NOTE — PROGRESS NOTES
HPI and Plan:   See dictation    Orders Placed This Encounter   Procedures     US Venous Competency Bilateral     US Lower Extremity Arterial Duplex Bilateral       No orders of the defined types were placed in this encounter.      There are no discontinued medications.      Encounter Diagnoses   Name Primary?     Secondary cardiomyopathy (H)      Cardiac pacemaker in situ Yes     Other cardiomyopathy (H)      Persistent atrial fibrillation (H)      Other emphysema (H)      Non-healing ulcer of lower leg, limited to breakdown of skin, unspecified laterality (H)      Asymptomatic varicose veins      Phlebitis and thrombophlebitis of calf muscular vein, bilateral (H)       Peripheral vascular disease, unspecified (H)         CURRENT MEDICATIONS:  Current Outpatient Medications   Medication Sig Dispense Refill     acetaminophen (TYLENOL) 500 MG tablet Take 1,000 mg by mouth every 8 hours as needed for mild pain       amLODIPine (NORVASC) 2.5 MG tablet Take 1 tablet (2.5 mg) by mouth daily 90 tablet 1     apixaban ANTICOAGULANT (ELIQUIS) 5 MG tablet Take 1 tablet (5 mg) by mouth 2 times daily 180 tablet 3     B Complex Vitamins (VITAMIN  B COMPLEX) tablet Take 1 tablet by mouth daily.       calcium carbonate (TUMS) 500 MG chewable tablet Take 2 chew tab by mouth 2 times daily as needed for heartburn       calcium citrate-vitamin D (CALCIUM CITRATE + D) 315-250 MG-UNIT TABS per tablet Take 2 tablets by mouth daily       ezetimibe (ZETIA) 10 MG tablet Take 1 tablet (10 mg) by mouth daily 90 tablet 1     furosemide (LASIX) 40 MG tablet Take 80 mg by mouth daily        metoprolol tartrate (LOPRESSOR) 100 MG tablet Take 1 tablet (100 mg) by mouth 2 times daily 60 tablet 6     omega 3 1000 MG CAPS Take 1 g by mouth daily 90 capsule      polyethylene glycol (MIRALAX/GLYCOLAX) powder Take 1 capful by mouth daily as needed for constipation       potassium chloride ER (KLOR-CON M) 20 MEQ CR tablet Take 1 tablet (20 mEq) by mouth  2 times daily 180 tablet 0     predniSONE (DELTASONE) 1 MG tablet Take 6 mg by mouth daily        FLUoxetine (PROZAC) 20 MG capsule Take 1 capsule (20 mg) by mouth daily (Patient not taking: Reported on 4/30/2021) 90 capsule 0     silver sulfADIAZINE (SILVADENE) 1 % external cream Apply topically daily (Patient not taking: Reported on 4/30/2021) 50 g 3       ALLERGIES     Allergies   Allergen Reactions     No Known Drug Allergies      Tape [Adhesive Tape]      Sensitive to plastic tape on upper part of body--takes skin off       PAST MEDICAL HISTORY:  Past Medical History:   Diagnosis Date     Arthritis     hands, knees     Breast cancer (H) jan. 2012     left mastectomy followed by right;  Dr. Luong     Chronic airway obstruction, not elsewhere classified 2006    very mild COPD - small cough     Concussion 3/13/2013     Problem list name updated by automated process. Provider to review and confirm [employer] Update utility     Cystocele, midline 4/4/2012     Diffuse cystic mastopathy      Gastroesophageal reflux disease, esophagitis presence not specified 11/23/2019     History of hypokalemia 1/23/2013     Hyperlipidemia LDL goal <160 6/29/2011    [place] 10-year CHD Risk Score: 2% (14 Total Points)  Values used to calculate score:    Age: 70 years -- Points: 14    Total Cholesterol: 192 mg/dL -- Points: 1    HDL Cholesterol: 61 mg/dL -- Points: -1    Systolic BP (treated): 118 mmHg -- Points: 0   The patient is not a smoker. -- Points: 0   The patient has not been diagnosed with diabetes. -- Points: 0   The patient does not have a famil     Lung cancer (H) 10/21/2015     Paroxysmal atrial fibrillation (H) 9/13/2019     PMR (polymyalgia rheumatica) (H) 1/17/2020    tapering' above.)  In patients receiving over 10 mg of prednisone/day, the dose can be lowered by 2.5 mg/day decrements every two to four weeks  Once the dose of prednisone is 10 mg/day, further tapering can be done by 1 mg per month, provided the clinical  course is stable  The clinical response to glucocorticoid therapy is closely monitored, which centers on screening for the presence and/or recu     Thyroid nodule 4/23/2016    Noted on CT Fall 2015.      Unspecified essential hypertension late 1980's       PAST SURGICAL HISTORY:  Past Surgical History:   Procedure Laterality Date     ANESTHESIA CARDIOVERSION N/A 6/12/2020    Procedure: ANESTHESIA, FOR CARDIOVERSION;  Surgeon: GENERIC ANESTHESIA PROVIDER;  Location:  OR     COLONOSCOPY  3/2003    adenomatous polyp      COLONOSCOPY  7/2006    diverticulosis - repeat in 5 years     COLONOSCOPY  10/13/2011    Procedure:COLONOSCOPY; COLONOSCOPY ; Surgeon:CHITO GORDILLO; Location: GI     CYSTOSCOPY  7/11/2012    Procedure: CYSTOSCOPY;;  Surgeon: Aline Cooper DO;  Location:  OR     DAVINCI HYSTERECTOMY SUPRACERVICAL, SACROCOLPOPEXY, COMBINED  7/11/2012    Procedure: COMBINED DAVINCI HYSTERECTOMY SUPRACERVICAL, SACROCOLPOPEXY;   DAVINCI ASSISTED LAPAROSCOPIC SUPRACERVICAL HYSTERECTOMY AND BILATERAL SALPINGO-OOPHORECTOMY, SACROCOLPOPEXY AND CYSTOSCOPY;  Surgeon: Aline Cooper DO;  Location:  OR     EP ABLATION AV NODE N/A 6/22/2020    Procedure: EP ABLATION AV NODE;  Surgeon: Adriano Raman MD;  Location:  [facility]     EP BIVENT LEAD PLACEMENT N/A 6/22/2020    Procedure: Bivent Lead Placement;  Surgeon: Adriano Raman MD;  Location:  [facility]     EP PACEMAKER N/A 6/22/2020    Procedure: AVNA and BiV Pacemaker Insertion;  Surgeon: Adriano Raman MD;  Location:  [facility]     EXCISE LESION EYELID Right 6/29/2015    Procedure: EXCISE LESION EYELID;  Surgeon: Hank Olvera MD;  Location:  [facility]     INSERT PORT VASCULAR ACCESS  2/3/2012    Procedure:INSERT PORT VASCULAR ACCESS; Power Port-A- Catheter Placement ; Surgeon:TRESSA TAPIA; Location: OR     LAPAROSCOPIC SALPINGO-OOPHORECTOMY  7/11/2012    Procedure: LAPAROSCOPIC  SALPINGO-OOPHORECTOMY;  Davinci;  Surgeon: Aline Cooper DO;  Location: [facility]     LIPOSUCTION, RHYTIDECTOMY, COMBINED       LOBECTOMY LUNG Right 3/1/2016    Procedure: LOBECTOMY LUNG;  Surgeon: Jonas Woodward MD;  Location:  OR     MAMMOPLASTY REDUCTION  1/6/2012    Procedure:MAMMOPLASTY REDUCTION; Surgeon:MICKI KYLE; Location:[facility] OR     MASTECTOMY SIMPLE  11/12/2012    Procedure: MASTECTOMY SIMPLE;   Right Prophylactic Mastectomy with attempted Right Sentinal Node Biopsy, Revision Bilateral Mastectomy Insicions, liposuction in breast area;  Surgeon: Irish Tapia MD;  Location: [facility] OR     MASTECTOMY SIMPLE, SENTINEL NODE, COMBINED  1/6/2012    Procedure:COMBINED MASTECTOMY SIMPLE, SENTINEL NODE; Left Mastectomy Left [place] Node Biopsy,  Right Breast Reduction ; Surgeon:IRISH TAPIA; Location:[facility] OR     MASTECTOMY, BILATERAL       REMOVE PORT VASCULAR ACCESS  4/29/2013    Procedure: REMOVE PORT VASCULAR ACCESS;  Port A catheter removal ;  Surgeon: Irish Tapia MD;  Location: [facility] OR     REPAIR PTOSIS BILATERAL Bilateral 6/29/2015    Procedure: REPAIR PTOSIS BILATERAL;  Surgeon: Hank Olvera MD;  Location:  [facility]     REVISE RECONSTRUCTED BREAST BILATERAL  11/12/2012    Procedure: REVISE RECONSTRUCTED BREAST BILATERAL;;  Surgeon: Micki Kyle MD;  Location: [facility] OR     SURGICAL HISTORY OF -   in 40's    face lift     SURGICAL HISTORY OF -       lipoma removed right thigh     SURGICAL HISTORY OF -       D and C     THORACOTOMY Right 3/1/2016    Procedure: THORACOTOMY;  Surgeon: Jonas Woodward MD;  Location:  OR     ZZC NONSPECIFIC PROCEDURE  1970    s/p Tubal ligation 1970       FAMILY HISTORY:  Family History   Problem Relation Age of Onset     Cardiovascular Father         ruptured aorta, hardening of the arteries     Cerebrovascular Disease Mother      Respiratory Mother         chronic bronchitis - was a smoker early on     Cardiovascular  Paternal Grandfather         MI     Cerebrovascular Disease Paternal Aunt      Hypertension Son      Neurologic Disorder Daughter         migraines     Breast Cancer Daughter      Brain Tumor Sister        SOCIAL HISTORY:  Social History     Socioeconomic History     Marital status:      Spouse name: Aren     Number of children: 2     Years of education: None     Highest education level: None   Occupational History     Occupation: [employer]     Employer: NONE    Social Needs     Financial resource strain: None     Food insecurity     Worry: None     Inability: None     Transportation needs     Medical: None     Non-medical: None   Tobacco Use     Smoking status: Never Smoker     Smokeless tobacco: Never Used   Substance and Sexual Activity     Alcohol use: Yes     Comment: 0-1 drink day     Drug use: No     Sexual activity: Yes     Partners: Male   Lifestyle     Physical activity     Days per week: None     Minutes per session: None     Stress: None   Relationships     Social connections     Talks on phone: None     Gets together: None     Attends Temple service: None     Active member of club or organization: None     Attends meetings of clubs or organizations: None     Relationship status: None     Intimate partner violence     Fear of current or ex partner: None     Emotionally abused: None     Physically abused: None     Forced sexual activity: None   Other Topics Concern      Service Not Asked     Blood Transfusions Not Asked     Caffeine Concern Not Asked     Occupational Exposure Not Asked     Hobby Hazards Not Asked     Sleep Concern Not Asked     Stress Concern Not Asked     Weight Concern Not Asked     Special Diet Not Asked     Back Care Not Asked     Exercise Yes     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 Yes   Social History Narrative     None       Review of Systems:  Skin:  " Positive for bruising     Eyes:  Positive for visual blurring    ENT:  Negative      Respiratory:  Positive for dyspnea on exertion     Cardiovascular:    chest pain;Positive for;fatigue    Gastroenterology: Positive for heartburn    Genitourinary:  Positive for urinary frequency    Musculoskeletal:  Positive for   arm pain  Neurologic:  Positive for numbness or tingling of hands    Psychiatric:  Positive for sleep disturbances    Heme/Lymph/Imm:  Negative      Endocrine:  Negative        Physical Exam:  Vitals: /62   Pulse 71   Ht 1.6 m (5' 3\")   Wt 65.3 kg (144 lb)   LMP  (LMP Unknown)   BMI 25.51 kg/m      Constitutional:  cooperative;in no acute distress        Skin:    bruises present;dusky;venous stasis changes        Head:  normocephalic, no masses or lesions        Eyes:  pupils equal and round        Lymph:      ENT:  no pallor or cyanosis, dentition good        Neck:  no carotid bruit        Respiratory:       marked diminished BS on left posterior lung as compared to right, no wheezing or rales    Cardiac: regular rhythm             single ectopic beat     pulses below the femoral arteries are diminished                                      GI:  abdomen soft        Extremities and Muscular Skeletal:  no deformities, clubbing, cyanosis, erythema observed stasis pigmentation 1+          Neurological:  no gross motor deficits        Psych:  Alert and Oriented x 3          CC  Gloria Donnelly, APRN CNP  [address]  [place] [id number]                  "

## 2021-05-10 ENCOUNTER — TRANSFERRED RECORDS (OUTPATIENT)
Dept: HEALTH INFORMATION MANAGEMENT | Facility: CLINIC | Age: 81
End: 2021-05-10

## 2021-05-18 ENCOUNTER — HOSPITAL ENCOUNTER (OUTPATIENT)
Dept: CARDIOLOGY | Facility: CLINIC | Age: 81
End: 2021-05-18
Attending: INTERNAL MEDICINE
Payer: MEDICARE

## 2021-05-18 DIAGNOSIS — L97.901: ICD-10-CM

## 2021-05-18 DIAGNOSIS — I83.90 ASYMPTOMATIC VARICOSE VEINS: ICD-10-CM

## 2021-05-18 DIAGNOSIS — I73.9 PERIPHERAL VASCULAR DISEASE, UNSPECIFIED (H): ICD-10-CM

## 2021-05-18 DIAGNOSIS — I80.253: ICD-10-CM

## 2021-05-18 PROCEDURE — 93970 EXTREMITY STUDY: CPT | Mod: 26 | Performed by: INTERNAL MEDICINE

## 2021-05-18 PROCEDURE — 93970 EXTREMITY STUDY: CPT

## 2021-05-18 PROCEDURE — 93925 LOWER EXTREMITY STUDY: CPT

## 2021-05-18 PROCEDURE — 93925 LOWER EXTREMITY STUDY: CPT | Mod: 26 | Performed by: INTERNAL MEDICINE

## 2021-05-19 DIAGNOSIS — I10 HTN (HYPERTENSION): Primary | ICD-10-CM

## 2021-05-19 RX ORDER — FUROSEMIDE 40 MG
80 TABLET ORAL DAILY
Qty: 180 TABLET | Refills: 0 | Status: SHIPPED | OUTPATIENT
Start: 2021-05-19 | End: 2021-09-08

## 2021-05-20 ENCOUNTER — TELEPHONE (OUTPATIENT)
Dept: CARDIOLOGY | Facility: CLINIC | Age: 81
End: 2021-05-20

## 2021-05-20 NOTE — TELEPHONE ENCOUNTER
Pt called back and was unable to make appt with Dr. Crooks on 6/7/21 because she will be out of town.   Pt prefers to be seen in [place].   Scheduled pt to see Dr. Mireles as a new vein pt on 6/23/21 in [place].   Pt gave verbal understanding.

## 2021-05-20 NOTE — TELEPHONE ENCOUNTER
Left detailed voicemail for pt regarding results and to see if pt can be scheduled to see Dr. Crooks as a new vein pt.   Awaiting call back from pt.     Pt had appt with Dr. Sevilla on 4/30/21:   In regards to her lower extremities, she definitely has evidence of severe varicose veins and likely has some venous insufficiency contributing to her lower extremity edema.  She has diminished pulses and evidence of peripheral vascular disease.  I would like her to undergo an ultrasound, both arterial and venous of her lower extremities to see if this is contributing to her nonhealing ulcers and diminished pulses.  We will schedule that for her in the near future and possibly have her see one of our vascular specialists.    Pt had lower extremity US on 5/18/21:   Impression:   Since study 5/26/2020, the right lower extremity posterior tibial and  peroneal arteries now appear occluded with single vessel runoff in the  anterior tibial artery and likely a >50% stenosis in the proximal  portion of that artery.     Left lower extremity with triphasic waveforms and normal velocities  except for elevation in proximal peroneal artery.       Pt had venous competency US on 5/18/21:  Impression:  1. Right leg: No DVT. Competent deep system. Competent superficial  vein system.     2. Left leg: No DVT. Competent deep system. Overall competent GSV.  Severe reflux in the SSV (6.0s, 3.4mm) with severe reflux in branching  varicose veins and tributary on the posteromedial calf and ankle  (6.3s, 3.6mm).     If clinically indicated, patient would be a candidate for left lower  extremity SSV VenaSeal ablation with sclerotherapy and phlebectomy of  varicose vein.

## 2021-05-24 ENCOUNTER — TRANSFERRED RECORDS (OUTPATIENT)
Dept: HEALTH INFORMATION MANAGEMENT | Facility: CLINIC | Age: 81
End: 2021-05-24

## 2021-06-01 ENCOUNTER — DOCUMENTATION ONLY (OUTPATIENT)
Dept: LAB | Facility: CLINIC | Age: 81
End: 2021-06-01

## 2021-06-01 ENCOUNTER — VIRTUAL VISIT (OUTPATIENT)
Dept: FAMILY MEDICINE | Facility: CLINIC | Age: 81
End: 2021-06-01
Payer: MEDICARE

## 2021-06-01 DIAGNOSIS — E87.6 HYPOKALEMIA: ICD-10-CM

## 2021-06-01 DIAGNOSIS — R82.90 NONSPECIFIC FINDING ON EXAMINATION OF URINE: Primary | ICD-10-CM

## 2021-06-01 DIAGNOSIS — N30.00 ACUTE CYSTITIS WITHOUT HEMATURIA: Primary | ICD-10-CM

## 2021-06-01 DIAGNOSIS — I10 ESSENTIAL HYPERTENSION WITH GOAL BLOOD PRESSURE LESS THAN 140/90: ICD-10-CM

## 2021-06-01 DIAGNOSIS — R82.90 CLOUDY URINE: ICD-10-CM

## 2021-06-01 DIAGNOSIS — R82.90 CLOUDY URINE: Primary | ICD-10-CM

## 2021-06-01 DIAGNOSIS — Z13.220 SCREENING FOR HYPERLIPIDEMIA: ICD-10-CM

## 2021-06-01 DIAGNOSIS — E11.9 DIABETES MELLITUS, TYPE 2 (H): ICD-10-CM

## 2021-06-01 LAB
ALBUMIN UR-MCNC: NEGATIVE MG/DL
ANION GAP SERPL CALCULATED.3IONS-SCNC: 4 MMOL/L (ref 3–14)
APPEARANCE UR: ABNORMAL
BACTERIA #/AREA URNS HPF: ABNORMAL /HPF
BILIRUB UR QL STRIP: NEGATIVE
BUN SERPL-MCNC: 21 MG/DL (ref 7–30)
CALCIUM SERPL-MCNC: 10.5 MG/DL (ref 8.5–10.1)
CHLORIDE SERPL-SCNC: 105 MMOL/L (ref 94–109)
CO2 SERPL-SCNC: 31 MMOL/L (ref 20–32)
COLOR UR AUTO: YELLOW
CREAT SERPL-MCNC: 0.91 MG/DL (ref 0.52–1.04)
GFR SERPL CREATININE-BSD FRML MDRD: 59 ML/MIN/{1.73_M2}
GLUCOSE SERPL-MCNC: 87 MG/DL (ref 70–99)
GLUCOSE UR STRIP-MCNC: NEGATIVE MG/DL
HGB UR QL STRIP: ABNORMAL
KETONES UR STRIP-MCNC: NEGATIVE MG/DL
LEUKOCYTE ESTERASE UR QL STRIP: ABNORMAL
NITRATE UR QL: NEGATIVE
PH UR STRIP: 7 PH (ref 5–7)
POTASSIUM SERPL-SCNC: 4.3 MMOL/L (ref 3.4–5.3)
RBC #/AREA URNS AUTO: ABNORMAL /HPF
SODIUM SERPL-SCNC: 140 MMOL/L (ref 133–144)
SOURCE: ABNORMAL
SP GR UR STRIP: 1.02 (ref 1–1.03)
UROBILINOGEN UR STRIP-ACNC: 0.2 EU/DL (ref 0.2–1)
WBC #/AREA URNS AUTO: ABNORMAL /HPF

## 2021-06-01 PROCEDURE — 81001 URINALYSIS AUTO W/SCOPE: CPT | Performed by: FAMILY MEDICINE

## 2021-06-01 PROCEDURE — 87088 URINE BACTERIA CULTURE: CPT | Performed by: NURSE PRACTITIONER

## 2021-06-01 PROCEDURE — 87086 URINE CULTURE/COLONY COUNT: CPT | Performed by: NURSE PRACTITIONER

## 2021-06-01 PROCEDURE — 80048 BASIC METABOLIC PNL TOTAL CA: CPT | Performed by: FAMILY MEDICINE

## 2021-06-01 PROCEDURE — 99441 PR PHYSICIAN TELEPHONE EVALUATION 5-10 MIN: CPT | Mod: 95 | Performed by: NURSE PRACTITIONER

## 2021-06-01 PROCEDURE — 36415 COLL VENOUS BLD VENIPUNCTURE: CPT | Performed by: FAMILY MEDICINE

## 2021-06-01 PROCEDURE — 87186 SC STD MICRODIL/AGAR DIL: CPT | Performed by: NURSE PRACTITIONER

## 2021-06-01 RX ORDER — CEPHALEXIN 500 MG/1
500 CAPSULE ORAL 2 TIMES DAILY
Qty: 10 CAPSULE | Refills: 0 | Status: SHIPPED | OUTPATIENT
Start: 2021-06-01 | End: 2021-07-01

## 2021-06-01 NOTE — PROGRESS NOTES
Patient called back and scheduled a phone visit with ZULY for today. Does not want to do a E-visit.     Roxie Lobato RN on 6/1/2021 at 10:32 AM

## 2021-06-01 NOTE — PROGRESS NOTES
"Chandrika is a 80 year old who is being evaluated via a billable telephone visit.      What phone number would you like to be contacted at? [phone]  How would you like to obtain your AVS? Mail a copy    Assessment & Plan     Acute cystitis without hematuria  No systemic symptoms.  Monitor.  Start abx.  Return to clinic if symptoms persist or worsen.    Pt agrees with plan and verbalized understanding.  - cephALEXin (KEFLEX) 500 MG capsule; Take 1 capsule (500 mg) by mouth 2 times daily     BMI:   Estimated body mass index is 25.51 kg/m  as calculated from the following:    Height as of 4/30/21: 1.6 m (5' 3\").    Weight as of 4/30/21: 65.3 kg (144 lb).           No follow-ups on file.    SYLVIA Hammond Ra, CNP  [facility] [place]    Subjective   Chandrika is a 80 year old who presents for the following health issues     HPI     Genitourinary - Female  Onset/Duration: x 1 month   Description:   Painful urination (Dysuria): no           Frequency: no  Blood in urine (Hematuria): no  Delay in urine (Hesitency): no  Cloudy urine   Intensity: NA  Progression of Symptoms:  same  Accompanying Signs & Symptoms:  Fever/chills: no  Flank pain: no  Nausea and vomiting: no  Vaginal symptoms: none  Abdominal/Pelvic Pain: left abdominal   History:   History of frequent UTI s: YES  History of kidney stones: no  Sexually Active: no  Possibility of pregnancy: No  Precipitating or alleviating factors: None  Therapies tried and outcome: none     Eating and drinking as usual.  She does have other conditions (PMR) that cause her symptoms but she reports no change from baseline.  She is not having fevers.  She has no urinary symptoms.  Only cloudy urine.    Review of Systems   Constitutional, HEENT, cardiovascular, pulmonary, gi and gu systems are negative, except as otherwise noted.      Objective           Vitals:  No vitals were obtained today due to virtual visit.    Physical Exam   healthy, alert and no " distress  PSYCH: Alert and oriented times 3; coherent speech, normal   rate and volume, able to articulate logical thoughts, able   to abstract reason, no tangential thoughts, no hallucinations   or delusions  Her affect is normal  RESP: No cough, no audible wheezing, able to talk in full sentences  Remainder of exam unable to be completed due to telephone visits    UA RESULTS:  Recent Labs   Lab Test 06/01/21  1000   COLOR Yellow   APPEARANCE Slightly Cloudy   URINEGLC Negative   URINEBILI Negative   URINEKETONE Negative   SG 1.020   UBLD Trace*   URINEPH 7.0   PROTEIN Negative   UROBILINOGEN 0.2   NITRITE Negative   LEUKEST Large*   RBCU O - 2   WBCU 25-50*                 Phone call duration: 5 minutes

## 2021-06-01 NOTE — PROGRESS NOTES
Patient has noticed her urine is very cloudy. She wanted it tested as she leaves out of town tomorrow. Please place the order as a future, thanks Clementine in lab

## 2021-06-04 DIAGNOSIS — I10 ESSENTIAL HYPERTENSION WITH GOAL BLOOD PRESSURE LESS THAN 140/90: ICD-10-CM

## 2021-06-04 DIAGNOSIS — E83.52 HYPERCALCEMIA: Primary | ICD-10-CM

## 2021-06-05 LAB
BACTERIA SPEC CULT: ABNORMAL
BACTERIA SPEC CULT: ABNORMAL
Lab: ABNORMAL
SPECIMEN SOURCE: ABNORMAL

## 2021-06-13 ENCOUNTER — MYC MEDICAL ADVICE (OUTPATIENT)
Dept: FAMILY MEDICINE | Facility: CLINIC | Age: 81
End: 2021-06-13

## 2021-06-15 DIAGNOSIS — I48.19 PERSISTENT ATRIAL FIBRILLATION (H): ICD-10-CM

## 2021-06-15 DIAGNOSIS — I10 ESSENTIAL HYPERTENSION: ICD-10-CM

## 2021-06-15 RX ORDER — METOPROLOL TARTRATE 100 MG
100 TABLET ORAL 2 TIMES DAILY
Qty: 180 TABLET | Refills: 0 | Status: SHIPPED | OUTPATIENT
Start: 2021-06-15 | End: 2021-09-09

## 2021-06-23 ENCOUNTER — OFFICE VISIT (OUTPATIENT)
Dept: CARDIOLOGY | Facility: CLINIC | Age: 81
End: 2021-06-23
Payer: MEDICARE

## 2021-06-23 VITALS
HEART RATE: 72 BPM | DIASTOLIC BLOOD PRESSURE: 68 MMHG | BODY MASS INDEX: 24.73 KG/M2 | HEIGHT: 63 IN | SYSTOLIC BLOOD PRESSURE: 132 MMHG | WEIGHT: 139.6 LBS

## 2021-06-23 DIAGNOSIS — I83.90 ASYMPTOMATIC VARICOSE VEINS: Primary | ICD-10-CM

## 2021-06-23 DIAGNOSIS — I73.9 PERIPHERAL VASCULAR DISEASE, UNSPECIFIED (H): ICD-10-CM

## 2021-06-23 PROCEDURE — 99215 OFFICE O/P EST HI 40 MIN: CPT | Performed by: INTERNAL MEDICINE

## 2021-06-23 ASSESSMENT — MIFFLIN-ST. JEOR: SCORE: 1067.35

## 2021-06-23 NOTE — PROGRESS NOTES
Vascular Cardiology Consultation      Tomasa Fam MRN# [id number]   YOB: 1940 Age: 80 year old   Date of Visit 06/23/2021     Reason for consult:  Peripheral arterial and superficial venous disease           Assessment and Plan:     1. Peripheral arterial disease with right lower extremity having single-vessel runoff with moderate proximal anterior tibial artery flow, occluded peroneal and posterior tibial arteries    This likely contributes to her slow healing from traumatic injury.  She does not have distal ischemic ulcer or exertional symptoms in this leg.  We discussed continued conservative management and watching for escalation of symptoms.      2. Severe venous reflux, minimally symptomatic of the left lower extremity    Patient is a candidate for ablation if symptoms escalate.  She is comfortable continuing to conservatively manage this and will message me or let me know if symptoms worsen.    Total 40 minutes spent today in chart review, discussion with the patient and follow-up charting.    This note was transcribed using electronic voice recognition software, typographical errors may be present.                Chief Complaint:   Atrial Fib and Cardiomyopathy           History of Present Illness:   This patient is a very pleasant 80 year old female kindly referred by Dr. Sevilla for arterial and venous disease.  I reviewed her lower extremity arterial ultrasound and venous competency testing from 5/18/2021 with the patient.    She has 1 vessel runoff in her right lower extremity with likely moderate proximal anterior tibial artery stenosis, occluded peroneal and posterior tibial arteries.  She denies any claudication or right lower extremity fatigue.  She is familiar with peripheral arterial disease with her  having lower extremity arterial bypasses.    She has had trauma to her right shin from injuring it on the corner of a table.  She has since moved the table out of the  "way.  The shin wound was slow to heal and she did see wound clinic, likely due to her peripheral arterial disease slowing healing.    We also reviewed her venous competency test which shows severe small saphenous vein reflux on the left.  She does endorse left greater than right swelling but it is well managed at this time.  She denies any left lower extremity venous claudication or significant pain or tenderness from the varicosities.    She states that her dyspnea on exertion limits her more than either leg.         Physical Exam:     Vitals: /68   Pulse 72   Ht 1.6 m (5' 3\")   Wt 63.3 kg (139 lb 9.6 oz)   LMP  (LMP Unknown)   BMI 24.73 kg/m    Constitutional:  cooperative;in no acute distress        Skin:    bruises present;dusky;venous stasis changes healed right shin wounds    Head:  normocephalic, no masses or lesions        Eyes:  pupils equal and round        ENT:  no pallor or cyanosis        Neck:  JVP normal        Chest:  normal symmetry        Extremities and Back:  no deformities, clubbing, cyanosis, erythema observed stasis pigmentation      Neurological:  no gross motor deficits;affect appropriate                      Past Medical History:   I have reviewed this patient's past medical history  Past Medical History:   Diagnosis Date     Arthritis     hands, knees     Breast cancer (H) jan. 2012     left mastectomy followed by right;  Dr. Luong     Chronic airway obstruction, not elsewhere classified 2006    very mild COPD - small cough     Concussion 3/13/2013     Problem list name updated by automated process. Provider to review and confirm [employer] Update utility     Cystocele, midline 4/4/2012     Diffuse cystic mastopathy      Gastroesophageal reflux disease, esophagitis presence not specified 11/23/2019     History of hypokalemia 1/23/2013     Hyperlipidemia LDL goal <160 6/29/2011    [place] 10-year CHD Risk Score: 2% (14 Total Points)  Values used to calculate score:    Age: 70 years " -- Points: 14    Total Cholesterol: 192 mg/dL -- Points: 1    HDL Cholesterol: 61 mg/dL -- Points: -1    Systolic BP (treated): 118 mmHg -- Points: 0   The patient is not a smoker. -- Points: 0   The patient has not been diagnosed with diabetes. -- Points: 0   The patient does not have a famil     Lung cancer (H) 10/21/2015     Paroxysmal atrial fibrillation (H) 9/13/2019     PMR (polymyalgia rheumatica) (H) 1/17/2020    tapering' above.)  In patients receiving over 10 mg of prednisone/day, the dose can be lowered by 2.5 mg/day decrements every two to four weeks  Once the dose of prednisone is 10 mg/day, further tapering can be done by 1 mg per month, provided the clinical course is stable  The clinical response to glucocorticoid therapy is closely monitored, which centers on screening for the presence and/or recu     Thyroid nodule 4/23/2016    Noted on CT Fall 2015.      Unspecified essential hypertension late 1980's             Past Surgical History:   I have reviewed this patient's past surgical history  Past Surgical History:   Procedure Laterality Date     ANESTHESIA CARDIOVERSION N/A 6/12/2020    Procedure: ANESTHESIA, FOR CARDIOVERSION;  Surgeon: GENERIC ANESTHESIA PROVIDER;  Location: [facility]     COLONOSCOPY  3/2003    adenomatous polyp      COLONOSCOPY  7/2006    diverticulosis - repeat in 5 years     COLONOSCOPY  10/13/2011    Procedure:COLONOSCOPY; COLONOSCOPY ; Surgeon:CHITO GORDILLO; Location: GI     CYSTOSCOPY  7/11/2012    Procedure: CYSTOSCOPY;;  Surgeon: Aline Cooper DO;  Location: [facility] OR     DAVINCI HYSTERECTOMY SUPRACERVICAL, SACROCOLPOPEXY, COMBINED  7/11/2012    Procedure: COMBINED DAVINCI HYSTERECTOMY SUPRACERVICAL, SACROCOLPOPEXY;   DAVINCI ASSISTED LAPAROSCOPIC SUPRACERVICAL HYSTERECTOMY AND BILATERAL SALPINGO-OOPHORECTOMY, SACROCOLPOPEXY AND CYSTOSCOPY;  Surgeon: Aline Cooper DO;  Location: [facility]     EP ABLATION AV NODE N/A 6/22/2020    Procedure: EP ABLATION AV NODE;   Surgeon: Adriano Raman MD;  Location:  [facility]     EP BIVENT LEAD PLACEMENT N/A 6/22/2020    Procedure: Bivent Lead Placement;  Surgeon: Adriano Raman MD;  Location:  [facility]     EP PACEMAKER N/A 6/22/2020    Procedure: AVNA and BiV Pacemaker Insertion;  Surgeon: Adriano Raman MD;  Location:  [facility]     EXCISE LESION EYELID Right 6/29/2015    Procedure: EXCISE LESION EYELID;  Surgeon: Hank Olvera MD;  Location: [facility]     INSERT PORT VASCULAR ACCESS  2/3/2012    Procedure:INSERT PORT VASCULAR ACCESS; Power Port-A- Catheter Placement ; Surgeon:IRISH TAPIA; Location: OR     LAPAROSCOPIC SALPINGO-OOPHORECTOMY  7/11/2012    Procedure: LAPAROSCOPIC SALPINGO-OOPHORECTOMY;  Davinci;  Surgeon: Aline Cooper DO;  Location:  OR     LIPOSUCTION, RHYTIDECTOMY, COMBINED       LOBECTOMY LUNG Right 3/1/2016    Procedure: LOBECTOMY LUNG;  Surgeon: Jonas Woodward MD;  Location:  OR     MAMMOPLASTY REDUCTION  1/6/2012    Procedure:MAMMOPLASTY REDUCTION; Surgeon:MICKI CAICEDO; Location:[facility] OR     MASTECTOMY SIMPLE  11/12/2012    Procedure: MASTECTOMY SIMPLE;   Right Prophylactic Mastectomy with attempted Right Sentinal Node Biopsy, Revision Bilateral Mastectomy Insicions, liposuction in breast area;  Surgeon: Irish Tapia MD;  Location: [facility]     MASTECTOMY SIMPLE, SENTINEL NODE, COMBINED  1/6/2012    Procedure:COMBINED MASTECTOMY SIMPLE, SENTINEL NODE; Left Mastectomy Left [place] Node Biopsy,  Right Breast Reduction ; Surgeon:IRISH TAPIA; Location:[facility] OR     MASTECTOMY, BILATERAL       REMOVE PORT VASCULAR ACCESS  4/29/2013    Procedure: REMOVE PORT VASCULAR ACCESS;  Port A catheter removal ;  Surgeon: Irish Tapia MD;  Location:  OR     REPAIR PTOSIS BILATERAL Bilateral 6/29/2015    Procedure: REPAIR PTOSIS BILATERAL;  Surgeon: Hank Olvera MD;  Location: [facility]     REVISE  RECONSTRUCTED BREAST BILATERAL  11/12/2012    Procedure: REVISE RECONSTRUCTED BREAST BILATERAL;;  Surgeon: Katia Kyle MD;  Location: [facility] OR     SURGICAL HISTORY OF -   in 40's    face lift     SURGICAL HISTORY OF -       lipoma removed right thigh     SURGICAL HISTORY OF -       D and C     THORACOTOMY Right 3/1/2016    Procedure: THORACOTOMY;  Surgeon: Jonas Woodward MD;  Location: [facility] OR     ZZC NONSPECIFIC PROCEDURE  1970    s/p Tubal ligation 1970               Social History:   I have reviewed this patient's social history  Social History     Tobacco Use     Smoking status: Never Smoker     Smokeless tobacco: Never Used   Substance Use Topics     Alcohol use: Yes     Comment: 0-1 drink day             Family History:   I have reviewed this patient's family history  Family History   Problem Relation Age of Onset     Cardiovascular Father         ruptured aorta, hardening of the arteries     Cerebrovascular Disease Mother      Respiratory Mother         chronic bronchitis - was a smoker early on     Cardiovascular Paternal Grandfather         MI     Cerebrovascular Disease Paternal Aunt      Hypertension Son      Neurologic Disorder Daughter         migraines     Breast Cancer Daughter      Brain Tumor Sister              Allergies:     Allergies   Allergen Reactions     No Known Drug Allergies      Tape [Adhesive Tape]      Sensitive to plastic tape on upper part of body--takes skin off             Medications:   I have reviewed this patient's current medications  Current Outpatient Medications   Medication Sig Dispense Refill     acetaminophen (TYLENOL) 500 MG tablet Take 1,000 mg by mouth every 8 hours as needed for mild pain       amLODIPine (NORVASC) 2.5 MG tablet Take 1 tablet (2.5 mg) by mouth daily 90 tablet 1     apixaban ANTICOAGULANT (ELIQUIS) 5 MG tablet Take 1 tablet (5 mg) by mouth 2 times daily 180 tablet 3     B Complex Vitamins (VITAMIN  B COMPLEX) tablet Take 1 tablet by  mouth daily.       calcium carbonate (TUMS) 500 MG chewable tablet Take 2 chew tab by mouth 2 times daily as needed for heartburn       calcium citrate-vitamin D (CALCIUM CITRATE + D) 315-250 MG-UNIT TABS per tablet Take 2 tablets by mouth daily       ezetimibe (ZETIA) 10 MG tablet TAKE 1 TABLET(10 MG) BY MOUTH DAILY 90 tablet 0     furosemide (LASIX) 40 MG tablet Take 2 tablets (80 mg) by mouth daily 180 tablet 0     metoprolol tartrate (LOPRESSOR) 100 MG tablet Take 1 tablet (100 mg) by mouth 2 times daily 180 tablet 0     omega 3 1000 MG CAPS Take 1 g by mouth daily 90 capsule      polyethylene glycol (MIRALAX/GLYCOLAX) powder Take 1 capful by mouth daily as needed for constipation       potassium chloride ER (KLOR-CON M) 20 MEQ CR tablet Take 1 tablet (20 mEq) by mouth 2 times daily 180 tablet 0     predniSONE (DELTASONE) 1 MG tablet Take 4 mg by mouth daily        cephALEXin (KEFLEX) 500 MG capsule Take 1 capsule (500 mg) by mouth 2 times daily (Patient not taking: Reported on 6/23/2021) 10 capsule 0               Review of Systems:       Review of Systems:  Skin:  Positive for bruising   Eyes:  Positive for tearing;visual blurring  ENT:  Positive for sinus trouble  Respiratory:  Positive for dyspnea on exertion  Cardiovascular:    Positive for;chest pain;fatigue  Gastroenterology: Positive for heartburn;nausea  Genitourinary:  Positive for urinary frequency  Musculoskeletal:  Positive for joint pain  Neurologic:  Positive for numbness or tingling of hands  Psychiatric:  Positive for sleep disturbances;excessive stress;anxiety;depression  Heme/Lymph/Imm:  Negative    Endocrine:  Negative                       Data:   All laboratory data reviewed  Lab Results   Component Value Date    CHOL 148 05/29/2020     Lab Results   Component Value Date    HDL 50 05/29/2020     Lab Results   Component Value Date    LDL 76 05/29/2020     Lab Results   Component Value Date    TRIG 110 05/29/2020     Lab Results   Component  Value Date    CHOLHDLRATIO 2.8 06/10/2015     TSH   Date Value Ref Range Status   08/27/2020 2.24 0.40 - 4.00 mU/L Final     Last Basic Metabolic Panel:  Lab Results   Component Value Date     06/01/2021      Lab Results   Component Value Date    POTASSIUM 4.3 06/01/2021     Lab Results   Component Value Date    CHLORIDE 105 06/01/2021     Lab Results   Component Value Date    CHARLIE 10.5 06/01/2021     Lab Results   Component Value Date    CO2 31 06/01/2021     Lab Results   Component Value Date    BUN 21 06/01/2021     Lab Results   Component Value Date    CR 0.91 06/01/2021     Lab Results   Component Value Date    GLC 87 06/01/2021     Lab Results   Component Value Date    WBC 11.2 09/08/2020     Lab Results   Component Value Date    RBC 3.74 09/08/2020     Lab Results   Component Value Date    HGB 11.4 09/08/2020     Lab Results   Component Value Date    HCT 36.6 09/08/2020     Lab Results   Component Value Date    MCV 98 09/08/2020     Lab Results   Component Value Date    MCH 30.5 09/08/2020     Lab Results   Component Value Date    MCHC 31.1 09/08/2020     Lab Results   Component Value Date    RDW 23.2 09/08/2020     Lab Results   Component Value Date     09/08/2020

## 2021-06-23 NOTE — LETTER
6/23/2021    [place]  [address] [address]  [facility] [address]    RE: Tomasa Fam       Dear Colleague,    I had the pleasure of seeing Tomasa Fam in the [facility].    Vascular Cardiology Consultation      Tomasa Fam MRN# [id number]   YOB: 1940 Age: 80 year old   Date of Visit 06/23/2021     Reason for consult:  Peripheral arterial and superficial venous disease           Assessment and Plan:     1. Peripheral arterial disease with right lower extremity having single-vessel runoff with moderate proximal anterior tibial artery flow, occluded peroneal and posterior tibial arteries    This likely contributes to her slow healing from traumatic injury.  She does not have distal ischemic ulcer or exertional symptoms in this leg.  We discussed continued conservative management and watching for escalation of symptoms.      2. Severe venous reflux, minimally symptomatic of the left lower extremity    Patient is a candidate for ablation if symptoms escalate.  She is comfortable continuing to conservatively manage this and will message me or let me know if symptoms worsen.    Total 40 minutes spent today in chart review, discussion with the patient and follow-up charting.    This note was transcribed using electronic voice recognition software, typographical errors may be present.                Chief Complaint:   Atrial Fib and Cardiomyopathy           History of Present Illness:   This patient is a very pleasant 80 year old female kindly referred by Dr. Sevilla for arterial and venous disease.  I reviewed her lower extremity arterial ultrasound and venous competency testing from 5/18/2021 with the patient.    She has 1 vessel runoff in her right lower extremity with likely moderate proximal anterior tibial artery stenosis, occluded peroneal and posterior tibial arteries.  She denies any claudication or right lower  "extremity fatigue.  She is familiar with peripheral arterial disease with her  having lower extremity arterial bypasses.    She has had trauma to her right shin from injuring it on the corner of a table.  She has since moved the table out of the way.  The shin wound was slow to heal and she did see wound clinic, likely due to her peripheral arterial disease slowing healing.    We also reviewed her venous competency test which shows severe small saphenous vein reflux on the left.  She does endorse left greater than right swelling but it is well managed at this time.  She denies any left lower extremity venous claudication or significant pain or tenderness from the varicosities.    She states that her dyspnea on exertion limits her more than either leg.         Physical Exam:     Vitals: /68   Pulse 72   Ht 1.6 m (5' 3\")   Wt 63.3 kg (139 lb 9.6 oz)   LMP  (LMP Unknown)   BMI 24.73 kg/m    Constitutional:  cooperative;in no acute distress        Skin:    bruises present;dusky;venous stasis changes healed right shin wounds    Head:  normocephalic, no masses or lesions        Eyes:  pupils equal and round        ENT:  no pallor or cyanosis        Neck:  JVP normal        Chest:  normal symmetry        Extremities and Back:  no deformities, clubbing, cyanosis, erythema observed stasis pigmentation      Neurological:  no gross motor deficits;affect appropriate                      Past Medical History:   I have reviewed this patient's past medical history  Past Medical History:   Diagnosis Date     Arthritis     hands, knees     Breast cancer (H) jan. 2012     left mastectomy followed by right;  Dr. Luong     Chronic airway obstruction, not elsewhere classified 2006    very mild COPD - small cough     Concussion 3/13/2013     Problem list name updated by automated process. Provider to review and confirm [employer] Update utility     Cystocele, midline 4/4/2012     Diffuse cystic mastopathy      Gastroesophageal " reflux disease, esophagitis presence not specified 11/23/2019     History of hypokalemia 1/23/2013     Hyperlipidemia LDL goal <160 6/29/2011    [place] 10-year CHD Risk Score: 2% (14 Total Points)  Values used to calculate score:    Age: 70 years -- Points: 14    Total Cholesterol: 192 mg/dL -- Points: 1    HDL Cholesterol: 61 mg/dL -- Points: -1    Systolic BP (treated): 118 mmHg -- Points: 0   The patient is not a smoker. -- Points: 0   The patient has not been diagnosed with diabetes. -- Points: 0   The patient does not have a famil     Lung cancer (H) 10/21/2015     Paroxysmal atrial fibrillation (H) 9/13/2019     PMR (polymyalgia rheumatica) (H) 1/17/2020    tapering' above.)  In patients receiving over 10 mg of prednisone/day, the dose can be lowered by 2.5 mg/day decrements every two to four weeks  Once the dose of prednisone is 10 mg/day, further tapering can be done by 1 mg per month, provided the clinical course is stable  The clinical response to glucocorticoid therapy is closely monitored, which centers on screening for the presence and/or recu     Thyroid nodule 4/23/2016    Noted on CT Fall 2015.      Unspecified essential hypertension late 1980's             Past Surgical History:   I have reviewed this patient's past surgical history  Past Surgical History:   Procedure Laterality Date     ANESTHESIA CARDIOVERSION N/A 6/12/2020    Procedure: ANESTHESIA, FOR CARDIOVERSION;  Surgeon: GENERIC ANESTHESIA PROVIDER;  Location:  OR     COLONOSCOPY  3/2003    adenomatous polyp      COLONOSCOPY  7/2006    diverticulosis - repeat in 5 years     COLONOSCOPY  10/13/2011    Procedure:COLONOSCOPY; COLONOSCOPY ; Surgeon:CHITO GORDILLO; Location: GI     CYSTOSCOPY  7/11/2012    Procedure: CYSTOSCOPY;;  Surgeon: Aline Cooper DO;  Location:  OR     DAVINCI HYSTERECTOMY SUPRACERVICAL, SACROCOLPOPEXY, COMBINED  7/11/2012    Procedure: COMBINED DAVINCI HYSTERECTOMY SUPRACERVICAL, SACROCOLPOPEXY;    JHONYINCEVERTON ASSISTED LAPAROSCOPIC SUPRACERVICAL HYSTERECTOMY AND BILATERAL SALPINGO-OOPHORECTOMY, SACROCOLPOPEXY AND CYSTOSCOPY;  Surgeon: Aline Cooper DO;  Location:  OR     EP ABLATION AV NODE N/A 6/22/2020    Procedure: EP ABLATION AV NODE;  Surgeon: Adriano Raman MD;  Location:  HEART CARDIAC CATH LAB     EP BIVENT LEAD PLACEMENT N/A 6/22/2020    Procedure: Bivent Lead Placement;  Surgeon: Adriano Raman MD;  Location:  HEART CARDIAC CATH LAB     EP PACEMAKER N/A 6/22/2020    Procedure: AVNA and BiV Pacemaker Insertion;  Surgeon: Adriano Raman MD;  Location:  HEART CARDIAC CATH LAB     EXCISE LESION EYELID Right 6/29/2015    Procedure: EXCISE LESION EYELID;  Surgeon: Hank Olvera MD;  [facility]: [facility]     INSERT PORT VASCULAR ACCESS  2/3/2012    Procedure:INSERT PORT VASCULAR ACCESS; Power Port-A- Catheter Placement ; Surgeon:IRISH TAPIA; Location:[facility] OR     LAPAROSCOPIC SALPINGO-OOPHORECTOMY  7/11/2012    Procedure: LAPAROSCOPIC SALPINGO-OOPHORECTOMY;  Davinci;  Surgeon: Aline Cooper DO;  Location:  OR     LIPOSUCTION, RHYTIDECTOMY, COMBINED       LOBECTOMY LUNG Right 3/1/2016    Procedure: LOBECTOMY LUNG;  Surgeon: Jonas Woodward MD;  Location:  OR     MAMMOPLASTY REDUCTION  1/6/2012    Procedure:MAMMOPLASTY REDUCTION; Surgeon:MICKI CAICEDO; Location:[facility] OR     MASTECTOMY SIMPLE  11/12/2012    Procedure: MASTECTOMY SIMPLE;   Right Prophylactic Mastectomy with attempted Right Sentinal Node Biopsy, Revision Bilateral Mastectomy Insicions, liposuction in breast area;  Surgeon: Irish Tapia MD;  Location: [facility] OR     MASTECTOMY SIMPLE, SENTINEL NODE, COMBINED  1/6/2012    Procedure:COMBINED MASTECTOMY SIMPLE, SENTINEL NODE; Left Mastectomy Left [place] Node Biopsy,  Right Breast Reduction ; Surgeon:IRISH TAPIA; Location:[facility] OR     MASTECTOMY, BILATERAL       REMOVE PORT VASCULAR ACCESS  4/29/2013    Procedure: REMOVE PORT  VASCULAR ACCESS;  Port A catheter removal ;  Surgeon: Irish Douglas MD;  Location: [facility] OR     REPAIR PTOSIS BILATERAL Bilateral 6/29/2015    Procedure: REPAIR PTOSIS BILATERAL;  Surgeon: Hank Olvera MD;  Location:  [facility]     REVISE RECONSTRUCTED BREAST BILATERAL  11/12/2012    Procedure: REVISE RECONSTRUCTED BREAST BILATERAL;;  Surgeon: Katia Kyle MD;  Location: [facility] OR     SURGICAL HISTORY OF -   in 40's    face lift     SURGICAL HISTORY OF -       lipoma removed right thigh     SURGICAL HISTORY OF -       D and C     THORACOTOMY Right 3/1/2016    Procedure: THORACOTOMY;  Surgeon: Jonas Woodward MD;  Location:  OR     [facility] NONSPECIFIC PROCEDURE  1970    s/p Tubal ligation 1970               Social History:   I have reviewed this patient's social history  Social History     Tobacco Use     Smoking status: Never Smoker     Smokeless tobacco: Never Used   Substance Use Topics     Alcohol use: Yes     Comment: 0-1 drink day             Family History:   I have reviewed this patient's family history  Family History   Problem Relation Age of Onset     Cardiovascular Father         ruptured aorta, hardening of the arteries     Cerebrovascular Disease Mother      Respiratory Mother         chronic bronchitis - was a smoker early on     Cardiovascular Paternal Grandfather         MI     Cerebrovascular Disease Paternal Aunt      Hypertension Son      Neurologic Disorder Daughter         migraines     Breast Cancer Daughter      Brain Tumor Sister              Allergies:     Allergies   Allergen Reactions     No Known Drug Allergies      Tape [Adhesive Tape]      Sensitive to plastic tape on upper part of body--takes skin off             Medications:   I have reviewed this patient's current medications  Current Outpatient Medications   Medication Sig Dispense Refill     acetaminophen (TYLENOL) 500 MG tablet Take 1,000 mg by mouth every 8 hours as needed for mild pain       amLODIPine  (NORVASC) 2.5 MG tablet Take 1 tablet (2.5 mg) by mouth daily 90 tablet 1     apixaban ANTICOAGULANT (ELIQUIS) 5 MG tablet Take 1 tablet (5 mg) by mouth 2 times daily 180 tablet 3     B Complex Vitamins (VITAMIN  B COMPLEX) tablet Take 1 tablet by mouth daily.       calcium carbonate (TUMS) 500 MG chewable tablet Take 2 chew tab by mouth 2 times daily as needed for heartburn       calcium citrate-vitamin D (CALCIUM CITRATE + D) 315-250 MG-UNIT TABS per tablet Take 2 tablets by mouth daily       ezetimibe (ZETIA) 10 MG tablet TAKE 1 TABLET(10 MG) BY MOUTH DAILY 90 tablet 0     furosemide (LASIX) 40 MG tablet Take 2 tablets (80 mg) by mouth daily 180 tablet 0     metoprolol tartrate (LOPRESSOR) 100 MG tablet Take 1 tablet (100 mg) by mouth 2 times daily 180 tablet 0     omega 3 1000 MG CAPS Take 1 g by mouth daily 90 capsule      polyethylene glycol (MIRALAX/GLYCOLAX) powder Take 1 capful by mouth daily as needed for constipation       potassium chloride ER (KLOR-CON M) 20 MEQ CR tablet Take 1 tablet (20 mEq) by mouth 2 times daily 180 tablet 0     predniSONE (DELTASONE) 1 MG tablet Take 4 mg by mouth daily        cephALEXin (KEFLEX) 500 MG capsule Take 1 capsule (500 mg) by mouth 2 times daily (Patient not taking: Reported on 6/23/2021) 10 capsule 0               Review of Systems:       Review of Systems:  Skin:  Positive for bruising   Eyes:  Positive for tearing;visual blurring  ENT:  Positive for sinus trouble  Respiratory:  Positive for dyspnea on exertion  Cardiovascular:    Positive for;chest pain;fatigue  Gastroenterology: Positive for heartburn;nausea  Genitourinary:  Positive for urinary frequency  Musculoskeletal:  Positive for joint pain  Neurologic:  Positive for numbness or tingling of hands  Psychiatric:  Positive for sleep disturbances;excessive stress;anxiety;depression  Heme/Lymph/Imm:  Negative    Endocrine:  Negative                       Data:   All laboratory data reviewed  Lab Results    Component Value Date    CHOL 148 05/29/2020     Lab Results   Component Value Date    HDL 50 05/29/2020     Lab Results   Component Value Date    LDL 76 05/29/2020     Lab Results   Component Value Date    TRIG 110 05/29/2020     Lab Results   Component Value Date    CHOLHDLRATIO 2.8 06/10/2015     TSH   Date Value Ref Range Status   08/27/2020 2.24 0.40 - 4.00 mU/L Final     Last Basic Metabolic Panel:  Lab Results   Component Value Date     06/01/2021      Lab Results   Component Value Date    POTASSIUM 4.3 06/01/2021     Lab Results   Component Value Date    CHLORIDE 105 06/01/2021     Lab Results   Component Value Date    CHARLIE 10.5 06/01/2021     Lab Results   Component Value Date    CO2 31 06/01/2021     Lab Results   Component Value Date    BUN 21 06/01/2021     Lab Results   Component Value Date    CR 0.91 06/01/2021     Lab Results   Component Value Date    GLC 87 06/01/2021     Lab Results   Component Value Date    WBC 11.2 09/08/2020     Lab Results   Component Value Date    RBC 3.74 09/08/2020     Lab Results   Component Value Date    HGB 11.4 09/08/2020     Lab Results   Component Value Date    HCT 36.6 09/08/2020     Lab Results   Component Value Date    MCV 98 09/08/2020     Lab Results   Component Value Date    MCH 30.5 09/08/2020     Lab Results   Component Value Date    MCHC 31.1 09/08/2020     Lab Results   Component Value Date    RDW 23.2 09/08/2020     Lab Results   Component Value Date     09/08/2020       Thank you for allowing me to participate in the care of your patient.      Sincerely,     Jayesh Mireles MD     [facility]  cc:   No referring provider defined for this encounter.

## 2021-06-24 DIAGNOSIS — B37.0 THRUSH: ICD-10-CM

## 2021-06-24 RX ORDER — NYSTATIN 100000/ML
SUSPENSION, ORAL (FINAL DOSE FORM) ORAL
Qty: 400 ML | Refills: 0
Start: 2021-06-24

## 2021-06-25 ENCOUNTER — MYC MEDICAL ADVICE (OUTPATIENT)
Dept: FAMILY MEDICINE | Facility: CLINIC | Age: 81
End: 2021-06-25

## 2021-06-25 ENCOUNTER — NURSE TRIAGE (OUTPATIENT)
Dept: FAMILY MEDICINE | Facility: CLINIC | Age: 81
End: 2021-06-25

## 2021-06-25 NOTE — TELEPHONE ENCOUNTER
Patient calls back. Shortness of breath triaged - see 6/25/21 triage encounter, no symptoms requiring ER visit at this time. Patient prefers to see Dr. Fenton first for evaluation.     Triage protocol recommended appointment in 2 weeks, no open appointments available with Dr. Fenton in that time. Can patient be worked in for appointment? She states she can come in any day in July. Routed to Dr. Fenton to review.     Kamini Winn RN  [facility]

## 2021-06-25 NOTE — TELEPHONE ENCOUNTER
"Per 6/25/21 [facility] encounter, patient to speak to a RN to discuss shortness of breath symptoms. Symptoms only occur with activity, she states she has to walk past 3 houses to get to mail box and has to stop nursing home each direction to catch her breath.     Patient advised to schedule appointment, message sent to Dr. Fenton through 6/25/21 [facility] encounter asking if patient can be worked in for an appointment.     Reason for Disposition    MILD longstanding difficulty breathing (e.g., speaks in phrases, SOB even at rest, pulse 100-120) and SAME as normal    Additional Information    Negative: Breathing stopped and hasn't returned    Negative: Choking on something    Negative: SEVERE difficulty breathing (e.g., struggling for each breath, speaks in single words, pulse > 120)    Negative: Bluish (or gray) lips or face    Negative: Difficult to awaken or acting confused (e.g., disoriented, slurred speech)    Negative: Passed out (i.e., fainted, collapsed and was not responding)    Negative: Wheezing started suddenly after medicine, an allergic food, or bee sting    Negative: Stridor    Negative: Slow, shallow and weak breathing    Negative: Sounds like a life-threatening emergency to the triager    Negative: Chest pain    Negative: Wheezing (high pitched whistling sound) and previous asthma attacks or use of asthma medicines    Negative: Difficulty breathing and only present when coughing    Negative: Difficulty breathing and only from stuffy or runny nose    Negative: Difficulty breathing and within 14 days of COVID-19 Exposure    Negative: MODERATE difficulty breathing (e.g., speaks in phrases, SOB even at rest, pulse 100-120) of new onset or worse than normal    Negative: Wheezing can be heard across the room    Negative: Drooling or spitting out saliva (because can't swallow)    Negative: Any history of prior \"blood clot\" in leg or lungs    Negative: Illness requiring prolonged bedrest in past month (e.g., " "immobilization, long hospital stay)    Negative: Hip or leg fracture (broken bone) in past month (or had cast on leg or ankle in past month)    Negative: Major surgery in the past month    Negative: Long-distance travel in past month (e.g., car, bus, train, plane; with trip lasting 6 or more hours)    Negative: Extra heart beats OR irregular heart beating   (i.e., \"palpitations\")    Negative: Fever > 103 F (39.4 C)    Negative: Fever > 101 F (38.3 C) and over 60 years of age    Negative: Fever > 100.0 F (37.8 C) and bedridden (e.g., nursing home patient, stroke, chronic illness, recovering from surgery)    Negative: Fever > 100.0 F (37.8 C) and diabetes mellitus or weak immune system (e.g., HIV positive, cancer chemo, splenectomy, organ transplant, chronic steroids)    Negative: Periods where breathing stops and then resumes normally and bedridden (e.g., nursing home patient, CVA)    Negative: Pregnant or postpartum (from 0 to 6 weeks after delivery)    Negative: Patient sounds very sick or weak to the triager    Negative: MILD difficulty breathing (e.g., minimal/no SOB at rest, SOB with walking, pulse < 100) of new onset or worse than normal    Negative: Longstanding difficulty breathing (e.g., CHF, COPD, emphysema) and worse than normal    Negative: Longstanding difficulty breathing and not responding to usual therapy    Negative: Continuous (nonstop) coughing    Negative: Patient wants to be seen    Negative: MODERATE longstanding difficulty breathing (e.g., speaks in phrases, SOB even at rest, pulse 100-120) and SAME as normal    Answer Assessment - Initial Assessment Questions  1. RESPIRATORY STATUS: \"Describe your breathing?\" (e.g., wheezing, shortness of breath, unable to speak, severe coughing)       Short of breath with activity  2. ONSET: \"When did this breathing problem begin?\"       3-4 months  3. PATTERN \"Does the difficult breathing come and go, or has it been constant since it started?\"       " "intermitten  4. SEVERITY: \"How bad is your breathing?\" (e.g., mild, moderate, severe)     - MILD: No SOB at rest, mild SOB with walking, speaks normally in sentences, can lay down, no retractions, pulse < 100.     - MODERATE: SOB at rest, SOB with minimal exertion and prefers to sit, cannot lie down flat, speaks in phrases, mild retractions, audible wheezing, pulse 100-120.     - SEVERE: Very SOB at rest, speaks in single words, struggling to breathe, sitting hunched forward, retractions, pulse > 120       Mild - unsure if this is changing  5. RECURRENT SYMPTOM: \"Have you had difficulty breathing before?\" If so, ask: \"When was the last time?\" and \"What happened that time?\"       no  6. CARDIAC HISTORY: \"Do you have any history of heart disease?\" (e.g., heart attack, angina, bypass surgery, angioplasty)       no  7. LUNG HISTORY: \"Do you have any history of lung disease?\"  (e.g., pulmonary embolus, asthma, emphysema)      COPD  8. CAUSE: \"What do you think is causing the breathing problem?\"       Thinks it is part of COPD  9. OTHER SYMPTOMS: \"Do you have any other symptoms? (e.g., dizziness, runny nose, cough, chest pain, fever)      no  10. PREGNANCY: \"Is there any chance you are pregnant?\" \"When was your last menstrual period?\"        n/a  11. TRAVEL: \"Have you traveled out of the country in the last month?\" (e.g., travel history, exposures)        no    Protocols used: BREATHING DIFFICULTY-A-[place]      "

## 2021-06-25 NOTE — TELEPHONE ENCOUNTER
Patient problem list includes dyspnea.    Did you want visit? Referral to pulmonary?    Carlie Burns RN

## 2021-06-25 NOTE — TELEPHONE ENCOUNTER
I would recommend a visit.    Would want Pulmonary to be part of the evaluation before going to oxygen.   If she prefers to start there, can put in a referral.  Sometimes there can be other reasons, such as heart or anemia, etc....    Triage for any reason to go to ER, etc

## 2021-06-25 NOTE — TELEPHONE ENCOUNTER
Ok to take a same day or a virtual.  There are some same days next week... that and virtuals the following week.    Thanks!

## 2021-07-01 ENCOUNTER — OFFICE VISIT (OUTPATIENT)
Dept: FAMILY MEDICINE | Facility: CLINIC | Age: 81
End: 2021-07-01
Payer: MEDICARE

## 2021-07-01 VITALS
HEART RATE: 71 BPM | OXYGEN SATURATION: 98 % | WEIGHT: 140.2 LBS | HEIGHT: 63 IN | SYSTOLIC BLOOD PRESSURE: 128 MMHG | DIASTOLIC BLOOD PRESSURE: 62 MMHG | RESPIRATION RATE: 16 BRPM | TEMPERATURE: 97.6 F | BODY MASS INDEX: 24.84 KG/M2

## 2021-07-01 DIAGNOSIS — I10 ESSENTIAL HYPERTENSION WITH GOAL BLOOD PRESSURE LESS THAN 140/90: ICD-10-CM

## 2021-07-01 DIAGNOSIS — Z85.118 HISTORY OF LUNG CANCER: ICD-10-CM

## 2021-07-01 DIAGNOSIS — B37.0 THRUSH: ICD-10-CM

## 2021-07-01 DIAGNOSIS — F43.21 ADJUSTMENT DISORDER WITH DEPRESSED MOOD: ICD-10-CM

## 2021-07-01 DIAGNOSIS — R06.09 DOE (DYSPNEA ON EXERTION): Primary | ICD-10-CM

## 2021-07-01 DIAGNOSIS — J43.9 PULMONARY EMPHYSEMA, UNSPECIFIED EMPHYSEMA TYPE (H): ICD-10-CM

## 2021-07-01 DIAGNOSIS — E11.9 TYPE 2 DIABETES MELLITUS WITHOUT COMPLICATION, WITHOUT LONG-TERM CURRENT USE OF INSULIN (H): ICD-10-CM

## 2021-07-01 DIAGNOSIS — R26.89 IMBALANCE: ICD-10-CM

## 2021-07-01 DIAGNOSIS — Z79.52 LONG TERM SYSTEMIC STEROID USER: ICD-10-CM

## 2021-07-01 DIAGNOSIS — M35.3 PMR (POLYMYALGIA RHEUMATICA) (H): ICD-10-CM

## 2021-07-01 DIAGNOSIS — E83.52 HYPERCALCEMIA: ICD-10-CM

## 2021-07-01 DIAGNOSIS — I48.19 PERSISTENT ATRIAL FIBRILLATION (H): ICD-10-CM

## 2021-07-01 DIAGNOSIS — R09.02 HYPOXIA: ICD-10-CM

## 2021-07-01 DIAGNOSIS — M31.6 TEMPORAL ARTERITIS (H): ICD-10-CM

## 2021-07-01 DIAGNOSIS — N18.31 STAGE 3A CHRONIC KIDNEY DISEASE (H): ICD-10-CM

## 2021-07-01 DIAGNOSIS — E87.6 HYPOKALEMIA: ICD-10-CM

## 2021-07-01 PROBLEM — N18.30 CHRONIC KIDNEY DISEASE, STAGE 3 (H): Status: ACTIVE | Noted: 2021-07-01

## 2021-07-01 LAB
ERYTHROCYTE [DISTWIDTH] IN BLOOD BY AUTOMATED COUNT: 13.1 % (ref 10–15)
HCT VFR BLD AUTO: 42.1 % (ref 35–47)
HGB BLD-MCNC: 13.3 G/DL (ref 11.7–15.7)
IRON STUDY JIC TUBE: NORMAL
MCH RBC QN AUTO: 29 PG (ref 26.5–33)
MCHC RBC AUTO-ENTMCNC: 31.6 G/DL (ref 31.5–36.5)
MCV RBC AUTO: 92 FL (ref 78–100)
PLATELET # BLD AUTO: 270 10E9/L (ref 150–450)
RBC # BLD AUTO: 4.59 10E12/L (ref 3.8–5.2)
WBC # BLD AUTO: 10.9 10E9/L (ref 4–11)

## 2021-07-01 PROCEDURE — 99215 OFFICE O/P EST HI 40 MIN: CPT | Performed by: FAMILY MEDICINE

## 2021-07-01 PROCEDURE — 85027 COMPLETE CBC AUTOMATED: CPT | Performed by: FAMILY MEDICINE

## 2021-07-01 PROCEDURE — 82043 UR ALBUMIN QUANTITATIVE: CPT | Performed by: FAMILY MEDICINE

## 2021-07-01 PROCEDURE — 99417 PROLNG OP E/M EACH 15 MIN: CPT | Performed by: FAMILY MEDICINE

## 2021-07-01 PROCEDURE — 80069 RENAL FUNCTION PANEL: CPT | Performed by: FAMILY MEDICINE

## 2021-07-01 PROCEDURE — 36415 COLL VENOUS BLD VENIPUNCTURE: CPT | Performed by: FAMILY MEDICINE

## 2021-07-01 PROCEDURE — 96127 BRIEF EMOTIONAL/BEHAV ASSMT: CPT | Performed by: FAMILY MEDICINE

## 2021-07-01 RX ORDER — NYSTATIN 100000/ML
500000 SUSPENSION, ORAL (FINAL DOSE FORM) ORAL 4 TIMES DAILY
Qty: 473 ML | Refills: 0 | Status: ON HOLD | OUTPATIENT
Start: 2021-07-01 | End: 2022-01-31

## 2021-07-01 ASSESSMENT — ANXIETY QUESTIONNAIRES
3. WORRYING TOO MUCH ABOUT DIFFERENT THINGS: NOT AT ALL
2. NOT BEING ABLE TO STOP OR CONTROL WORRYING: NOT AT ALL
1. FEELING NERVOUS, ANXIOUS, OR ON EDGE: NOT AT ALL
GAD7 TOTAL SCORE: 0
5. BEING SO RESTLESS THAT IT IS HARD TO SIT STILL: NOT AT ALL
7. FEELING AFRAID AS IF SOMETHING AWFUL MIGHT HAPPEN: NOT AT ALL
6. BECOMING EASILY ANNOYED OR IRRITABLE: NOT AT ALL

## 2021-07-01 ASSESSMENT — MIFFLIN-ST. JEOR: SCORE: 1070.07

## 2021-07-01 ASSESSMENT — PAIN SCALES - GENERAL: PAINLEVEL: NO PAIN (0)

## 2021-07-01 ASSESSMENT — PATIENT HEALTH QUESTIONNAIRE - PHQ9
SUM OF ALL RESPONSES TO PHQ QUESTIONS 1-9: 7
5. POOR APPETITE OR OVEREATING: NOT AT ALL

## 2021-07-01 NOTE — NURSING NOTE
Patient has been assessed for Home Oxygen needs.  Oxygen readings:   *   RA - at rest  Pulse oximetry SPO2 95 %  *   RA - during activity/with exercise SPO2 74 %  *   O2 at  liters/minute (at rest) ...Not Done  *   O2 at  liters/minute (during activity/with exercise) ...Not Done    Kamini Guzman, CMA

## 2021-07-01 NOTE — LETTER
"My Heart Failure Action Plan   Name: Tomasa Fam    YOB: 1940   Date: 7/1/2021    My doctor: Violet Fenton [facility]     [address] [address] [facility], [place] [address] [phone]  My Diagnosis: {HF STAGES:161844}   My Exercise Goal: 30 minutes daily  .     My Weight Plan:   Wt Readings from Last 2 Encounters:   07/01/21 63.6 kg (140 lb 3.2 oz)   06/23/21 63.3 kg (139 lb 9.6 oz)     Weigh yourself daily using the same scale. If you gain more than 2 pounds in 24 hours or 5 pounds in a week {HF WEIGHT GOAL:030292}    My Diet Goal: { :580059::\"No added salt\"}    Emergency Room Visits:    Our goal is to improve your quality of life and help you avoid a visit to the emergency room or hospital.  If we work together, we can achieve this goal. But, if you feel you need to call 911 or go to the emergency room, please do so.  If you go to the emergency room, please bring your list of medicines and your daily weight chart with you.       GREEN ZONE     Doing well today    Weight gained is no more than 2 pounds a day or 5 pounds a week.    No swelling in feet, ankles, legs or stomach.    No more swelling than usual.    No more trouble breathing than usual.    No change in my sleep.    No other problems. Actions:    I am doing fine.  I will take my medicine, follow my diet, see my doctor, exercise, and watch for symptoms.           YELLOW ZONE         Having a bad day or flare up    Weight gain of more than 2 pounds in one day or 5 pounds in one week.    New swelling in ankle, leg, knee or thigh.    Bloating in belly, pants feel tighter.    Swelling in hands or face.    Coughing or trouble breathing while walking or talking.    Harder to breathe last night.    Have trouble sleeping, wake up short of breath.    Much more tired than usual.    Not eating.    Pain in my chest or bad leg cramps.    Feel weak or dizzy. Actions:    I need to take action and call my doctor or nurse " today.                 RED ZONE         Need medical care now    Weight gain of 5 pounds overnight.    Chest pain or pressure that does not go away.    Feel less alert.    Wheezing or have trouble breathing when at rest.    Cannot sleep lying down.    Cannot take my water pill.    Pass out or faint. Actions:    I need to call my doctor or nurse now!    Call 911 if I have chest pain or cannot breathe.

## 2021-07-01 NOTE — PROGRESS NOTES
Assessment & Plan     PADRON (dyspnea on exertion)  Suspect related to COPD.   Her hemoglobin is OK. She has had her Cardiac status checked with Cardiology.  She notes she has not tolerated inhalers in the past.   Will have her visit with Pulmonary.  Starting O2 with exertion.  - CBC with Platelets and Reflex to Iron Studies  - Home Oxygen Order for DME - ONLY FOR DME  - PULMONARY MEDICINE REFERRAL  - Home Oxygen Order for DME - ONLY FOR DME    Hypoxia  As above.   - Home Oxygen Order for DME - ONLY FOR DME  - PULMONARY MEDICINE REFERRAL  - Home Oxygen Order for DME - ONLY FOR DME    Pulmonary emphysema, unspecified emphysema type (H)  As above.   - PULMONARY MEDICINE REFERRAL  - Home Oxygen Order for DME - ONLY FOR DME    Thrush  Possible. Will treat with some Nystatin. Steroids might predispose.  - nystatin (MYCOSTATIN) 775448 UNIT/ML suspension; Take 5 mLs (500,000 Units) by mouth 4 times daily    Long term systemic steroid user      Temporal arteritis (H)  Sees Rheumatology. Weaning on steroids.    PMR (polymyalgia rheumatica) (H)  Sees Rheumatology. Weaning on steroids.    Essential hypertension with goal blood pressure less than 140/90  Controlled.   - Renal panel (Alb, BUN, Ca, Cl, CO2, Creat, Gluc, Phos, K, Na)  - Albumin Random Urine Quantitative with Creat Ratio    Hypercalcemia  Will recheck with albumin so we could do a correction.   - Renal panel (Alb, BUN, Ca, Cl, CO2, Creat, Gluc, Phos, K, Na)    Hypokalemia  Rechecking.   - Renal panel (Alb, BUN, Ca, Cl, CO2, Creat, Gluc, Phos, K, Na)    Imbalance  Did discuss possible consideration for physical therapy; there are some exercises that could be done to help with balance.    Diabetes mellitus, type 2 (H)    - Albumin Random Urine Quantitative with Creat Ratio    Persistent atrial fibrillation (H)  Stable. She does see Cardiology.     History of lung cancer  Nonsmoker. Thought due to second hand smoke.     Stage 3a chronic kidney disease    - Renal  panel (Alb, BUN, Ca, Cl, CO2, Creat, Gluc, Phos, K, Na)  - Albumin Random Urine Quantitative with Creat Ratio    Adjustment disorder with depressed mood  Discussed with her. Reviewed her scores She does think this is situational, related to her breathing status currently. Will work with this at this time and may need to consider additional intervention in future.         69 minutes spent on the date of the encounter doing chart review, review of outside records, review of test results, interpretation of tests, patient visit and documentation        Depression Screening Follow Up    PHQ 7/1/2021   PHQ-9 Total Score 7   Q9: Thoughts of better off dead/self-harm past 2 weeks Several days                 Follow Up    Follow Up Actions Taken    Return in about 3 months (around 10/1/2021), or if symptoms worsen or fail to improve, for Medication recheck, Establish care.    Violet Fenton MD, MD  [facility]    CC note to Pulmonary.       I certify that this patient, Tomasa Fam has been under my care (or a nurse practitioner or physican's assistant working with me). This is the face-to-face encounter for oxygen medical necessity.      Tomasa Fam is now in a chronic stable state and continues to require supplemental oxygen. Patient has continued oxygen desaturation due to COPD J44.9.    Alternative treatment(s) tried or considered and deemed clinically infective for treatment of COPD J44.9 include inhalers.  If portability is ordered, is the patient mobile within the home? yes    **Patients who qualify for home O2 coverage under the CMS guidelines require ABG tests or O2 sat readings obtained closest to, but no earlier than 2 days prior to the discharge, as evidence of the need for home oxygen therapy. Testing must be performed while patient is in the chronic stable state. See notes for O2 sats.**    Subjective   Chandrika is a 80 year old who presents for the following health issues      HPI     Concern - SOB - See triage note for 06/25/2021  Onset: over 3-4 months   Description: SOB with exertion and sometimes with excited speaking  Intensity: mild to moderate   Progression of Symptoms:  worsening and intermittent  Accompanying Signs & Symptoms: none   Previous history of similar problem: none   Precipitating factors:        Worsened by: exertion or excited speaking   Alleviating factors:        Improved by: resting   Therapies tried and outcome: None    See under ROS     Patient Active Problem List   Diagnosis     Chronic airway obstruction (H)     ASCUS on Pap smear     Hyperlipidemia LDL goal <160     Breast cancer (H)     Essential hypertension with goal blood pressure less than 140/90     Cystocele, midline     Uterovaginal prolapse     S/P hysterectomy     Advanced directives, counseling/discussion     History of hypokalemia     Concussion     Thyroid nodule     Chronic pain of both knees     History of lung cancer     Gastroesophageal reflux disease, esophagitis presence not specified     PMR (polymyalgia rheumatica) (H)     Personal history of malignant neoplasm of breast     Long term systemic steroid user     Cardiomyopathy (H)     Complete heart block (H)     Temporal arteritis (H)     Elevation of level of transaminase or lactic acid dehydrogenase (LDH)     Recurrent falls     Dyspnea     Diabetes mellitus, type 2 (H)     Persistent atrial fibrillation (H)     Skin ulcer of right lower leg with fat layer exposed (H)     Cardiac pacemaker in situ 4/15/21       Current Outpatient Medications   Medication Sig Dispense Refill     acetaminophen (TYLENOL) 500 MG tablet Take 1,000 mg by mouth every 8 hours as needed for mild pain       amLODIPine (NORVASC) 2.5 MG tablet Take 1 tablet (2.5 mg) by mouth daily 90 tablet 1     apixaban ANTICOAGULANT (ELIQUIS) 5 MG tablet Take 1 tablet (5 mg) by mouth 2 times daily 180 tablet 3     B Complex Vitamins (VITAMIN  B COMPLEX) tablet Take 1 tablet by  mouth daily.       calcium carbonate (TUMS) 500 MG chewable tablet Take 2 chew tab by mouth 2 times daily as needed for heartburn       calcium citrate-vitamin D (CALCIUM CITRATE + D) 315-250 MG-UNIT TABS per tablet Take 2 tablets by mouth daily       ezetimibe (ZETIA) 10 MG tablet TAKE 1 TABLET(10 MG) BY MOUTH DAILY 90 tablet 0     furosemide (LASIX) 40 MG tablet Take 2 tablets (80 mg) by mouth daily 180 tablet 0     metoprolol tartrate (LOPRESSOR) 100 MG tablet Take 1 tablet (100 mg) by mouth 2 times daily 180 tablet 0     omega 3 1000 MG CAPS Take 1 g by mouth daily 90 capsule      polyethylene glycol (MIRALAX/GLYCOLAX) powder Take 1 capful by mouth daily as needed for constipation       potassium chloride ER (KLOR-CON M) 20 MEQ CR tablet Take 1 tablet (20 mEq) by mouth 2 times daily 180 tablet 0     predniSONE (DELTASONE) 1 MG tablet Take 4 mg by mouth daily        Social History     Tobacco Use     Smoking status: Never Smoker     Smokeless tobacco: Never Used   Substance Use Topics     Alcohol use: Yes     Comment: 0-1 drink day        Review of Systems   CONSTITUTIONAL:NEGATIVE for fever, chills, change in weight x some recent weight loss she suspects is fluids.   ENT/MOUTH: NEGATIVE for ear, mouth and throat problems  RESP:see below  CV: NEGATIVE for chest pain, palpitations or peripheral edema  MUSCULOSKELETAL: some leg pain.  Neuro: see below  PSYCHIATRIC: see below    She is here primarily to address her shortness of breath. This has been going on for months.  She notes the last time saw heart doctor; said heart was good, but she had some concerns about her lungs.  So she went to her cancer doctor (Dr. Woodward) and got a CT of the lungs.   She reports this looked good.   She primarily notes dyspnea on exertion. She notes she does very little walking. Has electric thing that takes up and down stairs. Can do it, but is afraid to do this when she is alone.     Additionally, she notes her balance is not good.  "    She notes this is getting her down. She is tired of this stuff.   Will cry easily.     Right leg will hurt on the shin bone. She is planning to discuss this more with the vascular specialist; they did talk to her about possibly doing a procedure on her other leg.    Saw lung specialist in the past due to cough.  Not coughing now.  Does get some reflux.    She notes she has tried inhalers, but her throat always gets real icky.  She does think she may have thrush now. Notes her tongue is coated white. Mouth is feeling irritated.       Objective    /62 (BP Location: Right arm, Cuff Size: Adult Regular)   Pulse 71   Temp 97.6  F (36.4  C) (Oral)   Resp 16   Ht 1.6 m (5' 3\")   Wt 63.6 kg (140 lb 3.2 oz)   LMP  (LMP Unknown)   SpO2 98%   BMI 24.84 kg/m    Body mass index is 24.84 kg/m .     With walking, her O2 sats did go down as low as 74. See nursing note.    Physical Exam   GENERAL APPEARANCE: alert and no distress  HENT: her tongue is not currently white, but there may be some brownish material on the lateral aspects of her tongue.  RESP: lungs clear to auscultation - no rales, rhonchi or wheezes but breath sounds distant.  CV: regular rates and rhythm  MS: trace edema.   SKIN: she does LE venous varicosities.  PSYCH: mentation appears normal and affect normal/bright          PHQ-9 SCORE 7/23/2020 11/19/2020 7/1/2021   PHQ-9 Total Score 5 5 7       RYANNE-7 SCORE 7/23/2020 11/19/2020 7/1/2021   Total Score 1 1 0         Hemoglobin   Date Value Ref Range Status   09/08/2020 11.4 (L) 11.7 - 15.7 g/dL Final   08/31/2020 11.4 (L) 11.7 - 15.7 g/dL Final     Component      Latest Ref Rng & Units 7/1/2021   WBC      4.0 - 11.0 10e9/L 10.9   RBC Count      3.8 - 5.2 10e12/L 4.59   Hemoglobin      11.7 - 15.7 g/dL 13.3   Hematocrit      35.0 - 47.0 % 42.1   MCV      78 - 100 fl 92   MCH      26.5 - 33.0 pg 29.0   MCHC      31.5 - 36.5 g/dL 31.6   RDW      10.0 - 15.0 % 13.1   Platelet Count      150 - 450 " 10e9/L 270   Iron Study JIC Tube       PENDING     Lab Results   Component Value Date    A1C 5.6 03/16/2021    A1C 7.0 08/31/2020    A1C 5.9 11/28/2005         ECHO from 8/2020:     Interpretation Summary     Left ventricular systolic function is normal.The visual ejection fraction is  estimated at 55-60%.  The right ventricular systolic function is normal.  Moderate to severe bi-atrial enlargement.  The aortic valve is trileaflet with aortic valve sclerosis.  There is mild (1+) aortic regurgitation.  Mild aortic root dilatation.     Compared to echo dated 05/15/2020 there is interval improvement in LV systolic  function.    Reviewed Cardiology note 4/30/2021 and 6/23/2021    Reviewed Pulmonary note from 2019.     Reviewed note from Oncology:

## 2021-07-02 ENCOUNTER — DOCUMENTATION ONLY (OUTPATIENT)
Dept: OTHER | Facility: CLINIC | Age: 81
End: 2021-07-02

## 2021-07-02 DIAGNOSIS — J43.9 PULMONARY EMPHYSEMA, UNSPECIFIED EMPHYSEMA TYPE (H): Primary | ICD-10-CM

## 2021-07-02 DIAGNOSIS — R06.09 DOE (DYSPNEA ON EXERTION): ICD-10-CM

## 2021-07-02 LAB
ALBUMIN SERPL-MCNC: 3.2 G/DL (ref 3.4–5)
ANION GAP SERPL CALCULATED.3IONS-SCNC: 6 MMOL/L (ref 3–14)
BUN SERPL-MCNC: 21 MG/DL (ref 7–30)
CALCIUM SERPL-MCNC: 10 MG/DL (ref 8.5–10.1)
CHLORIDE SERPL-SCNC: 104 MMOL/L (ref 94–109)
CO2 SERPL-SCNC: 28 MMOL/L (ref 20–32)
CREAT SERPL-MCNC: 0.98 MG/DL (ref 0.52–1.04)
CREAT UR-MCNC: 35 MG/DL
GFR SERPL CREATININE-BSD FRML MDRD: 54 ML/MIN/{1.73_M2}
GLUCOSE SERPL-MCNC: 97 MG/DL (ref 70–99)
MICROALBUMIN UR-MCNC: 6 MG/L
MICROALBUMIN/CREAT UR: 16.79 MG/G CR (ref 0–25)
PHOSPHATE SERPL-MCNC: 3 MG/DL (ref 2.5–4.5)
POTASSIUM SERPL-SCNC: 3.5 MMOL/L (ref 3.4–5.3)
SODIUM SERPL-SCNC: 138 MMOL/L (ref 133–144)

## 2021-07-02 ASSESSMENT — ANXIETY QUESTIONNAIRES: GAD7 TOTAL SCORE: 0

## 2021-07-06 ENCOUNTER — PATIENT OUTREACH (OUTPATIENT)
Dept: CARE COORDINATION | Facility: CLINIC | Age: 81
End: 2021-07-06

## 2021-07-06 NOTE — PROGRESS NOTES
Clinic Care Coordination Contact    Clinic Care Coordination Contact  OUTREACH    Referral Information:     Chief Complaint   Patient presents with     Clinic Care Coordination - Initial     Resoruces for Support: Pulmonary/O2        Universal Utilization:    Utilization    Last refreshed: 7/4/2021  1:29 PM: Hospital Admissions 1           Last refreshed: 7/4/2021  1:29 PM: ED Visits 1           Last refreshed: 7/4/2021  1:29 PM: No Show Count (past year) 0              Current as of: 7/4/2021  1:29 PM              Clinical Concerns:  Current Medical Concerns:    Patient Active Problem List   Diagnosis     Pulmonary emphysema, unspecified emphysema type (H)     ASCUS on Pap smear     Hyperlipidemia LDL goal <160     Breast cancer (H)     Essential hypertension with goal blood pressure less than 140/90     Cystocele, midline     Uterovaginal prolapse     S/P hysterectomy     Advanced directives, counseling/discussion     History of hypokalemia     Concussion     Thyroid nodule     Chronic pain of both knees     History of lung cancer     Gastroesophageal reflux disease, esophagitis presence not specified     PMR (polymyalgia rheumatica) (H)     Personal history of malignant neoplasm of breast     Long term systemic steroid user     Cardiomyopathy (H)     Complete heart block (H)     Temporal arteritis (H)     Elevation of level of transaminase or lactic acid dehydrogenase (LDH)     Recurrent falls     Dyspnea     Diabetes mellitus, type 2 (H)     Persistent atrial fibrillation (H)     Skin ulcer of right lower leg with fat layer exposed (H)     Cardiac pacemaker in situ 4/15/21     Chronic kidney disease, stage 3         SWCC outreached to pt on this date to review referral and assess for needs. SWCC explained the reason for the call and explained care coordination. SWCC reviewed areas of assistance and referral from provider.     Pt states she does not need any help with oxygen at this time adding that her and her  "dtr have already \"called a place\". Pt denied needing any further assistance from care coordination in this matter stating her daughter has been helping. [facility] again asked if pt needed any assistance facilitating any other supports or locating any other options for O2. Pt declined. Pt states she will outreach to PCP once she knows more about what she is getting and from where.     Pt denied any other needs.       Education Provided to patient: Care Coordination role and availability.       Health Maintenance Reviewed:    Health Maintenance Due   Topic Date Due     HF ACTION PLAN  Never done     EYE EXAM  Never done     ZOSTER IMMUNIZATION (1 of 2) Never done     MEDICARE ANNUAL WELLNESS VISIT  03/11/2012     COLORECTAL CANCER SCREENING  10/13/2016     DTAP/TDAP/TD IMMUNIZATION (3 - Td or Tdap) 03/11/2021     LIPID  05/29/2021       Clinical Pathway: None    Medication Management:  Pt declined reviewing medications.     Lifestyle & Psychosocial Needs:  Socioeconomic History     Marital status:      Spouse name: Aren     Number of children: 2     Years of education: Not on file     Highest education level: Not on file   Occupational History     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     Employer: NONE      Tobacco Use     Smoking status: Never Smoker     Smokeless tobacco: Never Used   Substance and Sexual Activity     Alcohol use: Yes     Comment: 0-1 drink day     Drug use: No     Sexual activity: Yes     Partners: Male       Resources and Interventions:  Current Resources: Strong support from family.      Goals: Declined establishing any healthcare goals at this time.     Patient/Caregiver understanding: Pt reports understanding and denies any additional questions or concerns at this times. [facility] engaged in AIDET communication during encounter.     Future Appointments              In 1 month [address] [facility]          Plan: Pt was provided with contact number and encouraged " to outreach if she runs into barriers. Pt agreed. Pt will continue to work with family to establish with O2 company. No further outreaches will be made at this time unless a new referral is made or a change in the pt's status occurs. Patient was provided with this writer's contact information and encouraged to call with any questions or concerns.     Peng Valadez, [facility]  Clinic Care Coordinator  [facility]  [phone]  [email or web address]

## 2021-07-19 ENCOUNTER — TRANSFERRED RECORDS (OUTPATIENT)
Dept: HEALTH INFORMATION MANAGEMENT | Facility: CLINIC | Age: 81
End: 2021-07-19

## 2021-07-24 DIAGNOSIS — E87.6 HYPOKALEMIA: ICD-10-CM

## 2021-07-26 RX ORDER — POTASSIUM CHLORIDE 1500 MG/1
TABLET, EXTENDED RELEASE ORAL
Qty: 180 TABLET | Refills: 1 | Status: SHIPPED | OUTPATIENT
Start: 2021-07-26 | End: 2021-10-19

## 2021-08-12 ENCOUNTER — ANCILLARY PROCEDURE (OUTPATIENT)
Dept: CARDIOLOGY | Facility: CLINIC | Age: 81
End: 2021-08-12
Attending: INTERNAL MEDICINE
Payer: MEDICARE

## 2021-08-12 DIAGNOSIS — Z95.0 CARDIAC PACEMAKER IN SITU: ICD-10-CM

## 2021-08-12 PROCEDURE — 93296 REM INTERROG EVL PM/IDS: CPT | Performed by: INTERNAL MEDICINE

## 2021-08-12 PROCEDURE — 93294 REM INTERROG EVL PM/LDLS PM: CPT | Performed by: INTERNAL MEDICINE

## 2021-08-13 ENCOUNTER — OFFICE VISIT (OUTPATIENT)
Dept: ORTHOPEDICS | Facility: CLINIC | Age: 81
End: 2021-08-13
Payer: MEDICARE

## 2021-08-13 ENCOUNTER — TELEPHONE (OUTPATIENT)
Dept: CARDIOLOGY | Facility: CLINIC | Age: 81
End: 2021-08-13

## 2021-08-13 VITALS
SYSTOLIC BLOOD PRESSURE: 118 MMHG | BODY MASS INDEX: 24.8 KG/M2 | WEIGHT: 140 LBS | DIASTOLIC BLOOD PRESSURE: 62 MMHG | HEIGHT: 63 IN

## 2021-08-13 DIAGNOSIS — M17.11 PRIMARY OSTEOARTHRITIS OF RIGHT KNEE: Primary | ICD-10-CM

## 2021-08-13 PROCEDURE — 99213 OFFICE O/P EST LOW 20 MIN: CPT | Mod: 25 | Performed by: FAMILY MEDICINE

## 2021-08-13 PROCEDURE — 20611 DRAIN/INJ JOINT/BURSA W/US: CPT | Mod: RT | Performed by: FAMILY MEDICINE

## 2021-08-13 RX ORDER — METHYLPREDNISOLONE ACETATE 40 MG/ML
80 INJECTION, SUSPENSION INTRA-ARTICULAR; INTRALESIONAL; INTRAMUSCULAR; SOFT TISSUE
Status: DISCONTINUED | OUTPATIENT
Start: 2021-08-13 | End: 2022-02-01

## 2021-08-13 RX ADMIN — METHYLPREDNISOLONE ACETATE 80 MG: 40 INJECTION, SUSPENSION INTRA-ARTICULAR; INTRALESIONAL; INTRAMUSCULAR; SOFT TISSUE at 15:15

## 2021-08-13 ASSESSMENT — MIFFLIN-ST. JEOR: SCORE: 1069.17

## 2021-08-13 NOTE — TELEPHONE ENCOUNTER
Patient called with concerns regarding pacemaker site. Patient had PPM placed on 6/22/2021. Patient stated recently when laying on left side pacemaker has seemed to move and notices 1/10 pain due to position of pacemaker.     Patient states pain is only present when on left side. Patient denies any redness or swelling around incision or pacemaker site. Explained to patient that she may be feeling the lead or the header of pacemaker when on left side and may just need to reposition to help alleviate discomfort.     Asked if patient would like to come into the clinic to have pacemaker pocket looked at. Patient stated she is not concerned she just wanted to let us know since this has been a change. Reassured patient and asked that if pain gets worse or if she notices any signs of infection to call device clinic. Patient in agreement with plan and scheduled for in clinic device check on 11/17/2021.

## 2021-08-13 NOTE — PATIENT INSTRUCTIONS
1. Primary osteoarthritis of right knee      -Patient has right knee pain and swelling due to severe arthritis  -Patient tolerated aspiration and cortisone injection today without complications.  Patient was given postprocedure instructions  -Patient was inquiring about the rooster comb injection.  We will obtain prior authorization for Synvisc 1.  My office will contact you once authorization has been obtained.  You may follow-up at your convenience to administer the medication  -If patient gets no relief from cortisone or viscosupplementation, she will need to seriously reconsider total knee replacement surgery.  Patient states that she lives alone and would find recovery very difficult.  Patient may need to be in short-term rehab facility for immediate postop care.  She can discuss this further with the surgeon if necessary  -Call direct clinic number [[phone]] at any time with questions or concerns.    Albert Yeo MD [facility]  [facility]

## 2021-08-13 NOTE — PROGRESS NOTES
"ASSESSMENT & PLAN  Patient Instructions     1. Primary osteoarthritis of right knee      -Patient has right knee pain and swelling due to severe arthritis  -Patient tolerated aspiration and cortisone injection today without complications.  Patient was given postprocedure instructions  -Patient was inquiring about the rooster comb injection.  We will obtain prior authorization for Synvisc 1.  My office will contact you once authorization has been obtained.  You may follow-up at your convenience to administer the medication  -If patient gets no relief from cortisone or viscosupplementation, she will need to seriously reconsider total knee replacement surgery.  Patient states that she lives alone and would find recovery very difficult.  Patient may need to be in short-term rehab facility for immediate postop care.  She can discuss this further with the surgeon if necessary  -Call direct clinic number [[phone]] at any time with questions or concerns.    Albert Yeo MD [facility]  [facility]          -----    SUBJECTIVE:  Tomasa Fam is a 80 year old female who is seen in follow-up for right knee pain.They were last seen by Dr. Sauceda on 8/18/20.     Since their last visit reports 0% - (About the same as last time). States only has pain with walking. They indicate that their current pain level is 5/10. They have tried no treatment tried to date.      The patient is seen by themselves.    Patient's past medical, surgical, social, and family histories were reviewed today and no changes are noted.    REVIEW OF SYSTEMS:  Constitutional: NEGATIVE for fever, chills, change in weight  Skin: NEGATIVE for worrisome rashes, moles or lesions  GI/: NEGATIVE for bowel or bladder changes  Neuro: NEGATIVE for weakness, dizziness or paresthesias    OBJECTIVE:  /62   Ht 1.6 m (5' 3\")   Wt 63.5 kg (140 lb)   LMP  (LMP Unknown)   BMI 24.80 kg/m     General: " healthy, alert and in no distress  HEENT: no scleral icterus or conjunctival erythema  Skin: no suspicious lesions or rash. No jaundice.  CV: regular rhythm by palpation, no pedal edema  Resp: normal respiratory effort without conversational dyspnea   Psych: normal mood and affect  Gait: normal steady gait with appropriate coordination and balance  Neuro: normal light touch sensory exam of the extremities.    MSK:  RIGHT KNEE  Inspection:    normal alignment  Palpation:    Tender about the lateral patellar facet, medial patellar facet, lateral joint line and medial joint line. Remainder of bony and ligamentous landmarks are nontender.    Moderate effusion is present    Patellofemoral crepitus is Present  Range of Motion:     -30 extension to 1000 flexion  Strength:    Quadriceps grossly intact    Extensor mechanism intact  Special Tests:    Positive: none    Negative: MCL/valgus stress (0 & 30 deg), LCL/varus stress (0 & 30 deg), Lachman's, anterior drawer, posterior drawer, Sujatha's      Large Joint Injection/Arthocentesis: R knee joint    Date/Time: 8/13/2021 3:15 PM  Performed by: Yeo, Albert, MD  Authorized by: Yeo, Albert, MD     Indications:  Pain and osteoarthritis  Needle Size:  25 G  Guidance: ultrasound    Approach:  Superolateral  Location:  Knee      Medications:  80 mg methylPREDNISolone 40 MG/ML  Aspirate amount (mL):  12  Aspirate:  Serous and yellow  Outcome:  Tolerated well, no immediate complications  Procedure discussed: discussed risks, benefits, and alternatives    Consent Given by:  Patient  Timeout: timeout called immediately prior to procedure    Prep: patient was prepped and draped in usual sterile fashion            Independent visualization of the below image:  XR KNEE STANDING AP BILAT SUNRISE BILAT LAT RT 8/18/2020 1:43 PM      HISTORY: Chronic pain of right knee; Chronic pain of right knee                                                                      IMPRESSION: Right knee  medial compartment moderate to severe joint  space narrowing which appears to have progressed since 11/2/2016.  Otherwise unremarkable knee radiographs     YUAN MALDONADO MD    Patient's conditions were thoroughly discussed during today's visit with total time spent face-to-face with the patient, chart review of previous notes with Dr. Sauceda and documentation being 26 minutes.    Albert Yeo MD, [facility]

## 2021-08-13 NOTE — LETTER
8/13/2021         RE: Tomasa Fam  [address] [facility] [address]        Dear Colleague,    Thank you for referring your patient, Tomasa Fam, to the [facility] [place]. Please see a copy of my visit note below.    ASSESSMENT & PLAN  Patient Instructions     1. Primary osteoarthritis of right knee      -Patient has right knee pain and swelling due to severe arthritis  -Patient tolerated aspiration and cortisone injection today without complications.  Patient was given postprocedure instructions  -Patient was inquiring about the rooster comb injection.  We will obtain prior authorization for Synvisc 1.  My office will contact you once authorization has been obtained.  You may follow-up at your convenience to administer the medication  -If patient gets no relief from cortisone or viscosupplementation, she will need to seriously reconsider total knee replacement surgery.  Patient states that she lives alone and would find recovery very difficult.  Patient may need to be in short-term rehab facility for immediate postop care.  She can discuss this further with the surgeon if necessary  -Call direct clinic number [[phone]] at any time with questions or concerns.    Albert Yeo MD [facility]  [facility] [place]          -----    SUBJECTIVE:  Tomasa Fam is a 80 year old female who is seen in follow-up for right knee pain.They were last seen by Dr. Sauceda on 8/18/20.     Since their last visit reports 0% - (About the same as last time). States only has pain with walking. They indicate that their current pain level is 5/10. They have tried no treatment tried to date.      The patient is seen by themselves.    Patient's past medical, surgical, social, and family histories were reviewed today and no changes are noted.    REVIEW OF SYSTEMS:  Constitutional: NEGATIVE for fever, chills, change in  "weight  Skin: NEGATIVE for worrisome rashes, moles or lesions  GI/: NEGATIVE for bowel or bladder changes  Neuro: NEGATIVE for weakness, dizziness or paresthesias    OBJECTIVE:  /62   Ht 1.6 m (5' 3\")   Wt 63.5 kg (140 lb)   LMP  (LMP Unknown)   BMI 24.80 kg/m     General: healthy, alert and in no distress  HEENT: no scleral icterus or conjunctival erythema  Skin: no suspicious lesions or rash. No jaundice.  CV: regular rhythm by palpation, no pedal edema  Resp: normal respiratory effort without conversational dyspnea   Psych: normal mood and affect  Gait: normal steady gait with appropriate coordination and balance  Neuro: normal light touch sensory exam of the extremities.    MSK:  RIGHT KNEE  Inspection:    normal alignment  Palpation:    Tender about the lateral patellar facet, medial patellar facet, lateral joint line and medial joint line. Remainder of bony and ligamentous landmarks are nontender.    Moderate effusion is present    Patellofemoral crepitus is Present  Range of Motion:     -30 extension to 1000 flexion  Strength:    Quadriceps grossly intact    Extensor mechanism intact  Special Tests:    Positive: none    Negative: MCL/valgus stress (0 & 30 deg), LCL/varus stress (0 & 30 deg), Lachman's, anterior drawer, posterior drawer, Sujatha's      Large Joint Injection/Arthocentesis: R knee joint    Date/Time: 8/13/2021 3:15 PM  Performed by: Yeo, Albert, MD  Authorized by: Yeo, Albert, MD     Indications:  Pain and osteoarthritis  Needle Size:  25 G  Guidance: ultrasound    Approach:  Superolateral  Location:  Knee      Medications:  80 mg methylPREDNISolone 40 MG/ML  Aspirate amount (mL):  12  Aspirate:  Serous and yellow  Outcome:  Tolerated well, no immediate complications  Procedure discussed: discussed risks, benefits, and alternatives    Consent Given by:  Patient  Timeout: timeout called immediately prior to procedure    Prep: patient was prepped and draped in usual sterile fashion  "           Independent visualization of the below image:  XR KNEE STANDING AP BILAT SUNRISE BILAT LAT RT 8/18/2020 1:43 PM      HISTORY: Chronic pain of right knee; Chronic pain of right knee                                                                      IMPRESSION: Right knee medial compartment moderate to severe joint  space narrowing which appears to have progressed since 11/2/2016.  Otherwise unremarkable knee radiographs     YUAN MALDONADO MD    Patient's conditions were thoroughly discussed during today's visit with total time spent face-to-face with the patient, chart review of previous notes with Dr. Sauceda and documentation being 26 minutes.    Albert Yeo MD, [facility]            Again, thank you for allowing me to participate in the care of your patient.        Sincerely,        Albert Yeo, MD

## 2021-08-14 DIAGNOSIS — E78.5 HYPERLIPIDEMIA LDL GOAL <160: ICD-10-CM

## 2021-08-16 RX ORDER — EZETIMIBE 10 MG/1
TABLET ORAL
Qty: 90 TABLET | Refills: 0 | Status: SHIPPED | OUTPATIENT
Start: 2021-08-16 | End: 2021-10-19

## 2021-08-16 NOTE — TELEPHONE ENCOUNTER
Recent Labs   Lab Test 05/29/20  0842 08/26/19  0832 06/10/15  0925 07/22/14  0827   CHOL 148 187 194 183   HDL 50 65 70 55   LDL 76 97 106 105   TRIG 110 124 88 114   CHOLHDLRATIO  --   --  2.8 3.3

## 2021-08-26 LAB
MDC_IDC_EPISODE_DTM: NORMAL
MDC_IDC_EPISODE_DTM: NORMAL
MDC_IDC_EPISODE_ID: NORMAL
MDC_IDC_EPISODE_ID: NORMAL
MDC_IDC_EPISODE_TYPE: NORMAL
MDC_IDC_EPISODE_TYPE: NORMAL
MDC_IDC_LEAD_IMPLANT_DT: NORMAL
MDC_IDC_LEAD_IMPLANT_DT: NORMAL
MDC_IDC_LEAD_LOCATION: NORMAL
MDC_IDC_LEAD_LOCATION: NORMAL
MDC_IDC_LEAD_LOCATION_DETAIL_1: NORMAL
MDC_IDC_LEAD_LOCATION_DETAIL_1: NORMAL
MDC_IDC_LEAD_MFG: NORMAL
MDC_IDC_LEAD_MFG: NORMAL
MDC_IDC_LEAD_MODEL: NORMAL
MDC_IDC_LEAD_MODEL: NORMAL
MDC_IDC_LEAD_POLARITY_TYPE: NORMAL
MDC_IDC_LEAD_SERIAL: NORMAL
MDC_IDC_LEAD_SERIAL: NORMAL
MDC_IDC_MSMT_BATTERY_DTM: NORMAL
MDC_IDC_MSMT_BATTERY_REMAINING_LONGEVITY: 126 MO
MDC_IDC_MSMT_BATTERY_REMAINING_PERCENTAGE: 100 %
MDC_IDC_MSMT_BATTERY_STATUS: NORMAL
MDC_IDC_MSMT_LEADCHNL_LV_IMPEDANCE_VALUE: 775 OHM
MDC_IDC_MSMT_LEADCHNL_LV_PACING_THRESHOLD_AMPLITUDE: 1.5 V
MDC_IDC_MSMT_LEADCHNL_LV_PACING_THRESHOLD_PULSEWIDTH: 0.4 MS
MDC_IDC_MSMT_LEADCHNL_RV_IMPEDANCE_VALUE: 707 OHM
MDC_IDC_MSMT_LEADCHNL_RV_PACING_THRESHOLD_AMPLITUDE: 1.1 V
MDC_IDC_MSMT_LEADCHNL_RV_PACING_THRESHOLD_PULSEWIDTH: 0.4 MS
MDC_IDC_PG_IMPLANT_DTM: NORMAL
MDC_IDC_PG_MFG: NORMAL
MDC_IDC_PG_MODEL: NORMAL
MDC_IDC_PG_SERIAL: NORMAL
MDC_IDC_PG_TYPE: NORMAL
MDC_IDC_SESS_CLINIC_NAME: NORMAL
MDC_IDC_SESS_DTM: NORMAL
MDC_IDC_SESS_TYPE: NORMAL
MDC_IDC_SET_BRADY_AT_MODE_SWITCH_RATE: 170 {BEATS}/MIN
MDC_IDC_SET_BRADY_LOWRATE: 70 {BEATS}/MIN
MDC_IDC_SET_BRADY_MAX_SENSOR_RATE: 130 {BEATS}/MIN
MDC_IDC_SET_BRADY_MODE: NORMAL
MDC_IDC_SET_CRT_LVRV_DELAY: 30 MS
MDC_IDC_SET_CRT_PACED_CHAMBERS: NORMAL
MDC_IDC_SET_LEADCHNL_LV_PACING_AMPLITUDE: 2.5 V
MDC_IDC_SET_LEADCHNL_LV_PACING_ANODE_ELECTRODE_1: NORMAL
MDC_IDC_SET_LEADCHNL_LV_PACING_ANODE_LOCATION_1: NORMAL
MDC_IDC_SET_LEADCHNL_LV_PACING_CATHODE_ELECTRODE_1: NORMAL
MDC_IDC_SET_LEADCHNL_LV_PACING_CATHODE_LOCATION_1: NORMAL
MDC_IDC_SET_LEADCHNL_LV_PACING_PULSEWIDTH: 0.4 MS
MDC_IDC_SET_LEADCHNL_LV_SENSING_ADAPTATION_MODE: NORMAL
MDC_IDC_SET_LEADCHNL_LV_SENSING_ANODE_ELECTRODE_1: NORMAL
MDC_IDC_SET_LEADCHNL_LV_SENSING_ANODE_LOCATION_1: NORMAL
MDC_IDC_SET_LEADCHNL_LV_SENSING_CATHODE_ELECTRODE_1: NORMAL
MDC_IDC_SET_LEADCHNL_LV_SENSING_CATHODE_LOCATION_1: NORMAL
MDC_IDC_SET_LEADCHNL_LV_SENSING_SENSITIVITY: 2.5 MV
MDC_IDC_SET_LEADCHNL_RA_SENSING_ADAPTATION_MODE: NORMAL
MDC_IDC_SET_LEADCHNL_RA_SENSING_SENSITIVITY: 0.5 MV
MDC_IDC_SET_LEADCHNL_RV_PACING_AMPLITUDE: 2.2 V
MDC_IDC_SET_LEADCHNL_RV_PACING_CAPTURE_MODE: NORMAL
MDC_IDC_SET_LEADCHNL_RV_PACING_POLARITY: NORMAL
MDC_IDC_SET_LEADCHNL_RV_PACING_PULSEWIDTH: 0.4 MS
MDC_IDC_SET_LEADCHNL_RV_SENSING_ADAPTATION_MODE: NORMAL
MDC_IDC_SET_LEADCHNL_RV_SENSING_POLARITY: NORMAL
MDC_IDC_SET_LEADCHNL_RV_SENSING_SENSITIVITY: 2.5 MV
MDC_IDC_SET_ZONE_DETECTION_INTERVAL: 375 MS
MDC_IDC_SET_ZONE_TYPE: NORMAL
MDC_IDC_SET_ZONE_VENDOR_TYPE: NORMAL
MDC_IDC_STAT_BRADY_DTM_END: NORMAL
MDC_IDC_STAT_BRADY_DTM_START: NORMAL
MDC_IDC_STAT_BRADY_RA_PERCENT_PACED: 0 %
MDC_IDC_STAT_BRADY_RV_PERCENT_PACED: 100 %
MDC_IDC_STAT_CRT_DTM_END: NORMAL
MDC_IDC_STAT_CRT_DTM_START: NORMAL
MDC_IDC_STAT_CRT_LV_PERCENT_PACED: 100 %
MDC_IDC_STAT_EPISODE_RECENT_COUNT: 0
MDC_IDC_STAT_EPISODE_RECENT_COUNT: 2
MDC_IDC_STAT_EPISODE_RECENT_COUNT_DTM_END: NORMAL
MDC_IDC_STAT_EPISODE_RECENT_COUNT_DTM_START: NORMAL
MDC_IDC_STAT_EPISODE_TYPE: NORMAL
MDC_IDC_STAT_EPISODE_VENDOR_TYPE: NORMAL

## 2021-08-30 ENCOUNTER — TRANSFERRED RECORDS (OUTPATIENT)
Dept: HEALTH INFORMATION MANAGEMENT | Facility: CLINIC | Age: 81
End: 2021-08-30

## 2021-09-05 ENCOUNTER — HEALTH MAINTENANCE LETTER (OUTPATIENT)
Age: 81
End: 2021-09-05

## 2021-09-08 DIAGNOSIS — I10 ESSENTIAL HYPERTENSION: ICD-10-CM

## 2021-09-08 DIAGNOSIS — I10 HTN (HYPERTENSION): ICD-10-CM

## 2021-09-08 DIAGNOSIS — I48.19 PERSISTENT ATRIAL FIBRILLATION (H): ICD-10-CM

## 2021-09-08 RX ORDER — FUROSEMIDE 40 MG
80 TABLET ORAL DAILY
Qty: 180 TABLET | Refills: 0 | Status: CANCELLED | OUTPATIENT
Start: 2021-09-08

## 2021-09-08 RX ORDER — FUROSEMIDE 40 MG
80 TABLET ORAL DAILY
Qty: 180 TABLET | Refills: 0 | Status: ON HOLD | OUTPATIENT
Start: 2021-09-08 | End: 2022-02-01

## 2021-09-09 ENCOUNTER — TELEPHONE (OUTPATIENT)
Dept: CARDIOLOGY | Facility: CLINIC | Age: 81
End: 2021-09-09

## 2021-09-09 RX ORDER — METOPROLOL TARTRATE 100 MG
100 TABLET ORAL 2 TIMES DAILY
Qty: 180 TABLET | Refills: 0 | Status: SHIPPED | OUTPATIENT
Start: 2021-09-09 | End: 2021-10-19

## 2021-09-09 NOTE — TELEPHONE ENCOUNTER
Medication is being filled for 1 time refill only due to:  Patient needs to be seen because needs visit..   Next 5 appointments (look out 90 days)    Oct 19, 2021  8:45 AM  PHYSICAL with Cris Romero MD  [facility] ([facility] [place] ) [address] [facility] [address]  [phone]        Carlie Burns RN

## 2021-10-19 ENCOUNTER — OFFICE VISIT (OUTPATIENT)
Dept: FAMILY MEDICINE | Facility: CLINIC | Age: 81
End: 2021-10-19
Payer: MEDICARE

## 2021-10-19 VITALS
TEMPERATURE: 98.4 F | HEART RATE: 70 BPM | HEIGHT: 63 IN | DIASTOLIC BLOOD PRESSURE: 58 MMHG | RESPIRATION RATE: 17 BRPM | WEIGHT: 133.7 LBS | BODY MASS INDEX: 23.69 KG/M2 | OXYGEN SATURATION: 99 % | SYSTOLIC BLOOD PRESSURE: 130 MMHG

## 2021-10-19 DIAGNOSIS — R13.10 DYSPHAGIA, UNSPECIFIED TYPE: ICD-10-CM

## 2021-10-19 DIAGNOSIS — Z85.3 PERSONAL HISTORY OF MALIGNANT NEOPLASM OF BREAST: ICD-10-CM

## 2021-10-19 DIAGNOSIS — R39.9 URINARY SYMPTOM OR SIGN: ICD-10-CM

## 2021-10-19 DIAGNOSIS — N18.30 STAGE 3 CHRONIC KIDNEY DISEASE, UNSPECIFIED WHETHER STAGE 3A OR 3B CKD (H): ICD-10-CM

## 2021-10-19 DIAGNOSIS — Z95.0 CARDIAC PACEMAKER IN SITU: ICD-10-CM

## 2021-10-19 DIAGNOSIS — R63.4 LOSS OF WEIGHT: ICD-10-CM

## 2021-10-19 DIAGNOSIS — R73.09 ELEVATED HEMOGLOBIN A1C: ICD-10-CM

## 2021-10-19 DIAGNOSIS — I48.19 PERSISTENT ATRIAL FIBRILLATION (H): ICD-10-CM

## 2021-10-19 DIAGNOSIS — M35.3 PMR (POLYMYALGIA RHEUMATICA) (H): ICD-10-CM

## 2021-10-19 DIAGNOSIS — I10 ESSENTIAL HYPERTENSION: ICD-10-CM

## 2021-10-19 DIAGNOSIS — Z00.00 ENCOUNTER FOR MEDICARE ANNUAL WELLNESS EXAM: Primary | ICD-10-CM

## 2021-10-19 DIAGNOSIS — J43.9 PULMONARY EMPHYSEMA, UNSPECIFIED EMPHYSEMA TYPE (H): ICD-10-CM

## 2021-10-19 DIAGNOSIS — I44.2 COMPLETE HEART BLOCK (H): ICD-10-CM

## 2021-10-19 DIAGNOSIS — Z23 NEED FOR PROPHYLACTIC VACCINATION AND INOCULATION AGAINST INFLUENZA: ICD-10-CM

## 2021-10-19 DIAGNOSIS — E87.6 HYPOKALEMIA: ICD-10-CM

## 2021-10-19 DIAGNOSIS — E78.5 HYPERLIPIDEMIA LDL GOAL <160: ICD-10-CM

## 2021-10-19 DIAGNOSIS — Z76.89 ENCOUNTER TO ESTABLISH CARE WITH NEW DOCTOR: ICD-10-CM

## 2021-10-19 DIAGNOSIS — Z85.118 HISTORY OF LUNG CANCER: ICD-10-CM

## 2021-10-19 DIAGNOSIS — F39 MOOD DISORDER (H): ICD-10-CM

## 2021-10-19 LAB
ALBUMIN SERPL-MCNC: 2.9 G/DL (ref 3.4–5)
ALBUMIN UR-MCNC: NEGATIVE MG/DL
ALP SERPL-CCNC: 81 U/L (ref 40–150)
ALT SERPL W P-5'-P-CCNC: 26 U/L (ref 0–50)
ANION GAP SERPL CALCULATED.3IONS-SCNC: 6 MMOL/L (ref 3–14)
APPEARANCE UR: CLEAR
AST SERPL W P-5'-P-CCNC: 17 U/L (ref 0–45)
BILIRUB SERPL-MCNC: 0.6 MG/DL (ref 0.2–1.3)
BILIRUB UR QL STRIP: NEGATIVE
BUN SERPL-MCNC: 14 MG/DL (ref 7–30)
CALCIUM SERPL-MCNC: 10.3 MG/DL (ref 8.5–10.1)
CHLORIDE BLD-SCNC: 107 MMOL/L (ref 94–109)
CHOLEST SERPL-MCNC: 157 MG/DL
CO2 SERPL-SCNC: 23 MMOL/L (ref 20–32)
COLOR UR AUTO: YELLOW
CREAT SERPL-MCNC: 0.73 MG/DL (ref 0.52–1.04)
FASTING STATUS PATIENT QL REPORTED: YES
GFR SERPL CREATININE-BSD FRML MDRD: 77 ML/MIN/1.73M2
GLUCOSE BLD-MCNC: 87 MG/DL (ref 70–99)
GLUCOSE UR STRIP-MCNC: NEGATIVE MG/DL
HBA1C MFR BLD: 5.7 % (ref 0–5.6)
HDLC SERPL-MCNC: 53 MG/DL
HGB UR QL STRIP: NEGATIVE
KETONES UR STRIP-MCNC: NEGATIVE MG/DL
LDLC SERPL CALC-MCNC: 84 MG/DL
LEUKOCYTE ESTERASE UR QL STRIP: ABNORMAL
MUCOUS THREADS #/AREA URNS LPF: PRESENT /LPF
NITRATE UR QL: NEGATIVE
NONHDLC SERPL-MCNC: 104 MG/DL
PH UR STRIP: 5.5 [PH] (ref 5–7)
POTASSIUM BLD-SCNC: 4.2 MMOL/L (ref 3.4–5.3)
PROT SERPL-MCNC: 6.8 G/DL (ref 6.8–8.8)
RBC #/AREA URNS AUTO: ABNORMAL /HPF
SODIUM SERPL-SCNC: 136 MMOL/L (ref 133–144)
SP GR UR STRIP: 1.02 (ref 1–1.03)
TRIGL SERPL-MCNC: 101 MG/DL
UROBILINOGEN UR STRIP-ACNC: 0.2 E.U./DL
WBC #/AREA URNS AUTO: ABNORMAL /HPF

## 2021-10-19 PROCEDURE — 80053 COMPREHEN METABOLIC PANEL: CPT | Performed by: INTERNAL MEDICINE

## 2021-10-19 PROCEDURE — 99215 OFFICE O/P EST HI 40 MIN: CPT | Mod: 25 | Performed by: INTERNAL MEDICINE

## 2021-10-19 PROCEDURE — G0008 ADMIN INFLUENZA VIRUS VAC: HCPCS | Performed by: INTERNAL MEDICINE

## 2021-10-19 PROCEDURE — G0438 PPPS, INITIAL VISIT: HCPCS | Performed by: INTERNAL MEDICINE

## 2021-10-19 PROCEDURE — 36415 COLL VENOUS BLD VENIPUNCTURE: CPT | Performed by: INTERNAL MEDICINE

## 2021-10-19 PROCEDURE — 90662 IIV NO PRSV INCREASED AG IM: CPT | Performed by: INTERNAL MEDICINE

## 2021-10-19 PROCEDURE — 80061 LIPID PANEL: CPT | Performed by: INTERNAL MEDICINE

## 2021-10-19 PROCEDURE — 83036 HEMOGLOBIN GLYCOSYLATED A1C: CPT | Performed by: INTERNAL MEDICINE

## 2021-10-19 PROCEDURE — 81001 URINALYSIS AUTO W/SCOPE: CPT | Performed by: INTERNAL MEDICINE

## 2021-10-19 RX ORDER — EZETIMIBE 10 MG/1
10 TABLET ORAL DAILY
Qty: 90 TABLET | Refills: 1 | Status: SHIPPED | OUTPATIENT
Start: 2021-10-19 | End: 2022-02-28

## 2021-10-19 RX ORDER — AMLODIPINE BESYLATE 2.5 MG/1
2.5 TABLET ORAL DAILY
Qty: 90 TABLET | Refills: 1 | Status: SHIPPED | OUTPATIENT
Start: 2021-10-19 | End: 2022-02-22 | Stop reason: DRUGHIGH

## 2021-10-19 RX ORDER — METOPROLOL TARTRATE 100 MG
100 TABLET ORAL 2 TIMES DAILY
Qty: 180 TABLET | Refills: 1 | Status: SHIPPED | OUTPATIENT
Start: 2021-10-19 | End: 2022-02-28

## 2021-10-19 RX ORDER — ALBUTEROL SULFATE 90 UG/1
2 AEROSOL, METERED RESPIRATORY (INHALATION) EVERY 4 HOURS PRN
COMMUNITY
Start: 2021-08-30 | End: 2022-02-28

## 2021-10-19 RX ORDER — FUROSEMIDE 40 MG
80 TABLET ORAL DAILY
Qty: 180 TABLET | Refills: 1 | Status: CANCELLED | OUTPATIENT
Start: 2021-10-19

## 2021-10-19 RX ORDER — POTASSIUM CHLORIDE 1500 MG/1
TABLET, EXTENDED RELEASE ORAL
Qty: 180 TABLET | Refills: 1 | Status: SHIPPED | OUTPATIENT
Start: 2021-10-19 | End: 2022-02-28

## 2021-10-19 ASSESSMENT — ENCOUNTER SYMPTOMS
SORE THROAT: 0
PARESTHESIAS: 0
HEMATOCHEZIA: 0
ARTHRALGIAS: 0
NAUSEA: 0
EYE PAIN: 0
FREQUENCY: 0
CONSTIPATION: 1
CHILLS: 0
JOINT SWELLING: 0
FEVER: 0
NERVOUS/ANXIOUS: 1
COUGH: 1
SHORTNESS OF BREATH: 1
WEAKNESS: 0
ABDOMINAL PAIN: 0
HEARTBURN: 1
HEMATURIA: 0
HEADACHES: 0
BREAST MASS: 0
PALPITATIONS: 0
DIARRHEA: 0
DYSURIA: 0
DIZZINESS: 0
MYALGIAS: 1

## 2021-10-19 ASSESSMENT — MIFFLIN-ST. JEOR: SCORE: 1035.59

## 2021-10-19 ASSESSMENT — ACTIVITIES OF DAILY LIVING (ADL): CURRENT_FUNCTION: NO ASSISTANCE NEEDED

## 2021-10-19 NOTE — PROGRESS NOTES
"Chief Complaint   Patient presents with     Wellness Visit     Lipids     Hypertension     Establish Care     Imm/Inj     Flu Shot         SUBJECTIVE:   Tomasa Fam is a 81 year old female who presents for Preventive Visit plus review of her many chronic medical issues. She is here to establish care since Dr. Fenton has retired..      Patient has been advised of split billing requirements and indicates understanding: Yes   Are you in the first 12 months of your Medicare coverage?  No    Healthy Habits:     In general, how would you rate your overall health?  Fair    Frequency of exercise:  None    Do you usually eat at least 4 servings of fruit and vegetables a day, include whole grains    & fiber and avoid regularly eating high fat or \"junk\" foods?  No    Taking medications regularly:  Yes    Medication side effects:  Muscle aches    Ability to successfully perform activities of daily living:  No assistance needed    Home Safety:  No safety concerns identified    Hearing Impairment:  Difficulty following a conversation in a noisy restaurant or crowded room    In the past 6 months, have you been bothered by leaking of urine? Yes    In general, how would you rate your overall mental or emotional health?  Fair      PHQ-2 Total Score: 2    Additional concerns today:  No    Do you feel safe in your environment? Yes    Have you ever done Advance Care Planning? (For example, a Health Directive, POLST, or a discussion with a medical provider or your loved ones about your wishes): Yes, patient states has an Advance Care Planning document and will bring a copy to the clinic.       Fall risk  Fallen 2 or more times in the past year?: No  Any fall with injury in the past year?: No    Cognitive Screening   1) Repeat 3 items (Leader, Season, Table)    2) Clock draw: NORMAL  3) 3 item recall: Recalls 2 objects   Results: NORMAL clock, 1-2 items recalled: COGNITIVE IMPAIRMENT LESS LIKELY    Mini-CogTM Copyright S Ronaldo. " Licensed by the author for use in [facility]; reprinted with permission (sherrie@[facility]). All rights reserved.      Do you have sleep apnea, excessive snoring or daytime drowsiness?: no    Reviewed and updated as needed this visit by clinical staff  Tobacco  Allergies  Meds   Med Hx  Surg Hx  Fam Hx  Soc Hx        Reviewed and updated as needed this visit by Provider                Social History     Tobacco Use     Smoking status: Never Smoker     Smokeless tobacco: Never Used   Substance Use Topics     Alcohol use: Yes     Comment: 0-1 drink day         Alcohol Use 10/19/2021   Prescreen: >3 drinks/day or >7 drinks/week? No           Hyperlipidemia Follow-Up    Are you regularly taking any medication or supplement to lower your cholesterol?   No   Zetia  Are you having muscle aches or other side effects that you think could be caused by your cholesterol lowering medication?  No    Hypertension Follow-up    Do you check your blood pressure regularly outside of the clinic? No   Are you following a low salt diet? Yes  Are your blood pressures ever more than 140 on the top number (systolic) OR more   than 90 on the bottom number (diastolic), for example 140/90?       Current providers sharing in care for this patient include:     Patient Care Team:  Violet Fenton MD as PCP - General (Family Practice)  Violet Fenton MD as Assigned PCP  Care, [facility]  Kylie Sevilla DO as Assigned Heart and Vascular Provider  Willow De La Paz RPH as Pharmacist (Pharmacist)  Yeo, Albert, MD as Assigned Musculoskeletal Provider    The following health maintenance items are reviewed in Epic and correct as of today:  Health Maintenance Due   Topic Date Due     HF ACTION PLAN  Never done     EYE EXAM  Never done     ZOSTER IMMUNIZATION (1 of 2) Never done     COLORECTAL CANCER SCREENING  10/13/2016     DTAP/TDAP/TD IMMUNIZATION (3 - Td or Tdap) 03/11/2021      LIPID  05/29/2021     INFLUENZA VACCINE (1) 09/01/2021     FALL RISK ASSESSMENT  09/08/2021     CBC  09/08/2021     A1C  09/16/2021     Labs reviewed in EPIC  BP Readings from Last 3 Encounters:   10/19/21 130/58   08/13/21 118/62   07/01/21 128/62    Wt Readings from Last 3 Encounters:   10/19/21 60.6 kg (133 lb 11.2 oz)   08/13/21 63.5 kg (140 lb)   07/01/21 63.6 kg (140 lb 3.2 oz)                  Patient Active Problem List   Diagnosis     Pulmonary emphysema, unspecified emphysema type (H)     ASCUS on Pap smear     Hyperlipidemia LDL goal <160     Breast cancer (H)     Essential hypertension with goal blood pressure less than 140/90     Cystocele, midline     Uterovaginal prolapse     S/P hysterectomy     Advanced directives, counseling/discussion     History of hypokalemia     Concussion     Thyroid nodule     Chronic pain of both knees     History of lung cancer     Gastroesophageal reflux disease, esophagitis presence not specified     PMR (polymyalgia rheumatica) (H)     Personal history of malignant neoplasm of breast     Long term systemic steroid user     Cardiomyopathy (H)     Complete heart block (H)     Temporal arteritis (H)     Elevation of level of transaminase or lactic acid dehydrogenase (LDH)     Recurrent falls     Dyspnea     Diabetes mellitus, type 2 (H)     Persistent atrial fibrillation (H)     Skin ulcer of right lower leg with fat layer exposed (H)     Cardiac pacemaker in situ 4/15/21     Chronic kidney disease, stage 3 (H)     Mood disorder (H)     Past Surgical History:   Procedure Laterality Date     ANESTHESIA CARDIOVERSION N/A 6/12/2020    Procedure: ANESTHESIA, FOR CARDIOVERSION;  Surgeon: GENERIC ANESTHESIA PROVIDER;  Location:  OR     COLONOSCOPY  3/2003    adenomatous polyp      COLONOSCOPY  7/2006    diverticulosis - repeat in 5 years     COLONOSCOPY  10/13/2011    Procedure:COLONOSCOPY; COLONOSCOPY ; Surgeon:CHITO GORDILLO; Location: GI     CYSTOSCOPY  7/11/2012     Procedure: CYSTOSCOPY;;  Surgeon: Aline Cooper DO;  Location:  OR     DAVINCI HYSTERECTOMY SUPRACERVICAL, SACROCOLPOPEXY, COMBINED  7/11/2012    Procedure: COMBINED DAVINCI HYSTERECTOMY SUPRACERVICAL, SACROCOLPOPEXY;   DAVINCI ASSISTED LAPAROSCOPIC SUPRACERVICAL HYSTERECTOMY AND BILATERAL SALPINGO-OOPHORECTOMY, SACROCOLPOPEXY AND CYSTOSCOPY;  Surgeon: Aline Cooper DO;  Location:  OR     EP ABLATION AV NODE N/A 6/22/2020    Procedure: EP ABLATION AV NODE;  Surgeon: Adriano Raman MD;  Location:  HEART CARDIAC CATH LAB     EP BIVENT LEAD PLACEMENT N/A 6/22/2020    Procedure: Bivent Lead Placement;  Surgeon: Adriano Raman MD;  Location:  HEART CARDIAC CATH LAB     EP PACEMAKER N/A 6/22/2020    Procedure: AVNA and BiV Pacemaker Insertion;  Surgeon: Adriano Raman MD;  Location:  HEART CARDIAC CATH LAB     EXCISE LESION EYELID Right 6/29/2015    Procedure: EXCISE LESION EYELID;  Surgeon: Hank Olvera MD;  Location:  SD     INSERT PORT VASCULAR ACCESS  2/3/2012    Procedure:INSERT PORT VASCULAR ACCESS; Power Port-A- Catheter Placement ; Surgeon:IRISH TAPIA; Location: OR     LAPAROSCOPIC SALPINGO-OOPHORECTOMY  7/11/2012    Procedure: LAPAROSCOPIC SALPINGO-OOPHORECTOMY;  Davinci;  Surgeon: Aline Cooper DO;  Location:  OR     LIPOSUCTION, RHYTIDECTOMY, COMBINED       LOBECTOMY LUNG Right 3/1/2016    Procedure: LOBECTOMY LUNG;  Surgeon: Jonas Woodward MD;  Location:  OR     MAMMOPLASTY REDUCTION  1/6/2012    Procedure:MAMMOPLASTY REDUCTION; Surgeon:MICKI CAICEDO; Location:[facility] OR     MASTECTOMY SIMPLE  11/12/2012    Procedure: MASTECTOMY SIMPLE;   Right Prophylactic Mastectomy with attempted Right Sentinal Node Biopsy, Revision Bilateral Mastectomy Insicions, liposuction in breast area;  Surgeon: Irish Tapia MD;  Location: [facility] OR     MASTECTOMY SIMPLE, SENTINEL NODE, COMBINED  1/6/2012    Procedure:COMBINED MASTECTOMY  SIMPLE, SENTINEL NODE; Left Mastectomy Left [place] Node Biopsy,  Right Breast Reduction ; Surgeon:IRISH TAPIA; Location:[facility] OR     MASTECTOMY, BILATERAL       REMOVE PORT VASCULAR ACCESS  4/29/2013    Procedure: REMOVE PORT VASCULAR ACCESS;  Port A catheter removal ;  Surgeon: Irish Tapia MD;  Location: [facility] OR     REPAIR PTOSIS BILATERAL Bilateral 6/29/2015    Procedure: REPAIR PTOSIS BILATERAL;  Surgeon: Hank Olvera MD;  Location:  [facility]     REVISE RECONSTRUCTED BREAST BILATERAL  11/12/2012    Procedure: REVISE RECONSTRUCTED BREAST BILATERAL;;  Surgeon: Katia Kyle MD;  Location: [facility] OR     SURGICAL HISTORY OF -   in 40's    face lift     SURGICAL HISTORY OF -       lipoma removed right thigh     SURGICAL HISTORY OF -       D and C     THORACOTOMY Right 3/1/2016    Procedure: THORACOTOMY;  Surgeon: Jonas Woodward MD;  Location:  OR     [facility] NONSPECIFIC PROCEDURE  1970    s/p Tubal ligation 1970       Social History     Tobacco Use     Smoking status: Never Smoker     Smokeless tobacco: Never Used   Substance Use Topics     Alcohol use: Yes     Comment: 0-1 drink day     Family History   Problem Relation Age of Onset     Cardiovascular Father         ruptured aorta, hardening of the arteries     Cerebrovascular Disease Mother      Respiratory Mother         chronic bronchitis - was a smoker early on     Cardiovascular Paternal Grandfather         MI     Cerebrovascular Disease Paternal Aunt      Hypertension Son      Neurologic Disorder Daughter         migraines     Breast Cancer Daughter      Brain Tumor Sister          Current Outpatient Medications   Medication Sig Dispense Refill     acetaminophen (TYLENOL) 500 MG tablet Take 1,000 mg by mouth every 8 hours as needed for mild pain       amLODIPine (NORVASC) 2.5 MG tablet TAKE 1 TABLET(2.5 MG) BY MOUTH DAILY 90 tablet 0     apixaban ANTICOAGULANT (ELIQUIS) 5 MG tablet Take 1 tablet (5 mg) by mouth 2 times daily 180  tablet 3     B Complex Vitamins (VITAMIN  B COMPLEX) tablet Take 1 tablet by mouth daily.       calcium carbonate (TUMS) 500 MG chewable tablet Take 2 chew tab by mouth 2 times daily as needed for heartburn       calcium citrate-vitamin D (CALCIUM CITRATE + D) 315-250 MG-UNIT TABS per tablet Take 2 tablets by mouth daily       ezetimibe (ZETIA) 10 MG tablet TAKE 1 TABLET(10 MG) BY MOUTH DAILY 90 tablet 0     furosemide (LASIX) 40 MG tablet Take 2 tablets (80 mg) by mouth daily 180 tablet 0     metoprolol tartrate (LOPRESSOR) 100 MG tablet Take 1 tablet (100 mg) by mouth 2 times daily 180 tablet 0     omega 3 1000 MG CAPS Take 1 g by mouth daily 90 capsule      polyethylene glycol (MIRALAX/GLYCOLAX) powder Take 1 capful by mouth daily as needed for constipation       potassium chloride ER (KLOR-CON M) 20 MEQ CR tablet TAKE 1 TABLET(20 MEQ) BY MOUTH TWICE DAILY 180 tablet 1     predniSONE (DELTASONE) 1 MG tablet Take 1 mg by mouth daily        albuterol (PROAIR HFA/PROVENTIL HFA/VENTOLIN HFA) 108 (90 Base) MCG/ACT inhaler Inhale 2 puffs into the lungs every 4 hours as needed       INCRUSE ELLIPTA 62.5 MCG/INH Inhaler Inhale 1 puff into the lungs daily       nystatin (MYCOSTATIN) 467360 UNIT/ML suspension Take 5 mLs (500,000 Units) by mouth 4 times daily 473 mL 0     Allergies   Allergen Reactions     No Known Drug Allergies      Tape [Adhesive Tape]      Sensitive to plastic tape on upper part of body--takes skin off     Recent Labs   Lab Test 07/01/21  1357 06/01/21  0957 03/16/21  1548 03/16/21  1548 09/08/20  1024 09/08/20  1024 08/31/20  1627 08/31/20  1014 08/31/20  1014 08/27/20  1530 08/27/20  1530 06/12/20  1030 05/29/20  0842 09/14/19  0621 09/13/19  1127 08/26/19  0832 08/26/19  0832 09/28/18  1843 05/22/18  0759   A1C  --   --   --  5.6  --   --   --   --  7.0*  --   --   --   --   --   --   --   --   --   --    LDL  --   --   --   --   --   --   --   --   --   --   --   --  76  --   --   --  97  --  108*    HDL  --   --   --   --   --   --   --   --   --   --   --   --  50  --   --   --  65  --  66   TRIG  --   --   --   --   --   --   --   --   --   --   --   --  110  --   --   --  124  --  113   ALT  --   --   --  35  --  127* 153*  --   --    < > 134*   < > 128*   < > 24   < > 20  --  27   CR 0.98 0.91   < > 1.03   < > 0.95  --    < > 0.79   < > 0.90   < > 0.92   < > 0.79   < > 0.60   < > 0.81   GFRESTIMATED 54* 59*   < > 51*   < > 56*  --    < > 71   < > 60*   < > 59*   < > 71   < > 86   < > 69   GFRESTBLACK 63 68   < > 59*   < > 65  --    < > 82   < > 70   < > 68   < > 82   < > >90   < > 83   POTASSIUM 3.5 4.3   < > 3.0*   < > 4.2  --    < > 4.6   < > 4.5   < > 3.9   < > 3.5   < > 3.7   < > 3.7   TSH  --   --   --   --   --   --   --   --   --   --  2.24  --   --   --  2.99  --   --    < >  --     < > = values in this interval not displayed.      Mammogram Screening: Mammogram Screening - Mammography discussed, not appropriate due to bilaeral mastectomy      Pertinent mammograms are reviewed under the imaging tab.    Review of Systems   Constitutional: Negative for chills and fever.   HENT: Negative for congestion, ear pain, hearing loss and sore throat.    Eyes: Negative for pain and visual disturbance.   Respiratory: Positive for cough and shortness of breath.    Cardiovascular: Negative for chest pain, palpitations and peripheral edema.   Gastrointestinal: Positive for constipation and heartburn. Negative for abdominal pain, diarrhea, hematochezia and nausea.   Breasts:  Negative for tenderness, breast mass and discharge.   Genitourinary: Positive for urgency. Negative for dysuria, frequency, genital sores, hematuria, pelvic pain, vaginal bleeding and vaginal discharge.   Musculoskeletal: Positive for myalgias. Negative for arthralgias and joint swelling.   Skin: Negative for rash.   Neurological: Negative for dizziness, weakness, headaches and paresthesias.   Psychiatric/Behavioral: Negative for mood  "changes. The patient is nervous/anxious.        OBJECTIVE:   /58   Pulse 70   Temp 98.4  F (36.9  C) (Oral)   Resp 17   Ht 1.6 m (5' 3\")   Wt 60.6 kg (133 lb 11.2 oz)   LMP  (LMP Unknown)   SpO2 99%   BMI 23.68 kg/m   Estimated body mass index is 23.68 kg/m  as calculated from the following:    Height as of this encounter: 1.6 m (5' 3\").    Weight as of this encounter: 60.6 kg (133 lb 11.2 oz).  Physical Exam  GENERAL: healthy, alert and no distress  EYES: Eyes grossly normal to inspection  HENT: ear canals and TM's normal, nose and mouth without ulcers or lesions  NECK: no adenopathy, no asymmetry, masses, or scars and thyroid normal to palpation  RESP: lungs clear to auscultation - no rales, rhonchi or wheezes and right back with scar from lung cancer surgery   BREAST: scars from bilateral mastectomy  CV: chest wall- left upper chest with palpable pacemaker;  regular rate and rhythm, normal S1 S2, soft systolic murmur along left sternal border no peripheral edema (recent   ABDOMEN: soft, nontender, no hepatosplenomegaly, no masses and bowel sounds normal   (female): no suprapubic tenderness or no costovertebral angle tenderness   MS: no gross musculoskeletal defects noted, no edema  NEURO: Normal strength and tone, mentation intact and speech normal  PSYCH: mentation appears normal, affect normal/bright    Diagnostic Test Results:  Labs reviewed in Epic          ASSESSMENT / PLAN:     (Z00.00) Encounter for Medicare annual wellness exam  (primary encounter diagnosis)  Comment: HEALTH CARE MAINTENANCE reviewed; chart reviewed; discussed with pt- her recall was challenging; most came from chart review during the appt.   Plan:     (E78.5) Hyperlipidemia LDL goal <160  Comment: she has not tolerated statins in the past but has done well with Zetia.  Plan: ezetimibe (ZETIA) 10 MG tablet, Lipid panel         reflex to direct LDL Fasting, Comprehensive         metabolic panel          (I48.19) Persistent " atrial fibrillation (H)  Comment: she is on meds for rate control and Eliquis for anticoagulation; also has pacemaker for rate control.  Plan: metoprolol tartrate (LOPRESSOR) 100 MG tablet        Eliquis for anticoagulation    (I10) Essential hypertension  Comment: BLOOD PRESSURE well controlled on exam today  Plan: amLODIPine (NORVASC) 2.5 MG tablet, metoprolol         tartrate (LOPRESSOR) 100 MG tablet,         Comprehensive metabolic panel          (Z23) Need for prophylactic vaccination and inoculation against influenza  Comment: pt desires flu shot  Plan: INFLUENZA, QUAD, HIGH DOSE, PF, 65YR + (FLUZONE        HD), ADMIN INFLUENZA (For MEDICARE Patients         ONLY) []          (M35.3) PMR (polymyalgia rheumatica) (H)  Comment: dx 2019; [facility]- Dr. Pires note from July reviewed. Slowly weaning Prednisone  Plan:     (F39) Mood disorder (H)  Comment: many health challenges;  lives on her own in home; .  Plan: family supportive and helpful    (Z85.3) Personal history of malignant neoplasm of breast  Comment: Pt reports 2012 on left ; treated with Bilateral mastectomies.  Plan: no need for mammograms since she no longer has breast tissue.    (J43.9) Pulmonary emphysema, unspecified emphysema type (H)  Comment: no hx of tobacco use; notes report possibly due to 2nd hand smoke.  Plan: she is followed by Pulmonary    (Z95.0) Cardiac pacemaker in situ 4/15/21  Comment: chart indicates AV node ablation and changed out in 2020  Plan: Cardiology device clinic follows pt on  A regular basis.       (I44.2) Complete heart block (H)  Comment: pacemaker in place; she does not recall series of events leading to pacemaker; chart indicates hx of Atrial Fib, possible heart block and need for change in device and reprogramming; EF has been better since these changes  Plan: follow with cardiology    (N18.30) Stage 3 chronic kidney disease, unspecified whether stage 3a or 3b CKD (H)  Comment:  monitor renal function  Plan: see lab orders.     (R13.10) Dysphagia, unspecified type  Comment: past several months; can happen when out at restaurant; doesn't really matter what she is eating; meds OK; loss of weight ( 7 # in past 2-3 months,)  Information from referral:  Several months of dysphagia.  Concern about possible stricture.   Losing weight   Wt Readings from Last 3 Encounters:   10/19/21 : 60.6 kg (133 lb 11.2 oz)   08/13/21 : 63.5 kg (140 lb)   07/01/21 : 63.6 kg (140 lb 3.2 oz)       Pt on Eliquis     Plan: she would like to check this out with GI; Plan: Adult Gastro Ref - Procedure Only- prefers [facility]    (R63.4) Loss of weight  Comment: chart reviewed; loss of weight ( 7 # in past 2-3 months,)  Wt Readings from Last 3 Encounters:   10/19/21 60.6 kg (133 lb 11.2 oz)   08/13/21 63.5 kg (140 lb)   07/01/21 63.6 kg (140 lb 3.2 oz)     Plan: she is monitoring oral intake; she is now on less Prednisone which may be a contributing factor.    (R39.9) Urinary symptom or sign  Comment: not typical symptoms; she has not been drinking as much water; she is on loop diuretic; more consistent dosing unless she has lots of appts ( like this week)  past hx of frequent infections; she is concerned aobut possible infection   Plan: UA Macro with Reflex to Micro and Culture - lab        collect, Urine Microscopic          (E87.6) Hypokalemia  Comment: low potassium in past but on Furosemide 80 mg per day unless she has appts, then she does not take but thinks she takes potassium daily.  Plan: Comprehensive metabolic panel, potassium         chloride ER (KLOR-CON M) 20 MEQ CR tablet        Assess potassium ; may need to have her monitor diuretic use; no evidence of fluid overload on exam today and she has not taken diuretic for several days ( possibly 3; she is not sure) and also not sure if      she has taken the potassium daily.    (R73.09) Elevated hemoglobin A1c  Comment: one reading in summer of  "8/31/2020; pt is not aware of a diagnosis of Diabetes; she was on high dose Prednisone due to PMR; will check today but do not see 2 consecutive readings wit A1C>6.5  Lab Results   Component Value Date    GLC 97 07/01/2021    GLC 87 06/01/2021        Lab Results   Component Value Date          A1C 5.6 03/16/2021    A1C 7.0 08/31/2020        Plan: Hemoglobin A1c          (Z85.118) History of lung cancer  Comment: Right upper lobe dx 2015- surgery; pt reports lung cancer, not metastatic breast cancer.  (breast cancer 2012)  Plan:     (Z76.89) Encounter to establish care with new doctor  Comment: Establish Care with new MD due to Dr. Fenton's custodial  Plan: extencive review of chart and discussion with pt. Pt was NOT accompanied by family.         Patient has been advised of split billing requirements and indicates understanding: Yes  COUNSELING:  Reviewed preventive health counseling, as reflected in patient instructions       Regular exercise       Healthy diet/nutrition    Estimated body mass index is 23.68 kg/m  as calculated from the following:    Height as of this encounter: 1.6 m (5' 3\").    Weight as of this encounter: 60.6 kg (133 lb 11.2 oz).        She reports that she has never smoked. She has never used smokeless tobacco.      Appropriate preventive services were discussed with this patient, including applicable screening as appropriate for cardiovascular disease, diabetes, osteopenia/osteoporosis, and glaucoma.  As appropriate for age/gender, discussed screening for colorectal cancer, prostate cancer, breast cancer, and cervical cancer. Checklist reviewing preventive services available has been given to the patient.    Reviewed patients plan of care and provided an AVS. The Complex Care Plan (for patients with higher acuity and needing more deliberate coordination of services) for Tomasa meets the Care Plan requirement. This Care Plan has been established and reviewed with the Patient.    Counseling " Resources:  ATP IV Guidelines  Pooled Cohorts Equation Calculator  Breast Cancer Risk Calculator  Breast Cancer: Medication to Reduce Risk  FRAX Risk Assessment  ICSI Preventive Guidelines  Dietary Guidelines for Americans, 2010  USDA's MyPlate  ASA Prophylaxis  Lung CA Screening    Cris Romero MD  Internal Medicine  [facility] [place]    90 minutes in addition to HEALTH CARE MAINTENANCE ( total 115 minutes) are spent with patient, over 50% of that time spent providing counselling, discussing and reviewing medical conditions/concerns, meds and potential side effects.    Follow up in 3-4 months; in clinic. appt set up for her.    Identified Health Risks:

## 2021-10-19 NOTE — PATIENT INSTRUCTIONS
Patient Education   Personalized Prevention Plan  You are due for the preventive services outlined below.  Your care team is available to assist you in scheduling these services.  If you have already completed any of these items, please share that information with your care team to update in your medical record.  Health Maintenance Due   Topic Date Due     Heart Failure Action Plan  Never done     Eye Exam  Never done     Zoster (Shingles) Vaccine (1 of 2) Never done     Annual Wellness Visit  03/11/2012     Colorectal Cancer Screening  10/13/2016     Diptheria Tetanus Pertussis (DTAP/TDAP/TD) Vaccine (3 - Td or Tdap) 03/11/2021     Cholesterol Lab  05/29/2021     Flu Vaccine (1) 09/01/2021     FALL RISK ASSESSMENT  09/08/2021     Complete Blood Count  09/08/2021     A1C Lab  09/16/2021

## 2021-11-17 ENCOUNTER — ANCILLARY PROCEDURE (OUTPATIENT)
Dept: CARDIOLOGY | Facility: CLINIC | Age: 81
End: 2021-11-17
Attending: INTERNAL MEDICINE
Payer: MEDICARE

## 2021-11-17 DIAGNOSIS — Z98.890 HX OF ATRIOVENTRICULAR NODE ABLATION: Primary | ICD-10-CM

## 2021-11-17 DIAGNOSIS — Z95.0 CARDIAC PACEMAKER IN SITU: ICD-10-CM

## 2021-11-17 DIAGNOSIS — Z11.59 ENCOUNTER FOR SCREENING FOR OTHER VIRAL DISEASES: ICD-10-CM

## 2021-11-17 PROCEDURE — 93281 PM DEVICE PROGR EVAL MULTI: CPT | Performed by: INTERNAL MEDICINE

## 2021-11-19 LAB
MDC_IDC_LEAD_IMPLANT_DT: NORMAL
MDC_IDC_LEAD_IMPLANT_DT: NORMAL
MDC_IDC_LEAD_LOCATION: NORMAL
MDC_IDC_LEAD_LOCATION: NORMAL
MDC_IDC_LEAD_LOCATION_DETAIL_1: NORMAL
MDC_IDC_LEAD_LOCATION_DETAIL_1: NORMAL
MDC_IDC_LEAD_MFG: NORMAL
MDC_IDC_LEAD_MFG: NORMAL
MDC_IDC_LEAD_MODEL: NORMAL
MDC_IDC_LEAD_MODEL: NORMAL
MDC_IDC_LEAD_POLARITY_TYPE: NORMAL
MDC_IDC_LEAD_SERIAL: NORMAL
MDC_IDC_LEAD_SERIAL: NORMAL
MDC_IDC_MSMT_BATTERY_DTM: NORMAL
MDC_IDC_MSMT_BATTERY_REMAINING_LONGEVITY: 114 MO
MDC_IDC_MSMT_BATTERY_REMAINING_PERCENTAGE: 100 %
MDC_IDC_MSMT_BATTERY_STATUS: NORMAL
MDC_IDC_MSMT_LEADCHNL_LV_IMPEDANCE_VALUE: 730 OHM
MDC_IDC_MSMT_LEADCHNL_LV_PACING_THRESHOLD_AMPLITUDE: 1.7 V
MDC_IDC_MSMT_LEADCHNL_LV_PACING_THRESHOLD_PULSEWIDTH: 0.4 MS
MDC_IDC_MSMT_LEADCHNL_RA_IMPEDANCE_VALUE: 3000 OHM
MDC_IDC_MSMT_LEADCHNL_RV_IMPEDANCE_VALUE: 670 OHM
MDC_IDC_MSMT_LEADCHNL_RV_PACING_THRESHOLD_AMPLITUDE: 1 V
MDC_IDC_MSMT_LEADCHNL_RV_PACING_THRESHOLD_PULSEWIDTH: 0.4 MS
MDC_IDC_PG_IMPLANT_DTM: NORMAL
MDC_IDC_PG_MFG: NORMAL
MDC_IDC_PG_MODEL: NORMAL
MDC_IDC_PG_SERIAL: NORMAL
MDC_IDC_PG_TYPE: NORMAL
MDC_IDC_SESS_CLINIC_NAME: NORMAL
MDC_IDC_SESS_DTM: NORMAL
MDC_IDC_SESS_TYPE: NORMAL
MDC_IDC_SET_BRADY_LOWRATE: 70 {BEATS}/MIN
MDC_IDC_SET_BRADY_MAX_SENSOR_RATE: 130 {BEATS}/MIN
MDC_IDC_SET_BRADY_MODE: NORMAL
MDC_IDC_SET_CRT_LVRV_DELAY: 30 MS
MDC_IDC_SET_CRT_PACED_CHAMBERS: NORMAL
MDC_IDC_SET_LEADCHNL_LV_PACING_AMPLITUDE: 2.7 V
MDC_IDC_SET_LEADCHNL_LV_PACING_ANODE_ELECTRODE_1: NORMAL
MDC_IDC_SET_LEADCHNL_LV_PACING_ANODE_LOCATION_1: NORMAL
MDC_IDC_SET_LEADCHNL_LV_PACING_CATHODE_ELECTRODE_1: NORMAL
MDC_IDC_SET_LEADCHNL_LV_PACING_CATHODE_LOCATION_1: NORMAL
MDC_IDC_SET_LEADCHNL_LV_PACING_PULSEWIDTH: 0.4 MS
MDC_IDC_SET_LEADCHNL_LV_SENSING_ADAPTATION_MODE: NORMAL
MDC_IDC_SET_LEADCHNL_LV_SENSING_ANODE_ELECTRODE_1: NORMAL
MDC_IDC_SET_LEADCHNL_LV_SENSING_ANODE_LOCATION_1: NORMAL
MDC_IDC_SET_LEADCHNL_LV_SENSING_CATHODE_ELECTRODE_1: NORMAL
MDC_IDC_SET_LEADCHNL_LV_SENSING_CATHODE_LOCATION_1: NORMAL
MDC_IDC_SET_LEADCHNL_LV_SENSING_SENSITIVITY: 2.5 MV
MDC_IDC_SET_LEADCHNL_RA_SENSING_ADAPTATION_MODE: NORMAL
MDC_IDC_SET_LEADCHNL_RA_SENSING_SENSITIVITY: 0.5 MV
MDC_IDC_SET_LEADCHNL_RV_PACING_AMPLITUDE: 2.4 V
MDC_IDC_SET_LEADCHNL_RV_PACING_CAPTURE_MODE: NORMAL
MDC_IDC_SET_LEADCHNL_RV_PACING_POLARITY: NORMAL
MDC_IDC_SET_LEADCHNL_RV_PACING_PULSEWIDTH: 0.4 MS
MDC_IDC_SET_LEADCHNL_RV_SENSING_ADAPTATION_MODE: NORMAL
MDC_IDC_SET_LEADCHNL_RV_SENSING_POLARITY: NORMAL
MDC_IDC_SET_LEADCHNL_RV_SENSING_SENSITIVITY: 2.5 MV
MDC_IDC_SET_ZONE_DETECTION_INTERVAL: 375 MS
MDC_IDC_SET_ZONE_TYPE: NORMAL
MDC_IDC_SET_ZONE_VENDOR_TYPE: NORMAL
MDC_IDC_STAT_BRADY_DTM_END: NORMAL
MDC_IDC_STAT_BRADY_DTM_START: NORMAL
MDC_IDC_STAT_BRADY_RA_PERCENT_PACED: 0 %
MDC_IDC_STAT_BRADY_RV_PERCENT_PACED: 100 %
MDC_IDC_STAT_CRT_DTM_END: NORMAL
MDC_IDC_STAT_CRT_DTM_START: NORMAL
MDC_IDC_STAT_CRT_LV_PERCENT_PACED: 100 %
MDC_IDC_STAT_EPISODE_RECENT_COUNT: 0
MDC_IDC_STAT_EPISODE_RECENT_COUNT: 3
MDC_IDC_STAT_EPISODE_RECENT_COUNT_DTM_END: NORMAL
MDC_IDC_STAT_EPISODE_RECENT_COUNT_DTM_START: NORMAL
MDC_IDC_STAT_EPISODE_TYPE: NORMAL
MDC_IDC_STAT_EPISODE_VENDOR_TYPE: NORMAL

## 2021-11-30 ENCOUNTER — TELEPHONE (OUTPATIENT)
Dept: FAMILY MEDICINE | Facility: CLINIC | Age: 81
End: 2021-11-30

## 2021-11-30 ENCOUNTER — ALLIED HEALTH/NURSE VISIT (OUTPATIENT)
Dept: FAMILY MEDICINE | Facility: CLINIC | Age: 81
End: 2021-11-30
Payer: MEDICARE

## 2021-11-30 NOTE — TELEPHONE ENCOUNTER
Chandrika is having a procedure on Dec. 14th (endoscopy) and needs information on how to adjust her blood thinners prior to procedure. Please call her at [phone], ok to leave voice mail.    Gloria Brown

## 2021-12-02 ENCOUNTER — IMMUNIZATION (OUTPATIENT)
Dept: NURSING | Facility: CLINIC | Age: 81
End: 2021-12-02
Payer: MEDICARE

## 2021-12-02 PROCEDURE — 0004A PR COVID VAC PFIZER DIL RECON 30 MCG/0.3 ML IM: CPT

## 2021-12-02 PROCEDURE — 91300 PR COVID VAC PFIZER DIL RECON 30 MCG/0.3 ML IM: CPT

## 2021-12-03 ENCOUNTER — TELEPHONE (OUTPATIENT)
Dept: CARDIOLOGY | Facility: CLINIC | Age: 81
End: 2021-12-03
Payer: MEDICARE

## 2021-12-03 NOTE — TELEPHONE ENCOUNTER
Received call from pt, pt stated she is having an endoscopy on 12/14/21 and was instructed to confirm with Dr. Sevilla if OK to hold Eliquis x 3 days prior starting on 12/11/21. Pt is on Eliquis for atrial fibrillation. Last visit on 4/30/21 with Dr. Sevilla. Will route to Dr. Sevilla to review.

## 2021-12-07 NOTE — TELEPHONE ENCOUNTER
Received response from Dr. Sevilla:     Kylie Sevilla, Yeni Bae, RN  Caller: Unspecified (4 days ago, 11:14 AM)  Yes that's fine     Called back and spoke with pt, reviewed per Dr. Sevilla OK to hold Eliquis x 3 days prior to her procedure. Pt verbalized understanding, no further questions at this time.

## 2021-12-10 ENCOUNTER — LAB (OUTPATIENT)
Dept: LAB | Facility: CLINIC | Age: 81
End: 2021-12-10
Attending: INTERNAL MEDICINE
Payer: MEDICARE

## 2021-12-10 DIAGNOSIS — Z11.59 ENCOUNTER FOR SCREENING FOR OTHER VIRAL DISEASES: ICD-10-CM

## 2021-12-10 PROCEDURE — U0003 INFECTIOUS AGENT DETECTION BY NUCLEIC ACID (DNA OR RNA); SEVERE ACUTE RESPIRATORY SYNDROME CORONAVIRUS 2 (SARS-COV-2) (CORONAVIRUS DISEASE [COVID-19]), AMPLIFIED PROBE TECHNIQUE, MAKING USE OF HIGH THROUGHPUT TECHNOLOGIES AS DESCRIBED BY CMS-2020-01-R: HCPCS

## 2021-12-10 PROCEDURE — U0005 INFEC AGEN DETEC AMPLI PROBE: HCPCS

## 2021-12-11 LAB — SARS-COV-2 RNA RESP QL NAA+PROBE: NEGATIVE

## 2021-12-13 DIAGNOSIS — I48.19 PERSISTENT ATRIAL FIBRILLATION (H): ICD-10-CM

## 2021-12-13 NOTE — TELEPHONE ENCOUNTER
Received call from pt requesting refill of Eliquis 5 mg twice daily. Pt requested a 30 day supply as her insurance is changing at the end of the year. Rx sent to preferred pharmacy.

## 2021-12-14 ENCOUNTER — HOSPITAL ENCOUNTER (OUTPATIENT)
Facility: CLINIC | Age: 81
Discharge: HOME | End: 2021-12-14
Attending: INTERNAL MEDICINE | Admitting: INTERNAL MEDICINE
Payer: MEDICARE

## 2021-12-14 VITALS
HEART RATE: 70 BPM | WEIGHT: 126 LBS | OXYGEN SATURATION: 97 % | DIASTOLIC BLOOD PRESSURE: 90 MMHG | RESPIRATION RATE: 16 BRPM | TEMPERATURE: 96.2 F | SYSTOLIC BLOOD PRESSURE: 166 MMHG | BODY MASS INDEX: 22.32 KG/M2 | HEIGHT: 63 IN

## 2021-12-14 LAB — UPPER GI ENDOSCOPY: NORMAL

## 2021-12-14 PROCEDURE — 250N000011 HC RX IP 250 OP 636: Performed by: INTERNAL MEDICINE

## 2021-12-14 PROCEDURE — 999N000099 HC STATISTIC MODERATE SEDATION < 10 MIN: Performed by: INTERNAL MEDICINE

## 2021-12-14 PROCEDURE — 88305 TISSUE EXAM BY PATHOLOGIST: CPT | Mod: TC | Performed by: INTERNAL MEDICINE

## 2021-12-14 PROCEDURE — 250N000009 HC RX 250: Performed by: INTERNAL MEDICINE

## 2021-12-14 PROCEDURE — 43239 EGD BIOPSY SINGLE/MULTIPLE: CPT | Performed by: INTERNAL MEDICINE

## 2021-12-14 PROCEDURE — G0500 MOD SEDAT ENDO SERVICE >5YRS: HCPCS | Performed by: INTERNAL MEDICINE

## 2021-12-14 RX ORDER — FENTANYL CITRATE 50 UG/ML
25 INJECTION, SOLUTION INTRAMUSCULAR; INTRAVENOUS
Status: DISCONTINUED | OUTPATIENT
Start: 2021-12-14 | End: 2021-12-14 | Stop reason: HOSPADM

## 2021-12-14 RX ORDER — NALOXONE HYDROCHLORIDE 0.4 MG/ML
0.4 INJECTION, SOLUTION INTRAMUSCULAR; INTRAVENOUS; SUBCUTANEOUS
Status: DISCONTINUED | OUTPATIENT
Start: 2021-12-14 | End: 2021-12-14 | Stop reason: HOSPADM

## 2021-12-14 RX ORDER — SIMETHICONE 40MG/0.6ML
133 SUSPENSION, DROPS(FINAL DOSAGE FORM)(ML) ORAL
Status: DISCONTINUED | OUTPATIENT
Start: 2021-12-14 | End: 2021-12-14 | Stop reason: HOSPADM

## 2021-12-14 RX ORDER — NALOXONE HYDROCHLORIDE 0.4 MG/ML
0.2 INJECTION, SOLUTION INTRAMUSCULAR; INTRAVENOUS; SUBCUTANEOUS
Status: DISCONTINUED | OUTPATIENT
Start: 2021-12-14 | End: 2021-12-14 | Stop reason: HOSPADM

## 2021-12-14 RX ORDER — FENTANYL CITRATE 50 UG/ML
25-50 INJECTION, SOLUTION INTRAMUSCULAR; INTRAVENOUS
Status: COMPLETED | OUTPATIENT
Start: 2021-12-14 | End: 2021-12-14

## 2021-12-14 RX ORDER — FLUMAZENIL 0.1 MG/ML
0.2 INJECTION, SOLUTION INTRAVENOUS
Status: DISCONTINUED | OUTPATIENT
Start: 2021-12-14 | End: 2021-12-14 | Stop reason: HOSPADM

## 2021-12-14 RX ORDER — ONDANSETRON 2 MG/ML
4 INJECTION INTRAMUSCULAR; INTRAVENOUS
Status: DISCONTINUED | OUTPATIENT
Start: 2021-12-14 | End: 2021-12-14 | Stop reason: HOSPADM

## 2021-12-14 RX ORDER — EPINEPHRINE 1 MG/ML
0.1 INJECTION, SOLUTION INTRAMUSCULAR; SUBCUTANEOUS
Status: DISCONTINUED | OUTPATIENT
Start: 2021-12-14 | End: 2021-12-14 | Stop reason: HOSPADM

## 2021-12-14 RX ORDER — ATROPINE SULFATE 0.4 MG/ML
0.4 AMPUL (ML) INJECTION
Status: DISCONTINUED | OUTPATIENT
Start: 2021-12-14 | End: 2021-12-14 | Stop reason: HOSPADM

## 2021-12-14 RX ORDER — LIDOCAINE 40 MG/G
CREAM TOPICAL
Status: DISCONTINUED | OUTPATIENT
Start: 2021-12-14 | End: 2021-12-14 | Stop reason: HOSPADM

## 2021-12-14 RX ORDER — ONDANSETRON 4 MG/1
4 TABLET, ORALLY DISINTEGRATING ORAL EVERY 6 HOURS PRN
Status: DISCONTINUED | OUTPATIENT
Start: 2021-12-14 | End: 2021-12-14 | Stop reason: HOSPADM

## 2021-12-14 RX ORDER — PROCHLORPERAZINE MALEATE 5 MG
5 TABLET ORAL EVERY 6 HOURS PRN
Status: DISCONTINUED | OUTPATIENT
Start: 2021-12-14 | End: 2021-12-14 | Stop reason: HOSPADM

## 2021-12-14 RX ORDER — ONDANSETRON 2 MG/ML
4 INJECTION INTRAMUSCULAR; INTRAVENOUS EVERY 6 HOURS PRN
Status: DISCONTINUED | OUTPATIENT
Start: 2021-12-14 | End: 2021-12-14 | Stop reason: HOSPADM

## 2021-12-14 RX ADMIN — MIDAZOLAM 1 MG: 1 INJECTION INTRAMUSCULAR; INTRAVENOUS at 10:58

## 2021-12-14 RX ADMIN — FENTANYL CITRATE 50 MCG: 50 INJECTION, SOLUTION INTRAMUSCULAR; INTRAVENOUS at 10:58

## 2021-12-14 RX ADMIN — TOPICAL ANESTHETIC 0.5 ML: 200 SPRAY DENTAL; PERIODONTAL at 10:58

## 2021-12-14 ASSESSMENT — MIFFLIN-ST. JEOR: SCORE: 1000.66

## 2021-12-14 NOTE — LETTER
November 22, 2021      Tomasa Fam  [address] [facility] [id number]        Dear Tomsaa,       Thank you for choosing [facility]. You are scheduled for the following service(s).   Please be aware that coverage of these services is subject to the terms and limitations of your health insurance plan.  Call member services at your health plan with any benefit or coverage questions.    Date:   12/14/2021      Procedure: UPPER ENDOSCOPY-EGD  Doctor: Dr. Roberto Nguyen          Arrival Time:  9:45   *Enter and check in at the Main Hospital Entrance  Procedure Time: 10:30       Location:   [facility]        Endoscopy Department, First Floor *         [address] [place], [place] [id number] [phone] or [phone] () to reschedule          PRE-PROCEDURE CHECKLIST    If you have diabetes, ask your regular doctor for diet and medication restrictions.  If you take any antiplatelet or anticoagulant medications (such as Coumadin, Lovenox, Plavix, etc.) and have not already discussed this, please call your primary physician for advice on holding this medication.  If you take Aspirin, you may continue to do so.  If you are or may be pregnant, please discuss the risks and benefits of this procedure with your doctor.  You must arrange for a ride for the day of your exam. If you fail to arrange transportation with a responsible adult, your procedure will need to be cancelled and rescheduled. Taxi, bus and medical transport are not acceptable unless you have a responsible adult that you know & trust with you. Please arrange for this  to be able to pick you up in our department, approximately one hour after your scheduled procedure, if they are not able to stay with you.      Canceling or rescheduling   If you must cancel or reschedule your appointment, please call [phone] as soon as possible.      Upper Endoscopy or  Esophagogastroduodenoscopy (EGD) is a test performed to evaluate symptoms of persistent abdominal pain, nausea, vomiting and difficulty swallowing. It may also be used to treat various conditions of the upper gastrointestinal tract, such as bleeding, narrowing or abnormal growths.     What happens during an upper endoscopy?  On the day of your procedure, plan to spend up to one and a half hour after your arrival at the endoscopy center. The exam itself takes about 5 to 10 minutes.    Before the exam:  - You will change into a gown.   - Your medical history and medication list will be reviewed with you, unless it has already been done over the phone.   - A nurse will insert an intravenous (IV) line into your hand or arm.  - The doctor will talk to you and give you a consent form to sign.    During the exam:  - Medicine will be given through the IV line to help you relax and feel comfortable.   - Your heart rate and oxygen levels will be monitored. If your blood pressure is low, you may be given fluids through the IV line.   - The doctor will insert a flexible, hollow tube, called an endoscope, into your mouth and will advance it slowly through the esophagus, stomach and duodenum (the first part of your small intestine).   - You may have a feeling of pressure or fullness.   - If you have difficulty swallowing, and the doctor finds a narrowing in your esophagus, it may be possible for the area to be expanded-dilated during the exam.   - If abnormal tissue is found, the doctor may remove it through the endoscope (biopsy it) for closer examination. The tissue removal is painless.    After the exam:  - Any tissue samples removed during the exam will be sent to a lab for evaluation. It may take 5 to 7 working days for you to be notified of the results  - The doctor will prepare a full report for the physician who referred you for the upper endoscopy.   - The doctor will talk with you about the initial results of your exam.    - You may feel bloated after the procedure. That is normal and should not last long.   - Your throat may feel sore for a short time.   - Following the exam, you may resume your normal diet. Avoid alcohol until the next day.   - You may resume your regular activities the day after the procedure.   - Medication given during the exam will prohibit you from driving for the rest of the day.  - A nurse will provide you with complete discharge instructions before you leave the endoscopy center. Be sure to ask the nurse for specific instructions if you take blood thinners such as Aspirin , Coumadin , Lovenox , Plavix , etc.       PREPARATION    To ensure a successful exam, please follow all instructions carefully.      The night before your exam:    STOP eating solid foods at 11:45 pm.     Clear liquids are okay to drink (examples: Gatorade , apple juice, clear broth,coffee or tea without milk or cream, etc.).     DO NOT drink red liquids or alcoholic beverages.    The day of your exam:    STOP drinking clear liquids 4 hours before your exam.     You may take your usual medications with 4 oz. of water, but it needs to be at least 4 hours prior to your procedure.    When you leave for the procedure:    Bring a list of all of your current medications, including any allergy or over-the-counter medications, unless you have already reviewed that with an Endoscopy RN over the phone.     Bring a photo ID as well as up-to-date insurance information, such as your insurance card and any referral forms that might be required by your payer.       DIRECTIONS TO THE ENDOSCOPY DEPARTMENT    From the north ([facility])  Take [address] South, exit on [address] . Get into the left hand nadia, turn left (east), go one-half mile to [address] and turn left. Go north to the second stoplight, take a right on [address] and follow it to the Main Hospital entrance.  From the south ([facility])  Take  35N to the [address]E split and exit on [address] . On [address] , turn left (west) to [address]. Turn right (north) on [address]. Go north to the second stoplight, take a right on [address] and follow it to the Main Hospital entrance.  From the east via 35E ([facility])  Take [address]E south to [address]  exit. Turn right on [address] . Go west to [address]. Turn right (north) on [address]. Go to the second stoplight, take a right on [address] to the Main Hospital entrance.  From the east via [address] ([facility])  Take [address] to [address]. Turn left (south) on [address] to [address], turn left (east) on [address] and follow it to the Main Hospital entrance.    From the west via [address] ([place])  Take [address] east to [address]. Turn right (south) on [address] to [address], turn left (east) on [address] and follow it to the Main Hospital entrance.

## 2021-12-14 NOTE — H&P
Pre-Endoscopy History and Physical     Tomasa Fam MRN# [id number]   YOB: 1940 Age: 81 year old     Date of Procedure: 12/14/2021  Primary care provider: Cris Romero  Type of Endoscopy: Gastroscopy with possible biopsy, possible dilation  Reason for Procedure: reflux  Type of Anesthesia Anticipated: Conscious Sedation    HPI:    Tomasa is a 81 year old female who will be undergoing the above procedure.      A history and physical has been performed. The patient's medications and allergies have been reviewed. The risks and benefits of the procedure and the sedation options and risks were discussed with the patient.  All questions were answered and informed consent was obtained.      She denies a personal or family history of anesthesia complications or bleeding disorders.     Patient Active Problem List   Diagnosis     Pulmonary emphysema, unspecified emphysema type (H)     ASCUS on Pap smear     Hyperlipidemia LDL goal <160     Essential hypertension with goal blood pressure less than 140/90     Cystocele, midline     Uterovaginal prolapse     S/P hysterectomy     Advanced directives, counseling/discussion     History of hypokalemia     Concussion     Thyroid nodule     Chronic pain of both knees     History of lung cancer     Gastroesophageal reflux disease, esophagitis presence not specified     PMR (polymyalgia rheumatica) (H)     Personal history of malignant neoplasm of breast     Long term systemic steroid user     Cardiomyopathy (H)     Complete heart block (H)     Temporal arteritis (H)     Elevation of level of transaminase or lactic acid dehydrogenase (LDH)     Recurrent falls     Dyspnea     Diabetes mellitus, type 2 (H)     Persistent atrial fibrillation (H)     Skin ulcer of right lower leg with fat layer exposed (H)     Cardiac pacemaker in situ 4/15/21     Chronic kidney disease, stage 3 (H)     Mood disorder (H)        Past Medical History:   Diagnosis Date      Arthritis     hands, knees     Breast cancer (H) jan. 2012     left mastectomy followed by right;  Dr. Luong     Chronic airway obstruction, not elsewhere classified 2006    very mild COPD - small cough     Concussion 3/13/2013     Problem list name updated by automated process. Provider to review and confirm [employer] Update utility     Cystocele, midline 4/4/2012     Diffuse cystic mastopathy      Gastroesophageal reflux disease, esophagitis presence not specified 11/23/2019     History of hypokalemia 1/23/2013     Hyperlipidemia LDL goal <160 6/29/2011    [place] 10-year CHD Risk Score: 2% (14 Total Points)  Values used to calculate score:    Age: 70 years -- Points: 14    Total Cholesterol: 192 mg/dL -- Points: 1    HDL Cholesterol: 61 mg/dL -- Points: -1    Systolic BP (treated): 118 mmHg -- Points: 0   The patient is not a smoker. -- Points: 0   The patient has not been diagnosed with diabetes. -- Points: 0   The patient does not have a famil     Lung cancer (H) 10/21/2015     Paroxysmal atrial fibrillation (H) 9/13/2019     PMR (polymyalgia rheumatica) (H) 1/17/2020    tapering' above.)  In patients receiving over 10 mg of prednisone/day, the dose can be lowered by 2.5 mg/day decrements every two to four weeks  Once the dose of prednisone is 10 mg/day, further tapering can be done by 1 mg per month, provided the clinical course is stable  The clinical response to glucocorticoid therapy is closely monitored, which centers on screening for the presence and/or recu     Thyroid nodule 4/23/2016    Noted on CT Fall 2015.      Unspecified essential hypertension late 1980's        Past Surgical History:   Procedure Laterality Date     ANESTHESIA CARDIOVERSION N/A 6/12/2020    Procedure: ANESTHESIA, FOR CARDIOVERSION;  Surgeon: GENERIC ANESTHESIA PROVIDER;  Location: [facility] OR     COLONOSCOPY  3/2003    adenomatous polyp      COLONOSCOPY  7/2006    diverticulosis - repeat in 5 years     COLONOSCOPY  10/13/2011     Procedure:COLONOSCOPY; COLONOSCOPY ; Surgeon:CHITO GORDILLO; Location: GI     CYSTOSCOPY  7/11/2012    Procedure: CYSTOSCOPY;;  Surgeon: Aline Cooper DO;  Location:  OR     DAVINCI HYSTERECTOMY SUPRACERVICAL, SACROCOLPOPEXY, COMBINED  7/11/2012    Procedure: COMBINED DAVINCI HYSTERECTOMY SUPRACERVICAL, SACROCOLPOPEXY;   DAVINCI ASSISTED LAPAROSCOPIC SUPRACERVICAL HYSTERECTOMY AND BILATERAL SALPINGO-OOPHORECTOMY, SACROCOLPOPEXY AND CYSTOSCOPY;  Surgeon: Aline Cooper DO;  Location:  OR     EP ABLATION AV NODE N/A 6/22/2020    Procedure: EP ABLATION AV NODE;  Surgeon: Adriano Raman MD;  Location:  HEART CARDIAC CATH LAB     EP BIVENT LEAD PLACEMENT N/A 6/22/2020    Procedure: Bivent Lead Placement;  Surgeon: Adriano Raman MD;  Location:  HEART CARDIAC CATH LAB     EP PACEMAKER N/A 6/22/2020    Procedure: AVNA and BiV Pacemaker Insertion;  Surgeon: Adriano Raman MD;  Location:  HEART CARDIAC CATH LAB     EXCISE LESION EYELID Right 6/29/2015    Procedure: EXCISE LESION EYELID;  Surgeon: Hank Olvera MD;  Location:  [facility]     INSERT PORT VASCULAR ACCESS  2/3/2012    Procedure:INSERT PORT VASCULAR ACCESS; Power Port-A- Catheter Placement ; Surgeon:IRISH TAPIA; Location: OR     LAPAROSCOPIC SALPINGO-OOPHORECTOMY  7/11/2012    Procedure: LAPAROSCOPIC SALPINGO-OOPHORECTOMY;  Davinci;  Surgeon: Aline Cooper DO;  Location:  OR     LIPOSUCTION, RHYTIDECTOMY, COMBINED       LOBECTOMY LUNG Right 3/1/2016    Procedure: LOBECTOMY LUNG;  Surgeon: Jonas Woodward MD;  Location:  OR     MAMMOPLASTY REDUCTION  1/6/2012    Procedure:MAMMOPLASTY REDUCTION; Surgeon:MICKI CAICEDO; Location: OR     MASTECTOMY SIMPLE  11/12/2012    Procedure: MASTECTOMY SIMPLE;   Right Prophylactic Mastectomy with attempted Right Sentinal Node Biopsy, Revision Bilateral Mastectomy Insicions, liposuction in breast area;  Surgeon: Irish Tapia MD;   Location: [facility] OR     MASTECTOMY SIMPLE, SENTINEL NODE, COMBINED  1/6/2012    Procedure:COMBINED MASTECTOMY SIMPLE, SENTINEL NODE; Left Mastectomy Left [place] Node Biopsy,  Right Breast Reduction ; Surgeon:IRISH TAPIA; Location:[facility] OR     MASTECTOMY, BILATERAL       REMOVE PORT VASCULAR ACCESS  4/29/2013    Procedure: REMOVE PORT VASCULAR ACCESS;  Port A catheter removal ;  Surgeon: Irish Tapia MD;  Location: [facility] OR     REPAIR PTOSIS BILATERAL Bilateral 6/29/2015    Procedure: REPAIR PTOSIS BILATERAL;  Surgeon: Hank Olvera MD;  Location:  [facility]     REVISE RECONSTRUCTED BREAST BILATERAL  11/12/2012    Procedure: REVISE RECONSTRUCTED BREAST BILATERAL;;  Surgeon: Katia Kyle MD;  Location: [facility] OR     SURGICAL HISTORY OF -   in 40's    face lift     SURGICAL HISTORY OF -       lipoma removed right thigh     SURGICAL HISTORY OF -       D and C     THORACOTOMY Right 3/1/2016    Procedure: THORACOTOMY;  Surgeon: Jonas Woodward MD;  Location:  OR     ZZC NONSPECIFIC PROCEDURE  1970    s/p Tubal ligation 1970       Social History     Tobacco Use     Smoking status: Never Smoker     Smokeless tobacco: Never Used   Substance Use Topics     Alcohol use: Yes     Comment: 0-1 drink day       Family History   Problem Relation Age of Onset     Cardiovascular Father         ruptured aorta, hardening of the arteries     Cerebrovascular Disease Mother      Respiratory Mother         chronic bronchitis - was a smoker early on     Cardiovascular Paternal Grandfather         MI     Cerebrovascular Disease Paternal Aunt      Hypertension Son      Neurologic Disorder Daughter         migraines     Breast Cancer Daughter      Brain Tumor Sister        Prior to Admission medications    Medication Sig Start Date End Date Taking? Authorizing Provider   acetaminophen (TYLENOL) 500 MG tablet Take 1,000 mg by mouth every 8 hours as needed for mild pain    Unknown, Entered By History   albuterol (PROAIR  HFA/PROVENTIL HFA/VENTOLIN HFA) 108 (90 Base) MCG/ACT inhaler Inhale 2 puffs into the lungs every 4 hours as needed 8/30/21   Reported, Patient   amLODIPine (NORVASC) 2.5 MG tablet Take 1 tablet (2.5 mg) by mouth daily 10/19/21   Cris Romero MD   apixaban ANTICOAGULANT (ELIQUIS) 5 MG tablet Take 1 tablet (5 mg) by mouth 2 times daily 12/13/21   Kylie Sevilla DO   B Complex Vitamins (VITAMIN  B COMPLEX) tablet Take 1 tablet by mouth daily.    Unknown, Entered By History   calcium carbonate (TUMS) 500 MG chewable tablet Take 2 chew tab by mouth 2 times daily as needed for heartburn    Unknown, Entered By History   calcium citrate-vitamin D (CALCIUM CITRATE + D) 315-250 MG-UNIT TABS per tablet Take 2 tablets by mouth daily 9/11/20   Violet Fenton MD   ezetimibe (ZETIA) 10 MG tablet Take 1 tablet (10 mg) by mouth daily 10/19/21   Cris Romero MD   furosemide (LASIX) 40 MG tablet Take 2 tablets (80 mg) by mouth daily 9/8/21   Kylie Sevilla DO   INCRUSE ELLIPTA 62.5 MCG/INH Inhaler Inhale 1 puff into the lungs daily 10/10/21   Reported, Patient   metoprolol tartrate (LOPRESSOR) 100 MG tablet Take 1 tablet (100 mg) by mouth 2 times daily 10/19/21   Cris Romero MD   nystatin (MYCOSTATIN) 074332 UNIT/ML suspension Take 5 mLs (500,000 Units) by mouth 4 times daily 7/1/21   Violet Fenton MD   omega 3 1000 MG CAPS Take 1 g by mouth daily 2/23/16   Violet Fenton MD   polyethylene glycol (MIRALAX/GLYCOLAX) powder Take 1 capful by mouth daily as needed for constipation    Reported, Patient   potassium chloride ER (KLOR-CON M) 20 MEQ CR tablet TAKE 1 TABLET(20 MEQ) BY MOUTH TWICE DAILY 10/19/21   Cris Romero MD   predniSONE (DELTASONE) 1 MG tablet Take 1 mg by mouth daily  3/16/21   Violet Fenton MD       Allergies   Allergen Reactions     No Known Drug Allergies      Tape [Adhesive Tape]      Sensitive to plastic tape on upper part of body--takes skin off     "    REVIEW OF SYSTEMS:   5 point ROS negative except as noted above in HPI, including Gen., Resp., CV, GI &  system review.    PHYSICAL EXAM:   LMP  (LMP Unknown)  Estimated body mass index is 23.68 kg/m  as calculated from the following:    Height as of 10/19/21: 1.6 m (5' 3\").    Weight as of 10/19/21: 60.6 kg (133 lb 11.2 oz).   GENERAL APPEARANCE: alert, and oriented  MENTAL STATUS: alert  AIRWAY EXAM: Mallampatti Class I (visualization of the soft palate, fauces, uvula, anterior and posterior pillars)  RESP: lungs clear to auscultation - no rales, rhonchi or wheezes  CV: regular rates and rhythm  DIAGNOSTICS:    Not indicated    IMPRESSION   ASA Class 2 - Mild systemic disease    PLAN:   Plan for gastroscopy. We discussed the risks, benefits and alternatives and the patient wished to proceed.    The above has been forwarded to the consulting provider.      Signed Electronically by: Roberto Nguyen MD  December 14, 2021          "

## 2021-12-15 LAB
PATH REPORT.COMMENTS IMP SPEC: NORMAL
PATH REPORT.FINAL DX SPEC: NORMAL
PATH REPORT.GROSS SPEC: NORMAL
PATH REPORT.MICROSCOPIC SPEC OTHER STN: NORMAL
PATH REPORT.RELEVANT HX SPEC: NORMAL
PHOTO IMAGE: NORMAL

## 2021-12-15 PROCEDURE — 88305 TISSUE EXAM BY PATHOLOGIST: CPT | Mod: 26

## 2022-01-31 ENCOUNTER — APPOINTMENT (OUTPATIENT)
Dept: CT IMAGING | Facility: CLINIC | Age: 82
End: 2022-01-31
Attending: EMERGENCY MEDICINE
Payer: MEDICARE

## 2022-01-31 ENCOUNTER — HOSPITAL ENCOUNTER (OUTPATIENT)
Facility: CLINIC | Age: 82
Setting detail: OBSERVATION
Discharge: NURSING, REHAB OR LONG-TERM CARE | End: 2022-02-02
Attending: EMERGENCY MEDICINE | Admitting: HOSPITALIST
Payer: MEDICARE

## 2022-01-31 DIAGNOSIS — M25.562 CHRONIC PAIN OF BOTH KNEES: Primary | ICD-10-CM

## 2022-01-31 DIAGNOSIS — S32.591A INFERIOR PUBIC RAMUS FRACTURE, RIGHT, CLOSED, INITIAL ENCOUNTER (H): ICD-10-CM

## 2022-01-31 DIAGNOSIS — G89.29 CHRONIC PAIN OF BOTH KNEES: Primary | ICD-10-CM

## 2022-01-31 DIAGNOSIS — K59.00 CONSTIPATION, UNSPECIFIED CONSTIPATION TYPE: ICD-10-CM

## 2022-01-31 DIAGNOSIS — G47.00 INSOMNIA, UNSPECIFIED TYPE: ICD-10-CM

## 2022-01-31 DIAGNOSIS — M25.561 CHRONIC PAIN OF BOTH KNEES: Primary | ICD-10-CM

## 2022-01-31 DIAGNOSIS — R11.0 NAUSEA: ICD-10-CM

## 2022-01-31 LAB
ANION GAP SERPL CALCULATED.3IONS-SCNC: 3 MMOL/L (ref 3–14)
BASOPHILS # BLD AUTO: 0.1 10E3/UL (ref 0–0.2)
BASOPHILS NFR BLD AUTO: 1 %
BUN SERPL-MCNC: 17 MG/DL (ref 7–30)
CALCIUM SERPL-MCNC: 10 MG/DL (ref 8.5–10.1)
CHLORIDE BLD-SCNC: 105 MMOL/L (ref 94–109)
CO2 SERPL-SCNC: 27 MMOL/L (ref 20–32)
CREAT SERPL-MCNC: 0.68 MG/DL (ref 0.52–1.04)
EOSINOPHIL # BLD AUTO: 0.2 10E3/UL (ref 0–0.7)
EOSINOPHIL NFR BLD AUTO: 2 %
ERYTHROCYTE [DISTWIDTH] IN BLOOD BY AUTOMATED COUNT: 14.6 % (ref 10–15)
GFR SERPL CREATININE-BSD FRML MDRD: 86 ML/MIN/1.73M2
GLUCOSE BLD-MCNC: 125 MG/DL (ref 70–99)
HCT VFR BLD AUTO: 37.9 % (ref 35–47)
HGB BLD-MCNC: 11.8 G/DL (ref 11.7–15.7)
HOLD SPECIMEN: NORMAL
HOLD SPECIMEN: NORMAL
IMM GRANULOCYTES # BLD: 0.1 10E3/UL
IMM GRANULOCYTES NFR BLD: 1 %
LYMPHOCYTES # BLD AUTO: 1.2 10E3/UL (ref 0.8–5.3)
LYMPHOCYTES NFR BLD AUTO: 14 %
MCH RBC QN AUTO: 27.3 PG (ref 26.5–33)
MCHC RBC AUTO-ENTMCNC: 31.1 G/DL (ref 31.5–36.5)
MCV RBC AUTO: 88 FL (ref 78–100)
MONOCYTES # BLD AUTO: 0.9 10E3/UL (ref 0–1.3)
MONOCYTES NFR BLD AUTO: 11 %
NEUTROPHILS # BLD AUTO: 5.9 10E3/UL (ref 1.6–8.3)
NEUTROPHILS NFR BLD AUTO: 71 %
NRBC # BLD AUTO: 0 10E3/UL
NRBC BLD AUTO-RTO: 0 /100
PLATELET # BLD AUTO: 290 10E3/UL (ref 150–450)
POTASSIUM BLD-SCNC: 4.2 MMOL/L (ref 3.4–5.3)
RBC # BLD AUTO: 4.33 10E6/UL (ref 3.8–5.2)
SARS-COV-2 RNA RESP QL NAA+PROBE: NEGATIVE
SODIUM SERPL-SCNC: 135 MMOL/L (ref 133–144)
WBC # BLD AUTO: 8.3 10E3/UL (ref 4–11)

## 2022-01-31 PROCEDURE — G0378 HOSPITAL OBSERVATION PER HR: HCPCS

## 2022-01-31 PROCEDURE — 73700 CT LOWER EXTREMITY W/O DYE: CPT | Mod: RT

## 2022-01-31 PROCEDURE — 250N000013 HC RX MED GY IP 250 OP 250 PS 637: Performed by: PHYSICIAN ASSISTANT

## 2022-01-31 PROCEDURE — 87635 SARS-COV-2 COVID-19 AMP PRB: CPT | Performed by: EMERGENCY MEDICINE

## 2022-01-31 PROCEDURE — C9803 HOPD COVID-19 SPEC COLLECT: HCPCS

## 2022-01-31 PROCEDURE — 99220 PR INITIAL OBSERVATION CARE,LEVEL III: CPT | Performed by: PHYSICIAN ASSISTANT

## 2022-01-31 PROCEDURE — 36415 COLL VENOUS BLD VENIPUNCTURE: CPT | Performed by: EMERGENCY MEDICINE

## 2022-01-31 PROCEDURE — 80048 BASIC METABOLIC PNL TOTAL CA: CPT | Performed by: EMERGENCY MEDICINE

## 2022-01-31 PROCEDURE — 70450 CT HEAD/BRAIN W/O DYE: CPT

## 2022-01-31 PROCEDURE — 85025 COMPLETE CBC W/AUTO DIFF WBC: CPT | Performed by: EMERGENCY MEDICINE

## 2022-01-31 PROCEDURE — 93005 ELECTROCARDIOGRAM TRACING: CPT

## 2022-01-31 PROCEDURE — 99285 EMERGENCY DEPT VISIT HI MDM: CPT | Mod: 25

## 2022-01-31 RX ORDER — LANOLIN ALCOHOL/MO/W.PET/CERES
3 CREAM (GRAM) TOPICAL
Status: DISCONTINUED | OUTPATIENT
Start: 2022-01-31 | End: 2022-02-02 | Stop reason: HOSPADM

## 2022-01-31 RX ORDER — NALOXONE HYDROCHLORIDE 0.4 MG/ML
0.2 INJECTION, SOLUTION INTRAMUSCULAR; INTRAVENOUS; SUBCUTANEOUS
Status: DISCONTINUED | OUTPATIENT
Start: 2022-01-31 | End: 2022-02-02 | Stop reason: HOSPADM

## 2022-01-31 RX ORDER — ACETAMINOPHEN 325 MG/1
975 TABLET ORAL EVERY 8 HOURS
Status: DISCONTINUED | OUTPATIENT
Start: 2022-01-31 | End: 2022-02-02 | Stop reason: HOSPADM

## 2022-01-31 RX ORDER — ONDANSETRON 2 MG/ML
4 INJECTION INTRAMUSCULAR; INTRAVENOUS EVERY 6 HOURS PRN
Status: DISCONTINUED | OUTPATIENT
Start: 2022-01-31 | End: 2022-02-02 | Stop reason: HOSPADM

## 2022-01-31 RX ORDER — METHOCARBAMOL 500 MG/1
500 TABLET, FILM COATED ORAL EVERY 6 HOURS PRN
Status: DISCONTINUED | OUTPATIENT
Start: 2022-01-31 | End: 2022-02-02 | Stop reason: HOSPADM

## 2022-01-31 RX ORDER — LIDOCAINE 40 MG/G
CREAM TOPICAL
Status: DISCONTINUED | OUTPATIENT
Start: 2022-01-31 | End: 2022-02-02 | Stop reason: HOSPADM

## 2022-01-31 RX ORDER — NALOXONE HYDROCHLORIDE 0.4 MG/ML
0.4 INJECTION, SOLUTION INTRAMUSCULAR; INTRAVENOUS; SUBCUTANEOUS
Status: DISCONTINUED | OUTPATIENT
Start: 2022-01-31 | End: 2022-02-02 | Stop reason: HOSPADM

## 2022-01-31 RX ORDER — ONDANSETRON 4 MG/1
4 TABLET, ORALLY DISINTEGRATING ORAL EVERY 6 HOURS PRN
Status: DISCONTINUED | OUTPATIENT
Start: 2022-01-31 | End: 2022-02-02 | Stop reason: HOSPADM

## 2022-01-31 RX ORDER — AMOXICILLIN 250 MG
2 CAPSULE ORAL 2 TIMES DAILY PRN
Status: DISCONTINUED | OUTPATIENT
Start: 2022-01-31 | End: 2022-02-02 | Stop reason: HOSPADM

## 2022-01-31 RX ORDER — AMOXICILLIN 250 MG
1 CAPSULE ORAL 2 TIMES DAILY PRN
Status: DISCONTINUED | OUTPATIENT
Start: 2022-01-31 | End: 2022-02-02 | Stop reason: HOSPADM

## 2022-01-31 RX ORDER — POLYETHYLENE GLYCOL 3350 17 G/17G
17 POWDER, FOR SOLUTION ORAL DAILY
Status: DISCONTINUED | OUTPATIENT
Start: 2022-01-31 | End: 2022-02-02 | Stop reason: HOSPADM

## 2022-01-31 RX ADMIN — METHOCARBAMOL 500 MG: 500 TABLET ORAL at 20:02

## 2022-01-31 RX ADMIN — POLYETHYLENE GLYCOL 3350 17 G: 17 POWDER, FOR SOLUTION ORAL at 21:25

## 2022-01-31 RX ADMIN — Medication 3 MG: at 22:39

## 2022-01-31 RX ADMIN — OXYCODONE HYDROCHLORIDE 2.5 MG: 5 TABLET ORAL at 22:38

## 2022-01-31 RX ADMIN — ACETAMINOPHEN 975 MG: 325 TABLET, FILM COATED ORAL at 20:02

## 2022-01-31 RX ADMIN — OXYCODONE HYDROCHLORIDE 2.5 MG: 5 TABLET ORAL at 20:01

## 2022-01-31 ASSESSMENT — MIFFLIN-ST. JEOR: SCORE: 1032.41

## 2022-01-31 ASSESSMENT — ACTIVITIES OF DAILY LIVING (ADL): DEPENDENT_IADLS:: CLEANING

## 2022-01-31 NOTE — PROGRESS NOTES
Care Management D Note    Discharge Date:         Discharge Disposition: Transitional Care    Discharge Services:  TCU PT    Discharge DME:      Discharge Transportation: family or friend will provide    Private pay costs discussed: insurance costs out of pocket expenses    PAS Confirmation Code:    Patient/family educated on Medicare website which has current facility and service quality ratings: yes    Education Provided on the Discharge Plan:    Persons Notified of Discharge Plans: Patient   Patient/Family in Agreement with the Plan: yes    Handoff Referral Completed: Yes    Additional Information:  Patient has [facility] PCP.         Linda Salamanca

## 2022-01-31 NOTE — H&P
History and Physical     Tomasa Fam MRN# [id number]   YOB: 1940 Age: 81 year old      Date of Admission:  1/31/2022    Primary care provider: Cris Romero          Assessment and Plan:   Tomasa Fam is a 81 year old female with a PMH significant for atrial fibrillation s/p pacemaker placement and anticoagulated, idiopathic cardiomyopathy, peripheral arterial disease, COPD, PMR, HLP, GERD and history of lung/breast cancer who presents after a mechanical fall with right hip pain.    Patient was discussed with Dr. Bui, who was provider in ED. Chart review of ED work up was reviewed as well as chart review of Care Everywhere, previous visits and admissions.     #Right inferior pubic rami fracture  #Right hip pain  -Mechanical fall on 1/31 resulting in right hip pain  -Orthopedics consult for completeness  -PT and social work consults  -Schedule Tylenol, ice frequently   -We will have Robaxin available for muscle spasms and oxycodone available for severe pain    #Atrial fibrillation   #Permanent pacemaker placement with AV node ablation 6/2020  #Idiopathic cardiomyopathy  -No EKG on admission but on auscultation is in normal sinus rhythm  -Continue PTA Eliquis  -Continue PTA metoprolol  -Only takes Lasix about 1 x per week and leg swelling stable, will hold for now    #PAD  -followed by vascular medicine with recommendation for conservative management for now    #HTN  -Continue PTA amlodipine on parameters    #COPD  -Lung sounds are clear  -Continue PTA inhalers    #HLP  -continue Zetia    #PMR  -Continue PTA prednisone 1 mg daily    #CKD stage III  -Baseline creatinine 0.7   -Avoid nephrotoxins        Social: Lives indendently  Code: Discussed with patient they have chosen DNR/DNI  VTE prophylaxis: Eliquis  Disposition: Observation                    Chief Complaint:   Right hip pain         History of Present Illness:   Tomasa Fam is a 81 year old female who  presents after a mechanical fall at home.  She was taking a couple steps backwards when she lost her footing falling onto her buttocks.  She hit her head but did not lose consciousness.  She had pain in her right hip and was unable to get up so pressed her life alert button.  Since she has been in the emergency room she has not been up.  She denies recent illness, fever, chills, nausea, vomiting, cough, shortness of breath, abdominal pain, urinary symptoms and diarrhea.  She has not started any new medicines recently and does not smoke cigarettes or drink alcohol daily. She lives independently and ambulates with a cane.             Past Medical History:     Past Medical History:   Diagnosis Date     Arthritis     hands, knees     Breast cancer (H) jan. 2012     left mastectomy followed by right;  Dr. Luong     Chronic airway obstruction, not elsewhere classified 2006    very mild COPD - small cough     Concussion 3/13/2013     Problem list name updated by automated process. Provider to review and confirm [employer] Update utility     Cystocele, midline 4/4/2012     Diffuse cystic mastopathy      Gastroesophageal reflux disease, esophagitis presence not specified 11/23/2019     History of hypokalemia 1/23/2013     Hyperlipidemia LDL goal <160 6/29/2011    [place] 10-year CHD Risk Score: 2% (14 Total Points)  Values used to calculate score:    Age: 70 years -- Points: 14    Total Cholesterol: 192 mg/dL -- Points: 1    HDL Cholesterol: 61 mg/dL -- Points: -1    Systolic BP (treated): 118 mmHg -- Points: 0   The patient is not a smoker. -- Points: 0   The patient has not been diagnosed with diabetes. -- Points: 0   The patient does not have a famil     Lung cancer (H) 10/21/2015     Paroxysmal atrial fibrillation (H) 9/13/2019     PMR (polymyalgia rheumatica) (H) 1/17/2020    tapering' above.)  In patients receiving over 10 mg of prednisone/day, the dose can be lowered by 2.5 mg/day decrements every two to four weeks  Once  the dose of prednisone is 10 mg/day, further tapering can be done by 1 mg per month, provided the clinical course is stable  The clinical response to glucocorticoid therapy is closely monitored, which centers on screening for the presence and/or recu     Thyroid nodule 4/23/2016    Noted on CT Fall 2015.      Unspecified essential hypertension late 1980's               Past Surgical History:     Past Surgical History:   Procedure Laterality Date     ANESTHESIA CARDIOVERSION N/A 6/12/2020    Procedure: ANESTHESIA, FOR CARDIOVERSION;  Surgeon: GENERIC ANESTHESIA PROVIDER;  Location: [facility] OR     COLONOSCOPY  3/2003    adenomatous polyp      COLONOSCOPY  7/2006    diverticulosis - repeat in 5 years     COLONOSCOPY  10/13/2011    Procedure:COLONOSCOPY; COLONOSCOPY ; Surgeon:CHITO NGUYEN; Location: GI     CYSTOSCOPY  7/11/2012    Procedure: CYSTOSCOPY;;  Surgeon: Aline Cooper DO;  Location:  OR     DAVINCI HYSTERECTOMY SUPRACERVICAL, SACROCOLPOPEXY, COMBINED  7/11/2012    Procedure: COMBINED DAVINCI HYSTERECTOMY SUPRACERVICAL, SACROCOLPOPEXY;   DAVINCI ASSISTED LAPAROSCOPIC SUPRACERVICAL HYSTERECTOMY AND BILATERAL SALPINGO-OOPHORECTOMY, SACROCOLPOPEXY AND CYSTOSCOPY;  Surgeon: Aline Cooper DO;  Location:  OR     EP ABLATION AV NODE N/A 6/22/2020    Procedure: EP ABLATION AV NODE;  Surgeon: Adriano Raman MD;  Location:  [facility]     EP BIVENT LEAD PLACEMENT N/A 6/22/2020    Procedure: Bivent Lead Placement;  Surgeon: Adriano Raman MD;  Location:  [facility]     EP PACEMAKER N/A 6/22/2020    Procedure: AVNA and BiV Pacemaker Insertion;  Surgeon: Adriano Raman MD;  Location:  HEART [facility] CATH LAB     ESOPHAGOSCOPY, GASTROSCOPY, DUODENOSCOPY (EGD), COMBINED N/A 12/14/2021    Procedure: ESOPHAGOGASTRODUODENOSCOPY (EGD) (fv) biopsies with cold forcep;  Surgeon: Chito Nguyen MD;  Location:  GI     EXCISE LESION EYELID Right 6/29/2015     Procedure: EXCISE LESION EYELID;  Surgeon: Hank Olvera MD;  Location: [facility]     INSERT PORT VASCULAR ACCESS  2/3/2012    Procedure:INSERT PORT VASCULAR ACCESS; Power Port-A- Catheter Placement ; Surgeon:IRISH TAPIA; Location:[facility] OR     LAPAROSCOPIC SALPINGO-OOPHORECTOMY  7/11/2012    Procedure: LAPAROSCOPIC SALPINGO-OOPHORECTOMY;  Davinci;  Surgeon: Aline Cooper DO;  Location:  OR     LIPOSUCTION, RHYTIDECTOMY, COMBINED       LOBECTOMY LUNG Right 3/1/2016    Procedure: LOBECTOMY LUNG;  Surgeon: Jonas Woodward MD;  Location:  OR     MAMMOPLASTY REDUCTION  1/6/2012    Procedure:MAMMOPLASTY REDUCTION; Surgeon:MICKI KYLE; Location: OR     MASTECTOMY SIMPLE  11/12/2012    Procedure: MASTECTOMY SIMPLE;   Right Prophylactic Mastectomy with attempted Right Sentinal Node Biopsy, Revision Bilateral Mastectomy Insicions, liposuction in breast area;  Surgeon: Irish Tapia MD;  Location: [facility]     MASTECTOMY SIMPLE, SENTINEL NODE, COMBINED  1/6/2012    Procedure:COMBINED MASTECTOMY SIMPLE, SENTINEL NODE; Left Mastectomy Left Odin Node Biopsy,  Right Breast Reduction ; Surgeon:IRISH TAPIA; Location:[facility] OR     MASTECTOMY, BILATERAL       REMOVE PORT VASCULAR ACCESS  4/29/2013    Procedure: REMOVE PORT VASCULAR ACCESS;  Port A catheter removal ;  Surgeon: Irish Tapia MD;  Location: [facility] OR     REPAIR PTOSIS BILATERAL Bilateral 6/29/2015    Procedure: REPAIR PTOSIS BILATERAL;  Surgeon: Hank Olvera MD;  Location: [facility]     REVISE RECONSTRUCTED BREAST BILATERAL  11/12/2012    Procedure: REVISE RECONSTRUCTED BREAST BILATERAL;;  Surgeon: Micki Kyle MD;  Location:  OR     SURGICAL HISTORY OF -   in 40's    face lift     SURGICAL HISTORY OF -       lipoma removed right thigh     SURGICAL HISTORY OF -       D and C     THORACOTOMY Right 3/1/2016    Procedure: THORACOTOMY;  Surgeon: Jonas Woodward MD;  Location:  OR     [facility]  NONSPECIFIC PROCEDURE  1970    s/p Tubal ligation 1970               Social History:     Social History     Socioeconomic History     Marital status:      Spouse name: Aren     Number of children: 2     Years of education: Not on file     Highest education level: Not on file   Occupational History     Occupation: ServiceRelated     Employer: NONE    Tobacco Use     Smoking status: Never Smoker     Smokeless tobacco: Never Used   Substance and Sexual Activity     Alcohol use: Yes     Comment: 0-1 drink day     Drug use: No     Sexual activity: Yes     Partners: Male   Other Topics Concern      Service Not Asked     Blood Transfusions Not Asked     Caffeine Concern Not Asked     Occupational Exposure Not Asked     Hobby Hazards Not Asked     Sleep Concern Not Asked     Stress Concern Not Asked     Weight Concern Not Asked     Special Diet Not Asked     Back Care Not Asked     Exercise Yes     Comment: nguyen     Bike Helmet Not Asked     Seat Belt Not Asked     Self-Exams Not Asked     Parent/sibling w/ CABG, MI or angioplasty before 65F 55M? Yes   Social History Narrative     Not on file     Social Determinants of Health     Financial Resource Strain: Not on file   Food Insecurity: Not on file   Transportation Needs: Not on file   Physical Activity: Not on file   Stress: Not on file   Social Connections: Not on file   Intimate Partner Violence: Not on file   Housing Stability: Not on file               Family History:     Family History   Problem Relation Age of Onset     Cardiovascular Father         ruptured aorta, hardening of the arteries     Cerebrovascular Disease Mother      Respiratory Mother         chronic bronchitis - was a smoker early on     Cardiovascular Paternal Grandfather         MI     Cerebrovascular Disease Paternal Aunt      Hypertension Son      Neurologic Disorder Daughter         migraines     Breast Cancer Daughter      Brain Tumor Sister               Allergies:      Allergies    Allergen Reactions     No Known Drug Allergies      Tape [Adhesive Tape]      Sensitive to plastic tape on upper part of body--takes skin off               Medications:     Prior to Admission medications    Medication Sig Last Dose Taking? Auth Provider   acetaminophen (TYLENOL) 500 MG tablet Take 1,000 mg by mouth every 8 hours as needed for mild pain   Unknown, Entered By History   albuterol (PROAIR HFA/PROVENTIL HFA/VENTOLIN HFA) 108 (90 Base) MCG/ACT inhaler Inhale 2 puffs into the lungs every 4 hours as needed   Reported, Patient   amLODIPine (NORVASC) 2.5 MG tablet Take 1 tablet (2.5 mg) by mouth daily   Cris Romero MD   apixaban ANTICOAGULANT (ELIQUIS) 5 MG tablet Take 1 tablet (5 mg) by mouth 2 times daily   Kylie Sevilla DO   B Complex Vitamins (VITAMIN  B COMPLEX) tablet Take 1 tablet by mouth daily.   Unknown, Entered By History   calcium carbonate (TUMS) 500 MG chewable tablet Take 2 chew tab by mouth 2 times daily as needed for heartburn   Unknown, Entered By History   calcium citrate-vitamin D (CALCIUM CITRATE + D) 315-250 MG-UNIT TABS per tablet Take 2 tablets by mouth daily   Violet Fenton MD   ezetimibe (ZETIA) 10 MG tablet Take 1 tablet (10 mg) by mouth daily   Cris Romero MD   furosemide (LASIX) 40 MG tablet Take 2 tablets (80 mg) by mouth daily   Kylie Sevilla DO   INCRUSE ELLIPTA 62.5 MCG/INH Inhaler Inhale 1 puff into the lungs daily   Reported, Patient   metoprolol tartrate (LOPRESSOR) 100 MG tablet Take 1 tablet (100 mg) by mouth 2 times daily   Cris Romero MD   nystatin (MYCOSTATIN) 035367 UNIT/ML suspension Take 5 mLs (500,000 Units) by mouth 4 times daily   Violet Fenton MD   omega 3 1000 MG CAPS Take 1 g by mouth daily   Violet Fenton MD   polyethylene glycol (MIRALAX/GLYCOLAX) powder Take 1 capful by mouth daily as needed for constipation   Reported, Patient   potassium chloride ER (KLOR-CON M) 20 MEQ CR tablet TAKE 1  TABLET(20 MEQ) BY MOUTH TWICE DAILY   Cris Romero MD   predniSONE (DELTASONE) 1 MG tablet Take 1 mg by mouth daily    Violet Fenton MD              Review of Systems:   A Comprehensive greater than 10 system review of systems was carried out.  Pertinent positives and negatives are noted above.  Otherwise negative for contributory information.            Physical Exam:   Blood pressure (!) 191/82, pulse 70, temperature 98.3  F (36.8  C), temperature source Oral, resp. rate 18, SpO2 95 %, not currently breastfeeding.  Exam:  GENERAL:  Comfortable.  PSYCH: pleasant, oriented, No acute distress.  HEENT:  PERRLA. Normal conjunctiva, normal hearing, nasal mucosa and Oropharynx are normal.  NECK:  Supple, no neck vein distention, adenopathy or bruits, normal thyroid.  HEART:  Normal S1, S2 with murmur heard, no pericardial rub, gallops or S3 or S4.  LUNGS:  Clear to auscultation, normal Respiratory effort. No wheezing, rales or ronchi.  ABDOMEN:  Soft, no hepatosplenomegaly, normal bowel sounds. Non-tender, non distended.   EXTREMITIES:  Bilateral swelling noted, legs are reddish/purplish  SKIN:  Dry to touch, No rash, wound or ulcerations.  NEUROLOGIC:  CN 2-12 grossly intact,  sensation is intact with no focal deficits.               Data:     Recent Labs   Lab 01/31/22  1459   WBC 8.3   HGB 11.8   HCT 37.9   MCV 88        Recent Labs   Lab 01/31/22  1459      POTASSIUM 4.2   CHLORIDE 105   CO2 27   ANIONGAP 3   *   BUN 17   CR 0.68   GFRESTIMATED 86   CHARLIE 10.0         Recent Results (from the past 24 hour(s))   Head CT w/o contrast    Narrative    CT SCAN OF THE HEAD WITHOUT CONTRAST   1/31/2022 3:59 PM     HISTORY: Head trauma, minor (Age >= 65y).    TECHNIQUE:  Axial images of the head and coronal reformations without  IV contrast material. Radiation dose for this scan was reduced using  automated exposure control, adjustment of the mA and/or kV according  to patient size, or iterative  reconstruction technique.    COMPARISON: Head CT 9/28/2019    FINDINGS:  Moderate volume loss is present. White matter  hypoattenuation likely represents moderate chronic small vessel  ischemic change. The cerebral hemispheres, brainstem, and cerebellum  otherwise demonstrate normal morphology and attenuation. No evidence  of acute ischemia, hemorrhage, mass, mass effect or hydrocephalus. The  visualized calvarium, tympanic cavities, mastoid cavities, and  extracranial soft tissues are unremarkable. Mild paranasal sinus  mucosal thickening with secretions layering within the left sphenoid  sinus.      Impression    IMPRESSION:  No acute intracranial abnormality.    EDE COOPER MD         SYSTEM ID:  [id number]   CT Hip Right w/o Contrast    Narrative    CT RIGHT HIP WITHOUT CONTRAST 1/31/2022 3:59 PM     HISTORY: Right hip pain after fall.    TECHNIQUE: Axial scans were performed through the pelvis with sagittal  and coronal reconstruction. Radiation dose for this scan was reduced  using automated exposure control, adjustment of the mA and/or kV  according to patient size, or iterative reconstruction technique.    COMPARISON: None.    FINDINGS: Mildly displaced mildly comminuted fracture of the right  inferior pubic ramus. Superior pubic ramus is intact. No definite  sacral fracture. Moderate adjacent bruising in the subcutaneous  tissues and mild thickening of the obturator internus muscle on the  right.    No evidence of femur or femoral neck fracture. No other fracture  identified.    There are multiple sigmoid diverticuli. Atherosclerotic changes of the  pelvic vessels.      Impression    IMPRESSION:  1. Mildly displaced mildly comminuted fracture of the right inferior  pubic ramus.  2. No evidence of femur or femoral neck fracture. No other fractures  identified.  3. Mild adjacent subcutaneous edema, likely bruising and mild  thickening of the obturator internus muscle adjacent to the fracture.    PARTHA SALMERON  MD ANTHONY         SYSTEM ID:  [id number]         Atiya Salcedo PA-C

## 2022-01-31 NOTE — ED NOTES
Care Management Initial Consult    General Information  Assessment completed with: Patient, Patient  Type of CM/SW Visit: Initial Assessment    Primary Care Provider verified and updated as needed: No   Readmission within the last 30 days: no previous admission in last 30 days      Reason for Consult: discharge planning  Advance Care Planning:            Communication Assessment  Patient's communication style: spoken language (English or Bilingual)    Hearing Difficulty or Deaf: no   Wear Glasses or Blind: no    Cognitive  Cognitive/Neuro/Behavioral: WDL                      Living Environment:   People in home: alone     Current living Arrangements: house      Able to return to prior arrangements: no       Family/Social Support:  Care provided by: self  Provides care for: no one  Marital Status:   Children,Other (specify) (Friends)          Description of Support System: Supportive    Support Assessment: Adequate family and caregiver support    Current Resources:   Patient receiving home care services:       Community Resources: Housekeeping/[employer]  Equipment currently used at home: cane, straight,grab bar, tub/shower,shower chair,other (see comments) (Stair Lift)  Supplies currently used at home:      Employment/Financial:  Employment Status:          Financial Concerns: No concerns identified   Referral to Financial Counselor: No       Lifestyle & Psychosocial Needs:  Social Determinants of Health     Tobacco Use: Low Risk      Smoking Tobacco Use: Never Smoker     Smokeless Tobacco Use: Never Used   Alcohol Use: Not on file   Financial Resource Strain: Not on file   Food Insecurity: Not on file   Transportation Needs: Not on file   Physical Activity: Not on file   Stress: Not on file   Social Connections: Not on file   Intimate Partner Violence: Not on file   Depression: Not at risk     PHQ-2 Score: 2   Housing Stability: Not on file       Functional Status:  Prior to admission patient  needed assistance:   Dependent ADLs:: Independent,Ambulation-cane  Dependent IADLs:: Cleaning       Mental Health Status:  Mental Health Status: No Current Concerns       Chemical Dependency Status:  Chemical Dependency Status: No Current Concerns             Values/Beliefs:  Spiritual, Cultural Beliefs, Hinduism Practices, Values that affect care:                 Additional Information:  Patient presents to the ED alone.     Patient had a fall and now has a Inferior pubic ramus fracture. MD updated PEDRO PABLO that patient would likely need TCU.     Patient lives a lone in a one level home that has a basement. Patient uses a chair lift to get to her basement. Patient uses a cane and grab bars. Patient has a cleaning services and life line. Patient has a son and a daughter. Her son in living nearby. Patient is a .     Patient reports having multiple falls but this is her first fracture.     Patient is familiar with TCU but has never been. SW gave SNF packet, explained options, and discussed the medicare ratings website. Patient would like time to view choices but is agreeable to a referral to [facility]. She would not want to go to [facility]. Patient may need more choices. Referral sent without PT notes.     Patient's son can transport at discharge.     ANGELA Gil, LGPEDRO PABLO  , ED Care Management   [phone]    Linda Salamanca

## 2022-01-31 NOTE — ED PROVIDER NOTES
History     Chief Complaint:    Fall    HPI   Tomasa Fam is a 81 year old female who presents with fall from standing.  She reports she was getting ready go to the grocery store when she fell down approximately 2 steps impacting her her right hip and buttock area.  She did hit her head but she did not lose consciousness.  She does take Xarelto has been compliant with her medications.  No recent fever cough chest pain or shortness of breath episodes.  Was feeling normal and healthy.  Lives independently at home alone has a neighbor that comes to help her occasionally.  Was not able to walk after the fall today due to pain in her right hip.  Pain is minimal without movement.     Allergies:  No Known Drug Allergies  Tape [Adhesive Tape]     Medications:    Albuterol  Amlodipine  Eliquis  Calcium carbonate  Ezetimibe  Furosemide  Incruse Ellipta  Metoprolol tartrate  Nystatin  Polyethylene glycol  Potassium chloride  Prednisone    Past Medical History:    Arthritis  Breast cancer  Chronic airway obstruction  Concussion  Cystocele, midline  Diffuse dystic mastopathy  GERD  Hypokalemia  Lung cancer  Paroxysmal atrial fibrillation  Polymyalgia rheumatica  Thyroid nodule  Hypertension  Hyperlipidemia  Mood disorder  CKS stage 3  Cardiac pacemaker in place  Skin ulcer of right lower leg  Persistent atrial fibrillation  Type 2 diabetes mellitus  Recurrent falls  Temporal arteriolitis  Cardiomyopathy  Complete heart block  Long term systemic steroid user  Malignant neoplasm of breast  Polymyalgia rheumatica  GERD  Lung cancer  Thyroid nodule  Concussion  Uterovaginal prolapse  Pulmonary emphysema    Past Surgical History:    Anesthesia cardioversion  Colonoscopy x3  Cystoscopy  Davinci hysterectomy supracervical, sacrocolpopexy, combined  EP ablation AV node  EP bivent lead placement  EP pacemaker  EGD, combined  Excise lesion eyelid, right  Insert port vascular access  Laparoscopic  salpingo-oophorectomy  Liposuction, rhytidectomy, combined  Lobectomy lung, right  Mammoplasty reduction  Mastectomy simple, bilateral  Remove port vascular access  Repair ptosis bilateral  Revise reconstructed breast bilateral  Face lift  Lipoma removed right thigh  D&C  Thoracotomy  Tubal ligation    Family History:    family history includes Brain Tumor in her sister; Breast Cancer in her daughter; Cardiovascular in her father and paternal grandfather; Cerebrovascular Disease in her mother and paternal aunt; Hypertension in her son; Neurologic Disorder in her daughter; Respiratory in her mother.    Social History:   reports that she has never smoked. She has never used smokeless tobacco. She reports current alcohol use. She reports that she does not use drugs.  Patient lives at home independently    PCP: Cris Romero     Review of Systems   All other systems reviewed and are negative.    Physical Exam     Patient Vitals for the past 24 hrs:   BP Temp Temp src Pulse Resp SpO2   01/31/22 1600 -- -- -- -- -- 95 %   01/31/22 1500 (!) 150/56 -- -- 70 -- 98 %   01/31/22 1453 (!) 164/61 -- -- 73 -- 97 %   01/31/22 1447 -- 98.3  F (36.8  C) Oral -- 18 100 %     Physical Exam  Gen: well appearing, in no acute distress in bed  Oral : Mucous membranes moist,   Nose: No rhinorhea  Ears: External near normal, without drainage  Eyes: periorbital tissues and sclera normal   Neck: supple, no abnormal swelling  Lungs:  CTAB,  no resp distress, speaks full sentences  CV: Regular rate, regular rhythm  Abd: soft, nontender, nondistended, no rebound/guarding  Ext: Mild symmetric lower extremity edema, right hip tender near the inguinal crease, decreased range of motion the pain slightly  Skin: warm, dry, well perfused, no rashes/bruising/lesions on exposed skin  Neuro: alert, no gross motor or sensory deficits,   Psych: pleasant mood, normal affect    Emergency Department Course     ECG   EKG shows a ventricular paced rhythm  rate at 70, no ST segment changes or T inversions concerning for acute ischemia.      Imaging:    CT Hip Right w/o Contrast   Preliminary Result   IMPRESSION:   1. Mildly displaced mildly comminuted fracture of the right inferior   pubic ramus.   2. No evidence of femur or femoral neck fracture. No other fractures   identified.   3. Mild adjacent subcutaneous edema, likely bruising and mild   thickening of the obturator internus muscle adjacent to the fracture.      Head CT w/o contrast   Final Result   IMPRESSION:  No acute intracranial abnormality.      EDE COOPER MD            SYSTEM ID:  [id number]           Laboratory:    Labs Ordered and Resulted from Time of ED Arrival to Time of ED Departure   BASIC METABOLIC PANEL - Abnormal       Result Value    Sodium 135      Potassium 4.2      Chloride 105      Carbon Dioxide (CO2) 27      Anion Gap 3      Urea Nitrogen 17      Creatinine 0.68      Calcium 10.0      Glucose 125 (*)     GFR Estimate 86     CBC WITH PLATELETS AND DIFFERENTIAL - Abnormal    WBC Count 8.3      RBC Count 4.33      Hemoglobin 11.8      Hematocrit 37.9      MCV 88      MCH 27.3      MCHC 31.1 (*)     RDW 14.6      Platelet Count 290      % Neutrophils 71      % Lymphocytes 14      % Monocytes 11      % Eosinophils 2      % Basophils 1      % Immature Granulocytes 1      NRBCs per 100 WBC 0      Absolute Neutrophils 5.9      Absolute Lymphocytes 1.2      Absolute Monocytes 0.9      Absolute Eosinophils 0.2      Absolute Basophils 0.1      Absolute Immature Granulocytes 0.1      Absolute NRBCs 0.0          Procedures:      Narrative: Procedure:         Interventions:    Medications - No data to display     Emergency Department Course:  Past medical records, nursing notes, and vitals reviewed.  I performed an exam of the patient and obtained history, as documented above.    I rechecked the patient. Findings and plan explained to the Patient     Impression & Plan          CMS Diagnoses:        Medical Decision Making:  Patient presents with right hip pain after small fall down  2 steps.  She not having any headache chest pain belly pain or pain anywhere else.  Pain is moderate unless she moves her hip.  Was not able to walk due to pain after the accident.  She lives independently at home I think she can have difficulty getting around at home due to the fracture.  Spoke with social work briefly and [facility]  Contacted hospitalist is in agreement for inpatient management potentially pending placement to alternative living for a time.        Diagnosis:    ICD-10-CM    1. Inferior pubic ramus fracture, right, closed, initial encounter (H)  S32.591A         Discharge Medications:  New Prescriptions    No medications on file        1/31/2022   John Bui,*        John Bui MD  01/31/22 1637

## 2022-01-31 NOTE — ED NOTES
Medicare Outpatient Observation Notice (MOON) reviewed with pt. Letter signed and dated by pt. Copy faxed to medical record, original placed in chart and copy given to pt.     ANGELA Gil, [facility]  , ED Care Management   [phone]

## 2022-01-31 NOTE — ED NOTES
[facility]  ED Nurse Handoff Report    Tomasa Fam is a 81 year old female   ED Chief complaint: Fall  . ED Diagnosis:   Final diagnoses:   Inferior pubic ramus fracture, right, closed, initial encounter (H)     Allergies:   Allergies   Allergen Reactions     No Known Drug Allergies      Tape [Adhesive Tape]      Sensitive to plastic tape on upper part of body--takes skin off       Code Status: Full Code  Activity level - Baseline/Home:  Independent. Activity Level - Current:   Total Care. Lift room needed: No. Bariatric: No   Needed: No   Isolation: No. Infection: Not Applicable.     Vital Signs:   Vitals:    01/31/22 1447 01/31/22 1453 01/31/22 1500 01/31/22 1600   BP:  (!) 164/61 (!) 150/56    Pulse:  73 70    Resp: 18      Temp: 98.3  F (36.8  C)      TempSrc: Oral      SpO2: 100% 97% 98% 95%       Cardiac Rhythm:  ,      Pain level:    Patient confused: No. Patient Falls Risk: Yes.   Elimination Status: Not yet   Patient Report - Initial Complaint: Fall. Focused Assessment: Pubic Fx   Tests Performed:    Labs Ordered and Resulted from Time of ED Arrival to Time of ED Departure   BASIC METABOLIC PANEL - Abnormal       Result Value    Sodium 135      Potassium 4.2      Chloride 105      Carbon Dioxide (CO2) 27      Anion Gap 3      Urea Nitrogen 17      Creatinine 0.68      Calcium 10.0      Glucose 125 (*)     GFR Estimate 86     CBC WITH PLATELETS AND DIFFERENTIAL - Abnormal    WBC Count 8.3      RBC Count 4.33      Hemoglobin 11.8      Hematocrit 37.9      MCV 88      MCH 27.3      MCHC 31.1 (*)     RDW 14.6      Platelet Count 290      % Neutrophils 71      % Lymphocytes 14      % Monocytes 11      % Eosinophils 2      % Basophils 1      % Immature Granulocytes 1      NRBCs per 100 WBC 0      Absolute Neutrophils 5.9      Absolute Lymphocytes 1.2      Absolute Monocytes 0.9      Absolute Eosinophils 0.2      Absolute Basophils 0.1      Absolute Immature Granulocytes 0.1       Absolute NRBCs 0.0     COVID-19 VIRUS (CORONAVIRUS) BY PCR     CT Hip Right w/o Contrast   Preliminary Result   IMPRESSION:   1. Mildly displaced mildly comminuted fracture of the right inferior   pubic ramus.   2. No evidence of femur or femoral neck fracture. No other fractures   identified.   3. Mild adjacent subcutaneous edema, likely bruising and mild   thickening of the obturator internus muscle adjacent to the fracture.      Head CT w/o contrast   Final Result   IMPRESSION:  No acute intracranial abnormality.      EDE COOPER MD            SYSTEM ID:  [id number]        . Abnormal Results: See above.   Treatments provided: None  Family Comments: NA  OBS brochure/video discussed/provided to patient:  No  ED Medications: Medications - No data to display  Drips infusing:  No  For the majority of the shift, the patient's behavior Green. Interventions performed were NA.    Sepsis treatment initiated: No     Patient tested for COVID 19 prior to admission: YES    ED Nurse Name/Phone Number: Cathy Reese RN,   4:40 PM    RECEIVING UNIT ED HANDOFF REVIEW    Above ED Nurse Handoff Report was reviewed: Yes  Reviewed by: Atiya Mathis RN on January 31, 2022 at 6:09 PM

## 2022-01-31 NOTE — ED TRIAGE NOTES
Pt presents after falling down two stairs in garage today and slipping underneath car. Unsure if hit head. Did not lose consciousness. Spasms in right hip area, unable to walk after fall.  On eliquis

## 2022-01-31 NOTE — CONSULTS
Care Management Follow Up    Length of Stay (days): 0    Expected Discharge Date:       Concerns to be Addressed: discharge planning     Patient plan of care discussed at interdisciplinary rounds: No    Anticipated Discharge Disposition: Transitional Care     Anticipated Discharge Services:    Anticipated Discharge DME:      Patient/family educated on Medicare website which has current facility and service quality ratings: yes  Education Provided on the Discharge Plan:    Patient/Family in Agreement with the Plan: yes    Referrals Placed by CM/SW:    Private pay costs discussed: insurance costs out of pocket expenses    Additional Information:  See ED note for SW consult.       Linda Salamanca

## 2022-02-01 ENCOUNTER — APPOINTMENT (OUTPATIENT)
Dept: PHYSICAL THERAPY | Facility: CLINIC | Age: 82
End: 2022-02-01
Attending: PHYSICIAN ASSISTANT
Payer: MEDICARE

## 2022-02-01 LAB
ANION GAP SERPL CALCULATED.3IONS-SCNC: 5 MMOL/L (ref 3–14)
ATRIAL RATE - MUSE: 51 BPM
BUN SERPL-MCNC: 15 MG/DL (ref 7–30)
CALCIUM SERPL-MCNC: 9.6 MG/DL (ref 8.5–10.1)
CHLORIDE BLD-SCNC: 105 MMOL/L (ref 94–109)
CO2 SERPL-SCNC: 27 MMOL/L (ref 20–32)
CREAT SERPL-MCNC: 0.7 MG/DL (ref 0.52–1.04)
DIASTOLIC BLOOD PRESSURE - MUSE: NORMAL MMHG
GFR SERPL CREATININE-BSD FRML MDRD: 86 ML/MIN/1.73M2
GLUCOSE BLD-MCNC: 85 MG/DL (ref 70–99)
INTERPRETATION ECG - MUSE: NORMAL
P AXIS - MUSE: NORMAL DEGREES
POTASSIUM BLD-SCNC: 3.9 MMOL/L (ref 3.4–5.3)
PR INTERVAL - MUSE: NORMAL MS
QRS DURATION - MUSE: 140 MS
QT - MUSE: 468 MS
QTC - MUSE: 505 MS
R AXIS - MUSE: 57 DEGREES
SODIUM SERPL-SCNC: 137 MMOL/L (ref 133–144)
SYSTOLIC BLOOD PRESSURE - MUSE: NORMAL MMHG
T AXIS - MUSE: 50 DEGREES
VENTRICULAR RATE- MUSE: 70 BPM

## 2022-02-01 PROCEDURE — 250N000013 HC RX MED GY IP 250 OP 250 PS 637: Performed by: NURSE PRACTITIONER

## 2022-02-01 PROCEDURE — 97530 THERAPEUTIC ACTIVITIES: CPT | Mod: GP

## 2022-02-01 PROCEDURE — G0378 HOSPITAL OBSERVATION PER HR: HCPCS

## 2022-02-01 PROCEDURE — 36415 COLL VENOUS BLD VENIPUNCTURE: CPT | Performed by: PHYSICIAN ASSISTANT

## 2022-02-01 PROCEDURE — 80048 BASIC METABOLIC PNL TOTAL CA: CPT | Performed by: PHYSICIAN ASSISTANT

## 2022-02-01 PROCEDURE — 99207 PR CDG-CODE CATEGORY CHANGED: CPT | Performed by: NURSE PRACTITIONER

## 2022-02-01 PROCEDURE — 97161 PT EVAL LOW COMPLEX 20 MIN: CPT | Mod: GP

## 2022-02-01 PROCEDURE — 250N000013 HC RX MED GY IP 250 OP 250 PS 637: Performed by: PHYSICIAN ASSISTANT

## 2022-02-01 PROCEDURE — 99226 PR SUBSEQUENT OBSERVATION CARE,LEVEL III: CPT | Performed by: NURSE PRACTITIONER

## 2022-02-01 RX ORDER — LANOLIN ALCOHOL/MO/W.PET/CERES
3 CREAM (GRAM) TOPICAL
DISCHARGE
Start: 2022-02-01 | End: 2022-02-22

## 2022-02-01 RX ORDER — POLYETHYLENE GLYCOL 3350 17 G/17G
17 POWDER, FOR SOLUTION ORAL DAILY
Qty: 510 G | DISCHARGE
Start: 2022-02-01 | End: 2022-09-22

## 2022-02-01 RX ORDER — AMLODIPINE BESYLATE 2.5 MG/1
2.5 TABLET ORAL EVERY EVENING
Status: DISCONTINUED | OUTPATIENT
Start: 2022-02-01 | End: 2022-02-02 | Stop reason: HOSPADM

## 2022-02-01 RX ORDER — EZETIMIBE 10 MG/1
10 TABLET ORAL EVERY EVENING
Status: DISCONTINUED | OUTPATIENT
Start: 2022-02-01 | End: 2022-02-02 | Stop reason: HOSPADM

## 2022-02-01 RX ORDER — OXYCODONE HYDROCHLORIDE 5 MG/1
2.5-5 TABLET ORAL EVERY 4 HOURS PRN
Qty: 15 TABLET | Refills: 0 | Status: SHIPPED | DISCHARGE
Start: 2022-02-01 | End: 2022-02-22

## 2022-02-01 RX ORDER — AMOXICILLIN 250 MG
1 CAPSULE ORAL 2 TIMES DAILY PRN
DISCHARGE
Start: 2022-02-01 | End: 2022-02-28

## 2022-02-01 RX ORDER — METHOCARBAMOL 500 MG/1
500 TABLET, FILM COATED ORAL EVERY 6 HOURS PRN
DISCHARGE
Start: 2022-02-01 | End: 2022-02-09

## 2022-02-01 RX ORDER — ONDANSETRON 4 MG/1
4 TABLET, ORALLY DISINTEGRATING ORAL EVERY 6 HOURS PRN
DISCHARGE
Start: 2022-02-01 | End: 2022-02-22

## 2022-02-01 RX ORDER — METOPROLOL TARTRATE 50 MG
100 TABLET ORAL 2 TIMES DAILY
Status: DISCONTINUED | OUTPATIENT
Start: 2022-02-01 | End: 2022-02-02 | Stop reason: HOSPADM

## 2022-02-01 RX ORDER — ACETAMINOPHEN 325 MG/1
975 TABLET ORAL EVERY 8 HOURS
DISCHARGE
Start: 2022-02-01 | End: 2022-05-12

## 2022-02-01 RX ADMIN — EZETIMIBE 10 MG: 10 TABLET ORAL at 20:14

## 2022-02-01 RX ADMIN — METOPROLOL TARTRATE 100 MG: 50 TABLET, FILM COATED ORAL at 20:13

## 2022-02-01 RX ADMIN — ACETAMINOPHEN 975 MG: 325 TABLET, FILM COATED ORAL at 03:36

## 2022-02-01 RX ADMIN — AMLODIPINE BESYLATE 2.5 MG: 2.5 TABLET ORAL at 20:14

## 2022-02-01 RX ADMIN — APIXABAN 5 MG: 5 TABLET, FILM COATED ORAL at 08:27

## 2022-02-01 RX ADMIN — ACETAMINOPHEN 975 MG: 325 TABLET, FILM COATED ORAL at 20:13

## 2022-02-01 RX ADMIN — ACETAMINOPHEN 975 MG: 325 TABLET, FILM COATED ORAL at 10:42

## 2022-02-01 RX ADMIN — OXYCODONE HYDROCHLORIDE 2.5 MG: 5 TABLET ORAL at 08:28

## 2022-02-01 RX ADMIN — APIXABAN 5 MG: 5 TABLET, FILM COATED ORAL at 20:14

## 2022-02-01 RX ADMIN — METOPROLOL TARTRATE 100 MG: 50 TABLET, FILM COATED ORAL at 08:27

## 2022-02-01 NOTE — PHARMACY-ADMISSION MEDICATION HISTORY
Admission medication history interview status for this patient is complete. See [facility] admission navigator for allergy information, prior to admission medications and immunization status.     Medication history interview done, indicate source(s): Patient  Medication history resources (including written lists, pill bottles, clinic record):None  Pharmacy: [facility]    Changes made to PTA medication list:  Added: none  Changed: calcium citrate, prednisone from 1 mg daily to 1 mg every other day  Reported as Not Taking: albuterol  Removed: Tums, nystatin    Actions taken by pharmacist (provider contacted, etc):Left note for provider that med rec is complete.     Additional medication history information:Patient states that she doesn't take furosemide every day due to side effects.     Medication reconciliation/reorder completed by provider prior to medication history?  N   (Y/N)        Prior to Admission medications    Medication Sig Last Dose Taking? Auth Provider   acetaminophen (TYLENOL) 500 MG tablet Take 1,000 mg by mouth every 8 hours as needed for mild pain  at prn Yes Unknown, Entered By History   amLODIPine (NORVASC) 2.5 MG tablet Take 1 tablet (2.5 mg) by mouth daily  Patient taking differently: Take 2.5 mg by mouth every evening  1/30/2022 at pm Yes Cris Romero MD   apixaban ANTICOAGULANT (ELIQUIS) 5 MG tablet Take 1 tablet (5 mg) by mouth 2 times daily 1/31/2022 at am Yes Kylie Sevilla DO   calcium citrate-vitamin D (CALCIUM CITRATE + D) 315-250 MG-UNIT TABS per tablet Take 1 tablet by mouth 2 times daily  1/31/2022 at am Yes Violet Fenton MD   ezetimibe (ZETIA) 10 MG tablet Take 1 tablet (10 mg) by mouth daily  Patient taking differently: Take 10 mg by mouth every evening  1/30/2022 at pm Yes Cris Romero MD   furosemide (LASIX) 40 MG tablet Take 2 tablets (80 mg) by mouth daily Past Month at Unknown time Yes Kylie Sevilla DO   INCRUSE ELLIPTA 62.5 MCG/INH  Inhaler Inhale 1 puff into the lungs daily Past Week at Unknown time Yes Reported, Patient   metoprolol tartrate (LOPRESSOR) 100 MG tablet Take 1 tablet (100 mg) by mouth 2 times daily 1/31/2022 at am Yes Cris Romero MD   omega 3 1000 MG CAPS Take 1 capsule by mouth daily  1/30/2022 at am Yes Violet Fenton MD   polyethylene glycol (MIRALAX/GLYCOLAX) powder Take 1 capful by mouth daily as needed for constipation  at prn Yes Reported, Patient   potassium chloride ER (KLOR-CON M) 20 MEQ CR tablet TAKE 1 TABLET(20 MEQ) BY MOUTH TWICE DAILY  Yes Cris Romero MD   predniSONE (DELTASONE) 1 MG tablet Take 1 mg by mouth every other day  1/30/2022 at Unknown time Yes Violet Fenton MD   albuterol (PROAIR HFA/PROVENTIL HFA/VENTOLIN HFA) 108 (90 Base) MCG/ACT inhaler Inhale 2 puffs into the lungs every 4 hours as needed  Patient not taking: Reported on 1/31/2022 Not Taking at Unknown time  Reported, Patient

## 2022-02-01 NOTE — PROGRESS NOTES
Care Management Discharge Note    Discharge Date: 02/02/2022     Discharge Disposition: [facility]    Discharge Services:  PT/OT    Discharge Transportation: Son vs [facility] WC    Private pay costs discussed: Not applicable    PAS Confirmation Code:  [id number]  Patient/family educated on Medicare website which has current facility and service quality ratings: yes    Education Provided on the Discharge Plan:  Yes  Persons Notified of Discharge Plans: Patient, [facility]  Patient/Family in Agreement with the Plan: yes    Handoff Referral Completed: Yes    Additional Information:  Initial SW consult completed in ED 1/31. Call from [facility] ([phone]). They have clinically accepted patient, semi-prvt room available tomorrow at anytime, pending Ortho and PT evals. They will need COVID Medicare waiver as patient is observation status. PEDRO PABLO will continue to follow.     1100: Spoke with patient to provide update. She is agreeable to a semi-private room. She reports that [facility] will transport at discharge. Spoke with provider who has not seen patient yet today. Once discharge date identified will call son to discuss discharge plan and transportation.     1515: PEDRO PABLO spoke with son Rodney ([phone]). He will transport tomorrow at 10:30am, he will come up to patient's room. Updated [facility] on transport time. PEDRO PABLO will continue to follow.     ANGELA Greene

## 2022-02-01 NOTE — PROGRESS NOTES
[facility]    Medicine Progress Note - Hospitalist Service    Date of Admission:  1/31/2022    Assessment & Plan          Tomasa Fam is a 81 year old female with a PMH significant for atrial fibrillation s/p pacemaker placement and anticoagulated, idiopathic cardiomyopathy, peripheral arterial disease, COPD, PMR, HLP, GERD and history of lung/breast cancer who presents after a mechanical fall with right hip pain.     Patient was discussed with Dr. Bui, who was provider in ED. Chart review of ED work up was reviewed as well as chart review of Care Everywhere, previous visits and admissions.      #Right inferior pubic rami fracture  #Right hip pain  -Mechanical fall on 1/31 resulting in right hip pain  -Orthopedics consult for completeness  -PT and social work consults  -Schedule Tylenol, ice frequently   -Robaxin available for muscle spasms and oxycodone available for severe pain  - Plan for TCU on 2/2/2022 -- [facility].      #Atrial fibrillation   #Permanent pacemaker placement with AV node ablation 6/2020  #Idiopathic cardiomyopathy  -No EKG on admission but on auscultation is in normal sinus rhythm  -Continue PTA Eliquis  -Continue PTA metoprolol  -Only takes Lasix about 1 x per week and leg swelling stable, will hold for now     #PAD  -followed by vascular medicine with recommendation for conservative management for now     #HTN  -Continue PTA amlodipine on parameters     #COPD  -Lung sounds are clear  -Continue PTA inhalers     #HLP  -continue Zetia     #PMR  -Continue PTA prednisone 1 mg daily     #CKD stage III  -Baseline creatinine 0.7   -Avoid nephrotoxins            Diet: Regular Diet Adult    DVT Prophylaxis: DOAC  Wong Catheter: Not present  Central Lines: None  Cardiac Monitoring: None  Code Status: No CPR- Do NOT Intubate      Disposition Plan   Expected Discharge: 02/02/2022     Anticipated discharge location: inpatient rehabilitation facility    Delays:      Specialist Consult  PT Consult  Placement - TCU            The patient's care was discussed with the Bedside Nurse, Care Coordinator/ and Patient.    SYLVIA [facility]  Hospitalist Service  [facility]  Securely message with the Vocera Web Console (learn more here)  Text page via [employer] Paging/Directory     ______________________________________________________________________    Interval History   Ms. Fam was seen and examined. VSS.  Working with therapies.  Pain under control.      Data reviewed today: I reviewed all medications, new labs and imaging results over the last 24 hours. I personally reviewed all data since admit.    Physical Exam   Vital Signs: Temp: 98.1  F (36.7  C) Temp src: Oral BP: 113/76 Pulse: 70   Resp: 16 SpO2: 98 % O2 Device: None (Room air)    Weight: 133 lbs 0 oz  General Appearance: Pleasant 82 yo F in NAD.  Respiratory: BBS. Lungs clear.  Cardiovascular: RRR. S1S2.  No m/g/c/r  GI: SNTND NABS.  No HSM.  Skin: No worrisome lesions.  Bilateral LE edema. Legs with reddish/purplish hue consistent with PAD.  Neuro:  CN grossly intact.  No focal deficits.  Clear speech.  AxOx3.      Data   Recent Labs   Lab 02/01/22  0605 01/31/22  1459   WBC  --  8.3   HGB  --  11.8   MCV  --  88   PLT  --  290    135   POTASSIUM 3.9 4.2   CHLORIDE 105 105   CO2 27 27   BUN 15 17   CR 0.70 0.68   ANIONGAP 5 3   CHARLIE 9.6 10.0   GLC 85 125*     Recent Results (from the past 24 hour(s))   Head CT w/o contrast    Narrative    CT SCAN OF THE HEAD WITHOUT CONTRAST   1/31/2022 3:59 PM     HISTORY: Head trauma, minor (Age >= 65y).    TECHNIQUE:  Axial images of the head and coronal reformations without  IV contrast material. Radiation dose for this scan was reduced using  automated exposure control, adjustment of the mA and/or kV according  to patient size, or iterative reconstruction technique.    COMPARISON: Head CT 9/28/2019    FINDINGS:  Moderate volume loss  is present. White matter  hypoattenuation likely represents moderate chronic small vessel  ischemic change. The cerebral hemispheres, brainstem, and cerebellum  otherwise demonstrate normal morphology and attenuation. No evidence  of acute ischemia, hemorrhage, mass, mass effect or hydrocephalus. The  visualized calvarium, tympanic cavities, mastoid cavities, and  extracranial soft tissues are unremarkable. Mild paranasal sinus  mucosal thickening with secretions layering within the left sphenoid  sinus.      Impression    IMPRESSION:  No acute intracranial abnormality.    EDE COOPER MD         SYSTEM ID:  [id number]   CT Hip Right w/o Contrast    Narrative    CT RIGHT HIP WITHOUT CONTRAST 1/31/2022 3:59 PM     HISTORY: Right hip pain after fall.    TECHNIQUE: Axial scans were performed through the pelvis with sagittal  and coronal reconstruction. Radiation dose for this scan was reduced  using automated exposure control, adjustment of the mA and/or kV  according to patient size, or iterative reconstruction technique.    COMPARISON: None.    FINDINGS: Mildly displaced mildly comminuted fracture of the right  inferior pubic ramus. Superior pubic ramus is intact. No definite  sacral fracture. Moderate adjacent bruising in the subcutaneous  tissues and mild thickening of the obturator internus muscle on the  right.    No evidence of femur or femoral neck fracture. No other fracture  identified.    There are multiple sigmoid diverticuli. Atherosclerotic changes of the  pelvic vessels.      Impression    IMPRESSION:  1. Mildly displaced mildly comminuted fracture of the right inferior  pubic ramus.  2. No evidence of femur or femoral neck fracture. No other fractures  identified.  3. Mild adjacent subcutaneous edema, likely bruising and mild  thickening of the obturator internus muscle adjacent to the fracture.    PARTHA CRUZ MD         SYSTEM ID:  [id number]     Medications       acetaminophen  975 mg Oral Q8H      amLODIPine  2.5 mg Oral QPM     apixaban ANTICOAGULANT  5 mg Oral BID     ezetimibe  10 mg Oral QPM     metoprolol tartrate  100 mg Oral BID     polyethylene glycol  17 g Oral Daily     sodium chloride (PF)  3 mL Intracatheter Q8H     umeclidinium  1 puff Inhalation Daily

## 2022-02-01 NOTE — PLAN OF CARE
PRIMARY DIAGNOSIS: ACUTE PAIN  OUTPATIENT/OBSERVATION GOALS TO BE MET BEFORE DISCHARGE:  1. Pain Status: Improved-controlled with oral pain medications.    2. Return to near baseline physical activity: No    3. Cleared for discharge by consultants (if involved): No    Discharge Planner Nurse   Safe discharge environment identified: No  Barriers to discharge: Yes       Entered by: Carlee Blank 02/01/2022 4:13 PM     Please review provider order for any additional goals.   Nurse to notify provider when observation goals have been met and patient is ready for discharge.    A&Ox4, Ax1 w/ walker to bedside commode, tolerating regular diet, heart sounds- WNL, lungs- clear, reports shortness of breath baseline, bowels- active, voiding without difficulty, IV SL, oxycodone given x1 before PT, tylenol scheduled for pain, PT recommending TCU.    Per SW- son to transport tomorrow at 1030 to [facility]     Provider ok with IV being removed, patient requested

## 2022-02-01 NOTE — DISCHARGE SUMMARY
[facility]  Hospitalist Discharge Summary      Date of Admission:  1/31/2022  Date of Discharge:  2/2/2022  Discharge Condition: Stable  Discharging Provider: SYLVIA Owen CNP  Discharge Service: Hospitalist Service    Discharge Diagnoses   Right inferior pubic rami fracture secondary to mechanical fall.   History of atrial fibrillation  History of idiopathic cardiomyopathy, status post permanent pacemaker ablation with AV susu ablation in June 2020  Peripheral artery disease  Hypertension  COPD  Chronic kidney disease stage III      Follow-ups Needed After Discharge   Follow-up with nursing home rounding service in 1 week. Repeat CBC BMP at that time. Follow-up with primary care provider 1 to 2 weeks post TCU discharge.      Follow-up with Dr Page at [facility] in 6 weeks for recheck if continued pain or weakness with ambulation.  Otherwise can be seen prn.   Call [phone] for appointment.       Unresulted Labs Ordered in the Past 30 Days of this Admission     No orders found for last 31 day(s).        Discharge Disposition   Discharged to rehabilitation facility  Condition at discharge: Stable      Hospital Course   Tomasa Fam is a 81 year old female with a PMH significant for atrial fibrillation s/p pacemaker placement and anticoagulated, idiopathic cardiomyopathy, peripheral arterial disease, COPD, PMR, HLP, GERD and history of lung/breast cancer who presents after a mechanical fall with right hip pain.     Patient was discussed with Dr. Bui, who was provider in ED. Chart review of ED work up was reviewed as well as chart review of Care Everywhere, previous visits and admissions.      #Right inferior pubic rami fracture after mechanical fall  #Right hip pain  -Mechanical fall on 1/31 resulting in right hip pain  -PT and social work consults. WBAT. Discussed with Orthopedics.  Non operative.    -Schedule Tylenol, ice frequently   -Robaxin available for muscle  spasms and oxycodone available for severe pain  - Plan for T[facility] on 2/2/2022 [facility]      #Atrial fibrillation   #Permanent pacemaker placement with AV node ablation 6/2020  #Idiopathic cardiomyopathy  -No EKG on admission but on auscultation is in normal sinus rhythm. No acute coronary symptoms.   -Continue PTA Eliquis  -Continue PTA metoprolol  -Only takes Lasix about 1 x per week and leg swelling stable, will hold for now     #PAD  -followed by vascular medicine with recommendation for conservative management for now     #HTN  -Continue PTA amlodipine on parameters     #COPD  -Lung sounds are clear  -Continue PTA inhalers     #HLP  -continue Zetia     #PMR  -Continue PTA prednisone 1 mg every other day.     #CKD stage III  -Baseline creatinine 0.7   -Avoid nephrotoxins    Consultations This Hospital Stay   CARE MANAGEMENT / SOCIAL WORK IP CONSULT  ORTHOPEDIC SURGERY IP CONSULT  CARE MANAGEMENT / SOCIAL WORK IP CONSULT  PHYSICAL THERAPY ADULT IP CONSULT  PHYSICAL THERAPY ADULT IP CONSULT  OCCUPATIONAL THERAPY ADULT IP CONSULT    Code Status   No CPR- Do NOT Intubate    Time Spent on this Encounter   I, SYLVIA Owen CNP, personally saw the patient today and spent greater than 30 minutes discharging this patient.       SYLVIA Owen CNP  [facility] OBSERVATION DEPT  [address] [place] [address]  Phone: [phone]  ______________________________________________________________________    Physical Exam   Vital Signs: Temp: 98.1  F (36.7  C) Temp src: Oral BP: 113/76 Pulse: 70   Resp: 16 SpO2: 98 % O2 Device: None (Room air)    Weight: 133 lbs 0 oz  General Appearance:  Pleasant 80 yo F in NAD.  Respiratory: BBS. Lungs clear.  Cardiovascular: RRR. S1S2.  No m/g/c/r  GI: SNTND NABS.  No HSM.  Skin: No worrisome lesions.  Bilateral LE edema. Legs with reddish/purplish hue consistent with PAD.  Neuro:  CN grossly intact.  No focal deficits.  Clear speech.   AxOx3.         Primary Care Physician   Cris Romero    Discharge Orders      Care Coordination Referral      General info for SNF    Length of Stay Estimate: Short Term Care: Estimated # of Days <30  Condition at Discharge: Improving  Level of care:skilled   Rehabilitation Potential: Good  Admission H&P remains valid and up-to-date: Yes  Recent Chemotherapy: N/A  Use Nursing Home Standing Orders: Yes     Mantoux instructions    Give two-step Mantoux (PPD) Per Facility Policy Yes     Follow Up and recommended labs and tests    Follow up with snf physician.  The following labs/tests are recommended: CBC and BMP in 1 week.     Reason for your hospital stay    1.  Right pubic rami fracture  2.  Chronic atrial fibrillation; status post permanent pacemaker  3.  Cardiomyopathy  4.  Peripheral artery disease  5.  Hypertension  6.  COPD  7.  Hyperlipidemia  8.  Polymyalgia rheumatica  9.  Chronic kidney disease stage III     Activity - Up ad jory     No CPR- Do NOT Intubate     Physical Therapy Adult Consult    Evaluate and treat as clinically indicated.    Reason: Pubic rami fracture, fall     Occupational Therapy Adult Consult    Evaluate and treat as clinically indicated.    Reason: Pubic rami fracture     Fall precautions     Diet    Follow this diet upon discharge: Orders Placed This Encounter      Regular Diet Adult       Significant Results and Procedures   Most Recent 3 CBC's:Recent Labs   Lab Test 01/31/22  1459 07/01/21  1357 09/08/20  1024   WBC 8.3 10.9 11.2*   HGB 11.8 13.3 11.4*   MCV 88 92 98    270 235     Most Recent 3 BMP's:Recent Labs   Lab Test 02/01/22  0605 01/31/22  1459 10/19/21  1021    135 136   POTASSIUM 3.9 4.2 4.2   CHLORIDE 105 105 107   CO2 27 27 23   BUN 15 17 14   CR 0.70 0.68 0.73   ANIONGAP 5 3 6   CHARLIE 9.6 10.0 10.3*   GLC 85 125* 87   ,   Results for orders placed or performed during the hospital encounter of 01/31/22   Head CT w/o contrast    Narrative    CT  SCAN OF THE HEAD WITHOUT CONTRAST   1/31/2022 3:59 PM     HISTORY: Head trauma, minor (Age >= 65y).    TECHNIQUE:  Axial images of the head and coronal reformations without  IV contrast material. Radiation dose for this scan was reduced using  automated exposure control, adjustment of the mA and/or kV according  to patient size, or iterative reconstruction technique.    COMPARISON: Head CT 9/28/2019    FINDINGS:  Moderate volume loss is present. White matter  hypoattenuation likely represents moderate chronic small vessel  ischemic change. The cerebral hemispheres, brainstem, and cerebellum  otherwise demonstrate normal morphology and attenuation. No evidence  of acute ischemia, hemorrhage, mass, mass effect or hydrocephalus. The  visualized calvarium, tympanic cavities, mastoid cavities, and  extracranial soft tissues are unremarkable. Mild paranasal sinus  mucosal thickening with secretions layering within the left sphenoid  sinus.      Impression    IMPRESSION:  No acute intracranial abnormality.    EDE COOPER MD         SYSTEM ID:  [id number]   CT Hip Right w/o Contrast    Narrative    CT RIGHT HIP WITHOUT CONTRAST 1/31/2022 3:59 PM     HISTORY: Right hip pain after fall.    TECHNIQUE: Axial scans were performed through the pelvis with sagittal  and coronal reconstruction. Radiation dose for this scan was reduced  using automated exposure control, adjustment of the mA and/or kV  according to patient size, or iterative reconstruction technique.    COMPARISON: None.    FINDINGS: Mildly displaced mildly comminuted fracture of the right  inferior pubic ramus. Superior pubic ramus is intact. No definite  sacral fracture. Moderate adjacent bruising in the subcutaneous  tissues and mild thickening of the obturator internus muscle on the  right.    No evidence of femur or femoral neck fracture. No other fracture  identified.    There are multiple sigmoid diverticuli. Atherosclerotic changes of the  pelvic vessels.       Impression    IMPRESSION:  1. Mildly displaced mildly comminuted fracture of the right inferior  pubic ramus.  2. No evidence of femur or femoral neck fracture. No other fractures  identified.  3. Mild adjacent subcutaneous edema, likely bruising and mild  thickening of the obturator internus muscle adjacent to the fracture.    PARTHA CRUZ MD         SYSTEM ID:  [id number]     *Note: Due to a large number of results and/or encounters for the requested time period, some results have not been displayed. A complete set of results can be found in Results Review.       Discharge Medications   Current Discharge Medication List      START taking these medications    Details   melatonin 3 MG tablet Take 1 tablet (3 mg) by mouth nightly as needed for sleep    Associated Diagnoses: Insomnia, unspecified type      methocarbamol (ROBAXIN) 500 MG tablet Take 1 tablet (500 mg) by mouth every 6 hours as needed for muscle spasms    Associated Diagnoses: Inferior pubic ramus fracture, right, closed, initial encounter (H)      ondansetron (ZOFRAN-ODT) 4 MG ODT tab Take 1 tablet (4 mg) by mouth every 6 hours as needed for nausea or vomiting    Associated Diagnoses: Nausea      oxyCODONE (ROXICODONE) 5 MG tablet Take 0.5-1 tablets (2.5-5 mg) by mouth every 4 hours as needed for moderate to severe pain (2.5 mg pain 3-5/10 and 5 mg pain 6-10/10)  Qty: 15 tablet, Refills: 0    Associated Diagnoses: Inferior pubic ramus fracture, right, closed, initial encounter (H)      senna-docusate (SENOKOT-S/PERICOLACE) 8.6-50 MG tablet Take 1 tablet by mouth 2 times daily as needed for constipation    Associated Diagnoses: Constipation, unspecified constipation type         CONTINUE these medications which have CHANGED    Details   acetaminophen (TYLENOL) 325 MG tablet Take 3 tablets (975 mg) by mouth every 8 hours    Associated Diagnoses: Inferior pubic ramus fracture, right, closed, initial encounter (H); Chronic pain of both knees       polyethylene glycol (MIRALAX) 17 GM/Dose powder Take 17 g by mouth daily  Qty: 510 g    Associated Diagnoses: Constipation, unspecified constipation type         CONTINUE these medications which have NOT CHANGED    Details   amLODIPine (NORVASC) 2.5 MG tablet Take 1 tablet (2.5 mg) by mouth daily  Qty: 90 tablet, Refills: 1    Associated Diagnoses: Essential hypertension      apixaban ANTICOAGULANT (ELIQUIS) 5 MG tablet Take 1 tablet (5 mg) by mouth 2 times daily  Qty: 60 tablet, Refills: 3    Associated Diagnoses: Persistent atrial fibrillation (H)      calcium citrate-vitamin D (CALCIUM CITRATE + D) 315-250 MG-UNIT TABS per tablet Take 1 tablet by mouth 2 times daily     Comments: She notes 650 mg calcium in 2 tabs      ezetimibe (ZETIA) 10 MG tablet Take 1 tablet (10 mg) by mouth daily  Qty: 90 tablet, Refills: 1    Associated Diagnoses: Hyperlipidemia LDL goal <160      INCRUSE ELLIPTA 62.5 MCG/INH Inhaler Inhale 1 puff into the lungs daily      metoprolol tartrate (LOPRESSOR) 100 MG tablet Take 1 tablet (100 mg) by mouth 2 times daily  Qty: 180 tablet, Refills: 1    Associated Diagnoses: Persistent atrial fibrillation (H); Essential hypertension      omega 3 1000 MG CAPS Take 1 capsule by mouth daily   Qty: 90 capsule      potassium chloride ER (KLOR-CON M) 20 MEQ CR tablet TAKE 1 TABLET(20 MEQ) BY MOUTH TWICE DAILY  Qty: 180 tablet, Refills: 1    Associated Diagnoses: Hypokalemia      predniSONE (DELTASONE) 1 MG tablet Take 1 mg by mouth every other day       albuterol (PROAIR HFA/PROVENTIL HFA/VENTOLIN HFA) 108 (90 Base) MCG/ACT inhaler Inhale 2 puffs into the lungs every 4 hours as needed         STOP taking these medications       furosemide (LASIX) 40 MG tablet Comments:   Reason for Stopping:             Allergies   Allergies   Allergen Reactions     No Known Drug Allergies      Tape [Adhesive Tape]      Sensitive to plastic tape on upper part of body--takes skin off

## 2022-02-01 NOTE — PLAN OF CARE
"Acute Traumatic Pain due to fall/ fractured pelvis      1.  Pain controlled with oral analgesia: Yes, pain returns with movement, tolerating pain medication without adverse effects      2.  Vital signs stable: Yes  BP (!) 152/54 (BP Location: Left arm)   Pulse 72   Temp 99.1  F (37.3  C) (Oral)   Resp 18   Ht 1.6 m (5' 3\")   Wt 60.3 kg (133 lb)   LMP  (LMP Unknown)   SpO2 97%   BMI 23.56 kg/m        3.  Diagnotic testing complete: No      4.  Cleared from consultants (if applicable): No, ortho md consult, PT and SW      5. Return to near baseline physical activity: No, bedrest until assessed 2/1/2022    "

## 2022-02-01 NOTE — PLAN OF CARE
PRIMARY DIAGNOSIS: ACUTE PAIN  OUTPATIENT/OBSERVATION GOALS TO BE MET BEFORE DISCHARGE:  1. Pain Status: Improved-controlled with oral pain medications.    2. Return to near baseline physical activity: No    3. Cleared for discharge by consultants (if involved): No    Discharge Planner Nurse   Safe discharge environment identified: No  Barriers to discharge: Yes       Entered by: Carlee Blank 02/01/2022 4:09 PM     Please review provider order for any additional goals.   Nurse to notify provider when observation goals have been met and patient is ready for discharge.

## 2022-02-01 NOTE — UTILIZATION REVIEW
Concurrent stay review; Secondary Review Determination     [facility]          Under the authority of the Utilization Management Committee, the utilization review process indicated a secondary review on the above patient.  The review outcome is based on review of the medical records, discussions with staff, and applying clinical experience noted on the date of the review.          (x) Observation Status Appropriate - Concurrent stay review    RATIONALE FOR DETERMINATION   81 year old female with a PMH significant for atrial fibrillation s/p pacemaker placement and anticoagulated, idiopathic cardiomyopathy, peripheral arterial disease, COPD, PMR, HLP, GERD and history of lung/breast cancer who presents after a mechanical fall with right hip pain  Patient receiving small dose oxycodone and Tylenol for pain control and would require physical therapy evaluation for possible placement need. The severity of illness, intensity of service provided, expected LOS and risk for adverse outcome make the care appropriate for observation.      This document was produced using voice recognition software       The information on this document is developed by the utilization review team in order for the business office to ensure compliance.  This only denotes the appropriateness of proper admission status and does not reflect the quality of care rendered.         The definitions of Inpatient Status and Observation Status used in making the determination above are those provided in the CMS Coverage Manual, Chapter 1 and Chapter 6, section 70.4.      Sincerely,     JUSTEN TORRES MD    System Medical Director  Utilization Management  [facility].

## 2022-02-01 NOTE — PROGRESS NOTES
02/01/22 1142   Quick Adds   Type of Visit Initial PT Evaluation   Living Environment   Current Living Arrangements house   Transportation Anticipated family or friend will provide   Living Environment [facility]  Has 3 steps to enter, full light to basement.   Self-Care   Usual Activity Tolerance good   Current Activity Tolerance moderate   Equipment Currently Used at Home other (see comments)  (TBD)   Activity/Exercise/Self-Care Comment Patient reports she uses a walker, has a stair lift to access basement level.  Pt drove and did the grocery shopping, laundry, and cooking.  Pt has a  once per month.     Disability/Function   Hearing Difficulty or Deaf no   Wear Glasses or Blind yes   Vision Management Glasses (at home)   Concentrating, Remembering or Making Decisions Difficulty no   Difficulty Communicating no   Difficulty Eating/Swallowing no   Walking or Climbing Stairs Difficulty yes  (r/t fracture)   Walking or Climbing Stairs other (see comments)  (TBD r/t fracture)   Dressing/Bathing Difficulty no   Toileting issues yes  (r/t fracture, purewick)   Toileting Management purewick   Toileting Assistance toileting difficulty, requires equipment   Doing Errands Independently Difficulty (such as shopping) no   Fall history within last six months yes   Number of times patient has fallen within last six months 4   General Information   Onset of Illness/Injury or Date of Surgery 01/31/22   Referring Physician Atiya Salcedo PA-C   Patient/Family Therapy Goals Statement (PT) patient would like to go to TCU   Pertinent History of Current Problem (include personal factors and/or comorbidities that impact the POC) Tomasa Fam is a 81 year old female with a PMH significant for atrial fibrillation s/p pacemaker placement and anticoagulated, idiopathic cardiomyopathy, peripheral arterial disease, COPD, PMR, HLP, GERD and history of lung/breast cancer who  "presents after a mechanical fall with right hip pain, found to have right inferior pubic rami fracture.   Existing Precautions/Restrictions fall   Weight-Bearing Status - LLE weight-bearing as tolerated  (Per Dr. Augustin (verbal order))   Weight-Bearing Status - RLE weight-bearing as tolerated  (Per Dr. Augustin (verbal order))   Cognition   Orientation Status (Cognition) oriented x 4   Pain Assessment   Patient Currently in Pain Yes, see Vital Sign flowsheet  (patient reports \"spikes\" of pain)   Posture    Posture Not impaired   Range of Motion (ROM)   ROM Comment Limited tolerance to right LE ROM secondary to pain.   Strength   Strength Comments Patient with globalized strength deficits during functional mobility   Bed Mobility   Comment (Bed Mobility)   Patient performed bed mobility with min A at right LE secondary to pain.    Transfers   Transfer Safety Comments Patient transferred between sitting and standing with 2WW, CGA and verbal cueing for safe technique.   Gait/Stairs (Locomotion)   Distance in Feet (Required for LE Total Joints) 3   Comment (Gait/Stairs) Patient amb 3 feet at bedside with 2WW, CGA and intermittent assist with walker management.    Balance   Balance Comments Patient initially reported severe dizziness and nausea with initial transfer to standing, improved with sitting.  Patient with history of falls; is a high fall risk   Clinical Impression   Criteria for Skilled Therapeutic Intervention yes, treatment indicated   PT Diagnosis (PT) decreased independence with mobility   Influenced by the following impairments pain, decreased strength, balance, activity tolerance   Functional limitations due to impairments difficulties with transfers, gait   Clinical Presentation Stable/Uncomplicated   Clinical Presentation Rationale complex pmh, stable presentation, good social support   Clinical Decision Making (Complexity) low complexity   Therapy Frequency (PT) 5x/week   Predicted Duration of Therapy " Intervention (days/wks) 3 days   Planned Therapy Interventions (PT) balance training;bed mobility training;gait training;home exercise program;patient/family education;stair training;strengthening;transfer training;progressive activity/exercise;home program guidelines   Risk & Benefits of therapy have been explained evaluation/treatment results reviewed;care plan/treatment goals reviewed;risks/benefits reviewed;current/potential barriers reviewed;participants voiced agreement with care plan;participants included;patient   PT Discharge Planning    PT Discharge Recommendation (DC Rec) Transitional Care Facility   PT Rationale for DC Rec Patient presents significantly below baseline for functional mobility, ADLs and IADLs secondary to limited tolerance for ambulation/standing.  Patient unable to demonstrate functional household mobility in order to enter home or access the bathroom.  Patient lives alone.  Recommend TCU in order to increase strength, activity tolerance and independence with mobility/ADLs/IADLs.

## 2022-02-01 NOTE — PLAN OF CARE
"Acute Traumatic Pain due to fall/ fractured pelvis       1.  Pain controlled with oral analgesia: Yes, pain returns with movement, tolerating pain medication without adverse effects       2.  Vital signs stable: Yes  BP (!) 152/54 (BP Location: Left arm)   Pulse 72   Temp 99.1  F (37.3  C) (Oral)   Resp 18   Ht 1.6 m (5' 3\")   Wt 60.3 kg (133 lb)   LMP  (LMP Unknown)   SpO2 97%   BMI 23.56 kg/m         3.  Diagnotic testing complete: to be assessed if needs further imaging or lab work.        4.  Cleared from consultants (if applicable): No, ortho md consult, PT and SW.  Patient may be candidate for TCU  to recover from fall and fracture.  She lives alone. Is very independent.        5. Return to near baseline physical activity: No, bedrest until assessed 2/1/2022             "

## 2022-02-01 NOTE — PLAN OF CARE
PRIMARY DIAGNOSIS: ACUTE PAIN  OUTPATIENT/OBSERVATION GOALS TO BE MET BEFORE DISCHARGE:  1. Pain Status: Improved-controlled with oral pain medications.    2. Return to near baseline physical activity: No    3. Cleared for discharge by consultants (if involved): No    Discharge Planner Nurse   Safe discharge environment identified: No  Barriers to discharge: Yes       Entered by: Carlee Blank 02/01/2022 4:10 PM     Please review provider order for any additional goals.   Nurse to notify provider when observation goals have been met and patient is ready for discharge.    A&Ox4, Ax1 w/ walker to bedside commode, tolerating regular diet, heart sounds- WNL, lungs- clear, reports shortness of breath baseline, bowels- active, voiding without difficulty, IV SL, oxycodone given x1 before PT, tylenol scheduled for pain, PT recommending TCU

## 2022-02-02 VITALS
HEART RATE: 72 BPM | SYSTOLIC BLOOD PRESSURE: 167 MMHG | HEIGHT: 63 IN | TEMPERATURE: 98.4 F | RESPIRATION RATE: 16 BRPM | WEIGHT: 133 LBS | OXYGEN SATURATION: 98 % | BODY MASS INDEX: 23.57 KG/M2 | DIASTOLIC BLOOD PRESSURE: 60 MMHG

## 2022-02-02 PROCEDURE — 250N000013 HC RX MED GY IP 250 OP 250 PS 637: Performed by: NURSE PRACTITIONER

## 2022-02-02 PROCEDURE — 99207 PR CDG-CODE CATEGORY CHANGED: CPT | Performed by: NURSE PRACTITIONER

## 2022-02-02 PROCEDURE — 250N000013 HC RX MED GY IP 250 OP 250 PS 637: Performed by: PHYSICIAN ASSISTANT

## 2022-02-02 PROCEDURE — G0378 HOSPITAL OBSERVATION PER HR: HCPCS

## 2022-02-02 PROCEDURE — 99217 PR OBSERVATION CARE DISCHARGE: CPT | Performed by: NURSE PRACTITIONER

## 2022-02-02 RX ADMIN — ACETAMINOPHEN 975 MG: 325 TABLET, FILM COATED ORAL at 03:02

## 2022-02-02 RX ADMIN — METOPROLOL TARTRATE 100 MG: 50 TABLET, FILM COATED ORAL at 08:25

## 2022-02-02 RX ADMIN — APIXABAN 5 MG: 5 TABLET, FILM COATED ORAL at 08:25

## 2022-02-02 RX ADMIN — POLYETHYLENE GLYCOL 3350 17 G: 17 POWDER, FOR SOLUTION ORAL at 08:25

## 2022-02-02 NOTE — PLAN OF CARE
PRIMARY DIAGNOSIS: Pelvic fracture  OUTPATIENT/OBSERVATION GOALS TO BE MET BEFORE DISCHARGE:  1. ADLs back to baseline:  No; assist of 1    2. Activity and level of assistance: Up with standby assistance.    3. Pain status: Improved-controlled with oral pain medications.    4. Return to near baseline physical activity: No; assist of 1 and walker     Discharge Planner Nurse   Safe discharge environment identified: Yes  Barriers to discharge: No       Entered by: Dasha Olvera 02/02/2022 9:50 AM     Please review provider order for any additional goals.   Nurse to notify provider when observation goals have been met and patient is ready for discharge.    Patient discharged to TCU, [place], with family transport. Patient is up with assist of 1 and walker/gait belt. Pain controlled with scheduled tylenol. CMS intact. Pt given mirilax to prevent constipation.   OBSERVATION patient END time: 8709

## 2022-02-02 NOTE — PLAN OF CARE
"PRIMARY DIAGNOSIS: ACUTE PAIN  OUTPATIENT/OBSERVATION GOALS TO BE MET BEFORE DISCHARGE:  1. Pain Status: Improved-controlled with oral pain medications.    2. Return to near baseline physical activity: No    3. Cleared for discharge by consultants (if involved): No    Discharge Planner Nurse   Safe discharge environment identified: No  Barriers to discharge: Yes       Entered by: Kevin Coulter 02/02/2022 4:16 AM  BP (!) 152/70 (BP Location: Left arm)   Pulse 70   Temp 98.4  F (36.9  C) (Oral)   Resp 16   Ht 1.6 m (5' 3\")   Wt 60.3 kg (133 lb)   LMP  (LMP Unknown)   SpO2 96%   BMI 23.56 kg/m      Pt is alert and oriented x4. Pt was given her scheduled Tylenol for pain. No acute distress noted. VSS. Pt lungs are clear bilaterally. Pt is sleeping in bed and call light within reach. Will continue to monitor and assess pt.   Please review provider order for any additional goals.   Nurse to notify provider when observation goals have been met and patient is ready for discharge.  "

## 2022-02-02 NOTE — PLAN OF CARE
"PRIMARY DIAGNOSIS: ACUTE PAIN  OUTPATIENT/OBSERVATION GOALS TO BE MET BEFORE DISCHARGE:  1. Pain Status: Improved-controlled with oral pain medications.    2. Return to near baseline physical activity: No    3. Cleared for discharge by consultants (if involved): No    Discharge Planner Nurse   Safe discharge environment identified: No  Barriers to discharge: Yes      BP 98/79 (BP Location: Left arm)   Pulse 69   Temp 97.9  F (36.6  C) (Oral)   Resp 18   Ht 1.6 m (5' 3\")   Wt 60.3 kg (133 lb)   LMP  (LMP Unknown)   SpO2 97%   BMI 23.56 kg/m      Pt is alert and oriented x4. Pt was given her scheduled Tylenol for pain. No acute distress noted. VSS. Pt lungs are clear bilaterally. Pt is sleeping in bed and call light within reach. Will continue to monitor and assess pt.   Please review provider order for any additional goals.   Nurse to notify provider when observation goals have been met and patient is ready for discharge.  "

## 2022-02-02 NOTE — PLAN OF CARE
"Physical Therapy Discharge Summary    Reason for therapy discharge:    Discharged to transitional care facility.    Progress towards therapy goal(s). See goals on Care Plan [facility] for goal details.  Goals not met.  Barriers to achieving goals:   discharge from facility.    Therapy recommendation(s):    \"Patient presents significantly below baseline for functional mobility, ADLs and IADLs secondary to limited tolerance for ambulation/standing.  Patient unable to demonstrate functional household mobility in order to enter home or access the bathroom.  Patient lives alone.  Recommend TCU in order to increase strength, activity tolerance and independence with mobility/ADLs/IADLs.\"    **Pt not seen by discharging therapist on this date, note written based on previous treating therapist's notes and recommendations.    "

## 2022-02-02 NOTE — PROGRESS NOTES
[facility]    PRIMARY CARE PROVIDER AND CLINIC:  Cris Romero MD, [address] [facility] [place] [address]  Chief Complaint   Patient presents with     Hospital F/U      [facility] Medical Record Number:  [id number]  Place of Service where encounter took place:  [facility] (U) [25]    Tomasa Fam  is a 81 year old  (1940), admitted to the above facility from  [facility]. Hospital stay 1/31/22 through 2/2/22..   HPI:    PMH of atrial fib s/p PPM on AC, cardiomyopathy, CKD, PAD, COPD, PMR, HLP, GERD, Hx of lung and breast cancer who presents after a fall.   Right inferior pubic rami Fx: right hip pain no Fx, non operative, pain control and therapy  Chronic conditions stable  On exam today: patient resting in bed, pleasant, states pain in right hip/pelvis is very little at rest, increases to 4/10 with weight bearing, pain controlled with current pain regimen, patient denies CP, palpitations, SOB, cough, congestion, N/V/D states she is constipated but took miralax today.     CODE STATUS/ADVANCE DIRECTIVES DISCUSSION:  No CPR- Do NOT Intubate  DNR / DNI  ALLERGIES:   Allergies   Allergen Reactions     No Known Drug Allergies      Tape [Adhesive Tape]      Sensitive to plastic tape on upper part of body--takes skin off      PAST MEDICAL HISTORY:   Past Medical History:   Diagnosis Date     Arthritis     hands, knees     Breast cancer (H) jan. 2012     left mastectomy followed by right;  Dr. Luong     Chronic airway obstruction, not elsewhere classified 2006    very mild COPD - small cough     Concussion 3/13/2013     Problem list name updated by automated process. Provider to review and confirm [employer] Update utility     Cystocele, midline 4/4/2012     Diffuse cystic mastopathy      Gastroesophageal reflux disease, esophagitis presence not specified 11/23/2019     History of hypokalemia 1/23/2013     Hyperlipidemia LDL goal <160 6/29/2011    [place] 10-year CHD  Risk Score: 2% (14 Total Points)  Values used to calculate score:    Age: 70 years -- Points: 14    Total Cholesterol: 192 mg/dL -- Points: 1    HDL Cholesterol: 61 mg/dL -- Points: -1    Systolic BP (treated): 118 mmHg -- Points: 0   The patient is not a smoker. -- Points: 0   The patient has not been diagnosed with diabetes. -- Points: 0   The patient does not have a famil     Lung cancer (H) 10/21/2015     Paroxysmal atrial fibrillation (H) 9/13/2019     PMR (polymyalgia rheumatica) (H) 1/17/2020    tapering' above.)  In patients receiving over 10 mg of prednisone/day, the dose can be lowered by 2.5 mg/day decrements every two to four weeks  Once the dose of prednisone is 10 mg/day, further tapering can be done by 1 mg per month, provided the clinical course is stable  The clinical response to glucocorticoid therapy is closely monitored, which centers on screening for the presence and/or recu     Thyroid nodule 4/23/2016    Noted on CT Fall 2015.      Unspecified essential hypertension late 1980's      PAST SURGICAL HISTORY:   has a past surgical history that includes NONSPECIFIC PROCEDURE (1970); Colonoscopy (3/2003); Colonoscopy (7/2006); Colonoscopy (10/13/2011); surgical history of -  (in 40's); surgical history of - ; Mastectomy simple, sentinel node, combined (1/6/2012); Mammoplasty reduction (1/6/2012); Liposuction, rhytidectomy, combined; Insert port vascular access (2/3/2012); surgical history of - ; DaVINCI hysterectomy supracervical, sacrocolpopexy, combined (7/11/2012); Laparoscopic salpingo-oophorectomy (7/11/2012); Cystoscopy (7/11/2012); Mastectomy simple (11/12/2012); Revise reconstructed breast bilateral (11/12/2012); mastectomy, bilateral; Remove port vascular access (4/29/2013); Repair ptosis bilateral (Bilateral, 6/29/2015); Excise lesion eyelid (Right, 6/29/2015); Thoracotomy (Right, 3/1/2016); Lobectomy lung (Right, 3/1/2016); Anesthesia cardioversion (N/A, 6/12/2020); Electrophysiology  Pacemaker (N/A, 6/22/2020); EP Ablation AV Node (N/A, 6/22/2020); Bivent Lead Placement (N/A, 6/22/2020); and Esophagoscopy, gastroscopy, duodenoscopy (EGD), combined (N/A, 12/14/2021).  FAMILY HISTORY: family history includes Brain Tumor in her sister; Breast Cancer in her daughter; Cardiovascular in her father and paternal grandfather; Cerebrovascular Disease in her mother and paternal aunt; Hypertension in her son; Neurologic Disorder in her daughter; Respiratory in her mother.  SOCIAL HISTORY:   reports that she has never smoked. She has never used smokeless tobacco. She reports current alcohol use. She reports that she does not use drugs.  Patient's living condition: lives alone    Post Discharge Medication Reconciliation Status: discharge medications reconciled, continue medications without change  Current Outpatient Medications   Medication Sig     acetaminophen (TYLENOL) 325 MG tablet Take 3 tablets (975 mg) by mouth every 8 hours     albuterol (PROAIR HFA/PROVENTIL HFA/VENTOLIN HFA) 108 (90 Base) MCG/ACT inhaler Inhale 2 puffs into the lungs every 4 hours as needed      amLODIPine (NORVASC) 2.5 MG tablet Take 1 tablet (2.5 mg) by mouth daily (Patient taking differently: Take 2.5 mg by mouth every evening )     apixaban ANTICOAGULANT (ELIQUIS) 5 MG tablet Take 1 tablet (5 mg) by mouth 2 times daily     calcium citrate-vitamin D (CALCIUM CITRATE + D) 315-250 MG-UNIT TABS per tablet Take 1 tablet by mouth 2 times daily      ezetimibe (ZETIA) 10 MG tablet Take 1 tablet (10 mg) by mouth daily (Patient taking differently: Take 10 mg by mouth every evening )     INCRUSE ELLIPTA 62.5 MCG/INH Inhaler Inhale 1 puff into the lungs daily     melatonin 3 MG tablet Take 1 tablet (3 mg) by mouth nightly as needed for sleep     methocarbamol (ROBAXIN) 500 MG tablet Take 1 tablet (500 mg) by mouth every 6 hours as needed for muscle spasms     metoprolol tartrate (LOPRESSOR) 100 MG tablet Take 1 tablet (100 mg) by mouth 2  "times daily     omega 3 1000 MG CAPS Take 1 capsule by mouth daily      ondansetron (ZOFRAN-ODT) 4 MG ODT tab Take 1 tablet (4 mg) by mouth every 6 hours as needed for nausea or vomiting     oxyCODONE (ROXICODONE) 5 MG tablet Take 0.5-1 tablets (2.5-5 mg) by mouth every 4 hours as needed for moderate to severe pain (2.5 mg pain 3-5/10 and 5 mg pain 6-10/10)     polyethylene glycol (MIRALAX) 17 GM/Dose powder Take 17 g by mouth daily     potassium chloride ER (KLOR-CON M) 20 MEQ CR tablet TAKE 1 TABLET(20 MEQ) BY MOUTH TWICE DAILY     senna-docusate (SENOKOT-S/PERICOLACE) 8.6-50 MG tablet Take 1 tablet by mouth 2 times daily as needed for constipation     No current facility-administered medications for this visit.       ROS:  10 point ROS of systems including Constitutional, Eyes, Respiratory, Cardiovascular, Gastroenterology, Genitourinary, Integumentary, Musculoskeletal, Psychiatric were all negative except for pertinent positives noted in my HPI.    Vitals:  /72   Pulse 68   Temp 98.6  F (37  C)   Resp 18   Ht 1.6 m (5' 3\")   LMP  (LMP Unknown)   SpO2 94%   BMI 23.56 kg/m    Exam:  GENERAL APPEARANCE:  Alert, in no distress  ENT:  Mouth and posterior oropharynx normal, moist mucous membranes, normal hearing acuity  EYES:  EOM, conjunctivae, lids, pupils and irises normal, PERRL  RESP:  respiratory effort and palpation of chest normal, lungs clear to auscultation , no respiratory distress  CV:  Palpation and auscultation of heart done , regular rate and rhythm, no murmur, rub, or gallop, no edema  ABDOMEN:  normal bowel sounds, soft, nontender, no hepatosplenomegaly or other masses  M/S:   patient resting in bed, able to move all 4 extremities  SKIN:  Inspection of skin and subcutaneous tissue baseline  NEURO:   speech wnl  PSYCH:  affect and mood normal    Lab/Diagnostic data:  Recent labs in [facility] reviewed by me today.  and   Most Recent 3 CBC's:  Recent Labs   Lab Test 01/31/22  1459 07/01/21  1357 " 09/08/20  1024   WBC 8.3 10.9 11.2*   HGB 11.8 13.3 11.4*   MCV 88 92 98    270 235     Most Recent 3 BMP's:  Recent Labs   Lab Test 02/01/22  0605 01/31/22  1459 10/19/21  1021    135 136   POTASSIUM 3.9 4.2 4.2   CHLORIDE 105 105 107   CO2 27 27 23   BUN 15 17 14   CR 0.70 0.68 0.73   ANIONGAP 5 3 6   CHARLIE 9.6 10.0 10.3*   GLC 85 125* 87       ASSESSMENT/PLAN:    (S32.840O) Inferior pubic ramus fracture, right, with routine healing, subsequent encounter  (primary encounter diagnosis)  (M25.551) Hip pain, right  (R53.81) Physical deconditioning  Comment: acute/ongoing  Plan: PT and OT, continue tylenol 975mg q 8 hours, oxycodone 2.5-5mg q 4 hours prn, Robaxin 500mg q 6 hours prn  F/u with ortho as directed    (I48.19) Persistent atrial fibrillation (H)  (Z95.0) Cardiac pacemaker in situ 4/15/21  (I42.8) Other cardiomyopathy (H)  (I10) Essential hypertension with goal blood pressure less than 140/90  (N18.30) Stage 3 chronic kidney disease, unspecified whether stage 3a or 3b CKD (H)  Comment: ongoing  Plan: daily weights, vitals daily,  Follow BMP, continue norvasc 2.5mg QD, metoprolol 100mg BID, eliquis 5mg BID    (M35.3) PMR (polymyalgia rheumatica) (H)  Comment: ongoing  Plan:  prednisone 1mg QOD ordered, patient states she is not taking prednisone, f/u with rheumatology as OP    (J43.9) Pulmonary emphysema, unspecified emphysema type (H)  Comment: ongoing  Plan: monitor SaO2 at rest and with activity, continue incruse ellipta 62.5mcg 1 puff QD, albuterol MDI prn    Orders:  BMP and hgb on Monday      Total time spent with patient visit at the skilled nursing facility was 25 min including patient visit and review of past records. Greater than 50% of total time spent with counseling and coordinating care due to discussed pain control and medications with patient, discussed hospitalization, discussed goals for discharge, patient lives in a townhouse alone, goal to return. .     Electronically signed  by:  Tonya Lynn Haase, APRN CNP

## 2022-02-02 NOTE — PROGRESS NOTES
"PRIMARY DIAGNOSIS: ACUTE PAIN  OUTPATIENT/OBSERVATION GOALS TO BE MET BEFORE DISCHARGE:  1. Pain Status: Improved-controlled with oral pain medications.    2. Return to near baseline physical activity: No    3. Cleared for discharge by consultants (if involved): No    Discharge Planner Nurse   Safe discharge environment identified: No  Barriers to discharge: Yes       Entered by: Kevin Coulter 02/02/2022 3:22 AM    BP 98/79 (BP Location: Left arm)   Pulse 69   Temp 97.9  F (36.6  C) (Oral)   Resp 18   Ht 1.6 m (5' 3\")   Wt 60.3 kg (133 lb)   LMP  (LMP Unknown)   SpO2 97%   BMI 23.56 kg/m      Please review provider order for any additional goals.   Nurse to notify provider when observation goals have been met and patient is ready for discharge.  "

## 2022-02-02 NOTE — CONSULTS
"Brief Ortho Consult Note:    HPI from medical records:   Tomasa Fam is a 81 year old female who presented to the ED after a mechanical fall at home.  Per medical records, she was taking a couple steps backwards when she lost her footing falling onto her buttocks.  She had pain in her right hip and was unable to get up so pressed her life alert button.  She lives independently and ambulates with a cane.      BP (!) 167/60 (BP Location: Left arm)   Pulse 72   Temp 98.4  F (36.9  C) (Oral)   Resp 16   Ht 1.6 m (5' 3\")   Wt 60.3 kg (133 lb)   LMP  (LMP Unknown)   SpO2 98%   BMI 23.56 kg/m      Imaging:   CR right HIP 01/31/2022                                                               1. Mildly displaced mildly comminuted fracture of the right inferior  pubic ramus.  2. No evidence of femur or femoral neck fracture. No other fractures  identified.  3. Mild adjacent subcutaneous edema, likely bruising and mild  thickening of the obturator internus muscle adjacent to the fracture.    Plan:    Non-operative management recommended  Able to WBAT with walker  Pain medication as needed, limit narcotics as able  Able to progress in PT/OT as able  PCDs and Eliquis for DVT prophylaxis  Follow-up with Dr Page at [facility] in 6 weeks for recheck if continued pain or weakness with ambulation.  Otherwise can be seen prn.   Call [phone] for appointment.   Please call ortho if further questions.        "

## 2022-02-03 ENCOUNTER — PATIENT OUTREACH (OUTPATIENT)
Dept: CARE COORDINATION | Facility: CLINIC | Age: 82
End: 2022-02-03

## 2022-02-03 ENCOUNTER — TRANSITIONAL CARE UNIT VISIT (OUTPATIENT)
Dept: GERIATRICS | Facility: CLINIC | Age: 82
End: 2022-02-03
Payer: MEDICARE

## 2022-02-03 VITALS
BODY MASS INDEX: 23.56 KG/M2 | RESPIRATION RATE: 18 BRPM | HEART RATE: 68 BPM | SYSTOLIC BLOOD PRESSURE: 138 MMHG | HEIGHT: 63 IN | TEMPERATURE: 98.6 F | DIASTOLIC BLOOD PRESSURE: 72 MMHG | OXYGEN SATURATION: 94 %

## 2022-02-03 DIAGNOSIS — J43.9 PULMONARY EMPHYSEMA, UNSPECIFIED EMPHYSEMA TYPE (H): ICD-10-CM

## 2022-02-03 DIAGNOSIS — I42.8 OTHER CARDIOMYOPATHY (H): ICD-10-CM

## 2022-02-03 DIAGNOSIS — I48.19 PERSISTENT ATRIAL FIBRILLATION (H): ICD-10-CM

## 2022-02-03 DIAGNOSIS — M25.551 HIP PAIN, RIGHT: ICD-10-CM

## 2022-02-03 DIAGNOSIS — N18.30 STAGE 3 CHRONIC KIDNEY DISEASE, UNSPECIFIED WHETHER STAGE 3A OR 3B CKD (H): ICD-10-CM

## 2022-02-03 DIAGNOSIS — M35.3 PMR (POLYMYALGIA RHEUMATICA) (H): ICD-10-CM

## 2022-02-03 DIAGNOSIS — Z95.0 CARDIAC PACEMAKER IN SITU: ICD-10-CM

## 2022-02-03 DIAGNOSIS — I10 ESSENTIAL HYPERTENSION WITH GOAL BLOOD PRESSURE LESS THAN 140/90: ICD-10-CM

## 2022-02-03 DIAGNOSIS — R53.81 PHYSICAL DECONDITIONING: ICD-10-CM

## 2022-02-03 DIAGNOSIS — S32.591D INFERIOR PUBIC RAMUS FRACTURE, RIGHT, WITH ROUTINE HEALING, SUBSEQUENT ENCOUNTER: Primary | ICD-10-CM

## 2022-02-03 PROCEDURE — 99310 SBSQ NF CARE HIGH MDM 45: CPT | Performed by: NURSE PRACTITIONER

## 2022-02-03 NOTE — LETTER
2/3/2022        RE: Tomasa Fam  [address]  [facility] [address]        [facility] GERIATRICS    PRIMARY CARE PROVIDER AND CLINIC:  Cris Romero MD, [address] / [place] [address]  Chief Complaint   Patient presents with     Hospital F/U      [facility] Medical Record Number:  [id number]  Place of Service where encounter took place:  [facility] ([facility]) [25]    Tomasa Fam  is a 81 year old  (1940), admitted to the above facility from  [facility]. Hospital stay 1/31/22 through 2/2/22..   HPI:    PMH of atrial fib s/p PPM on AC, cardiomyopathy, CKD, PAD, COPD, PMR, HLP, GERD, Hx of lung and breast cancer who presents after a fall.   Right inferior pubic rami Fx: right hip pain no Fx, non operative, pain control and therapy  Chronic conditions stable  On exam today: patient resting in bed, pleasant, states pain in right hip/pelvis is very little at rest, increases to 4/10 with weight bearing, pain controlled with current pain regimen, patient denies CP, palpitations, SOB, cough, congestion, N/V/D states she is constipated but took miralax today.     CODE STATUS/ADVANCE DIRECTIVES DISCUSSION:  No CPR- Do NOT Intubate  DNR / DNI  ALLERGIES:   Allergies   Allergen Reactions     No Known Drug Allergies      Tape [Adhesive Tape]      Sensitive to plastic tape on upper part of body--takes skin off      PAST MEDICAL HISTORY:   Past Medical History:   Diagnosis Date     Arthritis     hands, knees     Breast cancer (H) jan. 2012     left mastectomy followed by right;  Dr. Luong     Chronic airway obstruction, not elsewhere classified 2006    very mild COPD - small cough     Concussion 3/13/2013     Problem list name updated by automated process. Provider to review and confirm [employer] Update utility     Cystocele, midline 4/4/2012     Diffuse cystic mastopathy      Gastroesophageal reflux disease, esophagitis presence not specified 11/23/2019      History of hypokalemia 1/23/2013     Hyperlipidemia LDL goal <160 6/29/2011    [facility] 10-year CHD Risk Score: 2% (14 Total Points)  Values used to calculate score:    Age: 70 years -- Points: 14    Total Cholesterol: 192 mg/dL -- Points: 1    HDL Cholesterol: 61 mg/dL -- Points: -1    Systolic BP (treated): 118 mmHg -- Points: 0   The patient is not a smoker. -- Points: 0   The patient has not been diagnosed with diabetes. -- Points: 0   The patient does not have a famil     Lung cancer (H) 10/21/2015     Paroxysmal atrial fibrillation (H) 9/13/2019     PMR (polymyalgia rheumatica) (H) 1/17/2020    tapering' above.)  In patients receiving over 10 mg of prednisone/day, the dose can be lowered by 2.5 mg/day decrements every two to four weeks  Once the dose of prednisone is 10 mg/day, further tapering can be done by 1 mg per month, provided the clinical course is stable  The clinical response to glucocorticoid therapy is closely monitored, which centers on screening for the presence and/or recu     Thyroid nodule 4/23/2016    Noted on CT Fall 2015.      Unspecified essential hypertension late 1980's      PAST SURGICAL HISTORY:   has a past surgical history that includes NONSPECIFIC PROCEDURE (1970); Colonoscopy (3/2003); Colonoscopy (7/2006); Colonoscopy (10/13/2011); surgical history of -  (in 40's); surgical history of - ; Mastectomy simple, sentinel node, combined (1/6/2012); Mammoplasty reduction (1/6/2012); Liposuction, rhytidectomy, combined; Insert port vascular access (2/3/2012); surgical history of - ; DaVINCI hysterectomy supracervical, sacrocolpopexy, combined (7/11/2012); Laparoscopic salpingo-oophorectomy (7/11/2012); Cystoscopy (7/11/2012); Mastectomy simple (11/12/2012); Revise reconstructed breast bilateral (11/12/2012); mastectomy, bilateral; Remove port vascular access (4/29/2013); Repair ptosis bilateral (Bilateral, 6/29/2015); Excise lesion eyelid (Right, 6/29/2015); Thoracotomy (Right,  3/1/2016); Lobectomy lung (Right, 3/1/2016); Anesthesia cardioversion (N/A, 6/12/2020); Electrophysiology Pacemaker (N/A, 6/22/2020); EP Ablation AV Node (N/A, 6/22/2020); Bivent Lead Placement (N/A, 6/22/2020); and Esophagoscopy, gastroscopy, duodenoscopy (EGD), combined (N/A, 12/14/2021).  FAMILY HISTORY: family history includes Brain Tumor in her sister; Breast Cancer in her daughter; Cardiovascular in her father and paternal grandfather; Cerebrovascular Disease in her mother and paternal aunt; Hypertension in her son; Neurologic Disorder in her daughter; Respiratory in her mother.  SOCIAL HISTORY:   reports that she has never smoked. She has never used smokeless tobacco. She reports current alcohol use. She reports that she does not use drugs.  Patient's living condition: lives alone    Post Discharge Medication Reconciliation Status: discharge medications reconciled, continue medications without change  Current Outpatient Medications   Medication Sig     acetaminophen (TYLENOL) 325 MG tablet Take 3 tablets (975 mg) by mouth every 8 hours     albuterol (PROAIR HFA/PROVENTIL HFA/VENTOLIN HFA) 108 (90 Base) MCG/ACT inhaler Inhale 2 puffs into the lungs every 4 hours as needed      amLODIPine (NORVASC) 2.5 MG tablet Take 1 tablet (2.5 mg) by mouth daily (Patient taking differently: Take 2.5 mg by mouth every evening )     apixaban ANTICOAGULANT (ELIQUIS) 5 MG tablet Take 1 tablet (5 mg) by mouth 2 times daily     calcium citrate-vitamin D (CALCIUM CITRATE + D) 315-250 MG-UNIT TABS per tablet Take 1 tablet by mouth 2 times daily      ezetimibe (ZETIA) 10 MG tablet Take 1 tablet (10 mg) by mouth daily (Patient taking differently: Take 10 mg by mouth every evening )     INCRUSE ELLIPTA 62.5 MCG/INH Inhaler Inhale 1 puff into the lungs daily     melatonin 3 MG tablet Take 1 tablet (3 mg) by mouth nightly as needed for sleep     methocarbamol (ROBAXIN) 500 MG tablet Take 1 tablet (500 mg) by mouth every 6 hours as  "needed for muscle spasms     metoprolol tartrate (LOPRESSOR) 100 MG tablet Take 1 tablet (100 mg) by mouth 2 times daily     omega 3 1000 MG CAPS Take 1 capsule by mouth daily      ondansetron (ZOFRAN-ODT) 4 MG ODT tab Take 1 tablet (4 mg) by mouth every 6 hours as needed for nausea or vomiting     oxyCODONE (ROXICODONE) 5 MG tablet Take 0.5-1 tablets (2.5-5 mg) by mouth every 4 hours as needed for moderate to severe pain (2.5 mg pain 3-5/10 and 5 mg pain 6-10/10)     polyethylene glycol (MIRALAX) 17 GM/Dose powder Take 17 g by mouth daily     potassium chloride ER (KLOR-CON M) 20 MEQ CR tablet TAKE 1 TABLET(20 MEQ) BY MOUTH TWICE DAILY     senna-docusate (SENOKOT-S/PERICOLACE) 8.6-50 MG tablet Take 1 tablet by mouth 2 times daily as needed for constipation     No current facility-administered medications for this visit.       ROS:  10 point ROS of systems including Constitutional, Eyes, Respiratory, Cardiovascular, Gastroenterology, Genitourinary, Integumentary, Musculoskeletal, Psychiatric were all negative except for pertinent positives noted in my HPI.    Vitals:  /72   Pulse 68   Temp 98.6  F (37  C)   Resp 18   Ht 1.6 m (5' 3\")   LMP  (LMP Unknown)   SpO2 94%   BMI 23.56 kg/m    Exam:  GENERAL APPEARANCE:  Alert, in no distress  ENT:  Mouth and posterior oropharynx normal, moist mucous membranes, normal hearing acuity  EYES:  EOM, conjunctivae, lids, pupils and irises normal, PERRL  RESP:  respiratory effort and palpation of chest normal, lungs clear to auscultation , no respiratory distress  CV:  Palpation and auscultation of heart done , regular rate and rhythm, no murmur, rub, or gallop, no edema  ABDOMEN:  normal bowel sounds, soft, nontender, no hepatosplenomegaly or other masses  M/S:   patient resting in bed, able to move all 4 extremities  SKIN:  Inspection of skin and subcutaneous tissue baseline  NEURO:   speech wnl  PSYCH:  affect and mood normal    Lab/Diagnostic data:  Recent labs in " EPIC reviewed by me today.  and   Most Recent 3 CBC's:  Recent Labs   Lab Test 01/31/22  1459 07/01/21  1357 09/08/20  1024   WBC 8.3 10.9 11.2*   HGB 11.8 13.3 11.4*   MCV 88 92 98    270 235     Most Recent 3 BMP's:  Recent Labs   Lab Test 02/01/22  0605 01/31/22  1459 10/19/21  1021    135 136   POTASSIUM 3.9 4.2 4.2   CHLORIDE 105 105 107   CO2 27 27 23   BUN 15 17 14   CR 0.70 0.68 0.73   ANIONGAP 5 3 6   CHARLIE 9.6 10.0 10.3*   GLC 85 125* 87       ASSESSMENT/PLAN:    (S32.209J) Inferior pubic ramus fracture, right, with routine healing, subsequent encounter  (primary encounter diagnosis)  (M25.551) Hip pain, right  (R53.81) Physical deconditioning  Comment: acute/ongoing  Plan: PT and OT, continue tylenol 975mg q 8 hours, oxycodone 2.5-5mg q 4 hours prn, Robaxin 500mg q 6 hours prn  F/u with ortho as directed    (I48.19) Persistent atrial fibrillation (H)  (Z95.0) Cardiac pacemaker in situ 4/15/21  (I42.8) Other cardiomyopathy (H)  (I10) Essential hypertension with goal blood pressure less than 140/90  (N18.30) Stage 3 chronic kidney disease, unspecified whether stage 3a or 3b CKD (H)  Comment: ongoing  Plan: daily weights, vitals daily,  Follow BMP, continue norvasc 2.5mg QD, metoprolol 100mg BID, eliquis 5mg BID    (M35.3) PMR (polymyalgia rheumatica) (H)  Comment: ongoing  Plan:  prednisone 1mg QOD ordered, patient states she is not taking prednisone, f/u with rheumatology as OP    (J43.9) Pulmonary emphysema, unspecified emphysema type (H)  Comment: ongoing  Plan: monitor SaO2 at rest and with activity, continue incruse ellipta 62.5mcg 1 puff QD, albuterol MDI prn    Orders:  BMP and hgb on Monday      Total time spent with patient visit at the [facility] nursing [facility] was 25 min including patient visit and review of past records. Greater than 50% of total time spent with counseling and coordinating care due to discussed pain control and medications with patient, discussed hospitalization,  discussed goals for discharge, patient lives in a townhouse alone, goal to return. .     Electronically signed by:  Tonya Lynn Haase, APRN CNP                     Sincerely,        Tonya Lynn Haase, APRN CNP

## 2022-02-03 NOTE — PROGRESS NOTES
Clinic Care Coordination Contact  Care Coordination Transition Communication    Referral Source: IP Handoff    Clinical Data: Patient was hospitalized at [facility] from 1/31/22 to 2/2/22 with diagnosis of Right inferior pubic rami fracture secondary to mechanical fall.  .     Transition to Facility:              Facility Name: [facility]              Contact name and phone number/fax: [phone]    Plan: RN/SW Care Coordinator will await notification from facility staff informing RN/SW Care Coordinator of patient's discharge plans/needs. RN/SW Care Coordinator will review chart and outreach to facility staff every 4 weeks and as needed.     [facility] Care Coordination Team  [phone]

## 2022-02-03 NOTE — LETTER
[facility]   To:   [facility]                Please give to facility    From:Britni Palomino/ TED Care Coordinator   [facility]   P: [phone]  [email or web address]    Patient Name:  Tomasa Fam YOB: 1940   Admit date: 2/2/22      *Information Needed:  Please contact me when the patient will discharge (or if they will move to long term care)- include the discharge date, disposition, and main diagnosis   - If the patient is discharged with home care services, please provide the name of the agency    Also- Please inform me if a care conference is being held.   Phone, Fax or Email with information                              Thank you, Britni Palomino

## 2022-02-07 ASSESSMENT — MIFFLIN-ST. JEOR: SCORE: 1019.71

## 2022-02-08 ENCOUNTER — TRANSFERRED RECORDS (OUTPATIENT)
Dept: HEALTH INFORMATION MANAGEMENT | Facility: CLINIC | Age: 82
End: 2022-02-08
Payer: MEDICARE

## 2022-02-08 LAB
ANION GAP SERPL CALC-SCNC: 10 MMOL/L (ref 7–16)
BUN SERPL-MCNC: 14 MG/DL (ref 7–26)
CALCIUM (EXTERNAL): 10.6 MG/DL (ref 8.4–10.4)
CHLORIDE (EXTERNAL): 102 MMOL/L (ref 98–109)
CO2 (EXTERNAL): 26 MMOL/L (ref 20–29)
CREATININE (EXTERNAL): 0.6 MG/DL (ref 0.55–1.02)
GFR ESTIMATED (EXTERNAL): >60 ML/MIN/1.73M2
GLUCOSE (EXTERNAL): 91 MG/DL (ref 70–100)
HEMOGLOBIN (EXTERNAL): 11.1 G/DL (ref 12–15.5)
POTASSIUM (EXTERNAL): 4.4 MMOL/L (ref 3.5–5.1)
SODIUM (EXTERNAL): 138 MMOL/L (ref 136–145)

## 2022-02-09 ENCOUNTER — TRANSFERRED RECORDS (OUTPATIENT)
Dept: HEALTH INFORMATION MANAGEMENT | Facility: CLINIC | Age: 82
End: 2022-02-09

## 2022-02-09 ENCOUNTER — TRANSITIONAL CARE UNIT VISIT (OUTPATIENT)
Dept: GERIATRICS | Facility: CLINIC | Age: 82
End: 2022-02-09
Payer: MEDICARE

## 2022-02-09 VITALS
SYSTOLIC BLOOD PRESSURE: 161 MMHG | HEART RATE: 70 BPM | OXYGEN SATURATION: 93 % | WEIGHT: 130.2 LBS | RESPIRATION RATE: 16 BRPM | DIASTOLIC BLOOD PRESSURE: 73 MMHG | HEIGHT: 63 IN | TEMPERATURE: 97.8 F | BODY MASS INDEX: 23.07 KG/M2

## 2022-02-09 DIAGNOSIS — I48.19 PERSISTENT ATRIAL FIBRILLATION (H): ICD-10-CM

## 2022-02-09 DIAGNOSIS — R53.81 PHYSICAL DECONDITIONING: ICD-10-CM

## 2022-02-09 DIAGNOSIS — S32.591A INFERIOR PUBIC RAMUS FRACTURE, RIGHT, CLOSED, INITIAL ENCOUNTER (H): ICD-10-CM

## 2022-02-09 DIAGNOSIS — Z95.0 CARDIAC PACEMAKER IN SITU: ICD-10-CM

## 2022-02-09 DIAGNOSIS — I10 ESSENTIAL HYPERTENSION WITH GOAL BLOOD PRESSURE LESS THAN 140/90: ICD-10-CM

## 2022-02-09 DIAGNOSIS — M25.551 HIP PAIN, RIGHT: ICD-10-CM

## 2022-02-09 DIAGNOSIS — I42.8 OTHER CARDIOMYOPATHY (H): ICD-10-CM

## 2022-02-09 DIAGNOSIS — N18.30 STAGE 3 CHRONIC KIDNEY DISEASE, UNSPECIFIED WHETHER STAGE 3A OR 3B CKD (H): ICD-10-CM

## 2022-02-09 DIAGNOSIS — S32.591D INFERIOR PUBIC RAMUS FRACTURE, RIGHT, WITH ROUTINE HEALING, SUBSEQUENT ENCOUNTER: Primary | ICD-10-CM

## 2022-02-09 LAB
DIFFERENTIAL: ABNORMAL
ERYTHROCYTE [DISTWIDTH] IN BLOOD BY AUTOMATED COUNT: 14.8 % (ref 11.9–15.5)
HEMATOCRIT (EXTERNAL): 33.2 % (ref 34.9–44.5)
HEMOGLOBIN (EXTERNAL): 10.3 G/DL (ref 12–15.5)
MCH RBC QN AUTO: 26.5 PG (ref 27.3–33.3)
MCHC RBC AUTO-ENTMCNC: 31 G/DL (ref 31.5–35.2)
MCV RBC AUTO: 85.6 FL (ref 80–100)
PLATELET COUNT (EXTERNAL): 208 X10(9)/L (ref 150–450)
RBC # BLD AUTO: 3.88 X10(12)/L (ref 3.9–5.03)
WBC COUNT (EXTERNAL): 6.8 X10(9)/L (ref 3.5–10.5)

## 2022-02-09 PROCEDURE — 99309 SBSQ NF CARE MODERATE MDM 30: CPT | Performed by: NURSE PRACTITIONER

## 2022-02-09 RX ORDER — METHOCARBAMOL 500 MG/1
500 TABLET, FILM COATED ORAL 3 TIMES DAILY
Start: 2022-02-09 | End: 2022-02-22

## 2022-02-09 ASSESSMENT — MIFFLIN-ST. JEOR: SCORE: 1054.64

## 2022-02-09 NOTE — PROGRESS NOTES
"[facility] GERIATRICS    Chief Complaint   Patient presents with     Nursing Home Acute     HPI:  Tomasa Fam is a 81 year old  (1940), who is being seen today for an episodic care visit at: [facility]) [25]. Today's concern is:   Right inferior pubic rami Fx: today on exam patient states pain is very little to none sitting up in w/c, increases to 6/10 with standing, patient states pain is most bothersome at night, she takes oxycodone at night and she is able to sleep, patient states during the day she does not feel she needs the oxycodone to control her pain   Atrial fib/HTN: /65, 161/73, 127/64 with HR 70's   Cardiomyopathy: weight stable 130.2lbs  CKD: see labs    Allergies, and PMH/PSH reviewed in [facility] today.  REVIEW OF SYSTEMS:  10 point ROS of systems including Constitutional, Eyes, Respiratory, Cardiovascular, Gastroenterology, Genitourinary, Integumentary, Musculoskeletal, Psychiatric were all negative except for pertinent positives noted in my HPI.    Objective:   BP (!) 161/73   Pulse 70   Temp 97.8  F (36.6  C)   Resp 16   Ht 1.6 m (5' 3\")   Wt 59.1 kg (130 lb 3.2 oz)   LMP  (LMP Unknown)   SpO2 93%   BMI 23.06 kg/m    GENERAL APPEARANCE:  Alert, in no distress  ENT:  Mouth and posterior oropharynx normal, moist mucous membranes, normal hearing acuity  EYES:  EOM, conjunctivae, lids, pupils and irises normal, PERRL  RESP:  respiratory effort and palpation of chest normal, lungs clear to auscultation , no respiratory distress  CV:  Palpation and auscultation of heart done , regular rate and rhythm, no murmur, rub, or gallop, peripheral edema trace+ in LE bilaterally  ABDOMEN:  normal bowel sounds, soft, nontender, no hepatosplenomegaly or other masses  M/S:   patient is sitting up in w/c, able to stand from sitting independently  SKIN:  Inspection of skin and subcutaneous tissue baseline  NEURO:   speech wnl    Recent labs in EPIC reviewed by me today. "     Assessment/Plan:  (S39.590M) Inferior pubic ramus fracture, right, with routine healing, subsequent encounter  (primary encounter diagnosis)  (M25.551) Hip pain, right  (R53.81) Physical deconditioning  Comment: acute/ongoing  Plan: PT and OT, continue tylenol 975mg q 8 hours, oxycodone 2.5-5mg q 4 hours prn, change Robaxin to 500mg TID scheduled  F/u with ortho as directed     (I48.19) Persistent atrial fibrillation (H)  (Z95.0) Cardiac pacemaker in situ 4/15/21  (I42.8) Other cardiomyopathy (H)  (I10) Essential hypertension with goal blood pressure less than 140/90  (N18.30) Stage 3 chronic kidney disease, unspecified whether stage 3a or 3b CKD (H)  Comment: ongoing  Plan: daily weights, vitals daily,  Follow BMP, continue norvasc 2.5mg QD, metoprolol 100mg BID, eliquis 5mg BID     (M35.3) PMR (polymyalgia rheumatica) (H)  Comment: ongoing  Plan:  prednisone 1mg QOD ordered, patient states she is not taking prednisone, f/u with rheumatology as OP     (J43.9) Pulmonary emphysema, unspecified emphysema type (H)  Comment: ongoing  Plan: monitor SaO2 at rest and with activity, continue incruse ellipta 62.5mcg 1 puff QD, albuterol MDI prn    Orders:  Robaxin 500mg TID scheduled       Electronically signed by: Tonya Lynn Haase, APRN CNP

## 2022-02-09 NOTE — LETTER
"    2/9/2022        RE: Tomasa Fam  [address] [facility] [address]        [facility]    Chief Complaint   Patient presents with     Nursing Home Acute     HPI:  Tomasa Fam is a 81 year old  (1940), who is being seen today for an episodic care visit at: [facility]) [25]. Today's concern is:   Right inferior pubic rami Fx: today on exam patient states pain is very little to none sitting up in w/c, increases to 6/10 with standing, patient states pain is most bothersome at night, she takes oxycodone at night and she is able to sleep, patient states during the day she does not feel she needs the oxycodone to control her pain   Atrial fib/HTN: /65, 161/73, 127/64 with HR 70's   Cardiomyopathy: weight stable 130.2lbs  CKD: see labs    Allergies, and PMH/PSH reviewed in EPIC today.  REVIEW OF SYSTEMS:  10 point ROS of systems including Constitutional, Eyes, Respiratory, Cardiovascular, Gastroenterology, Genitourinary, Integumentary, Musculoskeletal, Psychiatric were all negative except for pertinent positives noted in my HPI.    Objective:   BP (!) 161/73   Pulse 70   Temp 97.8  F (36.6  C)   Resp 16   Ht 1.6 m (5' 3\")   Wt 59.1 kg (130 lb 3.2 oz)   LMP  (LMP Unknown)   SpO2 93%   BMI 23.06 kg/m    GENERAL APPEARANCE:  Alert, in no distress  ENT:  Mouth and posterior oropharynx normal, moist mucous membranes, normal hearing acuity  EYES:  EOM, conjunctivae, lids, pupils and irises normal, PERRL  RESP:  respiratory effort and palpation of chest normal, lungs clear to auscultation , no respiratory distress  CV:  Palpation and auscultation of heart done , regular rate and rhythm, no murmur, rub, or gallop, peripheral edema trace+ in LE bilaterally  ABDOMEN:  normal bowel sounds, soft, nontender, no hepatosplenomegaly or other masses  M/S:   patient is sitting up in w/c, able to stand from sitting independently  SKIN:  Inspection of skin and " subcutaneous tissue baseline  NEURO:   speech wnl    Recent labs in [facility] reviewed by me today.     Assessment/Plan:  (S32.122V) Inferior pubic ramus fracture, right, with routine healing, subsequent encounter  (primary encounter diagnosis)  (M25.551) Hip pain, right  (R53.81) Physical deconditioning  Comment: acute/ongoing  Plan: PT and OT, continue tylenol 975mg q 8 hours, oxycodone 2.5-5mg q 4 hours prn, change Robaxin to 500mg TID scheduled  F/u with ortho as directed     (I48.19) Persistent atrial fibrillation (H)  (Z95.0) Cardiac pacemaker in situ 4/15/21  (I42.8) Other cardiomyopathy (H)  (I10) Essential hypertension with goal blood pressure less than 140/90  (N18.30) Stage 3 chronic kidney disease, unspecified whether stage 3a or 3b CKD (H)  Comment: ongoing  Plan: daily weights, vitals daily,  Follow BMP, continue norvasc 2.5mg QD, metoprolol 100mg BID, eliquis 5mg BID     (M35.3) PMR (polymyalgia rheumatica) (H)  Comment: ongoing  Plan:  prednisone 1mg QOD ordered, patient states she is not taking prednisone, f/u with rheumatology as OP     (J43.9) Pulmonary emphysema, unspecified emphysema type (H)  Comment: ongoing  Plan: monitor SaO2 at rest and with activity, continue incruse ellipta 62.5mcg 1 puff QD, albuterol MDI prn    Orders:  Robaxin 500mg TID scheduled       Electronically signed by: Tonya Lynn Haase, APRN CNP           Sincerely,        Tonya Lynn Haase, APRN CNP

## 2022-02-14 NOTE — PROGRESS NOTES
"[facility] GERIATRICS    Chief Complaint   Patient presents with     Nursing Home Acute     HPI:  Tomasa Fam is a 81 year old  (1940), who is being seen today for an episodic care visit at: [facility] ([facility]) [25]. Today's concern is:   Right inferior pubic rami Fx: today on exam patient states pain is improving, rates pain as 4/10 today just finished therapy, patient walking up to 100 feet using a 4ww with SBA, needing assistance with ADL's  Atrial fib/HTN: /61, 157/72, 174/87  with HR 70's   Cardiomyopathy: weight stable 129.0lbs  CKD: creat 0.60  Insomnia: patient states she has not slept in 3 nights, states she has trouble at times at home, has taken tylenol pm to help her sleep, states melatonin does not work for her.     Allergies, and PMH/PSH reviewed in [facility] today.  REVIEW OF SYSTEMS:  10 point ROS of systems including Constitutional, Eyes, Respiratory, Cardiovascular, Gastroenterology, Genitourinary, Integumentary, Musculoskeletal, Psychiatric were all negative except for pertinent positives noted in my HPI.    Objective:   BP (!) 150/61   Pulse 70   Temp 98.2  F (36.8  C)   Resp 18   Ht 1.6 m (5' 3\")   Wt 62.6 kg (137 lb 14.4 oz)   LMP  (LMP Unknown)   SpO2 94%   BMI 24.43 kg/m    GENERAL APPEARANCE:  Alert, in no distress  ENT:  Mouth and posterior oropharynx normal, moist mucous membranes, normal hearing acuity  EYES:  EOM, conjunctivae, lids, pupils and irises normal, PERRL  RESP:  respiratory effort and palpation of chest normal, lungs clear to auscultation , no respiratory distress  CV:  Palpation and auscultation of heart done , regular rate and rhythm, no murmur, rub, or gallop, peripheral edema trace+ in LE bilaterally  ABDOMEN:  normal bowel sounds, soft, nontender, no hepatosplenomegaly or other masses  M/S:   patient is sitting up in w/c  SKIN:  Inspection of skin and subcutaneous tissue baseline  NEURO:   speech wnl    Recent labs in [facility] reviewed by me " today.     Assessment/Plan:  (S35.780Z) Inferior pubic ramus fracture, right, with routine healing, subsequent encounter  (primary encounter diagnosis)  (M25.461) Hip pain, right  (R53.81) Physical deconditioning  Comment: acute/ongoing  Plan: PT and OT, continue tylenol 975mg q 8 hours, oxycodone 2.5-5mg q 4 hours prn, change Robaxin to 500mg TID scheduled  F/u with ortho as directed     (I48.19) Persistent atrial fibrillation (H)  (Z95.0) Cardiac pacemaker in situ 4/15/21  (I42.8) Other cardiomyopathy (H)  (I10) Essential hypertension with goal blood pressure less than 140/90  (N18.30) Stage 3 chronic kidney disease, unspecified whether stage 3a or 3b CKD (H)  Comment: ongoing  Plan: daily weights, vitals daily,  Follow BMP, continue norvasc 2.5mg QD, metoprolol 100mg BID, eliquis 5mg BID     (M35.3) PMR (polymyalgia rheumatica) (H)  Comment: ongoing  Plan:  prednisone 1mg QOD ordered, patient states she is not taking prednisone, f/u with rheumatology as OP     (J43.9) Pulmonary emphysema, unspecified emphysema type (H)  Comment: ongoing  Plan: monitor SaO2 at rest and with activity, continue incruse ellipta 62.5mcg 1 puff QD, albuterol MDI prn    (G47.00) Insomnia, unspecified  Comment: acute  Plan: trazodone 12.5mg qhs prn     Orders:  Trazodone 12.5mg qhs prn      Electronically signed by: Tonya Lynn Haase, APRN CNP

## 2022-02-15 ENCOUNTER — TRANSITIONAL CARE UNIT VISIT (OUTPATIENT)
Dept: GERIATRICS | Facility: CLINIC | Age: 82
End: 2022-02-15
Payer: MEDICARE

## 2022-02-15 VITALS
OXYGEN SATURATION: 94 % | HEIGHT: 63 IN | HEART RATE: 70 BPM | DIASTOLIC BLOOD PRESSURE: 61 MMHG | TEMPERATURE: 98.2 F | WEIGHT: 137.9 LBS | RESPIRATION RATE: 18 BRPM | SYSTOLIC BLOOD PRESSURE: 150 MMHG | BODY MASS INDEX: 24.43 KG/M2

## 2022-02-15 DIAGNOSIS — M25.551 HIP PAIN, RIGHT: ICD-10-CM

## 2022-02-15 DIAGNOSIS — I42.8 OTHER CARDIOMYOPATHY (H): ICD-10-CM

## 2022-02-15 DIAGNOSIS — I48.19 PERSISTENT ATRIAL FIBRILLATION (H): ICD-10-CM

## 2022-02-15 DIAGNOSIS — I10 ESSENTIAL HYPERTENSION WITH GOAL BLOOD PRESSURE LESS THAN 140/90: ICD-10-CM

## 2022-02-15 DIAGNOSIS — M35.3 PMR (POLYMYALGIA RHEUMATICA) (H): ICD-10-CM

## 2022-02-15 DIAGNOSIS — R53.81 PHYSICAL DECONDITIONING: ICD-10-CM

## 2022-02-15 DIAGNOSIS — N18.30 STAGE 3 CHRONIC KIDNEY DISEASE, UNSPECIFIED WHETHER STAGE 3A OR 3B CKD (H): ICD-10-CM

## 2022-02-15 DIAGNOSIS — G47.00 INSOMNIA, UNSPECIFIED TYPE: ICD-10-CM

## 2022-02-15 DIAGNOSIS — S32.591D INFERIOR PUBIC RAMUS FRACTURE, RIGHT, WITH ROUTINE HEALING, SUBSEQUENT ENCOUNTER: Primary | ICD-10-CM

## 2022-02-15 DIAGNOSIS — Z95.0 CARDIAC PACEMAKER IN SITU: ICD-10-CM

## 2022-02-15 PROCEDURE — 99309 SBSQ NF CARE MODERATE MDM 30: CPT | Performed by: NURSE PRACTITIONER

## 2022-02-15 NOTE — LETTER
"    2/15/2022        RE: Tomasa Fam  [address] [facility] [address]        [facility] GERIATRICS    Chief Complaint   Patient presents with     Nursing Home Acute     HPI:  Tomasa Fam is a 81 year old  (1940), who is being seen today for an episodic care visit at: [facility]) [25]. Today's concern is:   Right inferior pubic rami Fx: today on exam patient states pain is improving, rates pain as 4/10 today just finished therapy, patient walking up to 100 feet using a 4ww with SBA, needing assistance with ADL's  Atrial fib/HTN: /61, 157/72, 174/87  with HR 70's   Cardiomyopathy: weight stable 129.0lbs  CKD: creat 0.60  Insomnia: patient states she has not slept in 3 nights, states she has trouble at times at home, has taken tylenol pm to help her sleep, states melatonin does not work for her.     Allergies, and PMH/PSH reviewed in [facility] today.  REVIEW OF SYSTEMS:  10 point ROS of systems including Constitutional, Eyes, Respiratory, Cardiovascular, Gastroenterology, Genitourinary, Integumentary, Musculoskeletal, Psychiatric were all negative except for pertinent positives noted in my HPI.    Objective:   BP (!) 150/61   Pulse 70   Temp 98.2  F (36.8  C)   Resp 18   Ht 1.6 m (5' 3\")   Wt 62.6 kg (137 lb 14.4 oz)   LMP  (LMP Unknown)   SpO2 94%   BMI 24.43 kg/m    GENERAL APPEARANCE:  Alert, in no distress  ENT:  Mouth and posterior oropharynx normal, moist mucous membranes, normal hearing acuity  EYES:  EOM, conjunctivae, lids, pupils and irises normal, PERRL  RESP:  respiratory effort and palpation of chest normal, lungs clear to auscultation , no respiratory distress  CV:  Palpation and auscultation of heart done , regular rate and rhythm, no murmur, rub, or gallop, peripheral edema trace+ in LE bilaterally  ABDOMEN:  normal bowel sounds, soft, nontender, no hepatosplenomegaly or other masses  M/S:   patient is sitting up in w/c  SKIN:  Inspection " of skin and subcutaneous tissue baseline  NEURO:   speech wnl    Recent labs in EPIC reviewed by me today.     Assessment/Plan:  (S32.461D) Inferior pubic ramus fracture, right, with routine healing, subsequent encounter  (primary encounter diagnosis)  (M25.551) Hip pain, right  (R53.81) Physical deconditioning  Comment: acute/ongoing  Plan: PT and OT, continue tylenol 975mg q 8 hours, oxycodone 2.5-5mg q 4 hours prn, change Robaxin to 500mg TID scheduled  F/u with ortho as directed     (I48.19) Persistent atrial fibrillation (H)  (Z95.0) Cardiac pacemaker in situ 4/15/21  (I42.8) Other cardiomyopathy (H)  (I10) Essential hypertension with goal blood pressure less than 140/90  (N18.30) Stage 3 chronic kidney disease, unspecified whether stage 3a or 3b CKD (H)  Comment: ongoing  Plan: daily weights, vitals daily,  Follow BMP, continue norvasc 2.5mg QD, metoprolol 100mg BID, eliquis 5mg BID     (M35.3) PMR (polymyalgia rheumatica) (H)  Comment: ongoing  Plan:  prednisone 1mg QOD ordered, patient states she is not taking prednisone, f/u with rheumatology as OP     (J43.9) Pulmonary emphysema, unspecified emphysema type (H)  Comment: ongoing  Plan: monitor SaO2 at rest and with activity, continue incruse ellipta 62.5mcg 1 puff QD, albuterol MDI prn    (G47.00) Insomnia, unspecified  Comment: acute  Plan: trazodone 12.5mg qhs prn     Orders:  Trazodone 12.5mg qhs prn      Electronically signed by: Tonya Lynn Haase, APRN CNP           Sincerely,        Tonya Lynn Haase, APRN CNP

## 2022-02-16 ENCOUNTER — ANCILLARY PROCEDURE (OUTPATIENT)
Dept: CARDIOLOGY | Facility: CLINIC | Age: 82
End: 2022-02-16
Attending: INTERNAL MEDICINE
Payer: MEDICARE

## 2022-02-16 DIAGNOSIS — Z98.890 HX OF ATRIOVENTRICULAR NODE ABLATION: ICD-10-CM

## 2022-02-16 DIAGNOSIS — Z95.0 CARDIAC PACEMAKER IN SITU: ICD-10-CM

## 2022-02-16 PROCEDURE — 93296 REM INTERROG EVL PM/IDS: CPT | Performed by: INTERNAL MEDICINE

## 2022-02-16 PROCEDURE — 93294 REM INTERROG EVL PM/LDLS PM: CPT | Performed by: INTERNAL MEDICINE

## 2022-02-17 ENCOUNTER — TELEPHONE (OUTPATIENT)
Dept: CARDIOLOGY | Facility: CLINIC | Age: 82
End: 2022-02-17
Payer: MEDICARE

## 2022-02-17 PROBLEM — R74.02 NONSPECIFIC ELEVATION OF LEVELS OF TRANSAMINASE AND LACTIC ACID DEHYDROGENASE (LDH): Status: ACTIVE | Noted: 2020-08-08

## 2022-02-17 PROBLEM — R74.01 NONSPECIFIC ELEVATION OF LEVELS OF TRANSAMINASE AND LACTIC ACID DEHYDROGENASE (LDH): Status: ACTIVE | Noted: 2020-08-08

## 2022-02-17 PROBLEM — K21.9 GASTROESOPHAGEAL REFLUX DISEASE: Status: ACTIVE | Noted: 2019-11-23

## 2022-02-17 NOTE — TELEPHONE ENCOUNTER
Left voicemail with patient regarding remote monitor. We have received a transmission from patient's [employer] Pacemaker today 2/17/22. Will complete remote device check off of this transmission. Call back number provided.

## 2022-02-17 NOTE — TELEPHONE ENCOUNTER
[facility] Call Center    Phone Message    May a detailed message be left on voicemail: no     Reason for Call: Other: Chandrika called to advise she is currently in a rehab facility due to a fall. She had forgotten to take her remote monitor for her cardiac device check, but her son picked it up last night and she believes it is currently back online. She would like you to check it, and reach out to her if you are not receiving a signal.      Action Taken: Other: [facility]    Travel Screening: Not Applicable

## 2022-02-18 ENCOUNTER — TRANSITIONAL CARE UNIT VISIT (OUTPATIENT)
Dept: GERIATRICS | Facility: CLINIC | Age: 82
End: 2022-02-18
Payer: MEDICARE

## 2022-02-18 VITALS
HEART RATE: 70 BPM | TEMPERATURE: 97.9 F | HEIGHT: 63 IN | WEIGHT: 129 LBS | DIASTOLIC BLOOD PRESSURE: 77 MMHG | BODY MASS INDEX: 22.86 KG/M2 | RESPIRATION RATE: 18 BRPM | SYSTOLIC BLOOD PRESSURE: 154 MMHG | OXYGEN SATURATION: 94 %

## 2022-02-18 DIAGNOSIS — S32.591D INFERIOR PUBIC RAMUS FRACTURE, RIGHT, WITH ROUTINE HEALING, SUBSEQUENT ENCOUNTER: Primary | ICD-10-CM

## 2022-02-18 DIAGNOSIS — R53.81 PHYSICAL DECONDITIONING: ICD-10-CM

## 2022-02-18 DIAGNOSIS — I42.8 OTHER CARDIOMYOPATHY (H): ICD-10-CM

## 2022-02-18 DIAGNOSIS — N18.30 STAGE 3 CHRONIC KIDNEY DISEASE, UNSPECIFIED WHETHER STAGE 3A OR 3B CKD (H): ICD-10-CM

## 2022-02-18 DIAGNOSIS — I48.19 PERSISTENT ATRIAL FIBRILLATION (H): ICD-10-CM

## 2022-02-18 DIAGNOSIS — Z95.0 CARDIAC PACEMAKER IN SITU: ICD-10-CM

## 2022-02-18 DIAGNOSIS — G47.00 INSOMNIA, UNSPECIFIED TYPE: ICD-10-CM

## 2022-02-18 DIAGNOSIS — I10 ESSENTIAL HYPERTENSION WITH GOAL BLOOD PRESSURE LESS THAN 140/90: ICD-10-CM

## 2022-02-18 DIAGNOSIS — M35.3 PMR (POLYMYALGIA RHEUMATICA) (H): ICD-10-CM

## 2022-02-18 DIAGNOSIS — M25.551 HIP PAIN, RIGHT: ICD-10-CM

## 2022-02-18 PROCEDURE — 99309 SBSQ NF CARE MODERATE MDM 30: CPT | Performed by: NURSE PRACTITIONER

## 2022-02-18 NOTE — LETTER
"    2/18/2022        RE: Tomasa Fam  [address] [facility] [address]        [facility]    Chief Complaint   Patient presents with     Nursing Home Acute     HPI:  Tomasa Fam is a 81 year old  (1940), who is being seen today for an episodic care visit at: [facility]) [25]. Today's concern is:   Right inferior pubic rami Fx: today on exam patient states pain is improving, patient walking up to 125 feet using a 4ww with SBA, needing assistance with ADL's  Atrial fib/HTN: /77, 177/90, 150/61  with HR 70's denies CP, palpitations  Cardiomyopathy: weight stable 129.0lbs  CKD: creat 0.60  Insomnia: patient states she has not slept well for several nights, states she tried the trazodone and that medication made her feel agitated, jittery and she does not want to take again. Patient states melatonin has not been effective in past, states tylenol pm has been effective in past.     Allergies, and PMH/PSH reviewed in [facility] today.  REVIEW OF SYSTEMS:  10 point ROS of systems including Constitutional, Eyes, Respiratory, Cardiovascular, Gastroenterology, Genitourinary, Integumentary, Musculoskeletal, Psychiatric were all negative except for pertinent positives noted in my HPI.    Objective:   BP (!) 154/77   Pulse 70   Temp 97.9  F (36.6  C)   Resp 18   Ht 1.6 m (5' 3\")   Wt 58.5 kg (129 lb)   LMP  (LMP Unknown)   SpO2 94%   BMI 22.85 kg/m    GENERAL APPEARANCE:  Alert, in no distress  ENT:  Mouth and posterior oropharynx normal, moist mucous membranes, normal hearing acuity  EYES:  EOM, conjunctivae, lids, pupils and irises normal, PERRL  RESP:  respiratory effort and palpation of chest normal, lungs clear to auscultation , no respiratory distress  CV:  Palpation and auscultation of heart done , regular rate and rhythm, no murmur, rub, or gallop, peripheral edema trace+ in LE bilaterally  ABDOMEN:  normal bowel sounds, soft, nontender, no " hepatosplenomegaly or other masses  M/S:   patient is sitting up in w/c  SKIN:  Inspection of skin and subcutaneous tissue baseline  NEURO:   speech wnl       Recent labs in EPIC reviewed by me today.     Assessment/Plan:  (S32.028Q) Inferior pubic ramus fracture, right, with routine healing, subsequent encounter  (primary encounter diagnosis)  (M25.551) Hip pain, right  (R53.81) Physical deconditioning  Comment: acute/ongoing  Plan: PT and OT, continue tylenol 975mg q 8 hours, oxycodone 2.5-5mg q 4 hours prn, change Robaxin to 500mg TID scheduled  F/u with ortho as directed     (I48.19) Persistent atrial fibrillation (H)  (Z95.0) Cardiac pacemaker in situ 4/15/21  (I42.8) Other cardiomyopathy (H)  (I10) Essential hypertension with goal blood pressure less than 140/90  (N18.30) Stage 3 chronic kidney disease, unspecified whether stage 3a or 3b CKD (H)  Comment: ongoing  Plan: daily weights, vitals daily,  Follow BMP, continue norvasc 2.5mg QD, metoprolol 100mg BID, eliquis 5mg BID     (M35.3) PMR (polymyalgia rheumatica) (H)  Comment: ongoing  Plan:  prednisone 1mg QOD ordered, patient states she is not taking prednisone, f/u with rheumatology as OP, patient states she will discuss prednisone dose with rheumatology, has been refusing in TCU     (J43.9) Pulmonary emphysema, unspecified emphysema type (H)  Comment: ongoing  Plan: monitor SaO2 at rest and with activity, continue incruse ellipta 62.5mcg 1 puff QD, albuterol MDI prn     (G47.00) Insomnia, unspecified  Comment: acute  Plan: DC trazodone, start tylenol /25mg qhs     Orders:  DC trazodone  Tylenol pm 500/25mg qhs      Electronically signed by: Tonya Lynn Haase, APRN CNP           Sincerely,        Tonya Lynn Haase, APRN CNP

## 2022-02-18 NOTE — PROGRESS NOTES
"[facility] GERIATRICS    Chief Complaint   Patient presents with     Nursing Home Acute     HPI:  Tomasa Fam is a 81 year old  (1940), who is being seen today for an episodic care visit at: [facility] ([facility]) [25]. Today's concern is:   Right inferior pubic rami Fx: today on exam patient states pain is improving, patient walking up to 125 feet using a 4ww with SBA, needing assistance with ADL's  Atrial fib/HTN: /77, 177/90, 150/61  with HR 70's denies CP, palpitations  Cardiomyopathy: weight stable 129.0lbs  CKD: creat 0.60  Insomnia: patient states she has not slept well for several nights, states she tried the trazodone and that medication made her feel agitated, jittery and she does not want to take again. Patient states melatonin has not been effective in past, states tylenol pm has been effective in past.     Allergies, and PMH/PSH reviewed in [facility] today.  REVIEW OF SYSTEMS:  10 point ROS of systems including Constitutional, Eyes, Respiratory, Cardiovascular, Gastroenterology, Genitourinary, Integumentary, Musculoskeletal, Psychiatric were all negative except for pertinent positives noted in my HPI.    Objective:   BP (!) 154/77   Pulse 70   Temp 97.9  F (36.6  C)   Resp 18   Ht 1.6 m (5' 3\")   Wt 58.5 kg (129 lb)   LMP  (LMP Unknown)   SpO2 94%   BMI 22.85 kg/m    GENERAL APPEARANCE:  Alert, in no distress  ENT:  Mouth and posterior oropharynx normal, moist mucous membranes, normal hearing acuity  EYES:  EOM, conjunctivae, lids, pupils and irises normal, PERRL  RESP:  respiratory effort and palpation of chest normal, lungs clear to auscultation , no respiratory distress  CV:  Palpation and auscultation of heart done , regular rate and rhythm, no murmur, rub, or gallop, peripheral edema trace+ in LE bilaterally  ABDOMEN:  normal bowel sounds, soft, nontender, no hepatosplenomegaly or other masses  M/S:   patient is sitting up in w/c  SKIN:  Inspection of skin and " subcutaneous tissue baseline  NEURO:   speech wnl       Recent labs in [facility] reviewed by me today.     Assessment/Plan:  (S32.577R) Inferior pubic ramus fracture, right, with routine healing, subsequent encounter  (primary encounter diagnosis)  (M25.551) Hip pain, right  (R53.81) Physical deconditioning  Comment: acute/ongoing  Plan: PT and OT, continue tylenol 975mg q 8 hours, oxycodone 2.5-5mg q 4 hours prn, change Robaxin to 500mg TID scheduled  F/u with ortho as directed     (I48.19) Persistent atrial fibrillation (H)  (Z95.0) Cardiac pacemaker in situ 4/15/21  (I42.8) Other cardiomyopathy (H)  (I10) Essential hypertension with goal blood pressure less than 140/90  (N18.30) Stage 3 chronic kidney disease, unspecified whether stage 3a or 3b CKD (H)  Comment: ongoing  Plan: daily weights, vitals daily,  Follow BMP, continue norvasc 2.5mg QD, metoprolol 100mg BID, eliquis 5mg BID     (M35.3) PMR (polymyalgia rheumatica) (H)  Comment: ongoing  Plan:  prednisone 1mg QOD ordered, patient states she is not taking prednisone, f/u with rheumatology as OP, patient states she will discuss prednisone dose with rheumatology, has been refusing in TCU     (J43.9) Pulmonary emphysema, unspecified emphysema type (H)  Comment: ongoing  Plan: monitor SaO2 at rest and with activity, continue incruse ellipta 62.5mcg 1 puff QD, albuterol MDI prn     (G47.00) Insomnia, unspecified  Comment: acute  Plan: DC trazodone, start tylenol /25mg qhs     Orders:  DC trazodone  Tylenol pm 500/25mg qhs      Electronically signed by: Tonya Lynn Haase, APRN CNP

## 2022-02-22 ENCOUNTER — DISCHARGE SUMMARY NURSING HOME (OUTPATIENT)
Dept: GERIATRICS | Facility: CLINIC | Age: 82
End: 2022-02-22
Payer: MEDICARE

## 2022-02-22 VITALS
HEIGHT: 63 IN | OXYGEN SATURATION: 94 % | HEART RATE: 70 BPM | SYSTOLIC BLOOD PRESSURE: 146 MMHG | TEMPERATURE: 98.3 F | BODY MASS INDEX: 22.86 KG/M2 | DIASTOLIC BLOOD PRESSURE: 71 MMHG | WEIGHT: 129 LBS | RESPIRATION RATE: 16 BRPM

## 2022-02-22 DIAGNOSIS — Z95.0 CARDIAC PACEMAKER IN SITU: ICD-10-CM

## 2022-02-22 DIAGNOSIS — G47.00 INSOMNIA, UNSPECIFIED TYPE: ICD-10-CM

## 2022-02-22 DIAGNOSIS — M35.3 PMR (POLYMYALGIA RHEUMATICA) (H): ICD-10-CM

## 2022-02-22 DIAGNOSIS — N18.30 STAGE 3 CHRONIC KIDNEY DISEASE, UNSPECIFIED WHETHER STAGE 3A OR 3B CKD (H): ICD-10-CM

## 2022-02-22 DIAGNOSIS — I42.8 OTHER CARDIOMYOPATHY (H): ICD-10-CM

## 2022-02-22 DIAGNOSIS — R53.81 PHYSICAL DECONDITIONING: ICD-10-CM

## 2022-02-22 DIAGNOSIS — I48.19 PERSISTENT ATRIAL FIBRILLATION (H): ICD-10-CM

## 2022-02-22 DIAGNOSIS — M25.551 HIP PAIN, RIGHT: ICD-10-CM

## 2022-02-22 DIAGNOSIS — I10 ESSENTIAL HYPERTENSION WITH GOAL BLOOD PRESSURE LESS THAN 140/90: ICD-10-CM

## 2022-02-22 DIAGNOSIS — S32.591D INFERIOR PUBIC RAMUS FRACTURE, RIGHT, WITH ROUTINE HEALING, SUBSEQUENT ENCOUNTER: Primary | ICD-10-CM

## 2022-02-22 PROCEDURE — 99316 NF DSCHRG MGMT 30 MIN+: CPT | Performed by: NURSE PRACTITIONER

## 2022-02-22 RX ORDER — METHOCARBAMOL 500 MG/1
500 TABLET, FILM COATED ORAL 4 TIMES DAILY
COMMUNITY
End: 2022-02-22

## 2022-02-22 RX ORDER — AMLODIPINE BESYLATE 5 MG/1
2.5 TABLET ORAL EVERY EVENING
COMMUNITY
End: 2022-02-28

## 2022-02-22 NOTE — PROGRESS NOTES
[facility] GERIATRICS DISCHARGE SUMMARY  PATIENT'S NAME: Tomasa Fam  YOB: 1940  MEDICAL RECORD NUMBER:  [id number]  Place of Service where encounter took place:  [facility]) [25]    PRIMARY CARE PROVIDER AND CLINIC RESPONSIBLE AFTER TRANSFER:   Cris Romero MD, [address] / [place] [address]    [facility] Provider     Transferring providers: Tonya Lynn Haase, APRN CNP, Dr. Jourdan Leonard MD  Recent Hospitalization/ED:  [facility] stay 1/31/22 to 2/2/22.  Date of SNF Admission: February 02, 2022  Date of SNF (anticipated) Discharge: February 23, 2022  Discharged to: previous independent home  Cognitive Scores: BIMS: 13/15 and Short blessed: 6/28  Physical Function: Ambulating 125 ft with RW  DME: Walker    CODE STATUS/ADVANCE DIRECTIVES DISCUSSION:  No CPR- Do NOT Intubate   ALLERGIES: No known drug allergies and Tape [adhesive tape]    NURSING FACILITY COURSE   Medication Changes/Rationale:     DC oxycodone and robaxin at discharge    Summary of nursing facility stay:   Patient progressed slowly to walking up to 125 feet using a RW independently, she is independent with aDL's and toileting in room, will DC to home with home PT, OT, HHA through [employer]     Discharge Medications:    Current Outpatient Medications   Medication Sig Dispense Refill     acetaminophen (TYLENOL) 325 MG tablet Take 3 tablets (975 mg) by mouth every 8 hours       albuterol (PROAIR HFA/PROVENTIL HFA/VENTOLIN HFA) 108 (90 Base) MCG/ACT inhaler Inhale 2 puffs into the lungs every 4 hours as needed        amLODIPine (NORVASC) 5 MG tablet Take 5 mg by mouth every morning htn       apixaban ANTICOAGULANT (ELIQUIS) 5 MG tablet Take 1 tablet (5 mg) by mouth 2 times daily 60 tablet 3     calcium citrate-vitamin D (CALCIUM CITRATE + D) 315-250 MG-UNIT TABS per tablet Take 1 tablet by mouth 2 times daily        diphenhydrAMINE-acetaminophen (TYLENOL PM)  MG  tablet Take 1 tablet by mouth At Bedtime insomnia       ezetimibe (ZETIA) 10 MG tablet Take 1 tablet (10 mg) by mouth daily (Patient taking differently: Take 10 mg by mouth every evening ) 90 tablet 1     INCRUSE ELLIPTA 62.5 MCG/INH Inhaler Inhale 1 puff into the lungs daily       melatonin 3 MG tablet Take 1 tablet (3 mg) by mouth nightly as needed for sleep       methocarbamol (ROBAXIN) 500 MG tablet Take 500 mg by mouth 4 times daily       metoprolol tartrate (LOPRESSOR) 100 MG tablet Take 1 tablet (100 mg) by mouth 2 times daily 180 tablet 1     omega 3 1000 MG CAPS Take 1 capsule by mouth daily  90 capsule      ondansetron (ZOFRAN-ODT) 4 MG ODT tab Take 1 tablet (4 mg) by mouth every 6 hours as needed for nausea or vomiting       oxyCODONE (ROXICODONE) 5 MG tablet Take 0.5-1 tablets (2.5-5 mg) by mouth every 4 hours as needed for moderate to severe pain (2.5 mg pain 3-5/10 and 5 mg pain 6-10/10) 15 tablet 0     polyethylene glycol (MIRALAX) 17 GM/Dose powder Take 17 g by mouth daily 510 g      potassium chloride ER (KLOR-CON M) 20 MEQ CR tablet TAKE 1 TABLET(20 MEQ) BY MOUTH TWICE DAILY 180 tablet 1     senna-docusate (SENOKOT-S/PERICOLACE) 8.6-50 MG tablet Take 1 tablet by mouth 2 times daily as needed for constipation       amLODIPine (NORVASC) 2.5 MG tablet Take 1 tablet (2.5 mg) by mouth daily (Patient not taking: Reported on 2/22/2022) 90 tablet 1     methocarbamol (ROBAXIN) 500 MG tablet Take 1 tablet (500 mg) by mouth 3 times daily (Patient not taking: Reported on 2/22/2022)            Controlled medications:   not applicable/none     Past Medical History:   Past Medical History:   Diagnosis Date     Arthritis     hands, knees     Breast cancer (H) jan. 2012     left mastectomy followed by right;  Dr. Luong     Chronic airway obstruction, not elsewhere classified 2006    very mild COPD - small cough     Concussion 3/13/2013     Problem list name updated by automated process. Provider to review and  "confirm [employer] Update utility     Cystocele, midline 4/4/2012     Diffuse cystic mastopathy      Gastroesophageal reflux disease, esophagitis presence not specified 11/23/2019     History of hypokalemia 1/23/2013     Hyperlipidemia LDL goal <160 6/29/2011    [place] 10-year CHD Risk Score: 2% (14 Total Points)  Values used to calculate score:    Age: 70 years -- Points: 14    Total Cholesterol: 192 mg/dL -- Points: 1    HDL Cholesterol: 61 mg/dL -- Points: -1    Systolic BP (treated): 118 mmHg -- Points: 0   The patient is not a smoker. -- Points: 0   The patient has not been diagnosed with diabetes. -- Points: 0   The patient does not have a famil     Lung cancer (H) 10/21/2015     Paroxysmal atrial fibrillation (H) 9/13/2019     PMR (polymyalgia rheumatica) (H) 1/17/2020    tapering' above.)  In patients receiving over 10 mg of prednisone/day, the dose can be lowered by 2.5 mg/day decrements every two to four weeks  Once the dose of prednisone is 10 mg/day, further tapering can be done by 1 mg per month, provided the clinical course is stable  The clinical response to glucocorticoid therapy is closely monitored, which centers on screening for the presence and/or recu     Thyroid nodule 4/23/2016    Noted on CT Fall 2015.      Unspecified essential hypertension late 1980's     Physical Exam:   Vitals: BP (!) 146/71   Pulse 70   Temp 98.3  F (36.8  C)   Resp 16   Ht 1.6 m (5' 3\")   Wt 58.5 kg (129 lb)   LMP  (LMP Unknown)   SpO2 94%   BMI 22.85 kg/m    BMI: Body mass index is 22.85 kg/m .  GENERAL APPEARANCE:  Alert, in no distress  ENT:  Mouth and posterior oropharynx normal, moist mucous membranes, [place]  EYES:  EOM, conjunctivae, lids, pupils and irises normal, PERRL  RESP:  respiratory effort and palpation of chest normal, lungs clear to auscultation , no respiratory distress  CV:  Palpation and auscultation of heart done , regular rate and rhythm, no murmur, rub, or gallop, peripheral edema trace+ in LE " bilaterally  ABDOMEN:  normal bowel sounds, soft, nontender, no hepatosplenomegaly or other masses  M/S:   patient sitting up in chair at bedside  SKIN:  Inspection of skin and subcutaneous tissue baseline  NEURO:   speech wnl  PSYCH:  affect and mood normal     SNF labs: Recent labs in [facility] reviewed by me today.     Assessment/Plan:  (Z78.535I) Inferior pubic ramus fracture, right, with routine healing, subsequent encounter  (primary encounter diagnosis)  (M25.551) Hip pain, right  (R53.81) Physical deconditioning  Comment: acute/ongoing  Plan: continue tylenol 975mg q 8 hours  DC oxycodone and robaxin  F/u with ortho as directed  DC to home patient driven with home PT, OT and HHA [place]      (I48.19) Persistent atrial fibrillation (H)  (Z95.0) Cardiac pacemaker in situ 4/15/21  (I42.8) Other cardiomyopathy (H)  (I10) Essential hypertension with goal blood pressure less than 140/90  (N18.30) Stage 3 chronic kidney disease, unspecified whether stage 3a or 3b CKD (H)  Comment: ongoing  Plan: increased  norvasc 5mg QD on 2/19/22 with improvement in BP, continue metoprolol 100mg BID, eliquis 5mg BID     (M35.3) PMR (polymyalgia rheumatica) (H)  Comment: ongoing  Plan:  prednisone 1mg QOD ordered,  f/u with rheumatology as OP, patient states she will discuss prednisone dose with rheumatology, has been refusing in TCU     (J43.9) Pulmonary emphysema, unspecified emphysema type (H)  Comment: ongoing  Plan: continue incruse ellipta 62.5mcg 1 puff QD, albuterol MDI prn     (G47.00) Insomnia, unspecified  Comment: acute  Plan: DC trazodone, start tylenol /25mg qhs patient states she takes tylenol PM at home    DISCHARGE PLAN:    Follow up labs: No labs orders/due    Medical Follow Up:      Follow up with primary care provider in 1 weeks    [facility] scheduled appointments:  Next 5 appointments (look out 90 days)    Apr 15, 2022 10:45 AM  Return Visit with Kylie Sevilla,    [facility] ([facility] ) [address]  [facility] [address] [phone]         Future Appointments   Date Time Provider Department Center   4/15/2022 10:45 AM Kylie Sevilla DO [facility]   5/19/2022 12:00 [facility]         Discharge Services: Home Care:  Occupational Therapy, Physical Therapy and Home Health Aide    Discharge Instructions Verbalized to Patient at Discharge:     None    TOTAL DISCHARGE TIME:   Greater than 30 minutes  Electronically signed by:  Tonya Lynn Haase, APRN CNP     Home care Face to Face documentation done in EPIC attached to Home care orders for [facility]. , Documentation of Face to Face and Certification for Home Health Services    I certify that patient: Tomasa Fam is under my care and that I, or a nurse practitioner or physician's assistant working with me, had a face-to-face encounter that meets the physician face-to-face encounter requirements with this patient on: 2/22/2022.    This encounter with the patient was in whole, or in part, for the following medical condition, which is the primary reason for home health care: Pubic rami Fx    I certify that, based on my findings, the following services are medically necessary home health services: Occupational Therapy and Physical Therapy.    My clinical findings support the need for the above services because: Occupational Therapy Services are needed to assess and treat cognitive ability and address ADL safety due to impairment in ADL training, home safety. and Physical Therapy Services are needed to assess and treat the following functional impairments: strengthening, endurance and home safety.    Further, I certify that my clinical findings support that this patient is homebound (i.e. absences from home require considerable and taxing effort and are for medical reasons or  Religion services or infrequently or of short duration when for other reasons) because: using an assistive device for mobility.    Based on the above findings. I certify that this patient is confined to the home and needs intermittent skilled nursing care, physical therapy and/or speech therapy.  The patient is under my care, and I have initiated the establishment of the plan of care.  This patient will be followed by a physician who will periodically review the plan of care.  Physician/Provider to provide follow up care: Cris Romero    Attending hospital physician (the Medicare certified PECOS provider): Tonya Lynn Haase, APRN CNP  Physician Signature: See electronic signature associated with these discharge orders.  Date: 2/22/2022

## 2022-02-22 NOTE — LETTER
2/22/2022        RE: Tomasa Fam  [address] [facility] [address]        [facility] GERIATRICS DISCHARGE SUMMARY  PATIENT'S NAME: Tomasa Fam  YOB: 1940  MEDICAL RECORD NUMBER:  [id number]  Place of Service where encounter took place:  [facility]) [25]    PRIMARY CARE PROVIDER AND CLINIC RESPONSIBLE AFTER TRANSFER:   Cris Romero MD, [address] / [place] [address]    [facility] [place]     Transferring providers: Tonya Lynn Haase, APRN CNP, Dr. Jourdan Leonard MD  Recent Hospitalization/ED:  [facility] stay 1/31/22 to 2/2/22.  Date of SNF Admission: February 02, 2022  Date of SNF (anticipated) Discharge: February 23, 2022  Discharged to: previous independent home  Cognitive Scores: BIMS: 13/15 and Short blessed: 6/28  Physical Function: Ambulating 125 ft with RW  DME: Walker    CODE STATUS/ADVANCE DIRECTIVES DISCUSSION:  No CPR- Do NOT Intubate   ALLERGIES: No known drug allergies and Tape [adhesive tape]    NURSING FACILITY COURSE   Medication Changes/Rationale:     DC oxycodone and robaxin at discharge    Summary of nursing facility stay:   Patient progressed slowly to walking up to 125 feet using a RW independently, she is independent with aDL's and toileting in room, will DC to home with home PT, OT, HHA through [facility]     Discharge Medications:    Current Outpatient Medications   Medication Sig Dispense Refill     acetaminophen (TYLENOL) 325 MG tablet Take 3 tablets (975 mg) by mouth every 8 hours       albuterol (PROAIR HFA/PROVENTIL HFA/VENTOLIN HFA) 108 (90 Base) MCG/ACT inhaler Inhale 2 puffs into the lungs every 4 hours as needed        amLODIPine (NORVASC) 5 MG tablet Take 5 mg by mouth every morning htn       apixaban ANTICOAGULANT (ELIQUIS) 5 MG tablet Take 1 tablet (5 mg) by mouth 2 times daily 60 tablet 3     calcium citrate-vitamin D (CALCIUM CITRATE + D) 315-250 MG-UNIT TABS per tablet  Take 1 tablet by mouth 2 times daily        diphenhydrAMINE-acetaminophen (TYLENOL PM)  MG tablet Take 1 tablet by mouth At Bedtime insomnia       ezetimibe (ZETIA) 10 MG tablet Take 1 tablet (10 mg) by mouth daily (Patient taking differently: Take 10 mg by mouth every evening ) 90 tablet 1     INCRUSE ELLIPTA 62.5 MCG/INH Inhaler Inhale 1 puff into the lungs daily       melatonin 3 MG tablet Take 1 tablet (3 mg) by mouth nightly as needed for sleep       methocarbamol (ROBAXIN) 500 MG tablet Take 500 mg by mouth 4 times daily       metoprolol tartrate (LOPRESSOR) 100 MG tablet Take 1 tablet (100 mg) by mouth 2 times daily 180 tablet 1     omega 3 1000 MG CAPS Take 1 capsule by mouth daily  90 capsule      ondansetron (ZOFRAN-ODT) 4 MG ODT tab Take 1 tablet (4 mg) by mouth every 6 hours as needed for nausea or vomiting       oxyCODONE (ROXICODONE) 5 MG tablet Take 0.5-1 tablets (2.5-5 mg) by mouth every 4 hours as needed for moderate to severe pain (2.5 mg pain 3-5/10 and 5 mg pain 6-10/10) 15 tablet 0     polyethylene glycol (MIRALAX) 17 GM/Dose powder Take 17 g by mouth daily 510 g      potassium chloride ER (KLOR-CON M) 20 MEQ CR tablet TAKE 1 TABLET(20 MEQ) BY MOUTH TWICE DAILY 180 tablet 1     senna-docusate (SENOKOT-S/PERICOLACE) 8.6-50 MG tablet Take 1 tablet by mouth 2 times daily as needed for constipation       amLODIPine (NORVASC) 2.5 MG tablet Take 1 tablet (2.5 mg) by mouth daily (Patient not taking: Reported on 2/22/2022) 90 tablet 1     methocarbamol (ROBAXIN) 500 MG tablet Take 1 tablet (500 mg) by mouth 3 times daily (Patient not taking: Reported on 2/22/2022)            Controlled medications:   not applicable/none     Past Medical History:   Past Medical History:   Diagnosis Date     Arthritis     hands, knees     Breast cancer (H) jan. 2012     left mastectomy followed by right;  Dr. Luong     Chronic airway obstruction, not elsewhere classified 2006    very mild COPD - small cough      "Concussion 3/13/2013     Problem list name updated by automated process. Provider to review and confirm [employer] Update utility     Cystocele, midline 4/4/2012     Diffuse cystic mastopathy      Gastroesophageal reflux disease, esophagitis presence not specified 11/23/2019     History of hypokalemia 1/23/2013     Hyperlipidemia LDL goal <160 6/29/2011    [place] 10-year CHD Risk Score: 2% (14 Total Points)  Values used to calculate score:    Age: 70 years -- Points: 14    Total Cholesterol: 192 mg/dL -- Points: 1    HDL Cholesterol: 61 mg/dL -- Points: -1    Systolic BP (treated): 118 mmHg -- Points: 0   The patient is not a smoker. -- Points: 0   The patient has not been diagnosed with diabetes. -- Points: 0   The patient does not have a famil     Lung cancer (H) 10/21/2015     Paroxysmal atrial fibrillation (H) 9/13/2019     PMR (polymyalgia rheumatica) (H) 1/17/2020    tapering' above.)  In patients receiving over 10 mg of prednisone/day, the dose can be lowered by 2.5 mg/day decrements every two to four weeks  Once the dose of prednisone is 10 mg/day, further tapering can be done by 1 mg per month, provided the clinical course is stable  The clinical response to glucocorticoid therapy is closely monitored, which centers on screening for the presence and/or recu     Thyroid nodule 4/23/2016    Noted on CT Fall 2015.      Unspecified essential hypertension late 1980's     Physical Exam:   Vitals: BP (!) 146/71   Pulse 70   Temp 98.3  F (36.8  C)   Resp 16   Ht 1.6 m (5' 3\")   Wt 58.5 kg (129 lb)   LMP  (LMP Unknown)   SpO2 94%   BMI 22.85 kg/m    BMI: Body mass index is 22.85 kg/m .  GENERAL APPEARANCE:  Alert, in no distress  ENT:  Mouth and posterior oropharynx normal, moist mucous membranes, [place]  EYES:  EOM, conjunctivae, lids, pupils and irises normal, PERRL  RESP:  respiratory effort and palpation of chest normal, lungs clear to auscultation , no respiratory distress  CV:  Palpation and auscultation of " heart done , regular rate and rhythm, no murmur, rub, or gallop, peripheral edema trace+ in LE bilaterally  ABDOMEN:  normal bowel sounds, soft, nontender, no hepatosplenomegaly or other masses  M/S:   patient sitting up in chair at bedside  SKIN:  Inspection of skin and subcutaneous tissue baseline  NEURO:   speech wnl  PSYCH:  affect and mood normal     SNF labs: Recent labs in [facility] reviewed by me today.     Assessment/Plan:  (S32.091U) Inferior pubic ramus fracture, right, with routine healing, subsequent encounter  (primary encounter diagnosis)  (M25.551) Hip pain, right  (R53.81) Physical deconditioning  Comment: acute/ongoing  Plan: continue tylenol 975mg q 8 hours  DC oxycodone and robaxin  F/u with ortho as directed  DC to home patient driven with home PT, OT and HHA [employer]      (I48.19) Persistent atrial fibrillation (H)  (Z95.0) Cardiac pacemaker in situ 4/15/21  (I42.8) Other cardiomyopathy (H)  (I10) Essential hypertension with goal blood pressure less than 140/90  (N18.30) Stage 3 chronic kidney disease, unspecified whether stage 3a or 3b CKD (H)  Comment: ongoing  Plan: increased  norvasc 5mg QD on 2/19/22 with improvement in BP, continue metoprolol 100mg BID, eliquis 5mg BID     (M35.3) PMR (polymyalgia rheumatica) (H)  Comment: ongoing  Plan:  prednisone 1mg QOD ordered,  f/u with rheumatology as OP, patient states she will discuss prednisone dose with rheumatology, has been refusing in TCU     (J43.9) Pulmonary emphysema, unspecified emphysema type (H)  Comment: ongoing  Plan: continue incruse ellipta 62.5mcg 1 puff QD, albuterol MDI prn     (G47.00) Insomnia, unspecified  Comment: acute  Plan: DC trazodone, start tylenol /25mg qhs patient states she takes tylenol PM at home    DISCHARGE PLAN:    Follow up labs: No labs orders/due    Medical Follow Up:      Follow up with primary care provider in 1 weeks    [facility] scheduled appointments:  Next 5 appointments (look  out 90 days)    Apr 15, 2022 10:45 AM  Return Visit with Kylie Sevilla DO  [facility] ([facility] [[email or web address] ) [address] [address] [address] [phone]         Future Appointments   Date Time Provider Department Center   4/15/2022 10:45 AM Kylie Sevilla DO [facility] PSA CLIN   5/19/2022 12:00 [facility]         Discharge Services: Home Care:  Occupational Therapy, Physical Therapy and Home Health Aide    Discharge Instructions Verbalized to Patient at Discharge:     None    TOTAL DISCHARGE TIME:   Greater than 30 minutes  Electronically signed by:  Tonya Lynn Haase, APRN CNP     Home care Face to Face documentation done in EPIC attached to Home care orders for [facility]. , Documentation of Face to Face and Certification for Home Health Services    I certify that patient: Tomasa Fam is under my care and that I, or a nurse practitioner or physician's assistant working with me, had a face-to-face encounter that meets the physician face-to-face encounter requirements with this patient on: 2/22/2022.    This encounter with the patient was in whole, or in part, for the following medical condition, which is the primary reason for home health care: Pubic rami Fx    I certify that, based on my findings, the following services are medically necessary home health services: Occupational Therapy and Physical Therapy.    My clinical findings support the need for the above services because: Occupational Therapy Services are needed to assess and treat cognitive ability and address ADL safety due to impairment in ADL training, home safety. and Physical Therapy Services are needed to assess and treat the following functional impairments: strengthening, endurance and home safety.    Further, I certify that my clinical findings support that this patient is homebound (i.e. absences from  home require considerable and taxing effort and are for medical reasons or Alevism services or infrequently or of short duration when for other reasons) because: using an assistive device for mobility.    Based on the above findings. I certify that this patient is confined to the home and needs intermittent skilled nursing care, physical therapy and/or speech therapy.  The patient is under my care, and I have initiated the establishment of the plan of care.  This patient will be followed by a physician who will periodically review the plan of care.  Physician/Provider to provide follow up care: Cris Romero    Attending hospital physician (the Medicare certified PECOS provider): Tonya Lynn Haase, APRN CNP  Physician Signature: See electronic signature associated with these discharge orders.  Date: 2/22/2022                Sincerely,        Tonya Lynn Haase, APRN CNP

## 2022-02-23 LAB
MDC_IDC_EPISODE_DTM: NORMAL
MDC_IDC_EPISODE_DTM: NORMAL
MDC_IDC_EPISODE_ID: NORMAL
MDC_IDC_EPISODE_ID: NORMAL
MDC_IDC_EPISODE_TYPE: NORMAL
MDC_IDC_EPISODE_TYPE: NORMAL
MDC_IDC_LEAD_IMPLANT_DT: NORMAL
MDC_IDC_LEAD_IMPLANT_DT: NORMAL
MDC_IDC_LEAD_LOCATION: NORMAL
MDC_IDC_LEAD_LOCATION: NORMAL
MDC_IDC_LEAD_LOCATION_DETAIL_1: NORMAL
MDC_IDC_LEAD_LOCATION_DETAIL_1: NORMAL
MDC_IDC_LEAD_MFG: NORMAL
MDC_IDC_LEAD_MFG: NORMAL
MDC_IDC_LEAD_MODEL: NORMAL
MDC_IDC_LEAD_MODEL: NORMAL
MDC_IDC_LEAD_POLARITY_TYPE: NORMAL
MDC_IDC_LEAD_SERIAL: NORMAL
MDC_IDC_LEAD_SERIAL: NORMAL
MDC_IDC_MSMT_BATTERY_DTM: NORMAL
MDC_IDC_MSMT_BATTERY_REMAINING_LONGEVITY: 120 MO
MDC_IDC_MSMT_BATTERY_REMAINING_PERCENTAGE: 100 %
MDC_IDC_MSMT_BATTERY_STATUS: NORMAL
MDC_IDC_MSMT_LEADCHNL_LV_IMPEDANCE_VALUE: 700 OHM
MDC_IDC_MSMT_LEADCHNL_LV_PACING_THRESHOLD_AMPLITUDE: 1.7 V
MDC_IDC_MSMT_LEADCHNL_LV_PACING_THRESHOLD_PULSEWIDTH: 0.4 MS
MDC_IDC_MSMT_LEADCHNL_RV_IMPEDANCE_VALUE: 616 OHM
MDC_IDC_MSMT_LEADCHNL_RV_PACING_THRESHOLD_AMPLITUDE: 1.3 V
MDC_IDC_MSMT_LEADCHNL_RV_PACING_THRESHOLD_PULSEWIDTH: 0.4 MS
MDC_IDC_PG_IMPLANT_DTM: NORMAL
MDC_IDC_PG_MFG: NORMAL
MDC_IDC_PG_MODEL: NORMAL
MDC_IDC_PG_SERIAL: NORMAL
MDC_IDC_PG_TYPE: NORMAL
MDC_IDC_SESS_CLINIC_NAME: NORMAL
MDC_IDC_SESS_DTM: NORMAL
MDC_IDC_SESS_TYPE: NORMAL
MDC_IDC_SET_BRADY_AT_MODE_SWITCH_RATE: 170 {BEATS}/MIN
MDC_IDC_SET_BRADY_LOWRATE: 70 {BEATS}/MIN
MDC_IDC_SET_BRADY_MAX_SENSOR_RATE: 130 {BEATS}/MIN
MDC_IDC_SET_BRADY_MODE: NORMAL
MDC_IDC_SET_CRT_LVRV_DELAY: 30 MS
MDC_IDC_SET_CRT_PACED_CHAMBERS: NORMAL
MDC_IDC_SET_LEADCHNL_LV_PACING_AMPLITUDE: 2.7 V
MDC_IDC_SET_LEADCHNL_LV_PACING_ANODE_ELECTRODE_1: NORMAL
MDC_IDC_SET_LEADCHNL_LV_PACING_ANODE_LOCATION_1: NORMAL
MDC_IDC_SET_LEADCHNL_LV_PACING_CATHODE_ELECTRODE_1: NORMAL
MDC_IDC_SET_LEADCHNL_LV_PACING_CATHODE_LOCATION_1: NORMAL
MDC_IDC_SET_LEADCHNL_LV_PACING_PULSEWIDTH: 0.4 MS
MDC_IDC_SET_LEADCHNL_LV_SENSING_ADAPTATION_MODE: NORMAL
MDC_IDC_SET_LEADCHNL_LV_SENSING_ANODE_ELECTRODE_1: NORMAL
MDC_IDC_SET_LEADCHNL_LV_SENSING_ANODE_LOCATION_1: NORMAL
MDC_IDC_SET_LEADCHNL_LV_SENSING_CATHODE_ELECTRODE_1: NORMAL
MDC_IDC_SET_LEADCHNL_LV_SENSING_CATHODE_LOCATION_1: NORMAL
MDC_IDC_SET_LEADCHNL_LV_SENSING_SENSITIVITY: 2.5 MV
MDC_IDC_SET_LEADCHNL_RA_SENSING_ADAPTATION_MODE: NORMAL
MDC_IDC_SET_LEADCHNL_RA_SENSING_SENSITIVITY: 0.5 MV
MDC_IDC_SET_LEADCHNL_RV_PACING_AMPLITUDE: 2.6 V
MDC_IDC_SET_LEADCHNL_RV_PACING_CAPTURE_MODE: NORMAL
MDC_IDC_SET_LEADCHNL_RV_PACING_POLARITY: NORMAL
MDC_IDC_SET_LEADCHNL_RV_PACING_PULSEWIDTH: 0.4 MS
MDC_IDC_SET_LEADCHNL_RV_SENSING_ADAPTATION_MODE: NORMAL
MDC_IDC_SET_LEADCHNL_RV_SENSING_POLARITY: NORMAL
MDC_IDC_SET_LEADCHNL_RV_SENSING_SENSITIVITY: 2.5 MV
MDC_IDC_SET_ZONE_DETECTION_INTERVAL: 375 MS
MDC_IDC_SET_ZONE_TYPE: NORMAL
MDC_IDC_SET_ZONE_VENDOR_TYPE: NORMAL
MDC_IDC_STAT_BRADY_DTM_END: NORMAL
MDC_IDC_STAT_BRADY_DTM_START: NORMAL
MDC_IDC_STAT_BRADY_RA_PERCENT_PACED: 0 %
MDC_IDC_STAT_BRADY_RV_PERCENT_PACED: 100 %
MDC_IDC_STAT_CRT_DTM_END: NORMAL
MDC_IDC_STAT_CRT_DTM_START: NORMAL
MDC_IDC_STAT_CRT_LV_PERCENT_PACED: 99 %
MDC_IDC_STAT_EPISODE_RECENT_COUNT: 0
MDC_IDC_STAT_EPISODE_RECENT_COUNT_DTM_END: NORMAL
MDC_IDC_STAT_EPISODE_RECENT_COUNT_DTM_START: NORMAL
MDC_IDC_STAT_EPISODE_TYPE: NORMAL
MDC_IDC_STAT_EPISODE_VENDOR_TYPE: NORMAL

## 2022-02-25 ENCOUNTER — TELEPHONE (OUTPATIENT)
Dept: FAMILY MEDICINE | Facility: CLINIC | Age: 82
End: 2022-02-25
Payer: MEDICARE

## 2022-02-25 NOTE — TELEPHONE ENCOUNTER
PT needs home care orders, she has [facility] coming out to her house tomorrow. Their phone number is [phone]. Please place orders for this company. PT has appt with Chance Fam on 3/2/22.  -Beverly Higgins

## 2022-02-25 NOTE — TELEPHONE ENCOUNTER
[employer] order placed at TCU.   Defer further orders to ordering provider, Tonya Haase APRN, CNP  Routed to her.    Request may be related to Triage authorization for therapies recommended,   Will route to triage.       Ifeoma Pt should see PCP, not another provider; there are appt options available    Thanks,   Cris Romero MD  Internal Medicine  electronically signed     Message routed to all 3. Thanks.

## 2022-02-28 ENCOUNTER — OFFICE VISIT (OUTPATIENT)
Dept: FAMILY MEDICINE | Facility: CLINIC | Age: 82
End: 2022-02-28
Payer: MEDICARE

## 2022-02-28 VITALS
HEART RATE: 70 BPM | BODY MASS INDEX: 22.86 KG/M2 | HEIGHT: 63 IN | SYSTOLIC BLOOD PRESSURE: 148 MMHG | OXYGEN SATURATION: 100 % | RESPIRATION RATE: 16 BRPM | DIASTOLIC BLOOD PRESSURE: 50 MMHG | WEIGHT: 129 LBS | TEMPERATURE: 98.7 F

## 2022-02-28 DIAGNOSIS — M35.3 PMR (POLYMYALGIA RHEUMATICA) (H): ICD-10-CM

## 2022-02-28 DIAGNOSIS — I48.19 PERSISTENT ATRIAL FIBRILLATION (H): Primary | ICD-10-CM

## 2022-02-28 DIAGNOSIS — E87.6 HYPOKALEMIA: ICD-10-CM

## 2022-02-28 DIAGNOSIS — R73.09 ELEVATED GLUCOSE: ICD-10-CM

## 2022-02-28 DIAGNOSIS — Z09 HOSPITAL DISCHARGE FOLLOW-UP: ICD-10-CM

## 2022-02-28 DIAGNOSIS — I10 ESSENTIAL HYPERTENSION: ICD-10-CM

## 2022-02-28 DIAGNOSIS — Z95.0 CARDIAC PACEMAKER IN SITU: ICD-10-CM

## 2022-02-28 DIAGNOSIS — F39 MOOD DISORDER (H): ICD-10-CM

## 2022-02-28 DIAGNOSIS — E78.5 HYPERLIPIDEMIA LDL GOAL <160: ICD-10-CM

## 2022-02-28 PROCEDURE — 99215 OFFICE O/P EST HI 40 MIN: CPT | Performed by: INTERNAL MEDICINE

## 2022-02-28 RX ORDER — METOPROLOL TARTRATE 100 MG
100 TABLET ORAL 2 TIMES DAILY
Qty: 180 TABLET | Refills: 1 | Status: SHIPPED | OUTPATIENT
Start: 2022-02-28 | End: 2022-07-08

## 2022-02-28 RX ORDER — EZETIMIBE 10 MG/1
10 TABLET ORAL EVERY EVENING
Qty: 90 TABLET | Refills: 1 | Status: SHIPPED | OUTPATIENT
Start: 2022-02-28 | End: 2022-09-15

## 2022-02-28 RX ORDER — POTASSIUM CHLORIDE 1500 MG/1
TABLET, EXTENDED RELEASE ORAL
Qty: 180 TABLET | Refills: 1 | Status: SHIPPED | OUTPATIENT
Start: 2022-02-28 | End: 2023-01-25

## 2022-02-28 RX ORDER — AMLODIPINE BESYLATE 5 MG/1
5 TABLET ORAL EVERY EVENING
Qty: 90 TABLET | Refills: 1 | Status: SHIPPED | OUTPATIENT
Start: 2022-02-28 | End: 2022-09-22

## 2022-02-28 ASSESSMENT — ANXIETY QUESTIONNAIRES
1. FEELING NERVOUS, ANXIOUS, OR ON EDGE: NOT AT ALL
7. FEELING AFRAID AS IF SOMETHING AWFUL MIGHT HAPPEN: NOT AT ALL
2. NOT BEING ABLE TO STOP OR CONTROL WORRYING: NOT AT ALL
GAD7 TOTAL SCORE: 1
6. BECOMING EASILY ANNOYED OR IRRITABLE: SEVERAL DAYS
3. WORRYING TOO MUCH ABOUT DIFFERENT THINGS: NOT AT ALL
5. BEING SO RESTLESS THAT IT IS HARD TO SIT STILL: NOT AT ALL
IF YOU CHECKED OFF ANY PROBLEMS ON THIS QUESTIONNAIRE, HOW DIFFICULT HAVE THESE PROBLEMS MADE IT FOR YOU TO DO YOUR WORK, TAKE CARE OF THINGS AT HOME, OR GET ALONG WITH OTHER PEOPLE: NOT DIFFICULT AT ALL

## 2022-02-28 ASSESSMENT — PATIENT HEALTH QUESTIONNAIRE - PHQ9
SUM OF ALL RESPONSES TO PHQ QUESTIONS 1-9: 6
5. POOR APPETITE OR OVEREATING: NOT AT ALL

## 2022-02-28 ASSESSMENT — PAIN SCALES - GENERAL: PAINLEVEL: SEVERE PAIN (6)

## 2022-02-28 NOTE — PROGRESS NOTES
Assessment & Plan     Hospital Follow up    (I48.19) Persistent atrial fibrillation (H)  (primary encounter diagnosis)  Comment: she is on meds for rate control and Eliquis for anticoagulation; also has pacemaker.  Plan: apixaban ANTICOAGULANT (ELIQUIS) 5 MG tablet,         metoprolol tartrate (LOPRESSOR) 100 MG tablet          (E78.5) Hyperlipidemia LDL goal <160  Comment: monitor lipids; future lab ordered.  Plan: ezetimibe (ZETIA) 10 MG tablet, Lipid panel         reflex to direct LDL Fasting          (I10) Essential hypertension  Comment: BLOOD PRESSURE running a bit high; increased stressors. Health challenges.   Plan: amLODIPine (NORVASC) 5 MG tablet, metoprolol         tartrate (LOPRESSOR) 100 MG tablet, potassium         chloride ER (KLOR-CON M) 20 MEQ CR tablet,         Comprehensive metabolic panel (BMP + Alb, Alk         Phos, ALT, AST, Total. Bili, TP)          (E87.6) Hypokalemia  Comment: low potassium in past but on Furosemide 80 mg per day unless she has appts, then she does not take but thinks she takes potassium daily. Assess labs; monitoring potassium intake depending on diuretic. Could consider change to Torsemide may act a tad slower to not interfere when she has appts.   Plan: potassium chloride ER (KLOR-CON M) 20 MEQ CR         tablet, Comprehensive metabolic panel (BMP +         Alb, Alk Phos, ALT, AST, Total. Bili, TP)          (M35.3) PMR (polymyalgia rheumatica) (H)  Comment: past PMR;   Lab Results   Component Value Date    SED 10 08/31/2020    SED 13 05/04/2020   Not elevated in quite some time; concern was raised recently; she does not recall specifics  Plan: ESR: Erythrocyte sedimentation rate, CRP,         inflammation, Comprehensive metabolic panel         (BMP + Alb, Alk Phos, ALT, AST, Total. 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   Plan: Hemoglobin A1c        Further assessment with future labs.     (F39) Mood disorder (H)  Comment: flagged on screening/check in; discussed with her; many stressors.  declines counseling or meds. no active plan.  Plan: many health stressors; she is frustrated about health, meds, etc.  Declines counseling, change in meds; \"don't add any more\"    Review of external notes as documented elsewhere in note  Prescription drug management   [facility]ed records from [facility], now home  40 minutes spent on the date of the encounter doing chart review, history and exam, documentation and further activities per the note     Depression Screening Follow Up    PHQ 2/28/2022   PHQ-9 Total Score 6   Q9: Thoughts of better off dead/self-harm past 2 weeks Several days     Last PHQ-9 2/28/2022   1.  Little interest or pleasure in doing things 0   2.  Feeling down, depressed, or hopeless 2   3.  Trouble falling or staying asleep, or sleeping too much 0   4.  Feeling tired or having little energy 1   5.  Poor appetite or overeating 0   6.  Feeling bad about yourself 1   7.  Trouble concentrating 1   8.  Moving slowly or restless 0   Q9: Thoughts of better off dead/self-harm past 2 weeks 1   PHQ-9 Total Score 6   Difficulty at work, home, or with people Somewhat difficult         Follow Up      Follow Up Actions Taken  Crisis resource information provided in the After Visit Summary  Patient declined referral.    Discussed the following ways the patient can remain in a safe environment:  be around others  MEDICATIONS:   Orders Placed This Encounter   Medications     amLODIPine (NORVASC) 5 MG tablet     Sig: Take 1 tablet (5 mg) by mouth every evening htn     Dispense:  90 tablet     Refill:  1     apixaban ANTICOAGULANT (ELIQUIS) 5 MG tablet     Sig: Take 1 tablet (5 mg) by " mouth 2 times daily     Dispense:  60 tablet     Refill:  11     ezetimibe (ZETIA) 10 MG tablet     Sig: Take 1 tablet (10 mg) by mouth every evening     Dispense:  90 tablet     Refill:  1     metoprolol tartrate (LOPRESSOR) 100 MG tablet     Sig: Take 1 tablet (100 mg) by mouth 2 times daily     Dispense:  180 tablet     Refill:  1     potassium chloride ER (KLOR-CON M) 20 MEQ CR tablet     Sig: TAKE 1 TABLET(20 MEQ) BY MOUTH TWICE DAILY     Dispense:  180 tablet     Refill:  1          - Continue other medications without change  FUTURE LABS:       - Schedule a fasting blood draw 3/24/2022  FUTURE APPOINTMENTS:       - she has cardiology and device clinic follow up   Regular exercise    Return in about 24 days (around 3/24/2022) for Lab Work, then follow up with cardiology and device clinic.    Cris Romero MD  Internal Medicine   [facility] [place] is a 81 year old who presents for the following health issues         HPI     Hypertension Follow-up      Do you check your blood pressure regularly outside of the clinic? No     Are you following a low salt diet? Yes    Are your blood pressures ever more than 140 on the top number (systolic) OR more   than 90 on the bottom number (diastolic), for example 140/90? States can check it if we need her to.       How many servings of fruits and vegetables do you eat daily?  1-2    On average, how many sweetened beverages do you drink each day (Examples: soda, juice, sweet tea, etc.  Do NOT count diet or artificially sweetened beverages)?   0    How many days per week do you exercise enough to make your heart beat faster? 3 or less    How many minutes a day do you exercise enough to make your heart beat faster? 9 or less    How many days per week do you miss taking your medication? 0      Hospital Follow-up Visit:    Hospital/Nursing Home/IP Rehab Facility: [facility]  Date of Admission: Hospital  1/31/2022 till 2/2/2022 and then went to [facility]  Date of Discharge: 2/22/22 TCU to Home  Reason(s) for Admission: fall  Right inferior pubic rami fracture secondary to mechanical fall.   History of atrial fibrillation  History of idiopathic cardiomyopathy, status post permanent pacemaker ablation with AV susu ablation in June 2020  Peripheral artery disease  Hypertension  COPD  Chronic kidney disease stage III      Was your hospitalization related to COVID-19? No   Problems taking medications regularly:  None  Medication changes since discharge: pt states medications were given at different times and patient started taking them as she did pre hospitalization   Problems adhering to non-medication therapy:  Pt needs to have orders placed for physical therapy to be done in her home.    Chart reviewed Home Care Orders placed by TCU staff.     Summary of hospitalization:  [facility] discharge summary reviewed  Diagnostic Tests/Treatments reviewed.  Follow up needed: none  Other Healthcare Providers Involved in Patient s Care:         Homecare and Face to Face done by Tonya Haase APRN CNP and being set up.   Update since discharge: stable.   PATIENT HEALTH QUESTIONNAIRE-9 (PHQ - 9)    Over the last 2 weeks, how often have you been bothered by any of the following problems?    1. Little interest or pleasure in doing things -  Not at all   2. Feeling down, depressed, or hopeless -  More than half the days   3. Trouble falling or staying asleep, or sleeping too much - Not at all   4. Feeling tired or having little energy -  Several days   5. Poor appetite or overeating -  Not at all   6. Feeling bad about yourself - or that you are a failure or have let yourself or your family down -  Several days   7. Trouble concentrating on things, such as reading the newspaper or watching television - Several days   8. Moving or speaking so slowly that other people could have noticed? Or the opposite -  "being so fidgety or restless that you have been moving around a lot more than usual Not at all   9. Thoughts that you would be better off dead or of hurting  yourself in some way Several days   Total Score: 6     If you checked off any problems, how difficult have these problems made it for you to do your work, take care of things at home, or get along with other people? Somewhat difficult    Developed by Cosme De Jesus, Blessing Martinez, Cirilo Pate and colleagues, with an educational elza from [employer] No permission required to reproduce, translate, display or distribute. permission required to reproduce, translate, display or distribute.   Post Discharge Medication Reconciliation: discharge medications reconciled and changed, per note/orders.  Plan of care communicated with patient            Review of Systems   CONSTITUTIONAL: NEGATIVE for fever, chills, change in weight  ENT/MOUTH: NEGATIVE for ear, mouth and throat problems  RESP: NEGATIVE for significant cough or SOB  CV: Atrial Fibrillation; pace maker placed 2020; denies chest pain or shortness of breath;  GI: NEGATIVE for nausea, abdominal pain, heartburn, or change in bowel habits  MUSCULOSKELETAL: pelvic rami fracture; improved ambulation. Physical therapy helpful  NEURO: NEGATIVE for weakness, dizziness or paresthesias  ENDOCRINE: reviewed cholesterol;  Past PMR, not recent- not on steroids  PSYCHIATRIC: increased stressors; PHQ flagged on screening. Resources have been offered.       Objective    BP (!) 148/50   Pulse 70   Temp 98.7  F (37.1  C) (Oral)   Resp 16   Ht 1.6 m (5' 3\")   Wt 58.5 kg (129 lb)   LMP  (LMP Unknown)   SpO2 100%   BMI 22.85 kg/m    Body mass index is 22.85 kg/m .  Physical Exam   GENERAL: healthy, alert and no distress  NECK: no adenopathy, no asymmetry, masses, or scars and thyroid normal to palpation  RESP: lungs clear to auscultation - no rales, rhonchi or wheezes  CV: left chest with palpable pace " maker; regular rate and rhythm, no peripheral edema and peripheral pulses strong  ABDOMEN: soft, nontender, no hepatosplenomegaly, no masses and bowel sounds normal  MS: no gross musculoskeletal defects noted, no edema; ambulating cautiously  NEURO: Normal strength and tone, mentation intact and speech normal  PSYCH: mentation appears normal, affect normal/bright  PSYCH: mentation appears normal and affect normal/bright

## 2022-03-01 ASSESSMENT — ANXIETY QUESTIONNAIRES: GAD7 TOTAL SCORE: 1

## 2022-03-03 ENCOUNTER — PATIENT OUTREACH (OUTPATIENT)
Dept: CARE COORDINATION | Facility: CLINIC | Age: 82
End: 2022-03-03
Payer: MEDICARE

## 2022-03-03 NOTE — PROGRESS NOTES
Clinic Care Coordination Contact  [facility]    Referral Source: IP Handoff  Clinical Data: Care Coordinator Outreach  Outreach attempted x 1.  Left message on patient's voicemail with call back information and requested return call.  Plan: Care Coordinator will send care coordination introduction letter with care coordinator contact information and explanation of care coordination services via [employer]. Care Coordinator will try to reach patient again in 1-2 business days.    TED Brower   Care Coordination Team  [phone]

## 2022-03-03 NOTE — LETTER
[facility] CARE COORDINATION  [address]  [facility] [facility] [address]  March 15, 2022    Tomasa Ruff Fam  [address] [facility] [address]      Dear Tomasa,    I am a clinic care coordinator who works with Cris Romero MD at [facility] . I have been trying to reach you recently to introduce Clinic Care Coordination and to see if there was anything I could assist you with.  Below is a description of clinic care coordination and how I can further assist you.      The clinic care coordination team is made up of a registered nurse,  and community health worker who understand the health care system. The goal of clinic care coordination is to help you manage your health and improve access to the health care system in the most efficient manner. The team can assist you in meeting your health care goals by providing education, coordinating services, strengthening the communication among your providers and supporting you with any resource needs.    Please feel free to contact me at [phone] with any questions or concerns. We are focused on providing you with the highest-quality healthcare experience possible and that all starts with you.     Sincerely,     TED Brower   Care Coordination Team  [phone]

## 2022-03-15 ENCOUNTER — TELEPHONE (OUTPATIENT)
Dept: FAMILY MEDICINE | Facility: CLINIC | Age: 82
End: 2022-03-15
Payer: MEDICARE

## 2022-03-15 NOTE — PROGRESS NOTES
Clinic Care Coordination Contact  [facility]/Voicemail    Referral Source: IP Handoff  Clinical Data: Care Coordinator Outreach  Outreach attempted x 2.  Left message on patient's voicemail with call back information and requested return call.  Plan: Care Coordinator sent care coordination introduction letter on 3/15/22 via [employer]. Care Coordinator will do no further outreaches at this time.      TED Brower   Care Coordination Team  [phone]

## 2022-03-16 DIAGNOSIS — Z53.9 DIAGNOSIS NOT YET DEFINED: Primary | ICD-10-CM

## 2022-03-16 PROCEDURE — G0180 MD CERTIFICATION HHA PATIENT: HCPCS | Performed by: INTERNAL MEDICINE

## 2022-03-17 DIAGNOSIS — Z53.9 DIAGNOSIS NOT YET DEFINED: Primary | ICD-10-CM

## 2022-03-17 PROCEDURE — G0180 MD CERTIFICATION HHA PATIENT: HCPCS | Performed by: INTERNAL MEDICINE

## 2022-03-23 ENCOUNTER — MEDICAL CORRESPONDENCE (OUTPATIENT)
Dept: HEALTH INFORMATION MANAGEMENT | Facility: CLINIC | Age: 82
End: 2022-03-23
Payer: MEDICARE

## 2022-03-24 ENCOUNTER — LAB (OUTPATIENT)
Dept: LAB | Facility: CLINIC | Age: 82
End: 2022-03-24
Payer: MEDICARE

## 2022-03-24 DIAGNOSIS — E87.6 HYPOKALEMIA: ICD-10-CM

## 2022-03-24 DIAGNOSIS — E78.5 HYPERLIPIDEMIA LDL GOAL <160: ICD-10-CM

## 2022-03-24 DIAGNOSIS — I10 ESSENTIAL HYPERTENSION: ICD-10-CM

## 2022-03-24 DIAGNOSIS — R73.09 ELEVATED GLUCOSE: ICD-10-CM

## 2022-03-24 DIAGNOSIS — M35.3 PMR (POLYMYALGIA RHEUMATICA) (H): ICD-10-CM

## 2022-03-24 LAB
ALBUMIN SERPL-MCNC: 2.7 G/DL (ref 3.4–5)
ALP SERPL-CCNC: 130 U/L (ref 40–150)
ALT SERPL W P-5'-P-CCNC: 41 U/L (ref 0–50)
ANION GAP SERPL CALCULATED.3IONS-SCNC: 6 MMOL/L (ref 3–14)
AST SERPL W P-5'-P-CCNC: 22 U/L (ref 0–45)
BILIRUB SERPL-MCNC: 0.5 MG/DL (ref 0.2–1.3)
BUN SERPL-MCNC: 17 MG/DL (ref 7–30)
CALCIUM SERPL-MCNC: 10.4 MG/DL (ref 8.5–10.1)
CHLORIDE BLD-SCNC: 106 MMOL/L (ref 94–109)
CHOLEST SERPL-MCNC: 130 MG/DL
CO2 SERPL-SCNC: 25 MMOL/L (ref 20–32)
CREAT SERPL-MCNC: 0.6 MG/DL (ref 0.52–1.04)
CRP SERPL-MCNC: 110 MG/L (ref 0–8)
ERYTHROCYTE [DISTWIDTH] IN BLOOD BY AUTOMATED COUNT: 14.4 % (ref 10–15)
ERYTHROCYTE [SEDIMENTATION RATE] IN BLOOD BY WESTERGREN METHOD: 55 MM/HR (ref 0–30)
FASTING STATUS PATIENT QL REPORTED: YES
GFR SERPL CREATININE-BSD FRML MDRD: 89 ML/MIN/1.73M2
GLUCOSE BLD-MCNC: 100 MG/DL (ref 70–99)
HBA1C MFR BLD: 5.3 % (ref 0–5.6)
HCT VFR BLD AUTO: 31.4 % (ref 35–47)
HDLC SERPL-MCNC: 51 MG/DL
HGB BLD-MCNC: 9.9 G/DL (ref 11.7–15.7)
LDLC SERPL CALC-MCNC: 58 MG/DL
MCH RBC QN AUTO: 26.4 PG (ref 26.5–33)
MCHC RBC AUTO-ENTMCNC: 31.5 G/DL (ref 31.5–36.5)
MCV RBC AUTO: 84 FL (ref 78–100)
NONHDLC SERPL-MCNC: 79 MG/DL
PLATELET # BLD AUTO: 442 10E3/UL (ref 150–450)
POTASSIUM BLD-SCNC: 3.9 MMOL/L (ref 3.4–5.3)
PROT SERPL-MCNC: 7 G/DL (ref 6.8–8.8)
RBC # BLD AUTO: 3.75 10E6/UL (ref 3.8–5.2)
SODIUM SERPL-SCNC: 137 MMOL/L (ref 133–144)
TRIGL SERPL-MCNC: 104 MG/DL
WBC # BLD AUTO: 9.5 10E3/UL (ref 4–11)

## 2022-03-24 PROCEDURE — 86140 C-REACTIVE PROTEIN: CPT

## 2022-03-24 PROCEDURE — 36415 COLL VENOUS BLD VENIPUNCTURE: CPT

## 2022-03-24 PROCEDURE — 85027 COMPLETE CBC AUTOMATED: CPT

## 2022-03-24 PROCEDURE — 80053 COMPREHEN METABOLIC PANEL: CPT

## 2022-03-24 PROCEDURE — 80061 LIPID PANEL: CPT

## 2022-03-24 PROCEDURE — 85652 RBC SED RATE AUTOMATED: CPT

## 2022-03-24 PROCEDURE — 83036 HEMOGLOBIN GLYCOSYLATED A1C: CPT

## 2022-03-28 DIAGNOSIS — I10 ESSENTIAL HYPERTENSION: ICD-10-CM

## 2022-03-28 RX ORDER — AMLODIPINE BESYLATE 5 MG/1
5 TABLET ORAL EVERY EVENING
Qty: 90 TABLET | Refills: 1 | Status: CANCELLED | OUTPATIENT
Start: 2022-03-28

## 2022-03-28 NOTE — TELEPHONE ENCOUNTER
Patient states she is suppose to be taking 2 pills a day after she got out of rehab. Does not have any pills left from picking up 02/28/22.      Does patient needs to be 2 a day or 1 a day?

## 2022-03-30 NOTE — TELEPHONE ENCOUNTER
Called the pt.      Advised of below.      She will continue with 5 mg amlodipine as she has been.  Five mg tablets were sent in on 2/28/22, disp 90 with 1 refill.  Pt advised to take only 1 because these are 5 mg tablets.  She had been taking 2 (2.5 mg tabs).      Offered to make a nurse only blood pressure check.  She is going to have to check around because she she can't drive and will have to get a ride.  She will call back once she knows someone can bring her.

## 2022-03-30 NOTE — TELEPHONE ENCOUNTER
"Chart reviewed. Looks like she had been on Amlodipine 2.5 mg and it was \"doubled\" to 5 mg.  She is now on 5 mg Amlodipine.   Recommend she have nurse only BLOOD PRESSURE check to assess BLOOD PRESSURE control.   she is also on Metoprolol 100 mg BID.  Amlodipine ordered at recent post TCU follow up; should have enough including refill.  Await clarification with pt.     Routed to Triage to address with pt and   Sender as MAYDA  "

## 2022-04-08 ENCOUNTER — HOSPITAL ENCOUNTER (OUTPATIENT)
Facility: CLINIC | Age: 82
End: 2022-04-08
Attending: ORTHOPAEDIC SURGERY | Admitting: ORTHOPAEDIC SURGERY
Payer: MEDICARE

## 2022-04-15 ENCOUNTER — OFFICE VISIT (OUTPATIENT)
Dept: CARDIOLOGY | Facility: CLINIC | Age: 82
End: 2022-04-15
Payer: MEDICARE

## 2022-04-15 VITALS
HEIGHT: 63 IN | WEIGHT: 133 LBS | HEART RATE: 70 BPM | DIASTOLIC BLOOD PRESSURE: 50 MMHG | SYSTOLIC BLOOD PRESSURE: 138 MMHG | OXYGEN SATURATION: 99 % | BODY MASS INDEX: 23.57 KG/M2

## 2022-04-15 DIAGNOSIS — Z98.890 HX OF ATRIOVENTRICULAR NODE ABLATION: ICD-10-CM

## 2022-04-15 DIAGNOSIS — Z95.0 CARDIAC PACEMAKER IN SITU: ICD-10-CM

## 2022-04-15 PROCEDURE — 99214 OFFICE O/P EST MOD 30 MIN: CPT | Performed by: INTERNAL MEDICINE

## 2022-04-15 NOTE — PROGRESS NOTES
HPI and Plan:   See dictation    No orders of the defined types were placed in this encounter.      No orders of the defined types were placed in this encounter.      There are no discontinued medications.      Encounter Diagnoses   Name Primary?     Cardiac pacemaker in situ      Hx of atrioventricular node ablation        CURRENT MEDICATIONS:  Current Outpatient Medications   Medication Sig Dispense Refill     acetaminophen (TYLENOL) 325 MG tablet Take 3 tablets (975 mg) by mouth every 8 hours (Patient taking differently: Take 650 mg by mouth every 8 hours as needed)       amLODIPine (NORVASC) 5 MG tablet Take 1 tablet (5 mg) by mouth every evening htn 90 tablet 1     apixaban ANTICOAGULANT (ELIQUIS) 5 MG tablet Take 1 tablet (5 mg) by mouth 2 times daily 60 tablet 11     calcium citrate-vitamin D (CITRACAL) 315-250 MG-UNIT TABS per tablet Take 1 tablet by mouth 2 times daily        ezetimibe (ZETIA) 10 MG tablet Take 1 tablet (10 mg) by mouth every evening 90 tablet 1     INCRUSE ELLIPTA 62.5 MCG/INH Inhaler Inhale 1 puff into the lungs daily       metoprolol tartrate (LOPRESSOR) 100 MG tablet Take 1 tablet (100 mg) by mouth 2 times daily 180 tablet 1     omega 3 1000 MG CAPS Take 1 capsule by mouth daily  90 capsule      polyethylene glycol (MIRALAX) 17 GM/Dose powder Take 17 g by mouth daily 510 g      potassium chloride ER (KLOR-CON M) 20 MEQ CR tablet TAKE 1 TABLET(20 MEQ) BY MOUTH TWICE DAILY 180 tablet 1       ALLERGIES     Allergies   Allergen Reactions     No Known Drug Allergies      Tape [Adhesive Tape]      Sensitive to plastic tape on upper part of body--takes skin off       PAST MEDICAL HISTORY:  Past Medical History:   Diagnosis Date     Arthritis     hands, knees     Breast cancer (H) jan. 2012     left mastectomy followed by right;  Dr. Luong     Chronic airway obstruction, not elsewhere classified 2006    very mild COPD - small cough     Concussion 3/13/2013     Problem list name updated by  automated process. Provider to review and confirm Imo Update utility     Cystocele, midline 4/4/2012     Diffuse cystic mastopathy      Gastroesophageal reflux disease, esophagitis presence not specified 11/23/2019     History of hypokalemia 1/23/2013     Hyperlipidemia LDL goal <160 6/29/2011    [place] 10-year CHD Risk Score: 2% (14 Total Points)  Values used to calculate score:    Age: 70 years -- Points: 14    Total Cholesterol: 192 mg/dL -- Points: 1    HDL Cholesterol: 61 mg/dL -- Points: -1    Systolic BP (treated): 118 mmHg -- Points: 0   The patient is not a smoker. -- Points: 0   The patient has not been diagnosed with diabetes. -- Points: 0   The patient does not have a famil     Lung cancer (H) 10/21/2015     Paroxysmal atrial fibrillation (H) 9/13/2019     PMR (polymyalgia rheumatica) (H) 1/17/2020    tapering' above.)  In patients receiving over 10 mg of prednisone/day, the dose can be lowered by 2.5 mg/day decrements every two to four weeks  Once the dose of prednisone is 10 mg/day, further tapering can be done by 1 mg per month, provided the clinical course is stable  The clinical response to glucocorticoid therapy is closely monitored, which centers on screening for the presence and/or recu     Thyroid nodule 4/23/2016    Noted on CT Fall 2015.      Unspecified essential hypertension late 1980's       PAST SURGICAL HISTORY:  Past Surgical History:   Procedure Laterality Date     ANESTHESIA CARDIOVERSION N/A 6/12/2020    Procedure: ANESTHESIA, FOR CARDIOVERSION;  Surgeon: GENERIC ANESTHESIA PROVIDER;  Location:  OR     COLONOSCOPY  3/2003    adenomatous polyp      COLONOSCOPY  7/2006    diverticulosis - repeat in 5 years     COLONOSCOPY  10/13/2011    Procedure:COLONOSCOPY; COLONOSCOPY ; Surgeon:CHITO GORDILLO; Location: GI     CYSTOSCOPY  7/11/2012    Procedure: CYSTOSCOPY;;  Surgeon: Aline Cooper DO;  Location:  OR     DAVINCI HYSTERECTOMY SUPRACERVICAL, SACROCOLPOPEXY,  COMBINED  7/11/2012    Procedure: COMBINED DAVINCI HYSTERECTOMY SUPRACERVICAL, SACROCOLPOPEXY;   DAVINCI ASSISTED LAPAROSCOPIC SUPRACERVICAL HYSTERECTOMY AND BILATERAL SALPINGO-OOPHORECTOMY, SACROCOLPOPEXY AND CYSTOSCOPY;  Surgeon: Aline Cooper DO;  Location:  OR     EP ABLATION AV NODE N/A 6/22/2020    Procedure: EP ABLATION AV NODE;  Surgeon: Adriano Raman MD;  Location:  HEART CARDIAC CATH LAB     EP BIVENT LEAD PLACEMENT N/A 6/22/2020    Procedure: Bivent Lead Placement;  Surgeon: Adriano Raman MD;  Location:  HEART CARDIAC CATH LAB     EP PACEMAKER N/A 6/22/2020    Procedure: AVNA and BiV Pacemaker Insertion;  Surgeon: Adriano Raman MD;  Location:  HEART CARDIAC CATH LAB     ESOPHAGOSCOPY, GASTROSCOPY, DUODENOSCOPY (EGD), COMBINED N/A 12/14/2021    Procedure: ESOPHAGOGASTRODUODENOSCOPY (EGD) (fv) biopsies with cold forcep;  Surgeon: Roberto Nguyen MD;  Location:  GI     EXCISE LESION EYELID Right 6/29/2015    Procedure: EXCISE LESION EYELID;  Surgeon: Hank Olvera MD;  Location:  SD     INSERT PORT VASCULAR ACCESS  2/3/2012    Procedure:INSERT PORT VASCULAR ACCESS; Power Port-A- Catheter Placement ; Surgeon:IRISH TAPIA; Location: OR     LAPAROSCOPIC SALPINGO-OOPHORECTOMY  7/11/2012    Procedure: LAPAROSCOPIC SALPINGO-OOPHORECTOMY;  Davinci;  Surgeon: Aline Cooper DO;  Location:  OR     LIPOSUCTION, RHYTIDECTOMY, COMBINED       LOBECTOMY LUNG Right 3/1/2016    Procedure: LOBECTOMY LUNG;  Surgeon: Jonas Woodward MD;  Location:  OR     MAMMOPLASTY REDUCTION  1/6/2012    Procedure:MAMMOPLASTY REDUCTION; Surgeon:MICKI CAICEDO; Location:[facility] OR     MASTECTOMY SIMPLE  11/12/2012    Procedure: MASTECTOMY SIMPLE;   Right Prophylactic Mastectomy with attempted Right Sentinal Node Biopsy, Revision Bilateral Mastectomy Insicions, liposuction in breast area;  Surgeon: Irish Tapia MD;  Location: [facility] OR     MASTECTOMY SIMPLE,  SENTINEL NODE, COMBINED  1/6/2012    Procedure:COMBINED MASTECTOMY SIMPLE, SENTINEL NODE; Left Mastectomy Left [place] Node Biopsy,  Right Breast Reduction ; Surgeon:IRISH TAPIA; Location:[facility] OR     MASTECTOMY, BILATERAL       REMOVE PORT VASCULAR ACCESS  4/29/2013    Procedure: REMOVE PORT VASCULAR ACCESS;  Port A catheter removal ;  Surgeon: Irish Tapia MD;  Location: [facility] OR     REPAIR PTOSIS BILATERAL Bilateral 6/29/2015    Procedure: REPAIR PTOSIS BILATERAL;  Surgeon: Hank Olvera MD;  Location:  SD     REVISE RECONSTRUCTED BREAST BILATERAL  11/12/2012    Procedure: REVISE RECONSTRUCTED BREAST BILATERAL;;  Surgeon: Katia Kyle MD;  Location: [facility] OR     SURGICAL HISTORY OF -   in 40's    face lift     SURGICAL HISTORY OF -       lipoma removed right thigh     SURGICAL HISTORY OF -       D and C     THORACOTOMY Right 3/1/2016    Procedure: THORACOTOMY;  Surgeon: Jonas Woodward MD;  Location:  OR     ZZC NONSPECIFIC PROCEDURE  1970    s/p Tubal ligation 1970       FAMILY HISTORY:  Family History   Problem Relation Age of Onset     Cardiovascular Father         ruptured aorta, hardening of the arteries     Cerebrovascular Disease Mother      Respiratory Mother         chronic bronchitis - was a smoker early on     Cardiovascular Paternal Grandfather         MI     Cerebrovascular Disease Paternal Aunt      Hypertension Son      Neurologic Disorder Daughter         migraines     Breast Cancer Daughter      Brain Tumor Sister        SOCIAL HISTORY:  Social History     Socioeconomic History     Marital status:      Spouse name: Aren     Number of children: 2   Occupational History     Occupation: "[employer]     Employer: NONE    Tobacco Use     Smoking status: Never Smoker     Smokeless tobacco: Never Used   Substance and Sexual Activity     Alcohol use: Yes     Comment: 0-1 drink day     Drug use: No     Sexual activity: Yes     Partners: Male   Other Topics Concern  "    Exercise Yes     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 Yes       Review of Systems:  Skin:          Eyes:         ENT:         Respiratory:          Cardiovascular:         Gastroenterology:        Genitourinary:         Musculoskeletal:         Neurologic:         Psychiatric:         Heme/Lymph/Imm:         Endocrine:           Physical Exam:  Vitals: /50 (BP Location: Right arm, Patient Position: Sitting, Cuff Size: Adult Regular)   Pulse 70   Ht 1.6 m (5' 3\")   Wt 60.3 kg (133 lb)   LMP  (LMP Unknown)   SpO2 99%   BMI 23.56 kg/m      Constitutional:  cooperative;in no acute distress        Skin:    bruises present;dusky;venous stasis changes        Head:  normocephalic, no masses or lesions        Eyes:  pupils equal and round        Lymph:      ENT:  no pallor or cyanosis, dentition good        Neck:  no carotid bruit        Respiratory:  clear to auscultation;normal symmetry         Cardiac: regular rhythm       systolic ejection murmur;grade 2     single ectopic beat     pulses below the femoral arteries are diminished                                      GI:  abdomen soft        Extremities and Muscular Skeletal:  no deformities, clubbing, cyanosis, erythema observed stasis pigmentation 1+          Neurological:  no gross motor deficits        Psych:  Alert and Oriented x 3          CC  Kylie Sevilla, DO  [address]  [place] [address]                  "

## 2022-04-15 NOTE — LETTER
4/15/2022    Cris Romero MD  [address] [place] [address]    RE: Tomasa Fam       Dear Colleague,     I had the pleasure of seeing Tomasa Fam in the [facility].  HPI and Plan:   See dictation    No orders of the defined types were placed in this encounter.      No orders of the defined types were placed in this encounter.      There are no discontinued medications.      Encounter Diagnoses   Name Primary?     Cardiac pacemaker in situ      Hx of atrioventricular node ablation        CURRENT MEDICATIONS:  Current Outpatient Medications   Medication Sig Dispense Refill     acetaminophen (TYLENOL) 325 MG tablet Take 3 tablets (975 mg) by mouth every 8 hours (Patient taking differently: Take 650 mg by mouth every 8 hours as needed)       amLODIPine (NORVASC) 5 MG tablet Take 1 tablet (5 mg) by mouth every evening htn 90 tablet 1     apixaban ANTICOAGULANT (ELIQUIS) 5 MG tablet Take 1 tablet (5 mg) by mouth 2 times daily 60 tablet 11     calcium citrate-vitamin D (CITRACAL) 315-250 MG-UNIT TABS per tablet Take 1 tablet by mouth 2 times daily        ezetimibe (ZETIA) 10 MG tablet Take 1 tablet (10 mg) by mouth every evening 90 tablet 1     INCRUSE ELLIPTA 62.5 MCG/INH Inhaler Inhale 1 puff into the lungs daily       metoprolol tartrate (LOPRESSOR) 100 MG tablet Take 1 tablet (100 mg) by mouth 2 times daily 180 tablet 1     omega 3 1000 MG CAPS Take 1 capsule by mouth daily  90 capsule      polyethylene glycol (MIRALAX) 17 GM/Dose powder Take 17 g by mouth daily 510 g      potassium chloride ER (KLOR-CON M) 20 MEQ CR tablet TAKE 1 TABLET(20 MEQ) BY MOUTH TWICE DAILY 180 tablet 1       ALLERGIES     Allergies   Allergen Reactions     No Known Drug Allergies      Tape [Adhesive Tape]      Sensitive to plastic tape on upper part of body--takes skin off       PAST MEDICAL HISTORY:  Past Medical History:   Diagnosis Date     Arthritis     hands, knees     Breast cancer  (H) jan. 2012     left mastectomy followed by right;  Dr. Luong     Chronic airway obstruction, not elsewhere classified 2006    very mild COPD - small cough     Concussion 3/13/2013     Problem list name updated by automated process. Provider to review and confirm Imo Update utility     Cystocele, midline 4/4/2012     Diffuse cystic mastopathy      Gastroesophageal reflux disease, esophagitis presence not specified 11/23/2019     History of hypokalemia 1/23/2013     Hyperlipidemia LDL goal <160 6/29/2011    [place] 10-year CHD Risk Score: 2% (14 Total Points)  Values used to calculate score:    Age: 70 years -- Points: 14    Total Cholesterol: 192 mg/dL -- Points: 1    HDL Cholesterol: 61 mg/dL -- Points: -1    Systolic BP (treated): 118 mmHg -- Points: 0   The patient is not a smoker. -- Points: 0   The patient has not been diagnosed with diabetes. -- Points: 0   The patient does not have a famil     Lung cancer (H) 10/21/2015     Paroxysmal atrial fibrillation (H) 9/13/2019     PMR (polymyalgia rheumatica) (H) 1/17/2020    tapering' above.)  In patients receiving over 10 mg of prednisone/day, the dose can be lowered by 2.5 mg/day decrements every two to four weeks  Once the dose of prednisone is 10 mg/day, further tapering can be done by 1 mg per month, provided the clinical course is stable  The clinical response to glucocorticoid therapy is closely monitored, which centers on screening for the presence and/or recu     Thyroid nodule 4/23/2016    Noted on CT Fall 2015.      Unspecified essential hypertension late 1980's       PAST SURGICAL HISTORY:  Past Surgical History:   Procedure Laterality Date     ANESTHESIA CARDIOVERSION N/A 6/12/2020    Procedure: ANESTHESIA, FOR CARDIOVERSION;  Surgeon: GENERIC ANESTHESIA PROVIDER;  Location: [facility] OR     COLONOSCOPY  3/2003    adenomatous polyp      COLONOSCOPY  7/2006    diverticulosis - repeat in 5 years     COLONOSCOPY  10/13/2011    Procedure:COLONOSCOPY;  COLONOSCOPY ; Surgeon:CHITO GORDILLO; Location: GI     CYSTOSCOPY  7/11/2012    Procedure: CYSTOSCOPY;;  Surgeon: Aline Cooper DO;  Location:  OR     DAVINCI HYSTERECTOMY SUPRACERVICAL, SACROCOLPOPEXY, COMBINED  7/11/2012    Procedure: COMBINED DAVINCI HYSTERECTOMY SUPRACERVICAL, SACROCOLPOPEXY;   DAVINCI ASSISTED LAPAROSCOPIC SUPRACERVICAL HYSTERECTOMY AND BILATERAL SALPINGO-OOPHORECTOMY, SACROCOLPOPEXY AND CYSTOSCOPY;  Surgeon: Aline Cooper DO;  Location:  OR     EP ABLATION AV NODE N/A 6/22/2020    Procedure: EP ABLATION AV NODE;  Surgeon: Adriano Raman MD;  Location:  HEART CARDIAC CATH LAB     EP BIVENT LEAD PLACEMENT N/A 6/22/2020    Procedure: Bivent Lead Placement;  Surgeon: Adriano Raman MD;  Location:  HEART CARDIAC CATH LAB     EP PACEMAKER N/A 6/22/2020    Procedure: AVNA and BiV Pacemaker Insertion;  Surgeon: Adriano Raman MD;  Location:  HEART CARDIAC CATH LAB     ESOPHAGOSCOPY, GASTROSCOPY, DUODENOSCOPY (EGD), COMBINED N/A 12/14/2021    Procedure: ESOPHAGOGASTRODUODENOSCOPY (EGD) (fv) biopsies with cold forcep;  Surgeon: Chito Gordillo MD;  Location:  GI     EXCISE LESION EYELID Right 6/29/2015    Procedure: EXCISE LESION EYELID;  Surgeon: Hank Olvera MD;  Location: [facility]     INSERT PORT VASCULAR ACCESS  2/3/2012    Procedure:INSERT PORT VASCULAR ACCESS; Power Port-A- Catheter Placement ; Surgeon:TRESSA TAPIA; Location: OR     LAPAROSCOPIC SALPINGO-OOPHORECTOMY  7/11/2012    Procedure: LAPAROSCOPIC SALPINGO-OOPHORECTOMY;  Davinci;  Surgeon: Aline Cooper DO;  Location:  OR     LIPOSUCTION, RHYTIDECTOMY, COMBINED       LOBECTOMY LUNG Right 3/1/2016    Procedure: LOBECTOMY LUNG;  Surgeon: Jonas Woodward MD;  Location:  OR     MAMMOPLASTY REDUCTION  1/6/2012    Procedure:MAMMOPLASTY REDUCTION; Surgeon:MICKI CAICEDO; Location: OR     MASTECTOMY SIMPLE  11/12/2012    Procedure: MASTECTOMY  SIMPLE;   Right Prophylactic Mastectomy with attempted Right Sentinal Node Biopsy, Revision Bilateral Mastectomy Insicions, liposuction in breast area;  Surgeon: Irish Tapia MD;  Location: [facility] OR     MASTECTOMY SIMPLE, SENTINEL NODE, COMBINED  1/6/2012    Procedure:COMBINED MASTECTOMY SIMPLE, SENTINEL NODE; Left Mastectomy Left [place] Node Biopsy,  Right Breast Reduction ; Surgeon:IRISH TAPIA; Location:[facility] OR     MASTECTOMY, BILATERAL       REMOVE PORT VASCULAR ACCESS  4/29/2013    Procedure: REMOVE PORT VASCULAR ACCESS;  Port A catheter removal ;  Surgeon: Irish Tapia MD;  Location: [facility] OR     REPAIR PTOSIS BILATERAL Bilateral 6/29/2015    Procedure: REPAIR PTOSIS BILATERAL;  Surgeon: Hank Olvera MD;  Location:  SD     REVISE RECONSTRUCTED BREAST BILATERAL  11/12/2012    Procedure: REVISE RECONSTRUCTED BREAST BILATERAL;;  Surgeon: Katia Kyle MD;  Location: [facility] OR     SURGICAL HISTORY OF -   in 40's    face lift     SURGICAL HISTORY OF -       lipoma removed right thigh     SURGICAL HISTORY OF -       D and C     THORACOTOMY Right 3/1/2016    Procedure: THORACOTOMY;  Surgeon: Jonas Woodward MD;  Location:  OR     [facility] NONSPECIFIC PROCEDURE  1970    s/p Tubal ligation 1970       FAMILY HISTORY:  Family History   Problem Relation Age of Onset     Cardiovascular Father         ruptured aorta, hardening of the arteries     Cerebrovascular Disease Mother      Respiratory Mother         chronic bronchitis - was a smoker early on     Cardiovascular Paternal Grandfather         MI     Cerebrovascular Disease Paternal Aunt      Hypertension Son      Neurologic Disorder Daughter         migraines     Breast Cancer Daughter      Brain Tumor Sister        SOCIAL HISTORY:  Social History     Socioeconomic History     Marital status:      Spouse name: Aren     Number of children: 2   Occupational History     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     Employer: NONE    Tobacco Use      "Smoking status: Never Smoker     Smokeless tobacco: Never Used   Substance and Sexual Activity     Alcohol use: Yes     Comment: 0-1 drink day     Drug use: No     Sexual activity: Yes     Partners: Male   Other Topics Concern     Exercise Yes     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 Yes       Review of Systems:  Skin:          Eyes:         ENT:         Respiratory:          Cardiovascular:         Gastroenterology:        Genitourinary:         Musculoskeletal:         Neurologic:         Psychiatric:         Heme/Lymph/Imm:         Endocrine:           Physical Exam:  Vitals: /50 (BP Location: Right arm, Patient Position: Sitting, Cuff Size: Adult Regular)   Pulse 70   Ht 1.6 m (5' 3\")   Wt 60.3 kg (133 lb)   LMP  (LMP Unknown)   SpO2 99%   BMI 23.56 kg/m      Constitutional:  cooperative;in no acute distress        Skin:    bruises present;dusky;venous stasis changes        Head:  normocephalic, no masses or lesions        Eyes:  pupils equal and round        Lymph:      ENT:  no pallor or cyanosis, dentition good        Neck:  no carotid bruit        Respiratory:  clear to auscultation;normal symmetry         Cardiac: regular rhythm       systolic ejection murmur;grade 2     single ectopic beat     pulses below the femoral arteries are diminished                                      GI:  abdomen soft        Extremities and Muscular Skeletal:  no deformities, clubbing, cyanosis, erythema observed stasis pigmentation 1+          Neurological:  no gross motor deficits        Psych:  Alert and Oriented x 3          CC  Kylie Sevilla DO  [address]  [place] [address]    Thank you for allowing me to participate in the care of your patient.      Sincerely,     Kylie Sevilla DO     [facility]  "

## 2022-04-15 NOTE — PROGRESS NOTES
Service Date: 04/15/2022    CLINIC FOLLOWUP VISIT    REFERRING PHYSICIAN:  Dr. Cris Romero    HISTORY OF PRESENT ILLNESS:  Ms. Fam is a pleasant 81-year-old female with a history of permanent atrial fibrillation, idiopathic cardiomyopathy, hypertension, history of lung and breast cancer and peripheral arterial disease.  She is here for a followup visit.  She tells me she is scheduled to undergo a right knee replacement on 05/23/2022 due to his severe discomfort and pain.  I am concerned about surgery for her for a couple of reasons.  I have noticed that her blood count has been dropping.  In January, it was 11.8.   It was measured on 03/24 and it was measured at 9.9.  She denies any blood in her stool, black tarry-looking stools or blood in her urine.  We talked about her taking an iron supplement and following up on that.  She also has had poor wound healing in her right lower extremity.  Last year, we did a Doppler of both venous and arterial and she has severe peripheral arterial disease in the right leg with occlusions of the posterior tibial and peroneal arteries with single-vessel runoff and likely a greater than 50% stenosis in the proximal portion of that artery.  Her left lower extremity had normal velocities.  She also has varicose veins with significant venous insufficiency and lower extremity swelling in both legs.  From a heart perspective, she has done well.  Her last echo in 2020 suggested normal LV function with an EF around 55%-60%.  She had only mild aortic regurgitation.  Moderate-to-severe biatrial enlargement was noted and her interrogation of her pacing device in February did not suggest any arrhythmias other than her atrial fibrillation.  Her breathing has always been an issue.  She has trouble with moderate exertion.  She denies any symptoms suggestive of orthopnea or PND.    PHYSICAL EXAMINATION:    VITAL SIGNS:  Today, blood pressure is 138/50, pulse of 70, weight is 133 pounds.     CARDIOVASCULAR:  Tones today are regular.  She has a grade 2 systolic ejection murmur.    RESPIRATORY:  Lung fields are clear.    EXTREMITIES:  She has about 1+ edema to her lower extremities.  I did not note any ulcerations or skin tissue breakdown in her right leg.  She does have some old healed wounds.      SUMMARY:  Ms. Hodge is a very pleasant 81-year-old female with history of idiopathic cardiomyopathy, resolved.  She has permanent atrial fibrillation and a cardiac pacing device.  She is paced 100% of the time with history of AV susu ablation.  She is on Eliquis for CVA prophylaxis.  She has new anemia of unknown etiology and severe peripheral arterial disease in the right lower extremity.  Both will increase her risk for complications for her upcoming knee surgery.  I have advised her to start an iron supplement and follow up with her primary care provider in regard to the anemia.  She will need close monitoring for poor wound healing postoperatively and Orthopedic Surgery may want to consider repeating an GREY or arterial ultrasound of that right lower extremity prior to surgery.    Please feel free to contact me with any questions you have in regards to her care.    Kylie Sevilla DO     cc:  Cris Romero MD   [facility]  [address] [place] [address]    Kylie Sevilla DO        D: 04/15/2022   T: 04/15/2022   MT: elizabeth    Name:     HAMIDA HODGE  MRN:      [id number]        Account:      [id number]   :      1940           Service Date: 04/15/2022       Document: [id number]

## 2022-04-17 DIAGNOSIS — Z11.59 ENCOUNTER FOR SCREENING FOR OTHER VIRAL DISEASES: Primary | ICD-10-CM

## 2022-04-27 ENCOUNTER — TELEPHONE (OUTPATIENT)
Dept: CARDIOLOGY | Facility: CLINIC | Age: 82
End: 2022-04-27
Payer: MEDICARE

## 2022-04-27 NOTE — TELEPHONE ENCOUNTER
[facility] Call Center    Phone Message    May a detailed message be left on voicemail: yes     Reason for Call: Medication Question or concern regarding medication   Prescription Clarification  Name of Medication: Iron supplement  Prescribing Provider: Di   Pharmacy:    What on the order needs clarification? Patient is wondering if its normal when taking iron supplements to be constipated and have blackish colored stools. Please call and discuss.          Action Taken: Other: Cardiology    Travel Screening: Not Applicable

## 2022-04-29 NOTE — TELEPHONE ENCOUNTER
"Called pt -  Question regarding Iron supplements.  Left message- blackish stools is \"normal\" with Iron pills  Constipation is also \"normal\" but patient must be conscientious to not be constipated -  Drink plenty of fluids especially water.  Do not eat bananas, rice, applesauce or toast on a daily basis.  Drink prune juice if able.  Take dulcolax pills or suppository if constipated.  Call pcp if needing further directions on constipation.  Yina Rhodes RN      "

## 2022-05-12 RX ORDER — ACETAMINOPHEN 500 MG
500-1000 TABLET ORAL EVERY 8 HOURS PRN
COMMUNITY
End: 2022-05-19 | Stop reason: HOSPADM

## 2022-05-17 ENCOUNTER — OFFICE VISIT (OUTPATIENT)
Dept: FAMILY MEDICINE | Facility: CLINIC | Age: 82
End: 2022-05-17
Payer: MEDICARE

## 2022-05-17 ENCOUNTER — TELEPHONE (OUTPATIENT)
Dept: FAMILY MEDICINE | Facility: CLINIC | Age: 82
End: 2022-05-17

## 2022-05-17 VITALS
WEIGHT: 121.8 LBS | DIASTOLIC BLOOD PRESSURE: 54 MMHG | BODY MASS INDEX: 21.58 KG/M2 | TEMPERATURE: 98.1 F | OXYGEN SATURATION: 100 % | HEART RATE: 70 BPM | RESPIRATION RATE: 20 BRPM | SYSTOLIC BLOOD PRESSURE: 138 MMHG

## 2022-05-17 DIAGNOSIS — R73.09 ELEVATED GLUCOSE: ICD-10-CM

## 2022-05-17 DIAGNOSIS — L97.901: ICD-10-CM

## 2022-05-17 DIAGNOSIS — J43.9 PULMONARY EMPHYSEMA, UNSPECIFIED EMPHYSEMA TYPE (H): ICD-10-CM

## 2022-05-17 DIAGNOSIS — I48.19 PERSISTENT ATRIAL FIBRILLATION (H): ICD-10-CM

## 2022-05-17 DIAGNOSIS — N18.30 STAGE 3 CHRONIC KIDNEY DISEASE, UNSPECIFIED WHETHER STAGE 3A OR 3B CKD (H): ICD-10-CM

## 2022-05-17 DIAGNOSIS — Z01.818 PREOP GENERAL PHYSICAL EXAM: Primary | ICD-10-CM

## 2022-05-17 DIAGNOSIS — D63.8 ANEMIA IN OTHER CHRONIC DISEASES CLASSIFIED ELSEWHERE: ICD-10-CM

## 2022-05-17 DIAGNOSIS — I44.2 COMPLETE HEART BLOCK (H): ICD-10-CM

## 2022-05-17 DIAGNOSIS — Z79.899 ENCOUNTER FOR LONG-TERM (CURRENT) USE OF MEDICATIONS: ICD-10-CM

## 2022-05-17 DIAGNOSIS — I73.9 PERIPHERAL VASCULAR DISEASE, UNSPECIFIED (H): ICD-10-CM

## 2022-05-17 DIAGNOSIS — Z95.0 CARDIAC PACEMAKER IN SITU: ICD-10-CM

## 2022-05-17 PROBLEM — E11.9 DIABETES MELLITUS, TYPE 2 (H): Status: RESOLVED | Noted: 2020-09-08 | Resolved: 2022-05-17

## 2022-05-17 LAB
ERYTHROCYTE [DISTWIDTH] IN BLOOD BY AUTOMATED COUNT: 14.9 % (ref 10–15)
HCT VFR BLD AUTO: 31.1 % (ref 35–47)
HGB BLD-MCNC: 9.8 G/DL (ref 11.7–15.7)
HOLD SPECIMEN: NORMAL
MCH RBC QN AUTO: 25.4 PG (ref 26.5–33)
MCHC RBC AUTO-ENTMCNC: 31.5 G/DL (ref 31.5–36.5)
MCV RBC AUTO: 81 FL (ref 78–100)
PLATELET # BLD AUTO: 495 10E3/UL (ref 150–450)
RBC # BLD AUTO: 3.86 10E6/UL (ref 3.8–5.2)
WBC # BLD AUTO: 10.6 10E3/UL (ref 4–11)

## 2022-05-17 PROCEDURE — 99215 OFFICE O/P EST HI 40 MIN: CPT | Performed by: INTERNAL MEDICINE

## 2022-05-17 PROCEDURE — 85027 COMPLETE CBC AUTOMATED: CPT | Performed by: INTERNAL MEDICINE

## 2022-05-17 PROCEDURE — 36415 COLL VENOUS BLD VENIPUNCTURE: CPT | Performed by: INTERNAL MEDICINE

## 2022-05-17 PROCEDURE — 80048 BASIC METABOLIC PNL TOTAL CA: CPT | Performed by: INTERNAL MEDICINE

## 2022-05-17 PROCEDURE — 83550 IRON BINDING TEST: CPT | Performed by: INTERNAL MEDICINE

## 2022-05-17 PROCEDURE — 82728 ASSAY OF FERRITIN: CPT | Performed by: INTERNAL MEDICINE

## 2022-05-17 ASSESSMENT — PAIN SCALES - GENERAL: PAINLEVEL: MODERATE PAIN (5)

## 2022-05-17 NOTE — PATIENT INSTRUCTIONS
Preparing for Your Surgery  Getting started  A nurse will call you to review your health history and instructions. They will give you an arrival time based on your scheduled surgery time. Please be ready to share:  Your doctor's clinic name and phone number  Your medical, surgical and anesthesia history  A list of allergies and sensitivities  A list of medicines, including herbal treatments and over-the-counter drugs  Whether the patient has a legal guardian (ask how to send us the papers in advance)  Please tell us if you're pregnant--or if there's any chance you might be pregnant. Some surgeries may injure a fetus (unborn baby), so they require a pregnancy test. Surgeries that are safe for a fetus don't always need a test, and you can choose whether to have one.   If you have a child who's having surgery, please ask for a copy of Preparing for Your Child's Surgery.    Preparing for surgery  Within 30 days of surgery: Have a pre-op exam (sometimes called an H&P, or History and Physical). This can be done at a clinic or pre-operative center.  If you're having a , you may not need this exam. Talk to your care team.  At your pre-op exam, talk to your care team about all medicines you take. If you need to stop any medicines before surgery, ask when to start taking them again.  We do this for your safety. Many medicines can make you bleed too much during surgery. Some change how well surgery (anesthesia) drugs work.  Call your insurance company to let them know you're having surgery. (If you don't have insurance, call [phone].)  Call your clinic if there's any change in your health. This includes signs of a cold or flu (sore throat, runny nose, cough, rash, fever). It also includes a scrape or scratch near the surgery site.  If you have questions on the day of surgery, call your hospital or surgery center.  COVID testing  You may need to be tested for COVID-19 before having surgery. If so, we will give  you instructions.  Eating and drinking guidelines  For your safety: Unless your surgeon tells you otherwise, follow the guidelines below.  Eat and drink as usual until 8 hours before surgery. After that, no food or milk.  Drink clear liquids until 2 hours before surgery. These are liquids you can see through, like water, Gatorade and Propel Water. You may also have black coffee and tea (no cream or milk).  Nothing by mouth within 2 hours of surgery. This includes gum, candy and breath mints.  If you drink alcohol: Stop drinking it the night before surgery.  If your care team tells you to take medicine on the morning of surgery, it's okay to take it with a sip of water.  Preventing infection  Shower or bathe the night before and morning of your surgery. Follow the instructions your clinic gave you. (If no instructions, use regular soap.)  Don't shave or clip hair near your surgery site. We'll remove the hair if needed.  Don't smoke or vape the morning of surgery. You may chew nicotine gum up to 2 hours before surgery. A nicotine patch is okay.  Note: Some surgeries require you to completely quit smoking and nicotine. Check with your surgeon.  Your care team will make every effort to keep you safe from infection. We will:  Clean our hands often with soap and water (or an alcohol-based hand rub).  Clean the skin at your surgery site with a special soap that kills germs.  Give you a special gown to keep you warm. (Cold raises the risk of infection.)  Wear special hair covers, masks, gowns and gloves during surgery.  Give antibiotic medicine, if prescribed. Not all surgeries need antibiotics.  What to bring on the day of surgery  Photo ID and insurance card  Copy of your health care directive, if you have one  Glasses and hearing aides (bring cases)  You can't wear contacts during surgery  Inhaler and eye drops, if you use them (tell us about these when you arrive)  CPAP machine or breathing device, if you use them  A  few personal items, if spending the night  If you have . . .  A pacemaker, ICD (cardiac defibrillator) or other implant: Bring the ID card.  An implanted stimulator: Bring the remote control.  A legal guardian: Bring a copy of the certified (court-stamped) guardianship papers.  Please remove any jewelry, including body piercings. Leave jewelry and other valuables at home.  If you're going home the day of surgery  You must have a responsible adult drive you home. They should stay with you overnight as well.  If you don't have someone to stay with you, and you aren't safe to go home alone, we may keep you overnight. Insurance often won't pay for this.  After surgery  If it's hard to control your pain or you need more pain medicine, please call your surgeon's office.  Questions?   If you have any questions for your care team, list them here: _________________________________________________________________________________________________________________________________________________________________________ ____________________________________ ____________________________________ ____________________________________  For informational purposes only. Not to replace the advice of your health care provider. Copyright   2003, 2019 [employer]. All rights reserved. Clinically reviewed by Denisa Redmond MD. [employer] [id number] - REV 07/21.      Recommendations:  --Approval given to proceed with proposed procedure, without further diagnostic evaluation  --Pt advised to avoid NSAIDS - since she is on Eliquis (Motrin, Ibuprofen, Aleve or Naprosyn);  If needed, Tylenol or Acetaminophen are fine to use.  --meds reviewed; one week prior to surgery (5/17/2022 ): Hold  Eliquis.   Continue all other medications   On AM of surgery only take the Metoprolol 100 mg with sip of water.   --Pain medications, time off from work and FMLA following surgery deferred to surgeon.    COVID testing deferred to Surgeon and surgery center.  Pt is NOT AWARE of need to have it done.   Order was placed in April , will have it scheduled for Friday 5/20/2022 at [facility].     Pt advised to wait 4-6 months before going to dentist for a cleaning.

## 2022-05-17 NOTE — PROGRESS NOTES
[facility]  [address] [facility] [address]  Phone: [phone]  Primary Provider: Eufemia Church  Pre-op Performing Provider: EUFEMIA CHURCH      PREOPERATIVE EVALUATION:  Today's date: 5/17/2022    Tomasa Fam is a 81 year old female who presents for a preoperative evaluation. She is a challenging historian.    Surgical Information:  Surgery/Procedure: Right total knee arthroplasty (Spinal with adductor)  Surgery Location: [facility]   Surgeon: Nick Parra MD  Surgery Date: 5/23/22  Time of Surgery: 11:35AM  Where patient plans to recover: At home with family  Fax number for surgical facility: [phone]    Type of Anesthesia Anticipated: anticipating Spinal     Assessment & Plan     The proposed surgical procedure is considered INTERMEDIATE risk.    (Z01.818) Preop general physical exam  (primary encounter diagnosis)  Comment: Right knee osteoarthritis. She has had injections in the past and now feels ready to proceed with elective surgery for TKA.  Dr. Parra has placed order for presurgical evaluation for asymptomatic evaluation of COVID infection.   Pt advised to wait 4-6 months before going to dentist for a cleaning.   Plan: CBC with Platelets and Reflex to Iron Studies,         Basic metabolic panel  (Ca, Cl, CO2, Creat,         Gluc, K, Na, BUN), Iron & Iron Binding         Capacity, Ferritin, Hemoglobin A1c    (I48.19) Persistent atrial fibrillation (H)  Comment: pt has been on Eliquis for stroke prevention and meds to control Heart rate. HOLD ONE WEEK PRIOR TO ORTHO SURGERY, NO BRIDGING AND THEN RESUME;  She has also had a pace maker to help with rte control  Plan: HOLD ONE WEEK PRIOR TO ORTHO SURGERY, NO BRIDGING AND THEN RESUME    (Z95.0) Cardiac pacemaker in situ since 6/22/2020  Comment: placed due to AV node ablation ( Atrial Fibrillation); 100 % sensing.   Plan: she is doing well    (I73.9) Peripheral vascular  disease, unspecified (H)  Comment: minimal edema today but close monitoring following surgery as she will be less mobile and increased risk for edema  Plan: she is encouraged to be as active as safety allows and to where AMBER stockings post operatively.     (L97.901) Non-healing ulcer of lower leg, limited to breakdown of skin, unspecified laterality (H)  Comment: no active skin lesion noted today but area at risk and will continue close monitoring.  Plan: monitor for any future wound issues; no reported    (J43.9) Pulmonary emphysema, unspecified emphysema type (H)  Comment: lungs are clear  Plan: encourage post op respiratory therapy to lessen risk of atelectasis and associated complications.     (N18.30) Stage 3 chronic kidney disease, unspecified whether stage 3a or 3b CKD (H)  Comment:   Lab Results   Component Value Date    CR 0.60 03/24/2022    CR 0.70 02/01/2022    CR 0.98 07/01/2021    CR 0.91 06/01/2021        Plan: keep well hydrated and active.    (R73.09) Elevated glucose  Comment: chart indicated diabetes but pt not aware;  Further evaluation in chart; that diagnosis appears to correlate with past use of corticosteroids and resulted in elevated glucose;  Will reassess A1C  She has been off corticosteroids for over a year; ( she [facility] [place], [place]) she is no longer needing steroid therapy.   Plan: Hemoglobin A1c            (I44.2) Complete heart block (H)  Comment: related to AV node ablation; pace maker dependent.  Plan: Pacemaker per cardiology        Recommendations:  --Approval given to proceed with proposed procedure, without further diagnostic evaluation  --Pt advised to avoid NSAIDS - since she is on Eliquis (Motrin, Ibuprofen, Aleve or Naprosyn);  If needed, Tylenol or Acetaminophen are fine to use.  --meds reviewed; one week prior to surgery (5/17/2022 ): Hold  Eliquis.   Continue all other medications   On AM of surgery only take the Metoprolol 100 mg with  sip of water.   --Pain medications, time off from work and FMLA following surgery deferred to surgeon.    COVID testing deferred to Surgeon and surgery center. Pt is NOT AWARE of need to have it done.   Order was placed in April , will have it scheduled for Friday 5/20/2022 at [facility].     Pt advised to wait 4-6 months before going to dentist for a cleaning.         60 minutes spent on the date of the encounter doing chart review, history and exam, documentation and further activities per the note        Subjective     HPI related to upcoming procedure: Tomasa Fam is a 81 year old female who presents for preoperative evaluation prior to undergoing right TKA. She has been using the walker more since her fall. She fell in late January in her garage. The knee has been more over the past 7-8 years and more bothersome since this fall.  Past injections with little improvement.   Post op care is planned for Home with family support ( daughter will be coming from [place] to stay with pt and assist her);  Post op PT is set up for [facility].       Preop Questions 6/23/2017   1. Have you ever had a heart attack or stroke? No   3. Do you have chest pain with activity? No   4. Do you have a history of  heart failure? No   5. Do you currently have a cold, bronchitis or symptoms of other infection? No   6. Do you have a cough, shortness of breath, or wheezing? No   7. Do you or anyone in your family have previous history of blood clots? No   8. Do you or does anyone in your family have a serious bleeding problem such as prolonged bleeding following surgeries or cuts? No   9. Have you ever had problems with anemia or been told to take iron pills? YES -taking IRON    10. Have you had any abnormal blood loss such as black, tarry or bloody stools, or abnormal vaginal bleeding? No   11. Have you ever had a blood transfusion? No   13. Have you or any of your relatives ever had problems with anesthesia? No  "  14. Do you have sleep apnea, excessive snoring or daytime drowsiness? No   15. Do you have any artifical heart valves or other implanted medical devices like a pacemaker, defibrillator, or continuous glucose monitor? No   16. Do you have artificial joints? No   18. Is there any chance that you may be pregnant? No     Health Care Directive:  Patient does not have a Health Care Directive or Living Will: Advance Directive received and scanned. Click on Code in the patient header to view.    Preoperative Review of :   reviewed - controlled substances prescribed by other outside provider(s).   after fall and was at [facility] for 28 days,       Review of Systems  CONSTITUTIONAL: NEGATIVE for fever, chills, change in weight  INTEGUMENTARY/SKIN: NEGATIVE for worrisome rashes, moles or lesions  EYES: NEGATIVE for vision changes or irritation  ENT/MOUTH: she denies any sores with mouth or teeth; she does not recall when she was at [facility]; thinks it was recent.  RESP: NEGATIVE for significant cough or SOB  CV: Pacemaker; hx of Atrial Fib, AV node ablation; followed by Cardiology  GI: NEGATIVE for nausea, abdominal pain, heartburn, or change in bowel habits  : NEGATIVE for frequency, dysuria, or hematuria  MUSCULOSKELETAL:knee pain \"arthritis\" ; past injections, not recent  NEURO: NEGATIVE for weakness, dizziness or paresthesias  ENDOCRINE: past was told she had DM but sounds like it was related to prednisone in past   HEME/ALLERGY/IMMUNE: Eliquis for stroke prevention.  PSYCHIATRIC: NEGATIVE for changes in mood or affect    Patient Active Problem List    Diagnosis Date Noted     Peripheral vascular disease, unspecified (H) 05/17/2022     Priority: Medium     Inferior pubic ramus fracture, right, closed, initial encounter (H) 01/31/2022     Priority: Medium     Mood disorder (H) 10/19/2021     Priority: Medium     Chronic kidney disease, stage 3 (H) 07/01/2021     Priority: Medium     Cardiac pacemaker in situ " 5/22/2020; AV node ablation 06/10/2021     Priority: Medium     Skin ulcer of right lower leg with fat layer exposed (H) 01/13/2021     Priority: Medium     Persistent atrial fibrillation (H) 11/19/2020     Priority: Medium     Recurrent falls 08/31/2020     Priority: Medium     Dyspnea 08/31/2020     Priority: Medium     Temporal arteritis (H) 08/08/2020     Priority: Medium     Elevation of level of transaminase or lactic acid dehydrogenase (LDH) 08/08/2020     Priority: Medium     IMO Regulatory Load OCT 2020       Cardiomyopathy (H) 06/17/2020     Priority: Medium     Added automatically from request for surgery [id number]       Complete heart block (H) 06/17/2020     Priority: Medium     AV Node ablation; permanent pacemaker.       Long term systemic steroid user 03/30/2020     Priority: Medium     Personal history of malignant neoplasm of breast 02/21/2020     Priority: Medium     PMR (polymyalgia rheumatica) (H) 01/17/2020     Priority: Medium     tapering' above.)   In patients receiving over 10 mg of prednisone/day, the dose can be lowered by 2.5 mg/day decrements every two to four weeks   Once the dose of prednisone is 10 mg/day, further tapering can be done by 1 mg per month, provided the clinical course is stable    The clinical response to glucocorticoid therapy is closely monitored, which centers on screening for the presence and/or recurrence of symptoms of PMR or GCA. The erythrocyte sedimentation rate (ESR) and C-reactive protein (CRP) should be measured at baseline and then two months after initiating glucocorticoid therapy. The ESR and CRP are then repeated as indicated every three to six months during glucocorticoid therapy         Gastroesophageal reflux disease, esophagitis presence not specified 11/23/2019     Priority: Medium     IMO Regulatory Load OCT 2020       History of lung cancer 09/01/2019     Priority: Medium     Chronic pain of both knees 11/09/2016     Priority: Medium     Thyroid  nodule 04/23/2016     Priority: Medium     Noted on CT Fall 2015.       Concussion 03/13/2013     Priority: Medium     Problem list name updated by automated process. Provider to review and confirm  Imo Update utility       History of hypokalemia 01/23/2013     Priority: Medium     Advanced directives, counseling/discussion 08/21/2012     Priority: Medium     Received outside advance directive.  Previously signed by patient and witnessed with two signatures (cannot be the HCA).  scanned into EMR as Advance Directive/Living Will document. View document and details in Code Status History Report. Please see advance directive for specifics.          S/P hysterectomy 07/12/2012     Priority: Medium     Uterovaginal prolapse 06/12/2012     Priority: Medium     Cystocele, midline 04/04/2012     Priority: Medium     Essential hypertension with goal blood pressure less than 140/90 01/31/2012     Priority: Medium     Hyperlipidemia LDL goal <160 06/29/2011     Priority: Medium     [place] 10-year CHD Risk Score: 2% (14 Total Points)   Values used to calculate score:     Age: 70 years -- Points: 14     Total Cholesterol: 192 mg/dL -- Points: 1     HDL Cholesterol: 61 mg/dL -- Points: -1     Systolic BP (treated): 118 mmHg -- Points: 0    The patient is not a smoker. -- Points: 0    The patient has not been diagnosed with diabetes. -- Points: 0    The patient does not have a family history of CHD. -- Points:0          ASCUS on Pap smear 03/11/2011     Priority: Medium     3/11/11 pap ASCUS, neg HPV - r/p pap in 1 year.   Due 3/12/12.       Pulmonary emphysema, unspecified emphysema type (H)      Priority: Medium     very mild COPD  Problem list name updated by automated process. Provider to review        Past Medical History:   Diagnosis Date     Anemia      Arthritis     hands, knees     Breast cancer (H) 01/2012    left mastectomy followed by right;  Dr. Luong     Chronic airway obstruction, not elsewhere  classified 2006    very mild COPD - small cough     Concussion 03/13/2013     Problem list name updated by automated process. Provider to review and confirm Imo Update utility     Cystocele, midline 04/04/2012     Diffuse cystic mastopathy      Gastroesophageal reflux disease, esophagitis presence not specified 11/23/2019     History of hypokalemia 01/23/2013     Hyperlipidemia LDL goal <160 06/29/2011    [place] 10-year CHD Risk Score: 2% (14 Total Points)  Values used to calculate score:    Age: 70 years -- Points: 14    Total Cholesterol: 192 mg/dL -- Points: 1    HDL Cholesterol: 61 mg/dL -- Points: -1    Systolic BP (treated): 118 mmHg -- Points: 0   The patient is not a smoker. -- Points: 0   The patient has not been diagnosed with diabetes. -- Points: 0   The patient does not have a famil     Lung cancer (H) 10/21/2015     Pacemaker     [employer]     Paroxysmal atrial fibrillation (H) 09/13/2019     PMR (polymyalgia rheumatica) (H) 01/17/2020    tapering' above.)  In patients receiving over 10 mg of prednisone/day, the dose can be lowered by 2.5 mg/day decrements every two to four weeks  Once the dose of prednisone is 10 mg/day, further tapering can be done by 1 mg per month, provided the clinical course is stable  The clinical response to glucocorticoid therapy is closely monitored, which centers on screening for the presence and/or recu     Thyroid nodule 04/23/2016    Noted on CT Fall 2015.      Unspecified essential hypertension late 1980's     Past Surgical History:   Procedure Laterality Date     ANESTHESIA CARDIOVERSION N/A 6/12/2020    Procedure: ANESTHESIA, FOR CARDIOVERSION;  Surgeon: GENERIC ANESTHESIA PROVIDER;  Location:  OR     COLONOSCOPY  3/2003    adenomatous polyp      COLONOSCOPY  7/2006    diverticulosis - repeat in 5 years     COLONOSCOPY  10/13/2011    Procedure:COLONOSCOPY; COLONOSCOPY ; Surgeon:CHITO GORDILLO; Location: GI     CYSTOSCOPY  7/11/2012    Procedure:  CYSTOSCOPY;;  Surgeon: Aline Cooper DO;  Location:  OR     DAVINCI HYSTERECTOMY SUPRACERVICAL, SACROCOLPOPEXY, COMBINED  7/11/2012    Procedure: COMBINED DAVINCI HYSTERECTOMY SUPRACERVICAL, SACROCOLPOPEXY;   DAVINCI ASSISTED LAPAROSCOPIC SUPRACERVICAL HYSTERECTOMY AND BILATERAL SALPINGO-OOPHORECTOMY, SACROCOLPOPEXY AND CYSTOSCOPY;  Surgeon: Aline Cooper DO;  Location:  OR     EP ABLATION AV NODE N/A 6/22/2020    Procedure: EP ABLATION AV NODE;  Surgeon: Adriano Raman MD;  Location:  HEART CARDIAC CATH LAB     EP BIVENT LEAD PLACEMENT N/A 6/22/2020    Procedure: Bivent Lead Placement;  Surgeon: Adriano Raman MD;  Location:  HEART CARDIAC CATH LAB     EP PACEMAKER N/A 6/22/2020    Procedure: AVNA and BiV Pacemaker Insertion;  Surgeon: Adriano Raman MD;  Location:  HEART CARDIAC CATH LAB     ESOPHAGOSCOPY, GASTROSCOPY, DUODENOSCOPY (EGD), COMBINED N/A 12/14/2021    Procedure: ESOPHAGOGASTRODUODENOSCOPY (EGD) (fv) biopsies with cold forcep;  Surgeon: Roberto Nguyen MD;  Location:  GI     EXCISE LESION EYELID Right 6/29/2015    Procedure: EXCISE LESION EYELID;  Surgeon: Hank Olvera MD;  Location:  SD     INSERT PORT VASCULAR ACCESS  2/3/2012    Procedure:INSERT PORT VASCULAR ACCESS; Power Port-A- Catheter Placement ; Surgeon:TRESSA TAPIA; Location: OR     LAPAROSCOPIC SALPINGO-OOPHORECTOMY  7/11/2012    Procedure: LAPAROSCOPIC SALPINGO-OOPHORECTOMY;  Davinci;  Surgeon: Aline Cooper DO;  Location:  OR     LIPOSUCTION, RHYTIDECTOMY, COMBINED       LOBECTOMY LUNG Right 3/1/2016    Procedure: LOBECTOMY LUNG;  Surgeon: Jonas Woodward MD;  Location:  OR     MAMMOPLASTY REDUCTION  1/6/2012    Procedure:MAMMOPLASTY REDUCTION; Surgeon:MICKI CAICEDO; Location:[facility] OR     MASTECTOMY SIMPLE  11/12/2012    Procedure: MASTECTOMY SIMPLE;   Right Prophylactic Mastectomy with attempted Right Sentinal Node Biopsy, Revision Bilateral  Mastectomy Insicions, liposuction in breast area;  Surgeon: Irish Tapia MD;  Location: [facility] OR     MASTECTOMY SIMPLE, SENTINEL NODE, COMBINED  1/6/2012    Procedure:COMBINED MASTECTOMY SIMPLE, SENTINEL NODE; Left Mastectomy Left Viborg Node Biopsy,  Right Breast Reduction ; Surgeon:IRISH TAPIA; Location:[facility] OR     MASTECTOMY, BILATERAL       REMOVE PORT VASCULAR ACCESS  4/29/2013    Procedure: REMOVE PORT VASCULAR ACCESS;  Port A catheter removal ;  Surgeon: Irish Tapia MD;  Location: [facility] OR     REPAIR PTOSIS BILATERAL Bilateral 6/29/2015    Procedure: REPAIR PTOSIS BILATERAL;  Surgeon: Hank Olvera MD;  Location:  SD     REVISE RECONSTRUCTED BREAST BILATERAL  11/12/2012    Procedure: REVISE RECONSTRUCTED BREAST BILATERAL;;  Surgeon: Katia Kyle MD;  Location: [facility] OR     SURGICAL HISTORY OF -   in 40's    face lift     SURGICAL HISTORY OF -       lipoma removed right thigh     SURGICAL HISTORY OF -       D and C     THORACOTOMY Right 3/1/2016    Procedure: THORACOTOMY;  Surgeon: Jonas Woodward MD;  Location:  OR     ZZC NONSPECIFIC PROCEDURE  1970    s/p Tubal ligation 1970     Current Outpatient Medications   Medication Sig Dispense Refill     acetaminophen (TYLENOL) 500 MG tablet Take 500-1,000 mg by mouth every 8 hours as needed for mild pain       amLODIPine (NORVASC) 5 MG tablet Take 1 tablet (5 mg) by mouth every evening htn 90 tablet 1     apixaban ANTICOAGULANT (ELIQUIS) 5 MG tablet Take 1 tablet (5 mg) by mouth 2 times daily 60 tablet 11     calcium citrate-vitamin D (CITRACAL) 315-250 MG-UNIT TABS per tablet Take 1 tablet by mouth 2 times daily        ezetimibe (ZETIA) 10 MG tablet Take 1 tablet (10 mg) by mouth every evening 90 tablet 1     Ferrous Sulfate (IRON PO)        INCRUSE ELLIPTA 62.5 MCG/INH Inhaler Inhale 1 puff into the lungs daily       metoprolol tartrate (LOPRESSOR) 100 MG tablet Take 1 tablet (100 mg) by mouth 2 times daily 180 tablet 1      omega 3 1000 MG CAPS Take 1 capsule by mouth daily  90 capsule      polyethylene glycol (MIRALAX) 17 GM/Dose powder Take 17 g by mouth daily 510 g      potassium chloride ER (KLOR-CON M) 20 MEQ CR tablet TAKE 1 TABLET(20 MEQ) BY MOUTH TWICE DAILY 180 tablet 1       Allergies   Allergen Reactions     No Known Drug Allergies      Tape [Adhesive Tape]      Sensitive to plastic tape on upper part of body--takes skin off        Social History     Tobacco Use     Smoking status: Never Smoker     Smokeless tobacco: Never Used   Substance Use Topics     Alcohol use: Yes     Comment: 0-1 drink day     Family History   Problem Relation Age of Onset     Cardiovascular Father         ruptured aorta, hardening of the arteries     Cerebrovascular Disease Mother      Respiratory Mother         chronic bronchitis - was a smoker early on     Cardiovascular Paternal Grandfather         MI     Cerebrovascular Disease Paternal Aunt      Hypertension Son      Neurologic Disorder Daughter         migraines     Breast Cancer Daughter      Brain Tumor Sister      History   Drug Use No               Objective     /54   Pulse 70   Temp 98.1  F (36.7  C) (Oral)   Resp 20   Wt 55.2 kg (121 lb 12.8 oz)   LMP  (LMP Unknown)   SpO2 100%   BMI 21.58 kg/m      Physical Exam    GENERAL APPEARANCE: healthy, alert and no distress     EYES: EOMI, PERRL     HENT: ear canals and TM's normal and nose and mouth without ulcers or lesions     NECK: no adenopathy, no asymmetry, masses, or scars and thyroid normal to palpation     RESP: lungs clear to auscultation - no rales, rhonchi or wheezes     CV: pacemaker in left upper chest; heart is fairly regular     ABDOMEN:  soft, nontender, no HSM or masses and bowel sounds normal     MS: using walker for stability; arthritic change involving both knees.     SKIN: no suspicious lesions or rashes     NEURO: Normal strength and tone, sensory exam grossly normal, mentation intact and speech normal      PSYCH: mentation appears normal. and affect normal/bright     LYMPHATICS: No cervical adenopathy    Recent Labs   Lab Test 03/24/22  0859 02/01/22  0605 01/31/22  1459 10/19/21  1021 03/16/21  1548 11/24/20  1325 11/10/20  1306   HGB 9.9*  --  11.8  --    < >  --   --      --  290  --    < >  --   --    INR  --   --   --   --   --  1.20* 1.90*    137 135 136   < >  --   --    POTASSIUM 3.9 3.9 4.2 4.2   < >  --   --    CR 0.60 0.70 0.68 0.73   < >  --   --    A1C 5.3  --   --  5.7*   < >  --   --     < > = values in this interval not displayed.        Diagnostics:  Recent Results (from the past 720 hour(s))   CBC with Platelets and Reflex to Iron Studies    Collection Time: 05/17/22  9:33 AM   Result Value Ref Range    WBC Count 10.6 4.0 - 11.0 10e3/uL    RBC Count 3.86 3.80 - 5.20 10e6/uL    Hemoglobin 9.8 (L) 11.7 - 15.7 g/dL    Hematocrit 31.1 (L) 35.0 - 47.0 %    MCV 81 78 - 100 fL    MCH 25.4 (L) 26.5 - 33.0 pg    MCHC 31.5 31.5 - 36.5 g/dL    RDW 14.9 10.0 - 15.0 %    Platelet Count 495 (H) 150 - 450 10e3/uL   Extra Green Top (Lithium Heparin) Tube    Collection Time: 05/17/22  9:33 AM   Result Value Ref Range    Hold Specimen JIC       EKG done in January 2022- reviewed and note  pacer spikes. Not done today     Revised Cardiac Risk Index (RCRI):  The patient has the following serious cardiovascular risks for perioperative complications:   - No serious cardiac risks = 0 points     RCRI Interpretation: 0 points: Class I (very low risk - 0.4% complication rate)           Signed Electronically by: Cris Romero MD  Copy of this evaluation report is provided to requesting physician.    ADDENDUM:   Labs reviewed.  Concerned about anemia; HGB 9.8  Spoke to [facility], Holli   HGB has been a bit on the low side but now even lower.   HGB is too low for elective procedure.  Will need to cancel TKA.  Will refer her to Hematology to see about optimizing Hgb in order to proceed with  elective surgery in the near future.  Hematology assistance appreciated.  Pt will be contacted via phone today. She can resume her Eliquis and follow up as appropriate.   Spoke to pt and daughter, Fiona Villalpando  ( [phone])  Daughter arrived yesterday from out of town to assist her mom with rehab following surgery.  She is now hopeful the anemia can be corrected and have surgery in near future.  Fiona is in [place] until 6/21/2022    Cris Romero MD  Internal Medicine  electronically signed  5/19/2022

## 2022-05-17 NOTE — TELEPHONE ENCOUNTER
Holli GONG at [facility] calling regarding HGB of 9.9 is not conducive to an elected surgery.  They are recommending an iron infusion? and a recheck of hemoglobin and then may be able to continue with surgery on Monday.      Cell number [phone] asap.

## 2022-05-18 LAB
ANION GAP SERPL CALCULATED.3IONS-SCNC: 11 MMOL/L (ref 3–14)
BUN SERPL-MCNC: 17 MG/DL (ref 7–30)
CALCIUM SERPL-MCNC: 10.6 MG/DL (ref 8.5–10.1)
CHLORIDE BLD-SCNC: 100 MMOL/L (ref 94–109)
CO2 SERPL-SCNC: 24 MMOL/L (ref 20–32)
CREAT SERPL-MCNC: 0.52 MG/DL (ref 0.52–1.04)
FERRITIN SERPL-MCNC: 261 NG/ML (ref 8–252)
GFR SERPL CREATININE-BSD FRML MDRD: >90 ML/MIN/1.73M2
GLUCOSE BLD-MCNC: 82 MG/DL (ref 70–99)
IRON SATN MFR SERPL: 8 % (ref 15–46)
IRON SERPL-MCNC: 22 UG/DL (ref 35–180)
POTASSIUM BLD-SCNC: 4.3 MMOL/L (ref 3.4–5.3)
SODIUM SERPL-SCNC: 135 MMOL/L (ref 133–144)
TIBC SERPL-MCNC: 261 UG/DL (ref 240–430)

## 2022-05-19 ENCOUNTER — PATIENT OUTREACH (OUTPATIENT)
Dept: ONCOLOGY | Facility: CLINIC | Age: 82
End: 2022-05-19
Payer: MEDICARE

## 2022-05-19 ENCOUNTER — ANCILLARY PROCEDURE (OUTPATIENT)
Dept: CARDIOLOGY | Facility: CLINIC | Age: 82
End: 2022-05-19
Attending: INTERNAL MEDICINE
Payer: MEDICARE

## 2022-05-19 DIAGNOSIS — Z95.0 CARDIAC PACEMAKER IN SITU: ICD-10-CM

## 2022-05-19 DIAGNOSIS — Z95.0 CARDIAC PACEMAKER IN SITU: Primary | ICD-10-CM

## 2022-05-19 DIAGNOSIS — I44.2 ATRIOVENTRICULAR BLOCK, COMPLETE (H): ICD-10-CM

## 2022-05-19 PROCEDURE — 93294 REM INTERROG EVL PM/LDLS PM: CPT | Performed by: INTERNAL MEDICINE

## 2022-05-19 PROCEDURE — 93296 REM INTERROG EVL PM/IDS: CPT | Performed by: INTERNAL MEDICINE

## 2022-05-19 NOTE — PROGRESS NOTES
Referral received for benign heme services, see below.    Referral reason: anemia, elective TKA optimization    Current abnormal labs:   Lab Results   Component Value Date    WBC 10.6 05/17/2022    WBC 10.9 07/01/2021     Lab Results   Component Value Date    RBC 3.86 05/17/2022    RBC 4.59 07/01/2021     Lab Results   Component Value Date    HGB 9.8 05/17/2022    HGB 13.3 07/01/2021     Lab Results   Component Value Date    HCT 31.1 05/17/2022    HCT 42.1 07/01/2021     Lab Results   Component Value Date    MCV 81 05/17/2022    MCV 92 07/01/2021     Lab Results   Component Value Date    MCH 25.4 05/17/2022    MCH 29.0 07/01/2021     Lab Results   Component Value Date    MCHC 31.5 05/17/2022    MCHC 31.6 07/01/2021     Lab Results   Component Value Date    RDW 14.9 05/17/2022    RDW 13.1 07/01/2021     Lab Results   Component Value Date     05/17/2022     07/01/2021      and   Ferritin   Date Value Ref Range Status   05/17/2022 261 (H) 8 - 252 ng/mL Final   09/08/2020 515 (H) 8 - 252 ng/mL Final     Iron   Date Value Ref Range Status   05/17/2022 22 (L) 35 - 180 ug/dL Final   09/08/2020 196 (H) 35 - 180 ug/dL Final     Iron Binding Cap   Date Value Ref Range Status   09/08/2020 323 240 - 430 ug/dL Final     Iron Binding Capacity   Date Value Ref Range Status   05/17/2022 261 240 - 430 ug/dL Final           Preferred location per patient or referral: [facility]    Outreach: Call not placed to patient regarding referral.    Plan: Internal Referral: No additional work-up needed, scheduling instructions updated, referral transferred to NPS for completion. Referring provider updated for absence of urgent appointments.

## 2022-05-19 NOTE — TELEPHONE ENCOUNTER
Chandrika is calling back to check status of results. Wanting to know if she is ok to proceed with surgery.     Janelle Sanchez-

## 2022-05-20 LAB
MDC_IDC_EPISODE_DTM: NORMAL
MDC_IDC_EPISODE_DTM: NORMAL
MDC_IDC_EPISODE_ID: NORMAL
MDC_IDC_EPISODE_ID: NORMAL
MDC_IDC_EPISODE_TYPE: NORMAL
MDC_IDC_EPISODE_TYPE: NORMAL
MDC_IDC_LEAD_IMPLANT_DT: NORMAL
MDC_IDC_LEAD_IMPLANT_DT: NORMAL
MDC_IDC_LEAD_LOCATION: NORMAL
MDC_IDC_LEAD_LOCATION: NORMAL
MDC_IDC_LEAD_LOCATION_DETAIL_1: NORMAL
MDC_IDC_LEAD_LOCATION_DETAIL_1: NORMAL
MDC_IDC_LEAD_MFG: NORMAL
MDC_IDC_LEAD_MFG: NORMAL
MDC_IDC_LEAD_MODEL: NORMAL
MDC_IDC_LEAD_MODEL: NORMAL
MDC_IDC_LEAD_POLARITY_TYPE: NORMAL
MDC_IDC_LEAD_SERIAL: NORMAL
MDC_IDC_LEAD_SERIAL: NORMAL
MDC_IDC_MSMT_BATTERY_DTM: NORMAL
MDC_IDC_MSMT_BATTERY_REMAINING_LONGEVITY: 114 MO
MDC_IDC_MSMT_BATTERY_REMAINING_PERCENTAGE: 100 %
MDC_IDC_MSMT_BATTERY_STATUS: NORMAL
MDC_IDC_MSMT_LEADCHNL_LV_IMPEDANCE_VALUE: 727 OHM
MDC_IDC_MSMT_LEADCHNL_LV_PACING_THRESHOLD_AMPLITUDE: 1.7 V
MDC_IDC_MSMT_LEADCHNL_LV_PACING_THRESHOLD_PULSEWIDTH: 0.4 MS
MDC_IDC_MSMT_LEADCHNL_RV_IMPEDANCE_VALUE: 662 OHM
MDC_IDC_MSMT_LEADCHNL_RV_PACING_THRESHOLD_AMPLITUDE: 1.3 V
MDC_IDC_MSMT_LEADCHNL_RV_PACING_THRESHOLD_PULSEWIDTH: 0.4 MS
MDC_IDC_PG_IMPLANT_DTM: NORMAL
MDC_IDC_PG_MFG: NORMAL
MDC_IDC_PG_MODEL: NORMAL
MDC_IDC_PG_SERIAL: NORMAL
MDC_IDC_PG_TYPE: NORMAL
MDC_IDC_SESS_CLINIC_NAME: NORMAL
MDC_IDC_SESS_DTM: NORMAL
MDC_IDC_SESS_TYPE: NORMAL
MDC_IDC_SET_BRADY_AT_MODE_SWITCH_RATE: 170 {BEATS}/MIN
MDC_IDC_SET_BRADY_LOWRATE: 70 {BEATS}/MIN
MDC_IDC_SET_BRADY_MAX_SENSOR_RATE: 130 {BEATS}/MIN
MDC_IDC_SET_BRADY_MODE: NORMAL
MDC_IDC_SET_CRT_LVRV_DELAY: 30 MS
MDC_IDC_SET_CRT_PACED_CHAMBERS: NORMAL
MDC_IDC_SET_LEADCHNL_LV_PACING_AMPLITUDE: 2.7 V
MDC_IDC_SET_LEADCHNL_LV_PACING_ANODE_ELECTRODE_1: NORMAL
MDC_IDC_SET_LEADCHNL_LV_PACING_ANODE_LOCATION_1: NORMAL
MDC_IDC_SET_LEADCHNL_LV_PACING_CATHODE_ELECTRODE_1: NORMAL
MDC_IDC_SET_LEADCHNL_LV_PACING_CATHODE_LOCATION_1: NORMAL
MDC_IDC_SET_LEADCHNL_LV_PACING_PULSEWIDTH: 0.4 MS
MDC_IDC_SET_LEADCHNL_LV_SENSING_ADAPTATION_MODE: NORMAL
MDC_IDC_SET_LEADCHNL_LV_SENSING_ANODE_ELECTRODE_1: NORMAL
MDC_IDC_SET_LEADCHNL_LV_SENSING_ANODE_LOCATION_1: NORMAL
MDC_IDC_SET_LEADCHNL_LV_SENSING_CATHODE_ELECTRODE_1: NORMAL
MDC_IDC_SET_LEADCHNL_LV_SENSING_CATHODE_LOCATION_1: NORMAL
MDC_IDC_SET_LEADCHNL_LV_SENSING_SENSITIVITY: 2.5 MV
MDC_IDC_SET_LEADCHNL_RA_SENSING_ADAPTATION_MODE: NORMAL
MDC_IDC_SET_LEADCHNL_RA_SENSING_SENSITIVITY: 0.5 MV
MDC_IDC_SET_LEADCHNL_RV_PACING_AMPLITUDE: 2.6 V
MDC_IDC_SET_LEADCHNL_RV_PACING_CAPTURE_MODE: NORMAL
MDC_IDC_SET_LEADCHNL_RV_PACING_POLARITY: NORMAL
MDC_IDC_SET_LEADCHNL_RV_PACING_PULSEWIDTH: 0.4 MS
MDC_IDC_SET_LEADCHNL_RV_SENSING_ADAPTATION_MODE: NORMAL
MDC_IDC_SET_LEADCHNL_RV_SENSING_POLARITY: NORMAL
MDC_IDC_SET_LEADCHNL_RV_SENSING_SENSITIVITY: 2.5 MV
MDC_IDC_SET_ZONE_DETECTION_INTERVAL: 375 MS
MDC_IDC_SET_ZONE_TYPE: NORMAL
MDC_IDC_SET_ZONE_VENDOR_TYPE: NORMAL
MDC_IDC_STAT_BRADY_DTM_END: NORMAL
MDC_IDC_STAT_BRADY_DTM_START: NORMAL
MDC_IDC_STAT_BRADY_RA_PERCENT_PACED: 0 %
MDC_IDC_STAT_BRADY_RV_PERCENT_PACED: 100 %
MDC_IDC_STAT_CRT_DTM_END: NORMAL
MDC_IDC_STAT_CRT_DTM_START: NORMAL
MDC_IDC_STAT_CRT_LV_PERCENT_PACED: 100 %
MDC_IDC_STAT_EPISODE_RECENT_COUNT: 0
MDC_IDC_STAT_EPISODE_RECENT_COUNT_DTM_END: NORMAL
MDC_IDC_STAT_EPISODE_RECENT_COUNT_DTM_START: NORMAL
MDC_IDC_STAT_EPISODE_TYPE: NORMAL
MDC_IDC_STAT_EPISODE_VENDOR_TYPE: NORMAL

## 2022-05-20 NOTE — PROGRESS NOTES
RECORDS STATUS - ALL OTHER DIAGNOSIS      RECORDS RECEIVED FROM: [facility]   DATE RECEIVED: 5/24/2022   NOTES STATUS DETAILS   OFFICE NOTE from referring provider [facility]   referred by Cris Romero MD   DISCHARGE SUMMARY from hospital     DISCHARGE REPORT from the ER     OPERATIVE REPORT Complete [facility] 12/14/2021   MEDICATION LIST [facility]   CLINICAL TRIAL TREATMENTS TO DATE     LABS     PATHOLOGY REPORTS     ANYTHING RELATED TO DIAGNOSIS Complete Labs last updated on 5/17/2022   GENONOMIC TESTING     TYPE:     IMAGING (NEED IMAGES & REPORT)     CT SCANS     MRI     MAMMO     ULTRASOUND Complete US Venous 5/18/2022    US Lower Extremity 5/18/2022    US ABdomen Limited 9/1/2020   PET          no

## 2022-05-20 NOTE — TELEPHONE ENCOUNTER
Spoke with RITIKA De La Garza and pt ( and pt's daughter)  Hematology referral provided.  Pt has upcoming appt within a week

## 2022-05-24 ENCOUNTER — VIRTUAL VISIT (OUTPATIENT)
Dept: ONCOLOGY | Facility: CLINIC | Age: 82
End: 2022-05-24
Attending: INTERNAL MEDICINE
Payer: MEDICARE

## 2022-05-24 ENCOUNTER — PRE VISIT (OUTPATIENT)
Dept: ONCOLOGY | Facility: CLINIC | Age: 82
End: 2022-05-24

## 2022-05-24 DIAGNOSIS — D50.0 IRON DEFICIENCY ANEMIA DUE TO CHRONIC BLOOD LOSS: ICD-10-CM

## 2022-05-24 DIAGNOSIS — D63.8 ANEMIA IN OTHER CHRONIC DISEASES CLASSIFIED ELSEWHERE: ICD-10-CM

## 2022-05-24 DIAGNOSIS — D47.2 MGUS (MONOCLONAL GAMMOPATHY OF UNKNOWN SIGNIFICANCE): Primary | ICD-10-CM

## 2022-05-24 PROCEDURE — G0463 HOSPITAL OUTPT CLINIC VISIT: HCPCS | Mod: PN,RTG | Performed by: INTERNAL MEDICINE

## 2022-05-24 PROCEDURE — 99205 OFFICE O/P NEW HI 60 MIN: CPT | Mod: 95 | Performed by: INTERNAL MEDICINE

## 2022-05-24 RX ORDER — HEPARIN SODIUM (PORCINE) LOCK FLUSH IV SOLN 100 UNIT/ML 100 UNIT/ML
5 SOLUTION INTRAVENOUS
Status: CANCELLED | OUTPATIENT
Start: 2022-05-26

## 2022-05-24 RX ORDER — NALOXONE HYDROCHLORIDE 0.4 MG/ML
0.2 INJECTION, SOLUTION INTRAMUSCULAR; INTRAVENOUS; SUBCUTANEOUS
Status: CANCELLED | OUTPATIENT
Start: 2022-05-26

## 2022-05-24 RX ORDER — METHYLPREDNISOLONE SODIUM SUCCINATE 125 MG/2ML
125 INJECTION, POWDER, LYOPHILIZED, FOR SOLUTION INTRAMUSCULAR; INTRAVENOUS
Status: CANCELLED
Start: 2022-05-26

## 2022-05-24 RX ORDER — EPINEPHRINE 1 MG/ML
0.3 INJECTION, SOLUTION, CONCENTRATE INTRAVENOUS EVERY 5 MIN PRN
Status: CANCELLED | OUTPATIENT
Start: 2022-05-26

## 2022-05-24 RX ORDER — DIPHENHYDRAMINE HYDROCHLORIDE 50 MG/ML
50 INJECTION INTRAMUSCULAR; INTRAVENOUS
Status: CANCELLED
Start: 2022-05-26

## 2022-05-24 RX ORDER — HEPARIN SODIUM,PORCINE 10 UNIT/ML
5 VIAL (ML) INTRAVENOUS
Status: CANCELLED | OUTPATIENT
Start: 2022-05-26

## 2022-05-24 RX ORDER — ALBUTEROL SULFATE 90 UG/1
1-2 AEROSOL, METERED RESPIRATORY (INHALATION)
Status: CANCELLED
Start: 2022-05-26

## 2022-05-24 RX ORDER — ALBUTEROL SULFATE 0.83 MG/ML
2.5 SOLUTION RESPIRATORY (INHALATION)
Status: CANCELLED | OUTPATIENT
Start: 2022-05-26

## 2022-05-24 RX ORDER — MEPERIDINE HYDROCHLORIDE 25 MG/ML
25 INJECTION INTRAMUSCULAR; INTRAVENOUS; SUBCUTANEOUS EVERY 30 MIN PRN
Status: CANCELLED | OUTPATIENT
Start: 2022-05-26

## 2022-05-24 NOTE — LETTER
5/24/2022         RE: Tomasa Fam  [address] [facility] [address]        Dear Colleague,    Thank you for referring your patient, Tomasa Fam, to the [facility]. Please see a copy of my visit note below.    Video-Visit Details    Type of service:  Video Visit    Video Start Time: 12:51 PM  Video End Time: 1:13 PM    Originating Location (pt. Location): Home in [place]    Distant Location (provider location):  [facility]     Platform used for Video Visit: BlackDuck    [facility] Hematology and Oncology Consult Note    Patient: Tomasa Fam  MRN: [id number]  Date of Service: 05/24/2022      Reason for Visit    Chief Complaint   Patient presents with     Oncology Clinic Visit     Personal history of malignant neoplasm of breast - video visit         Assessment/Plan    Problem List Items Addressed This Visit        Hematologic    Iron deficiency anemia due to chronic blood loss    Relevant Orders    Protein electrophoresis    Kappa and lambda light chain    Iron & Iron Binding Capacity    Ferritin    Calcium    Albumin level      Other Visit Diagnoses     MGUS (monoclonal gammopathy of unknown significance)    -  Primary    Relevant Orders    Protein electrophoresis    Kappa and lambda light chain    Calcium    Albumin level    Anemia in other chronic diseases classified elsewhere              Iron deficiency anemia probably secondary to chronic blood loss  Transferrin saturation was 8% and total iron was low at 22.  Ferritin is elevated to 61 which signifies probable underlying inflammatory state.  Hemoglobin 9.8.  With decreased MCH.  This is consistent with iron deficiency state.  She also has slightly elevated platelet count which is also seen in iron deficiency.  Hemoglobin has declined months by couple points.  Suspect longstanding GI blood loss.  Especially while she is on anticoagulation with Eliquis.  She has been on  oral iron supplementation for the last couple of weeks and her iron studies have not improved a whole lot.  In the setting I would recommend IV iron infusion to bring her hemoglobin with her.  She and her daughter expressed that it has been extremely difficult for her with joint pains and wants to get on with knee replacement surgery as soon as possible.  The fastest way to get her hemoglobin is to IV iron infusion.  I reviewed the potential side effects and complications associated including anaphylactic reactions, allergic reactions and intolerance, bone pain, skin changes etc.  She is agreeable to proceed with IV iron.  She wants to reduce the number of venipunctures.  So in this setting I would recommend IV iron sucrose 500 mg 1 dose given over 3 to 4 hours.  We should check her iron studies before the infusion and 2 weeks after the infusion.  The meantime continue oral iron every other day.  Expect her hemoglobin to improve to some extent with the IV iron supplementation.  She can proceed with knee surgery after that.  Although she does carry a diagnosis of chronic kidney disease her creatinine has been normal so not sure if at that point in supplementation is the right choice for her.  She also seems to have some chronic inflammatory disease especially with high ferritin and high ESR and this results in decreased iron absorption in the gut.  Hopefully will be able to get her hemoglobin above 11 so that she can proceed with surgery.  We can do work-up to look for source of bleeding after that.    Hypercalcemia in the setting of MGUS  Her calcium has been consistently above 10.5.  She also has low albumin level so corrected calcium is even higher.  In 2020 her SPEP showed small monoclonal protein at a concentration of 0.1 g/dL.  Immunofixation and light analysis was not done.  She also had elevated parathyroid hormone level at 121.  I am not sure if there is any follow-up arranged for this or further work-up  done.  I will repeat her SPEP along with immunofixation and free light chains to see if it has gotten worse.  Suspicion for myeloma is low but it is always in the differential diagnosis.  It is to be noted that she also has a history of lung cancer status post lobectomy.  Currently there are no signs of disease progression.  However she may need systemic imaging in the future to work hypercalcemia especially if other secondary causes are ruled out.  The meantime I asked her to stop her calcium supplementation.  We will recheck her calcium levels when she comes in for iron infusion.    ECOG Performance    2 - Ambulatory and independent in all ADLs; cannot work; up > 50% of the time    Problem List    Patient Active Problem List   Diagnosis     Pulmonary emphysema, unspecified emphysema type (H)     ASCUS on Pap smear     Hyperlipidemia LDL goal <160     Essential hypertension with goal blood pressure less than 140/90     Cystocele, midline     Uterovaginal prolapse     S/P hysterectomy     Advanced directives, counseling/discussion     History of hypokalemia     Concussion     Thyroid nodule     Chronic pain of both knees     History of lung cancer     Gastroesophageal reflux disease, esophagitis presence not specified     PMR (polymyalgia rheumatica) (H)     Personal history of malignant neoplasm of breast     Long term systemic steroid user     Cardiomyopathy (H)     Complete heart block (H)     Temporal arteritis (H)     Elevation of level of transaminase or lactic acid dehydrogenase (LDH)     Recurrent falls     Dyspnea     Persistent atrial fibrillation (H)     Skin ulcer of right lower leg with fat layer exposed (H)     Cardiac pacemaker in situ 5/22/2020; AV node ablation     Chronic kidney disease, stage 3 (H)     Mood disorder (H)     Inferior pubic ramus fracture, right, closed, initial encounter (H)     Peripheral vascular disease, unspecified (H)     Iron deficiency anemia due to chronic blood loss      ______________________________________________________________________________    Staging History    Cancer Staging  No matching staging information was found for the patient.      History of presenting illness:  Tomasa Fam is an 81-year-old female with past medical history significant for LCIS status post left mastectomy, history of lung cancer status post lobectomy, A. fib on eliquis, nonrheumatic and multiple other medical problems who has been referred by her primary care provider for evaluation management of Anfinson anemia in the setting of upcoming total knee replacement surgery. This was a video visit and paatient consented to it.    She has been evaluated for TKA when it was noted that her hemoglobin was less than 10.  Surgery was canceled and she was advised to get work-up for low hemoglobin and possibly improved to above 11 before going for surgery.  Chart her hemoglobin has been around 11-12 in the past and over the last few months it has come down below 10.  Most recent hemoglobin was 9.8 on 5/17/2022.  Platelet count was 495.  Total white count was normal.  Further evaluation of this showed high ferritin of 261 but iron studies came back showing low total iron of 22 and transferrin saturation of 8%.  TIBC was 261.  As mentioned above she is on Eliquis for atrial fibrillation.  Denies any blood in stools.  She does have some orangeish urine at times.  Also has increased frequency of urination at night.  She been taking oral iron supplementation for the last 2 to 3 weeks.  Has some black stools because of this.  In the past she has had left leg symptoms.  Last upper endoscopy was in December 2021 which was normal without any ulcerations or sources of bleeding.  Interestingly she also has elevated calcium levels which has been persistent.  Most recent calcium level was 10.7.  She had an parathyroid hormone level checked back in 2020 which was elevated at 121.  She does carry a diagnosis of  chronic kidney disease although her creatinine is within normal range.  I am not sure if there is any work-up done for this.  She is currently taking calcium supplementation.  Also in 2020 and SPEP showed a small monoclonal protein peak of 0.1 g/dL.  Light chains were not done.  Immunofixation was not done.  She denies any weight loss.  She has significant joint pains.  Earlier this year she fell from standing position and had a pubic ramus fracture.  No pathological fractures reported so far.  No lytic lesions seen on the CT or x-rays.      Past History    Past Medical History:   Diagnosis Date     Anemia      Arthritis     hands, knees     Breast cancer (H) 01/2012    left mastectomy followed by right;  Dr. Luong     Chronic airway obstruction, not elsewhere classified 2006    very mild COPD - small cough     Concussion 03/13/2013     Problem list name updated by automated process. Provider to review and confirm Im[employer] Update utility     Cystocele, midline 04/04/2012     Diffuse cystic mastopathy      Gastroesophageal reflux disease, esophagitis presence not specified 11/23/2019     History of hypokalemia 01/23/2013     Hyperlipidemia LDL goal <160 06/29/2011    Warren 10-year CHD Risk Score: 2% (14 Total Points)  Values used to calculate score:    Age: 70 years -- Points: 14    Total Cholesterol: 192 mg/dL -- Points: 1    HDL Cholesterol: 61 mg/dL -- Points: -1    Systolic BP (treated): 118 mmHg -- Points: 0   The patient is not a smoker. -- Points: 0   The patient has not been diagnosed with diabetes. -- Points: 0   The patient does not have a famil     Lung cancer (H) 10/21/2015     Pacemaker     [employer]     Paroxysmal atrial fibrillation (H) 09/13/2019     PMR (polymyalgia rheumatica) (H) 01/17/2020    tapering' above.)  In patients receiving over 10 mg of prednisone/day, the dose can be lowered by 2.5 mg/day decrements every two to four weeks  Once the dose of prednisone is 10 mg/day, further  tapering can be done by 1 mg per month, provided the clinical course is stable  The clinical response to glucocorticoid therapy is closely monitored, which centers on screening for the presence and/or recu     Thyroid nodule 04/23/2016    Noted on CT Fall 2015.      Unspecified essential hypertension late 1980's    Family History   Problem Relation Age of Onset     Cardiovascular Father         ruptured aorta, hardening of the arteries     Cerebrovascular Disease Mother      Respiratory Mother         chronic bronchitis - was a smoker early on     Cardiovascular Paternal Grandfather         MI     Cerebrovascular Disease Paternal Aunt      Hypertension Son      Neurologic Disorder Daughter         migraines     Breast Cancer Daughter      Brain Tumor Sister       Past Surgical History:   Procedure Laterality Date     ANESTHESIA CARDIOVERSION N/A 6/12/2020    Procedure: ANESTHESIA, FOR CARDIOVERSION;  Surgeon: GENERIC ANESTHESIA PROVIDER;  Location:  OR     COLONOSCOPY  3/2003    adenomatous polyp      COLONOSCOPY  7/2006    diverticulosis - repeat in 5 years     COLONOSCOPY  10/13/2011    Procedure:COLONOSCOPY; COLONOSCOPY ; Surgeon:CHITO GORDILLO; Location: GI     CYSTOSCOPY  7/11/2012    Procedure: CYSTOSCOPY;;  Surgeon: Aline Cooper DO;  Location:  OR     DAVINCI HYSTERECTOMY SUPRACERVICAL, SACROCOLPOPEXY, COMBINED  7/11/2012    Procedure: COMBINED DAVINCI HYSTERECTOMY SUPRACERVICAL, SACROCOLPOPEXY;   DAVINCI ASSISTED LAPAROSCOPIC SUPRACERVICAL HYSTERECTOMY AND BILATERAL SALPINGO-OOPHORECTOMY, SACROCOLPOPEXY AND CYSTOSCOPY;  Surgeon: Aline Cooper DO;  Location:  OR     EP ABLATION AV NODE N/A 6/22/2020    Procedure: EP ABLATION AV NODE;  Surgeon: Adriano Raman MD;  Location:  HEART CARDIAC CATH LAB     EP BIVENT LEAD PLACEMENT N/A 6/22/2020    Procedure: Bivent Lead Placement;  Surgeon: Adriano Raman MD;  Location:  HEART CARDIAC CATH LAB     EP PACEMAKER N/A  6/22/2020    Procedure: AVNA and BiV Pacemaker Insertion;  Surgeon: Adriano Raman MD;  Location:  HEART CARDIAC CATH LAB     ESOPHAGOSCOPY, GASTROSCOPY, DUODENOSCOPY (EGD), COMBINED N/A 12/14/2021    Procedure: ESOPHAGOGASTRODUODENOSCOPY (EGD) (fv) biopsies with cold forcep;  Surgeon: Roberto Nguyen MD;  Location:  GI     EXCISE LESION EYELID Right 6/29/2015    Procedure: EXCISE LESION EYELID;  Surgeon: Hank Olvera MD;  Location: [facility]     INSERT PORT VASCULAR ACCESS  2/3/2012    Procedure:INSERT PORT VASCULAR ACCESS; Power Port-A- Catheter Placement ; Surgeon:IRISH TAPIA; Location: OR     LAPAROSCOPIC SALPINGO-OOPHORECTOMY  7/11/2012    Procedure: LAPAROSCOPIC SALPINGO-OOPHORECTOMY;  Davinci;  Surgeon: Aline Cooper DO;  Location:  OR     LIPOSUCTION, RHYTIDECTOMY, COMBINED       LOBECTOMY LUNG Right 3/1/2016    Procedure: LOBECTOMY LUNG;  Surgeon: Jonas Woodward MD;  Location:  OR     MAMMOPLASTY REDUCTION  1/6/2012    Procedure:MAMMOPLASTY REDUCTION; Surgeon:MICKI CAICEDO; Location:[facility] OR     MASTECTOMY SIMPLE  11/12/2012    Procedure: MASTECTOMY SIMPLE;   Right Prophylactic Mastectomy with attempted Right Sentinal Node Biopsy, Revision Bilateral Mastectomy Insicions, liposuction in breast area;  Surgeon: Irish Tapia MD;  Location: [facility] OR     MASTECTOMY SIMPLE, SENTINEL NODE, COMBINED  1/6/2012    Procedure:COMBINED MASTECTOMY SIMPLE, SENTINEL NODE; Left Mastectomy Left [place] Node Biopsy,  Right Breast Reduction ; Surgeon:IRISH TAPIA; Location:[facility] OR     MASTECTOMY, BILATERAL       REMOVE PORT VASCULAR ACCESS  4/29/2013    Procedure: REMOVE PORT VASCULAR ACCESS;  Port A catheter removal ;  Surgeon: Irish Tapia MD;  Location:  OR     REPAIR PTOSIS BILATERAL Bilateral 6/29/2015    Procedure: REPAIR PTOSIS BILATERAL;  Surgeon: Hank Olvera MD;  Location: [facility]     REVISE RECONSTRUCTED BREAST BILATERAL  11/12/2012     Procedure: REVISE RECONSTRUCTED BREAST BILATERAL;;  Surgeon: Katia Kyle MD;  Location: [facility] OR     SURGICAL HISTORY OF -   in 40's    face lift     SURGICAL HISTORY OF -       lipoma removed right thigh     SURGICAL HISTORY OF -       D and C     THORACOTOMY Right 3/1/2016    Procedure: THORACOTOMY;  Surgeon: Jonas Woodward MD;  Location: [facility] OR     ZZC NONSPECIFIC PROCEDURE  1970    s/p Tubal ligation 1970    Social History     Socioeconomic History     Marital status:      Spouse name: Aren     Number of children: 2     Years of education: Not on file     Highest education level: Not on file   Occupational History     Occupation: [employer]     Employer: NONE    Tobacco Use     Smoking status: Never Smoker     Smokeless tobacco: Never Used   Vaping Use     Vaping Use: Never used   Substance and Sexual Activity     Alcohol use: Yes     Comment: 0-1 drink day     Drug use: No     Sexual activity: Yes     Partners: Male   Other Topics Concern      Service Not Asked     Blood Transfusions Not Asked     Caffeine Concern Not Asked     Occupational Exposure Not Asked     Hobby Hazards Not Asked     Sleep Concern Not Asked     Stress Concern Not Asked     Weight Concern Not Asked     Special Diet Not Asked     Back Care Not Asked     Exercise Yes     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 Yes   Social History Narrative     Not on file     Social Determinants of Health     Financial Resource Strain: Not on file   Food Insecurity: Not on file   Transportation Needs: Not on file   Physical Activity: Not on file   Stress: Not on file   Social Connections: Not on file   Intimate Partner Violence: Not on file   Housing Stability: Not on file        Allergies    Allergies   Allergen Reactions     No Known Drug Allergies      Tape [Adhesive Tape]      Sensitive to plastic tape on upper  part of body--takes skin off       Review of Systems    Pertinent items are noted in HPI.      Physical Exam    Oncology Vitals 5/17/2022   Height -   Weight -   BSA (m2) -   /54   Pulse -   Temp -   Temp src -   SpO2 -   Pain Score -   Pain Loc -   Some recent data might be hidden       General: alert and co-operative, No distress  HEENT:atraumatic and normocephalic  Neuro: alert and oriented x 3    Lab Results    Recent Results (from the past 168 hour(s))   Cardiac Device Check - Remote   Result Value Ref Range    Date Time Interrogation Session [id number]     Implantable Pulse Generator  [employer]     Implantable Pulse Generator Model U128 VALITUDE     Implantable Pulse Generator Serial Number [id number]     Type Interrogation Session Remote      Clinic Name [facility]     Implantable Pulse Generator Type Cardiac Resynchronization Therapy - Pacemaker     Implantable Pulse Generator Implant Date 20200622     Implantable Lead  [employer]     Implantable Lead Model 4671 Acuity X4 Straight     Implantable Lead Serial Number [id number]     Implantable Lead Implant Date 20200622     Implantable Lead Polarity Type Quadripolar Lead     Implantable Lead Location Detail 1 UNKNOWN     Implantable Lead Location Left Ventricle     Implantable Lead  [employer]     Implantable Lead Model 7841 Ingevity     Implantable Lead Serial Number [id number]     Implantable Lead Implant Date 20200622     Implantable Lead Location Detail 1 UNKNOWN     Implantable Lead Location Right Ventricle     Patrick Setting Mode (NBG Code) VVIR     Patrick Setting Lower Rate Limit 70 [beats]/min    Patrick Setting Maximum Sensor Rate 130 [beats]/min    Patrick Setting AT Mode Switch Rate 170 [beats]/min    Lead Channel Setting Sensing Sensitivity 0.5 mV    Lead Channel Setting Sensing Adaptation Mode Fixed      Lead Channel Setting Sensing Polarity Bipolar     Lead Channel Setting Sensing Sensitivity 2.5  mV    Lead Channel Setting Sensing Adaptation Mode Fixed      Lead Channel Setting Sensing Anode Location Left Ventricle     Lead Channel Setting Sensing Anode Terminal Ring2     Lead Channel Setting Sensing Cathode Location Left Ventricle     Lead Channel Setting Sensing Cathode Terminal Tip     Lead Channel Setting Sensing Sensitivity 2.5 mV    Lead Channel Setting Sensing Adaptation Mode Fixed      Ventricular chambers paced during CRT pacing. BiV     CRT LV-RV Delay 30 ms    Lead Channel Setting Pacing Polarity Bipolar     Lead Channel Setting Pacing Pulse Width 0.4 ms    Lead Channel Setting Pacing Amplitude 2.6 V    Lead Channel Setting Pacing Capture Mode Adaptive     Lead Channel Setting Pacing Anode Location Other     Lead Channel Setting Pacing Anode Terminal Can     Lead Channel Setting Sensing Cathode Location Left Ventricle     Lead Channel Setting Sensing Cathode Terminal Tip     Lead Channel Setting Pacing Pulse Width 0.4 ms    Lead Channel Setting Pacing Amplitude 2.7 V    Zone Setting Type Category VT     Zone Setting Vendor Type Category VT     Zone Setting Detection Interval 375 ms    Lead Channel Impedance Value 727 ohm    Lead Channel Pacing Threshold Amplitude 1.7 V    Lead Channel Pacing Threshold Pulse Width 0.4 ms    Lead Channel Impedance Value 662 ohm    Lead Channel Pacing Threshold Amplitude 1.3 V    Lead Channel Pacing Threshold Pulse Width 0.4 ms    Battery Date Time of Measurements 20220519002100     Battery Status Beginning of Service     Battery Remaining Longevity 114 mo    Battery Remaining Percentage 100 %    Patrick Statistic Date Time Start 20211117000000     Patrick Statistic Date Time End 20220519000000     Patrick Statistic RA Percent Paced 0 %    Patrick Statistic RV Percent Paced 100 %    CRT Statistic LV Percent Paced 100 %    CRT Statistic Date Time Start 20211117000000     CRT Statistic Date Time End 20220519000000     Episode Statistic Recent Count 0     Episode Statistic Type  Category AT/AF     Episode Statistic Vendor Type Category AF     Episode Statistic Recent Count 0     Episode Statistic Type Category Other     Episode Statistic Recent Count 0     Episode Statistic Type Category SVT     Episode Statistic Vendor Type Category SVT     Episode Statistic Recent Count 0     Episode Statistic Type Category VT     Episode Statistic Vendor Type Category NSVT     Episode Statistic Recent Count 0     Episode Statistic Type Category VT     Episode Statistic Vendor Type Category VT     Episode Statistic Recent Date Time Start 20211117000000     Episode Statistic Recent Date Time End 20220519000000     Episode Statistic Recent Date Time Start 20211117000000     Episode Statistic Recent Date Time End 20220519000000     Episode Statistic Recent Date Time Start 20211117000000     Episode Statistic Recent Date Time End 20220519000000     Episode Statistic Recent Date Time Start 20211117000000     Episode Statistic Recent Date Time End 20220519000000     Episode Statistic Recent Date Time Start 20211117000000     Episode Statistic Recent Date Time End 20220519000000     Episode Identifier [email or web address]     Episode Type Category Periodic EGM     Episode Date Time [id number]     Episode Identifier [email or web address]     Episode Type Category Other     Episode Date Time [id number]        Imaging Results    Cardiac Device Check - Remote    Result Date: 5/20/2022  [employer] Valitude CRT-P Remote PPM Device Check BiVP: 100%, chronic AF, taking Warfarin, AVNA Mode: VVIR 70/130 Presenting Rhythm: BiVP Heart Rate: adequate rates per histograms Sensing: stable Pacing Threshold: stable Impedance: stable Battery Status: 9.5 years remaining Atrial Arrhythmia: n/a Ventricular Arrhythmia: none Care Plan: Remote latitude PPM f/u q 3 months. OV w/ Dr. Sevilla or ANDER due 4/2023. Results and next scheduled appointment given to patient over the phone. LENNOX Louis I have reviewed and interpreted the device interrogation,  settings, programming and nurse's summary. The device is functioning within normal device parameters. I agree with the current findings, assessment and plan.      A total of 60 minutes was spent today on this visit including face to face conversation with the patient, EMR review (labs, imaging studies, pathology reports and outside records), counseling and care co-ordination and documentation.    Signed by: Negro Ibarra MD      Chandrika is a 81 year old who is being evaluated via a billable video visit.      How would you like to obtain your AVS? MyChart  If the video visit is dropped, the invitation should be resent by: Text to cell phone: [phone]  Will anyone else be joining your video visit? No     Denise Whitley, CMA on 5/24/2022 at 12:50 PM              Again, thank you for allowing me to participate in the care of your patient.        Sincerely,        Negro Ibarra MD

## 2022-05-24 NOTE — PATIENT INSTRUCTIONS
Please schedule IV iron sucrose with labs prior in the next few days (patient wants it closer to home). Labs again 2 weeks after the iron infusion and virtual visit with me a couple days later to discuss the results.

## 2022-05-24 NOTE — PROGRESS NOTES
Video-Visit Details    Type of service:  Video Visit    Video Start Time: 12:51 PM  Video End Time: 1:13 PM    Originating Location (pt. Location): Home in [place]    Distant Location (provider location):  [facility]     Platform used for Video Visit: [facility] Hematology and Oncology Consult Note    Patient: Tomasa Fam  MRN: [id number]  Date of Service: 05/24/2022      Reason for Visit    Chief Complaint   Patient presents with     Oncology Clinic Visit     Personal history of malignant neoplasm of breast - video visit         Assessment/Plan    Problem List Items Addressed This Visit        Hematologic    Iron deficiency anemia due to chronic blood loss    Relevant Orders    Protein electrophoresis    Kappa and lambda light chain    Iron & Iron Binding Capacity    Ferritin    Calcium    Albumin level      Other Visit Diagnoses     MGUS (monoclonal gammopathy of unknown significance)    -  Primary    Relevant Orders    Protein electrophoresis    Kappa and lambda light chain    Calcium    Albumin level    Anemia in other chronic diseases classified elsewhere              Iron deficiency anemia probably secondary to chronic blood loss  Transferrin saturation was 8% and total iron was low at 22.  Ferritin is elevated to 61 which signifies probable underlying inflammatory state.  Hemoglobin 9.8.  With decreased MCH.  This is consistent with iron deficiency state.  She also has slightly elevated platelet count which is also seen in iron deficiency.  Hemoglobin has declined months by couple points.  Suspect longstanding GI blood loss.  Especially while she is on anticoagulation with Eliquis.  She has been on oral iron supplementation for the last couple of weeks and her iron studies have not improved a whole lot.  In the setting I would recommend IV iron infusion to bring her hemoglobin with her.  She and her daughter expressed that it has been extremely difficult for her  with joint pains and wants to get on with knee replacement surgery as soon as possible.  The fastest way to get her hemoglobin is to IV iron infusion.  I reviewed the potential side effects and complications associated including anaphylactic reactions, allergic reactions and intolerance, bone pain, skin changes etc.  She is agreeable to proceed with IV iron.  She wants to reduce the number of venipunctures.  So in this setting I would recommend IV iron sucrose 500 mg 1 dose given over 3 to 4 hours.  We should check her iron studies before the infusion and 2 weeks after the infusion.  The meantime continue oral iron every other day.  Expect her hemoglobin to improve to some extent with the IV iron supplementation.  She can proceed with knee surgery after that.  Although she does carry a diagnosis of chronic kidney disease her creatinine has been normal so not sure if at that point in supplementation is the right choice for her.  She also seems to have some chronic inflammatory disease especially with high ferritin and high ESR and this results in decreased iron absorption in the gut.  Hopefully will be able to get her hemoglobin above 11 so that she can proceed with surgery.  We can do work-up to look for source of bleeding after that.    Hypercalcemia in the setting of MGUS  Her calcium has been consistently above 10.5.  She also has low albumin level so corrected calcium is even higher.  In 2020 her SPEP showed small monoclonal protein at a concentration of 0.1 g/dL.  Immunofixation and light analysis was not done.  She also had elevated parathyroid hormone level at 121.  I am not sure if there is any follow-up arranged for this or further work-up done.  I will repeat her SPEP along with immunofixation and free light chains to see if it has gotten worse.  Suspicion for myeloma is low but it is always in the differential diagnosis.  It is to be noted that she also has a history of lung cancer status post lobectomy.   Currently there are no signs of disease progression.  However she may need systemic imaging in the future to work hypercalcemia especially if other secondary causes are ruled out.  The meantime I asked her to stop her calcium supplementation.  We will recheck her calcium levels when she comes in for iron infusion.    ECOG Performance    2 - Ambulatory and independent in all ADLs; cannot work; up > 50% of the time    Problem List    Patient Active Problem List   Diagnosis     Pulmonary emphysema, unspecified emphysema type (H)     ASCUS on Pap smear     Hyperlipidemia LDL goal <160     Essential hypertension with goal blood pressure less than 140/90     Cystocele, midline     Uterovaginal prolapse     S/P hysterectomy     Advanced directives, counseling/discussion     History of hypokalemia     Concussion     Thyroid nodule     Chronic pain of both knees     History of lung cancer     Gastroesophageal reflux disease, esophagitis presence not specified     PMR (polymyalgia rheumatica) (H)     Personal history of malignant neoplasm of breast     Long term systemic steroid user     Cardiomyopathy (H)     Complete heart block (H)     Temporal arteritis (H)     Elevation of level of transaminase or lactic acid dehydrogenase (LDH)     Recurrent falls     Dyspnea     Persistent atrial fibrillation (H)     Skin ulcer of right lower leg with fat layer exposed (H)     Cardiac pacemaker in situ 5/22/2020; AV node ablation     Chronic kidney disease, stage 3 (H)     Mood disorder (H)     Inferior pubic ramus fracture, right, closed, initial encounter (H)     Peripheral vascular disease, unspecified (H)     Iron deficiency anemia due to chronic blood loss     ______________________________________________________________________________    Staging History    Cancer Staging  No matching staging information was found for the patient.      History of presenting illness:  Tomasa Fam is an 81-year-old female with past medical  history significant for LCIS status post left mastectomy, history of lung cancer status post lobectomy, A. fib on eliquis, nonrheumatic and multiple other medical problems who has been referred by her primary care provider for evaluation management of Anfinson anemia in the setting of upcoming total knee replacement surgery. This was a video visit and paatient consented to it.    She has been evaluated for TKA when it was noted that her hemoglobin was less than 10.  Surgery was canceled and she was advised to get work-up for low hemoglobin and possibly improved to above 11 before going for surgery.  Chart her hemoglobin has been around 11-12 in the past and over the last few months it has come down below 10.  Most recent hemoglobin was 9.8 on 5/17/2022.  Platelet count was 495.  Total white count was normal.  Further evaluation of this showed high ferritin of 261 but iron studies came back showing low total iron of 22 and transferrin saturation of 8%.  TIBC was 261.  As mentioned above she is on Eliquis for atrial fibrillation.  Denies any blood in stools.  She does have some orangeish urine at times.  Also has increased frequency of urination at night.  She been taking oral iron supplementation for the last 2 to 3 weeks.  Has some black stools because of this.  In the past she has had left leg symptoms.  Last upper endoscopy was in December 2021 which was normal without any ulcerations or sources of bleeding.  Interestingly she also has elevated calcium levels which has been persistent.  Most recent calcium level was 10.7.  She had an parathyroid hormone level checked back in 2020 which was elevated at 121.  She does carry a diagnosis of chronic kidney disease although her creatinine is within normal range.  I am not sure if there is any work-up done for this.  She is currently taking calcium supplementation.  Also in 2020 and SPEP showed a small monoclonal protein peak of 0.1 g/dL.  Light chains were not done.   Immunofixation was not done.  She denies any weight loss.  She has significant joint pains.  Earlier this year she fell from standing position and had a pubic ramus fracture.  No pathological fractures reported so far.  No lytic lesions seen on the CT or x-rays.      Past History    Past Medical History:   Diagnosis Date     Anemia      Arthritis     hands, knees     Breast cancer (H) 01/2012    left mastectomy followed by right;  Dr. Luong     Chronic airway obstruction, not elsewhere classified 2006    very mild COPD - small cough     Concussion 03/13/2013     Problem list name updated by automated process. Provider to review and confirm Im[employer] Update utility     Cystocele, midline 04/04/2012     Diffuse cystic mastopathy      Gastroesophageal reflux disease, esophagitis presence not specified 11/23/2019     History of hypokalemia 01/23/2013     Hyperlipidemia LDL goal <160 06/29/2011    [place] 10-year CHD Risk Score: 2% (14 Total Points)  Values used to calculate score:    Age: 70 years -- Points: 14    Total Cholesterol: 192 mg/dL -- Points: 1    HDL Cholesterol: 61 mg/dL -- Points: -1    Systolic BP (treated): 118 mmHg -- Points: 0   The patient is not a smoker. -- Points: 0   The patient has not been diagnosed with diabetes. -- Points: 0   The patient does not have a famil     Lung cancer (H) 10/21/2015     Pacemaker     [employer]     Paroxysmal atrial fibrillation (H) 09/13/2019     PMR (polymyalgia rheumatica) (H) 01/17/2020    tapering' above.)  In patients receiving over 10 mg of prednisone/day, the dose can be lowered by 2.5 mg/day decrements every two to four weeks  Once the dose of prednisone is 10 mg/day, further tapering can be done by 1 mg per month, provided the clinical course is stable  The clinical response to glucocorticoid therapy is closely monitored, which centers on screening for the presence and/or recu     Thyroid nodule 04/23/2016    Noted on CT Fall 2015.      Unspecified  essential hypertension late 1980's    Family History   Problem Relation Age of Onset     Cardiovascular Father         ruptured aorta, hardening of the arteries     Cerebrovascular Disease Mother      Respiratory Mother         chronic bronchitis - was a smoker early on     Cardiovascular Paternal Grandfather         MI     Cerebrovascular Disease Paternal Aunt      Hypertension Son      Neurologic Disorder Daughter         migraines     Breast Cancer Daughter      Brain Tumor Sister       Past Surgical History:   Procedure Laterality Date     ANESTHESIA CARDIOVERSION N/A 6/12/2020    Procedure: ANESTHESIA, FOR CARDIOVERSION;  Surgeon: GENERIC ANESTHESIA PROVIDER;  Location: [facility] OR     COLONOSCOPY  3/2003    adenomatous polyp      COLONOSCOPY  7/2006    diverticulosis - repeat in 5 years     COLONOSCOPY  10/13/2011    Procedure:COLONOSCOPY; COLONOSCOPY ; Surgeon:CHITO GORDILLO; Location: GI     CYSTOSCOPY  7/11/2012    Procedure: CYSTOSCOPY;;  Surgeon: Aline Cooper DO;  Location:  OR     DAVINCI HYSTERECTOMY SUPRACERVICAL, SACROCOLPOPEXY, COMBINED  7/11/2012    Procedure: COMBINED DAVINCI HYSTERECTOMY SUPRACERVICAL, SACROCOLPOPEXY;   DAVINCI ASSISTED LAPAROSCOPIC SUPRACERVICAL HYSTERECTOMY AND BILATERAL SALPINGO-OOPHORECTOMY, SACROCOLPOPEXY AND CYSTOSCOPY;  Surgeon: Aline Cooper DO;  Location:  OR     EP ABLATION AV NODE N/A 6/22/2020    Procedure: EP ABLATION AV NODE;  Surgeon: Adriano Raman MD;  Location:  HEART CARDIAC CATH LAB     EP BIVENT LEAD PLACEMENT N/A 6/22/2020    Procedure: Bivent Lead Placement;  Surgeon: Adriano Raman MD;  Location:  HEART CARDIAC CATH LAB     EP PACEMAKER N/A 6/22/2020    Procedure: AVNA and BiV Pacemaker Insertion;  Surgeon: Adriano Raman MD;  Location:  HEART CARDIAC CATH LAB     ESOPHAGOSCOPY, GASTROSCOPY, DUODENOSCOPY (EGD), COMBINED N/A 12/14/2021    Procedure: ESOPHAGOGASTRODUODENOSCOPY (EGD) (fv) biopsies with cold  [facility];  Surgeon: Roberto Nguyen MD;  Location: [facility] GI     EXCISE LESION EYELID Right 6/29/2015    Procedure: EXCISE LESION EYELID;  Surgeon: Hank Olvera MD;  Location: [facility]     INSERT PORT VASCULAR ACCESS  2/3/2012    Procedure:INSERT PORT VASCULAR ACCESS; Power Port-A- Catheter Placement ; Surgeon:IRISH TAPIA; Location:[facility] OR     LAPAROSCOPIC SALPINGO-OOPHORECTOMY  7/11/2012    Procedure: LAPAROSCOPIC SALPINGO-OOPHORECTOMY;  Davinci;  Surgeon: Aline Cooper DO;  Location:  OR     LIPOSUCTION, RHYTIDECTOMY, COMBINED       LOBECTOMY LUNG Right 3/1/2016    Procedure: LOBECTOMY LUNG;  Surgeon: Jonas Woodward MD;  Location:  OR     MAMMOPLASTY REDUCTION  1/6/2012    Procedure:MAMMOPLASTY REDUCTION; Surgeon:MICKI KYLE; Location:[facility] OR     MASTECTOMY SIMPLE  11/12/2012    Procedure: MASTECTOMY SIMPLE;   Right Prophylactic Mastectomy with attempted Right Sentinal Node Biopsy, Revision Bilateral Mastectomy Insicions, liposuction in breast area;  Surgeon: Irish Tapia MD;  Location: [facility] OR     MASTECTOMY SIMPLE, SENTINEL NODE, COMBINED  1/6/2012    Procedure:COMBINED MASTECTOMY SIMPLE, SENTINEL NODE; Left Mastectomy Left [place] Node Biopsy,  Right Breast Reduction ; Surgeon:IRISH TAPIA; Location:[facility] OR     MASTECTOMY, BILATERAL       REMOVE PORT VASCULAR ACCESS  4/29/2013    Procedure: REMOVE PORT VASCULAR ACCESS;  Port A catheter removal ;  Surgeon: Irish Tapia MD;  Location: [facility] OR     REPAIR PTOSIS BILATERAL Bilateral 6/29/2015    Procedure: REPAIR PTOSIS BILATERAL;  Surgeon: Hank Olvera MD;  Location: [facility]     REVISE RECONSTRUCTED BREAST BILATERAL  11/12/2012    Procedure: REVISE RECONSTRUCTED BREAST BILATERAL;;  Surgeon: Micki Kyle MD;  Location: [facility] OR     SURGICAL HISTORY OF -   in 40's    face lift     SURGICAL HISTORY OF -       lipoma removed right thigh     SURGICAL HISTORY OF -       D and C     THORACOTOMY Right  3/1/2016    Procedure: THORACOTOMY;  Surgeon: Jonas Woodward MD;  Location:  OR     [facility] NONSPECIFIC PROCEDURE  1970    s/p Tubal ligation 1970    Social History     Socioeconomic History     Marital status:      Spouse name: Aren     Number of children: 2     Years of education: Not on file     Highest education level: Not on file   Occupational History     Occupation: [employer]     Employer: NONE    Tobacco Use     Smoking status: Never Smoker     Smokeless tobacco: Never Used   Vaping Use     Vaping Use: Never used   Substance and Sexual Activity     Alcohol use: Yes     Comment: 0-1 drink day     Drug use: No     Sexual activity: Yes     Partners: Male   Other Topics Concern      Service Not Asked     Blood Transfusions Not Asked     Caffeine Concern Not Asked     Occupational Exposure Not Asked     Hobby Hazards Not Asked     Sleep Concern Not Asked     Stress Concern Not Asked     Weight Concern Not Asked     Special Diet Not Asked     Back Care Not Asked     Exercise Yes     Comment: nguyen     Bike Helmet Not Asked     Seat Belt Not Asked     Self-Exams Not Asked     Parent/sibling w/ CABG, MI or angioplasty before 65F 55M? Yes   Social History Narrative     Not on file     Social Determinants of Health     Financial Resource Strain: Not on file   Food Insecurity: Not on file   Transportation Needs: Not on file   Physical Activity: Not on file   Stress: Not on file   Social Connections: Not on file   Intimate Partner Violence: Not on file   Housing Stability: Not on file        Allergies    Allergies   Allergen Reactions     No Known Drug Allergies      Tape [Adhesive Tape]      Sensitive to plastic tape on upper part of body--takes skin off       Review of Systems    Pertinent items are noted in HPI.      Physical Exam    Oncology Vitals 5/17/2022   Height -   Weight -   BSA (m2) -   /54   Pulse -   Temp -   Temp src -   SpO2 -   Pain Score -   Pain Loc -   Some recent data  might be hidden       General: alert and co-operative, No distress  HEENT:atraumatic and normocephalic  Neuro: alert and oriented x 3    Lab Results    Recent Results (from the past 168 hour(s))   Cardiac Device Check - Remote   Result Value Ref Range    Date Time Interrogation Session [id number]     Implantable Pulse Generator  [employer]     Implantable Pulse Generator Model U128 VALITUDE     Implantable Pulse Generator Serial Number [id number]     Type Interrogation Session Remote      Clinic Name [facility]     Implantable Pulse Generator Type Cardiac Resynchronization Therapy - Pacemaker     Implantable Pulse Generator Implant Date 20200622     Implantable Lead  [employer]     Implantable Lead Model 4671 Acuity X4 Straight     Implantable Lead Serial Number [id number]     Implantable Lead Implant Date 20200622     Implantable Lead Polarity Type Quadripolar Lead     Implantable Lead Location Detail 1 UNKNOWN     Implantable Lead Location Left Ventricle     Implantable Lead  [employer]     Implantable Lead Model 7841 Ingevity     Implantable Lead Serial Number [id number]     Implantable Lead Implant Date 20200622     Implantable Lead Location Detail 1 UNKNOWN     Implantable Lead Location Right Ventricle     Patrick Setting Mode (NBG Code) VVIR     Patrick Setting Lower Rate Limit 70 [beats]/min    Patrick Setting Maximum Sensor Rate 130 [beats]/min    Patrick Setting AT Mode Switch Rate 170 [beats]/min    Lead Channel Setting Sensing Sensitivity 0.5 mV    Lead Channel Setting Sensing Adaptation Mode Fixed      Lead Channel Setting Sensing Polarity Bipolar     Lead Channel Setting Sensing Sensitivity 2.5 mV    Lead Channel Setting Sensing Adaptation Mode Fixed      Lead Channel Setting Sensing Anode Location Left Ventricle     Lead Channel Setting Sensing Anode Terminal Ring2     Lead Channel Setting Sensing Cathode Location Left Ventricle     Lead Channel Setting Sensing  Cathode Terminal Tip     Lead Channel Setting Sensing Sensitivity 2.5 mV    Lead Channel Setting Sensing Adaptation Mode Fixed      Ventricular chambers paced during CRT pacing. BiV     CRT LV-RV Delay 30 ms    Lead Channel Setting Pacing Polarity Bipolar     Lead Channel Setting Pacing Pulse Width 0.4 ms    Lead Channel Setting Pacing Amplitude 2.6 V    Lead Channel Setting Pacing Capture Mode Adaptive     Lead Channel Setting Pacing Anode Location Other     Lead Channel Setting Pacing Anode Terminal Can     Lead Channel Setting Sensing Cathode Location Left Ventricle     Lead Channel Setting Sensing Cathode Terminal Tip     Lead Channel Setting Pacing Pulse Width 0.4 ms    Lead Channel Setting Pacing Amplitude 2.7 V    Zone Setting Type Category VT     Zone Setting Vendor Type Category VT     Zone Setting Detection Interval 375 ms    Lead Channel Impedance Value 727 ohm    Lead Channel Pacing Threshold Amplitude 1.7 V    Lead Channel Pacing Threshold Pulse Width 0.4 ms    Lead Channel Impedance Value 662 ohm    Lead Channel Pacing Threshold Amplitude 1.3 V    Lead Channel Pacing Threshold Pulse Width 0.4 ms    Battery Date Time of Measurements 20220519002100     Battery Status Beginning of Service     Battery Remaining Longevity 114 mo    Battery Remaining Percentage 100 %    Patrick Statistic Date Time Start 20211117000000     Patrick Statistic Date Time End 20220519000000     Patrick Statistic RA Percent Paced 0 %    Patrick Statistic RV Percent Paced 100 %    CRT Statistic LV Percent Paced 100 %    CRT Statistic Date Time Start 20211117000000     CRT Statistic Date Time End 20220519000000     Episode Statistic Recent Count 0     Episode Statistic Type Category AT/AF     Episode Statistic Vendor Type Category AF     Episode Statistic Recent Count 0     Episode Statistic Type Category Other     Episode Statistic Recent Count 0     Episode Statistic Type Category SVT     Episode Statistic Vendor Type Category SVT      Episode Statistic Recent Count 0     Episode Statistic Type Category VT     Episode Statistic Vendor Type Category NSVT     Episode Statistic Recent Count 0     Episode Statistic Type Category VT     Episode Statistic Vendor Type Category VT     Episode Statistic Recent Date Time Start 20211117000000     Episode Statistic Recent Date Time End 20220519000000     Episode Statistic Recent Date Time Start 20211117000000     Episode Statistic Recent Date Time End 20220519000000     Episode Statistic Recent Date Time Start 20211117000000     Episode Statistic Recent Date Time End 20220519000000     Episode Statistic Recent Date Time Start 20211117000000     Episode Statistic Recent Date Time End 20220519000000     Episode Statistic Recent Date Time Start 20211117000000     Episode Statistic Recent Date Time End 20220519000000     Episode Identifier [email or web address]     Episode Type Category Periodic EGM     Episode Date Time 20220518221800     Episode Identifier [email or web address]     Episode Type Category Other     Episode Date Time 20220518030900        Imaging Results    Cardiac Device Check - Remote    Result Date: 5/20/2022  [employer] Valitude CRT-P Remote PPM Device Check BiVP: 100%, chronic AF, taking Warfarin, AVNA Mode: VVIR 70/130 Presenting Rhythm: BiVP Heart Rate: adequate rates per histograms Sensing: stable Pacing Threshold: stable Impedance: stable Battery Status: 9.5 years remaining Atrial Arrhythmia: n/a Ventricular Arrhythmia: none Care Plan: Remote latitude PPM f/u q 3 months. OV w/ Dr. Sevilla or ANDER due 4/2023. Results and next scheduled appointment given to patient over the phone. LENNOX Louis I have reviewed and interpreted the device interrogation, settings, programming and nurse's summary. The device is functioning within normal device parameters. I agree with the current findings, assessment and plan.      A total of 60 minutes was spent today on this visit including face to face conversation with the  patient, EMR review (labs, imaging studies, pathology reports and outside records), counseling and care co-ordination and documentation.    Signed by: Negro Ibarra MD

## 2022-05-24 NOTE — TELEPHONE ENCOUNTER
Fiona called back, states that they are going to do [facility] is going to schedule the appointment. They will do a blood panel before and after infustion & they should have patient ready for surgery in 2 weeks.     Routing to PCP.

## 2022-05-24 NOTE — PROGRESS NOTES
Chandrika is a 81 year old who is being evaluated via a billable video visit.      How would you like to obtain your AVS? MyChart  If the video visit is dropped, the invitation should be resent by: Text to cell phone: [phone]  Will anyone else be joining your video visit? Myriam Whitley CMA on 5/24/2022 at 12:50 PM           The patient is a 81y Female complaining of The patient is a 81y Female complaining of fall

## 2022-05-24 NOTE — LETTER
5/24/2022         RE: Tomasa Fam  [address] [facility] [address]        Dear Colleague,    Thank you for referring your patient, Tomasa Fam, to the [facility]. Please see a copy of my visit note below.    Video-Visit Details    Type of service:  Video Visit    Video Start Time: 12:51 PM  Video End Time: 1:13 PM    Originating Location (pt. Location): Home in [place]    Distant Location (provider location):  [facility]     Platform used for Video Visit: SumUp    [facility] Hematology and Oncology Consult Note    Patient: Tomasa Fam  MRN: [id number]  Date of Service: 05/24/2022      Reason for Visit    Chief Complaint   Patient presents with     Oncology Clinic Visit     Personal history of malignant neoplasm of breast - video visit         Assessment/Plan    Problem List Items Addressed This Visit        Hematologic    Iron deficiency anemia due to chronic blood loss    Relevant Orders    Protein electrophoresis    Kappa and lambda light chain    Iron & Iron Binding Capacity    Ferritin    Calcium    Albumin level      Other Visit Diagnoses     MGUS (monoclonal gammopathy of unknown significance)    -  Primary    Relevant Orders    Protein electrophoresis    Kappa and lambda light chain    Calcium    Albumin level    Anemia in other chronic diseases classified elsewhere              Iron deficiency anemia probably secondary to chronic blood loss  Transferrin saturation was 8% and total iron was low at 22.  Ferritin is elevated to 61 which signifies probable underlying inflammatory state.  Hemoglobin 9.8.  With decreased MCH.  This is consistent with iron deficiency state.  She also has slightly elevated platelet count which is also seen in iron deficiency.  Hemoglobin has declined months by couple points.  Suspect longstanding GI blood loss.  Especially while she is on anticoagulation with Eliquis.  She has been on  oral iron supplementation for the last couple of weeks and her iron studies have not improved a whole lot.  In the setting I would recommend IV iron infusion to bring her hemoglobin with her.  She and her daughter expressed that it has been extremely difficult for her with joint pains and wants to get on with knee replacement surgery as soon as possible.  The fastest way to get her hemoglobin is to IV iron infusion.  I reviewed the potential side effects and complications associated including anaphylactic reactions, allergic reactions and intolerance, bone pain, skin changes etc.  She is agreeable to proceed with IV iron.  She wants to reduce the number of venipunctures.  So in this setting I would recommend IV iron sucrose 500 mg 1 dose given over 3 to 4 hours.  We should check her iron studies before the infusion and 2 weeks after the infusion.  The meantime continue oral iron every other day.  Expect her hemoglobin to improve to some extent with the IV iron supplementation.  She can proceed with knee surgery after that.  Although she does carry a diagnosis of chronic kidney disease her creatinine has been normal so not sure if at that point in supplementation is the right choice for her.  She also seems to have some chronic inflammatory disease especially with high ferritin and high ESR and this results in decreased iron absorption in the gut.  Hopefully will be able to get her hemoglobin above 11 so that she can proceed with surgery.  We can do work-up to look for source of bleeding after that.    Hypercalcemia in the setting of MGUS  Her calcium has been consistently above 10.5.  She also has low albumin level so corrected calcium is even higher.  In 2020 her SPEP showed small monoclonal protein at a concentration of 0.1 g/dL.  Immunofixation and light analysis was not done.  She also had elevated parathyroid hormone level at 121.  I am not sure if there is any follow-up arranged for this or further work-up  done.  I will repeat her SPEP along with immunofixation and free light chains to see if it has gotten worse.  Suspicion for myeloma is low but it is always in the differential diagnosis.  It is to be noted that she also has a history of lung cancer status post lobectomy.  Currently there are no signs of disease progression.  However she may need systemic imaging in the future to work hypercalcemia especially if other secondary causes are ruled out.  The meantime I asked her to stop her calcium supplementation.  We will recheck her calcium levels when she comes in for iron infusion.    ECOG Performance    2 - Ambulatory and independent in all ADLs; cannot work; up > 50% of the time    Problem List    Patient Active Problem List   Diagnosis     Pulmonary emphysema, unspecified emphysema type (H)     ASCUS on Pap smear     Hyperlipidemia LDL goal <160     Essential hypertension with goal blood pressure less than 140/90     Cystocele, midline     Uterovaginal prolapse     S/P hysterectomy     Advanced directives, counseling/discussion     History of hypokalemia     Concussion     Thyroid nodule     Chronic pain of both knees     History of lung cancer     Gastroesophageal reflux disease, esophagitis presence not specified     PMR (polymyalgia rheumatica) (H)     Personal history of malignant neoplasm of breast     Long term systemic steroid user     Cardiomyopathy (H)     Complete heart block (H)     Temporal arteritis (H)     Elevation of level of transaminase or lactic acid dehydrogenase (LDH)     Recurrent falls     Dyspnea     Persistent atrial fibrillation (H)     Skin ulcer of right lower leg with fat layer exposed (H)     Cardiac pacemaker in situ 5/22/2020; AV node ablation     Chronic kidney disease, stage 3 (H)     Mood disorder (H)     Inferior pubic ramus fracture, right, closed, initial encounter (H)     Peripheral vascular disease, unspecified (H)     Iron deficiency anemia due to chronic blood loss      ______________________________________________________________________________    Staging History    Cancer Staging  No matching staging information was found for the patient.      History of presenting illness:  Tomasa Fam is an 81-year-old female with past medical history significant for LCIS status post left mastectomy, history of lung cancer status post lobectomy, A. fib on eliquis, nonrheumatic and multiple other medical problems who has been referred by her primary care provider for evaluation management of Anfinson anemia in the setting of upcoming total knee replacement surgery. This was a video visit and paatient consented to it.    She has been evaluated for TKA when it was noted that her hemoglobin was less than 10.  Surgery was canceled and she was advised to get work-up for low hemoglobin and possibly improved to above 11 before going for surgery.  Chart her hemoglobin has been around 11-12 in the past and over the last few months it has come down below 10.  Most recent hemoglobin was 9.8 on 5/17/2022.  Platelet count was 495.  Total white count was normal.  Further evaluation of this showed high ferritin of 261 but iron studies came back showing low total iron of 22 and transferrin saturation of 8%.  TIBC was 261.  As mentioned above she is on Eliquis for atrial fibrillation.  Denies any blood in stools.  She does have some orangeish urine at times.  Also has increased frequency of urination at night.  She been taking oral iron supplementation for the last 2 to 3 weeks.  Has some black stools because of this.  In the past she has had left leg symptoms.  Last upper endoscopy was in December 2021 which was normal without any ulcerations or sources of bleeding.  Interestingly she also has elevated calcium levels which has been persistent.  Most recent calcium level was 10.7.  She had an parathyroid hormone level checked back in 2020 which was elevated at 121.  She does carry a diagnosis of  chronic kidney disease although her creatinine is within normal range.  I am not sure if there is any work-up done for this.  She is currently taking calcium supplementation.  Also in 2020 and SPEP showed a small monoclonal protein peak of 0.1 g/dL.  Light chains were not done.  Immunofixation was not done.  She denies any weight loss.  She has significant joint pains.  Earlier this year she fell from standing position and had a pubic ramus fracture.  No pathological fractures reported so far.  No lytic lesions seen on the CT or x-rays.      Past History    Past Medical History:   Diagnosis Date     Anemia      Arthritis     hands, knees     Breast cancer (H) 01/2012    left mastectomy followed by right;  Dr. Luong     Chronic airway obstruction, not elsewhere classified 2006    very mild COPD - small cough     Concussion 03/13/2013     Problem list name updated by automated process. Provider to review and confirm Im[employer] Update utility     Cystocele, midline 04/04/2012     Diffuse cystic mastopathy      Gastroesophageal reflux disease, esophagitis presence not specified 11/23/2019     History of hypokalemia 01/23/2013     Hyperlipidemia LDL goal <160 06/29/2011    [place] 10-year CHD Risk Score: 2% (14 Total Points)  Values used to calculate score:    Age: 70 years -- Points: 14    Total Cholesterol: 192 mg/dL -- Points: 1    HDL Cholesterol: 61 mg/dL -- Points: -1    Systolic BP (treated): 118 mmHg -- Points: 0   The patient is not a smoker. -- Points: 0   The patient has not been diagnosed with diabetes. -- Points: 0   The patient does not have a famil     Lung cancer (H) 10/21/2015     Pacemaker     [employer]     Paroxysmal atrial fibrillation (H) 09/13/2019     PMR (polymyalgia rheumatica) (H) 01/17/2020    tapering' above.)  In patients receiving over 10 mg of prednisone/day, the dose can be lowered by 2.5 mg/day decrements every two to four weeks  Once the dose of prednisone is 10 mg/day, further  tapering can be done by 1 mg per month, provided the clinical course is stable  The clinical response to glucocorticoid therapy is closely monitored, which centers on screening for the presence and/or recu     Thyroid nodule 04/23/2016    Noted on CT Fall 2015.      Unspecified essential hypertension late 1980's    Family History   Problem Relation Age of Onset     Cardiovascular Father         ruptured aorta, hardening of the arteries     Cerebrovascular Disease Mother      Respiratory Mother         chronic bronchitis - was a smoker early on     Cardiovascular Paternal Grandfather         MI     Cerebrovascular Disease Paternal Aunt      Hypertension Son      Neurologic Disorder Daughter         migraines     Breast Cancer Daughter      Brain Tumor Sister       Past Surgical History:   Procedure Laterality Date     ANESTHESIA CARDIOVERSION N/A 6/12/2020    Procedure: ANESTHESIA, FOR CARDIOVERSION;  Surgeon: GENERIC ANESTHESIA PROVIDER;  Location:  OR     COLONOSCOPY  3/2003    adenomatous polyp      COLONOSCOPY  7/2006    diverticulosis - repeat in 5 years     COLONOSCOPY  10/13/2011    Procedure:COLONOSCOPY; COLONOSCOPY ; Surgeon:CHITO GORDILLO; Location: GI     CYSTOSCOPY  7/11/2012    Procedure: CYSTOSCOPY;;  Surgeon: Aline Cooper DO;  Location:  OR     DAVINCI HYSTERECTOMY SUPRACERVICAL, SACROCOLPOPEXY, COMBINED  7/11/2012    Procedure: COMBINED DAVINCI HYSTERECTOMY SUPRACERVICAL, SACROCOLPOPEXY;   DAVINCI ASSISTED LAPAROSCOPIC SUPRACERVICAL HYSTERECTOMY AND BILATERAL SALPINGO-OOPHORECTOMY, SACROCOLPOPEXY AND CYSTOSCOPY;  Surgeon: Aline Cooper DO;  Location:  OR     EP ABLATION AV NODE N/A 6/22/2020    Procedure: EP ABLATION AV NODE;  Surgeon: Adriano Raman MD;  Location:  HEART CARDIAC CATH LAB     EP BIVENT LEAD PLACEMENT N/A 6/22/2020    Procedure: Bivent Lead Placement;  Surgeon: Adriano Raman MD;  Location:  HEART CARDIAC CATH LAB     EP PACEMAKER N/A  6/22/2020    Procedure: AVNA and BiV Pacemaker Insertion;  Surgeon: Adriano Raman MD;  Location:  HEART CARDIAC CATH LAB     ESOPHAGOSCOPY, GASTROSCOPY, DUODENOSCOPY (EGD), COMBINED N/A 12/14/2021    Procedure: ESOPHAGOGASTRODUODENOSCOPY (EGD) (fv) biopsies with cold forcep;  Surgeon: Roberto Nguyen MD;  Location:  GI     EXCISE LESION EYELID Right 6/29/2015    Procedure: EXCISE LESION EYELID;  Surgeon: Hank Olvera MD;  Location: [facility]     INSERT PORT VASCULAR ACCESS  2/3/2012    Procedure:INSERT PORT VASCULAR ACCESS; Power Port-A- Catheter Placement ; Surgeon:IRISH TAPIA; Location: OR     LAPAROSCOPIC SALPINGO-OOPHORECTOMY  7/11/2012    Procedure: LAPAROSCOPIC SALPINGO-OOPHORECTOMY;  Davinci;  Surgeon: Aline Cooper DO;  Location:  OR     LIPOSUCTION, RHYTIDECTOMY, COMBINED       LOBECTOMY LUNG Right 3/1/2016    Procedure: LOBECTOMY LUNG;  Surgeon: Jonas Woodward MD;  Location:  OR     MAMMOPLASTY REDUCTION  1/6/2012    Procedure:MAMMOPLASTY REDUCTION; Surgeon:MICKI CAICEDO; Location:[facility] OR     MASTECTOMY SIMPLE  11/12/2012    Procedure: MASTECTOMY SIMPLE;   Right Prophylactic Mastectomy with attempted Right Sentinal Node Biopsy, Revision Bilateral Mastectomy Insicions, liposuction in breast area;  Surgeon: Irish Tapia MD;  Location: [facility] OR     MASTECTOMY SIMPLE, SENTINEL NODE, COMBINED  1/6/2012    Procedure:COMBINED MASTECTOMY SIMPLE, SENTINEL NODE; Left Mastectomy Left [place] Node Biopsy,  Right Breast Reduction ; Surgeon:IRISH TAPIA; Location:[facility] OR     MASTECTOMY, BILATERAL       REMOVE PORT VASCULAR ACCESS  4/29/2013    Procedure: REMOVE PORT VASCULAR ACCESS;  Port A catheter removal ;  Surgeon: Irish Tapia MD;  Location:  OR     REPAIR PTOSIS BILATERAL Bilateral 6/29/2015    Procedure: REPAIR PTOSIS BILATERAL;  Surgeon: Hank Olvera MD;  Location: [facility]     REVISE RECONSTRUCTED BREAST BILATERAL  11/12/2012     Procedure: REVISE RECONSTRUCTED BREAST BILATERAL;;  Surgeon: Katia Kyle MD;  Location: [facility] OR     SURGICAL HISTORY OF -   in 40's    face lift     SURGICAL HISTORY OF -       lipoma removed right thigh     SURGICAL HISTORY OF -       D and C     THORACOTOMY Right 3/1/2016    Procedure: THORACOTOMY;  Surgeon: Jonas Woodward MD;  Location: [facility] OR     ZZC NONSPECIFIC PROCEDURE  1970    s/p Tubal ligation 1970    Social History     Socioeconomic History     Marital status:      Spouse name: Aren     Number of children: 2     Years of education: Not on file     Highest education level: Not on file   Occupational History     Occupation: [employer]     Employer: NONE    Tobacco Use     Smoking status: Never Smoker     Smokeless tobacco: Never Used   Vaping Use     Vaping Use: Never used   Substance and Sexual Activity     Alcohol use: Yes     Comment: 0-1 drink day     Drug use: No     Sexual activity: Yes     Partners: Male   Other Topics Concern      Service Not Asked     Blood Transfusions Not Asked     Caffeine Concern Not Asked     Occupational Exposure Not Asked     Hobby Hazards Not Asked     Sleep Concern Not Asked     Stress Concern Not Asked     Weight Concern Not Asked     Special Diet Not Asked     Back Care Not Asked     Exercise Yes     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 Yes   Social History Narrative     Not on file     Social Determinants of Health     Financial Resource Strain: Not on file   Food Insecurity: Not on file   Transportation Needs: Not on file   Physical Activity: Not on file   Stress: Not on file   Social Connections: Not on file   Intimate Partner Violence: Not on file   Housing Stability: Not on file        Allergies    Allergies   Allergen Reactions     No Known Drug Allergies      Tape [Adhesive Tape]      Sensitive to plastic tape on upper  part of body--takes skin off       Review of Systems    Pertinent items are noted in HPI.      Physical Exam    Oncology Vitals 5/17/2022   Height -   Weight -   BSA (m2) -   /54   Pulse -   Temp -   Temp src -   SpO2 -   Pain Score -   Pain Loc -   Some recent data might be hidden       General: alert and co-operative, No distress  HEENT:atraumatic and normocephalic  Neuro: alert and oriented x 3    Lab Results    Recent Results (from the past 168 hour(s))   Cardiac Device Check - Remote   Result Value Ref Range    Date Time Interrogation Session [id number]     Implantable Pulse Generator  [employer]     Implantable Pulse Generator Model U128 VALITUDE     Implantable Pulse Generator Serial Number [id number]     Type Interrogation Session Remote      Clinic Name [facility]     Implantable Pulse Generator Type Cardiac Resynchronization Therapy - Pacemaker     Implantable Pulse Generator Implant Date 20200622     Implantable Lead  [employer]     Implantable Lead Model 4671 Acuity X4 Straight     Implantable Lead Serial Number [id number]     Implantable Lead Implant Date 20200622     Implantable Lead Polarity Type Quadripolar Lead     Implantable Lead Location Detail 1 UNKNOWN     Implantable Lead Location Left Ventricle     Implantable Lead  [employer]     Implantable Lead Model 7841 Ingevity     Implantable Lead Serial Number [id number]     Implantable Lead Implant Date 20200622     Implantable Lead Location Detail 1 UNKNOWN     Implantable Lead Location Right Ventricle     Patrick Setting Mode (NBG Code) VVIR     Patrick Setting Lower Rate Limit 70 [beats]/min    Patrick Setting Maximum Sensor Rate 130 [beats]/min    Patrick Setting AT Mode Switch Rate 170 [beats]/min    Lead Channel Setting Sensing Sensitivity 0.5 mV    Lead Channel Setting Sensing Adaptation Mode Fixed      Lead Channel Setting Sensing Polarity Bipolar     Lead Channel Setting Sensing Sensitivity 2.5  mV    Lead Channel Setting Sensing Adaptation Mode Fixed      Lead Channel Setting Sensing Anode Location Left Ventricle     Lead Channel Setting Sensing Anode Terminal Ring2     Lead Channel Setting Sensing Cathode Location Left Ventricle     Lead Channel Setting Sensing Cathode Terminal Tip     Lead Channel Setting Sensing Sensitivity 2.5 mV    Lead Channel Setting Sensing Adaptation Mode Fixed      Ventricular chambers paced during CRT pacing. BiV     CRT LV-RV Delay 30 ms    Lead Channel Setting Pacing Polarity Bipolar     Lead Channel Setting Pacing Pulse Width 0.4 ms    Lead Channel Setting Pacing Amplitude 2.6 V    Lead Channel Setting Pacing Capture Mode Adaptive     Lead Channel Setting Pacing Anode Location Other     Lead Channel Setting Pacing Anode Terminal Can     Lead Channel Setting Sensing Cathode Location Left Ventricle     Lead Channel Setting Sensing Cathode Terminal Tip     Lead Channel Setting Pacing Pulse Width 0.4 ms    Lead Channel Setting Pacing Amplitude 2.7 V    Zone Setting Type Category VT     Zone Setting Vendor Type Category VT     Zone Setting Detection Interval 375 ms    Lead Channel Impedance Value 727 ohm    Lead Channel Pacing Threshold Amplitude 1.7 V    Lead Channel Pacing Threshold Pulse Width 0.4 ms    Lead Channel Impedance Value 662 ohm    Lead Channel Pacing Threshold Amplitude 1.3 V    Lead Channel Pacing Threshold Pulse Width 0.4 ms    Battery Date Time of Measurements 20220519002100     Battery Status Beginning of Service     Battery Remaining Longevity 114 mo    Battery Remaining Percentage 100 %    Patrick Statistic Date Time Start 20211117000000     Patrick Statistic Date Time End 20220519000000     Patrick Statistic RA Percent Paced 0 %    Patrick Statistic RV Percent Paced 100 %    CRT Statistic LV Percent Paced 100 %    CRT Statistic Date Time Start 20211117000000     CRT Statistic Date Time End 20220519000000     Episode Statistic Recent Count 0     Episode Statistic Type  Category AT/AF     Episode Statistic Vendor Type Category AF     Episode Statistic Recent Count 0     Episode Statistic Type Category Other     Episode Statistic Recent Count 0     Episode Statistic Type Category SVT     Episode Statistic Vendor Type Category SVT     Episode Statistic Recent Count 0     Episode Statistic Type Category VT     Episode Statistic Vendor Type Category NSVT     Episode Statistic Recent Count 0     Episode Statistic Type Category VT     Episode Statistic Vendor Type Category VT     Episode Statistic Recent Date Time Start 20211117000000     Episode Statistic Recent Date Time End 20220519000000     Episode Statistic Recent Date Time Start 20211117000000     Episode Statistic Recent Date Time End 20220519000000     Episode Statistic Recent Date Time Start 20211117000000     Episode Statistic Recent Date Time End 20220519000000     Episode Statistic Recent Date Time Start 20211117000000     Episode Statistic Recent Date Time End 20220519000000     Episode Statistic Recent Date Time Start 20211117000000     Episode Statistic Recent Date Time End 20220519000000     Episode Identifier [email or web address]     Episode Type Category Periodic EGM     Episode Date Time [id number]     Episode Identifier [id number]     Episode Type Category Other     Episode Date Time [id number]        Imaging Results    Cardiac Device Check - Remote    Result Date: 5/20/2022  [employer] Valitude CRT-P Remote PPM Device Check BiVP: 100%, chronic AF, taking Warfarin, AVNA Mode: VVIR 70/130 Presenting Rhythm: BiVP Heart Rate: adequate rates per histograms Sensing: stable Pacing Threshold: stable Impedance: stable Battery Status: 9.5 years remaining Atrial Arrhythmia: n/a Ventricular Arrhythmia: none Care Plan: Remote latitude PPM f/u q 3 months. OV w/ Dr. Sevilla or ANDER due 4/2023. Results and next scheduled appointment given to patient over the phone. LENNOX Louis I have reviewed and interpreted the device interrogation,  settings, programming and nurse's summary. The device is functioning within normal device parameters. I agree with the current findings, assessment and plan.      A total of 60 minutes was spent today on this visit including face to face conversation with the patient, EMR review (labs, imaging studies, pathology reports and outside records), counseling and care co-ordination and documentation.    Signed by: Negro Ibarra MD      Chandrika is a 81 year old who is being evaluated via a billable video visit.      How would you like to obtain your AVS? MyChart  If the video visit is dropped, the invitation should be resent by: Text to cell phone: [phone]  Will anyone else be joining your video visit? No     Denise Whitley, CMA on 5/24/2022 at 12:50 PM              Again, thank you for allowing me to participate in the care of your patient.        Sincerely,        Negro Ibarra MD

## 2022-05-31 ENCOUNTER — VIRTUAL VISIT (OUTPATIENT)
Dept: FAMILY MEDICINE | Facility: CLINIC | Age: 82
End: 2022-05-31
Payer: MEDICARE

## 2022-05-31 ENCOUNTER — TELEPHONE (OUTPATIENT)
Dept: FAMILY MEDICINE | Facility: CLINIC | Age: 82
End: 2022-05-31
Payer: MEDICARE

## 2022-05-31 DIAGNOSIS — R60.0 PEDAL EDEMA: Primary | ICD-10-CM

## 2022-05-31 DIAGNOSIS — N18.30 STAGE 3 CHRONIC KIDNEY DISEASE, UNSPECIFIED WHETHER STAGE 3A OR 3B CKD (H): ICD-10-CM

## 2022-05-31 PROCEDURE — 99213 OFFICE O/P EST LOW 20 MIN: CPT | Mod: 95 | Performed by: PHYSICIAN ASSISTANT

## 2022-05-31 RX ORDER — FUROSEMIDE 20 MG
40 TABLET ORAL DAILY
Qty: 30 TABLET | Refills: 0 | Status: SHIPPED | OUTPATIENT
Start: 2022-05-31 | End: 2022-07-12

## 2022-05-31 NOTE — PATIENT INSTRUCTIONS
Take 40 mg of the Lasix daily for 2 days.     We will call in 2 days to see how this is working.     If having swelling that is worsening on one side or having pain in either leg (calf) call clinic to get in person visit for further testing.    Can pedal feet to keep muscles/blood moving in the legs.

## 2022-05-31 NOTE — TELEPHONE ENCOUNTER
Pt calls.      She is getting an infusion tomorrow.  Her feet are so swollen, she can't get her socks and shoes on.  She used to take a pill - lasix.  No problems breathing.  She had the swelling for a week-2 weeks.      Advised she will need some kind of appt.  Dr. Romero out, full at  and surrounding clinics for in-person visit.  Video visit was scheduled.

## 2022-05-31 NOTE — PROGRESS NOTES
Chandrika is a 81 year old who is being evaluated via a billable video visit.      How would you like to obtain your AVS? MyChart  If the video visit is dropped, the invitation should be resent by: Text to cell phone: [phone]  Will anyone else be joining your video visit? No      Video Start Time: 1:56 PM    Assessment & Plan     Pedal edema  Bilateral pedal edema. She has had this previously and had great improvement with Lasix. Has been on 40 mg daily and also 80 mg daily previously.  BMP from 2 weeks ago reviewed. Will start on Lasix 40 mg and recheck labs in 2 days. At that time can determine if continued dosing needed or if can stop.  - furosemide (LASIX) 20 MG tablet; Take 2 tablets (40 mg) by mouth daily for 2 days  - Basic metabolic panel  (Ca, Cl, CO2, Creat, Gluc, K, Na, BUN); Future    Stage 3 chronic kidney disease, unspecified whether stage 3a or 3b CKD (H)  I reviewed BMP from 2 weeks ago.      Ordering of each unique test  Prescription drug management  28 minutes spent on the date of the encounter doing chart review, history and exam, documentation and further activities per the note       Patient Instructions   Take 40 mg of the Lasix daily for 2 days.     We will call in 2 days to see how this is working.     If having swelling that is worsening on one side or having pain in either leg (calf) call clinic to get in person visit for further testing.    Can pedal feet to keep muscles/blood moving in the legs.      Return in about 2 days (around 6/2/2022) for Lab only.    Melissa Rudd PA-C  [facility]    Phone call      Subjective   Chandrika is a 81 year old who presents for the following health issues   Fiona, daughter, present during call too.    HPI     Concern - swollen feet  Onset: 2 weeks  Description: both feet/ankles/legs swollen  Intensity: moderate  Progression of Symptoms:  same  Accompanying Signs & Symptoms: none  Previous history of similar problem:  yes  Precipitating factors:        Worsened by: none  Alleviating factors:        Improved by: walking  Therapies tried and outcome: compression socks     Patient has been more fatigued. She is working towards a total knee replacement but during her pre-op was found to be anemic. She has an iron infusion coming up to help prepare her for surgery.  She is having pain in the right knee  Trouble with completing tasks due to pain.  She denies any leg pain in the calves or unilateral swelling.    Review of Systems   GENERAL:  No fevers  MUSCULOSKELETAL: As noted in HPI          Objective           Vitals:  No vitals were obtained today due to virtual visit.    Physical Exam   GENERAL: Healthy, alert and no distress  EYES: Eyes grossly normal to inspection.  No discharge or erythema, or obvious scleral/conjunctival abnormalities.  RESP: No audible wheeze, cough, or visible cyanosis.  No visible retractions or increased work of breathing.   SKIN: Visible skin clear. No significant rash, abnormal pigmentation or lesions.  NEURO: Cranial nerves grossly intact.  Mentation and speech appropriate for age.  MSK: Pedal edema present, difficult to determine how much based on video.  PSYCH: Mentation appears normal, affect normal/bright, judgement and insight intact, normal speech and appearance well-groomed.        Video-Visit Details    Type of service:  Video Visit    Video End Time:2:14 PM    Originating Location (pt. Location): Home    Distant Location (provider location): Home    Platform used for Video Visit: Dot

## 2022-06-02 ENCOUNTER — INFUSION THERAPY VISIT (OUTPATIENT)
Dept: INFUSION THERAPY | Facility: HOSPITAL | Age: 82
End: 2022-06-02
Attending: INTERNAL MEDICINE
Payer: MEDICARE

## 2022-06-02 ENCOUNTER — TELEPHONE (OUTPATIENT)
Dept: FAMILY MEDICINE | Facility: CLINIC | Age: 82
End: 2022-06-02
Payer: MEDICARE

## 2022-06-02 VITALS
TEMPERATURE: 97.7 F | SYSTOLIC BLOOD PRESSURE: 157 MMHG | RESPIRATION RATE: 20 BRPM | HEART RATE: 70 BPM | OXYGEN SATURATION: 99 % | DIASTOLIC BLOOD PRESSURE: 70 MMHG

## 2022-06-02 DIAGNOSIS — D47.2 MGUS (MONOCLONAL GAMMOPATHY OF UNKNOWN SIGNIFICANCE): ICD-10-CM

## 2022-06-02 DIAGNOSIS — D50.0 IRON DEFICIENCY ANEMIA DUE TO CHRONIC BLOOD LOSS: Primary | ICD-10-CM

## 2022-06-02 DIAGNOSIS — R60.0 PEDAL EDEMA: ICD-10-CM

## 2022-06-02 LAB
ALBUMIN SERPL-MCNC: 2.7 G/DL (ref 3.5–5)
ANION GAP SERPL CALCULATED.3IONS-SCNC: 9 MMOL/L (ref 5–18)
BUN SERPL-MCNC: 18 MG/DL (ref 8–28)
CALCIUM SERPL-MCNC: 10.1 MG/DL (ref 8.5–10.5)
CALCIUM SERPL-MCNC: 10.1 MG/DL (ref 8.5–10.5)
CHLORIDE BLD-SCNC: 104 MMOL/L (ref 98–107)
CO2 SERPL-SCNC: 24 MMOL/L (ref 22–31)
CREAT SERPL-MCNC: 0.66 MG/DL (ref 0.6–1.1)
FERRITIN SERPL-MCNC: 260 NG/ML (ref 10–130)
GFR SERPL CREATININE-BSD FRML MDRD: 87 ML/MIN/1.73M2
GLUCOSE BLD-MCNC: 108 MG/DL (ref 70–125)
IRON SATN MFR SERPL: 9 % (ref 15–46)
IRON SERPL-MCNC: 22 UG/DL (ref 35–180)
POTASSIUM BLD-SCNC: 4 MMOL/L (ref 3.5–5)
SODIUM SERPL-SCNC: 137 MMOL/L (ref 136–145)
TIBC SERPL-MCNC: 239 UG/DL (ref 240–430)

## 2022-06-02 PROCEDURE — 82040 ASSAY OF SERUM ALBUMIN: CPT

## 2022-06-02 PROCEDURE — 84165 PROTEIN E-PHORESIS SERUM: CPT | Mod: TC

## 2022-06-02 PROCEDURE — 999N000127 HC STATISTIC PERIPHERAL IV START W US GUIDANCE

## 2022-06-02 PROCEDURE — 250N000011 HC RX IP 250 OP 636: Performed by: INTERNAL MEDICINE

## 2022-06-02 PROCEDURE — 86334 IMMUNOFIX E-PHORESIS SERUM: CPT

## 2022-06-02 PROCEDURE — 82728 ASSAY OF FERRITIN: CPT

## 2022-06-02 PROCEDURE — 82310 ASSAY OF CALCIUM: CPT

## 2022-06-02 PROCEDURE — 96366 THER/PROPH/DIAG IV INF ADDON: CPT

## 2022-06-02 PROCEDURE — 96365 THER/PROPH/DIAG IV INF INIT: CPT

## 2022-06-02 PROCEDURE — 83521 IG LIGHT CHAINS FREE EACH: CPT | Mod: 59

## 2022-06-02 PROCEDURE — 258N000003 HC RX IP 258 OP 636: Performed by: INTERNAL MEDICINE

## 2022-06-02 PROCEDURE — 86334 IMMUNOFIX E-PHORESIS SERUM: CPT | Mod: 26 | Performed by: PATHOLOGY

## 2022-06-02 PROCEDURE — 80048 BASIC METABOLIC PNL TOTAL CA: CPT

## 2022-06-02 PROCEDURE — 83550 IRON BINDING TEST: CPT

## 2022-06-02 PROCEDURE — 84165 PROTEIN E-PHORESIS SERUM: CPT | Mod: 26 | Performed by: PATHOLOGY

## 2022-06-02 PROCEDURE — 84155 ASSAY OF PROTEIN SERUM: CPT

## 2022-06-02 PROCEDURE — 36415 COLL VENOUS BLD VENIPUNCTURE: CPT

## 2022-06-02 RX ORDER — HEPARIN SODIUM (PORCINE) LOCK FLUSH IV SOLN 100 UNIT/ML 100 UNIT/ML
5 SOLUTION INTRAVENOUS
Status: CANCELLED | OUTPATIENT
Start: 2022-06-04

## 2022-06-02 RX ORDER — MEPERIDINE HYDROCHLORIDE 25 MG/ML
25 INJECTION INTRAMUSCULAR; INTRAVENOUS; SUBCUTANEOUS EVERY 30 MIN PRN
Status: CANCELLED | OUTPATIENT
Start: 2022-06-04

## 2022-06-02 RX ORDER — METHYLPREDNISOLONE SODIUM SUCCINATE 125 MG/2ML
125 INJECTION, POWDER, LYOPHILIZED, FOR SOLUTION INTRAMUSCULAR; INTRAVENOUS
Status: DISCONTINUED | OUTPATIENT
Start: 2022-06-02 | End: 2022-06-02

## 2022-06-02 RX ORDER — MEPERIDINE HYDROCHLORIDE 25 MG/ML
25 INJECTION INTRAMUSCULAR; INTRAVENOUS; SUBCUTANEOUS EVERY 30 MIN PRN
Status: DISCONTINUED | OUTPATIENT
Start: 2022-06-02 | End: 2022-06-02

## 2022-06-02 RX ORDER — METHYLPREDNISOLONE SODIUM SUCCINATE 125 MG/2ML
125 INJECTION, POWDER, LYOPHILIZED, FOR SOLUTION INTRAMUSCULAR; INTRAVENOUS
Status: CANCELLED
Start: 2022-06-04

## 2022-06-02 RX ORDER — ALBUTEROL SULFATE 0.83 MG/ML
2.5 SOLUTION RESPIRATORY (INHALATION)
Status: CANCELLED | OUTPATIENT
Start: 2022-06-04

## 2022-06-02 RX ORDER — ALBUTEROL SULFATE 90 UG/1
1-2 AEROSOL, METERED RESPIRATORY (INHALATION)
Status: CANCELLED
Start: 2022-06-04

## 2022-06-02 RX ORDER — ALBUTEROL SULFATE 0.83 MG/ML
2.5 SOLUTION RESPIRATORY (INHALATION)
Status: DISCONTINUED | OUTPATIENT
Start: 2022-06-02 | End: 2022-06-02

## 2022-06-02 RX ORDER — NALOXONE HYDROCHLORIDE 0.4 MG/ML
0.2 INJECTION, SOLUTION INTRAMUSCULAR; INTRAVENOUS; SUBCUTANEOUS
Status: DISCONTINUED | OUTPATIENT
Start: 2022-06-02 | End: 2022-06-02

## 2022-06-02 RX ORDER — NALOXONE HYDROCHLORIDE 0.4 MG/ML
0.2 INJECTION, SOLUTION INTRAMUSCULAR; INTRAVENOUS; SUBCUTANEOUS
Status: CANCELLED | OUTPATIENT
Start: 2022-06-04

## 2022-06-02 RX ORDER — EPINEPHRINE 1 MG/ML
0.3 INJECTION, SOLUTION INTRAMUSCULAR; SUBCUTANEOUS EVERY 5 MIN PRN
Status: DISCONTINUED | OUTPATIENT
Start: 2022-06-02 | End: 2022-06-02

## 2022-06-02 RX ORDER — DIPHENHYDRAMINE HYDROCHLORIDE 50 MG/ML
50 INJECTION INTRAMUSCULAR; INTRAVENOUS
Status: CANCELLED
Start: 2022-06-04

## 2022-06-02 RX ORDER — DIPHENHYDRAMINE HYDROCHLORIDE 50 MG/ML
50 INJECTION INTRAMUSCULAR; INTRAVENOUS
Status: DISCONTINUED | OUTPATIENT
Start: 2022-06-02 | End: 2022-06-02

## 2022-06-02 RX ORDER — HEPARIN SODIUM,PORCINE 10 UNIT/ML
5 VIAL (ML) INTRAVENOUS
Status: CANCELLED | OUTPATIENT
Start: 2022-06-04

## 2022-06-02 RX ORDER — ALBUTEROL SULFATE 90 UG/1
1-2 AEROSOL, METERED RESPIRATORY (INHALATION)
Status: DISCONTINUED | OUTPATIENT
Start: 2022-06-02 | End: 2022-06-02

## 2022-06-02 RX ORDER — EPINEPHRINE 1 MG/ML
0.3 INJECTION, SOLUTION INTRAMUSCULAR; SUBCUTANEOUS EVERY 5 MIN PRN
Status: CANCELLED | OUTPATIENT
Start: 2022-06-04

## 2022-06-02 RX ADMIN — SODIUM CHLORIDE 250 ML: 9 INJECTION, SOLUTION INTRAVENOUS at 13:37

## 2022-06-02 RX ADMIN — IRON SUCROSE 500 MG: 20 INJECTION, SOLUTION INTRAVENOUS at 09:46

## 2022-06-02 NOTE — PROGRESS NOTES
Infusion Nursing Note:  Tomasa Fam presents today for Iron Sucrose 500 mg .    Patient seen by provider today: No   present during visit today: Not Applicable.    Note: Chandrika comes to infusion ambulatory with a walker accompanied by her Daughter. Seated in the front room. Discussed Iron sucrose and went over common side effects. All questions answered. States that she has an ultimate fear of needles and requested the PICC nurse to come and place the IV. Peripheral IV placed by the PICC team and blood collected, and sent to the lab. 500 mg of Iron sucrose given over 4 hours and then the line was flushed with NS afterwards. Chandrika tolerated well. BP slightly elevated during today's appointment, but Chandrika reports that she forgot to take her morning medications. The IV was removed after the iron and NS were completed. The site was covered with 2x2 gauze and secured in place with coban. Left infusion ambulatory and in stable condition with her daughter.     Intravenous Access:  Labs drawn without difficulty.  Peripheral IV placed.    Treatment Conditions:  Labs drawn prior to Iron administered. Results pending.    Post Infusion Assessment:  Patient tolerated infusion without incident.  Patient observed for 30 minutes post Iron infusion per protocol.  Site patent and intact, free from redness, edema or discomfort.  Access discontinued per protocol.     Discharge Plan:   Discharge instructions reviewed with: Patient and her Daughter.  Patient and/or family verbalized understanding of discharge instructions and all questions answered.  Copy of AVS reviewed with patient and/or family.    Patient discharged in stable condition accompanied by: daughter.  Departure Mode: Ambulatory.    Stacey Sanchez RN

## 2022-06-02 NOTE — TELEPHONE ENCOUNTER
See (2) messages from Melissa Rudd, Melissa WOOD, RADHA  P Cr Triage  Please call patient to see how the ankle/leg swelling has been since her visit.     left message for call back  Violet Gardiner RN

## 2022-06-02 NOTE — TELEPHONE ENCOUNTER
"Called pt and relayed provider message below that electrolytes and kidney function are normal.    Asked pt how the swelling was doing after taking the 40mg of Lasix for 2 days and pt responded \"Oh I haven't started taking that yet. I have still been taking only one tablet (20 mg). I had an infusion today and didn't want to be running to the bathroom all day. I will start taking the 40mg tomorrow.\"    Instructed pt to start taking 40mg tomorrow and update us how the swelling is doing after two days on that dose.    Patient was given an opportunity to ask questions, verbalized understanding of plan, and is agreeable.    Routing to provider to update and inform.    Luz Maria Jackson RN    "

## 2022-06-02 NOTE — TELEPHONE ENCOUNTER
Please call patient to let her know that her electrolytes and kidney funciton are normal. How is the swelling with the 40 mg of Lasix?

## 2022-06-03 LAB
ALBUMIN PERCENT: 52.4 % (ref 51–67)
ALBUMIN SERPL ELPH-MCNC: 3.3 G/DL (ref 3.2–4.7)
ALPHA 1 PERCENT: 5.8 % (ref 2–4)
ALPHA 2 PERCENT: 13.8 % (ref 5–13)
ALPHA1 GLOB SERPL ELPH-MCNC: 0.4 G/DL (ref 0.1–0.3)
ALPHA2 GLOB SERPL ELPH-MCNC: 0.9 G/DL (ref 0.4–0.9)
B-GLOBULIN SERPL ELPH-MCNC: 0.8 G/DL (ref 0.7–1.2)
BETA PERCENT: 12.8 % (ref 10–17)
GAMMA GLOB SERPL ELPH-MCNC: 1 G/DL (ref 0.6–1.4)
GAMMA GLOBULIN PERCENT: 15.2 % (ref 9–20)
KAPPA LC FREE SER-MCNC: 2.66 MG/DL (ref 0.33–1.94)
KAPPA LC FREE/LAMBDA FREE SER NEPH: 1.15 {RATIO} (ref 0.26–1.65)
LAMBDA LC FREE SERPL-MCNC: 2.32 MG/DL (ref 0.57–2.63)
PATH ICD:: ABNORMAL
PATH ICD:: NORMAL
PROT PATTERN SERPL ELPH-IMP: ABNORMAL
PROT PATTERN SERPL IFE-IMP: NORMAL
REVIEWING PATHOLOGIST: ABNORMAL
REVIEWING PATHOLOGIST: NORMAL
TOTAL PROTEIN SERUM FOR ELP (SYNCED VALUE): 6.3 G/DL
TOTAL PROTEIN SERUM FOR ELP: 6.3 G/DL (ref 6–8)

## 2022-06-03 NOTE — TELEPHONE ENCOUNTER
I reviewed note. Since patient has not started the 40 mg we can wait to call her until Monday. Can we please call her Monday 6/6/22 to see how the swelling is.

## 2022-06-06 NOTE — TELEPHONE ENCOUNTER
Called patient and she did take Furosemide 40 mg Saturday and Sunday.  Does not feel it did much.  Does not weigh herself.  States has chest pain and shortness of breath all the time but no change from her normal.  States having pains in arms and legs and a little diarrhea from her infusion.  States they did warn her of this but that it would not last.  Please advise.  Camelia Rajan RN

## 2022-06-09 ENCOUNTER — OFFICE VISIT (OUTPATIENT)
Dept: FAMILY MEDICINE | Facility: CLINIC | Age: 82
End: 2022-06-09
Payer: MEDICARE

## 2022-06-09 VITALS
SYSTOLIC BLOOD PRESSURE: 148 MMHG | DIASTOLIC BLOOD PRESSURE: 52 MMHG | TEMPERATURE: 98.3 F | RESPIRATION RATE: 18 BRPM | HEART RATE: 70 BPM | OXYGEN SATURATION: 100 %

## 2022-06-09 DIAGNOSIS — R60.0 PEDAL EDEMA: Primary | ICD-10-CM

## 2022-06-09 PROCEDURE — 99213 OFFICE O/P EST LOW 20 MIN: CPT | Performed by: PHYSICIAN ASSISTANT

## 2022-06-09 NOTE — PROGRESS NOTES
Assessment & Plan     Pedal edema  She has trace pitting edema and some brawny edema. Discussed wraps but she is preparing for surgery soon.   Will use compression stockings to keep status quo and allow her to not have to sit with her legs up all day long.  Can use Lasix as needed when having swelling. Recommended not to take for more than 2 days.  - Compression Sleeve/Stocking Order for DME - ONLY FOR DME    Review of external notes as documented elsewhere in note  Prescription drug management      Return for previously scheduled visit.    Melissa Rudd PA-C  [facility] BENNY Cobos is a 81 year old who presents for the following health issues  accompanied by her son.    HPI     Patientt is here for a follow up on leg swelling. She states it is better. She was started on Lasix 20 mg daily for 2 days last week. Rechecked BMP and was normal.  She has compression stockings but they do not fit currently.     Patient is working to get a total knee replacement in a month and a half.    Review of Systems   GENERAL:  No fevers        Objective    BP (!) 148/52 (BP Location: Right arm, Patient Position: Chair, Cuff Size: Adult Regular)   Pulse 70   Temp 98.3  F (36.8  C) (Oral)   Resp 18   LMP  (LMP Unknown)   SpO2 100%   There is no height or weight on file to calculate BMI.  Physical Exam   GENERAL: No acute distress  HEENT: Normocephalic, PERRL  MSK: Trace pedal edema bilaterally. Brawny edema bilaterally.  NEURO: Alert and non-focal

## 2022-06-16 ENCOUNTER — LAB (OUTPATIENT)
Dept: LAB | Facility: CLINIC | Age: 82
End: 2022-06-16
Payer: MEDICARE

## 2022-06-16 DIAGNOSIS — D47.2 MGUS (MONOCLONAL GAMMOPATHY OF UNKNOWN SIGNIFICANCE): ICD-10-CM

## 2022-06-16 DIAGNOSIS — R73.09 ELEVATED GLUCOSE: ICD-10-CM

## 2022-06-16 DIAGNOSIS — Z01.818 PREOP GENERAL PHYSICAL EXAM: ICD-10-CM

## 2022-06-16 DIAGNOSIS — D47.2 MGUS (MONOCLONAL GAMMOPATHY OF UNKNOWN SIGNIFICANCE): Primary | ICD-10-CM

## 2022-06-16 LAB
BASOPHILS # BLD AUTO: 0.1 10E3/UL (ref 0–0.2)
BASOPHILS NFR BLD AUTO: 1 %
EOSINOPHIL # BLD AUTO: 0.2 10E3/UL (ref 0–0.7)
EOSINOPHIL NFR BLD AUTO: 2 %
ERYTHROCYTE [DISTWIDTH] IN BLOOD BY AUTOMATED COUNT: 18.4 % (ref 10–15)
HBA1C MFR BLD: 5.3 % (ref 0–5.6)
HCT VFR BLD AUTO: 34.1 % (ref 35–47)
HGB BLD-MCNC: 10.5 G/DL (ref 11.7–15.7)
IMM GRANULOCYTES # BLD: 0.1 10E3/UL
IMM GRANULOCYTES NFR BLD: 1 %
LYMPHOCYTES # BLD AUTO: 1.8 10E3/UL (ref 0.8–5.3)
LYMPHOCYTES NFR BLD AUTO: 16 %
MCH RBC QN AUTO: 25.5 PG (ref 26.5–33)
MCHC RBC AUTO-ENTMCNC: 30.8 G/DL (ref 31.5–36.5)
MCV RBC AUTO: 83 FL (ref 78–100)
MONOCYTES # BLD AUTO: 1.3 10E3/UL (ref 0–1.3)
MONOCYTES NFR BLD AUTO: 12 %
NEUTROPHILS # BLD AUTO: 7.9 10E3/UL (ref 1.6–8.3)
NEUTROPHILS NFR BLD AUTO: 68 %
NRBC # BLD AUTO: 0 10E3/UL
NRBC BLD AUTO-RTO: 0 /100
PLATELET # BLD AUTO: 449 10E3/UL (ref 150–450)
RBC # BLD AUTO: 4.12 10E6/UL (ref 3.8–5.2)
WBC # BLD AUTO: 11.4 10E3/UL (ref 4–11)

## 2022-06-16 PROCEDURE — 83036 HEMOGLOBIN GLYCOSYLATED A1C: CPT

## 2022-06-16 PROCEDURE — 85025 COMPLETE CBC W/AUTO DIFF WBC: CPT

## 2022-06-16 PROCEDURE — 36415 COLL VENOUS BLD VENIPUNCTURE: CPT

## 2022-06-17 ENCOUNTER — PATIENT OUTREACH (OUTPATIENT)
Dept: ONCOLOGY | Facility: CLINIC | Age: 82
End: 2022-06-17
Payer: MEDICARE

## 2022-06-17 NOTE — TELEPHONE ENCOUNTER
[facility]: Cancer Care                                                                                          Patient called wanting to know about her recent lab results from 6/16 and if she can have surgery. Advised her hemoglobin has increased but is still slightly low and Dr. Ibarra will be better able to assess when he discusses it with her at her visit on 6/21. She verbalized understanding.    Signature:  Isabella Ramos RN

## 2022-06-21 ENCOUNTER — VIRTUAL VISIT (OUTPATIENT)
Dept: ONCOLOGY | Facility: CLINIC | Age: 82
End: 2022-06-21
Attending: INTERNAL MEDICINE
Payer: MEDICARE

## 2022-06-21 DIAGNOSIS — D50.0 IRON DEFICIENCY ANEMIA DUE TO CHRONIC BLOOD LOSS: Primary | ICD-10-CM

## 2022-06-21 PROCEDURE — 99442 PR PHYSICIAN TELEPHONE EVALUATION 11-20 MIN: CPT | Mod: 95 | Performed by: INTERNAL MEDICINE

## 2022-06-21 RX ORDER — HEPARIN SODIUM,PORCINE 10 UNIT/ML
5 VIAL (ML) INTRAVENOUS
Status: CANCELLED | OUTPATIENT
Start: 2022-06-28

## 2022-06-21 RX ORDER — HEPARIN SODIUM (PORCINE) LOCK FLUSH IV SOLN 100 UNIT/ML 100 UNIT/ML
5 SOLUTION INTRAVENOUS
Status: CANCELLED | OUTPATIENT
Start: 2022-06-28

## 2022-06-21 RX ORDER — DIPHENHYDRAMINE HYDROCHLORIDE 50 MG/ML
50 INJECTION INTRAMUSCULAR; INTRAVENOUS
Status: CANCELLED
Start: 2022-06-28

## 2022-06-21 RX ORDER — ALBUTEROL SULFATE 90 UG/1
1-2 AEROSOL, METERED RESPIRATORY (INHALATION)
Status: CANCELLED
Start: 2022-06-28

## 2022-06-21 RX ORDER — ALBUTEROL SULFATE 0.83 MG/ML
2.5 SOLUTION RESPIRATORY (INHALATION)
Status: CANCELLED | OUTPATIENT
Start: 2022-06-28

## 2022-06-21 RX ORDER — NALOXONE HYDROCHLORIDE 0.4 MG/ML
0.2 INJECTION, SOLUTION INTRAMUSCULAR; INTRAVENOUS; SUBCUTANEOUS
Status: CANCELLED | OUTPATIENT
Start: 2022-06-28

## 2022-06-21 RX ORDER — EPINEPHRINE 1 MG/ML
0.3 INJECTION, SOLUTION, CONCENTRATE INTRAVENOUS EVERY 5 MIN PRN
Status: CANCELLED | OUTPATIENT
Start: 2022-06-28

## 2022-06-21 RX ORDER — METHYLPREDNISOLONE SODIUM SUCCINATE 125 MG/2ML
125 INJECTION, POWDER, LYOPHILIZED, FOR SOLUTION INTRAMUSCULAR; INTRAVENOUS
Status: CANCELLED
Start: 2022-06-28

## 2022-06-21 RX ORDER — MEPERIDINE HYDROCHLORIDE 25 MG/ML
25 INJECTION INTRAMUSCULAR; INTRAVENOUS; SUBCUTANEOUS EVERY 30 MIN PRN
Status: CANCELLED | OUTPATIENT
Start: 2022-06-28

## 2022-06-21 NOTE — PATIENT INSTRUCTIONS
Please schedule one dose of IV venofer (please call her son Rodney) in the first week of July. Labs 2 weeks after orthopedic surgery.

## 2022-06-21 NOTE — LETTER
"    6/21/2022         RE: Tomasa Fam  [id number] [facility] [address]        Dear Colleague,    Thank you for referring your patient, Tomasa Fam, to the [facility] [place]. Please see a copy of my visit note below.    Chandrika is a 81 year old who is being evaluated via a billable telephone visit.      What phone number would you like to be contacted at? [phone]  How would you like to obtain your AVS? Elsa Whitley, AMADO on 6/21/2022 at 3:35 PM              The patient has been notified of the following:      \"We have found that certain health care needs can be provided without the need for a face to face visit.  This service lets us provide the care you need with a phone conversation.       I will have full access to your [place] medical record during this entire phone call.   I will be taking notes for your medical record.      Since this is like an office visit, we will bill your insurance company for this service.       There are potential benefits and risks of telephone visits (e.g. limits to patient confidentiality) that differ from in-person visits.?  Confidentiality still applies for telephone services, and nobody will record the visit.  It is important to be in a quiet, private space that is free of distractions (including cell phone or other devices) during the visit.??      If during the course of the call I believe a telephone visit is not appropriate, you will not be charged for this service\"     Consent has been obtained for this service by care team member: Yes     [facility] Hematology and Oncology Progress Note    Patient: Tomasa Fam  MRN: [id number]  Date of Service: 06/21/2022        Reason for Visit    Chief Complaint   Patient presents with     Oncology Clinic Visit     [facility] - Telephone visit         Problem List Items Addressed This Visit        Hematologic    Iron deficiency anemia due to chronic blood loss - " Primary            Assessment and Plan  Iron deficiency anemia probably secondary to chronic blood loss  Has responded to IV iron.  Hemoglobin came up from 9.8-10.5 with 1 dose of IV iron sucrose 500 mg.  Her surgery scheduled in late July.  I think she will benefit from 1 more iron infusion.  We will arrange this in early July.  She will get 1 dose of 300 mg of IV iron sucrose.  We will recheck her labs 2 weeks after orthopedic surgery.  Continue oral iron every other day in the meantime.    Once her surgery is done she may need evaluation for blood loss.  Last upper endoscopy was in December 2021.  She probably needs a lower endoscopy and possibly repeat upper endoscopy in the near future.    Patient Instructions   Please schedule one dose of IV venofer (please call her son Rodney) in the first week of July. Labs 2 weeks after orthopedic surgery.        Cancer Staging  No matching staging information was found for the patient.    ECOG Performance    2 - Ambulatory and independent in all ADLs; cannot work; up > 50% of the time         Problem List    Patient Active Problem List   Diagnosis     Pulmonary emphysema, unspecified emphysema type (H)     ASCUS on Pap smear     Hyperlipidemia LDL goal <160     Essential hypertension with goal blood pressure less than 140/90     Cystocele, midline     Uterovaginal prolapse     S/P hysterectomy     Advanced directives, counseling/discussion     History of hypokalemia     Concussion     Thyroid nodule     Chronic pain of both knees     History of lung cancer     Gastroesophageal reflux disease, esophagitis presence not specified     PMR (polymyalgia rheumatica) (H)     Personal history of malignant neoplasm of breast     Long term systemic steroid user     Cardiomyopathy (H)     Complete heart block (H)     Temporal arteritis (H)     Elevation of level of transaminase or lactic acid dehydrogenase (LDH)     Recurrent falls     Dyspnea     Persistent atrial fibrillation (H)      Skin ulcer of right lower leg with fat layer exposed (H)     Cardiac pacemaker in situ 5/22/2020; AV node ablation     Chronic kidney disease, stage 3 (H)     Mood disorder (H)     Inferior pubic ramus fracture, right, closed, initial encounter (H)     Peripheral vascular disease, unspecified (H)     Iron deficiency anemia due to chronic blood loss        Oncology history      Interval History   Tomasa Fam is a 81 year old with iron-deficiency anemia who was contacted by telephone to discuss further management of the same.    She received 1 dose of IV iron sucrose 500 mg on 6/2/2022.  Her iron studies were consistent with significant iron deficiency.  Transferrin saturation was 9%.  Ferritin was up to 260, total iron was 22 and TIBC was low at 239 consistent with underlying concurrent inflammation.  Repeat labs done on 6/16/2022 showed significant improvement in her hemoglobin.  It came up from 9.8 in May to 10.5..  Count was 449.  Total white count was mildly up.  She is scheduled to undergo knee replacement surgery in late July.  Orthopedic surgery wanted her hemoglobin to be on the higher side.  She did well with infusion without any infusion reactions.  Denies any new issues today.  She feels a bit better.    She continues to take oral iron.    Review of Systems  A comprehensive review of systems was negative except for what is noted in the interval history    Current Outpatient Medications   Medication     amLODIPine (NORVASC) 5 MG tablet     apixaban ANTICOAGULANT (ELIQUIS) 5 MG tablet     ezetimibe (ZETIA) 10 MG tablet     Ferrous Sulfate (IRON PO)     INCRUSE ELLIPTA 62.5 MCG/INH Inhaler     omega 3 1000 MG CAPS     polyethylene glycol (MIRALAX) 17 GM/Dose powder     potassium chloride ER (KLOR-CON M) 20 MEQ CR tablet     calcium citrate-vitamin D (CITRACAL) 315-250 MG-UNIT TABS per tablet     furosemide (LASIX) 20 MG tablet     metoprolol tartrate (LOPRESSOR) 100 MG tablet     No current  facility-administered medications for this visit.        Physical Exam    No flowsheet data found.    General: alert and cooperative      Lab Results    No results found for this or any previous visit (from the past 168 hour(s)).    Imaging    No results found.    A total of 11 minutes was spent with the patient during this telephone encounter.    Signed by: Negro Ibarra MD        Again, thank you for allowing me to participate in the care of your patient.        Sincerely,        Negro Ibarra MD

## 2022-06-21 NOTE — PROGRESS NOTES
Chandrika is a 81 year old who is being evaluated via a billable telephone visit.      What phone number would you like to be contacted at? [phone]  How would you like to obtain your AVS? Elsa Whitley CMA on 6/21/2022 at 3:35 PM

## 2022-06-21 NOTE — LETTER
"    6/21/2022         RE: Tomasa Fam  [address] [facility] [address]        Dear Colleague,    Thank you for referring your patient, Tomasa Fam, to the [facility] [place]. Please see a copy of my visit note below.    Chandrika is a 81 year old who is being evaluated via a billable telephone visit.      What phone number would you like to be contacted at? [phone]  How would you like to obtain your AVS? Elsa Whitley, AMADO on 6/21/2022 at 3:35 PM              The patient has been notified of the following:      \"We have found that certain health care needs can be provided without the need for a face to face visit.  This service lets us provide the care you need with a phone conversation.       I will have full access to your [place] medical record during this entire phone call.   I will be taking notes for your medical record.      Since this is like an office visit, we will bill your insurance company for this service.       There are potential benefits and risks of telephone visits (e.g. limits to patient confidentiality) that differ from in-person visits.?  Confidentiality still applies for telephone services, and nobody will record the visit.  It is important to be in a quiet, private space that is free of distractions (including cell phone or other devices) during the visit.??      If during the course of the call I believe a telephone visit is not appropriate, you will not be charged for this service\"     Consent has been obtained for this service by care team member: Yes     [facility] Hematology and Oncology Progress Note    Patient: Tomasa Fam  MRN: [id number]  Date of Service: 06/21/2022        Reason for Visit    Chief Complaint   Patient presents with     Oncology Clinic Visit     [facility] - Telephone visit         Problem List Items Addressed This Visit        Hematologic    Iron deficiency anemia due to chronic blood loss - " Primary            Assessment and Plan  Iron deficiency anemia probably secondary to chronic blood loss  Has responded to IV iron.  Hemoglobin came up from 9.8-10.5 with 1 dose of IV iron sucrose 500 mg.  Her surgery scheduled in late July.  I think she will benefit from 1 more iron infusion.  We will arrange this in early July.  She will get 1 dose of 300 mg of IV iron sucrose.  We will recheck her labs 2 weeks after orthopedic surgery.  Continue oral iron every other day in the meantime.    Once her surgery is done she may need evaluation for blood loss.  Last upper endoscopy was in December 2021.  She probably needs a lower endoscopy and possibly repeat upper endoscopy in the near future.    Patient Instructions   Please schedule one dose of IV venofer (please call her son Rodney) in the first week of July. Labs 2 weeks after orthopedic surgery.        Cancer Staging  No matching staging information was found for the patient.    ECOG Performance    2 - Ambulatory and independent in all ADLs; cannot work; up > 50% of the time         Problem List    Patient Active Problem List   Diagnosis     Pulmonary emphysema, unspecified emphysema type (H)     ASCUS on Pap smear     Hyperlipidemia LDL goal <160     Essential hypertension with goal blood pressure less than 140/90     Cystocele, midline     Uterovaginal prolapse     S/P hysterectomy     Advanced directives, counseling/discussion     History of hypokalemia     Concussion     Thyroid nodule     Chronic pain of both knees     History of lung cancer     Gastroesophageal reflux disease, esophagitis presence not specified     PMR (polymyalgia rheumatica) (H)     Personal history of malignant neoplasm of breast     Long term systemic steroid user     Cardiomyopathy (H)     Complete heart block (H)     Temporal arteritis (H)     Elevation of level of transaminase or lactic acid dehydrogenase (LDH)     Recurrent falls     Dyspnea     Persistent atrial fibrillation (H)      Skin ulcer of right lower leg with fat layer exposed (H)     Cardiac pacemaker in situ 5/22/2020; AV node ablation     Chronic kidney disease, stage 3 (H)     Mood disorder (H)     Inferior pubic ramus fracture, right, closed, initial encounter (H)     Peripheral vascular disease, unspecified (H)     Iron deficiency anemia due to chronic blood loss        Oncology history      Interval History   Tomasa Fam is a 81 year old with iron-deficiency anemia who was contacted by telephone to discuss further management of the same.    She received 1 dose of IV iron sucrose 500 mg on 6/2/2022.  Her iron studies were consistent with significant iron deficiency.  Transferrin saturation was 9%.  Ferritin was up to 260, total iron was 22 and TIBC was low at 239 consistent with underlying concurrent inflammation.  Repeat labs done on 6/16/2022 showed significant improvement in her hemoglobin.  It came up from 9.8 in May to 10.5..  Count was 449.  Total white count was mildly up.  She is scheduled to undergo knee replacement surgery in late July.  Orthopedic surgery wanted her hemoglobin to be on the higher side.  She did well with infusion without any infusion reactions.  Denies any new issues today.  She feels a bit better.    She continues to take oral iron.    Review of Systems  A comprehensive review of systems was negative except for what is noted in the interval history    Current Outpatient Medications   Medication     amLODIPine (NORVASC) 5 MG tablet     apixaban ANTICOAGULANT (ELIQUIS) 5 MG tablet     ezetimibe (ZETIA) 10 MG tablet     Ferrous Sulfate (IRON PO)     INCRUSE ELLIPTA 62.5 MCG/INH Inhaler     omega 3 1000 MG CAPS     polyethylene glycol (MIRALAX) 17 GM/Dose powder     potassium chloride ER (KLOR-CON M) 20 MEQ CR tablet     calcium citrate-vitamin D (CITRACAL) 315-250 MG-UNIT TABS per tablet     furosemide (LASIX) 20 MG tablet     metoprolol tartrate (LOPRESSOR) 100 MG tablet     No current  facility-administered medications for this visit.        Physical Exam    No flowsheet data found.    General: alert and cooperative      Lab Results    No results found for this or any previous visit (from the past 168 hour(s)).    Imaging    No results found.    A total of 11 minutes was spent with the patient during this telephone encounter.    Signed by: Negro Ibarra MD        Again, thank you for allowing me to participate in the care of your patient.        Sincerely,        Negro Ibarra MD

## 2022-06-28 ENCOUNTER — NURSE TRIAGE (OUTPATIENT)
Dept: FAMILY MEDICINE | Facility: CLINIC | Age: 82
End: 2022-06-28

## 2022-06-28 NOTE — TELEPHONE ENCOUNTER
"Will discuss w/ DN tomorrow.       Pt reports voiding approx 3 times during the day and it is normal. No urgency and no incontinence.   Once she lays down and falls asleep- anytime she wakes then she has urgency and incontinence. This happens 5-6 times a night, but not every night.   No other urinary symptoms.   She reports she was slightly constipated w/ beginning iron, but she thought she has that she had it under control.   She drinks 1 caffeinated drink per day in the am.   Not currently using the lasix that is on her med list.     Adv could try increasing miralax slightly to see if she is a little constipated and pressing on bladder when she sleeps until we can get in w/ DN- she does not want an appt right now because she cannot drive and has too much going on.     Irish RICO RN         Answer Assessment - Initial Assessment Questions  1. SYMPTOM: \"What's the main symptom you're concerned about?\" (e.g., frequency, incontinence)      incontinence  2. ONSET: \"When did the  incontinence start?\"      2-3 months ago   3. PAIN: \"Is there any pain?\" If so, ask: \"How bad is it?\" (Scale: 1-10; mild, moderate, severe)      No   4. CAUSE: \"What do you think is causing the symptoms?\"      unk   5. OTHER SYMPTOMS: \"Do you have any other symptoms?\" (e.g., fever, flank pain, blood in urine, pain with urination)      Urgency at night   6. PREGNANCY: \"Is there any chance you are pregnant?\" \"When was your last menstrual period?\"      No    Protocols used: URINARY SYMPTOMS-A-OH    "

## 2022-06-28 NOTE — TELEPHONE ENCOUNTER
Patient is having trouble with incontinence.  She's making a mess 5-6 times a night.  She doesn't drive so coming in for an appointment will be difficult.    Please advice.    Miranda Portillo [facility] [place]  [phone]

## 2022-06-29 ENCOUNTER — TELEPHONE (OUTPATIENT)
Dept: FAMILY MEDICINE | Facility: CLINIC | Age: 82
End: 2022-06-29

## 2022-06-29 NOTE — TELEPHONE ENCOUNTER
LMTCB- needs appt w/ DN    Huddled w/ DN- she recommends an appointment to discuss night time incontinence.     (going to ask pt if she needs pre-op set up before appt? 5/17 pre-op won't work for 7/25 surg)     Irish RICO RN

## 2022-06-29 NOTE — TELEPHONE ENCOUNTER
Pt took miralax this morning so would see how it would go. Adv could take daily and then wean back as stools improve.     Irish RICO RN

## 2022-06-29 NOTE — TELEPHONE ENCOUNTER
Patient calling wanting to know if she can get an 'as needed' prescription for a pain medication to alleviate the pain she has with it some days. She says she doesn't want to take something every day, just on the days she is in a lot of pain. She is also open to other suggestions to help reduce pain if there are options.  She has knee replacement surgery on 07/25.    [id number] - [place], [place] - [address]  [address]    You can reach Chandrika at [phone]    Sonia LOZA  [facility]   [facility]

## 2022-06-29 NOTE — TELEPHONE ENCOUNTER
Called and spoke w/ pt- she is wanting something for her knee pain. She reports not driving or going outside due to nerves about falling. Her knee is in constant pain. Adv to try icing and elevating. She takes 1,000 mg BID advised could add an afternoon dose to help with the pain as needed.     Pt verbalized understanding.     Irish RICO RN

## 2022-06-29 NOTE — TELEPHONE ENCOUNTER
Chandrika called, scheduled preop w/other appt. Patient wanted to do both in one as she has trouble getting transportaion.    Sonia LOZA  [facility]   [facility]

## 2022-07-01 DIAGNOSIS — I10 ESSENTIAL HYPERTENSION: ICD-10-CM

## 2022-07-01 DIAGNOSIS — I48.19 PERSISTENT ATRIAL FIBRILLATION (H): ICD-10-CM

## 2022-07-05 NOTE — TELEPHONE ENCOUNTER
Routing refill request to provider for review/approval because:  Labs out of range:  BP    BP Readings from Last 3 Encounters:   06/09/22 (!) 148/52   06/02/22 (!) 157/70   05/17/22 138/54     Mykel DO RN

## 2022-07-07 ENCOUNTER — INFUSION THERAPY VISIT (OUTPATIENT)
Dept: INFUSION THERAPY | Facility: CLINIC | Age: 82
End: 2022-07-07
Attending: INTERNAL MEDICINE
Payer: MEDICARE

## 2022-07-07 VITALS
HEART RATE: 70 BPM | DIASTOLIC BLOOD PRESSURE: 74 MMHG | SYSTOLIC BLOOD PRESSURE: 160 MMHG | RESPIRATION RATE: 16 BRPM | OXYGEN SATURATION: 99 % | TEMPERATURE: 96.8 F

## 2022-07-07 DIAGNOSIS — D50.0 IRON DEFICIENCY ANEMIA DUE TO CHRONIC BLOOD LOSS: Primary | ICD-10-CM

## 2022-07-07 PROCEDURE — 96365 THER/PROPH/DIAG IV INF INIT: CPT

## 2022-07-07 PROCEDURE — 250N000011 HC RX IP 250 OP 636: Performed by: INTERNAL MEDICINE

## 2022-07-07 PROCEDURE — 258N000003 HC RX IP 258 OP 636: Performed by: INTERNAL MEDICINE

## 2022-07-07 RX ORDER — ALBUTEROL SULFATE 90 UG/1
1-2 AEROSOL, METERED RESPIRATORY (INHALATION)
Status: CANCELLED
Start: 2022-07-09

## 2022-07-07 RX ORDER — ALBUTEROL SULFATE 0.83 MG/ML
2.5 SOLUTION RESPIRATORY (INHALATION)
Status: CANCELLED | OUTPATIENT
Start: 2022-07-09

## 2022-07-07 RX ORDER — HEPARIN SODIUM (PORCINE) LOCK FLUSH IV SOLN 100 UNIT/ML 100 UNIT/ML
5 SOLUTION INTRAVENOUS
Status: CANCELLED | OUTPATIENT
Start: 2022-07-09

## 2022-07-07 RX ORDER — DIPHENHYDRAMINE HYDROCHLORIDE 50 MG/ML
50 INJECTION INTRAMUSCULAR; INTRAVENOUS
Status: CANCELLED
Start: 2022-07-09

## 2022-07-07 RX ORDER — METHYLPREDNISOLONE SODIUM SUCCINATE 125 MG/2ML
125 INJECTION, POWDER, LYOPHILIZED, FOR SOLUTION INTRAMUSCULAR; INTRAVENOUS
Status: CANCELLED
Start: 2022-07-09

## 2022-07-07 RX ORDER — EPINEPHRINE 1 MG/ML
0.3 INJECTION, SOLUTION INTRAMUSCULAR; SUBCUTANEOUS EVERY 5 MIN PRN
Status: CANCELLED | OUTPATIENT
Start: 2022-07-09

## 2022-07-07 RX ORDER — MEPERIDINE HYDROCHLORIDE 25 MG/ML
25 INJECTION INTRAMUSCULAR; INTRAVENOUS; SUBCUTANEOUS EVERY 30 MIN PRN
Status: CANCELLED | OUTPATIENT
Start: 2022-07-09

## 2022-07-07 RX ORDER — HEPARIN SODIUM,PORCINE 10 UNIT/ML
5 VIAL (ML) INTRAVENOUS
Status: CANCELLED | OUTPATIENT
Start: 2022-07-09

## 2022-07-07 RX ORDER — NALOXONE HYDROCHLORIDE 0.4 MG/ML
0.2 INJECTION, SOLUTION INTRAMUSCULAR; INTRAVENOUS; SUBCUTANEOUS
Status: CANCELLED | OUTPATIENT
Start: 2022-07-09

## 2022-07-07 RX ADMIN — IRON SUCROSE 300 MG: 20 INJECTION, SOLUTION INTRAVENOUS at 08:31

## 2022-07-07 NOTE — PROGRESS NOTES
Infusion Nursing Note:  Tomasa Fam presents today for Venofer-one time dose of 300mg.    Patient seen by provider today: No   present during visit today: Not Applicable.    Note: Patient verbalized anxiety regarding needle insertion and understands we don't have ultrasound here.  Tolerated 2 attempts well with distraction.    Intravenous Access:  Peripheral IV placed.    Treatment Conditions:  Not Applicable.    Post Infusion Assessment:  Patient tolerated infusion without incident.  Blood return noted pre and post infusion.  Site patent and intact, free from redness, edema or discomfort.  No evidence of extravasations.  Access discontinued per protocol.     Discharge Plan:   AVS to patient via MYCHART.    Patient discharged in stable condition accompanied by: son.  Departure Mode: Ambulatory.      Sara Jurado RN

## 2022-07-08 RX ORDER — METOPROLOL TARTRATE 100 MG
TABLET ORAL
Qty: 180 TABLET | Refills: 0 | Status: SHIPPED | OUTPATIENT
Start: 2022-07-08 | End: 2023-01-03

## 2022-07-10 NOTE — PROGRESS NOTES
"The patient has been notified of the following:      \"We have found that certain health care needs can be provided without the need for a face to face visit.  This service lets us provide the care you need with a phone conversation.       I will have full access to your [facility] medical record during this entire phone call.   I will be taking notes for your medical record.      Since this is like an office visit, we will bill your insurance company for this service.       There are potential benefits and risks of telephone visits (e.g. limits to patient confidentiality) that differ from in-person visits.?  Confidentiality still applies for telephone services, and nobody will record the visit.  It is important to be in a quiet, private space that is free of distractions (including cell phone or other devices) during the visit.??      If during the course of the call I believe a telephone visit is not appropriate, you will not be charged for this service\"     Consent has been obtained for this service by care team member: Arlene     [facility] Progress Note    Patient: Tomasa Fam  MRN: [id number]  Date of Service: 06/21/2022        Reason for Visit    Chief Complaint   Patient presents with     Oncology Clinic Visit     [facility] - Telephone visit         Problem List Items Addressed This Visit        Hematologic    Iron deficiency anemia due to chronic blood loss - Primary            Assessment and Plan  Iron deficiency anemia probably secondary to chronic blood loss  Has responded to IV iron.  Hemoglobin came up from 9.8-10.5 with 1 dose of IV iron sucrose 500 mg.  Her surgery scheduled in late July.  I think she will benefit from 1 more iron infusion.  We will arrange this in early July.  She will get 1 dose of 300 mg of IV iron sucrose.  We will recheck her labs 2 weeks after orthopedic surgery.  Continue oral iron every other day in the meantime.    Once her surgery is done she may " need evaluation for blood loss.  Last upper endoscopy was in December 2021.  She probably needs a lower endoscopy and possibly repeat upper endoscopy in the near future.    Patient Instructions   Please schedule one dose of IV venofer (please call her son Rodney) in the first week of July. Labs 2 weeks after orthopedic surgery.        Cancer Staging  No matching staging information was found for the patient.    ECOG Performance    2 - Ambulatory and independent in all ADLs; cannot work; up > 50% of the time         Problem List    Patient Active Problem List   Diagnosis     Pulmonary emphysema, unspecified emphysema type (H)     ASCUS on Pap smear     Hyperlipidemia LDL goal <160     Essential hypertension with goal blood pressure less than 140/90     Cystocele, midline     Uterovaginal prolapse     S/P hysterectomy     Advanced directives, counseling/discussion     History of hypokalemia     Concussion     Thyroid nodule     Chronic pain of both knees     History of lung cancer     Gastroesophageal reflux disease, esophagitis presence not specified     PMR (polymyalgia rheumatica) (H)     Personal history of malignant neoplasm of breast     Long term systemic steroid user     Cardiomyopathy (H)     Complete heart block (H)     Temporal arteritis (H)     Elevation of level of transaminase or lactic acid dehydrogenase (LDH)     Recurrent falls     Dyspnea     Persistent atrial fibrillation (H)     Skin ulcer of right lower leg with fat layer exposed (H)     Cardiac pacemaker in situ 5/22/2020; AV node ablation     Chronic kidney disease, stage 3 (H)     Mood disorder (H)     Inferior pubic ramus fracture, right, closed, initial encounter (H)     Peripheral vascular disease, unspecified (H)     Iron deficiency anemia due to chronic blood loss        Oncology history      Interval History   Tomasa Fam is a 81 year old with iron-deficiency anemia who was contacted by telephone to discuss further management of the  same.    She received 1 dose of IV iron sucrose 500 mg on 6/2/2022.  Her iron studies were consistent with significant iron deficiency.  Transferrin saturation was 9%.  Ferritin was up to 260, total iron was 22 and TIBC was low at 239 consistent with underlying concurrent inflammation.  Repeat labs done on 6/16/2022 showed significant improvement in her hemoglobin.  It came up from 9.8 in May to 10.5..  Count was 449.  Total white count was mildly up.  She is scheduled to undergo knee replacement surgery in late July.  Orthopedic surgery wanted her hemoglobin to be on the higher side.  She did well with infusion without any infusion reactions.  Denies any new issues today.  She feels a bit better.    She continues to take oral iron.    Review of Systems  A comprehensive review of systems was negative except for what is noted in the interval history    Current Outpatient Medications   Medication     amLODIPine (NORVASC) 5 MG tablet     apixaban ANTICOAGULANT (ELIQUIS) 5 MG tablet     ezetimibe (ZETIA) 10 MG tablet     Ferrous Sulfate (IRON PO)     INCRUSE ELLIPTA 62.5 MCG/INH Inhaler     omega 3 1000 MG CAPS     polyethylene glycol (MIRALAX) 17 GM/Dose powder     potassium chloride ER (KLOR-CON M) 20 MEQ CR tablet     calcium citrate-vitamin D (CITRACAL) 315-250 MG-UNIT TABS per tablet     furosemide (LASIX) 20 MG tablet     metoprolol tartrate (LOPRESSOR) 100 MG tablet     No current facility-administered medications for this visit.        Physical Exam    No flowsheet data found.    General: alert and cooperative      Lab Results    No results found for this or any previous visit (from the past 168 hour(s)).    Imaging    No results found.    A total of 11 minutes was spent with the patient during this telephone encounter.    Signed by: Negro Ibarra MD

## 2022-07-12 NOTE — PROGRESS NOTES
07/12/22 1512   Discharge Planning   Concerns to be Addressed no discharge needs identified;denies needs/concerns at this time   Living Arrangements   Is your private residence a single family home or apartment? Single family home   Number of Stairs, Within Home, Primary greater than 10 stairs  (moving stairway)   Stair Railings, Within Home, Primary railings safe and in good condition   Once home, are you able to live on one level? Yes   Which level? Main Level   Bathroom Shower/Tub Walk-in shower   Support System   Support Systems Children   Do you have someone available to stay with you one or two nights once you are home? Yes   Medical Clearance   It is recommended that you call and check with any specialty providers before surgery to see if you need surgical clearance.  Do you see any specialty providers outside of your primary care provider? Yes  (last cardiology visit 04/15/2022)   Blood   Known bleeding disorder or coagulopathy? No   Does the patient have any [employer]/cultural preferences related to blood products? No   Education   Has the patient scheduled or completed pre-op total joint education, either in class or online, in the last 12 months? No  (doesn't do computers, will read book)

## 2022-07-14 ENCOUNTER — OFFICE VISIT (OUTPATIENT)
Dept: FAMILY MEDICINE | Facility: CLINIC | Age: 82
End: 2022-07-14
Payer: MEDICARE

## 2022-07-14 VITALS
HEART RATE: 78 BPM | TEMPERATURE: 99 F | RESPIRATION RATE: 16 BRPM | DIASTOLIC BLOOD PRESSURE: 40 MMHG | WEIGHT: 117 LBS | BODY MASS INDEX: 20.73 KG/M2 | OXYGEN SATURATION: 100 % | SYSTOLIC BLOOD PRESSURE: 136 MMHG

## 2022-07-14 DIAGNOSIS — D63.8 ANEMIA IN OTHER CHRONIC DISEASES CLASSIFIED ELSEWHERE: ICD-10-CM

## 2022-07-14 DIAGNOSIS — D47.2 MGUS (MONOCLONAL GAMMOPATHY OF UNKNOWN SIGNIFICANCE): ICD-10-CM

## 2022-07-14 DIAGNOSIS — I48.19 PERSISTENT ATRIAL FIBRILLATION (H): ICD-10-CM

## 2022-07-14 DIAGNOSIS — E44.0 MODERATE PROTEIN-CALORIE MALNUTRITION (H): ICD-10-CM

## 2022-07-14 DIAGNOSIS — Z95.0 CARDIAC PACEMAKER IN SITU: ICD-10-CM

## 2022-07-14 DIAGNOSIS — Z01.818 PREOP GENERAL PHYSICAL EXAM: Primary | ICD-10-CM

## 2022-07-14 DIAGNOSIS — M17.11 PRIMARY OSTEOARTHRITIS OF RIGHT KNEE: ICD-10-CM

## 2022-07-14 DIAGNOSIS — N18.31 STAGE 3A CHRONIC KIDNEY DISEASE (H): ICD-10-CM

## 2022-07-14 LAB
ANION GAP SERPL CALCULATED.3IONS-SCNC: 7 MMOL/L (ref 3–14)
BUN SERPL-MCNC: 18 MG/DL (ref 7–30)
CALCIUM SERPL-MCNC: 10.6 MG/DL (ref 8.5–10.1)
CHLORIDE BLD-SCNC: 106 MMOL/L (ref 94–109)
CO2 SERPL-SCNC: 24 MMOL/L (ref 20–32)
CREAT SERPL-MCNC: 0.54 MG/DL (ref 0.52–1.04)
CREAT UR-MCNC: 90 MG/DL
ERYTHROCYTE [DISTWIDTH] IN BLOOD BY AUTOMATED COUNT: 18.4 % (ref 10–15)
FERRITIN SERPL-MCNC: 806 NG/ML (ref 8–252)
GFR SERPL CREATININE-BSD FRML MDRD: >90 ML/MIN/1.73M2
GLUCOSE BLD-MCNC: 96 MG/DL (ref 70–99)
HCT VFR BLD AUTO: 33.6 % (ref 35–47)
HGB BLD-MCNC: 10.6 G/DL (ref 11.7–15.7)
HOLD SPECIMEN: NORMAL
MCH RBC QN AUTO: 25.9 PG (ref 26.5–33)
MCHC RBC AUTO-ENTMCNC: 31.5 G/DL (ref 31.5–36.5)
MCV RBC AUTO: 82 FL (ref 78–100)
MICROALBUMIN UR-MCNC: 84 MG/L
MICROALBUMIN/CREAT UR: 93.33 MG/G CR (ref 0–25)
PLATELET # BLD AUTO: 427 10E3/UL (ref 150–450)
POTASSIUM BLD-SCNC: 4.5 MMOL/L (ref 3.4–5.3)
RBC # BLD AUTO: 4.09 10E6/UL (ref 3.8–5.2)
SODIUM SERPL-SCNC: 137 MMOL/L (ref 133–144)
WBC # BLD AUTO: 10.9 10E3/UL (ref 4–11)

## 2022-07-14 PROCEDURE — 99215 OFFICE O/P EST HI 40 MIN: CPT | Performed by: INTERNAL MEDICINE

## 2022-07-14 PROCEDURE — 82728 ASSAY OF FERRITIN: CPT | Performed by: INTERNAL MEDICINE

## 2022-07-14 PROCEDURE — 83550 IRON BINDING TEST: CPT | Performed by: INTERNAL MEDICINE

## 2022-07-14 PROCEDURE — 36415 COLL VENOUS BLD VENIPUNCTURE: CPT | Performed by: INTERNAL MEDICINE

## 2022-07-14 PROCEDURE — 85027 COMPLETE CBC AUTOMATED: CPT | Performed by: INTERNAL MEDICINE

## 2022-07-14 PROCEDURE — 84134 ASSAY OF PREALBUMIN: CPT | Performed by: INTERNAL MEDICINE

## 2022-07-14 PROCEDURE — 82043 UR ALBUMIN QUANTITATIVE: CPT | Performed by: INTERNAL MEDICINE

## 2022-07-14 PROCEDURE — 80048 BASIC METABOLIC PNL TOTAL CA: CPT | Performed by: INTERNAL MEDICINE

## 2022-07-14 ASSESSMENT — PAIN SCALES - GENERAL: PAINLEVEL: SEVERE PAIN (6)

## 2022-07-14 NOTE — H&P (VIEW-ONLY)
[facility]  [address] [address]  Phone: [phone]       FAX: [phone]  Primary Provider: Eufemia Church  Pre-op Performing Provider: EUFEMIA CHURCH      PREOPERATIVE EVALUATION:  Today's date: 7/14/2022    Tomasa Fam is a 81 year old female who presents for a preoperative evaluation.    Surgical Information:  Surgery/Procedure: ARTHROPLASTY, KNEE, TOTAL - Right (Spinal with Adductor Canal Block)  Surgery Location: [facility] OR  Surgeon: Dr. Nick Parra  Surgery Date: 7/25/22  Time of Surgery: 830 am   Where patient plans to recover: At home with family  Fax number for surgical facility: Note does not need to be faxed, will be available electronically in Epic.    Type of Anesthesia Anticipated: other    Assessment & Plan     The proposed surgical procedure is considered INTERMEDIATE risk.    (Z01.818) Preop general physical exam  (primary encounter diagnosis)  Comment: pt with Right knee OA changes and increased pan with ambulation; previous elective surgery planned 5/25/2022 was cancelled due to new Dx of anemia and need for further evaluation.  She has since seen Hematology/Oncology, had extensive evaluation and iron infusion 2 weeks prior to anticipated surgery; she has been diagnosed with MGUS ( Monoclonal Gammopathy of Undetermined Significance) and followe by Hematology/Oncology.   Plan: Albumin Random Urine Quantitative with Creat         Ratio, CBC with Platelets and Reflex to Iron         Studies, Basic metabolic panel  (Ca, Cl, CO2,         Creat, Gluc, K, Na, BUN), Iron & Iron Binding         Capacity, Ferritin          (M17.11) Primary osteoarthritis of right knee  Comment: right knee OA; 2 months ago, surgery was put on hold due to dx of anemia; in the interim, she has been Dx with MGUS, had iron infusion 2 weeks prior to surgery  Plan: she is hopeful that surgery will be done as scheduled; she reports painful  range of motion and     (N18.31) Stage 3a chronic kidney disease (H)  Comment: proteinuria related to new Dx of MGUS  Plan: Prealbumin; monitor Albumin, nutritional support to aid in healing.           (D63.8) Anemia in other chronic diseases classified elsewhere  Comment: multifactorial; she has been evaluated by Heme/Onc and received Iron infusion. HGB has been stable.   Plan: CBC with Platelets and Reflex to Iron Studies,         Iron & Iron Binding Capacity, Ferritin          (E44.0) Moderate protein-calorie malnutrition (H)  Comment: Discussed healthy nutrition and importance with postop healing. Recommend taking Boost, Ensure, etc daily today and continuing through 3-4 weeks post surgery.  Plan: Prealbumin low at 9; nutrition encouraged for optimal healing.           (D47.2) MGUS (monoclonal gammopathy of unknown significance)  Comment: recent diagnosis; followed by Hematology  Plan: monitoring per specialist    (Z95.0) Cardiac pacemaker in situ 5/22/2020; AV node ablation  Comment: noted in left upper chest; Pacemaker dependent  Plan:     (I48.19) Persistent atrial fibrillation (H)  Comment: rate controlled with BB; Eliquis will be held one week prior to surgery and resumed post operatively   Plan: she also has a permanent pace maker in place.       RECOMMENDATION:      --Approval given to proceed with proposed procedure, without further diagnostic evaluation  --Pt advised to avoid NSAIDS - since she is on Eliquis (Motrin, Ibuprofen, Aleve or Naprosyn);  If needed, Tylenol or Acetaminophen are fine to use.  --meds reviewed; one week prior to surgery (7/17/2022 ): Hold  Eliquis.   Continue all other medications   On AM of surgery only take the Metoprolol 100 mg with sip of water.   --Pain medications, time off from work and FMLA following surgery deferred to surgeon.     COVID testing scheduled for Thursday 7/21/2022 at [facility].      Pt advised to wait 4-6 months before going to dentist for  a cleaning    Reviewed nutrition  Low albumin (Protein)  Recommend drinking Ensure, Boost or other supplement one per day for next 4-6 weeks     Monitor need for Miralax  After surgery, may benefit from Miralax every 1-2 days especially while needing pain medications.     Post op Physical therapy has been set up at [facility] in [place].       Cris Romero MD  Internal Medicine  electronically signed     47 minutes spent on the date of the encounter doing chart review, history and exam, documentation and further activities per the note.    [facility] [place] Office has been contacted, spoke with Holli and Dr. Parra will plan to proceed with elective surgery. Recommend consultation with inpatient team to assist with perioperative management.               Subjective     HPI related to upcoming procedure: Tomasa Fam is a 81 year old female who presents for preoperative evaluation prior to undergoing elective Right TKA; the surgery originally scheduled for 5/23/2022 was cancelled due to new anemia. Pt was seen by Heme Onc and had subsequent evaluation and found to have iron deficiency anemia and underlying Monoclonal Gammopathy of Undetermined Significance.  She has received iron infusion.   She presents today to again be evaluated for elective Right TKA . She is hopeful that surgery will be done as anticipated since she ha significant pain involving her right knee.       Preop Questions 7/14/2022   1. Have you ever had a heart attack or stroke? No   2. Have you ever had surgery on your heart or blood vessels, such as a stent placement, a coronary artery bypass, or surgery on an artery in your head, neck, heart, or legs? No   3. Do you have chest pain with activity? No   4. Do you have a history of  heart failure? No   5. Do you currently have a cold, bronchitis or symptoms of other infection? No   6. Do you have a cough, shortness of breath, or wheezing? No   7. Do you or anyone in your family  have previous history of blood clots? No   8. Do you or does anyone in your family have a serious bleeding problem such as prolonged bleeding following surgeries or cuts? No   9. Have you ever had problems with anemia or been told to take iron pills? YES - see chart, now, recently had iron infusion   10. Have you had any abnormal blood loss such as black, tarry or bloody stools, or abnormal vaginal bleeding? No   11. Have you ever had a blood transfusion? No   12. Are you willing to have a blood transfusion if it is medically needed before, during, or after your surgery? Yes   13. Have you or any of your relatives ever had problems with anesthesia? No   14. Do you have sleep apnea, excessive snoring or daytime drowsiness? No   15. Do you have any artifical heart valves or other implanted medical devices like a pacemaker, defibrillator, or continuous glucose monitor? YES - pacemaker   15a. What type of device do you have? Pacemaker   15b. Name of the clinic that manages your device:  [facility] cardiologist   16. Do you have artificial joints? No   17. Are you allergic to latex? No   18. Is there any chance that you may be pregnant? -     Health Care Directive:  Patient does not have a Health Care Directive or Living Will: see Code Status in chart.    Preoperative Review of :   reviewed - controlled substances prescribed by other outside provider(s).      Review of Systems  CONSTITUTIONAL: NEGATIVE for fever, chills, change in weight  INTEGUMENTARY/SKIN: NEGATIVE for worrisome rashes, moles or lesions  EYES: NEGATIVE for vision changes or irritation  ENT/MOUTH: NEGATIVE for ear, mouth and throat problems  RESP: NEGATIVE for significant cough or SOB  CV: PPM related to chronic atiral fibrillation  GI: intermittetnly using Miralax  : NEGATIVE for frequency, dysuria, or hematuria   female: frequency and especially hard at night, right knee arthritis limits mobility  MUSCULOSKELETAL:right knee  osteoarthritis and need for surgery; highly anticipated  NEURO: NEGATIVE for weakness, dizziness or paresthesias  ENDOCRINE: NEGATIVE for temperature intolerance, skin/hair changes  HEME/ALLERGY/IMMUNE: ANEMIA- resulted in postponement of this surgery in May; recent Dx of MGUS in addition to KAREN- taking iron supplementation -   PSYCHIATRIC: NEGATIVE for changes in mood or affect    Patient Active Problem List    Diagnosis Date Noted     Iron deficiency anemia due to chronic blood loss 05/24/2022     Priority: Medium     Peripheral vascular disease, unspecified (H) 05/17/2022     Priority: Medium     Inferior pubic ramus fracture, right, closed, initial encounter (H) 01/31/2022     Priority: Medium     Mood disorder (H) 10/19/2021     Priority: Medium     Chronic kidney disease, stage 3 (H) 07/01/2021     Priority: Medium     Cardiac pacemaker in situ 5/22/2020; AV node ablation 06/10/2021     Priority: Medium     Skin ulcer of right lower leg with fat layer exposed (H) 01/13/2021     Priority: Medium     Persistent atrial fibrillation (H) 11/19/2020     Priority: Medium     Recurrent falls 08/31/2020     Priority: Medium     Dyspnea 08/31/2020     Priority: Medium     Temporal arteritis (H) 08/08/2020     Priority: Medium     Elevation of level of transaminase or lactic acid dehydrogenase (LDH) 08/08/2020     Priority: Medium     IMO Regulatory Load OCT 2020       Cardiomyopathy (H) 06/17/2020     Priority: Medium     Added automatically from request for surgery [id number]       Complete heart block (H) 06/17/2020     Priority: Medium     AV Node ablation; permanent pacemaker.       Long term systemic steroid user 03/30/2020     Priority: Medium     Personal history of malignant neoplasm of breast 02/21/2020     Priority: Medium     PMR (polymyalgia rheumatica) (H) 01/17/2020     Priority: Medium     tapering' above.)   In patients receiving over 10 mg of prednisone/day, the dose can be lowered by 2.5 mg/day decrements  every two to four weeks   Once the dose of prednisone is 10 mg/day, further tapering can be done by 1 mg per month, provided the clinical course is stable    The clinical response to glucocorticoid therapy is closely monitored, which centers on screening for the presence and/or recurrence of symptoms of PMR or GCA. The erythrocyte sedimentation rate (ESR) and C-reactive protein (CRP) should be measured at baseline and then two months after initiating glucocorticoid therapy. The ESR and CRP are then repeated as indicated every three to six months during glucocorticoid therapy         Gastroesophageal reflux disease, esophagitis presence not specified 11/23/2019     Priority: Medium     IMO Regulatory Load OCT 2020       History of lung cancer 09/01/2019     Priority: Medium     Chronic pain of both knees 11/09/2016     Priority: Medium     Thyroid nodule 04/23/2016     Priority: Medium     Noted on CT Fall 2015.       Concussion 03/13/2013     Priority: Medium     Problem list name updated by automated process. Provider to review and confirm  Imo Update utility       History of hypokalemia 01/23/2013     Priority: Medium     Advanced directives, counseling/discussion 08/21/2012     Priority: Medium     Received outside advance directive.  Previously signed by patient and witnessed with two signatures (cannot be the HCA).  scanned into EMR as Advance Directive/Living Will document. View document and details in Code Status History Report. Please see advance directive for specifics.          S/P hysterectomy 07/12/2012     Priority: Medium     Uterovaginal prolapse 06/12/2012     Priority: Medium     Cystocele, midline 04/04/2012     Priority: Medium     Essential hypertension with goal blood pressure less than 140/90 01/31/2012     Priority: Medium     Hyperlipidemia LDL goal <160 06/29/2011     Priority: Medium     [place] 10-year CHD Risk Score: 2% (14 Total Points)   Values used to calculate score:      Age: 70 years -- Points: 14     Total Cholesterol: 192 mg/dL -- Points: 1     HDL Cholesterol: 61 mg/dL -- Points: -1     Systolic BP (treated): 118 mmHg -- Points: 0    The patient is not a smoker. -- Points: 0    The patient has not been diagnosed with diabetes. -- Points: 0    The patient does not have a family history of CHD. -- Points:0          ASCUS on Pap smear 03/11/2011     Priority: Medium     3/11/11 pap ASCUS, neg HPV - r/p pap in 1 year.   Due 3/12/12.       Pulmonary emphysema, unspecified emphysema type (H)      Priority: Medium     very mild COPD  Problem list name updated by automated process. Provider to review        Past Medical History:   Diagnosis Date     Anemia      Arthritis     hands, knees     Breast cancer (H) 01/2012    left mastectomy followed by right;  Dr. Luong     Chronic airway obstruction, not elsewhere classified 2006    very mild COPD - small cough     Concussion 03/13/2013     Problem list name updated by automated process. Provider to review and confirm Imo Update utility     Cystocele, midline 04/04/2012     Diffuse cystic mastopathy      Gastroesophageal reflux disease, esophagitis presence not specified 11/23/2019     History of hypokalemia 01/23/2013     Hyperlipidemia LDL goal <160 06/29/2011    [place] 10-year CHD Risk Score: 2% (14 Total Points)  Values used to calculate score:    Age: 70 years -- Points: 14    Total Cholesterol: 192 mg/dL -- Points: 1    HDL Cholesterol: 61 mg/dL -- Points: -1    Systolic BP (treated): 118 mmHg -- Points: 0   The patient is not a smoker. -- Points: 0   The patient has not been diagnosed with diabetes. -- Points: 0   The patient does not have a famil     Lung cancer (H) 10/21/2015     Pacemaker     [employer]     Paroxysmal atrial fibrillation (H) 09/13/2019     PMR (polymyalgia rheumatica) (H) 01/17/2020    tapering' above.)  In patients receiving over 10 mg of prednisone/day, the dose can be lowered by 2.5 mg/day decrements  every two to four weeks  Once the dose of prednisone is 10 mg/day, further tapering can be done by 1 mg per month, provided the clinical course is stable  The clinical response to glucocorticoid therapy is closely monitored, which centers on screening for the presence and/or recu     Thyroid nodule 04/23/2016    Noted on CT Fall 2015.      Unspecified essential hypertension late 1980's     Past Surgical History:   Procedure Laterality Date     ANESTHESIA CARDIOVERSION N/A 6/12/2020    Procedure: ANESTHESIA, FOR CARDIOVERSION;  Surgeon: GENERIC ANESTHESIA PROVIDER;  Location:  OR     COLONOSCOPY  3/2003    adenomatous polyp      COLONOSCOPY  7/2006    diverticulosis - repeat in 5 years     COLONOSCOPY  10/13/2011    Procedure:COLONOSCOPY; COLONOSCOPY ; Surgeon:CHITO NGUYEN; Location: GI     CYSTOSCOPY  7/11/2012    Procedure: CYSTOSCOPY;;  Surgeon: Aline Cooper DO;  Location:  OR     DAVINCI HYSTERECTOMY SUPRACERVICAL, SACROCOLPOPEXY, COMBINED  7/11/2012    Procedure: COMBINED DAVINCI HYSTERECTOMY SUPRACERVICAL, SACROCOLPOPEXY;   DAVINCI ASSISTED LAPAROSCOPIC SUPRACERVICAL HYSTERECTOMY AND BILATERAL SALPINGO-OOPHORECTOMY, SACROCOLPOPEXY AND CYSTOSCOPY;  Surgeon: Aline Cooper DO;  Location:  OR     EP ABLATION AV NODE N/A 6/22/2020    Procedure: EP ABLATION AV NODE;  Surgeon: Adriano Raman MD;  Location:  HEART CARDIAC CATH LAB     EP BIVENT LEAD PLACEMENT N/A 6/22/2020    Procedure: Bivent Lead Placement;  Surgeon: Adriano Raman MD;  Location:  HEART CARDIAC CATH LAB     EP PACEMAKER N/A 6/22/2020    Procedure: AVNA and BiV Pacemaker Insertion;  Surgeon: Adriano Raman MD;  Location:  HEART CARDIAC CATH LAB     ESOPHAGOSCOPY, GASTROSCOPY, DUODENOSCOPY (EGD), COMBINED N/A 12/14/2021    Procedure: ESOPHAGOGASTRODUODENOSCOPY (EGD) (fv) biopsies with cold forcep;  Surgeon: Chito Nguyen MD;  Location:  GI     EXCISE LESION EYELID Right 6/29/2015     Procedure: EXCISE LESION EYELID;  Surgeon: Hank Olvera MD;  Location: [facility]     INSERT PORT VASCULAR ACCESS  2/3/2012    Procedure:INSERT PORT VASCULAR ACCESS; Power Port-A- Catheter Placement ; Surgeon:IRISH TAPIA; Location:[facility] OR     LAPAROSCOPIC SALPINGO-OOPHORECTOMY  7/11/2012    Procedure: LAPAROSCOPIC SALPINGO-OOPHORECTOMY;  Davinci;  Surgeon: Aline Cooper DO;  Location:  OR     LIPOSUCTION, RHYTIDECTOMY, COMBINED       LOBECTOMY LUNG Right 3/1/2016    Procedure: LOBECTOMY LUNG;  Surgeon: Jonas Woodward MD;  Location:  OR     MAMMOPLASTY REDUCTION  1/6/2012    Procedure:MAMMOPLASTY REDUCTION; Surgeon:MICKI KYLE; Location: OR     MASTECTOMY SIMPLE  11/12/2012    Procedure: MASTECTOMY SIMPLE;   Right Prophylactic Mastectomy with attempted Right Sentinal Node Biopsy, Revision Bilateral Mastectomy Insicions, liposuction in breast area;  Surgeon: Irish Tapia MD;  Location: [facility] OR     MASTECTOMY SIMPLE, SENTINEL NODE, COMBINED  1/6/2012    Procedure:COMBINED MASTECTOMY SIMPLE, SENTINEL NODE; Left Mastectomy Left [place] Node Biopsy,  Right Breast Reduction ; Surgeon:IRISH TAPIA; Location:[facility] OR     MASTECTOMY, BILATERAL       REMOVE PORT VASCULAR ACCESS  4/29/2013    Procedure: REMOVE PORT VASCULAR ACCESS;  Port A catheter removal ;  Surgeon: Irish Tapia MD;  Location: [facility] OR     REPAIR PTOSIS BILATERAL Bilateral 6/29/2015    Procedure: REPAIR PTOSIS BILATERAL;  Surgeon: Hank Olvera MD;  Location: [facility]     REVISE RECONSTRUCTED BREAST BILATERAL  11/12/2012    Procedure: REVISE RECONSTRUCTED BREAST BILATERAL;;  Surgeon: Micki Kyle MD;  Location:  OR     SURGICAL HISTORY OF -   in 40's    face lift     SURGICAL HISTORY OF -       lipoma removed right thigh     SURGICAL HISTORY OF -       D and C     THORACOTOMY Right 3/1/2016    Procedure: THORACOTOMY;  Surgeon: Jonas Woodward MD;  Location:  OR     [facility]  NONSPECIFIC PROCEDURE  1970    s/p Tubal ligation 1970     Current Outpatient Medications   Medication Sig Dispense Refill     amLODIPine (NORVASC) 5 MG tablet Take 1 tablet (5 mg) by mouth every evening htn 90 tablet 1     apixaban ANTICOAGULANT (ELIQUIS) 5 MG tablet Take 1 tablet (5 mg) by mouth 2 times daily 60 tablet 11     ezetimibe (ZETIA) 10 MG tablet Take 1 tablet (10 mg) by mouth every evening 90 tablet 1     Ferrous Sulfate (IRON PO)        INCRUSE ELLIPTA 62.5 MCG/INH Inhaler Inhale 1 puff into the lungs daily       metoprolol tartrate (LOPRESSOR) 100 MG tablet TAKE 1 TABLET(100 MG) BY MOUTH TWICE DAILY 180 tablet 0     omega 3 1000 MG CAPS Take 1 capsule by mouth daily  90 capsule      polyethylene glycol (MIRALAX) 17 GM/Dose powder Take 17 g by mouth daily 510 g      potassium chloride ER (KLOR-CON M) 20 MEQ CR tablet TAKE 1 TABLET(20 MEQ) BY MOUTH TWICE DAILY 180 tablet 1       Allergies   Allergen Reactions     No Known Drug Allergies      Tape [Adhesive Tape]      Sensitive to plastic tape on upper part of body--takes skin off        Social History     Tobacco Use     Smoking status: Never Smoker     Smokeless tobacco: Never Used   Substance Use Topics     Alcohol use: Yes     Comment: 0-1 drink day     Family History   Problem Relation Age of Onset     Cardiovascular Father         ruptured aorta, hardening of the arteries     Cerebrovascular Disease Mother      Respiratory Mother         chronic bronchitis - was a smoker early on     Cardiovascular Paternal Grandfather         MI     Cerebrovascular Disease Paternal Aunt      Hypertension Son      Neurologic Disorder Daughter         migraines     Breast Cancer Daughter      Brain Tumor Sister      History   Drug Use No         Objective     /40 (BP Location: Right arm, Patient Position: Sitting, Cuff Size: Adult Regular)   Pulse 78   Temp 99  F (37.2  C) (Oral)   Resp 16   Wt 53.1 kg (117 lb)   LMP  (LMP Unknown)   SpO2 100%   BMI  20.73 kg/m      Physical Exam    GENERAL APPEARANCE: healthy, alert and no distress     EYES: EOMI, PERRL     HENT: ear canals and TM's normal and nose and mouth without ulcers or lesions     NECK: no adenopathy, no asymmetry, masses, or scars and thyroid normal to palpation     RESP: lungs clear to auscultation - no rales, rhonchi or wheezes     CV: regular rates and rhythm and pacemaker palpable in left upper chest.      ABDOMEN:  soft, nontender, no HSM or masses and bowel sounds normal     MS: right knee with arthritic changes; she is ambulatory     SKIN: no suspicious lesions or rashes     NEURO: Normal strength and tone, sensory exam grossly normal, mentation intact and speech normal     PSYCH: mentation appears normal. and affect normal/bright    Recent Labs   Lab Test 06/16/22  0940 06/02/22  0931 05/17/22  0933 03/24/22  0859 03/16/21  1548 11/24/20  1325 11/10/20  1306   HGB 10.5*  --  9.8* 9.9*   < >  --   --      --  495* 442   < >  --   --    INR  --   --   --   --   --  1.20* 1.90*   NA  --  137 135 137   < >  --   --    POTASSIUM  --  4.0 4.3 3.9   < >  --   --    CR  --  0.66 0.52 0.60   < >  --   --    A1C 5.3  --   --  5.3   < >  --   --     < > = values in this interval not displayed.        Diagnostics:  Recent Results (from the past 720 hour(s))   Basic metabolic panel  (Ca, Cl, CO2, Creat, Gluc, K, Na, BUN)    Collection Time: 07/14/22 11:14 AM   Result Value Ref Range    Sodium 137 133 - 144 mmol/L    Potassium 4.5 3.4 - 5.3 mmol/L    Chloride 106 94 - 109 mmol/L    Carbon Dioxide (CO2) 24 20 - 32 mmol/L    Anion Gap 7 3 - 14 mmol/L    Urea Nitrogen 18 7 - 30 mg/dL    Creatinine 0.54 0.52 - 1.04 mg/dL    Calcium 10.6 (H) 8.5 - 10.1 mg/dL    Glucose 96 70 - 99 mg/dL    GFR Estimate >90 >60 mL/min/1.73m2   Prealbumin    Collection Time: 07/14/22 11:14 AM   Result Value Ref Range    Prealbumin 9 (L) 15 - 45 mg/dL   CBC with Platelets and Reflex to Iron Studies    Collection Time: 07/14/22  11:14 AM   Result Value Ref Range    WBC Count 10.9 4.0 - 11.0 10e3/uL    RBC Count 4.09 3.80 - 5.20 10e6/uL    Hemoglobin 10.6 (L) 11.7 - 15.7 g/dL    Hematocrit 33.6 (L) 35.0 - 47.0 %    MCV 82 78 - 100 fL    MCH 25.9 (L) 26.5 - 33.0 pg    MCHC 31.5 31.5 - 36.5 g/dL    RDW 18.4 (H) 10.0 - 15.0 %    Platelet Count 427 150 - 450 10e3/uL   Extra Green Top (Lithium Heparin) Tube    Collection Time: 07/14/22 11:14 AM   Result Value Ref Range    Hold Specimen JIC    Iron & Iron Binding Capacity    Collection Time: 07/14/22 11:14 AM   Result Value Ref Range    Iron 19 (L) 35 - 180 ug/dL    Iron Binding Capacity 201 (L) 240 - 430 ug/dL    Iron Sat Index 9 (L) 15 - 46 %   Ferritin    Collection Time: 07/14/22 11:14 AM   Result Value Ref Range    Ferritin 806 (H) 8 - 252 ng/mL   Albumin Random Urine Quantitative with Creat Ratio    Collection Time: 07/14/22 11:33 AM   Result Value Ref Range    Creatinine Urine mg/dL 90 mg/dL    Albumin Urine mg/L 84 mg/L    Albumin Urine mg/g Cr 93.33 (H) 0.00 - 25.00 mg/g Cr      EKG: permanent pacemaker; no EKG done.     Revised Cardiac Risk Index (RCRI):  The patient has the following serious cardiovascular risks for perioperative complications:        Signed Electronically by: Cris Romero MD  Copy of this evaluation report is provided to requesting physician.

## 2022-07-14 NOTE — PROGRESS NOTES
[facility]  [address] [facility] [address]  Phone: [phone]       FAX: [phone]  Primary Provider: Eufemia Church  Pre-op Performing Provider: EUFEMIA CHURCH      PREOPERATIVE EVALUATION:  Today's date: 7/14/2022    Tomasa Fam is a 81 year old female who presents for a preoperative evaluation.    Surgical Information:  Surgery/Procedure: ARTHROPLASTY, KNEE, TOTAL - Right (Spinal with Adductor Canal Block)  Surgery Location: [facility] OR  Surgeon: Dr. Nick Parra  Surgery Date: 7/25/22  Time of Surgery: 830 am   Where patient plans to recover: At home with family  Fax number for surgical facility: Note does not need to be faxed, will be available electronically in Epic.    Type of Anesthesia Anticipated: other    Assessment & Plan     The proposed surgical procedure is considered INTERMEDIATE risk.    (Z01.818) Preop general physical exam  (primary encounter diagnosis)  Comment: pt with Right knee OA changes and increased pan with ambulation; previous elective surgery planned 5/25/2022 was cancelled due to new Dx of anemia and need for further evaluation.  She has since seen Hematology/Oncology, had extensive evaluation and iron infusion 2 weeks prior to anticipated surgery; she has been diagnosed with MGUS ( Monoclonal Gammopathy of Undetermined Significance) and followe by Hematology/Oncology.   Plan: Albumin Random Urine Quantitative with Creat         Ratio, CBC with Platelets and Reflex to Iron         Studies, Basic metabolic panel  (Ca, Cl, CO2,         Creat, Gluc, K, Na, BUN), Iron & Iron Binding         Capacity, Ferritin          (M17.11) Primary osteoarthritis of right knee  Comment: right knee OA; 2 months ago, surgery was put on hold due to dx of anemia; in the interim, she has been Dx with MGUS, had iron infusion 2 weeks prior to surgery  Plan: she is hopeful that surgery will be done as scheduled; she reports painful  range of motion and     (N18.31) Stage 3a chronic kidney disease (H)  Comment: proteinuria related to new Dx of MGUS  Plan: Prealbumin; monitor Albumin, nutritional support to aid in healing.           (D63.8) Anemia in other chronic diseases classified elsewhere  Comment: multifactorial; she has been evaluated by Heme/Onc and received Iron infusion. HGB has been stable.   Plan: CBC with Platelets and Reflex to Iron Studies,         Iron & Iron Binding Capacity, Ferritin          (E44.0) Moderate protein-calorie malnutrition (H)  Comment: Discussed healthy nutrition and importance with postop healing. Recommend taking Boost, Ensure, etc daily today and continuing through 3-4 weeks post surgery.  Plan: Prealbumin low at 9; nutrition encouraged for optimal healing.           (D47.2) MGUS (monoclonal gammopathy of unknown significance)  Comment: recent diagnosis; followed by Hematology  Plan: monitoring per specialist    (Z95.0) Cardiac pacemaker in situ 5/22/2020; AV node ablation  Comment: noted in left upper chest; Pacemaker dependent  Plan:     (I48.19) Persistent atrial fibrillation (H)  Comment: rate controlled with BB; Eliquis will be held one week prior to surgery and resumed post operatively   Plan: she also has a permanent pace maker in place.       RECOMMENDATION:      --Approval given to proceed with proposed procedure, without further diagnostic evaluation  --Pt advised to avoid NSAIDS - since she is on Eliquis (Motrin, Ibuprofen, Aleve or Naprosyn);  If needed, Tylenol or Acetaminophen are fine to use.  --meds reviewed; one week prior to surgery (7/17/2022 ): Hold  Eliquis.   Continue all other medications   On AM of surgery only take the Metoprolol 100 mg with sip of water.   --Pain medications, time off from work and FMLA following surgery deferred to surgeon.     COVID testing scheduled for Thursday 7/21/2022 at [facility].      Pt advised to wait 4-6 months before going to dentist for  a cleaning    Reviewed nutrition  Low albumin (Protein)  Recommend drinking Ensure, Boost or other supplement one per day for next 4-6 weeks     Monitor need for Miralax  After surgery, may benefit from Miralax every 1-2 days especially while needing pain medications.     Post op Physical therapy has been set up at [facility] in [place].       Cris Romero MD  Internal Medicine  electronically signed     47 minutes spent on the date of the encounter doing chart review, history and exam, documentation and further activities per the note.    [facility] [place] Office has been contacted, spoke with Holli and Dr. Parra will plan to proceed with elective surgery. Recommend consultation with inpatient team to assist with perioperative management.               Subjective     HPI related to upcoming procedure: Tomasa Fam is a 81 year old female who presents for preoperative evaluation prior to undergoing elective Right TKA; the surgery originally scheduled for 5/23/2022 was cancelled due to new anemia. Pt was seen by Heme Onc and had subsequent evaluation and found to have iron deficiency anemia and underlying Monoclonal Gammopathy of Undetermined Significance.  She has received iron infusion.   She presents today to again be evaluated for elective Right TKA . She is hopeful that surgery will be done as anticipated since she ha significant pain involving her right knee.       Preop Questions 7/14/2022   1. Have you ever had a heart attack or stroke? No   2. Have you ever had surgery on your heart or blood vessels, such as a stent placement, a coronary artery bypass, or surgery on an artery in your head, neck, heart, or legs? No   3. Do you have chest pain with activity? No   4. Do you have a history of  heart failure? No   5. Do you currently have a cold, bronchitis or symptoms of other infection? No   6. Do you have a cough, shortness of breath, or wheezing? No   7. Do you or anyone in your family  have previous history of blood clots? No   8. Do you or does anyone in your family have a serious bleeding problem such as prolonged bleeding following surgeries or cuts? No   9. Have you ever had problems with anemia or been told to take iron pills? YES - see chart, now, recently had iron infusion   10. Have you had any abnormal blood loss such as black, tarry or bloody stools, or abnormal vaginal bleeding? No   11. Have you ever had a blood transfusion? No   12. Are you willing to have a blood transfusion if it is medically needed before, during, or after your surgery? Yes   13. Have you or any of your relatives ever had problems with anesthesia? No   14. Do you have sleep apnea, excessive snoring or daytime drowsiness? No   15. Do you have any artifical heart valves or other implanted medical devices like a pacemaker, defibrillator, or continuous glucose monitor? YES - pacemaker   15a. What type of device do you have? Pacemaker   15b. Name of the clinic that manages your device:  [facility] cardiologist   16. Do you have artificial joints? No   17. Are you allergic to latex? No   18. Is there any chance that you may be pregnant? -     Health Care Directive:  Patient does not have a Health Care Directive or Living Will: see Code Status in chart.    Preoperative Review of :   reviewed - controlled substances prescribed by other outside provider(s).      Review of Systems  CONSTITUTIONAL: NEGATIVE for fever, chills, change in weight  INTEGUMENTARY/SKIN: NEGATIVE for worrisome rashes, moles or lesions  EYES: NEGATIVE for vision changes or irritation  ENT/MOUTH: NEGATIVE for ear, mouth and throat problems  RESP: NEGATIVE for significant cough or SOB  CV: PPM related to chronic atiral fibrillation  GI: intermittetnly using Miralax  : NEGATIVE for frequency, dysuria, or hematuria   female: frequency and especially hard at night, right knee arthritis limits mobility  MUSCULOSKELETAL:right knee  osteoarthritis and need for surgery; highly anticipated  NEURO: NEGATIVE for weakness, dizziness or paresthesias  ENDOCRINE: NEGATIVE for temperature intolerance, skin/hair changes  HEME/ALLERGY/IMMUNE: ANEMIA- resulted in postponement of this surgery in May; recent Dx of MGUS in addition to KAREN- taking iron supplementation -   PSYCHIATRIC: NEGATIVE for changes in mood or affect    Patient Active Problem List    Diagnosis Date Noted     Iron deficiency anemia due to chronic blood loss 05/24/2022     Priority: Medium     Peripheral vascular disease, unspecified (H) 05/17/2022     Priority: Medium     Inferior pubic ramus fracture, right, closed, initial encounter (H) 01/31/2022     Priority: Medium     Mood disorder (H) 10/19/2021     Priority: Medium     Chronic kidney disease, stage 3 (H) 07/01/2021     Priority: Medium     Cardiac pacemaker in situ 5/22/2020; AV node ablation 06/10/2021     Priority: Medium     Skin ulcer of right lower leg with fat layer exposed (H) 01/13/2021     Priority: Medium     Persistent atrial fibrillation (H) 11/19/2020     Priority: Medium     Recurrent falls 08/31/2020     Priority: Medium     Dyspnea 08/31/2020     Priority: Medium     Temporal arteritis (H) 08/08/2020     Priority: Medium     Elevation of level of transaminase or lactic acid dehydrogenase (LDH) 08/08/2020     Priority: Medium     IMO Regulatory Load OCT 2020       Cardiomyopathy (H) 06/17/2020     Priority: Medium     Added automatically from request for surgery [id number]       Complete heart block (H) 06/17/2020     Priority: Medium     AV Node ablation; permanent pacemaker.       Long term systemic steroid user 03/30/2020     Priority: Medium     Personal history of malignant neoplasm of breast 02/21/2020     Priority: Medium     PMR (polymyalgia rheumatica) (H) 01/17/2020     Priority: Medium     tapering' above.)   In patients receiving over 10 mg of prednisone/day, the dose can be lowered by 2.5 mg/day decrements  every two to four weeks   Once the dose of prednisone is 10 mg/day, further tapering can be done by 1 mg per month, provided the clinical course is stable    The clinical response to glucocorticoid therapy is closely monitored, which centers on screening for the presence and/or recurrence of symptoms of PMR or GCA. The erythrocyte sedimentation rate (ESR) and C-reactive protein (CRP) should be measured at baseline and then two months after initiating glucocorticoid therapy. The ESR and CRP are then repeated as indicated every three to six months during glucocorticoid therapy         Gastroesophageal reflux disease, esophagitis presence not specified 11/23/2019     Priority: Medium     IMO Regulatory Load OCT 2020       History of lung cancer 09/01/2019     Priority: Medium     Chronic pain of both knees 11/09/2016     Priority: Medium     Thyroid nodule 04/23/2016     Priority: Medium     Noted on CT Fall 2015.       Concussion 03/13/2013     Priority: Medium     Problem list name updated by automated process. Provider to review and confirm  Imo Update utility       History of hypokalemia 01/23/2013     Priority: Medium     Advanced directives, counseling/discussion 08/21/2012     Priority: Medium     Received outside advance directive.  Previously signed by patient and witnessed with two signatures (cannot be the HCA).  scanned into EMR as Advance Directive/Living Will document. View document and details in Code Status History Report. Please see advance directive for specifics.          S/P hysterectomy 07/12/2012     Priority: Medium     Uterovaginal prolapse 06/12/2012     Priority: Medium     Cystocele, midline 04/04/2012     Priority: Medium     Essential hypertension with goal blood pressure less than 140/90 01/31/2012     Priority: Medium     Hyperlipidemia LDL goal <160 06/29/2011     Priority: Medium     [place] 10-year CHD Risk Score: 2% (14 Total Points)   Values used to calculate score:      Age: 70 years -- Points: 14     Total Cholesterol: 192 mg/dL -- Points: 1     HDL Cholesterol: 61 mg/dL -- Points: -1     Systolic BP (treated): 118 mmHg -- Points: 0    The patient is not a smoker. -- Points: 0    The patient has not been diagnosed with diabetes. -- Points: 0    The patient does not have a family history of CHD. -- Points:0          ASCUS on Pap smear 03/11/2011     Priority: Medium     3/11/11 pap ASCUS, neg HPV - r/p pap in 1 year.   Due 3/12/12.       Pulmonary emphysema, unspecified emphysema type (H)      Priority: Medium     very mild COPD  Problem list name updated by automated process. Provider to review        Past Medical History:   Diagnosis Date     Anemia      Arthritis     hands, knees     Breast cancer (H) 01/2012    left mastectomy followed by right;  Dr. Luong     Chronic airway obstruction, not elsewhere classified 2006    very mild COPD - small cough     Concussion 03/13/2013     Problem list name updated by automated process. Provider to review and confirm Imo Update utility     Cystocele, midline 04/04/2012     Diffuse cystic mastopathy      Gastroesophageal reflux disease, esophagitis presence not specified 11/23/2019     History of hypokalemia 01/23/2013     Hyperlipidemia LDL goal <160 06/29/2011    [place] 10-year CHD Risk Score: 2% (14 Total Points)  Values used to calculate score:    Age: 70 years -- Points: 14    Total Cholesterol: 192 mg/dL -- Points: 1    HDL Cholesterol: 61 mg/dL -- Points: -1    Systolic BP (treated): 118 mmHg -- Points: 0   The patient is not a smoker. -- Points: 0   The patient has not been diagnosed with diabetes. -- Points: 0   The patient does not have a famil     Lung cancer (H) 10/21/2015     Pacemaker     [employer]     Paroxysmal atrial fibrillation (H) 09/13/2019     PMR (polymyalgia rheumatica) (H) 01/17/2020    tapering' above.)  In patients receiving over 10 mg of prednisone/day, the dose can be lowered by 2.5 mg/day decrements  every two to four weeks  Once the dose of prednisone is 10 mg/day, further tapering can be done by 1 mg per month, provided the clinical course is stable  The clinical response to glucocorticoid therapy is closely monitored, which centers on screening for the presence and/or recu     Thyroid nodule 04/23/2016    Noted on CT Fall 2015.      Unspecified essential hypertension late 1980's     Past Surgical History:   Procedure Laterality Date     ANESTHESIA CARDIOVERSION N/A 6/12/2020    Procedure: ANESTHESIA, FOR CARDIOVERSION;  Surgeon: GENERIC ANESTHESIA PROVIDER;  Location:  OR     COLONOSCOPY  3/2003    adenomatous polyp      COLONOSCOPY  7/2006    diverticulosis - repeat in 5 years     COLONOSCOPY  10/13/2011    Procedure:COLONOSCOPY; COLONOSCOPY ; Surgeon:CHITO NGUYEN; Location: GI     CYSTOSCOPY  7/11/2012    Procedure: CYSTOSCOPY;;  Surgeon: Aline Cooper DO;  Location:  OR     DAVINCI HYSTERECTOMY SUPRACERVICAL, SACROCOLPOPEXY, COMBINED  7/11/2012    Procedure: COMBINED DAVINCI HYSTERECTOMY SUPRACERVICAL, SACROCOLPOPEXY;   DAVINCI ASSISTED LAPAROSCOPIC SUPRACERVICAL HYSTERECTOMY AND BILATERAL SALPINGO-OOPHORECTOMY, SACROCOLPOPEXY AND CYSTOSCOPY;  Surgeon: Aline Cooper DO;  Location:  OR     EP ABLATION AV NODE N/A 6/22/2020    Procedure: EP ABLATION AV NODE;  Surgeon: Adriano Raman MD;  Location:  HEART CARDIAC CATH LAB     EP BIVENT LEAD PLACEMENT N/A 6/22/2020    Procedure: Bivent Lead Placement;  Surgeon: Adriano Raman MD;  Location:  HEART CARDIAC CATH LAB     EP PACEMAKER N/A 6/22/2020    Procedure: AVNA and BiV Pacemaker Insertion;  Surgeon: Adriano Raman MD;  Location:  HEART CARDIAC CATH LAB     ESOPHAGOSCOPY, GASTROSCOPY, DUODENOSCOPY (EGD), COMBINED N/A 12/14/2021    Procedure: ESOPHAGOGASTRODUODENOSCOPY (EGD) (fv) biopsies with cold forcep;  Surgeon: Chito Nguyen MD;  Location:  GI     EXCISE LESION EYELID Right 6/29/2015     Procedure: EXCISE LESION EYELID;  Surgeon: Hank Olvera MD;  Location: [facility]     INSERT PORT VASCULAR ACCESS  2/3/2012    Procedure:INSERT PORT VASCULAR ACCESS; Power Port-A- Catheter Placement ; Surgeon:IRISH TAPIA; Location:[facility] OR     LAPAROSCOPIC SALPINGO-OOPHORECTOMY  7/11/2012    Procedure: LAPAROSCOPIC SALPINGO-OOPHORECTOMY;  Davinci;  Surgeon: Aline Cooper DO;  Location:  OR     LIPOSUCTION, RHYTIDECTOMY, COMBINED       LOBECTOMY LUNG Right 3/1/2016    Procedure: LOBECTOMY LUNG;  Surgeon: Jonas Woodward MD;  Location:  OR     MAMMOPLASTY REDUCTION  1/6/2012    Procedure:MAMMOPLASTY REDUCTION; Surgeon:MICKI KYLE; Location: OR     MASTECTOMY SIMPLE  11/12/2012    Procedure: MASTECTOMY SIMPLE;   Right Prophylactic Mastectomy with attempted Right Sentinal Node Biopsy, Revision Bilateral Mastectomy Insicions, liposuction in breast area;  Surgeon: Irish Tapia MD;  Location: [facility] OR     MASTECTOMY SIMPLE, SENTINEL NODE, COMBINED  1/6/2012    Procedure:COMBINED MASTECTOMY SIMPLE, SENTINEL NODE; Left Mastectomy Left [place] Node Biopsy,  Right Breast Reduction ; Surgeon:IRISH TAIPA; Location:[facility] OR     MASTECTOMY, BILATERAL       REMOVE PORT VASCULAR ACCESS  4/29/2013    Procedure: REMOVE PORT VASCULAR ACCESS;  Port A catheter removal ;  Surgeon: Irish Tapia MD;  Location: [facility] OR     REPAIR PTOSIS BILATERAL Bilateral 6/29/2015    Procedure: REPAIR PTOSIS BILATERAL;  Surgeon: Hank Olvera MD;  Location: [facility]     REVISE RECONSTRUCTED BREAST BILATERAL  11/12/2012    Procedure: REVISE RECONSTRUCTED BREAST BILATERAL;;  Surgeon: Micki Kyle MD;  Location:  OR     SURGICAL HISTORY OF -   in 40's    face lift     SURGICAL HISTORY OF -       lipoma removed right thigh     SURGICAL HISTORY OF -       D and C     THORACOTOMY Right 3/1/2016    Procedure: THORACOTOMY;  Surgeon: Jonas Woodward MD;  Location:  OR     RUST  NONSPECIFIC PROCEDURE  1970    s/p Tubal ligation 1970     Current Outpatient Medications   Medication Sig Dispense Refill     amLODIPine (NORVASC) 5 MG tablet Take 1 tablet (5 mg) by mouth every evening htn 90 tablet 1     apixaban ANTICOAGULANT (ELIQUIS) 5 MG tablet Take 1 tablet (5 mg) by mouth 2 times daily 60 tablet 11     ezetimibe (ZETIA) 10 MG tablet Take 1 tablet (10 mg) by mouth every evening 90 tablet 1     Ferrous Sulfate (IRON PO)        INCRUSE ELLIPTA 62.5 MCG/INH Inhaler Inhale 1 puff into the lungs daily       metoprolol tartrate (LOPRESSOR) 100 MG tablet TAKE 1 TABLET(100 MG) BY MOUTH TWICE DAILY 180 tablet 0     omega 3 1000 MG CAPS Take 1 capsule by mouth daily  90 capsule      polyethylene glycol (MIRALAX) 17 GM/Dose powder Take 17 g by mouth daily 510 g      potassium chloride ER (KLOR-CON M) 20 MEQ CR tablet TAKE 1 TABLET(20 MEQ) BY MOUTH TWICE DAILY 180 tablet 1       Allergies   Allergen Reactions     No Known Drug Allergies      Tape [Adhesive Tape]      Sensitive to plastic tape on upper part of body--takes skin off        Social History     Tobacco Use     Smoking status: Never Smoker     Smokeless tobacco: Never Used   Substance Use Topics     Alcohol use: Yes     Comment: 0-1 drink day     Family History   Problem Relation Age of Onset     Cardiovascular Father         ruptured aorta, hardening of the arteries     Cerebrovascular Disease Mother      Respiratory Mother         chronic bronchitis - was a smoker early on     Cardiovascular Paternal Grandfather         MI     Cerebrovascular Disease Paternal Aunt      Hypertension Son      Neurologic Disorder Daughter         migraines     Breast Cancer Daughter      Brain Tumor Sister      History   Drug Use No         Objective     /40 (BP Location: Right arm, Patient Position: Sitting, Cuff Size: Adult Regular)   Pulse 78   Temp 99  F (37.2  C) (Oral)   Resp 16   Wt 53.1 kg (117 lb)   LMP  (LMP Unknown)   SpO2 100%   BMI  20.73 kg/m      Physical Exam    GENERAL APPEARANCE: healthy, alert and no distress     EYES: EOMI, PERRL     HENT: ear canals and TM's normal and nose and mouth without ulcers or lesions     NECK: no adenopathy, no asymmetry, masses, or scars and thyroid normal to palpation     RESP: lungs clear to auscultation - no rales, rhonchi or wheezes     CV: regular rates and rhythm and pacemaker palpable in left upper chest.      ABDOMEN:  soft, nontender, no HSM or masses and bowel sounds normal     MS: right knee with arthritic changes; she is ambulatory     SKIN: no suspicious lesions or rashes     NEURO: Normal strength and tone, sensory exam grossly normal, mentation intact and speech normal     PSYCH: mentation appears normal. and affect normal/bright    Recent Labs   Lab Test 06/16/22  0940 06/02/22  0931 05/17/22  0933 03/24/22  0859 03/16/21  1548 11/24/20  1325 11/10/20  1306   HGB 10.5*  --  9.8* 9.9*   < >  --   --      --  495* 442   < >  --   --    INR  --   --   --   --   --  1.20* 1.90*   NA  --  137 135 137   < >  --   --    POTASSIUM  --  4.0 4.3 3.9   < >  --   --    CR  --  0.66 0.52 0.60   < >  --   --    A1C 5.3  --   --  5.3   < >  --   --     < > = values in this interval not displayed.        Diagnostics:  Recent Results (from the past 720 hour(s))   Basic metabolic panel  (Ca, Cl, CO2, Creat, Gluc, K, Na, BUN)    Collection Time: 07/14/22 11:14 AM   Result Value Ref Range    Sodium 137 133 - 144 mmol/L    Potassium 4.5 3.4 - 5.3 mmol/L    Chloride 106 94 - 109 mmol/L    Carbon Dioxide (CO2) 24 20 - 32 mmol/L    Anion Gap 7 3 - 14 mmol/L    Urea Nitrogen 18 7 - 30 mg/dL    Creatinine 0.54 0.52 - 1.04 mg/dL    Calcium 10.6 (H) 8.5 - 10.1 mg/dL    Glucose 96 70 - 99 mg/dL    GFR Estimate >90 >60 mL/min/1.73m2   Prealbumin    Collection Time: 07/14/22 11:14 AM   Result Value Ref Range    Prealbumin 9 (L) 15 - 45 mg/dL   CBC with Platelets and Reflex to Iron Studies    Collection Time: 07/14/22  11:14 AM   Result Value Ref Range    WBC Count 10.9 4.0 - 11.0 10e3/uL    RBC Count 4.09 3.80 - 5.20 10e6/uL    Hemoglobin 10.6 (L) 11.7 - 15.7 g/dL    Hematocrit 33.6 (L) 35.0 - 47.0 %    MCV 82 78 - 100 fL    MCH 25.9 (L) 26.5 - 33.0 pg    MCHC 31.5 31.5 - 36.5 g/dL    RDW 18.4 (H) 10.0 - 15.0 %    Platelet Count 427 150 - 450 10e3/uL   Extra Green Top (Lithium Heparin) Tube    Collection Time: 07/14/22 11:14 AM   Result Value Ref Range    Hold Specimen JIC    Iron & Iron Binding Capacity    Collection Time: 07/14/22 11:14 AM   Result Value Ref Range    Iron 19 (L) 35 - 180 ug/dL    Iron Binding Capacity 201 (L) 240 - 430 ug/dL    Iron Sat Index 9 (L) 15 - 46 %   Ferritin    Collection Time: 07/14/22 11:14 AM   Result Value Ref Range    Ferritin 806 (H) 8 - 252 ng/mL   Albumin Random Urine Quantitative with Creat Ratio    Collection Time: 07/14/22 11:33 AM   Result Value Ref Range    Creatinine Urine mg/dL 90 mg/dL    Albumin Urine mg/L 84 mg/L    Albumin Urine mg/g Cr 93.33 (H) 0.00 - 25.00 mg/g Cr      EKG: permanent pacemaker; no EKG done.     Revised Cardiac Risk Index (RCRI):  The patient has the following serious cardiovascular risks for perioperative complications:        Signed Electronically by: Cris Romero MD  Copy of this evaluation report is provided to requesting physician.

## 2022-07-14 NOTE — PATIENT INSTRUCTIONS
Preparing for Your Surgery  Getting started  A nurse will call you to review your health history and instructions. They will give you an arrival time based on your scheduled surgery time. Please be ready to share:  Your doctor's clinic name and phone number  Your medical, surgical and anesthesia history  A list of allergies and sensitivities  A list of medicines, including herbal treatments and over-the-counter drugs  Whether the patient has a legal guardian (ask how to send us the papers in advance)  Please tell us if you're pregnant--or if there's any chance you might be pregnant. Some surgeries may injure a fetus (unborn baby), so they require a pregnancy test. Surgeries that are safe for a fetus don't always need a test, and you can choose whether to have one.   If you have a child who's having surgery, please ask for a copy of Preparing for Your Child's Surgery.    Preparing for surgery  Within 30 days of surgery: Have a pre-op exam (sometimes called an H&P, or History and Physical). This can be done at a clinic or pre-operative center.  If you're having a , you may not need this exam. Talk to your care team.  At your pre-op exam, talk to your care team about all medicines you take. If you need to stop any medicines before surgery, ask when to start taking them again.  We do this for your safety. Many medicines can make you bleed too much during surgery. Some change how well surgery (anesthesia) drugs work.  Call your insurance company to let them know you're having surgery. (If you don't have insurance, call [phone].)  Call your clinic if there's any change in your health. This includes signs of a cold or flu (sore throat, runny nose, cough, rash, fever). It also includes a scrape or scratch near the surgery site.  If you have questions on the day of surgery, call your hospital or surgery center.  COVID testing  You may need to be tested for COVID-19 before having surgery. If so, we will give  you instructions.  Eating and drinking guidelines  For your safety: Unless your surgeon tells you otherwise, follow the guidelines below.  Eat and drink as usual until 8 hours before surgery. After that, no food or milk.  Drink clear liquids until 2 hours before surgery. These are liquids you can see through, like water, Gatorade and Propel Water. You may also have black coffee and tea (no cream or milk).  Nothing by mouth within 2 hours of surgery. This includes gum, candy and breath mints.  If you drink alcohol: Stop drinking it the night before surgery.  If your care team tells you to take medicine on the morning of surgery, it's okay to take it with a sip of water.  Preventing infection  Shower or bathe the night before and morning of your surgery. Follow the instructions your clinic gave you. (If no instructions, use regular soap.)  Don't shave or clip hair near your surgery site. We'll remove the hair if needed.  Don't smoke or vape the morning of surgery. You may chew nicotine gum up to 2 hours before surgery. A nicotine patch is okay.  Note: Some surgeries require you to completely quit smoking and nicotine. Check with your surgeon.  Your care team will make every effort to keep you safe from infection. We will:  Clean our hands often with soap and water (or an alcohol-based hand rub).  Clean the skin at your surgery site with a special soap that kills germs.  Give you a special gown to keep you warm. (Cold raises the risk of infection.)  Wear special hair covers, masks, gowns and gloves during surgery.  Give antibiotic medicine, if prescribed. Not all surgeries need antibiotics.  What to bring on the day of surgery  Photo ID and insurance card  Copy of your health care directive, if you have one  Glasses and hearing aides (bring cases)  You can't wear contacts during surgery  Inhaler and eye drops, if you use them (tell us about these when you arrive)  CPAP machine or breathing device, if you use them  A  few personal items, if spending the night  If you have . . .  A pacemaker, ICD (cardiac defibrillator) or other implant: Bring the ID card.  An implanted stimulator: Bring the remote control.  A legal guardian: Bring a copy of the certified (court-stamped) guardianship papers.  Please remove any jewelry, including body piercings. Leave jewelry and other valuables at home.  If you're going home the day of surgery  You must have a responsible adult drive you home. They should stay with you overnight as well.  If you don't have someone to stay with you, and you aren't safe to go home alone, we may keep you overnight. Insurance often won't pay for this.  After surgery  If it's hard to control your pain or you need more pain medicine, please call your surgeon's office.  Questions?   If you have any questions for your care team, list them here: _________________________________________________________________________________________________________________________________________________________________________ ____________________________________ ____________________________________ ____________________________________  For informational purposes only. Not to replace the advice of your health care provider. Copyright   2003, 2019 "[employer]. All rights reserved. Clinically reviewed by Denisa Redmond MD. [employer] [id number] - REV 07/21.        RECOMMENDATIONS    --Approval given to proceed with proposed procedure, without further diagnostic evaluation  --Pt advised to avoid NSAIDS - since she is on Eliquis (Motrin, Ibuprofen, Aleve or Naprosyn);  If needed, Tylenol or Acetaminophen are fine to use.  --meds reviewed; one week prior to surgery (7/17/2022 ): Hold  Eliquis.   Continue all other medications   On AM of surgery only take the Metoprolol 100 mg with sip of water.   --Pain medications, time off from work and FMLA following surgery deferred to surgeon.     COVID testing scheduled for [facility]21/2022 [facility].      Pt advised to wait 4-6 months before going to dentist for a cleaning    Reviewed nutrition  Low albumin (Protein)  Recommend drinking Ensure, Boost or other supplement one per day for next 4-6 weeks     Monitor need for Miralax  After surgery, may benefit from Miralax every 1-2 days especially while needing pain medications.     Post op Physical therapy has been set up at [facility] in [place].

## 2022-07-14 NOTE — Clinical Note
Please fax to RITIKA Braga @ [phone]. Also please contact pt to let her know, surgery will proceed as planned.  Thanks

## 2022-07-15 ENCOUNTER — TELEPHONE (OUTPATIENT)
Dept: FAMILY MEDICINE | Facility: CLINIC | Age: 82
End: 2022-07-15

## 2022-07-15 LAB
IRON SATN MFR SERPL: 9 % (ref 15–46)
IRON SERPL-MCNC: 19 UG/DL (ref 35–180)
PREALB SERPL IA-MCNC: 9 MG/DL (ref 15–45)
TIBC SERPL-MCNC: 201 UG/DL (ref 240–430)

## 2022-07-15 NOTE — TELEPHONE ENCOUNTER
Patient's daughter Fiona called concerned if patient will be cleared for surgery. Current lab results abnormal.    Provider notes not completed. Patient telling daughter that may not have surgery dependent on labs.     Daughter will be in town 7/19 and would like to be called with ok for surgery or if needs to be postponed.    Informed provider would be in office 7/18.    Please contact Fiona if patient should or should not have surgery.    Carlie Burns RN

## 2022-07-18 NOTE — TELEPHONE ENCOUNTER
Daughter calling again regarding message below.    States she did read primary care provider's result note message 7/14/22.  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  Can she still have the knee surgery?    Please advise, thanks.

## 2022-07-20 NOTE — TELEPHONE ENCOUNTER
Dr. Romero received a call back from Dr. Tomas Parra's office and they discussed after care for the patient and Dr. Romero let them know that she would be ok with them going ahead with the surgery. Preop was completed and signed.     Forms faxed and message left for Fiona that surgery is going  To proceed as scheduled.

## 2022-07-21 ENCOUNTER — LAB (OUTPATIENT)
Dept: LAB | Facility: CLINIC | Age: 82
End: 2022-07-21
Attending: ORTHOPAEDIC SURGERY
Payer: MEDICARE

## 2022-07-21 DIAGNOSIS — Z11.59 ENCOUNTER FOR SCREENING FOR OTHER VIRAL DISEASES: ICD-10-CM

## 2022-07-21 LAB — SARS-COV-2 RNA RESP QL NAA+PROBE: NEGATIVE

## 2022-07-21 PROCEDURE — U0005 INFEC AGEN DETEC AMPLI PROBE: HCPCS

## 2022-07-21 PROCEDURE — U0003 INFECTIOUS AGENT DETECTION BY NUCLEIC ACID (DNA OR RNA); SEVERE ACUTE RESPIRATORY SYNDROME CORONAVIRUS 2 (SARS-COV-2) (CORONAVIRUS DISEASE [COVID-19]), AMPLIFIED PROBE TECHNIQUE, MAKING USE OF HIGH THROUGHPUT TECHNOLOGIES AS DESCRIBED BY CMS-2020-01-R: HCPCS

## 2022-07-23 NOTE — PHARMACY-ADMISSION MEDICATION HISTORY
Medication history and patient interview completed by pharmacy intern/student or pre-admitting RN.  Reviewed by pharmacist, including [employer] dispense records, [facility] Everywhere, and chart review.       Prieto Zaidi, Pharm.D., BCPS      Nurse Complete Set By: Franci Urbina, RN at 07/12/2022 2:56 PM      Prior to Admission medications    Medication Sig Last Dose Taking? Auth Provider Long Term End Date   amLODIPine (NORVASC) 5 MG tablet Take 1 tablet (5 mg) by mouth every evening htn  Yes Cris Romero MD Yes    apixaban ANTICOAGULANT (ELIQUIS) 5 MG tablet Take 1 tablet (5 mg) by mouth 2 times daily  Yes Cris Romero MD     ezetimibe (ZETIA) 10 MG tablet Take 1 tablet (10 mg) by mouth every evening  Yes Cris Romero MD Yes    Ferrous Sulfate (IRON PO) Take 1 tablet by mouth daily  Yes Reported, Patient     INCRUSE ELLIPTA 62.5 MCG/INH Inhaler Inhale 1 puff into the lungs daily  Yes Reported, Patient     metoprolol tartrate (LOPRESSOR) 100 MG tablet TAKE 1 TABLET(100 MG) BY MOUTH TWICE DAILY  Yes Cris Romero MD Yes    omega 3 1000 MG CAPS Take 1 capsule by mouth daily   Yes Violet Fenton MD No    polyethylene glycol (MIRALAX) 17 GM/Dose powder Take 17 g by mouth daily  Yes Trent Augustin APRN CNP     potassium chloride ER (KLOR-CON M) 20 MEQ CR tablet TAKE 1 TABLET(20 MEQ) BY MOUTH TWICE DAILY  Yes Cris Rmoero MD

## 2022-07-25 ENCOUNTER — ANESTHESIA EVENT (OUTPATIENT)
Dept: SURGERY | Facility: CLINIC | Age: 82
End: 2022-07-25
Payer: MEDICARE

## 2022-07-25 ENCOUNTER — APPOINTMENT (OUTPATIENT)
Dept: GENERAL RADIOLOGY | Facility: CLINIC | Age: 82
End: 2022-07-25
Attending: ORTHOPAEDIC SURGERY
Payer: MEDICARE

## 2022-07-25 ENCOUNTER — HOSPITAL ENCOUNTER (OUTPATIENT)
Facility: CLINIC | Age: 82
Discharge: HOME | End: 2022-07-27
Attending: ORTHOPAEDIC SURGERY | Admitting: ORTHOPAEDIC SURGERY
Payer: MEDICARE

## 2022-07-25 ENCOUNTER — APPOINTMENT (OUTPATIENT)
Dept: PHYSICAL THERAPY | Facility: CLINIC | Age: 82
End: 2022-07-25
Attending: ORTHOPAEDIC SURGERY
Payer: MEDICARE

## 2022-07-25 ENCOUNTER — ANESTHESIA (OUTPATIENT)
Dept: SURGERY | Facility: CLINIC | Age: 82
End: 2022-07-25
Payer: MEDICARE

## 2022-07-25 DIAGNOSIS — Z96.659 HISTORY OF TOTAL KNEE REPLACEMENT, UNSPECIFIED LATERALITY: Primary | ICD-10-CM

## 2022-07-25 DIAGNOSIS — Z96.651 STATUS POST TOTAL RIGHT KNEE REPLACEMENT: ICD-10-CM

## 2022-07-25 LAB
GRAM STAIN RESULT: NORMAL
GRAM STAIN RESULT: NORMAL
LACTATE SERPL-SCNC: 1.7 MMOL/L (ref 0.7–2)

## 2022-07-25 PROCEDURE — 272N000001 HC OR GENERAL SUPPLY STERILE: Performed by: ORTHOPAEDIC SURGERY

## 2022-07-25 PROCEDURE — 250N000011 HC RX IP 250 OP 636: Performed by: NURSE ANESTHETIST, CERTIFIED REGISTERED

## 2022-07-25 PROCEDURE — 250N000013 HC RX MED GY IP 250 OP 250 PS 637: Performed by: ORTHOPAEDIC SURGERY

## 2022-07-25 PROCEDURE — 360N000077 HC SURGERY LEVEL 4, PER MIN: Performed by: ORTHOPAEDIC SURGERY

## 2022-07-25 PROCEDURE — 258N000001 HC RX 258: Performed by: ORTHOPAEDIC SURGERY

## 2022-07-25 PROCEDURE — 710N000009 HC RECOVERY PHASE 1, LEVEL 1, PER MIN: Performed by: ORTHOPAEDIC SURGERY

## 2022-07-25 PROCEDURE — 83605 ASSAY OF LACTIC ACID: CPT | Performed by: ORTHOPAEDIC SURGERY

## 2022-07-25 PROCEDURE — 370N000017 HC ANESTHESIA TECHNICAL FEE, PER MIN: Performed by: ORTHOPAEDIC SURGERY

## 2022-07-25 PROCEDURE — 250N000011 HC RX IP 250 OP 636: Performed by: PHYSICIAN ASSISTANT

## 2022-07-25 PROCEDURE — C1776 JOINT DEVICE (IMPLANTABLE): HCPCS | Performed by: ORTHOPAEDIC SURGERY

## 2022-07-25 PROCEDURE — C1713 ANCHOR/SCREW BN/BN,TIS/BN: HCPCS | Performed by: ORTHOPAEDIC SURGERY

## 2022-07-25 PROCEDURE — 250N000013 HC RX MED GY IP 250 OP 250 PS 637: Performed by: PHYSICIAN ASSISTANT

## 2022-07-25 PROCEDURE — 93010 ELECTROCARDIOGRAM REPORT: CPT | Performed by: INTERNAL MEDICINE

## 2022-07-25 PROCEDURE — 258N000003 HC RX IP 258 OP 636: Performed by: PHYSICIAN ASSISTANT

## 2022-07-25 PROCEDURE — 36415 COLL VENOUS BLD VENIPUNCTURE: CPT | Performed by: ORTHOPAEDIC SURGERY

## 2022-07-25 PROCEDURE — 250N000011 HC RX IP 250 OP 636: Performed by: ORTHOPAEDIC SURGERY

## 2022-07-25 PROCEDURE — 258N000003 HC RX IP 258 OP 636: Performed by: NURSE ANESTHETIST, CERTIFIED REGISTERED

## 2022-07-25 PROCEDURE — 250N000011 HC RX IP 250 OP 636: Performed by: ANESTHESIOLOGY

## 2022-07-25 PROCEDURE — 97116 GAIT TRAINING THERAPY: CPT | Mod: GP

## 2022-07-25 PROCEDURE — 258N000003 HC RX IP 258 OP 636: Performed by: ORTHOPAEDIC SURGERY

## 2022-07-25 PROCEDURE — 97530 THERAPEUTIC ACTIVITIES: CPT | Mod: GP

## 2022-07-25 PROCEDURE — 36415 COLL VENOUS BLD VENIPUNCTURE: CPT | Performed by: PHYSICIAN ASSISTANT

## 2022-07-25 PROCEDURE — 87075 CULTR BACTERIA EXCEPT BLOOD: CPT | Performed by: ORTHOPAEDIC SURGERY

## 2022-07-25 PROCEDURE — 250N000009 HC RX 250: Performed by: NURSE ANESTHETIST, CERTIFIED REGISTERED

## 2022-07-25 PROCEDURE — 97161 PT EVAL LOW COMPLEX 20 MIN: CPT | Mod: GP

## 2022-07-25 PROCEDURE — 999N000141 HC STATISTIC PRE-PROCEDURE NURSING ASSESSMENT: Performed by: ORTHOPAEDIC SURGERY

## 2022-07-25 PROCEDURE — 87070 CULTURE OTHR SPECIMN AEROBIC: CPT | Performed by: ORTHOPAEDIC SURGERY

## 2022-07-25 PROCEDURE — 250N000009 HC RX 250: Performed by: ORTHOPAEDIC SURGERY

## 2022-07-25 PROCEDURE — 250N000009 HC RX 250: Performed by: PHYSICIAN ASSISTANT

## 2022-07-25 PROCEDURE — 999N000065 XR KNEE PORT RIGHT 1/2 VIEWS: Mod: RT

## 2022-07-25 PROCEDURE — 87205 SMEAR GRAM STAIN: CPT | Performed by: ORTHOPAEDIC SURGERY

## 2022-07-25 DEVICE — IMP PATELLA ZIM KNEE ALL POLLY 32MM 42-5400-000-32: Type: IMPLANTABLE DEVICE | Site: KNEE | Status: FUNCTIONAL

## 2022-07-25 DEVICE — FULL DOSE BONE CEMENT, 10 PACK CATALOG NUMBER IS 6191-1-010
Type: IMPLANTABLE DEVICE | Site: KNEE | Status: FUNCTIONAL
Brand: SIMPLEX

## 2022-07-25 DEVICE — IMP TIBIAL ZIM PSN NP STM 5DEG SZ DR 42-5320-067-02: Type: IMPLANTABLE DEVICE | Site: KNEE | Status: FUNCTIONAL

## 2022-07-25 DEVICE — IMPLANTABLE DEVICE: Type: IMPLANTABLE DEVICE | Site: KNEE | Status: FUNCTIONAL

## 2022-07-25 RX ORDER — OXYCODONE HYDROCHLORIDE 10 MG/1
10 TABLET ORAL EVERY 4 HOURS PRN
Status: DISCONTINUED | OUTPATIENT
Start: 2022-07-25 | End: 2022-07-27 | Stop reason: HOSPADM

## 2022-07-25 RX ORDER — NALOXONE HYDROCHLORIDE 0.4 MG/ML
0.2 INJECTION, SOLUTION INTRAMUSCULAR; INTRAVENOUS; SUBCUTANEOUS
Status: DISCONTINUED | OUTPATIENT
Start: 2022-07-25 | End: 2022-07-27 | Stop reason: HOSPADM

## 2022-07-25 RX ORDER — ACETAMINOPHEN 325 MG/1
650 TABLET ORAL EVERY 4 HOURS PRN
Status: DISCONTINUED | OUTPATIENT
Start: 2022-07-28 | End: 2022-07-27 | Stop reason: HOSPADM

## 2022-07-25 RX ORDER — TRANEXAMIC ACID 10 MG/ML
1 INJECTION, SOLUTION INTRAVENOUS ONCE
Status: COMPLETED | OUTPATIENT
Start: 2022-07-25 | End: 2022-07-25

## 2022-07-25 RX ORDER — SODIUM CHLORIDE, SODIUM LACTATE, POTASSIUM CHLORIDE, CALCIUM CHLORIDE 600; 310; 30; 20 MG/100ML; MG/100ML; MG/100ML; MG/100ML
INJECTION, SOLUTION INTRAVENOUS CONTINUOUS PRN
Status: DISCONTINUED | OUTPATIENT
Start: 2022-07-25 | End: 2022-07-25

## 2022-07-25 RX ORDER — PROPOFOL 10 MG/ML
INJECTION, EMULSION INTRAVENOUS PRN
Status: DISCONTINUED | OUTPATIENT
Start: 2022-07-25 | End: 2022-07-25

## 2022-07-25 RX ORDER — BISACODYL 10 MG
10 SUPPOSITORY, RECTAL RECTAL DAILY PRN
Status: DISCONTINUED | OUTPATIENT
Start: 2022-07-25 | End: 2022-07-27 | Stop reason: HOSPADM

## 2022-07-25 RX ORDER — HYDRALAZINE HYDROCHLORIDE 20 MG/ML
2.5-5 INJECTION INTRAMUSCULAR; INTRAVENOUS EVERY 10 MIN PRN
Status: DISCONTINUED | OUTPATIENT
Start: 2022-07-25 | End: 2022-07-25 | Stop reason: HOSPADM

## 2022-07-25 RX ORDER — OXYCODONE HYDROCHLORIDE 5 MG/1
5 TABLET ORAL EVERY 4 HOURS PRN
Status: DISCONTINUED | OUTPATIENT
Start: 2022-07-25 | End: 2022-07-27 | Stop reason: HOSPADM

## 2022-07-25 RX ORDER — ACETAMINOPHEN 325 MG/1
975 TABLET ORAL ONCE
Status: COMPLETED | OUTPATIENT
Start: 2022-07-25 | End: 2022-07-25

## 2022-07-25 RX ORDER — LIDOCAINE HYDROCHLORIDE 10 MG/ML
INJECTION, SOLUTION INFILTRATION; PERINEURAL PRN
Status: DISCONTINUED | OUTPATIENT
Start: 2022-07-25 | End: 2022-07-25

## 2022-07-25 RX ORDER — FENTANYL CITRATE 50 UG/ML
INJECTION, SOLUTION INTRAMUSCULAR; INTRAVENOUS PRN
Status: DISCONTINUED | OUTPATIENT
Start: 2022-07-25 | End: 2022-07-25

## 2022-07-25 RX ORDER — LIDOCAINE 40 MG/G
CREAM TOPICAL
Status: DISCONTINUED | OUTPATIENT
Start: 2022-07-25 | End: 2022-07-27 | Stop reason: HOSPADM

## 2022-07-25 RX ORDER — SODIUM CHLORIDE, SODIUM LACTATE, POTASSIUM CHLORIDE, CALCIUM CHLORIDE 600; 310; 30; 20 MG/100ML; MG/100ML; MG/100ML; MG/100ML
INJECTION, SOLUTION INTRAVENOUS CONTINUOUS
Status: DISCONTINUED | OUTPATIENT
Start: 2022-07-25 | End: 2022-07-27 | Stop reason: HOSPADM

## 2022-07-25 RX ORDER — FENTANYL CITRATE 50 UG/ML
50 INJECTION, SOLUTION INTRAMUSCULAR; INTRAVENOUS EVERY 5 MIN PRN
Status: DISCONTINUED | OUTPATIENT
Start: 2022-07-25 | End: 2022-07-25 | Stop reason: HOSPADM

## 2022-07-25 RX ORDER — POTASSIUM CHLORIDE 1500 MG/1
20 TABLET, EXTENDED RELEASE ORAL 2 TIMES DAILY
Status: DISCONTINUED | OUTPATIENT
Start: 2022-07-25 | End: 2022-07-27 | Stop reason: HOSPADM

## 2022-07-25 RX ORDER — AMOXICILLIN 250 MG
1 CAPSULE ORAL 2 TIMES DAILY
Status: DISCONTINUED | OUTPATIENT
Start: 2022-07-25 | End: 2022-07-27 | Stop reason: HOSPADM

## 2022-07-25 RX ORDER — NALOXONE HYDROCHLORIDE 0.4 MG/ML
0.4 INJECTION, SOLUTION INTRAMUSCULAR; INTRAVENOUS; SUBCUTANEOUS
Status: DISCONTINUED | OUTPATIENT
Start: 2022-07-25 | End: 2022-07-27 | Stop reason: HOSPADM

## 2022-07-25 RX ORDER — DEXAMETHASONE SODIUM PHOSPHATE 4 MG/ML
INJECTION, SOLUTION INTRA-ARTICULAR; INTRALESIONAL; INTRAMUSCULAR; INTRAVENOUS; SOFT TISSUE PRN
Status: DISCONTINUED | OUTPATIENT
Start: 2022-07-25 | End: 2022-07-25

## 2022-07-25 RX ORDER — CELECOXIB 100 MG/1
100 CAPSULE ORAL 2 TIMES DAILY
Status: DISCONTINUED | OUTPATIENT
Start: 2022-07-25 | End: 2022-07-27 | Stop reason: HOSPADM

## 2022-07-25 RX ORDER — AMLODIPINE BESYLATE 5 MG/1
5 TABLET ORAL EVERY EVENING
Status: DISCONTINUED | OUTPATIENT
Start: 2022-07-25 | End: 2022-07-27 | Stop reason: HOSPADM

## 2022-07-25 RX ORDER — ONDANSETRON 2 MG/ML
4 INJECTION INTRAMUSCULAR; INTRAVENOUS EVERY 30 MIN PRN
Status: DISCONTINUED | OUTPATIENT
Start: 2022-07-25 | End: 2022-07-25 | Stop reason: HOSPADM

## 2022-07-25 RX ORDER — EZETIMIBE 10 MG/1
10 TABLET ORAL EVERY EVENING
Status: DISCONTINUED | OUTPATIENT
Start: 2022-07-25 | End: 2022-07-27 | Stop reason: HOSPADM

## 2022-07-25 RX ORDER — ACETAMINOPHEN 325 MG/1
975 TABLET ORAL EVERY 8 HOURS
Status: DISCONTINUED | OUTPATIENT
Start: 2022-07-25 | End: 2022-07-27 | Stop reason: HOSPADM

## 2022-07-25 RX ORDER — ONDANSETRON 2 MG/ML
INJECTION INTRAMUSCULAR; INTRAVENOUS PRN
Status: DISCONTINUED | OUTPATIENT
Start: 2022-07-25 | End: 2022-07-25

## 2022-07-25 RX ORDER — ONDANSETRON 4 MG/1
4 TABLET, ORALLY DISINTEGRATING ORAL EVERY 30 MIN PRN
Status: DISCONTINUED | OUTPATIENT
Start: 2022-07-25 | End: 2022-07-25 | Stop reason: HOSPADM

## 2022-07-25 RX ORDER — METHOCARBAMOL 500 MG/1
500 TABLET, FILM COATED ORAL EVERY 6 HOURS PRN
Status: DISCONTINUED | OUTPATIENT
Start: 2022-07-25 | End: 2022-07-27 | Stop reason: HOSPADM

## 2022-07-25 RX ORDER — METOPROLOL TARTRATE 1 MG/ML
1-2 INJECTION, SOLUTION INTRAVENOUS EVERY 5 MIN PRN
Status: DISCONTINUED | OUTPATIENT
Start: 2022-07-25 | End: 2022-07-25 | Stop reason: HOSPADM

## 2022-07-25 RX ORDER — CEFAZOLIN SODIUM 1 G/3ML
1 INJECTION, POWDER, FOR SOLUTION INTRAMUSCULAR; INTRAVENOUS EVERY 8 HOURS
Status: COMPLETED | OUTPATIENT
Start: 2022-07-25 | End: 2022-07-26

## 2022-07-25 RX ORDER — HYDROMORPHONE HCL IN WATER/PF 6 MG/30 ML
0.4 PATIENT CONTROLLED ANALGESIA SYRINGE INTRAVENOUS
Status: DISCONTINUED | OUTPATIENT
Start: 2022-07-25 | End: 2022-07-27 | Stop reason: HOSPADM

## 2022-07-25 RX ORDER — SODIUM CHLORIDE, SODIUM LACTATE, POTASSIUM CHLORIDE, CALCIUM CHLORIDE 600; 310; 30; 20 MG/100ML; MG/100ML; MG/100ML; MG/100ML
INJECTION, SOLUTION INTRAVENOUS CONTINUOUS
Status: DISCONTINUED | OUTPATIENT
Start: 2022-07-25 | End: 2022-07-25 | Stop reason: HOSPADM

## 2022-07-25 RX ORDER — AMOXICILLIN 250 MG
1-2 CAPSULE ORAL 2 TIMES DAILY
Qty: 30 TABLET | Refills: 0 | Status: SHIPPED | OUTPATIENT
Start: 2022-07-25 | End: 2023-01-25

## 2022-07-25 RX ORDER — METOPROLOL TARTRATE 50 MG
100 TABLET ORAL 2 TIMES DAILY
Status: DISCONTINUED | OUTPATIENT
Start: 2022-07-25 | End: 2022-07-27 | Stop reason: HOSPADM

## 2022-07-25 RX ORDER — POLYETHYLENE GLYCOL 3350 17 G/17G
17 POWDER, FOR SOLUTION ORAL DAILY
Status: DISCONTINUED | OUTPATIENT
Start: 2022-07-26 | End: 2022-07-25

## 2022-07-25 RX ORDER — HYDROXYZINE HYDROCHLORIDE 10 MG/1
10 TABLET, FILM COATED ORAL EVERY 6 HOURS PRN
Status: DISCONTINUED | OUTPATIENT
Start: 2022-07-25 | End: 2022-07-27 | Stop reason: HOSPADM

## 2022-07-25 RX ORDER — ACETAMINOPHEN 325 MG/1
650 TABLET ORAL EVERY 4 HOURS PRN
Qty: 100 TABLET | Refills: 0 | Status: SHIPPED | OUTPATIENT
Start: 2022-07-25

## 2022-07-25 RX ORDER — TRANEXAMIC ACID 650 MG/1
1950 TABLET ORAL ONCE
Status: COMPLETED | OUTPATIENT
Start: 2022-07-25 | End: 2022-07-25

## 2022-07-25 RX ORDER — ACETAMINOPHEN 500 MG
500-1000 TABLET ORAL EVERY 6 HOURS PRN
Status: ON HOLD | COMMUNITY
End: 2022-07-25

## 2022-07-25 RX ORDER — ONDANSETRON 2 MG/ML
4 INJECTION INTRAMUSCULAR; INTRAVENOUS EVERY 6 HOURS PRN
Status: DISCONTINUED | OUTPATIENT
Start: 2022-07-25 | End: 2022-07-27 | Stop reason: HOSPADM

## 2022-07-25 RX ORDER — HYDROMORPHONE HCL IN WATER/PF 6 MG/30 ML
0.4 PATIENT CONTROLLED ANALGESIA SYRINGE INTRAVENOUS EVERY 5 MIN PRN
Status: DISCONTINUED | OUTPATIENT
Start: 2022-07-25 | End: 2022-07-25 | Stop reason: HOSPADM

## 2022-07-25 RX ORDER — ONDANSETRON 4 MG/1
4 TABLET, ORALLY DISINTEGRATING ORAL EVERY 6 HOURS PRN
Status: DISCONTINUED | OUTPATIENT
Start: 2022-07-25 | End: 2022-07-27 | Stop reason: HOSPADM

## 2022-07-25 RX ORDER — CEFAZOLIN SODIUM/WATER 2 G/20 ML
2 SYRINGE (ML) INTRAVENOUS
Status: COMPLETED | OUTPATIENT
Start: 2022-07-25 | End: 2022-07-25

## 2022-07-25 RX ORDER — HYDROXYZINE HYDROCHLORIDE 10 MG/1
10 TABLET, FILM COATED ORAL EVERY 6 HOURS PRN
Qty: 30 TABLET | Refills: 0 | Status: SHIPPED | OUTPATIENT
Start: 2022-07-25 | End: 2022-08-08

## 2022-07-25 RX ORDER — CEFAZOLIN SODIUM/WATER 2 G/20 ML
2 SYRINGE (ML) INTRAVENOUS SEE ADMIN INSTRUCTIONS
Status: DISCONTINUED | OUTPATIENT
Start: 2022-07-25 | End: 2022-07-25 | Stop reason: HOSPADM

## 2022-07-25 RX ORDER — LIDOCAINE 40 MG/G
CREAM TOPICAL
Status: DISCONTINUED | OUTPATIENT
Start: 2022-07-25 | End: 2022-07-25 | Stop reason: HOSPADM

## 2022-07-25 RX ORDER — MEPERIDINE HYDROCHLORIDE 25 MG/ML
12.5 INJECTION INTRAMUSCULAR; INTRAVENOUS; SUBCUTANEOUS
Status: DISCONTINUED | OUTPATIENT
Start: 2022-07-25 | End: 2022-07-25 | Stop reason: HOSPADM

## 2022-07-25 RX ORDER — POLYETHYLENE GLYCOL 3350 17 G/17G
17 POWDER, FOR SOLUTION ORAL DAILY
Status: DISCONTINUED | OUTPATIENT
Start: 2022-07-25 | End: 2022-07-27 | Stop reason: HOSPADM

## 2022-07-25 RX ORDER — HYDROMORPHONE HCL IN WATER/PF 6 MG/30 ML
0.2 PATIENT CONTROLLED ANALGESIA SYRINGE INTRAVENOUS
Status: DISCONTINUED | OUTPATIENT
Start: 2022-07-25 | End: 2022-07-27 | Stop reason: HOSPADM

## 2022-07-25 RX ORDER — OXYCODONE HYDROCHLORIDE 5 MG/1
5-10 TABLET ORAL EVERY 4 HOURS PRN
Qty: 33 TABLET | Refills: 0 | Status: SHIPPED | OUTPATIENT
Start: 2022-07-25 | End: 2023-01-25

## 2022-07-25 RX ORDER — OXYCODONE HYDROCHLORIDE 5 MG/1
5 TABLET ORAL EVERY 4 HOURS PRN
Status: DISCONTINUED | OUTPATIENT
Start: 2022-07-25 | End: 2022-07-25 | Stop reason: HOSPADM

## 2022-07-25 RX ORDER — PROCHLORPERAZINE MALEATE 5 MG
5 TABLET ORAL EVERY 6 HOURS PRN
Status: DISCONTINUED | OUTPATIENT
Start: 2022-07-25 | End: 2022-07-27 | Stop reason: HOSPADM

## 2022-07-25 RX ORDER — FENTANYL CITRATE 50 UG/ML
50 INJECTION, SOLUTION INTRAMUSCULAR; INTRAVENOUS
Status: DISCONTINUED | OUTPATIENT
Start: 2022-07-25 | End: 2022-07-25 | Stop reason: HOSPADM

## 2022-07-25 RX ADMIN — ONDANSETRON HYDROCHLORIDE 4 MG: 2 INJECTION, SOLUTION INTRAVENOUS at 08:30

## 2022-07-25 RX ADMIN — METOPROLOL TARTRATE 100 MG: 50 TABLET, FILM COATED ORAL at 20:10

## 2022-07-25 RX ADMIN — CEFAZOLIN 1 G: 1 INJECTION, POWDER, FOR SOLUTION INTRAMUSCULAR; INTRAVENOUS at 16:09

## 2022-07-25 RX ADMIN — MIDAZOLAM 2 MG: 1 INJECTION INTRAMUSCULAR; INTRAVENOUS at 07:14

## 2022-07-25 RX ADMIN — ACETAMINOPHEN 975 MG: 325 TABLET, FILM COATED ORAL at 06:01

## 2022-07-25 RX ADMIN — ACETAMINOPHEN 975 MG: 325 TABLET, FILM COATED ORAL at 15:57

## 2022-07-25 RX ADMIN — HYDROMORPHONE HYDROCHLORIDE 0.5 MG: 1 INJECTION, SOLUTION INTRAMUSCULAR; INTRAVENOUS; SUBCUTANEOUS at 07:49

## 2022-07-25 RX ADMIN — OXYCODONE HYDROCHLORIDE 5 MG: 5 TABLET ORAL at 20:09

## 2022-07-25 RX ADMIN — SODIUM CHLORIDE, POTASSIUM CHLORIDE, SODIUM LACTATE AND CALCIUM CHLORIDE: 600; 310; 30; 20 INJECTION, SOLUTION INTRAVENOUS at 06:23

## 2022-07-25 RX ADMIN — Medication 2 G: at 07:22

## 2022-07-25 RX ADMIN — POLYETHYLENE GLYCOL 3350 17 G: 17 POWDER, FOR SOLUTION ORAL at 20:10

## 2022-07-25 RX ADMIN — TRANEXAMIC ACID 1950 MG: 650 TABLET ORAL at 06:01

## 2022-07-25 RX ADMIN — LIDOCAINE HYDROCHLORIDE 50 MG: 10 INJECTION, SOLUTION INFILTRATION; PERINEURAL at 07:27

## 2022-07-25 RX ADMIN — AMLODIPINE BESYLATE 5 MG: 5 TABLET ORAL at 20:15

## 2022-07-25 RX ADMIN — Medication 60 MG: at 07:31

## 2022-07-25 RX ADMIN — HYDROMORPHONE HYDROCHLORIDE 0.5 MG: 1 INJECTION, SOLUTION INTRAMUSCULAR; INTRAVENOUS; SUBCUTANEOUS at 07:42

## 2022-07-25 RX ADMIN — HYDROXYZINE HYDROCHLORIDE 10 MG: 10 TABLET ORAL at 20:13

## 2022-07-25 RX ADMIN — POTASSIUM CHLORIDE 20 MEQ: 1500 TABLET, EXTENDED RELEASE ORAL at 20:09

## 2022-07-25 RX ADMIN — EZETIMIBE 10 MG: 10 TABLET ORAL at 20:15

## 2022-07-25 RX ADMIN — DEXAMETHASONE SODIUM PHOSPHATE 4 MG: 4 INJECTION, SOLUTION INTRA-ARTICULAR; INTRALESIONAL; INTRAMUSCULAR; INTRAVENOUS; SOFT TISSUE at 07:27

## 2022-07-25 RX ADMIN — FENTANYL CITRATE 100 MCG: 50 INJECTION, SOLUTION INTRAMUSCULAR; INTRAVENOUS at 07:14

## 2022-07-25 RX ADMIN — PROPOFOL 100 MG: 10 INJECTION, EMULSION INTRAVENOUS at 07:27

## 2022-07-25 RX ADMIN — OXYCODONE HYDROCHLORIDE 5 MG: 5 TABLET ORAL at 15:57

## 2022-07-25 RX ADMIN — SODIUM CHLORIDE, POTASSIUM CHLORIDE, SODIUM LACTATE AND CALCIUM CHLORIDE: 600; 310; 30; 20 INJECTION, SOLUTION INTRAVENOUS at 14:27

## 2022-07-25 RX ADMIN — Medication 40 MG: at 07:27

## 2022-07-25 RX ADMIN — FENTANYL CITRATE 100 MCG: 50 INJECTION, SOLUTION INTRAMUSCULAR; INTRAVENOUS at 07:35

## 2022-07-25 RX ADMIN — TRANEXAMIC ACID 1 G: 10 INJECTION, SOLUTION INTRAVENOUS at 08:30

## 2022-07-25 ASSESSMENT — ENCOUNTER SYMPTOMS: DYSRHYTHMIAS: 1

## 2022-07-25 ASSESSMENT — COPD QUESTIONNAIRES
COPD: 1
CAT_SEVERITY: MILD

## 2022-07-25 NOTE — PROGRESS NOTES
07/25/22 1700   Quick Adds   Type of Visit Initial PT Evaluation   Living Environment   People in Home alone   Current Living Arrangements condominium   Home Accessibility stairs to enter home;stairs within home   Number of Stairs, Main Entrance 3   Stair Railings, Main Entrance other (see comments)  (grab bars)   Number of Stairs, Within Home, Primary greater than 10 stairs   Stair Railings, Within Home, Primary railings safe and in good condition   Transportation Anticipated family or friend will provide   Living Environment Comments Lives in townhouse alone, daughter staying with pt after surgery. Stairs to basement with laundry, with chair lift for stairs. Walk in shower on main level.   Self-Care   Usual Activity Tolerance moderate   Current Activity Tolerance fair   Regular Exercise No   Equipment Currently Used at Home walker, rolling;grab bar, toilet;grab bar, tub/shower;shower chair;cane, straight   Fall history within last six months yes   Number of times patient has fallen within last six months 2   Activity/Exercise/Self-Care Comment Pt reports using 4WW and SPC inside home. Has stair lift to basement. Has cleaning service 1x/month, orders out for meals a lot. Does some cooking/cleaning. reports 2 falls over past week.   General Information   Onset of Illness/Injury or Date of Surgery 07/25/22   Referring Physician Nick Parra MD   Patient/Family Therapy Goals Statement (PT) return home   Pertinent History of Current Problem (include personal factors and/or comorbidities that impact the POC) Pt is 80 yo female who underwent R TKA on 7/25/2022.   Existing Precautions/Restrictions weight bearing   Weight-Bearing Status - RLE weight-bearing as tolerated   Cognition   Affect/Mental Status (Cognition) other (see comments)  (mild confusion at times)   Orientation Status (Cognition) oriented x 3   Follows Commands (Cognition) follows one-step commands;75-90% accuracy   Pain Assessment   Patient  Currently in Pain Yes, see Vital Sign flowsheet   Integumentary/Edema   Integumentary/Edema Comments intact ACE bandage   Posture    Posture Forward head position;Protracted shoulders   Range of Motion (ROM)   ROM Comment R knee flexion limited to ~30 degrees   Strength (Manual Muscle Testing)   Strength Comments unable to SLR bilaterally, at least 3/5 LE strength with functional mobiluity   Bed Mobility   Comment, (Bed Mobility) supine<>sit with min A   Transfers   Comment, (Transfers) sit<>stand with min A and 4WW   Gait/Stairs (Locomotion)   Comment, (Gait/Stairs) amb with 4WW and CGA   Balance   Balance Comments impaired; requiring use of AD   Clinical Impression   Criteria for Skilled Therapeutic Intervention Yes, treatment indicated   PT Diagnosis (PT) impaired functional mobility   Influenced by the following impairments weakness, pain, post-op status   Functional limitations due to impairments difficulty with bed mobility, transfers, ambulation, stairs   Clinical Presentation (PT Evaluation Complexity) Stable/Uncomplicated   Clinical Presentation Rationale clinical judgement   Clinical Decision Making (Complexity) low complexity   Planned Therapy Interventions (PT) balance training;bed mobility training;gait training;home exercise program;neuromuscular re-education;patient/family education;ROM (range of motion);stair training;strengthening;transfer training;progressive activity/exercise;risk factor education;home program guidelines   Anticipated Equipment Needs at Discharge (PT) walker, standard   Risk & Benefits of therapy have been explained evaluation/treatment results reviewed;care plan/treatment goals reviewed;risks/benefits reviewed;current/potential barriers reviewed;participants voiced agreement with care plan;participants included;patient;daughter   PT Discharge Planning   PT Discharge Recommendation (DC Rec)   (defer to ortho)   PT Rationale for DC Rec Anticipate patient will progress with acute PT  services and be able to perform functional mobility with supervision safely for discharge home with assist of daughter. Pt has 3 PRATIBHA and needs to be able to complete.   Plan of Care Review   Plan of Care Reviewed With patient;daughter   Physical Therapy Goals   PT Frequency Daily   PT Predicted Duration/Target Date for Goal Attainment 07/27/22   PT Goals Bed Mobility;Transfers;Gait;Stairs   PT: Bed Mobility Supervision/stand-by assist;Supine to/from sit   PT: Transfers Supervision/stand-by assist;Sit to/from stand;Assistive device   PT: Gait Supervision/stand-by assist;Assistive device;150 feet   PT: Stairs Minimal assist;3 stairs;Assistive device;Rail on both sides

## 2022-07-25 NOTE — ANESTHESIA PROCEDURE NOTES
Saphenous Procedure Note    Pre-Procedure   Staff -        Anesthesiologist:  Aníbal Torres MD       Performed By: Anesthesiologist       Location: pre-op       Pre-Anesthestic Checklist: patient identified, IV checked, site marked, risks and benefits discussed, informed consent, monitors and equipment checked, pre-op evaluation, at physician/surgeon's request and post-op pain management  Timeout:       Correct Patient: Yes        Correct Procedure: Yes        Correct Site: Yes        Correct Position: Yes        Correct Laterality: Yes        Site Marked: Yes  Procedure Documentation  Procedure: Saphenous       Diagnosis: KNEE DJD       Laterality: right       Patient Position: supine       Patient Prep/Sterile Barriers: sterile gloves, mask       Skin prep: Betadine       Needle Type: short bevel and insulated       Needle Gauge: 21.        Needle Length (Inches): 4        Ultrasound guided       1. Ultrasound was used to identify targeted nerve, plexus, vascular marker, or fascial plane and place a needle adjacent to it in real-time.       2. Ultrasound was used to visualize the spread of anesthetic in close proximity to the above referenced structure.       3. A permanent image is entered into the patient's record.    Assessment/Narrative         The placement was negative for: blood aspirated, painful injection and site bleeding       Paresthesias: No.       Bolus given via needle..        Secured via.        Insertion/Infusion Method: Single Shot       Complications: none       Injection made incrementally with aspirations every 5 mL.     Comments:  The surgeon has given a verbal order transferring care of this patient to me for the performance of a regional analgesia block for post-op pain control. It is requested of me because I am uniquely trained and qualified to perform this block and the surgeon is neither trained nor qualified to perform this procedure.    20 ml 0.5% bupivacaine

## 2022-07-25 NOTE — ANESTHESIA PREPROCEDURE EVALUATION
Anesthesia Pre-Procedure Evaluation    Patient: Tomasa Fam   MRN: [id number] : 1940        Procedure : Procedure(s):  Right total knee arthroplasty (Spinal with adductor canal block)          Past Medical History:   Diagnosis Date     Anemia      Arthritis     hands, knees     Breast cancer (H) 2012    left mastectomy followed by right;  Dr. Luong     Chronic airway obstruction, not elsewhere classified 2006    very mild COPD - small cough     Concussion 2013     Problem list name updated by automated process. Provider to review and confirm Im[employer] Update utility     Cystocele, midline 2012     Diffuse cystic mastopathy      Gastroesophageal reflux disease, esophagitis presence not specified 2019     History of hypokalemia 2013     Hyperlipidemia LDL goal <160 2011    [place] 10-year CHD Risk Score: 2% (14 Total Points)  Values used to calculate score:    Age: 70 years -- Points: 14    Total Cholesterol: 192 mg/dL -- Points: 1    HDL Cholesterol: 61 mg/dL -- Points: -1    Systolic BP (treated): 118 mmHg -- Points: 0   The patient is not a smoker. -- Points: 0   The patient has not been diagnosed with diabetes. -- Points: 0   The patient does not have a famil     Lung cancer (H) 10/21/2015     Pacemaker     [employer]     Paroxysmal atrial fibrillation (H) 2019     PMR (polymyalgia rheumatica) (H) 2020    tapering' above.)  In patients receiving over 10 mg of prednisone/day, the dose can be lowered by 2.5 mg/day decrements every two to four weeks  Once the dose of prednisone is 10 mg/day, further tapering can be done by 1 mg per month, provided the clinical course is stable  The clinical response to glucocorticoid therapy is closely monitored, which centers on screening for the presence and/or recu     Thyroid nodule 2016    Noted on CT 2015.      Unspecified essential hypertension late       Past Surgical History:   Procedure  Laterality Date     ANESTHESIA CARDIOVERSION N/A 6/12/2020    Procedure: ANESTHESIA, FOR CARDIOVERSION;  Surgeon: GENERIC ANESTHESIA PROVIDER;  Location:  OR     COLONOSCOPY  3/2003    adenomatous polyp      COLONOSCOPY  7/2006    diverticulosis - repeat in 5 years     COLONOSCOPY  10/13/2011    Procedure:COLONOSCOPY; COLONOSCOPY ; Surgeon:CHITO GORDILLO; Location: GI     CYSTOSCOPY  7/11/2012    Procedure: CYSTOSCOPY;;  Surgeon: Aline Cooper DO;  Location:  OR     DAVINCI HYSTERECTOMY SUPRACERVICAL, SACROCOLPOPEXY, COMBINED  7/11/2012    Procedure: COMBINED DAVINCI HYSTERECTOMY SUPRACERVICAL, SACROCOLPOPEXY;   DAVINCI ASSISTED LAPAROSCOPIC SUPRACERVICAL HYSTERECTOMY AND BILATERAL SALPINGO-OOPHORECTOMY, SACROCOLPOPEXY AND CYSTOSCOPY;  Surgeon: Aline Cooper DO;  Location:  OR     EP ABLATION AV NODE N/A 6/22/2020    Procedure: EP ABLATION AV NODE;  Surgeon: Adriano Raman MD;  Location:  [facility]ATH [facility]     EP BIVENT LEAD PLACEMENT N/A 6/22/2020    Procedure: Bivent Lead Placement;  Surgeon: Adriano Raman MD;  Location:  [facility]ATH [facility]     EP PACEMAKER N/A 6/22/2020    Procedure: AVNA and BiV Pacemaker Insertion;  Surgeon: Adriano Raman MD;  Location:  HEART [facility]ARD[facility] CATH LAB     ESOPHAGOSCOPY, GASTROSCOPY, DUODENOSCOPY (EGD), COMBINED N/A 12/14/2021    Procedure: ESOPHAGOGASTRODUODENOSCOPY (EGD) (fv) biopsies with cold forcep;  Surgeon: Chito Gordillo MD;  Location:  GI     EXCISE LESION EYELID Right 6/29/2015    Procedure: EXCISE LESION EYELID;  Surgeon: Hank Olvera MD;  Location:  SD     INSERT PORT VASCULAR ACCESS  2/3/2012    Procedure:INSERT PORT VASCULAR ACCESS; Power Port-A- Catheter Placement ; Surgeon:TRESSA TAPIA; Location: OR     LAPAROSCOPIC SALPINGO-OOPHORECTOMY  7/11/2012    Procedure: LAPAROSCOPIC SALPINGO-OOPHORECTOMY;  Davinci;  Surgeon: Aline Cooper DO;  Location:  OR     LIPOSUCTION,  RHYTIDECTOMY, COMBINED       LOBECTOMY LUNG Right 3/1/2016    Procedure: LOBECTOMY LUNG;  Surgeon: Jonas Woodward MD;  Location:  OR     MAMMOPLASTY REDUCTION  1/6/2012    Procedure:MAMMOPLASTY REDUCTION; Surgeon:MICKI KYLE; Location:[facility] OR     MASTECTOMY SIMPLE  11/12/2012    Procedure: MASTECTOMY SIMPLE;   Right Prophylactic Mastectomy with attempted Right Sentinal Node Biopsy, Revision Bilateral Mastectomy Insicions, liposuction in breast area;  Surgeon: Irish Tapia MD;  Location: [facility] OR     MASTECTOMY SIMPLE, SENTINEL NODE, COMBINED  1/6/2012    Procedure:COMBINED MASTECTOMY SIMPLE, SENTINEL NODE; Left Mastectomy Left [place] Node Biopsy,  Right Breast Reduction ; Surgeon:IRISH TAPIA; Location:[facility] OR     MASTECTOMY, BILATERAL       REMOVE PORT VASCULAR ACCESS  4/29/2013    Procedure: REMOVE PORT VASCULAR ACCESS;  Port A catheter removal ;  Surgeon: Irish Tapia MD;  Location: [facility] OR     REPAIR PTOSIS BILATERAL Bilateral 6/29/2015    Procedure: REPAIR PTOSIS BILATERAL;  Surgeon: Hank Olvera MD;  Location:  SD     REVISE RECONSTRUCTED BREAST BILATERAL  11/12/2012    Procedure: REVISE RECONSTRUCTED BREAST BILATERAL;;  Surgeon: Micki Kyle MD;  Location: [facility] OR     SURGICAL HISTORY OF -   in 40's    face lift     SURGICAL HISTORY OF -       lipoma removed right thigh     SURGICAL HISTORY OF -       D and C     THORACOTOMY Right 3/1/2016    Procedure: THORACOTOMY;  Surgeon: Jonas Woodward MD;  Location:  OR     [facility] NONSPECIFIC PROCEDURE  1970    s/p Tubal ligation 1970      Allergies   Allergen Reactions     No Known Drug Allergies      Tape [Adhesive Tape]      Sensitive to plastic tape on upper part of body--takes skin off      Social History     Tobacco Use     Smoking status: Never Smoker     Smokeless tobacco: Never Used   Substance Use Topics     Alcohol use: Yes     Comment: 0-1 drink day      Wt Readings from Last 1 Encounters:    07/25/22 53.1 kg (117 lb)        Anesthesia Evaluation   Pt has had prior anesthetic.         ROS/MED HX  ENT/Pulmonary:     (+) mild,  COPD,  (-) sleep apnea   Neurologic:       Cardiovascular:     (+) hypertension-----Taking blood thinners Pt has received instructions: pacemaker, dysrhythmias, a-fib,     METS/Exercise Tolerance:     Hematologic:     (+) anemia,     Musculoskeletal:   (+) arthritis,     GI/Hepatic:     (+) GERD,     Renal/Genitourinary:     (+) renal disease, type: CRI,     Endo:       Psychiatric/Substance Use:       Infectious Disease:       Malignancy:   (+) Malignancy, History of Breast and Lung.    Other:            Physical Exam    Airway        Mallampati: II   TM distance: > 3 FB   Neck ROM: full     Respiratory Devices and Support         Dental           Cardiovascular          Rhythm and rate: regular and normal     Pulmonary           breath sounds clear to auscultation           OUTSIDE LABS:  CBC:   Lab Results   Component Value Date    WBC 10.9 07/14/2022    WBC 11.4 (H) 06/16/2022    HGB 10.6 (L) 07/14/2022    HGB 10.5 (L) 06/16/2022    HCT 33.6 (L) 07/14/2022    HCT 34.1 (L) 06/16/2022     07/14/2022     06/16/2022     BMP:   Lab Results   Component Value Date     07/14/2022     06/02/2022    POTASSIUM 4.5 07/14/2022    POTASSIUM 4.0 06/02/2022    CHLORIDE 106 07/14/2022    CHLORIDE 104 06/02/2022    CO2 24 07/14/2022    CO2 24 06/02/2022    BUN 18 07/14/2022    BUN 18 06/02/2022    CR 0.54 07/14/2022    CR 0.66 06/02/2022    GLC 96 07/14/2022     06/02/2022     COAGS:   Lab Results   Component Value Date    PTT 24 09/13/2019    INR 1.20 (H) 11/24/2020     POC:   Lab Results   Component Value Date    BGM 96 09/14/2019     HEPATIC:   Lab Results   Component Value Date    ALBUMIN 2.7 (L) 06/02/2022    PROTTOTAL 7.0 03/24/2022    ALT 41 03/24/2022    AST 22 03/24/2022    ALKPHOS 130 03/24/2022    BILITOTAL 0.5 03/24/2022     OTHER:   Lab Results    Component Value Date    LACT 0.4 (L) 09/28/2018    A1C 5.3 06/16/2022    CHARLIE 10.6 (H) 07/14/2022    PHOS 3.0 07/01/2021    MAG 1.9 08/31/2020    TSH 2.24 08/27/2020    T4 0.96 09/28/2018    .0 (H) 03/24/2022    SED 55 (H) 03/24/2022       Anesthesia Plan    ASA Status:  3   NPO Status:  NPO Appropriate    Anesthesia Type: General.     - Airway: LMA   Induction: Intravenous.   Maintenance: Balanced.        Consents    Anesthesia Plan(s) and associated risks, benefits, and realistic alternatives discussed. Questions answered and patient/representative(s) expressed understanding.    - Discussed:     - Discussed with:  Patient         Postoperative Care    Pain management: IV analgesics, Oral pain medications, Multi-modal analgesia, Peripheral nerve block (Single Shot).   PONV prophylaxis: Ondansetron (or other 5HT-3), Dexamethasone or Solumedrol     Comments:                Aníbal Torres MD

## 2022-07-25 NOTE — ANESTHESIA CARE TRANSFER NOTE
Patient: Tomasa Fam    Procedure: Procedure(s):  Right total knee arthroplasty (Spinal with adductor canal block)       Diagnosis: Osteoarthritis [M19.90]  Diagnosis Additional Information: No value filed.    Anesthesia Type:   General     Note:    Oropharynx: oropharynx clear of all foreign objects and spontaneously breathing  Level of Consciousness: awake and drowsy  Oxygen Supplementation: face mask  Level of Supplemental Oxygen (L/min / FiO2): 6  Independent Airway: airway patency satisfactory and stable  Dentition: dentition unchanged  Vital Signs Stable: post-procedure vital signs reviewed and stable  Report to RN Given: handoff report given  Patient transferred to: PACU  Comments: Sleeping, exchanging  Handoff Report: Identifed the Patient, Identified the Reponsible Provider, Reviewed the pertinent medical history, Discussed the surgical course, Reviewed Intra-OP anesthesia mangement and issues during anesthesia and Allowed opportunity for questions and acknowledgement of understanding      Vitals:  Vitals Value Taken Time   /54 07/25/22 0850   Temp     Pulse 75 07/25/22 0852   Resp 12 07/25/22 0852   SpO2 100 % 07/25/22 0852   Vitals shown include unvalidated device data.    Electronically Signed By: SYLVIA Suggs CRNA  July 25, 2022  8:53 AM

## 2022-07-25 NOTE — BRIEF OP NOTE
TKR for Pre/Post OA knee  Fidel  TT est 35 w  (see dictation for full)  Spec none  No anticipated complications

## 2022-07-25 NOTE — PLAN OF CARE
Patient vital signs are at baseline: Yes  Patient able to ambulate as they were prior to admission or with assist devices provided by therapies during their stay:  Yes  Patient MUST void prior to discharge:  Yes  Patient able to tolerate oral intake:  Yes  Pain has adequate pain control using Oral analgesics:  Yes  Does patient have an identified :  Yes  Has goal D/C date and time been discussed with patient:  Yes      Pt arrived to 603 at 1415.  A&O, ambulated into bathroom Ax2 and voided.  Pt to have PT at 1730.

## 2022-07-25 NOTE — INTERVAL H&P NOTE
I have reviewed the surgical (or preoperative) H&P that is linked to this encounter, and examined the patient. There are no significant changes    Clinical Conditions Present on Arrival:  Clinically Significant Risk Factors Present on Admission            # Hypercalcemia: Ca = N/A and/or iCa = N/A on admission, will monitor as appropriate      # Coagulation Defect: home medication list includes an anticoagulant medication

## 2022-07-25 NOTE — ANESTHESIA POSTPROCEDURE EVALUATION
Patient: Tomasa Fam    Procedure: Procedure(s):  Right total knee arthroplasty (Spinal with adductor canal block)       Anesthesia Type:  General    Note:  Disposition: Admission   Postop Pain Control: Uneventful            Sign Out: Well controlled pain   PONV: No   Neuro/Psych: Uneventful            Sign Out: Acceptable/Baseline neuro status   Airway/Respiratory: Uneventful            Sign Out: Acceptable/Baseline resp. status   CV/Hemodynamics: Uneventful            Sign Out: Acceptable CV status; No obvious hypovolemia; No obvious fluid overload   Other NRE: NONE   DID A NON-ROUTINE EVENT OCCUR? No           Last vitals:  Vitals Value Taken Time   /63 07/25/22 1330   Temp 97.6  F (36.4  C) 07/25/22 1330   Pulse 70 07/25/22 1134   Resp 14 07/25/22 1330   SpO2 96 % 07/25/22 1330   Vitals shown include unvalidated device data.    Electronically Signed By: Aníbal Torres MD  July 25, 2022  4:23 PM

## 2022-07-25 NOTE — OP NOTE
Procedure Date: 07/25/2022    PREOPERATIVE DIAGNOSIS:  Osteoarthritis, right knee.    POSTOPERATIVE DIAGNOSIS:  Osteoarthritis, right knee.    PROCEDURE PERFORMED:  Right total knee arthroplasty.    SURGEON:  Nick Parra M.D.     ASSISTANT:  Irish Geller PA-C    ANESTHESIA:  General with adductor block.    ESTIMATED BLOOD LOSS:  25 mL    TOURNIQUET TIME:  Approximately 50 minutes.    COMPLICATIONS:  None.    DESCRIPTION OF PROCEDURE:  The patient was taken to the operating room where after administration of antibiotic prophylaxis, tranexamic acid and sterile prep and drape, the leg was exsanguinated and an anterior incision was made, followed by a medial arthrotomy with a moderate predominantly posteromedial soft tissue release given her preoperative 10-degree flexion contracture.  An intramedullary 5-degree guide was used to the anterior referencing at 3 degrees of external rotation, which best matched the epicondylar axis and a box cut was made for PCL substitution.  Retractors were carefully placed about the proximal tibia and a cut was made perpendicular to the mechanical axis of the tibia, removing 1-2 mm of medial bone.  Tibial preparation was done at the junction of the medial and middle thirds of the tubercle.  Posterior osteophytes were removed under direct vision, and a capsular injection was performed carefully protecting the posterior structures, and 8 mm was resected from the undersurface of a 21 mm patella and sized appropriately.  Gaps were equal.    After copious lavage and drying, Simplex cementing was performed for a Osmany Persona size 8 narrow cruciate substituting femur, size D tibia, 11 mm polyethylene insert, 32 mm patellar button.  Range of motion, balance and tracking were all excellent.  There was near full extension and I did not exert additional pressure due to her osteopenia.  There was easy gravity flexion and stable collateral ligaments at +0, 30 and 60 degrees.  Copious  Betadine and saline irrigation was performed.  I also added 1 gram of vancomycin to the wound after irrigation as there was substantial fibrinous debris and abnormal tissue within the knee, which was sent for routine culture.  There was no preoperative clinical evidence of infection.  There were no complications.    Nick Parra MD        D: 2022   T: 2022   MT: IVANNA    Name:     HAMIDA HODGE  MRN:      [id number]        Account:        [id number]   :      1940           Procedure Date: 2022     Document: [id number]

## 2022-07-26 ENCOUNTER — APPOINTMENT (OUTPATIENT)
Dept: PHYSICAL THERAPY | Facility: CLINIC | Age: 82
End: 2022-07-26
Attending: ORTHOPAEDIC SURGERY
Payer: MEDICARE

## 2022-07-26 ENCOUNTER — APPOINTMENT (OUTPATIENT)
Dept: OCCUPATIONAL THERAPY | Facility: CLINIC | Age: 82
End: 2022-07-26
Attending: ORTHOPAEDIC SURGERY
Payer: MEDICARE

## 2022-07-26 LAB
FASTING STATUS PATIENT QL REPORTED: YES
GLUCOSE BLD-MCNC: 125 MG/DL (ref 70–99)
HGB BLD-MCNC: 9.1 G/DL (ref 11.7–15.7)

## 2022-07-26 PROCEDURE — 250N000013 HC RX MED GY IP 250 OP 250 PS 637: Performed by: PHYSICIAN ASSISTANT

## 2022-07-26 PROCEDURE — 97535 SELF CARE MNGMENT TRAINING: CPT | Mod: GO | Performed by: REHABILITATION PRACTITIONER

## 2022-07-26 PROCEDURE — 97530 THERAPEUTIC ACTIVITIES: CPT | Mod: GP | Performed by: PHYSICAL THERAPIST

## 2022-07-26 PROCEDURE — 250N000011 HC RX IP 250 OP 636: Performed by: ORTHOPAEDIC SURGERY

## 2022-07-26 PROCEDURE — 36415 COLL VENOUS BLD VENIPUNCTURE: CPT | Performed by: ORTHOPAEDIC SURGERY

## 2022-07-26 PROCEDURE — 250N000013 HC RX MED GY IP 250 OP 250 PS 637: Performed by: ORTHOPAEDIC SURGERY

## 2022-07-26 PROCEDURE — 97116 GAIT TRAINING THERAPY: CPT | Mod: GP | Performed by: PHYSICAL THERAPIST

## 2022-07-26 PROCEDURE — 97165 OT EVAL LOW COMPLEX 30 MIN: CPT | Mod: GO | Performed by: REHABILITATION PRACTITIONER

## 2022-07-26 PROCEDURE — 85018 HEMOGLOBIN: CPT | Performed by: ORTHOPAEDIC SURGERY

## 2022-07-26 PROCEDURE — 99207 PR NO BILLABLE SERVICE THIS VISIT: CPT | Performed by: NURSE PRACTITIONER

## 2022-07-26 PROCEDURE — 82947 ASSAY GLUCOSE BLOOD QUANT: CPT | Performed by: ORTHOPAEDIC SURGERY

## 2022-07-26 RX ORDER — METHOCARBAMOL 500 MG/1
500 TABLET, FILM COATED ORAL EVERY 6 HOURS PRN
Qty: 30 TABLET | Refills: 0 | Status: SHIPPED | OUTPATIENT
Start: 2022-07-26 | End: 2023-01-25

## 2022-07-26 RX ADMIN — ONDANSETRON 4 MG: 4 TABLET, ORALLY DISINTEGRATING ORAL at 20:27

## 2022-07-26 RX ADMIN — METOPROLOL TARTRATE 100 MG: 50 TABLET, FILM COATED ORAL at 09:12

## 2022-07-26 RX ADMIN — APIXABAN 5 MG: 5 TABLET, FILM COATED ORAL at 09:13

## 2022-07-26 RX ADMIN — EZETIMIBE 10 MG: 10 TABLET ORAL at 20:29

## 2022-07-26 RX ADMIN — AMLODIPINE BESYLATE 5 MG: 5 TABLET ORAL at 20:29

## 2022-07-26 RX ADMIN — POTASSIUM CHLORIDE 20 MEQ: 1500 TABLET, EXTENDED RELEASE ORAL at 09:12

## 2022-07-26 RX ADMIN — SENNOSIDES AND DOCUSATE SODIUM 1 TABLET: 50; 8.6 TABLET ORAL at 20:28

## 2022-07-26 RX ADMIN — SENNOSIDES AND DOCUSATE SODIUM 1 TABLET: 50; 8.6 TABLET ORAL at 09:13

## 2022-07-26 RX ADMIN — OXYCODONE HYDROCHLORIDE 10 MG: 10 TABLET ORAL at 09:13

## 2022-07-26 RX ADMIN — METHOCARBAMOL 500 MG: 500 TABLET ORAL at 09:13

## 2022-07-26 RX ADMIN — CEFAZOLIN 1 G: 1 INJECTION, POWDER, FOR SOLUTION INTRAMUSCULAR; INTRAVENOUS at 00:37

## 2022-07-26 RX ADMIN — METOPROLOL TARTRATE 100 MG: 50 TABLET, FILM COATED ORAL at 20:28

## 2022-07-26 RX ADMIN — OXYCODONE HYDROCHLORIDE 10 MG: 10 TABLET ORAL at 04:53

## 2022-07-26 RX ADMIN — APIXABAN 5 MG: 5 TABLET, FILM COATED ORAL at 20:29

## 2022-07-26 RX ADMIN — ACETAMINOPHEN 975 MG: 325 TABLET, FILM COATED ORAL at 16:00

## 2022-07-26 RX ADMIN — POTASSIUM CHLORIDE 20 MEQ: 1500 TABLET, EXTENDED RELEASE ORAL at 20:29

## 2022-07-26 RX ADMIN — ACETAMINOPHEN 975 MG: 325 TABLET, FILM COATED ORAL at 09:13

## 2022-07-26 RX ADMIN — CELECOXIB 100 MG: 100 CAPSULE ORAL at 20:28

## 2022-07-26 RX ADMIN — ACETAMINOPHEN 975 MG: 325 TABLET, FILM COATED ORAL at 00:35

## 2022-07-26 NOTE — PROGRESS NOTES
07/26/22 1011   Quick Adds   Type of Visit Initial Occupational Therapy Evaluation   Living Environment   People in Home alone   Current Living Arrangements   ([facility])   Home Accessibility stairs to enter home;stairs within home   Number of Stairs, Main Entrance 3   Number of Stairs, Within Home, Primary greater than 10 stairs  (stair lift to lower, laundry in lower level)   Transportation Anticipated family or friend will provide   Living Environment Comments Lives in [facility] alone, daughter staying with pt after surgery. Stairs to basement with laundry, with chair lift for stairs. Walk in shower on main level, SHTwith grab bar on 1 side an vanity on other   Self-Care   Usual Activity Tolerance moderate   Regular Exercise No   Equipment Currently Used at Home walker, rolling;grab bar, toilet;grab bar, tub/shower;shower chair;cane, straight  (reacher)   Fall history within last six months yes   Number of times patient has fallen within last six months 2   Activity/Exercise/Self-Care Comment Pt reports using 4WW and SPC inside home. Has stair lift to basement. Has cleaning service 1x/month, orders out for meals a lot. Does some cooking/cleaning. reports 2 falls over past week.   Instrumental Activities of Daily Living (IADL)   IADL Comments family and friends to complete as needed   General Information   Onset of Illness/Injury or Date of Surgery 07/25/22   Referring Physician Dr. Parra   Patient/Family Therapy Goal Statement (OT) to return home   Additional Occupational Profile Info/Pertinent History of Current Problem patient is POD #1 for R TKA   Right Lower Extremity (Weight-bearing Status) weight-bearing as tolerated (WBAT)   General Observations and Info patient was in chair, sleepy, dtr present throughout session   Cognitive Status Examination   Orientation Status orientation to person, place and time   Cognitive Status Comments sleepy, falling asleep during session, dtr present throughout session for  education   Sensory   Sensory Quick Adds No deficits were identified   Pain Assessment   Patient Currently in Pain Yes, see Vital Sign flowsheet  (rating not provided)   Posture   Posture not impaired   Range of Motion Comprehensive   General Range of Motion bilateral upper extremity ROM WFL   Strength Comprehensive (MMT)   Comment, General Manual Muscle Testing (MMT) Assessment general weakness noted in R LE   Muscle Tone Assessment   Muscle Tone Quick Adds No deficits were identified   Coordination   Upper Extremity Coordination No deficits were identified   Transfers   Transfer Comments min A with sit<>stand, patient reporting increased fatigue and unable to stand without A. PT to follow up with further sessions.   Balance   Balance Comments LOB was not noted, general unsteadines to be expected   Activities of Daily Living   BADL Assessment/Intervention lower body dressing;toileting;bathing   Bathing Assessment/Intervention   [place] Level (Bathing) moderate assist (50% patient effort)   Comment, (Bathing) further education required   Lower Body Dressing Assessment/Training   Comment, (Lower Body Dressing) further education required   [place]ae Level (Lower Body Dressing) moderate assist (50% patient effort)   Toileting   Comment, (Toileting) further education required   [place]ae Level (Toileting) minimum assist (75% patient effort)   Clinical Impression   Criteria for Skilled Therapeutic Interventions Met (OT) Yes, treatment indicated   OT Diagnosis decreased ADL/IADLs   OT Problem List-Impairments impacting ADL activity tolerance impaired;balance;cognition;sensation;range of motion (ROM);pain   Assessment of Occupational Performance 5 or more Performance Deficits   Identified Performance Deficits dsg, toileting, bathing, functional/community mobility, driving, errands, household chores   Planned Therapy Interventions (OT) ADL retraining;progressive activity/exercise;transfer training   Clinical  Decision Making Complexity (OT) low complexity   Anticipated Equipment Needs Upon Discharge (OT)   ([facility])   Risk & Benefits of therapy have been explained evaluation/treatment results reviewed;care plan/treatment goals reviewed;risks/benefits reviewed;current/potential barriers reviewed;participants included;patient;daughter   OT Discharge Planning   OT Rationale for DC Rec defer to ortho team for dc plan- patient has met basic ADl goals for safe dc home with dtr support for ADL/IADLs   Total Evaluation Time (Minutes)   Total Evaluation Time (Minutes) 8   OT Goals   Therapy Frequency (OT) One time eval and treatment   OT Predicted Duration/Target Date for Goal Attainment 07/26/22   OT Goals Lower Body Dressing;Transfers;Toilet Transfer/Toileting;OT Goal 1;OT Goal 2   OT: Lower Body Dressing Minimal assist;Goal Met;Completed   OT: Goal 1 Dtr to verbalize understanding on how to complete walk in shower transfer. goal met   OT: Goal 2 Patient' s daughter will verbalize safe use of walker, including transporting items and reaching during ADLs.- goal met

## 2022-07-26 NOTE — PLAN OF CARE
Patient vital signs are at baseline: Yes  Patient able to ambulate as they were prior to admission or with assist devices provided by therapies during their stay:  Yes  Patient MUST void prior to discharge:  Yes  Patient able to tolerate oral intake:  Yes  Pain has adequate pain control using Oral analgesics:  Yes  Does patient have an identified :  Yes  Has goal D/C date and time been discussed with patient:  Yes      Pt is alert and oriented x4. Pt was given Oxycodone for right knee pain. Post surgery. Pt ace wrap is removed. Surgical dressing had moderate dressing CMS intact. No acute distress noted. Pt is suppose to be discharge this morning with daughter. PA came and saw pt in her room. PA changed pt surgical dressing. Pt walked from bed to bathroom and voided during the shift. Call light within reach and bed alarm in place.

## 2022-07-26 NOTE — PROGRESS NOTES
"Orthopedic Surgery  7/26/2022  POD 1    S: Patient voices no unexpected ortho complaints today. Denies chest pain or shortness of breath. Minimal PT last night as was groggy post-op.    O: Blood pressure (!) 151/55, pulse 71, temperature 97.1  F (36.2  C), temperature source Temporal, resp. rate 16, height 1.6 m (5' 3\"), weight 53.1 kg (117 lb), SpO2 97 %, not currently breastfeeding.  Lab Results   Component Value Date    HGB 10.6 07/14/2022    HGB 13.3 07/01/2021     Lab Results   Component Value Date    INR 1.20 11/24/2020     I/O last 3 completed shifts:  In: 1090 [P.O.:240; I.V.:850]  Out: 175 [Urine:150; Blood:25]  Distal extremity CMSI bilaterally.  Calves are negative bilaterally, both soft and nontender.  The dressing is saturated. Changed aquacel this am. No specific area of active bleeding currently.  Culture negative at this time.  Gram Stain negative.      A: Ms. Fam is doing well status post Procedure(s):  Right total knee arthroplasty (Spinal with adductor canal block).    P: Continue physical therapy. Will continue to watch cultures. Continue DVT pphx with chronic Eliquis. Anticipate discharge to home with Daughter today.    Irish Geller PA-C  [phone]  "

## 2022-07-26 NOTE — PROGRESS NOTES
Occupational Therapy Discharge Summary    Reason for therapy discharge:    All goals and outcomes met, no further needs identified.    Progress towards therapy goal(s). See goals on Care Plan in [facility] electronic health record for goal details.  Goals met    Therapy recommendation(s):    No further therapy is recommended.

## 2022-07-26 NOTE — PLAN OF CARE
Goal Outcome Evaluation:    Patient vital signs are at baseline: Yes  Patient able to ambulate as they were prior to admission or with assist devices provided by therapies during their stay: Yes  Patient MUST void prior to discharge:  Yes  Patient able to tolerate oral intake:  Yes  Pain has adequate pain control using Oral analgesics:  Yes  Does patient have an identified :  Yes daughter  Has goal D/C date and time been discussed with patient:  Yes    Patient up with assist of one using a walker and gait belt. Ace wrap CDI on right knee. CMS intact. Plan is to discharge home tomorrow.

## 2022-07-26 NOTE — CONSULTS
"Brief Note    Chart reviewed.  Pt case discussed with team.  Did not see given discharge planned, stability, and increased census.      BP (!) 151/55 (BP Location: Right arm)   Pulse 71   Temp 97.1  F (36.2  C) (Temporal)   Resp 16   Ht 1.6 m (5' 3\")   Wt 53.1 kg (117 lb)   LMP  (LMP Unknown)   SpO2 97%   BMI 20.73 kg/m       80 yo F POD #1 elective R TKA by Dr. Parra et al.  EBL 25 mL.  Tourniquet time 50 minutes.  GETA with block.  No apparent surgical or anesthesia complications.  Plan to discharge to home today.       Pertinent PMH: pafib, PMR, HTN, GERD, hx breast CA    Hgb 9.1 (near baseline -- 9.8-10.6) g/dL    Added Robaxin PRN at discharge for muscle spasms.  Okay with surgery to resume Eliquis.    Medically stable for discharge.  Orders reconciled. Please page Medicine for any acute medical issues.     Trent Augustin, SYLVIA CNP     "

## 2022-07-26 NOTE — PLAN OF CARE

## 2022-07-26 NOTE — PROVIDER NOTIFICATION
Patient did not pass therapy this afternoon and was having increased confusion.  Update given to Sj De La Garza to stay overnight and discharge tomorrow once passes therapy.

## 2022-07-27 ENCOUNTER — APPOINTMENT (OUTPATIENT)
Dept: PHYSICAL THERAPY | Facility: CLINIC | Age: 82
End: 2022-07-27
Attending: ORTHOPAEDIC SURGERY
Payer: MEDICARE

## 2022-07-27 VITALS
HEIGHT: 63 IN | HEART RATE: 70 BPM | SYSTOLIC BLOOD PRESSURE: 161 MMHG | WEIGHT: 117 LBS | DIASTOLIC BLOOD PRESSURE: 57 MMHG | TEMPERATURE: 97.1 F | BODY MASS INDEX: 20.73 KG/M2 | OXYGEN SATURATION: 98 % | RESPIRATION RATE: 20 BRPM

## 2022-07-27 LAB
ATRIAL RATE - MUSE: 69 BPM
DIASTOLIC BLOOD PRESSURE - MUSE: NORMAL MMHG
FASTING STATUS PATIENT QL REPORTED: YES
GLUCOSE SERPL-MCNC: 97 MG/DL (ref 70–99)
HGB BLD-MCNC: 8.4 G/DL (ref 11.7–15.7)
INTERPRETATION ECG - MUSE: NORMAL
P AXIS - MUSE: NORMAL DEGREES
PR INTERVAL - MUSE: NORMAL MS
QRS DURATION - MUSE: 148 MS
QT - MUSE: 476 MS
QTC - MUSE: 514 MS
R AXIS - MUSE: 138 DEGREES
SYSTOLIC BLOOD PRESSURE - MUSE: NORMAL MMHG
T AXIS - MUSE: 42 DEGREES
VENTRICULAR RATE- MUSE: 70 BPM

## 2022-07-27 PROCEDURE — 97116 GAIT TRAINING THERAPY: CPT | Mod: GP | Performed by: PHYSICAL THERAPIST

## 2022-07-27 PROCEDURE — 250N000013 HC RX MED GY IP 250 OP 250 PS 637: Performed by: ORTHOPAEDIC SURGERY

## 2022-07-27 PROCEDURE — 97110 THERAPEUTIC EXERCISES: CPT | Mod: GP | Performed by: PHYSICAL THERAPIST

## 2022-07-27 PROCEDURE — 36415 COLL VENOUS BLD VENIPUNCTURE: CPT | Performed by: ORTHOPAEDIC SURGERY

## 2022-07-27 PROCEDURE — 85018 HEMOGLOBIN: CPT | Performed by: ORTHOPAEDIC SURGERY

## 2022-07-27 PROCEDURE — 250N000013 HC RX MED GY IP 250 OP 250 PS 637: Performed by: PHYSICIAN ASSISTANT

## 2022-07-27 PROCEDURE — 82947 ASSAY GLUCOSE BLOOD QUANT: CPT | Performed by: ORTHOPAEDIC SURGERY

## 2022-07-27 PROCEDURE — 97530 THERAPEUTIC ACTIVITIES: CPT | Mod: GP | Performed by: PHYSICAL THERAPIST

## 2022-07-27 RX ORDER — TRAMADOL HYDROCHLORIDE 50 MG/1
50 TABLET ORAL EVERY 6 HOURS PRN
Qty: 25 TABLET | Refills: 0 | Status: SHIPPED | OUTPATIENT
Start: 2022-07-27 | End: 2023-01-25

## 2022-07-27 RX ORDER — TRAMADOL HYDROCHLORIDE 50 MG/1
50 TABLET ORAL EVERY 6 HOURS PRN
Status: DISCONTINUED | OUTPATIENT
Start: 2022-07-27 | End: 2022-07-27 | Stop reason: HOSPADM

## 2022-07-27 RX ADMIN — ACETAMINOPHEN 975 MG: 325 TABLET, FILM COATED ORAL at 00:27

## 2022-07-27 RX ADMIN — OXYCODONE HYDROCHLORIDE 5 MG: 5 TABLET ORAL at 02:50

## 2022-07-27 RX ADMIN — POTASSIUM CHLORIDE 20 MEQ: 1500 TABLET, EXTENDED RELEASE ORAL at 08:19

## 2022-07-27 RX ADMIN — POLYETHYLENE GLYCOL 3350 17 G: 17 POWDER, FOR SOLUTION ORAL at 08:18

## 2022-07-27 RX ADMIN — METOPROLOL TARTRATE 100 MG: 50 TABLET, FILM COATED ORAL at 08:19

## 2022-07-27 RX ADMIN — UMECLIDINIUM 1 PUFF: 62.5 AEROSOL, POWDER ORAL at 08:08

## 2022-07-27 RX ADMIN — APIXABAN 5 MG: 5 TABLET, FILM COATED ORAL at 08:19

## 2022-07-27 RX ADMIN — ACETAMINOPHEN 975 MG: 325 TABLET, FILM COATED ORAL at 08:19

## 2022-07-27 NOTE — PLAN OF CARE
Goal Outcome Evaluation:    Plan of Care Reviewed With: patient, daughter     Overall Patient Progress: improving  Pain controlled with scheduled meds. Pt more alert/awake. Confused to situation/time. Up with 1 assist & walker to BR and ashley. Chair for dinner. Tray regular diet- appetite fair. Voiding without difficulty. Cms intact. BLE edema. VSS- afeb sats good on room air. Plan is to discharge home tomorrow with daughter if passes PT

## 2022-07-27 NOTE — PLAN OF CARE
Physical Therapy Discharge Summary    Reason for therapy discharge:    All goals and outcomes met, no further needs identified.    Progress towards therapy goal(s). See goals on Care Plan in [facility] electronic health record for goal details.  Goals met    Therapy recommendation(s):    Defer to ortho

## 2022-07-27 NOTE — PLAN OF CARE
Goal Outcome Evaluation:    Patient vital signs are at baseline: Yes but BP high sometimes, is receiving BP medications  Patient able to ambulate as they were prior to admission or with assist devices provided by therapies during their stay:  Yes   Patient MUST void prior to discharge:  Yes  Patient able to tolerate oral intake:  Yes  Pain has adequate pain control using Oral analgesics:  Yes  Does patient have an identified :  Yes daughter  Has goal D/C date and time been discussed with patient:  Yes    Patient up with assist of one using a walker and gait belt. CMS intact, ace wrap CDI on right knee. Plan is to discharge home with daughter today if she passes PT.

## 2022-07-27 NOTE — PROGRESS NOTES
"Orthopedic Surgery  7/27/2022  POD 2    S: Patient voices no unexpected ortho complaints today. Denies chest pain or shortness of breath. Having a lot of pain. Had confusion yesterday likely secondary to oxycodone side effects. Was unable to do physical therapy yesterday. States she was awake all night from the medicine and pain.    O: Blood pressure (!) 175/61, pulse 70, temperature 97.1  F (36.2  C), temperature source Temporal, resp. rate 20, height 1.6 m (5' 3\"), weight 53.1 kg (117 lb), SpO2 96 %, not currently breastfeeding.  Lab Results   Component Value Date    HGB 9.1 07/26/2022    HGB 13.3 07/01/2021     Lab Results   Component Value Date    INR 1.20 11/24/2020     I/O last 3 completed shifts:  In: 480 [P.O.:480]  Out: 500 [Urine:500]  Distal extremity CMSI bilaterally.  Calves are negative bilaterally, both soft and nontender.  The dressing is C/D/I. Small area of blood in dressing window.  Cultures continue to be negative.        A: Ms. Fam is doing well status post Procedure(s):  Right total knee arthroplasty.    P: Continue physical therapy. Continue to encourage mobilization. Place kathy stocking this am.  Continue DVT pphx with chronic Eliquis. Anticipate discharge to home with daughter today. Will add Tramadol as option to try this am for lower side effect profile. Placed rx for Tramadol and Oxy for discharge. Should only go home with what is working this am; Likely the Tramadol. Please text or call me with questions.    Irish Geller PA-C  [phone]  "

## 2022-07-30 LAB — BACTERIA TISS BX CULT: NO GROWTH

## 2022-08-01 LAB — BACTERIA TISS BX CULT: NORMAL

## 2022-08-11 DIAGNOSIS — N18.31 STAGE 3A CHRONIC KIDNEY DISEASE (H): Primary | ICD-10-CM

## 2022-08-11 DIAGNOSIS — I10 ESSENTIAL HYPERTENSION: ICD-10-CM

## 2022-08-11 DIAGNOSIS — D63.8 ANEMIA IN OTHER CHRONIC DISEASES CLASSIFIED ELSEWHERE: ICD-10-CM

## 2022-08-15 ENCOUNTER — LAB (OUTPATIENT)
Dept: LAB | Facility: CLINIC | Age: 82
End: 2022-08-15
Payer: MEDICARE

## 2022-08-15 DIAGNOSIS — D50.0 IRON DEFICIENCY ANEMIA DUE TO CHRONIC BLOOD LOSS: Primary | ICD-10-CM

## 2022-08-15 DIAGNOSIS — N18.31 STAGE 3A CHRONIC KIDNEY DISEASE (H): ICD-10-CM

## 2022-08-15 DIAGNOSIS — D63.8 ANEMIA IN OTHER CHRONIC DISEASES CLASSIFIED ELSEWHERE: ICD-10-CM

## 2022-08-15 DIAGNOSIS — I10 ESSENTIAL HYPERTENSION: ICD-10-CM

## 2022-08-15 LAB
ANION GAP SERPL CALCULATED.3IONS-SCNC: 7 MMOL/L (ref 3–14)
BASOPHILS # BLD AUTO: 0.1 10E3/UL (ref 0–0.2)
BASOPHILS NFR BLD AUTO: 1 %
BUN SERPL-MCNC: 17 MG/DL (ref 7–30)
CALCIUM SERPL-MCNC: 10.1 MG/DL (ref 8.5–10.1)
CHLORIDE BLD-SCNC: 106 MMOL/L (ref 94–109)
CO2 SERPL-SCNC: 23 MMOL/L (ref 20–32)
CREAT SERPL-MCNC: 0.57 MG/DL (ref 0.52–1.04)
CREAT UR-MCNC: 63 MG/DL
EOSINOPHIL # BLD AUTO: 0.3 10E3/UL (ref 0–0.7)
EOSINOPHIL NFR BLD AUTO: 3 %
ERYTHROCYTE [DISTWIDTH] IN BLOOD BY AUTOMATED COUNT: 17.9 % (ref 10–15)
GFR SERPL CREATININE-BSD FRML MDRD: 90 ML/MIN/1.73M2
GLUCOSE BLD-MCNC: 135 MG/DL (ref 70–99)
HCT VFR BLD AUTO: 32.6 % (ref 35–47)
HGB BLD-MCNC: 10 G/DL (ref 11.7–15.7)
LYMPHOCYTES # BLD AUTO: 1.5 10E3/UL (ref 0.8–5.3)
LYMPHOCYTES NFR BLD AUTO: 16 %
MCH RBC QN AUTO: 26.2 PG (ref 26.5–33)
MCHC RBC AUTO-ENTMCNC: 30.7 G/DL (ref 31.5–36.5)
MCV RBC AUTO: 85 FL (ref 78–100)
MICROALBUMIN UR-MCNC: 40 MG/L
MICROALBUMIN/CREAT UR: 63.49 MG/G CR (ref 0–25)
MONOCYTES # BLD AUTO: 1.2 10E3/UL (ref 0–1.3)
MONOCYTES NFR BLD AUTO: 13 %
NEUTROPHILS # BLD AUTO: 6.1 10E3/UL (ref 1.6–8.3)
NEUTROPHILS NFR BLD AUTO: 67 %
PLATELET # BLD AUTO: 432 10E3/UL (ref 150–450)
POTASSIUM BLD-SCNC: 4.6 MMOL/L (ref 3.4–5.3)
RBC # BLD AUTO: 3.82 10E6/UL (ref 3.8–5.2)
SODIUM SERPL-SCNC: 136 MMOL/L (ref 133–144)
WBC # BLD AUTO: 9.1 10E3/UL (ref 4–11)

## 2022-08-15 PROCEDURE — 85025 COMPLETE CBC W/AUTO DIFF WBC: CPT

## 2022-08-15 PROCEDURE — 82043 UR ALBUMIN QUANTITATIVE: CPT

## 2022-08-15 PROCEDURE — 36415 COLL VENOUS BLD VENIPUNCTURE: CPT

## 2022-08-15 PROCEDURE — 80048 BASIC METABOLIC PNL TOTAL CA: CPT

## 2022-08-16 ENCOUNTER — TELEPHONE (OUTPATIENT)
Dept: FAMILY MEDICINE | Facility: CLINIC | Age: 82
End: 2022-08-16

## 2022-08-16 LAB
IRON SATN MFR SERPL: 20 % (ref 15–46)
IRON SERPL-MCNC: 43 UG/DL (ref 35–180)
TIBC SERPL-MCNC: 220 UG/DL (ref 240–430)

## 2022-08-16 NOTE — TELEPHONE ENCOUNTER
Columba with [facility] is calling needing a signed medication list and order for PT and OT eval and treat in order for Tomasa to move into facility tomorrow 8/17/2022.     Printed medication list in PCP basket.      [phone], Phone: [phone]    Janelle Huitron

## 2022-08-16 NOTE — TELEPHONE ENCOUNTER
LVM with [facility] to get form or more information for orders. Unable to just place generic PT/OT orders    Janelle Huitron

## 2022-08-17 ENCOUNTER — MEDICAL CORRESPONDENCE (OUTPATIENT)
Dept: HEALTH INFORMATION MANAGEMENT | Facility: CLINIC | Age: 82
End: 2022-08-17

## 2022-08-18 ENCOUNTER — MEDICAL CORRESPONDENCE (OUTPATIENT)
Dept: HEALTH INFORMATION MANAGEMENT | Facility: CLINIC | Age: 82
End: 2022-08-18

## 2022-08-23 ENCOUNTER — PATIENT OUTREACH (OUTPATIENT)
Dept: ONCOLOGY | Facility: CLINIC | Age: 82
End: 2022-08-23

## 2022-08-23 DIAGNOSIS — D50.0 IRON DEFICIENCY ANEMIA DUE TO CHRONIC BLOOD LOSS: Primary | ICD-10-CM

## 2022-08-24 ENCOUNTER — TELEPHONE (OUTPATIENT)
Dept: FAMILY MEDICINE | Facility: CLINIC | Age: 82
End: 2022-08-24

## 2022-08-24 NOTE — TELEPHONE ENCOUNTER
[facility] [phone].    Pt is asking for orders for OT and PT to eval and treat.  Pt had a right total knee arthoplasty in July.  Please fax ok - her name, date of birth, PT /OT eval and treat - lower extremity weakness - fax number - [phone].  Could be written like a letter.      She does not have an in house provider yet.      Will discuss with Dr. Romero.

## 2022-08-24 NOTE — TELEPHONE ENCOUNTER
Spoke to Dr. Romero.  She advised this may need to come from the surgeon as she has not done a face to face visit.  Called Columba to advise of this.  She said she does have a call out to the surgeon as well.

## 2022-08-25 ENCOUNTER — ANCILLARY PROCEDURE (OUTPATIENT)
Dept: CARDIOLOGY | Facility: CLINIC | Age: 82
End: 2022-08-25
Attending: INTERNAL MEDICINE
Payer: MEDICARE

## 2022-08-25 DIAGNOSIS — I44.2 ATRIOVENTRICULAR BLOCK, COMPLETE (H): ICD-10-CM

## 2022-08-25 DIAGNOSIS — Z95.0 CARDIAC PACEMAKER IN SITU: ICD-10-CM

## 2022-08-25 DIAGNOSIS — Z95.0 CARDIAC PACEMAKER IN SITU: Primary | ICD-10-CM

## 2022-08-25 PROCEDURE — 93296 REM INTERROG EVL PM/IDS: CPT | Performed by: INTERNAL MEDICINE

## 2022-08-25 PROCEDURE — 93294 REM INTERROG EVL PM/LDLS PM: CPT | Performed by: INTERNAL MEDICINE

## 2022-08-29 LAB
MDC_IDC_EPISODE_DTM: NORMAL
MDC_IDC_EPISODE_DURATION: 13 S
MDC_IDC_EPISODE_ID: NORMAL
MDC_IDC_EPISODE_TYPE: NORMAL
MDC_IDC_LEAD_IMPLANT_DT: NORMAL
MDC_IDC_LEAD_IMPLANT_DT: NORMAL
MDC_IDC_LEAD_LOCATION: NORMAL
MDC_IDC_LEAD_LOCATION: NORMAL
MDC_IDC_LEAD_LOCATION_DETAIL_1: NORMAL
MDC_IDC_LEAD_LOCATION_DETAIL_1: NORMAL
MDC_IDC_LEAD_MFG: NORMAL
MDC_IDC_LEAD_MFG: NORMAL
MDC_IDC_LEAD_MODEL: NORMAL
MDC_IDC_LEAD_MODEL: NORMAL
MDC_IDC_LEAD_POLARITY_TYPE: NORMAL
MDC_IDC_LEAD_SERIAL: NORMAL
MDC_IDC_LEAD_SERIAL: NORMAL
MDC_IDC_MSMT_BATTERY_DTM: NORMAL
MDC_IDC_MSMT_BATTERY_REMAINING_LONGEVITY: 114 MO
MDC_IDC_MSMT_BATTERY_REMAINING_PERCENTAGE: 100 %
MDC_IDC_MSMT_BATTERY_STATUS: NORMAL
MDC_IDC_MSMT_LEADCHNL_LV_IMPEDANCE_VALUE: 714 OHM
MDC_IDC_MSMT_LEADCHNL_LV_PACING_THRESHOLD_AMPLITUDE: 1.7 V
MDC_IDC_MSMT_LEADCHNL_LV_PACING_THRESHOLD_PULSEWIDTH: 0.4 MS
MDC_IDC_MSMT_LEADCHNL_RV_IMPEDANCE_VALUE: 659 OHM
MDC_IDC_MSMT_LEADCHNL_RV_PACING_THRESHOLD_AMPLITUDE: 1.2 V
MDC_IDC_MSMT_LEADCHNL_RV_PACING_THRESHOLD_PULSEWIDTH: 0.4 MS
MDC_IDC_PG_IMPLANT_DTM: NORMAL
MDC_IDC_PG_MFG: NORMAL
MDC_IDC_PG_MODEL: NORMAL
MDC_IDC_PG_SERIAL: NORMAL
MDC_IDC_PG_TYPE: NORMAL
MDC_IDC_SESS_CLINIC_NAME: NORMAL
MDC_IDC_SESS_DTM: NORMAL
MDC_IDC_SESS_TYPE: NORMAL
MDC_IDC_SET_BRADY_AT_MODE_SWITCH_RATE: 170 {BEATS}/MIN
MDC_IDC_SET_BRADY_LOWRATE: 70 {BEATS}/MIN
MDC_IDC_SET_BRADY_MAX_SENSOR_RATE: 130 {BEATS}/MIN
MDC_IDC_SET_BRADY_MODE: NORMAL
MDC_IDC_SET_CRT_LVRV_DELAY: 30 MS
MDC_IDC_SET_CRT_PACED_CHAMBERS: NORMAL
MDC_IDC_SET_LEADCHNL_LV_PACING_AMPLITUDE: 2.7 V
MDC_IDC_SET_LEADCHNL_LV_PACING_ANODE_ELECTRODE_1: NORMAL
MDC_IDC_SET_LEADCHNL_LV_PACING_ANODE_LOCATION_1: NORMAL
MDC_IDC_SET_LEADCHNL_LV_PACING_CATHODE_ELECTRODE_1: NORMAL
MDC_IDC_SET_LEADCHNL_LV_PACING_CATHODE_LOCATION_1: NORMAL
MDC_IDC_SET_LEADCHNL_LV_PACING_PULSEWIDTH: 0.4 MS
MDC_IDC_SET_LEADCHNL_LV_SENSING_ADAPTATION_MODE: NORMAL
MDC_IDC_SET_LEADCHNL_LV_SENSING_ANODE_ELECTRODE_1: NORMAL
MDC_IDC_SET_LEADCHNL_LV_SENSING_ANODE_LOCATION_1: NORMAL
MDC_IDC_SET_LEADCHNL_LV_SENSING_CATHODE_ELECTRODE_1: NORMAL
MDC_IDC_SET_LEADCHNL_LV_SENSING_CATHODE_LOCATION_1: NORMAL
MDC_IDC_SET_LEADCHNL_LV_SENSING_SENSITIVITY: 2.5 MV
MDC_IDC_SET_LEADCHNL_RA_SENSING_ADAPTATION_MODE: NORMAL
MDC_IDC_SET_LEADCHNL_RA_SENSING_SENSITIVITY: 0.5 MV
MDC_IDC_SET_LEADCHNL_RV_PACING_AMPLITUDE: 2.4 V
MDC_IDC_SET_LEADCHNL_RV_PACING_CAPTURE_MODE: NORMAL
MDC_IDC_SET_LEADCHNL_RV_PACING_POLARITY: NORMAL
MDC_IDC_SET_LEADCHNL_RV_PACING_PULSEWIDTH: 0.4 MS
MDC_IDC_SET_LEADCHNL_RV_SENSING_ADAPTATION_MODE: NORMAL
MDC_IDC_SET_LEADCHNL_RV_SENSING_POLARITY: NORMAL
MDC_IDC_SET_LEADCHNL_RV_SENSING_SENSITIVITY: 2.5 MV
MDC_IDC_SET_ZONE_DETECTION_INTERVAL: 375 MS
MDC_IDC_SET_ZONE_TYPE: NORMAL
MDC_IDC_SET_ZONE_VENDOR_TYPE: NORMAL
MDC_IDC_STAT_BRADY_DTM_END: NORMAL
MDC_IDC_STAT_BRADY_DTM_START: NORMAL
MDC_IDC_STAT_BRADY_RA_PERCENT_PACED: 0 %
MDC_IDC_STAT_BRADY_RV_PERCENT_PACED: 100 %
MDC_IDC_STAT_CRT_DTM_END: NORMAL
MDC_IDC_STAT_CRT_DTM_START: NORMAL
MDC_IDC_STAT_CRT_LV_PERCENT_PACED: 99 %
MDC_IDC_STAT_EPISODE_RECENT_COUNT: 0
MDC_IDC_STAT_EPISODE_RECENT_COUNT: 1
MDC_IDC_STAT_EPISODE_RECENT_COUNT_DTM_END: NORMAL
MDC_IDC_STAT_EPISODE_RECENT_COUNT_DTM_START: NORMAL
MDC_IDC_STAT_EPISODE_TYPE: NORMAL
MDC_IDC_STAT_EPISODE_VENDOR_TYPE: NORMAL

## 2022-09-14 DIAGNOSIS — E78.5 HYPERLIPIDEMIA LDL GOAL <160: ICD-10-CM

## 2022-09-15 RX ORDER — EZETIMIBE 10 MG/1
TABLET ORAL
Qty: 30 TABLET | Refills: 11 | Status: SHIPPED | OUTPATIENT
Start: 2022-09-15 | End: 2023-01-25

## 2022-09-15 RX ORDER — POTASSIUM CHLORIDE 1500 MG/1
TABLET, EXTENDED RELEASE ORAL
Qty: 60 TABLET | Refills: 11 | Status: SHIPPED | OUTPATIENT
Start: 2022-09-15 | End: 2023-01-25

## 2022-09-15 NOTE — TELEPHONE ENCOUNTER
Routing refill request to provider for review/approval because:  Drug not active on patient's medication list    Kat Fajardo RN on 9/15/2022 at 11:28 AM

## 2022-09-21 ENCOUNTER — TELEPHONE (OUTPATIENT)
Dept: FAMILY MEDICINE | Facility: CLINIC | Age: 82
End: 2022-09-21

## 2022-09-21 DIAGNOSIS — I10 ESSENTIAL HYPERTENSION: ICD-10-CM

## 2022-09-21 DIAGNOSIS — K59.00 CONSTIPATION, UNSPECIFIED CONSTIPATION TYPE: ICD-10-CM

## 2022-09-21 DIAGNOSIS — D47.2 MGUS (MONOCLONAL GAMMOPATHY OF UNKNOWN SIGNIFICANCE): Primary | ICD-10-CM

## 2022-09-21 DIAGNOSIS — I48.19 PERSISTENT ATRIAL FIBRILLATION (H): ICD-10-CM

## 2022-09-21 NOTE — TELEPHONE ENCOUNTER
Form placed in pcp basket- sign and fax to [phone]    Sonia LOZA  [facility]   [facility]

## 2022-09-22 ENCOUNTER — MEDICAL CORRESPONDENCE (OUTPATIENT)
Dept: HEALTH INFORMATION MANAGEMENT | Facility: CLINIC | Age: 82
End: 2022-09-22

## 2022-09-22 RX ORDER — POLYETHYLENE GLYCOL 3350 17 G/17G
POWDER, FOR SOLUTION ORAL
Qty: 238 G | Refills: 0 | Status: SHIPPED | OUTPATIENT
Start: 2022-09-22 | End: 2022-09-26

## 2022-09-22 RX ORDER — APIXABAN 5 MG/1
TABLET, FILM COATED ORAL
Qty: 180 TABLET | Refills: 0 | Status: SHIPPED | OUTPATIENT
Start: 2022-09-22 | End: 2023-01-25

## 2022-09-22 RX ORDER — OMEGA-3 FATTY ACIDS/FISH OIL 300-1000MG
1 CAPSULE ORAL DAILY
Qty: 90 CAPSULE | Refills: 0 | Status: SHIPPED | OUTPATIENT
Start: 2022-09-22 | End: 2023-01-06

## 2022-09-22 RX ORDER — AMLODIPINE BESYLATE 5 MG/1
TABLET ORAL
Qty: 90 TABLET | Refills: 0 | Status: SHIPPED | OUTPATIENT
Start: 2022-09-22 | End: 2023-01-03

## 2022-09-22 RX ORDER — FERROUS SULFATE 325(65) MG
TABLET ORAL
Qty: 90 TABLET | Refills: 0 | Status: SHIPPED | OUTPATIENT
Start: 2022-09-22 | End: 2022-09-26

## 2022-09-22 NOTE — TELEPHONE ENCOUNTER
Pt has moved out of the area- to [place], patient will transfer care to a new PCP.     Janelle Oteromount

## 2022-09-22 NOTE — TELEPHONE ENCOUNTER
Pharmacy checking status of form.    Dr. Romero not in office.    Please locate form and put to attention of POD.    Carlie Burns RN

## 2022-09-22 NOTE — TELEPHONE ENCOUNTER
Routing refill request to provider for review/approval because:  Drug not active on patient's medication list  Labs out of range:  HGB  Weight is greater than 60 kg for the past year  Outside of age range    Kat Fajardo RN on 9/22/2022 at 10:32 AM

## 2022-09-22 NOTE — TELEPHONE ENCOUNTER
Pharmacy calling to check status of refill request. Dr. Romero not in office.    Routed to POD.    Carlie Burns RN

## 2022-09-24 ENCOUNTER — NURSE TRIAGE (OUTPATIENT)
Dept: NURSING | Facility: CLINIC | Age: 82
End: 2022-09-24

## 2022-09-24 NOTE — TELEPHONE ENCOUNTER
Pt is phoning wanting to speak with her primary about the medications that she is on     Pt states that she will call office on Monday when open     No triage     Nohelia Seymour RN  [place] Nurse Advisor  12:18 PM 9/24/2022      COVID 19 Nurse Triage Plan/Patient Instructions    Please be aware that novel coronavirus (COVID-19) may be circulating in the community. If you develop symptoms such as fever, cough, or SOB or if you have concerns about the presence of another infection including coronavirus (COVID-19), please contact your health care provider or visit [email or web address].     Disposition/Instructions    Home care recommended. Follow home care protocol based instructions.    Thank you for taking steps to prevent the spread of this virus.  o Limit your contact with others.  o Wear a simple mask to cover your cough.  o Wash your hands well and often.    Resources    [facility] [place]: About COVID-19: [email or web address]    CDC: What to Do If You're Sick: www.cdc.gov/coronavirus/2019-ncov/about/steps-when-sick.html    CDC: Ending Home Isolation: www.cdc.gov/coronavirus/2019-ncov/hcp/disposition-in-home-patients.html     CDC: Caring for Someone: www.cdc.gov/coronavirus/2019-ncov/if-you-are-sick/care-for-someone.html     [facility]: Interim Guidance for Hospital Discharge to Home: www.health.[facility].[place]/diseases/coronavirus/hcp/hospdischarge.pdf    [facility] clinical trials (COVID-19 research studies): clinicalaffairs.[facility]/umn-clinical-trials     Below are the COVID-19 hotlines at the [place] [facility] of [facility] ([facility]). Interpreters are available.   o For health questions: Call 523-772-0831 or 1-732.282.2919 (7 a.m. to 7 p.m.)  o For questions about schools and childcare: Call 047-585-1670 or 1-638.842.3238 (7 a.m. to 7 p.m.)                       Reason for Disposition    General information question, no triage required and triager able to answer question    Additional  Information    Negative: [1] Caller is not with the adult (patient) AND [2] reporting urgent symptoms    Negative: Lab result questions    Negative: Medication questions    Negative: Caller can't be reached by phone    Negative: Caller has already spoken to PCP or another triager    Negative: RN needs further essential information from caller in order to complete triage    Negative: Requesting regular office appointment    Negative: [1] Caller requesting NON-URGENT health information AND [2] PCP's office is the best resource    Negative: Health Information question, no triage required and triager able to answer question    Protocols used: INFORMATION ONLY CALL - NO TRIAGE-A-

## 2022-09-26 NOTE — PROGRESS NOTES
Chandrika calling upset that she received meds from [facility]. She states she did not ask for them or take some of them.     Went through meds that came from REFILL encounter from pharmacy.   Miralax- she wanted off her list she does not take in that form or that often  Ferrous sulfate she also wanted off list same thing.   The other medication she is maybe going to keep, but did not ask for.     Adv she should reach out to pharm to take off auto refill.     Irish RICO RN

## 2022-10-13 NOTE — PATIENT INSTRUCTIONS
Follow-up with wound clinic for further management of lesions. Advised to follow-up if signs of infection - spreading/streaking redness, warmth, tenderness, swelling, purulent drainage, fever.    [employer]

## 2022-10-23 ENCOUNTER — HEALTH MAINTENANCE LETTER (OUTPATIENT)
Age: 82
End: 2022-10-23

## 2022-10-24 ENCOUNTER — NURSE TRIAGE (OUTPATIENT)
Dept: NURSING | Facility: CLINIC | Age: 82
End: 2022-10-24

## 2022-10-24 NOTE — TELEPHONE ENCOUNTER
TELEPHONE CALL -  Reason for call-   She is calling to let her PCP at the [facility] know that she does not havep ower at her building and she cannot go to her appt today    RN let her know that [facility] has no power at many locations including the Hospitals.     They may contact her to reschedule as soon as they have phone back     Lupe Rosado RN [place] Nurse Advisor,  2:52 PM 10/24/2022

## 2022-12-10 ENCOUNTER — HEALTH MAINTENANCE LETTER (OUTPATIENT)
Age: 82
End: 2022-12-10

## 2022-12-28 ENCOUNTER — TELEPHONE (OUTPATIENT)
Dept: FAMILY MEDICINE | Facility: CLINIC | Age: 82
End: 2022-12-28

## 2022-12-28 NOTE — TELEPHONE ENCOUNTER
Patient called wanting to inform us of her new pharmacy. The patient is set up to receive her refills at [facility] in [place] ([phone]). This pharmacy is now a favorite in the patient's chart. The patient reports that she does not need any refills at this time, this is purely for informational purposes.    Katharine Singh

## 2023-01-03 DIAGNOSIS — I48.19 PERSISTENT ATRIAL FIBRILLATION (H): ICD-10-CM

## 2023-01-03 DIAGNOSIS — D47.2 MGUS (MONOCLONAL GAMMOPATHY OF UNKNOWN SIGNIFICANCE): ICD-10-CM

## 2023-01-03 DIAGNOSIS — I10 ESSENTIAL HYPERTENSION: ICD-10-CM

## 2023-01-04 NOTE — TELEPHONE ENCOUNTER
Next 5 appointments (look out 90 days)      Jan 25, 2023 12:40 PM  (Arrive by 12:20 PM)  Annual Wellness Visit with Cris Romero MD  [facility] ([facility] ) [address] [address] [facility] [address] [facility] [address] [phone]          Routing refill request to provider for review/approval because:  BP Readings from Last 6 Encounters:   07/27/22 (!) 161/57   07/14/22 136/40   07/07/22 (!) 160/74   06/09/22 (!) 148/52   06/02/22 (!) 157/70   05/17/22 138/54      Zina Leung RN

## 2023-01-05 NOTE — TELEPHONE ENCOUNTER
Patient has about 3 days left of meds and needs them refilled asap.    Sonia LOZA  [facility]   [facility]

## 2023-01-06 RX ORDER — AMLODIPINE BESYLATE 5 MG/1
5 TABLET ORAL EVERY EVENING
Qty: 30 TABLET | Refills: 0 | Status: SHIPPED | OUTPATIENT
Start: 2023-01-06 | End: 2023-01-25

## 2023-01-06 RX ORDER — OMEGA-3 FATTY ACIDS/FISH OIL 300-1000MG
1 CAPSULE ORAL DAILY
Qty: 30 CAPSULE | Refills: 0 | Status: SHIPPED | OUTPATIENT
Start: 2023-01-06 | End: 2023-01-30

## 2023-01-06 RX ORDER — METOPROLOL TARTRATE 100 MG
100 TABLET ORAL 2 TIMES DAILY
Qty: 60 TABLET | Refills: 0 | Status: SHIPPED | OUTPATIENT
Start: 2023-01-06 | End: 2023-01-25

## 2023-01-06 NOTE — TELEPHONE ENCOUNTER
Medication has not been sent to the pharmacy.. routing to POD/refill team for further assistance   [phone]

## 2023-01-06 NOTE — TELEPHONE ENCOUNTER
May have #30 if needed until visit   Prescription approved per [facility] Refill Protocol.  Violet Gardiner RN, BSN  [facility]

## 2023-01-25 ENCOUNTER — PRE VISIT (OUTPATIENT)
Dept: ONCOLOGY | Facility: CLINIC | Age: 83
End: 2023-01-25

## 2023-01-25 ENCOUNTER — OFFICE VISIT (OUTPATIENT)
Dept: FAMILY MEDICINE | Facility: CLINIC | Age: 83
End: 2023-01-25
Payer: MEDICARE

## 2023-01-25 VITALS
DIASTOLIC BLOOD PRESSURE: 58 MMHG | OXYGEN SATURATION: 99 % | RESPIRATION RATE: 16 BRPM | BODY MASS INDEX: 20.68 KG/M2 | HEART RATE: 70 BPM | WEIGHT: 116.7 LBS | HEIGHT: 63 IN | SYSTOLIC BLOOD PRESSURE: 138 MMHG | TEMPERATURE: 98 F

## 2023-01-25 DIAGNOSIS — I10 ESSENTIAL HYPERTENSION: ICD-10-CM

## 2023-01-25 DIAGNOSIS — Z00.00 ENCOUNTER FOR MEDICARE ANNUAL WELLNESS EXAM: Primary | ICD-10-CM

## 2023-01-25 DIAGNOSIS — N18.30 STAGE 3 CHRONIC KIDNEY DISEASE, UNSPECIFIED WHETHER STAGE 3A OR 3B CKD (H): ICD-10-CM

## 2023-01-25 DIAGNOSIS — R73.09 ELEVATED GLUCOSE: ICD-10-CM

## 2023-01-25 DIAGNOSIS — D47.2 MGUS (MONOCLONAL GAMMOPATHY OF UNKNOWN SIGNIFICANCE): ICD-10-CM

## 2023-01-25 DIAGNOSIS — E78.5 HYPERLIPIDEMIA LDL GOAL <160: ICD-10-CM

## 2023-01-25 DIAGNOSIS — Z86.39 HISTORY OF HYPOKALEMIA: ICD-10-CM

## 2023-01-25 DIAGNOSIS — I48.19 PERSISTENT ATRIAL FIBRILLATION (H): ICD-10-CM

## 2023-01-25 DIAGNOSIS — R73.09 ELEVATED HEMOGLOBIN A1C: ICD-10-CM

## 2023-01-25 DIAGNOSIS — Z95.0 CARDIAC PACEMAKER IN SITU: ICD-10-CM

## 2023-01-25 PROBLEM — I73.9 PERIPHERAL VASCULAR DISEASE, UNSPECIFIED (H): Status: RESOLVED | Noted: 2022-05-17 | Resolved: 2023-01-25

## 2023-01-25 PROBLEM — I42.9 CARDIOMYOPATHY (H): Status: RESOLVED | Noted: 2020-06-17 | Resolved: 2023-01-25

## 2023-01-25 PROBLEM — M31.6 TEMPORAL ARTERITIS (H): Status: RESOLVED | Noted: 2020-08-08 | Resolved: 2023-01-25

## 2023-01-25 PROBLEM — L97.912 SKIN ULCER OF RIGHT LOWER LEG WITH FAT LAYER EXPOSED (H): Status: RESOLVED | Noted: 2021-01-13 | Resolved: 2023-01-25

## 2023-01-25 PROBLEM — F39 MOOD DISORDER (H): Status: RESOLVED | Noted: 2021-10-19 | Resolved: 2023-01-25

## 2023-01-25 PROBLEM — M35.3 PMR (POLYMYALGIA RHEUMATICA) (H): Status: RESOLVED | Noted: 2020-01-17 | Resolved: 2023-01-25

## 2023-01-25 LAB
ALBUMIN SERPL BCG-MCNC: 4.3 G/DL (ref 3.5–5.2)
ALP SERPL-CCNC: 148 U/L (ref 35–104)
ALT SERPL W P-5'-P-CCNC: 21 U/L (ref 10–35)
ANION GAP SERPL CALCULATED.3IONS-SCNC: 12 MMOL/L (ref 7–15)
AST SERPL W P-5'-P-CCNC: 24 U/L (ref 10–35)
BILIRUB SERPL-MCNC: 0.4 MG/DL
BUN SERPL-MCNC: 16.4 MG/DL (ref 8–23)
CALCIUM SERPL-MCNC: 10.4 MG/DL (ref 8.8–10.2)
CHLORIDE SERPL-SCNC: 104 MMOL/L (ref 98–107)
CHOLEST SERPL-MCNC: 155 MG/DL
CREAT SERPL-MCNC: 0.63 MG/DL (ref 0.51–0.95)
DEPRECATED HCO3 PLAS-SCNC: 22 MMOL/L (ref 22–29)
ERYTHROCYTE [DISTWIDTH] IN BLOOD BY AUTOMATED COUNT: 13.9 % (ref 10–15)
FERRITIN SERPL-MCNC: 442 NG/ML (ref 11–328)
GFR SERPL CREATININE-BSD FRML MDRD: 88 ML/MIN/1.73M2
GLUCOSE SERPL-MCNC: 79 MG/DL (ref 70–99)
HBA1C MFR BLD: 5.4 % (ref 0–5.6)
HCT VFR BLD AUTO: 38.5 % (ref 35–47)
HDLC SERPL-MCNC: 63 MG/DL
HGB BLD-MCNC: 12.1 G/DL (ref 11.7–15.7)
HOLD SPECIMEN: NORMAL
LDLC SERPL CALC-MCNC: 74 MG/DL
MCH RBC QN AUTO: 28.9 PG (ref 26.5–33)
MCHC RBC AUTO-ENTMCNC: 31.4 G/DL (ref 31.5–36.5)
MCV RBC AUTO: 92 FL (ref 78–100)
NONHDLC SERPL-MCNC: 92 MG/DL
PLATELET # BLD AUTO: 354 10E3/UL (ref 150–450)
POTASSIUM SERPL-SCNC: 4.9 MMOL/L (ref 3.4–5.3)
PROT SERPL-MCNC: 7.1 G/DL (ref 6.4–8.3)
RBC # BLD AUTO: 4.19 10E6/UL (ref 3.8–5.2)
SODIUM SERPL-SCNC: 138 MMOL/L (ref 136–145)
TRIGL SERPL-MCNC: 91 MG/DL
VIT B12 SERPL-MCNC: 434 PG/ML (ref 232–1245)
WBC # BLD AUTO: 10 10E3/UL (ref 4–11)

## 2023-01-25 PROCEDURE — 99207 PR FOOT EXAM NO CHARGE: CPT | Performed by: INTERNAL MEDICINE

## 2023-01-25 PROCEDURE — 83036 HEMOGLOBIN GLYCOSYLATED A1C: CPT | Performed by: INTERNAL MEDICINE

## 2023-01-25 PROCEDURE — 82728 ASSAY OF FERRITIN: CPT | Performed by: INTERNAL MEDICINE

## 2023-01-25 PROCEDURE — 80061 LIPID PANEL: CPT | Performed by: INTERNAL MEDICINE

## 2023-01-25 PROCEDURE — 80053 COMPREHEN METABOLIC PANEL: CPT | Performed by: INTERNAL MEDICINE

## 2023-01-25 PROCEDURE — 99214 OFFICE O/P EST MOD 30 MIN: CPT | Mod: 25 | Performed by: INTERNAL MEDICINE

## 2023-01-25 PROCEDURE — 82607 VITAMIN B-12: CPT | Performed by: INTERNAL MEDICINE

## 2023-01-25 PROCEDURE — 85027 COMPLETE CBC AUTOMATED: CPT | Performed by: INTERNAL MEDICINE

## 2023-01-25 PROCEDURE — 83550 IRON BINDING TEST: CPT | Performed by: INTERNAL MEDICINE

## 2023-01-25 PROCEDURE — G0439 PPPS, SUBSEQ VISIT: HCPCS | Performed by: INTERNAL MEDICINE

## 2023-01-25 PROCEDURE — 36415 COLL VENOUS BLD VENIPUNCTURE: CPT | Performed by: INTERNAL MEDICINE

## 2023-01-25 PROCEDURE — 83540 ASSAY OF IRON: CPT | Performed by: INTERNAL MEDICINE

## 2023-01-25 RX ORDER — METOPROLOL TARTRATE 100 MG
100 TABLET ORAL 2 TIMES DAILY
Qty: 180 TABLET | Refills: 3 | Status: SHIPPED | OUTPATIENT
Start: 2023-01-25 | End: 2023-06-19

## 2023-01-25 RX ORDER — AMLODIPINE BESYLATE 5 MG/1
5 TABLET ORAL EVERY EVENING
Qty: 90 TABLET | Refills: 3 | Status: SHIPPED | OUTPATIENT
Start: 2023-01-25 | End: 2023-06-19

## 2023-01-25 RX ORDER — EZETIMIBE 10 MG/1
10 TABLET ORAL EVERY EVENING
Qty: 90 TABLET | Refills: 3 | Status: SHIPPED | OUTPATIENT
Start: 2023-01-25 | End: 2023-06-19

## 2023-01-25 NOTE — PROGRESS NOTES
"Chief Complaint   Patient presents with     Medicare Visit       SUBJECTIVE:   Tomasa Fam is a 82 year old female who presents for Preventive Visit.      Patient has been advised of split billing requirements and indicates understanding: Yes  Are you in the first 12 months of your Medicare Part B coverage?  No    Physical Health:    In general, how would you rate your overall physical health? fair    Outside of work, how many days during the week do you exercise? 1 day/week    Outside of work, approximately how many minutes a day do you exercise?30-45 minutes    If you drink alcohol do you typically have >3 drinks per day or >7 drinks per week? No    Do you usually eat at least 4 servings of fruit and vegetables a day, include whole grains & fiber and avoid regularly eating high fat or \"junk\" foods? NO    Do you have any problems taking medications regularly?  No    Do you have any side effects from medications? none    Needs assistance for the following daily activities: telephone use, transportation, shopping, preparing meals, housework and money management    Which of the following safety concerns are present in your home?  throw rugs in the hallway     Hearing impairment: No    In the past 6 months, have you been bothered by leaking of urine? yes    Mental Health:    In general, how would you rate your overall mental or emotional health? fair  PHQ-2 Score:      Do you feel safe in your environment? YES    Have you ever done Advance Care Planning? (For example, a Health Directive, POLST, or a discussion with a medical provider or your loved ones about your wishes): Yes, advance care planning is on file.    Additional concerns to address?  YES-Has A LOT of questions per patient.     Fall risk:  Fallen 2 or more times in the past year?: No  Any fall with injury in the past year?: Yes  Timed Up and Go Test (>13.5 is fall risk; contact physician) : 3    Cognitive Screenin) Repeat 3 items (Leader, " Season, Table)    2) Clock draw: NORMAL  3) 3 item recall: Recalls 2 objects   Results: NORMAL clock, 1-2 items recalled: COGNITIVE IMPAIRMENT LESS LIKELY    Mini-CogTM Copyright HUNTER Higgins. Licensed by the author for use in [facility]; reprinted with permission ([email or web address]). All rights reserved.      Do you have sleep apnea, excessive snoring or daytime drowsiness?: no        Reviewed and updated as needed this visit by clinical staff   Tobacco  Allergies  Meds  Problems  Med Hx  Surg Hx  Fam Hx          Reviewed and updated as needed this visit by Provider   Tobacco  Allergies  Meds  Problems  Med Hx  Surg Hx  Fam Hx         Social History     Tobacco Use     Smoking status: Never     Smokeless tobacco: Never   Substance Use Topics     Alcohol use: Yes     Comment: 0-1 drink day                           Current providers sharing in care for this patient include:   Patient Care Team:  Cris Romero MD as PCP - General (Internal Medicine)  Care, [facility] ([facility] ([place]  Willow De La P[facility] as Pharmacist (Pharmacist)  Yeo, Albert, MD as Assigned Musculoskeletal Provider  Cris Romero MD as Assigned PCP  Kylie Sevilla DO as Assigned Heart and Vascular Provider  Negro Ibarra MD as Assigned Cancer Care Provider    The following health maintenance items are reviewed in Epic and correct as of today:  Health Maintenance   Topic Date Due     HF ACTION PLAN  Never done     EYE EXAM  Never done     COVID-19 Vaccine (3 - Booster for Ashlie series) 01/27/2022     DIABETIC FOOT EXAM  03/16/2022     PHQ-9  08/28/2022     A1C  12/16/2022     BMP  02/15/2023     ALT  03/24/2023     LIPID  03/24/2023     ANNUAL REVIEW OF HM ORDERS  05/17/2023     MICROALBUMIN  08/15/2023     CBC  08/15/2023     HEMOGLOBIN  08/15/2023     MEDICARE ANNUAL WELLNESS VISIT  01/25/2024     FALL RISK ASSESSMENT  01/25/2024     DEXA  10/01/2027     ADVANCE  CARE PLANNING  01/25/2028     TSH W/FREE T4 REFLEX  Completed     SPIROMETRY  Completed     COPD ACTION PLAN  Completed     PHQ-2 (once per calendar year)  Completed     INFLUENZA VACCINE  Completed     Pneumococcal Vaccine: 65+ Years  Completed     URINALYSIS  Completed     IPV IMMUNIZATION  Aged Out     MENINGITIS IMMUNIZATION  Aged Out     URINE DRUG SCREEN  Discontinued     COLORECTAL CANCER SCREENING  Discontinued     ZOSTER IMMUNIZATION  Discontinued     DTAP/TDAP/TD IMMUNIZATION  Discontinued     Labs reviewed in EPIC  BP Readings from Last 3 Encounters:   01/25/23 138/58   07/27/22 (!) 161/57   07/14/22 136/40    Wt Readings from Last 3 Encounters:   01/25/23 52.9 kg (116 lb 11.2 oz)   07/25/22 53.1 kg (117 lb)   07/14/22 53.1 kg (117 lb)                  Patient Active Problem List   Diagnosis     Pulmonary emphysema, unspecified emphysema type (H)     ASCUS on Pap smear     Hyperlipidemia LDL goal <160     Essential hypertension with goal blood pressure less than 140/90     Cystocele, midline     Uterovaginal prolapse     S/P hysterectomy     Advanced directives, counseling/discussion     History of hypokalemia     Concussion     Thyroid nodule     Chronic pain of both knees     History of lung cancer     Gastroesophageal reflux disease, esophagitis presence not specified     PMR (polymyalgia rheumatica) (H)     Personal history of malignant neoplasm of breast     Long term systemic steroid user     Cardiomyopathy (H)     Complete heart block (H)     Temporal arteritis (H)     Elevation of level of transaminase or lactic acid dehydrogenase (LDH)     Recurrent falls     Dyspnea     Persistent atrial fibrillation (H)     Skin ulcer of right lower leg with fat layer exposed (H)     Cardiac pacemaker in situ 5/22/2020; AV node ablation     Chronic kidney disease, stage 3 (H)     Mood disorder (H)     Inferior pubic ramus fracture, right, closed, initial encounter (H)     Peripheral vascular disease,  unspecified (H)     Iron deficiency anemia due to chronic blood loss     S/P total knee arthroplasty     Past Surgical History:   Procedure Laterality Date     ANESTHESIA CARDIOVERSION N/A 6/12/2020    Procedure: ANESTHESIA, FOR CARDIOVERSION;  Surgeon: GENERIC ANESTHESIA PROVIDER;  Location:  OR     ARTHROPLASTY KNEE Right 7/25/2022    Procedure: Right total knee arthroplasty;  Surgeon: Nick Parra MD;  Location:  OR     COLONOSCOPY  3/2003    adenomatous polyp      COLONOSCOPY  7/2006    diverticulosis - repeat in 5 years     COLONOSCOPY  10/13/2011    Procedure:COLONOSCOPY; COLONOSCOPY ; Surgeon:CHITO GORDILLO; Location: GI     CYSTOSCOPY  7/11/2012    Procedure: CYSTOSCOPY;;  Surgeon: Aline Cooper DO;  Location:  OR     DAVINCI HYSTERECTOMY SUPRACERVICAL, SACROCOLPOPEXY, COMBINED  7/11/2012    Procedure: COMBINED DAVINCI HYSTERECTOMY SUPRACERVICAL, SACROCOLPOPEXY;   DAVINCI ASSISTED LAPAROSCOPIC SUPRACERVICAL HYSTERECTOMY AND BILATERAL SALPINGO-OOPHORECTOMY, SACROCOLPOPEXY AND CYSTOSCOPY;  Surgeon: Aline Cooper DO;  Location:  OR     EP ABLATION AV NODE N/A 6/22/2020    Procedure: EP ABLATION AV NODE;  Surgeon: Adriano Raman MD;  Location:  HEART CARDIAC CATH LAB     EP BIVENT LEAD PLACEMENT N/A 6/22/2020    Procedure: Bivent Lead Placement;  Surgeon: Adriano Raman MD;  Location:  HEART CARDIAC CATH LAB     EP PACEMAKER N/A 6/22/2020    Procedure: AVNA and BiV Pacemaker Insertion;  Surgeon: Adriano Raman MD;  Location:  HEART CARDIAC CATH LAB     ESOPHAGOSCOPY, GASTROSCOPY, DUODENOSCOPY (EGD), COMBINED N/A 12/14/2021    Procedure: ESOPHAGOGASTRODUODENOSCOPY (EGD) (fv) biopsies with cold forcep;  Surgeon: Chito Gordillo MD;  Location:  GI     EXCISE LESION EYELID Right 6/29/2015    Procedure: EXCISE LESION EYELID;  Surgeon: Hank Olvera MD;  Location:  SD     INSERT PORT VASCULAR ACCESS  2/3/2012    Procedure:INSERT PORT  VASCULAR ACCESS; Power Port-A- Catheter Placement ; Surgeon:IRISH TAPIA; Location:[facility] OR     LAPAROSCOPIC SALPINGO-OOPHORECTOMY  7/11/2012    Procedure: LAPAROSCOPIC SALPINGO-OOPHORECTOMY;  Davinci;  Surgeon: Aline Cooper DO;  Location: [facility] OR     LIPOSUCTION, RHYTIDECTOMY, COMBINED       LOBECTOMY LUNG Right 3/1/2016    Procedure: LOBECTOMY LUNG;  Surgeon: Jonas Woodward MD;  Location: [facility] OR     MAMMOPLASTY REDUCTION  1/6/2012    Procedure:MAMMOPLASTY REDUCTION; Surgeon:MICKI KYLE; Location:[facility] OR     MASTECTOMY SIMPLE  11/12/2012    Procedure: MASTECTOMY SIMPLE;   Right Prophylactic Mastectomy with attempted Right Sentinal Node Biopsy, Revision Bilateral Mastectomy Insicions, liposuction in breast area;  Surgeon: Irish Tapia MD;  Location: [facility] OR     MASTECTOMY SIMPLE, SENTINEL NODE, COMBINED  1/6/2012    Procedure:COMBINED MASTECTOMY SIMPLE, SENTINEL NODE; Left Mastectomy Left Ruffin Node Biopsy,  Right Breast Reduction ; Surgeon:IRISH TAPIA; Location:[facility] OR     MASTECTOMY, BILATERAL       REMOVE PORT VASCULAR ACCESS  4/29/2013    Procedure: REMOVE PORT VASCULAR ACCESS;  Port A catheter removal ;  Surgeon: Irish Tapia MD;  Location: [facility] OR     REPAIR PTOSIS BILATERAL Bilateral 6/29/2015    Procedure: REPAIR PTOSIS BILATERAL;  Surgeon: Hank Olvera MD;  Location:  SD     REVISE RECONSTRUCTED BREAST BILATERAL  11/12/2012    Procedure: REVISE RECONSTRUCTED BREAST BILATERAL;;  Surgeon: Micki Kyle MD;  Location: [facility] OR     SURGICAL HISTORY OF -   in 40's    face lift     SURGICAL HISTORY OF -       lipoma removed right thigh     SURGICAL HISTORY OF -       D and C     THORACOTOMY Right 3/1/2016    Procedure: THORACOTOMY;  Surgeon: Jonas Woodward MD;  Location:  OR     ZZC NONSPECIFIC PROCEDURE  1970    s/p Tubal ligation 1970       Social History     Tobacco Use     Smoking status: Never     Smokeless tobacco: Never   Substance Use  Topics     Alcohol use: Yes     Comment: 0-1 drink day     Family History   Problem Relation Age of Onset     Cardiovascular Father         ruptured aorta, hardening of the arteries     Cerebrovascular Disease Mother      Respiratory Mother         chronic bronchitis - was a smoker early on     Cardiovascular Paternal Grandfather         MI     Cerebrovascular Disease Paternal Aunt      Hypertension Son      Neurologic Disorder Daughter         migraines     Breast Cancer Daughter      Brain Tumor Sister          Current Outpatient Medications   Medication Sig Dispense Refill     acetaminophen (TYLENOL) 325 MG tablet Take 2 tablets (650 mg) by mouth every 4 hours as needed for other (mild pain) 100 tablet 0     amLODIPine (NORVASC) 5 MG tablet Take 1 tablet (5 mg) by mouth every evening 30 tablet 0     ELIQUIS ANTICOAGULANT 5 MG tablet TAKE 1 TABLET BY MOUTH TWICE DAILY 180 tablet 0     ezetimibe (ZETIA) 10 MG tablet TAKE 1 TABLET BY MOUTH EVERY EVENING 30 tablet 11     Ferrous Sulfate (IRON PO) Take 1 tablet by mouth daily       metoprolol tartrate (LOPRESSOR) 100 MG tablet Take 1 tablet (100 mg) by mouth 2 times daily 60 tablet 0     omega 3 1000 MG CAPS Take 1 capsule by mouth daily 30 capsule 0     INCRUSE ELLIPTA 62.5 MCG/INH Inhaler Inhale 1 puff into the lungs daily (Patient not taking: Reported on 1/25/2023)       Allergies   Allergen Reactions     No Known Drug Allergies      Tape [Adhesive Tape]      Sensitive to plastic tape on upper part of body--takes skin off     Recent Labs   Lab Test 08/15/22  0927 07/14/22  1114 06/16/22  0940 05/17/22  0933 03/24/22  0859 01/31/22  1459 10/19/21  1021 07/01/21  1357 06/01/21  0957 03/16/21  1548 08/31/20  1014 08/27/20  1530 06/12/20  1030 05/29/20  0842 09/14/19  0621 09/13/19  1127   A1C  --   --  5.3  --  5.3  --  5.7*  --   --  5.6   < >  --   --   --   --   --    LDL  --   --   --   --  58  --  84  --   --   --   --   --   --  76  --   --    HDL  --   --   --   " --  51  --  53  --   --   --   --   --   --  50  --   --    TRIG  --   --   --   --  104  --  101  --   --   --   --   --   --  110  --   --    ALT  --   --   --   --  41  --  26  --   --  35   < > 134*   < > 128*   < > 24   CR 0.57 0.54  --    < > 0.60   < > 0.73 0.98 0.91 1.03   < > 0.90   < > 0.92   < > 0.79   GFRESTIMATED 90 >90  --    < > 89   < > 77 54* 59* 51*   < > 60*   < > 59*   < > 71   GFRESTBLACK  --   --   --   --   --   --   --  63 68 59*   < > 70   < > 68   < > 82   POTASSIUM 4.6 4.5  --    < > 3.9   < > 4.2 3.5 4.3 3.0*   < > 4.5   < > 3.9   < > 3.5   TSH  --   --   --   --   --   --   --   --   --   --   --  2.24  --   --   --  2.99    < > = values in this interval not displayed.          ROS:  CONSTITUTIONAL: NEGATIVE for fever, chills, change in weight  ENT/MOUTH: NEGATIVE for ear, mouth and throat problems  RESP: NEGATIVE for significant cough or SOB  CV: NEGATIVE for chest pain, palpitations or peripheral edema  GI: NEGATIVE for nausea, abdominal pain, heartburn, or change in bowel habits  MUSCULOSKELETAL: using walker; 7/2022 s/p Right TKA  NEURO: NEGATIVE for weakness, dizziness or paresthesias  ENDOCRINE: lipids reviewed  HEME/ALLERGY/IMMUNE: NEGATIVE for bleeding problems  PSYCHIATRIC: NEGATIVE for changes in mood or affect    OBJECTIVE:   /58 (BP Location: Right arm, Patient Position: Sitting, Cuff Size: Adult Regular)   Pulse 70   Temp 98  F (36.7  C)   Resp 16   Ht 1.607 m (5' 3.25\")   Wt 52.9 kg (116 lb 11.2 oz)   LMP  (LMP Unknown)   SpO2 99%   BMI 20.51 kg/m   Estimated body mass index is 20.51 kg/m  as calculated from the following:    Height as of this encounter: 1.607 m (5' 3.25\").    Weight as of this encounter: 52.9 kg (116 lb 11.2 oz).  EXAM:   GENERAL: healthy, alert and no distress  EYES: Eyes grossly normal to inspection  NECK: no adenopathy, no asymmetry, masses, or scars and thyroid normal to palpation  RESP: lungs clear to auscultation - no rales, rhonchi or " wheezes  CV: regular rates and rhythm, normal S1 S2, no S3 or S4 and peripheral pulses strong  ABDOMEN: soft, nontender and bowel sounds normal  MS: ambulating without assist device. no gross musculoskeletal defects noted, no edema  NEURO: Normal strength and tone, sensory exam grossly normal and mentation intact  PSYCH: mentation appears normal, affect normal/bright    Diagnostic Test Results:  Labs reviewed in Epic              ASSESSMENT / PLAN:   (Z00.00) Encounter for Medicare annual wellness exam  (primary encounter diagnosis)  Comment: HEALTH CARE MAINTENANCE reveiwed  Plan:     (R73.09) Elevated glucose  Comment:   Lab Results   Component Value Date    GLC 79 01/25/2023     08/15/2022    GLC 97 07/27/2022     07/26/2022    GLC 97 07/01/2021    GLC 87 06/01/2021      assess risk for DM, reassess glucose and A1C  Plan: HEMOGLOBIN A1C        Healthy diet and regular exercise.     (I10) Essential hypertension  Comment: BLOOD PRESSURE well controlled;   Plan: amLODIPine (NORVASC) 5 MG tablet, metoprolol         tartrate (LOPRESSOR) 100 MG tablet          (I48.19) Persistent atrial fibrillation (H)  Comment: she is on meds for rate control and Eliquis for anticoagulation; also has pacemaker.  Plan: apixaban ANTICOAGULANT (ELIQUIS ANTICOAGULANT)         5 MG tablet, metoprolol tartrate (LOPRESSOR) 100 MG tablet          (E78.5) Hyperlipidemia LDL goal <160  Comment: she has not tolerated statin; Zetia well tolerated.  Plan: ezetimibe (ZETIA) 10 MG tablet, Lipid panel         reflex to direct LDL Fasting          (D47.2) MGUS (monoclonal gammopathy of unknown significance)  Comment: monitoring labs and has seen Oncology;   Plan: CBC with Platelets and Reflex to Iron Studies,         Vitamin B12, Iron & Iron Binding Capacity, Ferritin          (Z86.39) History of hypokalemia  Comment: monitor potassium; supplement as needed.     Plan: Comprehensive metabolic panel (BMP + Alb, Alk         Phos, ALT, AST,  "Total. Bili, TP)          (Z95.0) Cardiac pacemaker in situ 5/22/2020; AV node ablation  Comment: pacemaker in place since 5/22/2020  Plan:     (N18.30) Stage 3 chronic kidney disease, unspecified whether stage 3a or 3b CKD (H)  Comment: CKD reviewed; monitor labs periodically  Plan: Comprehensive metabolic panel (BMP + Alb, Alk         Phos, ALT, AST, Total. Bili, TP)          (R73.09) Elevated hemoglobin A1c  Comment: monitor labs;treated with healthy diet and regular exercise   Plan: HEMOGLOBIN A1C, FOOT EXAM            Patient has been advised of split billing requirements and indicates understanding: Yes    COUNSELING:  Reviewed preventive health counseling, as reflected in patient instructions       Regular exercise       Healthy diet/nutrition    Estimated body mass index is 20.51 kg/m  as calculated from the following:    Height as of this encounter: 1.607 m (5' 3.25\").    Weight as of this encounter: 52.9 kg (116 lb 11.2 oz).    Weight management plan noted, stable and monitoring    She reports that she has never smoked. She has never used smokeless tobacco.    Appropriate preventive services were discussed with this patient, including applicable screening as appropriate for cardiovascular disease, diabetes, osteopenia/osteoporosis, and glaucoma.  As appropriate for age/gender, discussed screening for colorectal cancer, prostate cancer, breast cancer, and cervical cancer. Checklist reviewing preventive services available has been given to the patient.    Reviewed patients plan of care and provided an AVS. The Complex Care Plan (for patients with higher acuity and needing more deliberate coordination of services) for Tomasa meets the Care Plan requirement. This Care Plan has been established and reviewed with the Patient.    Counseling Resources:  ATP IV Guidelines  Pooled Cohorts Equation Calculator  Breast Cancer Risk Calculator  BRCA-Related Cancer Risk Assessment: FHS-7 Tool  FRAX Risk Assessment  ICSI " Preventive Guidelines  Dietary Guidelines for Americans, 2010  [employer]'s MyPlate  ASA Prophylaxis  Lung CA Screening    Cris Romero MD  Internal Medicine   [facility]    30 minutes in addition to HEALTH CARE MAINTENANCE are spent with patient, over 50% of that time spent providing counselling, discussing and reviewing medical conditions/concerns, meds and potential side effects.

## 2023-01-25 NOTE — PATIENT INSTRUCTIONS
Patient Education   Personalized Prevention Plan  You are due for the preventive services outlined below.  Your care team is available to assist you in scheduling these services.  If you have already completed any of these items, please share that information with your care team to update in your medical record.  Health Maintenance Due   Topic Date Due     Heart Failure Action Plan  Never done     Eye Exam  Never done     Zoster (Shingles) Vaccine (1 of 2) Never done     COVID-19 Vaccine (3 - Booster for Ashlie series) 01/27/2022     Diabetic Foot Exam  03/16/2022     Depression Assessment  08/28/2022     FALL RISK ASSESSMENT  10/19/2022     A1C Lab  12/16/2022     PHQ-2 (once per calendar year)  01/01/2023     Basic Metabolic Panel  02/15/2023

## 2023-01-25 NOTE — NURSING NOTE
"Chief Complaint   Patient presents with     Medicare Visit     Initial /58 (BP Location: Right arm, Patient Position: Sitting, Cuff Size: Adult Regular)   Pulse 70   Temp 98  F (36.7  C)   Resp 16   Ht 1.607 m (5' 3.25\")   Wt 52.9 kg (116 lb 11.2 oz)   LMP  (LMP Unknown)   SpO2 99%   BMI 20.51 kg/m   Estimated body mass index is 20.51 kg/m  as calculated from the following:    Height as of this encounter: 1.607 m (5' 3.25\").    Weight as of this encounter: 52.9 kg (116 lb 11.2 oz).  BP completed using cuff size regular long right arm    Lisa Magill, CMA    "

## 2023-01-26 LAB
IRON BINDING CAPACITY (ROCHE): 265 UG/DL (ref 240–430)
IRON SATN MFR SERPL: 10 % (ref 15–46)
IRON SERPL-MCNC: 27 UG/DL (ref 37–145)

## 2023-01-30 ENCOUNTER — TELEPHONE (OUTPATIENT)
Dept: FAMILY MEDICINE | Facility: CLINIC | Age: 83
End: 2023-01-30

## 2023-01-30 DIAGNOSIS — D47.2 MGUS (MONOCLONAL GAMMOPATHY OF UNKNOWN SIGNIFICANCE): ICD-10-CM

## 2023-01-30 RX ORDER — CHLORAL HYDRATE 500 MG
CAPSULE ORAL
Qty: 90 CAPSULE | Refills: 3 | Status: SHIPPED | OUTPATIENT
Start: 2023-01-30

## 2023-01-30 NOTE — TELEPHONE ENCOUNTER
Prescription approved per [facility] Refill Protocol.  Violet Gardiner RN, BSN  [facility]

## 2023-01-30 NOTE — TELEPHONE ENCOUNTER
Pt calls.      She does not have a computer anymore.  She had labs done last week that she is wondering about them.    Advised I can check with Dr. Romero and we can send them to her.      She would like it sent to:  [facility], [address] [place] [address], Room [address].  This was sent.

## 2023-02-02 ENCOUNTER — TELEPHONE (OUTPATIENT)
Dept: FAMILY MEDICINE | Facility: CLINIC | Age: 83
End: 2023-02-02
Payer: MEDICARE

## 2023-02-02 NOTE — TELEPHONE ENCOUNTER
Received call from pt  She would like to know her lab results and the interpretation  Relayed results  She wants to know if she should continue to take the OTC   Ferrous Sulfate 325mg daily    Please mail lab results and lab note to pt at  :  [place] [address], [address].       Thank you  Clare Dyer RN on 2/2/2023 at 10:12 AM

## 2023-02-05 ASSESSMENT — ACTIVITIES OF DAILY LIVING (ADL)
CURRENT_FUNCTION: NEEDS ASSISTANCE

## 2023-02-07 NOTE — TELEPHONE ENCOUNTER
Call to pt went over labs    She is taking iron every other day - she doesn't like that her stools are black. Talked about taking iron w/ oj to help w/ absorption.     Talked about other labs, questions, etc.     Offered to schedule appt down the road - pt declined at this time.     Irish RICO RN

## 2023-02-16 ENCOUNTER — TELEPHONE (OUTPATIENT)
Dept: FAMILY MEDICINE | Facility: CLINIC | Age: 83
End: 2023-02-16
Payer: MEDICARE

## 2023-02-16 DIAGNOSIS — K21.9 GASTROESOPHAGEAL REFLUX DISEASE: ICD-10-CM

## 2023-02-16 DIAGNOSIS — K59.00 CONSTIPATION: Primary | ICD-10-CM

## 2023-02-16 RX ORDER — POLYETHYLENE GLYCOL 3350 17 G/17G
17 POWDER, FOR SOLUTION ORAL DAILY PRN
Qty: 510 G | Refills: 0 | COMMUNITY
Start: 2023-02-16

## 2023-02-16 NOTE — TELEPHONE ENCOUNTER
"Spoke with patient.    Concern with itchy skin. Advised Sarna lotion.    Patient stated did have brace for Rt hand numbness as was treated for carpel tunnel. Sensations do go away with use of hand. Offered to request Hand P T referral. Patient does not want at this time. Patient stated \"I just need to find my brace\".    Patient does take Omeprazole (Prilosec 20 mg). Does not take every day. Will try taking daily for reflux symptoms. Does have hx of \"food getting stuck and coming back up\". Upper GI endoscopy done 12/2021.    C/o 1 episode of this last week while eating. Advised liquids with meals, smaller bites, chewing well. Should be seen if continues.    Take miralax due to constipation from iron supplement.    Miralax and Prilosec added to med list.       Concerned with feet swelling and tingling. Denies pain. Patient stated was discussed at visit.     Discussed if painful there are medication options that can be discussed.     Asking if this could be neuropathy and if there was something to alleviate swelling and tingling.    Please advise.    Carlie Burns RN    "

## 2023-02-16 NOTE — TELEPHONE ENCOUNTER
Pt called with Multiple questions:     1. We discussed something for the itchy skin and you mentioned something better but didn't write it down. What was it?    2. My feet always swell and don't go down but also my R hand goes numb. Should I be worried?    3. I told you that I take Metamucil but I meant Miralax. Is that still okay?    4. Lastly, the Prilosec isn't working any longer but years back I had a RX for something that was similar but started with an O. Can we go back?      Pt call back #: [phone] Detailed Vm ok.    Tamia Huitron

## 2023-03-11 ENCOUNTER — TELEPHONE (OUTPATIENT)
Dept: FAMILY MEDICINE | Facility: CLINIC | Age: 83
End: 2023-03-11
Payer: MEDICARE

## 2023-03-11 NOTE — TELEPHONE ENCOUNTER
Reason for Call:  Other appointment    Detailed comments: patient called and would like an E/R f/u appointment with Nick Vo at [facility].    Reason:  COVID pos and med check.  [facility].    COVID pos on March 7.    Provider in person okay?  Or Video?      Please contact patient.  Thank you.    Phone Number Patient can be reached at: Home number on file [phone] (home)    Best Time: any    Can we leave a detailed message on this number? YES    Call taken on 3/11/2023 at 10:22 AM by Vanda Jesus

## 2023-03-13 ENCOUNTER — TELEPHONE (OUTPATIENT)
Dept: FAMILY MEDICINE | Facility: CLINIC | Age: 83
End: 2023-03-13

## 2023-03-13 NOTE — TELEPHONE ENCOUNTER
Patient called requesting a hospital follow-up appointment. They were in [facility] last week with COVID-19. The records from the hospital stay are available in Epic. However, the patient is unable to come to this visit in-person because they do not live nearby anymore and they have no transportation. Routing to PCP to see if it is possible to do a virtual ED follow-up.    Katharine Singh

## 2023-03-13 NOTE — TELEPHONE ENCOUNTER
Would you want to get her worked into your schedule and if so, virtual or in person?    Janelle Huitron

## 2023-03-20 ENCOUNTER — MYC MEDICAL ADVICE (OUTPATIENT)
Dept: FAMILY MEDICINE | Facility: CLINIC | Age: 83
End: 2023-03-20
Payer: MEDICARE

## 2023-03-20 NOTE — TELEPHONE ENCOUNTER
Message sent to TC earlier today but unsure if anything was done with the message. Pt still on schedule for tomorrow.   ???     See other message. Since she is now living in [place] and without transportation . Perhaps she should transition care to [facility] for her ongoing health needs.

## 2023-03-20 NOTE — TELEPHONE ENCOUNTER
Sent [employer] relaying the message below, will call in a couple days if it is not read.    Katharine Singh

## 2023-03-20 NOTE — TELEPHONE ENCOUNTER
See other message. Since she is now living in [place] and without transportation . Perhaps she should transition care to [facility] for her ongoing health needs.

## 2023-03-20 NOTE — TELEPHONE ENCOUNTER
If pt living in [place] and unable to come to [facility], perhaps she should transfer care to a local clinic in [facility].

## 2023-03-21 ENCOUNTER — OFFICE VISIT (OUTPATIENT)
Dept: FAMILY MEDICINE | Facility: CLINIC | Age: 83
End: 2023-03-21
Payer: MEDICARE

## 2023-03-21 VITALS
HEART RATE: 70 BPM | RESPIRATION RATE: 20 BRPM | WEIGHT: 114.4 LBS | TEMPERATURE: 97.7 F | DIASTOLIC BLOOD PRESSURE: 56 MMHG | BODY MASS INDEX: 20.11 KG/M2 | OXYGEN SATURATION: 100 % | SYSTOLIC BLOOD PRESSURE: 144 MMHG

## 2023-03-21 DIAGNOSIS — J06.9 RECENT UPPER RESPIRATORY TRACT INFECTION: ICD-10-CM

## 2023-03-21 DIAGNOSIS — E87.6 HYPOKALEMIA: ICD-10-CM

## 2023-03-21 DIAGNOSIS — I48.19 PERSISTENT ATRIAL FIBRILLATION (H): ICD-10-CM

## 2023-03-21 DIAGNOSIS — Z09 HOSPITAL DISCHARGE FOLLOW-UP: Primary | ICD-10-CM

## 2023-03-21 DIAGNOSIS — E04.1 THYROID NODULE: ICD-10-CM

## 2023-03-21 LAB
ANION GAP SERPL CALCULATED.3IONS-SCNC: 14 MMOL/L (ref 7–15)
BASOPHILS # BLD AUTO: 0 10E3/UL (ref 0–0.2)
BASOPHILS NFR BLD AUTO: 0 %
BUN SERPL-MCNC: 16.3 MG/DL (ref 8–23)
CALCIUM SERPL-MCNC: 10.4 MG/DL (ref 8.8–10.2)
CHLORIDE SERPL-SCNC: 101 MMOL/L (ref 98–107)
CREAT SERPL-MCNC: 0.58 MG/DL (ref 0.51–0.95)
DEPRECATED HCO3 PLAS-SCNC: 23 MMOL/L (ref 22–29)
EOSINOPHIL # BLD AUTO: 0.1 10E3/UL (ref 0–0.7)
EOSINOPHIL NFR BLD AUTO: 1 %
ERYTHROCYTE [DISTWIDTH] IN BLOOD BY AUTOMATED COUNT: 12.8 % (ref 10–15)
GFR SERPL CREATININE-BSD FRML MDRD: 89 ML/MIN/1.73M2
GLUCOSE SERPL-MCNC: 76 MG/DL (ref 70–99)
HCT VFR BLD AUTO: 37.3 % (ref 35–47)
HGB BLD-MCNC: 11.7 G/DL (ref 11.7–15.7)
LYMPHOCYTES # BLD AUTO: 2.1 10E3/UL (ref 0.8–5.3)
LYMPHOCYTES NFR BLD AUTO: 20 %
MCH RBC QN AUTO: 28.2 PG (ref 26.5–33)
MCHC RBC AUTO-ENTMCNC: 31.4 G/DL (ref 31.5–36.5)
MCV RBC AUTO: 90 FL (ref 78–100)
MONOCYTES # BLD AUTO: 1.1 10E3/UL (ref 0–1.3)
MONOCYTES NFR BLD AUTO: 10 %
NEUTROPHILS # BLD AUTO: 7 10E3/UL (ref 1.6–8.3)
NEUTROPHILS NFR BLD AUTO: 68 %
PLATELET # BLD AUTO: 326 10E3/UL (ref 150–450)
POTASSIUM SERPL-SCNC: 4.1 MMOL/L (ref 3.4–5.3)
RBC # BLD AUTO: 4.15 10E6/UL (ref 3.8–5.2)
SODIUM SERPL-SCNC: 138 MMOL/L (ref 136–145)
TSH SERPL DL<=0.005 MIU/L-ACNC: 3.89 UIU/ML (ref 0.3–4.2)
WBC # BLD AUTO: 10.2 10E3/UL (ref 4–11)

## 2023-03-21 PROCEDURE — 80048 BASIC METABOLIC PNL TOTAL CA: CPT | Performed by: INTERNAL MEDICINE

## 2023-03-21 PROCEDURE — 84443 ASSAY THYROID STIM HORMONE: CPT | Performed by: INTERNAL MEDICINE

## 2023-03-21 PROCEDURE — 85025 COMPLETE CBC W/AUTO DIFF WBC: CPT | Performed by: INTERNAL MEDICINE

## 2023-03-21 PROCEDURE — 99215 OFFICE O/P EST HI 40 MIN: CPT | Performed by: INTERNAL MEDICINE

## 2023-03-21 PROCEDURE — 36415 COLL VENOUS BLD VENIPUNCTURE: CPT | Performed by: INTERNAL MEDICINE

## 2023-03-21 RX ORDER — GUAIFENESIN 600 MG/1
1200 TABLET, EXTENDED RELEASE ORAL 2 TIMES DAILY
Qty: 60 TABLET | Refills: 1 | COMMUNITY
Start: 2023-03-21 | End: 2023-12-11

## 2023-03-21 ASSESSMENT — PAIN SCALES - GENERAL: PAINLEVEL: NO PAIN (0)

## 2023-03-21 NOTE — PROGRESS NOTES
Assessment & Plan     (Z09) Hospital discharge follow-up  (primary encounter diagnosis)  Comment: [facility] recent hospitalization; no longer febrile or coughing.   Plan: reviewed pt medication list and limited paperwork brought by pt; unable to find in Care everywhere.  Pt indicates, she is currently living in [place] but has her home in the [facility] and plans to return soon. She has a walker with a seat and brakes.     (E04.1) Thyroid nodule  Comment: monitor thyroid labs;  Plan: TSH with free T4 reflex          (I48.19) Persistent atrial fibrillation (H)  Comment: she is on meds for rate control and Eliquis for anticoagulation; also has pacemaker.  Plan: no change in medications.     (J06.9) Recent upper respiratory tract infection  Comment: lungs are clear; intermittent cough,   Plan: Basic metabolic panel  (Ca, Cl, CO2, Creat,         Gluc, K, Na, BUN), CBC with platelets and         differential, guaiFENesin (MUCINEX) 600 MG 12         hr tablet          (E87.6) Hypokalemia  Comment: when in the hospital, was provided potassium;  ; In the past, Potassium OK on labs 1/25/2023- when feeling well;   Plan: will check labs today, monitor potassium; healthy diet encouraged.       45 minutes spent on the date of the encounter doing chart review, history and exam, documentation and further activities per the note     MED REC REQUIRED  Post Medication Reconciliation Status:  Discharge medications reconciled, continue medications without change      Cris Romero MD  Internal Medicine   [facility]          Presley Cobos is a 82 year old, presenting for the following health issues:  Hospital F/U      [facility]       Hospital Follow-up Visit:    Hospital/Nursing Home/IP Rehab Facility: [facility]   Date of Admission: 03/07/23  Date of Discharge: 03/10/23  Reason(s) for Admission: COVID/weakness     Was your hospitalization related to COVID-19? No   Problems taking  medications regularly:  None  Medication changes since discharge: amlodipine-dose change and added Potassium-but patient does NOT have a RX. She is not on a diuretic; she has not been on potassium since hospitalization   Problems adhering to non-medication therapy:  NONE    Summary of hospitalization:  Discharge summary unavailable  Pt has some records but not the discharge summary  Diagnostic Tests/Treatments reviewed.  Follow up needed: recheck labs, continue potassium if still low; consider replacement;   monitor BLOOD PRESSURE   Other Healthcare Providers Involved in Patient s Care:         discussed home care- declined; improved strength and stamina.   Update since discharge: improved.   Plan of care communicated with patient       Review of Systems   CONSTITUTIONAL: NEGATIVE for fever, chills, change in weight  ENT/MOUTH: NEGATIVE for ear, mouth and throat problems  RESP: NEGATIVE for significant cough or SOB  CV: NEGATIVE for chest pain, palpitations or peripheral edema  GI: NEGATIVE for nausea, abdominal pain, heartburn, or change in bowel habits  MUSCULOSKELETAL: NEGATIVE for significant arthralgias or myalgia  NEURO: NEGATIVE for weakness, dizziness or paresthesias  ENDOCRINE: low potassium when in the hospital; not on diuretic of prolonged course of Prednisone.   PSYCHIATRIC: NEGATIVE for changes in mood or affect      Objective    BP (!) 144/56 (BP Location: Right arm, Patient Position: Sitting, Cuff Size: Adult Regular)   Pulse 70   Temp 97.7  F (36.5  C) (Oral)   Resp 20   Wt 51.9 kg (114 lb 6.4 oz)   LMP  (LMP Unknown)   SpO2 100%   BMI 20.11 kg/m    Body mass index is 20.11 kg/m .  Physical Exam   GENERAL: healthy, alert and no distress  NECK: no adenopathy, no asymmetry, masses, or scars and thyroid normal to palpation  RESP: lungs clear to auscultation - no rales, rhonchi or wheezes  CV: regular rates and rhythm, normal S1 S2, no S3 or S4, peripheral pulses strong and no peripheral  edema  ABDOMEN: soft, nontender and bowel sounds normal  MS: no gross musculoskeletal defects noted, no edema  NEURO: Normal strength and tone, mentation intact and speech normal  PSYCH: mentation appears normal, affect normal/bright

## 2023-03-21 NOTE — NURSING NOTE
"Chief Complaint   Patient presents with     Hospital F/U     Initial BP (!) 144/56 (BP Location: Right arm, Patient Position: Sitting, Cuff Size: Adult Regular)   Pulse 70   Temp 97.7  F (36.5  C) (Oral)   Resp 20   Wt 51.9 kg (114 lb 6.4 oz)   LMP  (LMP Unknown)   SpO2 100%   BMI 20.11 kg/m   Estimated body mass index is 20.11 kg/m  as calculated from the following:    Height as of 1/25/23: 1.607 m (5' 3.25\").    Weight as of this encounter: 51.9 kg (114 lb 6.4 oz).  BP completed using cuff size regular long right arm    Lisa Magill, CMA    "

## 2023-03-24 ENCOUNTER — TELEPHONE (OUTPATIENT)
Dept: FAMILY MEDICINE | Facility: CLINIC | Age: 83
End: 2023-03-24
Payer: MEDICARE

## 2023-03-24 NOTE — TELEPHONE ENCOUNTER
Patient called to let us know that they do not currently have access to a computer, so if we need to discuss anything with them or send them information, we will have to call or send something in the mail.    Kathraine Singh

## 2023-04-11 ENCOUNTER — TELEPHONE (OUTPATIENT)
Dept: FAMILY MEDICINE | Facility: CLINIC | Age: 83
End: 2023-04-11
Payer: MEDICARE

## 2023-04-11 NOTE — TELEPHONE ENCOUNTER
Patient calling regarding apixaban ANTICOAGULANT (ELIQUIS ANTICOAGULANT) 5 MG tablet. Patient states this prescription is costing her thousands of dollars and she cannot keep taking this medication. Patient is wanting to know if there is an alternative or if she can stop this medication due to cost. Please review and advise.     Mykel OD RN 4/11/2023 at 10:00 AM

## 2023-04-12 NOTE — TELEPHONE ENCOUNTER
Patient called about this request again. Patient would like to remind us that they do not have a computer so we would have to call them directly.    Katharine Singh

## 2023-04-16 NOTE — TELEPHONE ENCOUNTER
Eliquis - Atrial fibrillation for stroke prevention.   The other option is Warfarin but would require frequent, regular INR monitoring.   Since she is living in [place], [place] it would mean setting up local clinic for INR management or [facility] on average of 2 times per month.    Any idea, how long she will be living  in [place]?     Indication for Warfarin: anticoagulation as stroke prevention due to Atrial Fibrillation.

## 2023-04-17 NOTE — TELEPHONE ENCOUNTER
Pt called back and relayed provider message below. Pt verbalizes that she is disappointed that there are only two options as her choices.    Warfarin doesn't work and she has no interest in trying this. Has transportation issues and cannot be coming every week in clinic for INR checks. Eliquis is too expensive for pt. She reports she doesn't have good insurance so she's still paying thousands of dollars out of pocket for this drug. Nurse attempted to educate on the importance of blood thinning agent and pt's a-fib.     Starting next month in May she will be moving back to [place].    Post huddle with PCP and she advised will have further discussion with pt at next visit. PCP recommends Xarelto as another option but is more expensive than Eliquis.    Kaylah Higginbotham RN on 4/17/2023 at 9:00 AM

## 2023-06-19 ENCOUNTER — OFFICE VISIT (OUTPATIENT)
Dept: FAMILY MEDICINE | Facility: CLINIC | Age: 83
End: 2023-06-19
Payer: MEDICARE

## 2023-06-19 VITALS
SYSTOLIC BLOOD PRESSURE: 162 MMHG | HEART RATE: 70 BPM | RESPIRATION RATE: 14 BRPM | BODY MASS INDEX: 20.57 KG/M2 | OXYGEN SATURATION: 99 % | WEIGHT: 111.8 LBS | DIASTOLIC BLOOD PRESSURE: 54 MMHG | HEIGHT: 62 IN

## 2023-06-19 DIAGNOSIS — E78.5 HYPERLIPIDEMIA LDL GOAL <160: ICD-10-CM

## 2023-06-19 DIAGNOSIS — R20.2 NUMBNESS AND TINGLING OF BOTH FEET: Primary | ICD-10-CM

## 2023-06-19 DIAGNOSIS — Z95.0 PACEMAKER: ICD-10-CM

## 2023-06-19 DIAGNOSIS — I10 ESSENTIAL HYPERTENSION: ICD-10-CM

## 2023-06-19 DIAGNOSIS — I48.19 PERSISTENT ATRIAL FIBRILLATION (H): ICD-10-CM

## 2023-06-19 DIAGNOSIS — R20.0 NUMBNESS AND TINGLING OF BOTH FEET: Primary | ICD-10-CM

## 2023-06-19 PROCEDURE — 99214 OFFICE O/P EST MOD 30 MIN: CPT | Performed by: INTERNAL MEDICINE

## 2023-06-19 RX ORDER — EZETIMIBE 10 MG/1
10 TABLET ORAL EVERY EVENING
Qty: 90 TABLET | Refills: 3 | Status: SHIPPED | OUTPATIENT
Start: 2023-06-19 | End: 2024-08-06

## 2023-06-19 RX ORDER — METOPROLOL TARTRATE 100 MG
100 TABLET ORAL 2 TIMES DAILY
Qty: 180 TABLET | Refills: 3 | Status: SHIPPED | OUTPATIENT
Start: 2023-06-19 | End: 2024-08-06

## 2023-06-19 RX ORDER — AMLODIPINE BESYLATE 5 MG/1
5 TABLET ORAL 2 TIMES DAILY
Qty: 180 TABLET | Refills: 3 | Status: SHIPPED | OUTPATIENT
Start: 2023-06-19 | End: 2024-07-12

## 2023-06-19 NOTE — PROGRESS NOTES
Assessment & Plan     (R20.0,  R20.2) Numbness and tingling of both feet  (primary encounter diagnosis)  Comment: several weeks; reviewed good arch support and avoid going bare foot.  opted to hold off on Gabapentin, try B12 and see podiatry  Plan: Orthopedic  Referral- Podiatry          (I48.19) Persistent atrial fibrillation (H)  Comment: she is on meds for rate control and Eliquis for anticoagulation; also has pacemaker.  Plan: apixaban ANTICOAGULANT (ELIQUIS ANTICOAGULANT)         5 MG tablet, metoprolol tartrate (LOPRESSOR)         100 MG tablet          (I10) Essential hypertension  Comment: elevated BP today at appt. home monitoring.   Plan: amLODIPine (NORVASC) 5 MG tablet, metoprolol         tartrate (LOPRESSOR) 100 MG tablet          (Z95.0) Pacemaker  Comment: 6/2020; placed afteer cardiac ablation due to atrial fibrillation.   Plan: PPM monitored by cardiology.    (E78.5) Hyperlipidemia LDL goal <100  Comment:   Lab Results   Component Value Date    LDL 74 01/25/2023    LDL 58 03/24/2022    LDL 76 05/29/2020    LDL 97 08/26/2019   Past trial of statin therapy not optimally tolerated in the past.  Plan: ezetimibe (ZETIA) 10 MG tablet            Review of the result(s) of each unique test - chart reviewed including meds, labs and plan of therapy.   Ordering of each unique test  Prescription drug management  38 minutes spent by me on the date of the encounter doing chart review, history and exam, documentation and further activities per the note       MEDICATIONS:   Orders Placed This Encounter   Medications     apixaban ANTICOAGULANT (ELIQUIS ANTICOAGULANT) 5 MG tablet     Sig: Take 1 tablet (5 mg) by mouth 2 times daily     Dispense:  180 tablet     Refill:  3     Profile Rx: patient will contact pharmacy when needed     amLODIPine (NORVASC) 5 MG tablet     Sig: Take 1 tablet (5 mg) by mouth 2 times daily     Dispense:  180 tablet     Refill:  3     Profile Rx: patient will contact pharmacy  "when needed     metoprolol tartrate (LOPRESSOR) 100 MG tablet     Sig: Take 1 tablet (100 mg) by mouth 2 times daily     Dispense:  180 tablet     Refill:  3     Profile Rx: patient will contact pharmacy when needed     ezetimibe (ZETIA) 10 MG tablet     Sig: Take 1 tablet (10 mg) by mouth every evening     Dispense:  90 tablet     Refill:  3     Profile Rx: patient will contact pharmacy when needed          - Continue other medications without change  CONSULTATION/REFERRAL to Podiatry  Regular exercise  Follow up for Wellness Visit ~ 1/25/2024 schedulled    Cris Romero MD  Internal Medicine   [facility] MEG Cobos is a 82 year old, presenting for the following health issues:  Neurologic Problem (/)         View : No data to display.              HPI     Concern - Neuropathy/Eliquis medication     Both feet feel numb past few months,  Onset: A couple months   Description: Pt states \"I feel like I have a ball on the bed of my foot\"  The sensation began in the top of her toes and has now spread to bottom of foot. No pain. no numbness, no weakness.  Pt also wants to discuss alternatives of Eliquis   Progression of Symptoms:  worsening  Accompanying Signs & Symptoms: unstead  Previous history of similar problem: no  Precipitating factors:        Worsened by: walking on uneven surfaces, long periods of time   Therapies tried and outcome: None    Atrial Fibrillation   anticoagulation - past use of Warfarin- difficult to monitor and regular INR  Eliquis well tolerated but expensive.   Pat ablation in 2020 unsuccessful ; then had pacemaker placed.      Hypertension        Review of Systems   CONSTITUTIONAL: NEGATIVE for fever, chills, change in weight  ENT/MOUTH: NEGATIVE for ear, mouth and throat problems  RESP: NEGATIVE for significant cough or SOB  CV: elevated BLOOD PRESSURE; chronic atrial fibrillation  GI: NEGATIVE for nausea, abdominal pain, heartburn, or change in " "bowel habits  MUSCULOSKELETAL: mobility has improved, now back at home in [place]  Neuro_ feet feel numbness  ENDOCRINE: lipids reviewed, past trial of statin therapy not well tolerated;  Now on Zetia.  PSYCHIATRIC: health stressors past several years; feeling better overall,  Heme: chronic anticoagulation- Eliquis due to Atrial Fibrillation.      Objective    BP (!) 162/54 (BP Location: Right arm, Patient Position: Sitting, Cuff Size: Adult Regular)   Pulse 70   Resp 14   Ht 1.581 m (5' 2.25\")   Wt 50.7 kg (111 lb 12.8 oz)   LMP  (LMP Unknown)   SpO2 99%   BMI 20.28 kg/m    Body mass index is 20.28 kg/m .  Physical Exam   GENERAL: healthy, alert and no distress  NECK: no adenopathy, no asymmetry, masses, or scars and thyroid normal to palpation  RESP: lungs clear to auscultation - no rales, rhonchi or wheezes  CV: left upper chest pacer pocket Nontender;   ABDOMEN: soft, nontender, no hepatosplenomegaly, no masses and bowel sounds normal  MS: no gross musculoskeletal defects noted, no edema  NEURO: Normal strength and tone, sensory exam grossly normal and mentation intact  PSYCH: mentation appears normal, affect normal/bright              "

## 2023-06-29 ENCOUNTER — OFFICE VISIT (OUTPATIENT)
Dept: PODIATRY | Facility: CLINIC | Age: 83
End: 2023-06-29
Payer: MEDICARE

## 2023-06-29 VITALS — DIASTOLIC BLOOD PRESSURE: 64 MMHG | SYSTOLIC BLOOD PRESSURE: 118 MMHG

## 2023-06-29 DIAGNOSIS — R20.2 PARESTHESIA OF BOTH FEET: Primary | ICD-10-CM

## 2023-06-29 DIAGNOSIS — L90.9 FAT PAD ATROPHY OF FOOT: ICD-10-CM

## 2023-06-29 PROCEDURE — 99203 OFFICE O/P NEW LOW 30 MIN: CPT | Performed by: PODIATRIST

## 2023-06-29 NOTE — PROGRESS NOTES
"ASSESSMENT:  Encounter Diagnoses   Name Primary?     Paresthesia of both feet Yes     Fat pad atrophy of foot      MEDICAL DECISION MAKING:  The feeling that a sock is balled up under her feet is not an uncommon complaint.  I explained that if it is stemming from the foot, it is related to years of high pressure on the plantar forefoot and likely atrophy of the plantar fat pad.  This can affect the nerves.    However other forms of peripheral neuropathy are possible.  I did not find any muscle or strength deficits.  Light touch sensation is intact.    Recommendations:  Stiffer soled shoes to offload the forefoot during the propulsive phase of gait.  A cushioned insert to reduce forefoot pressure get, given the atrophy of the plantar fat pad.  I suggest she place the pedals on her stationary bike more in the arch region rather than forefoot.    Because she reports that this is progressing, I did offer a referral to neurology.  She was in this and the referral was placed.    Follow-up on an as-needed basis.    Disclaimer: This note consists of symbols derived from keyboarding, dictation and/or voice recognition software. As a result, there may be errors in the script that have gone undetected. Please consider this when interpreting information found in this chart.    Abdoulaye Garcia, RUBEN, FACFAS, MS    [place] [facility] Ankle [facility]      ____________________________________________________________________    HPI:       Chandrika presents today reporting the feeling that a \"sock is balled\" up under the toes and balls of both feet.    She has experienced this for 3 months.  No pain.  She believes it is progressing.  She reports a more recent stay at a rehab facility and now has returned home.  She uses a stationary bike for exercise.  Chandrika is concerned this problem might ultimately affect her ability to drive.  *  Past Medical History:   Diagnosis Date     Anemia      Arthritis     hands, knees     Breast " cancer (H) 01/2012    left mastectomy followed by right;  Dr. Luong     Chronic airway obstruction, not elsewhere classified 2006    very mild COPD - small cough     Concussion 03/13/2013     Problem list name updated by automated process. Provider to review and confirm [employer] Update utility     Cystocele, midline 04/04/2012     Diffuse cystic mastopathy      Gastroesophageal reflux disease, esophagitis presence not specified 11/23/2019     History of hypokalemia 01/23/2013     Hyperlipidemia LDL goal <160 06/29/2011    [place] 10-year CHD Risk Score: 2% (14 Total Points)  Values used to calculate score:    Age: 70 years -- Points: 14    Total Cholesterol: 192 mg/dL -- Points: 1    HDL Cholesterol: 61 mg/dL -- Points: -1    Systolic BP (treated): 118 mmHg -- Points: 0   The patient is not a smoker. -- Points: 0   The patient has not been diagnosed with diabetes. -- Points: 0   The patient does not have a famil     Lung cancer (H) 10/21/2015     Pacemaker     [employer]     Paroxysmal atrial fibrillation (H) 09/13/2019     PMR (polymyalgia rheumatica) (H) 01/17/2020    tapering' above.)  In patients receiving over 10 mg of prednisone/day, the dose can be lowered by 2.5 mg/day decrements every two to four weeks  Once the dose of prednisone is 10 mg/day, further tapering can be done by 1 mg per month, provided the clinical course is stable  The clinical response to glucocorticoid therapy is closely monitored, which centers on screening for the presence and/or recu     Thyroid nodule 04/23/2016    Noted on CT Fall 2015.      Unspecified essential hypertension late 1980's   *  *  Past Surgical History:   Procedure Laterality Date     ANESTHESIA CARDIOVERSION N/A 6/12/2020    Procedure: ANESTHESIA, FOR CARDIOVERSION;  Surgeon: GENERIC ANESTHESIA PROVIDER;  Location: [facility] OR     ARTHROPLASTY KNEE Right 7/25/2022    Procedure: Right total knee arthroplasty;  Surgeon: Nick Parra MD;  Location:  OR      COLONOSCOPY  3/2003    adenomatous polyp      COLONOSCOPY  7/2006    diverticulosis - repeat in 5 years     COLONOSCOPY  10/13/2011    Procedure:COLONOSCOPY; COLONOSCOPY ; Surgeon:CHITO GORDILLO; Location: GI     CYSTOSCOPY  7/11/2012    Procedure: CYSTOSCOPY;;  Surgeon: Aline Cooper DO;  Location:  OR     DAVINCI HYSTERECTOMY SUPRACERVICAL, SACROCOLPOPEXY, COMBINED  7/11/2012    Procedure: COMBINED DAVINCI HYSTERECTOMY SUPRACERVICAL, SACROCOLPOPEXY;   DAVINCI ASSISTED LAPAROSCOPIC SUPRACERVICAL HYSTERECTOMY AND BILATERAL SALPINGO-OOPHORECTOMY, SACROCOLPOPEXY AND CYSTOSCOPY;  Surgeon: Aline Cooper DO;  Location:  OR     EP ABLATION AV NODE N/A 6/22/2020    Procedure: EP ABLATION AV NODE;  Surgeon: Adriano Raman MD;  Location:  HEART CARDIAC CATH LAB     EP BIVENT LEAD PLACEMENT N/A 6/22/2020    Procedure: Bivent Lead Placement;  Surgeon: Adriano Raman MD;  Location:  HEART CARDIAC CATH LAB     EP PACEMAKER N/A 6/22/2020    Procedure: AVNA and BiV Pacemaker Insertion;  Surgeon: Adriano Raman MD;  Location:  HEART CARDIAC CATH LAB     ESOPHAGOSCOPY, GASTROSCOPY, DUODENOSCOPY (EGD), COMBINED N/A 12/14/2021    Procedure: ESOPHAGOGASTRODUODENOSCOPY (EGD) (fv) biopsies with cold forcep;  Surgeon: Chito Gordillo MD;  Location:  GI     EXCISE LESION EYELID Right 6/29/2015    Procedure: EXCISE LESION EYELID;  Surgeon: Hank Olvera MD;  Location:  SD     INSERT PORT VASCULAR ACCESS  2/3/2012    Procedure:INSERT PORT VASCULAR ACCESS; Power Port-A- Catheter Placement ; Surgeon:TRESSA TAPIA; Location: OR     LAPAROSCOPIC SALPINGO-OOPHORECTOMY  7/11/2012    Procedure: LAPAROSCOPIC SALPINGO-OOPHORECTOMY;  Davinci;  Surgeon: Aline Cooper DO;  Location:  OR     LIPOSUCTION, RHYTIDECTOMY, COMBINED       LOBECTOMY LUNG Right 3/1/2016    Procedure: LOBECTOMY LUNG;  Surgeon: Jonas Woodward MD;  Location:  OR     MAMMOPLASTY REDUCTION   1/6/2012    Procedure:MAMMOPLASTY REDUCTION; Surgeon:MICKI KYLE; Location:[facility] OR     MASTECTOMY SIMPLE  11/12/2012    Procedure: MASTECTOMY SIMPLE;   Right Prophylactic Mastectomy with attempted Right Sentinal Node Biopsy, Revision Bilateral Mastectomy Insicions, liposuction in breast area;  Surgeon: Irish Tapia MD;  Location: [facility] OR     MASTECTOMY SIMPLE, SENTINEL NODE, COMBINED  1/6/2012    Procedure:COMBINED MASTECTOMY SIMPLE, SENTINEL NODE; Left Mastectomy Left [place] Node Biopsy,  Right Breast Reduction ; Surgeon:IRISH TAPIA; Location:[facility] OR     MASTECTOMY, BILATERAL       REMOVE PORT VASCULAR ACCESS  4/29/2013    Procedure: REMOVE PORT VASCULAR ACCESS;  Port A catheter removal ;  Surgeon: Irish Tapia MD;  Location: [facility] OR     REPAIR PTOSIS BILATERAL Bilateral 6/29/2015    Procedure: REPAIR PTOSIS BILATERAL;  Surgeon: Hank Olvera MD;  Location:  SD     REVISE RECONSTRUCTED BREAST BILATERAL  11/12/2012    Procedure: REVISE RECONSTRUCTED BREAST BILATERAL;;  Surgeon: Micki Kyle MD;  Location: [facility] OR     SURGICAL HISTORY OF -   in 40's    face lift     SURGICAL HISTORY OF -       lipoma removed right thigh     SURGICAL HISTORY OF -       D and C     THORACOTOMY Right 3/1/2016    Procedure: THORACOTOMY;  Surgeon: Jonas Woodward MD;  Location:  OR     [facility] NONSPECIFIC PROCEDURE  1970    s/p Tubal ligation 1970   *  *  Current Outpatient Medications   Medication Sig Dispense Refill     acetaminophen (TYLENOL) 325 MG tablet Take 2 tablets (650 mg) by mouth every 4 hours as needed for other (mild pain) 100 tablet 0     amLODIPine (NORVASC) 5 MG tablet Take 1 tablet (5 mg) by mouth 2 times daily 180 tablet 3     apixaban ANTICOAGULANT (ELIQUIS ANTICOAGULANT) 5 MG tablet Take 1 tablet (5 mg) by mouth 2 times daily 180 tablet 3     ezetimibe (ZETIA) 10 MG tablet Take 1 tablet (10 mg) by mouth every evening 90 tablet 3     fish oil-omega-3 fatty acids 1000 MG  capsule TAKE 1 CAPSULE BY MOUTH DAILY 90 capsule 3     guaiFENesin (MUCINEX) 600 MG 12 hr tablet Take 2 tablets (1,200 mg) by mouth 2 times daily 60 tablet 1     INCRUSE ELLIPTA 62.5 MCG/INH Inhaler Inhale 1 puff into the lungs daily (Patient not taking: Reported on 1/25/2023)       metoprolol tartrate (LOPRESSOR) 100 MG tablet Take 1 tablet (100 mg) by mouth 2 times daily 180 tablet 3     omeprazole (PRILOSEC) 20 MG DR capsule Take 1 capsule (20 mg) by mouth daily 90 capsule      polyethylene glycol (MIRALAX) 17 GM/Dose powder Take 17 g by mouth daily 510 g 0         EXAM:    Vitals: /64   LMP  (LMP Unknown)   BMI: There is no height or weight on file to calculate BMI.    Constitutional:  Tomasa Fam is in no apparent distress, appears well-nourished.  Cooperative with history and physical exam.    Vascular:  Pedal pulses are palpable for both the DP and PT arteries.  CFT < 3 sec.  LE edema with varicosities seen on legs and ankles.    Neuro: Light touch sensation is intact to the L4, L5, S1 distributions  No evidence of weakness, spasticity, or contracture in the lower extremities.     Derm: Normal texture and turgor.  No erythema, ecchymosis, or cyanosis.  No open lesions.     The plantar fat pad, bilateral forefoot, is thin.     Musculoskeletal:    Lower extremity muscle strength is normal. No gross deformities.

## 2023-06-29 NOTE — PATIENT INSTRUCTIONS
Thank you for choosing [facility] Podiatry / Foot & Ankle Surgery!    DR. WOOD'S CLINIC LOCATIONS:     [facility] TRIAGE LINE: [phone]   [address] [place] [address] APPOINTMENTS: [phone]   [place] [address] RADIOLOGY: [phone]   (Every other Tues - Wed - Fri PM) SET UP SURGERY: [phone]    PHYSICAL THERAPY: [phone]   [place] SPECIALTY BILLING QUESTIONS: [phone] [id number] FAX: [phone]   [place] [address]    (Thurs & Fri AM)      If they feeling of the balled up sock is coming from the foot, it is typically related to years of high pressure under this area.  Pressure is increased as the fat padding thins over time.    Consider stiffer soled shoes to minimize pressure on the ball of the foot  Consider purchasing a soft or cushioned insert from [employer].  Avoid barefoot walking, walking in socks and/or other flexible nonsupportive footwear    Dr. Wood also placed a referral for you to see a nerve doctor  This doctor can help decide if you have a nerve condition called peripheral neuropathy.  That office should contact you within 2 business days to schedule an appointment.    Feel free to follow-up with Dr. Wood at any time for future ankle and foot concerns.

## 2023-06-29 NOTE — LETTER
"    6/29/2023         RE: Tomasa Fam  [id number] [facility] [address]        Dear Colleague,    Thank you for referring your patient, Tomasa Fam, to the [facility]. Please see a copy of my visit note below.    ASSESSMENT:  Encounter Diagnoses   Name Primary?     Paresthesia of both feet Yes     Fat pad atrophy of foot      MEDICAL DECISION MAKING:  The feeling that a sock is balled up under her feet is not an uncommon complaint.  I explained that if it is stemming from the foot, it is related to years of high pressure on the plantar forefoot and likely atrophy of the plantar fat pad.  This can affect the nerves.    However other forms of peripheral neuropathy are possible.  I did not find any muscle or strength deficits.  Light touch sensation is intact.    Recommendations:  Stiffer soled shoes to offload the forefoot during the propulsive phase of gait.  A cushioned insert to reduce forefoot pressure get, given the atrophy of the plantar fat pad.  I suggest she place the pedals on her stationary bike more in the arch region rather than forefoot.    Because she reports that this is progressing, I did offer a referral to neurology.  She was in this and the referral was placed.    Follow-up on an as-needed basis.    Disclaimer: This note consists of symbols derived from keyboarding, dictation and/or voice recognition software. As a result, there may be errors in the script that have gone undetected. Please consider this when interpreting information found in this chart.    Abdoulaye Garcia, RUBEN, FACFAS, [facility] Department of Podiatry/Foot & Ankle Surgery      ____________________________________________________________________    HPI:       Chandrika presents today reporting the feeling that a \"sock is balled\" up under the toes and balls of both feet.    She has experienced this for 3 months.  No pain.  She believes it is progressing.  She reports a more " recent stay at a rehab facility and now has returned home.  She uses a stationary bike for exercise.  Chandrika is concerned this problem might ultimately affect her ability to drive.  *  Past Medical History:   Diagnosis Date     Anemia      Arthritis     hands, knees     Breast cancer (H) 01/2012    left mastectomy followed by right;  Dr. Luong     Chronic airway obstruction, not elsewhere classified 2006    very mild COPD - small cough     Concussion 03/13/2013     Problem list name updated by automated process. Provider to review and confirm [employer] Update utility     Cystocele, midline 04/04/2012     Diffuse cystic mastopathy      Gastroesophageal reflux disease, esophagitis presence not specified 11/23/2019     History of hypokalemia 01/23/2013     Hyperlipidemia LDL goal <160 06/29/2011    [place] 10-year CHD Risk Score: 2% (14 Total Points)  Values used to calculate score:    Age: 70 years -- Points: 14    Total Cholesterol: 192 mg/dL -- Points: 1    HDL Cholesterol: 61 mg/dL -- Points: -1    Systolic BP (treated): 118 mmHg -- Points: 0   The patient is not a smoker. -- Points: 0   The patient has not been diagnosed with diabetes. -- Points: 0   The patient does not have a famil     Lung cancer (H) 10/21/2015     Pacemaker     [employer]     Paroxysmal atrial fibrillation (H) 09/13/2019     PMR (polymyalgia rheumatica) (H) 01/17/2020    tapering' above.)  In patients receiving over 10 mg of prednisone/day, the dose can be lowered by 2.5 mg/day decrements every two to four weeks  Once the dose of prednisone is 10 mg/day, further tapering can be done by 1 mg per month, provided the clinical course is stable  The clinical response to glucocorticoid therapy is closely monitored, which centers on screening for the presence and/or recu     Thyroid nodule 04/23/2016    Noted on CT Fall 2015.      Unspecified essential hypertension late 1980's   *  *  Past Surgical History:   Procedure Laterality Date      ANESTHESIA CARDIOVERSION N/A 6/12/2020    Procedure: ANESTHESIA, FOR CARDIOVERSION;  Surgeon: GENERIC ANESTHESIA PROVIDER;  Location:  OR     ARTHROPLASTY KNEE Right 7/25/2022    Procedure: Right total knee arthroplasty;  Surgeon: Nick Parra MD;  Location:  OR     COLONOSCOPY  3/2003    adenomatous polyp      COLONOSCOPY  7/2006    diverticulosis - repeat in 5 years     COLONOSCOPY  10/13/2011    Procedure:COLONOSCOPY; COLONOSCOPY ; Surgeon:CHITO GORDILLO; Location: GI     CYSTOSCOPY  7/11/2012    Procedure: CYSTOSCOPY;;  Surgeon: Aline Cooper DO;  Location:  OR     DAVINCI HYSTERECTOMY SUPRACERVICAL, SACROCOLPOPEXY, COMBINED  7/11/2012    Procedure: COMBINED DAVINCI HYSTERECTOMY SUPRACERVICAL, SACROCOLPOPEXY;   DAVINCI ASSISTED LAPAROSCOPIC SUPRACERVICAL HYSTERECTOMY AND BILATERAL SALPINGO-OOPHORECTOMY, SACROCOLPOPEXY AND CYSTOSCOPY;  Surgeon: Aline Cooper DO;  Location:  OR     EP ABLATION AV NODE N/A 6/22/2020    Procedure: EP ABLATION AV NODE;  Surgeon: Adriano Raman MD;  Location:  HEART CARDIAC CATH LAB     EP BIVENT LEAD PLACEMENT N/A 6/22/2020    Procedure: Bivent Lead Placement;  Surgeon: Adriano Raman MD;  Location:  HEART CARDIAC CATH LAB     EP PACEMAKER N/A 6/22/2020    Procedure: AVNA and BiV Pacemaker Insertion;  Surgeon: Adriano Raman MD;  Location:  HEART CARDIAC CATH LAB     ESOPHAGOSCOPY, GASTROSCOPY, DUODENOSCOPY (EGD), COMBINED N/A 12/14/2021    Procedure: ESOPHAGOGASTRODUODENOSCOPY (EGD) (fv) biopsies with cold forcep;  Surgeon: Chito Gordillo MD;  Location:  GI     EXCISE LESION EYELID Right 6/29/2015    Procedure: EXCISE LESION EYELID;  Surgeon: Hank Olvera MD;  Location:  SD     INSERT PORT VASCULAR ACCESS  2/3/2012    Procedure:INSERT PORT VASCULAR ACCESS; Power Port-A- Catheter Placement ; Surgeon:TRESSA TAPIA; Location: OR     LAPAROSCOPIC SALPINGO-OOPHORECTOMY  7/11/2012    Procedure:  LAPAROSCOPIC SALPINGO-OOPHORECTOMY;  Eric;  Surgeon: Aline Cooper DO;  Location: [facility] OR     LIPOSUCTION, RHYTIDECTOMY, COMBINED       LOBECTOMY LUNG Right 3/1/2016    Procedure: LOBECTOMY LUNG;  Surgeon: Jonas Woodward MD;  Location: [facility] OR     MAMMOPLASTY REDUCTION  1/6/2012    Procedure:MAMMOPLASTY REDUCTION; Surgeon:MICKI KYLE; Location:[facility] OR     MASTECTOMY SIMPLE  11/12/2012    Procedure: MASTECTOMY SIMPLE;   Right Prophylactic Mastectomy with attempted Right Sentinal Node Biopsy, Revision Bilateral Mastectomy Insicions, liposuction in breast area;  Surgeon: Irish Tapia MD;  Location: [facility] OR     MASTECTOMY SIMPLE, SENTINEL NODE, COMBINED  1/6/2012    Procedure:COMBINED MASTECTOMY SIMPLE, SENTINEL NODE; Left Mastectomy Left Florence Node Biopsy,  Right Breast Reduction ; Surgeon:IRISH TAPIA; Location:[facility] OR     MASTECTOMY, BILATERAL       REMOVE PORT VASCULAR ACCESS  4/29/2013    Procedure: REMOVE PORT VASCULAR ACCESS;  Port A catheter removal ;  Surgeon: Irish Tapia MD;  Location: [facility] OR     REPAIR PTOSIS BILATERAL Bilateral 6/29/2015    Procedure: REPAIR PTOSIS BILATERAL;  Surgeon: Hank Olvera MD;  Location:  SD     REVISE RECONSTRUCTED BREAST BILATERAL  11/12/2012    Procedure: REVISE RECONSTRUCTED BREAST BILATERAL;;  Surgeon: Micki Kyle MD;  Location: [facility] OR     SURGICAL HISTORY OF -   in 40's    face lift     SURGICAL HISTORY OF -       lipoma removed right thigh     SURGICAL HISTORY OF -       D and C     THORACOTOMY Right 3/1/2016    Procedure: THORACOTOMY;  Surgeon: Jonas Woodward MD;  Location:  OR     ZZC NONSPECIFIC PROCEDURE  1970    s/p Tubal ligation 1970   *  *  Current Outpatient Medications   Medication Sig Dispense Refill     acetaminophen (TYLENOL) 325 MG tablet Take 2 tablets (650 mg) by mouth every 4 hours as needed for other (mild pain) 100 tablet 0     amLODIPine (NORVASC) 5 MG tablet Take 1 tablet (5 mg)  by mouth 2 times daily 180 tablet 3     apixaban ANTICOAGULANT (ELIQUIS ANTICOAGULANT) 5 MG tablet Take 1 tablet (5 mg) by mouth 2 times daily 180 tablet 3     ezetimibe (ZETIA) 10 MG tablet Take 1 tablet (10 mg) by mouth every evening 90 tablet 3     fish oil-omega-3 fatty acids 1000 MG capsule TAKE 1 CAPSULE BY MOUTH DAILY 90 capsule 3     guaiFENesin (MUCINEX) 600 MG 12 hr tablet Take 2 tablets (1,200 mg) by mouth 2 times daily 60 tablet 1     INCRUSE ELLIPTA 62.5 MCG/INH Inhaler Inhale 1 puff into the lungs daily (Patient not taking: Reported on 1/25/2023)       metoprolol tartrate (LOPRESSOR) 100 MG tablet Take 1 tablet (100 mg) by mouth 2 times daily 180 tablet 3     omeprazole (PRILOSEC) 20 MG DR capsule Take 1 capsule (20 mg) by mouth daily 90 capsule      polyethylene glycol (MIRALAX) 17 GM/Dose powder Take 17 g by mouth daily 510 g 0         EXAM:    Vitals: /64   LMP  (LMP Unknown)   BMI: There is no height or weight on file to calculate BMI.    Constitutional:  Tomasa Fam is in no apparent distress, appears well-nourished.  Cooperative with history and physical exam.    Vascular:  Pedal pulses are palpable for both the DP and PT arteries.  CFT < 3 sec.  LE edema with varicosities seen on legs and ankles.    Neuro: Light touch sensation is intact to the L4, L5, S1 distributions  No evidence of weakness, spasticity, or contracture in the lower extremities.     Derm: Normal texture and turgor.  No erythema, ecchymosis, or cyanosis.  No open lesions.     The plantar fat pad, bilateral forefoot, is thin.     Musculoskeletal:    Lower extremity muscle strength is normal. No gross deformities.          Again, thank you for allowing me to participate in the care of your patient.        Sincerely,        Abdoulaye Garcia DPM

## 2023-07-05 ENCOUNTER — TELEPHONE (OUTPATIENT)
Dept: FAMILY MEDICINE | Facility: CLINIC | Age: 83
End: 2023-07-05
Payer: MEDICARE

## 2023-07-05 NOTE — TELEPHONE ENCOUNTER
Pt calls,  S-(situation): saw PCP 6/19/23    B-(background): can't recall why PCP told her to take Vitamin B 12, discussed    A-(assessment): f/u appointment question    R-(recommendations): discussed numbness and tingling at visit, pt remembers that was why, no further action needed  Violet Gardiner, RN, BSN  [facility]

## 2023-08-27 ENCOUNTER — HEALTH MAINTENANCE LETTER (OUTPATIENT)
Age: 83
End: 2023-08-27

## 2023-09-05 ENCOUNTER — LAB (OUTPATIENT)
Dept: LAB | Facility: CLINIC | Age: 83
End: 2023-09-05
Payer: MEDICARE

## 2023-09-05 ENCOUNTER — VIRTUAL VISIT (OUTPATIENT)
Dept: INTERNAL MEDICINE | Facility: CLINIC | Age: 83
End: 2023-09-05
Payer: MEDICARE

## 2023-09-05 ENCOUNTER — TELEPHONE (OUTPATIENT)
Dept: INTERNAL MEDICINE | Facility: CLINIC | Age: 83
End: 2023-09-05

## 2023-09-05 DIAGNOSIS — N39.0 URINARY TRACT INFECTION WITH HEMATURIA, SITE UNSPECIFIED: ICD-10-CM

## 2023-09-05 DIAGNOSIS — R31.9 URINARY TRACT INFECTION WITH HEMATURIA, SITE UNSPECIFIED: ICD-10-CM

## 2023-09-05 DIAGNOSIS — R30.0 DYSURIA: Primary | ICD-10-CM

## 2023-09-05 DIAGNOSIS — R30.0 DYSURIA: ICD-10-CM

## 2023-09-05 LAB
ALBUMIN UR-MCNC: >=300 MG/DL
APPEARANCE UR: ABNORMAL
BACTERIA #/AREA URNS HPF: ABNORMAL /HPF
BILIRUB UR QL STRIP: ABNORMAL
COLOR UR AUTO: YELLOW
GLUCOSE UR STRIP-MCNC: NEGATIVE MG/DL
HGB UR QL STRIP: ABNORMAL
HYALINE CASTS #/AREA URNS LPF: ABNORMAL /LPF
KETONES UR STRIP-MCNC: ABNORMAL MG/DL
LEUKOCYTE ESTERASE UR QL STRIP: ABNORMAL
MUCOUS THREADS #/AREA URNS LPF: PRESENT /LPF
NITRATE UR QL: NEGATIVE
PH UR STRIP: 5.5 [PH] (ref 5–7)
RBC #/AREA URNS AUTO: ABNORMAL /HPF
SP GR UR STRIP: 1.02 (ref 1–1.03)
SQUAMOUS #/AREA URNS AUTO: ABNORMAL /LPF
UROBILINOGEN UR STRIP-ACNC: 1 E.U./DL
WBC #/AREA URNS AUTO: ABNORMAL /HPF

## 2023-09-05 PROCEDURE — 99442 PR PHYSICIAN TELEPHONE EVALUATION 11-20 MIN: CPT | Mod: 95 | Performed by: NURSE PRACTITIONER

## 2023-09-05 PROCEDURE — 87086 URINE CULTURE/COLONY COUNT: CPT

## 2023-09-05 PROCEDURE — 87186 SC STD MICRODIL/AGAR DIL: CPT

## 2023-09-05 PROCEDURE — 81001 URINALYSIS AUTO W/SCOPE: CPT

## 2023-09-05 RX ORDER — CEFDINIR 300 MG/1
300 CAPSULE ORAL 2 TIMES DAILY
Qty: 20 CAPSULE | Refills: 0 | Status: SHIPPED | OUTPATIENT
Start: 2023-09-05 | End: 2023-12-11

## 2023-09-05 ASSESSMENT — PATIENT HEALTH QUESTIONNAIRE - PHQ9: SUM OF ALL RESPONSES TO PHQ QUESTIONS 1-9: 0

## 2023-09-05 NOTE — PATIENT INSTRUCTIONS
Omnicef twice daily for 10 days     If culture comes back that bacteria not covered buy the medication, we will call you and change antibiotic

## 2023-09-05 NOTE — TELEPHONE ENCOUNTER
Patient arrived at lab around 1 pm today to provide urine sample.     Kamini Winn RN  [facility]

## 2023-09-05 NOTE — TELEPHONE ENCOUNTER
Left message to call back ----- Patient has vv / phone visit today with Leesa Robles @ 3:30 pm     Leesa would like patient to bring in a urin sample / hopefully before visit , please assist with     Letting Patient know  ..QUINITN Savage LPN

## 2023-09-05 NOTE — PROGRESS NOTES
Chandrika is a 83 year old who is being evaluated via a billable telephone visit.      What phone number would you like to be contacted at? [phone]  How would you like to obtain your AVS? Elsa    Distant Location (provider location):  On-site    Assessment & Plan     Dysuria  Discussed - if culture does not show sensitivity to omnicef would change antibiotic   - UA Macroscopic with reflex to Microscopic and Culture - Lab Collect; Future  - cefdinir (OMNICEF) 300 MG capsule; Take 1 capsule (300 mg) by mouth 2 times daily    Urinary tract infection with hematuria, site unspecified    - cefdinir (OMNICEF) 300 MG capsule; Take 1 capsule (300 mg) by mouth 2 times daily      11 minutes spent by me on the date of the encounter doing chart review, history and exam, documentation and further activities per the note       Patient Instructions   Omnicef twice daily for 10 days     If culture comes back that bacteria not covered buy the medication, we will call you and change antibiotic     SYLVIA Christianson CNP  [facility]   Chandrika is a 83 year old, presenting for the following health issues:  UTI  Symptoms x 2 weeks ago off and on ,  Noted blood in urine onset Saturday   No noted fever   When laying on left side , noticed ? Bladder moving     Last UTI she had omnicef and tolerated   Will do this again       HPI     Called 1341 and left message         Review of Systems   Constitutional, HEENT, cardiovascular, pulmonary, GI, , musculoskeletal, neuro, skin, endocrine and psych systems are negative, except as otherwise noted.      Objective           Vitals:  No vitals were obtained today due to virtual visit.    Physical Exam   alert and no distress  PSYCH: Alert and oriented times 3; coherent speech, normal   rate and volume, able to articulate logical thoughts, able   to abstract reason, no tangential thoughts, no hallucinations   or delusions  Her affect is normal  RESP: No  cough, no audible wheezing, able to talk in full sentences  Remainder of exam unable to be completed due to telephone visits    Color Urine (no units)   Date Value   09/05/2023 Yellow   06/01/2021 Yellow     Appearance Urine (no units)   Date Value   09/05/2023 Cloudy (A)   06/01/2021 Slightly Cloudy     Glucose Urine (mg/dL)   Date Value   09/05/2023 Negative   06/01/2021 Negative     Bilirubin Urine (no units)   Date Value   09/05/2023 Small (A)   06/01/2021 Negative     Ketones Urine (mg/dL)   Date Value   09/05/2023 Trace (A)   06/01/2021 Negative     Specific Gravity Urine (no units)   Date Value   09/05/2023 1.025   06/01/2021 1.020     pH Urine   Date Value   09/05/2023 5.5   06/01/2021 7.0 pH     Protein Albumin Urine (mg/dL)   Date Value   09/05/2023 >=300 (A)   06/01/2021 Negative     Urobilinogen Urine   Date Value   09/05/2023 1.0 E.U./dL   06/01/2021 0.2 EU/dL     Nitrite Urine (no units)   Date Value   09/05/2023 Negative   06/01/2021 Negative     Leukocyte Esterase Urine (no units)   Date Value   09/05/2023 Trace (A)   06/01/2021 Large (A)                 Phone call duration: 11 minutes

## 2023-09-06 LAB — BACTERIA UR CULT: ABNORMAL

## 2023-09-19 ENCOUNTER — TELEPHONE (OUTPATIENT)
Dept: FAMILY MEDICINE | Facility: CLINIC | Age: 83
End: 2023-09-19

## 2023-09-19 ENCOUNTER — NURSE TRIAGE (OUTPATIENT)
Dept: FAMILY MEDICINE | Facility: CLINIC | Age: 83
End: 2023-09-19

## 2023-09-19 ENCOUNTER — OFFICE VISIT (OUTPATIENT)
Dept: PEDIATRICS | Facility: CLINIC | Age: 83
End: 2023-09-19
Payer: MEDICARE

## 2023-09-19 VITALS
BODY MASS INDEX: 20.83 KG/M2 | HEART RATE: 70 BPM | OXYGEN SATURATION: 98 % | TEMPERATURE: 97.7 F | RESPIRATION RATE: 20 BRPM | DIASTOLIC BLOOD PRESSURE: 66 MMHG | WEIGHT: 114.8 LBS | SYSTOLIC BLOOD PRESSURE: 130 MMHG

## 2023-09-19 DIAGNOSIS — N30.00 ACUTE CYSTITIS WITHOUT HEMATURIA: ICD-10-CM

## 2023-09-19 DIAGNOSIS — R82.90 ABNORMAL URINE ODOR: Primary | ICD-10-CM

## 2023-09-19 LAB
ALBUMIN UR-MCNC: 100 MG/DL
APPEARANCE UR: CLEAR
BACTERIA #/AREA URNS HPF: ABNORMAL /HPF
BILIRUB UR QL STRIP: ABNORMAL
COLOR UR AUTO: YELLOW
ERYTHROCYTE [DISTWIDTH] IN BLOOD BY AUTOMATED COUNT: 13.4 % (ref 10–15)
GLUCOSE UR STRIP-MCNC: NEGATIVE MG/DL
HCT VFR BLD AUTO: 36.9 % (ref 35–47)
HGB BLD-MCNC: 11.9 G/DL (ref 11.7–15.7)
HGB UR QL STRIP: ABNORMAL
HYALINE CASTS #/AREA URNS LPF: ABNORMAL /LPF
KETONES UR STRIP-MCNC: 15 MG/DL
LEUKOCYTE ESTERASE UR QL STRIP: ABNORMAL
MCH RBC QN AUTO: 29.5 PG (ref 26.5–33)
MCHC RBC AUTO-ENTMCNC: 32.2 G/DL (ref 31.5–36.5)
MCV RBC AUTO: 92 FL (ref 78–100)
MUCOUS THREADS #/AREA URNS LPF: PRESENT /LPF
NITRATE UR QL: NEGATIVE
PH UR STRIP: 5.5 [PH] (ref 5–7)
PLATELET # BLD AUTO: 275 10E3/UL (ref 150–450)
RBC # BLD AUTO: 4.03 10E6/UL (ref 3.8–5.2)
RBC #/AREA URNS AUTO: ABNORMAL /HPF
SP GR UR STRIP: >=1.03 (ref 1–1.03)
SQUAMOUS #/AREA URNS AUTO: ABNORMAL /LPF
UROBILINOGEN UR STRIP-ACNC: 1 E.U./DL
WBC # BLD AUTO: 8.6 10E3/UL (ref 4–11)
WBC #/AREA URNS AUTO: >100 /HPF
WBC CLUMPS #/AREA URNS HPF: PRESENT /HPF

## 2023-09-19 PROCEDURE — 36415 COLL VENOUS BLD VENIPUNCTURE: CPT | Performed by: PHYSICIAN ASSISTANT

## 2023-09-19 PROCEDURE — 81001 URINALYSIS AUTO W/SCOPE: CPT | Performed by: PHYSICIAN ASSISTANT

## 2023-09-19 PROCEDURE — 85027 COMPLETE CBC AUTOMATED: CPT | Performed by: PHYSICIAN ASSISTANT

## 2023-09-19 PROCEDURE — 87186 SC STD MICRODIL/AGAR DIL: CPT | Performed by: PHYSICIAN ASSISTANT

## 2023-09-19 PROCEDURE — 87086 URINE CULTURE/COLONY COUNT: CPT | Performed by: PHYSICIAN ASSISTANT

## 2023-09-19 PROCEDURE — 99214 OFFICE O/P EST MOD 30 MIN: CPT | Performed by: PHYSICIAN ASSISTANT

## 2023-09-19 RX ORDER — SULFAMETHOXAZOLE/TRIMETHOPRIM 800-160 MG
1 TABLET ORAL 2 TIMES DAILY
Qty: 10 TABLET | Refills: 0 | Status: SHIPPED | OUTPATIENT
Start: 2023-09-19 | End: 2023-12-11

## 2023-09-19 NOTE — PROGRESS NOTES
Assessment & Plan     Abnormal urine odor    - UA Macroscopic with reflex to Microscopic and Culture; Future  - CBC with platelets; Future  - UA Macroscopic with reflex to Microscopic and Culture  - CBC with platelets  - Urine Microscopic Exam  - Urine Culture    Acute cystitis without hematuria    - sulfamethoxazole-trimethoprim (BACTRIM DS) 800-160 MG tablet; Take 1 tablet by mouth 2 times daily      Patient was seen today concerned that her UTI was still around.  She had one day of abnormal smell and has overall been tired.  On lab today, it appears to still be infected.  Culture is pending, but result was done and nurses advised to call patient to start the Bactrim for treatment.  Will make adjustments as necessary once culture results return.  Patient to seek more immediate care if symptoms change or worsen in any way.            RADHA Vásquez [facility] DEBBI Cobos is a 83 year old, presenting for the following health issues:  UTI        9/19/2023     2:44 PM   Additional Questions   Roomed by Concepcion Tapia   Accompanied by N/A       UTI      Genitourinary - Female  Onset/Duration: 1 month, comes and goes  Description:   Painful urination (Dysuria): No           Frequency: No  Blood in urine (Hematuria): YES- lots of 2 weeks ago   Delay in urine (Hesitency): YES- once and awhile   Intensity: mild  Progression of Symptoms:  improving and intermittent  Accompanying Signs & Symptoms:  Fever/chills: No  Flank pain: No  Nausea and vomiting: No  Vaginal symptoms: odor  Abdominal/Pelvic Pain: YES- gas moving around in stomach on left side   History:   History of frequent UTI s: YES- 2-3 years ago   History of kidney stones: No  Sexually Active: No  Possibility of pregnancy: No  Precipitating or alleviating factors: None  Therapies tried and outcome:  cefdinir  some improvement but symptoms still persisting   Completed round of cefdinir but still has orange urine, foul smelling  urine, and urgency with minimal output at times. Reports symptoms most frequent in the morning     She was recently put on an antibiotic for a UTI by the [facility].  She says that she took the last dose yesterday and although the symptoms are better, they are not gone.    She is still having a slight color to her urine, and then had one time where the urine smelled off to her.  She is not having any fevers, chills or sweats.  No nausea or vomiting for the patient.  She also denies abdominal pain and back pain.        Review of Systems   Constitutional, HEENT, cardiovascular, pulmonary, gi and gu systems are negative, except as otherwise noted.      Objective    /66 (BP Location: Right arm, Patient Position: Sitting, Cuff Size: Adult Regular)   Pulse 70   Temp 97.7  F (36.5  C) (Oral)   Resp 20   Wt 114 lb 12.8 oz (52.1 kg)   LMP  (LMP Unknown)   SpO2 98%   BMI 20.83 kg/m    Body mass index is 20.83 kg/m .  Physical Exam   GENERAL: healthy, alert and no distress  EYES: Eyes grossly normal to inspection, PERRL and conjunctivae and sclerae normal  ABDOMEN: soft, nontender, no hepatosplenomegaly, no masses and bowel sounds normal  MS: no gross musculoskeletal defects noted, no edema  BACK: no CVA tenderness, no paralumbar tenderness    UA:  Abnormal  CBC:  Within normal limits.      Renee Savage PA-C

## 2023-09-19 NOTE — RESULT ENCOUNTER NOTE
Note to staff: Please call the patient to explain results.    The results from your recent urine you left in clinic shows evidence of a UTI, even more-so than the urine that you left earlier this month.  We are sending this for culture, but in the meantime, we should have you start an antibiotic for the urine infection.  We will have you try Bactrim.  This is one tablet two times daily for 5 days.  We will send this to your pharmacy.  It appears you have had this in the past, but please let us know if you cannot have bactrim for any reason.  We will be in touch once the urine culture is back as well.        Thank you for choosing [facility] for your health care needs,      Renee Savage PA-C

## 2023-09-19 NOTE — TELEPHONE ENCOUNTER
Nurse Triage SBAR    Is this a 2nd Level Triage? YES, LICENSED PRACTITIONER REVIEW IS REQUIRED    Situation: Patient was seen on 9/5/23 telephone visit for a UTI. Patient still experiencing urinary symptoms.    Background: Patient reports finishing the cefdinir but still has orange urine, foul smelling urine, and urgency with minimal output at times.    Assessment: Patient denies pain. She reports the odorous urine happens mainly in the mornings. She is also complaining of gas pain. States the symptoms are improving from the last visit she had but she is concerned because symptoms are not completely gone. Patient also reports feeling tired, the last time she felt tired like this, she had to get a blood transfusion.    Protocol Recommended Disposition:   See in Office Today    Recommendation: Huddled with PCP, recommends patient to be seen in person. Patient could get blood work done at appointment as well to check hemoglobin, WBC, and possible urology referral?    Patient advised that there are no appointments at [place], she is agreeable to be seen in [place] today.    Appointments in Next Year      Sep 19, 2023  3:00 PM  (Arrive by 2:40 PM)  Provider Visit with Renee Savage PA-C  [facility] ([facility] [place] ) [phone]            Reason for Disposition   Bad or foul-smelling urine    Additional Information   Negative: Shock suspected (e.g., cold/pale/clammy skin, too weak to stand, low BP, rapid pulse)   Negative: Sounds like a life-threatening emergency to the triager   Negative: Followed a female genital area injury (e.g., labia, vagina, vulva)   Negative: Followed a male genital area injury (penis, scrotum)   Negative: Vaginal discharge   Negative: Pus (white, yellow) or bloody discharge from end of penis   Negative: Pain or burning with passing urine (urination) and pregnant   Negative: Pain or burning with passing urine (urination) and female   Negative: Pain or  "burning with passing urine (urination) and male   Negative: Pain or itching in the vulvar area   Negative: Pain in scrotum is main symptom   Negative: Blood in the urine is main symptom   Negative: Symptoms arising from use of a urinary catheter (e.g., Coude, Wong)   Negative: Unable to urinate (or only a few drops) > 4 hours and bladder feels very full (e.g., palpable bladder or strong urge to urinate)    Answer Assessment - Initial Assessment Questions  1. SYMPTOM: \"What's the main symptom you're concerned about?\" (e.g., frequency, incontinence)      Urine is slight orange, patient notices odor-mainly in the morning   2. ONSET: \"When did the  urinary symptoms  start?\"      At least a month  3. PAIN: \"Is there any pain?\" If Yes, ask: \"How bad is it?\" (Scale: 1-10; mild, moderate, severe)      no  4. CAUSE: \"What do you think is causing the symptoms?\"      Diagnosed with UTI  5. OTHER SYMPTOMS: \"Do you have any other symptoms?\" (e.g., blood in urine, fever, flank pain, pain with urination)      10 days ago, had a lot of blood in urine  6. PREGNANCY: \"Is there any chance you are pregnant?\" \"When was your last menstrual period?\"      no    Protocols used: Urinary Symptoms-A-OH    Irish Aj RN on 9/19/2023 at 1:49 PM    "

## 2023-09-19 NOTE — TELEPHONE ENCOUNTER
"Pt calling to inquire about results of UA. Relayed provider note below:     \"The results from your recent urine you left in clinic shows evidence of a UTI, even more-so than the urine that you left earlier this month.  We are sending this for culture, but in the meantime, we should have you start an antibiotic for the urine infection.  We will have you try Bactrim.  This is one tablet two times daily for 5 days.  We will send this to your pharmacy.  It appears you have had this in the past, but please let us know if you cannot have bactrim for any reason.  We will be in touch once the urine culture is back as well.      Thank you for choosing [facility] for your health care needs,  Renee Savage PA-C\"    Pt verbalized understanding. Pt denies known allergies to Bactrim. Pt agrees with plan, and verbalizes she will start the abx asap.     Steven Mullins RN on 9/19/2023 at 5:43 PM    "

## 2023-09-22 LAB — BACTERIA UR CULT: ABNORMAL

## 2023-09-28 ENCOUNTER — TELEPHONE (OUTPATIENT)
Dept: PEDIATRICS | Facility: CLINIC | Age: 83
End: 2023-09-28
Payer: MEDICARE

## 2023-09-28 NOTE — TELEPHONE ENCOUNTER
S-(situation): Patient calls asking if she should have repeat UA after completing recent course of Bactrim. Patient is also asking about recent leg/foot cramping and pain that woke her up at night and if this is related to her polymyalgia rheumatica or possible Bactrim side effect?    B-(background): Patient reports history of polymyalgia rheumatica. Patient typically has muscle aches from this and not so much cramping. Patient was also treated for UTI at the beginning of September with cefdinir and had another visit on 9/19/23 for continued symptoms. Patient was prescribed Bactrim at this visit which patient completed a few days ago.    A-(assessment): Patient reports that urinary symptoms have resolved. Patient is more concerned about muscle cramps. Patient reports that on two separate occasions recently she awoke at night with severe pain lasting a few minutes then resolving with movement and massage. One night her whole right leg was cramping, another night her left foot was cramping/painful. Patient also reports shoulder muscular pain, but has recently started exercising with a focus on upper body. Patient reports that she is staying hydrated. Patient wonders if this was a medication side effect of Bactrim, or if this is related to polymyalgia rheumatica.    R-(recommendations): Routing to PCP to advise on whether patient should have repeat UA now that she has completed antibiotic and to advise on above reported symptoms.      Atiya BUSH RN, BSN, PHN

## 2023-09-29 NOTE — TELEPHONE ENCOUNTER
Huddled with Dr. Romero.    Dr. Romero advised that patient does not need repeat UA if symptoms have improved.    Patient should try Gatorade and ensuring that she is staying hydrated for cramping symptoms and follow up if no improvement or worsening.    Contacted patient and relayed above recommendations. Patient verbalized understanding and agrees with plan.    Atiya BUSH RN, BSN, PHN

## 2023-10-18 ENCOUNTER — IMMUNIZATION (OUTPATIENT)
Dept: FAMILY MEDICINE | Facility: CLINIC | Age: 83
End: 2023-10-18
Payer: MEDICARE

## 2023-10-18 DIAGNOSIS — Z23 NEED FOR PROPHYLACTIC VACCINATION AND INOCULATION AGAINST INFLUENZA: Primary | ICD-10-CM

## 2023-10-18 PROCEDURE — G0008 ADMIN INFLUENZA VIRUS VAC: HCPCS

## 2023-10-18 PROCEDURE — 99207 PR NO CHARGE NURSE ONLY: CPT

## 2023-10-18 PROCEDURE — 90662 IIV NO PRSV INCREASED AG IM: CPT

## 2023-10-31 NOTE — TELEPHONE ENCOUNTER
"Called pt and relayed provider message below.     Pt adamant on continuing to see PCP. States \"absolutely not I will not go to [facility].\" Had previous experience at [place] and prefers to continue seeing Nick. Stated she had transportation figured out. Will see PCP at [facility] today.    Next 5 appointments (look out 90 days)    Mar 21, 2023  1:00 PM  (Arrive by 12:40 PM)  Provider Visit with Cris Romero MD  [facility] ([facility] ) [address] [address] [phone]          Patient was given an opportunity to ask questions, verbalized understanding of plan, and is agreeable.    Kaylah Higginbotham RN on 3/21/2023 at 8:59 AM        " no

## 2023-11-08 NOTE — PROGRESS NOTES
Service Date: 11/12/2020      HISTORY OF PRESENT ILLNESS:  Ms. Fam is a very pleasant 80-year-old female with a history of atrial fibrillation with a tachycardia-induced cardiomyopathy, hypertension, hyperlipidemia, COPD and lung cancer.  She underwent an AV susu ablation and BiV pacer implant in 06/2020.  She had been admitted to the hospital in August for frequent falls, generalized weakness ended up having, I think a urinary tract infection.  She had troponin elevation during that time and Cardiology I believe was consulted, but she ended up leaving the hospital because her  passed away in the night and she was not able to be seen by Cardiology.        She is being treated for polymyalgia rheumatica with a long chronic tapering dose of prednisone.  She is on warfarin for CVA prophylaxis due to her persistent atrial fibrillation and she has been having a lot of bruising and occasional bleeding with skin tissue breakdown of her lower extremities and in particular, was told to follow up with Cardiology.      On the first problem, the troponin elevation during her hospitalization, I did review those.  Max troponin was 0.07 and dropped from there.  It did appear to be a type 2 pattern of troponin elevation or demand ischemia.  She has shown improvement in her LV function since she was diagnosed with tachycardia-induced cardiomyopathy.  Initially in May, her ejection fraction was estimated around 30%-35%.  This was while she was in atrial fibrillation.  A repeat echocardiogram in September suggested normalization of her ejection fraction to 55%-60%.  She has not had any significant complications since her procedure or pacer implant and the device seem to be working appropriately.        However, in talking with her, she does have progressive shortness of breath and dyspnea on exertion over the past year.  She also has some upper posterior neck pain, especially with stress or emotional stress.  We talked  about evaluating for ischemic heart disease because of these symptoms and evidence of troponin elevation with her most recent hospitalization.  Her problem mainly is that she is dependent on other people to take her to her appointments.  She is no longer driving.  I explained the type of stress testing that I would like to perform and how it may benefit or change our management.  Certainly, if there is evidence of greater than 15% of the myocardium in fact, this may change our management, but she is on appropriate secondary prevention as far as medications and optimization of her risk factors in regards to ischemic heart disease.        She has agreed to go forth with a stress test, so I did order a Lexiscan stress test for her.  I would like this to be done at the [facility] on our cardiac-specific camera to get a better image and reduce the risk of breast attenuation, etc.  She seems agreeable to this.        The second issue again with the anticoagulation and significant bruising and skin tissue breakdown, likely a combination of the steroid and warfarin.  She has found the novel anticoagulant agents, such as Eliquis, too expensive, but I did provide her a pamphlet to reevaluate whether or not she would qualify for reduction in copay with the Eliquis through the Eliquis company.      PHYSICAL EXAMINATION:   VITAL SIGNS:  On exam today, her blood pressure is 138/64, pulse is 72, weight 144 with a body mass index of 25.   CARDIOVASCULAR:  Tones today were regular.  Again, she is in a paced rhythm.   LUNGS:  Diminished but otherwise clear.   EXTREMITIES:  She has significant bruising to her lower extremities with 1+ peripheral edema; however, she does note that her bruising has been, particularly to her left lower extremity is much better since her hospitalization where she fell.  She said her entire left leg was black at that time, so this is showing some improvement.      SUMMARY:  Ms. Fam is a very  pleasant 80-year-old female with a history of tachycardia-induced cardiomyopathy with persistent atrial fibrillation.  She is status post AV susu ablation procedure and biventricular pacing device with improvement in her overall LV function.  Her EF is now normalized to 55%-60%.  She is taking warfarin for anticoagulation due to her high risk for cerebral embolic events related to atrial fibrillation; however, she is experiencing significant lower extremity bruising and skin tissue breakdown, which likely is a combination of both the chronic, long tapering dose of steroid in addition to the warfarin.        I did provide her a pamphlet to determine if Eliquis may be something she could consider at a reduced dose through the company, but at this time, I do not see that the risks outweigh the benefits of continuing anticoagulation at this time due to her high risk of cerebral embolic events related to persistent atrial fibrillation.      In regards to her troponin elevation, symptoms of progressive dyspnea on exertion and upper neck and back discomfort with emotional stress, we will go forth with evaluating for ischemic heart disease with a Lexiscan nuclear study and I will have her follow up with those tests results once complete.      Please feel free to contact me with any questions you have in regards to her care.      cc:      Violet Fenton MD    [facility]   [address] [place] [address]         MAGY MILLER DO             D: 2020   T: 2020   MT: RONEL      Name:     HAMIDA HODGE   MRN:      [id number]        Account:      [id number]   :      1940           Service Date: 2020      Document: [id number]     Admission

## 2023-11-27 ENCOUNTER — TELEPHONE (OUTPATIENT)
Dept: FAMILY MEDICINE | Facility: CLINIC | Age: 83
End: 2023-11-27

## 2023-11-27 ENCOUNTER — TELEPHONE (OUTPATIENT)
Dept: FAMILY MEDICINE | Facility: CLINIC | Age: 83
End: 2023-11-27
Payer: MEDICARE

## 2023-11-27 NOTE — TELEPHONE ENCOUNTER
Patient is calling to schedule appointment with Dr. Romero for her PMR and neuropathy in toes concerns. Next available with PCP is 3/28/24, patient scheduled. Patient is wondering if Dr. Romero has anything available sooner. Please advise.     Please call patient

## 2023-11-27 NOTE — TELEPHONE ENCOUNTER
Patient is calling to schedule covid shot. Patient is scheduled for 11/30/23. Please advise orders

## 2023-11-30 ENCOUNTER — ALLIED HEALTH/NURSE VISIT (OUTPATIENT)
Dept: FAMILY MEDICINE | Facility: CLINIC | Age: 83
End: 2023-11-30
Payer: MEDICARE

## 2023-11-30 DIAGNOSIS — Z23 HIGH PRIORITY FOR 2019-NCOV VACCINE: Primary | ICD-10-CM

## 2023-11-30 PROCEDURE — 91320 SARSCV2 VAC 30MCG TRS-SUC IM: CPT

## 2023-11-30 PROCEDURE — 90480 ADMN SARSCOV2 VAC 1/ONLY CMP: CPT

## 2023-12-11 ENCOUNTER — OFFICE VISIT (OUTPATIENT)
Dept: FAMILY MEDICINE | Facility: CLINIC | Age: 83
End: 2023-12-11
Payer: MEDICARE

## 2023-12-11 VITALS
HEIGHT: 63 IN | HEART RATE: 70 BPM | OXYGEN SATURATION: 100 % | DIASTOLIC BLOOD PRESSURE: 66 MMHG | RESPIRATION RATE: 16 BRPM | WEIGHT: 113 LBS | BODY MASS INDEX: 20.02 KG/M2 | TEMPERATURE: 97.8 F | SYSTOLIC BLOOD PRESSURE: 130 MMHG

## 2023-12-11 DIAGNOSIS — R73.09 ELEVATED HEMOGLOBIN A1C: ICD-10-CM

## 2023-12-11 DIAGNOSIS — M35.3 PMR (POLYMYALGIA RHEUMATICA) (H): ICD-10-CM

## 2023-12-11 DIAGNOSIS — I10 ESSENTIAL HYPERTENSION: ICD-10-CM

## 2023-12-11 DIAGNOSIS — D47.2 MGUS (MONOCLONAL GAMMOPATHY OF UNKNOWN SIGNIFICANCE): ICD-10-CM

## 2023-12-11 DIAGNOSIS — D63.8 ANEMIA IN OTHER CHRONIC DISEASES CLASSIFIED ELSEWHERE: ICD-10-CM

## 2023-12-11 DIAGNOSIS — G62.9 PERIPHERAL POLYNEUROPATHY: Primary | ICD-10-CM

## 2023-12-11 LAB
ERYTHROCYTE [DISTWIDTH] IN BLOOD BY AUTOMATED COUNT: 13.4 % (ref 10–15)
ERYTHROCYTE [SEDIMENTATION RATE] IN BLOOD BY WESTERGREN METHOD: 29 MM/HR (ref 0–30)
HBA1C MFR BLD: 5.5 % (ref 0–5.6)
HCT VFR BLD AUTO: 38.5 % (ref 35–47)
HGB BLD-MCNC: 12.6 G/DL (ref 11.7–15.7)
HOLD SPECIMEN: NORMAL
IRON BINDING CAPACITY (ROCHE): 259 UG/DL (ref 240–430)
IRON SATN MFR SERPL: 14 % (ref 15–46)
IRON SERPL-MCNC: 35 UG/DL (ref 37–145)
MCH RBC QN AUTO: 29.7 PG (ref 26.5–33)
MCHC RBC AUTO-ENTMCNC: 32.7 G/DL (ref 31.5–36.5)
MCV RBC AUTO: 91 FL (ref 78–100)
PLATELET # BLD AUTO: 265 10E3/UL (ref 150–450)
RBC # BLD AUTO: 4.24 10E6/UL (ref 3.8–5.2)
WBC # BLD AUTO: 8.7 10E3/UL (ref 4–11)

## 2023-12-11 PROCEDURE — 82607 VITAMIN B-12: CPT | Performed by: PHYSICIAN ASSISTANT

## 2023-12-11 PROCEDURE — 86140 C-REACTIVE PROTEIN: CPT | Performed by: PHYSICIAN ASSISTANT

## 2023-12-11 PROCEDURE — 83550 IRON BINDING TEST: CPT | Performed by: PHYSICIAN ASSISTANT

## 2023-12-11 PROCEDURE — 82570 ASSAY OF URINE CREATININE: CPT | Performed by: PHYSICIAN ASSISTANT

## 2023-12-11 PROCEDURE — 82728 ASSAY OF FERRITIN: CPT | Performed by: PHYSICIAN ASSISTANT

## 2023-12-11 PROCEDURE — 85027 COMPLETE CBC AUTOMATED: CPT | Performed by: PHYSICIAN ASSISTANT

## 2023-12-11 PROCEDURE — 82043 UR ALBUMIN QUANTITATIVE: CPT | Performed by: PHYSICIAN ASSISTANT

## 2023-12-11 PROCEDURE — 85652 RBC SED RATE AUTOMATED: CPT | Performed by: PHYSICIAN ASSISTANT

## 2023-12-11 PROCEDURE — 80048 BASIC METABOLIC PNL TOTAL CA: CPT | Performed by: PHYSICIAN ASSISTANT

## 2023-12-11 PROCEDURE — 36415 COLL VENOUS BLD VENIPUNCTURE: CPT | Performed by: PHYSICIAN ASSISTANT

## 2023-12-11 PROCEDURE — 83036 HEMOGLOBIN GLYCOSYLATED A1C: CPT | Performed by: PHYSICIAN ASSISTANT

## 2023-12-11 PROCEDURE — 99214 OFFICE O/P EST MOD 30 MIN: CPT | Performed by: PHYSICIAN ASSISTANT

## 2023-12-11 PROCEDURE — 83540 ASSAY OF IRON: CPT | Performed by: PHYSICIAN ASSISTANT

## 2023-12-11 ASSESSMENT — PAIN SCALES - GENERAL: PAINLEVEL: NO PAIN (0)

## 2023-12-11 NOTE — PROGRESS NOTES
"  Assessment & Plan     Peripheral polyneuropathy  Check labs, referral to see Neurology that is closer to home for her.  - Vitamin B12; Future  - Adult Neurology  Referral; Future  - Vitamin B12    Essential hypertension  Controlled today.  Due for labs.  - Albumin Random Urine Quantitative with Creat Ratio; Future  - BASIC METABOLIC PANEL; Future  - CBC with Platelets and Reflex to Iron Studies; Future  - Albumin Random Urine Quantitative with Creat Ratio  - BASIC METABOLIC PANEL  - CBC with Platelets and Reflex to Iron Studies  - Iron & Iron Binding Capacity  - Ferritin    Elevated hemoglobin A1c    - HEMOGLOBIN A1C; Future  - HEMOGLOBIN A1C    PMR (polymyalgia rheumatica) (H24)  History remotely of PMR- not currently seeing Rheumatology.  Now having body/joint aches and pains.  Will check labs today but also recommend following up with Rheumatology.  - CRP, inflammation; Future  - ESR: Erythrocyte sedimentation rate; Future  - CRP, inflammation  - ESR: Erythrocyte sedimentation rate  - Adult Rheumatology  Referral; Future    MGUS (monoclonal gammopathy of unknown significance)  Anemia in other chronic diseases classified elsewhere  Also recommend follow up with hematology as she has not been back for a while.  - Adult Oncology/Hematology  Referral; Future            Chance Fam PA-C  [facility] MEG Cobos is a 83 year old, presenting for the following health issues:  Pain (Is having generalized pain in her arms and leg for the past several months  feels that her PMR is back.   States most recently treated about 3 years ago. ), NEUROPATHY (Feels that she has neuropathy in her feet and symptoms are worse at night.  Feels like she has been noting increased symptoms over the past 6 months. ), Consult (States if she tries to lay on her left side at night notes increased \" bubbles\" in her stomach and can wake her up at night. ), and Shortness of " "Breath (Has SOB with activity but is unable to use an inhaler due to the fact she gets thrush whenever she uses it. )        12/11/2023     2:29 PM   Additional Questions   Roomed by Shima RIBEIRO LPN     NEUROPATHY : (Feels that she has neuropathy in her feet and symptoms are worse at night.  Feels like she has been noting increased symptoms over the past 6 months. )  Feels like the ball of her foot has a sock in it and at times it is hard to move her toes. They feel like they are stuck together.   Saw podiatry  Left foot worse and with some swelling  Happening mostly at night when she lays down  Tylenol twice daily    PMR:  has a history of Polymyalgia Rheumatica.    has been having symptoms for the past several months with pain in her extremities   Last ESR 55 in 2022   also in 2022  Right arm, left arm, right calf  Having trouble lifting arms, putting dishes away etc      Stomach concerns when sleeping  When laying on left side at night feeling bubbling on and off  Keeping her up at night  No pain        Review of Systems   Constitutional, HEENT, cardiovascular, pulmonary, gi and gu systems are negative, except as otherwise noted.      Objective    /66   Pulse 70   Temp 97.8  F (36.6  C) (Oral)   Resp 16   Ht 1.588 m (5' 2.5\")   Wt 51.3 kg (113 lb)   LMP  (LMP Unknown)   SpO2 100%   BMI 20.34 kg/m    Body mass index is 20.34 kg/m .  Physical Exam   GENERAL: healthy, alert and no distress  EYES: Eyes grossly normal to inspection, PERRL and conjunctivae and sclerae normal  HENT: ear canals and TM's normal, nose and mouth without ulcers or lesions  NECK: no adenopathy, no asymmetry, masses, or scars and thyroid normal to palpation  RESP: lungs clear to auscultation - no rales, rhonchi or wheezes  CV: regular rate and rhythm, normal S1 S2, no S3 or S4, no murmur, click or rub, no peripheral edema and peripheral pulses strong  ABDOMEN: soft, nontender, no hepatosplenomegaly, no masses and bowel " sounds normal  MS: no gross musculoskeletal defects noted, no edema  SKIN: no suspicious lesions or rashes  NEURO: Normal strength and tone, mentation intact and speech normal  PSYCH: mentation appears normal, affect normal/bright

## 2023-12-12 ENCOUNTER — TELEPHONE (OUTPATIENT)
Dept: FAMILY MEDICINE | Facility: CLINIC | Age: 83
End: 2023-12-12
Payer: MEDICARE

## 2023-12-12 LAB
ANION GAP SERPL CALCULATED.3IONS-SCNC: 12 MMOL/L (ref 7–15)
BUN SERPL-MCNC: 15.4 MG/DL (ref 8–23)
CALCIUM SERPL-MCNC: 10.4 MG/DL (ref 8.8–10.2)
CHLORIDE SERPL-SCNC: 103 MMOL/L (ref 98–107)
CREAT SERPL-MCNC: 0.64 MG/DL (ref 0.51–0.95)
CREAT UR-MCNC: 110 MG/DL
CRP SERPL-MCNC: 19.9 MG/L
DEPRECATED HCO3 PLAS-SCNC: 25 MMOL/L (ref 22–29)
EGFRCR SERPLBLD CKD-EPI 2021: 87 ML/MIN/1.73M2
FERRITIN SERPL-MCNC: 409 NG/ML (ref 11–328)
GLUCOSE SERPL-MCNC: 101 MG/DL (ref 70–99)
MICROALBUMIN UR-MCNC: 689 MG/L
MICROALBUMIN/CREAT UR: 626.36 MG/G CR (ref 0–25)
POTASSIUM SERPL-SCNC: 3.9 MMOL/L (ref 3.4–5.3)
SODIUM SERPL-SCNC: 140 MMOL/L (ref 135–145)
VIT B12 SERPL-MCNC: 391 PG/ML (ref 232–1245)

## 2023-12-12 NOTE — TELEPHONE ENCOUNTER
Pt calling for lab results obtained yesterday 12/11/23.    Informed pt that some labs are still in process and all labs still need to be reviewed by ordering provider. Informed pt that we will reach out once results have been reviewed by provider. Patient was given an opportunity to ask questions, verbalized understanding of plan, and is agreeable.    Also, provided phone number to [employer] access line as pt informs she is having trouble accessing her account.    Luz Maria LOPEZ RN

## 2023-12-13 ENCOUNTER — TELEPHONE (OUTPATIENT)
Dept: ANTICOAGULATION | Facility: CLINIC | Age: 83
End: 2023-12-13
Payer: MEDICARE

## 2023-12-13 DIAGNOSIS — I48.19 PERSISTENT ATRIAL FIBRILLATION (H): Primary | ICD-10-CM

## 2023-12-13 NOTE — TELEPHONE ENCOUNTER
ANTICOAGULATION DIRECT ORAL ANTICOAGULANT MONITORING    SUBJECTIVE     The [facility]ticoagulation Clinic is evaluating Tomasa Fam's Apixaban (Eliquis) as part of its Anticoagulation Monitoring Program.    Indication:Atrial Fibrillation  Current dose per medication list: Apixaban 5 mg BID  Recent hospitalizations/ED/Office Visits for bleeding/clotting concerns: No  Other bleeding or side effect concerns: No  Additional findings: History of anemia    OBJECTIVE     Age: 83 year old    Wt Readings from Last 2 Encounters:   12/11/23 51.3 kg (113 lb)   09/19/23 52.1 kg (114 lb 12.8 oz)      Lab Results   Component Value Date    CR 0.64 12/11/2023    CR 0.58 03/21/2023    CR 0.63 01/25/2023     Lab Results   Component Value Date    HGB 12.6 12/11/2023    HGB 13.3 07/01/2021     12/11/2023     07/01/2021     ASSESSMENT/PLAN     A chart review for Direct Oral Anticoagulant (DOAC) Stewardship has been completed for:     Dosing: recommend adjustment to Apixaban 2.5 mg BID for age >= 80 years and weight <= 60 kg (consistent with package insert dosing)    Plan made per [facility] anticoagulation protocol    Ama Fuchs RN  Anticoagulation Clinic

## 2023-12-14 ENCOUNTER — TELEPHONE (OUTPATIENT)
Dept: FAMILY MEDICINE | Facility: CLINIC | Age: 83
End: 2023-12-14
Payer: MEDICARE

## 2023-12-14 NOTE — RESULT ENCOUNTER NOTE
Called pt and advised of result note from Chance Fam.     Pt would like 3 referrals:   1.  Hematology   2.  Rheumatology both per result note   3.  [facility] for a second opinion on her feet as discussed at appt 12/13/23.  Says both feet have numbness and swelling, left worse than right.  Went to  Neurology and pt reports they can't help her.    Nurse unable to rolando up referrals in the result note.    Leydi Joyce RN, BSN  [facility]

## 2023-12-14 NOTE — TELEPHONE ENCOUNTER
Patient calling for lab results.  Advised provider has not reviewed and commented yet.  She was wondering about ferritin as out of range.  She is not taking iron.  Also wondering if PMR has come back.  Also has been coughing mostly at night for a couple a years.  States did not discuss as infrequent enough and forgot to talk about it.  Last night coughed and phlegm with blood.  One time would be nothing and one time would be blood tinged and looked like scab in it.  Was taking omeprazole but did not feel it was helping so stopped it.  Please advise.  Call her back at [phone] .  Camelia Rajan RN

## 2023-12-14 NOTE — RESULT ENCOUNTER NOTE
Called pt and advised of result note below from Chance Fam.  Pt verbalized understanding and agreeable to plan.    Leydi Joyce RN, BSN  [facility]

## 2023-12-15 ENCOUNTER — PATIENT OUTREACH (OUTPATIENT)
Dept: ONCOLOGY | Facility: CLINIC | Age: 83
End: 2023-12-15
Payer: MEDICARE

## 2023-12-15 NOTE — PROGRESS NOTES
New Patient Oncology Nurse Navigator Note     Referring provider: Chance Fam PA-C      Referring Clinic/Organization: [facility]     Referred to (specialty:) Hematology/Oncology     Requested provider (if applicable): NA     Date Referral Received: December 14, 2023     Evaluation for:    D47.2 (ICD-10-CM) - MGUS (monoclonal gammopathy of unknown significance)   D63.8 (ICD-10-CM) - Anemia in other chronic diseases classified elsewhere     Hx of MGUS, elevated ferritin and CRP- follow up, recheck due to pt concerns      Clinical History (per Nurse review of records provided):     Latest Reference Range & Units 12/11/23 15:21 12/11/23 15:25   Sodium 135 - 145 mmol/L 140    Potassium 3.4 - 5.3 mmol/L 3.9    Chloride 98 - 107 mmol/L 103    Carbon Dioxide (CO2) 22 - 29 mmol/L 25    Urea Nitrogen 8.0 - 23.0 mg/dL 15.4    Creatinine 0.51 - 0.95 mg/dL 0.64    GFR Estimate >60 mL/min/1.73m2 87    Calcium 8.8 - 10.2 mg/dL 10.4 (H)    Anion Gap 7 - 15 mmol/L 12    Albumin Urine mg/g Cr 0.00 - 25.00 mg/g Cr  626.36 (H)   Albumin Urine mg/L mg/L  689.0   CRP Inflammation <5.00 mg/L 19.90 (H)    Creatinine Urine mg/dL  110.0   Ferritin 11 - 328 ng/mL 409 (H)    Glucose 70 - 99 mg/dL 101 (H)    Hemoglobin A1C 0.0 - 5.6 % 5.5    Iron 37 - 145 ug/dL 35 (L)    Iron Binding Capacity 240 - 430 ug/dL 259    Iron Sat Index 15 - 46 % 14 (L)    Vitamin B12 232 - 1,245 pg/mL 391    WBC 4.0 - 11.0 10e3/uL 8.7    Hemoglobin 11.7 - 15.7 g/dL 12.6    Hematocrit 35.0 - 47.0 % 38.5    Platelet Count 150 - 450 10e3/uL 265    RBC Count 3.80 - 5.20 10e6/uL 4.24    MCV 78 - 100 fL 91    MCH 26.5 - 33.0 pg 29.7    MCHC 31.5 - 36.5 g/dL 32.7    RDW 10.0 - 15.0 % 13.4    Sed Rate 0 - 30 mm/hr 29    (H): Data is abnormally high  (L): Data is abnormally low    6/2022 patient was seen by Dr. Ibarra for iron deficiency anemia.     Records Location: See Bookmarked material     Records Needed: NA     Additional testing  needed prior to consult: NA    Payor: MEDICARE / Plan: MEDICARE / Product Type: Medicare /     December 15, 2023    Called patient to introduced myself and role as nurse navigator with [facility] Hematology/Oncology department and to inform them that we have received the referral for a diagnosis of MGUS from Chance Fam PA-C.  Per patient she does not want to follow up with Dr. Ibarra as she lives in [place]. She would like to be seen at the [place] location only. Patient transferred to NPS to schedule.     Rukhsana Trevizo, RN, BSN  [facility] Hematology/Oncology Nurse Navigator  [phone]

## 2023-12-18 ENCOUNTER — TELEPHONE (OUTPATIENT)
Dept: FAMILY MEDICINE | Facility: CLINIC | Age: 83
End: 2023-12-18
Payer: MEDICARE

## 2023-12-18 NOTE — TELEPHONE ENCOUNTER
Chandrika calling for referral numbers, also got mychart message she needed to see PCP. Was just seen in TV 12/4/23, no follow up mentioned in note. Has Appt with PCP in March. Will wait for that March apt.     Referral numbers given:    Neurology: [phone]     Rheumatology: [phone]       Vanessa SALMERON RN on 12/18/2023 at 9:20 AM

## 2023-12-18 NOTE — TELEPHONE ENCOUNTER
"Patient calls the clinic.     Patient states that she does not want to \"[place] river\" for her appointment with rheumatology and is requesting that referral be sent to another provider. Patient would like referral to be sent to [facility] in [place] as she has seen a provider there in the past (Dr. Pires).     Referral faxed to fax number [phone]    Atiya BUSH RN, BSN, PHN    "

## 2023-12-20 ENCOUNTER — TELEPHONE (OUTPATIENT)
Dept: FAMILY MEDICINE | Facility: CLINIC | Age: 83
End: 2023-12-20
Payer: MEDICARE

## 2023-12-20 NOTE — TELEPHONE ENCOUNTER
Pt calls, discussed at length, regarding follow up visits etc, has appointments scheduled, also wanted to know what letter was about from yesterday as received alert via [facility], informed  Violet Gardiner RN, BSN  [facility]

## 2024-01-04 ENCOUNTER — PATIENT OUTREACH (OUTPATIENT)
Dept: CARE COORDINATION | Facility: CLINIC | Age: 84
End: 2024-01-04
Payer: MEDICARE

## 2024-01-05 ENCOUNTER — TELEPHONE (OUTPATIENT)
Dept: FAMILY MEDICINE | Facility: CLINIC | Age: 84
End: 2024-01-05
Payer: MEDICARE

## 2024-01-05 NOTE — TELEPHONE ENCOUNTER
Pt calls, discussed albumin protein urine test, discussed Mychart, will monitor annually, pt will discuss at upcoming visit if further questions  Violet Gardiner RN, BSN  [facility]

## 2024-01-09 NOTE — TELEPHONE ENCOUNTER
Dose adjusted under [facility] [place] approval    Roula Toledo, PharmD, [facility]  Anticoagulation Clinic

## 2024-01-11 NOTE — TELEPHONE ENCOUNTER
ANTICOAGULATION  STEWARDSHIP    Spoke with Chandrika to review advised dosage change for Apixaban (Eliquis).    Education provided: how to take apixaban (Eilquis) safely: take doses as close to every 12 hours as possible; at the same time each day, may split tablets in order to start new dose & finish current supply of medication., not discontinue therapy without talking to their provider first, may take longer than usual for bleeding to stop and patient may bruise or bleed more easily; any unusual bleeding to their provider. , and notifying provider of surgeries or procedures 1-2 weeks in advance    They verbalized understanding of new Eliquis dose/tablet strength  They were notified Rx for new dosage would be sent to preferred pharmacy    Total time on the call with the patient: 5 minute    Kamini Verdugo RN  [facility]ticoagulation Clinic

## 2024-01-18 ENCOUNTER — PATIENT OUTREACH (OUTPATIENT)
Dept: CARE COORDINATION | Facility: CLINIC | Age: 84
End: 2024-01-18
Payer: MEDICARE

## 2024-01-25 ENCOUNTER — LAB (OUTPATIENT)
Dept: ONCOLOGY | Facility: CLINIC | Age: 84
End: 2024-01-25
Attending: PHYSICIAN ASSISTANT
Payer: MEDICARE

## 2024-01-25 VITALS
WEIGHT: 110.8 LBS | TEMPERATURE: 96.9 F | DIASTOLIC BLOOD PRESSURE: 67 MMHG | HEART RATE: 70 BPM | OXYGEN SATURATION: 99 % | BODY MASS INDEX: 19.94 KG/M2 | SYSTOLIC BLOOD PRESSURE: 151 MMHG | RESPIRATION RATE: 16 BRPM

## 2024-01-25 DIAGNOSIS — D63.8 ANEMIA IN OTHER CHRONIC DISEASES CLASSIFIED ELSEWHERE: ICD-10-CM

## 2024-01-25 DIAGNOSIS — D47.2 MGUS (MONOCLONAL GAMMOPATHY OF UNKNOWN SIGNIFICANCE): ICD-10-CM

## 2024-01-25 DIAGNOSIS — R77.9 ABNORMALITY OF PLASMA PROTEIN, UNSPECIFIED: ICD-10-CM

## 2024-01-25 LAB
ALBUMIN SERPL BCG-MCNC: 4.6 G/DL (ref 3.5–5.2)
ALP SERPL-CCNC: 140 U/L (ref 40–150)
ALT SERPL W P-5'-P-CCNC: 15 U/L (ref 0–50)
ANION GAP SERPL CALCULATED.3IONS-SCNC: 12 MMOL/L (ref 7–15)
AST SERPL W P-5'-P-CCNC: 25 U/L (ref 0–45)
BILIRUB SERPL-MCNC: 0.6 MG/DL
BUN SERPL-MCNC: 16.4 MG/DL (ref 8–23)
CALCIUM SERPL-MCNC: 10.3 MG/DL (ref 8.8–10.2)
CHLORIDE SERPL-SCNC: 100 MMOL/L (ref 98–107)
CREAT SERPL-MCNC: 0.58 MG/DL (ref 0.51–0.95)
DEPRECATED HCO3 PLAS-SCNC: 25 MMOL/L (ref 22–29)
EGFRCR SERPLBLD CKD-EPI 2021: 89 ML/MIN/1.73M2
GLUCOSE SERPL-MCNC: 87 MG/DL (ref 70–99)
IRON BINDING CAPACITY (ROCHE): 273 UG/DL (ref 240–430)
IRON SATN MFR SERPL: 21 % (ref 15–46)
IRON SERPL-MCNC: 57 UG/DL (ref 37–145)
POTASSIUM SERPL-SCNC: 3.9 MMOL/L (ref 3.4–5.3)
PROT SERPL-MCNC: 7.4 G/DL (ref 6.4–8.3)
SODIUM SERPL-SCNC: 137 MMOL/L (ref 135–145)

## 2024-01-25 PROCEDURE — 84155 ASSAY OF PROTEIN SERUM: CPT | Performed by: INTERNAL MEDICINE

## 2024-01-25 PROCEDURE — 82232 ASSAY OF BETA-2 PROTEIN: CPT | Performed by: INTERNAL MEDICINE

## 2024-01-25 PROCEDURE — 83521 IG LIGHT CHAINS FREE EACH: CPT | Performed by: INTERNAL MEDICINE

## 2024-01-25 PROCEDURE — 99215 OFFICE O/P EST HI 40 MIN: CPT | Performed by: INTERNAL MEDICINE

## 2024-01-25 PROCEDURE — 82607 VITAMIN B-12: CPT | Performed by: INTERNAL MEDICINE

## 2024-01-25 PROCEDURE — 82728 ASSAY OF FERRITIN: CPT | Performed by: INTERNAL MEDICINE

## 2024-01-25 PROCEDURE — G0463 HOSPITAL OUTPT CLINIC VISIT: HCPCS | Performed by: INTERNAL MEDICINE

## 2024-01-25 PROCEDURE — 86334 IMMUNOFIX E-PHORESIS SERUM: CPT | Mod: 26 | Performed by: PATHOLOGY

## 2024-01-25 PROCEDURE — 84165 PROTEIN E-PHORESIS SERUM: CPT | Mod: TC | Performed by: PATHOLOGY

## 2024-01-25 PROCEDURE — 82247 BILIRUBIN TOTAL: CPT | Performed by: INTERNAL MEDICINE

## 2024-01-25 PROCEDURE — 36415 COLL VENOUS BLD VENIPUNCTURE: CPT | Performed by: INTERNAL MEDICINE

## 2024-01-25 PROCEDURE — 84165 PROTEIN E-PHORESIS SERUM: CPT | Mod: 26 | Performed by: PATHOLOGY

## 2024-01-25 PROCEDURE — 83550 IRON BINDING TEST: CPT | Performed by: INTERNAL MEDICINE

## 2024-01-25 PROCEDURE — 86334 IMMUNOFIX E-PHORESIS SERUM: CPT | Performed by: PATHOLOGY

## 2024-01-25 PROCEDURE — 82784 ASSAY IGA/IGD/IGG/IGM EACH: CPT | Performed by: INTERNAL MEDICINE

## 2024-01-25 ASSESSMENT — PAIN SCALES - GENERAL: PAINLEVEL: NO PAIN (0)

## 2024-01-25 NOTE — LETTER
1/25/2024         RE: Tomasa Fam  [address] [facility] [address]        Dear Colleague,    Thank you for referring your patient, Tomasa Fam, to the [facility]. Please see a copy of my visit note below.    [facility]    Hematology/Oncology New Patient Note      Today's Date: Jan 25, 2024    Reason for Consultation: MGUS  Referring Provider: Chance Fam PA-C      HISTORY OF PRESENT ILLNESS: Tomasa Fam is an 83 year old female with history of invasive ductal carcinoma of the left breast and LCIS of the right breast s/p double mastectomy, early stage NSCLC of the right lung s/p wedge resection, MGUS, iron deficiency anemia, GERD, HLD, pAfib, thyroid nodule s/p biopsy (benign), PMR, and PPM. She is referred to clinic for MGUS.     Patient was previously followed by [facility] (Dr. Peri Luong) for history of Stage IA left brast cancer and Stage 1A RUL adenocarcinoma of the lung. She was last seen by Dr. Luong in 2017.     Non-small cell lung cancer  Location: Right upper lobe, bronchus or lung  Date of diagnosis: 3/1/2016  TNM staging: T1b, N0, M0, staging type: Pathologic  Stage at diagnosis: 1A  Histology: Adenocarcinoma.  Histologic grade: Grade 2.  EGFR expression not performed.  ALK rearrangement not performed.    Breast cancer  Location: Left breast upper inner quadrant  Date of diagnosis: 12/1/2011  TNM staging: p T1b, pN0, M0.  Location: Left breast upper inner quadrant.  Stage at diagnosis: 1A  Line of therapy: Adjuvant  Histology: Invasive ductal carcinoma, histologic grade 3.  Lymphovascular invasion present.  ER positive, DE positive.  HER2/richard FISH positive.    Prior treatments:  1.  This is a 75-year-old lady with occasional Premarin use.  Abnormality found on routine mammography.  Proceeded with left-sided mastectomy and sentinel lymph node procedure, and right sided mammoplasty reduction,  showing a 1 cm grade 3 lesion with good margins.  ER positive at 75%, SC positive at 50%, and HER2/richard ratio 7.1.  2.  The patient was then chemotherapy with docetaxel, carboplatin, and trastuzumab, total of 6 cycles.  Started on 2/8/2012 with pegfilgrastim support up until 5/23/2012.  PEG filgrastim was held with cycle 6 per patient's choice.  Of note, on her cycle #2, dose reduction of docetaxel provided to 60 mg/m  due to significant rash.  3.  Maintenance trastuzumab briefly stopped due to concern for ejection fraction.  Then, by cardiology clearance, was resumed on 8/17/2012.  Adequate ejection fraction was all through the rest of the maintenance and finished last dose in 4/3/2013.  Her EF post trastuzumab also is okay.  4.  March 2016 was diagnosed with a pT1b N0 M0 right upper lobe adenocarcinoma status post resection and an observation.    Notes indicate patient had then declined adjuvant AI therapy.    She has seen Dr. Ibarra in 2022 for MGUS and iron deficiency anemia. She has received venofer in the past.    She states she had significant weight loss of about 25-40 lbs two years ago.    She reports constipation, constipation. No blood.     Last colonoscopy was done in 2011 and normal. She was to be due in 5 years. She had EGD 12/2021 that was normal.      REVIEW OF SYSTEMS:   A 14 point ROS was reviewed with pertinent positives and negatives in the HPI.        HOME MEDICATIONS:  Current Outpatient Medications   Medication Sig Dispense Refill     acetaminophen (TYLENOL) 325 MG tablet Take 2 tablets (650 mg) by mouth every 4 hours as needed for other (mild pain) 100 tablet 0     amLODIPine (NORVASC) 5 MG tablet Take 1 tablet (5 mg) by mouth 2 times daily 180 tablet 3     apixaban ANTICOAGULANT (ELIQUIS) 2.5 MG tablet Take 1 tablet (2.5 mg) by mouth 2 times daily 120 tablet 0     ezetimibe (ZETIA) 10 MG tablet Take 1 tablet (10 mg) by mouth every evening 90 tablet 3     fish oil-omega-3 fatty acids 1000 MG  capsule TAKE 1 CAPSULE BY MOUTH DAILY 90 capsule 3     metoprolol tartrate (LOPRESSOR) 100 MG tablet Take 1 tablet (100 mg) by mouth 2 times daily 180 tablet 3     omeprazole (PRILOSEC) 20 MG DR capsule Take 20 mg by mouth daily as needed 90 capsule      polyethylene glycol (MIRALAX) 17 GM/Dose powder Take 17 g by mouth daily as needed 510 g 0         ALLERGIES:  Allergies   Allergen Reactions     No Known Drug Allergy      Tape [Adhesive Tape]      Sensitive to plastic tape on upper part of body--takes skin off         PAST MEDICAL HISTORY:  Past Medical History:   Diagnosis Date     Anemia      Arthritis     hands, knees     Breast cancer (H) 01/2012    left mastectomy followed by right;  Dr. Luong     Chronic airway obstruction, not elsewhere classified 2006    very mild COPD - small cough     Concussion 03/13/2013     Problem list name updated by automated process. Provider to review and confirm Im[employer] Update utility     Cystocele, midline 04/04/2012     Diffuse cystic mastopathy      Gastroesophageal reflux disease, esophagitis presence not specified 11/23/2019     History of hypokalemia 01/23/2013     Hyperlipidemia LDL goal <160 06/29/2011    [place] 10-year CHD Risk Score: 2% (14 Total Points)  Values used to calculate score:    Age: 70 years -- Points: 14    Total Cholesterol: 192 mg/dL -- Points: 1    HDL Cholesterol: 61 mg/dL -- Points: -1    Systolic BP (treated): 118 mmHg -- Points: 0   The patient is not a smoker. -- Points: 0   The patient has not been diagnosed with diabetes. -- Points: 0   The patient does not have a famil     Lung cancer (H) 10/21/2015     Pacemaker     [employer]     Paroxysmal atrial fibrillation (H) 09/13/2019     PMR (polymyalgia rheumatica) (H24) 01/17/2020    tapering' above.)  In patients receiving over 10 mg of prednisone/day, the dose can be lowered by 2.5 mg/day decrements every two to four weeks  Once the dose of prednisone is 10 mg/day, further tapering can be  done by 1 mg per month, provided the clinical course is stable  The clinical response to glucocorticoid therapy is closely monitored, which centers on screening for the presence and/or recu     Thyroid nodule 04/23/2016    Noted on CT Fall 2015.      Unspecified essential hypertension late 1980's         PAST SURGICAL HISTORY:  Past Surgical History:   Procedure Laterality Date     ANESTHESIA CARDIOVERSION N/A 6/12/2020    Procedure: ANESTHESIA, FOR CARDIOVERSION;  Surgeon: GENERIC ANESTHESIA PROVIDER;  Location: [facility] OR     ARTHROPLASTY KNEE Right 7/25/2022    Procedure: Right total knee arthroplasty;  Surgeon: Nick Parra MD;  Location:  OR     COLONOSCOPY  3/2003    adenomatous polyp      COLONOSCOPY  7/2006    diverticulosis - repeat in 5 years     COLONOSCOPY  10/13/2011    Procedure:COLONOSCOPY; COLONOSCOPY ; Surgeon:CHITO GORDILLO; Location: GI     CYSTOSCOPY  7/11/2012    Procedure: CYSTOSCOPY;;  Surgeon: Aline Cooper DO;  Location:  OR     DAVINCI HYSTERECTOMY SUPRACERVICAL, SACROCOLPOPEXY, COMBINED  7/11/2012    Procedure: COMBINED DAVINCI HYSTERECTOMY SUPRACERVICAL, SACROCOLPOPEXY;   DAVINCI ASSISTED LAPAROSCOPIC SUPRACERVICAL HYSTERECTOMY AND BILATERAL SALPINGO-OOPHORECTOMY, SACROCOLPOPEXY AND CYSTOSCOPY;  Surgeon: Aline Cooper DO;  Location:  OR     EP ABLATION AV NODE N/A 6/22/2020    Procedure: EP ABLATION AV NODE;  Surgeon: Adriano Raman MD;  Location:  [facility]     EP BIVENT LEAD PLACEMENT N/A 6/22/2020    Procedure: Bivent Lead Placement;  Surgeon: Adriano Raman MD;  Location:  [facility]     EP PACEMAKER N/A 6/22/2020    Procedure: AVNA and BiV Pacemaker Insertion;  Surgeon: Adriano Raman MD;  Location:  [facility]     ESOPHAGOSCOPY, GASTROSCOPY, DUODENOSCOPY (EGD), COMBINED N/A 12/14/2021    Procedure: ESOPHAGOGASTRODUODENOSCOPY (EGD) (fv) biopsies with cold forcep;  Surgeon: Chito Gordillo  MD NATHAN;  Location: [facility] GI     EXCISE LESION EYELID Right 6/29/2015    Procedure: EXCISE LESION EYELID;  Surgeon: Hank Olvera MD;  Location: [facility]     INSERT PORT VASCULAR ACCESS  2/3/2012    Procedure:INSERT PORT VASCULAR ACCESS; Power Port-A- Catheter Placement ; Surgeon:IRISH TAPIA; Location:[facility] OR     LAPAROSCOPIC SALPINGO-OOPHORECTOMY  7/11/2012    Procedure: LAPAROSCOPIC SALPINGO-OOPHORECTOMY;  Davinci;  Surgeon: Aline Cooper DO;  Location:  OR     LIPOSUCTION, RHYTIDECTOMY, COMBINED       LOBECTOMY LUNG Right 3/1/2016    Procedure: LOBECTOMY LUNG;  Surgeon: Jonas Woodward MD;  Location:  OR     MAMMOPLASTY REDUCTION  1/6/2012    Procedure:MAMMOPLASTY REDUCTION; Surgeon:MICKI KYLE; Location:[facility] OR     MASTECTOMY SIMPLE  11/12/2012    Procedure: MASTECTOMY SIMPLE;   Right Prophylactic Mastectomy with attempted Right Sentinal Node Biopsy, Revision Bilateral Mastectomy Insicions, liposuction in breast area;  Surgeon: Irish Tapia MD;  Location: [facility] OR     MASTECTOMY SIMPLE, SENTINEL NODE, COMBINED  1/6/2012    Procedure:COMBINED MASTECTOMY SIMPLE, SENTINEL NODE; Left Mastectomy Left Lindsay Node Biopsy,  Right Breast Reduction ; Surgeon:IRISH TAPIA; Location:[facility] OR     MASTECTOMY, BILATERAL       REMOVE PORT VASCULAR ACCESS  4/29/2013    Procedure: REMOVE PORT VASCULAR ACCESS;  Port A catheter removal ;  Surgeon: Irish Tapia MD;  Location: [facility] OR     REPAIR PTOSIS BILATERAL Bilateral 6/29/2015    Procedure: REPAIR PTOSIS BILATERAL;  Surgeon: Hank Olvera MD;  Location: [facility]     REVISE RECONSTRUCTED BREAST BILATERAL  11/12/2012    Procedure: REVISE RECONSTRUCTED BREAST BILATERAL;;  Surgeon: Micki Kyle MD;  Location: [facility] OR     SURGICAL HISTORY OF -   in 40's    face lift     SURGICAL HISTORY OF -       lipoma removed right thigh     SURGICAL HISTORY OF -       D and C     THORACOTOMY Right 3/1/2016    Procedure: THORACOTOMY;   Surgeon: Jonas Woodward MD;  Location:  OR     [facility] NONSPECIFIC PROCEDURE  1970    s/p Tubal ligation 1970         SOCIAL HISTORY:  Social History     Socioeconomic History     Marital status:      Spouse name: Aren     Number of children: 2     Years of education: Not on file     Highest education level: Not on file   Occupational History     Occupation: nguyen     Employer: NONE    Tobacco Use     Smoking status: Never     Smokeless tobacco: Never   Vaping Use     Vaping Use: Never used   Substance and Sexual Activity     Alcohol use: Yes     Comment: 0-1 drink day     Drug use: No     Sexual activity: Yes     Partners: Male   Other Topics Concern      Service Not Asked     Blood Transfusions Not Asked     Caffeine Concern Not Asked     Occupational Exposure Not Asked     Hobby Hazards Not Asked     Sleep Concern Not Asked     Stress Concern Not Asked     Weight Concern Not Asked     Special Diet Not Asked     Back Care Not Asked     Exercise Yes     Comment: nguyen     Bike Helmet Not Asked     Seat Belt Not Asked     Self-Exams Not Asked     Parent/sibling w/ CABG, MI or angioplasty before 65F 55M? Yes   Social History Narrative     Not on file     Social Determinants of Health     Financial Resource Strain: Not on file   Food Insecurity: Not on file   Transportation Needs: Not on file   Physical Activity: Not on file   Stress: Not on file   Social Connections: Not on file   Interpersonal Safety: Low Risk  (12/11/2023)    Interpersonal Safety      Do you feel physically and emotionally safe where you currently live?: Yes      Within the past 12 months, have you been hit, slapped, kicked or otherwise physically hurt by someone?: No      Within the past 12 months, have you been humiliated or emotionally abused in other ways by your partner or ex-partner?: No   Housing Stability: Not on file         FAMILY HISTORY:  Family History   Problem Relation Age of Onset     Cardiovascular Father          ruptured aorta, hardening of the arteries     Cerebrovascular Disease Mother      Respiratory Mother         chronic bronchitis - was a smoker early on     Cardiovascular Paternal Grandfather         MI     Cerebrovascular Disease Paternal Aunt      Hypertension Son      Neurologic Disorder Daughter         migraines     Breast Cancer Daughter      Brain Tumor Sister          PHYSICAL EXAM:  Vital signs:  BP (!) 151/67   Pulse 70   Temp 96.9  F (36.1  C) (Tympanic)   Resp 16   Wt 50.3 kg (110 lb 12.8 oz)   LMP  (LMP Unknown)   SpO2 99%   BMI 19.94 kg/m     ECO  GENERAL/CONSTITUTIONAL: No acute distress.  EYES: Pupils are equal, round, and react to light and accommodation. Extraocular movements intact.  No scleral icterus.  ENT/MOUTH: Neck supple. Oropharynx clear, no mucositis.  LYMPH: No anterior cervical, posterior cervical, supraclavicular, axillary or inguinal adenopathy.   RESPIRATORY: Clear to auscultation bilaterally. No crackles or wheezing.   CARDIOVASCULAR: Regular rate and rhythm without murmurs, gallops, or rubs.  GASTROINTESTINAL: No hepatosplenomegaly, masses, or tenderness. The patient has normal bowel sounds. No guarding.  No distention.  MUSCULOSKELETAL: Warm and well-perfused, no cyanosis, clubbing, or edema.  NEUROLOGIC: Cranial nerves II-XII are intact. Alert, oriented, answers questions appropriately.  INTEGUMENTARY: No rashes or jaundice.  GAIT: Requires cane.      LABS:  CBC RESULTS:   Recent Labs   Lab Test 23  1521   WBC 8.7   RBC 4.24   HGB 12.6   HCT 38.5   MCV 91   MCH 29.7   MCHC 32.7   RDW 13.4          Recent Labs   Lab Test 23  1521 23  1355    138   POTASSIUM 3.9 4.1   CHLORIDE 103 101   CO2    ANIONGAP 12 14   * 76   BUN 15.4 16.3   CR 0.64 0.58   CHARLIE 10.4* 10.4*     Lab Results   Component Value Date    AST 24 2023    AST 18 2021     Lab Results   Component Value Date    ALT 21 2023    ALT 35 2021      Lab Results   Component Value Date    BILICONJ 0.0 04/22/2010      Lab Results   Component Value Date    BILITOTAL 0.4 01/25/2023    BILITOTAL 0.4 03/16/2021     Lab Results   Component Value Date    ALBUMIN 4.3 01/25/2023    ALBUMIN 2.7 06/02/2022    ALBUMIN 3.2 07/01/2021     Lab Results   Component Value Date    PROTTOTAL 7.1 01/25/2023    PROTTOTAL 6.8 03/16/2021      Lab Results   Component Value Date    ALKPHOS 148 01/25/2023    ALKPHOS 77 03/16/2021      Latest Reference Range & Units 06/28/11 09:05 09/08/20 10:24   Parathyroid Hormone Intact 18 - 80 pg/mL 45 121 (H)       Serum M-protein (g/dL):  9/8/20: 0.1  6/2/22: Unremarkable immunofixation; no quantification    Urine M-peak (%):     Total IgG (mg/dL), IgA (mg/dL), IgM (mg/dL):     Free kappa light chains (mg/dL), lambda (mg/dL), kappa/lambda ratio:  6/2/22: 2.66, 2.32, 1.15      PATHOLOGY:  Collected: 11/30/2011  Received: 11/30/2011  Reported: 12/1/2011 11:34  Ordering Phy(s): PARTHA CHRISTIE  Additional Phy(s): RITO ZHOU        ADDENDUM    TO ORIGINAL REPORT  Status: Signed Out  Date Ordered:12/2/2011  Date Complete:12/2/2011  Date Reported:12/2/2011 12:02  Signed Out By: Yaya Harding M.D.    INTERPRETATION:  IMMUNOHISTOCHEMICAL ANALYSIS FOR ESTROGEN AND PROGESTERONE RECEPTORS    RESULTS:    ESTROGEN RECEPTORS:           Positive      (Approximately 75% of tumor  cell nuclei arielle)                                       Intensity of staining:  Strong.    PROGESTERONE RECEPTORS:     Positive      (Approximately 50% of invasive  tumor nuclei arielle)                                       Intensity of staining:  Strong.      Collected: 11/30/2011 00:00  Received: 12/2/2011 00:00  Reported: 12/5/2011 23:47  Ordering Phy(s): PARTHA CHRISTIE  Additional Phy(s): RITO HARDING    __________________________________________          TEST(S) REQUESTED:  Her 2 Lo FISH    SPECIMEN DESCRIPTION:  Breast Tissue, Paraffin  Embedded    CLINICAL COMMENTS:  Breast Cancer, [id number]        METHODS:  Fluorescence in-situ hybridization (FISH) was performed on  formalin-fixed, paraffin-embedded tissue using the [employer]  PathVWeDidItion DNA probe set to HER2 (17q11.2-q21) and to the centromere  region of chromosome 17 (CEP 17; 17p11.1-q11.1). The formalin fixation  time is unavailable. An adequate number of invasive tumor cells were  present, of which 30 cells were scored independently by two  technologists; images were captured on an [employer] image analysis  system. The resulting numbers of HER2 and centromere 17 signals were  expressed as a ratio of HER2:CEP 17.    RESULTS:    Ratio of HER2:CEP17 signals  Tomasa Fam:  >7.1                    Avg. number HER2  signals/nucleus:  >21.0                                           Avg. number CEP 17  signals/nucleus:  3.0  Normal Range:  <1.8  Amplified Range: > 2.2  Equivocal Range:  1.8 - 2.2    INTERPRETATION:  Amplification of the HER2 gene was detected.          Collected: 1/6/2012  Received: 1/6/2012  Reported: 1/10/2012 16:41  Ordering Phy(s): TRESSA TAPIA        ADDENDUM    TO ORIGINAL REPORT  Status: Signed Out  Date Ordered:1/12/2012  Date Complete:1/12/2012  Date Reported:1/12/2012 09:28  Signed Out By: Mckenna Rivas M.D.    INTERPRETATION:  Tumor blocks B5, B6, and B7 are stained using D2-40 immunohistochemical  marker for evaluation of lymph/vascular space invasion by tumor.  The  immunomorphologic findings indicate focal presence of angiolymphatic  invasion by tumor (block B7).  Internal consultation is obtained.    /jacob  DT/1-12-12        __________________________________________      ORIGINAL REPORT:    SPECIMEN(S):  A: Lymph node, sentinel, axillary, #1  B: Breast mastectomy, simple, left  C: Breast, right, reduction mammoplasty    FINAL DIAGNOSIS:  A.  Lymph node, left axillary sentinel node #1, excision biopsy - Single  node negative for metastatic  carcinoma by routine staining and  immunohistochemistry (0/1).    B.  Breast, left, mastectomy -  Specimen, Procedure and Side:   Breast, left mastectomy.  Tumor Site:   10:00 (upper inner quadrant).  Histologic Type:   Infiltrating ductal carcinoma.  Specimen Integrity:   Intact.  Specimen Size:   See Gross Description.  Size of Invasive Component:  1 cm.  Shiloh Grade: III of III.  Tubule + Nuclei + Mitoses = Rosa Score:   3 + 3 + 3 = 9 of 9.  Vascular/Lymphatic Invasion:   Suspicious foci identified (pending  immunohistochemical stain; findings will be reported as an addendum to  this case).  Tumor Focality: Single focus.  Macroscopic and Microscopic Extent of Tumor: Tumor limited to breast.  Uninvolved skin.  Skeletal muscle not submitted for evaluation.  Associated In Situ Component:   Ductal carcinoma in-situ (DCIS),  solid-type.  High nuclear grade with necrosis.  Margins:     Invasive Component:   Uninvolved.     Distance to Closest Margin, if Negative:   Invasive tumor 2.3 cm from  nearest (posterior) margin.     In Situ Component:   Uninvolved.     Distance to Closest Margin, if Negative:   DCIS 2.3 cm from the  nearest (posterior) margin.  Lymph Nodes and Type:   See specimen A.            Number of nodes with macrometastases (greater than 0.2 cm): 0.              Number of nodes with micrometastases (greater than 0.2 mm to  0.2 cm): 0.            Number of nodes with isolated tumor cells (less than or equal  to 0.2 mm): 0.       Size of largest metastatic deposit:   Not applicable.  Pathologic Staging (pTNM):   pT1b, pN0(sn)(i-), pM not applicable.  Additional pathologic findings:   Tumor-associated microcalcifications.  Fibrocystic change.  Focal sclerosing adenosis.  Estrogen and Progesterone Receptor (previously performed on prior  biopsy, [id number]): ER+/WV+ (see prior report for details).  HER-2/Lo (previously performed on prior biopsy, [id number]): Her-2 gene  amplification detected (see  "separate report for details, [id number]).  CAP Protocol Based on AJCC/UICC TNM, 7th edition; Protocol Effective  Date:  January 2010  C.  Breast, right, reduction mammoplasty -  1.          Benign breast tissue.  2.     Unremarkable overlying skin.       Collected: 11/12/2012  Received: 11/12/2012  Reported: 11/13/2012 17:38  Ordering Phy(s): TRESSA TAPIA        SPECIMEN(S):  A: Breast mastectomy, simple, right  B: Possible small lymph node aggregate, questionable sentinel node,  right axillary  C: Additional right mastectomy tissue  D: Left mastectomy scar    FINAL DIAGNOSIS:  A.  Breast, right, simple mastectomy -       1.  Lobular carcinoma in-situ (LCIS), focal.       2.  Columnar cell change, focal.       3.  Dermal scar with focal foreign body-type granulomatous reaction  to polarizable material.       4.  Negative for invasive malignancy.    B.  Soft tissue, \"questionable sentinel node\", right axilla, excision  biopsy -       1.  Benign fibroadipose tissue.       2.  No lymph node identified.    C.  Right breast and overlying skin, 'additional mastectomy tissue',  resection -       1.  Dermal scar with foreign body-type granulomatous reaction.       2.  Single benign lymph node.       3.  Negative for malignancy.    D.  Skin, left breast mastectomy scar, resection -       1.  Dermal scar.       2.  Negative for malignancy.     Collected: 3/1/2016  Received: 3/1/2016  Reported: 3/2/2016 14:53  Ordering Phy(s): GUME MCCABE    SPECIMEN(S):  A: Right upper lobe lung mass  B: Lymph node, pulmonary ligament 9  C: Lymph node, subcarinal  D: Lymph nodes, paratracheal lower 4R  E: Lymph node, right tracheal bronchial angle  F: Lymph node, hilar    FINAL DIAGNOSIS:  A: Right lung, upper lobe mass, wedge resection-  - Invasive moderately differentiated adenocarcinoma (2.2 cm)  -Margins negative for malignancy  -Total of 5 lymph nodes negative for metastatic carcinoma (0/5)(please  see specimens " B.-F.)    Please see detailed tumor synopsis below         LUNG: Resection    Specimen       Specimen:    Lobe(s) of lung: Right       Procedure:    Wedge resection       Specimen Integrity:    Intact       Specimen Laterality: Right       Tumor Site:   Upper lobe       Tumor Focality:   Unifocal    Tumor       Histologic Type:   Adenocarcinoma, acinar predominant(acinar-70%,  lepidic-20%, papillary-10%)       Histologic Grade:    GX:  Cannot be assessed    Extent       Tumor Size:   Greatest dimension: 2.2 cm            Additional dimensions: 2.0 x 1.7 cm       Visceral Pleura Invasion:    Not identified       Tumor Extension:   Not identified    Margins       Status of Margin Involvement:  All margins uninvolved by invasive  carcinoma            Distance of Invasive Carcinoma from Closest Margin: 0.9 cm  exam            Margin Closest to Invasive Carcinoma: Parenchyma margin       Bronchial Margin:   Not applicable       Vascular Margin:    Not applicable       Parenchymal Margin:   Uninvolved by invasive carcinoma    Accessory Findings       Lymph-Vascular Invasion:   Not identified    Extranodal Extension: Not identified  Pathologic Staging (pTNM)       Primary Tumor (pT):    pT1b       Regional Lymph Nodes (pN):   pN0:  No regional lymph node  metastasis            Number of Lymph Nodes Examined: 5            Number of Lymph Nodes Involved: 0       Distant Metastasis (pM):    Not applicable    B: Lymph nodes, inferior pulmonary ligament #9, excision-  -1 lymph node negative for malignancy (0/1)    C: Lymph node, subcarinal #7, excision-  - 1 lymph node negative for malignancy (0/1)    D: Lymph node, peritracheal lower (4R), excision-  - 1 lymph node negative for malignancy (0/1)    E: Lymph node, right tracheobronchial angle, excision-  - 1 lymph node negative for malignancy (0/1)    F: Lymph node, hilar (10), excision-  - 1 lymph node negative for malignancy (0/1)       Collected: 6/2/2016   Received: 6/2/2016    Reported: 6/6/2016 08:22   Ordering Phy(s): SHERI WEINER     SPECIMEN/STAIN PROCESS:   A: FNA-thyroid, right thyroid nodule, correlates to #1 on  ultrasound        Pap-Cyto x 6, Botello's stain-cyto x 1   B: FNA-thyroid, right thyroid nodule, correlates to #2 on ultrasound        Pap-Cyto x 8, Botello's stain-cyto x 2     ----------------------------------------------------------------     CYTOLOGIC INTERPRETATION:     A.  FNA-thyroid, right thyroid nodule, correlates to #1 on  ultrasound:    Benign   Consistent with a benign nodule (includes adenomatoid nodule, colloid   nodule, etc.)           ASSESSMENT/PLAN:  Tomasa Fam is an 83 year old female with history of invasive ductal carcinoma of the left breast and LCIS of the right breast s/p double mastectomy, early stage NSCLC of the right lung s/p wedge resection, MGUS, iron deficiency anemia, GERD, HLD, pAfib, thyroid nodule s/p biopsy (benign), PMR, and PPM. She is referred to clinic for MGUS.     1) MGUS  -Patient with 0.1 g/dL very small monoclonal antibody in 2020. This was no longer quantifiable in 2022.   -She has anemia (with history of iron deficiency). Renal function is normal. She has persistent hypercalcemia.   -Update CBC, CMP, SPIEP, UPIEP, iron studies, B12, folate etc and PET/CT at this time.   -She could have underlying plasma cell disorder, but given her history of multiple malignancies, she may have elevated M-protein from underlying solid tumor malignancy as well.    2) Hypercalcemia  -See above.  -Patient with history of elevated PTH intact. I am repeating this. I do not see history of sestamibi scan.     3) History of clinical stage IA left breast ER/CO+, HER2+ invasive ductal carcinoma and right breast LCIS  -s/p mastectomy and adjuvant chemotherapy in 2011 as described in the HPI.  -She declined adjuvant AI therapy.     4) History of stage IA left upper lobe adenocarcinoma  -s/p wedge resection with Dr. Woodward in 2016.    5)  pAfib on apixaban    6) -Labs as ordered above.   -Schedule PET/CT.  -Follow up with me in 3 weeks to review and for further recommendations.   -I discussed with patient my planned departure from [facility] and she will establish with a new hematologist/oncologist after February 2024.  I offered her follow up with Dr. Gomez or Dr. Luong and she declines at this time.            Gabby Ruff DO  Hematology/Oncology  [facility]      Again, thank you for allowing me to participate in the care of your patient.        Sincerely,        Gabby Ruff DO

## 2024-01-25 NOTE — PROGRESS NOTES
Medical Assistant Note:  Tomasa Fam presents today for blood draw.    Patient seen by provider today: Yes: Dr Ruff.   present during visit today: Not Applicable.    Concerns: No Concerns.    Procedure:  Lab draw site: lt antecub, Needle type: butterfly, Gauge: 23.    Post Assessment:  Labs drawn without difficulty: no- attempted twice both times vein blew, patient didn't want to be poked again. I was only able to get half the tests collected. Pt will come back next week to have the rest collected. Dr Ruff notified.    Discharge Plan:  Departure Mode: Ambulatory.    Face to Face Time: 10.    Shireen Chao CMA, CMA

## 2024-01-25 NOTE — PROGRESS NOTES
[facility]    Hematology/Oncology New Patient Note      Today's Date: Jan 25, 2024    Reason for Consultation: MGUS  Referring Provider: Chance Fam PA-C      HISTORY OF PRESENT ILLNESS: Tomasa Fam is an 83 year old female with history of invasive ductal carcinoma of the left breast and LCIS of the right breast s/p double mastectomy, early stage NSCLC of the right lung s/p wedge resection, MGUS, iron deficiency anemia, GERD, HLD, pAfib, thyroid nodule s/p biopsy (benign), PMR, and PPM. She is referred to clinic for MGUS.     Patient was previously followed by [facility] (Dr. Peri Luong) for history of Stage IA left brast cancer and Stage 1A RUL adenocarcinoma of the lung. She was last seen by Dr. Luong in 2017.     Non-small cell lung cancer  Location: Right upper lobe, bronchus or lung  Date of diagnosis: 3/1/2016  TNM staging: T1b, N0, M0, staging type: Pathologic  Stage at diagnosis: 1A  Histology: Adenocarcinoma.  Histologic grade: Grade 2.  EGFR expression not performed.  ALK rearrangement not performed.    Breast cancer  Location: Left breast upper inner quadrant  Date of diagnosis: 12/1/2011  TNM staging: p T1b, pN0, M0.  Location: Left breast upper inner quadrant.  Stage at diagnosis: 1A  Line of therapy: Adjuvant  Histology: Invasive ductal carcinoma, histologic grade 3.  Lymphovascular invasion present.  ER positive, OH positive.  HER2/richard FISH positive.    Prior treatments:  1.  This is a 75-year-old lady with occasional Premarin use.  Abnormality found on routine mammography.  Proceeded with left-sided mastectomy and sentinel lymph node procedure, and right sided mammoplasty reduction, showing a 1 cm grade 3 lesion with good margins.  ER positive at 75%, OH positive at 50%, and HER2/richard ratio 7.1.  2.  The patient was then chemotherapy with docetaxel, carboplatin, and trastuzumab, total of 6 cycles.  Started on 2/8/2012 with pegfilgrastim support up  until 5/23/2012.  PEG filgrastim was held with cycle 6 per patient's choice.  Of note, on her cycle #2, dose reduction of docetaxel provided to 60 mg/m  due to significant rash.  3.  Maintenance trastuzumab briefly stopped due to concern for ejection fraction.  Then, by cardiology clearance, was resumed on 8/17/2012.  Adequate ejection fraction was all through the rest of the maintenance and finished last dose in 4/3/2013.  Her EF post trastuzumab also is okay.  4.  March 2016 was diagnosed with a pT1b N0 M0 right upper lobe adenocarcinoma status post resection and an observation.    Notes indicate patient had then declined adjuvant AI therapy.    She has seen Dr. Ibarra in 2022 for MGUS and iron deficiency anemia. She has received venofer in the past.    She states she had significant weight loss of about 25-40 lbs two years ago.    She reports constipation, constipation. No blood.     Last colonoscopy was done in 2011 and normal. She was to be due in 5 years. She had EGD 12/2021 that was normal.      REVIEW OF SYSTEMS:   A 14 point ROS was reviewed with pertinent positives and negatives in the HPI.        HOME MEDICATIONS:  Current Outpatient Medications   Medication Sig Dispense Refill    acetaminophen (TYLENOL) 325 MG tablet Take 2 tablets (650 mg) by mouth every 4 hours as needed for other (mild pain) 100 tablet 0    amLODIPine (NORVASC) 5 MG tablet Take 1 tablet (5 mg) by mouth 2 times daily 180 tablet 3    apixaban ANTICOAGULANT (ELIQUIS) 2.5 MG tablet Take 1 tablet (2.5 mg) by mouth 2 times daily 120 tablet 0    ezetimibe (ZETIA) 10 MG tablet Take 1 tablet (10 mg) by mouth every evening 90 tablet 3    fish oil-omega-3 fatty acids 1000 MG capsule TAKE 1 CAPSULE BY MOUTH DAILY 90 capsule 3    metoprolol tartrate (LOPRESSOR) 100 MG tablet Take 1 tablet (100 mg) by mouth 2 times daily 180 tablet 3    omeprazole (PRILOSEC) 20 MG DR capsule Take 20 mg by mouth daily as needed 90 capsule     polyethylene glycol  (MIRALAX) 17 GM/Dose powder Take 17 g by mouth daily as needed 510 g 0         ALLERGIES:  Allergies   Allergen Reactions    No Known Drug Allergy     Tape [Adhesive Tape]      Sensitive to plastic tape on upper part of body--takes skin off         PAST MEDICAL HISTORY:  Past Medical History:   Diagnosis Date    Anemia     Arthritis     hands, knees    Breast cancer (H) 01/2012    left mastectomy followed by right;  Dr. Luong    Chronic airway obstruction, not elsewhere classified 2006    very mild COPD - small cough    Concussion 03/13/2013     Problem list name updated by automated process. Provider to review and confirm [employer] Update utility    Cystocele, midline 04/04/2012    Diffuse cystic mastopathy     Gastroesophageal reflux disease, esophagitis presence not specified 11/23/2019    History of hypokalemia 01/23/2013    Hyperlipidemia LDL goal <160 06/29/2011    [place] 10-year CHD Risk Score: 2% (14 Total Points)  Values used to calculate score:    Age: 70 years -- Points: 14    Total Cholesterol: 192 mg/dL -- Points: 1    HDL Cholesterol: 61 mg/dL -- Points: -1    Systolic BP (treated): 118 mmHg -- Points: 0   The patient is not a smoker. -- Points: 0   The patient has not been diagnosed with diabetes. -- Points: 0   The patient does not have a famil    Lung cancer (H) 10/21/2015    Pacemaker     [employer]    Paroxysmal atrial fibrillation (H) 09/13/2019    PMR (polymyalgia rheumatica) (H24) 01/17/2020    tapering' above.) In patients receiving over 10 mg of prednisone/day, the dose can be lowered by 2.5 mg/day decrements every two to four weeks Once the dose of prednisone is 10 mg/day, further tapering can be done by 1 mg per month, provided the clinical course is stable  The clinical response to glucocorticoid therapy is closely monitored, which centers on screening for the presence and/or recu    Thyroid nodule 04/23/2016    Noted on CT Fall 2015.     Unspecified essential hypertension late 1980's          PAST SURGICAL HISTORY:  Past Surgical History:   Procedure Laterality Date    ANESTHESIA CARDIOVERSION N/A 6/12/2020    Procedure: ANESTHESIA, FOR CARDIOVERSION;  Surgeon: GENERIC ANESTHESIA PROVIDER;  Location:  OR    ARTHROPLASTY KNEE Right 7/25/2022    Procedure: Right total knee arthroplasty;  Surgeon: Nick Parra MD;  Location:  OR    COLONOSCOPY  3/2003    adenomatous polyp     COLONOSCOPY  7/2006    diverticulosis - repeat in 5 years    COLONOSCOPY  10/13/2011    Procedure:COLONOSCOPY; COLONOSCOPY ; Surgeon:CHITO GORDILLO; Location: GI    CYSTOSCOPY  7/11/2012    Procedure: CYSTOSCOPY;;  Surgeon: Aline Cooper DO;  Location:  OR    DAVINCI HYSTERECTOMY SUPRACERVICAL, SACROCOLPOPEXY, COMBINED  7/11/2012    Procedure: COMBINED DAVINCI HYSTERECTOMY SUPRACERVICAL, SACROCOLPOPEXY;   DAVINCI ASSISTED LAPAROSCOPIC SUPRACERVICAL HYSTERECTOMY AND BILATERAL SALPINGO-OOPHORECTOMY, SACROCOLPOPEXY AND CYSTOSCOPY;  Surgeon: Aline Cooper DO;  Location:  OR    EP ABLATION AV NODE N/A 6/22/2020    Procedure: EP ABLATION AV NODE;  Surgeon: Adriano Raman MD;  Location:  HEART CARDIAC CATH LAB    EP BIVENT LEAD PLACEMENT N/A 6/22/2020    Procedure: Bivent Lead Placement;  Surgeon: Adriano Raman MD;  Location:  HEART CARDIAC CATH LAB    EP PACEMAKER N/A 6/22/2020    Procedure: AVNA and BiV Pacemaker Insertion;  Surgeon: Adriano Raman MD;  Location:  HEART CARDIAC CATH LAB    ESOPHAGOSCOPY, GASTROSCOPY, DUODENOSCOPY (EGD), COMBINED N/A 12/14/2021    Procedure: ESOPHAGOGASTRODUODENOSCOPY (EGD) (fv) biopsies with cold forcep;  Surgeon: Chito Gordillo MD;  Location:  GI    EXCISE LESION EYELID Right 6/29/2015    Procedure: EXCISE LESION EYELID;  Surgeon: Hank Olvera MD;  Location:  SD    INSERT PORT VASCULAR ACCESS  2/3/2012    Procedure:INSERT PORT VASCULAR ACCESS; Power Port-A- Catheter Placement ; Surgeon:TRESSA TAPIA; Location:  OR    LAPAROSCOPIC SALPINGO-OOPHORECTOMY  7/11/2012    Procedure: LAPAROSCOPIC SALPINGO-OOPHORECTOMY;  Davinci;  Surgeon: Aline Cooper DO;  Location: [facility] OR    LIPOSUCTION, RHYTIDECTOMY, COMBINED      LOBECTOMY LUNG Right 3/1/2016    Procedure: LOBECTOMY LUNG;  Surgeon: Jonas Woodward MD;  Location: [facility] OR    MAMMOPLASTY REDUCTION  1/6/2012    Procedure:MAMMOPLASTY REDUCTION; Surgeon:MICKI KYLE; Location:[facility] OR    MASTECTOMY SIMPLE  11/12/2012    Procedure: MASTECTOMY SIMPLE;   Right Prophylactic Mastectomy with attempted Right Sentinal Node Biopsy, Revision Bilateral Mastectomy Insicions, liposuction in breast area;  Surgeon: Irish Tapia MD;  Location: [facility] OR    MASTECTOMY SIMPLE, SENTINEL NODE, COMBINED  1/6/2012    Procedure:COMBINED MASTECTOMY SIMPLE, SENTINEL NODE; Left Mastectomy Left Haigler Node Biopsy,  Right Breast Reduction ; Surgeon:IRISH TAPIA; Location:[facility] OR    MASTECTOMY, BILATERAL      REMOVE PORT VASCULAR ACCESS  4/29/2013    Procedure: REMOVE PORT VASCULAR ACCESS;  Port A catheter removal ;  Surgeon: Irish Tapia MD;  Location: [facility] OR    REPAIR PTOSIS BILATERAL Bilateral 6/29/2015    Procedure: REPAIR PTOSIS BILATERAL;  Surgeon: Hank Olvera MD;  Location:  SD    REVISE RECONSTRUCTED BREAST BILATERAL  11/12/2012    Procedure: REVISE RECONSTRUCTED BREAST BILATERAL;;  Surgeon: Micki Kyle MD;  Location: [facility] OR    SURGICAL HISTORY OF -   in 40's    face lift    SURGICAL HISTORY OF -       lipoma removed right thigh    SURGICAL HISTORY OF -       D and C    THORACOTOMY Right 3/1/2016    Procedure: THORACOTOMY;  Surgeon: Jonas Woodward MD;  Location:  OR    ZZC NONSPECIFIC PROCEDURE  1970    s/p Tubal ligation 1970         SOCIAL HISTORY:  Social History     Socioeconomic History    Marital status:      Spouse name: Aren    Number of children: 2    Years of education: Not on file    Highest education level: Not on  file   Occupational History    Occupation: nguyen     Employer: NONE    Tobacco Use    Smoking status: Never    Smokeless tobacco: Never   Vaping Use    Vaping Use: Never used   Substance and Sexual Activity    Alcohol use: Yes     Comment: 0-1 drink day    Drug use: No    Sexual activity: Yes     Partners: Male   Other Topics Concern     Service Not Asked    Blood Transfusions Not Asked    Caffeine Concern Not Asked    Occupational Exposure Not Asked    Hobby Hazards Not Asked    Sleep Concern Not Asked    Stress Concern Not Asked    Weight Concern Not Asked    Special Diet Not Asked    Back Care Not Asked    Exercise Yes     Comment: nguyen    Bike Helmet Not Asked    Seat Belt Not Asked    Self-Exams Not Asked    Parent/sibling w/ CABG, MI or angioplasty before 65F 55M? Yes   Social History Narrative    Not on file     Social Determinants of Health     Financial Resource Strain: Not on file   Food Insecurity: Not on file   Transportation Needs: Not on file   Physical Activity: Not on file   Stress: Not on file   Social Connections: Not on file   Interpersonal Safety: Low Risk  (12/11/2023)    Interpersonal Safety     Do you feel physically and emotionally safe where you currently live?: Yes     Within the past 12 months, have you been hit, slapped, kicked or otherwise physically hurt by someone?: No     Within the past 12 months, have you been humiliated or emotionally abused in other ways by your partner or ex-partner?: No   Housing Stability: Not on file         FAMILY HISTORY:  Family History   Problem Relation Age of Onset    Cardiovascular Father         ruptured aorta, hardening of the arteries    Cerebrovascular Disease Mother     Respiratory Mother         chronic bronchitis - was a smoker early on    Cardiovascular Paternal Grandfather         MI    Cerebrovascular Disease Paternal Aunt     Hypertension Son     Neurologic Disorder Daughter         migraines    Breast Cancer Daughter     Brain  Tumor Sister          PHYSICAL EXAM:  Vital signs:  BP (!) 151/67   Pulse 70   Temp 96.9  F (36.1  C) (Tympanic)   Resp 16   Wt 50.3 kg (110 lb 12.8 oz)   LMP  (LMP Unknown)   SpO2 99%   BMI 19.94 kg/m     ECO  GENERAL/CONSTITUTIONAL: No acute distress.  EYES: Pupils are equal, round, and react to light and accommodation. Extraocular movements intact.  No scleral icterus.  ENT/MOUTH: Neck supple. Oropharynx clear, no mucositis.  LYMPH: No anterior cervical, posterior cervical, supraclavicular, axillary or inguinal adenopathy.   RESPIRATORY: Clear to auscultation bilaterally. No crackles or wheezing.   CARDIOVASCULAR: Regular rate and rhythm without murmurs, gallops, or rubs.  GASTROINTESTINAL: No hepatosplenomegaly, masses, or tenderness. The patient has normal bowel sounds. No guarding.  No distention.  MUSCULOSKELETAL: Warm and well-perfused, no cyanosis, clubbing, or edema.  NEUROLOGIC: Cranial nerves II-XII are intact. Alert, oriented, answers questions appropriately.  INTEGUMENTARY: No rashes or jaundice.  GAIT: Requires cane.      LABS:  CBC RESULTS:   Recent Labs   Lab Test 23  1521   WBC 8.7   RBC 4.24   HGB 12.6   HCT 38.5   MCV 91   MCH 29.7   MCHC 32.7   RDW 13.4          Recent Labs   Lab Test 23  1521 23  1355    138   POTASSIUM 3.9 4.1   CHLORIDE 103 101   CO2 25 23   ANIONGAP 12 14   * 76   BUN 15.4 16.3   CR 0.64 0.58   CHARLIE 10.4* 10.4*     Lab Results   Component Value Date    AST 24 2023    AST 18 2021     Lab Results   Component Value Date    ALT 21 2023    ALT 35 2021     Lab Results   Component Value Date    BILICONJ 0.0 2010      Lab Results   Component Value Date    BILITOTAL 0.4 2023    BILITOTAL 0.4 2021     Lab Results   Component Value Date    ALBUMIN 4.3 2023    ALBUMIN 2.7 2022    ALBUMIN 3.2 2021     Lab Results   Component Value Date    PROTTOTAL 7.1 2023    PROTTOTAL 6.8  03/16/2021      Lab Results   Component Value Date    ALKPHOS 148 01/25/2023    ALKPHOS 77 03/16/2021      Latest Reference Range & Units 06/28/11 09:05 09/08/20 10:24   Parathyroid Hormone Intact 18 - 80 pg/mL 45 121 (H)       Serum M-protein (g/dL):  9/8/20: 0.1  6/2/22: Unremarkable immunofixation; no quantification    Urine M-peak (%):     Total IgG (mg/dL), IgA (mg/dL), IgM (mg/dL):     Free kappa light chains (mg/dL), lambda (mg/dL), kappa/lambda ratio:  6/2/22: 2.66, 2.32, 1.15      PATHOLOGY:  Collected: 11/30/2011  Received: 11/30/2011  Reported: 12/1/2011 11:34  Ordering Phy(s): PARTHA CHRISTIE  Additional Phy(s): RITO ZHOU        ADDENDUM    TO ORIGINAL REPORT  Status: Signed Out  Date Ordered:12/2/2011  Date Complete:12/2/2011  Date Reported:12/2/2011 12:02  Signed Out By: Yaya Harding M.D.    INTERPRETATION:  IMMUNOHISTOCHEMICAL ANALYSIS FOR ESTROGEN AND PROGESTERONE RECEPTORS    RESULTS:    ESTROGEN RECEPTORS:           Positive      (Approximately 75% of tumor  cell nuclei arielle)                                       Intensity of staining:  Strong.    PROGESTERONE RECEPTORS:     Positive      (Approximately 50% of invasive  tumor nuclei arielle)                                       Intensity of staining:  Strong.      Collected: 11/30/2011 00:00  Received: 12/2/2011 00:00  Reported: 12/5/2011 23:47  Ordering Phy(s): PARTHA CHRISTIE  Additional Phy(s): RITO HARDING    __________________________________________          TEST(S) REQUESTED:  Her 2 Lo FISH    SPECIMEN DESCRIPTION:  Breast Tissue, Paraffin Embedded    CLINICAL COMMENTS:  Breast Cancer, [id number]        METHODS:  Fluorescence in-situ hybridization (FISH) was performed on  formalin-fixed, paraffin-embedded tissue using the [employer]  PathVysion DNA probe set to HER2 (17q11.2-q21) and to the centromere  region of chromosome 17 (CEP 17; 17p11.1-q11.1). The formalin fixation  time is unavailable. An adequate  number of invasive tumor cells were  present, of which 30 cells were scored independently by two  technologists; images were captured on an [employer] image analysis  system. The resulting numbers of HER2 and centromere 17 signals were  expressed as a ratio of HER2:CEP 17.    RESULTS:    Ratio of HER2:CEP17 signals  Tomasa Fam:  >7.1                    Avg. number HER2  signals/nucleus:  >21.0                                           Avg. number CEP 17  signals/nucleus:  3.0  Normal Range:  <1.8  Amplified Range: > 2.2  Equivocal Range:  1.8 - 2.2    INTERPRETATION:  Amplification of the HER2 gene was detected.          Collected: 1/6/2012  Received: 1/6/2012  Reported: 1/10/2012 16:41  Ordering Phy(s): TRESSA TAPIA        ADDENDUM    TO ORIGINAL REPORT  Status: Signed Out  Date Ordered:1/12/2012  Date Complete:1/12/2012  Date Reported:1/12/2012 09:28  Signed Out By: Mckenna Rivas M.D.    INTERPRETATION:  Tumor blocks B5, B6, and B7 are stained using D2-40 immunohistochemical  marker for evaluation of lymph/vascular space invasion by tumor.  The  immunomorphologic findings indicate focal presence of angiolymphatic  invasion by tumor (block B7).  Internal consultation is obtained.    /jacob  DT/1-12-12        __________________________________________      ORIGINAL REPORT:    SPECIMEN(S):  A: Lymph node, sentinel, axillary, #1  B: Breast mastectomy, simple, left  C: Breast, right, reduction mammoplasty    FINAL DIAGNOSIS:  A.  Lymph node, left axillary sentinel node #1, excision biopsy - Single  node negative for metastatic carcinoma by routine staining and  immunohistochemistry (0/1).    B.  Breast, left, mastectomy -  Specimen, Procedure and Side:   Breast, left mastectomy.  Tumor Site:   10:00 (upper inner quadrant).  Histologic Type:   Infiltrating ductal carcinoma.  Specimen Integrity:   Intact.  Specimen Size:   See Gross Description.  Size of Invasive Component:  1 cm.  [place] Grade: III  of III.  Tubule + Nuclei + Mitoses = [place] Score:   3 + 3 + 3 = 9 of 9.  Vascular/Lymphatic Invasion:   Suspicious foci identified (pending  immunohistochemical stain; findings will be reported as an addendum to  this case).  Tumor Focality: Single focus.  Macroscopic and Microscopic Extent of Tumor: Tumor limited to breast.  Uninvolved skin.  Skeletal muscle not submitted for evaluation.  Associated In Situ Component:   Ductal carcinoma in-situ (DCIS),  solid-type.  High nuclear grade with necrosis.  Margins:     Invasive Component:   Uninvolved.     Distance to Closest Margin, if Negative:   Invasive tumor 2.3 cm from  nearest (posterior) margin.     In Situ Component:   Uninvolved.     Distance to Closest Margin, if Negative:   DCIS 2.3 cm from the  nearest (posterior) margin.  Lymph Nodes and Type:   See specimen A.            Number of nodes with macrometastases (greater than 0.2 cm): 0.              Number of nodes with micrometastases (greater than 0.2 mm to  0.2 cm): 0.            Number of nodes with isolated tumor cells (less than or equal  to 0.2 mm): 0.       Size of largest metastatic deposit:   Not applicable.  Pathologic Staging (pTNM):   pT1b, pN0(sn)(i-), pM not applicable.  Additional pathologic findings:   Tumor-associated microcalcifications.  Fibrocystic change.  Focal sclerosing adenosis.  Estrogen and Progesterone Receptor (previously performed on prior  biopsy, [id number]): ER+/IA+ (see prior report for details).  HER-2/Lo (previously performed on prior biopsy, [id number]): Her-2 gene  amplification detected (see separate report for details, [id number]).  CAP Protocol Based on AJCC/UICC TNM, 7th edition; Protocol Effective  Date:  January 2010  C.  Breast, right, reduction mammoplasty -  1.          Benign breast tissue.  2.     Unremarkable overlying skin.       Collected: 11/12/2012  Received: 11/12/2012  Reported: 11/13/2012 17:38  Ordering Phy(s): TRESSA CUNNINGHAM  "REGINA        SPECIMEN(S):  A: Breast mastectomy, simple, right  B: Possible small lymph node aggregate, questionable sentinel node,  right axillary  C: Additional right mastectomy tissue  D: Left mastectomy scar    FINAL DIAGNOSIS:  A.  Breast, right, simple mastectomy -       1.  Lobular carcinoma in-situ (LCIS), focal.       2.  Columnar cell change, focal.       3.  Dermal scar with focal foreign body-type granulomatous reaction  to polarizable material.       4.  Negative for invasive malignancy.    B.  Soft tissue, \"questionable sentinel node\", right axilla, excision  biopsy -       1.  Benign fibroadipose tissue.       2.  No lymph node identified.    C.  Right breast and overlying skin, 'additional mastectomy tissue',  resection -       1.  Dermal scar with foreign body-type granulomatous reaction.       2.  Single benign lymph node.       3.  Negative for malignancy.    D.  Skin, left breast mastectomy scar, resection -       1.  Dermal scar.       2.  Negative for malignancy.     Collected: 3/1/2016  Received: 3/1/2016  Reported: 3/2/2016 14:53  Ordering Phy(s): GUME MCCABE    SPECIMEN(S):  A: Right upper lobe lung mass  B: Lymph node, pulmonary ligament 9  C: Lymph node, subcarinal  D: Lymph nodes, paratracheal lower 4R  E: Lymph node, right tracheal bronchial angle  F: Lymph node, hilar    FINAL DIAGNOSIS:  A: Right lung, upper lobe mass, wedge resection-  - Invasive moderately differentiated adenocarcinoma (2.2 cm)  -Margins negative for malignancy  -Total of 5 lymph nodes negative for metastatic carcinoma (0/5)(please  see specimens B.-F.)    Please see detailed tumor synopsis below         LUNG: Resection    Specimen       Specimen:    Lobe(s) of lung: Right       Procedure:    Wedge resection       Specimen Integrity:    Intact       Specimen Laterality: Right       Tumor Site:   Upper lobe       Tumor Focality:   Unifocal    Tumor       Histologic Type:   Adenocarcinoma, acinar " predominant(acinar-70%,  lepidic-20%, papillary-10%)       Histologic Grade:    GX:  Cannot be assessed    Extent       Tumor Size:   Greatest dimension: 2.2 cm            Additional dimensions: 2.0 x 1.7 cm       Visceral Pleura Invasion:    Not identified       Tumor Extension:   Not identified    Margins       Status of Margin Involvement:  All margins uninvolved by invasive  carcinoma            Distance of Invasive Carcinoma from Closest Margin: 0.9 cm  exam            Margin Closest to Invasive Carcinoma: Parenchyma margin       Bronchial Margin:   Not applicable       Vascular Margin:    Not applicable       Parenchymal Margin:   Uninvolved by invasive carcinoma    Accessory Findings       Lymph-Vascular Invasion:   Not identified    Extranodal Extension: Not identified  Pathologic Staging (pTNM)       Primary Tumor (pT):    pT1b       Regional Lymph Nodes (pN):   pN0:  No regional lymph node  metastasis            Number of Lymph Nodes Examined: 5            Number of Lymph Nodes Involved: 0       Distant Metastasis (pM):    Not applicable    B: Lymph nodes, inferior pulmonary ligament #9, excision-  -1 lymph node negative for malignancy (0/1)    C: Lymph node, subcarinal #7, excision-  - 1 lymph node negative for malignancy (0/1)    D: Lymph node, peritracheal lower (4R), excision-  - 1 lymph node negative for malignancy (0/1)    E: Lymph node, right tracheobronchial angle, excision-  - 1 lymph node negative for malignancy (0/1)    F: Lymph node, hilar (10), excision-  - 1 lymph node negative for malignancy (0/1)       Collected: 6/2/2016   Received: 6/2/2016   Reported: 6/6/2016 08:22   Ordering Phy(s): SHERI WEINER     SPECIMEN/STAIN PROCESS:   A: FNA-thyroid, right thyroid nodule, correlates to #1 on  ultrasound        Pap-Cyto x 6, Botello's stain-cyto x 1   B: FNA-thyroid, right thyroid nodule, correlates to #2 on ultrasound        Pap-Cyto x 8, Botello's stain-cyto x 2      ----------------------------------------------------------------     CYTOLOGIC INTERPRETATION:     A.  FNA-thyroid, right thyroid nodule, correlates to #1 on  ultrasound:    Benign   Consistent with a benign nodule (includes adenomatoid nodule, colloid   nodule, etc.)           ASSESSMENT/PLAN:  Tomasa Fam is an 83 year old female with history of invasive ductal carcinoma of the left breast and LCIS of the right breast s/p double mastectomy, early stage NSCLC of the right lung s/p wedge resection, MGUS, iron deficiency anemia, GERD, HLD, pAfib, thyroid nodule s/p biopsy (benign), PMR, and PPM. She is referred to clinic for MGUS.     1) MGUS  -Patient with 0.1 g/dL very small monoclonal antibody in 2020. This was no longer quantifiable in 2022.   -She has anemia (with history of iron deficiency). Renal function is normal. She has persistent hypercalcemia.   -Update CBC, CMP, SPIEP, UPIEP, iron studies, B12, folate etc and PET/CT at this time.   -She could have underlying plasma cell disorder, but given her history of multiple malignancies, she may have elevated M-protein from underlying solid tumor malignancy as well.    2) Hypercalcemia  -See above.  -Patient with history of elevated PTH intact. I am repeating this. I do not see history of sestamibi scan.     3) History of clinical stage IA left breast ER/NH+, HER2+ invasive ductal carcinoma and right breast LCIS  -s/p mastectomy and adjuvant chemotherapy in 2011 as described in the HPI.  -She declined adjuvant AI therapy.     4) History of stage IA left upper lobe adenocarcinoma  -s/p wedge resection with Dr. Woodward in 2016.    5) pAfib on apixaban    6) -Labs as ordered above.   -Schedule PET/CT.  -Follow up with me in 3 weeks to review and for further recommendations.   -I discussed with patient my planned departure from [facility] and she will establish with a new hematologist/oncologist after February 2024.  I offered her follow up with Dr. Gomez or   Ag and she declines at this time.            Gabby Ruff DO  Hematology/Oncology  [facility]

## 2024-01-25 NOTE — NURSING NOTE
"Oncology Rooming Note    January 25, 2024 1:08 PM   Tomasa Fam is a 83 year old female who presents for:    Chief Complaint   Patient presents with    Oncology Clinic Visit     Initial Vitals: BP (!) 151/67   Pulse 70   Temp 96.9  F (36.1  C) (Tympanic)   Resp 16   Wt 50.3 kg (110 lb 12.8 oz)   LMP  (LMP Unknown)   SpO2 99%   BMI 19.94 kg/m   Estimated body mass index is 19.94 kg/m  as calculated from the following:    Height as of 12/11/23: 1.588 m (5' 2.5\").    Weight as of this encounter: 50.3 kg (110 lb 12.8 oz). Body surface area is 1.49 meters squared.  No Pain (0) Comment: Data Unavailable   No LMP recorded (lmp unknown). Patient has had a hysterectomy.  Allergies reviewed: Yes  Medications reviewed: Yes    Medications: Medication refills not needed today.  Pharmacy name entered into [employer]: [facility] #[id number] - [place], [place] - [address]    Frailty Screening:   Is the patient here for a new oncology consult visit in cancer care? 2. No      Clinical concerns: New Patient       Muriel Sesay CMA              "

## 2024-01-26 ENCOUNTER — PATIENT OUTREACH (OUTPATIENT)
Dept: ONCOLOGY | Facility: CLINIC | Age: 84
End: 2024-01-26
Payer: MEDICARE

## 2024-01-26 DIAGNOSIS — D47.2 MGUS (MONOCLONAL GAMMOPATHY OF UNKNOWN SIGNIFICANCE): Primary | ICD-10-CM

## 2024-01-26 LAB
ALBUMIN SERPL ELPH-MCNC: 4.1 G/DL (ref 3.7–5.1)
ALPHA1 GLOB SERPL ELPH-MCNC: 0.4 G/DL (ref 0.2–0.4)
ALPHA2 GLOB SERPL ELPH-MCNC: 0.8 G/DL (ref 0.5–0.9)
B-GLOBULIN SERPL ELPH-MCNC: 0.8 G/DL (ref 0.6–1)
B2 MICROGLOB TUMOR MARKER SER-MCNC: 2.7 MG/L
FERRITIN SERPL-MCNC: 413 NG/ML (ref 11–328)
GAMMA GLOB SERPL ELPH-MCNC: 0.9 G/DL (ref 0.7–1.6)
IGA SERPL-MCNC: 209 MG/DL (ref 84–499)
IGG SERPL-MCNC: 922 MG/DL (ref 610–1616)
IGM SERPL-MCNC: 86 MG/DL (ref 35–242)
KAPPA LC FREE SER-MCNC: 1.91 MG/DL (ref 0.33–1.94)
KAPPA LC FREE/LAMBDA FREE SER NEPH: 1.58 {RATIO} (ref 0.26–1.65)
LAMBDA LC FREE SERPL-MCNC: 1.21 MG/DL (ref 0.57–2.63)
LOCATION OF TASK: NORMAL
LOCATION OF TASK: NORMAL
M PROTEIN SERPL ELPH-MCNC: 0 G/DL
PROT PATTERN SERPL ELPH-IMP: NORMAL
PROT PATTERN SERPL IFE-IMP: NORMAL
TOTAL PROTEIN SERUM FOR ELP: 7 G/DL (ref 6.4–8.3)
VIT B12 SERPL-MCNC: 407 PG/ML (ref 232–1245)

## 2024-01-26 NOTE — PROGRESS NOTES
Unable to collect all ordered labs on 01/25/24. Writer tried calling Chandrika to discuss. Voicemail left on her mobile phone. Will await return call.     Lizet Michelle RN on 1/26/2024 at 8:36 AM

## 2024-01-26 NOTE — PROGRESS NOTES
Return call received from Chandrika. She is willing to have labs drawn again. She will be going to the [facility] in [facility] lab on 01/27/24. Writer reviewed the hours with Chandrika.     Lizet Michelle RN on 1/26/2024 at 11:46 AM

## 2024-01-27 ENCOUNTER — LAB (OUTPATIENT)
Dept: LAB | Facility: CLINIC | Age: 84
End: 2024-01-27
Payer: MEDICARE

## 2024-01-27 DIAGNOSIS — M35.3 POLYMYALGIA RHEUMATICA (H): ICD-10-CM

## 2024-01-27 DIAGNOSIS — D63.8 ANEMIA IN OTHER CHRONIC DISEASES CLASSIFIED ELSEWHERE: ICD-10-CM

## 2024-01-27 DIAGNOSIS — E55.9 AVITAMINOSIS D: ICD-10-CM

## 2024-01-27 DIAGNOSIS — G62.9 PERIPHERAL NERVE DISORDER: Primary | ICD-10-CM

## 2024-01-27 DIAGNOSIS — G72.9 MYOPATHY, UNSPECIFIED: ICD-10-CM

## 2024-01-27 DIAGNOSIS — D47.2 MGUS (MONOCLONAL GAMMOPATHY OF UNKNOWN SIGNIFICANCE): ICD-10-CM

## 2024-01-27 LAB
BASOPHILS # BLD AUTO: 0.1 10E3/UL (ref 0–0.2)
BASOPHILS NFR BLD AUTO: 1 %
CA-I BLD-MCNC: 5.2 MG/DL (ref 4.4–5.2)
CREAT SERPL-MCNC: 0.63 MG/DL (ref 0.51–0.95)
EGFRCR SERPLBLD CKD-EPI 2021: 87 ML/MIN/1.73M2
EOSINOPHIL # BLD AUTO: 0.3 10E3/UL (ref 0–0.7)
EOSINOPHIL NFR BLD AUTO: 3 %
ERYTHROCYTE [DISTWIDTH] IN BLOOD BY AUTOMATED COUNT: 13.4 % (ref 10–15)
FOLATE SERPL-MCNC: 11.2 NG/ML (ref 4.6–34.8)
HCT VFR BLD AUTO: 38.1 % (ref 35–47)
HGB BLD-MCNC: 12.4 G/DL (ref 11.7–15.7)
IMM GRANULOCYTES # BLD: 0 10E3/UL
IMM GRANULOCYTES NFR BLD: 0 %
LYMPHOCYTES # BLD AUTO: 2.3 10E3/UL (ref 0.8–5.3)
LYMPHOCYTES NFR BLD AUTO: 29 %
MCH RBC QN AUTO: 29.5 PG (ref 26.5–33)
MCHC RBC AUTO-ENTMCNC: 32.5 G/DL (ref 31.5–36.5)
MCV RBC AUTO: 91 FL (ref 78–100)
MONOCYTES # BLD AUTO: 0.9 10E3/UL (ref 0–1.3)
MONOCYTES NFR BLD AUTO: 11 %
NEUTROPHILS # BLD AUTO: 4.5 10E3/UL (ref 1.6–8.3)
NEUTROPHILS NFR BLD AUTO: 56 %
NRBC # BLD AUTO: 0 10E3/UL
NRBC BLD AUTO-RTO: 0 /100
PLATELET # BLD AUTO: 248 10E3/UL (ref 150–450)
PTH-INTACT SERPL-MCNC: 87 PG/ML (ref 15–65)
RBC # BLD AUTO: 4.21 10E6/UL (ref 3.8–5.2)
TSH SERPL DL<=0.005 MIU/L-ACNC: 5.56 UIU/ML (ref 0.3–4.2)
VIT B12 SERPL-MCNC: 391 PG/ML (ref 232–1245)
VIT D+METAB SERPL-MCNC: 40 NG/ML (ref 20–50)
WBC # BLD AUTO: 8.1 10E3/UL (ref 4–11)

## 2024-01-27 PROCEDURE — 82330 ASSAY OF CALCIUM: CPT

## 2024-01-27 PROCEDURE — 83970 ASSAY OF PARATHORMONE: CPT

## 2024-01-27 PROCEDURE — 82565 ASSAY OF CREATININE: CPT

## 2024-01-27 PROCEDURE — 82306 VITAMIN D 25 HYDROXY: CPT

## 2024-01-27 PROCEDURE — 85025 COMPLETE CBC W/AUTO DIFF WBC: CPT

## 2024-01-27 PROCEDURE — 82607 VITAMIN B-12: CPT

## 2024-01-27 PROCEDURE — 84443 ASSAY THYROID STIM HORMONE: CPT

## 2024-01-27 PROCEDURE — 81050 URINALYSIS VOLUME MEASURE: CPT | Performed by: PATHOLOGY

## 2024-01-27 PROCEDURE — 36415 COLL VENOUS BLD VENIPUNCTURE: CPT

## 2024-01-27 PROCEDURE — 86335 IMMUNFIX E-PHORSIS/URINE/CSF: CPT | Mod: 26 | Performed by: PATHOLOGY

## 2024-01-27 PROCEDURE — 82085 ASSAY OF ALDOLASE: CPT

## 2024-01-27 PROCEDURE — 86335 IMMUNFIX E-PHORSIS/URINE/CSF: CPT | Performed by: PATHOLOGY

## 2024-01-27 PROCEDURE — 83921 ORGANIC ACID SINGLE QUANT: CPT

## 2024-01-27 PROCEDURE — 84166 PROTEIN E-PHORESIS/URINE/CSF: CPT | Mod: 26 | Performed by: PATHOLOGY

## 2024-01-27 PROCEDURE — 83516 IMMUNOASSAY NONANTIBODY: CPT

## 2024-01-27 PROCEDURE — 82746 ASSAY OF FOLIC ACID SERUM: CPT

## 2024-01-27 PROCEDURE — 82542 COL CHROMOTOGRAPHY QUAL/QUAN: CPT

## 2024-01-28 LAB — ALDOLASE SERPL-CCNC: 3.1 U/L

## 2024-01-30 LAB
LOCATION OF TASK: NORMAL
METHYLMALONATE SERPL-SCNC: 0.67 UMOL/L (ref 0–0.4)
PROT ELPH PNL UR ELPH: NORMAL

## 2024-01-31 LAB — PTH RELATED PROT SERPL-SCNC: 3.3 PMOL/L

## 2024-02-13 ENCOUNTER — HOSPITAL ENCOUNTER (OUTPATIENT)
Dept: PET IMAGING | Facility: CLINIC | Age: 84
Discharge: HOME | End: 2024-02-13
Attending: INTERNAL MEDICINE
Payer: MEDICARE

## 2024-02-13 DIAGNOSIS — D47.2 MGUS (MONOCLONAL GAMMOPATHY OF UNKNOWN SIGNIFICANCE): ICD-10-CM

## 2024-02-13 DIAGNOSIS — D63.8 ANEMIA IN OTHER CHRONIC DISEASES CLASSIFIED ELSEWHERE: ICD-10-CM

## 2024-02-13 DIAGNOSIS — R77.9 ABNORMALITY OF PLASMA PROTEIN, UNSPECIFIED: ICD-10-CM

## 2024-02-13 PROCEDURE — 343N000001 HC RX 343: Performed by: INTERNAL MEDICINE

## 2024-02-13 PROCEDURE — 250N000011 HC RX IP 250 OP 636: Performed by: INTERNAL MEDICINE

## 2024-02-13 PROCEDURE — 78816 PET IMAGE W/CT FULL BODY: CPT | Mod: MG,PS

## 2024-02-13 PROCEDURE — A9552 F18 FDG: HCPCS | Performed by: INTERNAL MEDICINE

## 2024-02-13 PROCEDURE — 70491 CT SOFT TISSUE NECK W/DYE: CPT

## 2024-02-13 PROCEDURE — 71260 CT THORAX DX C+: CPT

## 2024-02-13 RX ORDER — IOPAMIDOL 755 MG/ML
10-135 INJECTION, SOLUTION INTRAVASCULAR ONCE
Status: COMPLETED | OUTPATIENT
Start: 2024-02-13 | End: 2024-02-13

## 2024-02-13 RX ADMIN — IOPAMIDOL 68 ML: 755 INJECTION, SOLUTION INTRAVENOUS at 13:13

## 2024-02-13 RX ADMIN — FLUDEOXYGLUCOSE F-18 11.91 MILLICURIE: 500 INJECTION, SOLUTION INTRAVENOUS at 13:13

## 2024-02-14 ENCOUNTER — ONCOLOGY VISIT (OUTPATIENT)
Dept: ONCOLOGY | Facility: CLINIC | Age: 84
End: 2024-02-14
Attending: INTERNAL MEDICINE
Payer: MEDICARE

## 2024-02-14 VITALS
SYSTOLIC BLOOD PRESSURE: 136 MMHG | HEIGHT: 63 IN | RESPIRATION RATE: 16 BRPM | TEMPERATURE: 97 F | WEIGHT: 111 LBS | HEART RATE: 70 BPM | BODY MASS INDEX: 19.67 KG/M2 | DIASTOLIC BLOOD PRESSURE: 71 MMHG | OXYGEN SATURATION: 96 %

## 2024-02-14 DIAGNOSIS — D47.2 MGUS (MONOCLONAL GAMMOPATHY OF UNKNOWN SIGNIFICANCE): ICD-10-CM

## 2024-02-14 DIAGNOSIS — D63.8 ANEMIA IN OTHER CHRONIC DISEASES CLASSIFIED ELSEWHERE: ICD-10-CM

## 2024-02-14 PROCEDURE — 99214 OFFICE O/P EST MOD 30 MIN: CPT | Performed by: INTERNAL MEDICINE

## 2024-02-14 PROCEDURE — G0463 HOSPITAL OUTPT CLINIC VISIT: HCPCS | Performed by: INTERNAL MEDICINE

## 2024-02-14 ASSESSMENT — PAIN SCALES - GENERAL: PAINLEVEL: NO PAIN (0)

## 2024-02-14 NOTE — NURSING NOTE
"Oncology Rooming Note    February 14, 2024 2:32 PM   Tomasa Fam is a 83 year old female who presents for:    Chief Complaint   Patient presents with    Oncology Clinic Visit     MGUS (monoclonal gammopathy of unknown significance)     Initial Vitals: /71 (Cuff Size: Adult Regular)   Pulse 70   Temp 97  F (36.1  C) (Tympanic)   Resp 16   Ht 1.588 m (5' 2.5\")   Wt 50.3 kg (111 lb)   LMP  (LMP Unknown)   SpO2 96%   BMI 19.98 kg/m   Estimated body mass index is 19.98 kg/m  as calculated from the following:    Height as of this encounter: 1.588 m (5' 2.5\").    Weight as of this encounter: 50.3 kg (111 lb). Body surface area is 1.49 meters squared.  No Pain (0) Comment: Data Unavailable   No LMP recorded (lmp unknown). Patient has had a hysterectomy.  Allergies reviewed: Yes  Medications reviewed: Yes    Medications: Medication refills not needed today.  Pharmacy name entered into [employer]: [facility] #[id number] - [facility] [id number]    Frailty Screening:   Is the patient here for a new oncology consult visit in cancer care? 2. No      Clinical concerns: f/u       Shireen Chao CMA              "

## 2024-02-14 NOTE — LETTER
2/14/2024         RE: Tomasa Fam  [address] [facility] [address]        Dear Colleague,    Thank you for referring your patient, Tomasa Fam, to the [facility]. Please see a copy of my visit note below.    [facility]    Hematology/Oncology Established Patient Note      Today's Date: Feb 14, 2024    Reason for Consultation: MGUS  Referring Provider: Chance Fam PA-C      HISTORY OF PRESENT ILLNESS: Tomasa Fam is an 83 year old female with history of invasive ductal carcinoma of the left breast and LCIS of the right breast s/p double mastectomy, early stage NSCLC of the right lung s/p wedge resection, iron deficiency anemia, GERD, HLD, pAfib, thyroid nodule s/p biopsy (benign), PMR, and PPM. She is referred to clinic for MGUS.     Patient was previously followed by [facility] (Dr. Peri Luong) for history of Stage IA left brast cancer and Stage 1A RUL adenocarcinoma of the lung. She was last seen by Dr. Luong in 2017.     Non-small cell lung cancer  Location: Right upper lobe, bronchus or lung  Date of diagnosis: 3/1/2016  TNM staging: T1b, N0, M0, staging type: Pathologic  Stage at diagnosis: 1A  Histology: Adenocarcinoma.  Histologic grade: Grade 2.  EGFR expression not performed.  ALK rearrangement not performed.    Breast cancer  Location: Left breast upper inner quadrant  Date of diagnosis: 12/1/2011  TNM staging: p T1b, pN0, M0.  Location: Left breast upper inner quadrant.  Stage at diagnosis: 1A  Line of therapy: Adjuvant  Histology: Invasive ductal carcinoma, histologic grade 3.  Lymphovascular invasion present.  ER positive, AL positive.  HER2/richard FISH positive.    Prior treatments:  1.  This is a 75-year-old lady with occasional Premarin use.  Abnormality found on routine mammography.  Proceeded with left-sided mastectomy and sentinel lymph node procedure, and right sided mammoplasty reduction,  showing a 1 cm grade 3 lesion with good margins.  ER positive at 75%, IA positive at 50%, and HER2/richard ratio 7.1.  2.  The patient was then chemotherapy with docetaxel, carboplatin, and trastuzumab, total of 6 cycles.  Started on 2/8/2012 with pegfilgrastim support up until 5/23/2012.  PEG filgrastim was held with cycle 6 per patient's choice.  Of note, on her cycle #2, dose reduction of docetaxel provided to 60 mg/m  due to significant rash.  3.  Maintenance trastuzumab briefly stopped due to concern for ejection fraction.  Then, by cardiology clearance, was resumed on 8/17/2012.  Adequate ejection fraction was all through the rest of the maintenance and finished last dose in 4/3/2013.  Her EF post trastuzumab also is okay.  4.  March 2016 was diagnosed with a pT1b N0 M0 right upper lobe adenocarcinoma status post resection and an observation.    Notes indicate patient had then declined adjuvant AI therapy.    She has seen Dr. Ibarra in 2022 for MGUS and iron deficiency anemia. She has received venofer in the past.    She states she had significant weight loss of about 25-40 lbs two years ago.    She reports constipation, constipation. No blood.     Last colonoscopy was done in 2011 and normal. She was to be due in 5 years. She had EGD 12/2021 that was normal.      INTERVAL HISTORY:  No acute events.      REVIEW OF SYSTEMS:   A 14 point ROS was reviewed with pertinent positives and negatives in the HPI.        HOME MEDICATIONS:  Current Outpatient Medications   Medication Sig Dispense Refill     acetaminophen (TYLENOL) 325 MG tablet Take 2 tablets (650 mg) by mouth every 4 hours as needed for other (mild pain) 100 tablet 0     amLODIPine (NORVASC) 5 MG tablet Take 1 tablet (5 mg) by mouth 2 times daily 180 tablet 3     apixaban ANTICOAGULANT (ELIQUIS) 2.5 MG tablet Take 1 tablet (2.5 mg) by mouth 2 times daily 120 tablet 0     ezetimibe (ZETIA) 10 MG tablet Take 1 tablet (10 mg) by mouth every evening 90 tablet 3      fish oil-omega-3 fatty acids 1000 MG capsule TAKE 1 CAPSULE BY MOUTH DAILY 90 capsule 3     metoprolol tartrate (LOPRESSOR) 100 MG tablet Take 1 tablet (100 mg) by mouth 2 times daily 180 tablet 3     omeprazole (PRILOSEC) 20 MG DR capsule Take 20 mg by mouth daily as needed 90 capsule      polyethylene glycol (MIRALAX) 17 GM/Dose powder Take 17 g by mouth daily as needed 510 g 0         ALLERGIES:  Allergies   Allergen Reactions     No Known Drug Allergy      Tape [Adhesive Tape]      Sensitive to plastic tape on upper part of body--takes skin off         PAST MEDICAL HISTORY:  Past Medical History:   Diagnosis Date     Anemia      Arthritis     hands, knees     Breast cancer (H) 01/2012    left mastectomy followed by right;  Dr. Luong     Chronic airway obstruction, not elsewhere classified 2006    very mild COPD - small cough     Concussion 03/13/2013     Problem list name updated by automated process. Provider to review and confirm [employer] Update utility     Cystocele, midline 04/04/2012     Diffuse cystic mastopathy      Gastroesophageal reflux disease, esophagitis presence not specified 11/23/2019     History of hypokalemia 01/23/2013     Hyperlipidemia LDL goal <160 06/29/2011    [place] 10-year CHD Risk Score: 2% (14 Total Points)  Values used to calculate score:    Age: 70 years -- Points: 14    Total Cholesterol: 192 mg/dL -- Points: 1    HDL Cholesterol: 61 mg/dL -- Points: -1    Systolic BP (treated): 118 mmHg -- Points: 0   The patient is not a smoker. -- Points: 0   The patient has not been diagnosed with diabetes. -- Points: 0   The patient does not have a famil     Lung cancer (H) 10/21/2015     Pacemaker     [employer]     Paroxysmal atrial fibrillation (H) 09/13/2019     PMR (polymyalgia rheumatica) (H24) 01/17/2020    tapering' above.)  In patients receiving over 10 mg of prednisone/day, the dose can be lowered by 2.5 mg/day decrements every two to four weeks  Once the dose of prednisone  is 10 mg/day, further tapering can be done by 1 mg per month, provided the clinical course is stable  The clinical response to glucocorticoid therapy is closely monitored, which centers on screening for the presence and/or recu     Thyroid nodule 04/23/2016    Noted on CT Fall 2015.      Unspecified essential hypertension late 1980's         PAST SURGICAL HISTORY:  Past Surgical History:   Procedure Laterality Date     ANESTHESIA CARDIOVERSION N/A 6/12/2020    Procedure: ANESTHESIA, FOR CARDIOVERSION;  Surgeon: GENERIC ANESTHESIA PROVIDER;  Location:  OR     ARTHROPLASTY KNEE Right 7/25/2022    Procedure: Right total knee arthroplasty;  Surgeon: Nick Parra MD;  Location:  OR     COLONOSCOPY  3/2003    adenomatous polyp      COLONOSCOPY  7/2006    diverticulosis - repeat in 5 years     COLONOSCOPY  10/13/2011    Procedure:COLONOSCOPY; COLONOSCOPY ; Surgeon:CHITO GORDILLO; Location: GI     CYSTOSCOPY  7/11/2012    Procedure: CYSTOSCOPY;;  Surgeon: Aline Cooper DO;  Location:  OR     DAVINCI HYSTERECTOMY SUPRACERVICAL, SACROCOLPOPEXY, COMBINED  7/11/2012    Procedure: COMBINED DAVINCI HYSTERECTOMY SUPRACERVICAL, SACROCOLPOPEXY;   DAVINCI ASSISTED LAPAROSCOPIC SUPRACERVICAL HYSTERECTOMY AND BILATERAL SALPINGO-OOPHORECTOMY, SACROCOLPOPEXY AND CYSTOSCOPY;  Surgeon: Aline Cooper DO;  Location:  OR     EP ABLATION AV NODE N/A 6/22/2020    Procedure: EP ABLATION AV NODE;  Surgeon: Adriano Raman MD;  Location:  HEART [facility]ARDI[facility] CATH LAB     EP BIVENT LEAD PLACEMENT N/A 6/22/2020    Procedure: Bivent Lead Placement;  Surgeon: Adriano Raman MD;  Location:  [facility]     EP PACEMAKER N/A 6/22/2020    Procedure: AVNA and BiV Pacemaker Insertion;  Surgeon: Adriano Raman MD;  Location:  HEART [facility]ARD[facility] CATH LAB     ESOPHAGOSCOPY, GASTROSCOPY, DUODENOSCOPY (EGD), COMBINED N/A 12/14/2021    Procedure: ESOPHAGOGASTRODUODENOSCOPY (EGD) (fv) biopsies with  cold forcep;  Surgeon: Roberto Nguyen MD;  Location: [facility]     EXCISE LESION EYELID Right 6/29/2015    Procedure: EXCISE LESION EYELID;  Surgeon: Hank Olvera MD;  Location: [facility]     INSERT PORT VASCULAR ACCESS  2/3/2012    Procedure:INSERT PORT VASCULAR ACCESS; Power Port-A- Catheter Placement ; Surgeon:IRISH TAPIA; Location:[facility] OR     LAPAROSCOPIC SALPINGO-OOPHORECTOMY  7/11/2012    Procedure: LAPAROSCOPIC SALPINGO-OOPHORECTOMY;  Davinci;  Surgeon: Aline Cooper DO;  Location:  OR     LIPOSUCTION, RHYTIDECTOMY, COMBINED       LOBECTOMY LUNG Right 3/1/2016    Procedure: LOBECTOMY LUNG;  Surgeon: Jonas Woodward MD;  Location:  OR     MAMMOPLASTY REDUCTION  1/6/2012    Procedure:MAMMOPLASTY REDUCTION; Surgeon:MICKI KYLE; Location:[facility] OR     MASTECTOMY SIMPLE  11/12/2012    Procedure: MASTECTOMY SIMPLE;   Right Prophylactic Mastectomy with attempted Right Sentinal Node Biopsy, Revision Bilateral Mastectomy Insicions, liposuction in breast area;  Surgeon: Irish Tapia MD;  Location: [facility] OR     MASTECTOMY SIMPLE, SENTINEL NODE, COMBINED  1/6/2012    Procedure:COMBINED MASTECTOMY SIMPLE, SENTINEL NODE; Left Mastectomy Left Nordheim Node Biopsy,  Right Breast Reduction ; Surgeon:IRISH TAPIA; Location:[facility] OR     MASTECTOMY, BILATERAL       REMOVE PORT VASCULAR ACCESS  4/29/2013    Procedure: REMOVE PORT VASCULAR ACCESS;  Port A catheter removal ;  Surgeon: Irish Tapia MD;  Location: [facility] OR     REPAIR PTOSIS BILATERAL Bilateral 6/29/2015    Procedure: REPAIR PTOSIS BILATERAL;  Surgeon: Hank Olvera MD;  Location: [facility]     REVISE RECONSTRUCTED BREAST BILATERAL  11/12/2012    Procedure: REVISE RECONSTRUCTED BREAST BILATERAL;;  Surgeon: Micki Kyle MD;  Location: [facility] OR     SURGICAL HISTORY OF -   in 40's    face lift     SURGICAL HISTORY OF -       lipoma removed right thigh     SURGICAL HISTORY OF -       D and C     THORACOTOMY Right  3/1/2016    Procedure: THORACOTOMY;  Surgeon: Jonas Woodward MD;  Location:  OR     [facility] NONSPECIFIC PROCEDURE  1970    s/p Tubal ligation 1970         SOCIAL HISTORY:  Social History     Socioeconomic History     Marital status:      Spouse name: Aren     Number of children: 2     Years of education: Not on file     Highest education level: Not on file   Occupational History     Occupation: [employer]     Employer: NONE    Tobacco Use     Smoking status: Never     Smokeless tobacco: Never   Vaping Use     Vaping Use: Never used   Substance and Sexual Activity     Alcohol use: Yes     Comment: 0-1 drink day     Drug use: No     Sexual activity: Yes     Partners: Male   Other Topics Concern      Service Not Asked     Blood Transfusions Not Asked     Caffeine Concern Not Asked     Occupational Exposure Not Asked     Hobby Hazards Not Asked     Sleep Concern Not Asked     Stress Concern Not Asked     Weight Concern Not Asked     Special Diet Not Asked     Back Care Not Asked     Exercise Yes     Comment: nguyen     Bike Helmet Not Asked     Seat Belt Not Asked     Self-Exams Not Asked     Parent/sibling w/ CABG, MI or angioplasty before 65F 55M? Yes   Social History Narrative     Not on file     Social Determinants of Health     Financial Resource Strain: Not on file   Food Insecurity: Not on file   Transportation Needs: Not on file   Physical Activity: Not on file   Stress: Not on file   Social Connections: Not on file   Interpersonal Safety: Low Risk  (12/11/2023)    Interpersonal Safety      Do you feel physically and emotionally safe where you currently live?: Yes      Within the past 12 months, have you been hit, slapped, kicked or otherwise physically hurt by someone?: No      Within the past 12 months, have you been humiliated or emotionally abused in other ways by your partner or ex-partner?: No   Housing Stability: Not on file         FAMILY HISTORY:  Family History   Problem Relation  "Age of Onset     Cardiovascular Father         ruptured aorta, hardening of the arteries     Cerebrovascular Disease Mother      Respiratory Mother         chronic bronchitis - was a smoker early on     Cardiovascular Paternal Grandfather         MI     Cerebrovascular Disease Paternal Aunt      Hypertension Son      Neurologic Disorder Daughter         migraines     Breast Cancer Daughter      Brain Tumor Sister          PHYSICAL EXAM:  Vital signs:  /71 (Cuff Size: Adult Regular)   Pulse 70   Temp 97  F (36.1  C) (Tympanic)   Resp 16   Ht 1.588 m (5' 2.5\")   Wt 50.3 kg (111 lb)   LMP  (LMP Unknown)   SpO2 96%   BMI 19.98 kg/m     ECO  GENERAL/CONSTITUTIONAL: No acute distress.  EYES: Pupils are equal, round, and react to light and accommodation. Extraocular movements intact.  No scleral icterus.  ENT/MOUTH: Neck supple. Oropharynx clear, no mucositis.  LYMPH: No anterior cervical, posterior cervical, supraclavicular, axillary or inguinal adenopathy.   RESPIRATORY: Clear to auscultation bilaterally. No crackles or wheezing.   CARDIOVASCULAR: Regular rate and rhythm without murmurs, gallops, or rubs.  GASTROINTESTINAL: No hepatosplenomegaly, masses, or tenderness. The patient has normal bowel sounds. No guarding.  No distention.  MUSCULOSKELETAL: Warm and well-perfused, no cyanosis, clubbing, or edema.  NEUROLOGIC: Cranial nerves II-XII are intact. Alert, oriented, answers questions appropriately.  INTEGUMENTARY: No rashes or jaundice.  GAIT: Requires cane.      LABS: Reviewed with patient today.   Latest Reference Range & Units 24 09:43   Aldolase 1.2 - 7.6 U/L 3.1   Calcium Ionized Whole Blood 4.4 - 5.2 mg/dL 5.2   Folate 4.6 - 34.8 ng/mL 11.2   Methylmalonic Acid 0.00 - 0.40 umol/L 0.67 (H)   PTH Related Peptide Test 0.0 - 3.4 pmol/L 3.3   TSH 0.30 - 4.20 uIU/mL 5.56 (H)   Vitamin B12 232 - 1,245 pg/mL 391   Vitamin D, Total (25-Hydroxy) 20 - 50 ng/mL 40   WBC 4.0 - 11.0 10e3/uL 8.1 "   Hemoglobin 11.7 - 15.7 g/dL 12.4   Hematocrit 35.0 - 47.0 % 38.1   Platelet Count 150 - 450 10e3/uL 248   RBC Count 3.80 - 5.20 10e6/uL 4.21   MCV 78 - 100 fL 91   MCH 26.5 - 33.0 pg 29.5   MCHC 31.5 - 36.5 g/dL 32.5   RDW 10.0 - 15.0 % 13.4   % Neutrophils % 56   % Lymphocytes % 29   % Monocytes % 11   % Eosinophils % 3   % Basophils % 1   Absolute Basophils 0.0 - 0.2 10e3/uL 0.1   Absolute Eosinophils 0.0 - 0.7 10e3/uL 0.3   Absolute Immature Granulocytes <=0.4 10e3/uL 0.0   Absolute Lymphocytes 0.8 - 5.3 10e3/uL 2.3   Absolute Monocytes 0.0 - 1.3 10e3/uL 0.9   % Immature Granulocytes % 0   Absolute Neutrophils 1.6 - 8.3 10e3/uL 4.5   Absolute NRBCs 10e3/uL 0.0   NRBCs per 100 WBC <1 /100 0      Latest Reference Range & Units 01/27/24 09:43   Parathyroid Hormone Intact 15 - 65 pg/mL 87 (H)      Latest Reference Range & Units 06/28/11 09:05 09/08/20 10:24   Parathyroid Hormone Intact 18 - 80 pg/mL 45 121 (H)       Serum M-protein (g/dL):  9/8/20: 0.1  6/2/22: Unremarkable immunofixation; no quantification  1/25/24: 0.0    Urine M-peak (%):  1/27/23: 0.0    Total IgG (mg/dL), IgA (mg/dL), IgM (mg/dL):  1/25/24: 922, 209, 86    Free kappa light chains (mg/dL), lambda (mg/dL), kappa/lambda ratio:  6/2/22: 2.66, 2.32, 1.15  1/25/24: 1.91, 1.21, 1.58      PATHOLOGY:  Collected: 11/30/2011  Received: 11/30/2011  Reported: 12/1/2011 11:34  Ordering Phy(s): PARTHA CHRISTIE  Additional Phy(s): RITO ZHOU        ADDENDUM    TO ORIGINAL REPORT  Status: Signed Out  Date Ordered:12/2/2011  Date Complete:12/2/2011  Date Reported:12/2/2011 12:02  Signed Out By: Yaya Harding M.D.    INTERPRETATION:  IMMUNOHISTOCHEMICAL ANALYSIS FOR ESTROGEN AND PROGESTERONE RECEPTORS    RESULTS:    ESTROGEN RECEPTORS:           Positive      (Approximately 75% of tumor  cell nuclei arielle)                                       Intensity of staining:  Strong.    PROGESTERONE RECEPTORS:     Positive      (Approximately 50% of invasive  tumor  nuclei arielle)                                       Intensity of staining:  Strong.      Collected: 11/30/2011 00:00  Received: 12/2/2011 00:00  Reported: 12/5/2011 23:47  Ordering Phy(s): PARTHA CHRISTIE  Additional Phy(s): RITO PAUL SNCANDACE    __________________________________________          TEST(S) REQUESTED:  Her 2 Lo FISH    SPECIMEN DESCRIPTION:  Breast Tissue, Paraffin Embedded    CLINICAL COMMENTS:  Breast Cancer, [id number]        METHODS:  Fluorescence in-situ hybridization (FISH) was performed on  formalin-fixed, paraffin-embedded tissue using the [employer]  [employer]zz[employer] - how to arabiaion DNA probe set to HER2 (17q11.2-q21) and to the centromere  region of chromosome 17 (CEP 17; 17p11.1-q11.1). The formalin fixation  time is unavailable. An adequate number of invasive tumor cells were  present, of which 30 cells were scored independently by two  technologists; images were captured on an [employer] image analysis  system. The resulting numbers of HER2 and centromere 17 signals were  expressed as a ratio of HER2:CEP 17.    RESULTS:    Ratio of HER2:CEP17 signals  Tomasa Fam:  >7.1                    Avg. number HER2  signals/nucleus:  >21.0                                           Avg. number CEP 17  signals/nucleus:  3.0  Normal Range:  <1.8  Amplified Range: > 2.2  Equivocal Range:  1.8 - 2.2    INTERPRETATION:  Amplification of the HER2 gene was detected.          Collected: 1/6/2012  Received: 1/6/2012  Reported: 1/10/2012 16:41  Ordering Phy(s): TRESSA TAPIA        ADDENDUM    TO ORIGINAL REPORT  Status: Signed Out  Date Ordered:1/12/2012  Date Complete:1/12/2012  Date Reported:1/12/2012 09:28  Signed Out By: Mckenna Rivas M.D.    INTERPRETATION:  Tumor blocks B5, B6, and B7 are stained using D2-40 immunohistochemical  marker for evaluation of lymph/vascular space invasion by tumor.  The  immunomorphologic findings indicate focal presence of angiolymphatic  invasion by tumor  (block B7).  Internal consultation is obtained.    Keegan  DT/1-12-12        __________________________________________      ORIGINAL REPORT:    SPECIMEN(S):  A: Lymph node, sentinel, axillary, #1  B: Breast mastectomy, simple, left  C: Breast, right, reduction mammoplasty    FINAL DIAGNOSIS:  A.  Lymph node, left axillary sentinel node #1, excision biopsy - Single  node negative for metastatic carcinoma by routine staining and  immunohistochemistry (0/1).    B.  Breast, left, mastectomy -  Specimen, Procedure and Side:   Breast, left mastectomy.  Tumor Site:   10:00 (upper inner quadrant).  Histologic Type:   Infiltrating ductal carcinoma.  Specimen Integrity:   Intact.  Specimen Size:   See Gross Description.  Size of Invasive Component:  1 cm.  [place] Grade: III of III.  Tubule + Nuclei + Mitoses = [place] Score:   3 + 3 + 3 = 9 of 9.  Vascular/Lymphatic Invasion:   Suspicious foci identified (pending  immunohistochemical stain; findings will be reported as an addendum to  this case).  Tumor Focality: Single focus.  Macroscopic and Microscopic Extent of Tumor: Tumor limited to breast.  Uninvolved skin.  Skeletal muscle not submitted for evaluation.  Associated In Situ Component:   Ductal carcinoma in-situ (DCIS),  solid-type.  High nuclear grade with necrosis.  Margins:     Invasive Component:   Uninvolved.     Distance to Closest Margin, if Negative:   Invasive tumor 2.3 cm from  nearest (posterior) margin.     In Situ Component:   Uninvolved.     Distance to Closest Margin, if Negative:   DCIS 2.3 cm from the  nearest (posterior) margin.  Lymph Nodes and Type:   See specimen A.            Number of nodes with macrometastases (greater than 0.2 cm): 0.              Number of nodes with micrometastases (greater than 0.2 mm to  0.2 cm): 0.            Number of nodes with isolated tumor cells (less than or equal  to 0.2 mm): 0.       Size of largest metastatic deposit:   Not applicable.  Pathologic Staging  "(pTNM):   pT1b, pN0(sn)(i-), pM not applicable.  Additional pathologic findings:   Tumor-associated microcalcifications.  Fibrocystic change.  Focal sclerosing adenosis.  Estrogen and Progesterone Receptor (previously performed on prior  biopsy, [id number]): ER+/AL+ (see prior report for details).  HER-2/Lo (previously performed on prior biopsy, [id number]): Her-2 gene  amplification detected (see separate report for details, [id number]).  CAP Protocol Based on AJCC/UICC TNM, 7th edition; Protocol Effective  Date:  January 2010  C.  Breast, right, reduction mammoplasty -  1.          Benign breast tissue.  2.     Unremarkable overlying skin.       Collected: 11/12/2012  Received: 11/12/2012  Reported: 11/13/2012 17:38  Ordering Phy(s): TRESSA TAPIA        SPECIMEN(S):  A: Breast mastectomy, simple, right  B: Possible small lymph node aggregate, questionable sentinel node,  right axillary  C: Additional right mastectomy tissue  D: Left mastectomy scar    FINAL DIAGNOSIS:  A.  Breast, right, simple mastectomy -       1.  Lobular carcinoma in-situ (LCIS), focal.       2.  Columnar cell change, focal.       3.  Dermal scar with focal foreign body-type granulomatous reaction  to polarizable material.       4.  Negative for invasive malignancy.    B.  Soft tissue, \"questionable sentinel node\", right axilla, excision  biopsy -       1.  Benign fibroadipose tissue.       2.  No lymph node identified.    C.  Right breast and overlying skin, 'additional mastectomy tissue',  resection -       1.  Dermal scar with foreign body-type granulomatous reaction.       2.  Single benign lymph node.       3.  Negative for malignancy.    D.  Skin, left breast mastectomy scar, resection -       1.  Dermal scar.       2.  Negative for malignancy.     Collected: 3/1/2016  Received: 3/1/2016  Reported: 3/2/2016 14:53  Ordering Phy(s): GUME MCCABE    SPECIMEN(S):  A: Right upper lobe lung mass  B: Lymph node, pulmonary ligament " 9  C: Lymph node, subcarinal  D: Lymph nodes, paratracheal lower 4R  E: Lymph node, right tracheal bronchial angle  F: Lymph node, hilar    FINAL DIAGNOSIS:  A: Right lung, upper lobe mass, wedge resection-  - Invasive moderately differentiated adenocarcinoma (2.2 cm)  -Margins negative for malignancy  -Total of 5 lymph nodes negative for metastatic carcinoma (0/5)(please  see specimens B.-F.)    Please see detailed tumor synopsis below         LUNG: Resection    Specimen       Specimen:    Lobe(s) of lung: Right       Procedure:    Wedge resection       Specimen Integrity:    Intact       Specimen Laterality: Right       Tumor Site:   Upper lobe       Tumor Focality:   Unifocal    Tumor       Histologic Type:   Adenocarcinoma, acinar predominant(acinar-70%,  lepidic-20%, papillary-10%)       Histologic Grade:    GX:  Cannot be assessed    Extent       Tumor Size:   Greatest dimension: 2.2 cm            Additional dimensions: 2.0 x 1.7 cm       Visceral Pleura Invasion:    Not identified       Tumor Extension:   Not identified    Margins       Status of Margin Involvement:  All margins uninvolved by invasive  carcinoma            Distance of Invasive Carcinoma from Closest Margin: 0.9 cm  exam            Margin Closest to Invasive Carcinoma: Parenchyma margin       Bronchial Margin:   Not applicable       Vascular Margin:    Not applicable       Parenchymal Margin:   Uninvolved by invasive carcinoma    Accessory Findings       Lymph-Vascular Invasion:   Not identified    Extranodal Extension: Not identified  Pathologic Staging (pTNM)       Primary Tumor (pT):    pT1b       Regional Lymph Nodes (pN):   pN0:  No regional lymph node  metastasis            Number of Lymph Nodes Examined: 5            Number of Lymph Nodes Involved: 0       Distant Metastasis (pM):    Not applicable    B: Lymph nodes, inferior pulmonary ligament #9, excision-  -1 lymph node negative for malignancy (0/1)    C: Lymph node, subcarinal #7,  excision-  - 1 lymph node negative for malignancy (0/1)    D: Lymph node, peritracheal lower (4R), excision-  - 1 lymph node negative for malignancy (0/1)    E: Lymph node, right tracheobronchial angle, excision-  - 1 lymph node negative for malignancy (0/1)    F: Lymph node, hilar (10), excision-  - 1 lymph node negative for malignancy (0/1)       Collected: 6/2/2016   Received: 6/2/2016   Reported: 6/6/2016 08:22   Ordering Phy(s): SHERI WEINER     SPECIMEN/STAIN PROCESS:   A: FNA-thyroid, right thyroid nodule, correlates to #1 on  ultrasound        Pap-Cyto x 6, Botello's stain-cyto x 1   B: FNA-thyroid, right thyroid nodule, correlates to #2 on ultrasound        Pap-Cyto x 8, Botello's stain-cyto x 2     ----------------------------------------------------------------     CYTOLOGIC INTERPRETATION:     A.  FNA-thyroid, right thyroid nodule, correlates to #1 on  ultrasound:    Benign   Consistent with a benign nodule (includes adenomatoid nodule, colloid   nodule, etc.)           IMAGING: Reviewed with patient today.   PET/CT 2/13/24:  IMPRESSION:  1.  No evidence of FDG avid malignancy. No lytic osseous lesions are identified.  2.  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  3.  New small right pleural effusion.      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 She is referred to clinic for MGUS.     1) Previous small monoclonal antibody  -Patient with 0.1 g/dL very small monoclonal antibody in 2020. This was no longer quantifiable in 2022.   -She has anemia (with history of iron deficiency). Renal function is normal. She has persistent hypercalcemia.   -Updated SPIEP,  UPIEP without identifiable monoclonal gammopathy. Total immunoglobulins, free light chains are normal. At this time, she is not meeting diagnostic criteria for MGUS. She has hypercalcemia (see below). No other evidence of end organ dysfunction. No indication for bone marrow biopsy at the present.   -PET/CT negative for mass, lymphadenopathy. 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     2) Hypercalcemia in the setting of elevated PTHi  -See above.  -Patient with history of elevated PTH intact (previously 120, currently 87). I am repeating this. I do not see history of sestamibi scan.   -She will discuss with PCP an Endocrinology referral and workup.    3) History of clinical stage IA left breast ER/AL+, HER2+ invasive ductal carcinoma and right breast LCIS  -s/p mastectomy and adjuvant chemotherapy in 2011 as described in the HPI.  -She declined adjuvant AI therapy.     4) History of stage IA left upper lobe adenocarcinoma  -s/p wedge resection with Dr. Woodward in 2016.    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 She is asymptomatic.  -She is followed by Dr. Samson Pires for PMR.     7) -She will follow with her Rheumatologist.  -She will discuss Endocrinology referral with PCP.  -I previously discussed with patient my planned departure from [facility]. I offered her follow up with Dr. Gomez or Dr. Luong and she declines at this time. I discussed she may continue to follow with PCP with referral to hematology/oncology in the future with any concerns.           Gabby Ruff DO  Hematology/Oncology  [facility]      Again, thank you for allowing me to participate in the care of your patient.        Sincerely,        Gabby Ruff DO

## 2024-02-14 NOTE — LETTER
2/14/2024         RE: Tomasa Fam  [address] [facility] [address]      [facility]    Hematology/Oncology Established Patient Note      Today's Date: Feb 14, 2024    Reason for Consultation: MGUS  Referring Provider: Chance Fam PA-C      HISTORY OF PRESENT ILLNESS: Tomasa Fam is an 83 year old female with history of invasive ductal carcinoma of the left breast and LCIS of the right breast s/p double mastectomy, early stage NSCLC of the right lung s/p wedge resection, iron deficiency anemia, GERD, HLD, pAfib, thyroid nodule s/p biopsy (benign), PMR, and PPM. She is referred to clinic for MGUS.     Patient was previously followed by [facility] (Dr. Peri Luong) for history of Stage IA left brast cancer and Stage 1A RUL adenocarcinoma of the lung. She was last seen by Dr. Luong in 2017.     Non-small cell lung cancer  Location: Right upper lobe, bronchus or lung  Date of diagnosis: 3/1/2016  TNM staging: T1b, N0, M0, staging type: Pathologic  Stage at diagnosis: 1A  Histology: Adenocarcinoma.  Histologic grade: Grade 2.  EGFR expression not performed.  ALK rearrangement not performed.    Breast cancer  Location: Left breast upper inner quadrant  Date of diagnosis: 12/1/2011  TNM staging: p T1b, pN0, M0.  Location: Left breast upper inner quadrant.  Stage at diagnosis: 1A  Line of therapy: Adjuvant  Histology: Invasive ductal carcinoma, histologic grade 3.  Lymphovascular invasion present.  ER positive, LA positive.  HER2/richard FISH positive.    Prior treatments:  1.  This is a 75-year-old lady with occasional Premarin use.  Abnormality found on routine mammography.  Proceeded with left-sided mastectomy and sentinel lymph node procedure, and right sided mammoplasty reduction, showing a 1 cm grade 3 lesion with good margins.  ER positive at 75%, LA positive at 50%, and HER2/richard ratio 7.1.  2.  The patient was then chemotherapy with docetaxel,  carboplatin, and trastuzumab, total of 6 cycles.  Started on 2/8/2012 with pegfilgrastim support up until 5/23/2012.  PEG filgrastim was held with cycle 6 per patient's choice.  Of note, on her cycle #2, dose reduction of docetaxel provided to 60 mg/m  due to significant rash.  3.  Maintenance trastuzumab briefly stopped due to concern for ejection fraction.  Then, by cardiology clearance, was resumed on 8/17/2012.  Adequate ejection fraction was all through the rest of the maintenance and finished last dose in 4/3/2013.  Her EF post trastuzumab also is okay.  4.  March 2016 was diagnosed with a pT1b N0 M0 right upper lobe adenocarcinoma status post resection and an observation.    Notes indicate patient had then declined adjuvant AI therapy.    She has seen Dr. Ibarra in 2022 for MGUS and iron deficiency anemia. She has received venofer in the past.    She states she had significant weight loss of about 25-40 lbs two years ago.    She reports constipation, constipation. No blood.     Last colonoscopy was done in 2011 and normal. She was to be due in 5 years. She had EGD 12/2021 that was normal.      INTERVAL HISTORY:  No acute events.      REVIEW OF SYSTEMS:   A 14 point ROS was reviewed with pertinent positives and negatives in the HPI.        HOME MEDICATIONS:  Current Outpatient Medications   Medication Sig Dispense Refill     acetaminophen (TYLENOL) 325 MG tablet Take 2 tablets (650 mg) by mouth every 4 hours as needed for other (mild pain) 100 tablet 0     amLODIPine (NORVASC) 5 MG tablet Take 1 tablet (5 mg) by mouth 2 times daily 180 tablet 3     apixaban ANTICOAGULANT (ELIQUIS) 2.5 MG tablet Take 1 tablet (2.5 mg) by mouth 2 times daily 120 tablet 0     ezetimibe (ZETIA) 10 MG tablet Take 1 tablet (10 mg) by mouth every evening 90 tablet 3     fish oil-omega-3 fatty acids 1000 MG capsule TAKE 1 CAPSULE BY MOUTH DAILY 90 capsule 3     metoprolol tartrate (LOPRESSOR) 100 MG tablet Take 1 tablet (100 mg) by  mouth 2 times daily 180 tablet 3     omeprazole (PRILOSEC) 20 MG DR capsule Take 20 mg by mouth daily as needed 90 capsule      polyethylene glycol (MIRALAX) 17 GM/Dose powder Take 17 g by mouth daily as needed 510 g 0         ALLERGIES:  Allergies   Allergen Reactions     No Known Drug Allergy      Tape [Adhesive Tape]      Sensitive to plastic tape on upper part of body--takes skin off         PAST MEDICAL HISTORY:  Past Medical History:   Diagnosis Date     Anemia      Arthritis     hands, knees     Breast cancer (H) 01/2012    left mastectomy followed by right;  Dr. Luong     Chronic airway obstruction, not elsewhere classified 2006    very mild COPD - small cough     Concussion 03/13/2013     Problem list name updated by automated process. Provider to review and confirm [employer] Update utility     Cystocele, midline 04/04/2012     Diffuse cystic mastopathy      Gastroesophageal reflux disease, esophagitis presence not specified 11/23/2019     History of hypokalemia 01/23/2013     Hyperlipidemia LDL goal <160 06/29/2011    [facility] 10-year CHD Risk Score: 2% (14 Total Points)  Values used to calculate score:    Age: 70 years -- Points: 14    Total Cholesterol: 192 mg/dL -- Points: 1    HDL Cholesterol: 61 mg/dL -- Points: -1    Systolic BP (treated): 118 mmHg -- Points: 0   The patient is not a smoker. -- Points: 0   The patient has not been diagnosed with diabetes. -- Points: 0   The patient does not have a famil     Lung cancer (H) 10/21/2015     Pacemaker     [employer]     Paroxysmal atrial fibrillation (H) 09/13/2019     PMR (polymyalgia rheumatica) (H24) 01/17/2020    tapering' above.)  In patients receiving over 10 mg of prednisone/day, the dose can be lowered by 2.5 mg/day decrements every two to four weeks  Once the dose of prednisone is 10 mg/day, further tapering can be done by 1 mg per month, provided the clinical course is stable  The clinical response to glucocorticoid therapy is closely  monitored, which centers on screening for the presence and/or recu     Thyroid nodule 04/23/2016    Noted on CT Fall 2015.      Unspecified essential hypertension late 1980's         PAST SURGICAL HISTORY:  Past Surgical History:   Procedure Laterality Date     ANESTHESIA CARDIOVERSION N/A 6/12/2020    Procedure: ANESTHESIA, FOR CARDIOVERSION;  Surgeon: GENERIC ANESTHESIA PROVIDER;  Location: [facility] OR     ARTHROPLASTY KNEE Right 7/25/2022    Procedure: Right total knee arthroplasty;  Surgeon: Nick Parra MD;  Location: [facility] OR     COLONOSCOPY  3/2003    adenomatous polyp      COLONOSCOPY  7/2006    diverticulosis - repeat in 5 years     COLONOSCOPY  10/13/2011    Procedure:COLONOSCOPY; COLONOSCOPY ; Surgeon:CHITO GORDILLO; Location: GI     CYSTOSCOPY  7/11/2012    Procedure: CYSTOSCOPY;;  Surgeon: Aline Cooper DO;  Location: [facility] OR     DAVINCI HYSTERECTOMY SUPRACERVICAL, SACROCOLPOPEXY, COMBINED  7/11/2012    Procedure: COMBINED DAVINCI HYSTERECTOMY SUPRACERVICAL, SACROCOLPOPEXY;   DAVINCI ASSISTED LAPAROSCOPIC SUPRACERVICAL HYSTERECTOMY AND BILATERAL SALPINGO-OOPHORECTOMY, SACROCOLPOPEXY AND CYSTOSCOPY;  Surgeon: Aline Cooper DO;  Location:  OR     EP ABLATION AV NODE N/A 6/22/2020    Procedure: EP ABLATION AV NODE;  Surgeon: Adriano Raman MD;  Location:  [facility]     EP BIVENT LEAD PLACEMENT N/A 6/22/2020    Procedure: Bivent Lead Placement;  Surgeon: Adriano Raman MD;  Location:  [facility]     EP PACEMAKER N/A 6/22/2020    Procedure: AVNA and BiV Pacemaker Insertion;  Surgeon: Adriano Raman MD;  Location:  [facility] C[facility]     ESOPHAGOSCOPY, GASTROSCOPY, DUODENOSCOPY (EGD), COMBINED N/A 12/14/2021    Procedure: ESOPHAGOGASTRODUODENOSCOPY (EGD) (fv) biopsies with cold forcep;  Surgeon: Chito Gordillo MD;  Location:  GI     EXCISE LESION EYELID Right 6/29/2015    Procedure: EXCISE LESION EYELID;  Surgeon: Hank Olvera  MD Francisco;  Location:  [facility]     INSERT PORT VASCULAR ACCESS  2/3/2012    Procedure:INSERT PORT VASCULAR ACCESS; Power Port-A- Catheter Placement ; Surgeon:IRISH TAPIA; Location:[facility] OR     LAPAROSCOPIC SALPINGO-OOPHORECTOMY  7/11/2012    Procedure: LAPAROSCOPIC SALPINGO-OOPHORECTOMY;  Davinci;  Surgeon: Aline Cooper DO;  Location:  OR     LIPOSUCTION, RHYTIDECTOMY, COMBINED       LOBECTOMY LUNG Right 3/1/2016    Procedure: LOBECTOMY LUNG;  Surgeon: Jonas Woodward MD;  Location:  OR     MAMMOPLASTY REDUCTION  1/6/2012    Procedure:MAMMOPLASTY REDUCTION; Surgeon:MICKI KYLE; Location:[facility] OR     MASTECTOMY SIMPLE  11/12/2012    Procedure: MASTECTOMY SIMPLE;   Right Prophylactic Mastectomy with attempted Right Sentinal Node Biopsy, Revision Bilateral Mastectomy Insicions, liposuction in breast area;  Surgeon: Irish Tapia MD;  Location: [facility] OR     MASTECTOMY SIMPLE, SENTINEL NODE, COMBINED  1/6/2012    Procedure:COMBINED MASTECTOMY SIMPLE, SENTINEL NODE; Left Mastectomy Left Northridge Node Biopsy,  Right Breast Reduction ; Surgeon:IRISH TAPIA; Location:[facility] OR     MASTECTOMY, BILATERAL       REMOVE PORT VASCULAR ACCESS  4/29/2013    Procedure: REMOVE PORT VASCULAR ACCESS;  Port A catheter removal ;  Surgeon: Irish Tapia MD;  Location: [facility] OR     REPAIR PTOSIS BILATERAL Bilateral 6/29/2015    Procedure: REPAIR PTOSIS BILATERAL;  Surgeon: Hank Olvera MD;  Location:  [facility]     REVISE RECONSTRUCTED BREAST BILATERAL  11/12/2012    Procedure: REVISE RECONSTRUCTED BREAST BILATERAL;;  Surgeon: Micki Kyle MD;  Location: [facility] OR     SURGICAL HISTORY OF -   in 40's    face lift     SURGICAL HISTORY OF -       lipoma removed right thigh     SURGICAL HISTORY OF -       D and C     THORACOTOMY Right 3/1/2016    Procedure: THORACOTOMY;  Surgeon: Jonas Woodward MD;  Location:  OR     ZZC NONSPECIFIC PROCEDURE  1970    s/p Tubal ligation 1970          SOCIAL HISTORY:  Social History     Socioeconomic History     Marital status:      Spouse name: Aren     Number of children: 2     Years of education: Not on file     Highest education level: Not on file   Occupational History     Occupation: "[employer]"     Employer: NONE    Tobacco Use     Smoking status: Never     Smokeless tobacco: Never   Vaping Use     Vaping Use: Never used   Substance and Sexual Activity     Alcohol use: Yes     Comment: 0-1 drink day     Drug use: No     Sexual activity: Yes     Partners: Male   Other Topics Concern      Service Not Asked     Blood Transfusions Not Asked     Caffeine Concern Not Asked     Occupational Exposure Not Asked     Hobby Hazards Not Asked     Sleep Concern Not Asked     Stress Concern Not Asked     Weight Concern Not Asked     Special Diet Not Asked     Back Care Not Asked     Exercise Yes     Comment: nguyen     Bike Helmet Not Asked     Seat Belt Not Asked     Self-Exams Not Asked     Parent/sibling w/ CABG, MI or angioplasty before 65F 55M? Yes   Social History Narrative     Not on file     Social Determinants of Health     Financial Resource Strain: Not on file   Food Insecurity: Not on file   Transportation Needs: Not on file   Physical Activity: Not on file   Stress: Not on file   Social Connections: Not on file   Interpersonal Safety: Low Risk  (12/11/2023)    Interpersonal Safety      Do you feel physically and emotionally safe where you currently live?: Yes      Within the past 12 months, have you been hit, slapped, kicked or otherwise physically hurt by someone?: No      Within the past 12 months, have you been humiliated or emotionally abused in other ways by your partner or ex-partner?: No   Housing Stability: Not on file         FAMILY HISTORY:  Family History   Problem Relation Age of Onset     Cardiovascular Father         ruptured aorta, hardening of the arteries     Cerebrovascular Disease Mother      Respiratory Mother          "chronic bronchitis - was a smoker early on     Cardiovascular Paternal Grandfather         MI     Cerebrovascular Disease Paternal Aunt      Hypertension Son      Neurologic Disorder Daughter         migraines     Breast Cancer Daughter      Brain Tumor Sister          PHYSICAL EXAM:  Vital signs:  /71 (Cuff Size: Adult Regular)   Pulse 70   Temp 97  F (36.1  C) (Tympanic)   Resp 16   Ht 1.588 m (5' 2.5\")   Wt 50.3 kg (111 lb)   LMP  (LMP Unknown)   SpO2 96%   BMI 19.98 kg/m     ECO  GENERAL/CONSTITUTIONAL: No acute distress.  EYES: Pupils are equal, round, and react to light and accommodation. Extraocular movements intact.  No scleral icterus.  ENT/MOUTH: Neck supple. Oropharynx clear, no mucositis.  LYMPH: No anterior cervical, posterior cervical, supraclavicular, axillary or inguinal adenopathy.   RESPIRATORY: Clear to auscultation bilaterally. No crackles or wheezing.   CARDIOVASCULAR: Regular rate and rhythm without murmurs, gallops, or rubs.  GASTROINTESTINAL: No hepatosplenomegaly, masses, or tenderness. The patient has normal bowel sounds. No guarding.  No distention.  MUSCULOSKELETAL: Warm and well-perfused, no cyanosis, clubbing, or edema.  NEUROLOGIC: Cranial nerves II-XII are intact. Alert, oriented, answers questions appropriately.  INTEGUMENTARY: No rashes or jaundice.  GAIT: Requires cane.      LABS: Reviewed with patient today.   Latest Reference Range & Units 24 09:43   Aldolase 1.2 - 7.6 U/L 3.1   Calcium Ionized Whole Blood 4.4 - 5.2 mg/dL 5.2   Folate 4.6 - 34.8 ng/mL 11.2   Methylmalonic Acid 0.00 - 0.40 umol/L 0.67 (H)   PTH Related Peptide Test 0.0 - 3.4 pmol/L 3.3   TSH 0.30 - 4.20 uIU/mL 5.56 (H)   Vitamin B12 232 - 1,245 pg/mL 391   Vitamin D, Total (25-Hydroxy) 20 - 50 ng/mL 40   WBC 4.0 - 11.0 10e3/uL 8.1   Hemoglobin 11.7 - 15.7 g/dL 12.4   Hematocrit 35.0 - 47.0 % 38.1   Platelet Count 150 - 450 10e3/uL 248   RBC Count 3.80 - 5.20 10e6/uL 4.21   MCV 78 - 100 fL " 91   MCH 26.5 - 33.0 pg 29.5   MCHC 31.5 - 36.5 g/dL 32.5   RDW 10.0 - 15.0 % 13.4   % Neutrophils % 56   % Lymphocytes % 29   % Monocytes % 11   % Eosinophils % 3   % Basophils % 1   Absolute Basophils 0.0 - 0.2 10e3/uL 0.1   Absolute Eosinophils 0.0 - 0.7 10e3/uL 0.3   Absolute Immature Granulocytes <=0.4 10e3/uL 0.0   Absolute Lymphocytes 0.8 - 5.3 10e3/uL 2.3   Absolute Monocytes 0.0 - 1.3 10e3/uL 0.9   % Immature Granulocytes % 0   Absolute Neutrophils 1.6 - 8.3 10e3/uL 4.5   Absolute NRBCs 10e3/uL 0.0   NRBCs per 100 WBC <1 /100 0      Latest Reference Range & Units 01/27/24 09:43   Parathyroid Hormone Intact 15 - 65 pg/mL 87 (H)      Latest Reference Range & Units 06/28/11 09:05 09/08/20 10:24   Parathyroid Hormone Intact 18 - 80 pg/mL 45 121 (H)       Serum M-protein (g/dL):  9/8/20: 0.1  6/2/22: Unremarkable immunofixation; no quantification  1/25/24: 0.0    Urine M-peak (%):  1/27/23: 0.0    Total IgG (mg/dL), IgA (mg/dL), IgM (mg/dL):  1/25/24: 922, 209, 86    Free kappa light chains (mg/dL), lambda (mg/dL), kappa/lambda ratio:  6/2/22: 2.66, 2.32, 1.15  1/25/24: 1.91, 1.21, 1.58      PATHOLOGY:  Collected: 11/30/2011  Received: 11/30/2011  Reported: 12/1/2011 11:34  Ordering Phy(s): PARHTA CHRISTIE  Additional Phy(s): RITO ZHOU        ADDENDUM    TO ORIGINAL REPORT  Status: Signed Out  Date Ordered:12/2/2011  Date Complete:12/2/2011  Date Reported:12/2/2011 12:02  Signed Out By: Yaya Harding M.D.    INTERPRETATION:  IMMUNOHISTOCHEMICAL ANALYSIS FOR ESTROGEN AND PROGESTERONE RECEPTORS    RESULTS:    ESTROGEN RECEPTORS:           Positive      (Approximately 75% of tumor  cell nuclei arielle)                                       Intensity of staining:  Strong.    PROGESTERONE RECEPTORS:     Positive      (Approximately 50% of invasive  tumor nuclei arielle)                                       Intensity of staining:  Strong.      Collected: 11/30/2011 00:00  Received: 12/2/2011 00:00  Reported:  12/5/2011 23:47  Ordering Phy(s): PARTHA CHRISTIE  Additional Phy(s): RITO PATEL    __________________________________________          TEST(S) REQUESTED:  Her 2 Lo FISH    SPECIMEN DESCRIPTION:  Breast Tissue, Paraffin Embedded    CLINICAL COMMENTS:  Breast Cancer, [id number]        METHODS:  Fluorescence in-situ hybridization (FISH) was performed on  formalin-fixed, paraffin-embedded tissue using the [employer]  PathVysion DNA probe set to HER2 (17q11.2-q21) and to the centromere  region of chromosome 17 (CEP 17; 17p11.1-q11.1). The formalin fixation  time is unavailable. An adequate number of invasive tumor cells were  present, of which 30 cells were scored independently by two  technologists; images were captured on an [employer] image analysis  system. The resulting numbers of HER2 and centromere 17 signals were  expressed as a ratio of HER2:CEP 17.    RESULTS:    Ratio of HER2:CEP17 signals  Tomasa Sarwat:  >7.1                    Avg. number HER2  signals/nucleus:  >21.0                                           Avg. number CEP 17  signals/nucleus:  3.0  Normal Range:  <1.8  Amplified Range: > 2.2  Equivocal Range:  1.8 - 2.2    INTERPRETATION:  Amplification of the HER2 gene was detected.          Collected: 1/6/2012  Received: 1/6/2012  Reported: 1/10/2012 16:41  Ordering Phy(s): TRESSA TAPIA        ADDENDUM    TO ORIGINAL REPORT  Status: Signed Out  Date Ordered:1/12/2012  Date Complete:1/12/2012  Date Reported:1/12/2012 09:28  Signed Out By: Mckenna Rivas M.D.    INTERPRETATION:  Tumor blocks B5, B6, and B7 are stained using D2-40 immunohistochemical  marker for evaluation of lymph/vascular space invasion by tumor.  The  immunomorphologic findings indicate focal presence of angiolymphatic  invasion by tumor (block B7).  Internal consultation is obtained.    Keegan  DT/1-12-12        __________________________________________      ORIGINAL  REPORT:    SPECIMEN(S):  A: Lymph node, sentinel, axillary, #1  B: Breast mastectomy, simple, left  C: Breast, right, reduction mammoplasty    FINAL DIAGNOSIS:  A.  Lymph node, left axillary sentinel node #1, excision biopsy - Single  node negative for metastatic carcinoma by routine staining and  immunohistochemistry (0/1).    B.  Breast, left, mastectomy -  Specimen, Procedure and Side:   Breast, left mastectomy.  Tumor Site:   10:00 (upper inner quadrant).  Histologic Type:   Infiltrating ductal carcinoma.  Specimen Integrity:   Intact.  Specimen Size:   See Gross Description.  Size of Invasive Component:  1 cm.  Edison Grade: III of III.  Tubule + Nuclei + Mitoses = Rosa Score:   3 + 3 + 3 = 9 of 9.  Vascular/Lymphatic Invasion:   Suspicious foci identified (pending  immunohistochemical stain; findings will be reported as an addendum to  this case).  Tumor Focality: Single focus.  Macroscopic and Microscopic Extent of Tumor: Tumor limited to breast.  Uninvolved skin.  Skeletal muscle not submitted for evaluation.  Associated In Situ Component:   Ductal carcinoma in-situ (DCIS),  solid-type.  High nuclear grade with necrosis.  Margins:     Invasive Component:   Uninvolved.     Distance to Closest Margin, if Negative:   Invasive tumor 2.3 cm from  nearest (posterior) margin.     In Situ Component:   Uninvolved.     Distance to Closest Margin, if Negative:   DCIS 2.3 cm from the  nearest (posterior) margin.  Lymph Nodes and Type:   See specimen A.            Number of nodes with macrometastases (greater than 0.2 cm): 0.              Number of nodes with micrometastases (greater than 0.2 mm to  0.2 cm): 0.            Number of nodes with isolated tumor cells (less than or equal  to 0.2 mm): 0.       Size of largest metastatic deposit:   Not applicable.  Pathologic Staging (pTNM):   pT1b, pN0(sn)(i-), pM not applicable.  Additional pathologic findings:   Tumor-associated microcalcifications.  Fibrocystic  "change.  Focal sclerosing adenosis.  Estrogen and Progesterone Receptor (previously performed on prior  biopsy, [id number]): ER+/NJ+ (see prior report for details).  HER-2/Lo (previously performed on prior biopsy, [id number]): Her-2 gene  amplification detected (see separate report for details, [id number]).  CAP Protocol Based on AJCC/UICC TNM, 7th edition; Protocol Effective  Date:  January 2010  C.  Breast, right, reduction mammoplasty -  1.          Benign breast tissue.  2.     Unremarkable overlying skin.       Collected: 11/12/2012  Received: 11/12/2012  Reported: 11/13/2012 17:38  Ordering Phy(s): TRESSA TAPIA        SPECIMEN(S):  A: Breast mastectomy, simple, right  B: Possible small lymph node aggregate, questionable sentinel node,  right axillary  C: Additional right mastectomy tissue  D: Left mastectomy scar    FINAL DIAGNOSIS:  A.  Breast, right, simple mastectomy -       1.  Lobular carcinoma in-situ (LCIS), focal.       2.  Columnar cell change, focal.       3.  Dermal scar with focal foreign body-type granulomatous reaction  to polarizable material.       4.  Negative for invasive malignancy.    B.  Soft tissue, \"questionable sentinel node\", right axilla, excision  biopsy -       1.  Benign fibroadipose tissue.       2.  No lymph node identified.    C.  Right breast and overlying skin, 'additional mastectomy tissue',  resection -       1.  Dermal scar with foreign body-type granulomatous reaction.       2.  Single benign lymph node.       3.  Negative for malignancy.    D.  Skin, left breast mastectomy scar, resection -       1.  Dermal scar.       2.  Negative for malignancy.     Collected: 3/1/2016  Received: 3/1/2016  Reported: 3/2/2016 14:53  Ordering Phy(s): GUME MCCABE    SPECIMEN(S):  A: Right upper lobe lung mass  B: Lymph node, pulmonary ligament 9  C: Lymph node, subcarinal  D: Lymph nodes, paratracheal lower 4R  E: Lymph node, right tracheal bronchial angle  F: Lymph node, " hilar    FINAL DIAGNOSIS:  A: Right lung, upper lobe mass, wedge resection-  - Invasive moderately differentiated adenocarcinoma (2.2 cm)  -Margins negative for malignancy  -Total of 5 lymph nodes negative for metastatic carcinoma (0/5)(please  see specimens B.-F.)    Please see detailed tumor synopsis below         LUNG: Resection    Specimen       Specimen:    Lobe(s) of lung: Right       Procedure:    Wedge resection       Specimen Integrity:    Intact       Specimen Laterality: Right       Tumor Site:   Upper lobe       Tumor Focality:   Unifocal    Tumor       Histologic Type:   Adenocarcinoma, acinar predominant(acinar-70%,  lepidic-20%, papillary-10%)       Histologic Grade:    GX:  Cannot be assessed    Extent       Tumor Size:   Greatest dimension: 2.2 cm            Additional dimensions: 2.0 x 1.7 cm       Visceral Pleura Invasion:    Not identified       Tumor Extension:   Not identified    Margins       Status of Margin Involvement:  All margins uninvolved by invasive  carcinoma            Distance of Invasive Carcinoma from Closest Margin: 0.9 cm  exam            Margin Closest to Invasive Carcinoma: Parenchyma margin       Bronchial Margin:   Not applicable       Vascular Margin:    Not applicable       Parenchymal Margin:   Uninvolved by invasive carcinoma    Accessory Findings       Lymph-Vascular Invasion:   Not identified    Extranodal Extension: Not identified  Pathologic Staging (pTNM)       Primary Tumor (pT):    pT1b       Regional Lymph Nodes (pN):   pN0:  No regional lymph node  metastasis            Number of Lymph Nodes Examined: 5            Number of Lymph Nodes Involved: 0       Distant Metastasis (pM):    Not applicable    B: Lymph nodes, inferior pulmonary ligament #9, excision-  -1 lymph node negative for malignancy (0/1)    C: Lymph node, subcarinal #7, excision-  - 1 lymph node negative for malignancy (0/1)    D: Lymph node, peritracheal lower (4R), excision-  - 1 lymph node  negative for malignancy (0/1)    E: Lymph node, right tracheobronchial angle, excision-  - 1 lymph node negative for malignancy (0/1)    F: Lymph node, hilar (10), excision-  - 1 lymph node negative for malignancy (0/1)       Collected: 6/2/2016   Received: 6/2/2016   Reported: 6/6/2016 08:22   Ordering Phy(s): SHERI WEINER     SPECIMEN/STAIN PROCESS:   A: FNA-thyroid, right thyroid nodule, correlates to #1 on  ultrasound        Pap-Cyto x 6, Botello's stain-cyto x 1   B: FNA-thyroid, right thyroid nodule, correlates to #2 on ultrasound        Pap-Cyto x 8, Botello's stain-cyto x 2     ----------------------------------------------------------------     CYTOLOGIC INTERPRETATION:     A.  FNA-thyroid, right thyroid nodule, correlates to #1 on  ultrasound:    Benign   Consistent with a benign nodule (includes adenomatoid nodule, colloid   nodule, etc.)           IMAGING: Reviewed with patient today.   PET/CT 2/13/24:  IMPRESSION:  1.  No evidence of FDG avid malignancy. No lytic osseous lesions are identified.  2.  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  3.  New small right pleural effusion.      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 She is referred to clinic for MGUS.     1) Previous small monoclonal antibody  -Patient with 0.1 g/dL very small monoclonal antibody in 2020. This was no longer quantifiable in 2022.   -She has anemia (with history of iron deficiency). Renal function is normal. She has persistent hypercalcemia.   -Updated SPIEP, UPIEP without identifiable monoclonal gammopathy. Total immunoglobulins, free light chains are normal. At this time, she is  not meeting diagnostic criteria for MGUS. She has hypercalcemia (see below). No other evidence of end organ dysfunction. No indication for bone marrow biopsy at the present.   -PET/CT negative for mass, lymphadenopathy. 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     2) Hypercalcemia in the setting of elevated PTHi  -See above.  -Patient with history of elevated PTH intact (previously 120, currently 87). I am repeating this. I do not see history of sestamibi scan.   -She will discuss with PCP an Endocrinology referral and workup.    3) History of clinical stage IA left breast ER/LA+, HER2+ invasive ductal carcinoma and right breast LCIS  -s/p mastectomy and adjuvant chemotherapy in 2011 as described in the HPI.  -She declined adjuvant AI therapy.     4) History of stage IA left upper lobe adenocarcinoma  -s/p wedge resection with Dr. Woodward in 2016.    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 She is asymptomatic.  -She is followed by Dr. Samson Pires for PMR.     7) -She will follow with her Rheumatologist.  -She will discuss Endocrinology referral with PCP.  -I previously discussed with patient my planned departure from [facility]. I offered her follow up with Dr. Gomez or Dr. Luong and she declines at this time. I discussed she may continue to follow with PCP with referral to hematology/oncology in the future with any concerns.           Gabby Ruff DO  Hematology/Oncology  [facility]        Gabby Ruff DO

## 2024-02-14 NOTE — PROGRESS NOTES
[facility]    Hematology/Oncology Established Patient Note      Today's Date: Feb 14, 2024    Reason for Consultation: MGUS  Referring Provider: Chance Fam PA-C      HISTORY OF PRESENT ILLNESS: Tomasa Fam is an 83 year old female with history of invasive ductal carcinoma of the left breast and LCIS of the right breast s/p double mastectomy, early stage NSCLC of the right lung s/p wedge resection, iron deficiency anemia, GERD, HLD, pAfib, thyroid nodule s/p biopsy (benign), PMR, and PPM. She is referred to clinic for MGUS.     Patient was previously followed by [facility] (Dr. Peri Luong) for history of Stage IA left brast cancer and Stage 1A RUL adenocarcinoma of the lung. She was last seen by Dr. Luong in 2017.     Non-small cell lung cancer  Location: Right upper lobe, bronchus or lung  Date of diagnosis: 3/1/2016  TNM staging: T1b, N0, M0, staging type: Pathologic  Stage at diagnosis: 1A  Histology: Adenocarcinoma.  Histologic grade: Grade 2.  EGFR expression not performed.  ALK rearrangement not performed.    Breast cancer  Location: Left breast upper inner quadrant  Date of diagnosis: 12/1/2011  TNM staging: p T1b, pN0, M0.  Location: Left breast upper inner quadrant.  Stage at diagnosis: 1A  Line of therapy: Adjuvant  Histology: Invasive ductal carcinoma, histologic grade 3.  Lymphovascular invasion present.  ER positive, AZ positive.  HER2/richard FISH positive.    Prior treatments:  1.  This is a 75-year-old lady with occasional Premarin use.  Abnormality found on routine mammography.  Proceeded with left-sided mastectomy and sentinel lymph node procedure, and right sided mammoplasty reduction, showing a 1 cm grade 3 lesion with good margins.  ER positive at 75%, AZ positive at 50%, and HER2/richard ratio 7.1.  2.  The patient was then chemotherapy with docetaxel, carboplatin, and trastuzumab, total of 6 cycles.  Started on 2/8/2012 with pegfilgrastim support up  until 5/23/2012.  PEG filgrastim was held with cycle 6 per patient's choice.  Of note, on her cycle #2, dose reduction of docetaxel provided to 60 mg/m  due to significant rash.  3.  Maintenance trastuzumab briefly stopped due to concern for ejection fraction.  Then, by cardiology clearance, was resumed on 8/17/2012.  Adequate ejection fraction was all through the rest of the maintenance and finished last dose in 4/3/2013.  Her EF post trastuzumab also is okay.  4.  March 2016 was diagnosed with a pT1b N0 M0 right upper lobe adenocarcinoma status post resection and an observation.    Notes indicate patient had then declined adjuvant AI therapy.    She has seen Dr. Ibarra in 2022 for MGUS and iron deficiency anemia. She has received venofer in the past.    She states she had significant weight loss of about 25-40 lbs two years ago.    She reports constipation, constipation. No blood.     Last colonoscopy was done in 2011 and normal. She was to be due in 5 years. She had EGD 12/2021 that was normal.      INTERVAL HISTORY:  No acute events.      REVIEW OF SYSTEMS:   A 14 point ROS was reviewed with pertinent positives and negatives in the HPI.        HOME MEDICATIONS:  Current Outpatient Medications   Medication Sig Dispense Refill    acetaminophen (TYLENOL) 325 MG tablet Take 2 tablets (650 mg) by mouth every 4 hours as needed for other (mild pain) 100 tablet 0    amLODIPine (NORVASC) 5 MG tablet Take 1 tablet (5 mg) by mouth 2 times daily 180 tablet 3    apixaban ANTICOAGULANT (ELIQUIS) 2.5 MG tablet Take 1 tablet (2.5 mg) by mouth 2 times daily 120 tablet 0    ezetimibe (ZETIA) 10 MG tablet Take 1 tablet (10 mg) by mouth every evening 90 tablet 3    fish oil-omega-3 fatty acids 1000 MG capsule TAKE 1 CAPSULE BY MOUTH DAILY 90 capsule 3    metoprolol tartrate (LOPRESSOR) 100 MG tablet Take 1 tablet (100 mg) by mouth 2 times daily 180 tablet 3    omeprazole (PRILOSEC) 20 MG DR capsule Take 20 mg by mouth daily as  needed 90 capsule     polyethylene glycol (MIRALAX) 17 GM/Dose powder Take 17 g by mouth daily as needed 510 g 0         ALLERGIES:  Allergies   Allergen Reactions    No Known Drug Allergy     Tape [Adhesive Tape]      Sensitive to plastic tape on upper part of body--takes skin off         PAST MEDICAL HISTORY:  Past Medical History:   Diagnosis Date    Anemia     Arthritis     hands, knees    Breast cancer (H) 01/2012    left mastectomy followed by right;  Dr. Luong    Chronic airway obstruction, not elsewhere classified 2006    very mild COPD - small cough    Concussion 03/13/2013     Problem list name updated by automated process. Provider to review and confirm [employer] Update utility    Cystocele, midline 04/04/2012    Diffuse cystic mastopathy     Gastroesophageal reflux disease, esophagitis presence not specified 11/23/2019    History of hypokalemia 01/23/2013    Hyperlipidemia LDL goal <160 06/29/2011    [place] 10-year CHD Risk Score: 2% (14 Total Points)  Values used to calculate score:    Age: 70 years -- Points: 14    Total Cholesterol: 192 mg/dL -- Points: 1    HDL Cholesterol: 61 mg/dL -- Points: -1    Systolic BP (treated): 118 mmHg -- Points: 0   The patient is not a smoker. -- Points: 0   The patient has not been diagnosed with diabetes. -- Points: 0   The patient does not have a famil    Lung cancer (H) 10/21/2015    Pacemaker     [employer]    Paroxysmal atrial fibrillation (H) 09/13/2019    PMR (polymyalgia rheumatica) (H24) 01/17/2020    tapering' above.) In patients receiving over 10 mg of prednisone/day, the dose can be lowered by 2.5 mg/day decrements every two to four weeks Once the dose of prednisone is 10 mg/day, further tapering can be done by 1 mg per month, provided the clinical course is stable  The clinical response to glucocorticoid therapy is closely monitored, which centers on screening for the presence and/or recu    Thyroid nodule 04/23/2016    Noted on CT Fall 2015.      Unspecified essential hypertension late 1980's         PAST SURGICAL HISTORY:  Past Surgical History:   Procedure Laterality Date    ANESTHESIA CARDIOVERSION N/A 6/12/2020    Procedure: ANESTHESIA, FOR CARDIOVERSION;  Surgeon: GENERIC ANESTHESIA PROVIDER;  Location:  OR    ARTHROPLASTY KNEE Right 7/25/2022    Procedure: Right total knee arthroplasty;  Surgeon: Nick Parra MD;  Location:  OR    COLONOSCOPY  3/2003    adenomatous polyp     COLONOSCOPY  7/2006    diverticulosis - repeat in 5 years    COLONOSCOPY  10/13/2011    Procedure:COLONOSCOPY; COLONOSCOPY ; Surgeon:CHITO GORDILLO; Location: GI    CYSTOSCOPY  7/11/2012    Procedure: CYSTOSCOPY;;  Surgeon: Aline Cooper DO;  Location:  OR    DAVINCI HYSTERECTOMY SUPRACERVICAL, SACROCOLPOPEXY, COMBINED  7/11/2012    Procedure: COMBINED DAVINCI HYSTERECTOMY SUPRACERVICAL, SACROCOLPOPEXY;   DAVINCI ASSISTED LAPAROSCOPIC SUPRACERVICAL HYSTERECTOMY AND BILATERAL SALPINGO-OOPHORECTOMY, SACROCOLPOPEXY AND CYSTOSCOPY;  Surgeon: Aline Cooper DO;  Location:  OR    EP ABLATION AV NODE N/A 6/22/2020    Procedure: EP ABLATION AV NODE;  Surgeon: Adriano Raman MD;  Location:  HE[facility]ARD[facility] CATH LAB    EP BIVENT LEAD PLACEMENT N/A 6/22/2020    Procedure: Bivent Lead Placement;  Surgeon: Adriano Raman MD;  Location:  [facility]    EP PACEMAKER N/A 6/22/2020    Procedure: AVNA and BiV Pacemaker Insertion;  Surgeon: Adriano Raman MD;  Location:  HEART [facility]ARD[facility] CATH LAB    ESOPHAGOSCOPY, GASTROSCOPY, DUODENOSCOPY (EGD), COMBINED N/A 12/14/2021    Procedure: ESOPHAGOGASTRODUODENOSCOPY (EGD) (fv) biopsies with cold forcep;  Surgeon: Chito Gordillo MD;  Location:  GI    EXCISE LESION EYELID Right 6/29/2015    Procedure: EXCISE LESION EYELID;  Surgeon: Hank Olvera MD;  Location:  SD    INSERT PORT VASCULAR ACCESS  2/3/2012    Procedure:INSERT PORT VASCULAR ACCESS; Power Port-A- Catheter  Placement ; Surgeon:IRISH TAPIA; Location:[facility] OR    LAPAROSCOPIC SALPINGO-OOPHORECTOMY  7/11/2012    Procedure: LAPAROSCOPIC SALPINGO-OOPHORECTOMY;  Davinci;  Surgeon: Aline Cooper DO;  Location: [facility] OR    LIPOSUCTION, RHYTIDECTOMY, COMBINED      LOBECTOMY LUNG Right 3/1/2016    Procedure: LOBECTOMY LUNG;  Surgeon: Jonas Woodward MD;  Location: [facility] OR    MAMMOPLASTY REDUCTION  1/6/2012    Procedure:MAMMOPLASTY REDUCTION; Surgeon:MICKI KYLE; Location:[facility] OR    MASTECTOMY SIMPLE  11/12/2012    Procedure: MASTECTOMY SIMPLE;   Right Prophylactic Mastectomy with attempted Right Sentinal Node Biopsy, Revision Bilateral Mastectomy Insicions, liposuction in breast area;  Surgeon: Irish Tapia MD;  Location: [facility] OR    MASTECTOMY SIMPLE, SENTINEL NODE, COMBINED  1/6/2012    Procedure:COMBINED MASTECTOMY SIMPLE, SENTINEL NODE; Left Mastectomy Left Holt Node Biopsy,  Right Breast Reduction ; Surgeon:IRISH TAPIA; Location:[facility] OR    MASTECTOMY, BILATERAL      REMOVE PORT VASCULAR ACCESS  4/29/2013    Procedure: REMOVE PORT VASCULAR ACCESS;  Port A catheter removal ;  Surgeon: Irish Tapia MD;  Location: [facility] OR    REPAIR PTOSIS BILATERAL Bilateral 6/29/2015    Procedure: REPAIR PTOSIS BILATERAL;  Surgeon: Hank Olvera MD;  Location:  [facility]    REVISE RECONSTRUCTED BREAST BILATERAL  11/12/2012    Procedure: REVISE RECONSTRUCTED BREAST BILATERAL;;  Surgeon: Micki Kyle MD;  Location: [facility] OR    SURGICAL HISTORY OF -   in 40's    face lift    SURGICAL HISTORY OF -       lipoma removed right thigh    SURGICAL HISTORY OF -       D and C    THORACOTOMY Right 3/1/2016    Procedure: THORACOTOMY;  Surgeon: Jonas Woodward MD;  Location:  OR    ZZC NONSPECIFIC PROCEDURE  1970    s/p Tubal ligation 1970         SOCIAL HISTORY:  Social History     Socioeconomic History    Marital status:      Spouse name: Aren    Number of children: 2    Years of  education: Not on file    Highest education level: Not on file   Occupational History    Occupation: nguyen     Employer: NONE    Tobacco Use    Smoking status: Never    Smokeless tobacco: Never   Vaping Use    Vaping Use: Never used   Substance and Sexual Activity    Alcohol use: Yes     Comment: 0-1 drink day    Drug use: No    Sexual activity: Yes     Partners: Male   Other Topics Concern     Service Not Asked    Blood Transfusions Not Asked    Caffeine Concern Not Asked    Occupational Exposure Not Asked    Hobby Hazards Not Asked    Sleep Concern Not Asked    Stress Concern Not Asked    Weight Concern Not Asked    Special Diet Not Asked    Back Care Not Asked    Exercise Yes     Comment: Abril    Bike Helmet Not Asked    Seat Belt Not Asked    Self-Exams Not Asked    Parent/sibling w/ CABG, MI or angioplasty before 65F 55M? Yes   Social History Narrative    Not on file     Social Determinants of Health     Financial Resource Strain: Not on file   Food Insecurity: Not on file   Transportation Needs: Not on file   Physical Activity: Not on file   Stress: Not on file   Social Connections: Not on file   Interpersonal Safety: Low Risk  (12/11/2023)    Interpersonal Safety     Do you feel physically and emotionally safe where you currently live?: Yes     Within the past 12 months, have you been hit, slapped, kicked or otherwise physically hurt by someone?: No     Within the past 12 months, have you been humiliated or emotionally abused in other ways by your partner or ex-partner?: No   Housing Stability: Not on file         FAMILY HISTORY:  Family History   Problem Relation Age of Onset    Cardiovascular Father         ruptured aorta, hardening of the arteries    Cerebrovascular Disease Mother     Respiratory Mother         chronic bronchitis - was a smoker early on    Cardiovascular Paternal Grandfather         MI    Cerebrovascular Disease Paternal Aunt     Hypertension Son     Neurologic Disorder Daughter  "        migraines    Breast Cancer Daughter     Brain Tumor Sister          PHYSICAL EXAM:  Vital signs:  /71 (Cuff Size: Adult Regular)   Pulse 70   Temp 97  F (36.1  C) (Tympanic)   Resp 16   Ht 1.588 m (5' 2.5\")   Wt 50.3 kg (111 lb)   LMP  (LMP Unknown)   SpO2 96%   BMI 19.98 kg/m     ECO  GENERAL/CONSTITUTIONAL: No acute distress.  EYES: Pupils are equal, round, and react to light and accommodation. Extraocular movements intact.  No scleral icterus.  ENT/MOUTH: Neck supple. Oropharynx clear, no mucositis.  LYMPH: No anterior cervical, posterior cervical, supraclavicular, axillary or inguinal adenopathy.   RESPIRATORY: Clear to auscultation bilaterally. No crackles or wheezing.   CARDIOVASCULAR: Regular rate and rhythm without murmurs, gallops, or rubs.  GASTROINTESTINAL: No hepatosplenomegaly, masses, or tenderness. The patient has normal bowel sounds. No guarding.  No distention.  MUSCULOSKELETAL: Warm and well-perfused, no cyanosis, clubbing, or edema.  NEUROLOGIC: Cranial nerves II-XII are intact. Alert, oriented, answers questions appropriately.  INTEGUMENTARY: No rashes or jaundice.  GAIT: Requires cane.      LABS: Reviewed with patient today.   Latest Reference Range & Units 24 09:43   Aldolase 1.2 - 7.6 U/L 3.1   Calcium Ionized Whole Blood 4.4 - 5.2 mg/dL 5.2   Folate 4.6 - 34.8 ng/mL 11.2   Methylmalonic Acid 0.00 - 0.40 umol/L 0.67 (H)   PTH Related Peptide Test 0.0 - 3.4 pmol/L 3.3   TSH 0.30 - 4.20 uIU/mL 5.56 (H)   Vitamin B12 232 - 1,245 pg/mL 391   Vitamin D, Total (25-Hydroxy) 20 - 50 ng/mL 40   WBC 4.0 - 11.0 10e3/uL 8.1   Hemoglobin 11.7 - 15.7 g/dL 12.4   Hematocrit 35.0 - 47.0 % 38.1   Platelet Count 150 - 450 10e3/uL 248   RBC Count 3.80 - 5.20 10e6/uL 4.21   MCV 78 - 100 fL 91   MCH 26.5 - 33.0 pg 29.5   MCHC 31.5 - 36.5 g/dL 32.5   RDW 10.0 - 15.0 % 13.4   % Neutrophils % 56   % Lymphocytes % 29   % Monocytes % 11   % Eosinophils % 3   % Basophils % 1   Absolute " Basophils 0.0 - 0.2 10e3/uL 0.1   Absolute Eosinophils 0.0 - 0.7 10e3/uL 0.3   Absolute Immature Granulocytes <=0.4 10e3/uL 0.0   Absolute Lymphocytes 0.8 - 5.3 10e3/uL 2.3   Absolute Monocytes 0.0 - 1.3 10e3/uL 0.9   % Immature Granulocytes % 0   Absolute Neutrophils 1.6 - 8.3 10e3/uL 4.5   Absolute NRBCs 10e3/uL 0.0   NRBCs per 100 WBC <1 /100 0      Latest Reference Range & Units 01/27/24 09:43   Parathyroid Hormone Intact 15 - 65 pg/mL 87 (H)      Latest Reference Range & Units 06/28/11 09:05 09/08/20 10:24   Parathyroid Hormone Intact 18 - 80 pg/mL 45 121 (H)       Serum M-protein (g/dL):  9/8/20: 0.1  6/2/22: Unremarkable immunofixation; no quantification  1/25/24: 0.0    Urine M-peak (%):  1/27/23: 0.0    Total IgG (mg/dL), IgA (mg/dL), IgM (mg/dL):  1/25/24: 922, 209, 86    Free kappa light chains (mg/dL), lambda (mg/dL), kappa/lambda ratio:  6/2/22: 2.66, 2.32, 1.15  1/25/24: 1.91, 1.21, 1.58      PATHOLOGY:  Collected: 11/30/2011  Received: 11/30/2011  Reported: 12/1/2011 11:34  Ordering Phy(s): PARTHA CHRISTIE  Additional Phy(s): RITO ZHOU        ADDENDUM    TO ORIGINAL REPORT  Status: Signed Out  Date Ordered:12/2/2011  Date Complete:12/2/2011  Date Reported:12/2/2011 12:02  Signed Out By: Yaya Harding M.D.    INTERPRETATION:  IMMUNOHISTOCHEMICAL ANALYSIS FOR ESTROGEN AND PROGESTERONE RECEPTORS    RESULTS:    ESTROGEN RECEPTORS:           Positive      (Approximately 75% of tumor  cell nuclei arielle)                                       Intensity of staining:  Strong.    PROGESTERONE RECEPTORS:     Positive      (Approximately 50% of invasive  tumor nuclei arielle)                                       Intensity of staining:  Strong.      Collected: 11/30/2011 00:00  Received: 12/2/2011 00:00  Reported: 12/5/2011 23:47  Ordering Phy(s): PARTHA CHRISTIE  Additional Phy(s): RITO HARDING    __________________________________________          TEST(S) REQUESTED:  Her 2 Lo  FISH    SPECIMEN DESCRIPTION:  Breast Tissue, Paraffin Embedded    CLINICAL COMMENTS:  Breast Cancer, [id number]        METHODS:  Fluorescence in-situ hybridization (FISH) was performed on  formalin-fixed, paraffin-embedded tissue using the [employer] DNA probe set to HER2 (17q11.2-q21) and to the centromere  region of chromosome 17 (CEP 17; 17p11.1-q11.1). The formalin fixation  time is unavailable. An adequate number of invasive tumor cells were  present, of which 30 cells were scored independently by two  technologists; images were captured on an [employer] image analysis  system. The resulting numbers of HER2 and centromere 17 signals were  expressed as a ratio of HER2:CEP 17.    RESULTS:    Ratio of HER2:CEP17 signals  Toamsa Fam:  >7.1                    Avg. number HER2  signals/nucleus:  >21.0                                           Avg. number CEP 17  signals/nucleus:  3.0  Normal Range:  <1.8  Amplified Range: > 2.2  Equivocal Range:  1.8 - 2.2    INTERPRETATION:  Amplification of the HER2 gene was detected.          Collected: 1/6/2012  Received: 1/6/2012  Reported: 1/10/2012 16:41  Ordering Phy(s): TRESSA TAPIA        ADDENDUM    TO ORIGINAL REPORT  Status: Signed Out  Date Ordered:1/12/2012  Date Complete:1/12/2012  Date Reported:1/12/2012 09:28  Signed Out By: Mckenna Rivas M.D.    INTERPRETATION:  Tumor blocks B5, B6, and B7 are stained using D2-40 immunohistochemical  marker for evaluation of lymph/vascular space invasion by tumor.  The  immunomorphologic findings indicate focal presence of angiolymphatic  invasion by tumor (block B7).  Internal consultation is obtained.    /jacob  DT/1-12-12        __________________________________________      ORIGINAL REPORT:    SPECIMEN(S):  A: Lymph node, sentinel, axillary, #1  B: Breast mastectomy, simple, left  C: Breast, right, reduction mammoplasty    FINAL DIAGNOSIS:  A.  Lymph node, left axillary sentinel node #1,  excision biopsy - Single  node negative for metastatic carcinoma by routine staining and  immunohistochemistry (0/1).    B.  Breast, left, mastectomy -  Specimen, Procedure and Side:   Breast, left mastectomy.  Tumor Site:   10:00 (upper inner quadrant).  Histologic Type:   Infiltrating ductal carcinoma.  Specimen Integrity:   Intact.  Specimen Size:   See Gross Description.  Size of Invasive Component:  1 cm.  Rosa Grade: III of III.  Tubule + Nuclei + Mitoses = [place] Score:   3 + 3 + 3 = 9 of 9.  Vascular/Lymphatic Invasion:   Suspicious foci identified (pending  immunohistochemical stain; findings will be reported as an addendum to  this case).  Tumor Focality: Single focus.  Macroscopic and Microscopic Extent of Tumor: Tumor limited to breast.  Uninvolved skin.  Skeletal muscle not submitted for evaluation.  Associated In Situ Component:   Ductal carcinoma in-situ (DCIS),  solid-type.  High nuclear grade with necrosis.  Margins:     Invasive Component:   Uninvolved.     Distance to Closest Margin, if Negative:   Invasive tumor 2.3 cm from  nearest (posterior) margin.     In Situ Component:   Uninvolved.     Distance to Closest Margin, if Negative:   DCIS 2.3 cm from the  nearest (posterior) margin.  Lymph Nodes and Type:   See specimen A.            Number of nodes with macrometastases (greater than 0.2 cm): 0.              Number of nodes with micrometastases (greater than 0.2 mm to  0.2 cm): 0.            Number of nodes with isolated tumor cells (less than or equal  to 0.2 mm): 0.       Size of largest metastatic deposit:   Not applicable.  Pathologic Staging (pTNM):   pT1b, pN0(sn)(i-), pM not applicable.  Additional pathologic findings:   Tumor-associated microcalcifications.  Fibrocystic change.  Focal sclerosing adenosis.  Estrogen and Progesterone Receptor (previously performed on prior  biopsy, [id number]): ER+/MT+ (see prior report for details).  HER-2/Lo (previously performed on prior  "biopsy, [id number]): Her-2 gene  amplification detected (see separate report for details, [id number]).  CAP Protocol Based on AJCC/UICC TNM, 7th edition; Protocol Effective  Date:  January 2010  C.  Breast, right, reduction mammoplasty -  1.          Benign breast tissue.  2.     Unremarkable overlying skin.       Collected: 11/12/2012  Received: 11/12/2012  Reported: 11/13/2012 17:38  Ordering Phy(s): TRESSA TAPIA        SPECIMEN(S):  A: Breast mastectomy, simple, right  B: Possible small lymph node aggregate, questionable sentinel node,  right axillary  C: Additional right mastectomy tissue  D: Left mastectomy scar    FINAL DIAGNOSIS:  A.  Breast, right, simple mastectomy -       1.  Lobular carcinoma in-situ (LCIS), focal.       2.  Columnar cell change, focal.       3.  Dermal scar with focal foreign body-type granulomatous reaction  to polarizable material.       4.  Negative for invasive malignancy.    B.  Soft tissue, \"questionable sentinel node\", right axilla, excision  biopsy -       1.  Benign fibroadipose tissue.       2.  No lymph node identified.    C.  Right breast and overlying skin, 'additional mastectomy tissue',  resection -       1.  Dermal scar with foreign body-type granulomatous reaction.       2.  Single benign lymph node.       3.  Negative for malignancy.    D.  Skin, left breast mastectomy scar, resection -       1.  Dermal scar.       2.  Negative for malignancy.     Collected: 3/1/2016  Received: 3/1/2016  Reported: 3/2/2016 14:53  Ordering Phy(s): GUME MCCABE    SPECIMEN(S):  A: Right upper lobe lung mass  B: Lymph node, pulmonary ligament 9  C: Lymph node, subcarinal  D: Lymph nodes, paratracheal lower 4R  E: Lymph node, right tracheal bronchial angle  F: Lymph node, hilar    FINAL DIAGNOSIS:  A: Right lung, upper lobe mass, wedge resection-  - Invasive moderately differentiated adenocarcinoma (2.2 cm)  -Margins negative for malignancy  -Total of 5 lymph nodes negative " for metastatic carcinoma (0/5)(please  see specimens B.-F.)    Please see detailed tumor synopsis below         LUNG: Resection    Specimen       Specimen:    Lobe(s) of lung: Right       Procedure:    Wedge resection       Specimen Integrity:    Intact       Specimen Laterality: Right       Tumor Site:   Upper lobe       Tumor Focality:   Unifocal    Tumor       Histologic Type:   Adenocarcinoma, acinar predominant(acinar-70%,  lepidic-20%, papillary-10%)       Histologic Grade:    GX:  Cannot be assessed    Extent       Tumor Size:   Greatest dimension: 2.2 cm            Additional dimensions: 2.0 x 1.7 cm       Visceral Pleura Invasion:    Not identified       Tumor Extension:   Not identified    Margins       Status of Margin Involvement:  All margins uninvolved by invasive  carcinoma            Distance of Invasive Carcinoma from Closest Margin: 0.9 cm  exam            Margin Closest to Invasive Carcinoma: Parenchyma margin       Bronchial Margin:   Not applicable       Vascular Margin:    Not applicable       Parenchymal Margin:   Uninvolved by invasive carcinoma    Accessory Findings       Lymph-Vascular Invasion:   Not identified    Extranodal Extension: Not identified  Pathologic Staging (pTNM)       Primary Tumor (pT):    pT1b       Regional Lymph Nodes (pN):   pN0:  No regional lymph node  metastasis            Number of Lymph Nodes Examined: 5            Number of Lymph Nodes Involved: 0       Distant Metastasis (pM):    Not applicable    B: Lymph nodes, inferior pulmonary ligament #9, excision-  -1 lymph node negative for malignancy (0/1)    C: Lymph node, subcarinal #7, excision-  - 1 lymph node negative for malignancy (0/1)    D: Lymph node, peritracheal lower (4R), excision-  - 1 lymph node negative for malignancy (0/1)    E: Lymph node, right tracheobronchial angle, excision-  - 1 lymph node negative for malignancy (0/1)    F: Lymph node, hilar (10), excision-  - 1 lymph node negative for malignancy  (0/1)       Collected: 6/2/2016   Received: 6/2/2016   Reported: 6/6/2016 08:22   Ordering Phy(s): SHERI WEINER     SPECIMEN/STAIN PROCESS:   A: FNA-thyroid, right thyroid nodule, correlates to #1 on  ultrasound        Pap-Cyto x 6, Botello's stain-cyto x 1   B: FNA-thyroid, right thyroid nodule, correlates to #2 on ultrasound        Pap-Cyto x 8, Botello's stain-cyto x 2     ----------------------------------------------------------------     CYTOLOGIC INTERPRETATION:     A.  FNA-thyroid, right thyroid nodule, correlates to #1 on  ultrasound:    Benign   Consistent with a benign nodule (includes adenomatoid nodule, colloid   nodule, etc.)           IMAGING: Reviewed with patient today.   PET/CT 2/13/24:  IMPRESSION:  1.  No evidence of FDG avid malignancy. No lytic osseous lesions are identified.  2.  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  3.  New small right pleural effusion.      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 She is referred to clinic for MGUS.     1) Previous small monoclonal antibody  -Patient with 0.1 g/dL very small monoclonal antibody in 2020. This was no longer quantifiable in 2022.   -She has anemia (with history of iron deficiency). Renal function is normal. She has persistent hypercalcemia.   -Updated SPIEP, UPIEP without identifiable monoclonal gammopathy. Total immunoglobulins, free light chains are normal. At this time, she is not meeting diagnostic criteria for MGUS. She has hypercalcemia (see below). No other evidence of end organ dysfunction. No indication for bone marrow biopsy at the present.   -PET/CT negative for mass,  lymphadenopathy. 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     2) Hypercalcemia in the setting of elevated PTHi  -See above.  -Patient with history of elevated PTH intact (previously 120, currently 87). I am repeating this. I do not see history of sestamibi scan.   -She will discuss with PCP an Endocrinology referral and workup.    3) History of clinical stage IA left breast ER/MO+, HER2+ invasive ductal carcinoma and right breast LCIS  -s/p mastectomy and adjuvant chemotherapy in 2011 as described in the HPI.  -She declined adjuvant AI therapy.     4) History of stage IA left upper lobe adenocarcinoma  -s/p wedge resection with Dr. Woodward in 2016.    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 She is asymptomatic.  -She is followed by Dr. Samson Pires for PMR.     7) -She will follow with her Rheumatologist.  -She will discuss Endocrinology referral with PCP.  -I previously discussed with patient my planned departure from [facility]. I offered her follow up with Dr. Gomez or Dr. Luong and she declines at this time. I discussed she may continue to follow with PCP with referral to hematology/oncology in the future with any concerns.           Gabby Ruff, DO  Hematology/Oncology  [facility]

## 2024-02-15 ENCOUNTER — TELEPHONE (OUTPATIENT)
Dept: ONCOLOGY | Facility: CLINIC | Age: 84
End: 2024-02-15
Payer: MEDICARE

## 2024-02-15 LAB
EJ AB SER QL: NEGATIVE
ENA JO1 AB SER IA-ACNC: <20 UNITS
ENA PM/SCL AB SER-ACNC: <20 UNITS
ENA SS-A 52KD IGG SER IA-ACNC: <20 UNITS
FIBRILLARIN AB SER QL: NEGATIVE
KU AB SER QL: NEGATIVE
MDA5 AB SER LINE BLOT-ACNC: <20 UNITS
MI2 AB SER QL: NEGATIVE
MJ AB SER LINE BLOT-ACNC: <20 UNITS
OJ AB SER QL: NEGATIVE
PL12 AB SER QL: NEGATIVE
PL7 AB SER QL: NEGATIVE
SAE1 IGG SER QL LINE BLOT: <20 UNITS
SRP AB SERPL QL: NEGATIVE
TIF1-GAMMA AB SER LINE BLOT-ACNC: <20 UNITS
U1 SNRNP AB SER IA-ACNC: <20 UNITS
U2 SNRNP AB SER QL: NEGATIVE

## 2024-02-15 NOTE — TELEPHONE ENCOUNTER
Patient has called in because she was seen by Dr. Ruff and she was under the impression that she was going to get a call to talk with someone about her Tyroid concerns; something in regards to calcium build up.     She is wondering if she can get a referral to see someone about her tyroid or if there is any recommendations.       Please advise.     Yeni Huitron

## 2024-02-29 ENCOUNTER — TRANSFERRED RECORDS (OUTPATIENT)
Dept: HEALTH INFORMATION MANAGEMENT | Facility: CLINIC | Age: 84
End: 2024-02-29
Payer: MEDICARE

## 2024-03-04 ENCOUNTER — TRANSFERRED RECORDS (OUTPATIENT)
Dept: HEALTH INFORMATION MANAGEMENT | Facility: CLINIC | Age: 84
End: 2024-03-04

## 2024-03-06 ENCOUNTER — MYC MEDICAL ADVICE (OUTPATIENT)
Dept: FAMILY MEDICINE | Facility: CLINIC | Age: 84
End: 2024-03-06
Payer: MEDICARE

## 2024-03-06 DIAGNOSIS — R79.89 ELEVATED TSH: ICD-10-CM

## 2024-03-06 DIAGNOSIS — M04.8 OTHER AUTOINFLAMMATORY SYNDROMES (H): ICD-10-CM

## 2024-03-06 DIAGNOSIS — M35.3 PMR (POLYMYALGIA RHEUMATICA) (H): Primary | ICD-10-CM

## 2024-03-06 NOTE — TELEPHONE ENCOUNTER
Called pt and advised of referral placed, gave pt clinic phone numbers.  Pt verbalized understanding and agreeable to plan.    Pt wondering about thyroid level mentioned in MC message, would you like to place lab or have pt address with rheumatology?    Leydi Joyce RN, BSN  [facility]

## 2024-03-06 NOTE — TELEPHONE ENCOUNTER
Patient willing to go to [place].    I was able to confirm that [facility] in [place] does have a rheum provider-Dr. Miguel Samaniego.    There is also [facility] with a location in [place].    Ifeoma Kimbrough RN on 3/6/2024 at 4:10 PM

## 2024-03-06 NOTE — TELEPHONE ENCOUNTER
Hey great!  Thanks for doing that.  I'll put in the referral.  She may need to call those herself and also make sure insurance covers those places.    Chance

## 2024-03-06 NOTE — TELEPHONE ENCOUNTER
We should change that appointment 3/28 to medicare wellness as she is due.  We can check her labs then.    I can do a [facility] referral, I just don't know how close they are to us.  Maybe [facility] but I'm not really sure.      I think [facility] has someone in [place] that see's Rheum issues.  Is that too far?      Chance

## 2024-03-06 NOTE — TELEPHONE ENCOUNTER
Pt wrote her whole message in the subject line of her MC message.    Do you want her to submit an e-visit since there are multiple concerns? Please advise.     Ifeoma Kimbrough RN on 3/6/2024 at 1:32 PM

## 2024-03-07 NOTE — TELEPHONE ENCOUNTER
I can re-order the thyroid lab.  Please have her wait however a bit to get it done.  It was done at the end of January so end of this month would be best.  The slight elevation isn't concerning right now.      Chance

## 2024-03-08 NOTE — TELEPHONE ENCOUNTER
Pt calls to request a new lab appt on 3/18. Scheduled pt to be seen per request.   Steven Mullins RN on 3/8/2024 at 11:23 AM

## 2024-03-18 ENCOUNTER — LAB (OUTPATIENT)
Dept: LAB | Facility: CLINIC | Age: 84
End: 2024-03-18
Payer: MEDICARE

## 2024-03-18 DIAGNOSIS — G62.9 PERIPHERAL NERVE DISORDER: ICD-10-CM

## 2024-03-18 DIAGNOSIS — M04.8 OTHER AUTOINFLAMMATORY SYNDROMES (H): ICD-10-CM

## 2024-03-18 DIAGNOSIS — E78.5 HYPERLIPIDEMIA LDL GOAL <160: Primary | ICD-10-CM

## 2024-03-18 DIAGNOSIS — R79.89 ELEVATED TSH: ICD-10-CM

## 2024-03-18 LAB
CHOLEST SERPL-MCNC: 223 MG/DL
FASTING STATUS PATIENT QL REPORTED: YES
HDLC SERPL-MCNC: 99 MG/DL
LDLC SERPL CALC-MCNC: 101 MG/DL
NONHDLC SERPL-MCNC: 124 MG/DL
T4 FREE SERPL-MCNC: 1.41 NG/DL (ref 0.9–1.7)
TRIGL SERPL-MCNC: 113 MG/DL
TSH SERPL DL<=0.005 MIU/L-ACNC: 4.67 UIU/ML (ref 0.3–4.2)
VIT B12 SERPL-MCNC: 1428 PG/ML (ref 232–1245)

## 2024-03-18 PROCEDURE — 83921 ORGANIC ACID SINGLE QUANT: CPT

## 2024-03-18 PROCEDURE — 84443 ASSAY THYROID STIM HORMONE: CPT

## 2024-03-18 PROCEDURE — 80061 LIPID PANEL: CPT

## 2024-03-18 PROCEDURE — 36415 COLL VENOUS BLD VENIPUNCTURE: CPT

## 2024-03-18 PROCEDURE — 84439 ASSAY OF FREE THYROXINE: CPT

## 2024-03-18 PROCEDURE — 82607 VITAMIN B-12: CPT

## 2024-03-21 LAB — METHYLMALONATE SERPL-SCNC: 0.18 UMOL/L (ref 0–0.4)

## 2024-03-24 ENCOUNTER — HEALTH MAINTENANCE LETTER (OUTPATIENT)
Age: 84
End: 2024-03-24

## 2024-03-28 ENCOUNTER — OFFICE VISIT (OUTPATIENT)
Dept: FAMILY MEDICINE | Facility: CLINIC | Age: 84
End: 2024-03-28
Payer: MEDICARE

## 2024-03-28 VITALS
HEIGHT: 63 IN | BODY MASS INDEX: 19.67 KG/M2 | HEART RATE: 70 BPM | TEMPERATURE: 97.8 F | DIASTOLIC BLOOD PRESSURE: 68 MMHG | OXYGEN SATURATION: 99 % | WEIGHT: 111 LBS | RESPIRATION RATE: 17 BRPM | SYSTOLIC BLOOD PRESSURE: 131 MMHG

## 2024-03-28 DIAGNOSIS — R20.2 NUMBNESS AND TINGLING OF BOTH FEET: ICD-10-CM

## 2024-03-28 DIAGNOSIS — Z00.00 ENCOUNTER FOR MEDICARE ANNUAL WELLNESS EXAM: Primary | ICD-10-CM

## 2024-03-28 DIAGNOSIS — R73.09 ELEVATED HEMOGLOBIN A1C: ICD-10-CM

## 2024-03-28 DIAGNOSIS — M35.3 PMR (POLYMYALGIA RHEUMATICA) (H): ICD-10-CM

## 2024-03-28 DIAGNOSIS — I10 ESSENTIAL HYPERTENSION WITH GOAL BLOOD PRESSURE LESS THAN 140/90: ICD-10-CM

## 2024-03-28 DIAGNOSIS — I48.19 PERSISTENT ATRIAL FIBRILLATION (H): ICD-10-CM

## 2024-03-28 DIAGNOSIS — R23.8 SKIN IRRITATION: ICD-10-CM

## 2024-03-28 DIAGNOSIS — N18.30 STAGE 3 CHRONIC KIDNEY DISEASE, UNSPECIFIED WHETHER STAGE 3A OR 3B CKD (H): ICD-10-CM

## 2024-03-28 DIAGNOSIS — R20.0 NUMBNESS AND TINGLING OF BOTH FEET: ICD-10-CM

## 2024-03-28 PROCEDURE — 99214 OFFICE O/P EST MOD 30 MIN: CPT | Mod: 25 | Performed by: PHYSICIAN ASSISTANT

## 2024-03-28 PROCEDURE — G0439 PPPS, SUBSEQ VISIT: HCPCS | Performed by: PHYSICIAN ASSISTANT

## 2024-03-28 RX ORDER — RESPIRATORY SYNCYTIAL VIRUS VACCINE 120MCG/0.5
0.5 KIT INTRAMUSCULAR ONCE
Qty: 1 EACH | Refills: 0 | Status: CANCELLED | OUTPATIENT
Start: 2024-03-28 | End: 2024-03-28

## 2024-03-28 SDOH — HEALTH STABILITY: PHYSICAL HEALTH: ON AVERAGE, HOW MANY DAYS PER WEEK DO YOU ENGAGE IN MODERATE TO STRENUOUS EXERCISE (LIKE A BRISK WALK)?: 7 DAYS

## 2024-03-28 SDOH — HEALTH STABILITY: PHYSICAL HEALTH: ON AVERAGE, HOW MANY MINUTES DO YOU ENGAGE IN EXERCISE AT THIS LEVEL?: 20 MIN

## 2024-03-28 ASSESSMENT — SOCIAL DETERMINANTS OF HEALTH (SDOH): HOW OFTEN DO YOU GET TOGETHER WITH FRIENDS OR RELATIVES?: ONCE A WEEK

## 2024-03-28 ASSESSMENT — PAIN SCALES - GENERAL: PAINLEVEL: NO PAIN (0)

## 2024-03-28 ASSESSMENT — PATIENT HEALTH QUESTIONNAIRE - PHQ9
SUM OF ALL RESPONSES TO PHQ QUESTIONS 1-9: 3
10. IF YOU CHECKED OFF ANY PROBLEMS, HOW DIFFICULT HAVE THESE PROBLEMS MADE IT FOR YOU TO DO YOUR WORK, TAKE CARE OF THINGS AT HOME, OR GET ALONG WITH OTHER PEOPLE: NOT DIFFICULT AT ALL
SUM OF ALL RESPONSES TO PHQ QUESTIONS 1-9: 3

## 2024-03-28 NOTE — PROGRESS NOTES
Preventive Care Visit  [facility]  Chance Fam PA-C, Family Medicine  Mar 28, 2024      Assessment & Plan     Encounter for Medicare annual wellness exam  Previous labs discussed today.    Essential hypertension with goal blood pressure less than 140/90  Chronic, BP well controlled.      Numbness and tingling of both feet  She is seeing Neurology for this currently but continues to bother her.    Persistent atrial fibrillation (H)  Refilled.  Chronic, stable.  - apixaban ANTICOAGULANT (ELIQUIS) 2.5 MG tablet; Take 1 tablet (2.5 mg) by mouth 2 times daily    Stage 3 chronic kidney disease, unspecified whether stage 3a or 3b CKD (H)  Last labs normal.  Chronic, stable.    PMR (polymyalgia rheumatica) (H24)  Recently treated with steroids with Rheum.  Improving.    Skin irritation  Patient will try OTC lidocaine cream to painful area on low back.              Counseling  Appropriate preventive services were discussed with this patient, including applicable screening as appropriate for fall prevention, nutrition, physical activity, Tobacco-use cessation, weight loss and cognition.  Checklist reviewing preventive services available has been given to the patient.  Reviewed patient's diet, addressing concerns and/or questions.   Information on urinary incontinence and treatment options given to patient.           Presley Cobos is a 83 year old, presenting for the following:  Medicare Visit        3/28/2024     2:09 PM   Additional Questions   Roomed by Sonia COOMBS         Health Care Directive  Patient does not have a Health Care Directive or Living Will:     Would like to discuss neuropathy in foot.  Seeing Neurology, did EMG, started on B12  Appointment next week    Rheumatology  Was restarted on course of prednisone and feeling better    Pt states when laying on L side, she feels pulsating in lower abdomen that will keep her up at night. Is not sure why it happens.     Pt is  having pain in tailbone while sitting. Is wondering what options there are to help with this pain.  Thinks it's from sitting  Did fall in her garage two years ago and injured the area      HPI        3/28/2024   General Health   How would you rate your overall physical health? (!) FAIR   Feel stress (tense, anxious, or unable to sleep) Not at all         3/28/2024   Nutrition   Diet: Regular (no restrictions)         3/28/2024   Exercise   Days per week of moderate/strenous exercise 7 days   Average minutes spent exercising at this level 20 min         3/28/2024   Social Factors   Frequency of gathering with friends or relatives Once a week   Worry food won't last until get money to buy more No   Food not last or not have enough money for food? No   Do you have housing?  Yes   Are you worried about losing your housing? No   Lack of transportation? No   Unable to get utilities (heat,electricity)? No         3/28/2024   Activities of Daily Living- Home Safety   Needs help with the following daily activites None of the above   Safety concerns in the home None of the above         3/28/2024   Dental   Dentist two times every year? Yes         3/28/2024   Hearing Screening   Hearing concerns? None of the above         3/28/2024   Driving Risk Screening   Patient/family members have concerns about driving No         3/28/2024   General Alertness/Fatigue Screening   Have you been more tired than usual lately? No         3/28/2024   Urinary Incontinence Screening   Bothered by leaking urine in past 6 months Yes         3/28/2024   TB Screening   Were you born outside of the [place]? No       Today's PHQ-9 Score:       3/28/2024     2:27 PM   PHQ-9 SCORE   PHQ-9 Total Score MyChart 3 (Minimal depression)   PHQ-9 Total Score 3         3/28/2024   Substance Use   Alcohol more than 3/day or more than 7/wk No   Do you have a current opioid prescription? No   How severe/bad is pain from 1 to 10? 0/10 (No Pain)   Do you use any other  substances recreationally? No     Social History     Tobacco Use    Smoking status: Never    Smokeless tobacco: Never   Vaping Use    Vaping Use: Never used   Substance Use Topics    Alcohol use: Yes     Comment: 0-1 drink day    Drug use: No          Mammogram Screening - After age 74- determine frequency with patient based on health status, life expectancy and patient goals              Reviewed and updated as needed this visit by Provider                      Current providers sharing in care for this patient include:  Patient Care Team:  Devaughn Dowell MD as PCP - General (Family Medicine)  Care, [facility] ([facility] ([place]  Willow De La Paz [facility] as Pharmacist (Pharmacist)  Cris Romero MD as Assigned PCP  Abdoulaye Garcia DPM as Assigned Surgical Provider  Renee Savage PA-C as Physician Assistant (Physician Assistant)  Gabby Ruff DO as Physician (Hematology & Oncology)  Gabby Ruff DO as Assigned Cancer Care Provider    The following health maintenance items are reviewed in Epic and correct as of today:  Health Maintenance   Topic Date Due    RSV VACCINE (Pregnancy & 60+) (1 - 1-dose 60+ series) Never done    MEDICARE ANNUAL WELLNESS VISIT  01/25/2024    DIABETIC FOOT EXAM  01/25/2024    EYE EXAM  01/30/2024    A1C  06/11/2024    BMP  07/25/2024    PHQ-9  09/28/2024    MICROALBUMIN  12/11/2024    ANNUAL REVIEW OF HM ORDERS  12/11/2024    HEMOGLOBIN  01/27/2025    LIPID  03/18/2025    FALL RISK ASSESSMENT  03/28/2025    DEXA  10/01/2027    ADVANCE CARE PLANNING  02/05/2028    PHQ-2 (once per calendar year)  Completed    INFLUENZA VACCINE  Completed    Pneumococcal Vaccine: 65+ Years  Completed    URINALYSIS  Completed    COVID-19 Vaccine  Completed    IPV IMMUNIZATION  Aged Out    HPV IMMUNIZATION  Aged Out    MENINGITIS IMMUNIZATION  Aged Out    RSV MONOCLONAL ANTIBODY  Aged Out    URINE DRUG SCREEN  Discontinued    COLORECTAL CANCER SCREENING  Discontinued  "   ZOSTER IMMUNIZATION  Discontinued    DTAP/TDAP/TD IMMUNIZATION  Discontinued         Review of Systems  Constitutional, HEENT, cardiovascular, pulmonary, gi and gu systems are negative, except as otherwise noted.     Objective    Exam  /68 (BP Location: Right arm, Patient Position: Sitting, Cuff Size: Adult Regular)   Pulse 70   Temp 97.8  F (36.6  C) (Oral)   Resp 17   Ht 1.588 m (5' 2.5\")   Wt 50.3 kg (111 lb)   LMP  (LMP Unknown)   SpO2 99%   BMI 19.98 kg/m     Estimated body mass index is 19.98 kg/m  as calculated from the following:    Height as of this encounter: 1.588 m (5' 2.5\").    Weight as of this encounter: 50.3 kg (111 lb).    Physical Exam  GENERAL: alert and no distress  EYES: Eyes grossly normal to inspection, PERRL and conjunctivae and sclerae normal  HENT: ear canals and TM's normal, nose and mouth without ulcers or lesions  NECK: no adenopathy, no asymmetry, masses, or scars  RESP: lungs clear to auscultation - no rales, rhonchi or wheezes  CV: regular rate and rhythm, normal S1 S2, no S3 or S4, no murmur, click or rub, no peripheral edema  ABDOMEN: soft, nontender, no hepatosplenomegaly, no masses and bowel sounds normal  MS: no gross musculoskeletal defects noted, no edema  SKIN: no suspicious lesions or rashes  NEURO: Normal strength and tone, mentation intact and speech normal  PSYCH: mentation appears normal, affect normal/bright        3/28/2024   Mini Cog   Mini-Cog Not Completed (choose reason) Patient declines              Signed Electronically by: Chance Fam PA-C    Answers submitted by the patient for this visit:  Patient Health Questionnaire (Submitted on 3/28/2024)  If you checked off any problems, how difficult have these problems made it for you to do your work, take care of things at home, or get along with other people?: Not difficult at all  PHQ9 TOTAL SCORE: 3    "

## 2024-03-28 NOTE — PATIENT INSTRUCTIONS
Preventive Care Advice   This is general advice given by our system to help you stay healthy. However, your care team may have specific advice just for you. Please talk to your care team about your preventive care needs.  Nutrition  Eat 5 or more servings of fruits and vegetables each day.  Try wheat bread, brown rice and whole grain pasta (instead of white bread, rice, and pasta).  Get enough calcium and vitamin D. Check the label on foods and aim for 100% of the RDA (recommended daily allowance).  Lifestyle  Exercise at least 150 minutes each week   (30 minutes a day, 5 days a week).  Do muscle strengthening activities 2 days a week. These help control your weight and prevent disease.  No smoking.  Wear sunscreen to prevent skin cancer.  Have a dental exam and cleaning every 6 months.  Yearly exams  See your health care team every year to talk about:  Any changes in your health.  Any medicines your care team has prescribed.  Preventive care, family planning, and ways to prevent chronic diseases.  Shots (vaccines)   HPV shots (up to age 26), if you've never had them before.  Hepatitis B shots (up to age 59), if you've never had them before.  COVID-19 shot: Get this shot when it's due.  Flu shot: Get a flu shot every year.  Tetanus shot: Get a tetanus shot every 10 years.  Pneumococcal, hepatitis A, and RSV shots: Ask your care team if you need these based on your risk.  Shingles shot (for age 50 and up).  General health tests  Diabetes screening:  Starting at age 35, Get screened for diabetes at least every 3 years.  If you are younger than age 35, ask your care team if you should be screened for diabetes.  Cholesterol test: At age 39, start having a cholesterol test every 5 years, or more often if advised.  Bone density scan (DEXA): At age 50, ask your care team if you should have this scan for osteoporosis (brittle bones).  Hepatitis C: Get tested at least once in your life.  STIs (sexually transmitted  infections)  Before age 24: Ask your care team if you should be screened for STIs.  After age 24: Get screened for STIs if you're at risk. You are at risk for STIs (including HIV) if:  You are sexually active with more than one person.  You don't use condoms every time.  You or a partner was diagnosed with a sexually transmitted infection.  If you are at risk for HIV, ask about PrEP medicine to prevent HIV.  Get tested for HIV at least once in your life, whether you are at risk for HIV or not.  Cancer screening tests  Cervical cancer screening: If you have a cervix, begin getting regular cervical cancer screening tests at age 21. Most people who have regular screenings with normal results can stop after age 65. Talk about this with your provider.  Breast cancer scan (mammogram): If you've ever had breasts, begin having regular mammograms starting at age 40. This is a scan to check for breast cancer.  Colon cancer screening: It is important to start screening for colon cancer at age 45.  Have a colonoscopy test every 10 years (or more often if you're at risk) Or, ask your provider about stool tests like a FIT test every year or Cologuard test every 3 years.  To learn more about your testing options, visit: https://www.[facility]/488693.pdf.  For help making a decision, visit: https://bit.ly/mt40531.  Prostate cancer screening test: If you have a prostate and are age 55 to 69, ask your provider if you would benefit from a yearly prostate cancer screening test.  Lung cancer screening: If you are a current or former smoker age 50 to 80, ask your care team if ongoing lung cancer screenings are right for you.  For informational purposes only. Not to replace the advice of your health care provider. Copyright   2023 [employer]. All rights reserved. Clinically reviewed by the [facility]itions Program. [employer]ra [id number] - REV 01/24.            Lidocaine 4% external cream once as needed for  pain

## 2024-04-10 ENCOUNTER — TRANSFERRED RECORDS (OUTPATIENT)
Dept: HEALTH INFORMATION MANAGEMENT | Facility: CLINIC | Age: 84
End: 2024-04-10
Payer: MEDICARE

## 2024-04-27 ENCOUNTER — TRANSFERRED RECORDS (OUTPATIENT)
Dept: MULTI SPECIALTY CLINIC | Facility: CLINIC | Age: 84
End: 2024-04-27
Payer: MEDICARE

## 2024-04-27 LAB — RETINOPATHY: NORMAL

## 2024-05-06 ENCOUNTER — OFFICE VISIT (OUTPATIENT)
Dept: FAMILY MEDICINE | Facility: CLINIC | Age: 84
End: 2024-05-06
Payer: MEDICARE

## 2024-05-06 VITALS
DIASTOLIC BLOOD PRESSURE: 72 MMHG | BODY MASS INDEX: 20.38 KG/M2 | HEIGHT: 63 IN | WEIGHT: 115 LBS | TEMPERATURE: 97.7 F | HEART RATE: 70 BPM | SYSTOLIC BLOOD PRESSURE: 144 MMHG | RESPIRATION RATE: 20 BRPM | OXYGEN SATURATION: 97 %

## 2024-05-06 DIAGNOSIS — M53.3 PAIN IN THE COCCYX: ICD-10-CM

## 2024-05-06 DIAGNOSIS — R19.8 PULSATILE ABDOMEN: ICD-10-CM

## 2024-05-06 DIAGNOSIS — R60.0 LOWER EXTREMITY EDEMA: Primary | ICD-10-CM

## 2024-05-06 PROCEDURE — G2211 COMPLEX E/M VISIT ADD ON: HCPCS | Performed by: PHYSICIAN ASSISTANT

## 2024-05-06 PROCEDURE — 99214 OFFICE O/P EST MOD 30 MIN: CPT | Performed by: PHYSICIAN ASSISTANT

## 2024-05-06 RX ORDER — RESPIRATORY SYNCYTIAL VIRUS VACCINE 120MCG/0.5
0.5 KIT INTRAMUSCULAR ONCE
Qty: 1 EACH | Refills: 0 | Status: CANCELLED | OUTPATIENT
Start: 2024-05-06 | End: 2024-05-06

## 2024-05-06 RX ORDER — FUROSEMIDE 20 MG
20 TABLET ORAL DAILY
Qty: 90 TABLET | Refills: 0 | Status: SHIPPED | OUTPATIENT
Start: 2024-05-06 | End: 2024-07-05

## 2024-05-06 ASSESSMENT — PAIN SCALES - GENERAL: PAINLEVEL: NO PAIN (0)

## 2024-05-06 NOTE — PROGRESS NOTES
Assessment & Plan     Lower extremity edema  She will check to see if she can find compression stockings that are easier to get one.  Lasix daily or BID prn.  Elevate legs, watch salt in diet.  - furosemide (LASIX) 20 MG tablet; Take 1 tablet (20 mg) by mouth daily    Pain in the coccyx  Lidocaine did not help so will try voltaren.  - diclofenac (VOLTAREN) 1 % topical gel; Apply 2g to painful spine/tailbone BID prn    Pulsatile abdomen  Concerning findings for AAA.  Discussed with patient.  She will go to the ED if pain or increase in symptoms.  Get US imaging.   - US Abdominal Aorta Imaging; Future          The longitudinal plan of care for the diagnosis(es)/condition(s) as documented were addressed during this visit. Due to the added complexity in care, I will continue to support Chandrika in the subsequent management and with ongoing continuity of care.        Subjective   Chandrika is a 83 year old, presenting for the following health issues:  Edema and Leg Swelling        5/6/2024    12:54 PM   Additional Questions   Roomed by Niurka         5/6/2024    12:54 PM   Patient Reported Additional Medications   Patient reports taking the following new medications B12 vitamin per Neurologist     Via the Health Maintenance questionnaire, the patient has reported the following services have been completed -Eye Exam, this information has been sent to the abstraction team.  History of Present Illness       Reason for visit:  Leg swelling, sleep concern, tailbone pain    She eats 0-1 servings of fruits and vegetables daily.She consumes 1 sweetened beverage(s) daily.She exercises with enough effort to increase her heart rate 20 to 29 minutes per day.  She exercises with enough effort to increase her heart rate 7 days per week.   She is taking medications regularly.     Cannot sleep on left side due to feeling heart beat in pelvis area. She has mentioned this before but could not reproduce symptoms at that time she was being seen.   "Not painful but keeps her awake at night.    Recheck or talk about tailbone pain, lidocaine cream is not helping.  Is there anything else to use?     Leg swelling on and off for a couple months. Has been a problem before for her.  Treated with up to 80mg lasix at times.  Cannot get her compression stocking on herself.  Says she probably is eating too much salt.  No SOB, no pain in legs.            Review of Systems  Constitutional, HEENT, cardiovascular, pulmonary, gi and gu systems are negative, except as otherwise noted.      Objective    BP (!) 144/72 (BP Location: Right arm, Patient Position: Sitting, Cuff Size: Adult Regular)   Pulse 70   Temp 97.7  F (36.5  C) (Oral)   Resp 20   Ht 1.588 m (5' 2.5\")   Wt 52.2 kg (115 lb)   LMP  (LMP Unknown)   SpO2 97%   BMI 20.70 kg/m    Body mass index is 20.7 kg/m .  Physical Exam   GENERAL: alert and no distress  NECK: no adenopathy, no asymmetry, masses, or scars  CV: regular rates and rhythm, normal S1 S2, no S3 or S4, no murmur, click or rub, and 2+ bilateral lower extremity pitting edema to mid shin    ABDOMEN: soft, nontender, pulsatile mass lower abdomen when laying on left side, and no bruits heard  SKIN: no suspicious lesions or rashes  PSYCH: mentation appears normal, affect normal/bright            Signed Electronically by: Chance Fam PA-C    "

## 2024-05-20 ENCOUNTER — HOSPITAL ENCOUNTER (OUTPATIENT)
Dept: ULTRASOUND IMAGING | Facility: CLINIC | Age: 84
Discharge: HOME | End: 2024-05-20
Attending: PHYSICIAN ASSISTANT | Admitting: PHYSICIAN ASSISTANT
Payer: MEDICARE

## 2024-05-20 DIAGNOSIS — R19.8 PULSATILE ABDOMEN: ICD-10-CM

## 2024-05-20 PROCEDURE — 76775 US EXAM ABDO BACK WALL LIM: CPT

## 2024-05-20 NOTE — RESULT ENCOUNTER NOTE
Called pt and advised of result note.  Pt would like CT.    Lasix update: is working well, but taking 2 per day.    Leydi Joyce RN, BSN  [facility]

## 2024-06-03 ENCOUNTER — TRANSFERRED RECORDS (OUTPATIENT)
Dept: HEALTH INFORMATION MANAGEMENT | Facility: CLINIC | Age: 84
End: 2024-06-03
Payer: MEDICARE

## 2024-06-10 ENCOUNTER — TELEPHONE (OUTPATIENT)
Dept: FAMILY MEDICINE | Facility: CLINIC | Age: 84
End: 2024-06-10
Payer: MEDICARE

## 2024-06-10 DIAGNOSIS — R60.0 LOWER EXTREMITY EDEMA: Primary | ICD-10-CM

## 2024-06-11 ENCOUNTER — LAB (OUTPATIENT)
Dept: LAB | Facility: CLINIC | Age: 84
End: 2024-06-11
Payer: MEDICARE

## 2024-06-11 DIAGNOSIS — R60.0 LOWER EXTREMITY EDEMA: ICD-10-CM

## 2024-06-11 DIAGNOSIS — R73.09 ELEVATED HEMOGLOBIN A1C: Primary | ICD-10-CM

## 2024-06-11 LAB — HBA1C MFR BLD: 5.7 % (ref 0–5.6)

## 2024-06-11 PROCEDURE — 80048 BASIC METABOLIC PNL TOTAL CA: CPT

## 2024-06-11 PROCEDURE — 83036 HEMOGLOBIN GLYCOSYLATED A1C: CPT

## 2024-06-11 PROCEDURE — 36415 COLL VENOUS BLD VENIPUNCTURE: CPT

## 2024-06-11 NOTE — TELEPHONE ENCOUNTER
RN called and spoke with patient. RN relayed provider message below, scheduled for lab only 6/11/24. Patient agreed with plan.     Mykel DO RN 6/11/2024 at 8:21 AM

## 2024-06-11 NOTE — TELEPHONE ENCOUNTER
We would need to check her K+ to determine if she needs a supplement or if this is the cause of the cramping.  I will order a BMP.  Often people on Lasix do need K+ but I can't give it without the lab.      Chance

## 2024-06-12 ENCOUNTER — HOSPITAL ENCOUNTER (OUTPATIENT)
Dept: CT IMAGING | Facility: CLINIC | Age: 84
Discharge: HOME | End: 2024-06-12
Attending: PHYSICIAN ASSISTANT | Admitting: PHYSICIAN ASSISTANT
Payer: MEDICARE

## 2024-06-12 DIAGNOSIS — R19.8 PULSATILE ABDOMEN: ICD-10-CM

## 2024-06-12 LAB
ANION GAP SERPL CALCULATED.3IONS-SCNC: 13 MMOL/L (ref 7–15)
BUN SERPL-MCNC: 23.5 MG/DL (ref 8–23)
CALCIUM SERPL-MCNC: 10.4 MG/DL (ref 8.8–10.2)
CHLORIDE SERPL-SCNC: 101 MMOL/L (ref 98–107)
CREAT BLD-MCNC: 0.7 MG/DL (ref 0.5–1)
CREAT SERPL-MCNC: 0.78 MG/DL (ref 0.51–0.95)
DEPRECATED HCO3 PLAS-SCNC: 25 MMOL/L (ref 22–29)
EGFRCR SERPLBLD CKD-EPI 2021: 74 ML/MIN/1.73M2
EGFRCR SERPLBLD CKD-EPI 2021: >60 ML/MIN/1.73M2
GLUCOSE SERPL-MCNC: 90 MG/DL (ref 70–99)
POTASSIUM SERPL-SCNC: 3.9 MMOL/L (ref 3.4–5.3)
SODIUM SERPL-SCNC: 139 MMOL/L (ref 135–145)

## 2024-06-12 PROCEDURE — 250N000009 HC RX 250: Performed by: PHYSICIAN ASSISTANT

## 2024-06-12 PROCEDURE — G1010 CDSM STANSON: HCPCS

## 2024-06-12 PROCEDURE — 74177 CT ABD & PELVIS W/CONTRAST: CPT | Mod: MG

## 2024-06-12 PROCEDURE — 82565 ASSAY OF CREATININE: CPT

## 2024-06-12 PROCEDURE — 250N000011 HC RX IP 250 OP 636: Performed by: PHYSICIAN ASSISTANT

## 2024-06-12 RX ORDER — IOPAMIDOL 755 MG/ML
500 INJECTION, SOLUTION INTRAVASCULAR ONCE
Status: COMPLETED | OUTPATIENT
Start: 2024-06-12 | End: 2024-06-12

## 2024-06-12 RX ADMIN — IOPAMIDOL 75 ML: 755 INJECTION, SOLUTION INTRAVENOUS at 11:08

## 2024-06-12 RX ADMIN — SODIUM CHLORIDE 94 ML: 9 INJECTION, SOLUTION INTRAVENOUS at 11:08

## 2024-06-18 ENCOUNTER — TELEPHONE (OUTPATIENT)
Dept: FAMILY MEDICINE | Facility: CLINIC | Age: 84
End: 2024-06-18
Payer: MEDICARE

## 2024-06-18 NOTE — TELEPHONE ENCOUNTER
"Received call back from Chandrika. RN relayed provider message below. Patient is concerned about sensation and is wanting to know what hematologist says. Patient is wondering if she needs to see a cardiologist. Patient states \"my heart beats in my pelvic area, I can't sleep on left side because it won't shut up\". Routing to PCP to follow up after hematologist.     Mykel DO RN 6/18/2024 at 11:23 AM    "

## 2024-06-18 NOTE — TELEPHONE ENCOUNTER
Called patient and left voicemail to call back and ask to speak to any triage nurse.     Edith Farnsworth RN

## 2024-06-18 NOTE — TELEPHONE ENCOUNTER
"----- Message from Chance Fam PA-C sent at 6/18/2024  7:59 AM CDT -----  Please call Chandrika- there is not any concerning aneurysm seen on her CT scan that we did.  I\"m not sure how to explain the sensation that she is having in her abdomen.  There are a couple enlarged lymph nodes noted that I am let the hematologist know about.  I'm not sure the significance of them right now.    Chance      "

## 2024-06-19 ENCOUNTER — TELEPHONE (OUTPATIENT)
Dept: FAMILY MEDICINE | Facility: CLINIC | Age: 84
End: 2024-06-19
Payer: MEDICARE

## 2024-06-19 ENCOUNTER — TELEPHONE (OUTPATIENT)
Dept: OTHER | Facility: CLINIC | Age: 84
End: 2024-06-19
Payer: MEDICARE

## 2024-06-19 DIAGNOSIS — R19.8 PULSATILE ABDOMEN: Primary | ICD-10-CM

## 2024-06-19 NOTE — TELEPHONE ENCOUNTER
Called pt and advised of below from Chance Fam.    Pt reports she is not sure she wants to go back to vascular or to hematology/oncology.  Pt not sure she wants to go through this again, since  went through both specialties.  Pt said she would like time to think about this.  Advised pt again of Chance's recommendations to see both specialties and advised pt to call us back and let us know what she decides.  Pt verbalized understanding and agreeable to plan.    Leydi Joyce RN, BSN  [facility]

## 2024-06-19 NOTE — TELEPHONE ENCOUNTER
Referral received via [employer] on 6/19/24.    Pt referred to [facility] by Chance Fam PA-C for pulsatile abdomen. CT showing extensive atherosclerotic changes of the visualized aorta and its branches.    Routing to scheduling to coordinate the following:    NEW VASCULAR PATIENT consult with Vascular Medicine  Please schedule this at next available      Appt note:  Pt referred to [facility] by Chance Fam PA-C for pulsatile abdomen. CT showing extensive atherosclerotic changes of the visualized aorta and its branches.    Jacki ORDOÑEZ, RN    [facility]  Office: [phone]  Fax: [phone]

## 2024-06-19 NOTE — TELEPHONE ENCOUNTER
----- Message from Casie Davis sent at 6/19/2024 10:54 AM CDT -----  Yes, please reach out and ask her if she would like to follow-up with us again. I have not met her and only read that in Dr Ruff's last note that she didn't want to follow-up with our team when Dr Ruff told her that she was leaving [place].     If she is ok with it, then I will request follow-up with new MD. Please keep me posted.     Casie  ----- Message -----  From: Chance Fam PA-C  Sent: 6/19/2024  10:33 AM CDT  To: RADHA Elizalde- thank you for the message.  I can reach out to her if you want.  I didn't know about not wanting to set up follow up.  She was getting a little exasperated with seeing so many specialists at one point and was a little irritable about it with me too.....    Having said that, she really probably should be seen again if possible correct?    Let me know if you need us to reach out.      Chance  ----- Message -----  From: Casie Corona PA-C  Sent: 6/18/2024   5:25 PM CDT  To: RADHA Valadez Dr Lee is no longer with [place] and it appears that Chandrika didn't want Dr Ruff to set up follow up with our team, so Dr Ruff suggested follow-up with her PCP. I will forward this to another oncologist. Did you get the feeling that Chandrika would be open to seeing our team again?     Casie Davis PA-C  ----- Message -----  From: Chance Fam PA-C  Sent: 6/18/2024   8:04 AM CDT  To: DO Sly Epps Dr- I have been seeing Chandrika lately for a sensation of a pulsing mass in the lower abdomen.  I was concerned about AAA but it appears that isn't the case.  I know you have been seeing her as well and wanted you to see this CT scan as I think there are a couple new findings regarding lymph nodes.    Let me know you thoughts if that is ok.    Chance Fam PA-C

## 2024-06-19 NOTE — TELEPHONE ENCOUNTER
Please call regarding follow up with Heme/Onc.  See notes below from them.  It seemed to them she didn't want to follow up with them but I do recommend that she do this.  If she is agreeable to appointment we can refer back to them.    In regards to her concerns about the pulsing area in the abdomen, I think I will refer to vascular medicine for this.  I'll place order.        Chance

## 2024-06-19 NOTE — TELEPHONE ENCOUNTER
Spoke with patient briefly. Patient was unable to schedule at this time. Patient requested a call back 6/20/24.

## 2024-06-20 NOTE — TELEPHONE ENCOUNTER
Called patient regarding scheduling consult, patient has concerns of having further imaging and labs being drawn. Informed patient that she would only be scheduled for a consult appointment to establish care. Patient declined scheduling as she would prefer to see a provider in [place] if there is a need for a Vascular medicine provider. Informed patient that we no longer have a Vascular medicine provider at [place] at this time. Informed patient to reach out to PCP clinic to further discuss referral and options closer to her home.

## 2024-06-20 NOTE — TELEPHONE ENCOUNTER
MAYDA - Chance Fam -     Called the pt to advise of below.     She said one of them already called her (she was not sure which specialty)  it was at [facility] and she said no, she will not go there.      See telephone call from 6/19/24 vascular nurse.

## 2024-06-20 NOTE — TELEPHONE ENCOUNTER
That is fine.  She doesn't have to see a specialist.  Heme/onc is willing to see her again, she just has to let us know.  I already place the referral for vascular so if they call she can chose to schedule or not.  I would recommend doing one or both of these as we don't yet know what is really going on with this.  But it is completely up to her.      Chance

## 2024-06-24 ENCOUNTER — TELEPHONE (OUTPATIENT)
Dept: FAMILY MEDICINE | Facility: CLINIC | Age: 84
End: 2024-06-24
Payer: MEDICARE

## 2024-06-24 NOTE — TELEPHONE ENCOUNTER
"Incoming call from patient. Patient was referred to vascular medicine by Chance Fam PA-C 6/19/24, \"In regards to her concerns about the pulsing area in the abdomen, I think I will refer to vascular medicine for this. I'll place order.\" Patient is wondering what the thought process is on why she was referred to them instead of a cardiologist. What did you have in mind that they can do vs. Cardiology?    Barbara Jernigan RN    "

## 2024-06-24 NOTE — TELEPHONE ENCOUNTER
Patient Returning Call    Reason for call:  discuss already placed vascular medicine referral as pt is wondering why she was referred there and not to cardiology. Pt would like to know what they are looking for.    Information relayed to patient:  Routing to nurse requesting a call back to discuss as pt won't schedule until she know more    Patient has additional questions:  Yes    What are your questions/concerns:  will talk to RN    Who does the patient want to speak with:  RN    Is an  needed?:  No    Could we send this information to you in [employer] or would you prefer to receive a phone call?:   Patient would prefer a phone call   Okay to leave a detailed message?: Yes at Cell number on file:    Telephone Information:   Mobile [phone]     Please do not leave a voicemail

## 2024-06-25 NOTE — TELEPHONE ENCOUNTER
Called the pt to advise of below.      She said she is not sure she wants to do anything.  She said she is going to be 84 years old and you get to that point.  Advised that is her decision.  She is going to think about it a little more.

## 2024-06-25 NOTE — TELEPHONE ENCOUNTER
The pulsatile nature of this is concerning for AAA which is a very large blood vessel in the abdomen.  With the US and CT scan we are not seeing an aneurysm which is good news but we don't yet know what this is.  This part of the report (There are extensive atherosclerotic changes of  the visualized aorta and its branches) and the above reasons are leading me to refer her to a Vascular specialist who deals with blood vessels.      I hope that helps, I don't know otherwise how to answer the question.    Chance

## 2024-07-03 DIAGNOSIS — R60.0 LOWER EXTREMITY EDEMA: ICD-10-CM

## 2024-07-03 RX ORDER — FUROSEMIDE 20 MG
20 TABLET ORAL DAILY
Qty: 90 TABLET | Refills: 0 | OUTPATIENT
Start: 2024-07-03

## 2024-07-05 NOTE — TELEPHONE ENCOUNTER
Patient is calling back to report she is need of refill on lasix. Patient was advised by PCP that it was okay to increase to 2 tablets, and to let her know when she did - which she did a month ago. Patient is now taking 1 tablet a day, and has 1 tablet left. Please advise refill.

## 2024-07-08 ENCOUNTER — MYC MEDICAL ADVICE (OUTPATIENT)
Dept: FAMILY MEDICINE | Facility: CLINIC | Age: 84
End: 2024-07-08
Payer: MEDICARE

## 2024-07-08 DIAGNOSIS — R60.0 LOWER EXTREMITY EDEMA: ICD-10-CM

## 2024-07-09 RX ORDER — FUROSEMIDE 20 MG
20 TABLET ORAL DAILY
Qty: 90 TABLET | Refills: 0 | Status: SHIPPED | OUTPATIENT
Start: 2024-07-09

## 2024-07-09 RX ORDER — FUROSEMIDE 20 MG
20 TABLET ORAL DAILY
Qty: 90 TABLET | Refills: 0 | OUTPATIENT
Start: 2024-07-09

## 2024-07-09 NOTE — TELEPHONE ENCOUNTER
Patient calling asking about status of refill. Notified patient this has been sent to pharmacy.     Ifeoma Kimbrough RN on 7/9/2024 at 3:30 PM

## 2024-07-10 DIAGNOSIS — I10 ESSENTIAL HYPERTENSION: ICD-10-CM

## 2024-07-11 ENCOUNTER — TELEPHONE (OUTPATIENT)
Dept: FAMILY MEDICINE | Facility: CLINIC | Age: 84
End: 2024-07-11
Payer: MEDICARE

## 2024-07-11 NOTE — TELEPHONE ENCOUNTER
"  Symptoms    Describe your symptoms: \"Tongue like structure\" at very front of genitalia is very swollen and pink, couple drops of blood present on pad    Any pain: No    How long have you been having symptoms: noticed 2-3 days ago      Have you been seen for this:  No    Appointment offered?: No    Triage offered?: Yes: warm transfer    Home remedies tried: NO    Preferred Pharmacy:   [id number] - [place], [place] - [address] [facility] [address]  [address] [facility] [address]  Phone: [phone] Fax: [phone]      Could we send this information to you in [employer] or would you prefer to receive a phone call?:   Patient would prefer a phone call   Okay to leave a detailed message?: Yes at Cell number on file:    Telephone Information:   Mobile [phone]       "

## 2024-07-11 NOTE — TELEPHONE ENCOUNTER
Pt calls with what sounds like a small cyst or mass in her outer labia. Patient has a hard time describing this. Pt denies any trouble urinating or dysuria. No vaginal discharge or itching. No rectal bleeding. States she had this prior maybe 10 years ago. Per chart review, did have labial cyst in 2017. Cyst was removed-benign.      Scheduled appointment for next week per pt request. Advised patient to call back with worsening symptoms prior to appointment.    Ifeoma Kimbrough RN on 7/11/2024 at 1:15 PM

## 2024-07-12 RX ORDER — AMLODIPINE BESYLATE 5 MG/1
5 TABLET ORAL 2 TIMES DAILY
Qty: 180 TABLET | Refills: 3 | Status: SHIPPED | OUTPATIENT
Start: 2024-07-12

## 2024-07-18 ENCOUNTER — OFFICE VISIT (OUTPATIENT)
Dept: FAMILY MEDICINE | Facility: CLINIC | Age: 84
End: 2024-07-18
Payer: MEDICARE

## 2024-07-18 VITALS
WEIGHT: 111 LBS | HEART RATE: 71 BPM | OXYGEN SATURATION: 99 % | SYSTOLIC BLOOD PRESSURE: 129 MMHG | TEMPERATURE: 97.1 F | DIASTOLIC BLOOD PRESSURE: 71 MMHG | BODY MASS INDEX: 19.67 KG/M2 | HEIGHT: 63 IN | RESPIRATION RATE: 15 BRPM

## 2024-07-18 DIAGNOSIS — L72.9 SKIN CYST: Primary | ICD-10-CM

## 2024-07-18 PROCEDURE — 99213 OFFICE O/P EST LOW 20 MIN: CPT | Performed by: PHYSICIAN ASSISTANT

## 2024-07-18 RX ORDER — SOY PROTEIN
POWDER (GRAM) ORAL
COMMUNITY

## 2024-07-18 RX ORDER — RESPIRATORY SYNCYTIAL VIRUS VACCINE 120MCG/0.5
0.5 KIT INTRAMUSCULAR ONCE
Qty: 1 EACH | Refills: 0 | Status: CANCELLED | OUTPATIENT
Start: 2024-07-18 | End: 2024-07-18

## 2024-07-18 NOTE — PROGRESS NOTES
Assessment & Plan     Skin cyst  Patient reports the lump has gone down in size. She does clean vigorously in the area and is wondering if this could have caused enlargement of this area. On exam there is a non-tender small subcutaneous lump. Patient reports the size has gone down significantly. At this time I think it is reasonable to monitor for changes. If growing or changing symptoms we can place referral to GYN to assess and determine next steps. Patient agreeable with this plan and will reach out if symptoms changing or the lump is growing.      Presley Cobos is a 83 year old, presenting for the following health issues:  cyst (Possilbe cyst, did have a couple of times when she wiped there was blood, she does wear a pad in underwear. Also wondering if the diclofenac is attributing to her cyst, started at the same time she started using that for her low back)        7/18/2024     9:08 AM   Additional Questions   Roomed by Faiza     History of Present Illness       Reason for visit:  Possible cyst  Symptom onset:  1-2 weeks ago  Symptoms include:  Bump  Symptom intensity:  Mild  Symptom progression:  Improving  Had these symptoms before:  No  What makes it worse:  No  What makes it better:  No    She eats 0-1 servings of fruits and vegetables daily.She consumes 0 sweetened beverage(s) daily.She exercises with enough effort to increase her heart rate 30 to 60 minutes per day.  She exercises with enough effort to increase her heart rate 7 days per week. She is missing 2 dose(s) of medications per week.  She is not taking prescribed medications regularly due to remembering to take.     Skin Lesion  Onset/Duration: about 2 weeks  Description  Location: labia area  Color: pink  Border description: raised  Character: raised  Itching: no  Bleeding:  YES  Intensity:  mild  Progression of Symptoms:  improving            Objective    /71 (BP Location: Left arm, Patient Position: Sitting, Cuff Size: Adult Small)  "  Pulse 71   Temp 97.1  F (36.2  C) (Temporal)   Resp 15   Ht 1.588 m (5' 2.5\")   Wt 50.3 kg (111 lb)   LMP  (LMP Unknown)   SpO2 99%   BMI 19.98 kg/m    Body mass index is 19.98 kg/m .  Physical Exam   GENERAL: No acute distress  HEENT: Normocephalic  SKIN: Superior to the clitoris with area of more skin and about a 0.5 cm small mobile subcutaneous lump, non tender and not bleeding.  NEURO: Alert and non-focal        Signed Electronically by: Melissa Rudd PA-C    "

## 2024-07-22 ENCOUNTER — OFFICE VISIT (OUTPATIENT)
Dept: OTHER | Facility: CLINIC | Age: 84
End: 2024-07-22
Attending: INTERNAL MEDICINE
Payer: MEDICARE

## 2024-07-22 VITALS
SYSTOLIC BLOOD PRESSURE: 136 MMHG | WEIGHT: 111.8 LBS | OXYGEN SATURATION: 99 % | BODY MASS INDEX: 19.81 KG/M2 | HEIGHT: 63 IN | HEART RATE: 70 BPM | DIASTOLIC BLOOD PRESSURE: 72 MMHG

## 2024-07-22 DIAGNOSIS — I25.10 ASCVD (ARTERIOSCLEROTIC CARDIOVASCULAR DISEASE): ICD-10-CM

## 2024-07-22 DIAGNOSIS — M35.3 PMR (POLYMYALGIA RHEUMATICA) (H): ICD-10-CM

## 2024-07-22 DIAGNOSIS — R06.09 DOE (DYSPNEA ON EXERTION): ICD-10-CM

## 2024-07-22 DIAGNOSIS — Z78.9 STATIN INTOLERANCE: ICD-10-CM

## 2024-07-22 DIAGNOSIS — F40.298 FEAR OF NEEDLES: ICD-10-CM

## 2024-07-22 DIAGNOSIS — E78.5 HYPERLIPIDEMIA LDL GOAL <70: Primary | ICD-10-CM

## 2024-07-22 DIAGNOSIS — M31.6 GCA (GIANT CELL ARTERITIS) (H): ICD-10-CM

## 2024-07-22 DIAGNOSIS — Z95.0 CARDIAC PACEMAKER IN SITU: Primary | ICD-10-CM

## 2024-07-22 DIAGNOSIS — R73.01 IFG (IMPAIRED FASTING GLUCOSE): ICD-10-CM

## 2024-07-22 PROCEDURE — 99215 OFFICE O/P EST HI 40 MIN: CPT | Performed by: INTERNAL MEDICINE

## 2024-07-22 PROCEDURE — 99417 PROLNG OP E/M EACH 15 MIN: CPT | Performed by: INTERNAL MEDICINE

## 2024-07-22 PROCEDURE — G2211 COMPLEX E/M VISIT ADD ON: HCPCS | Performed by: INTERNAL MEDICINE

## 2024-07-22 PROCEDURE — G0463 HOSPITAL OUTPT CLINIC VISIT: HCPCS | Performed by: INTERNAL MEDICINE

## 2024-07-22 NOTE — PROGRESS NOTES
"Patient is here to discuss consult    /65 (BP Location: Right arm, Patient Position: Chair, Cuff Size: Adult Regular)   Pulse 70   Ht 5' 3\" (1.6 m)   Wt 111 lb 12.8 oz (50.7 kg)   LMP  (LMP Unknown)   SpO2 99%   BMI 19.80 kg/m      Questions patient would like addressed today are: N/A.    Refills are needed: No    Has homecare services and agency name:  [facility]    "

## 2024-07-22 NOTE — PROGRESS NOTES
Pt lost to follow up. No device check since 8/25/22. No in clinic since 2021. Called pt to offer appt tomorrow 7/23/24 in [place] at 0850 as we had a cancellation. Also asked to call back and confirm she is not being followed elsewhere. Awaiting call back. VEDA Joseph

## 2024-07-22 NOTE — PROGRESS NOTES
INITIAL VASCULAR MEDICAL ASSESSMENT  REFERRAL SOURCE: Chance Fam PA-C   REASON FOR CONSULT:  for pulsatile abdomen.   CT showing extensive atherosclerotic changes of the visualized aorta and   its branches.         A/P:      (E78.5) Hyperlipidemia LDL goal <70  (primary encounter diagnosis)  Comment: She is not at goal. She has ASCVD. She is stain intolerant. She has a fear of needles and refuses to use a PCSK9 inhibitor. We will add bempedoic acid (Nexletol) to her Zetia. In case a PA is required, those are the reasons for the PA.   Plan: C-Reactive Protein, High Sensitivity,         Comprehensive metabolic panel, LipoFit by NMR,         Lipoprotein (a) in 10/2024, Bempedoic Acid 180 MG TABS        RTC two weeks later    (Z78.9) Statin intolerance  Comment: As above  Plan: Bempedoic Acid 180 MG TABS            (M31.6) GCA (giant cell arteritis) (H)  Comment: She sees Dr. Pires for this.  Plan: CRP inflammation, Erythrocyte sedimentation         rate auto            (M35.3) PMR (polymyalgia rheumatica) (H24)  Comment: She sees Dr. Pires for this.  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    The longitudinal care of irene for Chandrika was addressed during this visit. Due to added complexity of care, we will continue to support Tomasa Fam   and the subsequent management of these conditions and with ongoing continuity of care for these conditions.       62 minutes total medical care on today's date.    HPI: Tomasa Fam is a 83 year old female with a h/o PMR,  and GCA treated by Dr. Pires, htn, HLD (not on a statin due to disabling myalgias, inadequately treated LDL on zetia monotherapy), IFG, MGUS, stage IA left upper lobe adenocarcinoma s/p wedge resection with Dr. Woodward in 2016, stage IA left breast ER/NC+, HER2+ invasive ductal carcinoma and right breast LCIS s/p mastectomy and adjuvant chemotherapy in 2011 , PAF S/P AV node ablation and biventricular pacemaker implantation on apixaban, hypercalcemia with elevated PTH but no evaluation therein. Her PCP found a pulsatile abdomen on exam and ordered a CT abd/pelvis revealing no aneurysmal ds but extensive aortic atheroscelrotic ds. The patient presents today to address the above.     Review Of Systems  Skin: negative  Eyes: negative  Ears/Nose/Throat: negative  Respiratory: Positive shortness of breath and dyspnea on exertion after walking fifty feet,  no cough, or hemoptysis  Cardiovascular: negative  Gastrointestinal: negative  Genitourinary: negative  Musculoskeletal: negative  Neurologic: negative  Psychiatric: negative  Hematologic/Lymphatic/Immunologic: negative  Endocrine: negative      PAST MEDICAL HISTORY:                  Past Medical History:   Diagnosis Date    Anemia     Arthritis     hands, knees    Breast cancer (H) 01/2012    left mastectomy followed by right;  Dr. Luong    Chronic airway obstruction, not elsewhere classified 2006    very mild COPD - small cough    Concussion 03/13/2013     Problem list name updated by automated process. Provider to review and confirm [employer] Update utility    Cystocele, midline 04/04/2012    Diffuse cystic mastopathy     Gastroesophageal reflux disease, esophagitis presence not specified 11/23/2019    History of hypokalemia 01/23/2013     Hyperlipidemia LDL goal <160 06/29/2011    [place] 10-year CHD Risk Score: 2% (14 Total Points)  Values used to calculate score:    Age: 70 years -- Points: 14    Total Cholesterol: 192 mg/dL -- Points: 1    HDL Cholesterol: 61 mg/dL -- Points: -1    Systolic BP (treated): 118 mmHg -- Points: 0   The patient is not a smoker. -- Points: 0   The patient has not been diagnosed with diabetes. -- Points: 0   The patient does not have a famil    Lung cancer (H) 10/21/2015    Pacemaker     [employer]    Paroxysmal atrial fibrillation (H) 09/13/2019    PMR (polymyalgia rheumatica) (H24) 01/17/2020    tapering' above.) In patients receiving over 10 mg of prednisone/day, the dose can be lowered by 2.5 mg/day decrements every two to four weeks Once the dose of prednisone is 10 mg/day, further tapering can be done by 1 mg per month, provided the clinical course is stable  The clinical response to glucocorticoid therapy is closely monitored, which centers on screening for the presence and/or recu    Thyroid nodule 04/23/2016    Noted on CT Fall 2015.     Unspecified essential hypertension late 1980's       PAST SURGICAL HISTORY:                  Past Surgical History:   Procedure Laterality Date    ANESTHESIA CARDIOVERSION N/A 6/12/2020    Procedure: ANESTHESIA, FOR CARDIOVERSION;  Surgeon: GENERIC ANESTHESIA PROVIDER;  Location:  OR    ARTHROPLASTY KNEE Right 7/25/2022    Procedure: Right total knee arthroplasty;  Surgeon: Nick Parra MD;  Location:  OR    COLONOSCOPY  3/2003    adenomatous polyp     COLONOSCOPY  7/2006    diverticulosis - repeat in 5 years    COLONOSCOPY  10/13/2011    Procedure:COLONOSCOPY; COLONOSCOPY ; Surgeon:CHITO GORDILLO; Location: GI    CYSTOSCOPY  7/11/2012    Procedure: CYSTOSCOPY;;  Surgeon: Aline Cooper DO;  Location:  OR    DAVINCI HYSTERECTOMY SUPRACERVICAL, SACROCOLPOPEXY, COMBINED  7/11/2012    Procedure: COMBINED DAVINCI HYSTERECTOMY SUPRACERVICAL,  SACROCOLPOPEXY;   DAVINCI ASSISTED LAPAROSCOPIC SUPRACERVICAL HYSTERECTOMY AND BILATERAL SALPINGO-OOPHORECTOMY, SACROCOLPOPEXY AND CYSTOSCOPY;  Surgeon: Aline Cooper DO;  Location:  OR    EP ABLATION AV NODE N/A 6/22/2020    Procedure: EP ABLATION AV NODE;  Surgeon: Adriano Raman MD;  Location:  [facility]ATH [facility]    EP BIVENT LEAD PLACEMENT N/A 6/22/2020    Procedure: Bivent Lead Placement;  Surgeon: Adriano Raman MD;  Location:  [facility]    EP PACEMAKER N/A 6/22/2020    Procedure: AVNA and BiV Pacemaker Insertion;  Surgeon: Adriano aRman MD;  Location:  [facility]ATH [facility]    ESOPHAGOSCOPY, GASTROSCOPY, DUODENOSCOPY (EGD), COMBINED N/A 12/14/2021    Procedure: ESOPHAGOGASTRODUODENOSCOPY (EGD) (fv) biopsies with cold forcep;  Surgeon: Roberto Nguyen MD;  Location:  GI    EXCISE LESION EYELID Right 6/29/2015    Procedure: EXCISE LESION EYELID;  Surgeon: Hank Olvera MD;  Location:  SD    INSERT PORT VASCULAR ACCESS  2/3/2012    Procedure:INSERT PORT VASCULAR ACCESS; Power Port-A- Catheter Placement ; Surgeon:IRISH TAPIA; Location: OR    LAPAROSCOPIC SALPINGO-OOPHORECTOMY  7/11/2012    Procedure: LAPAROSCOPIC SALPINGO-OOPHORECTOMY;  Davinci;  Surgeon: Aline Cooper DO;  Location:  OR    LIPOSUCTION, RHYTIDECTOMY, COMBINED      LOBECTOMY LUNG Right 3/1/2016    Procedure: LOBECTOMY LUNG;  Surgeon: Jonas Woodward MD;  Location:  OR    MAMMOPLASTY REDUCTION  1/6/2012    Procedure:MAMMOPLASTY REDUCTION; Surgeon:MICKI CAICEDO; Location:[facility] OR    MASTECTOMY SIMPLE  11/12/2012    Procedure: MASTECTOMY SIMPLE;   Right Prophylactic Mastectomy with attempted Right Sentinal Node Biopsy, Revision Bilateral Mastectomy Insicions, liposuction in breast area;  Surgeon: Irish Tapia MD;  Location: [facility] OR    MASTECTOMY SIMPLE, SENTINEL NODE, COMBINED  1/6/2012    Procedure:COMBINED MASTECTOMY SIMPLE, SENTINEL NODE;  Left Mastectomy Left Monroe Node Biopsy,  Right Breast Reduction ; Surgeon:IRISH TAPIA; Location:[facility] OR    MASTECTOMY, BILATERAL      REMOVE PORT VASCULAR ACCESS  4/29/2013    Procedure: REMOVE PORT VASCULAR ACCESS;  Port A catheter removal ;  Surgeon: Irish Tapia MD;  Location: [facility] OR    REPAIR PTOSIS BILATERAL Bilateral 6/29/2015    Procedure: REPAIR PTOSIS BILATERAL;  Surgeon: Hank Olvera MD;  Location: [facility]    REVISE RECONSTRUCTED BREAST BILATERAL  11/12/2012    Procedure: REVISE RECONSTRUCTED BREAST BILATERAL;;  Surgeon: Katia Kyle MD;  Location: [facility] OR    SURGICAL HISTORY OF -   in 40's    face lift    SURGICAL HISTORY OF -       lipoma removed right thigh    SURGICAL HISTORY OF -       D and C    THORACOTOMY Right 3/1/2016    Procedure: THORACOTOMY;  Surgeon: Jonas Woodward MD;  Location:  OR    ZC NONSPECIFIC PROCEDURE  1970    s/p Tubal ligation 1970       CURRENT MEDICATIONS:                  Current Outpatient Medications   Medication Sig Dispense Refill    acetaminophen (TYLENOL) 325 MG tablet Take 2 tablets (650 mg) by mouth every 4 hours as needed for other (mild pain) 100 tablet 0    amLODIPine (NORVASC) 5 MG tablet TAKE 1 TABLET(5 MG) BY MOUTH TWICE DAILY 180 tablet 3    apixaban ANTICOAGULANT (ELIQUIS) 2.5 MG tablet Take 1 tablet (2.5 mg) by mouth 2 times daily 180 tablet 3    Cobalamin Combinations (VITAMIN B12-FOLIC ACID) 500-400 MCG TABS       diclofenac (VOLTAREN) 1 % topical gel Apply 2g to painful spine/tailbone BID prn 100 g 0    ezetimibe (ZETIA) 10 MG tablet Take 1 tablet (10 mg) by mouth every evening 90 tablet 3    fish oil-omega-3 fatty acids 1000 MG capsule TAKE 1 CAPSULE BY MOUTH DAILY 90 capsule 3    furosemide (LASIX) 20 MG tablet Take 1 tablet (20 mg) by mouth daily 90 tablet 0    metoprolol tartrate (LOPRESSOR) 100 MG tablet Take 1 tablet (100 mg) by mouth 2 times daily 180 tablet 3    omeprazole (PRILOSEC) 20 MG DR capsule Take 20  mg by mouth daily as needed 90 capsule     polyethylene glycol (MIRALAX) 17 GM/Dose powder Take 17 g by mouth daily as needed 510 g 0       ALLERGIES:                  Allergies   Allergen Reactions    No Known Drug Allergy     Tape [Adhesive Tape]      Sensitive to plastic tape on upper part of body--takes skin off       SOCIAL HISTORY:                  Social History     Socioeconomic History    Marital status:      Spouse name: Aren    Number of children: 2    Years of education: Not on file    Highest education level: Not on file   Occupational History    Occupation: [employer]     Employer: NONE    Tobacco Use    Smoking status: Never     Passive exposure: Never    Smokeless tobacco: Never   Vaping Use    Vaping status: Never Used   Substance and Sexual Activity    Alcohol use: Yes     Comment: 0-1 drink day    Drug use: No    Sexual activity: Yes     Partners: Male   Other Topics Concern     Service Not Asked    Blood Transfusions Not Asked    Caffeine Concern Not Asked    Occupational Exposure Not Asked    Hobby Hazards Not Asked    Sleep Concern Not Asked    Stress Concern Not Asked    Weight Concern Not Asked    Special Diet Not Asked    Back Care Not Asked    Exercise Yes     Comment: curves    Bike Helmet Not Asked    Seat Belt Not Asked    Self-Exams Not Asked    Parent/sibling w/ CABG, MI or angioplasty before 65F 55M? Yes   Social History Narrative    Not on file     Social Determinants of Health     Financial Resource Strain: Low Risk  (3/28/2024)    Financial Resource Strain     Within the past 12 months, have you or your family members you live with been unable to get utilities (heat, electricity) when it was really needed?: No   Food Insecurity: Low Risk  (3/28/2024)    Food Insecurity     Within the past 12 months, did you worry that your food would run out before you got money to buy more?: No     Within the past 12 months, did the food you bought just not last and you didn t have  money to get more?: No   Transportation Needs: Low Risk  (3/28/2024)    Transportation Needs     Within the past 12 months, has lack of transportation kept you from medical appointments, getting your medicines, non-medical meetings or appointments, work, or from getting things that you need?: No   Physical Activity: Insufficiently Active (3/28/2024)    Exercise Vital Sign     Days of Exercise per Week: 7 days     Minutes of Exercise per Session: 20 min   Stress: No Stress Concern Present (3/28/2024)    [employer] [place] [employer] - Occupational Stress Questionnaire     Feeling of Stress : Not at all   Social Connections: Unknown (3/28/2024)    Social Connection and Isolation Panel [NHANES]     Frequency of Communication with Friends and Family: Not on file     Frequency of Social Gatherings with Friends and Family: Once a week     Attends Anglican Services: Not on file     Active Member of Clubs or Organizations: Not on file     Attends Club or Organization Meetings: Not on file     Marital Status: Not on file   Interpersonal Safety: Low Risk  (7/18/2024)    Interpersonal Safety     Do you feel physically and emotionally safe where you currently live?: Yes     Within the past 12 months, have you been hit, slapped, kicked or otherwise physically hurt by someone?: No     Within the past 12 months, have you been humiliated or emotionally abused in other ways by your partner or ex-partner?: No   Housing Stability: Low Risk  (3/28/2024)    Housing Stability     Do you have housing? : Yes     Are you worried about losing your housing?: No       FAMILY HISTORY:                   Family History   Problem Relation Age of Onset    Cardiovascular Father         ruptured aorta, hardening of the arteries    Cerebrovascular Disease Mother     Respiratory Mother         chronic bronchitis - was a smoker early on    Cardiovascular Paternal Grandfather         MI    Cerebrovascular Disease Paternal Aunt      Hypertension Son     Neurologic Disorder Daughter         migraines    Breast Cancer Daughter     Brain Tumor Sister          Physical exam Reveals:    O/P: WNL  HEENT: WNL  NECK: No JVD, thyromegaly, or lymphadenopathy, no carotid bruits  HEART: RRR, no murmurs, gallops, or rubs  LUNGS: CTA bilaterally without rales, wheezes, or rhonchi  GI: NABS, nondistended, nontender, soft  EXT:without cyanosis, clubbing, or edema  NEURO: nonfocal  : no flank tenderness              ULTRASOUND ABDOMINAL AORTA May 20, 2024 at 0931 hours     HISTORY: 83-year-old patient with pulsatile abdomen.     COMPARISON: None.     FINDINGS: The proximal abdominal aorta is 2.1 x 2.1 cm, mid abdominal  aorta 1.4 x 1.6 m, distal abdominal aorta 1.3 x 1.2 cm. Both common  iliac arteries are 1.2 cm. Diffuse atherosclerotic calcification  throughout the visible arteries.                                                                      IMPRESSION: Normal sized abdominal aorta.        CT ABDOMEN AND PELVIS WITH CONTRAST 6/12/2024 11:14 AM     CLINICAL HISTORY: Abdominal pain. Pulsatile abdomen.     TECHNIQUE: CT scan of the abdomen and pelvis was performed following  injection of IV contrast. Multiplanar reformats were obtained. Dose  reduction techniques were used.  CONTRAST: 75 mL Isovue-370     COMPARISON: February 13, 2024     FINDINGS:   LOWER CHEST: Stable 4 mm nodule in the posterolateral left lower lobe.  Trace pleural fluid on the right. Cardiomegaly.     HEPATOBILIARY: Normal contour with no significant mass. No bile duct  dilatation. Calcified gallstones. Low-attenuation subcentimeter liver  lesion(s) compatible with benign cysts or other benign lesions. No  specific evaluation or follow-up is recommended in a low risk patient.     PANCREAS: No significant mass, duct dilatation, or inflammatory  change.     SPLEEN: Normal size.     ADRENAL GLANDS: No significant nodules.     KIDNEYS/BLADDER: No significant mass, stones, or  hydronephrosis.     BOWEL: Diverticulosis in the colon. No acute inflammatory change. No  obstruction.      PELVIC ORGANS: No pelvic masses.     ADDITIONAL FINDINGS: There are extensive atherosclerotic changes of  the visualized aorta and its branches. There is no evidence of aortic  dissection or aneurysm. No free fluid. Borderline-enlarged retrocaval  lymph node measuring 1 cm in short axis. This was not PET avid on the  recent PET/CT. Question an upper normal/borderline enlarged lymph node  in the right inguinal region measuring 1 cm.     MUSCULOSKELETAL: No frankly destructive bony lesions.                                                                      IMPRESSION:   1.  No aortic aneurysm demonstrated.  2.  Mild nonspecific adenopathy, that did not appear PET avid on the  recent PET/CT.     ARI JHAVERI MD         Narrative   EXAM: PET ONCOLOGY WHOLE BODY, CT SOFT TISSUE NECK W CONTRAST, CT CHEST/ABDOMEN/PELVIS W CONTRAST  LOCATION: [facility]  DATE: 2/13/2024    INDICATION: Abnormality of plasma protein, unspecified. Subsequent treatment planning and restaging for malignant neoplasm of upper inner quadrant of left female breast as well as malignant neoplasm of upper lobe, right bronchus or lung. History of  invasive ductal carcinoma of the left breast and LCIS of the right breast status post double mastectomy and chemotherapy in 2012 and right upper lobe pulmonary adenocarcinoma status post resection in 2016. Elevated M-protein.  COMPARISON: CT chest 05/24/2021.  CONTRAST: 68 mL Isovue-370.  TECHNIQUE: Serum glucose level 100 mg/dL. One hour post intravenous administration of 11.91 mCi F-18 FDG, PET imaging was performed from the skull vertex to feet, utilizing attenuation correction with concurrent axial CT and PET/CT image fusion. Separate   diagnostic CT of the neck, chest, abdomen, and pelvis was performed. Dose reduction techniques were used. ...   Impression    IMPRESSION:    1.  No evidence of FDG avid malignancy. No lytic osseous lesions are identified.  2.  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  3.  New small right pleural effusion.

## 2024-07-23 ENCOUNTER — ANCILLARY PROCEDURE (OUTPATIENT)
Dept: CARDIOLOGY | Facility: CLINIC | Age: 84
End: 2024-07-23
Attending: INTERNAL MEDICINE
Payer: MEDICARE

## 2024-07-23 ENCOUNTER — TELEPHONE (OUTPATIENT)
Dept: OTHER | Facility: CLINIC | Age: 84
End: 2024-07-23

## 2024-07-23 DIAGNOSIS — Z95.0 CARDIAC PACEMAKER IN SITU: ICD-10-CM

## 2024-07-23 PROCEDURE — 93280 PM DEVICE PROGR EVAL DUAL: CPT | Performed by: INTERNAL MEDICINE

## 2024-07-24 ENCOUNTER — TELEPHONE (OUTPATIENT)
Dept: FAMILY MEDICINE | Facility: CLINIC | Age: 84
End: 2024-07-24
Payer: MEDICARE

## 2024-07-24 NOTE — TELEPHONE ENCOUNTER
Incoming call from patient. She states she had visit with vascular provider (Fercho Wong MD) on 7/22/24. She states that provider recommended that she reach out to her PCP to check her parathyroid labs. She is not sure what the reason for this is. I don't see it specified in the providers visit note. Do you want a visit? Last visit with you was 5/6/24.    Barbara Jernigan RN

## 2024-07-25 ENCOUNTER — OFFICE VISIT (OUTPATIENT)
Dept: CARDIOLOGY | Facility: CLINIC | Age: 84
End: 2024-07-25
Payer: MEDICARE

## 2024-07-25 VITALS
SYSTOLIC BLOOD PRESSURE: 136 MMHG | HEIGHT: 63 IN | BODY MASS INDEX: 20.07 KG/M2 | WEIGHT: 113.3 LBS | OXYGEN SATURATION: 97 % | DIASTOLIC BLOOD PRESSURE: 58 MMHG | HEART RATE: 73 BPM

## 2024-07-25 DIAGNOSIS — I48.21 PERMANENT ATRIAL FIBRILLATION (H): Primary | ICD-10-CM

## 2024-07-25 DIAGNOSIS — Z95.0 CARDIAC PACEMAKER IN SITU: ICD-10-CM

## 2024-07-25 PROBLEM — R74.01 NONSPECIFIC ELEVATION OF LEVELS OF TRANSAMINASE AND LACTIC ACID DEHYDROGENASE (LDH): Status: RESOLVED | Noted: 2020-08-08 | Resolved: 2024-07-25

## 2024-07-25 PROBLEM — R74.02 NONSPECIFIC ELEVATION OF LEVELS OF TRANSAMINASE AND LACTIC ACID DEHYDROGENASE (LDH): Status: RESOLVED | Noted: 2020-08-08 | Resolved: 2024-07-25

## 2024-07-25 PROBLEM — Z79.52 LONG TERM SYSTEMIC STEROID USER: Status: RESOLVED | Noted: 2020-03-30 | Resolved: 2024-07-25

## 2024-07-25 LAB
MDC_IDC_LEAD_CONNECTION_STATUS: NORMAL
MDC_IDC_LEAD_CONNECTION_STATUS: NORMAL
MDC_IDC_LEAD_IMPLANT_DT: NORMAL
MDC_IDC_LEAD_IMPLANT_DT: NORMAL
MDC_IDC_LEAD_LOCATION: NORMAL
MDC_IDC_LEAD_LOCATION: NORMAL
MDC_IDC_LEAD_LOCATION_DETAIL_1: NORMAL
MDC_IDC_LEAD_LOCATION_DETAIL_1: NORMAL
MDC_IDC_LEAD_MFG: NORMAL
MDC_IDC_LEAD_MFG: NORMAL
MDC_IDC_LEAD_MODEL: NORMAL
MDC_IDC_LEAD_MODEL: NORMAL
MDC_IDC_LEAD_POLARITY_TYPE: NORMAL
MDC_IDC_LEAD_POLARITY_TYPE: NORMAL
MDC_IDC_LEAD_SERIAL: NORMAL
MDC_IDC_LEAD_SERIAL: NORMAL
MDC_IDC_MSMT_BATTERY_DTM: NORMAL
MDC_IDC_MSMT_BATTERY_REMAINING_LONGEVITY: 84 MO
MDC_IDC_MSMT_BATTERY_REMAINING_PERCENTAGE: 97 %
MDC_IDC_MSMT_BATTERY_STATUS: NORMAL
MDC_IDC_MSMT_LEADCHNL_LV_IMPEDANCE_VALUE: 657 OHM
MDC_IDC_MSMT_LEADCHNL_LV_PACING_THRESHOLD_AMPLITUDE: 1.2 V
MDC_IDC_MSMT_LEADCHNL_LV_PACING_THRESHOLD_PULSEWIDTH: 0.4 MS
MDC_IDC_MSMT_LEADCHNL_RA_IMPEDANCE_VALUE: 3000 OHM
MDC_IDC_MSMT_LEADCHNL_RV_IMPEDANCE_VALUE: 641 OHM
MDC_IDC_MSMT_LEADCHNL_RV_LEAD_CHANNEL_STATUS: NORMAL
MDC_IDC_MSMT_LEADCHNL_RV_PACING_THRESHOLD_AMPLITUDE: 1.2 V
MDC_IDC_MSMT_LEADCHNL_RV_PACING_THRESHOLD_PULSEWIDTH: 0.4 MS
MDC_IDC_PG_IMPLANT_DTM: NORMAL
MDC_IDC_PG_MFG: NORMAL
MDC_IDC_PG_MODEL: NORMAL
MDC_IDC_PG_SERIAL: NORMAL
MDC_IDC_PG_TYPE: NORMAL
MDC_IDC_SESS_CLINIC_NAME: NORMAL
MDC_IDC_SESS_DTM: NORMAL
MDC_IDC_SESS_TYPE: NORMAL
MDC_IDC_SET_BRADY_LOWRATE: 70 {BEATS}/MIN
MDC_IDC_SET_BRADY_MAX_SENSOR_RATE: 130 {BEATS}/MIN
MDC_IDC_SET_BRADY_MODE: NORMAL
MDC_IDC_SET_CRT_LVRV_DELAY: 30 MS
MDC_IDC_SET_CRT_PACED_CHAMBERS: NORMAL
MDC_IDC_SET_LEADCHNL_LV_PACING_AMPLITUDE: 2.7 V
MDC_IDC_SET_LEADCHNL_LV_PACING_ANODE_ELECTRODE_1: NORMAL
MDC_IDC_SET_LEADCHNL_LV_PACING_ANODE_LOCATION_1: NORMAL
MDC_IDC_SET_LEADCHNL_LV_PACING_CATHODE_ELECTRODE_1: NORMAL
MDC_IDC_SET_LEADCHNL_LV_PACING_CATHODE_LOCATION_1: NORMAL
MDC_IDC_SET_LEADCHNL_LV_PACING_PULSEWIDTH: 0.4 MS
MDC_IDC_SET_LEADCHNL_LV_SENSING_ADAPTATION_MODE: NORMAL
MDC_IDC_SET_LEADCHNL_LV_SENSING_ANODE_ELECTRODE_1: NORMAL
MDC_IDC_SET_LEADCHNL_LV_SENSING_ANODE_LOCATION_1: NORMAL
MDC_IDC_SET_LEADCHNL_LV_SENSING_CATHODE_ELECTRODE_1: NORMAL
MDC_IDC_SET_LEADCHNL_LV_SENSING_CATHODE_LOCATION_1: NORMAL
MDC_IDC_SET_LEADCHNL_LV_SENSING_SENSITIVITY: 2.5 MV
MDC_IDC_SET_LEADCHNL_RA_SENSING_ADAPTATION_MODE: NORMAL
MDC_IDC_SET_LEADCHNL_RA_SENSING_SENSITIVITY: 0.5 MV
MDC_IDC_SET_LEADCHNL_RV_PACING_AMPLITUDE: 2.4 V
MDC_IDC_SET_LEADCHNL_RV_PACING_CAPTURE_MODE: NORMAL
MDC_IDC_SET_LEADCHNL_RV_PACING_POLARITY: NORMAL
MDC_IDC_SET_LEADCHNL_RV_PACING_PULSEWIDTH: 0.4 MS
MDC_IDC_SET_LEADCHNL_RV_SENSING_ADAPTATION_MODE: NORMAL
MDC_IDC_SET_LEADCHNL_RV_SENSING_POLARITY: NORMAL
MDC_IDC_SET_LEADCHNL_RV_SENSING_SENSITIVITY: 2.5 MV
MDC_IDC_SET_ZONE_DETECTION_INTERVAL: 375 MS
MDC_IDC_SET_ZONE_STATUS: NORMAL
MDC_IDC_SET_ZONE_TYPE: NORMAL
MDC_IDC_SET_ZONE_VENDOR_TYPE: NORMAL
MDC_IDC_STAT_BRADY_DTM_END: NORMAL
MDC_IDC_STAT_BRADY_DTM_START: NORMAL
MDC_IDC_STAT_BRADY_RA_PERCENT_PACED: 0 %
MDC_IDC_STAT_BRADY_RV_PERCENT_PACED: 99 %
MDC_IDC_STAT_CRT_DTM_END: NORMAL
MDC_IDC_STAT_CRT_DTM_START: NORMAL
MDC_IDC_STAT_CRT_LV_PERCENT_PACED: 99 %
MDC_IDC_STAT_EPISODE_RECENT_COUNT: 0
MDC_IDC_STAT_EPISODE_RECENT_COUNT: 0
MDC_IDC_STAT_EPISODE_RECENT_COUNT: 3
MDC_IDC_STAT_EPISODE_RECENT_COUNT_DTM_END: NORMAL
MDC_IDC_STAT_EPISODE_RECENT_COUNT_DTM_START: NORMAL
MDC_IDC_STAT_EPISODE_TYPE: NORMAL
MDC_IDC_STAT_EPISODE_VENDOR_TYPE: NORMAL
MDC_IDC_STAT_EPISODE_VENDOR_TYPE: NORMAL

## 2024-07-25 PROCEDURE — 99215 OFFICE O/P EST HI 40 MIN: CPT | Performed by: INTERNAL MEDICINE

## 2024-07-25 NOTE — PROGRESS NOTES
"  SERVICE DATE: July 25, 2024    PRIMARY CARDIOLOGIST:  Dr. Kylie Sevilla.    PRIMARY CARE AND REFERRING PROVIDER:  Chance Fam  [id number] [facility] [id number]    REASON FOR VISIT:  Heart fluttering in pelvic area.    HISTORY OF PRESENT ILLNESS:  Tomasa Fam is 83 year old female, who is an established patient of my partner Dr. Sevilla.  She is seeing me due to unavailability of appointments with her cardiologist this month.    Patient's history is significant for permanent atrial fibrillation w patient history is significant for apixaban anticoagulated permanent atrial fibrillation status post AV node ablation and biventricular pacemaker implantation in 2020 and therefore she is pacemaker dependent, remote history of tachycardia mediated cardiomyopathy with normalization of LVEF in 2019, hypertension, COPD, history of lung cancer status post thoracotomy, breast cancer status post bilateral mastectomy, BMI of 20.    Patient is here specifically for 1 symptom.  She says that when she lays on her left side in bed, or changes position, she is aware of \"my heart beating down below and points to her pelvic area\".  She does not have palpitations in her chest or chest pain.  She has chronic stable NYHA class II dyspnea that is unchanged, no orthopnea.    Reviewed her pacemaker device interrogation that was done yesterday.  It shows that she is permanently biventricular paced at VVIR mode of  bpm with underlying rhythm of atrial fibrillation with complete heart block achieved by AV susu ablation with ventricular rates in the 30-40s, 7.5 years battery status, 1 episode of 11 beats of nonsustained ventricular tachycardia at heart rates of 180s. Her last echocardiogram was in 2020 with normal LVEF of 60%, normal RV function, no significant valve disease. Labs reviewed.  Creatinine 0.78, potassium 3.9, HbA1c 5.7%, normal TSH, normal CBC with a hemoglobin of 12.4.    Cardiopulmonary auscultation " today is unremarkable.  /58.  She has regular heart sounds, satisfactory pacemaker site, no murmur, lungs are clear, lower extremities consistent with known diagnosis of peripheral arterial disease.    DIAGNOSES/ASSESSMENT:  Symptoms of intermittent fluttering sensation in pelvic area.  I do not know what to make of this.  This is not cardiac.  She experiences it in bed or when she changes position.  She had 1 episode of nonsustained ventricular tachycardia on her pacemaker interrogation but is already adequately beta blocked with metoprolol tartrate 100 mg twice daily and her pacemaker is functioning well.  Advised her to follow-up with her primary care team for the specific symptom.  Permanent atrial fibrillation, apixaban anticoagulated, status post AV susu ablation and pacemaker implantation with a remote history of tachycardia mediated cardiomyopathy with subsequent normalization.  Needs up-to-date echocardiogram.  Single episode of nonsustained ventricular tachycardia.  Patient is already maximally beta blocked.  Needs up-to-date echocardiogram.    PLAN:  Patient's pelvic fluttering is a noncardiac symptom.  Advised her to see PCP about this and reassured her that her cardiac testing is stable.  Future follow-up with her primary cardiologist Dr. Sevilla per patient request.  I have ordered a previsit echocardiogram.        Tanvir Weber MD      Established patient.   40 minutes spent by me on the date of the encounter doing chart review, history and exam, documentation and further activities per the note        This note was completed in part using dictation via the Dragon voice recognition software. Some word and grammatical errors may occur and must be interpreted in the appropriate clinical context. If there are any questions pertaining to this issue, please contact me for further clarification.       Vitals: /58 (BP Location: Right arm, Patient Position: Sitting, Cuff Size: Adult  "Regular)   Pulse 73   Ht 1.6 m (5' 3\")   Wt 51.4 kg (113 lb 4.8 oz)   LMP  (LMP Unknown)   SpO2 97%   BMI 20.07 kg/m    Wt Readings from Last 5 Encounters:   07/25/24 51.4 kg (113 lb 4.8 oz)   07/22/24 50.7 kg (111 lb 12.8 oz)   07/18/24 50.3 kg (111 lb)   05/06/24 52.2 kg (115 lb)   03/28/24 50.3 kg (111 lb)         Orders Placed This Encounter   Procedures    Follow-Up with Cardiology    Echocardiogram Complete           CURRENT MEDICATIONS:  Current Outpatient Medications   Medication Sig Dispense Refill    acetaminophen (TYLENOL) 325 MG tablet Take 2 tablets (650 mg) by mouth every 4 hours as needed for other (mild pain) 100 tablet 0    amLODIPine (NORVASC) 5 MG tablet TAKE 1 TABLET(5 MG) BY MOUTH TWICE DAILY 180 tablet 3    apixaban ANTICOAGULANT (ELIQUIS) 2.5 MG tablet Take 1 tablet (2.5 mg) by mouth 2 times daily 180 tablet 3    Bempedoic Acid 180 MG TABS Take 180 mg by mouth daily 90 tablet 3    Cobalamin Combinations (VITAMIN B12-FOLIC ACID) 500-400 MCG TABS       diclofenac (VOLTAREN) 1 % topical gel Apply 2g to painful spine/tailbone BID prn 100 g 0    ezetimibe (ZETIA) 10 MG tablet Take 1 tablet (10 mg) by mouth every evening 90 tablet 3    fish oil-omega-3 fatty acids 1000 MG capsule TAKE 1 CAPSULE BY MOUTH DAILY 90 capsule 3    furosemide (LASIX) 20 MG tablet Take 1 tablet (20 mg) by mouth daily 90 tablet 0    metoprolol tartrate (LOPRESSOR) 100 MG tablet Take 1 tablet (100 mg) by mouth 2 times daily 180 tablet 3    omeprazole (PRILOSEC) 20 MG DR capsule Take 20 mg by mouth daily as needed 90 capsule     polyethylene glycol (MIRALAX) 17 GM/Dose powder Take 17 g by mouth daily as needed 510 g 0         ALLERGIES:  Allergies   Allergen Reactions    No Known Drug Allergy     Tape [Adhesive Tape]      Sensitive to plastic tape on upper part of body--takes skin off             "

## 2024-07-25 NOTE — LETTER
"7/25/2024    Chance Fam, RADHA  [address]  [facility] [address]    RE: Tomasa Farleyon       Dear Colleague,     I had the pleasure of seeing Tomasa Fam in the [facility].    SERVICE DATE: July 25, 2024    PRIMARY CARDIOLOGIST:  Dr. Kylie Sevilla.    PRIMARY CARE AND REFERRING PROVIDER:  Chance Fam  [address]  [facility] [address]    REASON FOR VISIT:  Heart fluttering in pelvic area.    HISTORY OF PRESENT ILLNESS:  Tomasa Fam is 83 year old female, who is an established patient of my partner Dr. Sevilla.  She is seeing me due to unavailability of appointments with her cardiologist this month.    Patient's history is significant for permanent atrial fibrillation w patient history is significant for apixaban anticoagulated permanent atrial fibrillation status post AV node ablation and biventricular pacemaker implantation in 2020 and therefore she is pacemaker dependent, remote history of tachycardia mediated cardiomyopathy with normalization of LVEF in 2019, hypertension, COPD, history of lung cancer status post thoracotomy, breast cancer status post bilateral mastectomy, BMI of 20.    Patient is here specifically for 1 symptom.  She says that when she lays on her left side in bed, or changes position, she is aware of \"my heart beating down below and points to her pelvic area\".  She does not have palpitations in her chest or chest pain.  She has chronic stable NYHA class II dyspnea that is unchanged, no orthopnea.    Reviewed her pacemaker device interrogation that was done yesterday.  It shows that she is permanently biventricular paced at VVIR mode of  bpm with underlying rhythm of atrial fibrillation with complete heart block achieved by AV susu ablation with ventricular rates in the 30-40s, 7.5 years battery status, 1 episode of 11 beats of nonsustained ventricular tachycardia at heart rates of 180s. Her last echocardiogram was in " 2020 with normal LVEF of 60%, normal RV function, no significant valve disease. Labs reviewed.  Creatinine 0.78, potassium 3.9, HbA1c 5.7%, normal TSH, normal CBC with a hemoglobin of 12.4.    Cardiopulmonary auscultation today is unremarkable.  /58.  She has regular heart sounds, satisfactory pacemaker site, no murmur, lungs are clear, lower extremities consistent with known diagnosis of peripheral arterial disease.    DIAGNOSES/ASSESSMENT:  Symptoms of intermittent fluttering sensation in pelvic area.  I do not know what to make of this.  This is not cardiac.  She experiences it in bed or when she changes position.  She had 1 episode of nonsustained ventricular tachycardia on her pacemaker interrogation but is already adequately beta blocked with metoprolol tartrate 100 mg twice daily and her pacemaker is functioning well.  Advised her to follow-up with her primary care team for the specific symptom.  Permanent atrial fibrillation, apixaban anticoagulated, status post AV susu ablation and pacemaker implantation with a remote history of tachycardia mediated cardiomyopathy with subsequent normalization.  Needs up-to-date echocardiogram.  Single episode of nonsustained ventricular tachycardia.  Patient is already maximally beta blocked.  Needs up-to-date echocardiogram.    PLAN:  Patient's pelvic fluttering is a noncardiac symptom.  Advised her to see PCP about this and reassured her that her cardiac testing is stable.  Future follow-up with her primary cardiologist Dr. Sevilla per patient request.  I have ordered a previsit echocardiogram.        Tanvir Weber MD      Established patient.   40 minutes spent by me on the date of the encounter doing chart review, history and exam, documentation and further activities per the note        This note was completed in part using dictation via the Dragon voice recognition software. Some word and grammatical errors may occur and must be interpreted in the  "appropriate clinical context. If there are any questions pertaining to this issue, please contact me for further clarification.       Vitals: /58 (BP Location: Right arm, Patient Position: Sitting, Cuff Size: Adult Regular)   Pulse 73   Ht 1.6 m (5' 3\")   Wt 51.4 kg (113 lb 4.8 oz)   LMP  (LMP Unknown)   SpO2 97%   BMI 20.07 kg/m    Wt Readings from Last 5 Encounters:   07/25/24 51.4 kg (113 lb 4.8 oz)   07/22/24 50.7 kg (111 lb 12.8 oz)   07/18/24 50.3 kg (111 lb)   05/06/24 52.2 kg (115 lb)   03/28/24 50.3 kg (111 lb)         Orders Placed This Encounter   Procedures    Follow-Up with Cardiology    Echocardiogram Complete           CURRENT MEDICATIONS:  Current Outpatient Medications   Medication Sig Dispense Refill    acetaminophen (TYLENOL) 325 MG tablet Take 2 tablets (650 mg) by mouth every 4 hours as needed for other (mild pain) 100 tablet 0    amLODIPine (NORVASC) 5 MG tablet TAKE 1 TABLET(5 MG) BY MOUTH TWICE DAILY 180 tablet 3    apixaban ANTICOAGULANT (ELIQUIS) 2.5 MG tablet Take 1 tablet (2.5 mg) by mouth 2 times daily 180 tablet 3    Bempedoic Acid 180 MG TABS Take 180 mg by mouth daily 90 tablet 3    Cobalamin Combinations (VITAMIN B12-FOLIC ACID) 500-400 MCG TABS       diclofenac (VOLTAREN) 1 % topical gel Apply 2g to painful spine/tailbone BID prn 100 g 0    ezetimibe (ZETIA) 10 MG tablet Take 1 tablet (10 mg) by mouth every evening 90 tablet 3    fish oil-omega-3 fatty acids 1000 MG capsule TAKE 1 CAPSULE BY MOUTH DAILY 90 capsule 3    furosemide (LASIX) 20 MG tablet Take 1 tablet (20 mg) by mouth daily 90 tablet 0    metoprolol tartrate (LOPRESSOR) 100 MG tablet Take 1 tablet (100 mg) by mouth 2 times daily 180 tablet 3    omeprazole (PRILOSEC) 20 MG DR capsule Take 20 mg by mouth daily as needed 90 capsule     polyethylene glycol (MIRALAX) 17 GM/Dose powder Take 17 g by mouth daily as needed 510 g 0         ALLERGIES:  Allergies   Allergen Reactions    No Known Drug Allergy     Tape " [Adhesive Tape]      Sensitive to plastic tape on upper part of body--takes skin off             Thank you for allowing me to participate in the care of your patient.      Sincerely,     Tanvir Weber MD     [facility]  cc:   Chance Fam, PA-C  [address] [place] [address]

## 2024-07-25 NOTE — TELEPHONE ENCOUNTER
Her PTH was elevated in the past but I was not the ordering provider.  I also would not be the one to manage this.  It is endocrinology that does that.        Chance

## 2024-07-25 NOTE — TELEPHONE ENCOUNTER
Called patient, left voicemail for call back to any triage nurse.     Leydi Joyce RN on 7/25/2024 at 11:47 AM

## 2024-07-25 NOTE — TELEPHONE ENCOUNTER
"Received call back from patient. RN relayed provider message below. Patient states she will \"just skip it and see what happens\". No further questions at this time.     Mykel DO RN 7/25/2024 at 2:09 PM    "

## 2024-07-26 NOTE — TELEPHONE ENCOUNTER
PA Initiation    Medication: BEMPEDOIC ACID 180 MG PO TABS  Insurance Company: [employer] - Phone [phone] Fax [phone]  Pharmacy Filling the Rx: [facility] #[id number] - [place], [place] - [address] AT [address]  Filling Pharmacy Phone: [phone]  Filling Pharmacy Fax: [phone]  Start Date: 7/26/2024

## 2024-07-30 NOTE — TELEPHONE ENCOUNTER
Prior Authorization Approval    Medication: BEMPEDOIC ACID 180 MG PO TABS  Authorization Effective Date: 4/28/2024  Authorization Expiration Date: 7/27/2025  Approved Dose/Quantity: as written  Reference #: [id number]   Insurance Company: [employer] - Phone [phone] Fax [phone]  Which Pharmacy is filling the prescription: [id number] [address] AT [address]  Pharmacy Notified: y  Patient Notified: Instructed pharmacy to notify patient once order is ready.

## 2024-08-03 DIAGNOSIS — E78.5 HYPERLIPIDEMIA LDL GOAL <160: ICD-10-CM

## 2024-08-03 DIAGNOSIS — I10 ESSENTIAL HYPERTENSION: ICD-10-CM

## 2024-08-03 DIAGNOSIS — I48.19 PERSISTENT ATRIAL FIBRILLATION (H): ICD-10-CM

## 2024-08-06 RX ORDER — METOPROLOL TARTRATE 100 MG
100 TABLET ORAL 2 TIMES DAILY
Qty: 180 TABLET | Refills: 1 | Status: SHIPPED | OUTPATIENT
Start: 2024-08-06

## 2024-08-06 RX ORDER — EZETIMIBE 10 MG/1
10 TABLET ORAL EVERY EVENING
Qty: 90 TABLET | Refills: 1 | Status: SHIPPED | OUTPATIENT
Start: 2024-08-06

## 2024-08-13 NOTE — TELEPHONE ENCOUNTER
Spoke to pt on the phone. Pt has refills available at the pharmacy. Sent in new prescription for 90 days supply with refills.    Detail Level: Zone Initiate Treatment: 5FU bid for 6 weeks Render In Strict Bullet Format?: No

## 2024-08-30 ENCOUNTER — OFFICE VISIT (OUTPATIENT)
Dept: FAMILY MEDICINE | Facility: CLINIC | Age: 84
End: 2024-08-30
Payer: MEDICARE

## 2024-08-30 VITALS
HEIGHT: 63 IN | TEMPERATURE: 97.9 F | OXYGEN SATURATION: 98 % | SYSTOLIC BLOOD PRESSURE: 154 MMHG | BODY MASS INDEX: 19.91 KG/M2 | DIASTOLIC BLOOD PRESSURE: 52 MMHG | RESPIRATION RATE: 14 BRPM | HEART RATE: 75 BPM | WEIGHT: 112.4 LBS

## 2024-08-30 DIAGNOSIS — K60.2 ANAL FISSURE: Primary | ICD-10-CM

## 2024-08-30 DIAGNOSIS — L72.9 SKIN CYST: ICD-10-CM

## 2024-08-30 PROCEDURE — 99213 OFFICE O/P EST LOW 20 MIN: CPT | Performed by: PHYSICIAN ASSISTANT

## 2024-08-30 ASSESSMENT — PATIENT HEALTH QUESTIONNAIRE - PHQ9
SUM OF ALL RESPONSES TO PHQ QUESTIONS 1-9: 6
SUM OF ALL RESPONSES TO PHQ QUESTIONS 1-9: 6
10. IF YOU CHECKED OFF ANY PROBLEMS, HOW DIFFICULT HAVE THESE PROBLEMS MADE IT FOR YOU TO DO YOUR WORK, TAKE CARE OF THINGS AT HOME, OR GET ALONG WITH OTHER PEOPLE: SOMEWHAT DIFFICULT

## 2024-08-30 ASSESSMENT — PAIN SCALES - GENERAL: PAINLEVEL: WORST PAIN (10)

## 2024-08-30 NOTE — PROGRESS NOTES
Assessment & Plan     Anal fissure  Exam shows anal fissure. Discussed nitroglycerin vs nifedipine treatment to help with healing. It has been on going for months. Nifedipine sent to the compounding pharmacy for patient. She will try treatment for 3-6 months and if not improving referral to [facility] would be recommended.  - nifedipine 0.2% in white petrolatum 0.2 % OINT ointment; Apply topically 2 times daily.    Skin cyst  Waxing and waning in size. At this point follow up with GYN to examine and determine next steps.  - Ob/Gyn  Referral; Future      Review of external notes as documented elsewhere in note  Prescription drug management  22 minutes spent by me on the date of the encounter doing chart review, history and exam, documentation and further activities per the note    Depression Screening Follow Up        8/30/2024     1:31 PM   PHQ   PHQ-9 Total Score 5   Q9: Thoughts of better off dead/self-harm past 2 weeks Not at all         8/30/2024     1:31 PM   Last PHQ-9   1.  Little interest or pleasure in doing things 2   2.  Feeling down, depressed, or hopeless 1   3.  Trouble falling or staying asleep, or sleeping too much 1   4.  Feeling tired or having little energy 1   5.  Poor appetite or overeating 0   6.  Feeling bad about yourself 0   7.  Trouble concentrating 0   8.  Moving slowly or restless 0   Q9: Thoughts of better off dead/self-harm past 2 weeks 0   PHQ-9 Total Score 5     Patient denies thoughts of suicide or plan.    Presley Cobos is a 84 year old, presenting for the following health issues:  RECHECK (Follow up visit from 7/18 (cyst)/) and Rectal Problem (Pt has irritated rectal area.  )        8/30/2024     1:29 PM   Additional Questions   Roomed by [email or web address]   Accompanied by self     History of Present Illness       Reason for visit:  Rectal pain    She eats 2-3 servings of fruits and vegetables daily.She consumes 0 sweetened beverage(s) daily.She exercises with enough  "effort to increase her heart rate 9 or less minutes per day.  She exercises with enough effort to increase her heart rate 3 or less days per week.          She has had some irritation and pain in the rectal area for months.    She has noticed some blood with wiping in the morning (thinks this could be related to irritated tissue vaginally (it is just spots of blood).        Objective    BP (!) 150/59 (BP Location: Left arm, Patient Position: Sitting, Cuff Size: Adult Regular)   Pulse 75   Temp 97.9  F (36.6  C) (Oral)   Resp 14   Ht 1.6 m (5' 3\")   Wt 51 kg (112 lb 6.4 oz)   LMP  (LMP Unknown)   SpO2 98%   BMI 19.91 kg/m    Body mass index is 19.91 kg/m .  Physical Exam   GENERAL: No acute distress  HEENT: Normocephalic  : Clitoral richardson with small 3 mm lump that is mobile, vulvar opening and labia majora with some thin and friable skin, Anal opening with fissure at 12 o'clock.    NEURO: Alert and non-focal          Signed Electronically by: Melissa Rudd PA-C    "

## 2024-09-17 ENCOUNTER — TELEPHONE (OUTPATIENT)
Dept: OTHER | Facility: CLINIC | Age: 84
End: 2024-09-17
Payer: MEDICARE

## 2024-09-17 NOTE — TELEPHONE ENCOUNTER
Follow up to 07/22/2024 OV with Dr. Wong    Routing to scheduling to coordinate the following:  Fasting labs  RETURN VASCULAR PATIENT consult with Dr. Wong  Please schedule this  around 10/22/24; labs first and then in person visit with Dr. Wong 2 weeks later      Appt note:  Follow up to 07/22/2024 OV with Dr. Wong

## 2024-09-18 NOTE — TELEPHONE ENCOUNTER
"Patient declined to schedule follow up and stated \"I did not like the way Dr. Wong treated me.\" Patient stated she did not want to receive further care or testing from [facility].   "

## 2024-10-02 ENCOUNTER — VIRTUAL VISIT (OUTPATIENT)
Dept: FAMILY MEDICINE | Facility: CLINIC | Age: 84
End: 2024-10-02
Payer: MEDICARE

## 2024-10-02 DIAGNOSIS — R53.83 OTHER FATIGUE: ICD-10-CM

## 2024-10-02 DIAGNOSIS — R06.09 DYSPNEA ON EXERTION: Primary | ICD-10-CM

## 2024-10-02 PROCEDURE — 99442 PR PHYSICIAN TELEPHONE EVALUATION 11-20 MIN: CPT | Mod: 93 | Performed by: PHYSICIAN ASSISTANT

## 2024-10-02 NOTE — PROGRESS NOTES
Chandrika is a 84 year old who is being evaluated via a billable telephone visit.    What phone number would you like to be contacted at? [phone] (home)     How would you like to obtain your AVS? Elsa  Originating Location (pt. Location): Home    Distant Location (provider location):  Off-site    Assessment & Plan     Dyspnea on exertion  Patient denies acute SOB or CP.  I am concerned that this could relate to her heart but she did just see cardiology.  Will get labs to start.  Consider getting ECHO done sooner than dec if possible, maría if lab are normal.  Patient to go to ED if acutely worsening.  - CBC with Platelets and Reflex to Iron Studies; Future  - TSH with free T4 reflex; Future  - Comprehensive metabolic panel (BMP + Alb, Alk Phos, ALT, AST, Total. Bili, TP); Future  - BNP-N terminal pro; Future    Other fatigue  Past hx of PMR.  Rechecking labs.  - CRP, inflammation; Future  - ESR: Erythrocyte sedimentation rate; Future    The longitudinal plan of care for the diagnosis(es)/condition(s) as documented were addressed during this visit. Due to the added complexity in care, I will continue to support Chandrika in the subsequent management and with ongoing continuity of care.        Subjective   Chandrika is a 84 year old, presenting for the following health issues:  Fatigue      10/2/2024     2:32 PM   Additional Questions   Roomed by ector     HPI     Can barely make it to the mailbox without breathing hard. Denies chest pain. 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    Vomiting easily. Not normal throw up - it has it lots bubbles.  Last happened about a week ago.    Lasix for fluid build up. Takes one pill, would like new rx for 2 PRN.    Was feeling pretty good up until about 3 months ago.  Running out of energy and SOB      Vomiting bubbly phlegm sometimes at night.  Not every night.  Has been happening for years but in last two months getting worse.          Review of  Systems  Constitutional, HEENT, cardiovascular, pulmonary, gi and gu systems are negative, except as otherwise noted.      Objective           Vitals:  No vitals were obtained today due to virtual visit.    Physical Exam   General: Alert and no distress //Respiratory: No audible wheeze, cough, or shortness of breath // Psychiatric:  Appropriate affect, tone, and pace of words            Phone call duration: 15 minutes  Signed Electronically by: Chance Fam PA-C

## 2024-10-04 ENCOUNTER — IMMUNIZATION (OUTPATIENT)
Dept: FAMILY MEDICINE | Facility: CLINIC | Age: 84
End: 2024-10-04
Payer: MEDICARE

## 2024-10-04 ENCOUNTER — LAB (OUTPATIENT)
Dept: LAB | Facility: CLINIC | Age: 84
End: 2024-10-04
Payer: MEDICARE

## 2024-10-04 DIAGNOSIS — R53.83 OTHER FATIGUE: ICD-10-CM

## 2024-10-04 DIAGNOSIS — R06.09 DYSPNEA ON EXERTION: ICD-10-CM

## 2024-10-04 DIAGNOSIS — Z23 NEED FOR PROPHYLACTIC VACCINATION AND INOCULATION AGAINST INFLUENZA: Primary | ICD-10-CM

## 2024-10-04 LAB
ERYTHROCYTE [DISTWIDTH] IN BLOOD BY AUTOMATED COUNT: 13.6 % (ref 10–15)
ERYTHROCYTE [SEDIMENTATION RATE] IN BLOOD BY WESTERGREN METHOD: 18 MM/HR (ref 0–30)
HCT VFR BLD AUTO: 37 % (ref 35–47)
HGB BLD-MCNC: 12.1 G/DL (ref 11.7–15.7)
HOLD SPECIMEN: NORMAL
MCH RBC QN AUTO: 30 PG (ref 26.5–33)
MCHC RBC AUTO-ENTMCNC: 32.7 G/DL (ref 31.5–36.5)
MCV RBC AUTO: 92 FL (ref 78–100)
PLATELET # BLD AUTO: 237 10E3/UL (ref 150–450)
RBC # BLD AUTO: 4.04 10E6/UL (ref 3.8–5.2)
WBC # BLD AUTO: 7.7 10E3/UL (ref 4–11)

## 2024-10-04 PROCEDURE — 82728 ASSAY OF FERRITIN: CPT | Mod: GZ

## 2024-10-04 PROCEDURE — 90662 IIV NO PRSV INCREASED AG IM: CPT

## 2024-10-04 PROCEDURE — 86140 C-REACTIVE PROTEIN: CPT

## 2024-10-04 PROCEDURE — 84443 ASSAY THYROID STIM HORMONE: CPT

## 2024-10-04 PROCEDURE — 80053 COMPREHEN METABOLIC PANEL: CPT | Mod: GZ

## 2024-10-04 PROCEDURE — 83550 IRON BINDING TEST: CPT | Mod: GZ

## 2024-10-04 PROCEDURE — 85027 COMPLETE CBC AUTOMATED: CPT

## 2024-10-04 PROCEDURE — 83880 ASSAY OF NATRIURETIC PEPTIDE: CPT

## 2024-10-04 PROCEDURE — G0008 ADMIN INFLUENZA VIRUS VAC: HCPCS

## 2024-10-04 PROCEDURE — 36415 COLL VENOUS BLD VENIPUNCTURE: CPT

## 2024-10-04 PROCEDURE — 83540 ASSAY OF IRON: CPT | Mod: GZ

## 2024-10-04 PROCEDURE — 85652 RBC SED RATE AUTOMATED: CPT

## 2024-10-05 LAB
ALBUMIN SERPL BCG-MCNC: 4.4 G/DL (ref 3.5–5.2)
ALP SERPL-CCNC: 135 U/L (ref 40–150)
ALT SERPL W P-5'-P-CCNC: 21 U/L (ref 0–50)
ANION GAP SERPL CALCULATED.3IONS-SCNC: 11 MMOL/L (ref 7–15)
AST SERPL W P-5'-P-CCNC: 23 U/L (ref 0–45)
BILIRUB SERPL-MCNC: 0.7 MG/DL
BUN SERPL-MCNC: 16.8 MG/DL (ref 8–23)
CALCIUM SERPL-MCNC: 10.4 MG/DL (ref 8.8–10.4)
CHLORIDE SERPL-SCNC: 102 MMOL/L (ref 98–107)
CREAT SERPL-MCNC: 0.73 MG/DL (ref 0.51–0.95)
CRP SERPL-MCNC: 11.2 MG/L
EGFRCR SERPLBLD CKD-EPI 2021: 80 ML/MIN/1.73M2
FERRITIN SERPL-MCNC: 356 NG/ML (ref 11–328)
GLUCOSE SERPL-MCNC: 104 MG/DL (ref 70–99)
HCO3 SERPL-SCNC: 24 MMOL/L (ref 22–29)
IRON BINDING CAPACITY (ROCHE): 284 UG/DL (ref 240–430)
IRON SATN MFR SERPL: 21 % (ref 15–46)
IRON SERPL-MCNC: 60 UG/DL (ref 37–145)
NT-PROBNP SERPL-MCNC: 5391 PG/ML (ref 0–1800)
POTASSIUM SERPL-SCNC: 4 MMOL/L (ref 3.4–5.3)
PROT SERPL-MCNC: 7.1 G/DL (ref 6.4–8.3)
SODIUM SERPL-SCNC: 137 MMOL/L (ref 135–145)
TSH SERPL DL<=0.005 MIU/L-ACNC: 4.16 UIU/ML (ref 0.3–4.2)

## 2024-10-07 ENCOUNTER — TELEPHONE (OUTPATIENT)
Dept: FAMILY MEDICINE | Facility: CLINIC | Age: 84
End: 2024-10-07
Payer: MEDICARE

## 2024-10-07 DIAGNOSIS — R53.83 OTHER FATIGUE: ICD-10-CM

## 2024-10-07 DIAGNOSIS — R79.89 ELEVATED BRAIN NATRIURETIC PEPTIDE (BNP) LEVEL: ICD-10-CM

## 2024-10-07 DIAGNOSIS — R06.09 DYSPNEA ON EXERTION: Primary | ICD-10-CM

## 2024-10-07 NOTE — TELEPHONE ENCOUNTER
Called the pt.  She said she is doing the same.  Nothing new or worse.  Advised that I am going to try to call Cardiology to see if she can get in sooner as Chance would like her to be seen sooner.  The pt said ok.       See also 10/4/24 labs.

## 2024-10-07 NOTE — TELEPHONE ENCOUNTER
Ok, well please stress again the need for ED if she really notices any sig swelling or a lot of trouble breathing or chest pain.      Chance

## 2024-10-07 NOTE — TELEPHONE ENCOUNTER
Thanks, I placed an urgent referral.  I think she should keep the original appointment as well just in case.    Chance

## 2024-10-07 NOTE — TELEPHONE ENCOUNTER
Called Cardiology and spoke to Lennox.      She said she could look at the nurse practioner schedules or if Chance wants to send in a referral that is urgent, then they check all providers schedules.  Chance - please advise.

## 2024-10-07 NOTE — TELEPHONE ENCOUNTER
----- Message from Chance Fam sent at 10/7/2024  9:01 AM CDT -----  Any chance we can call cardiology to see if they will get her in sooner?  I am concerned about the BNP elevation along with symptoms of dyspnea.    Triage- can we call to see how she is?  If anything acute she will need to go to the ED.      Thanks,  Chance

## 2024-10-07 NOTE — TELEPHONE ENCOUNTER
MAYDA Fletcher - Called Cardiology scheduling and spoke to Casie.       The  had an appt in [place] for Friday.  The pt was conferenced in and she is refusing to go to [place].      She scheduled her in [place] - Tuesday, October 15 at 10:15 with Dr. Adam Mendosa.      Advised if she feels worse before then, be seen in the ER.        Appointments in Next Year      Oct 15, 2024 10:15 AM  (Arrive by 10:10 AM)  Urgent with Adam Cueva MD  [facility] ([facility] ) [phone]     Oct 22, 2024 12:00 AM  CARDIAC DEVICE CHECK - REMOTE with PATRICIA TECH1  [facility] ([facility] ) [phone]     Oct 23, 2024 2:00 PM  New Patient with Aline Cooper DO  [facility] ([facility] ) [phone]     Dec 02, 2024 9:45 AM  (Arrive by 9:30 AM)  ECHO COMPLETE with [email or web address]  [facility] ([facility] ) [phone]     Dec 06, 2024 10:15 AM  (Arrive by 10:10 AM)  Return Cardiology with Kylie Sevilla DO  [facility] ([facility] ) [phone]

## 2024-10-09 ENCOUNTER — TRANSFERRED RECORDS (OUTPATIENT)
Dept: HEALTH INFORMATION MANAGEMENT | Facility: CLINIC | Age: 84
End: 2024-10-09
Payer: MEDICARE

## 2024-10-12 ASSESSMENT — PATIENT HEALTH QUESTIONNAIRE - PHQ9: SUM OF ALL RESPONSES TO PHQ QUESTIONS 1-9: 5

## 2024-10-15 ENCOUNTER — OFFICE VISIT (OUTPATIENT)
Dept: CARDIOLOGY | Facility: CLINIC | Age: 84
End: 2024-10-15
Attending: PHYSICIAN ASSISTANT
Payer: MEDICARE

## 2024-10-15 VITALS
WEIGHT: 113 LBS | DIASTOLIC BLOOD PRESSURE: 60 MMHG | HEART RATE: 83 BPM | BODY MASS INDEX: 20.02 KG/M2 | HEIGHT: 63 IN | SYSTOLIC BLOOD PRESSURE: 148 MMHG

## 2024-10-15 DIAGNOSIS — R01.1 SYSTOLIC EJECTION MURMUR: ICD-10-CM

## 2024-10-15 DIAGNOSIS — Z98.890 HX OF ATRIOVENTRICULAR NODE ABLATION: ICD-10-CM

## 2024-10-15 DIAGNOSIS — R06.09 DYSPNEA ON EXERTION: ICD-10-CM

## 2024-10-15 DIAGNOSIS — I50.9 ACUTE CONGESTIVE HEART FAILURE, UNSPECIFIED HEART FAILURE TYPE (H): ICD-10-CM

## 2024-10-15 DIAGNOSIS — I48.21 PERMANENT ATRIAL FIBRILLATION (H): Primary | ICD-10-CM

## 2024-10-15 DIAGNOSIS — I87.2 VENOUS (PERIPHERAL) INSUFFICIENCY: ICD-10-CM

## 2024-10-15 DIAGNOSIS — L97.919 VENOUS ULCER OF RIGHT LEG (H): ICD-10-CM

## 2024-10-15 DIAGNOSIS — Z95.0 CARDIAC PACEMAKER IN SITU: ICD-10-CM

## 2024-10-15 DIAGNOSIS — I83.019 VENOUS ULCER OF RIGHT LEG (H): ICD-10-CM

## 2024-10-15 DIAGNOSIS — I35.8 AORTIC DIASTOLIC MURMUR: ICD-10-CM

## 2024-10-15 DIAGNOSIS — R79.89 ELEVATED BRAIN NATRIURETIC PEPTIDE (BNP) LEVEL: ICD-10-CM

## 2024-10-15 DIAGNOSIS — R53.83 OTHER FATIGUE: ICD-10-CM

## 2024-10-15 DIAGNOSIS — R60.0 LOWER EXTREMITY EDEMA: ICD-10-CM

## 2024-10-15 PROCEDURE — G2211 COMPLEX E/M VISIT ADD ON: HCPCS | Performed by: INTERNAL MEDICINE

## 2024-10-15 PROCEDURE — 99214 OFFICE O/P EST MOD 30 MIN: CPT | Performed by: INTERNAL MEDICINE

## 2024-10-15 RX ORDER — FUROSEMIDE 40 MG/1
40 TABLET ORAL DAILY
Qty: 90 TABLET | Refills: 3 | Status: SHIPPED | OUTPATIENT
Start: 2024-10-15

## 2024-10-15 NOTE — PATIENT INSTRUCTIONS
Your blood pressure was elevated at your appointment today.  Elevated blood pressure can increase your risk of a heart attack, stroke and heart failure.  For this reason, we feel it is important to monitor your blood pressure closely.  If you have access to a home blood pressure monitor or are able to check your blood pressure at a local pharmacy in the next week we would like you to do so and call our clinic with those readings. Please call [phone] (Britton) and leave a message with your name, date of birth, blood pressure reading, date and location it was completed. If your blood pressure remains elevated your care team will be notified.  We appreciate being a part of your healthcare team and look forward to hearing from you soon.

## 2024-10-15 NOTE — LETTER
10/15/2024    Chance Fam PA-C  [id number]  [facility] [id number]    RE: Tomasa L Sarwat       Dear Colleague,     I had the pleasure of seeing Tomasa Fam in the [facility].  HPI and Plan:   It is my pleasure to see your patient Tomasa Fam who is a very pleasant 84-year-old patient who is normally followed by Dr. Sevilla.  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  She also is a history of essential hypertension.    The patient has noted for approximately the last 3 to 4 months that she is getting increasing shortness of breath on exertion.  In the past she could walk around a large block without difficulty.  Now, if she walks to the mailbox she can become short of breath.  She does not have PND but she does give a history of orthopnea.  She has also noticed increasing ankle edema.  She has been started on furosemide 20 mg/day which has improved symptoms somewhat but not completely.  Also because she passes a lot of urine she may miss some doses on some days.  She did have a proBNP ordered and this was markedly raised at 5391.  She is not complaining of any chest pains or chest pressure.    She is also complained of a healing ulcer on the back of her right calf which may be consistent with venous ulceration.  She has a past history of venous disease and has seen Dr. Jayesh Mireles in the past.  She also has right lower extremity venous insufficiency as well which has been noted by Dr. Mireles also.    Impression:  1.  Congestive heart failure.  The history and the physical findings of raise jugular venous pulse and lower extremity edema is consistent with a new diagnosis of congestive heart failure.  Whether this is systolic diastolic or both will depend upon the echocardiogram.  2.   Loud 3/6 systolic ejection murmur heard in the aortic area with a diastolic murmur also heard along the left sternal border.  Her last echocardiogram was 4 years ago showing aortic sclerosis and mild aortic regurgitation.  3.  Essential hypertension.  Blood pressure is on the higher side today.  4.  Healing ulcer on the right lower extremity.  5.  Chronic atrial fibrillation status post AV node ablation and permanent pacemaker placement.    Plan:  1.  Firstly we will increase the dose of furosemide from 20 mg to 40 mg/day.  2.  We will get an echocardiogram performed soon as possible to determine the mechanism of her heart failure whether this be diastolic or systolic heart failure.  Also we need to determine the nature of the murmurs.  3.  I have the patient return to see an ANDER in approximately 2 weeks time with a repeat basic metabolic profile as we have increased the dose of the furosemide.  We also need to determine if she is responding to our medical therapy.  4.  We will have the patient see Dr. Mireles for further assessment of ulceration of the lower extremity.    The patient does have a follow-up visit with Dr. Sevilla her normal cardiologist in December which is fortuitous also.    The longitudinal plan of care for the diagnosis(es)/condition(s) as documented were addressed during this visit. Due to the added complexity in care, I will continue to support Chandrika in the subsequent management and with ongoing continuity of care.    Adam Cueva MD, FACC, FRCPI        Orders Placed This Encounter   Procedures     Basic metabolic panel     Follow-Up with Cardiology ANDER     Echocardiogram Complete       Orders Placed This Encounter   Medications     furosemide (LASIX) 40 MG tablet     Sig: Take 1 tablet (40 mg) by mouth daily.     Dispense:  90 tablet     Refill:  3       Medications Discontinued During This Encounter   Medication Reason     furosemide (LASIX) 20 MG tablet Reorder (No AVS)         Encounter  Diagnoses   Name Primary?     Dyspnea on exertion      Other fatigue      Elevated brain natriuretic peptide (BNP) level      Permanent atrial fibrillation (H) Yes     Cardiac pacemaker in situ      Hx of atrioventricular node ablation      Acute congestive heart failure, unspecified heart failure type (H)      Lower extremity edema      Systolic ejection murmur      Aortic diastolic murmur        CURRENT MEDICATIONS:  Current Outpatient Medications   Medication Sig Dispense Refill     amLODIPine (NORVASC) 5 MG tablet TAKE 1 TABLET(5 MG) BY MOUTH TWICE DAILY 180 tablet 3     apixaban ANTICOAGULANT (ELIQUIS) 2.5 MG tablet Take 1 tablet (2.5 mg) by mouth 2 times daily 180 tablet 3     Cobalamin Combinations (VITAMIN B12-FOLIC ACID) 500-400 MCG TABS        ezetimibe (ZETIA) 10 MG tablet TAKE 1 TABLET(10 MG) BY MOUTH EVERY EVENING 90 tablet 1     fish oil-omega-3 fatty acids 1000 MG capsule TAKE 1 CAPSULE BY MOUTH DAILY 90 capsule 3     furosemide (LASIX) 40 MG tablet Take 1 tablet (40 mg) by mouth daily. 90 tablet 3     metoprolol tartrate (LOPRESSOR) 100 MG tablet TAKE 1 TABLET(100 MG) BY MOUTH TWICE DAILY 180 tablet 1     polyethylene glycol (MIRALAX) 17 GM/Dose powder Take 17 g by mouth daily as needed 510 g 0     acetaminophen (TYLENOL) 325 MG tablet Take 2 tablets (650 mg) by mouth every 4 hours as needed for other (mild pain) 100 tablet 0     Bempedoic Acid 180 MG TABS Take 180 mg by mouth daily (Patient not taking: Reported on 10/15/2024) 90 tablet 3     nifedipine 0.2% in white petrolatum 0.2 % OINT ointment Apply topically 2 times daily. (Patient not taking: Reported on 10/15/2024) 30 g 0     omeprazole (PRILOSEC) 20 MG DR capsule Take 20 mg by mouth daily as needed (Patient not taking: Reported on 10/15/2024) 90 capsule        ALLERGIES     Allergies   Allergen Reactions     No Known Drug Allergy      Tape [Adhesive Tape]      Sensitive to plastic tape on upper part of body--takes skin off       PAST MEDICAL  HISTORY:  Past Medical History:   Diagnosis Date     Anemia      Arthritis     hands, knees     Breast cancer (H) 01/2012    left mastectomy followed by right;  Dr. Luong     Chronic airway obstruction, not elsewhere classified 2006    very mild COPD - small cough     Concussion 03/13/2013     Problem list name updated by automated process. Provider to review and confirm [employer] Update utility     Cystocele, midline 04/04/2012     Diffuse cystic mastopathy      Gastroesophageal reflux disease, esophagitis presence not specified 11/23/2019     History of hypokalemia 01/23/2013     Hyperlipidemia LDL goal <160 06/29/2011    Graham 10-year CHD Risk Score: 2% (14 Total Points)  Values used to calculate score:    Age: 70 years -- Points: 14    Total Cholesterol: 192 mg/dL -- Points: 1    HDL Cholesterol: 61 mg/dL -- Points: -1    Systolic BP (treated): 118 mmHg -- Points: 0   The patient is not a smoker. -- Points: 0   The patient has not been diagnosed with diabetes. -- Points: 0   The patient does not have a famil     Lung cancer (H) 10/21/2015     Pacemaker     [employer]     Paroxysmal atrial fibrillation (H) 09/13/2019     PMR (polymyalgia rheumatica) (H) 01/17/2020    tapering' above.)  In patients receiving over 10 mg of prednisone/day, the dose can be lowered by 2.5 mg/day decrements every two to four weeks  Once the dose of prednisone is 10 mg/day, further tapering can be done by 1 mg per month, provided the clinical course is stable  The clinical response to glucocorticoid therapy is closely monitored, which centers on screening for the presence and/or recu     Thyroid nodule 04/23/2016    Noted on CT Fall 2015.      Unspecified essential hypertension late 1980's       PAST SURGICAL HISTORY:  Past Surgical History:   Procedure Laterality Date     ANESTHESIA CARDIOVERSION N/A 6/12/2020    Procedure: ANESTHESIA, FOR CARDIOVERSION;  Surgeon: GENERIC ANESTHESIA PROVIDER;  Location: [facility] OR     ARTHROPLASTY  KNEE Right 7/25/2022    Procedure: Right total knee arthroplasty;  Surgeon: Nick Parra MD;  Location:  OR     COLONOSCOPY  3/2003    adenomatous polyp      COLONOSCOPY  7/2006    diverticulosis - repeat in 5 years     COLONOSCOPY  10/13/2011    Procedure:COLONOSCOPY; COLONOSCOPY ; Surgeon:CHITO GORDILLO; Location: GI     CYSTOSCOPY  7/11/2012    Procedure: CYSTOSCOPY;;  Surgeon: Aline Cooper DO;  Location:  OR     DAVINCI HYSTERECTOMY SUPRACERVICAL, SACROCOLPOPEXY, COMBINED  7/11/2012    Procedure: COMBINED DAVINCI HYSTERECTOMY SUPRACERVICAL, SACROCOLPOPEXY;   DAVINCI ASSISTED LAPAROSCOPIC SUPRACERVICAL HYSTERECTOMY AND BILATERAL SALPINGO-OOPHORECTOMY, SACROCOLPOPEXY AND CYSTOSCOPY;  Surgeon: Aline Cooper DO;  Location:  OR     EP ABLATION AV NODE N/A 6/22/2020    Procedure: EP ABLATION AV NODE;  Surgeon: Adriano Raman MD;  Location:  HEART CARDI[facility] CATH LAB     EP BIVENT LEAD PLACEMENT N/A 6/22/2020    Procedure: Bivent Lead Placement;  Surgeon: Adriano Raman MD;  Location:  HEART CARD[facility] CATH LAB     EP PACEMAKER N/A 6/22/2020    Procedure: AVNA and BiV Pacemaker Insertion;  Surgeon: Adriano Raman MD;  Location:  HEART CARDIAC CATH LAB     ESOPHAGOSCOPY, GASTROSCOPY, DUODENOSCOPY (EGD), COMBINED N/A 12/14/2021    Procedure: ESOPHAGOGASTRODUODENOSCOPY (EGD) (fv) biopsies with cold forcep;  Surgeon: Chito Gordillo MD;  Location:  GI     EXCISE LESION EYELID Right 6/29/2015    Procedure: EXCISE LESION EYELID;  Surgeon: Hank Olvera MD;  Location:  [facility]     INSERT PORT VASCULAR ACCESS  2/3/2012    Procedure:INSERT PORT VASCULAR ACCESS; Power Port-A- Catheter Placement ; Surgeon:TRESSA TAPIA; Location: OR     LAPAROSCOPIC SALPINGO-OOPHORECTOMY  7/11/2012    Procedure: LAPAROSCOPIC SALPINGO-OOPHORECTOMY;  Davinci;  Surgeon: Aline Cooper DO;  Location:  OR     LIPOSUCTION, RHYTIDECTOMY, COMBINED       LOBECTOMY LUNG  Right 3/1/2016    Procedure: LOBECTOMY LUNG;  Surgeon: Jonas Woodward MD;  Location:  OR     MAMMOPLASTY REDUCTION  1/6/2012    Procedure:MAMMOPLASTY REDUCTION; Surgeon:MICKI KYLE; Location:[facility] OR     MASTECTOMY SIMPLE  11/12/2012    Procedure: MASTECTOMY SIMPLE;   Right Prophylactic Mastectomy with attempted Right Sentinal Node Biopsy, Revision Bilateral Mastectomy Insicions, liposuction in breast area;  Surgeon: Irish Tapia MD;  Location: [facility] OR     MASTECTOMY SIMPLE, SENTINEL NODE, COMBINED  1/6/2012    Procedure:COMBINED MASTECTOMY SIMPLE, SENTINEL NODE; Left Mastectomy Left Lyons Node Biopsy,  Right Breast Reduction ; Surgeon:IRISH TAPIA; Location:[facility] OR     MASTECTOMY, BILATERAL       REMOVE PORT VASCULAR ACCESS  4/29/2013    Procedure: REMOVE PORT VASCULAR ACCESS;  Port A catheter removal ;  Surgeon: Irish Tapia MD;  Location: [facility] OR     REPAIR PTOSIS BILATERAL Bilateral 6/29/2015    Procedure: REPAIR PTOSIS BILATERAL;  Surgeon: Hank Olvera MD;  Location:  [facility]     REVISE RECONSTRUCTED BREAST BILATERAL  11/12/2012    Procedure: REVISE RECONSTRUCTED BREAST BILATERAL;;  Surgeon: Micki Kyle MD;  Location: [facility] OR     SURGICAL HISTORY OF -   in 40's    face lift     SURGICAL HISTORY OF -       lipoma removed right thigh     SURGICAL HISTORY OF -       D and C     THORACOTOMY Right 3/1/2016    Procedure: THORACOTOMY;  Surgeon: Jonas Woodward MD;  Location:  OR     ZZC NONSPECIFIC PROCEDURE  1970    s/p Tubal ligation 1970       FAMILY HISTORY:  Family History   Problem Relation Age of Onset     Cardiovascular Father         ruptured aorta, hardening of the arteries     Cerebrovascular Disease Mother      Respiratory Mother         chronic bronchitis - was a smoker early on     Cardiovascular Paternal Grandfather         MI     Cerebrovascular Disease Paternal Aunt      Hypertension Son      Neurologic Disorder Daughter         migraines      Breast Cancer Daughter      Brain Tumor Sister        SOCIAL HISTORY:  Social History     Socioeconomic History     Marital status:      Spouse name: Aren     Number of children: 2     Years of education: None     Highest education level: None   Occupational History     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 Yes     Social Determinants of Health     Financial Resource Strain: Low Risk  (3/28/2024)    Financial Resource Strain      Within the past 12 months, have you or your family members you live with been unable to get utilities (heat, electricity) when it was really needed?: No   Food Insecurity: Low Risk  (3/28/2024)    Food Insecurity      Within the past 12 months, did you worry that your food would run out before you got money to buy more?: No      Within the past 12 months, did the food you bought just not last and you didn t have money to get more?: No   Transportation Needs: Low Risk  (3/28/2024)    Transportation Needs      Within the past 12 months, has lack of transportation kept you from medical appointments, getting your medicines, non-medical meetings or appointments, work, or from getting things that you need?: No   Physical Activity: Insufficiently Active (3/28/2024)    Exercise Vital Sign      Days of Exercise per Week: 7 days      Minutes of Exercise per Session: 20 min   Stress: No Stress Concern Present (3/28/2024)    [employer] [place] [employer] - Occupational Stress Questionnaire      Feeling of Stress : Not at all   Social Connections: Unknown (3/28/2024)    Social  "Connection and Isolation Panel [NHANES]      Frequency of Social Gatherings with Friends and Family: Once a week   Interpersonal Safety: Low Risk  (7/18/2024)    Interpersonal Safety      Do you feel physically and emotionally safe where you currently live?: Yes      Within the past 12 months, have you been hit, slapped, kicked or otherwise physically hurt by someone?: No      Within the past 12 months, have you been humiliated or emotionally abused in other ways by your partner or ex-partner?: No   Housing Stability: Low Risk  (3/28/2024)    Housing Stability      Do you have housing? : Yes      Are you worried about losing your housing?: No       Review of Systems:  Skin:          Eyes:         ENT:         Respiratory:  Negative       Cardiovascular:    Positive for;edema;fatigue chest pain and palpitations occ  Gastroenterology:        Genitourinary:         Musculoskeletal:         Neurologic:         Psychiatric:         Heme/Lymph/Imm:         Endocrine:           Physical Exam:  Vitals: BP (!) 148/60   Pulse 83   Ht 1.6 m (5' 3\")   Wt 51.3 kg (113 lb)   LMP  (LMP Unknown)   BMI 20.02 kg/m      Constitutional:  cooperative;cooperative, alert and oriented, well developed, well nourished, in no acute distress        Skin:  warm and dry to the touch, no apparent skin lesions or masses noted bruises present;dusky;venous stasis changes (Healing ulcer on the back of the right calf.)  (Healing ulcer on the back of the right calf.) healed right shin wounds    Head:  normocephalic, no masses or lesions        Eyes:  pupils equal and round        Lymph:      ENT:  no pallor or cyanosis, dentition good        Neck:  no carotid bruit;carotid pulses are full and equal bilaterally JVP 10-12      Respiratory:  clear to auscultation;normal symmetry    marked diminished BS on left posterior lung as compared to right, no wheezing or rales    Cardiac: regular rhythm tachycardic     systolic ejection murmur;grade " 3;RUSB;LUSB;LLSB   early diastolic murmur;LLSB;grade 1 single ectopic beat   not assessed this visit pulses below the femoral arteries are diminished                                      GI:  not assessed this visit        Extremities and Muscular Skeletal:  no deformities, clubbing, cyanosis, erythema observed stasis pigmentation   RLE edema;pitting;1+ LLE edema;pitting;1+      Neurological:  no gross motor deficits;affect appropriate        Psych:  Alert and Oriented x 3        CC  Chance Fam PA-C  [id number] [id number]                Thank you for allowing me to participate in the care of your patient.      Sincerely,     Adam Cueva MD, MD     [facility]  cc:   Chance Fam PA-C  [id number] [id number]

## 2024-10-15 NOTE — PROGRESS NOTES
HPI and Plan:   It is my pleasure to see your patient Tomasa Fam who is a very pleasant 84-year-old patient who is normally followed by Dr. Sevilla.  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  She also is a history of essential hypertension.    The patient has noted for approximately the last 3 to 4 months that she is getting increasing shortness of breath on exertion.  In the past she could walk around a large block without difficulty.  Now, if she walks to the mailbox she can become short of breath.  She does not have PND but she does give a history of orthopnea.  She has also noticed increasing ankle edema.  She has been started on furosemide 20 mg/day which has improved symptoms somewhat but not completely.  Also because she passes a lot of urine she may miss some doses on some days.  She did have a proBNP ordered and this was markedly raised at 5391.  She is not complaining of any chest pains or chest pressure.    She is also complained of a healing ulcer on the back of her right calf which may be consistent with venous ulceration.  She has a past history of venous disease and has seen Dr. Jayesh Mireles in the past.  She also has right lower extremity venous insufficiency as well which has been noted by Dr. Mireles also.    Impression:  1.  Congestive heart failure.  The history and the physical findings of raise jugular venous pulse and lower extremity edema is consistent with a new diagnosis of congestive heart failure.  Whether this is systolic diastolic or both will depend upon the echocardiogram.  2.  Loud 3/6 systolic ejection murmur heard in the aortic area with a diastolic murmur also heard along the left sternal border.  Her last echocardiogram was 4 years ago showing aortic sclerosis and mild aortic  regurgitation.  3.  Essential hypertension.  Blood pressure is on the higher side today.  4.  Healing ulcer on the right lower extremity.  5.  Chronic atrial fibrillation status post AV node ablation and permanent pacemaker placement.    Plan:  1.  Firstly we will increase the dose of furosemide from 20 mg to 40 mg/day.  2.  We will get an echocardiogram performed soon as possible to determine the mechanism of her heart failure whether this be diastolic or systolic heart failure.  Also we need to determine the nature of the murmurs.  3.  I have the patient return to see an ANDER in approximately 2 weeks time with a repeat basic metabolic profile as we have increased the dose of the furosemide.  We also need to determine if she is responding to our medical therapy.  4.  We will have the patient see Dr. Mireles for further assessment of ulceration of the lower extremity.    The patient does have a follow-up visit with Dr. Sevilla her normal cardiologist in December which is fortuitous also.    The longitudinal plan of care for the diagnosis(es)/condition(s) as documented were addressed during this visit. Due to the added complexity in care, I will continue to support Chandrika in the subsequent management and with ongoing continuity of care.    Adam Cueva MD, FACC, FRCPI        Orders Placed This Encounter   Procedures    Basic metabolic panel    Follow-Up with Cardiology ANDER    Echocardiogram Complete       Orders Placed This Encounter   Medications    furosemide (LASIX) 40 MG tablet     Sig: Take 1 tablet (40 mg) by mouth daily.     Dispense:  90 tablet     Refill:  3       Medications Discontinued During This Encounter   Medication Reason    furosemide (LASIX) 20 MG tablet Reorder (No AVS)         Encounter Diagnoses   Name Primary?    Dyspnea on exertion     Other fatigue     Elevated brain natriuretic peptide (BNP) level     Permanent atrial fibrillation (H) Yes    Cardiac pacemaker in situ     Hx of atrioventricular  node ablation     Acute congestive heart failure, unspecified heart failure type (H)     Lower extremity edema     Systolic ejection murmur     Aortic diastolic murmur        CURRENT MEDICATIONS:  Current Outpatient Medications   Medication Sig Dispense Refill    amLODIPine (NORVASC) 5 MG tablet TAKE 1 TABLET(5 MG) BY MOUTH TWICE DAILY 180 tablet 3    apixaban ANTICOAGULANT (ELIQUIS) 2.5 MG tablet Take 1 tablet (2.5 mg) by mouth 2 times daily 180 tablet 3    Cobalamin Combinations (VITAMIN B12-FOLIC ACID) 500-400 MCG TABS       ezetimibe (ZETIA) 10 MG tablet TAKE 1 TABLET(10 MG) BY MOUTH EVERY EVENING 90 tablet 1    fish oil-omega-3 fatty acids 1000 MG capsule TAKE 1 CAPSULE BY MOUTH DAILY 90 capsule 3    furosemide (LASIX) 40 MG tablet Take 1 tablet (40 mg) by mouth daily. 90 tablet 3    metoprolol tartrate (LOPRESSOR) 100 MG tablet TAKE 1 TABLET(100 MG) BY MOUTH TWICE DAILY 180 tablet 1    polyethylene glycol (MIRALAX) 17 GM/Dose powder Take 17 g by mouth daily as needed 510 g 0    acetaminophen (TYLENOL) 325 MG tablet Take 2 tablets (650 mg) by mouth every 4 hours as needed for other (mild pain) 100 tablet 0    Bempedoic Acid 180 MG TABS Take 180 mg by mouth daily (Patient not taking: Reported on 10/15/2024) 90 tablet 3    nifedipine 0.2% in white petrolatum 0.2 % OINT ointment Apply topically 2 times daily. (Patient not taking: Reported on 10/15/2024) 30 g 0    omeprazole (PRILOSEC) 20 MG DR capsule Take 20 mg by mouth daily as needed (Patient not taking: Reported on 10/15/2024) 90 capsule        ALLERGIES     Allergies   Allergen Reactions    No Known Drug Allergy     Tape [Adhesive Tape]      Sensitive to plastic tape on upper part of body--takes skin off       PAST MEDICAL HISTORY:  Past Medical History:   Diagnosis Date    Anemia     Arthritis     hands, knees    Breast cancer (H) 01/2012    left mastectomy followed by right;  Dr. Luong    Chronic airway obstruction, not elsewhere classified 2006    very  mild COPD - small cough    Concussion 03/13/2013     Problem list name updated by automated process. Provider to review and confirm [employer] Update utility    Cystocele, midline 04/04/2012    Diffuse cystic mastopathy     Gastroesophageal reflux disease, esophagitis presence not specified 11/23/2019    History of hypokalemia 01/23/2013    Hyperlipidemia LDL goal <160 06/29/2011    [place] 10-year CHD Risk Score: 2% (14 Total Points)  Values used to calculate score:    Age: 70 years -- Points: 14    Total Cholesterol: 192 mg/dL -- Points: 1    HDL Cholesterol: 61 mg/dL -- Points: -1    Systolic BP (treated): 118 mmHg -- Points: 0   The patient is not a smoker. -- Points: 0   The patient has not been diagnosed with diabetes. -- Points: 0   The patient does not have a famil    Lung cancer (H) 10/21/2015    Pacemaker     [employer]    Paroxysmal atrial fibrillation (H) 09/13/2019    PMR (polymyalgia rheumatica) (H) 01/17/2020    tapering' above.) In patients receiving over 10 mg of prednisone/day, the dose can be lowered by 2.5 mg/day decrements every two to four weeks Once the dose of prednisone is 10 mg/day, further tapering can be done by 1 mg per month, provided the clinical course is stable  The clinical response to glucocorticoid therapy is closely monitored, which centers on screening for the presence and/or recu    Thyroid nodule 04/23/2016    Noted on CT Fall 2015.     Unspecified essential hypertension late 1980's       PAST SURGICAL HISTORY:  Past Surgical History:   Procedure Laterality Date    ANESTHESIA CARDIOVERSION N/A 6/12/2020    Procedure: ANESTHESIA, FOR CARDIOVERSION;  Surgeon: GENERIC ANESTHESIA PROVIDER;  Location: [facility] OR    ARTHROPLASTY KNEE Right 7/25/2022    Procedure: Right total knee arthroplasty;  Surgeon: Nick Parra MD;  Location:  OR    COLONOSCOPY  3/2003    adenomatous polyp     COLONOSCOPY  7/2006    diverticulosis - repeat in 5 years    COLONOSCOPY  10/13/2011     Procedure:COLONOSCOPY; COLONOSCOPY ; Surgeon:CHITO GORDILLO; Location: GI    CYSTOSCOPY  7/11/2012    Procedure: CYSTOSCOPY;;  Surgeon: Aline Cooper DO;  Location:  OR    DAVINCI HYSTERECTOMY SUPRACERVICAL, SACROCOLPOPEXY, COMBINED  7/11/2012    Procedure: COMBINED DAVINCI HYSTERECTOMY SUPRACERVICAL, SACROCOLPOPEXY;   DAVINCI ASSISTED LAPAROSCOPIC SUPRACERVICAL HYSTERECTOMY AND BILATERAL SALPINGO-OOPHORECTOMY, SACROCOLPOPEXY AND CYSTOSCOPY;  Surgeon: Aline Cooper DO;  Location:  OR    EP ABLATION AV NODE N/A 6/22/2020    Procedure: EP ABLATION AV NODE;  Surgeon: Adriano Raman MD;  Location:  [facility]    EP BIVENT LEAD PLACEMENT N/A 6/22/2020    Procedure: Bivent Lead Placement;  Surgeon: Adriano Raman MD;  Location:  [facility]ATH [facility]    EP PACEMAKER N/A 6/22/2020    Procedure: AVNA and BiV Pacemaker Insertion;  Surgeon: Adriano Raman MD;  Location:  [facility]ATH [facility]    ESOPHAGOSCOPY, GASTROSCOPY, DUODENOSCOPY (EGD), COMBINED N/A 12/14/2021    Procedure: ESOPHAGOGASTRODUODENOSCOPY (EGD) (fv) biopsies with cold forcep;  Surgeon: Chito Gordillo MD;  Location:  GI    EXCISE LESION EYELID Right 6/29/2015    Procedure: EXCISE LESION EYELID;  Surgeon: Hank Olvera MD;  Location:  [facility]    INSERT PORT VASCULAR ACCESS  2/3/2012    Procedure:INSERT PORT VASCULAR ACCESS; Power Port-A- Catheter Placement ; Surgeon:TRESSA TAPIA; Location: OR    LAPAROSCOPIC SALPINGO-OOPHORECTOMY  7/11/2012    Procedure: LAPAROSCOPIC SALPINGO-OOPHORECTOMY;  Davinci;  Surgeon: Aline Cooper DO;  Location:  OR    LIPOSUCTION, RHYTIDECTOMY, COMBINED      LOBECTOMY LUNG Right 3/1/2016    Procedure: LOBECTOMY LUNG;  Surgeon: Jonas Woodward MD;  Location:  OR    MAMMOPLASTY REDUCTION  1/6/2012    Procedure:MAMMOPLASTY REDUCTION; Surgeon:MICKI CAICEDO; Location: OR    MASTECTOMY SIMPLE  11/12/2012    Procedure:  MASTECTOMY SIMPLE;   Right Prophylactic Mastectomy with attempted Right Sentinal Node Biopsy, Revision Bilateral Mastectomy Insicions, liposuction in breast area;  Surgeon: Irish Tapia MD;  Location: [facility] OR    MASTECTOMY SIMPLE, SENTINEL NODE, COMBINED  1/6/2012    Procedure:COMBINED MASTECTOMY SIMPLE, SENTINEL NODE; Left Mastectomy Left [place] Node Biopsy,  Right Breast Reduction ; Surgeon:IRISH TAPIA; Location:[facility] OR    MASTECTOMY, BILATERAL      REMOVE PORT VASCULAR ACCESS  4/29/2013    Procedure: REMOVE PORT VASCULAR ACCESS;  Port A catheter removal ;  Surgeon: Irish Tapia MD;  Location: [facility] OR    REPAIR PTOSIS BILATERAL Bilateral 6/29/2015    Procedure: REPAIR PTOSIS BILATERAL;  Surgeon: Hank Olvera MD;  Location:  [facility]    REVISE RECONSTRUCTED BREAST BILATERAL  11/12/2012    Procedure: REVISE RECONSTRUCTED BREAST BILATERAL;;  Surgeon: Katia Kyle MD;  Location: [facility] OR    SURGICAL HISTORY OF -   in 40's    face lift    SURGICAL HISTORY OF -       lipoma removed right thigh    SURGICAL HISTORY OF -       D and C    THORACOTOMY Right 3/1/2016    Procedure: THORACOTOMY;  Surgeon: Jonas Woodward MD;  Location:  OR    Z NONSPECIFIC PROCEDURE  1970    s/p Tubal ligation 1970       FAMILY HISTORY:  Family History   Problem Relation Age of Onset    Cardiovascular Father         ruptured aorta, hardening of the arteries    Cerebrovascular Disease Mother     Respiratory Mother         chronic bronchitis - was a smoker early on    Cardiovascular Paternal Grandfather         MI    Cerebrovascular Disease Paternal Aunt     Hypertension Son     Neurologic Disorder Daughter         migraines    Breast Cancer Daughter     Brain Tumor Sister        SOCIAL HISTORY:  Social History     Socioeconomic History    Marital status:      Spouse name: Aren    Number of children: 2    Years of education: None    Highest education level: None   Occupational History     Occupation: [employer]     Employer: NONE    Tobacco Use    Smoking status: Never     Passive exposure: Never    Smokeless tobacco: Never   Vaping Use    Vaping status: Never Used   Substance and Sexual Activity    Alcohol use: Yes     Comment: 0-1 drink day    Drug use: No    Sexual activity: Yes     Partners: Male   Other Topics Concern    Exercise Yes     Comment: curves    Parent/sibling w/ CABG, MI or angioplasty before 65F 55M? Yes     Social Determinants of Health     Financial Resource Strain: Low Risk  (3/28/2024)    Financial Resource Strain     Within the past 12 months, have you or your family members you live with been unable to get utilities (heat, electricity) when it was really needed?: No   Food Insecurity: Low Risk  (3/28/2024)    Food Insecurity     Within the past 12 months, did you worry that your food would run out before you got money to buy more?: No     Within the past 12 months, did the food you bought just not last and you didn t have money to get more?: No   Transportation Needs: Low Risk  (3/28/2024)    Transportation Needs     Within the past 12 months, has lack of transportation kept you from medical appointments, getting your medicines, non-medical meetings or appointments, work, or from getting things that you need?: No   Physical Activity: Insufficiently Active (3/28/2024)    Exercise Vital Sign     Days of Exercise per Week: 7 days     Minutes of Exercise per Session: 20 min   Stress: No Stress Concern Present (3/28/2024)    [employer] [place] [employer] - Occupational Stress Questionnaire     Feeling of Stress : Not at all   Social Connections: Unknown (3/28/2024)    Social Connection and Isolation Panel [NHANES]     Frequency of Social Gatherings with Friends and Family: Once a week   Interpersonal Safety: Low Risk  (7/18/2024)    Interpersonal Safety     Do you feel physically and emotionally safe where you currently live?: Yes     Within the past 12 months, have you been  "hit, slapped, kicked or otherwise physically hurt by someone?: No     Within the past 12 months, have you been humiliated or emotionally abused in other ways by your partner or ex-partner?: No   Housing Stability: Low Risk  (3/28/2024)    Housing Stability     Do you have housing? : Yes     Are you worried about losing your housing?: No       Review of Systems:  Skin:          Eyes:         ENT:         Respiratory:  Negative       Cardiovascular:    Positive for;edema;fatigue chest pain and palpitations occ  Gastroenterology:        Genitourinary:         Musculoskeletal:         Neurologic:         Psychiatric:         Heme/Lymph/Imm:         Endocrine:           Physical Exam:  Vitals: BP (!) 148/60   Pulse 83   Ht 1.6 m (5' 3\")   Wt 51.3 kg (113 lb)   LMP  (LMP Unknown)   BMI 20.02 kg/m      Constitutional:  cooperative;cooperative, alert and oriented, well developed, well nourished, in no acute distress        Skin:  warm and dry to the touch, no apparent skin lesions or masses noted bruises present;dusky;venous stasis changes (Healing ulcer on the back of the right calf.)  (Healing ulcer on the back of the right calf.) healed right shin wounds    Head:  normocephalic, no masses or lesions        Eyes:  pupils equal and round        Lymph:      ENT:  no pallor or cyanosis, dentition good        Neck:  no carotid bruit;carotid pulses are full and equal bilaterally JVP 10-12      Respiratory:  clear to auscultation;normal symmetry    marked diminished BS on left posterior lung as compared to right, no wheezing or rales    Cardiac: regular rhythm tachycardic     systolic ejection murmur;grade 3;RUSB;LUSB;LLSB   early diastolic murmur;LLSB;grade 1 single ectopic beat   not assessed this visit pulses below the femoral arteries are diminished                                      GI:  not assessed this visit        Extremities and Muscular Skeletal:  no deformities, clubbing, cyanosis, erythema observed stasis " pigmentation   RLE edema;pitting;1+ LLE edema;pitting;1+      Neurological:  no gross motor deficits;affect appropriate        Psych:  Alert and Oriented x 3        CC  Chance Fam PA-C  [id number] [place] [address]

## 2024-10-21 ENCOUNTER — TELEPHONE (OUTPATIENT)
Dept: FAMILY MEDICINE | Facility: CLINIC | Age: 84
End: 2024-10-21
Payer: MEDICARE

## 2024-10-21 ENCOUNTER — TELEPHONE (OUTPATIENT)
Dept: CARDIOLOGY | Facility: CLINIC | Age: 84
End: 2024-10-21
Payer: MEDICARE

## 2024-10-21 NOTE — TELEPHONE ENCOUNTER
[facility] Call Center    Phone Message    May a detailed message be left on voicemail: yes    Reason for Call: Patient called requesting to speak with a member of her care team. Patient states she doesn't understand her appointment from last week with Dr. Cueva, and would like someone to call her and explain to her that visit. Please call back to further discuss.    Thank you!  [facility]

## 2024-10-21 NOTE — TELEPHONE ENCOUNTER
Called patient, advised her that she should keep her appointment for the echo and follow-up with Dr. Sevilla.  Patient was asking about the cause of the congestive heart failure etc. patient advised that we will need the echo and that is a question for Dr. Sevilla when she sees her in December.  Reviewed upcoming appointments with her, patient states she will keep these appointments as scheduled.  RACHELLE Logan RN

## 2024-10-21 NOTE — TELEPHONE ENCOUNTER
Pt calls.    She said she was seen by Cardiology last week.  She said the doctor said she has heart failure and she wants to know what that is because she does not have a clue what that is?  Asked if she asked them?  She said he was not the one who she was supposed to see.  She said she does not feel like she knows what is happening.      See ov of 10/15/24.  Looks like they are going to do an echo.  She said they scheduled that in December.      Advised that some symptoms of chf could be SOB, fatigue, swelling, weight gain, frequent urination, cough, dizziness.  Advised those are some things to watch for.  Advised it may be a good idea to speak to Cardiology as they may be able to give better advisal.  Gave her the number to Cardiology.

## 2024-10-22 ENCOUNTER — ANCILLARY PROCEDURE (OUTPATIENT)
Dept: CARDIOLOGY | Facility: CLINIC | Age: 84
End: 2024-10-22
Attending: INTERNAL MEDICINE
Payer: MEDICARE

## 2024-10-22 DIAGNOSIS — I44.2 ATRIOVENTRICULAR BLOCK, COMPLETE (H): ICD-10-CM

## 2024-10-22 DIAGNOSIS — Z95.0 CARDIAC PACEMAKER IN SITU: ICD-10-CM

## 2024-10-22 PROCEDURE — 93296 REM INTERROG EVL PM/IDS: CPT | Performed by: INTERNAL MEDICINE

## 2024-10-22 PROCEDURE — 93294 REM INTERROG EVL PM/LDLS PM: CPT | Performed by: INTERNAL MEDICINE

## 2024-10-23 ENCOUNTER — OFFICE VISIT (OUTPATIENT)
Dept: OBGYN | Facility: CLINIC | Age: 84
End: 2024-10-23
Payer: MEDICARE

## 2024-10-23 VITALS
DIASTOLIC BLOOD PRESSURE: 70 MMHG | SYSTOLIC BLOOD PRESSURE: 134 MMHG | BODY MASS INDEX: 19.47 KG/M2 | HEIGHT: 63 IN | WEIGHT: 109.9 LBS

## 2024-10-23 DIAGNOSIS — L90.0 LICHEN SCLEROSUS ET ATROPHICUS: Primary | ICD-10-CM

## 2024-10-23 DIAGNOSIS — L72.9 SKIN CYST: ICD-10-CM

## 2024-10-23 PROCEDURE — 99204 OFFICE O/P NEW MOD 45 MIN: CPT | Performed by: FAMILY MEDICINE

## 2024-10-23 RX ORDER — CLOBETASOL PROPIONATE 0.5 MG/G
OINTMENT TOPICAL DAILY
Qty: 70 G | Refills: 11 | Status: SHIPPED | OUTPATIENT
Start: 2024-10-23

## 2024-10-23 NOTE — PATIENT INSTRUCTIONS
Return in 3 weeks    Dr. Aline Cooper,     Obstetrics and Gynecology  [facility] [place] [facility] [place]

## 2024-10-23 NOTE — PROGRESS NOTES
SUBJECTIVE:  Tomasa Fam is an 84 year old   woman who presents for   gynecology consult for clitroal mass, hx of lichen sclerosis, noting blood on underwear in the morning, for 3 weeks,   But not daily. Hx of hysterectomy.     No LMP recorded (lmp unknown). Patient has had a hysterectomy.     Current contraception: hysterectomy  History of abnormal Pap smear: No  Family history of uterine or ovarian cancer: No  History of abnormal mammogram: No  Family history of breast cancer: No        Past Medical History:   Diagnosis Date    Anemia     Arthritis     hands, knees    Breast cancer (H) 2012    left mastectomy followed by right;  Dr. Luong    Chronic airway obstruction, not elsewhere classified     very mild COPD - small cough    Concussion 2013     Problem list name updated by automated process. Provider to review and confirm [employer] Update utility    Cystocele, midline 2012    Diffuse cystic mastopathy     Gastroesophageal reflux disease, esophagitis presence not specified 2019    History of hypokalemia 2013    Hyperlipidemia LDL goal <160 2011    [place] 10-year CHD Risk Score: 2% (14 Total Points)  Values used to calculate score:    Age: 70 years -- Points: 14    Total Cholesterol: 192 mg/dL -- Points: 1    HDL Cholesterol: 61 mg/dL -- Points: -1    Systolic BP (treated): 118 mmHg -- Points: 0   The patient is not a smoker. -- Points: 0   The patient has not been diagnosed with diabetes. -- Points: 0   The patient does not have a famil    Lung cancer (H) 10/21/2015    Pacemaker     [employer]    Paroxysmal atrial fibrillation (H) 2019    PMR (polymyalgia rheumatica) (H) 2020    tapering' above.) In patients receiving over 10 mg of prednisone/day, the dose can be lowered by 2.5 mg/day decrements every two to four weeks Once the dose of prednisone is 10 mg/day, further tapering can be done by 1 mg per month, provided the clinical course is  stable  The clinical response to glucocorticoid therapy is closely monitored, which centers on screening for the presence and/or recu    Thyroid nodule 04/23/2016    Noted on CT Fall 2015.     Unspecified essential hypertension late 1980's          Family History   Problem Relation Age of Onset    Cardiovascular Father         ruptured aorta, hardening of the arteries    Cerebrovascular Disease Mother     Respiratory Mother         chronic bronchitis - was a smoker early on    Cardiovascular Paternal Grandfather         MI    Cerebrovascular Disease Paternal Aunt     Hypertension Son     Neurologic Disorder Daughter         migraines    Breast Cancer Daughter     Brain Tumor Sister        Past Surgical History:   Procedure Laterality Date    ANESTHESIA CARDIOVERSION N/A 6/12/2020    Procedure: ANESTHESIA, FOR CARDIOVERSION;  Surgeon: GENERIC ANESTHESIA PROVIDER;  Location: [facility] OR    ARTHROPLASTY KNEE Right 7/25/2022    Procedure: Right total knee arthroplasty;  Surgeon: Nick Parra MD;  Location:  OR    COLONOSCOPY  3/2003    adenomatous polyp     COLONOSCOPY  7/2006    diverticulosis - repeat in 5 years    COLONOSCOPY  10/13/2011    Procedure:COLONOSCOPY; COLONOSCOPY ; Surgeon:CHITO GORDILLO; Location: GI    CYSTOSCOPY  7/11/2012    Procedure: CYSTOSCOPY;;  Surgeon: Aline Cooper DO;  Location:  OR    DAVINCI HYSTERECTOMY SUPRACERVICAL, SACROCOLPOPEXY, COMBINED  7/11/2012    Procedure: COMBINED DAVINCI HYSTERECTOMY SUPRACERVICAL, SACROCOLPOPEXY;   DAVINCI ASSISTED LAPAROSCOPIC SUPRACERVICAL HYSTERECTOMY AND BILATERAL SALPINGO-OOPHORECTOMY, SACROCOLPOPEXY AND CYSTOSCOPY;  Surgeon: Aline Cooper DO;  Location:  OR    EP ABLATION AV NODE N/A 6/22/2020    Procedure: EP ABLATION AV NODE;  Surgeon: Adriano Raman MD;  Location:  [facility]    EP BIVENT LEAD PLACEMENT N/A 6/22/2020    Procedure: Bivent Lead Placement;  Surgeon: Adriano Raman MD;  Location:   [facility]    EP PACEMAKER N/A 6/22/2020    Procedure: AVNA and BiV Pacemaker Insertion;  Surgeon: Adriano Raman MD;  Location:  [facility]    ESOPHAGOSCOPY, GASTROSCOPY, DUODENOSCOPY (EGD), COMBINED N/A 12/14/2021    Procedure: ESOPHAGOGASTRODUODENOSCOPY (EGD) (fv) biopsies with cold forcep;  Surgeon: Roberto Nguyen MD;  Location:  GI    EXCISE LESION EYELID Right 6/29/2015    Procedure: EXCISE LESION EYELID;  Surgeon: Hank Olvera MD;  Location: [facility]    INSERT PORT VASCULAR ACCESS  2/3/2012    Procedure:INSERT PORT VASCULAR ACCESS; Power Port-A- Catheter Placement ; Surgeon:IRISH TAPIA; Location: OR    LAPAROSCOPIC SALPINGO-OOPHORECTOMY  7/11/2012    Procedure: LAPAROSCOPIC SALPINGO-OOPHORECTOMY;  Davinci;  Surgeon: Aline Cooper DO;  Location:  OR    LIPOSUCTION, RHYTIDECTOMY, COMBINED      LOBECTOMY LUNG Right 3/1/2016    Procedure: LOBECTOMY LUNG;  Surgeon: Jonas Woodward MD;  Location:  OR    MAMMOPLASTY REDUCTION  1/6/2012    Procedure:MAMMOPLASTY REDUCTION; Surgeon:MICKI CAICEDO; Location: OR    MASTECTOMY SIMPLE  11/12/2012    Procedure: MASTECTOMY SIMPLE;   Right Prophylactic Mastectomy with attempted Right Sentinal Node Biopsy, Revision Bilateral Mastectomy Insicions, liposuction in breast area;  Surgeon: Irish Tapia MD;  Location: [facility] OR    MASTECTOMY SIMPLE, SENTINEL NODE, COMBINED  1/6/2012    Procedure:COMBINED MASTECTOMY SIMPLE, SENTINEL NODE; Left Mastectomy Left [place] Node Biopsy,  Right Breast Reduction ; Surgeon:IRISH TAPIA; Location:[facility] OR    MASTECTOMY, BILATERAL      REMOVE PORT VASCULAR ACCESS  4/29/2013    Procedure: REMOVE PORT VASCULAR ACCESS;  Port A catheter removal ;  Surgeon: Irish Tapia MD;  Location:  OR    REPAIR PTOSIS BILATERAL Bilateral 6/29/2015    Procedure: REPAIR PTOSIS BILATERAL;  Surgeon: Hank Olvera MD;  Location: [facility]    REVISE RECONSTRUCTED  BREAST BILATERAL  11/12/2012    Procedure: REVISE RECONSTRUCTED BREAST BILATERAL;;  Surgeon: Katia Kyle MD;  Location: [facility] OR    SURGICAL HISTORY OF -   in 40's    face lift    SURGICAL HISTORY OF -       lipoma removed right thigh    SURGICAL HISTORY OF -       D and C    THORACOTOMY Right 3/1/2016    Procedure: THORACOTOMY;  Surgeon: Jonas Woodward MD;  Location: [facility] OR    ZZC NONSPECIFIC PROCEDURE  1970    s/p Tubal ligation 1970       Current Outpatient Medications   Medication Sig Dispense Refill    acetaminophen (TYLENOL) 325 MG tablet Take 2 tablets (650 mg) by mouth every 4 hours as needed for other (mild pain) 100 tablet 0    amLODIPine (NORVASC) 5 MG tablet TAKE 1 TABLET(5 MG) BY MOUTH TWICE DAILY 180 tablet 3    apixaban ANTICOAGULANT (ELIQUIS) 2.5 MG tablet Take 1 tablet (2.5 mg) by mouth 2 times daily 180 tablet 3    Cobalamin Combinations (VITAMIN B12-FOLIC ACID) 500-400 MCG TABS       ezetimibe (ZETIA) 10 MG tablet TAKE 1 TABLET(10 MG) BY MOUTH EVERY EVENING 90 tablet 1    fish oil-omega-3 fatty acids 1000 MG capsule TAKE 1 CAPSULE BY MOUTH DAILY 90 capsule 3    furosemide (LASIX) 40 MG tablet Take 1 tablet (40 mg) by mouth daily. 90 tablet 3    metoprolol tartrate (LOPRESSOR) 100 MG tablet TAKE 1 TABLET(100 MG) BY MOUTH TWICE DAILY 180 tablet 1    polyethylene glycol (MIRALAX) 17 GM/Dose powder Take 17 g by mouth daily as needed 510 g 0    Bempedoic Acid 180 MG TABS Take 180 mg by mouth daily (Patient not taking: Reported on 10/15/2024) 90 tablet 3    nifedipine 0.2% in white petrolatum 0.2 % OINT ointment Apply topically 2 times daily. (Patient not taking: Reported on 10/15/2024) 30 g 0    omeprazole (PRILOSEC) 20 MG DR capsule Take 20 mg by mouth daily as needed (Patient not taking: Reported on 10/15/2024) 90 capsule      No current facility-administered medications for this visit.     Allergies   Allergen Reactions    No Known Drug Allergy     Tape [Adhesive Tape]       "Sensitive to plastic tape on upper part of body--takes skin off       Social History     Tobacco Use    Smoking status: Never     Passive exposure: Never    Smokeless tobacco: Never   Substance Use Topics    Alcohol use: Yes     Comment: 0-1 drink day       Review Of Systems  Ears/Nose/Throat: negative  Respiratory: No shortness of breath, dyspnea on exertion, cough, or hemoptysis  Cardiovascular: negative  Gastrointestinal: negative  Genitourinary: See HPI   Constitutional, HEENT, cardiovascular, pulmonary, GI, , musculoskeletal, neuro, skin, endocrine and psych systems are negative, except as otherwise noted.    OBJECTIVE:  /70   Ht 1.6 m (5' 3\")   Wt 49.9 kg (109 lb 14.4 oz)   LMP  (LMP Unknown)   BMI 19.47 kg/m    General appearance: healthy, alert, and no distress  Skin: Skin color, texture, turgor normal. No rashes or lesions.  Ears: negative  Nose/Sinuses: Nares normal. Septum midline. Mucosa normal. No drainage or sinus tenderness.  Oropharynx: Lips, mucosa, and tongue normal. Teeth and gums normal.  Neck: Neck supple. No adenopathy. Thyroid symmetric, normal size,, Carotids without bruits.  Lungs: negative, Percussion normal. Good diaphragmatic excursion. Lungs clear  Heart: negative, PMI normal. No lifts, heaves, or thrills. RRR. No murmurs, clicks gallops or rub  Abdomen:   Pelvic: Pelvic:  Pelvic examination with no pap/no Gonorrhea and Chlamydia   including  External genitalia with lichen sclerosis changes,  normal clitoris     ASSESSMENT:  Tomasa Fam is an 84 year old   woman who presents for   gynecology consult for clitroal mass, hx of lichen sclerosis, noting blood on underwear in the morning, for 3 weeks,   But not daily. Hx of hysterectomy.     PLAN:  Dx:  1)  Lichen sclerosis:  restart clobetasol cream.    No mass on clitoris seen. If swelling occurs again, suspect infection   From skin changes from LS.  Call for antibiotic if needed   Return in 2 weeks  2)  normal " cervix on exam    Labs were reviewed in Epic   Imaging was reviewed in Epic   Tests and documents were reviewed. =  Discussion of management or test interpretation =  Diagnosis or treatment significantly limited by social determinant           Dr. Aline Cooper, DO    Obstetrics and Gynecology  [facility] [place]

## 2024-10-23 NOTE — NURSING NOTE
"Chief Complaint   Patient presents with    Vaginal Problem     Cyst near clitoral richardson area--not painful--bleeds sometimes--noticed it for a couple months       Initial /70   Ht 1.6 m (5' 3\")   Wt 49.9 kg (109 lb 14.4 oz)   LMP  (LMP Unknown)   BMI 19.47 kg/m   Estimated body mass index is 19.47 kg/m  as calculated from the following:    Height as of this encounter: 1.6 m (5' 3\").    Weight as of this encounter: 49.9 kg (109 lb 14.4 oz).  BP completed using cuff size: regular    Questioned patient about current smoking habits.  Pt. has never smoked.          The following HM Due: NONE    "

## 2024-10-24 LAB
MDC_IDC_EPISODE_DTM: NORMAL
MDC_IDC_EPISODE_ID: NORMAL
MDC_IDC_EPISODE_TYPE: NORMAL
MDC_IDC_LEAD_CONNECTION_STATUS: NORMAL
MDC_IDC_LEAD_CONNECTION_STATUS: NORMAL
MDC_IDC_LEAD_IMPLANT_DT: NORMAL
MDC_IDC_LEAD_IMPLANT_DT: NORMAL
MDC_IDC_LEAD_LOCATION: NORMAL
MDC_IDC_LEAD_LOCATION: NORMAL
MDC_IDC_LEAD_LOCATION_DETAIL_1: NORMAL
MDC_IDC_LEAD_LOCATION_DETAIL_1: NORMAL
MDC_IDC_LEAD_MFG: NORMAL
MDC_IDC_LEAD_MFG: NORMAL
MDC_IDC_LEAD_MODEL: NORMAL
MDC_IDC_LEAD_MODEL: NORMAL
MDC_IDC_LEAD_POLARITY_TYPE: NORMAL
MDC_IDC_LEAD_POLARITY_TYPE: NORMAL
MDC_IDC_LEAD_SERIAL: NORMAL
MDC_IDC_LEAD_SERIAL: NORMAL
MDC_IDC_MSMT_BATTERY_DTM: NORMAL
MDC_IDC_MSMT_BATTERY_REMAINING_LONGEVITY: 84 MO
MDC_IDC_MSMT_BATTERY_REMAINING_PERCENTAGE: 94 %
MDC_IDC_MSMT_BATTERY_STATUS: NORMAL
MDC_IDC_MSMT_LEADCHNL_LV_IMPEDANCE_VALUE: 641 OHM
MDC_IDC_MSMT_LEADCHNL_LV_PACING_THRESHOLD_AMPLITUDE: 1.2 V
MDC_IDC_MSMT_LEADCHNL_LV_PACING_THRESHOLD_PULSEWIDTH: 0.4 MS
MDC_IDC_MSMT_LEADCHNL_RV_IMPEDANCE_VALUE: 623 OHM
MDC_IDC_MSMT_LEADCHNL_RV_PACING_THRESHOLD_AMPLITUDE: 1.1 V
MDC_IDC_MSMT_LEADCHNL_RV_PACING_THRESHOLD_PULSEWIDTH: 0.4 MS
MDC_IDC_PG_IMPLANT_DTM: NORMAL
MDC_IDC_PG_MFG: NORMAL
MDC_IDC_PG_MODEL: NORMAL
MDC_IDC_PG_SERIAL: NORMAL
MDC_IDC_PG_TYPE: NORMAL
MDC_IDC_SESS_CLINIC_NAME: NORMAL
MDC_IDC_SESS_DTM: NORMAL
MDC_IDC_SESS_TYPE: NORMAL
MDC_IDC_SET_BRADY_AT_MODE_SWITCH_RATE: 170 {BEATS}/MIN
MDC_IDC_SET_BRADY_LOWRATE: 70 {BEATS}/MIN
MDC_IDC_SET_BRADY_MAX_SENSOR_RATE: 130 {BEATS}/MIN
MDC_IDC_SET_BRADY_MODE: NORMAL
MDC_IDC_SET_CRT_LVRV_DELAY: 30 MS
MDC_IDC_SET_CRT_PACED_CHAMBERS: NORMAL
MDC_IDC_SET_LEADCHNL_LV_PACING_AMPLITUDE: 2.7 V
MDC_IDC_SET_LEADCHNL_LV_PACING_ANODE_ELECTRODE_1: NORMAL
MDC_IDC_SET_LEADCHNL_LV_PACING_ANODE_LOCATION_1: NORMAL
MDC_IDC_SET_LEADCHNL_LV_PACING_CATHODE_ELECTRODE_1: NORMAL
MDC_IDC_SET_LEADCHNL_LV_PACING_CATHODE_LOCATION_1: NORMAL
MDC_IDC_SET_LEADCHNL_LV_PACING_PULSEWIDTH: 0.4 MS
MDC_IDC_SET_LEADCHNL_LV_SENSING_ADAPTATION_MODE: NORMAL
MDC_IDC_SET_LEADCHNL_LV_SENSING_ANODE_ELECTRODE_1: NORMAL
MDC_IDC_SET_LEADCHNL_LV_SENSING_ANODE_LOCATION_1: NORMAL
MDC_IDC_SET_LEADCHNL_LV_SENSING_CATHODE_ELECTRODE_1: NORMAL
MDC_IDC_SET_LEADCHNL_LV_SENSING_CATHODE_LOCATION_1: NORMAL
MDC_IDC_SET_LEADCHNL_LV_SENSING_SENSITIVITY: 2.5 MV
MDC_IDC_SET_LEADCHNL_RA_SENSING_ADAPTATION_MODE: NORMAL
MDC_IDC_SET_LEADCHNL_RA_SENSING_SENSITIVITY: 0.5 MV
MDC_IDC_SET_LEADCHNL_RV_PACING_AMPLITUDE: 2.4 V
MDC_IDC_SET_LEADCHNL_RV_PACING_CAPTURE_MODE: NORMAL
MDC_IDC_SET_LEADCHNL_RV_PACING_POLARITY: NORMAL
MDC_IDC_SET_LEADCHNL_RV_PACING_PULSEWIDTH: 0.4 MS
MDC_IDC_SET_LEADCHNL_RV_SENSING_ADAPTATION_MODE: NORMAL
MDC_IDC_SET_LEADCHNL_RV_SENSING_POLARITY: NORMAL
MDC_IDC_SET_LEADCHNL_RV_SENSING_SENSITIVITY: 2.5 MV
MDC_IDC_SET_ZONE_DETECTION_INTERVAL: 375 MS
MDC_IDC_SET_ZONE_STATUS: NORMAL
MDC_IDC_SET_ZONE_TYPE: NORMAL
MDC_IDC_SET_ZONE_VENDOR_TYPE: NORMAL
MDC_IDC_STAT_BRADY_DTM_END: NORMAL
MDC_IDC_STAT_BRADY_DTM_START: NORMAL
MDC_IDC_STAT_BRADY_RA_PERCENT_PACED: 0 %
MDC_IDC_STAT_BRADY_RV_PERCENT_PACED: 99 %
MDC_IDC_STAT_CRT_DTM_END: NORMAL
MDC_IDC_STAT_CRT_DTM_START: NORMAL
MDC_IDC_STAT_CRT_LV_PERCENT_PACED: 98 %
MDC_IDC_STAT_EPISODE_RECENT_COUNT: 0
MDC_IDC_STAT_EPISODE_RECENT_COUNT_DTM_END: NORMAL
MDC_IDC_STAT_EPISODE_RECENT_COUNT_DTM_START: NORMAL
MDC_IDC_STAT_EPISODE_TYPE: NORMAL
MDC_IDC_STAT_EPISODE_VENDOR_TYPE: NORMAL
MDC_IDC_STAT_EPISODE_VENDOR_TYPE: NORMAL

## 2024-10-29 ENCOUNTER — LAB (OUTPATIENT)
Dept: LAB | Facility: CLINIC | Age: 84
End: 2024-10-29
Payer: MEDICARE

## 2024-10-29 DIAGNOSIS — M35.3 PMR (POLYMYALGIA RHEUMATICA) (H): ICD-10-CM

## 2024-10-29 DIAGNOSIS — M31.6 GCA (GIANT CELL ARTERITIS) (H): ICD-10-CM

## 2024-10-29 DIAGNOSIS — I50.9 ACUTE CONGESTIVE HEART FAILURE, UNSPECIFIED HEART FAILURE TYPE (H): ICD-10-CM

## 2024-10-29 DIAGNOSIS — E78.5 HYPERLIPIDEMIA LDL GOAL <70: ICD-10-CM

## 2024-10-29 LAB
ALBUMIN SERPL BCG-MCNC: 4.4 G/DL (ref 3.5–5.2)
ALP SERPL-CCNC: 127 U/L (ref 40–150)
ALT SERPL W P-5'-P-CCNC: 20 U/L (ref 0–50)
ANION GAP SERPL CALCULATED.3IONS-SCNC: 13 MMOL/L (ref 7–15)
APO A-I SERPL-MCNC: 46 MG/DL
AST SERPL W P-5'-P-CCNC: 25 U/L (ref 0–45)
BILIRUB SERPL-MCNC: 0.8 MG/DL
BUN SERPL-MCNC: 18.9 MG/DL (ref 8–23)
CALCIUM SERPL-MCNC: 10.4 MG/DL (ref 8.8–10.4)
CHLORIDE SERPL-SCNC: 100 MMOL/L (ref 98–107)
CREAT SERPL-MCNC: 0.69 MG/DL (ref 0.51–0.95)
CRP SERPL HS-MCNC: 7.73 MG/L
CRP SERPL-MCNC: 7.99 MG/L
EGFRCR SERPLBLD CKD-EPI 2021: 85 ML/MIN/1.73M2
ERYTHROCYTE [SEDIMENTATION RATE] IN BLOOD BY WESTERGREN METHOD: 18 MM/HR (ref 0–30)
GLUCOSE SERPL-MCNC: 96 MG/DL (ref 70–99)
HCO3 SERPL-SCNC: 27 MMOL/L (ref 22–29)
POTASSIUM SERPL-SCNC: 3 MMOL/L (ref 3.4–5.3)
PROT SERPL-MCNC: 7.2 G/DL (ref 6.4–8.3)
SODIUM SERPL-SCNC: 140 MMOL/L (ref 135–145)

## 2024-10-29 PROCEDURE — 80061 LIPID PANEL: CPT | Mod: 90

## 2024-10-29 PROCEDURE — 36415 COLL VENOUS BLD VENIPUNCTURE: CPT

## 2024-10-29 PROCEDURE — 86141 C-REACTIVE PROTEIN HS: CPT

## 2024-10-29 PROCEDURE — 99000 SPECIMEN HANDLING OFFICE-LAB: CPT

## 2024-10-29 PROCEDURE — 83704 LIPOPROTEIN BLD QUAN PART: CPT | Mod: 90

## 2024-10-29 PROCEDURE — 80053 COMPREHEN METABOLIC PANEL: CPT

## 2024-10-29 PROCEDURE — 83695 ASSAY OF LIPOPROTEIN(A): CPT

## 2024-10-29 PROCEDURE — 85652 RBC SED RATE AUTOMATED: CPT

## 2024-10-30 ENCOUNTER — TELEPHONE (OUTPATIENT)
Dept: CARDIOLOGY | Facility: CLINIC | Age: 84
End: 2024-10-30
Payer: MEDICARE

## 2024-10-30 DIAGNOSIS — E87.6 HYPOKALEMIA: Primary | ICD-10-CM

## 2024-10-30 RX ORDER — POTASSIUM CHLORIDE 1500 MG/1
20 TABLET, EXTENDED RELEASE ORAL 2 TIMES DAILY
Qty: 60 TABLET | Refills: 2 | Status: SHIPPED | OUTPATIENT
Start: 2024-10-30 | End: 2024-10-31

## 2024-10-30 NOTE — TELEPHONE ENCOUNTER
Discussed with Dr. Tru Tran, recommends potassium supplement 20 mEq twice daily and recheck potassium in 1 week.      Called pt with recommendations from Dr. Cueva. Prescription escripted to [facility] & order placed for BMP in 1 week. Pt will schedule this when she is in clinic for an echo on Monday.

## 2024-10-30 NOTE — TELEPHONE ENCOUNTER
Reviewed metabolic panel showing   Recent Labs   Lab Test 10/29/24  1035 10/04/24  1428    137   POTASSIUM 3.0* 4.0   CHLORIDE 100 102   CO2 27 24   ANIONGAP 13 11   GLC 96 104*   BUN 18.9 16.8   CR 0.69 0.73   CHARLIE 10.4 10.4      Per office note dated 10/15/24, Dr. Cueva recommended:   1.  Firstly we will increase the dose of furosemide from 20 mg to 40 mg/day.  2.  We will get an echocardiogram performed soon as possible to determine the mechanism of her heart failure whether this be diastolic or systolic heart failure.  Also we need to determine the nature of the murmurs.  3.  I have the patient return to see an ANDER in approximately 2 weeks time with a repeat basic metabolic profile as we have increased the dose of the furosemide.  We also need to determine if she is responding to our medical therapy.  4.  We will have the patient see Dr. Mireles for further assessment of ulceration of the lower extremity.    Pt has appt 11/04/24 for echo & sees Dr. Sevilla 12/06. Will message Dr. Cueva to review. Maribel MCCOLLUM

## 2024-10-30 NOTE — TELEPHONE ENCOUNTER
The patient is Dr. Sevilla's and I would have Dr. Sevilla  address potassium replacement.  Thank you

## 2024-10-31 DIAGNOSIS — E87.6 HYPOKALEMIA: ICD-10-CM

## 2024-10-31 RX ORDER — POTASSIUM CHLORIDE 1500 MG/1
20 TABLET, EXTENDED RELEASE ORAL 2 TIMES DAILY
Qty: 180 TABLET | Refills: 0 | Status: SHIPPED | OUTPATIENT
Start: 2024-10-31 | End: 2024-11-07

## 2024-11-03 LAB
CHOLEST SERPL-MCNC: 179 MG/DL
HDL SERPL QN: 9.7 NM
HDL SERPL-SCNC: 28.2 UMOL/L
HDLC SERPL-MCNC: 64 MG/DL
HLD.LARGE SERPL-SCNC: 10.7 UMOL/L
LDL SERPL QN: 21 NM
LDL SERPL-SCNC: 1224 NMOL/L
LDL SMALL SERPL-SCNC: 429 NMOL/L
LDLC SERPL CALC-MCNC: 98 MG/DL
PATHOLOGY STUDY: ABNORMAL
TRIGL SERPL-MCNC: 84 MG/DL
VLDL LARGE SERPL-SCNC: <1.5 NMOL/L
VLDL SERPL QN: 46.6 NM

## 2024-11-04 ENCOUNTER — HOSPITAL ENCOUNTER (OUTPATIENT)
Dept: CARDIOLOGY | Facility: CLINIC | Age: 84
Discharge: HOME | End: 2024-11-04
Attending: INTERNAL MEDICINE | Admitting: INTERNAL MEDICINE
Payer: MEDICARE

## 2024-11-04 DIAGNOSIS — I35.8 AORTIC DIASTOLIC MURMUR: ICD-10-CM

## 2024-11-04 DIAGNOSIS — R01.1 SYSTOLIC EJECTION MURMUR: ICD-10-CM

## 2024-11-04 DIAGNOSIS — R06.09 DYSPNEA ON EXERTION: ICD-10-CM

## 2024-11-04 DIAGNOSIS — I50.9 ACUTE CONGESTIVE HEART FAILURE, UNSPECIFIED HEART FAILURE TYPE (H): ICD-10-CM

## 2024-11-04 LAB — LVEF ECHO: NORMAL

## 2024-11-04 PROCEDURE — 93306 TTE W/DOPPLER COMPLETE: CPT | Mod: 26 | Performed by: INTERNAL MEDICINE

## 2024-11-04 PROCEDURE — 93306 TTE W/DOPPLER COMPLETE: CPT

## 2024-11-05 ENCOUNTER — TELEPHONE (OUTPATIENT)
Dept: OTHER | Facility: CLINIC | Age: 84
End: 2024-11-05
Payer: MEDICARE

## 2024-11-05 ENCOUNTER — TELEPHONE (OUTPATIENT)
Dept: FAMILY MEDICINE | Facility: CLINIC | Age: 84
End: 2024-11-05
Payer: MEDICARE

## 2024-11-05 DIAGNOSIS — E87.6 HYPOKALEMIA: Primary | ICD-10-CM

## 2024-11-05 NOTE — TELEPHONE ENCOUNTER
Patient does not want to schedule with Dr Wong. Please remove her from call lists/follow up visits.    Routing to RN Triage as FYI, patient also declined to schedule 9/17/24.

## 2024-11-05 NOTE — TELEPHONE ENCOUNTER
Called patient and LVM in regards to message below.   Thwaprt message also sent to patient.    Routing to scheduling to coordinate the following:    Non-fasting BMP lab in 1 week  Virtual follow up next available      Appt note: Follow up to discuss labs    Jacki ORDOÑEZ, VEDA    [facility]  Office: [phone]  Fax: [phone]

## 2024-11-05 NOTE — TELEPHONE ENCOUNTER
Start the patient and potassium chloride 20 megv twice a day and recheck K in 3 days. This is Dr Little patient whom I saw in her abscence. thx

## 2024-11-05 NOTE — TELEPHONE ENCOUNTER
Attempted to reach patient, message left to call [phone] to speak with nurse.  Jennifer Moreland RN on 11/5/2024 at 2:58 PM

## 2024-11-05 NOTE — TELEPHONE ENCOUNTER
Orders removed.    Jacki ORDOÑEZ, RN    [facility]  Vascular [facility]  Office: [phone]  Fax: [phone]

## 2024-11-05 NOTE — TELEPHONE ENCOUNTER
----- Message from Fercho Wong sent at 11/4/2024 12:10 PM CST -----  Pt's K is low. Please call patient and advise her to take extra 20 mEq K tablet daily for next three days, Please recheck BMP in one week, and have patient see me in my next available green spot.

## 2024-11-05 NOTE — TELEPHONE ENCOUNTER
"Pt called in after getting call from vascular office, see 11/5  message Vascular:      \"Your Potassium is a little low and Dr. Wong would like you to take a extra Potassium pill for the next 3 days and then re-check your potassium level in 1 week- I will have a  call you to schedule this. \"    Pt reports she did not like Dr Wong and doesn't want to go to him.  Pt is requesting her potassium be managed by PCP or Cardiology.  Advised pt she could request to see a different provider at Vascular, pt does not want to go back to that clinic.    Pt is open to referral to a different vascular clinic.    Routed to Chance Fam, please review and advise.    Leydi Joyce RN, BSN  [facility]    "

## 2024-11-05 NOTE — TELEPHONE ENCOUNTER
I was not the one who prescribed the K+.  Also on Lasix right now which is not from me either.  Please forward concern and messages to cardiology.      Chance

## 2024-11-05 NOTE — TELEPHONE ENCOUNTER
Routing to Cardiologist and team to address pt's concerns.     Not sure of nurse pool name, once reviewed, please route to appropriate nurse pool to address pt.    Shani Dejesus, SEKOUN, RN     [facility]    11/05/2024 at 11:55 AM

## 2024-11-06 ENCOUNTER — LAB (OUTPATIENT)
Dept: LAB | Facility: CLINIC | Age: 84
End: 2024-11-06
Payer: MEDICARE

## 2024-11-06 DIAGNOSIS — E87.6 HYPOKALEMIA: ICD-10-CM

## 2024-11-06 PROCEDURE — 80048 BASIC METABOLIC PNL TOTAL CA: CPT

## 2024-11-06 PROCEDURE — 36415 COLL VENOUS BLD VENIPUNCTURE: CPT

## 2024-11-06 NOTE — TELEPHONE ENCOUNTER
See telephone encounter dated 10/30/2024, patient was started on potassium 20 mill equivalents twice daily last week and was supposed to get a repeat BMP done this week.  BMP was not done.  Order is in chart and patient was transferred to scheduling to arrange BMP in [facility].  RACHELLE Logan RN

## 2024-11-07 DIAGNOSIS — I50.9 ACUTE CONGESTIVE HEART FAILURE, UNSPECIFIED HEART FAILURE TYPE (H): Primary | ICD-10-CM

## 2024-11-07 DIAGNOSIS — E87.6 HYPOKALEMIA: ICD-10-CM

## 2024-11-07 LAB
ANION GAP SERPL CALCULATED.3IONS-SCNC: 13 MMOL/L (ref 7–15)
BUN SERPL-MCNC: 27.2 MG/DL (ref 8–23)
CALCIUM SERPL-MCNC: 10.6 MG/DL (ref 8.8–10.4)
CHLORIDE SERPL-SCNC: 102 MMOL/L (ref 98–107)
CREAT SERPL-MCNC: 0.71 MG/DL (ref 0.51–0.95)
EGFRCR SERPLBLD CKD-EPI 2021: 83 ML/MIN/1.73M2
GLUCOSE SERPL-MCNC: 104 MG/DL (ref 70–99)
HCO3 SERPL-SCNC: 23 MMOL/L (ref 22–29)
POTASSIUM SERPL-SCNC: 4.5 MMOL/L (ref 3.4–5.3)
SODIUM SERPL-SCNC: 138 MMOL/L (ref 135–145)

## 2024-11-07 RX ORDER — POTASSIUM CHLORIDE 1500 MG/1
20 TABLET, EXTENDED RELEASE ORAL DAILY
Qty: 90 TABLET | Refills: 0 | Status: SHIPPED | OUTPATIENT
Start: 2024-11-07

## 2024-11-07 NOTE — TELEPHONE ENCOUNTER
Called patient with recommendations from Dr. Tru Tran to decrease the potassium to 20 mEq once daily and repeat BMP in 2 weeks.  Patient verbalized understanding.  Order in chart and patient will call scheduling to arrange.      Patient is asking about her echocardiogram showing  The visual ejection fraction is 55-60%.  Flattened septum consistent with right ventricular volume overload  The right ventricle is normal in structure, function and size.  There is mild (1+) mitral regurgitation.  There is moderate to mod-severe (2-3+) tricuspid regurgitation.  Right ventricular systolic pressure is elevated, consistent with severe  pulmonary hypertension.  Mild valvular aortic stenosis.  There is mild (1+) aortic regurgitation.  There is mild to moderate (1-2+) pulmonic valvular regurgitation.  IVC diameter >2.1 cm collapsing <50% with sniff suggests a high RA pressure  estimated at 15 mmHg or greater.  Compared to prior study, changes are noted.    Will message Dr. Cueva to review. Maribel MCCOLLUM

## 2024-11-07 NOTE — TELEPHONE ENCOUNTER
Reviewed BMP showing   Recent Labs   Lab Test 11/06/24  1403 10/29/24  1035    140   POTASSIUM 4.5 3.0*   CHLORIDE 102 100   CO2 23 27   ANIONGAP 13 13   * 96   BUN 27.2* 18.9   CR 0.71 0.69   CHARLIE 10.6* 10.4      Will message Dr. Cueva to review. Maribel MCCOLLUM

## 2024-11-07 NOTE — TELEPHONE ENCOUNTER
Would drop down to 20 mill equivalents per day of potassium chloride and recheck a basic metabolic profile in 2 weeks time.

## 2024-11-08 NOTE — TELEPHONE ENCOUNTER
Results and recommendations were sent to patient via result note with request to reply or call with questions or concerns.  RACHELLE Logan RN

## 2024-11-20 ENCOUNTER — OFFICE VISIT (OUTPATIENT)
Dept: OBGYN | Facility: CLINIC | Age: 84
End: 2024-11-20
Payer: MEDICARE

## 2024-11-20 VITALS
SYSTOLIC BLOOD PRESSURE: 140 MMHG | HEIGHT: 63 IN | BODY MASS INDEX: 19.38 KG/M2 | DIASTOLIC BLOOD PRESSURE: 78 MMHG | WEIGHT: 109.4 LBS

## 2024-11-20 DIAGNOSIS — L90.0 LICHEN SCLEROSUS ET ATROPHICUS: ICD-10-CM

## 2024-11-20 PROCEDURE — 99214 OFFICE O/P EST MOD 30 MIN: CPT | Performed by: FAMILY MEDICINE

## 2024-11-20 RX ORDER — CLOBETASOL PROPIONATE 0.5 MG/G
OINTMENT TOPICAL DAILY
Qty: 70 G | Refills: 11 | Status: SHIPPED | OUTPATIENT
Start: 2024-11-20

## 2024-11-20 NOTE — NURSING NOTE
"Chief Complaint   Patient presents with    RECHECK     Using cream 3 times a week--feels good--no blood       Initial BP (!) 140/78   Ht 1.6 m (5' 3\")   Wt 49.6 kg (109 lb 6.4 oz)   LMP  (LMP Unknown)   BMI 19.38 kg/m   Estimated body mass index is 19.38 kg/m  as calculated from the following:    Height as of this encounter: 1.6 m (5' 3\").    Weight as of this encounter: 49.6 kg (109 lb 6.4 oz).  BP completed using cuff size: regular    Questioned patient about current smoking habits.  Pt. has never smoked.          The following HM Due: NONE    "

## 2024-11-20 NOTE — PATIENT INSTRUCTIONS
Return yearly     Dr. Aline Cooper, DO    Obstetrics and Gynecology  [facility]

## 2024-11-20 NOTE — PROGRESS NOTES
SUBJECTIVE:  Tomasa Fam is an 84 year old   woman who presents for   gynecology consult for lichen sclerosis.    No LMP recorded (lmp unknown). Patient has had a hysterectomy.       Current contraception: none      Past Medical History:   Diagnosis Date    Anemia     Arthritis     hands, knees    Breast cancer (H) 2012    left mastectomy followed by right;  Dr. Luong    Chronic airway obstruction, not elsewhere classified 2006    very mild COPD - small cough    Concussion 2013     Problem list name updated by automated process. Provider to review and confirm [employer] Update utility    Cystocele, midline 2012    Diffuse cystic mastopathy     Gastroesophageal reflux disease, esophagitis presence not specified 2019    History of hypokalemia 2013    Hyperlipidemia LDL goal <160 2011    [place] 10-year CHD Risk Score: 2% (14 Total Points)  Values used to calculate score:    Age: 70 years -- Points: 14    Total Cholesterol: 192 mg/dL -- Points: 1    HDL Cholesterol: 61 mg/dL -- Points: -1    Systolic BP (treated): 118 mmHg -- Points: 0   The patient is not a smoker. -- Points: 0   The patient has not been diagnosed with diabetes. -- Points: 0   The patient does not have a famil    Lung cancer (H) 10/21/2015    Pacemaker     [employer]    Paroxysmal atrial fibrillation (H) 2019    PMR (polymyalgia rheumatica) (H) 2020    tapering' above.) In patients receiving over 10 mg of prednisone/day, the dose can be lowered by 2.5 mg/day decrements every two to four weeks Once the dose of prednisone is 10 mg/day, further tapering can be done by 1 mg per month, provided the clinical course is stable  The clinical response to glucocorticoid therapy is closely monitored, which centers on screening for the presence and/or recu    Thyroid nodule 2016    Noted on CT 2015.     Unspecified essential hypertension late           Family History   Problem Relation  Age of Onset    Cardiovascular Father         ruptured aorta, hardening of the arteries    Cerebrovascular Disease Mother     Respiratory Mother         chronic bronchitis - was a smoker early on    Cardiovascular Paternal Grandfather         MI    Cerebrovascular Disease Paternal Aunt     Hypertension Son     Neurologic Disorder Daughter         migraines    Breast Cancer Daughter     Brain Tumor Sister        Past Surgical History:   Procedure Laterality Date    ANESTHESIA CARDIOVERSION N/A 6/12/2020    Procedure: ANESTHESIA, FOR CARDIOVERSION;  Surgeon: GENERIC ANESTHESIA PROVIDER;  Location: [facility]    ARTHROPLASTY KNEE Right 7/25/2022    Procedure: Right total knee arthroplasty;  Surgeon: Nick Parra MD;  Location: [facility] OR    COLONOSCOPY  3/2003    adenomatous polyp     COLONOSCOPY  7/2006    diverticulosis - repeat in 5 years    COLONOSCOPY  10/13/2011    Procedure:COLONOSCOPY; COLONOSCOPY ; Surgeon:CHITO GORDILLO; Location: GI    CYSTOSCOPY  7/11/2012    Procedure: CYSTOSCOPY;;  Surgeon: Aline Cooper DO;  Location:  OR    DAVINCI HYSTERECTOMY SUPRACERVICAL, SACROCOLPOPEXY, COMBINED  7/11/2012    Procedure: COMBINED DAVINCI HYSTERECTOMY SUPRACERVICAL, SACROCOLPOPEXY;   DAVINCI ASSISTED LAPAROSCOPIC SUPRACERVICAL HYSTERECTOMY AND BILATERAL SALPINGO-OOPHORECTOMY, SACROCOLPOPEXY AND CYSTOSCOPY;  Surgeon: Aline Cooper DO;  Location:  OR    EP ABLATION AV NODE N/A 6/22/2020    Procedure: EP ABLATION AV NODE;  Surgeon: Adriano Raman MD;  Location:  [facility]    EP BIVENT LEAD PLACEMENT N/A 6/22/2020    Procedure: Bivent Lead Placement;  Surgeon: Adriano Raman MD;  Location:  [facility]    EP PACEMAKER N/A 6/22/2020    Procedure: AVNA and BiV Pacemaker Insertion;  Surgeon: Adriano Raman MD;  Location:  HEART CARD[facility]AC CATH LAB    ESOPHAGOSCOPY, GASTROSCOPY, DUODENOSCOPY (EGD), COMBINED N/A 12/14/2021    Procedure:  ESOPHAGOGASTRODUODENOSCOPY (EGD) (fv) biopsies with cold forcep;  Surgeon: Roberto Nguyen MD;  Location: [facility]    EXCISE LESION EYELID Right 6/29/2015    Procedure: EXCISE LESION EYELID;  Surgeon: Hank Olvera MD;  Location: [facility]    INSERT PORT VASCULAR ACCESS  2/3/2012    Procedure:INSERT PORT VASCULAR ACCESS; Power Port-A- Catheter Placement ; Surgeon:IRISH TAPIA; Location:[facility] OR    LAPAROSCOPIC SALPINGO-OOPHORECTOMY  7/11/2012    Procedure: LAPAROSCOPIC SALPINGO-OOPHORECTOMY;  Davinci;  Surgeon: Aline Cooper DO;  Location: [facility] OR    LIPOSUCTION, RHYTIDECTOMY, COMBINED      LOBECTOMY LUNG Right 3/1/2016    Procedure: LOBECTOMY LUNG;  Surgeon: Jonas Woodward MD;  Location:  OR    MAMMOPLASTY REDUCTION  1/6/2012    Procedure:MAMMOPLASTY REDUCTION; Surgeon:MICKI KYLE; Location:[facility]    MASTECTOMY SIMPLE  11/12/2012    Procedure: MASTECTOMY SIMPLE;   Right Prophylactic Mastectomy with attempted Right Sentinal Node Biopsy, Revision Bilateral Mastectomy Insicions, liposuction in breast area;  Surgeon: Irish Tapia MD;  Location: [facility]    MASTECTOMY SIMPLE, SENTINEL NODE, COMBINED  1/6/2012    Procedure:COMBINED MASTECTOMY SIMPLE, SENTINEL NODE; Left Mastectomy Left [place] Node Biopsy,  Right Breast Reduction ; Surgeon:IRISH TAPIA; Location:[facility] OR    MASTECTOMY, BILATERAL      REMOVE PORT VASCULAR ACCESS  4/29/2013    Procedure: REMOVE PORT VASCULAR ACCESS;  Port A catheter removal ;  Surgeon: Irish Tapia MD;  Location: [facility] OR    REPAIR PTOSIS BILATERAL Bilateral 6/29/2015    Procedure: REPAIR PTOSIS BILATERAL;  Surgeon: Hank Olvera MD;  Location: [facility]    REVISE RECONSTRUCTED BREAST BILATERAL  11/12/2012    Procedure: REVISE RECONSTRUCTED BREAST BILATERAL;;  Surgeon: Micki Kyle MD;  Location: [facility] OR    SURGICAL HISTORY OF -   in 40's    face lift    SURGICAL HISTORY OF -       lipoma removed right thigh    SURGICAL HISTORY OF -        D and C    THORACOTOMY Right 3/1/2016    Procedure: THORACOTOMY;  Surgeon: Jonas Woodward MD;  Location:  OR    [facility] NONSPECIFIC PROCEDURE  1970    s/p Tubal ligation 1970       Current Outpatient Medications   Medication Sig Dispense Refill    acetaminophen (TYLENOL) 325 MG tablet Take 2 tablets (650 mg) by mouth every 4 hours as needed for other (mild pain) 100 tablet 0    amLODIPine (NORVASC) 5 MG tablet TAKE 1 TABLET(5 MG) BY MOUTH TWICE DAILY 180 tablet 3    apixaban ANTICOAGULANT (ELIQUIS) 2.5 MG tablet Take 1 tablet (2.5 mg) by mouth 2 times daily 180 tablet 3    clobetasol (TEMOVATE) 0.05 % external ointment Apply topically daily. 70 g 11    Cobalamin Combinations (VITAMIN B12-FOLIC ACID) 500-400 MCG TABS       ezetimibe (ZETIA) 10 MG tablet TAKE 1 TABLET(10 MG) BY MOUTH EVERY EVENING 90 tablet 1    fish oil-omega-3 fatty acids 1000 MG capsule TAKE 1 CAPSULE BY MOUTH DAILY 90 capsule 3    furosemide (LASIX) 40 MG tablet Take 1 tablet (40 mg) by mouth daily. 90 tablet 3    metoprolol tartrate (LOPRESSOR) 100 MG tablet TAKE 1 TABLET(100 MG) BY MOUTH TWICE DAILY 180 tablet 1    polyethylene glycol (MIRALAX) 17 GM/Dose powder Take 17 g by mouth daily as needed 510 g 0    potassium chloride ethan ER (KLOR-CON M20) 20 MEQ CR tablet Take 1 tablet (20 mEq) by mouth daily. 90 tablet 0    Bempedoic Acid 180 MG TABS Take 180 mg by mouth daily (Patient not taking: Reported on 10/15/2024) 90 tablet 3    nifedipine 0.2% in white petrolatum 0.2 % OINT ointment Apply topically 2 times daily. (Patient not taking: Reported on 10/15/2024) 30 g 0    omeprazole (PRILOSEC) 20 MG DR capsule Take 20 mg by mouth daily as needed (Patient not taking: Reported on 10/15/2024) 90 capsule      No current facility-administered medications for this visit.     Allergies   Allergen Reactions    No Known Drug Allergy     Tape [Adhesive Tape]      Sensitive to plastic tape on upper part of body--takes skin off       Social History  "    Tobacco Use    Smoking status: Never     Passive exposure: Never    Smokeless tobacco: Never   Substance Use Topics    Alcohol use: Yes     Comment: 0-1 drink day       Review Of Systems  Ears/Nose/Throat: negative  Respiratory: No shortness of breath, dyspnea on exertion, cough, or hemoptysis  Cardiovascular: negative  Gastrointestinal: negative  Genitourinary: See HPI   Constitutional, HEENT, cardiovascular, pulmonary, GI, , musculoskeletal, neuro, skin, endocrine and psych systems are negative, except as otherwise noted.    OBJECTIVE:  Ht 1.6 m (5' 3\")   LMP  (LMP Unknown)   BMI 19.47 kg/m    General appearance: healthy, alert, and no distress  Skin: Skin color, texture, turgor normal. No rashes or lesions.  Ears: negative  Nose/Sinuses: Nares normal. Septum midline. Mucosa normal. No drainage or sinus tenderness.  Oropharynx: Lips, mucosa, and tongue normal. Teeth and gums normal.  Neck: Neck supple. No adenopathy. Thyroid symmetric, normal size,, Carotids without bruits.    Pelvic: Pelvic:  Pelvic examination with no pap/no Gonorrhea and Chlamydia   including  External genitalia with lichen sclerosis changes, slight fissuring noted, less erythema    ASSESSMENT:  Tomasa Fam is an 84 year old   woman who presents for   gynecology consult for lichen sclerosis.    PLAN:  Dx:  1)  Lichen sclerosis:  clobetasol called in         Dr. Aline Cooper, DO    Obstetrics and Gynecology  [facility] - [place] and [place]       "

## 2024-11-21 ENCOUNTER — LAB (OUTPATIENT)
Dept: LAB | Facility: CLINIC | Age: 84
End: 2024-11-21
Payer: MEDICARE

## 2024-11-21 DIAGNOSIS — E87.6 HYPOKALEMIA: ICD-10-CM

## 2024-11-21 DIAGNOSIS — N18.30 CHRONIC KIDNEY DISEASE, STAGE 3 (H): Primary | ICD-10-CM

## 2024-11-21 DIAGNOSIS — I50.9 ACUTE CONGESTIVE HEART FAILURE, UNSPECIFIED HEART FAILURE TYPE (H): ICD-10-CM

## 2024-11-21 LAB
ANION GAP SERPL CALCULATED.3IONS-SCNC: 11 MMOL/L (ref 7–15)
BUN SERPL-MCNC: 17.2 MG/DL (ref 8–23)
CALCIUM SERPL-MCNC: 10.6 MG/DL (ref 8.8–10.4)
CHLORIDE SERPL-SCNC: 104 MMOL/L (ref 98–107)
CREAT SERPL-MCNC: 0.68 MG/DL (ref 0.51–0.95)
EGFRCR SERPLBLD CKD-EPI 2021: 85 ML/MIN/1.73M2
GLUCOSE SERPL-MCNC: 117 MG/DL (ref 70–99)
HCO3 SERPL-SCNC: 25 MMOL/L (ref 22–29)
POTASSIUM SERPL-SCNC: 4.2 MMOL/L (ref 3.4–5.3)
SODIUM SERPL-SCNC: 140 MMOL/L (ref 135–145)

## 2024-12-05 PROCEDURE — 82570 ASSAY OF URINE CREATININE: CPT | Performed by: PHYSICIAN ASSISTANT

## 2024-12-05 PROCEDURE — 82043 UR ALBUMIN QUANTITATIVE: CPT | Performed by: PHYSICIAN ASSISTANT

## 2024-12-06 ENCOUNTER — LAB (OUTPATIENT)
Dept: LAB | Facility: CLINIC | Age: 84
End: 2024-12-06
Payer: MEDICARE

## 2024-12-06 DIAGNOSIS — N18.30 CHRONIC KIDNEY DISEASE, STAGE 3 (H): Primary | ICD-10-CM

## 2024-12-06 LAB
CREAT UR-MCNC: 42.3 MG/DL
MICROALBUMIN UR-MCNC: 33.1 MG/L
MICROALBUMIN/CREAT UR: 78.25 MG/G CR (ref 0–25)

## 2024-12-12 DIAGNOSIS — I27.20 PULMONARY HYPERTENSION (H): Primary | ICD-10-CM

## 2024-12-12 RX ORDER — ASPIRIN 325 MG
325 TABLET ORAL ONCE
OUTPATIENT
Start: 2024-12-12 | End: 2024-12-12

## 2024-12-12 RX ORDER — POTASSIUM CHLORIDE 1500 MG/1
20 TABLET, EXTENDED RELEASE ORAL
OUTPATIENT
Start: 2024-12-12

## 2024-12-12 RX ORDER — LIDOCAINE 40 MG/G
CREAM TOPICAL
OUTPATIENT
Start: 2024-12-12

## 2024-12-12 RX ORDER — ASPIRIN 81 MG/1
243 TABLET, CHEWABLE ORAL ONCE
OUTPATIENT
Start: 2024-12-12

## 2024-12-12 RX ORDER — SODIUM CHLORIDE 9 MG/ML
INJECTION, SOLUTION INTRAVENOUS CONTINUOUS
OUTPATIENT
Start: 2024-12-12

## 2024-12-12 NOTE — PROGRESS NOTES
Coronary angiogram/PCI/Right Heart Cath prep instructions.     Patient is scheduled for a Right Heart Cath at [facility] [place] [address] [place] [address] - Main Entrance of the Hospital on 12/16/24.  Check in time is at 1000 and procedure to follow.    Patient instructed to remain NPO for solid foods 8 hours prior to arrival and may have clear liquids up to 2 hours prior to arrival.    Patient does not require extra fluids prior to procedure.    Patient is not diabetic.    Patient is on Eliquis (Eliquis, Pradaxa, Xarelto) and has been advised to hold for 2 days prior to procedure starting 12/14/24.  Patient can resume after the procedure.    Patient is having a Right Heart Cath and should continue furosemide as prescribed.    Patient is not currently taking ASA and has been advised to take one 325 mg tablet the day prior and morning of the procedure.    Pt is not on a SGLT2 inhibitor.    Pt is not on a GLP-1 Agonist    Patient advised to take their other daily medications the morning of the procedure with small sips of water.     Patient advised to shower the night before and morning of their procedure with regular soap.    Verified patient does not have a contrast allergy.    Verified patient has someone available to drive them home from the hospital and can stay with them for 24 hours after the procedure.     Patient advised they will have bedrest post procedure.  Length of time is 2-6 hours and dependent on access site used for procedure.  This bedrest is to allow proper clotting of the access site to prevent bleeding.    Patient advised to notify care team with any new COVID like symptoms prior to procedure. Day of procedure phone number: Alexis at [phone]    Patient is aware of visitor policy.    Patient expresses understanding of above instructions and denies further questions at this time.      Jennifer Moreland, RN  [facility]

## 2024-12-16 ENCOUNTER — HOSPITAL ENCOUNTER (OUTPATIENT)
Facility: CLINIC | Age: 84
Discharge: HOME | End: 2024-12-16
Attending: INTERNAL MEDICINE | Admitting: INTERNAL MEDICINE
Payer: MEDICARE

## 2024-12-16 VITALS
OXYGEN SATURATION: 97 % | RESPIRATION RATE: 16 BRPM | TEMPERATURE: 97.3 F | SYSTOLIC BLOOD PRESSURE: 146 MMHG | HEART RATE: 71 BPM | DIASTOLIC BLOOD PRESSURE: 53 MMHG

## 2024-12-16 DIAGNOSIS — R06.03 ACUTE RESPIRATORY DISTRESS: ICD-10-CM

## 2024-12-16 DIAGNOSIS — I27.20 PULMONARY HYPERTENSION (H): ICD-10-CM

## 2024-12-16 LAB
ANION GAP SERPL CALCULATED.3IONS-SCNC: 13 MMOL/L (ref 7–15)
BUN SERPL-MCNC: 17.2 MG/DL (ref 8–23)
CALCIUM SERPL-MCNC: 10.3 MG/DL (ref 8.8–10.4)
CHLORIDE SERPL-SCNC: 101 MMOL/L (ref 98–107)
CREAT SERPL-MCNC: 0.75 MG/DL (ref 0.51–0.95)
EGFRCR SERPLBLD CKD-EPI 2021: 78 ML/MIN/1.73M2
ERYTHROCYTE [DISTWIDTH] IN BLOOD BY AUTOMATED COUNT: 12.6 % (ref 10–15)
GLUCOSE SERPL-MCNC: 93 MG/DL (ref 70–99)
HCO3 BLDV-SCNC: 28 MMOL/L (ref 21–28)
HCO3 SERPL-SCNC: 25 MMOL/L (ref 22–29)
HCT VFR BLD AUTO: 41 % (ref 35–47)
HGB BLD-MCNC: 13.1 G/DL (ref 11.7–15.7)
LACTATE BLD-SCNC: 0.3 MMOL/L
MCH RBC QN AUTO: 29.3 PG (ref 26.5–33)
MCHC RBC AUTO-ENTMCNC: 32 G/DL (ref 31.5–36.5)
MCV RBC AUTO: 92 FL (ref 78–100)
PCO2 BLDV: 42 MM HG (ref 40–50)
PH BLDV: 7.44 [PH] (ref 7.32–7.43)
PLATELET # BLD AUTO: 258 10E3/UL (ref 150–450)
PO2 BLDV: 30 MM HG (ref 25–47)
POTASSIUM SERPL-SCNC: 3.9 MMOL/L (ref 3.4–5.3)
RBC # BLD AUTO: 4.47 10E6/UL (ref 3.8–5.2)
SAO2 % BLDV: 60 % (ref 70–75)
SODIUM SERPL-SCNC: 139 MMOL/L (ref 135–145)
WBC # BLD AUTO: 9.8 10E3/UL (ref 4–11)

## 2024-12-16 PROCEDURE — C1894 INTRO/SHEATH, NON-LASER: HCPCS | Performed by: INTERNAL MEDICINE

## 2024-12-16 PROCEDURE — 80048 BASIC METABOLIC PNL TOTAL CA: CPT | Performed by: INTERNAL MEDICINE

## 2024-12-16 PROCEDURE — 93451 RIGHT HEART CATH: CPT | Performed by: INTERNAL MEDICINE

## 2024-12-16 PROCEDURE — 83605 ASSAY OF LACTIC ACID: CPT

## 2024-12-16 PROCEDURE — 999N000099 HC STATISTIC MODERATE SEDATION < 10 MIN: Performed by: INTERNAL MEDICINE

## 2024-12-16 PROCEDURE — 250N000011 HC RX IP 250 OP 636: Performed by: INTERNAL MEDICINE

## 2024-12-16 PROCEDURE — 93451 RIGHT HEART CATH: CPT | Mod: 26 | Performed by: INTERNAL MEDICINE

## 2024-12-16 PROCEDURE — 85014 HEMATOCRIT: CPT | Performed by: INTERNAL MEDICINE

## 2024-12-16 PROCEDURE — 36415 COLL VENOUS BLD VENIPUNCTURE: CPT | Performed by: INTERNAL MEDICINE

## 2024-12-16 PROCEDURE — 272N000001 HC OR GENERAL SUPPLY STERILE: Performed by: INTERNAL MEDICINE

## 2024-12-16 PROCEDURE — C1751 CATH, INF, PER/CENT/MIDLINE: HCPCS | Performed by: INTERNAL MEDICINE

## 2024-12-16 PROCEDURE — 250N000009 HC RX 250: Performed by: INTERNAL MEDICINE

## 2024-12-16 RX ORDER — FENTANYL CITRATE 50 UG/ML
INJECTION, SOLUTION INTRAMUSCULAR; INTRAVENOUS
Status: COMPLETED | OUTPATIENT
Start: 2024-12-16 | End: 2024-12-16

## 2024-12-16 RX ORDER — LIDOCAINE 40 MG/G
CREAM TOPICAL
Status: DISCONTINUED | OUTPATIENT
Start: 2024-12-16 | End: 2024-12-16 | Stop reason: HOSPADM

## 2024-12-16 RX ORDER — ASPIRIN 81 MG/1
243 TABLET, CHEWABLE ORAL ONCE
Status: COMPLETED | OUTPATIENT
Start: 2024-12-16 | End: 2024-12-16

## 2024-12-16 RX ORDER — SODIUM CHLORIDE 9 MG/ML
INJECTION, SOLUTION INTRAVENOUS CONTINUOUS
Status: DISCONTINUED | OUTPATIENT
Start: 2024-12-16 | End: 2024-12-16

## 2024-12-16 RX ORDER — ASPIRIN 325 MG
325 TABLET ORAL ONCE
Status: COMPLETED | OUTPATIENT
Start: 2024-12-16 | End: 2024-12-16

## 2024-12-16 RX ORDER — POTASSIUM CHLORIDE 1500 MG/1
20 TABLET, EXTENDED RELEASE ORAL
Status: DISCONTINUED | OUTPATIENT
Start: 2024-12-16 | End: 2024-12-16 | Stop reason: HOSPADM

## 2024-12-16 ASSESSMENT — ACTIVITIES OF DAILY LIVING (ADL)
ADLS_ACUITY_SCORE: 49

## 2024-12-16 NOTE — DISCHARGE INSTRUCTIONS
Right Heart Catheterization: What to Expect at Home  Your Recovery     The right side of the heart receives blood from the body and pumps it to the lungs. The blood picks up oxygen in the lungs. A right heart catheterization (also called pulmonary artery catheterization) tests the blood pressure and oxygen levels in your lungs and heart. It also checks to see how well your heart is pumping.  Your doctor put a thin, flexible tube (catheter) into a blood vessel in your neck, groin, or arm. During the test, the doctor moved the catheter through the blood vessel into your heart. A small balloon on the tip of the catheter helped guide it into the artery that carries blood to your lungs (pulmonary artery). If your doctor used an X-ray to see where to move the catheter, you also had dye injected into your blood vessel and heart.  You may have swelling, bruising, or a small lump around the site where the catheter went into your body. You can do light activities around the house. But don't do anything strenuous until your doctor says it is okay. This lets the catheter site heal.  This care sheet gives you a general idea about how long it will take for you to recover. But each person recovers at a different pace. Follow the steps below to get better as quickly as possible.  How can you care for yourself at home?  Activity    If the doctor gave you a sedative:  For 24 hours, don't do anything that requires attention to detail, such as going to work, making important decisions, or signing any legal documents. It takes time for the medicine's effects to completely wear off.  For your safety, do not drive or operate any machinery that could be dangerous. Wait until the medicine wears off and you can think clearly and react easily.     Do not do strenuous exercise and do not lift, pull, or push anything heavy until your doctor says it is okay. This may be for a couple of days. This lets the catheter site heal. You can walk around  the house and do light activity, such as cooking.   Diet    If you had dye injected, drink plenty of fluids to help your body flush out the dye. If you have kidney, heart, or liver disease and have to limit fluids, talk with your doctor before you increase the amount of fluids you drink.     You can eat your normal diet. If your stomach is upset, try bland, low-fat foods like plain rice, broiled chicken, toast, and yogurt.   Medicines    Your doctor will tell you if and when you can restart your medicines. You will also be given instructions about taking any new medicines.     If you take aspirin or some other blood thinner, be sure to talk to your doctor. Your doctor will tell you if and when to start taking this medicine again. Make sure that you understand exactly what your doctor wants you to do.     Call your doctor if you think you are having a problem with your medicine.   Care of the catheter site    For 1 or 2 days, keep the bandage over the spot where the catheter was inserted. The bandage probably will fall off in this time.     Put ice or a cold pack on the area for 10 to 20 minutes at a time to help with soreness or swelling. Put a thin cloth between the ice and your skin.     You may shower 24 to 48 hours after the procedure, if your doctor okays it. Pat the incision dry.     Do not soak the catheter site until it is healed. Don't take a bath for 1 week, or until your doctor tells you it is okay.     Watch for bleeding from the site. A small amount of blood (up to the size of a quarter) on the bandage can be normal.     If you are bleeding, lie down and press on the area for 15 minutes to try to make it stop. If the bleeding doesn't stop, call your doctor or seek immediate medical care.   Follow-up care is a key part of your treatment and safety. Be sure to make and go to all appointments, and call your doctor if you are having problems. It's also a good idea to know your test results and keep a list  "of the medicines you take.  When should you call for help?   Call 911  anytime you think you may need emergency care. For example, call if:    You passed out (lost consciousness).     You have symptoms of a heart attack. These may include:  Chest pain or pressure, or a strange feeling in the chest.  Sweating.  Shortness of breath.  Nausea or vomiting.  Pain, pressure, or a strange feeling in the back, neck, jaw, or upper belly or in one or both shoulders or arms.  Lightheadedness or sudden weakness.  A fast or irregular heartbeat.   After you call 911, the  may tell you to chew 1 adult-strength or 2 to 4 low-dose aspirin. Wait for an ambulance. Do not try to drive yourself.  Call your doctor now or seek immediate medical care if:    You are bleeding from the area where the catheter was put in.     You have a fast-growing, painful lump at the catheter site.     You have symptoms of infection, such as:  Increased pain, swelling, warmth, or redness.  Red streaks leading from the area.  Pus draining from the area.  A fever.     Your leg, arm, or hand is painful, looks blue, or feels cold, numb, or tingly.   Where can you learn more?  Go to https://www.[employer].net/patiented  Enter R076 in the search box to learn more about \"Right Heart Catheterization: What to Expect at Home.\"  Current as of: June 24, 2023  Content Version: 14.2 2024 [employer] [facility] [employer].   Care instructions adapted under license by your healthcare professional. If you have questions about a medical condition or this instruction, always ask your healthcare professional. [employer] disclaims any warranty or liability for your use of this information.    "

## 2024-12-16 NOTE — PROGRESS NOTES
BP (!) 146/53 (BP Location: Right arm)   Pulse 71   Temp 97.3  F (36.3  C) (Temporal)   Resp 16   LMP  (LMP Unknown)   SpO2 97%      PIV removed. Dressing CDI. A/O. Tolerated PO and able to ambulate. Voided. Denies pain. Belongings returned to patient. Discharge instructions reviewed with patient who verbalized understanding. Patient left hospital in wheelchair with nursing staff, son provided transportation home.

## 2024-12-16 NOTE — PRE-PROCEDURE
GENERAL PRE-PROCEDURE:     Risks and benefits: Risks, benefits and alternatives were discussed    Consent given by:  Patient  Patient states understanding of procedure being performed: Yes    Patient's understanding of procedure matches consent: Yes    Procedure consent matches procedure scheduled: Yes    Expected level of sedation:  Minimal  Appropriately NPO:  Yes  ASA Class:  2  Mallampati  :  Grade 2- soft palate, base of uvula, tonsillar pillars, and portion of posterior pharyngeal wall visible  Lungs:  Lungs clear with good breath sounds bilaterally  Heart:  Normal heart sounds and rate  History & Physical reviewed:  History and physical reviewed and no updates needed  Statement of review:  I have reviewed the lab findings, diagnostic data, medications, and the plan for sedation

## 2024-12-18 ENCOUNTER — TELEPHONE (OUTPATIENT)
Dept: CARDIOLOGY | Facility: CLINIC | Age: 84
End: 2024-12-18
Payer: MEDICARE

## 2024-12-18 LAB
ATRIAL RATE - MUSE: 70 BPM
DIASTOLIC BLOOD PRESSURE - MUSE: NORMAL MMHG
INTERPRETATION ECG - MUSE: NORMAL
P AXIS - MUSE: NORMAL DEGREES
PR INTERVAL - MUSE: 328 MS
QRS DURATION - MUSE: 154 MS
QT - MUSE: 502 MS
QTC - MUSE: 542 MS
R AXIS - MUSE: 135 DEGREES
SYSTOLIC BLOOD PRESSURE - MUSE: NORMAL MMHG
T AXIS - MUSE: 43 DEGREES
VENTRICULAR RATE- MUSE: 70 BPM

## 2024-12-18 NOTE — TELEPHONE ENCOUNTER
Patient was admitted to [facility] on 12/16/24 with pulmonary hypertension who is referred for RHC.    12/16/24: RHC via RI showed:    Moderately elevated right heart filling pressures (mean RA 13 mmHg) and mildly elevated left heart filling pressure (mean wedge 17 mmHg)  Mild pulmonary hypertension (mean PA 29 mmHg)    No medication changes made.    Called patient to discuss any post hospital d/c questions she may have and confirm f/u appts.     Patient denied any SOB, chest pain or lightheadedness.     [facility] cardiac cath site is without bleeding, swelling, redness or signs of infection.     RN confirmed with patient that she is scheduled for an OV on 2/7/25 at 1210 with Dr. Sevilla at our [place] Office.    Patient advised to call clinic with any cardiac related questions or concerns prior to this arnulfo't. Patient verbalized understanding and agreed with plan. YOKO Noland RN.

## 2024-12-30 ENCOUNTER — TRANSFERRED RECORDS (OUTPATIENT)
Dept: HEALTH INFORMATION MANAGEMENT | Facility: CLINIC | Age: 84
End: 2024-12-30
Payer: MEDICARE

## 2025-01-06 ENCOUNTER — HOSPITAL ENCOUNTER (OUTPATIENT)
Dept: RESPIRATORY THERAPY | Facility: CLINIC | Age: 85
Discharge: HOME | End: 2025-01-06
Attending: INTERNAL MEDICINE | Admitting: INTERNAL MEDICINE
Payer: MEDICARE

## 2025-01-06 DIAGNOSIS — I50.9 ACUTE CONGESTIVE HEART FAILURE, UNSPECIFIED HEART FAILURE TYPE (H): ICD-10-CM

## 2025-01-06 DIAGNOSIS — I48.21 PERMANENT ATRIAL FIBRILLATION (H): ICD-10-CM

## 2025-01-06 DIAGNOSIS — R06.03 ACUTE RESPIRATORY DISTRESS: ICD-10-CM

## 2025-01-06 DIAGNOSIS — I27.20 PULMONARY HYPERTENSION (H): ICD-10-CM

## 2025-01-06 DIAGNOSIS — I44.2 ATRIOVENTRICULAR BLOCK, COMPLETE (H): ICD-10-CM

## 2025-01-06 DIAGNOSIS — Z95.0 CARDIAC PACEMAKER IN SITU: ICD-10-CM

## 2025-01-06 PROCEDURE — 94060 EVALUATION OF WHEEZING: CPT

## 2025-01-06 PROCEDURE — 94729 DIFFUSING CAPACITY: CPT

## 2025-01-06 PROCEDURE — 250N000009 HC RX 250: Performed by: INTERNAL MEDICINE

## 2025-01-06 PROCEDURE — 94726 PLETHYSMOGRAPHY LUNG VOLUMES: CPT

## 2025-01-06 PROCEDURE — 999N000157 HC STATISTIC RCP TIME EA 10 MIN

## 2025-01-06 RX ORDER — ALBUTEROL SULFATE 0.83 MG/ML
SOLUTION RESPIRATORY (INHALATION)
Status: COMPLETED
Start: 2025-01-06 | End: 2025-01-06

## 2025-01-06 RX ORDER — ALBUTEROL SULFATE 0.83 MG/ML
2.5 SOLUTION RESPIRATORY (INHALATION)
Status: COMPLETED | OUTPATIENT
Start: 2025-01-06 | End: 2025-01-06

## 2025-01-06 RX ADMIN — ALBUTEROL SULFATE 2.5 MG: 0.83 SOLUTION RESPIRATORY (INHALATION) at 09:39

## 2025-01-06 RX ADMIN — ALBUTEROL SULFATE 2.5 MG: 2.5 SOLUTION RESPIRATORY (INHALATION) at 09:39

## 2025-01-07 LAB
DLCOCOR-%PRED-PRE: 70 %
DLCOCOR-PRE: 12.46 ML/MIN/MMHG
DLCOUNC-%PRED-PRE: 69 %
DLCOUNC-PRE: 12.34 ML/MIN/MMHG
DLCOUNC-PRED: 17.76 ML/MIN/MMHG
ERV-PRE: 0.47 L
EXPTIME-PRE: 7.01 SEC
FEF2575-%PRED-POST: 48 %
FEF2575-%PRED-PRE: 35 %
FEF2575-POST: 0.68 L/SEC
FEF2575-PRE: 0.5 L/SEC
FEF2575-PRED: 1.4 L/SEC
FEFMAX-%PRED-PRE: 51 %
FEFMAX-PRE: 2.16 L/SEC
FEFMAX-PRED: 4.17 L/SEC
FEV1-%PRED-PRE: 52 %
FEV1-PRE: 0.93 L
FEV1FEV6-PRE: 59 %
FEV1FEV6-PRED: 77 %
FEV1FVC-PRE: 59 %
FEV1FVC-PRED: 77 %
FEV1SVC-PRE: 59 %
FIFMAX-PRE: 2.16 L/SEC
FRCPLETH-%PRED-PRE: 121 %
FRCPLETH-PRE: 3.5 L
FRCPLETH-PRED: 2.89 L
FVC-%PRED-PRE: 67 %
FVC-PRE: 1.59 L
FVC-PRED: 2.36 L
IC-PRE: 1.12 L
MEP-PRE: 42 CMH2O
MIP-PRE: -57 CMH2O
MVV-%PRED-PRE: 39 %
MVV-PRE: 30 L/MIN
MVV-PRED: 76 L/MIN
RVPLETH-%PRED-PRE: 132 %
RVPLETH-PRE: 3.04 L
RVPLETH-PRED: 2.29 L
TLCPLETH-%PRED-PRE: 95 %
TLCPLETH-PRE: 4.62 L
TLCPLETH-PRED: 4.86 L
VA-%PRED-PRE: 69 %
VA-PRE: 3.01 L
VC-PRE: 1.59 L

## 2025-01-23 NOTE — MR AVS SNAPSHOT
After Visit Summary   10/1/2018    Tomasa Fam    MRN: [id number]           Patient Information     Date Of Birth          1940        Visit Information        Provider Department      10/1/2018 9:10 AM Sherri Martin PA-C [facility]        Today's Diagnoses     Essential hypertension with goal blood pressure less than 140/90    -  1    Decreased energy        Stress           Follow-ups after your visit        Follow-up notes from your care team     Return in about 8 days (around 10/9/2018) for Blood Pressure.      Your next 10 appointments already scheduled     Oct 09, 2018  4:10 PM CDT   Office Visit with Violet Fenton MD   [facility] ([facility])    [address] [facility] [address] [phone]           Bring a current list of meds and any records pertaining to this visit. For Physicals, please bring immunization records and any forms needing to be filled out. Please arrive 10 minutes early to complete paperwork.              Who to contact     If you have questions or need follow up information about today's clinic visit or your schedule please contact [facility] directly at [phone].  Normal or non-critical lab and imaging results will be communicated to you by MyChart, letter or phone within 4 business days after the clinic has received the results. If you do not hear from us within 7 days, please contact the clinic through MyChart or phone. If you have a critical or abnormal lab result, we will notify you by phone as soon as possible.  Submit refill requests through [employer] or call your pharmacy and they will forward the refill request to us. Please allow 3 business days for your refill to be completed.          Additional Information About Your Visit        MyChart Information     [employer] gives you secure access to your electronic health record. If you see a primary care provider, you  Well controlled on metoprolol 25mg once daily and spironolactone 50 mg BID. Home blood pressure accord shows pressures typical mid-120s/70s-80s. No new symptoms or complaints.   Continue metoprolol and spironolactone as prescribed.  Pt agrees to continue recording home blood pressures and will bring accord to next visit.   Return to clinic in 6 weeks with labs prior (lab requisition forms reprinted and given to patient with AVS).   "can also send messages to your care team and make appointments. If you have questions, please call your primary care clinic.  If you do not have a primary care provider, please call [phone] and they will assist you.        Care EveryWhere ID     This is your Care EveryWhere ID. This could be used by other organizations to access your [facility] medical records  [id number]        Your Vitals Were     Pulse Temperature Respirations Height Last Period Pulse Oximetry    78 98  F (36.7  C) (Oral) 16 5' 3\" (1.6 m) (LMP Unknown) 97%    Breastfeeding? BMI (Body Mass Index)                No 23.38 kg/m2           Blood Pressure from Last 3 Encounters:   10/01/18 156/78   09/28/18 160/69   03/02/18 (P) 132/68    Weight from Last 3 Encounters:   10/01/18 132 lb (59.9 kg)   03/02/18 (P) 135 lb 4.8 oz (61.4 kg)   07/21/17 134 lb 11.2 oz (61.1 kg)              Today, you had the following     No orders found for display         Today's Medication Changes          These changes are accurate as of 10/1/18  9:53 AM.  If you have any questions, ask your nurse or doctor.               These medicines have changed or have updated prescriptions.        Dose/Directions    carvedilol 6.25 MG tablet   Commonly known as:  COREG   This may have changed:  See the new instructions.   Used for:  Essential hypertension with goal blood pressure less than 140/90   Changed by:  Sherri Martin PA-C        Dose:  12.5 mg   Take 2 tablets (12.5 mg) by mouth 2 times daily (with meals)   Quantity:  180 tablet   Refills:  2       clobetasol 0.05 % Crea cream   Commonly known as:  CLOBETASOL PROPIONATE EMULSION   This may have changed:    - when to take this  - reasons to take this   Used for:  Lichen sclerosus        Apply topically daily   Quantity:  70 g   Refills:  0            Where to get your medicines      These medications were sent to [id number] - [place], [place] - [address]  [id number]  [address] , [facility] [address]     Phone:  [phone]     FLUoxetine 10 MG capsule                Primary Care Provider Office Phone # Fax #    Violet Fenton [phone] [address]  [facility] [address]        Equal Access to Services     TAIWO SIGALA : Hadii aad ku hadasho Soomaali, waaxda luqadaha, qaybta kaalmada adeegyada, waxay luzin hayaan adeamy loveyo asif. So [facility] [phone].    ATENCIÓN: Si habla español, tiene a srivastava disposición servicios gratuitos de asistencia lingüística. Llame al [phone].    We comply with applicable federal civil rights laws and [place] laws. We do not discriminate on the basis of race, color, national origin, age, disability, sex, sexual orientation, or gender identity.            Thank you!     Thank you for choosing [facility]  for your care. Our goal is always to provide you with excellent care. Hearing back from our patients is one way we can continue to improve our services. Please take a few minutes to complete the written survey that you may receive in the mail after your visit with us. Thank you!             Your Updated Medication List - Protect others around you: Learn how to safely use, store and throw away your medicines at www.disposemymeds.org.          This list is accurate as of 10/1/18  9:53 AM.  Always use your most recent med list.                   Brand Name Dispense Instructions for use Diagnosis    amoxicillin-clavulanate 875-125 MG per tablet    AUGMENTIN    6 tablet    Take 1 tablet by mouth 2 times daily for 3 days    Dysuria       aspirin 81 MG tablet     90 tablet    Take 1 tablet (81 mg) by mouth daily        augmented betamethasone dipropionate 0.05 % cream    DIPROLENE-AF    50 g    APPLY EXTERNALLY TO THE AFFECTED AREA TWICE DAILY    Lichen sclerosus et atrophicus       calcium carbonate 600 mg-vitamin D 400 units 600-400 MG-UNIT per tablet    CALTRATE     Take 1 tablet by  mouth 2 times daily        carvedilol 6.25 MG tablet    COREG    180 tablet    Take 2 tablets (12.5 mg) by mouth 2 times daily (with meals)    Essential hypertension with goal blood pressure less than 140/90       chlorthalidone 25 MG tablet    HYGROTON    90 tablet    TAKE 1 TABLET(25 MG) BY MOUTH DAILY    Essential hypertension with goal blood pressure less than 140/90       cholecalciferol 400 UNIT Tabs tablet    vitamin D3     Take 400 Units by mouth daily        clobetasol 0.05 % Crea cream    CLOBETASOL PROPIONATE EMULSION    70 g    Apply topically daily    Lichen sclerosus       ezetimibe 10 MG tablet    ZETIA    90 tablet    TAKE 1 TABLET(10 MG) BY MOUTH DAILY    Hyperlipidemia LDL goal <160       fluocinonide 0.05 % solution    LIDEX     Apply topically as needed        FLUoxetine 10 MG capsule    PROzac    90 capsule    TAKE 1 CAPSULE(10 MG) BY MOUTH DAILY    Decreased energy, Stress       losartan 100 MG tablet    COZAAR    90 tablet    TAKE 1 TABLET(100 MG) BY MOUTH DAILY WITH BREAKFAST    Essential hypertension with goal blood pressure less than 140/90       olopatadine HCl 0.2 % Soln    [facility]     Place 1 drop into the right eye daily as needed (runny eye)        omega 3 1000 MG Caps     90 capsule    Take 1 g by mouth daily        polyethylene glycol powder    MIRALAX/GLYCOLAX     Take 1 capful by mouth daily        potassium chloride SA 10 MEQ CR tablet    K-DUR/KLOR-CON M    180 tablet    TAKE 1 TABLET(10 MEQ) BY MOUTH TWICE DAILY    History of hypokalemia       vitamin B complex with vitamin C Tabs tablet    STRESS TAB     Take 1 tablet by mouth daily.

## 2025-01-28 ENCOUNTER — ANCILLARY PROCEDURE (OUTPATIENT)
Dept: CARDIOLOGY | Facility: CLINIC | Age: 85
End: 2025-01-28
Attending: INTERNAL MEDICINE
Payer: MEDICARE

## 2025-01-28 DIAGNOSIS — I44.2 ATRIOVENTRICULAR BLOCK, COMPLETE (H): ICD-10-CM

## 2025-01-28 DIAGNOSIS — Z95.0 CARDIAC PACEMAKER IN SITU: ICD-10-CM

## 2025-01-28 LAB
MDC_IDC_EPISODE_DTM: NORMAL
MDC_IDC_EPISODE_ID: NORMAL
MDC_IDC_EPISODE_TYPE: NORMAL
MDC_IDC_LEAD_CONNECTION_STATUS: NORMAL
MDC_IDC_LEAD_CONNECTION_STATUS: NORMAL
MDC_IDC_LEAD_IMPLANT_DT: NORMAL
MDC_IDC_LEAD_IMPLANT_DT: NORMAL
MDC_IDC_LEAD_LOCATION: NORMAL
MDC_IDC_LEAD_LOCATION: NORMAL
MDC_IDC_LEAD_LOCATION_DETAIL_1: NORMAL
MDC_IDC_LEAD_LOCATION_DETAIL_1: NORMAL
MDC_IDC_LEAD_MFG: NORMAL
MDC_IDC_LEAD_MFG: NORMAL
MDC_IDC_LEAD_MODEL: NORMAL
MDC_IDC_LEAD_MODEL: NORMAL
MDC_IDC_LEAD_POLARITY_TYPE: NORMAL
MDC_IDC_LEAD_POLARITY_TYPE: NORMAL
MDC_IDC_LEAD_SERIAL: NORMAL
MDC_IDC_LEAD_SERIAL: NORMAL
MDC_IDC_MSMT_BATTERY_DTM: NORMAL
MDC_IDC_MSMT_BATTERY_REMAINING_LONGEVITY: 78 MO
MDC_IDC_MSMT_BATTERY_REMAINING_PERCENTAGE: 83 %
MDC_IDC_MSMT_BATTERY_STATUS: NORMAL
MDC_IDC_MSMT_LEADCHNL_LV_IMPEDANCE_VALUE: 645 OHM
MDC_IDC_MSMT_LEADCHNL_LV_PACING_THRESHOLD_AMPLITUDE: 1.2 V
MDC_IDC_MSMT_LEADCHNL_LV_PACING_THRESHOLD_PULSEWIDTH: 0.4 MS
MDC_IDC_MSMT_LEADCHNL_RV_IMPEDANCE_VALUE: 656 OHM
MDC_IDC_MSMT_LEADCHNL_RV_PACING_THRESHOLD_AMPLITUDE: 1 V
MDC_IDC_MSMT_LEADCHNL_RV_PACING_THRESHOLD_PULSEWIDTH: 0.4 MS
MDC_IDC_PG_IMPLANT_DTM: NORMAL
MDC_IDC_PG_MFG: NORMAL
MDC_IDC_PG_MODEL: NORMAL
MDC_IDC_PG_SERIAL: NORMAL
MDC_IDC_PG_TYPE: NORMAL
MDC_IDC_SESS_CLINIC_NAME: NORMAL
MDC_IDC_SESS_DTM: NORMAL
MDC_IDC_SESS_TYPE: NORMAL
MDC_IDC_SET_BRADY_AT_MODE_SWITCH_RATE: 170 {BEATS}/MIN
MDC_IDC_SET_BRADY_LOWRATE: 70 {BEATS}/MIN
MDC_IDC_SET_BRADY_MAX_SENSOR_RATE: 130 {BEATS}/MIN
MDC_IDC_SET_BRADY_MODE: NORMAL
MDC_IDC_SET_CRT_LVRV_DELAY: 30 MS
MDC_IDC_SET_CRT_PACED_CHAMBERS: NORMAL
MDC_IDC_SET_LEADCHNL_LV_PACING_AMPLITUDE: 2.7 V
MDC_IDC_SET_LEADCHNL_LV_PACING_ANODE_ELECTRODE_1: NORMAL
MDC_IDC_SET_LEADCHNL_LV_PACING_ANODE_LOCATION_1: NORMAL
MDC_IDC_SET_LEADCHNL_LV_PACING_CATHODE_ELECTRODE_1: NORMAL
MDC_IDC_SET_LEADCHNL_LV_PACING_CATHODE_LOCATION_1: NORMAL
MDC_IDC_SET_LEADCHNL_LV_PACING_PULSEWIDTH: 0.4 MS
MDC_IDC_SET_LEADCHNL_LV_SENSING_ADAPTATION_MODE: NORMAL
MDC_IDC_SET_LEADCHNL_LV_SENSING_ANODE_ELECTRODE_1: NORMAL
MDC_IDC_SET_LEADCHNL_LV_SENSING_ANODE_LOCATION_1: NORMAL
MDC_IDC_SET_LEADCHNL_LV_SENSING_CATHODE_ELECTRODE_1: NORMAL
MDC_IDC_SET_LEADCHNL_LV_SENSING_CATHODE_LOCATION_1: NORMAL
MDC_IDC_SET_LEADCHNL_LV_SENSING_SENSITIVITY: 2.5 MV
MDC_IDC_SET_LEADCHNL_RA_SENSING_ADAPTATION_MODE: NORMAL
MDC_IDC_SET_LEADCHNL_RA_SENSING_SENSITIVITY: 0.5 MV
MDC_IDC_SET_LEADCHNL_RV_PACING_AMPLITUDE: 3.6 V
MDC_IDC_SET_LEADCHNL_RV_PACING_CAPTURE_MODE: NORMAL
MDC_IDC_SET_LEADCHNL_RV_PACING_POLARITY: NORMAL
MDC_IDC_SET_LEADCHNL_RV_PACING_PULSEWIDTH: 0.4 MS
MDC_IDC_SET_LEADCHNL_RV_SENSING_ADAPTATION_MODE: NORMAL
MDC_IDC_SET_LEADCHNL_RV_SENSING_POLARITY: NORMAL
MDC_IDC_SET_LEADCHNL_RV_SENSING_SENSITIVITY: 2.5 MV
MDC_IDC_SET_ZONE_DETECTION_INTERVAL: 375 MS
MDC_IDC_SET_ZONE_STATUS: NORMAL
MDC_IDC_SET_ZONE_TYPE: NORMAL
MDC_IDC_SET_ZONE_VENDOR_TYPE: NORMAL
MDC_IDC_STAT_BRADY_DTM_END: NORMAL
MDC_IDC_STAT_BRADY_DTM_START: NORMAL
MDC_IDC_STAT_BRADY_RA_PERCENT_PACED: 0 %
MDC_IDC_STAT_BRADY_RV_PERCENT_PACED: 99 %
MDC_IDC_STAT_CRT_DTM_END: NORMAL
MDC_IDC_STAT_CRT_DTM_START: NORMAL
MDC_IDC_STAT_CRT_LV_PERCENT_PACED: 99 %
MDC_IDC_STAT_EPISODE_RECENT_COUNT: 0
MDC_IDC_STAT_EPISODE_RECENT_COUNT_DTM_END: NORMAL
MDC_IDC_STAT_EPISODE_RECENT_COUNT_DTM_START: NORMAL
MDC_IDC_STAT_EPISODE_TYPE: NORMAL
MDC_IDC_STAT_EPISODE_VENDOR_TYPE: NORMAL
MDC_IDC_STAT_EPISODE_VENDOR_TYPE: NORMAL

## 2025-01-28 PROCEDURE — 93296 REM INTERROG EVL PM/IDS: CPT | Performed by: INTERNAL MEDICINE

## 2025-01-28 PROCEDURE — 93294 REM INTERROG EVL PM/LDLS PM: CPT | Performed by: INTERNAL MEDICINE

## 2025-02-02 DIAGNOSIS — I10 ESSENTIAL HYPERTENSION: ICD-10-CM

## 2025-02-02 DIAGNOSIS — I48.19 PERSISTENT ATRIAL FIBRILLATION (H): ICD-10-CM

## 2025-02-02 DIAGNOSIS — E78.5 HYPERLIPIDEMIA LDL GOAL <160: ICD-10-CM

## 2025-02-03 RX ORDER — METOPROLOL TARTRATE 100 MG/1
100 TABLET ORAL 2 TIMES DAILY
Qty: 180 TABLET | Refills: 0 | Status: SHIPPED | OUTPATIENT
Start: 2025-02-03

## 2025-02-03 RX ORDER — EZETIMIBE 10 MG/1
10 TABLET ORAL EVERY EVENING
Qty: 90 TABLET | Refills: 1 | Status: SHIPPED | OUTPATIENT
Start: 2025-02-03

## 2025-02-24 ENCOUNTER — TELEPHONE (OUTPATIENT)
Dept: FAMILY MEDICINE | Facility: CLINIC | Age: 85
End: 2025-02-24
Payer: MEDICARE

## 2025-02-24 ENCOUNTER — TELEPHONE (OUTPATIENT)
Dept: FAMILY MEDICINE | Facility: CLINIC | Age: 85
End: 2025-02-24

## 2025-02-24 NOTE — TELEPHONE ENCOUNTER
Is she wanting to fill out (or help with) filling out a Polst?  Does she have family she needs to talk about these things with?  I'm not sure if we can get those forms to her to review ahead of time.  Or to review with family.      Chance

## 2025-02-24 NOTE — TELEPHONE ENCOUNTER
Patient calling to request an appointment with provider to discuss DNR and varicose veins. Appointment scheduled.     Is anything else needed prior to the appointment in preparation for the DNR discussion?    Apr 01, 2025 10:30 AM  (Arrive by 10:10 AM)  Provider Visit with Chance Fam PA-C  [facility] ([facility] ) [phone]     Vanda Garcia RN 2/24/2025 11:56 AM  [facility]

## 2025-02-24 NOTE — TELEPHONE ENCOUNTER
Melissa from [facility] returning the call.     If patient only needs oxygen for overnight, then the tested she has completed would be sufficient. Melissa will reach out to Dr. [facility] to get the orders.     If patient needs daytime oxygen, she would need to do a 6 minute walk test and a face to face visit with a provider.     RN called patient to clarify. Patient confirmed she only needs oxygen overnight.     Vanda Garcia RN 2/24/2025 11:56 AM  [facility]

## 2025-02-24 NOTE — TELEPHONE ENCOUNTER
Patient calls to discuss next steps in ordering home oxygen. Based on recent cardiology note, patient already underwent overnight oximetry testing and was told she meets criteria for home oxygen. These results are scanned into chart.    I called  [facility] to inquire about next steps. LMTCB for oxygen team.   -Since patient had overnight oximetry testing done, does she need additional testing to meet criteria for home oxygen at this time? Does she need a face to face with PCP to discuss in order for insurance to cover?      Ifeoma Allen RN on 2/24/2025 at 11:32 AM

## 2025-02-24 NOTE — TELEPHONE ENCOUNTER
Called the pt to advise of below.  She said her family knows exactly what she wants.  She said she has a living will.  She does not know where it is.  She will look for it.  She said she was gone for 3-4 months in rehab. She said her grandson lived in her house then and some things got misplaced.  Advised it would be great if she could try to find the living will.  Will forward to Chance Fam to see if anything else is needed.

## 2025-02-26 ENCOUNTER — PATIENT OUTREACH (OUTPATIENT)
Dept: CARE COORDINATION | Facility: CLINIC | Age: 85
End: 2025-02-26
Payer: MEDICARE

## 2025-03-12 ENCOUNTER — PATIENT OUTREACH (OUTPATIENT)
Dept: CARE COORDINATION | Facility: CLINIC | Age: 85
End: 2025-03-12
Payer: MEDICARE

## 2025-03-17 DIAGNOSIS — E87.6 HYPOKALEMIA: ICD-10-CM

## 2025-03-17 RX ORDER — POTASSIUM CHLORIDE 1500 MG/1
20 TABLET, EXTENDED RELEASE ORAL DAILY
Qty: 90 TABLET | Refills: 3 | Status: SHIPPED | OUTPATIENT
Start: 2025-03-17

## 2025-04-01 ENCOUNTER — OFFICE VISIT (OUTPATIENT)
Dept: FAMILY MEDICINE | Facility: CLINIC | Age: 85
End: 2025-04-01
Payer: MEDICARE

## 2025-04-01 ENCOUNTER — TELEPHONE (OUTPATIENT)
Dept: FAMILY MEDICINE | Facility: CLINIC | Age: 85
End: 2025-04-01

## 2025-04-01 VITALS
RESPIRATION RATE: 20 BRPM | HEART RATE: 71 BPM | WEIGHT: 108.8 LBS | SYSTOLIC BLOOD PRESSURE: 152 MMHG | BODY MASS INDEX: 19.28 KG/M2 | HEIGHT: 63 IN | DIASTOLIC BLOOD PRESSURE: 55 MMHG | TEMPERATURE: 97.7 F | OXYGEN SATURATION: 99 %

## 2025-04-01 DIAGNOSIS — I48.19 PERSISTENT ATRIAL FIBRILLATION (H): ICD-10-CM

## 2025-04-01 DIAGNOSIS — G47.34 NOCTURNAL HYPOXIA: Primary | ICD-10-CM

## 2025-04-01 DIAGNOSIS — N18.31 STAGE 3A CHRONIC KIDNEY DISEASE (H): ICD-10-CM

## 2025-04-01 DIAGNOSIS — I50.9 ACUTE CONGESTIVE HEART FAILURE, UNSPECIFIED HEART FAILURE TYPE (H): ICD-10-CM

## 2025-04-01 DIAGNOSIS — J44.9 CHRONIC OBSTRUCTIVE PULMONARY DISEASE, UNSPECIFIED COPD TYPE (H): ICD-10-CM

## 2025-04-01 DIAGNOSIS — C34.90 MALIGNANT NEOPLASM OF LUNG, UNSPECIFIED LATERALITY, UNSPECIFIED PART OF LUNG (H): ICD-10-CM

## 2025-04-01 DIAGNOSIS — Z00.00 ENCOUNTER FOR MEDICARE ANNUAL WELLNESS EXAM: ICD-10-CM

## 2025-04-01 DIAGNOSIS — I10 ESSENTIAL HYPERTENSION WITH GOAL BLOOD PRESSURE LESS THAN 140/90: ICD-10-CM

## 2025-04-01 RX ORDER — METOPROLOL TARTRATE 100 MG/1
100 TABLET ORAL 2 TIMES DAILY
Qty: 180 TABLET | Refills: 0 | Status: SHIPPED | OUTPATIENT
Start: 2025-04-01

## 2025-04-01 ASSESSMENT — PAIN SCALES - GENERAL: PAINLEVEL_OUTOF10: NO PAIN (0)

## 2025-04-01 NOTE — TELEPHONE ENCOUNTER
Myah, from [facility] medical [facility]. Patient had OV today and DME order sent for Oxygen.    In order for them to dispense home O2 to patient, provider needs to add a dot phrase to today's OV note or create a separate progress note. The dot phrase is .[id number]    Paula will watch the chart for this to be added and then let patient know.    Vanda Garcia RN 4/1/2025 12:51 PM  [facility]

## 2025-04-01 NOTE — PROGRESS NOTES
Assessment & Plan     Nocturnal hypoxia  Due to low 02's at night due overnight oximetry testing her cardiologist recommended trying supplemental 02 at night.  She does state she is waking in the AM's fatgiued and a little dizzy as well.  Will place order today for her and send to  [facility] supply.  - Oxygen Order      Encounter for Medicare annual wellness exam  Reviewed personal and family history. Reviewed age appropriate screenings. Reviewed healthy BP and BMI ranges. Counseled on lifestyle modifications for optimal mental and physical health.  Discussed age-appropriate health maintanence. Recommended any needed vaccinations. Continue to focus on well balanced diet and exercise     Chronic obstructive pulmonary disease, unspecified COPD type (H)  Malignant neoplasm of lung, unspecified laterality, unspecified part of lung (H)  Acute congestive heart failure, unspecified heart failure type (H)  All could be contributing to her low nighttime oxygen readings.  - Oxygen Order    Essential hypertension with goal blood pressure less than 140/90  Slightly elevated today.  Continue management with cardiology.  I did send in refills for what she needed today as well.    Stage 3a chronic kidney disease (H)  Last labs in Dec stable.    Persistent atrial fibrillation (H)  Refilling Eliquis- patient managing this with cardiology as well.      The longitudinal plan of care for the diagnosis(es)/condition(s) as documented were addressed during this visit. Due to the added complexity in care, I will continue to support Chandrika in the subsequent management and with ongoing continuity of care.        Subjective   Chandrika is a 84 year old, presenting for the following health issues:  Shortness of Breath and Forms (Patient brought her health care directive today with her. )        4/1/2025    10:13 AM   Additional Questions   Roomed by Lisa Magill, CMA   Accompanied by self         4/1/2025    10:13 AM   Patient Reported  "Additional Medications   Patient reports taking the following new medications none     HPI        Concern - Getting oxygen to wear at home.   From cardiology notes in February  Sleep induced hypoxemia-she spends a significant amount of time with oxygen levels less than 88% and likely needs nocturnal supplemental oxygen. I have referred her for formal sleep study and will contact her PMD with the results of her overnight oximetry to discuss home O2 at night     \"The oximetry however showed about 6 hours of oxygen levels below 88% indicating significant sleep disordered breathing\"      Has a home 02 oximeter- sometimes will dip below 90 to 88% or so with exertion.  Did a overnight 02 test that showed low 02.    Patient has been assessed for Home Oxygen needs.     Pulse oximetry (SpO2) and Oxygen flow readings:    SpO2 =  Lowest of 98% on room air at rest while awake.                Highest of 99%    SpO2 = Lowest of 95% on room air during activity/with exercise.     Highest of 97%    SpO2 improved to Highest of 99% on  1 liters/minute during activity/with exercise.                     Lowest of 98%      Healthcare Directive Form  Has an old out dated form.  Wants to update this.      Annual Wellness Visit     Patient has been advised of split billing requirements and indicates understanding: Yes       Health Care Directive  Patient does not have a Health Care Directive: Patient states has Advance Directive and will bring in a copy to clinic.  In general, how would you rate your overall physical health? (!) FAIR   Discussed with patient their rating of physical health; information has been provided.   Do you have a special diet?  Regular (no restrictions)        3/28/2024   Exercise, Social Connection, Stress   Days per week of moderate/strenous exercise 7 days    Average minutes spent exercising at this level 20 min    Frequency of gathering with friends or relatives Once    Feel stress (tense, anxious, or unable to " sleep) Not at all        Proxy-reported     Do you see a dentist two times every year?  (!) NO  The patient was instructed to see the dentist every 6 months.   Have you been more tired than usual lately?  (!) YES   Discussed possible causes of fatigue.   If you drink alcohol do you typically have >3 drinks per day or >7 drinks per week? No  Do you have a current opioid prescription? No  Do you use any other controlled substances or medications that are not prescribed by a provider? None  Social History     Tobacco Use    Smoking status: Never     Passive exposure: Never    Smokeless tobacco: Never   Vaping Use    Vaping status: Never Used   Substance Use Topics    Alcohol use: Yes     Comment: 0-1 drink day    Drug use: No       Needs assistance for the following daily activities: no assistance needed  Which of the following safety concerns are present in your home?  (!) THROW RUGS IN THE HALLWAY   Do you (or your family members) have any concerns about your safety while driving?  No  Do you have any of the following hearing concerns?: No hearing concerns  In the past 6 months, have you been bothered by leaking of urine? (!) YES   Information on urinary incontinence and treatment options given to patient.        3/28/2024   Social Factors   Frequency of gathering with friends or relatives Once a week   Worry food won't last until get money to buy more No   Food not last or not have enough money for food? No   Do you have housing? (Housing is defined as stable permanent housing and does not include staying ouside in a car, in a tent, in an abandoned building, in an overnight shelter, or couch-surfing.) Yes   Are you worried about losing your housing? No   Lack of transportation? No   Unable to get utilities (heat,electricity)? No         3/28/2024   Fall Risk   Fallen 2 or more times in the past year? No    Trouble with walking or balance? Yes    Reason Gait Speed Test Not Completed Patient declines        Proxy-reported            Today's PHQ-2 Score:       4/1/2025    10:32 AM   PHQ-2 ( 1999 [employer])   Q1: Little interest or pleasure in doing things 0   Q2: Feeling down, depressed or hopeless 0   PHQ-2 Score 0        Mammogram Screening - After age 74- determine frequency with patient based on health status, life expectancy and patient goals              Reviewed and updated as needed this visit by Provider                        Current providers sharing in care for this patient include:  Patient Care Team:  Chance Fam PA-C as PCP - General (Family Medicine)  Care, [facility] ([facility] ([place]  Willow De La Paz RPH as Pharmacist (Pharmacist)  Renee Savage PA-C as Physician Assistant (Physician Assistant)  Gabby Ruff DO as Physician (Hematology & Oncology)  Gabby Ruff DO as Assigned Cancer Care Provider  Chance Fam PA-C as Assigned PCP  Aline Cooper DO as Physician (OB/Gyn)  Aline Cooper DO as Assigned OBGYN Provider  Kylie Sevilla DO as Assigned Heart and Vascular Provider    The following health maintenance items are reviewed in Epic and correct as of today:  Health Maintenance   Topic Date Due    HF ACTION PLAN  Never done    RSV VACCINE (1 - 1-dose 75+ series) Never done    COVID-19 Vaccine (4 - 2024-25 season) 09/01/2024    ANNUAL REVIEW OF HM ORDERS  12/11/2024    PHQ-9  02/28/2025    LIPID  03/18/2025    FALL RISK ASSESSMENT  03/28/2025    MEDICARE ANNUAL WELLNESS VISIT  03/28/2025    BMP  06/16/2025    ALT  10/29/2025    MICROALBUMIN  12/05/2025    CBC  12/16/2025    HEMOGLOBIN  12/16/2025    DEXA  10/01/2027    ADVANCE CARE PLANNING  02/05/2028    TSH W/FREE T4 REFLEX  Completed    PHQ-2 (once per calendar year)  Completed    INFLUENZA VACCINE  Completed    Pneumococcal Vaccine: 50+ Years  Completed    URINALYSIS  Completed    HPV IMMUNIZATION  Aged Out    MENINGITIS IMMUNIZATION  Aged Out    URINE DRUG SCREEN   "Discontinued    COLORECTAL CANCER SCREENING  Discontinued    ZOSTER IMMUNIZATION  Discontinued    DTAP/TDAP/TD IMMUNIZATION  Discontinued       Appropriate preventive services were discussed with this patient, including applicable screening as appropriate for fall prevention, nutrition, physical activity, Tobacco-use cessation, weight loss and cognition.  Checklist reviewing preventive services available has been given to the patient.          3/28/2024   Mini Cog   Mini-Cog Not Completed (choose reason) Patient declines            Hyperlipidemia Follow-Up    Are you regularly taking any medication or supplement to lower your cholesterol?   Yes- zetia  Are you having muscle aches or other side effects that you think could be caused by your cholesterol lowering medication?  No    Hypertension Follow-up    Do you check your blood pressure regularly outside of the clinic? Yes   Are you following a low salt diet? Yes  Are your blood pressures ever more than 140 on the top number (systolic) OR more   than 90 on the bottom number (diastolic), for example 140/90?     BP Readings from Last 6 Encounters:   04/01/25 (!) 152/55   02/07/25 124/60   12/16/24 (!) 146/53   12/06/24 138/52   11/20/24 (!) 140/78   10/23/24 134/70           Objective    BP (!) 152/55 (BP Location: Right arm, Patient Position: Sitting, Cuff Size: Adult Small)   Pulse 71   Temp 97.7  F (36.5  C) (Oral)   Resp 20   Ht 1.6 m (5' 3\")   Wt 49.4 kg (108 lb 12.8 oz)   LMP  (LMP Unknown)   SpO2 99%   BMI 19.27 kg/m    Body mass index is 19.27 kg/m .  Physical Exam   GENERAL: alert and no distress  RESP: lungs clear to auscultation - no rales, rhonchi or wheezes  CV: regular rate and rhythm, normal S1 S2, no S3 or S4, no murmur, click or rub, no peripheral edema  MS: no gross musculoskeletal defects noted, no edema  SKIN: no suspicious lesions or rashes  NEURO: Normal strength and tone, mentation intact and speech normal  PSYCH: mentation appears " normal, affect normal/bright            Signed Electronically by: Chance Fam PA-C  DME (Durable Medical Equipment) Orders and Documentation  Orders Placed This Encounter   Procedures    Oxygen Order        The patient was assessed and it was determined the patient is in need of the following listed DME Supplies/Equipment. Please complete supporting documentation below to demonstrate medical necessity.

## 2025-04-08 ENCOUNTER — DOCUMENTATION ONLY (OUTPATIENT)
Dept: OTHER | Facility: CLINIC | Age: 85
End: 2025-04-08

## 2025-04-23 ENCOUNTER — TRANSFERRED RECORDS (OUTPATIENT)
Dept: HEALTH INFORMATION MANAGEMENT | Facility: CLINIC | Age: 85
End: 2025-04-23
Payer: MEDICARE

## 2025-05-06 ENCOUNTER — ANCILLARY PROCEDURE (OUTPATIENT)
Dept: CARDIOLOGY | Facility: CLINIC | Age: 85
End: 2025-05-06
Attending: INTERNAL MEDICINE
Payer: MEDICARE

## 2025-05-06 DIAGNOSIS — I44.2 ATRIOVENTRICULAR BLOCK, COMPLETE (H): ICD-10-CM

## 2025-05-06 DIAGNOSIS — Z95.0 CARDIAC PACEMAKER IN SITU: ICD-10-CM

## 2025-05-06 LAB
MDC_IDC_EPISODE_DTM: NORMAL
MDC_IDC_EPISODE_DURATION: 14 S
MDC_IDC_EPISODE_ID: NORMAL
MDC_IDC_EPISODE_TYPE: NORMAL
MDC_IDC_EPISODE_TYPE_INDUCED: NO
MDC_IDC_LEAD_CONNECTION_STATUS: NORMAL
MDC_IDC_LEAD_CONNECTION_STATUS: NORMAL
MDC_IDC_LEAD_IMPLANT_DT: NORMAL
MDC_IDC_LEAD_IMPLANT_DT: NORMAL
MDC_IDC_LEAD_LOCATION: NORMAL
MDC_IDC_LEAD_LOCATION: NORMAL
MDC_IDC_LEAD_LOCATION_DETAIL_1: NORMAL
MDC_IDC_LEAD_LOCATION_DETAIL_1: NORMAL
MDC_IDC_LEAD_MFG: NORMAL
MDC_IDC_LEAD_MFG: NORMAL
MDC_IDC_LEAD_MODEL: NORMAL
MDC_IDC_LEAD_MODEL: NORMAL
MDC_IDC_LEAD_POLARITY_TYPE: NORMAL
MDC_IDC_LEAD_POLARITY_TYPE: NORMAL
MDC_IDC_LEAD_SERIAL: NORMAL
MDC_IDC_LEAD_SERIAL: NORMAL
MDC_IDC_MSMT_BATTERY_DTM: NORMAL
MDC_IDC_MSMT_BATTERY_REMAINING_LONGEVITY: 78 MO
MDC_IDC_MSMT_BATTERY_REMAINING_PERCENTAGE: 85 %
MDC_IDC_MSMT_BATTERY_STATUS: NORMAL
MDC_IDC_MSMT_LEADCHNL_LV_IMPEDANCE_VALUE: 690 OHM
MDC_IDC_MSMT_LEADCHNL_LV_PACING_THRESHOLD_AMPLITUDE: 1.2 V
MDC_IDC_MSMT_LEADCHNL_LV_PACING_THRESHOLD_PULSEWIDTH: 0.4 MS
MDC_IDC_MSMT_LEADCHNL_RV_IMPEDANCE_VALUE: 690 OHM
MDC_IDC_MSMT_LEADCHNL_RV_PACING_THRESHOLD_AMPLITUDE: 1 V
MDC_IDC_MSMT_LEADCHNL_RV_PACING_THRESHOLD_PULSEWIDTH: 0.4 MS
MDC_IDC_PG_IMPLANT_DTM: NORMAL
MDC_IDC_PG_MFG: NORMAL
MDC_IDC_PG_MODEL: NORMAL
MDC_IDC_PG_SERIAL: NORMAL
MDC_IDC_PG_TYPE: NORMAL
MDC_IDC_SESS_CLINIC_NAME: NORMAL
MDC_IDC_SESS_DTM: NORMAL
MDC_IDC_SESS_TYPE: NORMAL
MDC_IDC_SET_BRADY_AT_MODE_SWITCH_RATE: 170 {BEATS}/MIN
MDC_IDC_SET_BRADY_LOWRATE: 70 {BEATS}/MIN
MDC_IDC_SET_BRADY_MAX_SENSOR_RATE: 130 {BEATS}/MIN
MDC_IDC_SET_BRADY_MODE: NORMAL
MDC_IDC_SET_CRT_LVRV_DELAY: 30 MS
MDC_IDC_SET_CRT_PACED_CHAMBERS: NORMAL
MDC_IDC_SET_LEADCHNL_LV_PACING_AMPLITUDE: 2.7 V
MDC_IDC_SET_LEADCHNL_LV_PACING_ANODE_ELECTRODE_1: NORMAL
MDC_IDC_SET_LEADCHNL_LV_PACING_ANODE_LOCATION_1: NORMAL
MDC_IDC_SET_LEADCHNL_LV_PACING_CATHODE_ELECTRODE_1: NORMAL
MDC_IDC_SET_LEADCHNL_LV_PACING_CATHODE_LOCATION_1: NORMAL
MDC_IDC_SET_LEADCHNL_LV_PACING_PULSEWIDTH: 0.4 MS
MDC_IDC_SET_LEADCHNL_LV_SENSING_ADAPTATION_MODE: NORMAL
MDC_IDC_SET_LEADCHNL_LV_SENSING_ANODE_ELECTRODE_1: NORMAL
MDC_IDC_SET_LEADCHNL_LV_SENSING_ANODE_LOCATION_1: NORMAL
MDC_IDC_SET_LEADCHNL_LV_SENSING_CATHODE_ELECTRODE_1: NORMAL
MDC_IDC_SET_LEADCHNL_LV_SENSING_CATHODE_LOCATION_1: NORMAL
MDC_IDC_SET_LEADCHNL_LV_SENSING_SENSITIVITY: 2.5 MV
MDC_IDC_SET_LEADCHNL_RA_SENSING_ADAPTATION_MODE: NORMAL
MDC_IDC_SET_LEADCHNL_RA_SENSING_SENSITIVITY: 0.5 MV
MDC_IDC_SET_LEADCHNL_RV_PACING_AMPLITUDE: 2.8 V
MDC_IDC_SET_LEADCHNL_RV_PACING_CAPTURE_MODE: NORMAL
MDC_IDC_SET_LEADCHNL_RV_PACING_POLARITY: NORMAL
MDC_IDC_SET_LEADCHNL_RV_PACING_PULSEWIDTH: 0.4 MS
MDC_IDC_SET_LEADCHNL_RV_SENSING_ADAPTATION_MODE: NORMAL
MDC_IDC_SET_LEADCHNL_RV_SENSING_POLARITY: NORMAL
MDC_IDC_SET_LEADCHNL_RV_SENSING_SENSITIVITY: 2.5 MV
MDC_IDC_SET_ZONE_DETECTION_INTERVAL: 375 MS
MDC_IDC_SET_ZONE_STATUS: NORMAL
MDC_IDC_SET_ZONE_TYPE: NORMAL
MDC_IDC_SET_ZONE_VENDOR_TYPE: NORMAL
MDC_IDC_STAT_BRADY_DTM_END: NORMAL
MDC_IDC_STAT_BRADY_DTM_START: NORMAL
MDC_IDC_STAT_BRADY_RA_PERCENT_PACED: 0 %
MDC_IDC_STAT_BRADY_RV_PERCENT_PACED: 99 %
MDC_IDC_STAT_CRT_DTM_END: NORMAL
MDC_IDC_STAT_CRT_DTM_START: NORMAL
MDC_IDC_STAT_CRT_LV_PERCENT_PACED: 99 %
MDC_IDC_STAT_EPISODE_RECENT_COUNT: 0
MDC_IDC_STAT_EPISODE_RECENT_COUNT: 0
MDC_IDC_STAT_EPISODE_RECENT_COUNT: 1
MDC_IDC_STAT_EPISODE_RECENT_COUNT_DTM_END: NORMAL
MDC_IDC_STAT_EPISODE_RECENT_COUNT_DTM_START: NORMAL
MDC_IDC_STAT_EPISODE_TYPE: NORMAL
MDC_IDC_STAT_EPISODE_VENDOR_TYPE: NORMAL
MDC_IDC_STAT_EPISODE_VENDOR_TYPE: NORMAL

## 2025-05-06 PROCEDURE — 93294 REM INTERROG EVL PM/LDLS PM: CPT | Performed by: INTERNAL MEDICINE

## 2025-05-06 PROCEDURE — 93296 REM INTERROG EVL PM/IDS: CPT | Performed by: INTERNAL MEDICINE

## 2025-07-04 DIAGNOSIS — I10 ESSENTIAL HYPERTENSION: ICD-10-CM

## 2025-07-07 RX ORDER — AMLODIPINE BESYLATE 5 MG/1
5 TABLET ORAL 2 TIMES DAILY
Qty: 180 TABLET | Refills: 1 | Status: SHIPPED | OUTPATIENT
Start: 2025-07-07

## 2025-07-29 ENCOUNTER — OFFICE VISIT (OUTPATIENT)
Dept: FAMILY MEDICINE | Facility: CLINIC | Age: 85
End: 2025-07-29
Payer: MEDICARE

## 2025-07-29 VITALS
HEART RATE: 69 BPM | HEIGHT: 63 IN | BODY MASS INDEX: 18.8 KG/M2 | DIASTOLIC BLOOD PRESSURE: 63 MMHG | TEMPERATURE: 98.4 F | RESPIRATION RATE: 18 BRPM | OXYGEN SATURATION: 98 % | WEIGHT: 106.1 LBS | SYSTOLIC BLOOD PRESSURE: 148 MMHG

## 2025-07-29 DIAGNOSIS — N18.31 STAGE 3A CHRONIC KIDNEY DISEASE (H): ICD-10-CM

## 2025-07-29 DIAGNOSIS — I10 ESSENTIAL HYPERTENSION: ICD-10-CM

## 2025-07-29 DIAGNOSIS — E78.5 HYPERLIPIDEMIA LDL GOAL <160: ICD-10-CM

## 2025-07-29 DIAGNOSIS — M53.3 SACROILIAC JOINT PAIN: ICD-10-CM

## 2025-07-29 DIAGNOSIS — K60.2 ANAL FISSURE: Primary | ICD-10-CM

## 2025-07-29 PROCEDURE — 99213 OFFICE O/P EST LOW 20 MIN: CPT | Performed by: PHYSICIAN ASSISTANT

## 2025-07-29 PROCEDURE — 3077F SYST BP >= 140 MM HG: CPT | Performed by: PHYSICIAN ASSISTANT

## 2025-07-29 PROCEDURE — G2211 COMPLEX E/M VISIT ADD ON: HCPCS | Performed by: PHYSICIAN ASSISTANT

## 2025-07-29 PROCEDURE — 1126F AMNT PAIN NOTED NONE PRSNT: CPT | Performed by: PHYSICIAN ASSISTANT

## 2025-07-29 PROCEDURE — 3078F DIAST BP <80 MM HG: CPT | Performed by: PHYSICIAN ASSISTANT

## 2025-07-29 RX ORDER — EZETIMIBE 10 MG/1
10 TABLET ORAL EVERY EVENING
Qty: 90 TABLET | Refills: 1 | Status: SHIPPED | OUTPATIENT
Start: 2025-07-29

## 2025-07-29 RX ORDER — LIDOCAINE 50 MG/G
OINTMENT TOPICAL PRN
Qty: 50 G | Refills: 1 | Status: SHIPPED | OUTPATIENT
Start: 2025-07-29

## 2025-07-29 RX ORDER — METOPROLOL TARTRATE 100 MG/1
100 TABLET ORAL 2 TIMES DAILY
Qty: 180 TABLET | Refills: 1 | Status: SHIPPED | OUTPATIENT
Start: 2025-07-29

## 2025-07-29 ASSESSMENT — PAIN SCALES - GENERAL: PAINLEVEL_OUTOF10: NO PAIN (0)

## 2025-08-06 ENCOUNTER — ANCILLARY PROCEDURE (OUTPATIENT)
Dept: CARDIOLOGY | Facility: CLINIC | Age: 85
End: 2025-08-06
Payer: MEDICARE

## 2025-08-06 DIAGNOSIS — Z95.0 CARDIAC PACEMAKER IN SITU: ICD-10-CM

## 2025-08-06 DIAGNOSIS — I44.2 ATRIOVENTRICULAR BLOCK, COMPLETE (H): ICD-10-CM

## 2025-08-06 LAB
MDC_IDC_LEAD_CONNECTION_STATUS: NORMAL
MDC_IDC_LEAD_CONNECTION_STATUS: NORMAL
MDC_IDC_LEAD_IMPLANT_DT: NORMAL
MDC_IDC_LEAD_IMPLANT_DT: NORMAL
MDC_IDC_LEAD_LOCATION: NORMAL
MDC_IDC_LEAD_LOCATION: NORMAL
MDC_IDC_LEAD_LOCATION_DETAIL_1: NORMAL
MDC_IDC_LEAD_LOCATION_DETAIL_1: NORMAL
MDC_IDC_LEAD_MFG: NORMAL
MDC_IDC_LEAD_MFG: NORMAL
MDC_IDC_LEAD_MODEL: NORMAL
MDC_IDC_LEAD_MODEL: NORMAL
MDC_IDC_LEAD_POLARITY_TYPE: NORMAL
MDC_IDC_LEAD_POLARITY_TYPE: NORMAL
MDC_IDC_LEAD_SERIAL: NORMAL
MDC_IDC_LEAD_SERIAL: NORMAL
MDC_IDC_MSMT_BATTERY_DTM: NORMAL
MDC_IDC_MSMT_BATTERY_REMAINING_LONGEVITY: 78 MO
MDC_IDC_MSMT_BATTERY_REMAINING_PERCENTAGE: 86 %
MDC_IDC_MSMT_BATTERY_STATUS: NORMAL
MDC_IDC_MSMT_LEADCHNL_LV_IMPEDANCE_VALUE: 664 OHM
MDC_IDC_MSMT_LEADCHNL_LV_PACING_THRESHOLD_AMPLITUDE: 1.3 V
MDC_IDC_MSMT_LEADCHNL_LV_PACING_THRESHOLD_PULSEWIDTH: 0.4 MS
MDC_IDC_MSMT_LEADCHNL_RA_IMPEDANCE_VALUE: 3000 OHM
MDC_IDC_MSMT_LEADCHNL_RV_IMPEDANCE_VALUE: 708 OHM
MDC_IDC_MSMT_LEADCHNL_RV_LEAD_CHANNEL_STATUS: NORMAL
MDC_IDC_MSMT_LEADCHNL_RV_PACING_THRESHOLD_AMPLITUDE: 1 V
MDC_IDC_MSMT_LEADCHNL_RV_PACING_THRESHOLD_PULSEWIDTH: 0.4 MS
MDC_IDC_PG_IMPLANT_DTM: NORMAL
MDC_IDC_PG_MFG: NORMAL
MDC_IDC_PG_MODEL: NORMAL
MDC_IDC_PG_SERIAL: NORMAL
MDC_IDC_PG_TYPE: NORMAL
MDC_IDC_SESS_CLINIC_NAME: NORMAL
MDC_IDC_SESS_DTM: NORMAL
MDC_IDC_SESS_TYPE: NORMAL
MDC_IDC_SET_BRADY_LOWRATE: 70 {BEATS}/MIN
MDC_IDC_SET_BRADY_MAX_SENSOR_RATE: 130 {BEATS}/MIN
MDC_IDC_SET_BRADY_MODE: NORMAL
MDC_IDC_SET_CRT_LVRV_DELAY: 30 MS
MDC_IDC_SET_CRT_PACED_CHAMBERS: NORMAL
MDC_IDC_SET_LEADCHNL_LV_PACING_AMPLITUDE: 2.7 V
MDC_IDC_SET_LEADCHNL_LV_PACING_ANODE_ELECTRODE_1: NORMAL
MDC_IDC_SET_LEADCHNL_LV_PACING_ANODE_LOCATION_1: NORMAL
MDC_IDC_SET_LEADCHNL_LV_PACING_CATHODE_ELECTRODE_1: NORMAL
MDC_IDC_SET_LEADCHNL_LV_PACING_CATHODE_LOCATION_1: NORMAL
MDC_IDC_SET_LEADCHNL_LV_PACING_PULSEWIDTH: 0.4 MS
MDC_IDC_SET_LEADCHNL_LV_SENSING_ADAPTATION_MODE: NORMAL
MDC_IDC_SET_LEADCHNL_LV_SENSING_ANODE_ELECTRODE_1: NORMAL
MDC_IDC_SET_LEADCHNL_LV_SENSING_ANODE_LOCATION_1: NORMAL
MDC_IDC_SET_LEADCHNL_LV_SENSING_CATHODE_ELECTRODE_1: NORMAL
MDC_IDC_SET_LEADCHNL_LV_SENSING_CATHODE_LOCATION_1: NORMAL
MDC_IDC_SET_LEADCHNL_LV_SENSING_SENSITIVITY: 2.5 MV
MDC_IDC_SET_LEADCHNL_RA_SENSING_ADAPTATION_MODE: NORMAL
MDC_IDC_SET_LEADCHNL_RA_SENSING_SENSITIVITY: 0.5 MV
MDC_IDC_SET_LEADCHNL_RV_PACING_AMPLITUDE: 2.6 V
MDC_IDC_SET_LEADCHNL_RV_PACING_CAPTURE_MODE: NORMAL
MDC_IDC_SET_LEADCHNL_RV_PACING_POLARITY: NORMAL
MDC_IDC_SET_LEADCHNL_RV_PACING_PULSEWIDTH: 0.4 MS
MDC_IDC_SET_LEADCHNL_RV_SENSING_ADAPTATION_MODE: NORMAL
MDC_IDC_SET_LEADCHNL_RV_SENSING_POLARITY: NORMAL
MDC_IDC_SET_LEADCHNL_RV_SENSING_SENSITIVITY: 2.5 MV
MDC_IDC_SET_ZONE_DETECTION_INTERVAL: 375 MS
MDC_IDC_SET_ZONE_STATUS: NORMAL
MDC_IDC_SET_ZONE_TYPE: NORMAL
MDC_IDC_SET_ZONE_VENDOR_TYPE: NORMAL
MDC_IDC_STAT_BRADY_DTM_END: NORMAL
MDC_IDC_STAT_BRADY_DTM_START: NORMAL
MDC_IDC_STAT_BRADY_RA_PERCENT_PACED: 0 %
MDC_IDC_STAT_BRADY_RV_PERCENT_PACED: 99 %
MDC_IDC_STAT_CRT_DTM_END: NORMAL
MDC_IDC_STAT_CRT_DTM_START: NORMAL
MDC_IDC_STAT_CRT_LV_PERCENT_PACED: 99 %
MDC_IDC_STAT_EPISODE_RECENT_COUNT: 0
MDC_IDC_STAT_EPISODE_RECENT_COUNT: 0
MDC_IDC_STAT_EPISODE_RECENT_COUNT: 1
MDC_IDC_STAT_EPISODE_RECENT_COUNT_DTM_END: NORMAL
MDC_IDC_STAT_EPISODE_RECENT_COUNT_DTM_START: NORMAL
MDC_IDC_STAT_EPISODE_TYPE: NORMAL
MDC_IDC_STAT_EPISODE_VENDOR_TYPE: NORMAL
MDC_IDC_STAT_EPISODE_VENDOR_TYPE: NORMAL

## 2025-08-06 PROCEDURE — 93281 PM DEVICE PROGR EVAL MULTI: CPT | Performed by: INTERNAL MEDICINE

## 2025-08-20 ENCOUNTER — ANCILLARY PROCEDURE (OUTPATIENT)
Dept: CARDIOLOGY | Facility: CLINIC | Age: 85
End: 2025-08-20
Payer: MEDICARE

## 2025-08-20 DIAGNOSIS — I44.2 ATRIOVENTRICULAR BLOCK, COMPLETE (H): ICD-10-CM

## 2025-08-20 DIAGNOSIS — Z95.0 CARDIAC PACEMAKER IN SITU: ICD-10-CM

## 2025-08-20 LAB
MDC_IDC_LEAD_CONNECTION_STATUS: NORMAL
MDC_IDC_LEAD_CONNECTION_STATUS: NORMAL
MDC_IDC_LEAD_IMPLANT_DT: NORMAL
MDC_IDC_LEAD_IMPLANT_DT: NORMAL
MDC_IDC_LEAD_LOCATION: NORMAL
MDC_IDC_LEAD_LOCATION: NORMAL
MDC_IDC_LEAD_LOCATION_DETAIL_1: NORMAL
MDC_IDC_LEAD_LOCATION_DETAIL_1: NORMAL
MDC_IDC_LEAD_MFG: NORMAL
MDC_IDC_LEAD_MFG: NORMAL
MDC_IDC_LEAD_MODEL: NORMAL
MDC_IDC_LEAD_MODEL: NORMAL
MDC_IDC_LEAD_POLARITY_TYPE: NORMAL
MDC_IDC_LEAD_POLARITY_TYPE: NORMAL
MDC_IDC_LEAD_SERIAL: NORMAL
MDC_IDC_LEAD_SERIAL: NORMAL
MDC_IDC_MSMT_BATTERY_DTM: NORMAL
MDC_IDC_MSMT_BATTERY_REMAINING_LONGEVITY: 72 MO
MDC_IDC_MSMT_BATTERY_REMAINING_PERCENTAGE: 82 %
MDC_IDC_MSMT_BATTERY_STATUS: NORMAL
MDC_IDC_MSMT_LEADCHNL_LV_IMPEDANCE_VALUE: 684 OHM
MDC_IDC_MSMT_LEADCHNL_RV_IMPEDANCE_VALUE: 700 OHM
MDC_IDC_MSMT_LEADCHNL_RV_PACING_THRESHOLD_AMPLITUDE: 1.4 V
MDC_IDC_MSMT_LEADCHNL_RV_PACING_THRESHOLD_PULSEWIDTH: 0.4 MS
MDC_IDC_PG_IMPLANT_DTM: NORMAL
MDC_IDC_PG_MFG: NORMAL
MDC_IDC_PG_MODEL: NORMAL
MDC_IDC_PG_SERIAL: NORMAL
MDC_IDC_PG_TYPE: NORMAL
MDC_IDC_SESS_CLINIC_NAME: NORMAL
MDC_IDC_SESS_DTM: NORMAL
MDC_IDC_SESS_TYPE: NORMAL
MDC_IDC_SET_BRADY_LOWRATE: 70 {BEATS}/MIN
MDC_IDC_SET_BRADY_MAX_SENSOR_RATE: 130 {BEATS}/MIN
MDC_IDC_SET_BRADY_MODE: NORMAL
MDC_IDC_SET_CRT_LVRV_DELAY: 30 MS
MDC_IDC_SET_CRT_PACED_CHAMBERS: NORMAL
MDC_IDC_SET_LEADCHNL_LV_PACING_AMPLITUDE: 2.7 V
MDC_IDC_SET_LEADCHNL_LV_PACING_ANODE_ELECTRODE_1: NORMAL
MDC_IDC_SET_LEADCHNL_LV_PACING_ANODE_LOCATION_1: NORMAL
MDC_IDC_SET_LEADCHNL_LV_PACING_CATHODE_ELECTRODE_1: NORMAL
MDC_IDC_SET_LEADCHNL_LV_PACING_CATHODE_LOCATION_1: NORMAL
MDC_IDC_SET_LEADCHNL_LV_PACING_PULSEWIDTH: 0.4 MS
MDC_IDC_SET_LEADCHNL_LV_SENSING_ADAPTATION_MODE: NORMAL
MDC_IDC_SET_LEADCHNL_LV_SENSING_ANODE_ELECTRODE_1: NORMAL
MDC_IDC_SET_LEADCHNL_LV_SENSING_ANODE_LOCATION_1: NORMAL
MDC_IDC_SET_LEADCHNL_LV_SENSING_CATHODE_ELECTRODE_1: NORMAL
MDC_IDC_SET_LEADCHNL_LV_SENSING_CATHODE_LOCATION_1: NORMAL
MDC_IDC_SET_LEADCHNL_LV_SENSING_SENSITIVITY: 2.5 MV
MDC_IDC_SET_LEADCHNL_RA_SENSING_ADAPTATION_MODE: NORMAL
MDC_IDC_SET_LEADCHNL_RA_SENSING_SENSITIVITY: 0.5 MV
MDC_IDC_SET_LEADCHNL_RV_PACING_AMPLITUDE: 2.8 V
MDC_IDC_SET_LEADCHNL_RV_PACING_CAPTURE_MODE: NORMAL
MDC_IDC_SET_LEADCHNL_RV_PACING_POLARITY: NORMAL
MDC_IDC_SET_LEADCHNL_RV_PACING_PULSEWIDTH: 0.4 MS
MDC_IDC_SET_LEADCHNL_RV_SENSING_ADAPTATION_MODE: NORMAL
MDC_IDC_SET_LEADCHNL_RV_SENSING_POLARITY: NORMAL
MDC_IDC_SET_LEADCHNL_RV_SENSING_SENSITIVITY: 2.5 MV
MDC_IDC_SET_ZONE_DETECTION_INTERVAL: 375 MS
MDC_IDC_SET_ZONE_STATUS: NORMAL
MDC_IDC_SET_ZONE_TYPE: NORMAL
MDC_IDC_SET_ZONE_VENDOR_TYPE: NORMAL
MDC_IDC_STAT_BRADY_DTM_END: NORMAL
MDC_IDC_STAT_BRADY_DTM_START: NORMAL
MDC_IDC_STAT_BRADY_RA_PERCENT_PACED: 0 %
MDC_IDC_STAT_BRADY_RV_PERCENT_PACED: 100 %
MDC_IDC_STAT_CRT_DTM_END: NORMAL
MDC_IDC_STAT_CRT_DTM_START: NORMAL
MDC_IDC_STAT_CRT_LV_PERCENT_PACED: 100 %
MDC_IDC_STAT_EPISODE_RECENT_COUNT: 0
MDC_IDC_STAT_EPISODE_RECENT_COUNT_DTM_END: NORMAL
MDC_IDC_STAT_EPISODE_RECENT_COUNT_DTM_START: NORMAL
MDC_IDC_STAT_EPISODE_TYPE: NORMAL
MDC_IDC_STAT_EPISODE_VENDOR_TYPE: NORMAL
MDC_IDC_STAT_EPISODE_VENDOR_TYPE: NORMAL

## 2025-08-20 PROCEDURE — 93288 INTERROG EVL PM/LDLS PM IP: CPT | Performed by: INTERNAL MEDICINE

## 2025-08-22 ENCOUNTER — TELEPHONE (OUTPATIENT)
Dept: CARDIOLOGY | Facility: CLINIC | Age: 85
End: 2025-08-22
Payer: MEDICARE

## 2025-08-25 ENCOUNTER — TELEPHONE (OUTPATIENT)
Dept: FAMILY MEDICINE | Facility: CLINIC | Age: 85
End: 2025-08-25
Payer: MEDICARE

## 2025-08-25 LAB
MDC_IDC_LEAD_CONNECTION_STATUS: NORMAL
MDC_IDC_LEAD_CONNECTION_STATUS: NORMAL
MDC_IDC_LEAD_IMPLANT_DT: NORMAL
MDC_IDC_LEAD_IMPLANT_DT: NORMAL
MDC_IDC_LEAD_LOCATION: NORMAL
MDC_IDC_LEAD_LOCATION: NORMAL
MDC_IDC_LEAD_LOCATION_DETAIL_1: NORMAL
MDC_IDC_LEAD_LOCATION_DETAIL_1: NORMAL
MDC_IDC_LEAD_MFG: NORMAL
MDC_IDC_LEAD_MFG: NORMAL
MDC_IDC_LEAD_MODEL: NORMAL
MDC_IDC_LEAD_MODEL: NORMAL
MDC_IDC_LEAD_POLARITY_TYPE: NORMAL
MDC_IDC_LEAD_POLARITY_TYPE: NORMAL
MDC_IDC_LEAD_SERIAL: NORMAL
MDC_IDC_LEAD_SERIAL: NORMAL
MDC_IDC_MSMT_BATTERY_DTM: NORMAL
MDC_IDC_MSMT_BATTERY_REMAINING_LONGEVITY: 72 MO
MDC_IDC_MSMT_BATTERY_REMAINING_PERCENTAGE: 82 %
MDC_IDC_MSMT_BATTERY_STATUS: NORMAL
MDC_IDC_MSMT_LEADCHNL_LV_IMPEDANCE_VALUE: 684 OHM
MDC_IDC_MSMT_LEADCHNL_RV_IMPEDANCE_VALUE: 700 OHM
MDC_IDC_MSMT_LEADCHNL_RV_PACING_THRESHOLD_AMPLITUDE: 1.4 V
MDC_IDC_MSMT_LEADCHNL_RV_PACING_THRESHOLD_PULSEWIDTH: 0.4 MS
MDC_IDC_PG_IMPLANT_DTM: NORMAL
MDC_IDC_PG_MFG: NORMAL
MDC_IDC_PG_MODEL: NORMAL
MDC_IDC_PG_SERIAL: NORMAL
MDC_IDC_PG_TYPE: NORMAL
MDC_IDC_SESS_CLINIC_NAME: NORMAL
MDC_IDC_SESS_DTM: NORMAL
MDC_IDC_SESS_TYPE: NORMAL
MDC_IDC_SET_BRADY_LOWRATE: 70 {BEATS}/MIN
MDC_IDC_SET_BRADY_MAX_SENSOR_RATE: 130 {BEATS}/MIN
MDC_IDC_SET_BRADY_MODE: NORMAL
MDC_IDC_SET_CRT_LVRV_DELAY: 30 MS
MDC_IDC_SET_CRT_PACED_CHAMBERS: NORMAL
MDC_IDC_SET_LEADCHNL_LV_PACING_AMPLITUDE: 2.7 V
MDC_IDC_SET_LEADCHNL_LV_PACING_ANODE_ELECTRODE_1: NORMAL
MDC_IDC_SET_LEADCHNL_LV_PACING_ANODE_LOCATION_1: NORMAL
MDC_IDC_SET_LEADCHNL_LV_PACING_CATHODE_ELECTRODE_1: NORMAL
MDC_IDC_SET_LEADCHNL_LV_PACING_CATHODE_LOCATION_1: NORMAL
MDC_IDC_SET_LEADCHNL_LV_PACING_PULSEWIDTH: 0.4 MS
MDC_IDC_SET_LEADCHNL_LV_SENSING_ADAPTATION_MODE: NORMAL
MDC_IDC_SET_LEADCHNL_LV_SENSING_ANODE_ELECTRODE_1: NORMAL
MDC_IDC_SET_LEADCHNL_LV_SENSING_ANODE_LOCATION_1: NORMAL
MDC_IDC_SET_LEADCHNL_LV_SENSING_CATHODE_ELECTRODE_1: NORMAL
MDC_IDC_SET_LEADCHNL_LV_SENSING_CATHODE_LOCATION_1: NORMAL
MDC_IDC_SET_LEADCHNL_LV_SENSING_SENSITIVITY: 2.5 MV
MDC_IDC_SET_LEADCHNL_RA_SENSING_ADAPTATION_MODE: NORMAL
MDC_IDC_SET_LEADCHNL_RA_SENSING_SENSITIVITY: 0.5 MV
MDC_IDC_SET_LEADCHNL_RV_PACING_AMPLITUDE: 2.8 V
MDC_IDC_SET_LEADCHNL_RV_PACING_CAPTURE_MODE: NORMAL
MDC_IDC_SET_LEADCHNL_RV_PACING_POLARITY: NORMAL
MDC_IDC_SET_LEADCHNL_RV_PACING_PULSEWIDTH: 0.4 MS
MDC_IDC_SET_LEADCHNL_RV_SENSING_ADAPTATION_MODE: NORMAL
MDC_IDC_SET_LEADCHNL_RV_SENSING_POLARITY: NORMAL
MDC_IDC_SET_LEADCHNL_RV_SENSING_SENSITIVITY: 2.5 MV
MDC_IDC_SET_ZONE_DETECTION_INTERVAL: 375 MS
MDC_IDC_SET_ZONE_STATUS: NORMAL
MDC_IDC_SET_ZONE_TYPE: NORMAL
MDC_IDC_SET_ZONE_VENDOR_TYPE: NORMAL
MDC_IDC_STAT_BRADY_DTM_END: NORMAL
MDC_IDC_STAT_BRADY_DTM_START: NORMAL
MDC_IDC_STAT_BRADY_RA_PERCENT_PACED: 0 %
MDC_IDC_STAT_BRADY_RV_PERCENT_PACED: 100 %
MDC_IDC_STAT_CRT_DTM_END: NORMAL
MDC_IDC_STAT_CRT_DTM_START: NORMAL
MDC_IDC_STAT_CRT_LV_PERCENT_PACED: 100 %
MDC_IDC_STAT_EPISODE_RECENT_COUNT: 0
MDC_IDC_STAT_EPISODE_RECENT_COUNT_DTM_END: NORMAL
MDC_IDC_STAT_EPISODE_RECENT_COUNT_DTM_START: NORMAL
MDC_IDC_STAT_EPISODE_TYPE: NORMAL
MDC_IDC_STAT_EPISODE_VENDOR_TYPE: NORMAL
MDC_IDC_STAT_EPISODE_VENDOR_TYPE: NORMAL

## (undated) DEVICE — GLOVE PROTEXIS POWDER FREE 8.5 ORTHOPEDIC 2D73ET85

## (undated) DEVICE — CATH EP 60CM 5FR 2-8-2MM SPC-

## (undated) DEVICE — TOURNIQUET SGL  BLADDER 30"X4" BLUE 5921030135

## (undated) DEVICE — INTRO SHEATH 7FRX10CM PINNACLE RSS702

## (undated) DEVICE — GLOVE PROTEXIS W/NEU-THERA 8.5  2D73TE85

## (undated) DEVICE — RAD INTRODUCER KIT MICRO 5FRX10CM .018 NITINOL G/W

## (undated) DEVICE — 7 FR X 115CM, F-J CURVE, EZ STEER BI-DIRECTIONAL DIAG/ABLATION DEFLECTABLE TIP CATH, THERMOCOUPLE, 1-7-4MM SPACING, 4MM TIP

## (undated) DEVICE — MANIFOLD KIT ANGIO AUTOMATED 014613

## (undated) DEVICE — INTRO SHEALTH 8.5FRX63CM SRO 406853

## (undated) DEVICE — LINEN FULL SHEET 5511

## (undated) DEVICE — 52CM INGEVITY MR-CONDITIONAL ENDOCARDIAL PACING LEADS

## (undated) DEVICE — GLOVE PROTEXIS BLUE W/NEU-THERA 7.0  2D73EB70

## (undated) DEVICE — NDL BLUNT 18GA 1" W/O FILTER 305181

## (undated) DEVICE — KIT ENDO TURNOVER/PROCEDURE W/CLEAN A SCOPE LINERS 103888

## (undated) DEVICE — SHEATH PRELUDE SNAP 13CM 6FR

## (undated) DEVICE — SUCTION MANIFOLD NEPTUNE 2 SYS 4 PORT 0702-020-000

## (undated) DEVICE — DEFIB PRO-PADZ LVP LQD GEL ADULT 8900-2105-01

## (undated) DEVICE — GLOVE PROTEXIS W/NEU-THERA 7.0  2D73TE70

## (undated) DEVICE — SU VICRYL 2-0 CT-1 27" UND J259H

## (undated) DEVICE — BLADE SAW SAGITTAL STRK 25X90X1.27MM HD SYS 6 6125-127-090

## (undated) DEVICE — CAST PADDING 6" STERILE 9046S

## (undated) DEVICE — DRSG AQUACEL AG HYDROFIBER  3.5X10" 422605

## (undated) DEVICE — GUIDEWIRE VASC 182CM .014IN CHC PT I H7491216101J2

## (undated) DEVICE — CATH VENOGRAM BLLN W/INFLATION 6225

## (undated) DEVICE — BAG CLEAR TRASH 1.3M 39X33" P4040C

## (undated) DEVICE — INTRO SFSHEATH WORLEY 40CM 9FR S CSGWORLEY109M

## (undated) DEVICE — SYR 03ML LL W/O NDL 309657

## (undated) DEVICE — PREP CHLORAPREP 26ML TINTED HI-LITE ORANGE 930815

## (undated) DEVICE — PACK TOTAL KNEE BOXED LATEX FREE PO15TKFCT

## (undated) DEVICE — ENDO BITE BLOCK ADULT OLYMPUS LATEX FREE MAJ-1632

## (undated) DEVICE — LINEN ORTHO ACL PACK 5447

## (undated) DEVICE — GLOVE PROTEXIS BLUE W/NEU-THERA 8.5  2D73EB85

## (undated) DEVICE — SU ETHIBOND 0 CT-1 CR 8X18" CX21D

## (undated) DEVICE — PACK EP SRG PROC LF DISP SAN32EPFSR

## (undated) DEVICE — BONE CEMENT MIXEVAC III HI VAC KIT  0206-015-000

## (undated) DEVICE — SU VICRYL 1 CT-1 27" J341H

## (undated) DEVICE — SOL NACL 0.9% IRRIG 3000ML BAG 2B7477

## (undated) DEVICE — BLADE SAW SAGITTAL STRK 13X90X1.27MM HD SYS 6 6113-127-090

## (undated) DEVICE — GLOVE PROTEXIS POWDER FREE 7.0 ORTHOPEDIC 2D73ET70

## (undated) DEVICE — KIT HAND CONTROL ANGIOTOUCH ACIST 65CM AT-P65

## (undated) DEVICE — SET HANDPIECE INTERPULSE W/COAXIAL FAN SPRAY TIP 0210118000

## (undated) DEVICE — PACK PCMKR PERM SRG PROC LF SAN32PC573

## (undated) DEVICE — Device

## (undated) RX ORDER — HEPARIN SODIUM 1000 [USP'U]/ML
INJECTION, SOLUTION INTRAVENOUS; SUBCUTANEOUS
Status: DISPENSED
Start: 2020-06-22

## (undated) RX ORDER — TRANEXAMIC ACID 650 MG/1
TABLET ORAL
Status: DISPENSED
Start: 2022-07-25

## (undated) RX ORDER — FENTANYL CITRATE 50 UG/ML
INJECTION, SOLUTION INTRAMUSCULAR; INTRAVENOUS
Status: DISPENSED
Start: 2022-07-25

## (undated) RX ORDER — LIDOCAINE HYDROCHLORIDE 10 MG/ML
INJECTION, SOLUTION EPIDURAL; INFILTRATION; INTRACAUDAL; PERINEURAL
Status: DISPENSED
Start: 2020-06-22

## (undated) RX ORDER — FENTANYL CITRATE 50 UG/ML
INJECTION, SOLUTION INTRAMUSCULAR; INTRAVENOUS
Status: DISPENSED
Start: 2020-06-22

## (undated) RX ORDER — DEXAMETHASONE SODIUM PHOSPHATE 4 MG/ML
INJECTION, SOLUTION INTRA-ARTICULAR; INTRALESIONAL; INTRAMUSCULAR; INTRAVENOUS; SOFT TISSUE
Status: DISPENSED
Start: 2022-07-25

## (undated) RX ORDER — PROPOFOL 10 MG/ML
INJECTION, EMULSION INTRAVENOUS
Status: DISPENSED
Start: 2022-07-25

## (undated) RX ORDER — FENTANYL CITRATE 50 UG/ML
INJECTION, SOLUTION INTRAMUSCULAR; INTRAVENOUS
Status: DISPENSED
Start: 2024-12-16

## (undated) RX ORDER — ONDANSETRON 2 MG/ML
INJECTION INTRAMUSCULAR; INTRAVENOUS
Status: DISPENSED
Start: 2022-07-25

## (undated) RX ORDER — TRANEXAMIC ACID 10 MG/ML
INJECTION, SOLUTION INTRAVENOUS
Status: DISPENSED
Start: 2022-07-25

## (undated) RX ORDER — CEFAZOLIN SODIUM/WATER 2 G/20 ML
SYRINGE (ML) INTRAVENOUS
Status: DISPENSED
Start: 2022-07-25

## (undated) RX ORDER — ONDANSETRON 2 MG/ML
INJECTION INTRAMUSCULAR; INTRAVENOUS
Status: DISPENSED
Start: 2020-06-22

## (undated) RX ORDER — BUPIVACAINE HYDROCHLORIDE 2.5 MG/ML
INJECTION, SOLUTION EPIDURAL; INFILTRATION; INTRACAUDAL
Status: DISPENSED
Start: 2020-06-22

## (undated) RX ORDER — LIDOCAINE HYDROCHLORIDE 10 MG/ML
INJECTION, SOLUTION EPIDURAL; INFILTRATION; INTRACAUDAL; PERINEURAL
Status: DISPENSED
Start: 2022-07-25

## (undated) RX ORDER — ACETAMINOPHEN 325 MG/1
TABLET ORAL
Status: DISPENSED
Start: 2022-07-25

## (undated) RX ORDER — VANCOMYCIN HYDROCHLORIDE 1 G/20ML
INJECTION, POWDER, LYOPHILIZED, FOR SOLUTION INTRAVENOUS
Status: DISPENSED
Start: 2022-07-25

## (undated) RX ORDER — LIDOCAINE HYDROCHLORIDE 10 MG/ML
INJECTION, SOLUTION EPIDURAL; INFILTRATION; INTRACAUDAL; PERINEURAL
Status: DISPENSED
Start: 2024-12-16

## (undated) RX ORDER — GLYCOPYRROLATE 0.2 MG/ML
INJECTION INTRAMUSCULAR; INTRAVENOUS
Status: DISPENSED
Start: 2022-07-25